# Patient Record
Sex: FEMALE | Race: BLACK OR AFRICAN AMERICAN | Employment: UNEMPLOYED | URBAN - METROPOLITAN AREA
[De-identification: names, ages, dates, MRNs, and addresses within clinical notes are randomized per-mention and may not be internally consistent; named-entity substitution may affect disease eponyms.]

---

## 2017-01-09 ENCOUNTER — HOSPITAL ENCOUNTER (INPATIENT)
Age: 42
LOS: 2 days | Discharge: HOME | DRG: 189 | End: 2017-01-11
Attending: EMERGENCY MEDICINE | Admitting: INTERNAL MEDICINE
Payer: MEDICAID

## 2017-01-09 ENCOUNTER — APPOINTMENT (OUTPATIENT)
Dept: GENERAL RADIOLOGY | Age: 42
DRG: 189 | End: 2017-01-09
Attending: EMERGENCY MEDICINE
Payer: MEDICAID

## 2017-01-09 DIAGNOSIS — J96.00 ACUTE RESPIRATORY FAILURE, UNSPECIFIED WHETHER WITH HYPOXIA OR HYPERCAPNIA (HCC): Primary | ICD-10-CM

## 2017-01-09 PROBLEM — J44.9 COPD (CHRONIC OBSTRUCTIVE PULMONARY DISEASE) (HCC): Status: ACTIVE | Noted: 2017-01-09

## 2017-01-09 LAB
ALBUMIN SERPL BCP-MCNC: 3.9 G/DL (ref 3.5–5)
ALBUMIN/GLOB SERPL: 1 {RATIO} (ref 1.1–2.2)
ALP SERPL-CCNC: 78 U/L (ref 45–117)
ALT SERPL-CCNC: 25 U/L (ref 12–78)
AMMONIA PLAS-SCNC: 22 UMOL/L
ANION GAP BLD CALC-SCNC: 6 MMOL/L (ref 5–15)
ARTERIAL PATENCY WRIST A: YES
AST SERPL W P-5'-P-CCNC: 27 U/L (ref 15–37)
BASE DEFICIT BLDA-SCNC: 2.7 MMOL/L
BASOPHILS # BLD AUTO: 0 K/UL (ref 0–0.1)
BASOPHILS # BLD: 1 % (ref 0–1)
BDY SITE: ABNORMAL
BILIRUB SERPL-MCNC: 0.2 MG/DL (ref 0.2–1)
BUN SERPL-MCNC: 9 MG/DL (ref 6–20)
BUN/CREAT SERPL: 10 (ref 12–20)
CALCIUM SERPL-MCNC: 8.4 MG/DL (ref 8.5–10.1)
CHLORIDE SERPL-SCNC: 106 MMOL/L (ref 97–108)
CO2 SERPL-SCNC: 30 MMOL/L (ref 21–32)
CREAT SERPL-MCNC: 0.92 MG/DL (ref 0.55–1.02)
EOSINOPHIL # BLD: 0.6 K/UL (ref 0–0.4)
EOSINOPHIL NFR BLD: 12 % (ref 0–7)
ERYTHROCYTE [DISTWIDTH] IN BLOOD BY AUTOMATED COUNT: 15.6 % (ref 11.5–14.5)
GLOBULIN SER CALC-MCNC: 3.8 G/DL (ref 2–4)
GLUCOSE SERPL-MCNC: 98 MG/DL (ref 65–100)
HCO3 BLDA-SCNC: 24 MMOL/L (ref 22–26)
HCT VFR BLD AUTO: 38.4 % (ref 35–47)
HGB BLD-MCNC: 12.5 G/DL (ref 11.5–16)
INR PPP: 1.1 (ref 0.9–1.1)
LACTATE SERPL-SCNC: 1.4 MMOL/L (ref 0.4–2)
LYMPHOCYTES # BLD AUTO: 36 % (ref 12–49)
LYMPHOCYTES # BLD: 1.7 K/UL (ref 0.8–3.5)
MCH RBC QN AUTO: 29.3 PG (ref 26–34)
MCHC RBC AUTO-ENTMCNC: 32.6 G/DL (ref 30–36.5)
MCV RBC AUTO: 89.9 FL (ref 80–99)
MONOCYTES # BLD: 0.5 K/UL (ref 0–1)
MONOCYTES NFR BLD AUTO: 10 % (ref 5–13)
NEUTS SEG # BLD: 2 K/UL (ref 1.8–8)
NEUTS SEG NFR BLD AUTO: 41 % (ref 32–75)
PCO2 BLDA: 47 MMHG (ref 35–45)
PH BLDA: 7.32 [PH] (ref 7.35–7.45)
PLATELET # BLD AUTO: 278 K/UL (ref 150–400)
PO2 BLDA: 201 MMHG (ref 80–100)
POTASSIUM SERPL-SCNC: 3.2 MMOL/L (ref 3.5–5.1)
PROT SERPL-MCNC: 7.7 G/DL (ref 6.4–8.2)
PROTHROMBIN TIME: 11.1 SEC (ref 9–11.1)
RBC # BLD AUTO: 4.27 M/UL (ref 3.8–5.2)
SAO2 % BLD: 99 % (ref 92–97)
SAO2% DEVICE SAO2% SENSOR NAME: ABNORMAL
SODIUM SERPL-SCNC: 142 MMOL/L (ref 136–145)
SPECIMEN SITE: ABNORMAL
WBC # BLD AUTO: 4.7 K/UL (ref 3.6–11)

## 2017-01-09 PROCEDURE — 80053 COMPREHEN METABOLIC PANEL: CPT | Performed by: EMERGENCY MEDICINE

## 2017-01-09 PROCEDURE — 83605 ASSAY OF LACTIC ACID: CPT | Performed by: EMERGENCY MEDICINE

## 2017-01-09 PROCEDURE — 65660000000 HC RM CCU STEPDOWN

## 2017-01-09 PROCEDURE — 71010 XR CHEST PORT: CPT

## 2017-01-09 PROCEDURE — 74011000250 HC RX REV CODE- 250: Performed by: EMERGENCY MEDICINE

## 2017-01-09 PROCEDURE — 74011000250 HC RX REV CODE- 250: Performed by: INTERNAL MEDICINE

## 2017-01-09 PROCEDURE — 74011250636 HC RX REV CODE- 250/636: Performed by: INTERNAL MEDICINE

## 2017-01-09 PROCEDURE — 74011250637 HC RX REV CODE- 250/637: Performed by: INTERNAL MEDICINE

## 2017-01-09 PROCEDURE — 82803 BLOOD GASES ANY COMBINATION: CPT | Performed by: EMERGENCY MEDICINE

## 2017-01-09 PROCEDURE — 77030013140 HC MSK NEB VYRM -A

## 2017-01-09 PROCEDURE — 94644 CONT INHLJ TX 1ST HOUR: CPT

## 2017-01-09 PROCEDURE — 36600 WITHDRAWAL OF ARTERIAL BLOOD: CPT | Performed by: EMERGENCY MEDICINE

## 2017-01-09 PROCEDURE — 85025 COMPLETE CBC W/AUTO DIFF WBC: CPT | Performed by: EMERGENCY MEDICINE

## 2017-01-09 PROCEDURE — 96374 THER/PROPH/DIAG INJ IV PUSH: CPT

## 2017-01-09 PROCEDURE — 36415 COLL VENOUS BLD VENIPUNCTURE: CPT | Performed by: EMERGENCY MEDICINE

## 2017-01-09 PROCEDURE — 82140 ASSAY OF AMMONIA: CPT | Performed by: EMERGENCY MEDICINE

## 2017-01-09 PROCEDURE — 74011250636 HC RX REV CODE- 250/636: Performed by: EMERGENCY MEDICINE

## 2017-01-09 PROCEDURE — 85610 PROTHROMBIN TIME: CPT | Performed by: EMERGENCY MEDICINE

## 2017-01-09 PROCEDURE — 80178 ASSAY OF LITHIUM: CPT | Performed by: INTERNAL MEDICINE

## 2017-01-09 PROCEDURE — 96361 HYDRATE IV INFUSION ADD-ON: CPT

## 2017-01-09 PROCEDURE — 99285 EMERGENCY DEPT VISIT HI MDM: CPT

## 2017-01-09 RX ORDER — ALBUTEROL SULFATE 0.83 MG/ML
1.25 SOLUTION RESPIRATORY (INHALATION)
Status: DISCONTINUED | OUTPATIENT
Start: 2017-01-09 | End: 2017-01-10

## 2017-01-09 RX ORDER — QUETIAPINE FUMARATE 25 MG/1
50 TABLET, FILM COATED ORAL 2 TIMES DAILY
Status: DISCONTINUED | OUTPATIENT
Start: 2017-01-10 | End: 2017-01-11 | Stop reason: HOSPADM

## 2017-01-09 RX ORDER — SODIUM CHLORIDE 0.9 % (FLUSH) 0.9 %
5-10 SYRINGE (ML) INJECTION AS NEEDED
Status: DISCONTINUED | OUTPATIENT
Start: 2017-01-09 | End: 2017-01-11 | Stop reason: HOSPADM

## 2017-01-09 RX ORDER — LITHIUM CARBONATE 300 MG/1
300 CAPSULE ORAL 2 TIMES DAILY
Status: DISCONTINUED | OUTPATIENT
Start: 2017-01-09 | End: 2017-01-11 | Stop reason: HOSPADM

## 2017-01-09 RX ORDER — ALBUTEROL SULFATE 2.5 MG/.5ML
5 SOLUTION RESPIRATORY (INHALATION) CONTINUOUS
Status: DISCONTINUED | OUTPATIENT
Start: 2017-01-09 | End: 2017-01-09

## 2017-01-09 RX ORDER — CLONAZEPAM 0.5 MG/1
0.5 TABLET ORAL 2 TIMES DAILY
Status: DISCONTINUED | OUTPATIENT
Start: 2017-01-09 | End: 2017-01-11 | Stop reason: HOSPADM

## 2017-01-09 RX ORDER — ALBUTEROL SULFATE 0.83 MG/ML
10 SOLUTION RESPIRATORY (INHALATION) CONTINUOUS
Status: DISCONTINUED | OUTPATIENT
Start: 2017-01-09 | End: 2017-01-10

## 2017-01-09 RX ORDER — SODIUM CHLORIDE 0.9 % (FLUSH) 0.9 %
5-10 SYRINGE (ML) INJECTION EVERY 8 HOURS
Status: DISCONTINUED | OUTPATIENT
Start: 2017-01-09 | End: 2017-01-11 | Stop reason: HOSPADM

## 2017-01-09 RX ORDER — PROCHLORPERAZINE EDISYLATE 5 MG/ML
5 INJECTION INTRAMUSCULAR; INTRAVENOUS
Status: DISCONTINUED | OUTPATIENT
Start: 2017-01-09 | End: 2017-01-11 | Stop reason: HOSPADM

## 2017-01-09 RX ORDER — SERTRALINE HYDROCHLORIDE 50 MG/1
100 TABLET, FILM COATED ORAL DAILY
Status: DISCONTINUED | OUTPATIENT
Start: 2017-01-10 | End: 2017-01-10 | Stop reason: SDUPTHER

## 2017-01-09 RX ORDER — IPRATROPIUM BROMIDE 0.5 MG/2.5ML
0.5 SOLUTION RESPIRATORY (INHALATION)
Status: DISCONTINUED | OUTPATIENT
Start: 2017-01-09 | End: 2017-01-10

## 2017-01-09 RX ORDER — PRAZOSIN HYDROCHLORIDE 1 MG/1
2 CAPSULE ORAL
Status: DISCONTINUED | OUTPATIENT
Start: 2017-01-09 | End: 2017-01-11 | Stop reason: HOSPADM

## 2017-01-09 RX ORDER — POTASSIUM CHLORIDE 7.45 MG/ML
10 INJECTION INTRAVENOUS ONCE
Status: COMPLETED | OUTPATIENT
Start: 2017-01-10 | End: 2017-01-10

## 2017-01-09 RX ORDER — HEPARIN SODIUM 5000 [USP'U]/ML
5000 INJECTION, SOLUTION INTRAVENOUS; SUBCUTANEOUS EVERY 8 HOURS
Status: DISCONTINUED | OUTPATIENT
Start: 2017-01-10 | End: 2017-01-11 | Stop reason: HOSPADM

## 2017-01-09 RX ADMIN — ALBUTEROL SULFATE 1.25 MG: 2.5 SOLUTION RESPIRATORY (INHALATION) at 21:35

## 2017-01-09 RX ADMIN — Medication 10 ML: at 21:22

## 2017-01-09 RX ADMIN — IPRATROPIUM BROMIDE 0.5 MG: 0.5 SOLUTION RESPIRATORY (INHALATION) at 21:35

## 2017-01-09 RX ADMIN — CLONAZEPAM 0.5 MG: 0.5 TABLET ORAL at 21:35

## 2017-01-09 RX ADMIN — GABAPENTIN 800 MG: 100 CAPSULE ORAL at 21:21

## 2017-01-09 RX ADMIN — METHYLPREDNISOLONE SODIUM SUCCINATE 125 MG: 125 INJECTION, POWDER, FOR SOLUTION INTRAMUSCULAR; INTRAVENOUS at 19:43

## 2017-01-09 RX ADMIN — AZITHROMYCIN MONOHYDRATE 500 MG: 500 INJECTION, POWDER, LYOPHILIZED, FOR SOLUTION INTRAVENOUS at 21:22

## 2017-01-09 RX ADMIN — GUAIFENESIN 600 MG: 600 TABLET, EXTENDED RELEASE ORAL at 21:39

## 2017-01-09 RX ADMIN — LIDOCAINE HYDROCHLORIDE 1 G: 10 INJECTION, SOLUTION EPIDURAL; INFILTRATION; INTRACAUDAL; PERINEURAL at 21:21

## 2017-01-09 RX ADMIN — PROCHLORPERAZINE EDISYLATE 5 MG: 5 INJECTION INTRAMUSCULAR; INTRAVENOUS at 23:17

## 2017-01-09 RX ADMIN — SODIUM CHLORIDE 1000 ML: 900 INJECTION, SOLUTION INTRAVENOUS at 19:44

## 2017-01-09 RX ADMIN — ALBUTEROL SULFATE 10 MG: 2.5 SOLUTION RESPIRATORY (INHALATION) at 18:17

## 2017-01-09 NOTE — IP AVS SNAPSHOT
Summary of Care Report The Summary of Care report has been created to help improve care coordination. Users with access to [employer] or [employer] (Web-based application) may access additional patient information including the Discharge Summary. If you are not currently a [address] user and need more information, please call the number listed below in the [place] [address] section and ask to be connected with Medical Records. Facility Information Name Address Phone [address] [address] [phone] Patient Information Patient Name Sex MALENA Han ([id number]) Female 1975 Discharge Information Admitting Provider Service Area Unit Kayden Beavers MD / [phone] [address] [facility] / [phone] Discharge Provider Discharge Date/Time Discharge Disposition Destination (none) 2017 (Pending) AHR (none) Patient Language Language ENGLISH [13] Problem List as of 2017  Date Reviewed: 2017 Codes Priority Class Noted - Resolved Unspecified asthma, with exacerbation ICD-10-CM: J45.901 ICD-9-CM: 493.92   2014 - Present Heroin abuse ICD-10-CM: F11.10 ICD-9-CM: 305.50   2014 - Present COPD (chronic obstructive pulmonary disease) (HCC) ICD-10-CM: J44.9 ICD-9-CM: 896   2017 - Present Sinus tachycardia ICD-10-CM: R00.0 ICD-9-CM: 427.89   1/10/2017 - Present HTN (hypertension) ICD-10-CM: I10 
ICD-9-CM: 401.9   2017 - Present Drug abuse ICD-10-CM: F19.10 ICD-9-CM: 305.90   2017 - Present Bipolar disorder ([address] ICD-10-CM: F31.9 ICD-9-CM: 296.80   2017 - Present You are allergic to the following No active allergies Current Discharge Medication List  
  
START taking these medications Dose & Instructions Dispensing Information Comments  
 acetaminophen 325 mg tablet Commonly known as:  TYLENOL Dose:  650 mg Take 2 Tabs by mouth every four (4) hours as needed for Fever. Quantity:  40 Tab Refills:  0  
   
 albuterol-ipratropium 2.5 mg-0.5 mg/3 ml Nebu Commonly known as:  Ryan Marques Dose:  3 mL  
3 mL by Nebulization route every six (6) hours as needed. Quantity:  100 Nebule Refills:  1  
   
 dilTIAZem  mg ER capsule Commonly known as:  CARDIZEM CD Dose:  120 mg Take 1 Cap by mouth daily. Quantity:  30 Cap Refills:  1  
   
 fluticasone-vilanterol 200-25 mcg/dose inhaler Commonly known as:  BREO ELLIPTA Dose:  1 Puff Take 1 Puff by inhalation daily. Quantity:  28 Each Refills:  1  
   
 levoFLOXacin 500 mg tablet Commonly known as:  Fairchild Parma Dose:  500 mg Take 1 Tab by mouth daily for 10 days. Quantity:  10 Tab Refills:  0 Nebulizer & Compressor machine Dose:  1 Each  
1 Each by Does Not Apply route daily. Quantity:  1 Each Refills:  0  
   
 nicotine 14 mg/24 hr patch Commonly known as:  Erskin Neither Dose:  1 Patch 1 Patch by TransDERmal route daily for 30 days. Quantity:  30 Patch Refills:  0  
   
 oxyCODONE-acetaminophen 5-325 mg per tablet Commonly known as:  PERCOCET Dose:  1 Tab Take 1 Tab by mouth every six (6) hours as needed. Max Daily Amount: 4 Tabs. Quantity:  15 Tab Refills:  0 CONTINUE these medications which have CHANGED Dose & Instructions Dispensing Information Comments  
 albuterol 90 mcg/actuation inhaler Commonly known as:  PROVENTIL HFA, VENTOLIN HFA, PROAIR HFA What changed:   
- how much to take 
- reasons to take this - Another medication with the same name was removed. Continue taking this medication, and follow the directions you see here. Dose:  2 Puff Take 2 Puffs by inhalation every four (4) hours as needed for Wheezing or Shortness of Breath. Quantity:  1 Inhaler Refills:  0  
   
 prazosin 2 mg capsule Commonly known as:  MINIPRESS What changed:  Another medication with the same name was removed. Continue taking this medication, and follow the directions you see here. Dose:  2 mg Take 1 Cap by mouth nightly. Quantity:  30 Cap Refills:  1  
   
 predniSONE 10 mg tablet Commonly known as:  Radhasy Arnoldll What changed:   
- how much to take 
- how to take this - when to take this 
- reasons to take this 
- additional instructions Dose:  10 mg Take 1 Tab by mouth Multiple. Take 2 tab po daily for 3 days Then take 1 tab po daily for 3 days Then take 1/2 tab po daily for 3 days then stop Quantity:  12 Tab Refills:  0 QUEtiapine 400 mg tablet Commonly known as:  SEROquel What changed:   
- medication strength 
- how much to take - Another medication with the same name was removed. Continue taking this medication, and follow the directions you see here. Dose:  400 mg Take 1 Tab by mouth two (2) times a day. Quantity:  60 Tab Refills:  1 CONTINUE these medications which have NOT CHANGED Dose & Instructions Dispensing Information Comments  
 clonazePAM 0.5 mg tablet Commonly known as:  Dhaval Brocks Dose:  0.5 mg Take 1 Tab by mouth two (2) times a day. Max Daily Amount: 1 mg. Quantity:  60 Tab Refills:  0  
   
 cloNIDine HCl 0.3 mg tablet Commonly known as:  CATAPRES Dose:  0.3 mg Take 1 Tab by mouth three (3) times daily. Quantity:  90 Tab Refills:  1  
   
 gabapentin 800 mg tablet Commonly known as:  NEURONTIN Dose:  800 mg Take 1 Tab by mouth three (3) times daily. Quantity:  90 Tab Refills:  1  
   
 lithium carbonate 300 mg capsule Dose:  300 mg Take 1 Cap by mouth two (2) times daily (with meals). Quantity:  60 Cap Refills:  1  
   
 sertraline 100 mg tablet Commonly known as:  ZOLOFT Dose:  100 mg Take 1 Tab by mouth daily. Quantity:  30 Tab Refills:  1 STOP taking these medications Comments  
 budesonide-formoterol 160-4.5 mcg/actuation HFA inhaler Commonly known as:  SYMBICORT  
   
   
 SYMBICORT IN  
   
   
  
  
  
Current Immunizations Name Date Influenza Vaccine  Deferred (Patient Refused) Pneumococcal Polysaccharide (PPSV-23)  Deferred (Patient Refused) Follow-up Information Follow up With Details Comments Contact Info None   None (395) Patient stated that they have no PCP Discharge Instructions HOSPITALIST DISCHARGE INSTRUCTIONS 
NAME: Milena Dobbs :  1975 MRN:  [id number] Date/Time:  2017 9:59 AM 
 
ADMIT DATE: 2017 DISCHARGE DATE: 2017 ADMITTING DIAGNOSIS:  Respiratory Failure, COPD Exacerbation, HTN, Bipolar Ds, Chronic Pain, Smoker, Drug Abuse Patient Active Problem List  
Diagnosis Code  Unspecified asthma, with exacerbation J45. [address]  Heroin abuse F11.10  
 COPD (chronic obstructive pulmonary disease) ([facility]) J44.9  Sinus tachycardia R00.0  
 HTN (hypertension) I10  
 Drug abuse F19.10  Bipolar disorder ([facility] F31.9 DISCHARGE DIAGNOSIS: 
Patient Active Problem List  
Diagnosis Code  Unspecified asthma, with exacerbation J45. [address]  Heroin abuse F11.10  
 COPD (chronic obstructive pulmonary disease) (HCC) J44.9  Sinus tachycardia R00.0  
 HTN (hypertension) I10  
 Drug abuse F19.10  Bipolar disorder ([facility] F31.9 MEDICATIONS: 
  
 
         As per medication reconciliation · It is important that you take the medication exactly as they are prescribed. · Keep your medication in the bottles provided by the pharmacist and keep a list of the medication names, dosages, and times to be taken in your wallet. · Do not take other medications without consulting your doctor. Pain Management: per above medications What to do at [facility] Recommended diet:  Cardiac Diet Recommended activity: Activity as tolerated and No driving while on analgesics If you experience any of the following symptoms then please call your primary care physician or return to the emergency room if you cannot get hold of your doctor: 
Fever, chills, nausea, vomiting, diarrhea, change in mentation, falling, bleeding, shortness of breath. Follow Up: 
 PCP you are to call and set up an appointment to see them in 2 week. Information obtained by : 
I understand that if any problems occur once I am at home I am to contact my physician. I understand and acknowledge receipt of the instructions indicated above. Physician's or R.N.'s Signature                                                                  Date/Time Patient or Representative Signature                                                          Date/Time Chart Review Routing History Recipient Method Report Sent By Bill Gandara None [address] IP Auto Routed Notes Fabian Hall MD [facility] 1/30/2014  6:20 AM 01/30/2014 None [address] IP Auto Routed Notes Fabian Hall MD [facility] 2/2/2014  2:20 PM 02/02/2014 None [address] IP Auto Routed Notes Glenys Jaeger MD [[id number]] 2/4/2014 11:22 PM 02/04/2014 None [address] IP Auto Routed Notes Amy Kirby MD [[id number]] 2/6/2014  4:20 PM 02/06/2014

## 2017-01-09 NOTE — ED NOTES
Pt reports being difficult IV stick, always requiring it \"in my neck\". 1 attempt at IV insertion by Jamison Kirkland RN, with no success.

## 2017-01-09 NOTE — IP AVS SNAPSHOT
[address] [facility] 
[phone] Patient: Cherry Guido MRN: [id number] :1975 You are allergic to the following No active allergies Recent Documentation Height Weight BMI OB Status Smoking Status 1.626 m 68 kg 25.75 kg/m2 Having regular periods Current Every Day Smoker Unresulted Labs Order Current Status LEGIONELLA PNEUMOPHILA AG, URINE  In process S. PNEUMO AG, URINE/CSF ONLY In process T3 TOTAL In process CULTURE, URINE Preliminary result CULTURE, URINE Preliminary result Emergency Contacts Name Discharge Info Relation Home Work Mobile Amalia Kim  Parent [1] [phone] About your hospitalization You were admitted on:  2017 You last received care in the:  [facility] 2 CARDIOPULMONARY CARE You were discharged on:  2017 Unit phone number:  [phone] Why you were hospitalized Your primary diagnosis was:  Not on File Your diagnoses also included:  Copd (Chronic Obstructive Pulmonary Disease) (Hcc), Sinus Tachycardia, Htn (Hypertension), Drug Abuse, Bipolar Disorder (Hcc) Providers Seen During Your Hospitalizations Provider Role Specialty Primary office phone Clarke Turner MD Attending Provider Emergency Medicine [phone] Jodi Godoy MD Attending Provider Hospitalist [phone] Your Primary Care Physician (PCP) Primary Care Physician Office Phone Office Fax NONE ** None ** ** None ** Follow-up Information Follow up With Details Comments Contact Info None   None (395) Patient stated that they have no PCP Current Discharge Medication List  
  
START taking these medications Dose & Instructions Dispensing Information Comments Morning Noon Evening Bedtime  
 acetaminophen 325 mg tablet Commonly known as:  TYLENOL  
   
 Your next dose is: Today, Tomorrow Other:  _________ Dose:  650 mg Take 2 Tabs by mouth every four (4) hours as needed for Fever. Quantity:  40 Tab Refills:  0  
     
   
   
   
  
 albuterol-ipratropium 2.5 mg-0.5 mg/3 ml Nebu Commonly known as:  Rosario Medicus Your next dose is: Today, Tomorrow Other:  _________ Dose:  3 mL  
3 mL by Nebulization route every six (6) hours as needed. Quantity:  100 Nebule Refills:  1  
     
   
   
   
  
 dilTIAZem  mg ER capsule Commonly known as:  CARDIZEM CD Your next dose is: Today, Tomorrow Other:  _________ Dose:  120 mg Take 1 Cap by mouth daily. Quantity:  30 Cap Refills:  1  
     
   
   
   
  
 fluticasone-vilanterol 200-25 mcg/dose inhaler Commonly known as:  BREO ELLIPTA Your next dose is: Today, Tomorrow Other:  _________ Dose:  1 Puff Take 1 Puff by inhalation daily. Quantity:  28 Each Refills:  1  
     
   
   
   
  
 levoFLOXacin 500 mg tablet Commonly known as:  Duaine Hurter Your next dose is: Today, Tomorrow Other:  _________ Dose:  500 mg Take 1 Tab by mouth daily for 10 days. Quantity:  10 Tab Refills:  0 Nebulizer & Compressor machine Your next dose is: Today, Tomorrow Other:  _________ Dose:  1 Each  
1 Each by Does Not Apply route daily. Quantity:  1 Each Refills:  0  
     
   
   
   
  
 nicotine 14 mg/24 hr patch Commonly known as:  Rola Capri Your next dose is: Today, Tomorrow Other:  _________ Dose:  1 Patch 1 Patch by TransDERmal route daily for 30 days. Quantity:  30 Patch Refills:  0  
     
   
   
   
  
 oxyCODONE-acetaminophen 5-325 mg per tablet Commonly known as:  PERCOCET Your next dose is: Today, Tomorrow Other:  _________ Dose:  1 Tab Take 1 Tab by mouth every six (6) hours as needed. Max Daily Amount: 4 Tabs. Quantity:  15 Tab Refills:  0 CONTINUE these medications which have CHANGED Dose & Instructions Dispensing Information Comments Morning Noon Evening Bedtime  
 albuterol 90 mcg/actuation inhaler Commonly known as:  PROVENTIL HFA, VENTOLIN HFA, PROAIR HFA What changed:   
- how much to take 
- reasons to take this - Another medication with the same name was removed. Continue taking this medication, and follow the directions you see here. Your next dose is: Today, Tomorrow Other:  _________ Dose:  2 Puff Take 2 Puffs by inhalation every four (4) hours as needed for Wheezing or Shortness of Breath. Quantity:  1 Inhaler Refills:  0  
     
   
   
   
  
 prazosin 2 mg capsule Commonly known as:  MINIPRESS What changed:  Another medication with the same name was removed. Continue taking this medication, and follow the directions you see here. Your next dose is: Today, Tomorrow Other:  _________ Dose:  2 mg Take 1 Cap by mouth nightly. Quantity:  30 Cap Refills:  1  
     
   
   
   
  
 predniSONE 10 mg tablet Commonly known as:  Merle Kendell What changed:   
- how much to take 
- how to take this - when to take this 
- reasons to take this 
- additional instructions Your next dose is: Today, Tomorrow Other:  _________ Dose:  10 mg Take 1 Tab by mouth Multiple. Take 2 tab po daily for 3 days Then take 1 tab po daily for 3 days Then take 1/2 tab po daily for 3 days then stop Quantity:  12 Tab Refills:  0 QUEtiapine 400 mg tablet Commonly known as:  SEROquel What changed:   
- medication strength 
- how much to take - Another medication with the same name was removed. Continue taking this medication, and follow the directions you see here. Your next dose is: Today, Tomorrow Other:  _________ Dose:  400 mg Take 1 Tab by mouth two (2) times a day. Quantity:  60 Tab Refills:  1 CONTINUE these medications which have NOT CHANGED Dose & Instructions Dispensing Information Comments Morning Noon Evening Bedtime  
 clonazePAM 0.5 mg tablet Commonly known as:  [place] Hany Your next dose is: Today, Tomorrow Other:  _________ Dose:  0.5 mg Take 1 Tab by mouth two (2) times a day. Max Daily Amount: 1 mg. Quantity:  60 Tab Refills:  0  
     
   
   
   
  
 cloNIDine HCl 0.3 mg tablet Commonly known as:  CATAPRES Your next dose is: Today, Tomorrow Other:  _________ Dose:  0.3 mg Take 1 Tab by mouth three (3) times daily. Quantity:  90 Tab Refills:  1  
     
   
   
   
  
 gabapentin 800 mg tablet Commonly known as:  NEURONTIN Your next dose is: Today, Tomorrow Other:  _________ Dose:  800 mg Take 1 Tab by mouth three (3) times daily. Quantity:  90 Tab Refills:  1  
     
   
   
   
  
 lithium carbonate 300 mg capsule Your next dose is: Today, Tomorrow Other:  _________ Dose:  300 mg Take 1 Cap by mouth two (2) times daily (with meals). Quantity:  60 Cap Refills:  1  
     
   
   
   
  
 sertraline 100 mg tablet Commonly known as:  ZOLOFT Your next dose is: Today, Tomorrow Other:  _________ Dose:  100 mg Take 1 Tab by mouth daily. Quantity:  30 Tab Refills:  1 STOP taking these medications   
 budesonide-formoterol 160-4.5 mcg/actuation HFA inhaler Commonly known as:  SYMBICORT  
   
  
 SYMBICORT IN Where to Get Your Medications Information on where to get these meds will be given to you by the nurse or doctor. ! Ask your nurse or doctor about these medications acetaminophen 325 mg tablet  
 albuterol 90 mcg/actuation inhaler  
 albuterol-ipratropium 2.5 mg-0.5 mg/3 ml Nebu  
 clonazePAM 0.5 mg tablet  
 cloNIDine HCl 0.3 mg tablet  
 dilTIAZem  mg ER capsule  
 fluticasone-vilanterol 200-25 mcg/dose inhaler  
 gabapentin 800 mg tablet  
 levoFLOXacin 500 mg tablet  
 lithium carbonate 300 mg capsule Nebulizer & Compressor machine  
 nicotine 14 mg/24 hr patch  
 oxyCODONE-acetaminophen 5-325 mg per tablet  
 prazosin 2 mg capsule  
 predniSONE 10 mg tablet QUEtiapine 400 mg tablet  
 sertraline 100 mg tablet Discharge Instructions HOSPITALIST DISCHARGE INSTRUCTIONS 
NAME: David Dunn :  1975 MRN:  [id number] Date/Time:  2017 9:59 AM 
 
ADMIT DATE: 2017 DISCHARGE DATE: 2017 ADMITTING DIAGNOSIS:  Respiratory Failure, COPD Exacerbation, HTN, Bipolar Ds, Chronic Pain, Smoker, Drug Abuse Patient Active Problem List  
Diagnosis Code  Unspecified asthma, with exacerbation J45. 0  Heroin abuse F11.10  
 COPD (chronic obstructive pulmonary disease) ([facility]) J44.9  Sinus tachycardia R00.0  
 HTN (hypertension) I10  
 Drug abuse F19.10  Bipolar disorder ([facility] F31.9 DISCHARGE DIAGNOSIS: 
Patient Active Problem List  
Diagnosis Code  Unspecified asthma, with exacerbation J45. 0  Heroin abuse F11.10  
 COPD (chronic obstructive pulmonary disease) (HCC) J44.9  Sinus tachycardia R00.0  
 HTN (hypertension) I10  
 Drug abuse F19.10  Bipolar disorder ([facility] F31.9 MEDICATIONS: 
  
 
         As per medication reconciliation · It is important that you take the medication exactly as they are prescribed. · Keep your medication in the bottles provided by the pharmacist and keep a list of the medication names, dosages, and times to be taken in your wallet. · Do not take other medications without consulting your doctor. Pain Management: per above medications What to do at [facility] Recommended diet:  Cardiac Diet Recommended activity: Activity as tolerated and No driving while on analgesics If you experience any of the following symptoms then please call your primary care physician or return to the emergency room if you cannot get hold of your doctor: 
Fever, chills, nausea, vomiting, diarrhea, change in mentation, falling, bleeding, shortness of breath. Follow Up: 
 PCP you are to call and set up an appointment to see them in 2 week. Information obtained by : 
I understand that if any problems occur once I am at home I am to contact my physician. I understand and acknowledge receipt of the instructions indicated above. Physician's or R.N.'s Signature                                                                  Date/Time Patient or Representative Signature                                                          Date/Time Discharge Orders None [employer] Announcement We are excited to announce that we are making your provider's discharge notes available to you in [employer]. You will see these notes when they are completed and signed by the physician that discharged you from your recent hospital stay. If you have any questions or concerns about any information you see in [employer], please call the Health Information Department where you were seen or reach out to your Primary Care Provider for more information about your plan of care. Introducing [facility]! Dede Sharpe introduces [employer] patient portal. Now you can access parts of your medical record, email your doctor's office, and request medication refills online.    
 
1. In your internet browser, go to [email or web address] 2. Click on the First Time User? Click Here link in the Sign In box. You will see the New Member Sign Up page. 3. Enter your [employer] Access Code exactly as it appears below. You will not need to use this code after youve completed the sign-up process. If you do not sign up before the expiration date, you must request a new code. · [employer] Access Code: [email or web address] Expires: 4/9/2017  6:14 PM 
 
4. Enter the last four digits of your Social Security Number (xxxx) and Date of Birth (mm/dd/yyyy) as indicated and click Submit. You will be taken to the next sign-up page. 5. Create a [employer] ID. This will be your [employer] login ID and cannot be changed, so think of one that is secure and easy to remember. 6. Create a [employer] password. You can change your password at any time. 7. Enter your Password Reset Question and Answer. This can be used at a later time if you forget your password. 8. Enter your e-mail address. You will receive e-mail notification when new information is available in [address] 9. Click Sign Up. You can now view and download portions of your medical record. 10. Click the Download Summary menu link to download a portable copy of your medical information. If you have questions, please visit the Frequently Asked Questions section of the [employer] website. Remember, [employer] is NOT to be used for urgent needs. For medical emergencies, dial 911. Now available from your iPhone and Android! General Information Please provide this summary of care documentation to your next provider. Patient Signature:  ____________________________________________________________ Date:  ____________________________________________________________  
  
Scharlene Alosa Provider Signature:  ____________________________________________________________ Date:  ____________________________________________________________

## 2017-01-09 NOTE — ED PROVIDER NOTES
HPI Comments: Livier Ross is a 35 y.o. Female with PMHx significant for HTN, COPD, and asthma with intubation who presents via EMS to the ED to be evaluated for respiratory distress. Per EMS, pt was initially at 80% SO2 upon their arrival, but improved to 100% SO2 when she was placed on C-PAP at 8L with duo-nebulizer. According to EMS, pt is severely orthopneic. She had a recent drug relapse (heroin) this morning. She called 911 this afternoon but was unable to speak. PCP: None     Social hx: + Smoke( 4 cigs/day), + EtOH (quit), + Illicit Drugs (heroin)    HPI was mainly collected from EMS, and thus HPI is limited in nature. Written by Marquis Ryan ED Scribaleksandra, as dictated by Les Pinto MD.      The history is provided by the patient and the EMS personnel. No  was used. Past Medical History:   Diagnosis Date    Arrhythmia      irregular    Asthma     Chronic pain      back, leg    Depression     Hepatitis B     Hypertension      atrial fibrillation    Other ill-defined conditions      heart murmur    Psychiatric disorder      bipolar    Sarcoidosis ([facility] [address]        No past surgical history on file. Family History:   Problem Relation Age of Onset    Hypertension Father     Hypertension Mother        Social History     Social History    Marital status: SINGLE     Spouse name: N/A    Number of children: N/A    Years of education: N/A     Occupational History    Not on file. Social History Main Topics    Smoking status: Current Every Day Smoker    Smokeless tobacco: Not on file      Comment: 4 cig. day    Alcohol use No      Comment: quit    Drug use: Yes     Special: Heroin    Sexual activity: Not on file     Other Topics Concern    Not on file     Social History Narrative    No narrative on file         ALLERGIES: Review of patient's allergies indicates no known allergies.     Review of Systems   Unable to perform ROS: Acuity of condition     Patient Vitals for the past 12 hrs:   Pulse Resp BP SpO2   01/09/17 2135 (!) 113 - (!) 134/99 -   01/09/17 2000 69 17 147/77 100 %   01/09/17 1830 80 21 135/81 100 %   01/09/17 1805 94 19 122/87 100 %     Physical Exam   Constitutional: She is oriented to person, place, and time. She appears well-developed and well-nourished. She appears distressed. HENT:   Head: Atraumatic. Eyes: EOM are normal.   Cardiovascular: Normal rate, regular rhythm, normal heart sounds and intact distal pulses. Exam reveals no gallop and no friction rub. No murmur heard. Pulmonary/Chest: Accessory muscle usage present. No stridor. She is in respiratory distress. She has wheezes. She has no rhonchi. She has no rales. She exhibits no tenderness. Increased work of breathing, tripoding on non-rebreather 100% O2. Excessive accessory muscle usage. Wheezing diffusely through all lung fields. Prolonged expiratory phase. Abdominal: Soft. Bowel sounds are normal. She exhibits no distension and no mass. There is no tenderness. There is no rebound and no guarding. Musculoskeletal: Normal range of motion. She exhibits no edema or tenderness. Neurological: She is oriented to person, place, and time. Skin: Skin is warm. Psychiatric: She has a normal mood and affect. Nursing note and vitals reviewed.        MDM  Number of Diagnoses or Management Options  Acute respiratory failure, unspecified whether with hypoxia or hypercapnia [facility]):   Diagnosis management comments: Assessment/Plan: acute respiratory failure with hypoxia/hypercapnea, asthma exacerbation, COPD exacerbation, PNA       Amount and/or Complexity of Data Reviewed  Clinical lab tests: ordered and reviewed  Tests in the radiology section of CPT®: ordered and reviewed  Obtain history from someone other than the patient: yes (EMS)  Review and summarize past medical records: yes  Discuss the patient with other providers: yes (Hospitalist)    Critical Care  Total time providing critical care:  minutes    Patient Progress  Patient progress: stable        Procedures    CRITICAL CARE NOTE :    6:09 PM      IMPENDING DETERIORATION -Respiratory    ASSOCIATED RISK FACTORS - Hypoxia    MANAGEMENT- Bedside Assessment and Supervision of Care    INTERPRETATION -  Xrays, Blood Gases, ECG and Blood Pressure    INTERVENTIONS - vent mngmt    CASE REVIEW - Hospitalist, Nursing and Family    TREATMENT RESPONSE -Stable    PERFORMED BY - Self        NOTES   :      I have spent >100 minutes of critical care time involved in lab review, consultations with specialist, family decision- making, bedside attention and documentation. During this entire length of time I was immediately available to the patient . Jamison Wyman MD    Procedure Note - Limited Bedside Ultrasound- Peripheral Line Placement   6:53 PM  Performed by: Dc Garcia MD: Supervising Doctor: Jamison Wyman MD  Indication: need for venous access for blood work or imaging studies. Interpretation:   A peripheral venous line was placed using dynamic US guidance. Compression of vessel was performed to confirm venous placement. Area was prepped with Chlorprep. A 20 gauge catheter was placed in the left brachial antecubital area. 1 number of attempts. The procedure took 1-15 minutes, and pt tolerated well. Written by Sigifredo Camp, ED Scribe, as dictated by cD Garcia MD: Supervising Doctor: Jamison Wyman MD    Progress note:  7:49 PM  Pt has completed her 1 hour nebulizer treatment. She still has expiratory wheezing diffusely through all fields and a prolonged expiratory phase. She is still on non-rebreather 100%. Will attempt to ween pt off non-rebreather as tolerated. Plan admission.    Written by Sigifredo Camp, ED Scribe, as dictated by Jamison Wyman MD.    CONSULT NOTE:   8:12 PM  Jamison Wyman MD spoke with Gloria Hwang MD.   Specialty: Hospitalist  Discussed pt's hx, disposition, and available diagnostic and imaging results. Reviewed care plans. Consultant will evaluate pt for admission. Written by Jada Love ED Scribe, as dictated by Vasile Gonzáles MD.    LABORATORY TESTS:  Recent Results (from the past 12 hour(s))   BLOOD GAS, ARTERIAL    Collection Time: 01/09/17  6:26 PM   Result Value Ref Range    pH 7.32 (L) 7.35 - 7.45      PCO2 47 (H) 35.0 - 45.0 mmHg    PO2 201 (H) 80 - 100 mmHg    O2 SAT 99 (H) 92 - 97 %    BICARBONATE 24 22 - 26 mmol/L    BASE DEFICIT 2.7 mmol/L    O2 METHOD AEROSOL MASK      Sample source ARTERIAL      SITE LEFT RADIAL      ROSHAN'S TEST YES     CBC WITH AUTOMATED DIFF    Collection Time: 01/09/17  6:50 PM   Result Value Ref Range    WBC 4.7 3.6 - 11.0 K/uL    RBC 4.27 3.80 - 5.20 M/uL    HGB 12.5 11.5 - 16.0 g/dL    HCT 38.4 35.0 - 47.0 %    MCV 89.9 80.0 - 99.0 FL    MCH 29.3 26.0 - 34.0 PG    MCHC 32.6 30.0 - 36.5 g/dL    RDW 15.6 (H) 11.5 - 14.5 %    PLATELET 219 659 - 454 K/uL    NEUTROPHILS 41 32 - 75 %    LYMPHOCYTES 36 12 - 49 %    MONOCYTES 10 5 - 13 %    EOSINOPHILS 12 (H) 0 - 7 %    BASOPHILS 1 0 - 1 %    ABS. NEUTROPHILS 2.0 1.8 - 8.0 K/UL    ABS. LYMPHOCYTES 1.7 0.8 - 3.5 K/UL    ABS. MONOCYTES 0.5 0.0 - 1.0 K/UL    ABS. EOSINOPHILS 0.6 (H) 0.0 - 0.4 K/UL    ABS. BASOPHILS 0.0 0.0 - 0.1 K/UL   METABOLIC PANEL, COMPREHENSIVE    Collection Time: 01/09/17  6:50 PM   Result Value Ref Range    Sodium 142 136 - 145 mmol/L    Potassium 3.2 (L) 3.5 - 5.1 mmol/L    Chloride 106 97 - 108 mmol/L    CO2 30 21 - 32 mmol/L    Anion gap 6 5 - 15 mmol/L    Glucose 98 65 - 100 mg/dL    BUN 9 6 - 20 MG/DL    Creatinine 0.92 0.55 - 1.02 MG/DL    BUN/Creatinine ratio 10 (L) 12 - 20      GFR est AA >60 >60 ml/min/1.73m2    GFR est non-AA >60 >60 ml/min/1.73m2    Calcium 8.4 (L) 8.5 - 10.1 MG/DL    Bilirubin, total 0.2 0.2 - 1.0 MG/DL    ALT 25 12 - 78 U/L    AST 27 15 - 37 U/L    Alk.  phosphatase 78 45 - 117 U/L    Protein, total 7.7 6.4 - 8.2 g/dL    Albumin 3.9 3.5 - 5.0 g/dL Globulin 3.8 2.0 - 4.0 g/dL    A-G Ratio 1.0 (L) 1.1 - 2.2     LACTIC ACID, PLASMA    Collection Time: 01/09/17  6:50 PM   Result Value Ref Range    Lactic acid 1.4 0.4 - 2.0 MMOL/L   AMMONIA    Collection Time: 01/09/17  7:42 PM   Result Value Ref Range    Ammonia 22 <32 UMOL/L   PROTHROMBIN TIME + INR    Collection Time: 01/09/17  7:42 PM   Result Value Ref Range    INR 1.1 0.9 - 1.1      Prothrombin time 11.1 9.0 - 11.1 sec       IMAGING RESULTS:  XR CHEST PORT   Final Result   EXAM: XR CHEST PORT     INDICATION: Chest Pain     COMPARISON: 2014     FINDINGS: A portable AP radiograph of the chest was obtained at 1825 hours. The  patient is on a cardiac monitor. The lungs are clear of an acute process. Granulomatous changes are noted. . The cardiac and mediastinal contours and  pulmonary vascularity are normal. Bony structures are unchanged.      IMPRESSION  IMPRESSION: No acute abnormality is identified.        MEDICATIONS GIVEN:  Medications   albuterol (PROVENTIL VENTOLIN) nebulizer solution 10 mg (10 mg Nebulization New Bag 1/9/17 1817)   lithium carbonate capsule 300 mg (not administered)   sertraline (ZOLOFT) tablet 100 mg (not administered)   clonazePAM (KlonoPIN) tablet 0.5 mg (0.5 mg Oral Given 1/9/17 2135)   gabapentin (NEURONTIN) capsule 800 mg (800 mg Oral Given 1/9/17 2121)   sodium chloride (NS) flush 5-10 mL (10 mL IntraVENous Given 1/9/17 2122)   sodium chloride (NS) flush 5-10 mL (not administered)   ipratropium (ATROVENT) 0.02 % nebulizer solution 0.5 mg (0.5 mg Nebulization Given 1/9/17 2135)   albuterol (PROVENTIL VENTOLIN) nebulizer solution 1.25 mg (1.25 mg Nebulization Given 1/9/17 2135)   methylPREDNISolone (PF) (SOLU-MEDROL) injection 60 mg (not administered)   azithromycin (ZITHROMAX) 500 mg in 0.9% sodium chloride (MBP/ADV) 250 mL (500 mg IntraVENous New Bag 1/9/17 2122)   cefTRIAXone (ROCEPHIN) 1 g in 0.9% sodium chloride (MBP/ADV) 50 mL ADV (not administered)   guaiFENesin SR (Gregg & Gregg) tablet 600 mg (600 mg Oral Given 1/9/17 2139)   sodium chloride 0.9 % bolus infusion 1,000 mL (1,000 mL IntraVENous New Bag 1/9/17 1944)   methylPREDNISolone (PF) (SOLU-MEDROL) injection 125 mg (125 mg IntraVENous Given 1/9/17 1943)   cefTRIAXone (ROCEPHIN) 1 g in lidocaine (PF) (XYLOCAINE) 10 mg/mL (1 %) IM injection (1 g IntraMUSCular Given 1/9/17 2121)       IMPRESSION:  1. Acute respiratory failure, unspecified whether with hypoxia or hypercapnia ([facility] [address]        PLAN:  1. Admit to hospitalist.    ADMIT NOTE:  6:05 PM  The patient is being admitted to the hospital by Antonio Juárez MD.  The results of their tests and reasons for their admission have been discussed with the patient and/or available family. They convey agreement and understanding for the need to be admitted and for their admission diagnosis. This note is prepared by Kostas Dunne, acting as Scribe for Shandra Bojorquez MD.    Shandra Bojorquez MD: The scribe's documentation has been prepared under my direction and personally reviewed by me in its entirety. I confirm that the note above accurately reflects all work, treatment, procedures, and medical decision making performed by me.

## 2017-01-10 PROBLEM — R00.0 SINUS TACHYCARDIA: Status: ACTIVE | Noted: 2017-01-10

## 2017-01-10 LAB
ALBUMIN SERPL BCP-MCNC: 3.4 G/DL (ref 3.5–5)
ALBUMIN/GLOB SERPL: 1 {RATIO} (ref 1.1–2.2)
ALP SERPL-CCNC: 70 U/L (ref 45–117)
ALT SERPL-CCNC: 20 U/L (ref 12–78)
AMPHET UR QL SCN: NEGATIVE
ANION GAP BLD CALC-SCNC: 13 MMOL/L (ref 5–15)
APPEARANCE UR: CLEAR
AST SERPL W P-5'-P-CCNC: 27 U/L (ref 15–37)
BACTERIA URNS QL MICRO: ABNORMAL /HPF
BARBITURATES UR QL SCN: NEGATIVE
BASOPHILS # BLD AUTO: 0 K/UL (ref 0–0.1)
BASOPHILS # BLD: 0 % (ref 0–1)
BENZODIAZ UR QL: NEGATIVE
BILIRUB SERPL-MCNC: 0.2 MG/DL (ref 0.2–1)
BILIRUB UR QL: NEGATIVE
BUN SERPL-MCNC: 8 MG/DL (ref 6–20)
BUN/CREAT SERPL: 9 (ref 12–20)
CALCIUM SERPL-MCNC: 8.3 MG/DL (ref 8.5–10.1)
CANNABINOIDS UR QL SCN: POSITIVE
CHLORIDE SERPL-SCNC: 110 MMOL/L (ref 97–108)
CO2 SERPL-SCNC: 18 MMOL/L (ref 21–32)
COCAINE UR QL SCN: POSITIVE
COLOR UR: ABNORMAL
CREAT SERPL-MCNC: 0.85 MG/DL (ref 0.55–1.02)
DATE LAST DOSE: ABNORMAL
DIFFERENTIAL METHOD BLD: ABNORMAL
DRUG SCRN COMMENT,DRGCM: ABNORMAL
EOSINOPHIL # BLD: 0 K/UL (ref 0–0.4)
EOSINOPHIL NFR BLD: 0 % (ref 0–7)
EPITH CASTS URNS QL MICRO: ABNORMAL /LPF
ERYTHROCYTE [DISTWIDTH] IN BLOOD BY AUTOMATED COUNT: 15.6 % (ref 11.5–14.5)
EST. AVERAGE GLUCOSE BLD GHB EST-MCNC: 105 MG/DL
GLOBULIN SER CALC-MCNC: 3.5 G/DL (ref 2–4)
GLUCOSE BLD STRIP.AUTO-MCNC: 131 MG/DL (ref 65–100)
GLUCOSE BLD STRIP.AUTO-MCNC: 140 MG/DL (ref 65–100)
GLUCOSE SERPL-MCNC: 144 MG/DL (ref 65–100)
GLUCOSE UR STRIP.AUTO-MCNC: NEGATIVE MG/DL
HBA1C MFR BLD: 5.3 % (ref 4.2–6.3)
HCG UR QL: NEGATIVE
HCT VFR BLD AUTO: 37.8 % (ref 35–47)
HGB BLD-MCNC: 12.3 G/DL (ref 11.5–16)
HGB UR QL STRIP: ABNORMAL
KETONES UR QL STRIP.AUTO: NEGATIVE MG/DL
LEUKOCYTE ESTERASE UR QL STRIP.AUTO: NEGATIVE
LITHIUM SERPL-SCNC: <0.2 MMOL/L (ref 0.6–1.2)
LYMPHOCYTES # BLD AUTO: 12 % (ref 12–49)
LYMPHOCYTES # BLD: 0.4 K/UL (ref 0.8–3.5)
MCH RBC QN AUTO: 29.6 PG (ref 26–34)
MCHC RBC AUTO-ENTMCNC: 32.5 G/DL (ref 30–36.5)
MCV RBC AUTO: 91.1 FL (ref 80–99)
METHADONE UR QL: NEGATIVE
MONOCYTES # BLD: 0 K/UL (ref 0–1)
MONOCYTES NFR BLD AUTO: 1 % (ref 5–13)
MUCOUS THREADS URNS QL MICRO: ABNORMAL /LPF
NEUTS SEG # BLD: 2.6 K/UL (ref 1.8–8)
NEUTS SEG NFR BLD AUTO: 87 % (ref 32–75)
NITRITE UR QL STRIP.AUTO: NEGATIVE
OPIATES UR QL: POSITIVE
PCP UR QL: NEGATIVE
PH UR STRIP: 6 [PH] (ref 5–8)
PLATELET # BLD AUTO: 248 K/UL (ref 150–400)
POTASSIUM SERPL-SCNC: 4.1 MMOL/L (ref 3.5–5.1)
PROT SERPL-MCNC: 6.9 G/DL (ref 6.4–8.2)
PROT UR STRIP-MCNC: NEGATIVE MG/DL
RBC # BLD AUTO: 4.15 M/UL (ref 3.8–5.2)
RBC #/AREA URNS HPF: ABNORMAL /HPF (ref 0–5)
RBC MORPH BLD: ABNORMAL
REPORTED DOSE,DOSE: ABNORMAL UNITS
REPORTED DOSE/TIME,TMG: ABNORMAL
SERVICE CMNT-IMP: ABNORMAL
SERVICE CMNT-IMP: ABNORMAL
SODIUM SERPL-SCNC: 141 MMOL/L (ref 136–145)
SP GR UR REFRACTOMETRY: 1.02 (ref 1–1.03)
TSH SERPL DL<=0.05 MIU/L-ACNC: 0.25 UIU/ML (ref 0.36–3.74)
UA: UC IF INDICATED,UAUC: ABNORMAL
UROBILINOGEN UR QL STRIP.AUTO: 0.2 EU/DL (ref 0.2–1)
WBC # BLD AUTO: 3 K/UL (ref 3.6–11)
WBC URNS QL MICRO: ABNORMAL /HPF (ref 0–4)

## 2017-01-10 PROCEDURE — 74011250636 HC RX REV CODE- 250/636: Performed by: INTERNAL MEDICINE

## 2017-01-10 PROCEDURE — 36415 COLL VENOUS BLD VENIPUNCTURE: CPT | Performed by: INTERNAL MEDICINE

## 2017-01-10 PROCEDURE — 51798 US URINE CAPACITY MEASURE: CPT

## 2017-01-10 PROCEDURE — 65660000000 HC RM CCU STEPDOWN

## 2017-01-10 PROCEDURE — 80307 DRUG TEST PRSMV CHEM ANLYZR: CPT | Performed by: EMERGENCY MEDICINE

## 2017-01-10 PROCEDURE — 87899 AGENT NOS ASSAY W/OPTIC: CPT | Performed by: INTERNAL MEDICINE

## 2017-01-10 PROCEDURE — 81025 URINE PREGNANCY TEST: CPT | Performed by: INTERNAL MEDICINE

## 2017-01-10 PROCEDURE — 87086 URINE CULTURE/COLONY COUNT: CPT | Performed by: INTERNAL MEDICINE

## 2017-01-10 PROCEDURE — 80053 COMPREHEN METABOLIC PANEL: CPT | Performed by: INTERNAL MEDICINE

## 2017-01-10 PROCEDURE — 82962 GLUCOSE BLOOD TEST: CPT

## 2017-01-10 PROCEDURE — 83036 HEMOGLOBIN GLYCOSYLATED A1C: CPT | Performed by: INTERNAL MEDICINE

## 2017-01-10 PROCEDURE — 77030029684 HC NEB SM VOL KT MONA -A

## 2017-01-10 PROCEDURE — 84443 ASSAY THYROID STIM HORMONE: CPT | Performed by: INTERNAL MEDICINE

## 2017-01-10 PROCEDURE — 97161 PT EVAL LOW COMPLEX 20 MIN: CPT

## 2017-01-10 PROCEDURE — 74011250637 HC RX REV CODE- 250/637: Performed by: INTERNAL MEDICINE

## 2017-01-10 PROCEDURE — 85025 COMPLETE CBC W/AUTO DIFF WBC: CPT | Performed by: INTERNAL MEDICINE

## 2017-01-10 PROCEDURE — 81001 URINALYSIS AUTO W/SCOPE: CPT | Performed by: EMERGENCY MEDICINE

## 2017-01-10 PROCEDURE — 74011000250 HC RX REV CODE- 250: Performed by: INTERNAL MEDICINE

## 2017-01-10 PROCEDURE — 87449 NOS EACH ORGANISM AG IA: CPT | Performed by: INTERNAL MEDICINE

## 2017-01-10 PROCEDURE — 74011000258 HC RX REV CODE- 258: Performed by: INTERNAL MEDICINE

## 2017-01-10 RX ORDER — OXYCODONE AND ACETAMINOPHEN 5; 325 MG/1; MG/1
1 TABLET ORAL
Status: DISCONTINUED | OUTPATIENT
Start: 2017-01-10 | End: 2017-01-11 | Stop reason: HOSPADM

## 2017-01-10 RX ORDER — IBUPROFEN 200 MG
1 TABLET ORAL DAILY
Status: DISCONTINUED | OUTPATIENT
Start: 2017-01-10 | End: 2017-01-11 | Stop reason: HOSPADM

## 2017-01-10 RX ORDER — CLONAZEPAM 0.5 MG/1
0.5 TABLET ORAL 2 TIMES DAILY
Status: ON HOLD | COMMUNITY
End: 2017-01-11

## 2017-01-10 RX ORDER — SERTRALINE HYDROCHLORIDE 50 MG/1
100 TABLET, FILM COATED ORAL EVERY EVENING
Status: DISCONTINUED | OUTPATIENT
Start: 2017-01-10 | End: 2017-01-11 | Stop reason: HOSPADM

## 2017-01-10 RX ORDER — MAGNESIUM SULFATE 100 %
4 CRYSTALS MISCELLANEOUS AS NEEDED
Status: DISCONTINUED | OUTPATIENT
Start: 2017-01-10 | End: 2017-01-11 | Stop reason: HOSPADM

## 2017-01-10 RX ORDER — BUTALBITAL, ACETAMINOPHEN AND CAFFEINE 50; 325; 40 MG/1; MG/1; MG/1
2 TABLET ORAL
Status: DISCONTINUED | OUTPATIENT
Start: 2017-01-10 | End: 2017-01-11 | Stop reason: HOSPADM

## 2017-01-10 RX ORDER — PRAZOSIN HYDROCHLORIDE 2 MG/1
2 CAPSULE ORAL
Status: ON HOLD | COMMUNITY
End: 2017-01-11

## 2017-01-10 RX ORDER — DEXTROSE 50 % IN WATER (D50W) INTRAVENOUS SYRINGE
12.5-25 AS NEEDED
Status: DISCONTINUED | OUTPATIENT
Start: 2017-01-10 | End: 2017-01-11 | Stop reason: HOSPADM

## 2017-01-10 RX ORDER — SERTRALINE HYDROCHLORIDE 100 MG/1
100 TABLET, FILM COATED ORAL DAILY
Status: ON HOLD | COMMUNITY
End: 2017-01-11

## 2017-01-10 RX ORDER — ACETAMINOPHEN 325 MG/1
650 TABLET ORAL
Status: DISCONTINUED | OUTPATIENT
Start: 2017-01-10 | End: 2017-01-11 | Stop reason: HOSPADM

## 2017-01-10 RX ORDER — QUETIAPINE FUMARATE 400 MG/1
800 TABLET, FILM COATED ORAL
COMMUNITY
End: 2017-01-11

## 2017-01-10 RX ORDER — LANOLIN ALCOHOL/MO/W.PET/CERES
3 CREAM (GRAM) TOPICAL
Status: DISCONTINUED | OUTPATIENT
Start: 2017-01-10 | End: 2017-01-11 | Stop reason: HOSPADM

## 2017-01-10 RX ORDER — LITHIUM CARBONATE 300 MG/1
300 CAPSULE ORAL 2 TIMES DAILY WITH MEALS
Status: ON HOLD | COMMUNITY
End: 2017-01-11

## 2017-01-10 RX ORDER — BUDESONIDE AND FORMOTEROL FUMARATE DIHYDRATE 160; 4.5 UG/1; UG/1
2 AEROSOL RESPIRATORY (INHALATION) 2 TIMES DAILY
Status: ON HOLD | COMMUNITY
End: 2017-01-11

## 2017-01-10 RX ORDER — CLONIDINE HYDROCHLORIDE 0.3 MG/1
0.3 TABLET ORAL 3 TIMES DAILY
Status: ON HOLD | COMMUNITY
End: 2017-01-11

## 2017-01-10 RX ORDER — IPRATROPIUM BROMIDE AND ALBUTEROL SULFATE 2.5; .5 MG/3ML; MG/3ML
3 SOLUTION RESPIRATORY (INHALATION)
Status: DISCONTINUED | OUTPATIENT
Start: 2017-01-10 | End: 2017-01-11

## 2017-01-10 RX ORDER — GABAPENTIN 800 MG/1
800 TABLET ORAL 3 TIMES DAILY
Status: ON HOLD | COMMUNITY
End: 2017-01-11

## 2017-01-10 RX ORDER — INSULIN LISPRO 100 [IU]/ML
INJECTION, SOLUTION INTRAVENOUS; SUBCUTANEOUS
Status: DISCONTINUED | OUTPATIENT
Start: 2017-01-10 | End: 2017-01-11 | Stop reason: HOSPADM

## 2017-01-10 RX ORDER — MORPHINE SULFATE 2 MG/ML
2 INJECTION, SOLUTION INTRAMUSCULAR; INTRAVENOUS
Status: DISCONTINUED | OUTPATIENT
Start: 2017-01-10 | End: 2017-01-11 | Stop reason: HOSPADM

## 2017-01-10 RX ORDER — POTASSIUM CHLORIDE 7.45 MG/ML
10 INJECTION INTRAVENOUS ONCE
Status: COMPLETED | OUTPATIENT
Start: 2017-01-10 | End: 2017-01-10

## 2017-01-10 RX ADMIN — QUETIAPINE FUMARATE 50 MG: 25 TABLET, FILM COATED ORAL at 10:11

## 2017-01-10 RX ADMIN — GABAPENTIN 800 MG: 100 CAPSULE ORAL at 21:01

## 2017-01-10 RX ADMIN — SERTRALINE HYDROCHLORIDE 100 MG: 50 TABLET ORAL at 21:01

## 2017-01-10 RX ADMIN — HEPARIN SODIUM 5000 UNITS: 5000 INJECTION, SOLUTION INTRAVENOUS; SUBCUTANEOUS at 07:13

## 2017-01-10 RX ADMIN — QUETIAPINE FUMARATE 50 MG: 25 TABLET, FILM COATED ORAL at 17:09

## 2017-01-10 RX ADMIN — CLONAZEPAM 0.5 MG: 0.5 TABLET ORAL at 10:10

## 2017-01-10 RX ADMIN — POTASSIUM CHLORIDE 10 MEQ: 10 INJECTION, SOLUTION INTRAVENOUS at 01:36

## 2017-01-10 RX ADMIN — POTASSIUM CHLORIDE 10 MEQ: 10 INJECTION, SOLUTION INTRAVENOUS at 03:13

## 2017-01-10 RX ADMIN — GUAIFENESIN 600 MG: 600 TABLET, EXTENDED RELEASE ORAL at 21:01

## 2017-01-10 RX ADMIN — Medication 10 ML: at 05:40

## 2017-01-10 RX ADMIN — Medication 10 ML: at 21:01

## 2017-01-10 RX ADMIN — GUAIFENESIN 600 MG: 600 TABLET, EXTENDED RELEASE ORAL at 10:10

## 2017-01-10 RX ADMIN — PRAZOSIN HYDROCHLORIDE 2 MG: 1 CAPSULE ORAL at 00:02

## 2017-01-10 RX ADMIN — MELATONIN 3 MG ORAL TABLET 3 MG: 3 TABLET ORAL at 21:11

## 2017-01-10 RX ADMIN — GABAPENTIN 800 MG: 100 CAPSULE ORAL at 15:36

## 2017-01-10 RX ADMIN — DEXTROSE MONOHYDRATE 5 MG/HR: 50 INJECTION, SOLUTION INTRAVENOUS at 17:09

## 2017-01-10 RX ADMIN — PRAZOSIN HYDROCHLORIDE 2 MG: 1 CAPSULE ORAL at 21:01

## 2017-01-10 RX ADMIN — OXYCODONE HYDROCHLORIDE AND ACETAMINOPHEN 1 TABLET: 5; 325 TABLET ORAL at 21:01

## 2017-01-10 RX ADMIN — GABAPENTIN 800 MG: 100 CAPSULE ORAL at 10:10

## 2017-01-10 RX ADMIN — UMECLIDINIUM 1 PUFF: 62.5 AEROSOL, POWDER ORAL at 10:14

## 2017-01-10 RX ADMIN — METHYLPREDNISOLONE SODIUM SUCCINATE 60 MG: 125 INJECTION, POWDER, FOR SOLUTION INTRAMUSCULAR; INTRAVENOUS at 05:40

## 2017-01-10 RX ADMIN — Medication 10 ML: at 12:59

## 2017-01-10 RX ADMIN — METHYLPREDNISOLONE SODIUM SUCCINATE 40 MG: 125 INJECTION, POWDER, FOR SOLUTION INTRAMUSCULAR; INTRAVENOUS at 21:02

## 2017-01-10 RX ADMIN — OXYCODONE HYDROCHLORIDE AND ACETAMINOPHEN 1 TABLET: 5; 325 TABLET ORAL at 15:58

## 2017-01-10 RX ADMIN — AZITHROMYCIN MONOHYDRATE 500 MG: 500 INJECTION, POWDER, LYOPHILIZED, FOR SOLUTION INTRAVENOUS at 21:02

## 2017-01-10 RX ADMIN — LITHIUM CARBONATE 300 MG: 300 CAPSULE ORAL at 17:09

## 2017-01-10 RX ADMIN — METHYLPREDNISOLONE SODIUM SUCCINATE 40 MG: 125 INJECTION, POWDER, FOR SOLUTION INTRAMUSCULAR; INTRAVENOUS at 12:58

## 2017-01-10 RX ADMIN — LITHIUM CARBONATE 300 MG: 300 CAPSULE ORAL at 10:11

## 2017-01-10 RX ADMIN — BUTALBITAL, ACETAMINOPHEN, AND CAFFEINE 2 TABLET: 50; 325; 40 TABLET ORAL at 18:44

## 2017-01-10 RX ADMIN — LITHIUM CARBONATE 300 MG: 300 CAPSULE ORAL at 00:03

## 2017-01-10 RX ADMIN — METHYLPREDNISOLONE SODIUM SUCCINATE 60 MG: 125 INJECTION, POWDER, FOR SOLUTION INTRAMUSCULAR; INTRAVENOUS at 00:02

## 2017-01-10 RX ADMIN — CLONAZEPAM 0.5 MG: 0.5 TABLET ORAL at 17:09

## 2017-01-10 RX ADMIN — CEFTRIAXONE 1 G: 1 INJECTION, POWDER, FOR SOLUTION INTRAMUSCULAR; INTRAVENOUS at 22:53

## 2017-01-10 NOTE — PROGRESS NOTES
physical Therapy EVALUATION/DISCHARGE  Patient: Don Lee (64 y.o. female)  Date: 1/10/2017  Primary Diagnosis: COPD (chronic obstructive pulmonary disease) ([facility])        Precautions:      ASSESSMENT :  Based on the objective data described below, the patient demonstrated independence with functional mobility and ambulation. Patient received sleeping in bed and not agreeable at the request to get OOB and ambulate. Patient reluctantly ambulated in the room independently. Patient denied shortness of breath. SpO2: 97% on room air at rest and with activity. Patient returned to bed. Patient does not require any further acute PT needs at this time. Notified RN. Will complete order. Skilled physical therapy is not indicated at this time. PLAN :  Discharge Recommendations: None  Further Equipment Recommendations for Discharge: none     SUBJECTIVE:   Patient stated I hope I get to leave tomorrow.     OBJECTIVE DATA SUMMARY:   HISTORY:    Past Medical History   Diagnosis Date    Arrhythmia      irregular    Asthma     Chronic pain      back, leg    Depression     Hepatitis B     Hypertension      atrial fibrillation    Other ill-defined conditions      heart murmur    Psychiatric disorder      bipolar    Sarcoidosis ([facility] [address]    No past surgical history on file. Prior Level of Function/Home Situation: independent without the use of an assistive device; lives with her mother in a 1 story home  Personal factors and/or comorbidities impacting plan of care:     Home Situation  Home Environment: Private residence  # Steps to Enter: 0  One/Two Story Residence: One story  Living Alone: No  Support Systems: Parent  Patient Expects to be Discharged to[de-identified] Private residence  Current DME Used/Available at Home: None    EXAMINATION/PRESENTATION/DECISION MAKING:     Strength:    Strength:  Within functional limits      Range Of Motion:  AROM: Within functional limits      PROM: Within functional limits     Functional Mobility:  Bed Mobility:     Supine to Sit: Independent  Sit to Supine: Independent     Transfers:  Sit to Stand: Independent  Stand to Sit: Independent        Balance:   Sitting: Intact  Standing: Intact  Ambulation/Gait Training:  Distance (ft): 25 Feet (ft) (pt declined gait outside of room)  Assistive Device:  (none)  Ambulation - Level of Assistance: Independent        Gait Abnormalities: Path deviations        Base of Support: Narrowed    Functional Measure:  Barthel Index:    Bathin  Bladder: 10  Bowels: 10  Groomin  Dressing: 10  Feeding: 10  Mobility: 15  Stairs: 10  Toilet Use: 10  Transfer (Bed to Chair and Back): 15  Total: 100       Barthel and G-code impairment scale:  Percentage of impairment CH  0% CI  1-19% CJ  20-39% CK  40-59% CL  60-79% CM  80-99% CN  100%   Barthel Score 0-100 100 99-80 79-60 59-40 20-39 1-19   0   Barthel Score 0-20 20 17-19 13-16 9-12 5-8 1-4 0      The Barthel ADL Index: Guidelines  1. The index should be used as a record of what a patient does, not as a record of what a patient could do. 2. The main aim is to establish degree of independence from any help, physical or verbal, however minor and for whatever reason. 3. The need for supervision renders the patient not independent. 4. A patient's performance should be established using the best available evidence. Asking the patient, friends/relatives and nurses are the usual sources, but direct observation and common sense are also important. However direct testing is not needed. 5. Usually the patient's performance over the preceding 24-48 hours is important, but occasionally longer periods will be relevant. 6. Middle categories imply that the patient supplies over 50 per cent of the effort. 7. Use of aids to be independent is allowed. Geovanna Jasmine., Barthel, DLeticiaW. (6397). Functional evaluation: the Barthel Index. [facility] (14)2.   [facility] [place] krish KATARINA Todd, Gabriela Guerin., [facility]., Deven Petersen. (1999). Measuring the change indisability after inpatient rehabilitation; comparison of the responsiveness of the Barthel Index and Functional Jenkins Measure. Journal of Neurology, Neurosurgery, and Psychiatry, 66(4), 750-873. STEFANIA Wise, KATIE Prado, & Angélica Agrawal M.A. (2004.) Assessment of post-stroke quality of life in cost-effectiveness studies: The usefulness of the Barthel Index and the EuroQoL-5D. Quality of Life Research, 13, 134-84         G codes: In compliance with CMSs Claims Based Outcome Reporting, the following G-code set was chosen for this patient based on their primary functional limitation being treated: The outcome measure chosen to determine the severity of the functional limitation was the Barthel Index with a score of 100/100 which was correlated with the impairment scale. ? Mobility - Walking and Moving Around:     - CURRENT STATUS: CH - 0% impaired, limited or restricted    - GOAL STATUS: CH - 0% impaired, limited or restricted    - D/C STATUS:  CH - 0% impaired, limited or restricted        Physical Therapy Evaluation Charge Determination   History Examination Presentation Decision-Making   MEDIUM  Complexity : 1-2 comorbidities / personal factors will impact the outcome/ POC  LOW Complexity : 1-2 Standardized tests and measures addressing body structure, function, activity limitation and / or participation in recreation  LOW Complexity : Stable, uncomplicated  Other outcome measures Barthel Index  LOW       Based on the above components, the patient evaluation is determined to be of the following complexity level: LOW     Pain:           Activity Tolerance:   Good. VSS throughout evaluation on room air  Please refer to the flowsheet for vital signs taken during this treatment.   After treatment:   []   Patient left in no apparent distress sitting up in chair  [x]   Patient left in no apparent distress in bed  [x]   Call bell left within reach  [x]   Nursing notified  []   Caregiver present  []   Bed alarm activated    COMMUNICATION/EDUCATION:   Communication/Collaboration:  [x]   Fall prevention education was provided and the patient/caregiver indicated understanding. [x]   Patient/family have participated as able and agree with findings and recommendations. []   Patient is unable to participate in plan of care at this time.   Findings and recommendations were discussed with: Registered Nurse    Thank you for this referral.  Jere Kimball, PT, DPT   Time Calculation: 10 mins

## 2017-01-10 NOTE — PROGRESS NOTES
Hospitalist Progress Note    NAME: Watson Holder   :  1975   MRN:  [id number]       Interim Hospital Summary: 35 y.o. female whom presented on 2017 with  Respiratory FAilure due to COPD Exacerbation     Assessment / Plan:  Acute Hypoxic Respiratory Failure POA: O2 sat 87% on room air, will c/w supplemental O2. Wean down as tolerated  COPD Exacerbation: will start Z-max,/rocephin  bronchodilators, mucolytics, taper down Steroids, check sputum. Hypokalemia: replace prn  HTN: home meds  Bipolar dx/home meds Check lithium level  Chronic pain/neuropathy- cont w Neurontin   Smoker: counseled. Start nicotine patch. Baseline: Fairly independent   Code status: Full  Prophylaxis: Hep SQ  Recommended Disposition: Home w/Family     Subjective:     Chief Complaint / Reason for Physician Visit  \"I feel a little better\". Discussed with RN events overnight. Review of Systems:  Symptom Y/N Comments  Symptom Y/N Comments   Fever/Chills    Chest Pain     Poor Appetite    Edema     Cough y   Abdominal Pain     Sputum y   Joint Pain     SOB/RIVAS y   Pruritis/Rash     Nausea/vomit    Tolerating PT/OT     Diarrhea    Tolerating Diet y    Constipation    Other       Could NOT obtain due to:      Objective:     VITALS:   Last 24hrs VS reviewed since prior progress note. Most recent are:  Patient Vitals for the past 24 hrs:   Temp Pulse Resp BP SpO2   01/10/17 0734 98 °F (36.7 °C) (!) 106 18 129/78 95 %   01/10/17 0347 98.1 °F (36.7 °C) (!) 110 20 126/69 96 %   17 2300 98 °F (36.7 °C) 93 20 155/86 96 %   17 2135 - (!) 113 - (!) 134/99 -   17 -  17 147/77 100 %   17 1830 - 80 21 135/81 100 %   17 1805 - 94 19 122/87 100 %     No intake or output data in the 24 hours ending 01/10/17 1026     PHYSICAL EXAM:  General: WD, WN. Alert, cooperative, no acute distress    EENT:  EOMI. Anicteric sclerae.  MMM  Resp:  Coarse BS, rhonchi, wheezing  CV:  Regular  rhythm,  No edema  GI:  Soft, Non distended, Non tender.  +Bowel sounds  Neurologic:  Alert and oriented X 3, normal speech,   Psych:   Good insight. Not anxious nor agitated  Skin:  No rashes. No jaundice    Reviewed most current lab test results and cultures  YES  Reviewed most current radiology test results   YES  Review and summation of old records today    NO  Reviewed patient's current orders and MAR    YES  PMH/SH reviewed - no change compared to H&P  ________________________________________________________________________  Care Plan discussed with:    Comments   Patient y    Family      RN y    Care Manager     Consultant                        Multidiciplinary team rounds were held today with , nursing, pharmacist and clinical coordinator. Patient's plan of care was discussed; medications were reviewed and discharge planning was addressed. ________________________________________________________________________  Total NON critical care TIME:  35  Minutes    Total CRITICAL CARE TIME Spent:   Minutes non procedure based      Comments   >50% of visit spent in counseling and coordination of care     ________________________________________________________________________  Louie Waite MD     Procedures: see electronic medical records for all procedures/Xrays and details which were not copied into this note but were reviewed prior to creation of Plan. LABS:  I reviewed today's most current labs and imaging studies.   Pertinent labs include:  Recent Labs      01/10/17   0528  01/09/17   1850   WBC  3.0*  4.7   HGB  12.3  12.5   HCT  37.8  38.4   PLT  248  278     Recent Labs      01/10/17   0528  01/09/17   1942 01/09/17   1850   NA  141   --   142   K  4.1   --   3.2*   CL  110*   --   106   CO2  18*   --   30   GLU  144*   --   98   BUN  8   --   9   CREA  0.85   --   0.92   CA  8.3*   --   8.4*   ALB  3.4*   --   3.9   TBILI  0.2   --   0.2   SGOT  27   --   27   ALT  20   --   25   INR   --   1.1   -- Signed: Jodi Godoy MD

## 2017-01-10 NOTE — ROUTINE PROCESS
TRANSFER - OUT REPORT:    Verbal report given to Keara Mckeon RN on Don Lee  being transferred to [address] [place] [address] for routine progression of care       Report consisted of patients Situation, Background, Assessment and   Recommendations(SBAR). Information from the following report(s) SBAR, ED Summary and MAR was reviewed with the receiving nurse. Lines:   Peripheral IV 01/09/17 Left Antecubital (Active)   Site Assessment Clean, dry, & intact 1/9/2017  6:57 PM   Phlebitis Assessment 0 1/9/2017  6:57 PM   Infiltration Assessment 0 1/9/2017  6:57 PM   Dressing Status Clean, dry, & intact 1/9/2017  6:57 PM   Dressing Type Tape;Transparent 1/9/2017  6:57 PM        Opportunity for questions and clarification was provided.       Patient transported with:   O2 @ 10 liters

## 2017-01-10 NOTE — PROGRESS NOTES
TRANSFER - IN REPORT:    Verbal report received from Amy Miranda RN(name) on Rubina Baugh  being received from ED (unit) for routine progression of care      Report consisted of patients Situation, Background, Assessment and   Recommendations(SBAR). Information from the following report(s) SBAR, Kardex and Recent Results was reviewed with the receiving nurse. Opportunity for questions and clarification was provided. Assessment completed upon patients arrival to unit and care assumed.        Primary Nurse Sade Lee RN and Yoni Juarez RN performed a dual skin assessment on this patient No impairment noted  Doc score is 23

## 2017-01-10 NOTE — H&P
Hospitalist Admission Note    NAME: Marcio Ly   :  1975   MRN:  [id number]     Date/Time:  2017 9:20 PM    Patient PCP: None  ________________________________________________________________________    My assessment of this patient's clinical condition and my plan of care is as follows. Assessment / Plan:  Acute Hypoxic Respiratory Failure POA: O2 sat 87% on room air, will c/w supplemental O2. If does not respond will use bipap  COPD Exacerbation: will start Z-max,/rocephin  bronchodilators, mucolytics, Steroids, check sputum. Hypokalemia: replace prn  HTN: home meds  Bipolar dx/home meds  Check lithium level  Chronic pain/neuropathy- cont w Neurontin   Former Smoker: counseled. .   Code Status: Full Code as per patient   DVT Prophylaxis: heparin  GI Prophylaxis: not indicated   Baseline: Fairly independent               Subjective:   CHIEF COMPLAINT: sob/wheezing    HISTORY OF PRESENT ILLNESS:     Shantell Sweeney is a 35 y.o. Female with PMHx significant for HTN, COPD, and Bipolar/Anxiety  With a h/o  Intubation in the past  who presents via EMS to the ED to be evaluated for respiratory distress. Per EMS, pt was initially at 80% SO2 upon their arrival, but improved to 100% SO2 when she was placed on C-PAP at 8L with duo-nebulizer. No fevers or chills  No c/p  no abdominal pain. No n/v. No uti sx. No sick contacts. No travel. no longer smokes. No si/hi   Sx per patient are c/w prior copd flare ups- no calf pain. We were asked to admit for work up and evaluation of the above problems. Past Medical History   Diagnosis Date    Arrhythmia      irregular    Asthma     Chronic pain      back, leg    Depression     Hepatitis B     Hypertension      atrial fibrillation    Other ill-defined conditions      heart murmur    Psychiatric disorder      bipolar    Sarcoidosis ([facility]         No past surgical history on file.     Social History   Substance Use Topics    Smoking status: Current Every Day Smoker    Smokeless tobacco: Not on file      Comment: 4 cig. day    Alcohol use No      Comment: quit        Family History   Problem Relation Age of Onset    Hypertension Father     Hypertension Mother      No Known Allergies     Prior to Admission medications    Medication Sig Start Date End Date Taking? Authorizing Provider   predniSONE (DELTASONE) 10 mg tablet Take 4 Tab tonight x 1 then 3 Tab daily  x 2 days then 2 Tab daily x 3 days then 1 Tab daily x 3 days then 1/2 Tab daily  x 3 days. 2/6/14   Anaya Colby MD   albuterol (PROVENTIL HFA, VENTOLIN HFA) 90 mcg/actuation inhaler Take 1 Puff by inhalation every four (4) hours as needed for Wheezing. 2/6/14   Anaya Colby MD   QUEtiapine (SEROQUEL) 100 mg tablet Take 50 mg by mouth two (2) times a day. Analy Rivera MD   albuterol (PROVENTIL HFA, VENTOLIN HFA) 90 mcg/actuation inhaler Take  by inhalation. Analy Rivera MD   tiotropium (SPIRIVA WITH HANDIHALER) 18 mcg inhalation capsule Take 1 Cap by inhalation daily. Analy Rivera MD   diltiazem (CARDIZEM) 30 mg tablet Take  by mouth Before breakfast, lunch, dinner and at bedtime. Anlay Rivera MD   Paliperidone (INVEGA) 1.5 mg tr24 Take  by mouth. Analy Rivera MD   prazosin (MINIPRESS) 1 mg capsule Take  by mouth nightly. Analy Rivera MD       REVIEW OF SYSTEMS:     I am not able to complete the review of systems because:    The patient is intubated and sedated    The patient has altered mental status due to his acute medical problems    The patient has baseline aphasia from prior stroke(s)    The patient has baseline dementia and is not reliable historian    The patient is in acute medical distress and unable to provide information           Total of 12 systems reviewed as follows:       POSITIVE= underlined text  Negative = text not underlined  General:  fever, chills, sweats, generalized weakness, weight loss/gain,      loss of appetite   Eyes:    blurred vision, eye pain, loss of vision, double vision  ENT:    rhinorrhea, pharyngitis   Respiratory:   cough, sputum production, SOB, RIVAS, wheezing, pleuritic pain   Cardiology:   chest pain, palpitations, orthopnea, PND, edema, syncope   Gastrointestinal:  abdominal pain , N/V, diarrhea, dysphagia, constipation, bleeding   Genitourinary:  frequency, urgency, dysuria, hematuria, incontinence   Muskuloskeletal :  arthralgia, myalgia, chronic pain  Hematology:  easy bruising, nose or gum bleeding, lymphadenopathy   Dermatological: rash, ulceration, pruritis, color change / jaundice  Endocrine:   hot flashes or polydipsia   Neurological:  headache, dizziness, confusion, focal weakness, paresthesia,     Speech difficulties, memory loss, gait difficulty  Psychological: Feelings of anxiety, depression, agitation    Objective:   VITALS:    Visit Vitals    /77    Pulse 69    Resp 17    Ht 5' 4\" (1.626 m)    Wt 68 kg (150 lb)    SpO2 100%    BMI 25.75 kg/m2       PHYSICAL EXAM:    General:    Alert, cooperative, mild respiratory distress, appears stated age. Able to speak in full sentences   HEENT: Atraumatic, anicteric sclerae, pink conjunctivae     No oral ulcers, mucosa moist, throat clear, dentition fair  Neck:  Supple, symmetrical,  thyroid: non tender  Lungs:   Decrease bs jessica   Diffuse  Wheezing thru chest wall  no Rhonchi. No rales. Chest wall:  No tenderness  ++Accessory muscle use. Heart:   Regular  rhythm,  No  murmur   Trace pedal edema jessica, no calf  ttp  Abdomen:   Soft, non-tender. Not distended. Bowel sounds normal  Extremities: No cyanosis. No clubbing      Capillary refill normal,  Radial pulse 2+,  DP   Skin:     Not pale. Not Jaundiced  No rashes   Psych:  Good insight. Not depressed. Not anxious or agitated. Neurologic: EOMs intact. No facial asymmetry. No aphasia or slurred speech. Symmetrical strength, Sensation grossly intact. Alert and oriented X 4. _______________________________________________________________________  Care Plan discussed with:    Comments   Patient x    Family      RN x    Care Manager                    Consultant:      _______________________________________________________________________  Expected  Disposition:   Home with Family x   HH/PT/OT/RN    SNF/LTC    MAEGAN    ________________________________________________________________________  TOTAL TIME:  61 Minutes    Critical Care Provided     Minutes non procedure based      Comments    xx Reviewed previous records   >50% of visit spent in counseling and coordination of care xx Discussion with patient and/or family and questions answered       ________________________________________________________________________  Signed: Silas Rutledge MD    Procedures: see electronic medical records for all procedures/Xrays and details which were not copied into this note but were reviewed prior to creation of Plan. LAB DATA REVIEWED:    Recent Results (from the past 24 hour(s))   BLOOD GAS, ARTERIAL    Collection Time: 01/09/17  6:26 PM   Result Value Ref Range    pH 7.32 (L) 7.35 - 7.45      PCO2 47 (H) 35.0 - 45.0 mmHg    PO2 201 (H) 80 - 100 mmHg    O2 SAT 99 (H) 92 - 97 %    BICARBONATE 24 22 - 26 mmol/L    BASE DEFICIT 2.7 mmol/L    O2 METHOD AEROSOL MASK      Sample source ARTERIAL      SITE LEFT RADIAL      ROSHAN'S TEST YES     CBC WITH AUTOMATED DIFF    Collection Time: 01/09/17  6:50 PM   Result Value Ref Range    WBC 4.7 3.6 - 11.0 K/uL    RBC 4.27 3.80 - 5.20 M/uL    HGB 12.5 11.5 - 16.0 g/dL    HCT 38.4 35.0 - 47.0 %    MCV 89.9 80.0 - 99.0 FL    MCH 29.3 26.0 - 34.0 PG    MCHC 32.6 30.0 - 36.5 g/dL    RDW 15.6 (H) 11.5 - 14.5 %    PLATELET 359 261 - 618 K/uL    NEUTROPHILS 41 32 - 75 %    LYMPHOCYTES 36 12 - 49 %    MONOCYTES 10 5 - 13 %    EOSINOPHILS 12 (H) 0 - 7 %    BASOPHILS 1 0 - 1 %    ABS. NEUTROPHILS 2.0 1.8 - 8.0 K/UL    ABS. LYMPHOCYTES 1.7 0.8 - 3.5 K/UL    ABS. MONOCYTES 0.5 0.0 - 1.0 K/UL    ABS. EOSINOPHILS 0.6 (H) 0.0 - 0.4 K/UL    ABS. BASOPHILS 0.0 0.0 - 0.1 K/UL   METABOLIC PANEL, COMPREHENSIVE    Collection Time: 01/09/17  6:50 PM   Result Value Ref Range    Sodium 142 136 - 145 mmol/L    Potassium 3.2 (L) 3.5 - 5.1 mmol/L    Chloride 106 97 - 108 mmol/L    CO2 30 21 - 32 mmol/L    Anion gap 6 5 - 15 mmol/L    Glucose 98 65 - 100 mg/dL    BUN 9 6 - 20 MG/DL    Creatinine 0.92 0.55 - 1.02 MG/DL    BUN/Creatinine ratio 10 (L) 12 - 20      GFR est AA >60 >60 ml/min/1.73m2    GFR est non-AA >60 >60 ml/min/1.73m2    Calcium 8.4 (L) 8.5 - 10.1 MG/DL    Bilirubin, total 0.2 0.2 - 1.0 MG/DL    ALT 25 12 - 78 U/L    AST 27 15 - 37 U/L    Alk.  phosphatase 78 45 - 117 U/L    Protein, total 7.7 6.4 - 8.2 g/dL    Albumin 3.9 3.5 - 5.0 g/dL    Globulin 3.8 2.0 - 4.0 g/dL    A-G Ratio 1.0 (L) 1.1 - 2.2     LACTIC ACID, PLASMA    Collection Time: 01/09/17  6:50 PM   Result Value Ref Range    Lactic acid 1.4 0.4 - 2.0 MMOL/L   AMMONIA    Collection Time: 01/09/17  7:42 PM   Result Value Ref Range    Ammonia 22 <32 UMOL/L   PROTHROMBIN TIME + INR    Collection Time: 01/09/17  7:42 PM   Result Value Ref Range    INR 1.1 0.9 - 1.1      Prothrombin time 11.1 9.0 - 11.1 sec

## 2017-01-10 NOTE — PROGRESS NOTES
Pharmacy Medication Reconciliation     The patient was interviewed regarding current PTA medication list, use and drug allergies. The patient was questioned regarding use of any other inhalers, topical products, over the counter medications, herbal medications, vitamin products or ophthalmic/nasal/otic medication use. Allergy Update: Confirmed with patient that she does not have any known medication allergies    Recommendations/Findings: The following amendments were made to the patient's active medication list on file at [facility]:   1) Additions:    Budesonide/formoterol inhaler   Clonidine   Clonazepam   Gabapentin   Lithium   Sertraline    2) Deletions:    Duplicate albuterol HFA inhaler   Diltiazem- patient stated that she was taken off this agent and initiated on clonidine   Paliperidone   Prednisone- patient stated that the last dose of her prednisone taper was administered on the day of her admission   Tiotropium     3) Changes:    Albuterol HFA: patient takes 1-2 puffs, not 1 puff   Prazosin: patient is now on 2 mg QHS (increased from 1 mg)   Quetiapine: increased dose to patient-reported 800 mg QHS    4) Pharmacy identified PTA medication concerns:   Quetiapine- when I called to clarify PTA dose, pharmacy communicated that the last time filled at community pharmacy was 12/22 for 400 mg PO QHS x 5 days   Budesonide/Formoterol- patient states that she uses this inhaler 3-4 times daily (using more than prescribed per her NOT provider's discretion), clarified with her that this medication is a maintenance therapy, and if she feels that she needs extra doses, her COPD may not be adequately controlled.    Clonazepam: concerning for patient with + opioid in UDS as concomitant benzodiazepines + opioids can cause significant respiratory distress and risk for overdose      -Clarified PTA med list with patient and [address] (patient last filled PTA medications at one of the [place], [place] stores). PTA medication list was corrected to the following:     Prior to Admission Medications   Prescriptions Last Dose Informant Patient Reported? Taking? QUEtiapine (SEROQUEL) 400 mg tablet   Yes Yes   Sig: Take 800 mg by mouth nightly. albuterol (PROVENTIL HFA, VENTOLIN HFA) 90 mcg/actuation inhaler 1/9/2017 at Unknown time  No Yes   Sig: Take 1 Puff by inhalation every four (4) hours as needed for Wheezing. Patient taking differently: Take 1-2 Puffs by inhalation every four (4) hours as needed for Wheezing. budesonide-formoterol (SYMBICORT) 160-4.5 mcg/actuation HFA inhaler   Yes Yes   Sig: Take 2 Puffs by inhalation two (2) times a day. cloNIDine HCl (CATAPRES) 0.3 mg tablet   Yes Yes   Sig: Take 0.3 mg by mouth three (3) times daily. clonazePAM (KLONOPIN) 0.5 mg tablet   Yes Yes   Sig: Take 0.5 mg by mouth two (2) times a day.   gabapentin (NEURONTIN) 800 mg tablet   Yes Yes   Sig: Take 800 mg by mouth three (3) times daily. lithium carbonate 300 mg capsule   Yes Yes   Sig: Take 300 mg by mouth two (2) times daily (with meals). prazosin (MINIPRESS) 2 mg capsule   Yes Yes   Sig: Take 2 mg by mouth nightly. sertraline (ZOLOFT) 100 mg tablet   Yes Yes   Sig: Take 100 mg by mouth daily.       Facility-Administered Medications: None          Thank you,  Russ Virk, ANGELAD

## 2017-01-10 NOTE — CONSULTS
[address] [facility] [address] [facility]     Date of  Admission: 1/9/2017  6:00 PM     Admission type:Emergency    Consult for:  Consult by: Subjective:     Rubina Baugh is a 35 y.o. female admitted for COPD (chronic obstructive pulmonary disease) ([facility] Patient complains of  Palpitations. No CP. No Hx cardiac problems. Rate OK in er but has been climbing since admission.   Telemetry shows sinus tach at 120-130    Patient Active Problem List    Diagnosis Date Noted    Sinus tachycardia 01/10/2017    COPD (chronic obstructive pulmonary disease) ([facility] 01/09/2017    Unspecified asthma, with exacerbation 01/30/2014    Heroin abuse 01/30/2014      None  Past Medical History   Diagnosis Date    Arrhythmia      irregular    Asthma     Chronic pain      back, leg    Depression     Hepatitis B     Hypertension      atrial fibrillation    Other ill-defined conditions      heart murmur    Psychiatric disorder      bipolar    Sarcoidosis ([facility]       Social History     Social History    Marital status: SINGLE     Spouse name: N/A    Number of children: N/A    Years of education: N/A     Social History Main Topics    Smoking status: Current Every Day Smoker    Smokeless tobacco: Not on file      Comment: 4 cig. day    Alcohol use No      Comment: quit    Drug use: Yes     Special: Heroin    Sexual activity: Not on file     Other Topics Concern    Not on file     Social History Narrative    No narrative on file     No Known Allergies   Family History   Problem Relation Age of Onset    Hypertension Father     Hypertension Mother       Current Facility-Administered Medications   Medication Dose Route Frequency    cefTRIAXone (ROCEPHIN) 1 g in 0.9% sodium chloride (MBP/ADV) 50 mL ADV  1 g IntraVENous Q12H    albuterol-ipratropium (DUO-NEB) 2.5 MG-0.5 MG/3 ML  3 mL Nebulization Q6H RT    acetaminophen (TYLENOL) tablet 650 mg  650 mg Oral Q4H PRN    methylPREDNISolone (PF) (SOLU-MEDROL) injection 40 mg  40 mg IntraVENous Q8H    morphine injection 2 mg  2 mg IntraVENous Q4H PRN    nicotine (NICODERM CQ) 14 mg/24 hr patch 1 Patch  1 Patch TransDERmal DAILY    insulin lispro (HUMALOG) injection   SubCUTAneous AC&HS    glucose chewable tablet 16 g  4 Tab Oral PRN    dextrose (D50W) injection syrg 12.5-25 g  12.5-25 g IntraVENous PRN    glucagon (GLUCAGEN) injection 1 mg  1 mg IntraMUSCular PRN    oxyCODONE-acetaminophen (PERCOCET) 5-325 mg per tablet 1 Tab  1 Tab Oral Q6H PRN    sertraline (ZOLOFT) tablet 100 mg  100 mg Oral QPM    dilTIAZem (CARDIZEM) 100 mg in dextrose 5% (MBP/ADV) 100 mL infusion  5 mg/hr IntraVENous CONTINUOUS    prazosin (MINIPRESS) capsule 2 mg  2 mg Oral QHS    QUEtiapine (SEROquel) tablet 50 mg  50 mg Oral BID    umeclidinium (INCRUSE ELLIPTA) 62.5 mcg/actuation  1 Puff Inhalation DAILY    lithium carbonate capsule 300 mg  300 mg Oral BID    clonazePAM (KlonoPIN) tablet 0.5 mg  0.5 mg Oral BID    gabapentin (NEURONTIN) capsule 800 mg  800 mg Oral TID    sodium chloride (NS) flush 5-10 mL  5-10 mL IntraVENous Q8H    sodium chloride (NS) flush 5-10 mL  5-10 mL IntraVENous PRN    heparin (porcine) injection 5,000 Units  5,000 Units SubCUTAneous Q8H    azithromycin (ZITHROMAX) 500 mg in 0.9% sodium chloride (MBP/ADV) 250 mL  500 mg IntraVENous Q24H    guaiFENesin SR (MUCINEX) tablet 600 mg  600 mg Oral Q12H    prochlorperazine (COMPAZINE) injection 5 mg  5 mg IntraVENous Q6H PRN        Review of Symptoms:   Constitutional: negative  Eyes: negative   Ears, nose, mouth, throat, and face: negative  Respiratory: negative   Cardiovascular: negative   Gastrointestinal: negative  Genitourinary:negative   Musculoskeletal:negative   Neurological: negative   Endocrine: negative          Objective:      Visit Vitals    /77 (BP 1 Location: Right arm, BP Patient Position: At rest)    Pulse (!) 137    Temp 98.6 °F (37 °C)    Resp 16    Ht 5' 4\" (1.626 m)    Wt 150 lb (68 kg)    SpO2 96%    BMI 25.75 kg/m2       Physical:   General:   Heart: RRR, no m/S3/JVD, no carotid bruits   Lungs: clear   Abdomen: Soft, +BS, NTND   Extremities: LE jessica +DP/PT, no edema   Neurologic: Grossly normal    Data Review:   Recent Labs      01/10/17   0528  01/09/17   1850   WBC  3.0*  4.7   HGB  12.3  12.5   HCT  37.8  38.4   PLT  248  278     Recent Labs      01/10/17   0528  01/09/17   1942 01/09/17   1850   NA  141   --   142   K  4.1   --   3.2*   CL  110*   --   106   CO2  18*   --   30   GLU  144*   --   98   BUN  8   --   9   CREA  0.85   --   0.92   CA  8.3*   --   8.4*   ALB  3.4*   --   3.9   TBILI  0.2   --   0.2   SGOT  27   --   27   ALT  20   --   25   INR   --   1.1   --        No results for input(s): TROIQ, CPK, CKMB in the last 72 hours. Intake/Output Summary (Last 24 hours) at 01/10/17 1632  Last data filed at 01/10/17 1017   Gross per 24 hour   Intake                0 ml   Output              700 ml   Net             -700 ml        Cardiographics    Telemetry:   ECG:   Echocardiogram:   CXRAY:       Assessment:       Active Problems:    COPD (chronic obstructive pulmonary disease) ([facility] (1/9/2017)      Sinus tachycardia (1/10/2017)         Plan:     Prob a side effect of the albuterol. Consider changing to xopinex. Will order echo. No objection to IV dilt.                   Keny Ramos MD

## 2017-01-10 NOTE — PROGRESS NOTES
0730  Report received from [place], [facility]. SBAR, Kardex, Procedure Summary, Intake/Output, MAR, Accordion and Recent Results were discussed. 1030  Urine collected and sent to lab  Sputum sample needed. Patient aware. Will call when able to obtain    1050  Patient requesting pain meds for 8/10 pain. Takes percocet at home for chronic back pain. MD Park paged  Percocet 5/325 q6hr ordered    1300  Patient refusing Heparin stating \"I don't want that, I don't like it. \" Provided education on DVT prophylaxis, patient continues to refuse. 1608  Patients HR maintaining 120's-130's. 140's with any movement in bed. MD Celena Menjivar paged  Orders received to start diltiazem gtt and call cardiology consult    4006  Cardiology consult called. 1742  Patient requesting something for sleep. States that she normally takes Seroquel 800 mg QHS. Pharmacy called pharmacy to verify [address]. Script was filled for Seroquel 400 mg QHS on 12/22. Patient also c/o unrelieved HA, \"feels like my BP is narciso-high. \" States that she takes clonidine . 3mg TID. Current /90    1800  MD Park paged. Orders received for melatonin and firocet.  RN to enter orders     Elizabeth Sparks

## 2017-01-11 VITALS
BODY MASS INDEX: 25.61 KG/M2 | HEART RATE: 76 BPM | OXYGEN SATURATION: 97 % | SYSTOLIC BLOOD PRESSURE: 124 MMHG | WEIGHT: 150 LBS | DIASTOLIC BLOOD PRESSURE: 77 MMHG | TEMPERATURE: 98.1 F | RESPIRATION RATE: 18 BRPM | HEIGHT: 64 IN

## 2017-01-11 PROBLEM — F31.9 BIPOLAR DISORDER (HCC): Status: ACTIVE | Noted: 2017-01-11

## 2017-01-11 PROBLEM — I10 HTN (HYPERTENSION): Status: ACTIVE | Noted: 2017-01-11

## 2017-01-11 PROBLEM — F19.10 DRUG ABUSE (HCC): Status: ACTIVE | Noted: 2017-01-11

## 2017-01-11 LAB
ALBUMIN SERPL BCP-MCNC: 3.2 G/DL (ref 3.5–5)
ALBUMIN/GLOB SERPL: 0.9 {RATIO} (ref 1.1–2.2)
ALP SERPL-CCNC: 63 U/L (ref 45–117)
ALT SERPL-CCNC: 20 U/L (ref 12–78)
ANION GAP BLD CALC-SCNC: 11 MMOL/L (ref 5–15)
AST SERPL W P-5'-P-CCNC: 18 U/L (ref 15–37)
BASOPHILS # BLD AUTO: 0 K/UL (ref 0–0.1)
BASOPHILS # BLD: 0 % (ref 0–1)
BILIRUB SERPL-MCNC: 0.2 MG/DL (ref 0.2–1)
BUN SERPL-MCNC: 10 MG/DL (ref 6–20)
BUN/CREAT SERPL: 12 (ref 12–20)
CALCIUM SERPL-MCNC: 8.6 MG/DL (ref 8.5–10.1)
CHLORIDE SERPL-SCNC: 108 MMOL/L (ref 97–108)
CHOLEST SERPL-MCNC: 237 MG/DL
CO2 SERPL-SCNC: 20 MMOL/L (ref 21–32)
CREAT SERPL-MCNC: 0.84 MG/DL (ref 0.55–1.02)
EOSINOPHIL # BLD: 0 K/UL (ref 0–0.4)
EOSINOPHIL NFR BLD: 0 % (ref 0–7)
ERYTHROCYTE [DISTWIDTH] IN BLOOD BY AUTOMATED COUNT: 15.6 % (ref 11.5–14.5)
GLOBULIN SER CALC-MCNC: 3.7 G/DL (ref 2–4)
GLUCOSE SERPL-MCNC: 112 MG/DL (ref 65–100)
HCT VFR BLD AUTO: 36.1 % (ref 35–47)
HDLC SERPL-MCNC: 97 MG/DL
HDLC SERPL: 2.4 {RATIO} (ref 0–5)
HGB BLD-MCNC: 11.8 G/DL (ref 11.5–16)
L PNEUMO1 AG UR QL IA: NEGATIVE
LDLC SERPL CALC-MCNC: 130.6 MG/DL (ref 0–100)
LIPID PROFILE,FLP: ABNORMAL
LYMPHOCYTES # BLD AUTO: 6 % (ref 12–49)
LYMPHOCYTES # BLD: 0.7 K/UL (ref 0.8–3.5)
MAGNESIUM SERPL-MCNC: 2.3 MG/DL (ref 1.6–2.4)
MCH RBC QN AUTO: 29.4 PG (ref 26–34)
MCHC RBC AUTO-ENTMCNC: 32.7 G/DL (ref 30–36.5)
MCV RBC AUTO: 89.8 FL (ref 80–99)
MONOCYTES # BLD: 0.4 K/UL (ref 0–1)
MONOCYTES NFR BLD AUTO: 3 % (ref 5–13)
NEUTS SEG # BLD: 9.6 K/UL (ref 1.8–8)
NEUTS SEG NFR BLD AUTO: 91 % (ref 32–75)
PLATELET # BLD AUTO: 292 K/UL (ref 150–400)
POTASSIUM SERPL-SCNC: 4 MMOL/L (ref 3.5–5.1)
PROT SERPL-MCNC: 6.9 G/DL (ref 6.4–8.2)
RBC # BLD AUTO: 4.02 M/UL (ref 3.8–5.2)
SODIUM SERPL-SCNC: 139 MMOL/L (ref 136–145)
SPECIMEN SOURCE: NORMAL
T4 FREE SERPL-MCNC: 1.1 NG/DL (ref 0.8–1.5)
TRIGL SERPL-MCNC: 47 MG/DL (ref ?–150)
TSH SERPL DL<=0.05 MIU/L-ACNC: 0.13 UIU/ML (ref 0.36–3.74)
VLDLC SERPL CALC-MCNC: 9.4 MG/DL
WBC # BLD AUTO: 10.6 K/UL (ref 3.6–11)

## 2017-01-11 PROCEDURE — 80061 LIPID PANEL: CPT | Performed by: INTERNAL MEDICINE

## 2017-01-11 PROCEDURE — 36415 COLL VENOUS BLD VENIPUNCTURE: CPT | Performed by: INTERNAL MEDICINE

## 2017-01-11 PROCEDURE — 84480 ASSAY TRIIODOTHYRONINE (T3): CPT | Performed by: INTERNAL MEDICINE

## 2017-01-11 PROCEDURE — 94640 AIRWAY INHALATION TREATMENT: CPT

## 2017-01-11 PROCEDURE — 85025 COMPLETE CBC W/AUTO DIFF WBC: CPT | Performed by: INTERNAL MEDICINE

## 2017-01-11 PROCEDURE — 74011250637 HC RX REV CODE- 250/637: Performed by: INTERNAL MEDICINE

## 2017-01-11 PROCEDURE — 80053 COMPREHEN METABOLIC PANEL: CPT | Performed by: INTERNAL MEDICINE

## 2017-01-11 PROCEDURE — 74011000250 HC RX REV CODE- 250: Performed by: INTERNAL MEDICINE

## 2017-01-11 PROCEDURE — 83735 ASSAY OF MAGNESIUM: CPT | Performed by: INTERNAL MEDICINE

## 2017-01-11 PROCEDURE — 36600 WITHDRAWAL OF ARTERIAL BLOOD: CPT

## 2017-01-11 PROCEDURE — 84439 ASSAY OF FREE THYROXINE: CPT | Performed by: INTERNAL MEDICINE

## 2017-01-11 PROCEDURE — 84443 ASSAY THYROID STIM HORMONE: CPT | Performed by: INTERNAL MEDICINE

## 2017-01-11 RX ORDER — CLONIDINE HYDROCHLORIDE 0.3 MG/1
0.3 TABLET ORAL 3 TIMES DAILY
Qty: 90 TAB | Refills: 1 | Status: SHIPPED | OUTPATIENT
Start: 2017-01-11 | End: 2017-01-26

## 2017-01-11 RX ORDER — BUDESONIDE AND FORMOTEROL FUMARATE DIHYDRATE 160; 4.5 UG/1; UG/1
2 AEROSOL RESPIRATORY (INHALATION) 2 TIMES DAILY
Qty: 1 INHALER | Refills: 1 | Status: SHIPPED | OUTPATIENT
Start: 2017-01-11 | End: 2017-01-11

## 2017-01-11 RX ORDER — IPRATROPIUM BROMIDE AND ALBUTEROL SULFATE 2.5; .5 MG/3ML; MG/3ML
3 SOLUTION RESPIRATORY (INHALATION)
Qty: 100 NEBULE | Refills: 1 | Status: ON HOLD | OUTPATIENT
Start: 2017-01-11 | End: 2017-04-19

## 2017-01-11 RX ORDER — LITHIUM CARBONATE 300 MG/1
300 CAPSULE ORAL 2 TIMES DAILY WITH MEALS
Qty: 60 CAP | Refills: 1 | Status: SHIPPED | OUTPATIENT
Start: 2017-01-11 | End: 2017-01-26

## 2017-01-11 RX ORDER — NEBULIZER AND COMPRESSOR
1 EACH MISCELLANEOUS DAILY
Qty: 1 EACH | Refills: 0 | Status: ON HOLD | OUTPATIENT
Start: 2017-01-11 | End: 2017-04-19

## 2017-01-11 RX ORDER — OXYCODONE AND ACETAMINOPHEN 5; 325 MG/1; MG/1
1 TABLET ORAL
Qty: 15 TAB | Refills: 0 | Status: SHIPPED | OUTPATIENT
Start: 2017-01-11 | End: 2017-01-26

## 2017-01-11 RX ORDER — IPRATROPIUM BROMIDE AND ALBUTEROL SULFATE 2.5; .5 MG/3ML; MG/3ML
3 SOLUTION RESPIRATORY (INHALATION)
Status: DISCONTINUED | OUTPATIENT
Start: 2017-01-11 | End: 2017-01-11 | Stop reason: HOSPADM

## 2017-01-11 RX ORDER — CLONAZEPAM 0.5 MG/1
0.5 TABLET ORAL 2 TIMES DAILY
Qty: 60 TAB | Refills: 0 | Status: SHIPPED | OUTPATIENT
Start: 2017-01-11 | End: 2017-01-26

## 2017-01-11 RX ORDER — PRAZOSIN HYDROCHLORIDE 2 MG/1
2 CAPSULE ORAL
Qty: 30 CAP | Refills: 1 | Status: SHIPPED | OUTPATIENT
Start: 2017-01-11 | End: 2017-01-26

## 2017-01-11 RX ORDER — FLUTICASONE FUROATE AND VILANTEROL 200; 25 UG/1; UG/1
1 POWDER RESPIRATORY (INHALATION) DAILY
Qty: 28 EACH | Refills: 1 | Status: SHIPPED | OUTPATIENT
Start: 2017-01-11 | End: 2017-01-11

## 2017-01-11 RX ORDER — FLUTICASONE FUROATE AND VILANTEROL 200; 25 UG/1; UG/1
1 POWDER RESPIRATORY (INHALATION) DAILY
Qty: 28 EACH | Refills: 1 | Status: ON HOLD | OUTPATIENT
Start: 2017-01-11 | End: 2017-04-19

## 2017-01-11 RX ORDER — ALBUTEROL SULFATE 90 UG/1
2 AEROSOL, METERED RESPIRATORY (INHALATION)
Qty: 1 INHALER | Refills: 0 | Status: SHIPPED | OUTPATIENT
Start: 2017-01-11 | End: 2017-04-16

## 2017-01-11 RX ORDER — DILTIAZEM HYDROCHLORIDE 120 MG/1
120 CAPSULE, COATED, EXTENDED RELEASE ORAL DAILY
Qty: 30 CAP | Refills: 1 | Status: SHIPPED | OUTPATIENT
Start: 2017-01-11 | End: 2017-01-26

## 2017-01-11 RX ORDER — PREDNISONE 10 MG/1
10 TABLET ORAL
Qty: 12 TAB | Refills: 0 | Status: ON HOLD | OUTPATIENT
Start: 2017-01-11 | End: 2017-01-25

## 2017-01-11 RX ORDER — IBUPROFEN 200 MG
1 TABLET ORAL DAILY
Qty: 30 PATCH | Refills: 0 | Status: SHIPPED | OUTPATIENT
Start: 2017-01-11 | End: 2017-02-10

## 2017-01-11 RX ORDER — ACETAMINOPHEN 325 MG/1
650 TABLET ORAL
Qty: 40 TAB | Refills: 0 | Status: SHIPPED | OUTPATIENT
Start: 2017-01-11 | End: 2017-01-26

## 2017-01-11 RX ORDER — QUETIAPINE FUMARATE 400 MG/1
400 TABLET, FILM COATED ORAL 2 TIMES DAILY
Qty: 60 TAB | Refills: 1 | Status: SHIPPED | OUTPATIENT
Start: 2017-01-11 | End: 2017-01-26

## 2017-01-11 RX ORDER — LEVOFLOXACIN 500 MG/1
500 TABLET, FILM COATED ORAL DAILY
Qty: 10 TAB | Refills: 0 | Status: SHIPPED | OUTPATIENT
Start: 2017-01-11 | End: 2017-01-21

## 2017-01-11 RX ORDER — DILTIAZEM HYDROCHLORIDE 30 MG/1
30 TABLET, FILM COATED ORAL 3 TIMES DAILY
Status: DISCONTINUED | OUTPATIENT
Start: 2017-01-11 | End: 2017-01-11 | Stop reason: HOSPADM

## 2017-01-11 RX ORDER — SERTRALINE HYDROCHLORIDE 100 MG/1
100 TABLET, FILM COATED ORAL DAILY
Qty: 30 TAB | Refills: 1 | Status: SHIPPED | OUTPATIENT
Start: 2017-01-11 | End: 2017-01-26

## 2017-01-11 RX ORDER — GABAPENTIN 800 MG/1
800 TABLET ORAL 3 TIMES DAILY
Qty: 90 TAB | Refills: 1 | Status: SHIPPED | OUTPATIENT
Start: 2017-01-11 | End: 2017-01-26

## 2017-01-11 RX ADMIN — GUAIFENESIN 600 MG: 600 TABLET, EXTENDED RELEASE ORAL at 10:14

## 2017-01-11 RX ADMIN — OXYCODONE HYDROCHLORIDE AND ACETAMINOPHEN 1 TABLET: 5; 325 TABLET ORAL at 03:47

## 2017-01-11 RX ADMIN — DILTIAZEM HYDROCHLORIDE 30 MG: 30 TABLET, FILM COATED ORAL at 15:50

## 2017-01-11 RX ADMIN — GABAPENTIN 800 MG: 100 CAPSULE ORAL at 10:14

## 2017-01-11 RX ADMIN — Medication 10 ML: at 06:26

## 2017-01-11 RX ADMIN — UMECLIDINIUM 1 PUFF: 62.5 AEROSOL, POWDER ORAL at 10:18

## 2017-01-11 RX ADMIN — DILTIAZEM HYDROCHLORIDE 30 MG: 30 TABLET, FILM COATED ORAL at 10:14

## 2017-01-11 RX ADMIN — CLONAZEPAM 0.5 MG: 0.5 TABLET ORAL at 10:14

## 2017-01-11 RX ADMIN — GABAPENTIN 800 MG: 100 CAPSULE ORAL at 15:50

## 2017-01-11 RX ADMIN — QUETIAPINE FUMARATE 50 MG: 25 TABLET, FILM COATED ORAL at 10:14

## 2017-01-11 RX ADMIN — IPRATROPIUM BROMIDE AND ALBUTEROL SULFATE 3 ML: .5; 3 SOLUTION RESPIRATORY (INHALATION) at 01:12

## 2017-01-11 RX ADMIN — LITHIUM CARBONATE 300 MG: 300 CAPSULE ORAL at 10:14

## 2017-01-11 RX ADMIN — OXYCODONE HYDROCHLORIDE AND ACETAMINOPHEN 1 TABLET: 5; 325 TABLET ORAL at 10:14

## 2017-01-11 NOTE — PROGRESS NOTES
[address] [place] [address]  [phone]      Cardiology Progress Note      1/11/2017 11:10 AM    Admit Date: 1/9/2017    Admit Diagnosis:   COPD (chronic obstructive pulmonary disease) ([address]    Subjective:     Lara Mcgee     Visit Vitals    /76 (BP 1 Location: Left arm, BP Patient Position: At rest)    Pulse 93    Temp 97.9 °F (36.6 °C)    Resp 18    Ht 5' 4\" (1.626 m)    Wt 150 lb (68 kg)    LMP 12/19/2016    SpO2 96%    BMI 25.75 kg/m2       Current Facility-Administered Medications   Medication Dose Route Frequency    dilTIAZem (CARDIZEM) IR tablet 30 mg  30 mg Oral TID    albuterol-ipratropium (DUO-NEB) 2.5 MG-0.5 MG/3 ML  3 mL Nebulization Q6H PRN    cefTRIAXone (ROCEPHIN) 1 g in 0.9% sodium chloride (MBP/ADV) 50 mL ADV  1 g IntraVENous Q12H    acetaminophen (TYLENOL) tablet 650 mg  650 mg Oral Q4H PRN    methylPREDNISolone (PF) (SOLU-MEDROL) injection 40 mg  40 mg IntraVENous Q8H    morphine injection 2 mg  2 mg IntraVENous Q4H PRN    nicotine (NICODERM CQ) 14 mg/24 hr patch 1 Patch  1 Patch TransDERmal DAILY    insulin lispro (HUMALOG) injection   SubCUTAneous AC&HS    glucose chewable tablet 16 g  4 Tab Oral PRN    dextrose (D50W) injection syrg 12.5-25 g  12.5-25 g IntraVENous PRN    glucagon (GLUCAGEN) injection 1 mg  1 mg IntraMUSCular PRN    oxyCODONE-acetaminophen (PERCOCET) 5-325 mg per tablet 1 Tab  1 Tab Oral Q6H PRN    sertraline (ZOLOFT) tablet 100 mg  100 mg Oral QPM    butalbital-acetaminophen-caffeine (FIORICET, ESGIC) -40 mg per tablet 2 Tab  2 Tab Oral Q8H PRN    melatonin tablet 3 mg  3 mg Oral QHS PRN    prazosin (MINIPRESS) capsule 2 mg  2 mg Oral QHS    QUEtiapine (SEROquel) tablet 50 mg  50 mg Oral BID    umeclidinium (INCRUSE ELLIPTA) 62.5 mcg/actuation  1 Puff Inhalation DAILY    lithium carbonate capsule 300 mg  300 mg Oral BID    clonazePAM (KlonoPIN) tablet 0.5 mg  0.5 mg Oral BID    gabapentin (NEURONTIN) capsule 800 mg  800 mg Oral TID    sodium chloride (NS) flush 5-10 mL  5-10 mL IntraVENous Q8H    sodium chloride (NS) flush 5-10 mL  5-10 mL IntraVENous PRN    heparin (porcine) injection 5,000 Units  5,000 Units SubCUTAneous Q8H    azithromycin (ZITHROMAX) 500 mg in 0.9% sodium chloride (MBP/ADV) 250 mL  500 mg IntraVENous Q24H    guaiFENesin SR (MUCINEX) tablet 600 mg  600 mg Oral Q12H    prochlorperazine (COMPAZINE) injection 5 mg  5 mg IntraVENous Q6H PRN       Objective:      Physical Exam:  General Appearance:    Chest:   Clear  Cardiovascular:  Regular rate and rhythm, no murmur.   Abdomen:   Soft, non-tender, bowel sounds are active.   Extremities:   Skin:  Warm and dry.     Data Review:   Recent Labs      01/11/17   0409  01/10/17   0528  01/09/17   1850   WBC  10.6  3.0*  4.7   HGB  11.8  12.3  12.5   HCT  36.1  37.8  38.4   PLT  292  248  278     Recent Labs      01/11/17   0409  01/10/17   0528  01/09/17   1942 01/09/17   1850   NA  139  141   --   142   K  4.0  4.1   --   3.2*   CL  108  110*   --   106   CO2  20*  18*   --   30   GLU  112*  144*   --   98   BUN  10  8   --   9   CREA  0.84  0.85   --   0.92   CA  8.6  8.3*   --   8.4*   MG  2.3   --    --    --    ALB  3.2*  3.4*   --   3.9   TBILI  0.2  0.2   --   0.2   SGOT  18  27   --   27   ALT  20  20   --   25   INR   --    --   1.1   --        No results for input(s): TROIQ, CPK, CKMB in the last 72 hours. Intake/Output Summary (Last 24 hours) at 01/11/17 1110  Last data filed at 01/11/17 0423   Gross per 24 hour   Intake           606.17 ml   Output              400 ml   Net           206.17 ml        Telemetry:   EKG:  Cxray:    Assessment:     Active Problems:    COPD (chronic obstructive pulmonary disease) ([facility] (1/9/2017)      Sinus tachycardia (1/10/2017)      HTN (hypertension) (1/11/2017)      Drug abuse (1/11/2017)      Bipolar disorder ([facility] (1/11/2017)        Plan:       Rate down. Echo pending. Agree with po dilt.

## 2017-01-11 NOTE — PROGRESS NOTES
Bedside and Verbal shift change report given to Sam Vega RN by Richard Pal RN. Report included the following information SBAR, Kardex, Intake/Output, MAR, Recent Results and Cardiac Rhythm NSR/ Sinus Tach. 0815: Patient refused blood glucose check. Patient currently without IV access, as patient pulled IV out last night. Patient has very poor IV access. Left message with PICC team about inserting Midline. IV medications held until IV access established. 1600: Discharge instructions reviewed with patient including follow ups, new medications and side effects. Opportunity for questions provided. Patient verbalized understanding of information. Telemetry removed. Patient leaving by way of cab, stable for discharge at this time.

## 2017-01-11 NOTE — PROGRESS NOTES
Occupational Therapy  Orders received and medical record reviewed. Pt reports that she does not need OT services. Nursing reports that pt is up ad yani in room and pt has recently been ambulating in the narayan. PT discharged pt yesterday. Pt requests, \"Can you tell them that I need a nebulizer? \"   Pt reports that 3 weeks ago she was hospitalized for a week in [place]; she was intubated due to not being able to breathe. Pt requests a list of medical doctors to follow up with and a list of mental health doctors as well. Pt reports that she lives with her mother and step father. Her 8year old son has been living with them while she's been in [place]; pt has recently moved here. Pt may benefit from pulmonary rehab-outpatient services once she is settled and has her medical condition managed. Pt was educated in basic [employer] principles and provided [facility] handouts for her reference. Discussed with nursing re: pt's request for a nebulizer. Will discuss w/ when available. Will discharge pt from OT. Thank you for the consult. 13 minutes.

## 2017-01-11 NOTE — DISCHARGE SUMMARY
Hospitalist Discharge Summary     Patient ID:  Rubina Baugh  [id number]  35 y.o.  1975    PCP on record: None    Admit date: 1/9/2017  Discharge date and time: 1/11/2017      DISCHARGE DIAGNOSIS:    Respiratory Failure, COPD Exacerbation, HTN, Bipolar Ds, Chronic Pain, Smoker, Drug Abuse      CONSULTATIONS:  IP CONSULT TO HOSPITALIST  IP CONSULT TO CARDIOLOGY    Excerpted HPI from H&P of Dominguez Soto MD:  Rhea Nascimento is a 35 y.o. Female with PMHx significant for HTN, COPD, and Bipolar/Anxiety With a h/o  Intubation in the past  who presents via EMS to the ED to be evaluated for respiratory distress. Per EMS, pt was initially at 80% SO2 upon their arrival, but improved to 100% SO2 when she was placed on C-PAP at 8L with duo-nebulizer. No fevers or chills No c/p no abdominal pain. No n/v. No uti sx. No sick contacts. No travel. no longer smokes. No si/hi   Sx per patient are c/w prior copd flare ups- no calf pain. We were asked to admit for work up and evaluation of the above problems. ______________________________________________________________________  DISCHARGE SUMMARY/HOSPITAL COURSE:  for full details see H&P, daily progress notes, labs, consult notes. Acute Hypoxic Respiratory Failure POA: O2 sat 87% on room air, will c/w supplemental O2. Wean down as tolerated, so far tolerating room air. COPD Exacerbation: will start Z-max,/rocephin bronchodilators, mucolytics, taper down Steroids, check sputum. Will D/c on LVQ and prednisone  Hypokalemia: replace prn  HTN: home meds, resume clonidine and prozosin, added CArdizem  Bipolar dx/home meds Check lithium level, c/w Lithium, Seroquel, Zoloft, Klonopin  Chronic pain/neuropathy- cont w Neurontin   Smoker: counseled. Start nicotine patch.    Drug Abuse: UDS positive for Opiates, Marijuana, Cocaine  Baseline: Fairly independent   Code status: Full  Prophylaxis: Hep SQ  Recommended Disposition: Home w/Family    D/c Home today will need to set up relationship with PCP in the area.  _______________________________________________________________________  Patient seen and examined by me on discharge day. Pertinent Findings:  Gen:    Not in distress  Chest: Coarse BS, rhonchi  CVS:   Regular rhythm. No edema  Abd:  Soft, not distended, not tender  Neuro:  Alert, GCS 15  _______________________________________________________________________  DISCHARGE MEDICATIONS:   Current Discharge Medication List      START taking these medications    Details   acetaminophen (TYLENOL) 325 mg tablet Take 2 Tabs by mouth every four (4) hours as needed for Fever. Qty: 40 Tab, Refills: 0      albuterol-ipratropium (DUO-NEB) 2.5 mg-0.5 mg/3 ml nebu 3 mL by Nebulization route every six (6) hours as needed. Qty: 100 Nebule, Refills: 1      nicotine (NICODERM CQ) 14 mg/24 hr patch 1 Patch by TransDERmal route daily for 30 days. Qty: 30 Patch, Refills: 0      oxyCODONE-acetaminophen (PERCOCET) 5-325 mg per tablet Take 1 Tab by mouth every six (6) hours as needed. Max Daily Amount: 4 Tabs. Qty: 15 Tab, Refills: 0      levoFLOXacin (LEVAQUIN) 500 mg tablet Take 1 Tab by mouth daily for 10 days. Qty: 10 Tab, Refills: 0      Nebulizer & Compressor machine 1 Each by Does Not Apply route daily. Qty: 1 Each, Refills: 0      dilTIAZem CD (CARDIZEM CD) 120 mg ER capsule Take 1 Cap by mouth daily. Qty: 30 Cap, Refills: 1      fluticasone-vilanterol (BREO ELLIPTA) 200-25 mcg/dose inhaler Take 1 Puff by inhalation daily. Qty: 28 Each, Refills: 1         CONTINUE these medications which have CHANGED    Details   albuterol (PROVENTIL HFA, VENTOLIN HFA, PROAIR HFA) 90 mcg/actuation inhaler Take 2 Puffs by inhalation every four (4) hours as needed for Wheezing or Shortness of Breath. Qty: 1 Inhaler, Refills: 0      clonazePAM (KLONOPIN) 0.5 mg tablet Take 1 Tab by mouth two (2) times a day. Max Daily Amount: 1 mg.   Qty: 60 Tab, Refills: 0      cloNIDine HCl (CATAPRES) 0.3 mg tablet Take 1 Tab by mouth three (3) times daily. Qty: 90 Tab, Refills: 1      gabapentin (NEURONTIN) 800 mg tablet Take 1 Tab by mouth three (3) times daily. Qty: 90 Tab, Refills: 1      lithium carbonate 300 mg capsule Take 1 Cap by mouth two (2) times daily (with meals). Qty: 60 Cap, Refills: 1      prazosin (MINIPRESS) 2 mg capsule Take 1 Cap by mouth nightly. Qty: 30 Cap, Refills: 1      QUEtiapine (SEROQUEL) 400 mg tablet Take 1 Tab by mouth two (2) times a day. Qty: 60 Tab, Refills: 1      sertraline (ZOLOFT) 100 mg tablet Take 1 Tab by mouth daily. Qty: 30 Tab, Refills: 1      predniSONE (DELTASONE) 10 mg tablet Take 1 Tab by mouth Multiple. Take 2 tab po daily for 3 days  Then take 1 tab po daily for 3 days  Then take 1/2 tab po daily for 3 days then stop  Qty: 12 Tab, Refills: 0         STOP taking these medications       BUDESONIDE/FORMOTEROL FUMARATE (SYMBICORT IN) Comments:   Reason for Stopping:         budesonide-formoterol (SYMBICORT) 160-4.5 mcg/actuation HFA inhaler Comments:   Reason for Stopping:         tiotropium (SPIRIVA WITH HANDIHALER) 18 mcg inhalation capsule Comments:   Reason for Stopping:               My Recommended Diet, Activity, Wound Care, and follow-up labs are listed in the patient's Discharge Insturctions which I have personally completed and reviewed.     _______________________________________________________________________  DISPOSITION:    Home with Family: y   Home with HH/PT/OT/RN:    SNF/LTC:    MAEGAN:    OTHER:        Condition at Discharge:  Stable  _______________________________________________________________________  Follow up with:   PCP : None  Follow-up Information     Follow up With Details Comments Contact Info    None   None ([phone]) Patient stated that they have no PCP        F/U PCP        Total time in minutes spent coordinating this discharge (includes going over instructions, follow-up, prescriptions, and preparing report for sign off to her PCP) : 35 minutes    Signed:  Cayla Abreu MD

## 2017-01-11 NOTE — DISCHARGE INSTRUCTIONS
HOSPITALIST DISCHARGE INSTRUCTIONS  NAME: Elmer Mohamud   :  1975   MRN:  [id number]     Date/Time:  2017 9:59 AM    ADMIT DATE: 2017     DISCHARGE DATE: 2017     ADMITTING DIAGNOSIS:  Respiratory Failure, COPD Exacerbation, HTN, Bipolar Ds, Chronic Pain, Smoker, Drug Abuse    Patient Active Problem List   Diagnosis Code    Unspecified asthma, with exacerbation J45. 0    Heroin abuse F11.10    COPD (chronic obstructive pulmonary disease) (formerly [facility]) J44.9    Sinus tachycardia R00.0    HTN (hypertension) I10    Drug abuse F19.10    [facility] F31.9       DISCHARGE DIAGNOSIS:  Patient Active Problem List   Diagnosis Code    Unspecified asthma, with exacerbation J45. 0    Heroin abuse F11.10    COPD (chronic obstructive pulmonary disease) (formerly [facility]) J44.9    Sinus tachycardia R00.0    HTN (hypertension) I10    Drug abuse F19.10    Bipolar disorder (formerly [facility]) F31.9         MEDICATIONS:                As per medication reconciliation    · It is important that you take the medication exactly as they are prescribed. · Keep your medication in the bottles provided by the pharmacist and keep a list of the medication names, dosages, and times to be taken in your wallet. · Do not take other medications without consulting your doctor. Pain Management: per above medications    What to do at Home    Recommended diet:  Cardiac Diet    Recommended activity: Activity as tolerated and No driving while on analgesics    If you experience any of the following symptoms then please call your primary care physician or return to the emergency room if you cannot get hold of your doctor:  Fever, chills, nausea, vomiting, diarrhea, change in mentation, falling, bleeding, shortness of breath. Follow Up:   PCP you are to call and set up an appointment to see them in 2 week. Information obtained by :  I understand that if any problems occur once I am at home I am to contact my physician.     I understand and acknowledge receipt of the instructions indicated above.                                                                                                                                            Physician's or R.N.'s Signature                                                                  Date/Time                                                                                                                                              Patient or Representative Signature                                                          Date/Time

## 2017-01-11 NOTE — PROGRESS NOTES
Pt is a 35year old female admitted on 1/9/17. Pt reports that she will be discharged today and will require assistance with her medications as she is uninsured. Pt will also require a cab vouchure to her home address located on the demographic sheet. The good help fund has been able to pay for the cost of 7 medications for the pt. A cab voucher has been provided to the pharmacy to  medication and then to her home address. Cyndy Loud PCP list given and community based resources for Free and low case healthcare also provided.     Sami Espitia, DORCAS

## 2017-01-11 NOTE — PROGRESS NOTES
Report received from Cece Millard, [facility]. SBAR, Kardex, Procedure Summary, Intake/Output, MAR and Recent Results were discussed. Win        [facility] SHIFT NURSING NOTE    Bedside shift change report given to Christina Silvestre (oncoming nurse) by Doretha Carbajal (offgoing nurse). Report included the following information SBAR, Kardex, Procedure Summary, Intake/Output, MAR and Recent Results. SHIFT SUMMARY: HR improved. TORB received for arterial stick for morning labs. 6:37 AM  Patient pulled PIV out. Patient does not know how IV was pulled out. Admission Date 1/9/2017   Admission Diagnosis COPD (chronic obstructive pulmonary disease) ([facility]   Consults IP CONSULT TO HOSPITALIST  IP CONSULT TO CARDIOLOGY        Consults   [x] PT   [x] OT   [] Speech   [] Palliative      [] Hospice    [x] Case Management   [] None   Cardiac Monitoring   [] Yes   [] No     Antibiotics   [x] Yes   [] No   GI Prophylaxis  (Ex: Protonix, Pepcid, etc,.)   [x] Yes   [] No          DVT Prophylaxis   SCDs:             Win stockings:         [x] Medication (Ex: Lovenox, Eliquis,  Heparin, etc..)   [] Contraindicated   [] None       Urinary Catheter             LDAs               Peripheral IV 01/09/17 Left Antecubital (Active)   Site Assessment Clean, dry, & intact 1/11/2017  4:23 AM   Phlebitis Assessment 0 1/11/2017  4:23 AM   Infiltration Assessment 0 1/11/2017  4:23 AM   Dressing Status Clean, dry, & intact 1/11/2017  4:23 AM   Dressing Type Tape 1/11/2017  4:23 AM   Hub Color/Line Status Flushed 1/10/2017  8:20 PM                      I/Os   Intake/Output Summary (Last 24 hours) at 01/11/17 0429  Last data filed at 01/11/17 0423   Gross per 24 hour   Intake           606.17 ml   Output             1100 ml   Net          -493.83 ml         Activity Level Activity Level:  Up with Assistance     Activity Assistance: Partial (one person)   Diet Active Orders   Diet    DIET CARDIAC Regular; Consistent Carb 1800kcal      Purposeful Rounding every 1-2 hour?   [x] Yes    Criss Score  Total Score: 1   Bed Alarm (If score 3 or >)   [] Yes    [] Refused (See signed refusal form in chart)   Doc Score  Doc Score: 21       Doc Score (if score 14 or less)   [] PMT consult   [] Nutrition consult   [] Wound Care consult      []  Specialty bed         Influenza Vaccine Received Flu Vaccine for Current Season (usually Sept-March): Yes    Patient/Guardian Refused (Notify MD): Yes          Needs prior to discharge:   Home O2 required:    [] Yes   [x] No     If yes, how much O2 required?     Other:    Last Bowel Movement Date: 01/09/17        POST-OP SURGICAL VATS   [] Yes   [x] No     Incentive Spirometer:   [] Yes   [] Refused   Coughing and deep breathing:     [] Yes   [] Refused   Oral care:     [] Yes   [] Refused   Understanding (patient education):     [] Yes   [] Refused   Getting out of bed Number times ambulated in hallway past shift:    Number of times OOB to chair past shift:     Head of bed elevation:     [] Yes   [] Refused      Readmission Risk Assessment Tool Score Low Risk            12       Total Score        3 Relationship with PCP    4 More than 1 Admission in calendar year    4 Patient Insurance is Medicare, Medicaid or Self Pay    1 Charlson Comorbidity Score        Criteria that do not apply:    Patient Living Status    Patient Length of Stay > 5       Expected Length of Stay 3d 19h   Actual Length of Stay 2

## 2017-01-12 LAB
BACTERIA SPEC CULT: NORMAL
BACTERIA SPEC CULT: NORMAL
CC UR VC: NORMAL
CC UR VC: NORMAL
SERVICE CMNT-IMP: NORMAL
SERVICE CMNT-IMP: NORMAL

## 2017-01-15 ENCOUNTER — HOSPITAL ENCOUNTER (OUTPATIENT)
Age: 42
Setting detail: OBSERVATION
Discharge: LEFT BEFORE DISCHARGE | End: 2017-01-16
Attending: EMERGENCY MEDICINE | Admitting: INTERNAL MEDICINE
Payer: MEDICAID

## 2017-01-15 ENCOUNTER — APPOINTMENT (OUTPATIENT)
Dept: GENERAL RADIOLOGY | Age: 42
End: 2017-01-15
Attending: EMERGENCY MEDICINE
Payer: MEDICAID

## 2017-01-15 DIAGNOSIS — J45.41 MODERATE PERSISTENT ASTHMA WITH ACUTE EXACERBATION: Primary | ICD-10-CM

## 2017-01-15 LAB
ALBUMIN SERPL BCP-MCNC: 3.5 G/DL (ref 3.5–5)
ALBUMIN/GLOB SERPL: 1 {RATIO} (ref 1.1–2.2)
ALP SERPL-CCNC: 87 U/L (ref 45–117)
ALT SERPL-CCNC: 39 U/L (ref 12–78)
AMPHET UR QL SCN: NEGATIVE
ANION GAP BLD CALC-SCNC: 9 MMOL/L (ref 5–15)
APPEARANCE UR: ABNORMAL
ARTERIAL PATENCY WRIST A: YES
AST SERPL W P-5'-P-CCNC: 40 U/L (ref 15–37)
BACTERIA URNS QL MICRO: ABNORMAL /HPF
BARBITURATES UR QL SCN: POSITIVE
BASE DEFICIT BLDA-SCNC: 3.8 MMOL/L
BASOPHILS # BLD AUTO: 0 K/UL (ref 0–0.1)
BASOPHILS # BLD: 0 % (ref 0–1)
BDY SITE: ABNORMAL
BENZODIAZ UR QL: NEGATIVE
BILIRUB SERPL-MCNC: <0.1 MG/DL (ref 0.2–1)
BILIRUB UR QL: NEGATIVE
BUN SERPL-MCNC: 8 MG/DL (ref 6–20)
BUN/CREAT SERPL: 8 (ref 12–20)
CALCIUM SERPL-MCNC: 8.4 MG/DL (ref 8.5–10.1)
CANNABINOIDS UR QL SCN: POSITIVE
CHLORIDE SERPL-SCNC: 105 MMOL/L (ref 97–108)
CO2 SERPL-SCNC: 25 MMOL/L (ref 21–32)
COCAINE UR QL SCN: NEGATIVE
COLOR UR: ABNORMAL
CREAT SERPL-MCNC: 1.03 MG/DL (ref 0.55–1.02)
DATE LAST DOSE: ABNORMAL
DRUG SCRN COMMENT,DRGCM: ABNORMAL
EOSINOPHIL # BLD: 0.2 K/UL (ref 0–0.4)
EOSINOPHIL NFR BLD: 5 % (ref 0–7)
EPITH CASTS URNS QL MICRO: ABNORMAL /LPF
ERYTHROCYTE [DISTWIDTH] IN BLOOD BY AUTOMATED COUNT: 14.9 % (ref 11.5–14.5)
FLUID CULTURE, SPNG2: NORMAL
GLOBULIN SER CALC-MCNC: 3.6 G/DL (ref 2–4)
GLUCOSE SERPL-MCNC: 123 MG/DL (ref 65–100)
GLUCOSE UR STRIP.AUTO-MCNC: NEGATIVE MG/DL
HCO3 BLDA-SCNC: 22 MMOL/L (ref 22–26)
HCT VFR BLD AUTO: 41 % (ref 35–47)
HGB BLD-MCNC: 13.6 G/DL (ref 11.5–16)
HGB UR QL STRIP: ABNORMAL
KETONES UR QL STRIP.AUTO: NEGATIVE MG/DL
LEUKOCYTE ESTERASE UR QL STRIP.AUTO: ABNORMAL
LITHIUM SERPL-SCNC: <0.2 MMOL/L (ref 0.6–1.2)
LYMPHOCYTES # BLD AUTO: 31 % (ref 12–49)
LYMPHOCYTES # BLD: 1.6 K/UL (ref 0.8–3.5)
MCH RBC QN AUTO: 29.2 PG (ref 26–34)
MCHC RBC AUTO-ENTMCNC: 33.2 G/DL (ref 30–36.5)
MCV RBC AUTO: 88.2 FL (ref 80–99)
METHADONE UR QL: NEGATIVE
MONOCYTES # BLD: 0.2 K/UL (ref 0–1)
MONOCYTES NFR BLD AUTO: 4 % (ref 5–13)
NEUTS SEG # BLD: 3.1 K/UL (ref 1.8–8)
NEUTS SEG NFR BLD AUTO: 60 % (ref 32–75)
NITRITE UR QL STRIP.AUTO: NEGATIVE
OPIATES UR QL: POSITIVE
ORGANISM ID, SPNG3: NORMAL
PCO2 BLDA: 42 MMHG (ref 35–45)
PCP UR QL: NEGATIVE
PH BLDA: 7.33 [PH] (ref 7.35–7.45)
PH UR STRIP: 6 [PH] (ref 5–8)
PLATELET # BLD AUTO: 232 K/UL (ref 150–400)
PLEASE NOTE, SPNG4: NORMAL
PO2 BLDA: 55 MMHG (ref 80–100)
POTASSIUM SERPL-SCNC: 3.8 MMOL/L (ref 3.5–5.1)
PROT SERPL-MCNC: 7.1 G/DL (ref 6.4–8.2)
PROT UR STRIP-MCNC: NEGATIVE MG/DL
RBC # BLD AUTO: 4.65 M/UL (ref 3.8–5.2)
RBC #/AREA URNS HPF: ABNORMAL /HPF (ref 0–5)
REPORTED DOSE,DOSE: ABNORMAL UNITS
REPORTED DOSE/TIME,TMG: ABNORMAL
S PNEUM AG SPEC QL LA: NEGATIVE
SAO2 % BLD: 86 % (ref 92–97)
SAO2% DEVICE SAO2% SENSOR NAME: ABNORMAL
SODIUM SERPL-SCNC: 139 MMOL/L (ref 136–145)
SP GR UR REFRACTOMETRY: 1.01 (ref 1–1.03)
SPECIMEN SITE: ABNORMAL
SPECIMEN SOURCE: NORMAL
SPECIMEN, SPNG1: NORMAL
T3 SERPL-MCNC: 74 NG/DL (ref 71–180)
TROPONIN I SERPL-MCNC: <0.04 NG/ML
UROBILINOGEN UR QL STRIP.AUTO: 0.2 EU/DL (ref 0.2–1)
WBC # BLD AUTO: 5.1 K/UL (ref 3.6–11)
WBC URNS QL MICRO: ABNORMAL /HPF (ref 0–4)

## 2017-01-15 PROCEDURE — 84484 ASSAY OF TROPONIN QUANT: CPT | Performed by: EMERGENCY MEDICINE

## 2017-01-15 PROCEDURE — 74011250636 HC RX REV CODE- 250/636: Performed by: INTERNAL MEDICINE

## 2017-01-15 PROCEDURE — 36415 COLL VENOUS BLD VENIPUNCTURE: CPT | Performed by: EMERGENCY MEDICINE

## 2017-01-15 PROCEDURE — 71010 XR CHEST PORT: CPT

## 2017-01-15 PROCEDURE — 74011000250 HC RX REV CODE- 250: Performed by: EMERGENCY MEDICINE

## 2017-01-15 PROCEDURE — 36600 WITHDRAWAL OF ARTERIAL BLOOD: CPT | Performed by: EMERGENCY MEDICINE

## 2017-01-15 PROCEDURE — 80053 COMPREHEN METABOLIC PANEL: CPT | Performed by: EMERGENCY MEDICINE

## 2017-01-15 PROCEDURE — 80307 DRUG TEST PRSMV CHEM ANLYZR: CPT | Performed by: EMERGENCY MEDICINE

## 2017-01-15 PROCEDURE — 85025 COMPLETE CBC W/AUTO DIFF WBC: CPT | Performed by: EMERGENCY MEDICINE

## 2017-01-15 PROCEDURE — 99218 HC RM OBSERVATION: CPT

## 2017-01-15 PROCEDURE — 96360 HYDRATION IV INFUSION INIT: CPT

## 2017-01-15 PROCEDURE — 81001 URINALYSIS AUTO W/SCOPE: CPT | Performed by: EMERGENCY MEDICINE

## 2017-01-15 PROCEDURE — 82803 BLOOD GASES ANY COMBINATION: CPT | Performed by: EMERGENCY MEDICINE

## 2017-01-15 PROCEDURE — 74011250637 HC RX REV CODE- 250/637: Performed by: INTERNAL MEDICINE

## 2017-01-15 PROCEDURE — 74011250636 HC RX REV CODE- 250/636: Performed by: EMERGENCY MEDICINE

## 2017-01-15 PROCEDURE — 99285 EMERGENCY DEPT VISIT HI MDM: CPT

## 2017-01-15 PROCEDURE — 94640 AIRWAY INHALATION TREATMENT: CPT

## 2017-01-15 PROCEDURE — 80178 ASSAY OF LITHIUM: CPT | Performed by: EMERGENCY MEDICINE

## 2017-01-15 PROCEDURE — 93005 ELECTROCARDIOGRAM TRACING: CPT

## 2017-01-15 RX ORDER — SODIUM CHLORIDE 0.9 % (FLUSH) 0.9 %
5-10 SYRINGE (ML) INJECTION AS NEEDED
Status: DISCONTINUED | OUTPATIENT
Start: 2017-01-15 | End: 2017-01-16 | Stop reason: HOSPADM

## 2017-01-15 RX ORDER — DILTIAZEM HYDROCHLORIDE 120 MG/1
120 CAPSULE, COATED, EXTENDED RELEASE ORAL DAILY
Status: DISCONTINUED | OUTPATIENT
Start: 2017-01-16 | End: 2017-01-15

## 2017-01-15 RX ORDER — CODEINE PHOSPHATE AND GUAIFENESIN 10; 100 MG/5ML; MG/5ML
5 SOLUTION ORAL
Status: DISCONTINUED | OUTPATIENT
Start: 2017-01-15 | End: 2017-01-16 | Stop reason: HOSPADM

## 2017-01-15 RX ORDER — ALBUTEROL SULFATE 0.83 MG/ML
2.5 SOLUTION RESPIRATORY (INHALATION)
Status: DISCONTINUED | OUTPATIENT
Start: 2017-01-15 | End: 2017-01-16 | Stop reason: HOSPADM

## 2017-01-15 RX ORDER — ENOXAPARIN SODIUM 100 MG/ML
40 INJECTION SUBCUTANEOUS DAILY
Status: DISCONTINUED | OUTPATIENT
Start: 2017-01-16 | End: 2017-01-16 | Stop reason: HOSPADM

## 2017-01-15 RX ORDER — QUETIAPINE FUMARATE 100 MG/1
400 TABLET, FILM COATED ORAL 2 TIMES DAILY
Status: DISCONTINUED | OUTPATIENT
Start: 2017-01-15 | End: 2017-01-16 | Stop reason: HOSPADM

## 2017-01-15 RX ORDER — CLONIDINE HYDROCHLORIDE 0.1 MG/1
0.1 TABLET ORAL 3 TIMES DAILY
Status: DISCONTINUED | OUTPATIENT
Start: 2017-01-16 | End: 2017-01-15

## 2017-01-15 RX ORDER — PRAZOSIN HYDROCHLORIDE 1 MG/1
2 CAPSULE ORAL
Status: DISCONTINUED | OUTPATIENT
Start: 2017-01-15 | End: 2017-01-16 | Stop reason: HOSPADM

## 2017-01-15 RX ORDER — OXYCODONE AND ACETAMINOPHEN 5; 325 MG/1; MG/1
1 TABLET ORAL
Status: DISCONTINUED | OUTPATIENT
Start: 2017-01-15 | End: 2017-01-16 | Stop reason: HOSPADM

## 2017-01-15 RX ORDER — FLUTICASONE FUROATE AND VILANTEROL 200; 25 UG/1; UG/1
1 POWDER RESPIRATORY (INHALATION) DAILY
Status: DISCONTINUED | OUTPATIENT
Start: 2017-01-16 | End: 2017-01-16 | Stop reason: HOSPADM

## 2017-01-15 RX ORDER — ACETAMINOPHEN 325 MG/1
650 TABLET ORAL
Status: DISCONTINUED | OUTPATIENT
Start: 2017-01-15 | End: 2017-01-16 | Stop reason: HOSPADM

## 2017-01-15 RX ORDER — GABAPENTIN 300 MG/1
900 CAPSULE ORAL 3 TIMES DAILY
Status: DISCONTINUED | OUTPATIENT
Start: 2017-01-15 | End: 2017-01-16 | Stop reason: HOSPADM

## 2017-01-15 RX ORDER — CLONIDINE HYDROCHLORIDE 0.1 MG/1
0.3 TABLET ORAL 3 TIMES DAILY
Status: DISCONTINUED | OUTPATIENT
Start: 2017-01-15 | End: 2017-01-15

## 2017-01-15 RX ORDER — CLONIDINE HYDROCHLORIDE 0.1 MG/1
0.1 TABLET ORAL 2 TIMES DAILY
Status: DISCONTINUED | OUTPATIENT
Start: 2017-01-15 | End: 2017-01-16 | Stop reason: HOSPADM

## 2017-01-15 RX ORDER — SODIUM CHLORIDE 0.9 % (FLUSH) 0.9 %
5-10 SYRINGE (ML) INJECTION EVERY 8 HOURS
Status: DISCONTINUED | OUTPATIENT
Start: 2017-01-15 | End: 2017-01-16 | Stop reason: HOSPADM

## 2017-01-15 RX ORDER — LITHIUM CARBONATE 300 MG/1
300 CAPSULE ORAL 2 TIMES DAILY WITH MEALS
Status: DISCONTINUED | OUTPATIENT
Start: 2017-01-16 | End: 2017-01-16 | Stop reason: HOSPADM

## 2017-01-15 RX ORDER — BENZONATATE 100 MG/1
100 CAPSULE ORAL
Status: DISCONTINUED | OUTPATIENT
Start: 2017-01-15 | End: 2017-01-16 | Stop reason: HOSPADM

## 2017-01-15 RX ORDER — SERTRALINE HYDROCHLORIDE 50 MG/1
100 TABLET, FILM COATED ORAL DAILY
Status: DISCONTINUED | OUTPATIENT
Start: 2017-01-16 | End: 2017-01-16 | Stop reason: HOSPADM

## 2017-01-15 RX ORDER — IBUPROFEN 200 MG
1 TABLET ORAL DAILY
Status: DISCONTINUED | OUTPATIENT
Start: 2017-01-16 | End: 2017-01-16 | Stop reason: HOSPADM

## 2017-01-15 RX ORDER — CLONAZEPAM 0.5 MG/1
0.5 TABLET ORAL 2 TIMES DAILY
Status: DISCONTINUED | OUTPATIENT
Start: 2017-01-15 | End: 2017-01-16 | Stop reason: HOSPADM

## 2017-01-15 RX ORDER — SULFAMETHOXAZOLE AND TRIMETHOPRIM 800; 160 MG/1; MG/1
1 TABLET ORAL EVERY 12 HOURS
Status: DISCONTINUED | OUTPATIENT
Start: 2017-01-15 | End: 2017-01-16 | Stop reason: HOSPADM

## 2017-01-15 RX ORDER — IPRATROPIUM BROMIDE AND ALBUTEROL SULFATE 2.5; .5 MG/3ML; MG/3ML
3 SOLUTION RESPIRATORY (INHALATION)
Status: DISCONTINUED | OUTPATIENT
Start: 2017-01-15 | End: 2017-01-16 | Stop reason: HOSPADM

## 2017-01-15 RX ORDER — IPRATROPIUM BROMIDE AND ALBUTEROL SULFATE 2.5; .5 MG/3ML; MG/3ML
3 SOLUTION RESPIRATORY (INHALATION) ONCE
Status: COMPLETED | OUTPATIENT
Start: 2017-01-15 | End: 2017-01-15

## 2017-01-15 RX ADMIN — CLONAZEPAM 0.5 MG: 0.5 TABLET ORAL at 23:19

## 2017-01-15 RX ADMIN — GUAIFENESIN 600 MG: 600 TABLET, EXTENDED RELEASE ORAL at 19:32

## 2017-01-15 RX ADMIN — IPRATROPIUM BROMIDE AND ALBUTEROL SULFATE 3 ML: .5; 3 SOLUTION RESPIRATORY (INHALATION) at 18:29

## 2017-01-15 RX ADMIN — PRAZOSIN HYDROCHLORIDE 2 MG: 1 CAPSULE ORAL at 23:22

## 2017-01-15 RX ADMIN — Medication 10 ML: at 23:19

## 2017-01-15 RX ADMIN — SULFAMETHOXAZOLE AND TRIMETHOPRIM 1 TABLET: 800; 160 TABLET ORAL at 23:24

## 2017-01-15 RX ADMIN — BENZONATATE 100 MG: 100 CAPSULE ORAL at 23:21

## 2017-01-15 RX ADMIN — METHYLPREDNISOLONE SODIUM SUCCINATE 40 MG: 40 INJECTION, POWDER, FOR SOLUTION INTRAMUSCULAR; INTRAVENOUS at 23:25

## 2017-01-15 RX ADMIN — GABAPENTIN 900 MG: 300 CAPSULE ORAL at 23:28

## 2017-01-15 RX ADMIN — QUETIAPINE FUMARATE 400 MG: 100 TABLET, FILM COATED ORAL at 23:20

## 2017-01-15 RX ADMIN — OXYCODONE HYDROCHLORIDE AND ACETAMINOPHEN 1 TABLET: 5; 325 TABLET ORAL at 23:20

## 2017-01-15 RX ADMIN — SODIUM CHLORIDE 1000 ML: 900 INJECTION, SOLUTION INTRAVENOUS at 18:00

## 2017-01-15 NOTE — ED PROVIDER NOTES
HPI Comments: Milena Dobbs is a 35 y.o. female with hx significant for asthma, a-fib, HTN, hepatitis B, sarcoidosis, and bipolar disorder who presents to ED [facility] ED via EMS with cc of gradually worsening SOB since yesterday. Pt also c/o wheezing, productive cough with \"yellow and green\" sputum, and chest tightness. Pt reports she has been using nebulizer treatments at home, with no relief. Per EMS, pt was given Decadron 10mg IM and Albuterol 10mg en route to ED which pt notes has helped her symptoms. Pt reports h/o admission and intubation for her asthma. Pt reports recent steroid use but is not on any treatment currently. Pt does not see a PCP or pulmonologist due to recently being uninsured. Pt states current symptoms are similar to her prior asthma exacerbations. Pt denies fever, chest pain, calf pain, leg swelling, nausea, or vomiting. Social Hx: +tobacco, +former EtOH, +illicit drug usage (heroin)    There are no other complaints, changes or physical findings at this time. The history is provided by the patient and the EMS personnel. Past Medical History:   Diagnosis Date    Arrhythmia      irregular    Asthma     Chronic pain      back, leg    Depression     Hepatitis B     Hypertension      atrial fibrillation    Other ill-defined conditions      heart murmur    Psychiatric disorder      bipolar    Sarcoidosis ([facility] [address]        No past surgical history on file. Family History:   Problem Relation Age of Onset    Hypertension Father     Hypertension Mother        Social History     Social History    Marital status: SINGLE     Spouse name: N/A    Number of children: N/A    Years of education: N/A     Occupational History    Not on file.      Social History Main Topics    Smoking status: Current Every Day Smoker    Smokeless tobacco: Not on file      Comment: 4 cig. day    Alcohol use No      Comment: quit    Drug use: Yes     Special: Heroin    Sexual activity: Not on file Other Topics Concern    Not on file     Social History Narrative    No narrative on file         ALLERGIES: Review of patient's allergies indicates no known allergies. Review of Systems   Constitutional: Negative for activity change, chills and fever. HENT: Negative for congestion and sore throat. Eyes: Negative for pain and redness. Respiratory: Positive for cough, chest tightness, shortness of breath and wheezing. Cardiovascular: Negative for chest pain, palpitations and leg swelling. Gastrointestinal: Negative for abdominal pain, diarrhea, nausea and vomiting. Genitourinary: Negative for dysuria, frequency and urgency. Musculoskeletal: Negative for back pain, joint swelling, myalgias and neck pain. Denies calf pain   Skin: Negative for rash. Neurological: Negative for syncope, light-headedness and headaches. Psychiatric/Behavioral: Negative for confusion. All other systems reviewed and are negative. Patient Vitals for the past 12 hrs:   Temp Pulse Resp BP SpO2   01/15/17 1657 97.7 °F (36.5 °C) 78 26 92/59 98 %         Physical Exam   Constitutional: She is oriented to person, place, and time. She appears well-developed and well-nourished. No distress. HENT:   Head: Normocephalic. Nose: Nose normal.   Mouth/Throat: Oropharynx is clear and moist. No oropharyngeal exudate. Eyes: Conjunctivae are normal. Pupils are equal, round, and reactive to light. No scleral icterus. Neck: Normal range of motion. Neck supple. No JVD present. No tracheal deviation present. No thyromegaly present. Cardiovascular: Normal rate, regular rhythm and intact distal pulses. Exam reveals no gallop and no friction rub. No murmur heard. Pulmonary/Chest: No stridor. She is in respiratory distress (mild). She has no rales. Moderate increased work of breathing, poor air movement, BL expiratory wheezes   Abdominal: Soft. Bowel sounds are normal. She exhibits no distension.  There is no tenderness. There is no rebound and no guarding. Musculoskeletal: Normal range of motion. She exhibits no edema. Lymphadenopathy:     She has no cervical adenopathy. Neurological: She is alert and oriented to person, place, and time. No cranial nerve deficit. She exhibits normal muscle tone. Coordination normal.   Skin: Skin is warm and dry. No rash noted. She is not diaphoretic. No erythema. Psychiatric: She has a normal mood and affect. Her behavior is normal.   Nursing note and vitals reviewed. MDM  Number of Diagnoses or Management Options  Moderate persistent asthma with acute exacerbation:   Diagnosis management comments: DDx: asthma, pneumonia, ACS       Amount and/or Complexity of Data Reviewed  Clinical lab tests: reviewed and ordered  Tests in the radiology section of CPT®: reviewed and ordered  Tests in the medicine section of CPT®: reviewed and ordered  Obtain history from someone other than the patient: yes (EMS)  Review and summarize past medical records: yes  Discuss the patient with other providers: yes (Hospitalist)  Independent visualization of images, tracings, or specimens: yes    Risk of Complications, Morbidity, and/or Mortality  General comments: Will admit for acute asthma; afebrile; sats improved with nebs but wheezing and increased work of breathing persist; cxr clear; no increased wbc; reportedly on prednisone as outpatient after recent admission for same; negative cardiac enzymes; zeynep admit to hospitalist; Nusrat Lewis MD      Patient Progress  Patient progress: stable    ED Course       Procedures    PROGRESS NOTE:  5:11 PM  I have reviewed medical records in the EHR, pertinent to patients present condition/presenting problems. Pt was admitted from 1/9/17 to 1/11/17 for acute respiratory failure and COPD. Pt was d/c home with Levaquin 500mg once daily x 10 days and Prednisone taper course.   Written by Naz Berg ED Scribe, as dictated by Nusrat Lewis, MD.      ED EKG interpretation: 17:00  Rhythm: normal sinus rhythm; and regular . Rate (approx.): 79; Axis: normal; WA Interval: normal; QRS interval: normal ; ST/T wave: normal; normal ekg; This EKG was interpreted by Marissa Arias MD,ED Provider. PROGRESS NOTE:  6:34 PM  Pt reevaluated. She is still wheezing significantly despite treatment with nebs and steroids. Will plan for hospitalist admission. Written by Judy Hoyos, ED Scribe, as dictated by Marissa Arias MD.    CONSULT NOTE:   6:59 PM  Marissa Arias MD spoke with Umm Pereira MD,   Specialty: Hospitalist  Discussed pt's hx, disposition, and available diagnostic and imaging results. Reviewed care plans. Consultant will evaluate pt for admission.   Written by Blanca Zuniga, ED Scribe, as dictated by Marissa Arias MD.      LABORATORY TESTS:  Recent Results (from the past 12 hour(s))   EKG, 12 LEAD, INITIAL    Collection Time: 01/15/17  5:00 PM   Result Value Ref Range    Ventricular Rate 79 BPM    Atrial Rate 79 BPM    P-R Interval 110 ms    QRS Duration 88 ms    Q-T Interval 390 ms    QTC Calculation (Bezet) 447 ms    Calculated P Axis 44 degrees    Calculated R Axis 54 degrees    Calculated T Axis 59 degrees    Diagnosis       Sinus rhythm with short WA  Otherwise normal ECG  When compared with ECG of 30-JAN-2014 00:16,  No significant change was found     BLOOD GAS, ARTERIAL    Collection Time: 01/15/17  5:35 PM   Result Value Ref Range    pH 7.33 (L) 7.35 - 7.45      PCO2 42 35.0 - 45.0 mmHg    PO2 55 (L) 80 - 100 mmHg    O2 SAT 86 (L) 92 - 97 %    BICARBONATE 22 22 - 26 mmol/L    BASE DEFICIT 3.8 mmol/L    O2 METHOD ROOM AIR      Sample source ARTERIAL      SITE RIGHT RADIAL      ROSHAN'S TEST YES     CBC WITH AUTOMATED DIFF    Collection Time: 01/15/17  6:00 PM   Result Value Ref Range    WBC 5.1 3.6 - 11.0 K/uL    RBC 4.65 3.80 - 5.20 M/uL    HGB 13.6 11.5 - 16.0 g/dL    HCT 41.0 35.0 - 47.0 %    MCV 88.2 80.0 - 99.0 FL    MCH 29.2 26.0 - 34.0 PG    MCHC 33.2 30.0 - 36.5 g/dL    RDW 14.9 (H) 11.5 - 14.5 %    PLATELET 339 014 - 533 K/uL    NEUTROPHILS 60 32 - 75 %    LYMPHOCYTES 31 12 - 49 %    MONOCYTES 4 (L) 5 - 13 %    EOSINOPHILS 5 0 - 7 %    BASOPHILS 0 0 - 1 %    ABS. NEUTROPHILS 3.1 1.8 - 8.0 K/UL    ABS. LYMPHOCYTES 1.6 0.8 - 3.5 K/UL    ABS. MONOCYTES 0.2 0.0 - 1.0 K/UL    ABS. EOSINOPHILS 0.2 0.0 - 0.4 K/UL    ABS. BASOPHILS 0.0 0.0 - 0.1 K/UL   METABOLIC PANEL, COMPREHENSIVE    Collection Time: 01/15/17  6:00 PM   Result Value Ref Range    Sodium 139 136 - 145 mmol/L    Potassium 3.8 3.5 - 5.1 mmol/L    Chloride 105 97 - 108 mmol/L    CO2 25 21 - 32 mmol/L    Anion gap 9 5 - 15 mmol/L    Glucose 123 (H) 65 - 100 mg/dL    BUN 8 6 - 20 MG/DL    Creatinine 1.03 (H) 0.55 - 1.02 MG/DL    BUN/Creatinine ratio 8 (L) 12 - 20      GFR est AA >60 >60 ml/min/1.73m2    GFR est non-AA 58 (L) >60 ml/min/1.73m2    Calcium 8.4 (L) 8.5 - 10.1 MG/DL    Bilirubin, total <0.1 (L) 0.2 - 1.0 MG/DL    ALT 39 12 - 78 U/L    AST 40 (H) 15 - 37 U/L    Alk. phosphatase 87 45 - 117 U/L    Protein, total 7.1 6.4 - 8.2 g/dL    Albumin 3.5 3.5 - 5.0 g/dL    Globulin 3.6 2.0 - 4.0 g/dL    A-G Ratio 1.0 (L) 1.1 - 2.2     TROPONIN I    Collection Time: 01/15/17  6:00 PM   Result Value Ref Range    Troponin-I, Qt. <0.04 <0.05 ng/mL       IMAGING RESULTS:  CXR Results  (Last 48 hours)               01/15/17 4257  XR CHEST PORT Final result    Impression:  IMPRESSION: No acute abnormality identified. Narrative:  EXAM:  XR CHEST PORT       INDICATION:  Chest Pain       COMPARISON:  1/9/2017       FINDINGS: A portable AP radiograph of the chest was obtained at 1728 hours. The   patient is on a cardiac monitor. The lungs are clear. Heart size is normal.   Granulomatous changes are noted. .  The bones and soft tissues are grossly within   normal limits.                   MEDICATIONS GIVEN:  Medications   sodium chloride 0.9 % bolus infusion 1,000 mL (1,000 mL IntraVENous New Bag 1/15/17 1800)   albuterol-ipratropium (DUO-NEB) 2.5 MG-0.5 MG/3 ML (3 mL Nebulization Given 1/15/17 1829)       IMPRESSION:  1. Moderate persistent asthma with acute exacerbation        PLAN:  1. Admit to hospital    ADMISSION NOTE:  6:59 PM  Pt is being admitted to the hospital by Dr. Ramakrishna Eaton. All available results and reasons for admission have been discussed with the pt and/or available family. Pt and/or available family convey agreement and understanding of need for admission and admission diagnosis. ATTESTATION:  This note is prepared by David Huynh, acting as Scribe for Patricia Mccullough MD.    Patricia Mccullough MD: The scribe's documentation has been prepared under my direction and personally reviewed by me in its entirety. I confirm that the note above accurately reflects all work, treatment, procedures, and medical decision making performed by me.

## 2017-01-16 VITALS
BODY MASS INDEX: 27.31 KG/M2 | DIASTOLIC BLOOD PRESSURE: 64 MMHG | OXYGEN SATURATION: 98 % | RESPIRATION RATE: 18 BRPM | HEIGHT: 64 IN | WEIGHT: 160 LBS | TEMPERATURE: 98 F | SYSTOLIC BLOOD PRESSURE: 105 MMHG | HEART RATE: 66 BPM

## 2017-01-16 LAB
ANION GAP BLD CALC-SCNC: 10 MMOL/L (ref 5–15)
ATRIAL RATE: 79 BPM
BUN SERPL-MCNC: 8 MG/DL (ref 6–20)
BUN/CREAT SERPL: 10 (ref 12–20)
CALCIUM SERPL-MCNC: 8.2 MG/DL (ref 8.5–10.1)
CALCULATED P AXIS, ECG09: 44 DEGREES
CALCULATED R AXIS, ECG10: 54 DEGREES
CALCULATED T AXIS, ECG11: 59 DEGREES
CHLORIDE SERPL-SCNC: 106 MMOL/L (ref 97–108)
CO2 SERPL-SCNC: 22 MMOL/L (ref 21–32)
CREAT SERPL-MCNC: 0.83 MG/DL (ref 0.55–1.02)
DIAGNOSIS, 93000: NORMAL
ERYTHROCYTE [DISTWIDTH] IN BLOOD BY AUTOMATED COUNT: 15 % (ref 11.5–14.5)
GLUCOSE SERPL-MCNC: 115 MG/DL (ref 65–100)
HCT VFR BLD AUTO: 37 % (ref 35–47)
HGB BLD-MCNC: 12.1 G/DL (ref 11.5–16)
MCH RBC QN AUTO: 29.8 PG (ref 26–34)
MCHC RBC AUTO-ENTMCNC: 32.7 G/DL (ref 30–36.5)
MCV RBC AUTO: 91.1 FL (ref 80–99)
P-R INTERVAL, ECG05: 110 MS
PLATELET # BLD AUTO: 189 K/UL (ref 150–400)
POTASSIUM SERPL-SCNC: 4.2 MMOL/L (ref 3.5–5.1)
Q-T INTERVAL, ECG07: 390 MS
QRS DURATION, ECG06: 88 MS
QTC CALCULATION (BEZET), ECG08: 447 MS
RBC # BLD AUTO: 4.06 M/UL (ref 3.8–5.2)
SODIUM SERPL-SCNC: 138 MMOL/L (ref 136–145)
VENTRICULAR RATE, ECG03: 79 BPM
WBC # BLD AUTO: 7.2 K/UL (ref 3.6–11)

## 2017-01-16 PROCEDURE — 74011000250 HC RX REV CODE- 250: Performed by: INTERNAL MEDICINE

## 2017-01-16 PROCEDURE — 36415 COLL VENOUS BLD VENIPUNCTURE: CPT | Performed by: INTERNAL MEDICINE

## 2017-01-16 PROCEDURE — 80048 BASIC METABOLIC PNL TOTAL CA: CPT | Performed by: INTERNAL MEDICINE

## 2017-01-16 PROCEDURE — 99218 HC RM OBSERVATION: CPT

## 2017-01-16 PROCEDURE — 94640 AIRWAY INHALATION TREATMENT: CPT

## 2017-01-16 PROCEDURE — 77030029684 HC NEB SM VOL KT MONA -A

## 2017-01-16 PROCEDURE — 85027 COMPLETE CBC AUTOMATED: CPT | Performed by: INTERNAL MEDICINE

## 2017-01-16 RX ADMIN — IPRATROPIUM BROMIDE AND ALBUTEROL SULFATE 3 ML: .5; 3 SOLUTION RESPIRATORY (INHALATION) at 03:11

## 2017-01-16 RX ADMIN — Medication 10 ML: at 06:09

## 2017-01-16 NOTE — H&P
Hospitalist Admission Note    NAME: Watson Holder   :  1975   MRN:  [id number]     Date/Time:  1/15/2017 7:12 PM    Patient PCP: None  ________________________________________________________________________    My assessment of this patient's clinical condition and my plan of care is as follows. Assessment / Plan:  Acute combined COPD and asthma exacerbations with acute bronchitis:  Recent admission for same -  - CXR with no acute abnormality identified  - solumedrol  - mucinex. Change levaquin to bactrim - CXR remains negative. - sputum culture  - duonebs schedule with prn albuterol  - prn tessalon perles and robitussin AC  - will check C Diff with diarrhea since leaving the hospital  Hypertension, benign/essential:  Low blood pressure since presentation  - reduce clonidine and add hold parameters  - hold diltiazem  H/o atrial fibrillation:  - holding diltiazem as above  - add ASA  Bipolar disorder:   - con't home seroquel, zoloft, lithium  - prn klonipin  Tobacco use d/o:  Has not smoked since she left the hospital  Sarcoidosis: no e/o sarcoid change on CXR    Code Status: Full  Surrogate Decision Maker: mom is decision maker if needed  DVT Prophylaxis: lovenox    Baseline: independent, lives with mom        Subjective:   CHIEF COMPLAINT:  Shortness of breath    HISTORY OF PRESENT ILLNESS:     Michel Roblero is a 35 y.o.  female who presents with above. Pt recently admitted for COPD exacerbation and bronchitis, discharged from the hospital 4 days ago. She says that she was doing fairly well until yesterday morning. At that time, she again developed increasing shortness of breath. She used her neb machine ~6-7 times yesterday without improvement. She says that she was taking her prednisone and levaquin as prescribed. She says she had a fever 102 this morning. She has been coughing purulent and blood-tinged sputum. She has chest pain with coughing.   Poor appetite. She has been having diarrhea since leaving the hospital.  No focal weakness. We were asked to admit for work up and evaluation of the above problems. Past Medical History   Diagnosis Date    Arrhythmia      irregular    Asthma     Chronic pain      back, leg    Depression     Hepatitis B     Hypertension      atrial fibrillation    Other ill-defined conditions      heart murmur    Psychiatric disorder      bipolar    Sarcoidosis ([address]         No past surgical history on file. Social History   Substance Use Topics    Smoking status: Current Every Day Smoker    Smokeless tobacco: Not on file      Comment: 4 cig. day    Alcohol use No      Comment: quit        Family History   Problem Relation Age of Onset    Hypertension Father     Hypertension Mother      No Known Allergies     Prior to Admission medications    Medication Sig Start Date End Date Taking? Authorizing Provider   albuterol (PROVENTIL HFA, VENTOLIN HFA, PROAIR HFA) 90 mcg/actuation inhaler Take 2 Puffs by inhalation every four (4) hours as needed for Wheezing or Shortness of Breath. 1/11/17   Cayla Abreu MD   clonazePAM (KLONOPIN) 0.5 mg tablet Take 1 Tab by mouth two (2) times a day. Max Daily Amount: 1 mg. 1/11/17   Cayla Abreu MD   cloNIDine HCl (CATAPRES) 0.3 mg tablet Take 1 Tab by mouth three (3) times daily. 1/11/17   Cayla Abreu MD   gabapentin (NEURONTIN) 800 mg tablet Take 1 Tab by mouth three (3) times daily. 1/11/17   Cayla Abreu MD   lithium carbonate 300 mg capsule Take 1 Cap by mouth two (2) times daily (with meals). 1/11/17   Cayla Abreu MD   prazosin (MINIPRESS) 2 mg capsule Take 1 Cap by mouth nightly. 1/11/17   Cayla Abreu MD   QUEtiapine (SEROQUEL) 400 mg tablet Take 1 Tab by mouth two (2) times a day. 1/11/17   Cayla Abreu MD   sertraline (ZOLOFT) 100 mg tablet Take 1 Tab by mouth daily.  1/11/17   Cayla Abreu MD   acetaminophen (TYLENOL) 325 mg tablet Take 2 Tabs by mouth every four (4) hours as needed for Fever. 1/11/17   Alexia Caraballo MD   albuterol-ipratropium (DUO-NEB) 2.5 mg-0.5 mg/3 ml nebu 3 mL by Nebulization route every six (6) hours as needed. 1/11/17   Alexia Caraballo MD   nicotine (NICODERM CQ) 14 mg/24 hr patch 1 Patch by TransDERmal route daily for 30 days. 1/11/17 2/10/17  Alexia Caraballo MD   oxyCODONE-acetaminophen (PERCOCET) 5-325 mg per tablet Take 1 Tab by mouth every six (6) hours as needed. Max Daily Amount: 4 Tabs. 1/11/17   Alexia Caraballo MD   levoFLOXacin (LEVAQUIN) 500 mg tablet Take 1 Tab by mouth daily for 10 days. 1/11/17 1/21/17  Alexia Caraballo MD   predniSONE (DELTASONE) 10 mg tablet Take 1 Tab by mouth Multiple. Take 2 tab po daily for 3 days  Then take 1 tab po daily for 3 days  Then take 1/2 tab po daily for 3 days then stop 1/11/17   Alexia Caraballo MD   Nebulizer & Compressor machine 1 Each by Does Not Apply route daily. 1/11/17   Alexia Caraballo MD   dilTIAZem CD (CARDIZEM CD) 120 mg ER capsule Take 1 Cap by mouth daily. 1/11/17   Alexia Caraballo MD   fluticasone-vilanterol (BREO ELLIPTA) 972-70 mcg/dose inhaler Take 1 Puff by inhalation daily. 1/11/17   Alexia Caraballo MD       REVIEW OF SYSTEMS:     I am not able to complete the review of systems because:    The patient is intubated and sedated    The patient has altered mental status due to his acute medical problems    The patient has baseline aphasia from prior stroke(s)    The patient has baseline dementia and is not reliable historian    The patient is in acute medical distress and unable to provide information           Total of 12 systems reviewed as follows:       POSITIVE= underlined text  Negative = text not underlined  General:  fever, chills, sweats, generalized weakness, weight loss/gain,      loss of appetite   Eyes:    blurred vision, eye pain, loss of vision, double vision  ENT:    rhinorrhea, pharyngitis   Respiratory:   cough, sputum production, SOB, RIVAS, wheezing, pleuritic pain   Cardiology:   chest pain, palpitations, orthopnea, PND, edema, syncope   Gastrointestinal:  abdominal pain , N/V, diarrhea, dysphagia, constipation, bleeding   Genitourinary:  frequency, urgency, dysuria, hematuria, incontinence   Muskuloskeletal :  arthralgia, myalgia, back pain  Hematology:  easy bruising, nose or gum bleeding, lymphadenopathy   Dermatological: rash, ulceration, pruritis, color change / jaundice  Endocrine:   hot flashes or polydipsia   Neurological:  headache, dizziness, confusion, focal weakness, paresthesia,     Speech difficulties, memory loss, gait difficulty  Psychological: Feelings of anxiety, depression, agitation    Objective:   VITALS:    Visit Vitals    BP 92/59 (BP 1 Location: Right arm)    Pulse 78    Temp 97.7 °F (36.5 °C)    Resp 26    Ht 5' 4\" (1.626 m)    Wt 72.6 kg (160 lb)    SpO2 98%    BMI 27.46 kg/m2       PHYSICAL EXAM:    General:    Alert, cooperative, no distress, appears stated age. HEENT: Atraumatic, anicteric sclerae, pink conjunctivae     No oral ulcers, mucosa moist, throat clear, dentition fair  Neck:  Supple, symmetrical,  thyroid: non tender  Lungs:   Minimal wheezing at very end of expiration. Pt trying to \"force\" wheeze as well. No rales. Good air movement. Chest wall:  No tenderness  No Accessory muscle use. Heart:   Regular  rhythm,  No  murmur   No edema  Abdomen:   Soft, non-tender. Not distended. Bowel sounds normal  Extremities: No cyanosis. No clubbing    Skin:     Not pale. Not Jaundiced  No rashes   Psych:  Some insight. Not depressed. Not anxious or agitated. Neurologic: EOMs intact. No facial asymmetry. No aphasia or slurred speech. Symmetrical strength, Sensation grossly intact.  Alert and oriented X 4.     _______________________________________________________________________  Care Plan discussed with:    Comments   Patient x    Family      RN     Care Manager Consultant:      _______________________________________________________________________  Expected  Disposition:   Home with Family x   HH/PT/OT/RN    SNF/LTC    MAEGAN    ________________________________________________________________________  TOTAL TIME:  48 Minutes    Critical Care Provided     Minutes non procedure based      Comments    x Reviewed previous records   >50% of visit spent in counseling and coordination of care x Discussion with patient and/or family and questions answered       ________________________________________________________________________  Signed: Lorenzo Ceballos MD    Procedures: see electronic medical records for all procedures/Xrays and details which were not copied into this note but were reviewed prior to creation of Plan.     LAB DATA REVIEWED:    Recent Results (from the past 24 hour(s))   EKG, 12 LEAD, INITIAL    Collection Time: 01/15/17  5:00 PM   Result Value Ref Range    Ventricular Rate 79 BPM    Atrial Rate 79 BPM    P-R Interval 110 ms    QRS Duration 88 ms    Q-T Interval 390 ms    QTC Calculation (Bezet) 447 ms    Calculated P Axis 44 degrees    Calculated R Axis 54 degrees    Calculated T Axis 59 degrees    Diagnosis       Sinus rhythm with short OK  Otherwise normal ECG  When compared with ECG of 30-JAN-2014 00:16,  No significant change was found     BLOOD GAS, ARTERIAL    Collection Time: 01/15/17  5:35 PM   Result Value Ref Range    pH 7.33 (L) 7.35 - 7.45      PCO2 42 35.0 - 45.0 mmHg    PO2 55 (L) 80 - 100 mmHg    O2 SAT 86 (L) 92 - 97 %    BICARBONATE 22 22 - 26 mmol/L    BASE DEFICIT 3.8 mmol/L    O2 METHOD ROOM AIR      Sample source ARTERIAL      SITE RIGHT RADIAL      ROSHAN'S TEST YES     CBC WITH AUTOMATED DIFF    Collection Time: 01/15/17  6:00 PM   Result Value Ref Range    WBC 5.1 3.6 - 11.0 K/uL    RBC 4.65 3.80 - 5.20 M/uL    HGB 13.6 11.5 - 16.0 g/dL    HCT 41.0 35.0 - 47.0 %    MCV 88.2 80.0 - 99.0 FL    MCH 29.2 26.0 - 34.0 PG    MCHC 33.2 30.0 - 36.5 g/dL    RDW 14.9 (H) 11.5 - 14.5 %    PLATELET 311 593 - 737 K/uL    NEUTROPHILS 60 32 - 75 %    LYMPHOCYTES 31 12 - 49 %    MONOCYTES 4 (L) 5 - 13 %    EOSINOPHILS 5 0 - 7 %    BASOPHILS 0 0 - 1 %    ABS. NEUTROPHILS 3.1 1.8 - 8.0 K/UL    ABS. LYMPHOCYTES 1.6 0.8 - 3.5 K/UL    ABS. MONOCYTES 0.2 0.0 - 1.0 K/UL    ABS. EOSINOPHILS 0.2 0.0 - 0.4 K/UL    ABS. BASOPHILS 0.0 0.0 - 0.1 K/UL   METABOLIC PANEL, COMPREHENSIVE    Collection Time: 01/15/17  6:00 PM   Result Value Ref Range    Sodium 139 136 - 145 mmol/L    Potassium 3.8 3.5 - 5.1 mmol/L    Chloride 105 97 - 108 mmol/L    CO2 25 21 - 32 mmol/L    Anion gap 9 5 - 15 mmol/L    Glucose 123 (H) 65 - 100 mg/dL    BUN 8 6 - 20 MG/DL    Creatinine 1.03 (H) 0.55 - 1.02 MG/DL    BUN/Creatinine ratio 8 (L) 12 - 20      GFR est AA >60 >60 ml/min/1.73m2    GFR est non-AA 58 (L) >60 ml/min/1.73m2    Calcium 8.4 (L) 8.5 - 10.1 MG/DL    Bilirubin, total <0.1 (L) 0.2 - 1.0 MG/DL    ALT 39 12 - 78 U/L    AST 40 (H) 15 - 37 U/L    Alk.  phosphatase 87 45 - 117 U/L    Protein, total 7.1 6.4 - 8.2 g/dL    Albumin 3.5 3.5 - 5.0 g/dL    Globulin 3.6 2.0 - 4.0 g/dL    A-G Ratio 1.0 (L) 1.1 - 2.2     TROPONIN I    Collection Time: 01/15/17  6:00 PM   Result Value Ref Range    Troponin-I, Qt. <0.04 <0.05 ng/mL

## 2017-01-16 NOTE — PROGRESS NOTES
Order received and acknowledged. Noted that pt left AMA today. Will complete order. Thank you.   Shady Paredes, PT, DPT

## 2017-01-16 NOTE — PROGRESS NOTES
TRANSFER - IN REPORT:    Verbal report received from Arianna(name) on Evgeny Mcgee  being received from ED(unit) for routine progression of care      Report consisted of patients Situation, Background, Assessment and   Recommendations(SBAR). Information from the following report(s) SBAR, Kardex, ED Summary, Intake/Output, MAR, Recent Results and Med Rec Status was reviewed with the receiving nurse. Opportunity for questions and clarification was provided. Assessment completed upon patients arrival to unit and care assumed.      Primary Nurse Magdaleno Mueller RN and Regina Graham RN performed a dual skin assessment on this patient No impairment noted  Doc score is 22

## 2017-01-16 NOTE — ED NOTES
Bedside shift report received from [place]. Patient resting comfortably, no complaints of pain, vital signs stable, states feels better.

## 2017-01-16 NOTE — PROGRESS NOTES
Pt left AMA despite offering medical discharge in some time today after my evaluation was complete. Pt signed the [facility] paperwork. Vitals were stable based on the EMR. Labs were reviewed prior to pt leaving- unremarkable. Pt was medically stable for leaving AMA.

## 2017-01-16 NOTE — PROGRESS NOTES
Bedside and Verbal shift change report given to Eva House (oncoming nurse) by Zion MOTT (offgoing nurse). Report included the following information SBAR, Kardex, MAR and Recent Results.
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
## 2017-01-16 NOTE — DISCHARGE SUMMARY
Pt left AMA before seeing        Acute combined COPD and asthma exacerbations with acute bronchitis: Recent admission for same 1/9-11/17  - CXR with no acute abnormality identified  - solumedrol - Plan to DC on PO prednisone when clinically improved  - mucinex. Change levaquin to bactrim - CXR remains negative.   - sputum culture  - duonebs schedule with prn albuterol  - prn tessalon perles and robitussin AC  - will check C Diff with diarrhea since leaving the hospital  Hypertension, benign/essential:  Low blood pressure since presentation  - reduce clonidine and add hold parameters  - hold diltiazem  H/o atrial fibrillation:  - holding diltiazem as above  - add ASA  Bipolar disorder:   - con't home seroquel, zoloft, lithium  - prn klonipin  Tobacco use d/o: Has not smoked since she left the hospital  Sarcoidosis: no e/o sarcoid change on CXR     Code Status: Full  Surrogate Decision Maker: mom is decision maker if needed

## 2017-01-16 NOTE — PROGRESS NOTES
0745: Patient was walking in hallway. RN directed patient back into room being that patient was on contact to r/o CDiff. Patient became angry. Patient called out. Upon entering room patient asked how long before she was to see Dr. Elmer Becerra. Stated I could page him and would be around sometime this morning. Left room to page MD. Will continue to monitor. 0800: MD paged. Dr. Elmer Becerra returned call. Notified him that patient was wanting to leave AMA or see him promptly. While on the phone patient called out again stating she was going to leave AMA. Dr. Elmer Becerra notified while on phone. Verbal order stating to make sure AMA paperwork filled out. IV discontinued. Will continue monitor. 0809: Patient filled out [place] paperwork and left the floor. Dr. Elmer Becerra to come fill out [place] paperwork. Will continue to monitor.

## 2017-01-25 ENCOUNTER — HOSPITAL ENCOUNTER (INPATIENT)
Age: 42
LOS: 1 days | Discharge: HOME | DRG: 897 | End: 2017-01-26
Attending: EMERGENCY MEDICINE | Admitting: PSYCHIATRY & NEUROLOGY
Payer: MEDICAID

## 2017-01-25 DIAGNOSIS — F33.9 EPISODE OF RECURRENT MAJOR DEPRESSIVE DISORDER, UNSPECIFIED DEPRESSION EPISODE SEVERITY (HCC): ICD-10-CM

## 2017-01-25 DIAGNOSIS — F11.10 HEROIN ABUSE (HCC): Primary | ICD-10-CM

## 2017-01-25 DIAGNOSIS — F19.94 SUBSTANCE INDUCED MOOD DISORDER (HCC): ICD-10-CM

## 2017-01-25 PROBLEM — F32.A DEPRESSION: Status: ACTIVE | Noted: 2017-01-25

## 2017-01-25 PROBLEM — F32.A DEPRESSIVE DISORDER: Status: ACTIVE | Noted: 2017-01-25

## 2017-01-25 PROBLEM — F19.20 POLYSUBSTANCE DEPENDENCE (HCC): Status: ACTIVE | Noted: 2017-01-25

## 2017-01-25 PROBLEM — F19.10 POLYSUBSTANCE ABUSE (HCC): Status: ACTIVE | Noted: 2017-01-25

## 2017-01-25 LAB
AMPHET UR QL SCN: NEGATIVE
ANION GAP BLD CALC-SCNC: 13 MMOL/L (ref 5–15)
APPEARANCE UR: CLEAR
BACTERIA URNS QL MICRO: ABNORMAL /HPF
BARBITURATES UR QL SCN: NEGATIVE
BASOPHILS # BLD AUTO: 0 K/UL (ref 0–0.1)
BASOPHILS # BLD: 1 % (ref 0–1)
BENZODIAZ UR QL: NEGATIVE
BILIRUB UR QL: NEGATIVE
BUN SERPL-MCNC: 8 MG/DL (ref 6–20)
BUN/CREAT SERPL: 9 (ref 12–20)
CALCIUM SERPL-MCNC: 8.9 MG/DL (ref 8.5–10.1)
CANNABINOIDS UR QL SCN: POSITIVE
CHLORIDE SERPL-SCNC: 108 MMOL/L (ref 97–108)
CO2 SERPL-SCNC: 24 MMOL/L (ref 21–32)
COCAINE UR QL SCN: POSITIVE
COLOR UR: ABNORMAL
CREAT SERPL-MCNC: 0.9 MG/DL (ref 0.55–1.02)
DRUG SCRN COMMENT,DRGCM: ABNORMAL
EOSINOPHIL # BLD: 0.5 K/UL (ref 0–0.4)
EOSINOPHIL NFR BLD: 7 % (ref 0–7)
EPITH CASTS URNS QL MICRO: ABNORMAL /LPF
ERYTHROCYTE [DISTWIDTH] IN BLOOD BY AUTOMATED COUNT: 15.9 % (ref 11.5–14.5)
ETHANOL SERPL-MCNC: <10 MG/DL
GLUCOSE BLD STRIP.AUTO-MCNC: 119 MG/DL (ref 65–100)
GLUCOSE SERPL-MCNC: 68 MG/DL (ref 65–100)
GLUCOSE UR STRIP.AUTO-MCNC: NEGATIVE MG/DL
HCG UR QL: NEGATIVE
HCT VFR BLD AUTO: 37.9 % (ref 35–47)
HGB BLD-MCNC: 12.1 G/DL (ref 11.5–16)
HGB UR QL STRIP: NEGATIVE
KETONES UR QL STRIP.AUTO: ABNORMAL MG/DL
LEUKOCYTE ESTERASE UR QL STRIP.AUTO: NEGATIVE
LYMPHOCYTES # BLD AUTO: 16 % (ref 12–49)
LYMPHOCYTES # BLD: 1 K/UL (ref 0.8–3.5)
MCH RBC QN AUTO: 29.2 PG (ref 26–34)
MCHC RBC AUTO-ENTMCNC: 31.9 G/DL (ref 30–36.5)
MCV RBC AUTO: 91.5 FL (ref 80–99)
METHADONE UR QL: NEGATIVE
MONOCYTES # BLD: 0.4 K/UL (ref 0–1)
MONOCYTES NFR BLD AUTO: 6 % (ref 5–13)
NEUTS SEG # BLD: 4.7 K/UL (ref 1.8–8)
NEUTS SEG NFR BLD AUTO: 70 % (ref 32–75)
NITRITE UR QL STRIP.AUTO: NEGATIVE
OPIATES UR QL: POSITIVE
PCP UR QL: NEGATIVE
PH UR STRIP: 6.5 [PH] (ref 5–8)
PLATELET # BLD AUTO: 237 K/UL (ref 150–400)
POTASSIUM SERPL-SCNC: 3.3 MMOL/L (ref 3.5–5.1)
PROT UR STRIP-MCNC: NEGATIVE MG/DL
RBC # BLD AUTO: 4.14 M/UL (ref 3.8–5.2)
RBC #/AREA URNS HPF: ABNORMAL /HPF (ref 0–5)
SERVICE CMNT-IMP: ABNORMAL
SODIUM SERPL-SCNC: 145 MMOL/L (ref 136–145)
SP GR UR REFRACTOMETRY: 1.02 (ref 1–1.03)
UA: UC IF INDICATED,UAUC: ABNORMAL
UROBILINOGEN UR QL STRIP.AUTO: 1 EU/DL (ref 0.2–1)
WBC # BLD AUTO: 6.6 K/UL (ref 3.6–11)
WBC URNS QL MICRO: ABNORMAL /HPF (ref 0–4)

## 2017-01-25 PROCEDURE — 65220000003 HC RM SEMIPRIVATE PSYCH

## 2017-01-25 PROCEDURE — 94640 AIRWAY INHALATION TREATMENT: CPT

## 2017-01-25 PROCEDURE — 82962 GLUCOSE BLOOD TEST: CPT

## 2017-01-25 PROCEDURE — 74011636637 HC RX REV CODE- 636/637: Performed by: EMERGENCY MEDICINE

## 2017-01-25 PROCEDURE — 99285 EMERGENCY DEPT VISIT HI MDM: CPT

## 2017-01-25 PROCEDURE — 90791 PSYCH DIAGNOSTIC EVALUATION: CPT

## 2017-01-25 PROCEDURE — 81025 URINE PREGNANCY TEST: CPT

## 2017-01-25 PROCEDURE — 36415 COLL VENOUS BLD VENIPUNCTURE: CPT | Performed by: EMERGENCY MEDICINE

## 2017-01-25 PROCEDURE — 80307 DRUG TEST PRSMV CHEM ANLYZR: CPT | Performed by: EMERGENCY MEDICINE

## 2017-01-25 PROCEDURE — 85025 COMPLETE CBC W/AUTO DIFF WBC: CPT | Performed by: EMERGENCY MEDICINE

## 2017-01-25 PROCEDURE — 74011250637 HC RX REV CODE- 250/637: Performed by: EMERGENCY MEDICINE

## 2017-01-25 PROCEDURE — 74011250637 HC RX REV CODE- 250/637: Performed by: PSYCHIATRY & NEUROLOGY

## 2017-01-25 PROCEDURE — 74011000250 HC RX REV CODE- 250: Performed by: EMERGENCY MEDICINE

## 2017-01-25 PROCEDURE — HZ2ZZZZ DETOXIFICATION SERVICES FOR SUBSTANCE ABUSE TREATMENT: ICD-10-PCS | Performed by: PSYCHIATRY & NEUROLOGY

## 2017-01-25 PROCEDURE — 81001 URINALYSIS AUTO W/SCOPE: CPT | Performed by: EMERGENCY MEDICINE

## 2017-01-25 PROCEDURE — 80048 BASIC METABOLIC PNL TOTAL CA: CPT | Performed by: EMERGENCY MEDICINE

## 2017-01-25 PROCEDURE — 87086 URINE CULTURE/COLONY COUNT: CPT | Performed by: EMERGENCY MEDICINE

## 2017-01-25 RX ORDER — IPRATROPIUM BROMIDE AND ALBUTEROL SULFATE 2.5; .5 MG/3ML; MG/3ML
3 SOLUTION RESPIRATORY (INHALATION)
Status: DISCONTINUED | OUTPATIENT
Start: 2017-01-26 | End: 2017-01-26 | Stop reason: HOSPADM

## 2017-01-25 RX ORDER — ADHESIVE BANDAGE
30 BANDAGE TOPICAL DAILY PRN
Status: DISCONTINUED | OUTPATIENT
Start: 2017-01-25 | End: 2017-01-26 | Stop reason: HOSPADM

## 2017-01-25 RX ORDER — THERA TABS 400 MCG
1 TAB ORAL DAILY
Status: DISCONTINUED | OUTPATIENT
Start: 2017-01-25 | End: 2017-01-26 | Stop reason: HOSPADM

## 2017-01-25 RX ORDER — IBUPROFEN 200 MG
1 TABLET ORAL
Status: DISCONTINUED | OUTPATIENT
Start: 2017-01-25 | End: 2017-01-26 | Stop reason: HOSPADM

## 2017-01-25 RX ORDER — ACETAMINOPHEN 325 MG/1
650 TABLET ORAL
Status: DISCONTINUED | OUTPATIENT
Start: 2017-01-25 | End: 2017-01-26 | Stop reason: HOSPADM

## 2017-01-25 RX ORDER — METHADONE HYDROCHLORIDE 10 MG/1
30 TABLET ORAL ONCE
Status: COMPLETED | OUTPATIENT
Start: 2017-01-25 | End: 2017-01-25

## 2017-01-25 RX ORDER — DIAZEPAM 5 MG/1
5 TABLET ORAL
Status: COMPLETED | OUTPATIENT
Start: 2017-01-25 | End: 2017-01-25

## 2017-01-25 RX ORDER — LORAZEPAM 2 MG/ML
2 INJECTION INTRAMUSCULAR
Status: DISCONTINUED | OUTPATIENT
Start: 2017-01-25 | End: 2017-01-26 | Stop reason: HOSPADM

## 2017-01-25 RX ORDER — PREDNISONE 20 MG/1
60 TABLET ORAL
Status: COMPLETED | OUTPATIENT
Start: 2017-01-25 | End: 2017-01-25

## 2017-01-25 RX ORDER — LORAZEPAM 1 MG/1
1 TABLET ORAL
Status: DISCONTINUED | OUTPATIENT
Start: 2017-01-25 | End: 2017-01-26 | Stop reason: HOSPADM

## 2017-01-25 RX ORDER — OLANZAPINE 5 MG/1
5 TABLET ORAL
Status: DISCONTINUED | OUTPATIENT
Start: 2017-01-25 | End: 2017-01-26 | Stop reason: HOSPADM

## 2017-01-25 RX ORDER — ADHESIVE BANDAGE
30 BANDAGE TOPICAL DAILY PRN
Status: DISCONTINUED | OUTPATIENT
Start: 2017-01-25 | End: 2017-01-25

## 2017-01-25 RX ORDER — PROMETHAZINE HYDROCHLORIDE 25 MG/1
25 TABLET ORAL
Status: DISCONTINUED | OUTPATIENT
Start: 2017-01-25 | End: 2017-01-25

## 2017-01-25 RX ORDER — LOPERAMIDE HYDROCHLORIDE 2 MG/1
2 CAPSULE ORAL AS NEEDED
Status: DISCONTINUED | OUTPATIENT
Start: 2017-01-25 | End: 2017-01-26 | Stop reason: HOSPADM

## 2017-01-25 RX ORDER — CLONIDINE HYDROCHLORIDE 0.1 MG/1
0.3 TABLET ORAL 3 TIMES DAILY
Status: DISCONTINUED | OUTPATIENT
Start: 2017-01-26 | End: 2017-01-26 | Stop reason: HOSPADM

## 2017-01-25 RX ORDER — BENZTROPINE MESYLATE 2 MG/1
2 TABLET ORAL
Status: DISCONTINUED | OUTPATIENT
Start: 2017-01-25 | End: 2017-01-26 | Stop reason: HOSPADM

## 2017-01-25 RX ORDER — ZOLPIDEM TARTRATE 10 MG/1
10 TABLET ORAL
Status: DISCONTINUED | OUTPATIENT
Start: 2017-01-25 | End: 2017-01-26 | Stop reason: HOSPADM

## 2017-01-25 RX ORDER — BENZONATATE 100 MG/1
100 CAPSULE ORAL
COMMUNITY
End: 2017-01-26

## 2017-01-25 RX ORDER — LANOLIN ALCOHOL/MO/W.PET/CERES
100 CREAM (GRAM) TOPICAL DAILY
Status: DISCONTINUED | OUTPATIENT
Start: 2017-01-25 | End: 2017-01-26 | Stop reason: HOSPADM

## 2017-01-25 RX ORDER — IBUPROFEN 400 MG/1
400 TABLET ORAL
Status: DISCONTINUED | OUTPATIENT
Start: 2017-01-25 | End: 2017-01-25

## 2017-01-25 RX ORDER — BENZTROPINE MESYLATE 1 MG/ML
2 INJECTION INTRAMUSCULAR; INTRAVENOUS
Status: DISCONTINUED | OUTPATIENT
Start: 2017-01-25 | End: 2017-01-26 | Stop reason: HOSPADM

## 2017-01-25 RX ORDER — PROCHLORPERAZINE EDISYLATE 5 MG/ML
10 INJECTION INTRAMUSCULAR; INTRAVENOUS
Status: DISCONTINUED | OUTPATIENT
Start: 2017-01-25 | End: 2017-01-26 | Stop reason: HOSPADM

## 2017-01-25 RX ORDER — FLUTICASONE FUROATE AND VILANTEROL 200; 25 UG/1; UG/1
1 POWDER RESPIRATORY (INHALATION) DAILY
Status: DISCONTINUED | OUTPATIENT
Start: 2017-01-26 | End: 2017-01-26 | Stop reason: CLARIF

## 2017-01-25 RX ORDER — ACETAMINOPHEN 325 MG/1
650 TABLET ORAL
Status: DISCONTINUED | OUTPATIENT
Start: 2017-01-25 | End: 2017-01-25

## 2017-01-25 RX ORDER — PROMETHAZINE HYDROCHLORIDE 25 MG/ML
25 INJECTION, SOLUTION INTRAMUSCULAR; INTRAVENOUS
Status: DISCONTINUED | OUTPATIENT
Start: 2017-01-25 | End: 2017-01-25 | Stop reason: CLARIF

## 2017-01-25 RX ORDER — DILTIAZEM HYDROCHLORIDE 120 MG/1
120 CAPSULE, COATED, EXTENDED RELEASE ORAL DAILY
Status: DISCONTINUED | OUTPATIENT
Start: 2017-01-26 | End: 2017-01-26 | Stop reason: HOSPADM

## 2017-01-25 RX ORDER — DICYCLOMINE HYDROCHLORIDE 10 MG/ML
20 INJECTION INTRAMUSCULAR
Status: DISCONTINUED | OUTPATIENT
Start: 2017-01-25 | End: 2017-01-26 | Stop reason: HOSPADM

## 2017-01-25 RX ORDER — CODEINE PHOSPHATE AND GUAIFENESIN 10; 100 MG/5ML; MG/5ML
5 SOLUTION ORAL
COMMUNITY
End: 2017-04-22

## 2017-01-25 RX ORDER — ONDANSETRON 4 MG/1
8 TABLET, ORALLY DISINTEGRATING ORAL
Status: COMPLETED | OUTPATIENT
Start: 2017-01-25 | End: 2017-01-25

## 2017-01-25 RX ORDER — ONDANSETRON 2 MG/ML
8 INJECTION INTRAMUSCULAR; INTRAVENOUS
Status: DISCONTINUED | OUTPATIENT
Start: 2017-01-25 | End: 2017-01-25

## 2017-01-25 RX ORDER — IPRATROPIUM BROMIDE AND ALBUTEROL SULFATE 2.5; .5 MG/3ML; MG/3ML
3 SOLUTION RESPIRATORY (INHALATION)
Status: COMPLETED | OUTPATIENT
Start: 2017-01-25 | End: 2017-01-25

## 2017-01-25 RX ORDER — FOLIC ACID 1 MG/1
1 TABLET ORAL DAILY
Status: DISCONTINUED | OUTPATIENT
Start: 2017-01-25 | End: 2017-01-26 | Stop reason: HOSPADM

## 2017-01-25 RX ADMIN — ACETAMINOPHEN 650 MG: 325 TABLET ORAL at 09:35

## 2017-01-25 RX ADMIN — METHADONE HYDROCHLORIDE 30 MG: 10 TABLET ORAL at 09:33

## 2017-01-25 RX ADMIN — DIAZEPAM 5 MG: 5 TABLET ORAL at 03:23

## 2017-01-25 RX ADMIN — ZOLPIDEM TARTRATE 10 MG: 10 TABLET, FILM COATED ORAL at 21:21

## 2017-01-25 RX ADMIN — IPRATROPIUM BROMIDE AND ALBUTEROL SULFATE 3 ML: .5; 3 SOLUTION RESPIRATORY (INHALATION) at 03:25

## 2017-01-25 RX ADMIN — ONDANSETRON 8 MG: 4 TABLET, ORALLY DISINTEGRATING ORAL at 03:21

## 2017-01-25 RX ADMIN — PREDNISONE 60 MG: 20 TABLET ORAL at 03:23

## 2017-01-25 NOTE — H&P
INITIAL PSYCHIATRIC EVALUATION         IDENTIFICATION:    Patient Name  Elmer Mohamud   Date of Birth 1975   [facility] [id number]   Medical Record Number  [id number]      Age  35 y.o. PCP None   Admit date:  1/25/2017    Room Number  322/02  @ [facility]   Date of Service  1/25/2017            HISTORY         REASON FOR HOSPITALIZATION:  CC: \" i am so so\". Pt admitted under a voluntary basis for severe depression with suicidal ideations proving to be/posing an imminent danger to self. HISTORY OF PRESENT ILLNESS:    The patient, Elmer Mohamud, is a 35 y.o. BLACK OR  female with a past psychiatric history significant for polysubstance dependence, who presents at this time with complaints of (and/or evidence of) the following emotional symptoms: depression and suicidal thoughts/threats. Additional symptomatology include relapse on drug use, feeling of guilt, helplessness, mild w/d sx, anxiety, chronic pain, difficulty sleeping, poor appetite financial problems, increased irritability, problem with medication and \"I don't want to go on living like this\". The above symptoms have been present for few days. These symptoms are of severe severity. These symptoms are intermittent/ fleeting in nature. The patient's condition has been precipitated by and psychosocial stressors (relapse, financial ). Patient's condition made worse by continued illicit drug use and alcohol use as well as treatment noncompliance. UDS +MJ , cocaine and opiates; BAL=0. Per [employer] report \"Patient is 35year old female reporting to ED with suicidal ideations, increased depression. Patient is currently having symptoms of withdrawal and was given 5mg Valium while in Ed, patient reported cold sweats, vomiting, restless, no appetite, sleepless nights. At bedside, patient reported having suicidal thoughts, denied homicidal thoughts and denied hallucinations. Patient reported Having plan to overdose.  Patient reported that she was previously hospitalized psychiatrically about a month ago in [place]. Patient reported that they gave her resources to follow through with in [facility] but she did not follow through with resources. As reported patient relapsed about a month ago after being clean. Patient reported that she does not feel safe leaving hospital and stated she does not want to live anymore. Patient reported being under psychiatric care about two years ago did not identify who psychiatrist was. Patient was cooperative and observed laying in bed with low mood. Patient cooperated with assessment. \"     ALLERGIES:  Allergies   Allergen Reactions    Tomato Swelling     Tongue      MEDICATIONS PRIOR TO ADMISSION:  Prescriptions Prior to Admission   Medication Sig    albuterol (PROVENTIL HFA, VENTOLIN HFA, PROAIR HFA) 90 mcg/actuation inhaler Take 2 Puffs by inhalation every four (4) hours as needed for Wheezing or Shortness of Breath.  clonazePAM (KLONOPIN) 0.5 mg tablet Take 1 Tab by mouth two (2) times a day. Max Daily Amount: 1 mg.  cloNIDine HCl (CATAPRES) 0.3 mg tablet Take 1 Tab by mouth three (3) times daily.  gabapentin (NEURONTIN) 800 mg tablet Take 1 Tab by mouth three (3) times daily.  lithium carbonate 300 mg capsule Take 1 Cap by mouth two (2) times daily (with meals).  prazosin (MINIPRESS) 2 mg capsule Take 1 Cap by mouth nightly.  QUEtiapine (SEROQUEL) 400 mg tablet Take 1 Tab by mouth two (2) times a day.  sertraline (ZOLOFT) 100 mg tablet Take 1 Tab by mouth daily.  acetaminophen (TYLENOL) 325 mg tablet Take 2 Tabs by mouth every four (4) hours as needed for Fever.  albuterol-ipratropium (DUO-NEB) 2.5 mg-0.5 mg/3 ml nebu 3 mL by Nebulization route every six (6) hours as needed.  nicotine (NICODERM CQ) 14 mg/24 hr patch 1 Patch by TransDERmal route daily for 30 days.  oxyCODONE-acetaminophen (PERCOCET) 5-325 mg per tablet Take 1 Tab by mouth every six (6) hours as needed. Max Daily Amount: 4 Tabs.  predniSONE (DELTASONE) 10 mg tablet Take 1 Tab by mouth Multiple. Take 2 tab po daily for 3 days  Then take 1 tab po daily for 3 days  Then take 1/2 tab po daily for 3 days then stop    Nebulizer & Compressor machine 1 Each by Does Not Apply route daily.  dilTIAZem CD (CARDIZEM CD) 120 mg ER capsule Take 1 Cap by mouth daily.  fluticasone-vilanterol (BREO ELLIPTA) 200-25 mcg/dose inhaler Take 1 Puff by inhalation daily. PAST MEDICAL HISTORY:  Past Medical History   Diagnosis Date    Arrhythmia      irregular    Asthma     Chronic obstructive pulmonary disease (HCC)     Chronic pain      back, leg    Depression     Hepatitis B     Hypertension      atrial fibrillation    Other ill-defined conditions(799.89)      heart murmur    Psychiatric disorder      bipolar    Sarcoidosis ([facility] [address]    History reviewed. No pertinent past surgical history. SOCIAL HISTORY:    Social History     Social History    Marital status: SINGLE     Spouse name: N/A    Number of children: N/A    Years of education: N/A     Occupational History    Not on file. Social History Main Topics    Smoking status: Current Every Day Smoker     Packs/day: 0.50    Smokeless tobacco: Never Used      Comment: 4 cig. day    Alcohol use Yes      Comment: daily, 1/2 pint vodka for 3 year.  Drug use: Yes     Special: Heroin, Marijuana, Cocaine      Comment: .5 gm heroin daily- relapsed 1.5 months, was in rehab in [place], also uses cocaine once a months    Sexual activity: Yes     Partners: Female     Other Topics Concern    Not on file     Social History Narrative    Born in [address], 5 children,    No legal charges. Pt graduated from high school. Pt was employed last month at [employer], currently she is unemployed. Pt lives with her mother and 8year old son. She has 4 adult children. FAMILY HISTORY: History reviewed. No pertinent family history.    Family History Problem Relation Age of Onset    Hypertension Father     Hypertension Mother        REVIEW OF SYSTEMS:   Psychological ROS: positive for - anxiety, depression and suicidal ideation  negative for - disorientation or hallucinations  Pertinent items are noted in the History of Present Illness. All other Systems reviewed and are considered negative. MENTAL STATUS EXAM & VITALS         MENTAL STATUS EXAM (MSE):    MSE FINDINGS ARE WITHIN NORMAL LIMITS (WNL) UNLESS OTHERWISE STATED BELOW. ( ALL OF THE BELOW CATEGORIES OF THE MSE HAVE BEEN REVIEWED (reviewed 1/25/2017) AND UPDATED AS DEEMED APPROPRIATE )  General Presentation age appropriate and disheveled, passive and unreliable   Orientation oriented to time, place and person   Vital Signs  See below (reviewed 1/25/2017); Vital Signs (BP, Pulse, & Temp) are within normal limits if not listed below.    Gait and Station Stable/steady, no ataxia   Musculoskeletal System No extrapyramidal symptoms (EPS); no abnormal muscular movements or Tardive Dyskinesia (TD); muscle strength and tone are within normal limits   Language No aphasia or dysarthria   Speech:  monotone   Thought Processes logical; slow rate of thoughts; fair abstract reasoning/computation   Thought Associations goal directed   Thought Content free of delusions, free of hallucinations and preoccupations   Suicidal Ideations no plan , no intention and contracts for safety   Homicidal Ideations no plan , no intention and contracts for safety   Mood:  stable    Affect:  mood-congruent and sad   Memory recent  intact   Memory remote:  intact   Concentration/Attention:  intact   Fund of Knowledge average   Insight:  fair   Reliability fair   Judgment:  limited            VITALS:     Patient Vitals for the past 24 hrs:   Temp Pulse Resp BP SpO2   01/25/17 0845 98 °F (36.7 °C) 74 16 - -   01/25/17 0634 98.4 °F (36.9 °C) 100 16 (!) 135/91 96 %   01/25/17 0241 98.7 °F (37.1 °C) (!) 116 16 (!) 158/102 97 % Wt Readings from Last 3 Encounters:   01/25/17 68 kg (150 lb)   01/15/17 72.6 kg (160 lb)   01/09/17 68 kg (150 lb)     Temp Readings from Last 3 Encounters:   01/25/17 98 °F (36.7 °C)   01/16/17 98 °F (36.7 °C)   01/11/17 98.1 °F (36.7 °C)     BP Readings from Last 3 Encounters:   01/25/17 (!) 135/91   01/16/17 105/64   01/11/17 124/77     Pulse Readings from Last 3 Encounters:   01/25/17 74   01/16/17 66   01/11/17 76            DATA       LABORATORY DATA:  Labs Reviewed   CBC WITH AUTOMATED DIFF - Abnormal; Notable for the following:        Result Value    RDW 15.9 (*)     ABS.  EOSINOPHILS 0.5 (*)     All other components within normal limits   METABOLIC PANEL, BASIC - Abnormal; Notable for the following:     Potassium 3.3 (*)     BUN/Creatinine ratio 9 (*)     All other components within normal limits   URINALYSIS W/ REFLEX CULTURE - Abnormal; Notable for the following:     Ketone TRACE (*)     Bacteria 1+ (*)     UA:UC IF INDICATED URINE CULTURE ORDERED (*)     All other components within normal limits   DRUG SCREEN, URINE - Abnormal; Notable for the following:     COCAINE POSITIVE (*)     OPIATES POSITIVE (*)     THC (TH-CANNABINOL) POSITIVE (*)     All other components within normal limits   GLUCOSE, POC - Abnormal; Notable for the following:     Glucose (POC) 119 (*)     All other components within normal limits   CULTURE, URINE   ETHYL ALCOHOL   HCG URINE, QL. - POC   POC URINE PREGNANCY TEST     Admission on 01/25/2017   Component Date Value Ref Range Status    WBC 01/25/2017 6.6  3.6 - 11.0 K/uL Final    RBC 01/25/2017 4.14  3.80 - 5.20 M/uL Final    HGB 01/25/2017 12.1  11.5 - 16.0 g/dL Final    HCT 01/25/2017 37.9  35.0 - 47.0 % Final    MCV 01/25/2017 91.5  80.0 - 99.0 FL Final    MCH 01/25/2017 29.2  26.0 - 34.0 PG Final    MCHC 01/25/2017 31.9  30.0 - 36.5 g/dL Final    RDW 01/25/2017 15.9* 11.5 - 14.5 % Final    PLATELET 01/13/2223 322  150 - 400 K/uL Final    NEUTROPHILS 01/25/2017 70 32 - 75 % Final    LYMPHOCYTES 01/25/2017 16  12 - 49 % Final    MONOCYTES 01/25/2017 6  5 - 13 % Final    EOSINOPHILS 01/25/2017 7  0 - 7 % Final    BASOPHILS 01/25/2017 1  0 - 1 % Final    ABS. NEUTROPHILS 01/25/2017 4.7  1.8 - 8.0 K/UL Final    ABS. LYMPHOCYTES 01/25/2017 1.0  0.8 - 3.5 K/UL Final    ABS. MONOCYTES 01/25/2017 0.4  0.0 - 1.0 K/UL Final    ABS. EOSINOPHILS 01/25/2017 0.5* 0.0 - 0.4 K/UL Final    ABS.  BASOPHILS 01/25/2017 0.0  0.0 - 0.1 K/UL Final    Sodium 01/25/2017 145  136 - 145 mmol/L Final    Potassium 01/25/2017 3.3* 3.5 - 5.1 mmol/L Final    Chloride 01/25/2017 108  97 - 108 mmol/L Final    CO2 01/25/2017 24  21 - 32 mmol/L Final    Anion gap 01/25/2017 13  5 - 15 mmol/L Final    Glucose 01/25/2017 68  65 - 100 mg/dL Final    BUN 01/25/2017 8  6 - 20 MG/DL Final    Creatinine 01/25/2017 0.90  0.55 - 1.02 MG/DL Final    BUN/Creatinine ratio 01/25/2017 9* 12 - 20   Final    GFR est AA 01/25/2017 >60  >60 ml/min/1.73m2 Final    GFR est non-AA 01/25/2017 >60  >60 ml/min/1.73m2 Final    Calcium 01/25/2017 8.9  8.5 - 10.1 MG/DL Final    Color 01/25/2017 YELLOW/STRAW    Final    Appearance 01/25/2017 CLEAR  CLEAR   Final    Specific gravity 01/25/2017 1.025  1.003 - 1.030   Final    pH (UA) 01/25/2017 6.5  5.0 - 8.0   Final    Protein 01/25/2017 NEGATIVE   NEG mg/dL Final    Glucose 01/25/2017 NEGATIVE   NEG mg/dL Final    Ketone 01/25/2017 TRACE* NEG mg/dL Final    Bilirubin 01/25/2017 NEGATIVE   NEG   Final    Blood 01/25/2017 NEGATIVE   NEG   Final    Urobilinogen 01/25/2017 1.0  0.2 - 1.0 EU/dL Final    Nitrites 01/25/2017 NEGATIVE   NEG   Final    Leukocyte Esterase 01/25/2017 NEGATIVE   NEG   Final    WBC 01/25/2017 0-4  0 - 4 /hpf Final    RBC 01/25/2017 0-5  0 - 5 /hpf Final    Epithelial cells 01/25/2017 FEW  FEW /lpf Final    Bacteria 01/25/2017 1+* NEG /hpf Final    UA:UC IF INDICATED 01/25/2017 URINE CULTURE ORDERED* CNI   Final    AMPHETAMINE 01/25/2017 NEGATIVE   NEG   Final    BARBITURATES 01/25/2017 NEGATIVE   NEG   Final    BENZODIAZEPINE 01/25/2017 NEGATIVE   NEG   Final    COCAINE 01/25/2017 POSITIVE* NEG   Final    METHADONE 01/25/2017 NEGATIVE   NEG   Final    OPIATES 01/25/2017 POSITIVE* NEG   Final    PCP(PHENCYCLIDINE) 01/25/2017 NEGATIVE   NEG   Final    THC (TH-CANNABINOL) 01/25/2017 POSITIVE* NEG   Final    Drug screen comment 01/25/2017 (NOTE)   Final    ALCOHOL(ETHYL),SERUM 01/25/2017 <10  <10 MG/DL Final    Pregnancy test,urine (POC) 01/25/2017 NEGATIVE   NEG   Final    Glucose (POC) 01/25/2017 119* 65 - 100 mg/dL Final    Performed by 01/25/2017 Usama Jama   Final        RADIOLOGY REPORTS:    Results from Hospital Encounter encounter on 01/15/17   XR CHEST PORT   Narrative EXAM:  XR CHEST PORT    INDICATION:  Chest Pain    COMPARISON:  1/9/2017    FINDINGS: A portable AP radiograph of the chest was obtained at 1728 hours. The  patient is on a cardiac monitor. The lungs are clear. Heart size is normal.  Granulomatous changes are noted. .  The bones and soft tissues are grossly within  normal limits. Impression IMPRESSION: No acute abnormality identified. Xr Chest Port    Result Date: 1/15/2017  EXAM:  XR CHEST PORT INDICATION:  Chest Pain COMPARISON:  1/9/2017 FINDINGS: A portable AP radiograph of the chest was obtained at 1728 hours. The patient is on a cardiac monitor. The lungs are clear. Heart size is normal. Granulomatous changes are noted. .  The bones and soft tissues are grossly within normal limits. IMPRESSION: No acute abnormality identified. Xr Chest Port    Result Date: 1/9/2017  EXAM:  XR CHEST PORT INDICATION:  Chest Pain COMPARISON:  2014 FINDINGS: A portable AP radiograph of the chest was obtained at 1825 hours. The patient is on a cardiac monitor. The lungs are clear of an acute process. Granulomatous changes are noted. .  The cardiac and mediastinal contours and pulmonary vascularity are normal.  Bony structures are unchanged. IMPRESSION: No acute abnormality is identified.               MEDICATIONS       ALL MEDICATIONS  Current Facility-Administered Medications   Medication Dose Route Frequency    ziprasidone (GEODON) 20 mg in sterile water (preservative free) 1 mL injection  20 mg IntraMUSCular BID PRN    OLANZapine (ZyPREXA) tablet 5 mg  5 mg Oral Q6H PRN    benztropine (COGENTIN) tablet 2 mg  2 mg Oral BID PRN    benztropine (COGENTIN) injection 2 mg  2 mg IntraMUSCular BID PRN    LORazepam (ATIVAN) injection 2 mg  2 mg IntraMUSCular Q4H PRN    LORazepam (ATIVAN) tablet 1 mg  1 mg Oral Q4H PRN    zolpidem (AMBIEN) tablet 10 mg  10 mg Oral QHS PRN    acetaminophen (TYLENOL) tablet 650 mg  650 mg Oral Q4H PRN    ibuprofen (MOTRIN) tablet 400 mg  400 mg Oral Q8H PRN    magnesium hydroxide (MILK OF MAGNESIA) 400 mg/5 mL oral suspension 30 mL  30 mL Oral DAILY PRN    nicotine (NICODERM CQ) 21 mg/24 hr patch 1 Patch  1 Patch TransDERmal DAILY PRN    [START ON 1/26/2017] methadone (DOLOPHINE) tablet 15 mg  15 mg Oral DAILY    loperamide (IMODIUM) capsule 2 mg  2 mg Oral PRN    dicyclomine (BENTYL) 10 mg/mL injection 20 mg  20 mg IntraMUSCular I2H PRN    folic acid (FOLVITE) tablet 1 mg  1 mg Oral DAILY    therapeutic multivitamin (THERAGRAN) tablet 1 Tab  1 Tab Oral DAILY    thiamine (B-1) tablet 100 mg  100 mg Oral DAILY    prochlorperazine (COMPAZINE) injection 10 mg  10 mg IntraMUSCular Q6H PRN      SCHEDULED MEDICATIONS  Current Facility-Administered Medications   Medication Dose Route Frequency    [START ON 1/26/2017] methadone (DOLOPHINE) tablet 15 mg  15 mg Oral DAILY    folic acid (FOLVITE) tablet 1 mg  1 mg Oral DAILY    therapeutic multivitamin (THERAGRAN) tablet 1 Tab  1 Tab Oral DAILY    thiamine (B-1) tablet 100 mg  100 mg Oral DAILY                ASSESSMENT & PLAN        The patient Leif Loyd is a 35 y.o.  female who presents at this time for treatment of the following diagnoses:  Patient Active Hospital Problem List:   Substance induced mood disorder ([facility] (1/25/2017)    Assessment: doc- opiates- heroin, recent relapse- feeling of guilt, vague SI without plan, feel safe on the unit, no manai or psychosis. Sad affect. Plan: detox protocol with methadone, need period of sobriety to assess need for med. COPD (chronic obstructive pulmonary disease) ([address] (1/9/2017)    Assessment: chronic    Plan: per IM f/u, received med in the ER     Polysubstance dependence ([address] (1/25/2017)    Assessment: heroin, cocaine, alcohol, and MJ dependence rehab in [place] and sober until 1.5 month ago, last dose yesterday, mild w/d sx- cramps, nx, sweating. Plan: detox, COWS and CIWA, encourage Rehab/ substance abuse program        In summary, Freida Morley presents with a severe exacerbation of the principal diagnosis, Substance induced mood disorder ([facility]    While on the unit Freida Morley will be provided with individual, milieu, occupational, group, and substance abuse therapies to address target symptoms as deemed appropriate for the individual patient. I agree with decision to admit patient. I have spoken to [facility] psychiatric /ED staff regarding the nature of patients's admission at this time. A coordinated, multidisplinary treatment team (includes the nurse, unit pharmcist,  and writer) round was conducted for this initial evaluation with the patient present. The following regarding medications was addressed during rounds with patient:   the risks and benefits of the proposed medication. The patient was given the opportunity to ask questions. Informed consent given to the use of the above medications. I will continue to adjust psychiatric and non-psychiatric medications (see above \"medication\" section and orders section for details) as deemed appropriate & based upon diagnoses and response to treatment.      I have reviewed admission (and previous/old) labs and medical tests in the EHR and or transferring hospital documents. I will continue to order blood tests/labs and diagnostic tests as deemed appropriate and review results as they become available (see orders for details). I have reviewed old psychiatric and medical records available in the EHR. I Will order additional psychiatric records from other institutions to further elucidate the nature of patient's psychopathology and review once available. I will gather additional collateral information from friends, family and o/p treatment team to further elucidate the nature of patient's psychopathology and baselline level of psychiatric functioning.         ESTIMATED LENGTH OF STAY:   3 days       STRENGTHS:  Exercising self-direction/Resourceful and Awareness of Substance abuse issues                      SIGNED:    Erick Loza MD  1/25/2017

## 2017-01-25 NOTE — IP AVS SNAPSHOT
Ching Vasquez 
 
 
 [address] Patient: Antonio Aguilar MRN: [id number] :1975 You are allergic to the following Allergen Reactions Tomato Swelling Tongue Recent Documentation Height Weight Breastfeeding? BMI OB Status Smoking Status 1.626 m 68 kg No 25.75 kg/m2 Having regular periods Current Every Day Smoker Emergency Contacts Name Discharge Info Relation Home Work Mobile Amalia Kim  Parent [1] [phone] About your hospitalization You were admitted on:  2017 You last received care in the:  [facility]. [facility] You were discharged on:  2017 Unit phone number:  [phone] Why you were hospitalized Your primary diagnosis was:  Substance Induced Mood Disorder (Hcc) Your diagnoses also included:  Polysubstance Dependence (Hcc), Copd (Chronic Obstructive Pulmonary Disease) (Hcc), Htn (Hypertension) Providers Seen During Your Hospitalizations Provider Role Specialty Primary office phone Tiffany Betancourt MD Attending Provider Emergency Medicine [phone] Renay Espinoza MD Attending Provider Psychiatry [phone] Your Primary Care Physician (PCP) Primary Care Physician Office Phone Office Fax NONE ** None ** ** None ** Follow-up Information Follow up With Details Comments Contact Info [employer]  Walk-in appointments Monday- @ 8AM for substance abuse treatment. Pt will need photo ID  [id number] [facility] 
[phone] None   None ([phone]) Patient stated that they have no PCP Current Discharge Medication List  
  
START taking these medications Dose & Instructions Dispensing Information Comments Morning Noon Evening Bedtime  
 gabapentin 400 mg capsule Commonly known as:  NEURONTIN  
 Replaces:  gabapentin 800 mg tablet Your next dose is: Today, Tomorrow Other:  _________ Dose:  800 mg Take 2 Caps by mouth three (3) times daily for 7 days. Indications: NEUROPATHIC PAIN Quantity:  42 Cap Refills:  0 CONTINUE these medications which have NOT CHANGED Dose & Instructions Dispensing Information Comments Morning Noon Evening Bedtime  
 albuterol 90 mcg/actuation inhaler Commonly known as:  PROVENTIL HFA, VENTOLIN HFA, PROAIR HFA Your next dose is: Today, Tomorrow Other:  _________ Dose:  2 Puff Take 2 Puffs by inhalation every four (4) hours as needed for Wheezing or Shortness of Breath. Quantity:  1 Inhaler Refills:  0  
     
   
   
   
  
 albuterol-ipratropium 2.5 mg-0.5 mg/3 ml Nebu Commonly known as:  Sun Oscar Your next dose is: Today, Tomorrow Other:  _________ Dose:  3 mL  
3 mL by Nebulization route every six (6) hours as needed. Quantity:  100 Nebule Refills:  1  
     
   
   
   
  
 cloNIDine HCl 0.3 mg tablet Commonly known as:  CATAPRES Your next dose is: Today, Tomorrow Other:  _________ Dose:  0.3 mg Take 1 Tab by mouth three (3) times daily for 7 days. Indications: Hypertension Quantity:  21 Tab Refills:  0  
     
   
   
   
  
 dilTIAZem  mg ER capsule Commonly known as:  CARDIZEM CD Your next dose is: Today, Tomorrow Other:  _________ Dose:  120 mg Take 1 Cap by mouth daily for 7 days. Indications: VENTRICULAR RATE CONTROL IN ATRIAL FIBRILLATION Quantity:  30 Cap Refills:  0  
     
   
   
   
  
 prazosin 2 mg capsule Commonly known as:  MINIPRESS Your next dose is: Today, Tomorrow Other:  _________ Dose:  2 mg Take 1 Cap by mouth nightly for 7 days. Indications: Hypertension Quantity:  7 Cap Refills:  0 STOP taking these medications   
 acetaminophen 325 mg tablet Commonly known as:  TYLENOL  
   
  
 clonazePAM 0.5 mg tablet Commonly known as:  KlonoPIN  
   
  
 gabapentin 800 mg tablet Commonly known as:  NEURONTIN Replaced by:  gabapentin 400 mg capsule  
   
  
 lithium carbonate 300 mg capsule  
   
  
 oxyCODONE-acetaminophen 5-325 mg per tablet Commonly known as:  PERCOCET QUEtiapine 400 mg tablet Commonly known as:  SEROquel  
   
  
 sertraline 100 mg tablet Commonly known as:  ZOLOFT  
   
  
 TESSALON PERLES 100 mg capsule Generic drug:  benzonatate ASK your doctor about these medications Dose & Instructions Dispensing Information Comments Morning Noon Evening Bedtime  
 fluticasone-vilanterol 200-25 mcg/dose inhaler Commonly known as:  BREO ELLIPTA Your next dose is: Today, Tomorrow Other:  _________ Dose:  1 Puff Take 1 Puff by inhalation daily. Quantity:  28 Each Refills:  1  
     
   
   
   
  
 guaiFENesin-codeine 100-10 mg/5 mL solution Commonly known as:  ROBITUSSIN AC Your next dose is: Today, Tomorrow Other:  _________ Dose:  5 mL Take 5 mL by mouth four (4) times daily as needed for Cough. Refills:  0 Nebulizer & Compressor machine Your next dose is: Today, Tomorrow Other:  _________ Dose:  1 Each  
1 Each by Does Not Apply route daily. Quantity:  1 Each Refills:  0  
     
   
   
   
  
 nicotine 14 mg/24 hr patch Commonly known as:  Jeramie Mehta Your next dose is: Today, Tomorrow Other:  _________ Dose:  1 Patch 1 Patch by TransDERmal route daily for 30 days. Quantity:  30 Patch Refills:  0 Where to Get Your Medications Information on where to get these meds will be given to you by the nurse or doctor. ! Ask your nurse or doctor about these medications cloNIDine HCl 0.3 mg tablet  
 dilTIAZem  mg ER capsule  
 gabapentin 400 mg capsule  
 prazosin 2 mg capsule Discharge Instructions DISCHARGE SUMMARY from Nurse The following personal items are in your possession at time of discharge: 
 
  
Visual Aid: None PATIENT INSTRUCTIONS: 
 
 
 
 
What to do at Home: 
Recommended activity: Activity as tolerated, If I feel that I can not keep these promises and I am at risk of hurting myself or others, I will call the crisis office and speak with a crisis worker who will assist me during my crisis. [address] [place] [address]  [phone] [address] [place] [address]   [phone] [id number] [facility]  [phone] [facility]  [phone] *  Please give a list of your current medications to your Primary Care Provider. *  Please update this list whenever your medications are discontinued, doses are 
    changed, or new medications (including over-the-counter products) are added. *  Please carry medication information at all times in case of emergency situations. These are general instructions for a healthy lifestyle: No smoking/ No tobacco products/ Avoid exposure to second hand smoke Surgeon General's Warning:  Quitting smoking now greatly reduces serious risk to your health. Obesity, smoking, and sedentary lifestyle greatly increases your risk for illness A healthy diet, regular physical exercise & weight monitoring are important for maintaining a healthy lifestyle Recognize signs and symptoms of STROKE: 
 
F-face looks uneven A-arms unable to move or move unevenly S-speech slurred or non-existent T-time-call 911 as soon as signs and symptoms begin-DO NOT go Back to bed or wait to see if you get better-TIME IS BRAIN. Warning Signs of HEART ATTACK Call 911 if you have these symptoms: ? Chest discomfort. Most heart attacks involve discomfort in the center of the chest that lasts more than a few minutes, or that goes away and comes back. It can feel like uncomfortable pressure, squeezing, fullness, or pain. ? Discomfort in other areas of the upper body. Symptoms can include pain or discomfort in one or both arms, the back, neck, jaw, or stomach. ? Shortness of breath with or without chest discomfort. ? Other signs may include breaking out in a cold sweat, nausea, or lightheadedness. Don't wait more than five minutes to call [address]! Fast action can save your life. Calling 911 is almost always the fastest way to get lifesaving treatment. Emergency Medical Services staff can begin treatment when they arrive  up to an hour sooner than if someone gets to the hospital by car. The discharge information has been reviewed with the patient. The patient verbalized understanding. Discharge medications reviewed with the patient and appropriate educational materials and side effects teaching were provided. Discharge Orders None [employer] [place] Announcement We are excited to announce that we are making your provider's discharge notes available to you in [employer]. You will see these notes when they are completed and signed by the physician that discharged you from your recent hospital stay. If you have any questions or concerns about any information you see in [employer], please call the Health Information Department where you were seen or reach out to your Primary Care Provider for more information about your plan of care. Introducing [facility]! [employer] introduces [employer] patient portal. Now you can access parts of your medical record, email your doctor's office, and request medication refills online. 1. In your internet browser, go to [email or web address] 2. Click on the First Time User? Click Here link in the Sign In box.  You will see the New Member Sign Up page. 3. Enter your [employer] Access Code exactly as it appears below. You will not need to use this code after youve completed the sign-up process. If you do not sign up before the expiration date, you must request a new code. · [employer] Access Code: [email or web address] Expires: 4/9/2017  6:14 PM 
 
4. Enter the last four digits of your Social Security Number (xxxx) and Date of Birth (mm/dd/yyyy) as indicated and click Submit. You will be taken to the next sign-up page. 5. Create a [employer] ID. This will be your [employer] login ID and cannot be changed, so think of one that is secure and easy to remember. 6. Create a [employer] password. You can change your password at any time. 7. Enter your Password Reset Question and Answer. This can be used at a later time if you forget your password. 8. Enter your e-mail address. You will receive e-mail notification when new information is available in [address] 9. Click Sign Up. You can now view and download portions of your medical record. 10. Click the Download Summary menu link to download a portable copy of your medical information. If you have questions, please visit the Frequently Asked Questions section of the [employer] website. Remember, [employer] is NOT to be used for urgent needs. For medical emergencies, dial 911. Now available from your iPhone and Android! General Information Please provide this summary of care documentation to your next provider. Patient Signature:  ____________________________________________________________ Date:  ____________________________________________________________  
  
[employer] Provider Signature:  ____________________________________________________________ Date:  ____________________________________________________________

## 2017-01-25 NOTE — ED NOTES
TRANSFER - OUT REPORT:    Verbal report given to Beth Gregorio RN(name) on [facility]  being transferred to [facility] for routine progression of care       Report consisted of patients Situation, Background, Assessment and   Recommendations(SBAR). Information from the following report(s) SBAR, ED Summary, Intake/Output, MAR and Recent Results was reviewed with the receiving nurse. Lines:       Opportunity for questions and clarification was provided.

## 2017-01-25 NOTE — IP AVS SNAPSHOT
Current Discharge Medication List  
  
Take these medications at their scheduled times Dose & Instructions Dispensing Information Comments Morning Noon Evening Bedtime  
 cloNIDine HCl 0.3 mg tablet Commonly known as:  CATAPRES Your next dose is: Today, Tomorrow Other:  ____________ Dose:  0.3 mg Take 1 Tab by mouth three (3) times daily for 7 days. Indications: Hypertension Quantity:  21 Tab Refills:  0  
     
   
   
   
  
 dilTIAZem  mg ER capsule Commonly known as:  CARDIZEM CD Your next dose is: Today, Tomorrow Other:  ____________ Dose:  120 mg Take 1 Cap by mouth daily for 7 days. Indications: VENTRICULAR RATE CONTROL IN ATRIAL FIBRILLATION Quantity:  30 Cap Refills:  0  
     
   
   
   
  
 gabapentin 400 mg capsule Commonly known as:  NEURONTIN Your next dose is: Today, Tomorrow Other:  ____________ Dose:  800 mg Take 2 Caps by mouth three (3) times daily for 7 days. Indications: NEUROPATHIC PAIN Quantity:  42 Cap Refills:  0  
     
   
   
   
  
 prazosin 2 mg capsule Commonly known as:  MINIPRESS Your next dose is: Today, Tomorrow Other:  ____________ Dose:  2 mg Take 1 Cap by mouth nightly for 7 days. Indications: Hypertension Quantity:  7 Cap Refills:  0 Take these medications as needed Dose & Instructions Dispensing Information Comments Morning Noon Evening Bedtime  
 albuterol 90 mcg/actuation inhaler Commonly known as:  PROVENTIL HFA, VENTOLIN HFA, PROAIR HFA Your next dose is: Today, Tomorrow Other:  ____________ Dose:  2 Puff Take 2 Puffs by inhalation every four (4) hours as needed for Wheezing or Shortness of Breath. Quantity:  1 Inhaler Refills:  0  
     
   
   
   
  
 albuterol-ipratropium 2.5 mg-0.5 mg/3 ml Nebu Commonly known as:  Alis Cabrera  
   
 Your next dose is: Today, Tomorrow Other:  ____________ Dose:  3 mL  
3 mL by Nebulization route every six (6) hours as needed. Quantity:  100 Nebule Refills:  1 ASK your doctor about these medications Dose & Instructions Dispensing Information Comments Morning Noon Evening Bedtime  
 fluticasone-vilanterol 200-25 mcg/dose inhaler Commonly known as:  BREO ELLIPTA Your next dose is: Today, Tomorrow Other:  ____________ Dose:  1 Puff Take 1 Puff by inhalation daily. Quantity:  28 Each Refills:  1  
     
   
   
   
  
 guaiFENesin-codeine 100-10 mg/5 mL solution Commonly known as:  ROBITUSSIN AC Your next dose is: Today, Tomorrow Other:  ____________ Dose:  5 mL Take 5 mL by mouth four (4) times daily as needed for Cough. Refills:  0 Nebulizer & Compressor machine Your next dose is: Today, Tomorrow Other:  ____________ Dose:  1 Each  
1 Each by Does Not Apply route daily. Quantity:  1 Each Refills:  0  
     
   
   
   
  
 nicotine 14 mg/24 hr patch Commonly known as:  Oletha Lisa Your next dose is: Today, Tomorrow Other:  ____________ Dose:  1 Patch 1 Patch by TransDERmal route daily for 30 days. Quantity:  30 Patch Refills:  0 Where to Get Your Medications Information about where to get these medications is not yet available ! Ask your nurse or doctor about these medications  
  cloNIDine HCl 0.3 mg tablet  
 dilTIAZem  mg ER capsule  
 gabapentin 400 mg capsule  
 prazosin 2 mg capsule

## 2017-01-25 NOTE — BH NOTES
Patient was admitted with complaints of depression,SI, and PSA. Patient is ambulatory. Patient stated tht she still has SI, but she contracted for safety. Patient stated she has been using drugs x 20 yrs on and off. Patient said that she was sober for a year about a year ago. She stated she did participate in a 30 day inpatient program once. Patient stated she is unemployed and stated that people gives her drugs. Patient stated she attempted suicide 5 years ago bu taking medications and cutting wrist.    Affect and mood depressed and irritable. A body search was performed. Old wounds noted on right mid abdomen (abscess drainage). Patient also had old wound from cuts on upper right arm. Healed wounds noted on lower right arm also. Patient went to bed after admission. Doctor is aware of admission. Breakfast has been ordered for patient.

## 2017-01-25 NOTE — PROGRESS NOTES
Pharmacy Medication Reconciliation     Recommendations/Findings:   1) Please interpret medication list with caution, no supplementary notes available in paper chart so medication list is based on patient's admission to [facility] from 1/10-. 2) Of note during that admission, patient's BP medications were decreased to: clonidine 0.1 mg PO BID & diltiazem IR 30 mg PO TID. 3) Added Robitussin AC and benzonatate per [facility] notes. 4) Removed prednisone.      -Clarified PTA med list with previous medical records. PTA medication list was corrected to the following:     Prior to Admission Medications   Prescriptions Last Dose Informant Patient Reported? Taking? Nebulizer & Compressor machine Not Taking at Unknown time Transfer Papers No No   Si Each by Does Not Apply route daily. QUEtiapine (SEROQUEL) 400 mg tablet Not Taking at Unknown time Transfer Papers No No   Sig: Take 1 Tab by mouth two (2) times a day. acetaminophen (TYLENOL) 325 mg tablet Not Taking at Unknown time Transfer Papers No No   Sig: Take 2 Tabs by mouth every four (4) hours as needed for Fever. albuterol (PROVENTIL HFA, VENTOLIN HFA, PROAIR HFA) 90 mcg/actuation inhaler Not Taking at Unknown time Transfer Papers No No   Sig: Take 2 Puffs by inhalation every four (4) hours as needed for Wheezing or Shortness of Breath. albuterol-ipratropium (DUO-NEB) 2.5 mg-0.5 mg/3 ml nebu Not Taking at Unknown time Transfer Papers No No   Sig: 3 mL by Nebulization route every six (6) hours as needed. benzonatate (TESSALON PERLES) 100 mg capsule Unknown at Unknown time Transfer Papers Yes No   Sig: Take 100 mg by mouth three (3) times daily as needed for Cough. cloNIDine HCl (CATAPRES) 0.3 mg tablet Not Taking at Unknown time Transfer Papers No No   Sig: Take 1 Tab by mouth three (3) times daily. clonazePAM (KLONOPIN) 0.5 mg tablet Not Taking at Unknown time Transfer Papers No No   Sig: Take 1 Tab by mouth two (2) times a day. Max Daily Amount: 1 mg. dilTIAZem CD (CARDIZEM CD) 120 mg ER capsule Not Taking at Unknown time Transfer Papers No No   Sig: Take 1 Cap by mouth daily. fluticasone-vilanterol (BREO ELLIPTA) 200-25 mcg/dose inhaler Not Taking at Unknown time Transfer Papers No No   Sig: Take 1 Puff by inhalation daily. gabapentin (NEURONTIN) 800 mg tablet Not Taking at Unknown time Transfer Papers No No   Sig: Take 1 Tab by mouth three (3) times daily. guaiFENesin-codeine (ROBITUSSIN AC) 100-10 mg/5 mL solution Unknown at Unknown time Transfer Papers Yes No   Sig: Take 5 mL by mouth four (4) times daily as needed for Cough. lithium carbonate 300 mg capsule Not Taking at Unknown time Transfer Papers No No   Sig: Take 1 Cap by mouth two (2) times daily (with meals). nicotine (NICODERM CQ) 14 mg/24 hr patch Not Taking at Unknown time Transfer Papers No No   Si Patch by TransDERmal route daily for 30 days. oxyCODONE-acetaminophen (PERCOCET) 5-325 mg per tablet Not Taking at Unknown time Transfer Papers No No   Sig: Take 1 Tab by mouth every six (6) hours as needed. Max Daily Amount: 4 Tabs. prazosin (MINIPRESS) 2 mg capsule Not Taking at Unknown time Transfer Papers No No   Sig: Take 1 Cap by mouth nightly. sertraline (ZOLOFT) 100 mg tablet Not Taking at Unknown time Transfer Papers No No   Sig: Take 1 Tab by mouth daily.       Facility-Administered Medications: None          Thank you,  Faboi Hubbard, PHARMD, BCPS  Contact: [phone]

## 2017-01-25 NOTE — ED NOTES
Pt presents ambulatory to ED complaining of SI, plan to overdose on pills. Pt denies HI, hallucinations, delusions. Pt reports recent heroin, cocaine, and ETOH use. Pt presents clammy, shaky, tachycardic, and hypertensive. Pt reports she is supposed to take \"a lot of medications, but haven't been. \"  Pt reports recent relapse with heroin use 1 month ago. Pt is cooperative and tearful. Pt is alert and oriented x 4. Assesment completed and pt updated on plan of care.

## 2017-01-25 NOTE — IP AVS SNAPSHOT
Summary of Care Report The Summary of Care report has been created to help improve care coordination. Users with access to [employer] or [address] (Web-based application) may access additional patient information including the Discharge Summary. If you are not currently a [employer] user and need more information, please call the number listed below in the [place] [address] section and ask to be connected with Medical Records. Facility Information Name Address Phone [facility] [address] [place] [id number] [phone] Patient Information Patient Name Sex  Mallika Dominguez ([id number]) Female 1975 Discharge Information Admitting Provider Service Area Unit Jessica Fortune MD / Alice 57 / [phone] Discharge Provider Discharge Date/Time Discharge Disposition Destination (none) 2017 Afternoon (Pending) AHR (none) Patient Language Language ENGLISH [13] Problem List as of 2017  Date Reviewed: 2017 Codes Priority Class Noted - Resolved Unspecified asthma, with exacerbation ICD-10-CM: J45.901 ICD-9-CM: 493.92   2014 - Present Heroin abuse ICD-10-CM: F11.10 ICD-9-CM: 305.50   2014 - Present COPD (chronic obstructive pulmonary disease) (HCC) ICD-10-CM: J44.9 ICD-9-CM: 142   2017 - Present Sinus tachycardia ICD-10-CM: R00.0 ICD-9-CM: 427.89   1/10/2017 - Present HTN (hypertension) ICD-10-CM: I10 
ICD-9-CM: 401.9   2017 - Present Drug abuse ICD-10-CM: F19.10 ICD-9-CM: 305.90   2017 - Present Bipolar disorder ([facility] ICD-10-CM: F31.9 ICD-9-CM: 296.80   2017 - Present Depressive disorder ICD-10-CM: F32.9 ICD-9-CM: 331   2017 - Present Polysubstance dependence ([facility] ICD-10-CM: M00.39 ICD-9-CM: 304.80   1/25/2017 - Present * (Principal)Substance induced mood disorder (HCC) ICD-10-CM: N47.25 ICD-9-CM: 292.84   1/25/2017 - Present Depression ICD-10-CM: F32.9 ICD-9-CM: 103   1/25/2017 - Present You are allergic to the following Allergen Reactions Tomato Swelling Tongue Current Discharge Medication List  
  
START taking these medications Dose & Instructions Dispensing Information Comments  
 gabapentin 400 mg capsule Commonly known as:  NEURONTIN Replaces:  gabapentin 800 mg tablet Dose:  800 mg Take 2 Caps by mouth three (3) times daily for 7 days. Indications: NEUROPATHIC PAIN Quantity:  42 Cap Refills:  0 CONTINUE these medications which have NOT CHANGED Dose & Instructions Dispensing Information Comments  
 albuterol 90 mcg/actuation inhaler Commonly known as:  PROVENTIL HFA, VENTOLIN HFA, PROAIR HFA Dose:  2 Puff Take 2 Puffs by inhalation every four (4) hours as needed for Wheezing or Shortness of Breath. Quantity:  1 Inhaler Refills:  0  
   
 albuterol-ipratropium 2.5 mg-0.5 mg/3 ml Nebu Commonly known as:  Janeen Flatten Dose:  3 mL  
3 mL by Nebulization route every six (6) hours as needed. Quantity:  100 Nebule Refills:  1  
   
 cloNIDine HCl 0.3 mg tablet Commonly known as:  CATAPRES Dose:  0.3 mg Take 1 Tab by mouth three (3) times daily for 7 days. Indications: Hypertension Quantity:  21 Tab Refills:  0  
   
 dilTIAZem  mg ER capsule Commonly known as:  CARDIZEM CD Dose:  120 mg Take 1 Cap by mouth daily for 7 days. Indications: VENTRICULAR RATE CONTROL IN ATRIAL FIBRILLATION Quantity:  30 Cap Refills:  0  
   
 prazosin 2 mg capsule Commonly known as:  MINIPRESS Dose:  2 mg Take 1 Cap by mouth nightly for 7 days. Indications: Hypertension Quantity:  7 Cap Refills:  0 STOP taking these medications Comments  
 acetaminophen 325 mg tablet Commonly known as:  TYLENOL  
   
   
 clonazePAM 0.5 mg tablet Commonly known as:  KlonoPIN  
   
   
 gabapentin 800 mg tablet Commonly known as:  NEURONTIN Replaced by:  gabapentin 400 mg capsule  
   
   
 lithium carbonate 300 mg capsule  
   
   
 oxyCODONE-acetaminophen 5-325 mg per tablet Commonly known as:  PERCOCET QUEtiapine 400 mg tablet Commonly known as:  SEROquel  
   
   
 sertraline 100 mg tablet Commonly known as:  ZOLOFT  
   
   
 TESSALON PERLES 100 mg capsule Generic drug:  benzonatate ASK your doctor about these medications Dose & Instructions Dispensing Information Comments  
 fluticasone-vilanterol 200-25 mcg/dose inhaler Commonly known as:  BREO ELLIPTA Dose:  1 Puff Take 1 Puff by inhalation daily. Quantity:  28 Each Refills:  1  
   
 guaiFENesin-codeine 100-10 mg/5 mL solution Commonly known as:  ROBITUSSIN AC Dose:  5 mL Take 5 mL by mouth four (4) times daily as needed for Cough. Refills:  0 Nebulizer & Compressor machine Dose:  1 Each  
1 Each by Does Not Apply route daily. Quantity:  1 Each Refills:  0  
   
 nicotine 14 mg/24 hr patch Commonly known as:  Azell Sabins Dose:  1 Patch 1 Patch by TransDERmal route daily for 30 days. Quantity:  30 Patch Refills:  0 Current Immunizations Name Date Influenza Vaccine 10/1/2016,  Deferred (Patient Refused) Pneumococcal Polysaccharide (PPSV-23)  Deferred (Patient Refused) Follow-up Information Follow up With Details Comments Contact [facility]  Walk-in appointments Monday-Thursdays @ 8AM for substance abuse treatment. Pt will need photo ID  [facility] 
[phone] None   None (395) Patient stated that they have no PCP Discharge Instructions DISCHARGE SUMMARY from Nurse The following personal items are in your possession at time of discharge: 
 
  
Visual Aid: None PATIENT INSTRUCTIONS: 
 
 
 
 
What to do at Home: 
Recommended activity: Activity as tolerated, If I feel that I can not keep these promises and I am at risk of hurting myself or others, I will call the crisis office and speak with a crisis worker who will assist me during my crisis. [facility]  [phone] [place] [address]   [phone] [address] [facility]  [phone] [facility]  [phone] *  Please give a list of your current medications to your Primary Care Provider. *  Please update this list whenever your medications are discontinued, doses are 
    changed, or new medications (including over-the-counter products) are added. *  Please carry medication information at all times in case of emergency situations. These are general instructions for a healthy lifestyle: No smoking/ No tobacco products/ Avoid exposure to second hand smoke Surgeon General's Warning:  Quitting smoking now greatly reduces serious risk to your health. Obesity, smoking, and sedentary lifestyle greatly increases your risk for illness A healthy diet, regular physical exercise & weight monitoring are important for maintaining a healthy lifestyle Recognize signs and symptoms of STROKE: 
 
F-face looks uneven A-arms unable to move or move unevenly S-speech slurred or non-existent T-time-call 911 as soon as signs and symptoms begin-DO NOT go Back to bed or wait to see if you get better-TIME IS BRAIN. Warning Signs of HEART ATTACK Call 911 if you have these symptoms: 
? Chest discomfort.  Most heart attacks involve discomfort in the center of the chest that lasts more than a few minutes, or that goes away and comes back. It can feel like uncomfortable pressure, squeezing, fullness, or pain. ? Discomfort in other areas of the upper body. Symptoms can include pain or discomfort in one or both arms, the back, neck, jaw, or stomach. ? Shortness of breath with or without chest discomfort. ? Other signs may include breaking out in a cold sweat, nausea, or lightheadedness. Don't wait more than five minutes to call [address]! Fast action can save your life. Calling 911 is almost always the fastest way to get lifesaving treatment. Emergency Medical Services staff can begin treatment when they arrive  up to an hour sooner than if someone gets to the hospital by car. The discharge information has been reviewed with the patient. The patient verbalized understanding. Discharge medications reviewed with the patient and appropriate educational materials and side effects teaching were provided. Chart Review Routing History Recipient Method Report Sent By Erica Patricio [address] IP Auto Routed Notes Laura Obregon MD [facility] 1/30/2014  6:20 AM 01/30/2014 None [address] [employer] IP Auto Routed Notes Laura Obregon MD [facility] 2/2/2014  2:20 PM 02/02/2014 None [address] [employer] IP Auto Routed Notes Misbah Nava MD [[id number]] 2/4/2014 11:22 PM 02/04/2014 None [address] [employer] [facility] IP Auto Routed Notes Chino Hunter MD [[id number]] 2/6/2014  4:20 PM 02/06/2014

## 2017-01-25 NOTE — ED PROVIDER NOTES
HPI Comments: Ozzie Campuzano is a 35 y.o. female with hx of bipolar disorder and heroin abuse who presents ambulatory to the ED c/o SI and feeling depressed since yesterday. Pt states she began to have SI with plan to \"take pills\" after she began to relapse from heroin yesterday, noting last use was early yesterday. She notes hx of SI \"a long time ago\", and admits to recently snorting cocaine. She also c/o nausea and vomiting since yesterday, constant R lower dental pain since yesterday, and recent SOB. Pt notes hx of asthma and COPD which has been treated with prednisone in the past (last used steroids ~1 month ago). Pt specifically denies chance of pregnancy, HI, CP, fevers, chills, diarrhea, or abdominal pain. Social hx: - Tobacco, - EtOH, + Heroin/Cocaine, - Marijuana    PCP: None    There are no other complaints, changes or physical findings at this time. The history is provided by the patient. Past Medical History:   Diagnosis Date    Arrhythmia      irregular    Asthma     Chronic pain      back, leg    Depression     Hepatitis B     Hypertension      atrial fibrillation    Other ill-defined conditions(799.89)      heart murmur    Psychiatric disorder      bipolar    Sarcoidosis ([facility]        No past surgical history on file. Family History:   Problem Relation Age of Onset    Hypertension Father     Hypertension Mother        Social History     Social History    Marital status: SINGLE     Spouse name: N/A    Number of children: N/A    Years of education: N/A     Occupational History    Not on file.      Social History Main Topics    Smoking status: Current Every Day Smoker    Smokeless tobacco: Not on file      Comment: 4 cig. day    Alcohol use No      Comment: quit    Drug use: Yes     Special: Heroin    Sexual activity: Not on file     Other Topics Concern    Not on file     Social History Narrative         ALLERGIES: Tomato    Review of Systems   Constitutional: Negative. Negative for chills and fever. HENT: Positive for dental problem (right lower dental pain). Eyes: Negative. Respiratory: Positive for shortness of breath. Negative for cough. Cardiovascular: Negative. Negative for chest pain. Gastrointestinal: Positive for nausea and vomiting. Negative for abdominal pain, constipation and diarrhea. Genitourinary: Negative. Musculoskeletal: Negative. Skin: Negative. Neurological: Negative. Psychiatric/Behavioral: Positive for dysphoric mood and suicidal ideas. Denies HI   All other systems reviewed and are negative. Vitals:    01/25/17 0241   BP: (!) 158/102   Pulse: (!) 116   Resp: 16   Temp: 98.7 °F (37.1 °C)   SpO2: 97%   Weight: 68 kg (150 lb)   Height: 5' 4\" (1.626 m)            Physical Exam   Constitutional: She is oriented to person, place, and time. She appears well-developed and well-nourished. No distress. HENT:   Head: Normocephalic and atraumatic. Mouth/Throat: Oropharynx is clear and moist. No oropharyngeal exudate. Poor dentition   Eyes: Conjunctivae and EOM are normal. Pupils are equal, round, and reactive to light. Neck: Normal range of motion. Neck supple. Cardiovascular: Regular rhythm and normal heart sounds. Tachycardia present. Exam reveals no gallop and no friction rub. No murmur heard. Pulmonary/Chest: Effort normal. No respiratory distress. She has decreased breath sounds (BL). She has wheezes. She has no rales. Abdominal: Soft. Bowel sounds are normal. She exhibits no distension. There is no tenderness. There is no rebound and no guarding. Musculoskeletal: Normal range of motion. She exhibits no edema or tenderness. Neurological: She is alert and oriented to person, place, and time. Skin: Skin is warm and dry. No rash noted. No erythema. Psychiatric: She expresses suicidal ideation. She expresses no homicidal ideation. She expresses suicidal plans.    Nursing note and vitals reviewed. MDM  Number of Diagnoses or Management Options  Diagnosis management comments: DDx: SI, depression, bipolar disorder, substance abuse, asthma       Amount and/or Complexity of Data Reviewed  Clinical lab tests: ordered and reviewed  Review and summarize past medical records: yes  Discuss the patient with other providers: yes [facility])    Patient Progress  Patient progress: stable    ED Course       Procedures    PROGRESS NOTE:  5:57 AM  Nav Aparicio, [facility] counselor, has evaluated the patient and spoken with psychiatry. Dr Sina Severin has agreed to admit the patient to [address] [place] [address] Unit  Written by Alexis Mariscal ED Scribaleksandra, as dictated by Jahaira Allan MD    LABORATORY TESTS:  Recent Results (from the past 12 hour(s))   HCG URINE, QL. - POC    Collection Time: 01/25/17  3:01 AM   Result Value Ref Range    Pregnancy test,urine (POC) NEGATIVE  NEG     CBC WITH AUTOMATED DIFF    Collection Time: 01/25/17  3:14 AM   Result Value Ref Range    WBC 6.6 3.6 - 11.0 K/uL    RBC 4.14 3.80 - 5.20 M/uL    HGB 12.1 11.5 - 16.0 g/dL    HCT 37.9 35.0 - 47.0 %    MCV 91.5 80.0 - 99.0 FL    MCH 29.2 26.0 - 34.0 PG    MCHC 31.9 30.0 - 36.5 g/dL    RDW 15.9 (H) 11.5 - 14.5 %    PLATELET 690 153 - 311 K/uL    NEUTROPHILS 70 32 - 75 %    LYMPHOCYTES 16 12 - 49 %    MONOCYTES 6 5 - 13 %    EOSINOPHILS 7 0 - 7 %    BASOPHILS 1 0 - 1 %    ABS. NEUTROPHILS 4.7 1.8 - 8.0 K/UL    ABS. LYMPHOCYTES 1.0 0.8 - 3.5 K/UL    ABS. MONOCYTES 0.4 0.0 - 1.0 K/UL    ABS. EOSINOPHILS 0.5 (H) 0.0 - 0.4 K/UL    ABS.  BASOPHILS 0.0 0.0 - 0.1 K/UL   METABOLIC PANEL, BASIC    Collection Time: 01/25/17  3:14 AM   Result Value Ref Range    Sodium 145 136 - 145 mmol/L    Potassium 3.3 (L) 3.5 - 5.1 mmol/L    Chloride 108 97 - 108 mmol/L    CO2 24 21 - 32 mmol/L    Anion gap 13 5 - 15 mmol/L    Glucose 68 65 - 100 mg/dL    BUN 8 6 - 20 MG/DL    Creatinine 0.90 0.55 - 1.02 MG/DL    BUN/Creatinine ratio 9 (L) 12 - 20      GFR est AA >60 >60 ml/min/1.73m2    GFR est non-AA >60 >60 ml/min/1.73m2    Calcium 8.9 8.5 - 10.1 MG/DL   URINALYSIS W/ REFLEX CULTURE    Collection Time: 01/25/17  3:14 AM   Result Value Ref Range    Color YELLOW/STRAW      Appearance CLEAR CLEAR      Specific gravity 1.025 1.003 - 1.030      pH (UA) 6.5 5.0 - 8.0      Protein NEGATIVE  NEG mg/dL    Glucose NEGATIVE  NEG mg/dL    Ketone TRACE (A) NEG mg/dL    Bilirubin NEGATIVE  NEG      Blood NEGATIVE  NEG      Urobilinogen 1.0 0.2 - 1.0 EU/dL    Nitrites NEGATIVE  NEG      Leukocyte Esterase NEGATIVE  NEG      WBC 0-4 0 - 4 /hpf    RBC 0-5 0 - 5 /hpf    Epithelial cells FEW FEW /lpf    Bacteria 1+ (A) NEG /hpf    UA:UC IF INDICATED URINE CULTURE ORDERED (A) CNI     DRUG SCREEN, URINE    Collection Time: 01/25/17  3:14 AM   Result Value Ref Range    AMPHETAMINE NEGATIVE  NEG      BARBITURATES NEGATIVE  NEG      BENZODIAZEPINE NEGATIVE  NEG      COCAINE POSITIVE (A) NEG      METHADONE NEGATIVE  NEG      OPIATES POSITIVE (A) NEG      PCP(PHENCYCLIDINE) NEGATIVE  NEG      THC (TH-CANNABINOL) POSITIVE (A) NEG      Drug screen comment (NOTE)    ETHYL ALCOHOL    Collection Time: 01/25/17  3:14 AM   Result Value Ref Range    ALCOHOL(ETHYL),SERUM <10 <10 MG/DL       MEDICATIONS GIVEN:  Medications   predniSONE (DELTASONE) tablet 60 mg (60 mg Oral Given 1/25/17 0323)   albuterol-ipratropium (DUO-NEB) 2.5 MG-0.5 MG/3 ML (3 mL Nebulization Given 1/25/17 0325)   diazePAM (VALIUM) tablet 5 mg (5 mg Oral Given 1/25/17 0323)   ondansetron (ZOFRAN ODT) tablet 8 mg (8 mg Oral Given 1/25/17 0321)       IMPRESSION:  1. Heroin abuse    2. Episode of recurrent major depressive disorder, unspecified depression episode severity ([facility]    3. Substance induced mood disorder ([facility]        PLAN:  1. Admit    5:58 AM  Patient is being admitted to the hospital by Dr. Mathew Connell. The results of their tests and reasons for their admission have been discussed with them and/or available family.   They convey agreement and understanding for the need to be admitted and for their admission diagnosis. Consultation has been made with the inpatient physician specialist for hospitalization. This note is prepared by Kimberly Seals, acting as Scribe for May Raymond MD.    May Raymond MD: The scribe's documentation has been prepared under my direction and personally reviewed by me in its entirety. I confirm that the note above accurately reflects all work, treatment, procedures, and medical decision making performed by me. Milli Krishnamurthy
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
## 2017-01-25 NOTE — BH NOTES
Social Work     Pt is a single female here on a voluntary status and suicidal ideations due to substance use. Pt reported that she relapsed on heroin a month ago after being clean for one year. She is using 1/2 a gram a day. Pt is also using alcohol 1/2 pint of voldka , cocaine and marijuana. Pt has participated in a 30 day inpatient program in [place]. Pt reported she has attempted suicide in the past. Pt reported withdrawal symptoms to include cold sweats, cramping and body aches. Pt's medical issues include a heart murmur. Pt graduated from high school. Pt was employed last month at [employer], currently she is unemployed. Pt lives with her mother and 8year old son. She has 4 adult children. Pt is alert and oriented. Pt denies SI/HI. Pt is free of delusions. Pt's mood was depressed, affect was congruent with mood. Pt's thought process was logical. Pt's insight and judgment was fair, reliability was good. Pt is interested in starting outpatient treatment. Social work department will coordinate discharge plans.

## 2017-01-25 NOTE — ED NOTES
Pt denies feeling improvement in symptoms. Pt resting with blanket and lights dimmed for comfort.  Pt noted to not be shaky anymore

## 2017-01-25 NOTE — PROGRESS NOTES
Problem: Depressed Mood (Adult/Pediatric)  Goal: *STG: Participates in treatment plan  Outcome: Not Progressing Towards Goal  Patient came in and participated in the admission process and retired to her room. She discussed her use of heroin and how she has abscesses on her arms from picking her skin when she is high. She says that she wants to be clean because she is tired of getting high. She received medications and went back to bed. Patient has not eaten any food today or drank any liquids. She says that she is not able to eat anything at this time. She denies that she is suicidal or homicidal at this time. She was given Tylenol for complaints of tooth pain. Will continue to monitor and assist as needed.

## 2017-01-26 VITALS
BODY MASS INDEX: 25.61 KG/M2 | RESPIRATION RATE: 16 BRPM | HEIGHT: 64 IN | WEIGHT: 150 LBS | DIASTOLIC BLOOD PRESSURE: 94 MMHG | SYSTOLIC BLOOD PRESSURE: 142 MMHG | TEMPERATURE: 97.7 F | OXYGEN SATURATION: 100 % | HEART RATE: 66 BPM

## 2017-01-26 LAB
BACTERIA SPEC CULT: NORMAL
CC UR VC: NORMAL
SERVICE CMNT-IMP: NORMAL

## 2017-01-26 PROCEDURE — 94640 AIRWAY INHALATION TREATMENT: CPT

## 2017-01-26 PROCEDURE — 74011250637 HC RX REV CODE- 250/637: Performed by: INTERNAL MEDICINE

## 2017-01-26 PROCEDURE — 74011250637 HC RX REV CODE- 250/637: Performed by: PSYCHIATRY & NEUROLOGY

## 2017-01-26 PROCEDURE — 74011000250 HC RX REV CODE- 250: Performed by: INTERNAL MEDICINE

## 2017-01-26 RX ORDER — FLUTICASONE PROPIONATE AND SALMETEROL 500; 50 UG/1; UG/1
1 POWDER RESPIRATORY (INHALATION)
Status: DISCONTINUED | OUTPATIENT
Start: 2017-01-26 | End: 2017-01-26 | Stop reason: HOSPADM

## 2017-01-26 RX ORDER — GABAPENTIN 100 MG/1
800 CAPSULE ORAL 3 TIMES DAILY
Status: DISCONTINUED | OUTPATIENT
Start: 2017-01-26 | End: 2017-01-26 | Stop reason: HOSPADM

## 2017-01-26 RX ORDER — CLONIDINE HYDROCHLORIDE 0.3 MG/1
0.3 TABLET ORAL 3 TIMES DAILY
Qty: 21 TAB | Refills: 0 | Status: SHIPPED | OUTPATIENT
Start: 2017-01-26 | End: 2017-02-02

## 2017-01-26 RX ORDER — PRAZOSIN HYDROCHLORIDE 1 MG/1
2 CAPSULE ORAL
Status: DISCONTINUED | OUTPATIENT
Start: 2017-01-26 | End: 2017-01-26 | Stop reason: HOSPADM

## 2017-01-26 RX ORDER — PRAZOSIN HYDROCHLORIDE 2 MG/1
2 CAPSULE ORAL
Qty: 7 CAP | Refills: 0 | Status: SHIPPED | OUTPATIENT
Start: 2017-01-26 | End: 2017-02-02

## 2017-01-26 RX ORDER — DILTIAZEM HYDROCHLORIDE 120 MG/1
120 CAPSULE, COATED, EXTENDED RELEASE ORAL DAILY
Qty: 30 CAP | Refills: 0 | Status: SHIPPED | OUTPATIENT
Start: 2017-01-26 | End: 2017-02-02

## 2017-01-26 RX ORDER — GABAPENTIN 400 MG/1
800 CAPSULE ORAL 3 TIMES DAILY
Qty: 42 CAP | Refills: 0 | Status: SHIPPED | OUTPATIENT
Start: 2017-01-26 | End: 2017-02-02

## 2017-01-26 RX ADMIN — IPRATROPIUM BROMIDE AND ALBUTEROL SULFATE 3 ML: .5; 3 SOLUTION RESPIRATORY (INHALATION) at 07:52

## 2017-01-26 RX ADMIN — THERA TABS 1 TABLET: TAB at 08:57

## 2017-01-26 RX ADMIN — METHADONE HYDROCHLORIDE 15 MG: 10 TABLET ORAL at 08:57

## 2017-01-26 RX ADMIN — GABAPENTIN 800 MG: 100 CAPSULE ORAL at 08:56

## 2017-01-26 RX ADMIN — CLONIDINE HYDROCHLORIDE 0.3 MG: 0.1 TABLET ORAL at 08:57

## 2017-01-26 RX ADMIN — NIFEDIPINE 120 MG: 10 CAPSULE ORAL at 08:58

## 2017-01-26 RX ADMIN — FLUTICASONE PROPIONATE AND SALMETEROL 1 PUFF: 50; 500 POWDER RESPIRATORY (INHALATION) at 08:56

## 2017-01-26 RX ADMIN — Medication 100 MG: at 08:58

## 2017-01-26 RX ADMIN — FOLIC ACID 1 MG: 1 TABLET ORAL at 08:57

## 2017-01-26 NOTE — PROGRESS NOTES
Problem: Suicide/Homicide (Adult/Pediatric)  Goal: *STG: Remains safe in hospital  Outcome: Progressing Towards Goal  The pt remained asleep approximately 7-8 hours. The pt remains safe on the unit. The pt did not communicate any complaint or concern nor displayed any signs/symptoms of distress. Will continue to monitor and support.

## 2017-01-26 NOTE — BH NOTES
Social Work     Pt will be discharged today. She will return home with her mother. Pt is alert and oriented. Pt denies SI/HI. Pt is free of delusions. Pt's mood is euthymic. Pt's thought process is logical. Pt expressed that she is ready to make a change and get clean. Pt is in agreement with the plan. Follow-up  [address] [place] [address]  [id number] [place] [address]  [phone]  Walk-in appointments Monday-Thursdays @ 8AM for substance abuse treatment.  Pt will need photo ID

## 2017-01-26 NOTE — DISCHARGE INSTRUCTIONS
DISCHARGE SUMMARY from Nurse    The following personal items are in your possession at time of discharge:       Visual Aid: None                            PATIENT INSTRUCTIONS:          What to do at Home:  Recommended activity: Activity as tolerated,     If I feel that I can not keep these promises and I am at risk of hurting myself or others, I will call the crisis office and speak with a crisis worker who will assist me during my crisis. [facility]  [address] [facility]  [phone]         *  Please give a list of your current medications to your Primary Care Provider. *  Please update this list whenever your medications are discontinued, doses are      changed, or new medications (including over-the-counter products) are added. *  Please carry medication information at all times in case of emergency situations. These are general instructions for a healthy lifestyle:    No smoking/ No tobacco products/ Avoid exposure to second hand smoke    Surgeon General's Warning:  Quitting smoking now greatly reduces serious risk to your health. Obesity, smoking, and sedentary lifestyle greatly increases your risk for illness    A healthy diet, regular physical exercise & weight monitoring are important for maintaining a healthy lifestyle        Recognize signs and symptoms of STROKE:    F-face looks uneven    A-arms unable to move or move unevenly    S-speech slurred or non-existent    T-time-call 911 as soon as signs and symptoms begin-DO NOT go       Back to bed or wait to see if you get better-TIME IS BRAIN. Warning Signs of HEART ATTACK     Call 911 if you have these symptoms:   Chest discomfort. Most heart attacks involve discomfort in the center of the chest that lasts more than a few minutes, or that goes away and comes back.  It can feel like uncomfortable pressure, squeezing, fullness, or pain.  Discomfort in other areas of the upper body. Symptoms can include pain or discomfort in one or both arms, the back, neck, jaw, or stomach.  Shortness of breath with or without chest discomfort.  Other signs may include breaking out in a cold sweat, nausea, or lightheadedness. Don't wait more than five minutes to call 911 - MINUTES MATTER! Fast action can save your life. Calling 911 is almost always the fastest way to get lifesaving treatment. Emergency Medical Services staff can begin treatment when they arrive -- up to an hour sooner than if someone gets to the hospital by car. The discharge information has been reviewed with the patient. The patient verbalized understanding. Discharge medications reviewed with the patient and appropriate educational materials and side effects teaching were provided.

## 2017-01-26 NOTE — PROGRESS NOTES
Problem: Suicide/Homicide (Adult/Pediatric)  Goal: *STG: Seeks staff when feelings of self harm or harm towards others arise  Outcome: Progressing Towards Goal  Patient is calm and cooperative. Visible on the unit, interacting with staff and peers appropriately. Compliant with meals. Denies any pain or discomfort. Continue to monitor the patient and assess needs.

## 2017-01-26 NOTE — H&P
History and Physical    Subjective:     Segundo Morales is a 35 y.o. female with past medical hx significant for Arrythmia, Asthma, COPD, chronic pain, Hep B, HTN, Atrial fib, and sarcoidosis. Pt is hospitalized with a diagnosis of substance induced mood disorder. Pt denies any acute issues. She reports eye, skin and lung involvement related to sarcoidosis. Past Medical History   Diagnosis Date    Arrhythmia      irregular    Asthma     Chronic obstructive pulmonary disease (HCC)     Chronic pain      back, leg    Depression     Hepatitis B     Hypertension      atrial fibrillation    Other ill-defined conditions(799.89)      heart murmur    Psychiatric disorder      bipolar    Sarcoidosis ([facility] [address] [address]       PSHx:none declared by patient. Family History   Problem Relation Age of Onset    Hypertension Father     Hypertension Mother       Social History   Substance Use Topics    Smoking status: Current Every Day Smoker     Packs/day: 0.50    Smokeless tobacco: Never Used      Comment: 4 cig. day    Alcohol use Yes      Comment: daily, 1/2 pint vodka for 3 year. Prior to Admission medications    Medication Sig Start Date End Date Taking? Authorizing Provider   benzonatate (TESSALON PERLES) 100 mg capsule Take 100 mg by mouth three (3) times daily as needed for Cough. Historical Provider   guaiFENesin-codeine (ROBITUSSIN AC) 100-10 mg/5 mL solution Take 5 mL by mouth four (4) times daily as needed for Cough. Historical Provider   albuterol (PROVENTIL HFA, VENTOLIN HFA, PROAIR HFA) 90 mcg/actuation inhaler Take 2 Puffs by inhalation every four (4) hours as needed for Wheezing or Shortness of Breath. 1/11/17   Cayla Abreu MD   clonazePAM (KLONOPIN) 0.5 mg tablet Take 1 Tab by mouth two (2) times a day. Max Daily Amount: 1 mg. 1/11/17   Cayla Abreu MD   cloNIDine HCl (CATAPRES) 0.3 mg tablet Take 1 Tab by mouth three (3) times daily.  1/11/17   Cayla Abreu MD   gabapentin (NEURONTIN) 800 mg tablet Take 1 Tab by mouth three (3) times daily. 1/11/17   Tacho Pearson MD   lithium carbonate 300 mg capsule Take 1 Cap by mouth two (2) times daily (with meals). 1/11/17   Tacho Pearson MD   prazosin (MINIPRESS) 2 mg capsule Take 1 Cap by mouth nightly. 1/11/17   Tacho Pearson MD   QUEtiapine (SEROQUEL) 400 mg tablet Take 1 Tab by mouth two (2) times a day. 1/11/17   Tacho Pearson MD   sertraline (ZOLOFT) 100 mg tablet Take 1 Tab by mouth daily. 1/11/17   Tacho Pearson MD   acetaminophen (TYLENOL) 325 mg tablet Take 2 Tabs by mouth every four (4) hours as needed for Fever. 1/11/17   Tacho Pearson MD   albuterol-ipratropium (DUO-NEB) 2.5 mg-0.5 mg/3 ml nebu 3 mL by Nebulization route every six (6) hours as needed. 1/11/17   Tacho Pearson MD   nicotine (NICODERM CQ) 14 mg/24 hr patch 1 Patch by TransDERmal route daily for 30 days. 1/11/17 2/10/17  Tacho Pearson MD   oxyCODONE-acetaminophen (PERCOCET) 5-325 mg per tablet Take 1 Tab by mouth every six (6) hours as needed. Max Daily Amount: 4 Tabs. 1/11/17   Tacho Pearson MD   Nebulizer & Compressor machine 1 Each by Does Not Apply route daily. 1/11/17   Tacho Pearson MD   dilTIAZem CD (CARDIZEM CD) 120 mg ER capsule Take 1 Cap by mouth daily. 1/11/17   Tacho Pearson MD   fluticasone-vilanterol (BREO ELLIPTA) 941-64 mcg/dose inhaler Take 1 Puff by inhalation daily. 1/11/17   Tacho Pearson MD     Allergies   Allergen Reactions    Tomato Swelling     Tongue        Review of Systems:  Constitutional: negative  Eyes: negative  Ears, Nose, Mouth, Throat, and Face: negative  Respiratory: negative  Cardiovascular: negative  Gastrointestinal: negative  Genitourinary:negative  Integument/Breast: negative  Hematologic/Lymphatic: negative  Musculoskeletal:negative  Neurological: negative  Behavioral/Psychiatric: substance induced mood disorder. Endocrine: negative  Allergic/Immunologic: negative     Objective:      Intake and Output:            Physical Exam:   Visit Vitals    BP (!) 156/99 (BP 1 Location: Left arm, BP Patient Position: Prone)    Pulse 63    Temp 97.6 °F (36.4 °C)    Resp 20    Ht 5' 4\" (1.626 m)    Wt 68 kg (150 lb)    LMP 12/19/2016    SpO2 96%    Breastfeeding No    BMI 25.75 kg/m2     General:  Alert, cooperative, no distress, appears stated age. Head:  Normocephalic, without obvious abnormality, atraumatic. Eyes:  Conjunctivae/corneas clear. PERRL, EOMs intact. Ears:  Normal external ear canals both ears. Nose: Nares normal. Septum midline. Mucosa normal. No drainage or sinus tenderness. Throat: Lips, mucosa, and tongue normal. Teeth and gums normal.   Neck: Supple, symmetrical, trachea midline, no adenopathy, thyroid: no enlargement/tenderness/nodules, no carotid bruit and no JVD. Back:   Symmetric, no curvature. ROM normal. No CVA tenderness. Lungs:   Clear to auscultation bilaterally. Chest wall:  No tenderness or deformity. Heart:  Regular rate and rhythm, S1, S2 normal, no murmur, click, rub or gallop. Abdomen:   Soft, non-tender. Bowel sounds normal. No masses,  No organomegaly. Extremities: Extremities normal, atraumatic, no cyanosis or edema. Pulses: 2+ and symmetric all extremities. Skin: Skin color, texture, turgor normal. No rashes or lesions   Lymph nodes: Cervical, supraclavicular, and axillary nodes normal.   Neurologic: CNII-XII intact. Normal strength, sensation and reflexes throughout.        Data Review:   Recent Results (from the past 24 hour(s))   HCG URINE, QL. - POC    Collection Time: 01/25/17  3:01 AM   Result Value Ref Range    Pregnancy test,urine (POC) NEGATIVE  NEG     CBC WITH AUTOMATED DIFF    Collection Time: 01/25/17  3:14 AM   Result Value Ref Range    WBC 6.6 3.6 - 11.0 K/uL    RBC 4.14 3.80 - 5.20 M/uL    HGB 12.1 11.5 - 16.0 g/dL    HCT 37.9 35.0 - 47.0 %    MCV 91.5 80.0 - 99.0 FL    MCH 29.2 26.0 - 34.0 PG    MCHC 31.9 30.0 - 36.5 g/dL RDW 15.9 (H) 11.5 - 14.5 %    PLATELET 492 401 - 672 K/uL    NEUTROPHILS 70 32 - 75 %    LYMPHOCYTES 16 12 - 49 %    MONOCYTES 6 5 - 13 %    EOSINOPHILS 7 0 - 7 %    BASOPHILS 1 0 - 1 %    ABS. NEUTROPHILS 4.7 1.8 - 8.0 K/UL    ABS. LYMPHOCYTES 1.0 0.8 - 3.5 K/UL    ABS. MONOCYTES 0.4 0.0 - 1.0 K/UL    ABS. EOSINOPHILS 0.5 (H) 0.0 - 0.4 K/UL    ABS.  BASOPHILS 0.0 0.0 - 0.1 K/UL   METABOLIC PANEL, BASIC    Collection Time: 01/25/17  3:14 AM   Result Value Ref Range    Sodium 145 136 - 145 mmol/L    Potassium 3.3 (L) 3.5 - 5.1 mmol/L    Chloride 108 97 - 108 mmol/L    CO2 24 21 - 32 mmol/L    Anion gap 13 5 - 15 mmol/L    Glucose 68 65 - 100 mg/dL    BUN 8 6 - 20 MG/DL    Creatinine 0.90 0.55 - 1.02 MG/DL    BUN/Creatinine ratio 9 (L) 12 - 20      GFR est AA >60 >60 ml/min/1.73m2    GFR est non-AA >60 >60 ml/min/1.73m2    Calcium 8.9 8.5 - 10.1 MG/DL   URINALYSIS W/ REFLEX CULTURE    Collection Time: 01/25/17  3:14 AM   Result Value Ref Range    Color YELLOW/STRAW      Appearance CLEAR CLEAR      Specific gravity 1.025 1.003 - 1.030      pH (UA) 6.5 5.0 - 8.0      Protein NEGATIVE  NEG mg/dL    Glucose NEGATIVE  NEG mg/dL    Ketone TRACE (A) NEG mg/dL    Bilirubin NEGATIVE  NEG      Blood NEGATIVE  NEG      Urobilinogen 1.0 0.2 - 1.0 EU/dL    Nitrites NEGATIVE  NEG      Leukocyte Esterase NEGATIVE  NEG      WBC 0-4 0 - 4 /hpf    RBC 0-5 0 - 5 /hpf    Epithelial cells FEW FEW /lpf    Bacteria 1+ (A) NEG /hpf    UA:UC IF INDICATED URINE CULTURE ORDERED (A) CNI     DRUG SCREEN, URINE    Collection Time: 01/25/17  3:14 AM   Result Value Ref Range    AMPHETAMINE NEGATIVE  NEG      BARBITURATES NEGATIVE  NEG      BENZODIAZEPINE NEGATIVE  NEG      COCAINE POSITIVE (A) NEG      METHADONE NEGATIVE  NEG      OPIATES POSITIVE (A) NEG      PCP(PHENCYCLIDINE) NEGATIVE  NEG      THC (TH-CANNABINOL) POSITIVE (A) NEG      Drug screen comment (NOTE)    ETHYL ALCOHOL    Collection Time: 01/25/17  3:14 AM   Result Value Ref Range ALCOHOL(ETHYL),SERUM <10 <10 MG/DL   GLUCOSE, POC    Collection Time: 01/25/17  6:01 AM   Result Value Ref Range    Glucose (POC) 119 (H) 65 - 100 mg/dL    Performed by Valarie Veloz        Assessment:     Principal Problem:    Substance induced mood disorder ([facility] (1/25/2017)    Active Problems:    COPD (chronic obstructive pulmonary disease) ([facility] (1/9/2017)      HTN (hypertension) (1/11/2017)      Polysubstance dependence ([facility] (1/25/2017)    Atrial fibrillation    Sarcoidosis    Plan:     Restart Diltiazem    Add Aspirin    Restart inahlers    Restart Catapress    No VTE prophylaxis indicated or necessary at this time.    Signed By: Renee Vides MD     January 25, 2017

## 2017-01-26 NOTE — BH NOTES
SOCIAL WORK NOTE:  Pt did not attend processing group.      DORCAS Chaparro, Supervisee in Social Work

## 2017-01-26 NOTE — DISCHARGE SUMMARY
PSYCHIATRIC DISCHARGE SUMMARY         IDENTIFICATION:    Patient Name  Leif Loyd   Date of Birth 1975   [facility] [id number]   Medical Record Number  [id number]      Age  35 y.o. PCP None   Admit date:  1/25/2017    Discharge date: 1/26/2017   Room Number  322/02  @ [address]   Date of Service  1/26/2017            TYPE OF DISCHARGE: REGULAR               CONDITION AT DISCHARGE: stable       PROVISIONAL & DISCHARGE DIAGNOSES:    Problem List  Date Reviewed: 1/16/2017          Codes Class    Depressive disorder ICD-10-CM: F32.9  ICD-9-CM: 36         Polysubstance dependence ([facility] ICD-10-CM: F19.20  ICD-9-CM: 304.80         * (Principal)Substance induced mood disorder ([facility] ICD-10-CM: F19.94  ICD-9-CM: 292.84         Depression ICD-10-CM: F32.9  ICD-9-CM: 478         HTN (hypertension) ICD-10-CM: I10  ICD-9-CM: 401.9         Drug abuse ICD-10-CM: F19.10  ICD-9-CM: 305.90         Bipolar disorder ([facility] ICD-10-CM: F31.9  ICD-9-CM: 296.80         Sinus tachycardia ICD-10-CM: R00.0  ICD-9-CM: 427.89         COPD (chronic obstructive pulmonary disease) (HCC) ICD-10-CM: J44.9  ICD-9-CM: 496         Unspecified asthma, with exacerbation ICD-10-CM: J45.901  ICD-9-CM: 493.92         Heroin abuse ICD-10-CM: F11.10  ICD-9-CM: 305.50               Active Hospital Problems    Polysubstance dependence ([facility]      *Substance induced mood disorder ([facility]      HTN (hypertension)      COPD (chronic obstructive pulmonary disease) (HCC)        DISCHARGE DIAGNOSIS:   Axis I:  SEE ABOVE  Axis II: SEE ABOVE  Axis III: SEE ABOVE  Axis IV:  Substance abuse, financial,lack of structure  Axis V:  35 on admission, 60 on discharge (baseline)       CC & HISTORY OF PRESENT ILLNESS:  The patient, Leif Loyd, is a 35 y.o.  BLACK OR  female with a past psychiatric history significant for polysubstance dependence, who presents at this time with complaints of (and/or evidence of) the following emotional symptoms: depression and suicidal thoughts/threats. Additional symptomatology include relapse on drug use, feeling of guilt, helplessness, mild w/d sx, anxiety, chronic pain, difficulty sleeping, poor appetite financial problems, increased irritability, problem with medication and \"I don't want to go on living like this\". The above symptoms have been present for few days. These symptoms are of severe severity. These symptoms are intermittent/ fleeting in nature. The patient's condition has been precipitated by and psychosocial stressors (relapse, financial ). Patient's condition made worse by continued illicit drug use and alcohol use as well as treatment noncompliance. UDS +MJ , cocaine and opiates; BAL=0. Per [employer] report \"Patient is 35year old female reporting to ED with suicidal ideations, increased depression. Patient is currently having symptoms of withdrawal and was given 5mg Valium while in Ed, patient reported cold sweats, vomiting, restless, no appetite, sleepless nights. At bedside, patient reported having suicidal thoughts, denied homicidal thoughts and denied hallucinations. Patient reported Having plan to overdose. Patient reported that she was previously hospitalized psychiatrically about a month ago in [place]. Patient reported that they gave her resources to follow through with in [place] but she did not follow through with resources. As reported patient relapsed about a month ago after being clean. Patient reported that she does not feel safe leaving hospital and stated she does not want to live anymore. Patient reported being under psychiatric care about two years ago did not identify who psychiatrist was. Patient was cooperative and observed laying in bed with low mood. Patient cooperated with assessment. \"       SOCIAL HISTORY:    Social History     Social History    Marital status: SINGLE     Spouse name: N/A    Number of children: N/A    Years of education: N/A     Occupational History    Not on file. Social History Main Topics    Smoking status: Current Every Day Smoker     Packs/day: 0.50    Smokeless tobacco: Never Used      Comment: 4 cig. day    Alcohol use Yes      Comment: daily, 1/2 pint vodka for 3 year.  Drug use: Yes     Special: Heroin, Marijuana, Cocaine      Comment: .5 gm heroin daily- relapsed 1.5 months, was in rehab in [place], also uses cocaine once a months    Sexual activity: Yes     Partners: Female     Other Topics Concern    Not on file     Social History Narrative    Born in [facility],     [facility], 5 children,    No legal charges. Pt graduated from high school. Pt was employed last month at [employer], currently she is unemployed. Pt lives with her mother and 8year old son. She has 4 adult children. FAMILY HISTORY:   Family History   Problem Relation Age of Onset    Hypertension Father     Hypertension Mother              HOSPITALIZATION COURSE:    Catracho Mclean was admitted to the inpatient psychiatric unit [facility] for acute psychiatric stabilization in regards to symptomatology as described in the HPI above. .  While on the unit Catracho Mclean was involved in individual, group, occupational and milieu therapy. Psychiatric medications were adjusted during this hospitalization including prn med, detox. Evgeny Mcgee demonstrated a progressive improvement in overall condition. Much of patient's depression appeared to be related to situational stressors, effects of drugs of abuse, and psychological factors. Please see individual progress notes for more specific details regarding patient's hospitalization course. At time of discharge, Catracho Mclean is without significant problems of depression psychosis  zane. Patient free of suicidal and homicidal ideations (appears to be at very low risk of suicide or homicide) and reports many positive predictive factors in terms of not attempting suicide or homicide.  Overall presentation at time of discharge is most consistent with the diagnosis of substance induced mood disorder, polysubstance dependence. Patient with request for discharge today. There are no grounds to seek a TDO. Patient has maximized benefit to be derived from acute inpatient psychiatric treatment. All members of the treatment team concur with each other in regards to plans for discharge today per patient's request.  Patient and family are aware and in agreement with discharge and discharge plan. Per my last note:     Substance induced mood disorder ([facility] (1/25/2017)  Assessment: doc- opiates- heroin, recent relapse- feeling of guilt, vague SI without plan, feel safe on the unit, no zane or psychosis. Sad affect. Plan: detox protocol with methadone, need period of sobriety to assess need for med. Polysubstance dependence ([facility] (1/25/2017)  Assessment: heroin, cocaine, alcohol, and MJ dependence rehab in [place] and sober until 1.5 month ago, last dose yesterday, mild w/d sx- cramps, nx, sweating. Plan: detox, COWS and CIWA, encourage Rehab/ substance abuse program           LABS AND IMAGAING:    Labs Reviewed   CBC WITH AUTOMATED DIFF - Abnormal; Notable for the following:        Result Value    RDW 15.9 (*)     ABS.  EOSINOPHILS 0.5 (*)     All other components within normal limits   METABOLIC PANEL, BASIC - Abnormal; Notable for the following:     Potassium 3.3 (*)     BUN/Creatinine ratio 9 (*)     All other components within normal limits   URINALYSIS W/ REFLEX CULTURE - Abnormal; Notable for the following:     Ketone TRACE (*)     Bacteria 1+ (*)     UA:UC IF INDICATED URINE CULTURE ORDERED (*)     All other components within normal limits   DRUG SCREEN, URINE - Abnormal; Notable for the following:     COCAINE POSITIVE (*)     OPIATES POSITIVE (*)     THC (TH-CANNABINOL) POSITIVE (*)     All other components within normal limits   GLUCOSE, POC - Abnormal; Notable for the following:     Glucose (POC) 119 (*)     All other components within normal limits   CULTURE, URINE   ETHYL ALCOHOL   GLUCOSE, FASTING   TSH 3RD GENERATION   LIPID PANEL   HCG URINE, QL. - POC   POC URINE PREGNANCY TEST     No results found for: DS35, PHEN, PHENO, PHENT, DILF, DS39, PHENY, PTN, VALF2, VALAC, VALP, VALPR, DS6, CRBAM, CRBAMP, CARB2, XCRBAM  Admission on 01/25/2017   Component Date Value Ref Range Status    WBC 01/25/2017 6.6  3.6 - 11.0 K/uL Final    RBC 01/25/2017 4.14  3.80 - 5.20 M/uL Final    HGB 01/25/2017 12.1  11.5 - 16.0 g/dL Final    HCT 01/25/2017 37.9  35.0 - 47.0 % Final    MCV 01/25/2017 91.5  80.0 - 99.0 FL Final    MCH 01/25/2017 29.2  26.0 - 34.0 PG Final    MCHC 01/25/2017 31.9  30.0 - 36.5 g/dL Final    RDW 01/25/2017 15.9* 11.5 - 14.5 % Final    PLATELET 21/06/5208 968  150 - 400 K/uL Final    NEUTROPHILS 01/25/2017 70  32 - 75 % Final    LYMPHOCYTES 01/25/2017 16  12 - 49 % Final    MONOCYTES 01/25/2017 6  5 - 13 % Final    EOSINOPHILS 01/25/2017 7  0 - 7 % Final    BASOPHILS 01/25/2017 1  0 - 1 % Final    ABS. NEUTROPHILS 01/25/2017 4.7  1.8 - 8.0 K/UL Final    ABS. LYMPHOCYTES 01/25/2017 1.0  0.8 - 3.5 K/UL Final    ABS. MONOCYTES 01/25/2017 0.4  0.0 - 1.0 K/UL Final    ABS. EOSINOPHILS 01/25/2017 0.5* 0.0 - 0.4 K/UL Final    ABS.  BASOPHILS 01/25/2017 0.0  0.0 - 0.1 K/UL Final    Sodium 01/25/2017 145  136 - 145 mmol/L Final    Potassium 01/25/2017 3.3* 3.5 - 5.1 mmol/L Final    Chloride 01/25/2017 108  97 - 108 mmol/L Final    CO2 01/25/2017 24  21 - 32 mmol/L Final    Anion gap 01/25/2017 13  5 - 15 mmol/L Final    Glucose 01/25/2017 68  65 - 100 mg/dL Final    BUN 01/25/2017 8  6 - 20 MG/DL Final    Creatinine 01/25/2017 0.90  0.55 - 1.02 MG/DL Final    BUN/Creatinine ratio 01/25/2017 9* 12 - 20   Final    GFR est AA 01/25/2017 >60  >60 ml/min/1.73m2 Final    GFR est non-AA 01/25/2017 >60  >60 ml/min/1.73m2 Final    Calcium 01/25/2017 8.9  8.5 - 10.1 MG/DL Final    Color 01/25/2017 YELLOW/STRAW    Final    Appearance 01/25/2017 CLEAR  CLEAR   Final    Specific gravity 01/25/2017 1.025  1.003 - 1.030   Final    pH (UA) 01/25/2017 6.5  5.0 - 8.0   Final    Protein 01/25/2017 NEGATIVE   NEG mg/dL Final    Glucose 01/25/2017 NEGATIVE   NEG mg/dL Final    Ketone 01/25/2017 TRACE* NEG mg/dL Final    Bilirubin 01/25/2017 NEGATIVE   NEG   Final    Blood 01/25/2017 NEGATIVE   NEG   Final    Urobilinogen 01/25/2017 1.0  0.2 - 1.0 EU/dL Final    Nitrites 01/25/2017 NEGATIVE   NEG   Final    Leukocyte Esterase 01/25/2017 NEGATIVE   NEG   Final    WBC 01/25/2017 0-4  0 - 4 /hpf Final    RBC 01/25/2017 0-5  0 - 5 /hpf Final    Epithelial cells 01/25/2017 FEW  FEW /lpf Final    Bacteria 01/25/2017 1+* NEG /hpf Final    UA:UC IF INDICATED 01/25/2017 URINE CULTURE ORDERED* CNI   Final    AMPHETAMINE 01/25/2017 NEGATIVE   NEG   Final    BARBITURATES 01/25/2017 NEGATIVE   NEG   Final    BENZODIAZEPINE 01/25/2017 NEGATIVE   NEG   Final    COCAINE 01/25/2017 POSITIVE* NEG   Final    METHADONE 01/25/2017 NEGATIVE   NEG   Final    OPIATES 01/25/2017 POSITIVE* NEG   Final    PCP(PHENCYCLIDINE) 01/25/2017 NEGATIVE   NEG   Final    THC (TH-CANNABINOL) 01/25/2017 POSITIVE* NEG   Final    Drug screen comment 01/25/2017 (NOTE)   Final    ALCOHOL(ETHYL),SERUM 01/25/2017 <10  <10 MG/DL Final    Pregnancy test,urine (POC) 01/25/2017 NEGATIVE   NEG   Final    Glucose (POC) 01/25/2017 119* 65 - 100 mg/dL Final    Performed by 01/25/2017 Erna Amador   Final   Admission on 01/15/2017, Discharged on 01/16/2017   Component Date Value Ref Range Status    Ventricular Rate 01/15/2017 79  BPM Final    Atrial Rate 01/15/2017 79  BPM Final    P-R Interval 01/15/2017 110  ms Final    QRS Duration 01/15/2017 88  ms Final    Q-T Interval 01/15/2017 390  ms Final    QTC Calculation (Bezet) 01/15/2017 447  ms Final    Calculated P Axis 01/15/2017 44  degrees Final    Calculated R Axis 01/15/2017 54  degrees Final    Calculated T Axis 01/15/2017 59  degrees Final    Diagnosis 01/15/2017    Final                    Value:Sinus rhythm with short DE  Otherwise normal ECG  When compared with ECG of 30-JAN-2014 00:16,  No significant change was found  Confirmed by Eliazar Chandra ([id number]) on 1/16/2017 12:11:13 PM      WBC 01/15/2017 5.1  3.6 - 11.0 K/uL Final    RBC 01/15/2017 4.65  3.80 - 5.20 M/uL Final    HGB 01/15/2017 13.6  11.5 - 16.0 g/dL Final    HCT 01/15/2017 41.0  35.0 - 47.0 % Final    MCV 01/15/2017 88.2  80.0 - 99.0 FL Final    MCH 01/15/2017 29.2  26.0 - 34.0 PG Final    MCHC 01/15/2017 33.2  30.0 - 36.5 g/dL Final    RDW 01/15/2017 14.9* 11.5 - 14.5 % Final    PLATELET 82/46/0950 388  150 - 400 K/uL Final    NEUTROPHILS 01/15/2017 60  32 - 75 % Final    LYMPHOCYTES 01/15/2017 31  12 - 49 % Final    MONOCYTES 01/15/2017 4* 5 - 13 % Final    EOSINOPHILS 01/15/2017 5  0 - 7 % Final    BASOPHILS 01/15/2017 0  0 - 1 % Final    ABS. NEUTROPHILS 01/15/2017 3.1  1.8 - 8.0 K/UL Final    ABS. LYMPHOCYTES 01/15/2017 1.6  0.8 - 3.5 K/UL Final    ABS. MONOCYTES 01/15/2017 0.2  0.0 - 1.0 K/UL Final    ABS. EOSINOPHILS 01/15/2017 0.2  0.0 - 0.4 K/UL Final    ABS.  BASOPHILS 01/15/2017 0.0  0.0 - 0.1 K/UL Final    Sodium 01/15/2017 139  136 - 145 mmol/L Final    Potassium 01/15/2017 3.8  3.5 - 5.1 mmol/L Final    Chloride 01/15/2017 105  97 - 108 mmol/L Final    CO2 01/15/2017 25  21 - 32 mmol/L Final    Anion gap 01/15/2017 9  5 - 15 mmol/L Final    Glucose 01/15/2017 123* 65 - 100 mg/dL Final    BUN 01/15/2017 8  6 - 20 MG/DL Final    Creatinine 01/15/2017 1.03* 0.55 - 1.02 MG/DL Final    BUN/Creatinine ratio 01/15/2017 8* 12 - 20   Final    GFR est AA 01/15/2017 >60  >60 ml/min/1.73m2 Final    GFR est non-AA 01/15/2017 58* >60 ml/min/1.73m2 Final    Calcium 01/15/2017 8.4* 8.5 - 10.1 MG/DL Final    Bilirubin, total 01/15/2017 <0.1* 0.2 - 1.0 MG/DL Final    ALT 01/15/2017 39  12 - 78 U/L Final    AST 01/15/2017 40* 15 - 37 U/L Final    Alk.  phosphatase 01/15/2017 87  45 - 117 U/L Final    Protein, total 01/15/2017 7.1  6.4 - 8.2 g/dL Final    Albumin 01/15/2017 3.5  3.5 - 5.0 g/dL Final    Globulin 01/15/2017 3.6  2.0 - 4.0 g/dL Final    A-G Ratio 01/15/2017 1.0* 1.1 - 2.2   Final    Troponin-I, Qt. 01/15/2017 <0.04  <0.05 ng/mL Final    Color 01/15/2017 YELLOW/STRAW    Final    Appearance 01/15/2017 TURBID* CLEAR   Final    Specific gravity 01/15/2017 1.014  1.003 - 1.030   Final    pH (UA) 01/15/2017 6.0  5.0 - 8.0   Final    Protein 01/15/2017 NEGATIVE   NEG mg/dL Final    Glucose 01/15/2017 NEGATIVE   NEG mg/dL Final    Ketone 01/15/2017 NEGATIVE   NEG mg/dL Final    Bilirubin 01/15/2017 NEGATIVE   NEG   Final    Blood 01/15/2017 LARGE* NEG   Final    Urobilinogen 01/15/2017 0.2  0.2 - 1.0 EU/dL Final    Nitrites 01/15/2017 NEGATIVE   NEG   Final    Leukocyte Esterase 01/15/2017 TRACE* NEG   Final    WBC 01/15/2017 0-4  0 - 4 /hpf Final    RBC 01/15/2017 0-5  0 - 5 /hpf Final    Epithelial cells 01/15/2017 MANY* FEW /lpf Final    Bacteria 01/15/2017 4+* NEG /hpf Final    AMPHETAMINE 01/15/2017 NEGATIVE   NEG   Final    BARBITURATES 01/15/2017 POSITIVE* NEG   Final    BENZODIAZEPINE 01/15/2017 NEGATIVE   NEG   Final    COCAINE 01/15/2017 NEGATIVE   NEG   Final    METHADONE 01/15/2017 NEGATIVE   NEG   Final    OPIATES 01/15/2017 POSITIVE* NEG   Final    PCP(PHENCYCLIDINE) 01/15/2017 NEGATIVE   NEG   Final    THC (TH-CANNABINOL) 01/15/2017 POSITIVE* NEG   Final    Drug screen comment 01/15/2017 (NOTE)   Final    Lithium 01/15/2017 <0.20* 0.60 - 1.20 MMOL/L Final    Reported dose date: 01/15/2017 NOT PERFORMED    Final    Reported dose time: 01/15/2017 NOT PERFORMED    Final    Reported dose: 01/15/2017 NOT PERFORMED  UNITS Final    pH 01/15/2017 7.33* 7.35 - 7.45   Final    PCO2 01/15/2017 42  35.0 - 45.0 mmHg Final    PO2 01/15/2017 55* 80 - 100 mmHg Final    O2 SAT 01/15/2017 86* 92 - 97 % Final    BICARBONATE 01/15/2017 22  22 - 26 mmol/L Final    BASE DEFICIT 01/15/2017 3.8  mmol/L Final    O2 METHOD 01/15/2017 ROOM AIR    Final    Sample source 01/15/2017 ARTERIAL    Final    SITE 01/15/2017 RIGHT RADIAL    Final    ROSHAN'S TEST 01/15/2017 YES    Final    Sodium 01/16/2017 138  136 - 145 mmol/L Final    Potassium 01/16/2017 4.2  3.5 - 5.1 mmol/L Final    Chloride 01/16/2017 106  97 - 108 mmol/L Final    CO2 01/16/2017 22  21 - 32 mmol/L Final    Anion gap 01/16/2017 10  5 - 15 mmol/L Final    Glucose 01/16/2017 115* 65 - 100 mg/dL Final    BUN 01/16/2017 8  6 - 20 MG/DL Final    Creatinine 01/16/2017 0.83  0.55 - 1.02 MG/DL Final    BUN/Creatinine ratio 01/16/2017 10* 12 - 20   Final    GFR est AA 01/16/2017 >60  >60 ml/min/1.73m2 Final    GFR est non-AA 01/16/2017 >60  >60 ml/min/1.73m2 Final    Calcium 01/16/2017 8.2* 8.5 - 10.1 MG/DL Final    WBC 01/16/2017 7.2  3.6 - 11.0 K/uL Final    RBC 01/16/2017 4.06  3.80 - 5.20 M/uL Final    HGB 01/16/2017 12.1  11.5 - 16.0 g/dL Final    HCT 01/16/2017 37.0  35.0 - 47.0 % Final    MCV 01/16/2017 91.1  80.0 - 99.0 FL Final    MCH 01/16/2017 29.8  26.0 - 34.0 PG Final    MCHC 01/16/2017 32.7  30.0 - 36.5 g/dL Final    RDW 01/16/2017 15.0* 11.5 - 14.5 % Final    PLATELET 39/93/6129 448  150 - 400 K/uL Final     Xr Chest Port    Result Date: 1/15/2017  EXAM:  XR CHEST PORT INDICATION:  Chest Pain COMPARISON:  1/9/2017 FINDINGS: A portable AP radiograph of the chest was obtained at 1728 hours. The patient is on a cardiac monitor. The lungs are clear. Heart size is normal. Granulomatous changes are noted. .  The bones and soft tissues are grossly within normal limits. IMPRESSION: No acute abnormality identified.      Xr Chest Port    Result Date: 1/9/2017  EXAM:  XR CHEST PORT INDICATION:  Chest Pain COMPARISON:  2014 FINDINGS: A portable AP radiograph of the chest was obtained at 1825 hours. The patient is on a cardiac monitor. The lungs are clear of an acute process. Granulomatous changes are noted. .  The cardiac and mediastinal contours and pulmonary vascularity are normal.  Bony structures are unchanged. IMPRESSION: No acute abnormality is identified. DISPOSITION:    Home. Patient to f/u with drug/etoh rehabilitation, psychiatric, and psychotherapy appointments. Patient is to f/u with internist as directed. FOLLOW-UP CARE:    Activity as tolerated  Regular Diet  Wound Care: none needed. Follow-up Information     Follow up With Details Comments 2005 [facility]  Walk-in appointments Mobday-Thursdays @ 8AM for substance abuse treatment. Pt will need photo ID  [id number] [facility]  [phone]    None   None ([phone]) Patient stated that they have no PCP                   PROGNOSIS:   guarded / Poor---- based on nature of patient's pathology/ies and treatment compliance issues. Prognosis is greatly dependent upon patient's ability to remain sober and to follow up with drug/etoh rehabilitation and psychiatric/psychotherapy appointments as well as to comply with psychiatric medications as prescribed. DISCHARGE MEDICATIONS:     Informed consent given for the use of following psychotropic medications:  Current Discharge Medication List      START taking these medications    Details   gabapentin (NEURONTIN) 400 mg capsule Take 2 Caps by mouth three (3) times daily for 7 days. Indications: NEUROPATHIC PAIN  Qty: 42 Cap, Refills: 0         CONTINUE these medications which have CHANGED    Details   cloNIDine HCl (CATAPRES) 0.3 mg tablet Take 1 Tab by mouth three (3) times daily for 7 days. Indications: Hypertension  Qty: 21 Tab, Refills: 0      dilTIAZem CD (CARDIZEM CD) 120 mg ER capsule Take 1 Cap by mouth daily for 7 days.  Indications: VENTRICULAR RATE CONTROL IN ATRIAL FIBRILLATION  Qty: 30 Cap, Refills: 0      prazosin (MINIPRESS) 2 mg capsule Take 1 Cap by mouth nightly for 7 days. Indications: Hypertension  Qty: 7 Cap, Refills: 0         CONTINUE these medications which have NOT CHANGED    Details   guaiFENesin-codeine (ROBITUSSIN AC) 100-10 mg/5 mL solution Take 5 mL by mouth four (4) times daily as needed for Cough. albuterol (PROVENTIL HFA, VENTOLIN HFA, PROAIR HFA) 90 mcg/actuation inhaler Take 2 Puffs by inhalation every four (4) hours as needed for Wheezing or Shortness of Breath. Qty: 1 Inhaler, Refills: 0      albuterol-ipratropium (DUO-NEB) 2.5 mg-0.5 mg/3 ml nebu 3 mL by Nebulization route every six (6) hours as needed. Qty: 100 Nebule, Refills: 1      nicotine (NICODERM CQ) 14 mg/24 hr patch 1 Patch by TransDERmal route daily for 30 days. Qty: 30 Patch, Refills: 0      Nebulizer & Compressor machine 1 Each by Does Not Apply route daily. Qty: 1 Each, Refills: 0      fluticasone-vilanterol (BREO ELLIPTA) 200-25 mcg/dose inhaler Take 1 Puff by inhalation daily.   Qty: 28 Each, Refills: 1         STOP taking these medications       benzonatate (TESSALON PERLES) 100 mg capsule Comments:   Reason for Stopping:         clonazePAM (KLONOPIN) 0.5 mg tablet Comments:   Reason for Stopping:         gabapentin (NEURONTIN) 800 mg tablet Comments:   Reason for Stopping:         lithium carbonate 300 mg capsule Comments:   Reason for Stopping:         QUEtiapine (SEROQUEL) 400 mg tablet Comments:   Reason for Stopping:         sertraline (ZOLOFT) 100 mg tablet Comments:   Reason for Stopping:         acetaminophen (TYLENOL) 325 mg tablet Comments:   Reason for Stopping:         oxyCODONE-acetaminophen (PERCOCET) 5-325 mg per tablet Comments:   Reason for Stopping:                      A coordinated, multidisplinary treatment team round was conducted with Domenica Mcgee---this is done daily here at [facility]. This team consists of the nurse, psychiatric unit pharmcist,  and writer. I have spent greater than 35 minutes on discharge work.     Signed:  Michael Diallo MD  1/26/2017

## 2017-01-27 ENCOUNTER — HOSPITAL ENCOUNTER (EMERGENCY)
Age: 42
Discharge: HOME | End: 2017-01-27
Attending: EMERGENCY MEDICINE
Payer: MEDICAID

## 2017-01-27 VITALS
TEMPERATURE: 97.9 F | BODY MASS INDEX: 24.75 KG/M2 | DIASTOLIC BLOOD PRESSURE: 78 MMHG | OXYGEN SATURATION: 95 % | SYSTOLIC BLOOD PRESSURE: 141 MMHG | WEIGHT: 145 LBS | HEIGHT: 64 IN | RESPIRATION RATE: 16 BRPM | HEART RATE: 61 BPM

## 2017-01-27 DIAGNOSIS — J45.41 MODERATE PERSISTENT ASTHMA WITH ACUTE EXACERBATION: Primary | ICD-10-CM

## 2017-01-27 DIAGNOSIS — F19.94 SUBSTANCE INDUCED MOOD DISORDER (HCC): ICD-10-CM

## 2017-01-27 LAB
AMPHET UR QL SCN: NEGATIVE
APPEARANCE UR: ABNORMAL
BACTERIA URNS QL MICRO: ABNORMAL /HPF
BARBITURATES UR QL SCN: NEGATIVE
BENZODIAZ UR QL: POSITIVE
BILIRUB UR QL CFM: NEGATIVE
CANNABINOIDS UR QL SCN: POSITIVE
COCAINE UR QL SCN: POSITIVE
COLOR UR: ABNORMAL
DRUG SCRN COMMENT,DRGCM: ABNORMAL
EPITH CASTS URNS QL MICRO: ABNORMAL /LPF
GLUCOSE UR STRIP.AUTO-MCNC: NEGATIVE MG/DL
HCG UR QL: NEGATIVE
HGB UR QL STRIP: NEGATIVE
KETONES UR QL STRIP.AUTO: 15 MG/DL
LEUKOCYTE ESTERASE UR QL STRIP.AUTO: NEGATIVE
METHADONE UR QL: POSITIVE
MUCOUS THREADS URNS QL MICRO: ABNORMAL /LPF
NITRITE UR QL STRIP.AUTO: NEGATIVE
OPIATES UR QL: POSITIVE
PCP UR QL: NEGATIVE
PH UR STRIP: 5.5 [PH] (ref 5–8)
PROT UR STRIP-MCNC: ABNORMAL MG/DL
RBC #/AREA URNS HPF: ABNORMAL /HPF (ref 0–5)
SP GR UR REFRACTOMETRY: 1.02 (ref 1–1.03)
UA: UC IF INDICATED,UAUC: ABNORMAL
UROBILINOGEN UR QL STRIP.AUTO: 1 EU/DL (ref 0.2–1)
WBC URNS QL MICRO: ABNORMAL /HPF (ref 0–4)

## 2017-01-27 PROCEDURE — 94640 AIRWAY INHALATION TREATMENT: CPT

## 2017-01-27 PROCEDURE — 90791 PSYCH DIAGNOSTIC EVALUATION: CPT

## 2017-01-27 PROCEDURE — 81001 URINALYSIS AUTO W/SCOPE: CPT | Performed by: EMERGENCY MEDICINE

## 2017-01-27 PROCEDURE — 81025 URINE PREGNANCY TEST: CPT

## 2017-01-27 PROCEDURE — 77030013140 HC MSK NEB VYRM -A

## 2017-01-27 PROCEDURE — 87086 URINE CULTURE/COLONY COUNT: CPT | Performed by: EMERGENCY MEDICINE

## 2017-01-27 PROCEDURE — 99284 EMERGENCY DEPT VISIT MOD MDM: CPT

## 2017-01-27 PROCEDURE — 74011000250 HC RX REV CODE- 250: Performed by: EMERGENCY MEDICINE

## 2017-01-27 PROCEDURE — 99283 EMERGENCY DEPT VISIT LOW MDM: CPT

## 2017-01-27 PROCEDURE — 80307 DRUG TEST PRSMV CHEM ANLYZR: CPT | Performed by: EMERGENCY MEDICINE

## 2017-01-27 PROCEDURE — 74011636637 HC RX REV CODE- 636/637: Performed by: EMERGENCY MEDICINE

## 2017-01-27 RX ORDER — PREDNISONE 20 MG/1
60 TABLET ORAL
Status: COMPLETED | OUTPATIENT
Start: 2017-01-27 | End: 2017-01-27

## 2017-01-27 RX ORDER — PREDNISONE 20 MG/1
60 TABLET ORAL DAILY
Qty: 15 TAB | Refills: 0 | Status: SHIPPED | OUTPATIENT
Start: 2017-01-27 | End: 2017-02-01

## 2017-01-27 RX ORDER — IPRATROPIUM BROMIDE AND ALBUTEROL SULFATE 2.5; .5 MG/3ML; MG/3ML
3 SOLUTION RESPIRATORY (INHALATION)
Status: COMPLETED | OUTPATIENT
Start: 2017-01-27 | End: 2017-01-27

## 2017-01-27 RX ADMIN — PREDNISONE 60 MG: 20 TABLET ORAL at 21:33

## 2017-01-27 RX ADMIN — IPRATROPIUM BROMIDE AND ALBUTEROL SULFATE 3 ML: .5; 3 SOLUTION RESPIRATORY (INHALATION) at 21:43

## 2017-01-28 NOTE — ED TRIAGE NOTES
Pt states that her plan was to take pills. States that she has different medications at home. Denies having any medications here with her.

## 2017-01-28 NOTE — ED PROVIDER NOTES
HPI Comments: Milena Dobbs is a 35 y.o. female with PMHx significant for asthma, HTN, depression, arrhythmia, and COPD who presents ambulatory to the ED c/o suicidal ideation and SOB. Pt states that she was discharged two days ago after presenting to the ED for suicidal ideation, but notes that the ideation has been getting progressively worse since then. Pt states that she is compliant with her prescription medication. Pt denies any hx of DM. Pt denies any chances of pregnancy due to a tubal ligation. Pt denies any recent marijuana usage. Pt specifically denies fever, nausea, vomiting, and diarrhea. PCP: None  Social hx: + tobacco usage, - EtOH    There are no other complaints, changes or physical findings at this time. The history is provided by the patient. Past Medical History:   Diagnosis Date    Arrhythmia      irregular    Asthma     Chronic obstructive pulmonary disease (HCC)     Chronic pain      back, leg    Depression     Hepatitis B     Hypertension      atrial fibrillation    Other ill-defined conditions(799.89)      heart murmur    Psychiatric disorder      bipolar    Sarcoidosis ([facility] [address]        History reviewed. No pertinent past surgical history. Family History:   Problem Relation Age of Onset    Hypertension Father     Hypertension Mother        Social History     Social History    Marital status: SINGLE     Spouse name: N/A    Number of children: N/A    Years of education: N/A     Occupational History    Not on file. Social History Main Topics    Smoking status: Current Every Day Smoker     Packs/day: 0.50    Smokeless tobacco: Never Used      Comment: 4 cig. day    Alcohol use Yes      Comment: daily, 1/2 pint vodka for 3 year.     Drug use: Yes     Special: Heroin, Marijuana, Cocaine      Comment: last used 1/24/2017    Sexual activity: Yes     Partners: Female     Other Topics Concern    Not on file     Social History Narrative    Born in [facility], Single, 5 children,    No legal charges. Pt graduated from high school. Pt was employed last month at [employer], currently she is unemployed. Pt lives with her mother and 8year old son. She has 4 adult children. ALLERGIES: Tomato    Review of Systems   Constitutional: Negative for activity change, chills, diaphoresis and fever. HENT: Negative for sore throat and trouble swallowing. Respiratory: Positive for shortness of breath. Negative for cough. (-) RIVAS   Cardiovascular: Negative for chest pain. Gastrointestinal: Negative for abdominal pain, diarrhea, nausea and vomiting. Genitourinary: Negative for difficulty urinating, dysuria and frequency. Musculoskeletal: Negative for arthralgias, back pain and myalgias. Neurological: Negative for weakness (generalized or focal). Hematological: Does not bruise/bleed easily. Psychiatric/Behavioral: Positive for suicidal ideas. All other systems reviewed and are negative. Vitals:    01/27/17 2110   BP: 141/78   Pulse: 61   Resp: 16   Temp: 97.9 °F (36.6 °C)   SpO2: 95%   Weight: 65.8 kg (145 lb)   Height: 5' 4\" (1.626 m)            Physical Exam   Constitutional: She is oriented to person, place, and time. She appears well-developed. No distress. HENT:   Head: Normocephalic and atraumatic. Mouth/Throat: Oropharynx is clear and moist. No oropharyngeal exudate or posterior oropharyngeal erythema. Neck: Normal range of motion and full passive range of motion without pain. Neck supple. (-) adenopathy   Cardiovascular: Normal rate, regular rhythm, normal heart sounds, intact distal pulses and normal pulses. Exam reveals no gallop and no friction rub. No murmur heard. Pulmonary/Chest: No accessory muscle usage. She is in respiratory distress (moderate). She has no decreased breath sounds. She has wheezes. She has no rhonchi. She has no rales. Diminished breath sounds BL. Abdominal: Soft.  Bowel sounds are normal. She exhibits no distension. There is no tenderness. There is no rebound, no guarding and no CVA tenderness. Musculoskeletal:        Thoracic back: She exhibits no tenderness and no bony tenderness. Lumbar back: She exhibits no tenderness and no bony tenderness. Neurological: She is alert and oriented to person, place, and time. She has normal strength. She is not disoriented. No cranial nerve deficit or sensory deficit. No focal deficits ; 5/5 muscle strength in all extremities   Skin: Skin is warm. No lesion and no rash noted. Rash is not nodular. She is not diaphoretic. MDM  Number of Diagnoses or Management Options  Diagnosis management comments:     DDx: viral illness, unlikely pneumonia, SI, substance abuse, mood disorder       Amount and/or Complexity of Data Reviewed  Clinical lab tests: ordered and reviewed  Review and summarize past medical records: yes  Discuss the patient with other providers: yes [facility]    Patient Progress  Patient progress: stable    ED Course       Procedures      CONSULT NOTE:   10:15 PM  Tiffany Betancourt MD spoke with Bobby Green,  Specialty: Gino Knox  Discussed pt's hx, disposition, and available diagnostic and imaging results. Reviewed care plans. Consultant agrees with plans as outlined. Bobby Green states that she talked with  who denies pt admission to hospital. Bobby Green states that pt needs to follow discharge instructions.   Written by Misti Hightower ED Scribe, as dictated by Tiffany Betancourt MD.      LABORATORY TESTS:  Recent Results (from the past 12 hour(s))   HCG URINE, QL. - POC    Collection Time: 01/27/17  9:29 PM   Result Value Ref Range    Pregnancy test,urine (POC) NEGATIVE  NEG     URINALYSIS W/ REFLEX CULTURE    Collection Time: 01/27/17  9:30 PM   Result Value Ref Range    Color DARK YELLOW      Appearance CLOUDY (A) CLEAR      Specific gravity 1.025 1.003 - 1.030      pH (UA) 5.5 5.0 - 8.0      Protein TRACE (A) NEG mg/dL    Glucose NEGATIVE  NEG mg/dL    Ketone 15 (A) NEG mg/dL    Blood NEGATIVE  NEG      Urobilinogen 1.0 0.2 - 1.0 EU/dL    Nitrites NEGATIVE  NEG      Leukocyte Esterase NEGATIVE  NEG      WBC 0-4 0 - 4 /hpf    RBC 0-5 0 - 5 /hpf    Epithelial cells MANY (A) FEW /lpf    Bacteria 4+ (A) NEG /hpf    UA:UC IF INDICATED URINE CULTURE ORDERED (A) CNI      Mucus 2+ (A) NEG /lpf   DRUG SCREEN, URINE    Collection Time: 01/27/17  9:30 PM   Result Value Ref Range    AMPHETAMINE NEGATIVE  NEG      BARBITURATES NEGATIVE  NEG      BENZODIAZEPINE POSITIVE (A) NEG      COCAINE POSITIVE (A) NEG      METHADONE POSITIVE (A) NEG      OPIATES POSITIVE (A) NEG      PCP(PHENCYCLIDINE) NEGATIVE  NEG      THC (TH-CANNABINOL) POSITIVE (A) NEG      Drug screen comment (NOTE)    BILIRUBIN, CONFIRM    Collection Time: 01/27/17  9:30 PM   Result Value Ref Range    Bilirubin UA, confirm NEGATIVE  NEG         IMAGING RESULTS:  No orders to display       MEDICATIONS GIVEN:  Medications   predniSONE (DELTASONE) tablet 60 mg (60 mg Oral Given 1/27/17 2133)   albuterol-ipratropium (DUO-NEB) 2.5 MG-0.5 MG/3 ML (3 mL Nebulization Given 1/27/17 2143)       IMPRESSION:  1. Moderate persistent asthma with acute exacerbation    2. [facility] ([facility]        PLAN:  1. Discharge Medication List as of 1/27/2017 10:26 PM      START taking these medications    Details   predniSONE (DELTASONE) 20 mg tablet Take 3 Tabs by mouth daily for 5 days. , Print, Disp-15 Tab, R-0         CONTINUE these medications which have NOT CHANGED    Details   cloNIDine HCl (CATAPRES) 0.3 mg tablet Take 1 Tab by mouth three (3) times daily for 7 days. Indications: Hypertension, Print, Disp-21 Tab, R-0      dilTIAZem CD (CARDIZEM CD) 120 mg ER capsule Take 1 Cap by mouth daily for 7 days. Indications: VENTRICULAR RATE CONTROL IN ATRIAL FIBRILLATION, Print, Disp-30 Cap, R-0      gabapentin (NEURONTIN) 400 mg capsule Take 2 Caps by mouth three (3) times daily for 7 days. Indications: NEUROPATHIC PAIN, Print, Disp-42 Cap, R-0      prazosin (MINIPRESS) 2 mg capsule Take 1 Cap by mouth nightly for 7 days. Indications: Hypertension, Print, Disp-7 Cap, R-0      guaiFENesin-codeine (ROBITUSSIN AC) 100-10 mg/5 mL solution Take 5 mL by mouth four (4) times daily as needed for Cough., Historical Med      albuterol (PROVENTIL HFA, VENTOLIN HFA, PROAIR HFA) 90 mcg/actuation inhaler Take 2 Puffs by inhalation every four (4) hours as needed for Wheezing or Shortness of Breath., Print, Disp-1 Inhaler, R-0      albuterol-ipratropium (DUO-NEB) 2.5 mg-0.5 mg/3 ml nebu 3 mL by Nebulization route every six (6) hours as needed. , Print, Disp-100 Nebule, R-1      nicotine (NICODERM CQ) 14 mg/24 hr patch 1 Patch by TransDERmal route daily for 30 days. , Print, Disp-30 Patch, R-0      Nebulizer & Compressor machine 1 Each by Does Not Apply route daily. , Print, Disp-1 Each, R-0      fluticasone-vilanterol (BREO ELLIPTA) 200-25 mcg/dose inhaler Take 1 Puff by inhalation daily. , Print, Disp-28 Each, R-1           2. Follow-up Information     Follow up With Details Comments [address], [place] [address] [phone]          Return to ED if worse     DISCHARGE NOTE  10:27 PM  The patient has been re-evaluated and is ready for discharge. Reviewed available results with patient. Counseled pt on diagnosis and care plan. Pt has expressed understanding, and all questions have been answered. Pt agrees with plan and agrees to F/U as recommended, or return to the ED if their sxs worsen. Discharge instructions have been provided and explained to the pt, along with reasons to return to the ED.   Written by Maia Thompson ED Scribe, as dictated by Natanael Putnam MD.    This note is prepared by Maia Thompson, acting as Scribe for Natanael Putnam MD.    Natanael Putnam MD: The scribe's documentation has been prepared under my direction and personally reviewed by me in its entirety. I confirm that the note above accurately reflects all work, treatment, procedures, and medical decision making performed by me.

## 2017-01-28 NOTE — ED NOTES
Patient (s) was given copy of dc instructions and 1 paper script(s) and  electronic scripts. Patient (s)  verbalized understanding of instructions and script (s). Patient given a current medication reconciliation form and verbalized understanding of their medications. Patient (s) verbalized understanding of the importance of discussing medications with  his or her physician or clinic they will be following up with. Patient alert and oriented and in no acute distress. Patient discharged home ambulatory with self.

## 2017-01-28 NOTE — ED NOTES
Patient reports being suicidal today. She reports a plan to overdose on her pills, she reports taking a lot of home medications. She reports living with her mother at this time. The patient denies being homicidal or having any hallucinations. She is alert & oriented x 4. She reports that she using drugs (heroin & cocaine 3 days ago). .See Nursing Assessment      Emergency Department Nursing Plan of Care       The Nursing Plan of Care is developed from the Nursing assessment and Emergency Department Attending provider initial evaluation. The plan of care may be reviewed in the ED Provider note.     The Plan of Care was developed with the following considerations:   Patient / Family readiness to learn indicated by:verbalized understanding  Persons(s) to be included in education: patient  Barriers to Learning/Limitations:No    Signed     Sofia Oliveira RN    1/27/2017   9:55 PM

## 2017-01-28 NOTE — DISCHARGE INSTRUCTIONS
Asthma Attack: Care Instructions  Your Care Instructions    During an asthma attack, the airways swell and narrow. This makes it hard to breathe. Severe asthma attacks can be life-threatening, but you can help prevent them by keeping your asthma under control and treating symptoms before they get bad. Symptoms include being short of breath, having chest tightness, coughing, and wheezing. Noting and treating these symptoms can also help you avoid future trips to the emergency room. The doctor has checked you carefully, but problems can develop later. If you notice any problems or new symptoms, get medical treatment right away. Follow-up care is a key part of your treatment and safety. Be sure to make and go to all appointments, and call your doctor if you are having problems. It's also a good idea to know your test results and keep a list of the medicines you take. How can you care for yourself at home? · Follow your asthma action plan to prevent and treat attacks. If you don't have an asthma action plan, work with your doctor to create one. · Take your asthma medicines exactly as prescribed. Talk to your doctor right away if you have any questions about how to take them. ¨ Use your quick-relief medicine when you have symptoms of an attack. Quick-relief medicine is usually an albuterol inhaler. Some people need to use quick-relief medicine before they exercise. ¨ Take your controller medicine every day, not just when you have symptoms. Controller medicine is usually an inhaled corticosteroid. The goal is to prevent problems before they occur. Don't use your controller medicine to treat an attack that has already started. It doesn't work fast enough to help. ¨ If your doctor prescribed corticosteroid pills to use during an attack, take them exactly as prescribed. It may take hours for the pills to work, but they may make the episode shorter and help you breathe better.   ¨ Keep your quick-relief medicine with you at all times. · Talk to your doctor before using other medicines. Some medicines, such as aspirin, can cause asthma attacks in some people. · If you have a peak flow meter, use it to check how well you are breathing. This can help you predict when an asthma attack is going to occur. Then you can take medicine to prevent the asthma attack or make it less severe. · Do not smoke or allow others to smoke around you. Avoid smoky places. Smoking makes asthma worse. If you need help quitting, talk to your doctor about stop-smoking programs and medicines. These can increase your chances of quitting for good. · Learn what triggers an asthma attack for you, and avoid the triggers when you can. Common triggers include colds, smoke, air pollution, dust, pollen, mold, pets, cockroaches, stress, and cold air. · Avoid colds and the flu. Get a pneumococcal vaccine shot. If you have had one before, ask your doctor if you need a second dose. Get a flu vaccine every fall. If you must be around people with colds or the flu, wash your hands often. When should you call for help? Call 911 anytime you think you may need emergency care. For example, call if:  · You have severe trouble breathing. Call your doctor now or seek immediate medical care if:  · Your symptoms do not get better after you have followed your asthma action plan. · You have new or worse trouble breathing. · Your coughing and wheezing get worse. · You cough up dark brown or bloody mucus (sputum). · You have a new or higher fever. Watch closely for changes in your health, and be sure to contact your doctor if:  · You need to use quick-relief medicine on more than 2 days a week (unless it is just for exercise). · You cough more deeply or more often, especially if you notice more mucus or a change in the color of your mucus. · You are not getting better as expected. Where can you learn more?   Go to http://jazlyn-eladio.info/. Enter J557 in the search box to learn more about \"Asthma Attack: Care Instructions. \"  Current as of: May 23, 2016  Content Version: 11.1  © 5011-0784 [employer]. Care instructions adapted under license by [employer] (which disclaims liability or warranty for this information). If you have questions about a medical condition or this instruction, always ask your healthcare professional. [address] any warranty or liability for your use of this information. Learning About Drug Abuse  What is drug abuse? Drug abuse means using drugs in a way that harms you or causes you to harm others. Your doctor may call it substance use disorder or drug misuse. It's possible to misuse almost any type of drug, including illegal drugs, prescription drugs, and over-the-counter drugs. Could you have a problem? If there's a chance you may be misusing drugs, it's important to find out. So ask yourself a few questions. · Do you spend a lot of time thinking about your medicine or other drug--getting it and using it? Has that taken the place of other things you used to enjoy? · Have you had problems with your family or friends, or at work, because of your use? · Do you use drugs even when you've told yourself you won't? Do you think you might have a problem? If you do, then you've just taken an important first step. Many people have overcome this problem. And most of them started by reaching out to others, like caring friends or family, their doctor, or a support group. How is drug abuse treated? If you think you may have a problem with drugs, talk to your doctor. You and your doctor can decide whether you have a problem and what type of treatment might help you. You may need to stay in a hospital at first, so that you can be treated for withdrawal symptoms.   One of the goals of treatment is helping you get used to life without the drug. Counseling can help you prepare for people or situations that might tempt you to start using again. You can practice these skills through one-on-one counseling, family therapy, or group therapy. Therapy may be part of inpatient treatment, where you stay in a treatment center. Or it may be part of outpatient treatment, where you can fit your therapy around your job or other responsibilities. Another goal of treatment is getting ongoing support for your drug-free life. Many people find support by going to meetings like Narcotics Anonymous or SMART Recovery. This type of support can help you feel less alone and more motivated to stay drug-free. You might talk to your doctor or do an online search for local treatment programs. Or you might tell a friend or loved one that you need help. Follow-up care is a key part of your treatment and safety. Be sure to make and go to all appointments, and call your doctor if you are having problems. It's also a good idea to know your test results and keep a list of the medicines you take. Where can you learn more? Go to http://jazlyn-eladio.info/. Enter 468-161-2883 in the search box to learn more about \"Learning About Drug Abuse. \"  Current as of: May 13, 2016  Content Version: 11.1  © 7289-9335 [employer]. Care instructions adapted under license by [employer] (which disclaims liability or warranty for this information). If you have questions about a medical condition or this instruction, always ask your healthcare professional. [place] [address] any warranty or liability for your use of this information.

## 2017-01-29 LAB
BACTERIA SPEC CULT: NORMAL
CC UR VC: NORMAL
SERVICE CMNT-IMP: NORMAL

## 2017-02-06 ENCOUNTER — HOSPITAL ENCOUNTER (EMERGENCY)
Age: 42
Discharge: HOME | End: 2017-02-06
Attending: EMERGENCY MEDICINE
Payer: MEDICAID

## 2017-02-06 VITALS
OXYGEN SATURATION: 100 % | TEMPERATURE: 98 F | HEIGHT: 64 IN | SYSTOLIC BLOOD PRESSURE: 142 MMHG | BODY MASS INDEX: 26.57 KG/M2 | RESPIRATION RATE: 20 BRPM | HEART RATE: 121 BPM | WEIGHT: 155.65 LBS | DIASTOLIC BLOOD PRESSURE: 102 MMHG

## 2017-02-06 DIAGNOSIS — Z76.0 MEDICATION REFILL: ICD-10-CM

## 2017-02-06 DIAGNOSIS — Z91.89 POOR ORAL HYGIENE: ICD-10-CM

## 2017-02-06 DIAGNOSIS — Z72.0 TOBACCO ABUSE: ICD-10-CM

## 2017-02-06 DIAGNOSIS — I10 ESSENTIAL HYPERTENSION: ICD-10-CM

## 2017-02-06 DIAGNOSIS — F19.20 POLYSUBSTANCE DEPENDENCE (HCC): Primary | ICD-10-CM

## 2017-02-06 PROCEDURE — 99282 EMERGENCY DEPT VISIT SF MDM: CPT

## 2017-02-06 RX ORDER — CLONIDINE HYDROCHLORIDE 0.2 MG/1
0.2 TABLET ORAL 2 TIMES DAILY
Qty: 20 TAB | Refills: 0 | Status: SHIPPED | OUTPATIENT
Start: 2017-02-06 | End: 2017-03-19

## 2017-02-06 RX ORDER — PENICILLIN V POTASSIUM 500 MG/1
500 TABLET, FILM COATED ORAL 4 TIMES DAILY
Qty: 28 TAB | Refills: 0 | Status: SHIPPED | OUTPATIENT
Start: 2017-02-06 | End: 2017-02-13

## 2017-02-06 RX ORDER — CLONIDINE HYDROCHLORIDE 0.2 MG/1
0.2 TABLET ORAL 3 TIMES DAILY
COMMUNITY
End: 2017-02-26 | Stop reason: SDUPTHER

## 2017-02-06 RX ORDER — QUETIAPINE FUMARATE 400 MG/1
800 TABLET, FILM COATED ORAL
Qty: 20 TAB | Refills: 0 | Status: SHIPPED | OUTPATIENT
Start: 2017-02-06 | End: 2017-02-26 | Stop reason: SDUPTHER

## 2017-02-06 RX ORDER — NAPROXEN 500 MG/1
500 TABLET ORAL 2 TIMES DAILY WITH MEALS
Qty: 20 TAB | Refills: 0 | Status: SHIPPED | OUTPATIENT
Start: 2017-02-06 | End: 2017-02-16

## 2017-02-06 RX ORDER — QUETIAPINE FUMARATE 400 MG/1
800 TABLET, FILM COATED ORAL
Status: ON HOLD | COMMUNITY
End: 2017-04-19

## 2017-02-06 RX ORDER — SERTRALINE HYDROCHLORIDE 100 MG/1
100 TABLET, FILM COATED ORAL DAILY
Status: ON HOLD | COMMUNITY
End: 2017-04-19

## 2017-02-06 RX ORDER — PRAZOSIN HYDROCHLORIDE 2 MG/1
2 CAPSULE ORAL
Status: ON HOLD | COMMUNITY
End: 2017-04-19

## 2017-02-06 RX ORDER — LITHIUM CARBONATE 300 MG
300 TABLET ORAL 2 TIMES DAILY
Status: ON HOLD | COMMUNITY
End: 2017-04-19

## 2017-02-06 RX ORDER — HYDROCODONE BITARTRATE AND ACETAMINOPHEN 5; 325 MG/1; MG/1
1 TABLET ORAL
Qty: 10 TAB | Refills: 0 | Status: SHIPPED | OUTPATIENT
Start: 2017-02-06 | End: 2017-04-22

## 2017-02-06 RX ORDER — CLONAZEPAM 2 MG/1
2 TABLET ORAL
Qty: 20 TAB | Refills: 0 | Status: SHIPPED | OUTPATIENT
Start: 2017-02-06 | End: 2017-02-27 | Stop reason: ALTCHOICE

## 2017-02-06 RX ORDER — CLONAZEPAM 2 MG/1
2 TABLET ORAL 2 TIMES DAILY
COMMUNITY
End: 2017-02-27 | Stop reason: ALTCHOICE

## 2017-02-06 NOTE — ED PROVIDER NOTES
HPI Comments: Don Lee, 35 y.o. Female with PMHx of asthma, HTN, Hep B, chronic pain, and COPD presents ambulatory to [facility] ED with cc of worsening lower bilateral teeth pain for a few days. Her teeth have been hurting since her lower front teeth were removed. Pt reports that she has been taking Motrin without relief. Pt also notes that she is currently out of her prescriptions for Clonidine, Seroquel,  and Klonopin and does not have a PCP in the area. She specifically denies any fevers, chills, nausea, vomiting, chest pain, shortness of breath, headache, rash, diarrhea, sweating or weight loss. Social history significant for: + Tobacco (0.5 PPD), + EtOH, + Illicit Drug Use (cocaine, heroin, marijuana)  PSHx: none    There are no other complaints, changes, or physical findings at this time. Written by Criss Castillo ED Scribaleksandra, as dictated by Kathy Bennett. The history is provided by the patient. No  was used. Past Medical History:   Diagnosis Date    Arrhythmia      irregular    Asthma     Chronic obstructive pulmonary disease (HCC)     Chronic pain      back, leg    Depression     Hepatitis B     Hypertension      atrial fibrillation    Other ill-defined conditions(799.89)      heart murmur    Psychiatric disorder      bipolar    Sarcoidosis ([facility] [address]        No past surgical history on file. Family History:   Problem Relation Age of Onset    Hypertension Father     Hypertension Mother        Social History     Social History    Marital status: SINGLE     Spouse name: N/A    Number of children: N/A    Years of education: N/A     Occupational History    Not on file. Social History Main Topics    Smoking status: Current Every Day Smoker     Packs/day: 0.50    Smokeless tobacco: Never Used      Comment: 4 cig. day    Alcohol use Yes      Comment: daily, 1/2 pint vodka for 3 year.     Drug use: Yes     Special: Heroin, Marijuana, Cocaine      Comment: last used 1/24/2017    Sexual activity: Yes     Partners: Female     Other Topics Concern    Not on file     Social History Narrative    Born in [address], 5 children,    No legal charges. Pt graduated from high school. Pt was employed last month at [employer], currently she is unemployed. Pt lives with her mother and 8year old son. She has 4 adult children. ALLERGIES: Tomato    Review of Systems   Constitutional: Negative for fatigue and fever.        (+) medications refill    HENT: Positive for dental problem. Negative for ear pain and sore throat. Eyes: Negative for pain, redness and visual disturbance. Respiratory: Negative for cough and shortness of breath. Cardiovascular: Negative for chest pain and palpitations. Gastrointestinal: Negative for abdominal pain, nausea and vomiting. Genitourinary: Negative for dysuria, frequency and urgency. Musculoskeletal: Negative for back pain, gait problem, neck pain and neck stiffness. Skin: Negative for rash and wound. Neurological: Negative for dizziness, weakness, light-headedness, numbness and headaches. Patient Vitals for the past 12 hrs:   Temp Pulse Resp BP SpO2   02/06/17 0832 - - - (!) 142/102 -   02/06/17 0829 98 °F (36.7 °C) (!) 121 20 - 100 %         Physical Exam   Constitutional: She is oriented to person, place, and time. She appears well-developed and well-nourished. Non-toxic appearance. No distress. HENT:   Head: Normocephalic and atraumatic. Right Ear: Tympanic membrane, external ear and ear canal normal. Tympanic membrane is not erythematous. Left Ear: Tympanic membrane, external ear and ear canal normal. Tympanic membrane is not erythematous.    Nose: Nose normal.   Mouth/Throat: Uvula is midline and oropharynx is clear and moist.   No facial swelling  No trismus  Essentially edentulous, poor oral hygeine  No abscess     Eyes: Conjunctivae and EOM are normal. Pupils are equal, round, and reactive to light. No scleral icterus. Neck: Normal range of motion and full passive range of motion without pain. Cardiovascular: Normal rate and regular rhythm. Pulses:       Radial pulses are 2+ on the right side, and 2+ on the left side. Pulmonary/Chest: Effort normal. No accessory muscle usage. No tachypnea. No respiratory distress. She has no decreased breath sounds. She has no wheezes. Abdominal: Soft. There is no tenderness. Musculoskeletal: Normal range of motion. Neurological: She is alert and oriented to person, place, and time. She is not disoriented. No cranial nerve deficit. GCS eye subscore is 4. GCS verbal subscore is 5. GCS motor subscore is 6. Skin: Skin is intact. No rash noted. Psychiatric: She has a normal mood and affect. Her speech is normal.   Nursing note and vitals reviewed. MDM  Number of Diagnoses or Management Options  Polysubstance dependence [facility]):   Diagnosis management comments:   DDx: Polysubstance abuse, tobacco abuse, medication refill, poor oral hygeine       Amount and/or Complexity of Data Reviewed  Review and summarize past medical records: yes (EMR )    Patient Progress  Patient progress: stable    ED Course       Procedures    8:40 AM  Per EMR review, pt has recently been treated for moderate asthma exacerbation and heroin abuse. Reviewed . TOBACCO COUNSELING:  Upon evaluation, pt expressed that they are a current tobacco user. Pt has been counseled on the dangers of smoking and was encouraged to quit as soon as possible in order to decrease further risks to their health. Pt has conveyed their understanding of the risks involved should they continue to use tobacco products. IMPRESSION:  1. Polysubstance dependence ([address]    2. Tobacco abuse    3. Medication refill    4. Poor oral hygiene    5. Essential hypertension        PLAN:  1.    Current Discharge Medication List      START taking these medications    Details   !! cloNIDine HCl (CATAPRES) 0.2 mg tablet Take 1 Tab by mouth two (2) times a day. Qty: 20 Tab, Refills: 0      !! QUEtiapine (SEROQUEL) 400 mg tablet Take 2 Tabs by mouth nightly. Qty: 20 Tab, Refills: 0      !! clonazePAM (KLONOPIN) 2 mg tablet Take 1 Tab by mouth every eight (8) hours as needed. Max Daily Amount: 6 mg. Qty: 20 Tab, Refills: 0      penicillin v potassium (VEETID) 500 mg tablet Take 1 Tab by mouth four (4) times daily for 7 days. Qty: 28 Tab, Refills: 0      HYDROcodone-acetaminophen (NORCO) 5-325 mg per tablet Take 1 Tab by mouth every four (4) hours as needed for Pain. Max Daily Amount: 6 Tabs. Qty: 10 Tab, Refills: 0      naproxen (NAPROSYN) 500 mg tablet Take 1 Tab by mouth two (2) times daily (with meals) for 10 days. Qty: 20 Tab, Refills: 0       !! - Potential duplicate medications found. Please discuss with provider. CONTINUE these medications which have NOT CHANGED    Details   !! cloNIDine HCl (CATAPRES) 0.2 mg tablet Take 0.2 mg by mouth three (3) times daily. !! QUEtiapine (SEROQUEL) 400 mg tablet Take 800 mg by mouth nightly. lithium carbonate 300 mg tablet Take 300 mg by mouth two (2) times a day. sertraline (ZOLOFT) 100 mg tablet Take 100 mg by mouth daily. prazosin (MINIPRESS) 2 mg capsule Take 2 mg by mouth nightly. !! clonazePAM (KLONOPIN) 2 mg tablet Take 2 mg by mouth two (2) times a day. guaiFENesin-codeine (ROBITUSSIN AC) 100-10 mg/5 mL solution Take 5 mL by mouth four (4) times daily as needed for Cough. albuterol (PROVENTIL HFA, VENTOLIN HFA, PROAIR HFA) 90 mcg/actuation inhaler Take 2 Puffs by inhalation every four (4) hours as needed for Wheezing or Shortness of Breath. Qty: 1 Inhaler, Refills: 0      albuterol-ipratropium (DUO-NEB) 2.5 mg-0.5 mg/3 ml nebu 3 mL by Nebulization route every six (6) hours as needed. Qty: 100 Nebule, Refills: 1      nicotine (NICODERM CQ) 14 mg/24 hr patch 1 Patch by TransDERmal route daily for 30 days.   Qty: 30 Patch, Refills: 0      Nebulizer & Compressor machine 1 Each by Does Not Apply route daily. Qty: 1 Each, Refills: 0      fluticasone-vilanterol (BREO ELLIPTA) 200-25 mcg/dose inhaler Take 1 Puff by inhalation daily. Qty: 28 Each, Refills: 1       !! - Potential duplicate medications found. Please discuss with provider. 2.   Follow-up Information     Follow up With Details Comments [address] Schedule an appointment as soon as possible for a visit PRIMARY CARE: call to schedule follow up [address] [facility] [address] [phone]        Return to ED if worse     Discharge Note:  9:19 AM  The pt is ready for discharge. The pt's signs, symptoms, diagnosis, and discharge instructions have been discussed and pt has conveyed their understanding. The pt is to follow up as recommended or return to ER should their symptoms worsen. Plan has been discussed and pt is in agreement. This note is prepared by Katrin Hough, acting as a Scribe for Jose A Tang: The scribe's documentation has been prepared under my direction and personally reviewed by me in its entirety. I confirm that the notes above accurately reflects all work, treatment, procedures, and medical decision making performed by me.

## 2017-02-06 NOTE — ED NOTES
Patient presents ambulatory to the ED with complaint of lower dental pain for several days as well as being out of her HTN medication for about three days.

## 2017-02-06 NOTE — DISCHARGE INSTRUCTIONS
[facility] Departments     For adult and child immunizations, family planning, TB screening, STD testing and women's health services. [facility]: [place] [phone]      [facility] 25   [address]   [address]   170 [facility]: [place] [address] [facility] [address] [phone] [address]          [address] [place] [address]     For primary care services, woman and child wellness, and some clinics providing specialty care. [facility] -- [address] [facility]5 [facility] [phone]   411 [facility] 200 [facility] 3614 [facility] [phone]   339 [facility][note 3] [address] [facility] [address] [phone]   [address]  [facility] [address] [place] [address] 5850  Community  [phone]   [address] [phone]   [facility] 81 [facility] [phone]   [facility] [address] [place] [address] [phone]   [facility]: [facility] [address] [facility] [address] [phone]     Ravi [address] [phone]   [facility] Outreach 5850  Community  [phone]   [employer]  [address], [address] ([email or web address]) [phone]         Sexual Health/Woman Wellness Clinics    For STD/HIV testing and treatment, pregnancy testing and services, men's health, birth control services, LGBT services, and hepatitis/HPV vaccine services. [facility] for [place] [employer] 201 N. [facility] 75 [facility] 1579 [address] [phone]   [facility] [address], 5th floor [phone]   Pregnancy [address] for Women [address]  [address] [phone]         Specialty Service 170 [facility]   [phone]   [place]   [phone]   Women, Infant and Children's Services: [address] [phone] 600 [facility]   [phone]   [address] 4938 [facility] Psychiatry     [phone]   [address] Crisis   [address] [facility] [address] [phone]     Local Primary Care Physicians  [facility] [phone]  MD Lillian Bello MD Delta Boys, MD [facility] [phone]  Jennifer Gonzalez, [facility]  MD Felicia Murray MD Carlette Lien, MD [address] [address] [phone]  Eloy Punch[place] [address] [facility] [phone]  MD Ewa Padron MD Thedford Fetters, MD Estill Marks, MD   [facility] [phone]  MD Connor PEREZ MD [facility], NP [address] [phone]  MD Carley Huffman MD Cara Meissner, MD Darlene Snowman, MD Alejandra Levering, MD Myles Salaam, MD Berkley Hippo, MD   [address] [facility]  Shandra Peck MD [facility] [phone]  MD Roseanne Yu, NP  MD Jinny Knight MD Arneta Jacob, MD Graydon Living, MD Melvenia Shears, MD   2123 [facility] [phone]  MD Naye Simmons, P  Yarely Reyes, NP  MD Antonio Castelan MD Florencia Rafter, MD Joesph Brunswick, MD [facility] [phone]  MD Audrey Stephens MD Leva Rand, MD Rodgers Medley, MD Piedad Dauer, MD   [address] [phone]  MD Cherelle Stack MD [facility] [phone]  MD Kristen Lorenz MD Sharalyn Coast Jere Strong, [address] [facility] [phone]  Jason Lin, MD Vance Leonard, MD Alisson Clayton, MD Christiano Obando, MD Chas Leach, MD Fabrice Chandler, NP  Patricia Gates MD 1619 [facility]   [phone]  Estela Umaña, MD Augustine Maria, MD Chepe Ying, MD   2104 [facility] [phone]  [facility], MD Yudith Godwin, FNP  Belen Bleachecarlos, PA-C  Belen Bleacher, FNP  Julia Lockhart, PA-C  Aurora Cano, MD Jai Phan, NP   Hortencia Layton, DO Miscellaneous:  Sudha Velasco -[phone]

## 2017-02-06 NOTE — PROGRESS NOTES
Care Management ED Assessment    **CM Consult for transportation and assistance with information about Medicaid, she had Medicaid product in [place], she thought it would be transferred here. Discussed with her that Medicaid is state supported and doesn't transfer from state to state**    Quynh Sorto, 35 y.o. Female  presented ambulatory to ED [facility] ED with cc of worsening lower bilateral teeth pain for a few days. Her teeth have been hurting since her lower front teeth were removed. ED workup completed, verified face sheet and demographics. ,         Past Medical History          Past Medical History:   Diagnosis Date    Arrhythmia         irregular    Asthma      Chronic obstructive pulmonary disease (HCC)      Chronic pain         back, leg    Depression      Hepatitis B      Hypertension         atrial fibrillation    Other ill-defined conditions(799.89)         heart murmur    Psychiatric disorder         bipolar    Sarcoidosis ([facility] [address]         Care Management Interventions  PCP Verified by CM: Yes (No PCP, list given to patient)  Mode of Transport at Discharge: Other (see comment) (Yellow tax cab voucher given to patient - she has no transportation home or resources)  Transition of Care Consult (CM Consult): Discharge Planning (Discharge planned to home, lives with mother.)  MyChart Signup: No  Discharge Durable Medical Equipment: No  Health Maintenance Reviewed: Yes  Physical Therapy Consult: No  Occupational Therapy Consult: No  Speech Therapy Consult: No  Current Support Network: Relative's Home (Lives with mother, she is independent prior to admission. )  Plan discussed with Pt/Family/Caregiver: Yes (Met with patient in ED, discussed following up with [facility] [place].   She had Medicaid in [place])  Discharge Location  Discharge Placement: Home    Sari Ramirez RN, BSN, [place]  ED Care Management  [phone]

## 2017-02-09 ENCOUNTER — APPOINTMENT (OUTPATIENT)
Dept: GENERAL RADIOLOGY | Age: 42
End: 2017-02-09
Attending: EMERGENCY MEDICINE
Payer: MEDICAID

## 2017-02-09 ENCOUNTER — HOSPITAL ENCOUNTER (EMERGENCY)
Age: 42
Discharge: HOME | End: 2017-02-09
Attending: EMERGENCY MEDICINE
Payer: MEDICAID

## 2017-02-09 VITALS
WEIGHT: 147 LBS | OXYGEN SATURATION: 100 % | RESPIRATION RATE: 14 BRPM | TEMPERATURE: 98.2 F | SYSTOLIC BLOOD PRESSURE: 125 MMHG | BODY MASS INDEX: 25.1 KG/M2 | HEIGHT: 64 IN | HEART RATE: 78 BPM | DIASTOLIC BLOOD PRESSURE: 85 MMHG

## 2017-02-09 DIAGNOSIS — R51.9 FACE PAIN: ICD-10-CM
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
## 2017-02-09 DIAGNOSIS — R10.9 RIGHT SIDED ABDOMINAL PAIN: ICD-10-CM

## 2017-02-09 PROCEDURE — 75810000275 HC EMERGENCY DEPT VISIT NO LEVEL OF CARE

## 2017-02-09 PROCEDURE — 99284 EMERGENCY DEPT VISIT MOD MDM: CPT | Performed by: EMERGENCY MEDICINE

## 2017-02-09 PROCEDURE — 70360 X-RAY EXAM OF NECK: CPT

## 2017-02-09 PROCEDURE — 74011250637 HC RX REV CODE- 250/637: Performed by: EMERGENCY MEDICINE

## 2017-02-09 RX ORDER — ACETAMINOPHEN 325 MG/1
975 TABLET ORAL
Status: COMPLETED | OUTPATIENT
Start: 2017-02-09 | End: 2017-02-09

## 2017-02-09 RX ADMIN — ACETAMINOPHEN 975 MG: 325 TABLET, FILM COATED ORAL at 17:49

## 2017-02-09 NOTE — ED NOTES
Pt given discharge instructions, patient education, and follow up information. Pt states understanding. All questions answered. Pt discharged to home in private vehicle, ambulatory. Pt A/Ox4, RA, pain controlled.
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
## 2017-02-09 NOTE — DISCHARGE INSTRUCTIONS
Head or Face Pain: Care Instructions  Your Care Instructions  Common causes of head or face pain are allergies, stress, and injuries. Other causes include tooth problems and sinus infections. Eating certain foods, such as chocolate or cheese, or drinking certain liquids, such as coffee or cola, can cause head pain for some people. If you have mild head pain, you may not need treatment. It is important to watch your symptoms and talk to your doctor if your pain continues or gets worse. Follow-up care is a key part of your treatment and safety. Be sure to make and go to all appointments, and call your doctor if you are having problems. It's also a good idea to know your test results and keep a list of the medicines you take. How can you care for yourself at home? · Take pain medicines exactly as directed. ¨ If the doctor gave you a prescription medicine for pain, take it as prescribed. ¨ If you are not taking a prescription pain medicine, ask your doctor if you can take an over-the-counter pain medicine. · Take it easy for the next few days or longer if you are not feeling well. · Use a warm, moist towel or heating pad set on low to relax tight muscles in your shoulder and neck. Have someone gently massage your neck and shoulders. · Put ice or a cold pack on the area for 10 to 20 minutes at a time. Put a thin cloth between the ice and your skin. When should you call for help? Call 911 anytime you think you may need emergency care. For example, call if:  · You have twitching, jerking, or a seizure. · You passed out (lost consciousness). · You have symptoms of a stroke. These may include:  ¨ Sudden numbness, tingling, weakness, or loss of movement in your face, arm, or leg, especially on only one side of your body. ¨ Sudden vision changes. ¨ Sudden trouble speaking. ¨ Sudden confusion or trouble understanding simple statements. ¨ Sudden problems with walking or balance.   ¨ A sudden, severe headache that is different from past headaches. · You have jaw pain and pain in your chest, shoulder, neck, or arm. Call your doctor now or seek immediate medical care if:  · You have a fever with a stiff neck or a severe headache. · You have nausea and vomiting, or you cannot keep food or liquids down. Watch closely for changes in your health, and be sure to contact your doctor if:  · Your head or face pain does not get better as expected. Where can you learn more? Go to http://jazlyn-eladio.info/. Enter P568 in the search box to learn more about \"Head or Face Pain: Care Instructions. \"  Current as of: May 27, 2016  Content Version: 11.1  © 3472-7991 [employer], [employer]. Care instructions adapted under license by [employer] (which disclaims liability or warranty for this information). If you have questions about a medical condition or this instruction, always ask your healthcare professional. [address] any warranty or liability for your use of this information.

## 2017-02-10 ENCOUNTER — HOSPITAL ENCOUNTER (EMERGENCY)
Age: 42
Discharge: HOME | End: 2017-02-10
Attending: STUDENT IN AN ORGANIZED HEALTH CARE EDUCATION/TRAINING PROGRAM | Admitting: STUDENT IN AN ORGANIZED HEALTH CARE EDUCATION/TRAINING PROGRAM
Payer: MEDICAID

## 2017-02-10 VITALS
WEIGHT: 147.06 LBS | DIASTOLIC BLOOD PRESSURE: 77 MMHG | TEMPERATURE: 98.6 F | HEIGHT: 64 IN | SYSTOLIC BLOOD PRESSURE: 134 MMHG | HEART RATE: 120 BPM | RESPIRATION RATE: 16 BRPM | OXYGEN SATURATION: 98 % | BODY MASS INDEX: 25.11 KG/M2

## 2017-02-10 DIAGNOSIS — K02.9 DENTAL CARIES: Primary | ICD-10-CM

## 2017-02-10 PROCEDURE — 74011250637 HC RX REV CODE- 250/637: Performed by: STUDENT IN AN ORGANIZED HEALTH CARE EDUCATION/TRAINING PROGRAM

## 2017-02-10 PROCEDURE — 74011000250 HC RX REV CODE- 250: Performed by: STUDENT IN AN ORGANIZED HEALTH CARE EDUCATION/TRAINING PROGRAM

## 2017-02-10 PROCEDURE — 99283 EMERGENCY DEPT VISIT LOW MDM: CPT

## 2017-02-10 RX ADMIN — BENZOCAINE: 200 SPRAY DENTAL; ORAL; PERIODONTAL at 09:57

## 2017-02-10 NOTE — ED NOTES
Pt has no HD access. Pt states \"they do it here\" & pointed to her [facility]. Reviewed chart history, has never been seen here by nephrology or for kidney problems. All blood work in past related to kidney function has been normal. MD in to speak with pt regarding findings.

## 2017-02-10 NOTE — ED PROVIDER NOTES
HPI Comments: 35 y.o. female with past medical history significant for asthma, HTN, sarcoidosis, depression, hepatitis B, heart murmur, arrhythmia, bipolar disorder, chronic pain, COPD who presents with chief complaint of abdominal pain. Pt complains of RUQ abd pain and associated chills that began yesterday. Pt reports that she is a dialysis patient, but was not dialyzed yesterday. Pt states that she has been on dialysis for 2 years. Pt also complains of diffuse dental pain that has persisted for the past several days. Pt denies fever. There are no other acute medical concerns at this time. Old Chart Review:  Pt's labs show normal renal function; there is no h/o dialysis treatment. PCP: None    Note written by Alene Reap. Evern Schlatter, as dictated by Rafy Eastman MD 10:08 AM      The history is provided by the patient. No  was used. Past Medical History:   Diagnosis Date    Arrhythmia      irregular    Asthma     Chronic obstructive pulmonary disease (HCC)     Chronic pain      back, leg    Depression     Hepatitis B     Hypertension      atrial fibrillation    Other ill-defined conditions(799.89)      heart murmur    Psychiatric disorder      bipolar    Sarcoidosis ([facility] [address]        Past Surgical History:   Procedure Laterality Date    Hx gyn      Hx wisdom teeth extraction           Family History:   Problem Relation Age of Onset    Hypertension Father     Hypertension Mother        Social History     Social History    Marital status: SINGLE     Spouse name: N/A    Number of children: N/A    Years of education: N/A     Occupational History    Not on file.      Social History Main Topics    Smoking status: Current Every Day Smoker     Packs/day: 1.50    Smokeless tobacco: Never Used      Comment: 4 cig. day    Alcohol use Yes    Drug use: Yes     Special: Heroin, Marijuana, Cocaine      Comment: last used 1/24/2017    Sexual activity: Yes     Partners: Female Other Topics Concern    Not on file     Social History Narrative    Born in [address], 5 children,    No legal charges. Pt graduated from high school. Pt was employed last month at [employer], currently she is unemployed. Pt lives with her mother and 8year old son. She has 4 adult children. ALLERGIES: Tomato    Review of Systems   Constitutional: Positive for chills. Negative for activity change, diaphoresis, fatigue and fever. HENT: Positive for dental problem. Negative for congestion and sore throat. Eyes: Negative for photophobia and visual disturbance. Respiratory: Negative for chest tightness and shortness of breath. Cardiovascular: Negative for chest pain, palpitations and leg swelling. Gastrointestinal: Positive for abdominal pain. Negative for blood in stool, constipation, diarrhea, nausea and vomiting. Genitourinary: Negative for difficulty urinating, dysuria, flank pain, frequency and hematuria. Musculoskeletal: Negative for back pain. Neurological: Negative for dizziness, syncope, numbness and headaches. All other systems reviewed and are negative. Vitals:    02/10/17 0854   BP: 134/77   Pulse: (!) 120   Resp: 16   Temp: 98.6 °F (37 °C)   SpO2: 98%   Weight: 66.7 kg (147 lb 1 oz)   Height: 5' 4\" (1.626 m)            Physical Exam   Constitutional: She is oriented to person, place, and time. She appears well-developed and well-nourished. No distress. HENT:   Head: Normocephalic and atraumatic. Nose: Nose normal.   Mouth/Throat: Oropharynx is clear and moist. Dental caries (throughout mouth) present. No oropharyngeal exudate. Poor dentition   Eyes: Conjunctivae and EOM are normal. Right eye exhibits no discharge. Left eye exhibits no discharge. No scleral icterus. Neck: Normal range of motion. Neck supple. No JVD present. No tracheal deviation present. No thyromegaly present.    Cardiovascular: Normal rate, regular rhythm, normal heart sounds and intact distal pulses. Exam reveals no gallop and no friction rub. No murmur heard. Pulmonary/Chest: Effort normal and breath sounds normal. No stridor. No respiratory distress. She has no wheezes. She has no rales. She exhibits no tenderness. Abdominal: Bowel sounds are normal. She exhibits no distension and no mass. There is no tenderness. There is no rebound. Musculoskeletal: Normal range of motion. She exhibits no edema or tenderness. Lymphadenopathy:     She has no cervical adenopathy. Neurological: She is alert and oriented to person, place, and time. No cranial nerve deficit. Coordination normal.   Skin: Skin is warm and dry. No rash noted. She is not diaphoretic. No erythema. No pallor. Psychiatric: She has a normal mood and affect. Her behavior is normal. Judgment and thought content normal.   Nursing note and vitals reviewed. Note written by Erika Boucher. Sonia Slaughter, as dictated by Shadi Page MD 10:14 AM         MDM  Number of Diagnoses or Management Options  Dental caries:   Diagnosis management comments: Malingering, narcotic seeking, dental caries. 34 y/o female stating she is in pain and missed dialysis. On PE pt. Has no access for hemo or peritoneal dialysis. Pt. Originally stated she started dialysis 2 years ago. When questioned pt. About likelihood of not being on dialysis she said she started 2 days ago. Pt. Is clearly not on dialysis nor has been on dialysis. Likely malingering vs. factious as pt. Is seeking pain meds. Amount and/or Complexity of Data Reviewed  Review and summarize past medical records: yes    Risk of Complications, Morbidity, and/or Mortality  Presenting problems: moderate  Diagnostic procedures: moderate  Management options: moderate    Patient Progress  Patient progress: stable    ED Course       Procedures    PROGRESS NOTE:  10:14 AM  Pt reports that she has been on dialysis for 2 years.  Upon chart review, pt has normal renal function and no evidence of dialysis treatment. Discussed this with the patient and pointed out that she has no tunnel cath or AV fistula; pt states that she started dialysis 2 days ago (contrary to previous statement that she has been on dialysis for 2 years). Explained to the pt that she would have access if truly on dialysis. Concern for malingering and narcotic seeking.

## 2017-02-10 NOTE — ED NOTES
Rhart at bedside arranging a shelter for patient. Awaiting alfred back from staff. Patient ambulatory to restroom. Steady gait noted.

## 2017-02-10 NOTE — ED NOTES
Shelter arranged by Kathie Champion. Patient going to the [address] [place] [address] , [address] [facility] to [facility]. ETA 25 minutes. Patient verbalizes understanding of discharge instructions. Ambulatory and in no acute distress at discharge.
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
## 2017-02-11 ENCOUNTER — HOSPITAL ENCOUNTER (EMERGENCY)
Age: 42
Discharge: HOME | End: 2017-02-11
Attending: EMERGENCY MEDICINE
Payer: MEDICAID

## 2017-02-11 VITALS
OXYGEN SATURATION: 98 % | TEMPERATURE: 97.3 F | WEIGHT: 145 LBS | RESPIRATION RATE: 16 BRPM | HEART RATE: 105 BPM | HEIGHT: 64 IN | BODY MASS INDEX: 24.75 KG/M2 | SYSTOLIC BLOOD PRESSURE: 124 MMHG | DIASTOLIC BLOOD PRESSURE: 69 MMHG

## 2017-02-11 DIAGNOSIS — J45.31 MILD PERSISTENT ASTHMA WITH ACUTE EXACERBATION: Primary | ICD-10-CM

## 2017-02-11 DIAGNOSIS — Z72.0 TOBACCO ABUSE: ICD-10-CM

## 2017-02-11 PROCEDURE — 74011250637 HC RX REV CODE- 250/637: Performed by: EMERGENCY MEDICINE

## 2017-02-11 PROCEDURE — 94640 AIRWAY INHALATION TREATMENT: CPT

## 2017-02-11 PROCEDURE — 74011636637 HC RX REV CODE- 636/637: Performed by: EMERGENCY MEDICINE

## 2017-02-11 PROCEDURE — 77030013140 HC MSK NEB VYRM -A

## 2017-02-11 PROCEDURE — 99283 EMERGENCY DEPT VISIT LOW MDM: CPT

## 2017-02-11 RX ORDER — ALBUTEROL SULFATE 90 UG/1
1 AEROSOL, METERED RESPIRATORY (INHALATION)
Status: COMPLETED | OUTPATIENT
Start: 2017-02-11 | End: 2017-02-11

## 2017-02-11 RX ORDER — PREDNISONE 20 MG/1
60 TABLET ORAL
Status: COMPLETED | OUTPATIENT
Start: 2017-02-11 | End: 2017-02-11

## 2017-02-11 RX ORDER — PREDNISONE 20 MG/1
60 TABLET ORAL DAILY
Qty: 15 TAB | Refills: 0 | Status: SHIPPED | OUTPATIENT
Start: 2017-02-11 | End: 2017-02-16

## 2017-02-11 RX ADMIN — PREDNISONE 60 MG: 20 TABLET ORAL at 06:16

## 2017-02-11 RX ADMIN — ALBUTEROL SULFATE 1 PUFF: 90 AEROSOL, METERED RESPIRATORY (INHALATION) at 06:16

## 2017-02-11 NOTE — ED NOTES
Patient given copy of dc instructions and one script(s). Patient verbalized understanding of instructions and script (s). Patient given a current medication reconciliation form and verbalized understanding of their medications. Patient verbalized understanding of the importance of discussing medications with  his or her physician or clinic when they follow up. Patient alert and oriented and in no acute distress. Pt verbalizes pain scale of 8 out of 10. Pt declined wheelchair discharge. Patient discharged home ambulatory without assiatnce.

## 2017-02-11 NOTE — ED PROVIDER NOTES
HPI Comments: Joy Castaneda is a 35 y.o. female with PMHx significant for hepatitis B, HTN, and asthma who presents ambulatory to the ED with c/o shortness of breath. Pt reports her symptoms are consistent with h/o asthma exacerbation. Furthermore, she is also has a cough productive of yellow sputum. Pt also reports dental pain. Upon chart review, these dental problems have been an ongoing chronic issue. Of note, pt is no longer menstruating. She specifically denies any fevers, chills, nausea, vomiting, chest pain, headache, rash, or diarrhea. PCP: None     Social hx: + Smoker, + EtOH, + Illicit Drugs    There are no other complaints, changes, or physical findings at this time. Written by CALI Magana, as dictated by Natanael Putnam MD.      The history is provided by the patient. No  was used. Past Medical History:   Diagnosis Date    Arrhythmia      irregular    Asthma     Chronic obstructive pulmonary disease (HCC)     Chronic pain      back, leg    Depression     Hepatitis B     Hypertension      atrial fibrillation    Other ill-defined conditions(799.89)      heart murmur    Psychiatric disorder      bipolar    Sarcoidosis ([facility] [address]        Past Surgical History:   Procedure Laterality Date    Hx gyn      Hx wisdom teeth extraction           Family History:   Problem Relation Age of Onset    Hypertension Father     Hypertension Mother        Social History     Social History    Marital status: SINGLE     Spouse name: N/A    Number of children: N/A    Years of education: N/A     Occupational History    Not on file.      Social History Main Topics    Smoking status: Current Every Day Smoker     Packs/day: 1.50    Smokeless tobacco: Never Used      Comment: 4 cig. day    Alcohol use Yes    Drug use: Yes     Special: Heroin, Marijuana, Cocaine      Comment: last used 1/24/2017    Sexual activity: Yes     Partners: Female     Other Topics Concern    Not on file     Social History Narrative    Born in [address], 5 children,    No legal charges. Pt graduated from high school. Pt was employed last month at [employer], currently she is unemployed. Pt lives with her mother and 8year old son. She has 4 adult children. ALLERGIES: Tomato    Review of Systems   Constitutional: Negative for appetite change, chills, diaphoresis, fatigue and fever. HENT: Positive for dental problem (pain). Negative for sore throat and trouble swallowing. Respiratory: Positive for cough (productive) and shortness of breath. Cardiovascular: Negative for chest pain. Gastrointestinal: Negative for abdominal pain, diarrhea, nausea and vomiting. Genitourinary: Negative for dysuria and frequency. Musculoskeletal: Negative for arthralgias, back pain and myalgias. Skin: Negative for color change and rash. Neurological: Negative for weakness and numbness. Hematological: Does not bruise/bleed easily. All other systems reviewed and are negative. Patient Vitals for the past 12 hrs:   Temp Pulse Resp BP SpO2   02/11/17 0601 97.3 °F (36.3 °C) (!) 105 16 124/69 98 %              Physical Exam   Constitutional: She is oriented to person, place, and time. She appears well-developed and well-nourished. No distress. HENT:   Head: Normocephalic and atraumatic. Mouth/Throat: Oropharynx is clear and moist. No oropharyngeal exudate or posterior oropharyngeal erythema. Neck: Normal range of motion and full passive range of motion without pain. Neck supple. Cardiovascular: Normal rate, regular rhythm, normal heart sounds, intact distal pulses and normal pulses. Exam reveals no gallop and no friction rub. No murmur heard. Pulmonary/Chest: Effort normal. No accessory muscle usage. No respiratory distress. She has no decreased breath sounds. She has wheezes. She has no rhonchi. She has no rales. Wheezes bilaterally. Abdominal: Soft. Bowel sounds are normal. She exhibits no distension. There is no tenderness. There is no rebound, no guarding and no CVA tenderness. Musculoskeletal: Normal range of motion. She exhibits no edema or tenderness. Thoracic back: She exhibits no tenderness and no bony tenderness. Lumbar back: She exhibits no tenderness and no bony tenderness. Lymphadenopathy:     She has no cervical adenopathy. Neurological: She is alert and oriented to person, place, and time. She has normal strength. She is not disoriented. No cranial nerve deficit or sensory deficit. No focal deficits; 5/5 muscle strength in all extremities   Skin: Skin is warm. No lesion and no rash noted. Rash is not nodular. She is not diaphoretic. Nursing note and vitals reviewed. MDM  Number of Diagnoses or Management Options  Diagnosis management comments: DDx: Asthma exacerbation, tobacco abuse, viral illness       Amount and/or Complexity of Data Reviewed  Review and summarize past medical records: yes    Patient Progress  Patient progress: stable        Procedures    MEDICATIONS GIVEN:  Medications   predniSONE (DELTASONE) tablet 60 mg (60 mg Oral Given 2/11/17 0616)   albuterol (PROVENTIL HFA, VENTOLIN HFA, PROAIR HFA) inhaler 1 Puff (1 Puff Inhalation Given 2/11/17 0616)       IMPRESSION:  1. Mild persistent asthma with acute exacerbation    2. Tobacco abuse        PLAN:  1. Current Discharge Medication List      START taking these medications    Details   predniSONE (DELTASONE) 20 mg tablet Take 3 Tabs by mouth daily for 5 days. Qty: 15 Tab, Refills: 0         CONTINUE these medications which have NOT CHANGED    Details   !! cloNIDine HCl (CATAPRES) 0.2 mg tablet Take 0.2 mg by mouth three (3) times daily. !! QUEtiapine (SEROQUEL) 400 mg tablet Take 800 mg by mouth nightly. lithium carbonate 300 mg tablet Take 300 mg by mouth two (2) times a day. sertraline (ZOLOFT) 100 mg tablet Take 100 mg by mouth daily. prazosin (MINIPRESS) 2 mg capsule Take 2 mg by mouth nightly. !! clonazePAM (KLONOPIN) 2 mg tablet Take 2 mg by mouth two (2) times a day. !! cloNIDine HCl (CATAPRES) 0.2 mg tablet Take 1 Tab by mouth two (2) times a day. Qty: 20 Tab, Refills: 0      !! QUEtiapine (SEROQUEL) 400 mg tablet Take 2 Tabs by mouth nightly. Qty: 20 Tab, Refills: 0      !! clonazePAM (KLONOPIN) 2 mg tablet Take 1 Tab by mouth every eight (8) hours as needed. Max Daily Amount: 6 mg. Qty: 20 Tab, Refills: 0      guaiFENesin-codeine (ROBITUSSIN AC) 100-10 mg/5 mL solution Take 5 mL by mouth four (4) times daily as needed for Cough. albuterol (PROVENTIL HFA, VENTOLIN HFA, PROAIR HFA) 90 mcg/actuation inhaler Take 2 Puffs by inhalation every four (4) hours as needed for Wheezing or Shortness of Breath. Qty: 1 Inhaler, Refills: 0      albuterol-ipratropium (DUO-NEB) 2.5 mg-0.5 mg/3 ml nebu 3 mL by Nebulization route every six (6) hours as needed. Qty: 100 Nebule, Refills: 1      Nebulizer & Compressor machine 1 Each by Does Not Apply route daily. Qty: 1 Each, Refills: 0      fluticasone-vilanterol (BREO ELLIPTA) 200-25 mcg/dose inhaler Take 1 Puff by inhalation daily. Qty: 28 Each, Refills: 1      penicillin v potassium (VEETID) 500 mg tablet Take 1 Tab by mouth four (4) times daily for 7 days. Qty: 28 Tab, Refills: 0      HYDROcodone-acetaminophen (NORCO) 5-325 mg per tablet Take 1 Tab by mouth every four (4) hours as needed for Pain. Max Daily Amount: 6 Tabs. Qty: 10 Tab, Refills: 0      naproxen (NAPROSYN) 500 mg tablet Take 1 Tab by mouth two (2) times daily (with meals) for 10 days. Qty: 20 Tab, Refills: 0       !! - Potential duplicate medications found. Please discuss with provider.       STOP taking these medications       nicotine (NICODERM CQ) 14 mg/24 hr patch Comments:   Reason for Stoppin.   Follow-up Information     Follow up With Details Comments Contact Info    Dayanara Dick MD [address]  [phone]      Matias Robles, [address]  [phone]          Return to ED if worse     DISCHARGE NOTE:  6:26 AM  The patient is ready for discharge. The patients signs, symptoms, diagnosis, and instructions for discharge have been discussed and the pt has conveyed their understanding. The patient is to follow up as recommended with Forrest Myles MD, Matias Robles MD, or return to the ER should their symptoms worsen. Plan has been discussed and patient has conveyed their agreement. This note is prepared by Jada Love, acting as Scribe for Dayton Hastings MD.    Dayton Hastings MD: The scribe's documentation has been prepared under my direction and personally reviewed by me in its entirety. I confirm that the note above accurately reflects all work, treatment, procedures, and medical decision making performed by me.

## 2017-02-11 NOTE — DISCHARGE INSTRUCTIONS

## 2017-02-22 ENCOUNTER — APPOINTMENT (OUTPATIENT)
Dept: GENERAL RADIOLOGY | Age: 42
DRG: 191 | End: 2017-02-22
Attending: EMERGENCY MEDICINE
Payer: MEDICAID

## 2017-02-22 ENCOUNTER — HOSPITAL ENCOUNTER (INPATIENT)
Age: 42
LOS: 1 days | Discharge: LEFT BEFORE DISCHARGE | DRG: 191 | End: 2017-02-23
Attending: EMERGENCY MEDICINE | Admitting: INTERNAL MEDICINE
Payer: MEDICAID

## 2017-02-22 DIAGNOSIS — J45.901 ASTHMA WITH ACUTE EXACERBATION, UNSPECIFIED ASTHMA SEVERITY: Primary | ICD-10-CM

## 2017-02-22 PROBLEM — J44.9 COPD (CHRONIC OBSTRUCTIVE PULMONARY DISEASE) (HCC): Status: RESOLVED | Noted: 2017-01-09 | Resolved: 2017-02-22

## 2017-02-22 PROBLEM — F32.A DEPRESSIVE DISORDER: Status: RESOLVED | Noted: 2017-01-25 | Resolved: 2017-02-22

## 2017-02-22 PROBLEM — F19.10 DRUG ABUSE (HCC): Status: RESOLVED | Noted: 2017-01-11 | Resolved: 2017-02-22

## 2017-02-22 PROBLEM — D64.9 ANEMIA: Status: ACTIVE | Noted: 2017-02-22

## 2017-02-22 PROBLEM — F31.9 BIPOLAR DISORDER (HCC): Status: RESOLVED | Noted: 2017-01-11 | Resolved: 2017-02-22

## 2017-02-22 PROBLEM — F19.20 POLYSUBSTANCE DEPENDENCE (HCC): Status: RESOLVED | Noted: 2017-01-25 | Resolved: 2017-02-22

## 2017-02-22 PROBLEM — F19.94 SUBSTANCE INDUCED MOOD DISORDER (HCC): Status: RESOLVED | Noted: 2017-01-25 | Resolved: 2017-02-22

## 2017-02-22 PROBLEM — I10 HTN (HYPERTENSION): Status: RESOLVED | Noted: 2017-01-11 | Resolved: 2017-02-22

## 2017-02-22 LAB
ALBUMIN SERPL BCP-MCNC: 3 G/DL (ref 3.5–5)
ALBUMIN/GLOB SERPL: 0.9 {RATIO} (ref 1.1–2.2)
ALP SERPL-CCNC: 89 U/L (ref 45–117)
ALT SERPL-CCNC: 17 U/L (ref 12–78)
ANION GAP BLD CALC-SCNC: 9 MMOL/L (ref 5–15)
AST SERPL W P-5'-P-CCNC: 18 U/L (ref 15–37)
BASOPHILS # BLD AUTO: 0 K/UL (ref 0–0.1)
BASOPHILS # BLD: 0 % (ref 0–1)
BILIRUB SERPL-MCNC: 0.3 MG/DL (ref 0.2–1)
BUN SERPL-MCNC: 10 MG/DL (ref 6–20)
BUN/CREAT SERPL: 13 (ref 12–20)
CALCIUM SERPL-MCNC: 8.5 MG/DL (ref 8.5–10.1)
CHLORIDE SERPL-SCNC: 105 MMOL/L (ref 97–108)
CO2 SERPL-SCNC: 25 MMOL/L (ref 21–32)
CREAT SERPL-MCNC: 0.79 MG/DL (ref 0.55–1.02)
DATE LAST DOSE: ABNORMAL
DIFFERENTIAL METHOD BLD: ABNORMAL
EOSINOPHIL # BLD: 0 K/UL (ref 0–0.4)
EOSINOPHIL NFR BLD: 0 % (ref 0–7)
ERYTHROCYTE [DISTWIDTH] IN BLOOD BY AUTOMATED COUNT: 15.8 % (ref 11.5–14.5)
ETHANOL SERPL-MCNC: <10 MG/DL
FLUAV AG NPH QL IA: NEGATIVE
FLUBV AG NOSE QL IA: NEGATIVE
GLOBULIN SER CALC-MCNC: 3.3 G/DL (ref 2–4)
GLUCOSE SERPL-MCNC: 122 MG/DL (ref 65–100)
HCT VFR BLD AUTO: 33.5 % (ref 35–47)
HGB BLD-MCNC: 11.1 G/DL (ref 11.5–16)
LITHIUM SERPL-SCNC: <0.2 MMOL/L (ref 0.6–1.2)
LYMPHOCYTES # BLD AUTO: 15 % (ref 12–49)
LYMPHOCYTES # BLD: 0.8 K/UL (ref 0.8–3.5)
MCH RBC QN AUTO: 28.7 PG (ref 26–34)
MCHC RBC AUTO-ENTMCNC: 33.1 G/DL (ref 30–36.5)
MCV RBC AUTO: 86.6 FL (ref 80–99)
MONOCYTES # BLD: 0.5 K/UL (ref 0–1)
MONOCYTES NFR BLD AUTO: 10 % (ref 5–13)
NEUTS SEG # BLD: 3.7 K/UL (ref 1.8–8)
NEUTS SEG NFR BLD AUTO: 75 % (ref 32–75)
PLATELET # BLD AUTO: 273 K/UL (ref 150–400)
POTASSIUM SERPL-SCNC: 3.7 MMOL/L (ref 3.5–5.1)
PROT SERPL-MCNC: 6.3 G/DL (ref 6.4–8.2)
RBC # BLD AUTO: 3.87 M/UL (ref 3.8–5.2)
RBC MORPH BLD: ABNORMAL
REPORTED DOSE,DOSE: ABNORMAL UNITS
REPORTED DOSE/TIME,TMG: ABNORMAL
SODIUM SERPL-SCNC: 139 MMOL/L (ref 136–145)
WBC # BLD AUTO: 5 K/UL (ref 3.6–11)

## 2017-02-22 PROCEDURE — 71010 XR CHEST PORT: CPT

## 2017-02-22 PROCEDURE — 65660000000 HC RM CCU STEPDOWN

## 2017-02-22 PROCEDURE — 36600 WITHDRAWAL OF ARTERIAL BLOOD: CPT

## 2017-02-22 PROCEDURE — 80307 DRUG TEST PRSMV CHEM ANLYZR: CPT | Performed by: INTERNAL MEDICINE

## 2017-02-22 PROCEDURE — 74011000250 HC RX REV CODE- 250: Performed by: EMERGENCY MEDICINE

## 2017-02-22 PROCEDURE — 80053 COMPREHEN METABOLIC PANEL: CPT | Performed by: EMERGENCY MEDICINE

## 2017-02-22 PROCEDURE — 80074 ACUTE HEPATITIS PANEL: CPT | Performed by: INTERNAL MEDICINE

## 2017-02-22 PROCEDURE — 80178 ASSAY OF LITHIUM: CPT | Performed by: INTERNAL MEDICINE

## 2017-02-22 PROCEDURE — 96375 TX/PRO/DX INJ NEW DRUG ADDON: CPT

## 2017-02-22 PROCEDURE — 74011250636 HC RX REV CODE- 250/636: Performed by: INTERNAL MEDICINE

## 2017-02-22 PROCEDURE — 94640 AIRWAY INHALATION TREATMENT: CPT

## 2017-02-22 PROCEDURE — 94644 CONT INHLJ TX 1ST HOUR: CPT

## 2017-02-22 PROCEDURE — 36415 COLL VENOUS BLD VENIPUNCTURE: CPT | Performed by: EMERGENCY MEDICINE

## 2017-02-22 PROCEDURE — 85025 COMPLETE CBC W/AUTO DIFF WBC: CPT | Performed by: EMERGENCY MEDICINE

## 2017-02-22 PROCEDURE — 87804 INFLUENZA ASSAY W/OPTIC: CPT | Performed by: EMERGENCY MEDICINE

## 2017-02-22 PROCEDURE — 96365 THER/PROPH/DIAG IV INF INIT: CPT

## 2017-02-22 PROCEDURE — 99285 EMERGENCY DEPT VISIT HI MDM: CPT

## 2017-02-22 PROCEDURE — 87389 HIV-1 AG W/HIV-1&-2 AB AG IA: CPT | Performed by: INTERNAL MEDICINE

## 2017-02-22 PROCEDURE — 74011250637 HC RX REV CODE- 250/637: Performed by: INTERNAL MEDICINE

## 2017-02-22 PROCEDURE — 74011000250 HC RX REV CODE- 250: Performed by: INTERNAL MEDICINE

## 2017-02-22 PROCEDURE — 74011250636 HC RX REV CODE- 250/636: Performed by: EMERGENCY MEDICINE

## 2017-02-22 RX ORDER — LITHIUM CARBONATE 300 MG
300 TABLET ORAL 2 TIMES DAILY
Status: DISCONTINUED | OUTPATIENT
Start: 2017-02-22 | End: 2017-02-23 | Stop reason: HOSPADM

## 2017-02-22 RX ORDER — DEXTROSE, SODIUM CHLORIDE, AND POTASSIUM CHLORIDE 5; .45; .15 G/100ML; G/100ML; G/100ML
75 INJECTION INTRAVENOUS CONTINUOUS
Status: DISCONTINUED | OUTPATIENT
Start: 2017-02-22 | End: 2017-02-23 | Stop reason: HOSPADM

## 2017-02-22 RX ORDER — NALOXONE HYDROCHLORIDE 0.4 MG/ML
0.4 INJECTION, SOLUTION INTRAMUSCULAR; INTRAVENOUS; SUBCUTANEOUS AS NEEDED
Status: DISCONTINUED | OUTPATIENT
Start: 2017-02-22 | End: 2017-02-23 | Stop reason: HOSPADM

## 2017-02-22 RX ORDER — ONDANSETRON 2 MG/ML
4 INJECTION INTRAMUSCULAR; INTRAVENOUS
Status: DISCONTINUED | OUTPATIENT
Start: 2017-02-22 | End: 2017-02-23 | Stop reason: HOSPADM

## 2017-02-22 RX ORDER — MAGNESIUM SULFATE HEPTAHYDRATE 40 MG/ML
2 INJECTION, SOLUTION INTRAVENOUS
Status: COMPLETED | OUTPATIENT
Start: 2017-02-22 | End: 2017-02-22

## 2017-02-22 RX ORDER — FLUTICASONE FUROATE AND VILANTEROL 200; 25 UG/1; UG/1
1 POWDER RESPIRATORY (INHALATION) DAILY
Status: DISCONTINUED | OUTPATIENT
Start: 2017-02-23 | End: 2017-02-23 | Stop reason: HOSPADM

## 2017-02-22 RX ORDER — IPRATROPIUM BROMIDE AND ALBUTEROL SULFATE 2.5; .5 MG/3ML; MG/3ML
3 SOLUTION RESPIRATORY (INHALATION)
Status: DISCONTINUED | OUTPATIENT
Start: 2017-02-22 | End: 2017-02-23 | Stop reason: HOSPADM

## 2017-02-22 RX ORDER — SODIUM CHLORIDE 9 MG/ML
125 INJECTION, SOLUTION INTRAVENOUS CONTINUOUS
Status: DISCONTINUED | OUTPATIENT
Start: 2017-02-22 | End: 2017-02-23

## 2017-02-22 RX ORDER — QUETIAPINE FUMARATE 100 MG/1
800 TABLET, FILM COATED ORAL
Status: DISCONTINUED | OUTPATIENT
Start: 2017-02-22 | End: 2017-02-23 | Stop reason: HOSPADM

## 2017-02-22 RX ORDER — PRAZOSIN HYDROCHLORIDE 1 MG/1
2 CAPSULE ORAL
Status: DISCONTINUED | OUTPATIENT
Start: 2017-02-22 | End: 2017-02-23 | Stop reason: HOSPADM

## 2017-02-22 RX ORDER — DIPHENHYDRAMINE HCL 25 MG
25 CAPSULE ORAL
Status: DISCONTINUED | OUTPATIENT
Start: 2017-02-22 | End: 2017-02-23 | Stop reason: HOSPADM

## 2017-02-22 RX ORDER — IPRATROPIUM BROMIDE 0.5 MG/2.5ML
0.5 SOLUTION RESPIRATORY (INHALATION)
Status: COMPLETED | OUTPATIENT
Start: 2017-02-22 | End: 2017-02-22

## 2017-02-22 RX ORDER — FAMOTIDINE 20 MG/1
20 TABLET, FILM COATED ORAL 2 TIMES DAILY
Status: DISCONTINUED | OUTPATIENT
Start: 2017-02-22 | End: 2017-02-23 | Stop reason: HOSPADM

## 2017-02-22 RX ORDER — SERTRALINE HYDROCHLORIDE 50 MG/1
100 TABLET, FILM COATED ORAL DAILY
Status: DISCONTINUED | OUTPATIENT
Start: 2017-02-23 | End: 2017-02-23 | Stop reason: HOSPADM

## 2017-02-22 RX ORDER — ALBUTEROL SULFATE 0.83 MG/ML
5 SOLUTION RESPIRATORY (INHALATION)
Status: COMPLETED | OUTPATIENT
Start: 2017-02-22 | End: 2017-02-22

## 2017-02-22 RX ORDER — CLONAZEPAM 1 MG/1
2 TABLET ORAL
Status: DISCONTINUED | OUTPATIENT
Start: 2017-02-22 | End: 2017-02-23 | Stop reason: HOSPADM

## 2017-02-22 RX ORDER — CLONIDINE HYDROCHLORIDE 0.1 MG/1
0.2 TABLET ORAL 3 TIMES DAILY
Status: DISCONTINUED | OUTPATIENT
Start: 2017-02-22 | End: 2017-02-23 | Stop reason: HOSPADM

## 2017-02-22 RX ORDER — ACETAMINOPHEN 325 MG/1
650 TABLET ORAL
Status: DISCONTINUED | OUTPATIENT
Start: 2017-02-22 | End: 2017-02-23 | Stop reason: HOSPADM

## 2017-02-22 RX ORDER — ENOXAPARIN SODIUM 100 MG/ML
40 INJECTION SUBCUTANEOUS EVERY 24 HOURS
Status: DISCONTINUED | OUTPATIENT
Start: 2017-02-22 | End: 2017-02-23 | Stop reason: HOSPADM

## 2017-02-22 RX ORDER — ALBUTEROL SULFATE 2.5 MG/.5ML
15 SOLUTION RESPIRATORY (INHALATION) CONTINUOUS
Status: DISCONTINUED | OUTPATIENT
Start: 2017-02-22 | End: 2017-02-23

## 2017-02-22 RX ADMIN — IPRATROPIUM BROMIDE AND ALBUTEROL SULFATE 3 ML: .5; 3 SOLUTION RESPIRATORY (INHALATION) at 20:28

## 2017-02-22 RX ADMIN — QUETIAPINE FUMARATE 800 MG: 100 TABLET, FILM COATED ORAL at 22:31

## 2017-02-22 RX ADMIN — ENOXAPARIN SODIUM 40 MG: 40 INJECTION SUBCUTANEOUS at 21:51

## 2017-02-22 RX ADMIN — ALBUTEROL SULFATE 5 MG: 2.5 SOLUTION RESPIRATORY (INHALATION) at 19:00

## 2017-02-22 RX ADMIN — METHYLPREDNISOLONE SODIUM SUCCINATE 125 MG: 125 INJECTION, POWDER, FOR SOLUTION INTRAMUSCULAR; INTRAVENOUS at 23:53

## 2017-02-22 RX ADMIN — METHYLPREDNISOLONE SODIUM SUCCINATE 125 MG: 125 INJECTION, POWDER, FOR SOLUTION INTRAMUSCULAR; INTRAVENOUS at 17:57

## 2017-02-22 RX ADMIN — FOLIC ACID: 5 INJECTION, SOLUTION INTRAMUSCULAR; INTRAVENOUS; SUBCUTANEOUS at 21:25

## 2017-02-22 RX ADMIN — IPRATROPIUM BROMIDE AND ALBUTEROL SULFATE 3 ML: .5; 3 SOLUTION RESPIRATORY (INHALATION) at 23:24

## 2017-02-22 RX ADMIN — ALBUTEROL SULFATE 5 MG: 2.5 SOLUTION RESPIRATORY (INHALATION) at 16:36

## 2017-02-22 RX ADMIN — IPRATROPIUM BROMIDE 0.5 MG: 0.5 SOLUTION RESPIRATORY (INHALATION) at 16:50

## 2017-02-22 RX ADMIN — ALBUTEROL SULFATE 15 MG/HR: 2.5 SOLUTION RESPIRATORY (INHALATION) at 17:08

## 2017-02-22 RX ADMIN — FAMOTIDINE 20 MG: 20 TABLET ORAL at 20:28

## 2017-02-22 RX ADMIN — IPRATROPIUM BROMIDE 0.5 MG: 0.5 SOLUTION RESPIRATORY (INHALATION) at 19:00

## 2017-02-22 RX ADMIN — SODIUM CHLORIDE 125 ML/HR: 900 INJECTION, SOLUTION INTRAVENOUS at 18:01

## 2017-02-22 RX ADMIN — MAGNESIUM SULFATE HEPTAHYDRATE 2 G: 40 INJECTION, SOLUTION INTRAVENOUS at 17:57

## 2017-02-22 RX ADMIN — CLONIDINE HYDROCHLORIDE 0.2 MG: 0.1 TABLET ORAL at 21:30

## 2017-02-22 RX ADMIN — PRAZOSIN HYDROCHLORIDE 2 MG: 1 CAPSULE ORAL at 21:30

## 2017-02-22 NOTE — ED NOTES
Unable to pull lab specimen from [facility]. Fluids and mag infusing. Per Dr. Afua Ramos gave verbal orders for art stick by respiratory for labs.

## 2017-02-22 NOTE — ED PROVIDER NOTES
HPI Comments: Junior Ward is a 35 y.o. Female tobacco and heroine user who has a h/o asthma, copd, hypertension, hepatitis B and A-fib, presents to ED [facility] ED ambulatory with cc worsening shortness of breath and associated symptoms of wheezing x yesterday. The patient reports using her Nebulizer 4 times since 0900 today, however it did not provide her with any symptom relief. While she denies any recent illnesses, she does report sick contacts for which she states that her son has an upper respiratory infection. She is not complaining of any productive cough, however. While the patient still smokes half a pack of cigarettes on a regular basis, she states that she has not used tobacco or heroine within the last 3 days. Of note; the patient does endorse a h/o hospitalizations and intubations related to asthma and copd exacerbations. She denies all other symptoms at this time, including any fevers, chills, coughing, chest pain or nausea. PCP: None    There are no other complaints changes or physical findings at this time  Written by Casey Spicer ED Scribe as dictated by Luis A Mcmahon MD.    The history is provided by the patient. Past Medical History:   Diagnosis Date    Asthma     Bipolar 1 disorder (HCC)     Chronic obstructive pulmonary disease (HCC)     Chronic pain     back, leg    Cocaine abuse     Depression     Hepatitis B     Heroin abuse     HTN (hypertension)     Narcotic abuse     Polysubstance abuse     Sarcoidosis ([address]     Tobacco abuse        Past Surgical History:   Procedure Laterality Date    HX GYN      HX WISDOM TEETH EXTRACTION           Family History:   Problem Relation Age of Onset    Hypertension Father     Hypertension Mother        Social History     Social History    Marital status: SINGLE     Spouse name: N/A    Number of children: N/A    Years of education: N/A     Occupational History    Not on file.      Social History Main Topics    Smoking status: Current Every Day Smoker     Packs/day: 0.50    Smokeless tobacco: Never Used      Comment: 4 cig. day    Alcohol use Yes    Drug use: Yes     Special: Heroin, Marijuana, Cocaine      Comment: last used 1/24/2017    Sexual activity: Yes     Partners: Female     Other Topics Concern    Not on file     Social History Narrative    Born in [address], 5 children,    No legal charges. Pt graduated from high school. Pt was employed last month at [employer], currently she is unemployed. Pt lives with her mother and 8year old son. She has 4 adult children. ALLERGIES: Tomato    Review of Systems   Constitutional: Negative for chills, diaphoresis, fever and unexpected weight change. HENT: Negative for rhinorrhea and sore throat. Eyes: Negative for pain. Respiratory: Positive for shortness of breath and wheezing. Negative for cough and stridor. Cardiovascular: Negative for chest pain and leg swelling. Gastrointestinal: Negative for abdominal distention, abdominal pain, blood in stool, diarrhea, nausea and vomiting. Genitourinary: Negative for difficulty urinating, dysuria and flank pain. Musculoskeletal: Negative for back pain and neck stiffness. Skin: Negative for rash. Neurological: Negative for seizures, syncope, weakness, light-headedness and headaches. Psychiatric/Behavioral: Negative for confusion. All other systems reviewed and are negative. Patient Vitals for the past 12 hrs:   Temp Pulse Resp BP SpO2   02/22/17 1915 - (!) 123 20 118/64 95 %   02/22/17 1800 - (!) 122 19 111/71 98 %   02/22/17 1757 - (!) 121 - 125/75 -   02/22/17 1709 - - - - 97 %   02/22/17 1636 - 97 - 109/67 -   02/22/17 1625 98.3 °F (36.8 °C) (!) 108 28 118/73 100 %     Physical Exam   Constitutional: She is oriented to person, place, and time. She appears well-developed and well-nourished. No distress.    HENT:   Nose: Nose normal.   Mouth/Throat: Oropharynx is clear and moist. No oropharyngeal exudate. Eyes: Conjunctivae and EOM are normal. Pupils are equal, round, and reactive to light. Right eye exhibits no discharge. Left eye exhibits no discharge. No scleral icterus. Neck: Normal range of motion. Neck supple. No JVD present. Cardiovascular: Regular rhythm, normal heart sounds and intact distal pulses. Tachycardia present. No murmur heard. Tachypnea   Pulmonary/Chest: Effort normal. No stridor. No respiratory distress. She has wheezes. She has no rales. Sating at 100% on room air  Tachypneic  Diminished air movement  Diffuse expiratory wheezing throughout all lung fields  + accessory muscle use: Supraclavicular retractions   Abdominal: Soft. Bowel sounds are normal. She exhibits no distension. There is no tenderness. There is no rebound and no guarding. Musculoskeletal: She exhibits no edema or tenderness. Neurological: She is alert and oriented to person, place, and time. Skin: Skin is warm and dry. No rash noted. She is not diaphoretic. Psychiatric: She has a normal mood and affect. Nursing note and vitals reviewed. MDM  Number of Diagnoses or Management Options  Asthma with acute exacerbation, unspecified asthma severity:      Amount and/or Complexity of Data Reviewed  Clinical lab tests: ordered and reviewed  Tests in the radiology section of CPT®: ordered and reviewed  Review and summarize past medical records: yes  Discuss the patient with other providers: yes (Hospitalist)  Independent visualization of images, tracings, or specimens: yes    Critical Care  Total time providing critical care: 30-74 minutes    Patient Progress  Patient progress: stable    ED Course       Procedures    5:58 PM  I re-examined the patient and evaluated the effectiveness of breathing treatment they received. I feel that there has been minimal improvement, and feel that the patient would benefit clinically from further breathing treatments.   I will evaluate the patient again after the next round of treatments. 7:00 PM  I re-examined the patient and evaluated the effectiveness of breathing treatment they received. I feel that there has been minimal improvement, and feel that the patient would benefit clinically from further breathing treatments. I will evaluate the patient again after the next round of treatments. Will have the Hospitalist evaluate for admission. CONSULT NOTE:   7:13 PM  Saturnino Encarnacion MD spoke with Dr. Tacho Jason,   Specialty: Hospitalist  Discussed pt's hx, disposition, and available diagnostic and imaging results. Reviewed care plans. Consultant will evaluate pt for admission. Written by Lizandro Landry II, ED Scribe, as dictated by Saturnino Encarnacion MD.    Critical Care: The reason for providing this level of medical care for this critically ill patient was due to a critical illness that impaired one or more vital organ systems such that there was a high probability of imminent or life threatening deterioration in the patients condition. This care involved high complexity decision making to assess, manipulate, and support vital system functions. LABORATORY TESTS:  Recent Results (from the past 12 hour(s))   INFLUENZA A & B AG (RAPID TEST)    Collection Time: 02/22/17  6:02 PM   Result Value Ref Range    Influenza A Antigen NEGATIVE  NEG      Influenza B Antigen NEGATIVE  NEG     CBC WITH AUTOMATED DIFF    Collection Time: 02/22/17  6:25 PM   Result Value Ref Range    WBC 5.0 3.6 - 11.0 K/uL    RBC 3.87 3.80 - 5.20 M/uL    HGB 11.1 (L) 11.5 - 16.0 g/dL    HCT 33.5 (L) 35.0 - 47.0 %    MCV 86.6 80.0 - 99.0 FL    MCH 28.7 26.0 - 34.0 PG    MCHC 33.1 30.0 - 36.5 g/dL    RDW 15.8 (H) 11.5 - 14.5 %    PLATELET 714 774 - 486 K/uL    NEUTROPHILS PENDING %    LYMPHOCYTES PENDING %    MONOCYTES PENDING %    EOSINOPHILS PENDING %    BASOPHILS PENDING %    ABS. NEUTROPHILS PENDING K/UL    ABS. LYMPHOCYTES PENDING K/UL    ABS. MONOCYTES PENDING K/UL    ABS.  EOSINOPHILS PENDING K/UL    ABS. BASOPHILS PENDING K/UL    DF PENDING    METABOLIC PANEL, COMPREHENSIVE    Collection Time: 02/22/17  6:25 PM   Result Value Ref Range    Sodium 139 136 - 145 mmol/L    Potassium 3.7 3.5 - 5.1 mmol/L    Chloride 105 97 - 108 mmol/L    CO2 25 21 - 32 mmol/L    Anion gap 9 5 - 15 mmol/L    Glucose 122 (H) 65 - 100 mg/dL    BUN 10 6 - 20 MG/DL    Creatinine 0.79 0.55 - 1.02 MG/DL    BUN/Creatinine ratio 13 12 - 20      GFR est AA >60 >60 ml/min/1.73m2    GFR est non-AA >60 >60 ml/min/1.73m2    Calcium 8.5 8.5 - 10.1 MG/DL    Bilirubin, total 0.3 0.2 - 1.0 MG/DL    ALT (SGPT) 17 12 - 78 U/L    AST (SGOT) 18 15 - 37 U/L    Alk. phosphatase 89 45 - 117 U/L    Protein, total 6.3 (L) 6.4 - 8.2 g/dL    Albumin 3.0 (L) 3.5 - 5.0 g/dL    Globulin 3.3 2.0 - 4.0 g/dL    A-G Ratio 0.9 (L) 1.1 - 2.2         IMAGING RESULTS:  CXR Results  (Last 48 hours)               02/22/17 1703  XR CHEST PORT Final result    Impression:  Impression:   1. No acute disease           Narrative:  INDICATION:  Asthma and COPD with shortness of breath and wheezing since   yesterday        Exam: Portable chest 1650. Comparison January 15, 2017. Findings: Cardiomediastinal silhouette is within normal limits. Pulmonary   vasculature is not engorged. There are no focal parenchymal opacities,   effusions, or pneumothorax.                  MEDICATIONS GIVEN:  Medications   albuterol CONCENTRATE 2.5mg/0.5 mL neb soln (15 mg/hr Nebulization New Bag 2/22/17 1708)   0.9% sodium chloride infusion (125 mL/hr IntraVENous New Bag 2/22/17 7631)   albuterol-ipratropium (DUO-NEB) 2.5 MG-0.5 MG/3 ML (not administered)   methylPREDNISolone (PF) (SOLU-MEDROL) injection 125 mg (not administered)   clonazePAM (KlonoPIN) tablet 2 mg (not administered)   cloNIDine HCl (CATAPRES) tablet 0.2 mg (not administered)   lithium carbonate tablet 300 mg (not administered)   prazosin (MINIPRESS) capsule 2 mg (not administered)   QUEtiapine (SEROquel) tablet 800 mg (not administered)   sertraline (ZOLOFT) tablet 100 mg (not administered)   fluticasone-vilanterol (BREO ELLIPTA) 200mcg-25mcg/puff (not administered)   famotidine (PEPCID) tablet 20 mg (not administered)   0.9% sodium chloride 1,000 mL with mvi, adult no. 4 with vit K 10 mL, thiamine 961 mg, folic acid 1 mg infusion (not administered)   albuterol (PROVENTIL VENTOLIN) nebulizer solution 5 mg (5 mg Nebulization Given 2/22/17 1636)   ipratropium (ATROVENT) 0.02 % nebulizer solution 0.5 mg (0.5 mg Nebulization Given 2/22/17 1650)   methylPREDNISolone (PF) (SOLU-MEDROL) injection 125 mg (125 mg IntraVENous Given 2/22/17 1757)   magnesium sulfate 2 g/50 ml IVPB (premix or compounded) (2 g IntraVENous New Bag 2/22/17 1757)   albuterol (PROVENTIL VENTOLIN) nebulizer solution 5 mg (5 mg Nebulization Given 2/22/17 1900)   ipratropium (ATROVENT) 0.02 % nebulizer solution 0.5 mg (0.5 mg Nebulization Given 2/22/17 1900)       IMPRESSION:  1. Asthma with acute exacerbation, unspecified asthma severity        PLAN:  1. Admit    7:15 PM  Patient is being admitted to the hospital by Dr. Alessandra Watt. The results of their tests and reasons for their admission have been discussed with the patient and/or available family. They convey agreement and understanding for the need to be admitted and for their admission diagnosis. This note is prepared by Tanja Moseley II acting as Scribe for Gregg Currie MD.    Gregg Currie MD: The Scribe's documentation has been prepared under my direction and personally reviewed by me in its entirety. I confirm that the note above accurately reflects all work, treatment, procedures, and medical decision making performed by me.

## 2017-02-22 NOTE — ED NOTES
Pt has track marks to bilateral arms with scar tissue at most veins. Attempted IV twice, patient reported most recent use was this week in right [facility]. Stuck patient twice, unable to obtain labs or get IV access. Dr. Asim Meyer aware and will try EJ or US guided IV.

## 2017-02-23 VITALS
BODY MASS INDEX: 25.56 KG/M2 | HEIGHT: 64 IN | WEIGHT: 149.69 LBS | SYSTOLIC BLOOD PRESSURE: 108 MMHG | TEMPERATURE: 97.8 F | OXYGEN SATURATION: 93 % | HEART RATE: 114 BPM | DIASTOLIC BLOOD PRESSURE: 65 MMHG | RESPIRATION RATE: 18 BRPM

## 2017-02-23 LAB
AMPHET UR QL SCN: NEGATIVE
ANION GAP BLD CALC-SCNC: 9 MMOL/L (ref 5–15)
APPEARANCE UR: CLEAR
BACTERIA URNS QL MICRO: NEGATIVE /HPF
BARBITURATES UR QL SCN: NEGATIVE
BENZODIAZ UR QL: NEGATIVE
BILIRUB UR QL: NEGATIVE
BUN SERPL-MCNC: 9 MG/DL (ref 6–20)
BUN/CREAT SERPL: 12 (ref 12–20)
CALCIUM SERPL-MCNC: 8.3 MG/DL (ref 8.5–10.1)
CANNABINOIDS UR QL SCN: POSITIVE
CHLORIDE SERPL-SCNC: 109 MMOL/L (ref 97–108)
CO2 SERPL-SCNC: 21 MMOL/L (ref 21–32)
COCAINE UR QL SCN: NEGATIVE
COLOR UR: NORMAL
CREAT SERPL-MCNC: 0.75 MG/DL (ref 0.55–1.02)
DRUG SCRN COMMENT,DRGCM: ABNORMAL
EPITH CASTS URNS QL MICRO: NORMAL /LPF
ERYTHROCYTE [DISTWIDTH] IN BLOOD BY AUTOMATED COUNT: 16 % (ref 11.5–14.5)
GLUCOSE SERPL-MCNC: 141 MG/DL (ref 65–100)
GLUCOSE UR STRIP.AUTO-MCNC: NEGATIVE MG/DL
HCT VFR BLD AUTO: 32 % (ref 35–47)
HGB BLD-MCNC: 11 G/DL (ref 11.5–16)
HGB UR QL STRIP: NEGATIVE
HYALINE CASTS URNS QL MICRO: NORMAL /LPF (ref 0–5)
KETONES UR QL STRIP.AUTO: NEGATIVE MG/DL
LEUKOCYTE ESTERASE UR QL STRIP.AUTO: NEGATIVE
MAGNESIUM SERPL-MCNC: 2.4 MG/DL (ref 1.6–2.4)
MCH RBC QN AUTO: 30 PG (ref 26–34)
MCHC RBC AUTO-ENTMCNC: 34.4 G/DL (ref 30–36.5)
MCV RBC AUTO: 87.2 FL (ref 80–99)
METHADONE UR QL: NEGATIVE
NITRITE UR QL STRIP.AUTO: NEGATIVE
OPIATES UR QL: POSITIVE
PCP UR QL: NEGATIVE
PH UR STRIP: 6.5 [PH] (ref 5–8)
PHOSPHATE SERPL-MCNC: 3 MG/DL (ref 2.6–4.7)
PLATELET # BLD AUTO: 281 K/UL (ref 150–400)
POTASSIUM SERPL-SCNC: 4.1 MMOL/L (ref 3.5–5.1)
PROT UR STRIP-MCNC: NEGATIVE MG/DL
RBC # BLD AUTO: 3.67 M/UL (ref 3.8–5.2)
RBC #/AREA URNS HPF: NORMAL /HPF (ref 0–5)
SODIUM SERPL-SCNC: 139 MMOL/L (ref 136–145)
SP GR UR REFRACTOMETRY: 1.01 (ref 1–1.03)
UROBILINOGEN UR QL STRIP.AUTO: 0.2 EU/DL (ref 0.2–1)
WBC # BLD AUTO: 3.4 K/UL (ref 3.6–11)
WBC URNS QL MICRO: NORMAL /HPF (ref 0–4)

## 2017-02-23 PROCEDURE — 94640 AIRWAY INHALATION TREATMENT: CPT

## 2017-02-23 PROCEDURE — 74011000250 HC RX REV CODE- 250: Performed by: INTERNAL MEDICINE

## 2017-02-23 PROCEDURE — 36600 WITHDRAWAL OF ARTERIAL BLOOD: CPT

## 2017-02-23 PROCEDURE — 74011250636 HC RX REV CODE- 250/636: Performed by: INTERNAL MEDICINE

## 2017-02-23 PROCEDURE — 84100 ASSAY OF PHOSPHORUS: CPT | Performed by: INTERNAL MEDICINE

## 2017-02-23 PROCEDURE — 83735 ASSAY OF MAGNESIUM: CPT | Performed by: INTERNAL MEDICINE

## 2017-02-23 PROCEDURE — 74011250637 HC RX REV CODE- 250/637: Performed by: INTERNAL MEDICINE

## 2017-02-23 PROCEDURE — 81001 URINALYSIS AUTO W/SCOPE: CPT | Performed by: INTERNAL MEDICINE

## 2017-02-23 PROCEDURE — 87086 URINE CULTURE/COLONY COUNT: CPT | Performed by: INTERNAL MEDICINE

## 2017-02-23 PROCEDURE — 80048 BASIC METABOLIC PNL TOTAL CA: CPT | Performed by: INTERNAL MEDICINE

## 2017-02-23 PROCEDURE — 80307 DRUG TEST PRSMV CHEM ANLYZR: CPT | Performed by: INTERNAL MEDICINE

## 2017-02-23 PROCEDURE — 36415 COLL VENOUS BLD VENIPUNCTURE: CPT | Performed by: INTERNAL MEDICINE

## 2017-02-23 PROCEDURE — 85027 COMPLETE CBC AUTOMATED: CPT | Performed by: INTERNAL MEDICINE

## 2017-02-23 RX ADMIN — CLONIDINE HYDROCHLORIDE 0.2 MG: 0.1 TABLET ORAL at 08:43

## 2017-02-23 RX ADMIN — METHYLPREDNISOLONE SODIUM SUCCINATE 125 MG: 125 INJECTION, POWDER, FOR SOLUTION INTRAMUSCULAR; INTRAVENOUS at 06:18

## 2017-02-23 RX ADMIN — IPRATROPIUM BROMIDE AND ALBUTEROL SULFATE 3 ML: .5; 3 SOLUTION RESPIRATORY (INHALATION) at 03:39

## 2017-02-23 RX ADMIN — FAMOTIDINE 20 MG: 20 TABLET ORAL at 08:43

## 2017-02-23 RX ADMIN — IPRATROPIUM BROMIDE AND ALBUTEROL SULFATE 3 ML: .5; 3 SOLUTION RESPIRATORY (INHALATION) at 07:36

## 2017-02-23 RX ADMIN — LITHIUM CARBONATE 300 MG: 300 TABLET ORAL at 08:43

## 2017-02-23 RX ADMIN — SERTRALINE HYDROCHLORIDE 100 MG: 50 TABLET ORAL at 08:43

## 2017-02-23 NOTE — PROGRESS NOTES
8:13 PM  Called ED to receive report. 8:28 PM  Called ED to receive report. TRANSFER - IN REPORT:    Verbal report received from Alize(name) on Evgeny Mcgee  being received from ED(unit) for routine progression of care      Report consisted of patients Situation, Background, Assessment and   Recommendations(SBAR). Information from the following report(s) SBAR, Kardex, ED Summary, Procedure Summary, Intake/Output, MAR and Recent Results was reviewed with the receiving nurse. Opportunity for questions and clarification was provided. Assessment completed upon patients arrival to unit and care assumed. Primary Nurse Katrin Brown and Sumit Jimenez RN performed a dual skin assessment on this patient No impairment noted  Doc score is 22    3:29 AM  Shay OROZCO for art stick for morning. Labs  TORB received from Dr. Marva Ying for art stick for morning labs. [address] SHIFT NURSING NOTE    Bedside shift change report given to Shannon (oncoming nurse) by Gerri Hollis (offgoing nurse). Report included the following information SBAR, Kardex, Procedure Summary, Intake/Output, MAR and Recent Results.     SHIFT SUMMARY: See above        Admission Date 2/22/2017   Admission Diagnosis Asthma exacerbation   Consults IP CONSULT TO PULMONOLOGY  IP CONSULT TO PSYCHIATRY        Consults   [] PT   [] OT   [] Speech   [] Palliative      [] Hospice    [] Case Management   [x] None   Cardiac Monitoring   [x] Yes   [] No     Antibiotics   [x] Yes   [] No   GI Prophylaxis  (Ex: Protonix, Pepcid, etc,.)   [x] Yes   [] No          DVT Prophylaxis   SCDs:             Win stockings:         [x] Medication (Ex: Lovenox, Eliquis, Brilinta, Coumadin,  Heparin, etc..)   [] Contraindicated   [] No VTE needed       Urinary Catheter             LDAs               Peripheral IV 02/22/17 Right External jugular (Active)   Site Assessment Clean, dry, & intact 2/23/2017  3:50 AM   Phlebitis Assessment 0 2/23/2017  3:50 AM   Infiltration Assessment 0 2/23/2017  3:50 AM   Dressing Status Clean, dry, & intact 2/23/2017  3:50 AM   Dressing Type Tape;Transparent 2/23/2017  3:50 AM   Hub Color/Line Status Flushed 2/23/2017  3:50 AM                      I/Os   Intake/Output Summary (Last 24 hours) at 02/23/17 0611  Last data filed at 02/23/17 0447   Gross per 24 hour   Intake           481.25 ml   Output              950 ml   Net          -468.75 ml         Activity Level Activity Level: Up with Assistance     Activity Assistance: Partial (one person)   Diet Active Orders   Diet    DIET CARDIAC Regular      Purposeful Rounding every 1-2 hour? [x] Yes    Criss Score  Total Score: 1   Bed Alarm (If score 3 or >)   [] Yes    [] Refused (See signed refusal form in chart)   Doc Score  Doc Score: 21       Doc Score (if score 14 or less)   [] PMT consult   [] Nutrition consult   [] Wound Care consult      []  Specialty bed         Influenza Vaccine Received Flu Vaccine for Current Season (usually Sept-March): Yes               Needs prior to discharge:   Home O2 required:    [] Yes   [x] No     If yes, how much O2 required?     Other:    Last Bowel Movement Date: 02/22/17   Readmission Risk Assessment Tool Score Medium Risk            18       Total Score        3 Relationship with PCP    9 More than 1 Admission in calendar year    4 Patient Insurance is Medicare, Medicaid or Self Pay    2 Charlson Comorbidity Score        Criteria that do not apply:    Patient Living Status    Patient Length of Stay > 5       Expected Length of Stay - - -   Actual Length of Stay 1

## 2017-02-23 NOTE — PROGRESS NOTES
Bedside and Verbal shift change report received from Duc Fletcher RN (offgoing nurse). Report included the following information SBAR, Kardex, ED Summary, Intake/Output, MAR, Recent Results and Cardiac Rhythm NSR.   0830: Paged Dr. Hubert Coreas per patient want to leave the hospital -- awaiting call back from MD  8832: Per MD, can discharge patient today. MD will see patient to discharge   8953: Per patient wants to sign AMA, cannot wait for MD - paged Dr. Carmen Calvillo: Per MD will see patient now  6069: Patient left AMA without seeing patient. Removed telemetry and PIV.  Escorted to lobby by nurse manager

## 2017-02-23 NOTE — ED NOTES
TRANSFER - OUT REPORT:    Verbal report given to [facility] RN(name) on [place] [employer]  being transferred to [facility](unit) for routine progression of care       Report consisted of patients Situation, Background, Assessment and   Recommendations(SBAR). Information from the following report(s) SBAR, Kardex, ED Summary and MAR was reviewed with the receiving nurse. Lines:   Peripheral IV 02/22/17 Right External jugular (Active)   Site Assessment Clean, dry, & intact 2/22/2017  5:57 PM   Phlebitis Assessment 0 2/22/2017  5:57 PM   Infiltration Assessment 0 2/22/2017  5:57 PM        Opportunity for questions and clarification was provided.       Patient transported with:   [employer]

## 2017-02-23 NOTE — DISCHARGE SUMMARY
[facility] Discharge- Patient has left against medical advice prior to me getting a chance to evaluate her. Admit: 2/22/17  AMA DC: 2/13/17    Paged by nursing patient would like to leave AMA. Informed I would come evaluate the patient and discharge if medically appropriate. Paged again patient leaving without evaluation. Informed I would see patient now to complete AMA paperwork. Patient left prior to this. See nursing progress note for further documentation.     Signed By: Sarita Bledsoe MD     February 23, 2017

## 2017-02-23 NOTE — PROGRESS NOTES
02/23/17 0754   Vital Signs   Temp 97.8 °F (36.6 °C)   Temp Source Oral   Pulse (Heart Rate) (!) 114   Heart Rate Source Monitor   Resp Rate 18   O2 Sat (%) 93 %   Level of Consciousness Alert   /65   MAP (Calculated) 79   BP 1 Method Automatic   BP 1 Location Right arm   BP Patient Position At rest   MEWS Score 3   Oxygen Therapy   O2 Device Room air   Patient Observation   Hourly Rounds Yes   Activity In bed;Eating; Watching t.v.     Patient resting quietly in bed with no complaint voiced. Will continue to monitor.

## 2017-02-23 NOTE — H&P
[address] [place] [address] [address] [facility]  [phone]    Hospitalist Admission Note      NAME:  Annita King   :   1975   MRN:  [id number]     PCP:  None     Date/Time:  2017 7:31 PM          Subjective:     CHIEF COMPLAINT: dyspnea     HISTORY OF PRESENT ILLNESS:     Ms. Theo Ariza is a 35 y.o.  female who presented to the Emergency Department complaining of dyspnea. She is a vague historian. I have reviewed chart and discussed with ER staff. Patient with hx of recurrent asthma, including admissions and intubation. She has 1 to 3 days of worsening dyspnea with cough. She has a sick contact at home. She continues to smoke, and use heroin. Er workup shows RIVAS and increased WOB that was not relieved with steroids, nebs, and Mg. We will admit her for management. Past Medical History:   Diagnosis Date    Asthma     Bipolar 1 disorder (HCC)     Chronic obstructive pulmonary disease (HCC)     Chronic pain     back, leg    Cocaine abuse     Depression     Hepatitis B     Heroin abuse     HTN (hypertension)     Narcotic abuse     Polysubstance abuse     Sarcoidosis ([facility] [address]     Tobacco abuse         Past Surgical History:   Procedure Laterality Date    HX GYN      HX WISDOM TEETH EXTRACTION         Social History   Substance Use Topics    Smoking status: Current Every Day Smoker     Packs/day: 0.50    Smokeless tobacco: Never Used      Comment: 4 cig. day    Alcohol use Yes        Family History   Problem Relation Age of Onset    Hypertension Father     Hypertension Mother         Allergies   Allergen Reactions    Tomato Swelling     Tongue        Prior to Admission medications    Medication Sig Start Date End Date Taking? Authorizing Provider   cloNIDine HCl (CATAPRES) 0.2 mg tablet Take 1 Tab by mouth two (2) times a day.  17  Yes ANDRZEJ Tolliver   clonazePAM (KLONOPIN) 2 mg tablet Take 1 Tab by mouth every eight (8) hours as needed. Max Daily Amount: 6 mg. 2/6/17  Yes ANDRZEJ Aguilar   albuterol (PROVENTIL HFA, VENTOLIN HFA, PROAIR HFA) 90 mcg/actuation inhaler Take 2 Puffs by inhalation every four (4) hours as needed for Wheezing or Shortness of Breath. 1/11/17  Yes Taylor Mauricio MD   albuterol-ipratropium (DUO-NEB) 2.5 mg-0.5 mg/3 ml nebu 3 mL by Nebulization route every six (6) hours as needed. 1/11/17  Yes Taylor Mauricio MD   fluticasone-vilanterol (BREO ELLIPTA) 200-25 mcg/dose inhaler Take 1 Puff by inhalation daily. 1/11/17  Yes Taylor Mauricio MD   cloNIDine HCl (CATAPRES) 0.2 mg tablet Take 0.2 mg by mouth three (3) times daily. Analy Rivera MD   QUEtiapine (SEROQUEL) 400 mg tablet Take 800 mg by mouth nightly. Analy Rivera MD   lithium carbonate 300 mg tablet Take 300 mg by mouth two (2) times a day. Analy Rivera MD   sertraline (ZOLOFT) 100 mg tablet Take 100 mg by mouth daily. Analy Rivera MD   prazosin (MINIPRESS) 2 mg capsule Take 2 mg by mouth nightly. Analy Rivera MD   clonazePAM (KLONOPIN) 2 mg tablet Take 2 mg by mouth two (2) times a day. Analy Rivera MD   QUEtiapine (SEROQUEL) 400 mg tablet Take 2 Tabs by mouth nightly. 2/6/17   ANDRZEJ Aguilar   HYDROcodone-acetaminophen (NORCO) 5-325 mg per tablet Take 1 Tab by mouth every four (4) hours as needed for Pain. Max Daily Amount: 6 Tabs. 2/6/17   ANDRZEJ Aguilar   guaiFENesin-codeine (ROBITUSSIN AC) 100-10 mg/5 mL solution Take 5 mL by mouth four (4) times daily as needed for Cough. Historical Provider   Nebulizer & Compressor machine 1 Each by Does Not Apply route daily.  1/11/17   Taylor Mauricio MD       Review of Systems: Vague  (bold if positive, if negative)    Gen:  Eyes:  ENT:  CVS:  Pulm: wheezingGI:    :    MS:  Skin:  Psych:  Endo:    Hem:  Renal:    Neuro:        Objective:      VITALS:    Vital signs reviewed; most recent are:    Visit Vitals    /64    Pulse (!) 123    Temp 98.3 °F (36.8 °C)    Resp 20    Ht 5' 4\" (1.626 m)    Wt 67.9 kg (149 lb 11.1 oz)    SpO2 95%    BMI 25.69 kg/m2     SpO2 Readings from Last 6 Encounters:   02/22/17 95%   02/11/17 98%   02/10/17 98%   02/09/17 100%   02/06/17 100%   01/27/17 95%        No intake or output data in the 24 hours ending 02/22/17 1931     Exam:     Physical Exam:    Gen:  Frail disheveled, in mild acute distress  HEENT:  Pink conjunctivae, PERRL, hearing intact to voice, moist mucous membranes  Neck:  Supple, without masses, thyroid non-tender  Resp:  Mild accessory muscle use, bilateral breath sounds with wheezes   Card:  No murmurs, tachycardic S1, S2 without thrills, bruits or peripheral edema  Abd:  Soft, non-tender, non-distended, reduced bowel sounds are present, no mass  Lymph:  No cervical or inguinal adenopathy  Musc:  No cyanosis or clubbing  Skin:  No rashes or ulcers, skin turgor is good  Neuro:  Cranial nerves are grossly intact, mild motor weakness, follows commands vaguely  Psych:  Poor insight, oriented to person, place     Labs:    Recent Labs      02/22/17   1825   WBC  5.0   HGB  11.1*   HCT  33.5*   PLT  273     Recent Labs      02/22/17   1825   NA  139   K  3.7   CL  105   CO2  25   GLU  122*   BUN  10   CREA  0.79   CA  8.5   ALB  3.0*   TBILI  0.3   SGOT  18   ALT  17     Lab Results   Component Value Date/Time    Glucose (POC) 119 01/25/2017 06:01 AM    Glucose (POC) 131 01/10/2017 04:48 PM     No results for input(s): PH, PCO2, PO2, HCO3, FIO2 in the last 72 hours. No results for input(s): INR in the last 72 hours. No lab exists for component: INREXT       Assessment and Plan:      Asthma exacerbation / Chronic obstructive pulmonary disease with exacerbation / Sarcoidosis - POA, unclear trigger, but likely viral URI based on Hx of sick contact. IV steroids. Scheduled duonebs. Continue Breo. Pulm consult. Oxygen if needed. Polysubstance abuse / Heroin abuse / Cocaine abuse / Narcotic abuse - Check drug screen.   Check hepatitis and HIV screen. Advise cessation. Goody Bag. HTN (hypertension) / Sinus tachycardia - POA, pulse driven by albuterol for now. Continue clonidine and prozosin    Bipolar 1 disorder / Depression - This leads to abuse and non-compliance, which are likely to lead to her death soon. Needs close outpatient follow up with psych. Consult psych. Continue lonazepam, clonidine, quetiapine, lithium, sertraline, prazosin, and check Li level. Anemia - Monitor with hydration    Chronic pain - Tylenol prn. No narcotics    Hepatitis B - Per chart, but no serology testing in chart. Tobacco abuse - Advised cessation. Spent 5 minutes in addition to all other time spent on admission, noted below.       Telemetry reviewed:   normal sinus rhythm    Risk of deterioration: high      Total time spent with patient: [address] [facility] discussed with: Patient, Nursing Staff and >50% of time spent in counseling and coordination of care    Discussed:  Care Plan       ___________________________________________________    Attending Physician: Pam Ocasio MD

## 2017-02-23 NOTE — ED NOTES
Bedside shift change report given to Martin Bhatia (oncoming nurse) by Mel Barcenas (offgoing nurse). Report included the following information SBAR, Kardex, ED Summary, [facility] and Recent Results.

## 2017-02-24 LAB
BACTERIA SPEC CULT: NORMAL
CC UR VC: NORMAL
HAV IGM SERPL QL IA: NONREACTIVE
HBV CORE IGM SER QL: ABNORMAL
HBV SURFACE AG SER QL: 0.12 INDEX
HBV SURFACE AG SER QL: NEGATIVE
HCV AB SER IA-ACNC: >11.9 INDEX
HCV AB SERPL QL IA: REACTIVE
HCV COMMENT,HCGAC: ABNORMAL
HIV 1+2 AB+HIV1 P24 AG SERPL QL IA: NONREACTIVE
HIV12 RESULT COMMENT, HHIVC: NORMAL
SERVICE CMNT-IMP: NORMAL
SP1: ABNORMAL
SP2: ABNORMAL
SP3: ABNORMAL

## 2017-02-26 ENCOUNTER — HOSPITAL ENCOUNTER (INPATIENT)
Age: 42
LOS: 1 days | Discharge: LEFT BEFORE DISCHARGE | DRG: 203 | End: 2017-02-27
Attending: EMERGENCY MEDICINE | Admitting: INTERNAL MEDICINE
Payer: MEDICAID

## 2017-02-26 ENCOUNTER — APPOINTMENT (OUTPATIENT)
Dept: GENERAL RADIOLOGY | Age: 42
DRG: 203 | End: 2017-02-26
Attending: EMERGENCY MEDICINE
Payer: MEDICAID

## 2017-02-26 DIAGNOSIS — E87.6 HYPOKALEMIA: ICD-10-CM

## 2017-02-26 DIAGNOSIS — J45.51 SEVERE PERSISTENT ASTHMA WITH ACUTE EXACERBATION: Primary | ICD-10-CM

## 2017-02-26 DIAGNOSIS — D64.9 CHRONIC ANEMIA: ICD-10-CM

## 2017-02-26 LAB
ALBUMIN SERPL BCP-MCNC: 3.3 G/DL (ref 3.5–5)
ALBUMIN/GLOB SERPL: 0.9 {RATIO} (ref 1.1–2.2)
ALP SERPL-CCNC: 101 U/L (ref 45–117)
ALT SERPL-CCNC: 31 U/L (ref 12–78)
ANION GAP BLD CALC-SCNC: 12 MMOL/L (ref 5–15)
ARTERIAL PATENCY WRIST A: YES
AST SERPL W P-5'-P-CCNC: 40 U/L (ref 15–37)
BASE EXCESS BLDA CALC-SCNC: 0 MMOL/L
BASOPHILS # BLD AUTO: 0 K/UL (ref 0–0.1)
BASOPHILS # BLD: 0 % (ref 0–1)
BDY SITE: ABNORMAL
BILIRUB SERPL-MCNC: 0.4 MG/DL (ref 0.2–1)
BUN SERPL-MCNC: 9 MG/DL (ref 6–20)
BUN/CREAT SERPL: 12 (ref 12–20)
CALCIUM SERPL-MCNC: 8 MG/DL (ref 8.5–10.1)
CHLORIDE SERPL-SCNC: 108 MMOL/L (ref 97–108)
CO2 SERPL-SCNC: 23 MMOL/L (ref 21–32)
CREAT SERPL-MCNC: 0.76 MG/DL (ref 0.55–1.02)
EOSINOPHIL # BLD: 0.1 K/UL (ref 0–0.4)
EOSINOPHIL NFR BLD: 1 % (ref 0–7)
ERYTHROCYTE [DISTWIDTH] IN BLOOD BY AUTOMATED COUNT: 16.1 % (ref 11.5–14.5)
GLOBULIN SER CALC-MCNC: 3.6 G/DL (ref 2–4)
GLUCOSE SERPL-MCNC: 81 MG/DL (ref 65–100)
HCG SERPL QL: NEGATIVE
HCO3 BLDA-SCNC: 23 MMOL/L (ref 22–26)
HCT VFR BLD AUTO: 32 % (ref 35–47)
HGB BLD-MCNC: 10.6 G/DL (ref 11.5–16)
LYMPHOCYTES # BLD AUTO: 27 % (ref 12–49)
LYMPHOCYTES # BLD: 1.7 K/UL (ref 0.8–3.5)
MAGNESIUM SERPL-MCNC: 2.2 MG/DL (ref 1.6–2.4)
MCH RBC QN AUTO: 28.8 PG (ref 26–34)
MCHC RBC AUTO-ENTMCNC: 33.1 G/DL (ref 30–36.5)
MCV RBC AUTO: 87 FL (ref 80–99)
MONOCYTES # BLD: 0.8 K/UL (ref 0–1)
MONOCYTES NFR BLD AUTO: 12 % (ref 5–13)
NEUTS SEG # BLD: 3.7 K/UL (ref 1.8–8)
NEUTS SEG NFR BLD AUTO: 60 % (ref 32–75)
PCO2 BLDA: 34 MMHG (ref 35–45)
PH BLDA: 7.45 [PH] (ref 7.35–7.45)
PLATELET # BLD AUTO: 331 K/UL (ref 150–400)
PO2 BLDA: 79 MMHG (ref 80–100)
POTASSIUM SERPL-SCNC: 3.1 MMOL/L (ref 3.5–5.1)
PROT SERPL-MCNC: 6.9 G/DL (ref 6.4–8.2)
RBC # BLD AUTO: 3.68 M/UL (ref 3.8–5.2)
SAO2 % BLD: 96 % (ref 92–97)
SAO2% DEVICE SAO2% SENSOR NAME: ABNORMAL
SODIUM SERPL-SCNC: 143 MMOL/L (ref 136–145)
SPECIMEN SITE: ABNORMAL
WBC # BLD AUTO: 6.2 K/UL (ref 3.6–11)

## 2017-02-26 PROCEDURE — 77030029684 HC NEB SM VOL KT MONA -A

## 2017-02-26 PROCEDURE — 74011250636 HC RX REV CODE- 250/636: Performed by: INTERNAL MEDICINE

## 2017-02-26 PROCEDURE — 74011000250 HC RX REV CODE- 250: Performed by: INTERNAL MEDICINE

## 2017-02-26 PROCEDURE — 65270000029 HC RM PRIVATE

## 2017-02-26 PROCEDURE — 74011250637 HC RX REV CODE- 250/637: Performed by: INTERNAL MEDICINE

## 2017-02-26 PROCEDURE — 36415 COLL VENOUS BLD VENIPUNCTURE: CPT | Performed by: EMERGENCY MEDICINE

## 2017-02-26 PROCEDURE — 99285 EMERGENCY DEPT VISIT HI MDM: CPT

## 2017-02-26 PROCEDURE — 82803 BLOOD GASES ANY COMBINATION: CPT | Performed by: EMERGENCY MEDICINE

## 2017-02-26 PROCEDURE — 80053 COMPREHEN METABOLIC PANEL: CPT | Performed by: EMERGENCY MEDICINE

## 2017-02-26 PROCEDURE — 94640 AIRWAY INHALATION TREATMENT: CPT

## 2017-02-26 PROCEDURE — 96372 THER/PROPH/DIAG INJ SC/IM: CPT

## 2017-02-26 PROCEDURE — 94644 CONT INHLJ TX 1ST HOUR: CPT

## 2017-02-26 PROCEDURE — 74011250636 HC RX REV CODE- 250/636: Performed by: EMERGENCY MEDICINE

## 2017-02-26 PROCEDURE — 71010 XR CHEST PORT: CPT

## 2017-02-26 PROCEDURE — 36600 WITHDRAWAL OF ARTERIAL BLOOD: CPT | Performed by: EMERGENCY MEDICINE

## 2017-02-26 PROCEDURE — 84703 CHORIONIC GONADOTROPIN ASSAY: CPT | Performed by: EMERGENCY MEDICINE

## 2017-02-26 PROCEDURE — 74011000250 HC RX REV CODE- 250

## 2017-02-26 PROCEDURE — 83735 ASSAY OF MAGNESIUM: CPT | Performed by: EMERGENCY MEDICINE

## 2017-02-26 PROCEDURE — 85025 COMPLETE CBC W/AUTO DIFF WBC: CPT | Performed by: EMERGENCY MEDICINE

## 2017-02-26 PROCEDURE — 74011000250 HC RX REV CODE- 250: Performed by: EMERGENCY MEDICINE

## 2017-02-26 RX ORDER — IPRATROPIUM BROMIDE AND ALBUTEROL SULFATE 2.5; .5 MG/3ML; MG/3ML
SOLUTION RESPIRATORY (INHALATION)
Status: COMPLETED
Start: 2017-02-26 | End: 2017-02-26

## 2017-02-26 RX ORDER — SERTRALINE HYDROCHLORIDE 50 MG/1
100 TABLET, FILM COATED ORAL DAILY
Status: DISCONTINUED | OUTPATIENT
Start: 2017-02-27 | End: 2017-02-27 | Stop reason: HOSPADM

## 2017-02-26 RX ORDER — ACETAMINOPHEN 325 MG/1
650 TABLET ORAL
Status: DISCONTINUED | OUTPATIENT
Start: 2017-02-26 | End: 2017-02-27 | Stop reason: HOSPADM

## 2017-02-26 RX ORDER — IPRATROPIUM BROMIDE AND ALBUTEROL SULFATE 2.5; .5 MG/3ML; MG/3ML
3 SOLUTION RESPIRATORY (INHALATION)
Status: DISCONTINUED | OUTPATIENT
Start: 2017-02-26 | End: 2017-02-27 | Stop reason: HOSPADM

## 2017-02-26 RX ORDER — AZITHROMYCIN 250 MG/1
500 TABLET, FILM COATED ORAL DAILY
Status: DISCONTINUED | OUTPATIENT
Start: 2017-02-27 | End: 2017-02-27 | Stop reason: HOSPADM

## 2017-02-26 RX ORDER — CLONIDINE HYDROCHLORIDE 0.1 MG/1
0.2 TABLET ORAL 2 TIMES DAILY
Status: DISCONTINUED | OUTPATIENT
Start: 2017-02-26 | End: 2017-02-27 | Stop reason: HOSPADM

## 2017-02-26 RX ORDER — POTASSIUM CHLORIDE 750 MG/1
20 TABLET, FILM COATED, EXTENDED RELEASE ORAL
Status: COMPLETED | OUTPATIENT
Start: 2017-02-26 | End: 2017-02-26

## 2017-02-26 RX ORDER — IPRATROPIUM BROMIDE AND ALBUTEROL SULFATE 2.5; .5 MG/3ML; MG/3ML
3 SOLUTION RESPIRATORY (INHALATION)
Status: COMPLETED | OUTPATIENT
Start: 2017-02-26 | End: 2017-02-26

## 2017-02-26 RX ORDER — GABAPENTIN 800 MG/1
800 TABLET ORAL 3 TIMES DAILY
Status: ON HOLD | COMMUNITY
End: 2017-04-19

## 2017-02-26 RX ORDER — DOCUSATE SODIUM 100 MG/1
100 CAPSULE, LIQUID FILLED ORAL
Status: DISCONTINUED | OUTPATIENT
Start: 2017-02-26 | End: 2017-02-27 | Stop reason: HOSPADM

## 2017-02-26 RX ORDER — LITHIUM CARBONATE 300 MG
300 TABLET ORAL 2 TIMES DAILY
Status: DISCONTINUED | OUTPATIENT
Start: 2017-02-26 | End: 2017-02-26

## 2017-02-26 RX ORDER — CLONAZEPAM 2 MG/1
5 TABLET ORAL 2 TIMES DAILY
COMMUNITY
End: 2017-02-27 | Stop reason: ALTCHOICE

## 2017-02-26 RX ORDER — QUETIAPINE FUMARATE 200 MG/1
800 TABLET, FILM COATED ORAL
Status: DISCONTINUED | OUTPATIENT
Start: 2017-02-26 | End: 2017-02-26 | Stop reason: SDUPTHER

## 2017-02-26 RX ORDER — ALBUTEROL SULFATE 2.5 MG/.5ML
10 SOLUTION RESPIRATORY (INHALATION) CONTINUOUS
Status: DISCONTINUED | OUTPATIENT
Start: 2017-02-26 | End: 2017-02-26

## 2017-02-26 RX ORDER — CLONIDINE HYDROCHLORIDE 0.3 MG/1
0.3 TABLET ORAL 3 TIMES DAILY
Status: ON HOLD | COMMUNITY
End: 2017-04-19

## 2017-02-26 RX ORDER — QUETIAPINE FUMARATE 100 MG/1
800 TABLET, FILM COATED ORAL
Status: DISCONTINUED | OUTPATIENT
Start: 2017-02-26 | End: 2017-02-27 | Stop reason: HOSPADM

## 2017-02-26 RX ORDER — SODIUM CHLORIDE 0.9 % (FLUSH) 0.9 %
5-10 SYRINGE (ML) INJECTION AS NEEDED
Status: DISCONTINUED | OUTPATIENT
Start: 2017-02-26 | End: 2017-02-27 | Stop reason: HOSPADM

## 2017-02-26 RX ORDER — SODIUM CHLORIDE 0.9 % (FLUSH) 0.9 %
5-10 SYRINGE (ML) INJECTION EVERY 8 HOURS
Status: DISCONTINUED | OUTPATIENT
Start: 2017-02-26 | End: 2017-02-27 | Stop reason: HOSPADM

## 2017-02-26 RX ORDER — MAGNESIUM SULFATE 1 G/100ML
1 INJECTION INTRAVENOUS ONCE
Status: COMPLETED | OUTPATIENT
Start: 2017-02-26 | End: 2017-02-26

## 2017-02-26 RX ORDER — IPRATROPIUM BROMIDE AND ALBUTEROL SULFATE 2.5; .5 MG/3ML; MG/3ML
3 SOLUTION RESPIRATORY (INHALATION)
Status: DISCONTINUED | OUTPATIENT
Start: 2017-02-26 | End: 2017-02-26

## 2017-02-26 RX ORDER — ONDANSETRON 2 MG/ML
4 INJECTION INTRAMUSCULAR; INTRAVENOUS
Status: DISCONTINUED | OUTPATIENT
Start: 2017-02-26 | End: 2017-02-27 | Stop reason: HOSPADM

## 2017-02-26 RX ORDER — CLONAZEPAM 1 MG/1
2 TABLET ORAL 2 TIMES DAILY
Status: DISCONTINUED | OUTPATIENT
Start: 2017-02-26 | End: 2017-02-27 | Stop reason: HOSPADM

## 2017-02-26 RX ORDER — HYDROCODONE BITARTRATE AND ACETAMINOPHEN 5; 325 MG/1; MG/1
1 TABLET ORAL
Status: DISCONTINUED | OUTPATIENT
Start: 2017-02-26 | End: 2017-02-27 | Stop reason: HOSPADM

## 2017-02-26 RX ORDER — LITHIUM CARBONATE 300 MG/1
300 CAPSULE ORAL 2 TIMES DAILY
Status: DISCONTINUED | OUTPATIENT
Start: 2017-02-26 | End: 2017-02-27 | Stop reason: HOSPADM

## 2017-02-26 RX ORDER — FLUTICASONE FUROATE AND VILANTEROL 200; 25 UG/1; UG/1
1 POWDER RESPIRATORY (INHALATION) DAILY
Status: DISCONTINUED | OUTPATIENT
Start: 2017-02-27 | End: 2017-02-27 | Stop reason: HOSPADM

## 2017-02-26 RX ORDER — AMLODIPINE BESYLATE 5 MG/1
5 TABLET ORAL DAILY
Status: DISCONTINUED | OUTPATIENT
Start: 2017-02-26 | End: 2017-02-27 | Stop reason: HOSPADM

## 2017-02-26 RX ORDER — ENOXAPARIN SODIUM 100 MG/ML
40 INJECTION SUBCUTANEOUS EVERY 24 HOURS
Status: DISCONTINUED | OUTPATIENT
Start: 2017-02-26 | End: 2017-02-27 | Stop reason: HOSPADM

## 2017-02-26 RX ORDER — HYDRALAZINE HYDROCHLORIDE 20 MG/ML
20 INJECTION INTRAMUSCULAR; INTRAVENOUS
Status: DISCONTINUED | OUTPATIENT
Start: 2017-02-26 | End: 2017-02-27 | Stop reason: HOSPADM

## 2017-02-26 RX ADMIN — IPRATROPIUM BROMIDE AND ALBUTEROL SULFATE 3 ML: 2.5; .5 SOLUTION RESPIRATORY (INHALATION) at 10:06

## 2017-02-26 RX ADMIN — LITHIUM CARBONATE 300 MG: 300 CAPSULE, GELATIN COATED ORAL at 21:03

## 2017-02-26 RX ADMIN — MAGNESIUM SULFATE IN DEXTROSE 1 G: 10 INJECTION, SOLUTION INTRAVENOUS at 21:04

## 2017-02-26 RX ADMIN — METHYLPREDNISOLONE SODIUM SUCCINATE 125 MG: 125 INJECTION, POWDER, FOR SOLUTION INTRAMUSCULAR; INTRAVENOUS at 10:40

## 2017-02-26 RX ADMIN — ALBUTEROL SULFATE 10 MG/HR: 2.5 SOLUTION RESPIRATORY (INHALATION) at 10:30

## 2017-02-26 RX ADMIN — AMLODIPINE BESYLATE 5 MG: 5 TABLET ORAL at 16:32

## 2017-02-26 RX ADMIN — CLONAZEPAM 2 MG: 1 TABLET ORAL at 17:57

## 2017-02-26 RX ADMIN — CLONIDINE HYDROCHLORIDE 0.2 MG: 0.1 TABLET ORAL at 17:57

## 2017-02-26 RX ADMIN — ONDANSETRON HYDROCHLORIDE 4 MG: 2 INJECTION, SOLUTION INTRAMUSCULAR; INTRAVENOUS at 17:57

## 2017-02-26 RX ADMIN — IPRATROPIUM BROMIDE AND ALBUTEROL SULFATE 3 ML: .5; 3 SOLUTION RESPIRATORY (INHALATION) at 19:35

## 2017-02-26 RX ADMIN — METHYLPREDNISOLONE SODIUM SUCCINATE 40 MG: 40 INJECTION, POWDER, FOR SOLUTION INTRAMUSCULAR; INTRAVENOUS at 17:57

## 2017-02-26 RX ADMIN — QUETIAPINE FUMARATE 800 MG: 100 TABLET, FILM COATED ORAL at 21:03

## 2017-02-26 RX ADMIN — Medication 10 ML: at 21:05

## 2017-02-26 RX ADMIN — Medication 10 ML: at 17:58

## 2017-02-26 RX ADMIN — POTASSIUM CHLORIDE 20 MEQ: 750 TABLET, FILM COATED, EXTENDED RELEASE ORAL at 17:57

## 2017-02-26 RX ADMIN — HYDROCODONE BITARTRATE AND ACETAMINOPHEN 1 TABLET: 5; 325 TABLET ORAL at 17:57

## 2017-02-26 RX ADMIN — IPRATROPIUM BROMIDE AND ALBUTEROL SULFATE 3 ML: .5; 3 SOLUTION RESPIRATORY (INHALATION) at 10:06

## 2017-02-26 RX ADMIN — IPRATROPIUM BROMIDE AND ALBUTEROL SULFATE 3 ML: .5; 3 SOLUTION RESPIRATORY (INHALATION) at 23:39

## 2017-02-26 NOTE — ED NOTES
TRANSFER - OUT REPORT:    Verbal report given to Russ Feliciano RN on [address] [facility]  being transferred to  21  (Renal) for routine progression of care       Report consisted of patients Situation, Background, Assessment and   Recommendations(SBAR). Information from the following report(s) SBAR, ED Summary, Intake/Output, MAR and Recent Results was reviewed with the receiving nurse. Lines:       Opportunity for questions and clarification was provided.       Patient transported with:   [employer]

## 2017-02-26 NOTE — PROGRESS NOTES
TRANSFER - IN REPORT:    Verbal report received from [address] [place], [place] [address]dior Arredondo RN(name) on [address] [place] [address]  being received from ER(unit) for routine progression of care      Report consisted of patients Situation, Background, Assessment and   Recommendations(SBAR). Information from the following report(s) SBAR, Kardex, ED Summary, Intake/Output, MAR, Recent Results and Cardiac Rhythm NSR was reviewed with the receiving nurse. Opportunity for questions and clarification was provided. Assessment completed upon patients arrival to unit and care assumed.

## 2017-02-26 NOTE — ED NOTES
Pt reports not much improvement after hour long neb. Pt continues to have audible wheezing at this time. Pt states she was admitted for three days last week for the same thing. Pt has no requests at this time.

## 2017-02-26 NOTE — H&P
Hospitalist Admission Note    NAME: Ozzie Campuzano   :  1975   MRN:  [id number]     Date/Time:  2017 3:33 PM    Patient PCP: None  ________________________________________________________________________    My assessment of this patient's clinical condition and my plan of care is as follows. Assessment / Plan:  Asthma exacerbation, COPD, sarcoidosis  -significant symptoms and very poor airflow on exam, not improving despite nebulizer treatments and prednisone at home, will likely need few days of inpatient treatment  -recently admitted for exacerbation and left AMA  -start higher dose IV steroids  -nebs every 4 hours  -azithromycin for possible infection  -will order magnesium  -continue home inhalers  -check daily peak flow    Hypertension  -will reorder home medications  -add amlodipine    Bipolar disorder  -lithium  -seroquel    Polysubstance abuse  -check drug screen    History of hep B, positive hep C ab  -hep B ab equivocal on last lab work  -hep C ab positive on last admission, will check HCV RNA to eval for active infection      Code Status: full  Surrogate Decision Maker: mother    DVT Prophylaxis: lovenox  GI Prophylaxis: not indicated    Baseline: independent        Subjective:   CHIEF COMPLAINT: sob    HISTORY OF PRESENT ILLNESS:     Emiliana Ngo is a 35 y.o.  female with history of asthma, bipolar d/o, chronic pain, depression, polysubstance abuse who presents with worsening wheezing. Patient was admitted here this past week for same symptoms however left AMA the next day. She initially felt better after discharge then began feeling worse today. Has been having chills, no cough. Significant shortness of breath worse with exertion. She tried nebulizer rx at home and home inhalers without improvement, had been taking prednisone 20 mg for the last 2 days. We were asked to admit for work up and evaluation of the above problems.      Past Medical History: Diagnosis Date    Asthma     Bipolar 1 disorder (HCC)     Chronic obstructive pulmonary disease (HCC)     Chronic pain     back, leg    Cocaine abuse     Depression     Hepatitis B     Heroin abuse     HTN (hypertension)     Narcotic abuse     Polysubstance abuse     Sarcoidosis ([address]     Tobacco abuse         Past Surgical History:   Procedure Laterality Date    HX GYN      HX WISDOM TEETH EXTRACTION         Social History   Substance Use Topics    Smoking status: Current Every Day Smoker     Packs/day: 0.50    Smokeless tobacco: Never Used      Comment: 4 cig. day    Alcohol use Yes        Family History   Problem Relation Age of Onset    Hypertension Father     Hypertension Mother      Allergies   Allergen Reactions    Tomato Swelling     Tongue        Prior to Admission medications    Medication Sig Start Date End Date Taking? Authorizing Provider   cloNIDine HCl (CATAPRES) 0.2 mg tablet Take 0.2 mg by mouth three (3) times daily. Analy Rivera MD   QUEtiapine (SEROQUEL) 400 mg tablet Take 800 mg by mouth nightly. Analy Rivera MD   lithium carbonate 300 mg tablet Take 300 mg by mouth two (2) times a day. Analy Rivera MD   sertraline (ZOLOFT) 100 mg tablet Take 100 mg by mouth daily. Analy Rviera MD   prazosin (MINIPRESS) 2 mg capsule Take 2 mg by mouth nightly. Analy Rivera MD   clonazePAM (KLONOPIN) 2 mg tablet Take 2 mg by mouth two (2) times a day. Analy Rivera MD   cloNIDine HCl (CATAPRES) 0.2 mg tablet Take 1 Tab by mouth two (2) times a day. 2/6/17   ANDRZEJ Rivera   QUEtiapine (SEROQUEL) 400 mg tablet Take 2 Tabs by mouth nightly. 2/6/17   ANDRZEJ Rivera   clonazePAM (KLONOPIN) 2 mg tablet Take 1 Tab by mouth every eight (8) hours as needed. Max Daily Amount: 6 mg. 2/6/17   ANDRZEJ Rivera   HYDROcodone-acetaminophen [facility]) 5-325 mg per tablet Take 1 Tab by mouth every four (4) hours as needed for Pain. Max Daily Amount: 6 Tabs.  2/6/17   ANDRZEJ Rivera guaiFENesin-codeine (ROBITUSSIN AC) 100-10 mg/5 mL solution Take 5 mL by mouth four (4) times daily as needed for Cough. Historical Provider   albuterol (PROVENTIL HFA, VENTOLIN HFA, PROAIR HFA) 90 mcg/actuation inhaler Take 2 Puffs by inhalation every four (4) hours as needed for Wheezing or Shortness of Breath. 1/11/17   Warden Aristides MD   albuterol-ipratropium (DUO-NEB) 2.5 mg-0.5 mg/3 ml nebu 3 mL by Nebulization route every six (6) hours as needed. 1/11/17   Warden Aristides MD   Nebulizer & Compressor machine 1 Each by Does Not Apply route daily. 1/11/17   Warden Aristides MD   fluticasone-vilanterol (BREO ELLIPTA) 545-85 mcg/dose inhaler Take 1 Puff by inhalation daily.  1/11/17   Warden Aristides MD       REVIEW OF SYSTEMS:       Total of 12 systems reviewed as follows:         General: no fever, no chills, no sweats, no generalized weakness, no weight loss, no weight gain, no loss of appetite   Eyes: no blurred vision, no eye pain, no loss of vision, no double vision  ENT: no rhinorrhea, no pharyngitis   Respiratory: + cough, no sputum production, + SOB, + RIVAS, + wheezing, no pleuritic pain   Cardiology: no chest pain, no palpitations, no orthopnea, no PND, no edema, no syncope   Gastrointestinal: no abdominal pain, no N/V, no diarrhea, no dysphagia, no constipation, no bleeding   Genitourinary: no frequency, no urgency, no dysuria, no hematuria, no incontinence   Muskuloskeletal : no arthralgia, no myalgia, no back pain  Hematology: no easy bruising, no nose or gum bleeding, no lymphadenopathy   Dermatological: no rash, no ulceration, no pruritis, no color change / jaundice  Endocrine: no hot flashes, no polydipsia   Neurological: no headache, no dizziness, no confusion, no focal weakness, no paresthesia, no speech difficulties, no memory loss, no gait difficulty  Psychological: no anxiety, no depression, no agitation      Objective:   VITALS:    Patient Vitals for the past 12 hrs:   Temp Pulse Resp BP SpO2   02/26/17 1030 - 81 15 (!) 147/95 98 %   02/26/17 1015 - 81 15 139/89 98 %   02/26/17 1006 - 75 - (!) 146/91 99 %   02/26/17 0958 98.2 °F (36.8 °C) 98 (!) 41 (!) 159/102 99 %         PHYSICAL EXAM:    General:    Alert, cooperative, some distress, appears stated age. HEENT: Atraumatic, anicteric sclerae, pink conjunctivae     No oral ulcers, oral mucosa moist, throat clear, dentition poor  Neck:  Supple, symmetrical  Lungs:   Very poor airflow, wheezing and rhonchi in all lung fields  Chest wall:  No tenderness, no accessory muscle use. Heart:   Regular rhythm, no murmur, no edema  Abdomen:   Soft, non-tender, not distended, bowel sounds normal  Extremities: No cyanosis, no clubbing, warm, well perfused, radial pulse 2+  Skin:     Not pale, not jaundiced, no rashes   Psych:  Good insight, not depressed, not anxious or agitated.   Neurologic: EOMs intact, face symmetric, no aphasia or slurred speech, strength 5/5 in BUE, 5/5 in BLE, sensation grossly intact, alert and oriented x 4.     _______________________________________________________________________  Care Plan discussed with:    Comments   Patient x    Family      RN     Care Manager                    Consultant:      _______________________________________________________________________  Expected  Disposition:   Home with Family x   HH/PT/OT/RN    SNF/LTC    MAEGAN    ________________________________________________________________________  TOTAL TIME:  61 Minutes    Critical Care Provided     Minutes non procedure based      Comments     Reviewed previous records   >50% of visit spent in counseling and coordination of care  Discussion with patient and/or family and questions answered       ________________________________________________________________________  Amanda Brown MD    Procedures: see electronic medical records for all procedures/Xrays and details which were not copied into this note but were reviewed prior to creation of Plan.    LAB DATA REVIEWED:    Recent Results (from the past 24 hour(s))   CBC WITH AUTOMATED DIFF    Collection Time: 02/26/17 10:46 AM   Result Value Ref Range    WBC 6.2 3.6 - 11.0 K/uL    RBC 3.68 (L) 3.80 - 5.20 M/uL    HGB 10.6 (L) 11.5 - 16.0 g/dL    HCT 32.0 (L) 35.0 - 47.0 %    MCV 87.0 80.0 - 99.0 FL    MCH 28.8 26.0 - 34.0 PG    MCHC 33.1 30.0 - 36.5 g/dL    RDW 16.1 (H) 11.5 - 14.5 %    PLATELET 518 108 - 142 K/uL    NEUTROPHILS 60 32 - 75 %    LYMPHOCYTES 27 12 - 49 %    MONOCYTES 12 5 - 13 %    EOSINOPHILS 1 0 - 7 %    BASOPHILS 0 0 - 1 %    ABS. NEUTROPHILS 3.7 1.8 - 8.0 K/UL    ABS. LYMPHOCYTES 1.7 0.8 - 3.5 K/UL    ABS. MONOCYTES 0.8 0.0 - 1.0 K/UL    ABS. EOSINOPHILS 0.1 0.0 - 0.4 K/UL    ABS. BASOPHILS 0.0 0.0 - 0.1 K/UL   METABOLIC PANEL, COMPREHENSIVE    Collection Time: 02/26/17 10:46 AM   Result Value Ref Range    Sodium 143 136 - 145 mmol/L    Potassium 3.1 (L) 3.5 - 5.1 mmol/L    Chloride 108 97 - 108 mmol/L    CO2 23 21 - 32 mmol/L    Anion gap 12 5 - 15 mmol/L    Glucose 81 65 - 100 mg/dL    BUN 9 6 - 20 MG/DL    Creatinine 0.76 0.55 - 1.02 MG/DL    BUN/Creatinine ratio 12 12 - 20      GFR est AA >60 >60 ml/min/1.73m2    GFR est non-AA >60 >60 ml/min/1.73m2    Calcium 8.0 (L) 8.5 - 10.1 MG/DL    Bilirubin, total 0.4 0.2 - 1.0 MG/DL    ALT (SGPT) 31 12 - 78 U/L    AST (SGOT) 40 (H) 15 - 37 U/L    Alk.  phosphatase 101 45 - 117 U/L    Protein, total 6.9 6.4 - 8.2 g/dL    Albumin 3.3 (L) 3.5 - 5.0 g/dL    Globulin 3.6 2.0 - 4.0 g/dL    A-G Ratio 0.9 (L) 1.1 - 2.2     MAGNESIUM    Collection Time: 02/26/17 10:46 AM   Result Value Ref Range    Magnesium 2.2 1.6 - 2.4 mg/dL

## 2017-02-26 NOTE — ED NOTES
Spoke to Dr. Gabriel Ghotra. Ok for vitals per unit protocol, not q2, and orders will be placed for low potassium.

## 2017-02-26 NOTE — ED PROVIDER NOTES
HPI Comments: David Dunn is a 35 y.o. female with PMHx significant for Sarcoidosis, Asthma, COPD, and illicit drug abuse who presents ambulatory to ED [facility] ED with difficulty breathing. Pt was recently admitted for acute exacerbation of her asthma on 2/22/17 but left AMA 2/23/17. Pt is back into the ED with severe difficulty breathing. Pt notes her last intubation was about a year ago. Pt does have a nebulizer at home and notes no relief after using it. She also notes a dry cough. Pt is on 20 mg of steroids every day and has been taking them as prescribed. Pt is a known user of cocaine, heroin, and other narcotics but she says she has not used any recently. Pt specifically denies any fevers, leg swelling, or any other acute symptoms. PCP: None    Social Hx: + tobacco (0.5 ppd), +EtOH, + illicit drugs (cocaine, heroin)    There are no other complaints, changes or physical findings at this time. The history is provided by the patient. No  was used. Past Medical History:   Diagnosis Date    Asthma     Bipolar 1 disorder (HCC)     Chronic obstructive pulmonary disease (HCC)     Chronic pain     back, leg    Cocaine abuse     Depression     Hepatitis B     Heroin abuse     HTN (hypertension)     Narcotic abuse     Polysubstance abuse     Sarcoidosis ([facility] [address]     Tobacco abuse        Past Surgical History:   Procedure Laterality Date    HX GYN      HX WISDOM TEETH EXTRACTION           Family History:   Problem Relation Age of Onset    Hypertension Father     Hypertension Mother        Social History     Social History    Marital status: SINGLE     Spouse name: N/A    Number of children: N/A    Years of education: N/A     Occupational History    Not on file.      Social History Main Topics    Smoking status: Current Every Day Smoker     Packs/day: 0.50    Smokeless tobacco: Never Used      Comment: 4 cig. day    Alcohol use Yes    Drug use: Yes     Special: Heroin, Marijuana, Cocaine      Comment: last used 1/24/2017    Sexual activity: Yes     Partners: Female     Other Topics Concern    Not on file     Social History Narrative    Born in [facility],     [facility],    No legal charges. Pt graduated from high school. Pt was employed last month at [employer], currently she is unemployed. Pt lives with her mother and 8year old son. She has 4 adult children. ALLERGIES: Tomato    Review of Systems   Constitutional: Negative for fever. Respiratory: Positive for cough (dry) and shortness of breath. (+) dyspnea    Cardiovascular: Negative for leg swelling. Patient Vitals for the past 12 hrs:   Temp Pulse Resp BP SpO2   02/26/17 1600 - 72 15 (!) 159/102 97 %   02/26/17 1500 - 74 16 149/89 98 %   02/26/17 1400 - 78 15 (!) 148/101 97 %   02/26/17 1300 - 85 20 154/82 96 %   02/26/17 1030 - 81 15 (!) 147/95 98 %   02/26/17 1015 - 81 15 139/89 98 %   02/26/17 1006 - 75 - (!) 146/91 99 %   02/26/17 0958 98.2 °F (36.8 °C) 98 (!) 41 (!) 159/102 99 %           Physical Exam     Vital signs and nursing notes reviewed    CONSTITUTIONAL: Alert, in moderate distress; well-developed; well-nourished, sitting upright. HEAD:  Normocephalic, atraumatic  EYES: PERRL; EOM's intact. ENTM: Nose: no rhinorrhea; Throat: no erythema or exudate, mucous membranes moist  Neck:  Supple. trachea is midline. RESP: Chest clear, equal breath sounds. - R/R, respiratory rate of 30, inspiratory and expiratory wheezes with poor air movement. CV: S1 and S2 WNL; No murmurs, gallops or rubs. 2+ radial and DP pulses bilaterally. GI: non-distended, normal bowel sounds, abdomen soft and non-tender. No masses or organomegaly. : No costo-vertebral angle tenderness. BACK:  Non-tender, normal appearance  UPPER EXT:  Normal inspection. no joint or soft tissue swelling  LOWER EXT: No edema, no calf tenderness.   NEURO: Alert and oriented x3, 5/5 strength and light touch sensation intact in bilateral upper and lower extremities. SKIN: No rashes; Warm and dry  PSYCH: Normal mood, normal affect    MDM  Number of Diagnoses or Management Options  Chronic anemia:   Hypokalemia:   Severe persistent asthma with acute exacerbation:   Diagnosis management comments: 34 y/o female with moderate persistent asthma here with acute exacerbation with poor air movement, wheezing and poor peak flow pt with some improvement but remains tachypnic and would benefit from in patient admission for regular pulmonary care not consistent with Pneumonia, pneumothorax, PE, CHF. Amount and/or Complexity of Data Reviewed  Clinical lab tests: ordered and reviewed  Tests in the radiology section of CPT®: ordered and reviewed  Review and summarize past medical records: yes  Discuss the patient with other providers: yes (Hospitalist)    Patient Progress  Patient progress: stable    ED Course       Procedures    CRITICAL CARE NOTE :    10:01 AM      IMPENDING DETERIORATION -Respiratory    ASSOCIATED RISK FACTORS - Hypoxia and hypercarbia    MANAGEMENT- Bedside Assessment and Supervision of Care    INTERPRETATION -  Xrays, Blood Gases and Cardiac Output Measures     INTERVENTIONS - oxygen management and continuous nebulizer treatment    CASE REVIEW - Hospitalist and Nursing    TREATMENT RESPONSE -Stable    PERFORMED BY - Self        NOTES   :      I have spent 40 minutes of critical care time involved in lab review, consultations with specialist, family decision- making, bedside attention and documentation. During this entire length of time I was immediately available to the patient . Phillip Moctezuma MD    PROGRESS NOTE:  2:14 PM  Pt with ongoing tachypnea and poor air movement despite continuous nebs. Will document AVG and peak flow. Plan for admission.   Written by Leeroy Noel, ED Scribe, as dictated by Phillip Moctezuma MD.      CONSULT NOTE:   3:23 PM  Phillip Moctezuma MD spoke with Dr. Georgi Harper,   Specialty: Hospitalist  Discussed pt's hx, disposition, and available diagnostic and imaging results. Reviewed care plans. Consultant will evaluate pt for admission. Written by CALI Villagran, as dictated by Kyle Pereira MD.    LABORATORY TESTS:  Recent Results (from the past 12 hour(s))   CBC WITH AUTOMATED DIFF    Collection Time: 02/26/17 10:46 AM   Result Value Ref Range    WBC 6.2 3.6 - 11.0 K/uL    RBC 3.68 (L) 3.80 - 5.20 M/uL    HGB 10.6 (L) 11.5 - 16.0 g/dL    HCT 32.0 (L) 35.0 - 47.0 %    MCV 87.0 80.0 - 99.0 FL    MCH 28.8 26.0 - 34.0 PG    MCHC 33.1 30.0 - 36.5 g/dL    RDW 16.1 (H) 11.5 - 14.5 %    PLATELET 409 725 - 010 K/uL    NEUTROPHILS 60 32 - 75 %    LYMPHOCYTES 27 12 - 49 %    MONOCYTES 12 5 - 13 %    EOSINOPHILS 1 0 - 7 %    BASOPHILS 0 0 - 1 %    ABS. NEUTROPHILS 3.7 1.8 - 8.0 K/UL    ABS. LYMPHOCYTES 1.7 0.8 - 3.5 K/UL    ABS. MONOCYTES 0.8 0.0 - 1.0 K/UL    ABS. EOSINOPHILS 0.1 0.0 - 0.4 K/UL    ABS. BASOPHILS 0.0 0.0 - 0.1 K/UL   METABOLIC PANEL, COMPREHENSIVE    Collection Time: 02/26/17 10:46 AM   Result Value Ref Range    Sodium 143 136 - 145 mmol/L    Potassium 3.1 (L) 3.5 - 5.1 mmol/L    Chloride 108 97 - 108 mmol/L    CO2 23 21 - 32 mmol/L    Anion gap 12 5 - 15 mmol/L    Glucose 81 65 - 100 mg/dL    BUN 9 6 - 20 MG/DL    Creatinine 0.76 0.55 - 1.02 MG/DL    BUN/Creatinine ratio 12 12 - 20      GFR est AA >60 >60 ml/min/1.73m2    GFR est non-AA >60 >60 ml/min/1.73m2    Calcium 8.0 (L) 8.5 - 10.1 MG/DL    Bilirubin, total 0.4 0.2 - 1.0 MG/DL    ALT (SGPT) 31 12 - 78 U/L    AST (SGOT) 40 (H) 15 - 37 U/L    Alk.  phosphatase 101 45 - 117 U/L    Protein, total 6.9 6.4 - 8.2 g/dL    Albumin 3.3 (L) 3.5 - 5.0 g/dL    Globulin 3.6 2.0 - 4.0 g/dL    A-G Ratio 0.9 (L) 1.1 - 2.2     MAGNESIUM    Collection Time: 02/26/17 10:46 AM   Result Value Ref Range    Magnesium 2.2 1.6 - 2.4 mg/dL   BLOOD GAS, ARTERIAL    Collection Time: 02/26/17  3:48 PM   Result Value Ref Range    pH 7.45 7.35 - 7.45      PCO2 34 (L) 35.0 - 45.0 mmHg    PO2 79 (L) 80 - 100 mmHg    O2 SAT 96 92 - 97 %    BICARBONATE 23 22 - 26 mmol/L    BASE EXCESS 0.0 mmol/L    O2 METHOD ROOM AIR      Sample source ARTERIAL      SITE RIGHT RADIAL      ROSHAN'S TEST YES         IMAGING RESULTS:  XR CHEST PORT   Final Result   Study Result      Chest portable AP     History: Acute wheezing     Comparison: 2/22/2017     Findings: The lungs are well expanded. No focal consolidation, pleural  effusion, or pneumothorax. The cardiomediastinal silhouette is unremarkable. The visualized osseous structures are unremarkable.     IMPRESSION  Impression:  No acute cardiopulmonary process. MEDICATIONS GIVEN:  Medications   albuterol CONCENTRATE 2.5mg/0.5 mL neb soln (10 mg/hr Nebulization New Bag 2/26/17 1030)   methylPREDNISolone (PF) (SOLU-MEDROL) injection 40 mg (not administered)   albuterol-ipratropium (DUO-NEB) 2.5 MG-0.5 MG/3 ML (3 mL Nebulization Given 2/26/17 1006)   methylPREDNISolone (PF) (SOLU-MEDROL) injection 125 mg (125 mg IntraMUSCular Given 2/26/17 1040)       IMPRESSION:  1. Severe persistent asthma with acute exacerbation    2. Hypokalemia    3. Chronic anemia        PLAN:  1. admit    ADMIT NOTE:  3:55 PM  Patient is being admitted to the hospital by Dr. Andrés Kiser. The results of their tests and reasons for their admission have been discussed with them and/or available family. They convey agreement and understanding for the need to be admitted and for their admission diagnosis. Consultation has been made with the inpatient physician specialist for hospitalization.

## 2017-02-26 NOTE — ED NOTES
Assumed care of pt from [facility]. Pt placed in position of comfort. Call bell within reach.

## 2017-02-26 NOTE — PROGRESS NOTES
Primary Nurse Shayan Jeffers RN and Ann Carlin RN performed a dual skin assessment on this patient No impairment noted  Doc score is 23

## 2017-02-27 VITALS
DIASTOLIC BLOOD PRESSURE: 63 MMHG | RESPIRATION RATE: 18 BRPM | TEMPERATURE: 97.8 F | HEART RATE: 120 BPM | OXYGEN SATURATION: 94 % | BODY MASS INDEX: 24.79 KG/M2 | WEIGHT: 144.4 LBS | SYSTOLIC BLOOD PRESSURE: 111 MMHG

## 2017-02-27 LAB
ANION GAP BLD CALC-SCNC: 9 MMOL/L (ref 5–15)
BUN SERPL-MCNC: 8 MG/DL (ref 6–20)
BUN/CREAT SERPL: 10 (ref 12–20)
CALCIUM SERPL-MCNC: 8.5 MG/DL (ref 8.5–10.1)
CHLORIDE SERPL-SCNC: 108 MMOL/L (ref 97–108)
CO2 SERPL-SCNC: 23 MMOL/L (ref 21–32)
CREAT SERPL-MCNC: 0.79 MG/DL (ref 0.55–1.02)
ERYTHROCYTE [DISTWIDTH] IN BLOOD BY AUTOMATED COUNT: 16 % (ref 11.5–14.5)
GLUCOSE SERPL-MCNC: 123 MG/DL (ref 65–100)
HCT VFR BLD AUTO: 35.9 % (ref 35–47)
HGB BLD-MCNC: 12.1 G/DL (ref 11.5–16)
MCH RBC QN AUTO: 29.4 PG (ref 26–34)
MCHC RBC AUTO-ENTMCNC: 33.7 G/DL (ref 30–36.5)
MCV RBC AUTO: 87.3 FL (ref 80–99)
PLATELET # BLD AUTO: 337 K/UL (ref 150–400)
POTASSIUM SERPL-SCNC: 4.3 MMOL/L (ref 3.5–5.1)
RBC # BLD AUTO: 4.11 M/UL (ref 3.8–5.2)
SODIUM SERPL-SCNC: 140 MMOL/L (ref 136–145)
WBC # BLD AUTO: 4.7 K/UL (ref 3.6–11)

## 2017-02-27 PROCEDURE — 85027 COMPLETE CBC AUTOMATED: CPT | Performed by: INTERNAL MEDICINE

## 2017-02-27 PROCEDURE — 74011250637 HC RX REV CODE- 250/637: Performed by: INTERNAL MEDICINE

## 2017-02-27 PROCEDURE — 74011000250 HC RX REV CODE- 250: Performed by: INTERNAL MEDICINE

## 2017-02-27 PROCEDURE — 74011250636 HC RX REV CODE- 250/636: Performed by: INTERNAL MEDICINE

## 2017-02-27 PROCEDURE — 80048 BASIC METABOLIC PNL TOTAL CA: CPT | Performed by: INTERNAL MEDICINE

## 2017-02-27 PROCEDURE — 87521 HEPATITIS C PROBE&RVRS TRNSC: CPT | Performed by: INTERNAL MEDICINE

## 2017-02-27 PROCEDURE — 94640 AIRWAY INHALATION TREATMENT: CPT

## 2017-02-27 PROCEDURE — 36415 COLL VENOUS BLD VENIPUNCTURE: CPT | Performed by: INTERNAL MEDICINE

## 2017-02-27 RX ADMIN — METHYLPREDNISOLONE SODIUM SUCCINATE 40 MG: 40 INJECTION, POWDER, FOR SOLUTION INTRAMUSCULAR; INTRAVENOUS at 02:03

## 2017-02-27 RX ADMIN — SERTRALINE HYDROCHLORIDE 100 MG: 50 TABLET ORAL at 08:48

## 2017-02-27 RX ADMIN — CLONIDINE HYDROCHLORIDE 0.2 MG: 0.1 TABLET ORAL at 08:48

## 2017-02-27 RX ADMIN — LITHIUM CARBONATE 300 MG: 300 CAPSULE, GELATIN COATED ORAL at 08:48

## 2017-02-27 RX ADMIN — IPRATROPIUM BROMIDE AND ALBUTEROL SULFATE 3 ML: .5; 3 SOLUTION RESPIRATORY (INHALATION) at 04:25

## 2017-02-27 RX ADMIN — AMLODIPINE BESYLATE 5 MG: 5 TABLET ORAL at 08:47

## 2017-02-27 RX ADMIN — Medication 10 ML: at 05:11

## 2017-02-27 RX ADMIN — HYDROCODONE BITARTRATE AND ACETAMINOPHEN 1 TABLET: 5; 325 TABLET ORAL at 03:31

## 2017-02-27 RX ADMIN — METHYLPREDNISOLONE SODIUM SUCCINATE 40 MG: 40 INJECTION, POWDER, FOR SOLUTION INTRAMUSCULAR; INTRAVENOUS at 11:12

## 2017-02-27 RX ADMIN — CLONAZEPAM 2 MG: 1 TABLET ORAL at 08:48

## 2017-02-27 RX ADMIN — FLUTICASONE FUROATE AND VILANTEROL TRIFENATATE 1 PUFF: 200; 25 POWDER RESPIRATORY (INHALATION) at 09:30

## 2017-02-27 RX ADMIN — AZITHROMYCIN 500 MG: 250 TABLET, FILM COATED ORAL at 08:47

## 2017-02-27 RX ADMIN — IPRATROPIUM BROMIDE AND ALBUTEROL SULFATE 3 ML: .5; 3 SOLUTION RESPIRATORY (INHALATION) at 07:37

## 2017-02-27 NOTE — PROGRESS NOTES
Bedside and Verbal shift change report given to Charo (oncoming nurse) by Valorie Mac RN (offgoing nurse). Report included the following information SBAR, Kardex, Intake/Output, MAR and Recent Results. Zone Phone for oncoming shift:   [phone]    Shift Summary: VS stable. Norco given x 1 for lower back pain.     LDAs               Peripheral IV 02/26/17 Right Hand (Active)   Site Assessment Clean, dry, & intact 2/26/2017  5:51 PM   Phlebitis Assessment 0 2/26/2017  5:51 PM   Infiltration Assessment 0 2/26/2017  5:51 PM   Dressing Status Clean, dry, & intact 2/26/2017  5:51 PM   Dressing Type Transparent 2/26/2017  5:51 PM   Hub Color/Line Status Blue;Capped 2/26/2017  5:51 PM                        Intake & Output     Last Bowel Movement Last Bowel Movement Date: 02/25/17   Glucose Checks [x] N/A  [] AC/HS  [] Q6  Concerns:   Nutrition Active Orders   Diet    DIET REGULAR       Consults []PT  []OT  []Speech  []Case Management   Cardiac Monitoring []N/A [x]Yes Expires:

## 2017-02-27 NOTE — PROGRESS NOTES
Pt asked to sign her self out AMA, I asked if she could wait for the MD and she asked how long, so I paged Dr Vicente Saenz and he stated could see pt in about an hour. I advised pt of this and she agreed to wait and after about 15 min she decided she couldn't wait and signer herself out AMA. Dr. Vicente Saenz notified. Pt's IV removed.

## 2017-02-28 LAB
BACTERIA SPEC CULT: NORMAL
BACTERIA SPEC CULT: NORMAL
SERVICE CMNT-IMP: NORMAL

## 2017-02-28 NOTE — DISCHARGE SUMMARY
[facility]     Patient ID:  Milena Dobbs  [id number]  35 y.o.  1975    PCP on record: None    Admit date: 2/26/2017  Discharge date and time: 2/27/2017    PATIENT LEFT AMA PRIOR TO MY EVALUATION OF HER DURING THIS HOSPITALIZATION. - Paged by nursing that patient is going to leave AMA. On review of her chart and labs I offered to come do an official discharge with proper follow up and medications within the next hour. Patient decided she could not wait for an official discharge and left prior to me seeing her.     DISCHARGE DIAGNOSIS:    (per h&p)  Asthma exacerbation, COPD, sarcoidosis  Hypertension  Bipolar disorder  Polysubstance abuse  History of hep B, positive hep C ab        Signed:  Shaquille Manzanares MD

## 2017-03-01 LAB — HCV RNA SERPL QL NAA+PROBE: POSITIVE

## 2017-03-19 ENCOUNTER — APPOINTMENT (OUTPATIENT)
Dept: GENERAL RADIOLOGY | Age: 42
End: 2017-03-19
Attending: PHYSICIAN ASSISTANT
Payer: MEDICAID

## 2017-03-19 ENCOUNTER — HOSPITAL ENCOUNTER (OUTPATIENT)
Age: 42
Setting detail: OBSERVATION
Discharge: LEFT BEFORE DISCHARGE | End: 2017-03-20
Attending: EMERGENCY MEDICINE | Admitting: INTERNAL MEDICINE
Payer: MEDICAID

## 2017-03-19 DIAGNOSIS — J45.902 STATUS ASTHMATICUS WITH COPD (CHRONIC OBSTRUCTIVE PULMONARY DISEASE) (HCC): Primary | ICD-10-CM

## 2017-03-19 DIAGNOSIS — J44.9 STATUS ASTHMATICUS WITH COPD (CHRONIC OBSTRUCTIVE PULMONARY DISEASE) (HCC): Primary | ICD-10-CM

## 2017-03-19 LAB
ALBUMIN SERPL BCP-MCNC: 3.7 G/DL (ref 3.5–5)
ALBUMIN/GLOB SERPL: 1.1 {RATIO} (ref 1.1–2.2)
ALP SERPL-CCNC: 107 U/L (ref 45–117)
ALT SERPL-CCNC: 26 U/L (ref 12–78)
ANION GAP BLD CALC-SCNC: 12 MMOL/L (ref 5–15)
AST SERPL W P-5'-P-CCNC: 44 U/L (ref 15–37)
BASOPHILS # BLD AUTO: 0 K/UL (ref 0–0.1)
BASOPHILS # BLD: 1 % (ref 0–1)
BILIRUB SERPL-MCNC: 0.5 MG/DL (ref 0.2–1)
BUN SERPL-MCNC: 6 MG/DL (ref 6–20)
BUN/CREAT SERPL: 8 (ref 12–20)
CALCIUM SERPL-MCNC: 8.8 MG/DL (ref 8.5–10.1)
CHLORIDE SERPL-SCNC: 104 MMOL/L (ref 97–108)
CO2 SERPL-SCNC: 25 MMOL/L (ref 21–32)
CREAT SERPL-MCNC: 0.77 MG/DL (ref 0.55–1.02)
EOSINOPHIL # BLD: 0.4 K/UL (ref 0–0.4)
EOSINOPHIL NFR BLD: 8 % (ref 0–7)
ERYTHROCYTE [DISTWIDTH] IN BLOOD BY AUTOMATED COUNT: 15.9 % (ref 11.5–14.5)
FLUAV AG NPH QL IA: NEGATIVE
FLUBV AG NOSE QL IA: NEGATIVE
GLOBULIN SER CALC-MCNC: 3.5 G/DL (ref 2–4)
GLUCOSE SERPL-MCNC: 86 MG/DL (ref 65–100)
HCT VFR BLD AUTO: 37 % (ref 35–47)
HGB BLD-MCNC: 12 G/DL (ref 11.5–16)
LYMPHOCYTES # BLD AUTO: 30 % (ref 12–49)
LYMPHOCYTES # BLD: 1.4 K/UL (ref 0.8–3.5)
MCH RBC QN AUTO: 28.7 PG (ref 26–34)
MCHC RBC AUTO-ENTMCNC: 32.4 G/DL (ref 30–36.5)
MCV RBC AUTO: 88.5 FL (ref 80–99)
MONOCYTES # BLD: 0.3 K/UL (ref 0–1)
MONOCYTES NFR BLD AUTO: 6 % (ref 5–13)
NEUTS SEG # BLD: 2.6 K/UL (ref 1.8–8)
NEUTS SEG NFR BLD AUTO: 55 % (ref 32–75)
PLATELET # BLD AUTO: 268 K/UL (ref 150–400)
POTASSIUM SERPL-SCNC: 3.3 MMOL/L (ref 3.5–5.1)
PROT SERPL-MCNC: 7.2 G/DL (ref 6.4–8.2)
RBC # BLD AUTO: 4.18 M/UL (ref 3.8–5.2)
SODIUM SERPL-SCNC: 141 MMOL/L (ref 136–145)
WBC # BLD AUTO: 4.7 K/UL (ref 3.6–11)

## 2017-03-19 PROCEDURE — 77030013140 HC MSK NEB VYRM -A

## 2017-03-19 PROCEDURE — 85025 COMPLETE CBC W/AUTO DIFF WBC: CPT | Performed by: EMERGENCY MEDICINE

## 2017-03-19 PROCEDURE — 94640 AIRWAY INHALATION TREATMENT: CPT

## 2017-03-19 PROCEDURE — 74011636637 HC RX REV CODE- 636/637: Performed by: EMERGENCY MEDICINE

## 2017-03-19 PROCEDURE — 71020 XR CHEST PA LAT: CPT

## 2017-03-19 PROCEDURE — 80053 COMPREHEN METABOLIC PANEL: CPT | Performed by: EMERGENCY MEDICINE

## 2017-03-19 PROCEDURE — 74011250637 HC RX REV CODE- 250/637: Performed by: EMERGENCY MEDICINE

## 2017-03-19 PROCEDURE — 74011000250 HC RX REV CODE- 250: Performed by: EMERGENCY MEDICINE

## 2017-03-19 PROCEDURE — 87804 INFLUENZA ASSAY W/OPTIC: CPT | Performed by: EMERGENCY MEDICINE

## 2017-03-19 PROCEDURE — 74011000250 HC RX REV CODE- 250

## 2017-03-19 PROCEDURE — 74011000250 HC RX REV CODE- 250: Performed by: PHYSICIAN ASSISTANT

## 2017-03-19 PROCEDURE — 36415 COLL VENOUS BLD VENIPUNCTURE: CPT | Performed by: EMERGENCY MEDICINE

## 2017-03-19 PROCEDURE — 99285 EMERGENCY DEPT VISIT HI MDM: CPT

## 2017-03-19 RX ORDER — IPRATROPIUM BROMIDE AND ALBUTEROL SULFATE 2.5; .5 MG/3ML; MG/3ML
SOLUTION RESPIRATORY (INHALATION)
Status: COMPLETED
Start: 2017-03-19 | End: 2017-03-19

## 2017-03-19 RX ORDER — POTASSIUM CHLORIDE 1.5 G/1.77G
40 POWDER, FOR SOLUTION ORAL
Status: COMPLETED | OUTPATIENT
Start: 2017-03-19 | End: 2017-03-19

## 2017-03-19 RX ORDER — IPRATROPIUM BROMIDE AND ALBUTEROL SULFATE 2.5; .5 MG/3ML; MG/3ML
3 SOLUTION RESPIRATORY (INHALATION)
Status: COMPLETED | OUTPATIENT
Start: 2017-03-19 | End: 2017-03-19

## 2017-03-19 RX ORDER — PREDNISONE 20 MG/1
60 TABLET ORAL
Status: COMPLETED | OUTPATIENT
Start: 2017-03-19 | End: 2017-03-19

## 2017-03-19 RX ADMIN — POTASSIUM CHLORIDE 40 MEQ: 1.5 POWDER, FOR SOLUTION ORAL at 23:23

## 2017-03-19 RX ADMIN — PREDNISONE 60 MG: 20 TABLET ORAL at 21:30

## 2017-03-19 RX ADMIN — IPRATROPIUM BROMIDE AND ALBUTEROL SULFATE 3 ML: .5; 3 SOLUTION RESPIRATORY (INHALATION) at 20:53

## 2017-03-19 RX ADMIN — IPRATROPIUM BROMIDE AND ALBUTEROL SULFATE 3 ML: .5; 3 SOLUTION RESPIRATORY (INHALATION) at 21:30

## 2017-03-19 RX ADMIN — IPRATROPIUM BROMIDE AND ALBUTEROL SULFATE 3 ML: .5; 3 SOLUTION RESPIRATORY (INHALATION) at 20:30

## 2017-03-20 VITALS
HEIGHT: 64 IN | TEMPERATURE: 98 F | HEART RATE: 116 BPM | SYSTOLIC BLOOD PRESSURE: 151 MMHG | RESPIRATION RATE: 20 BRPM | OXYGEN SATURATION: 98 % | DIASTOLIC BLOOD PRESSURE: 83 MMHG | WEIGHT: 144.84 LBS | BODY MASS INDEX: 24.73 KG/M2

## 2017-03-20 LAB
ALBUMIN SERPL BCP-MCNC: 3.2 G/DL (ref 3.5–5)
ALBUMIN/GLOB SERPL: 0.9 {RATIO} (ref 1.1–2.2)
ALP SERPL-CCNC: 97 U/L (ref 45–117)
ALT SERPL-CCNC: 24 U/L (ref 12–78)
ANION GAP BLD CALC-SCNC: 14 MMOL/L (ref 5–15)
AST SERPL W P-5'-P-CCNC: 40 U/L (ref 15–37)
BASOPHILS # BLD AUTO: 0 K/UL (ref 0–0.1)
BASOPHILS # BLD: 0 % (ref 0–1)
BILIRUB SERPL-MCNC: 0.3 MG/DL (ref 0.2–1)
BUN SERPL-MCNC: 6 MG/DL (ref 6–20)
BUN/CREAT SERPL: 8 (ref 12–20)
CALCIUM SERPL-MCNC: 8.4 MG/DL (ref 8.5–10.1)
CHLORIDE SERPL-SCNC: 108 MMOL/L (ref 97–108)
CO2 SERPL-SCNC: 18 MMOL/L (ref 21–32)
CREAT SERPL-MCNC: 0.77 MG/DL (ref 0.55–1.02)
DATE LAST DOSE: ABNORMAL
EOSINOPHIL # BLD: 0 K/UL (ref 0–0.4)
EOSINOPHIL NFR BLD: 0 % (ref 0–7)
ERYTHROCYTE [DISTWIDTH] IN BLOOD BY AUTOMATED COUNT: 15.7 % (ref 11.5–14.5)
GLOBULIN SER CALC-MCNC: 3.7 G/DL (ref 2–4)
GLUCOSE SERPL-MCNC: 111 MG/DL (ref 65–100)
HCT VFR BLD AUTO: 35.4 % (ref 35–47)
HGB BLD-MCNC: 11.7 G/DL (ref 11.5–16)
LITHIUM SERPL-SCNC: <0.2 MMOL/L (ref 0.6–1.2)
LYMPHOCYTES # BLD AUTO: 7 % (ref 12–49)
LYMPHOCYTES # BLD: 0.3 K/UL (ref 0.8–3.5)
MAGNESIUM SERPL-MCNC: 1.6 MG/DL (ref 1.6–2.4)
MCH RBC QN AUTO: 29 PG (ref 26–34)
MCHC RBC AUTO-ENTMCNC: 33.1 G/DL (ref 30–36.5)
MCV RBC AUTO: 87.8 FL (ref 80–99)
MONOCYTES # BLD: 0 K/UL (ref 0–1)
MONOCYTES NFR BLD AUTO: 1 % (ref 5–13)
NEUTS SEG # BLD: 4 K/UL (ref 1.8–8)
NEUTS SEG NFR BLD AUTO: 92 % (ref 32–75)
PLATELET # BLD AUTO: 251 K/UL (ref 150–400)
POTASSIUM SERPL-SCNC: 3.6 MMOL/L (ref 3.5–5.1)
PROT SERPL-MCNC: 6.9 G/DL (ref 6.4–8.2)
RBC # BLD AUTO: 4.03 M/UL (ref 3.8–5.2)
RBC MORPH BLD: ABNORMAL
REPORTED DOSE,DOSE: ABNORMAL UNITS
REPORTED DOSE/TIME,TMG: ABNORMAL
SODIUM SERPL-SCNC: 140 MMOL/L (ref 136–145)
WBC # BLD AUTO: 4.3 K/UL (ref 3.6–11)

## 2017-03-20 PROCEDURE — 74011000250 HC RX REV CODE- 250: Performed by: INTERNAL MEDICINE

## 2017-03-20 PROCEDURE — 80178 ASSAY OF LITHIUM: CPT | Performed by: INTERNAL MEDICINE

## 2017-03-20 PROCEDURE — 85025 COMPLETE CBC W/AUTO DIFF WBC: CPT | Performed by: INTERNAL MEDICINE

## 2017-03-20 PROCEDURE — 83735 ASSAY OF MAGNESIUM: CPT | Performed by: INTERNAL MEDICINE

## 2017-03-20 PROCEDURE — 80053 COMPREHEN METABOLIC PANEL: CPT | Performed by: INTERNAL MEDICINE

## 2017-03-20 PROCEDURE — 74011250636 HC RX REV CODE- 250/636: Performed by: INTERNAL MEDICINE

## 2017-03-20 PROCEDURE — 74011250637 HC RX REV CODE- 250/637: Performed by: INTERNAL MEDICINE

## 2017-03-20 PROCEDURE — 74011250636 HC RX REV CODE- 250/636: Performed by: EMERGENCY MEDICINE

## 2017-03-20 PROCEDURE — 36415 COLL VENOUS BLD VENIPUNCTURE: CPT | Performed by: INTERNAL MEDICINE

## 2017-03-20 PROCEDURE — 74011000250 HC RX REV CODE- 250: Performed by: EMERGENCY MEDICINE

## 2017-03-20 PROCEDURE — 74011636637 HC RX REV CODE- 636/637: Performed by: INTERNAL MEDICINE

## 2017-03-20 PROCEDURE — 99218 HC RM OBSERVATION: CPT

## 2017-03-20 PROCEDURE — 96360 HYDRATION IV INFUSION INIT: CPT

## 2017-03-20 RX ORDER — FLUTICASONE FUROATE AND VILANTEROL 200; 25 UG/1; UG/1
1 POWDER RESPIRATORY (INHALATION) DAILY
Status: DISCONTINUED | OUTPATIENT
Start: 2017-03-20 | End: 2017-03-20 | Stop reason: HOSPADM

## 2017-03-20 RX ORDER — SODIUM CHLORIDE 0.9 % (FLUSH) 0.9 %
5-10 SYRINGE (ML) INJECTION EVERY 8 HOURS
Status: DISCONTINUED | OUTPATIENT
Start: 2017-03-20 | End: 2017-03-20 | Stop reason: HOSPADM

## 2017-03-20 RX ORDER — SODIUM CHLORIDE 9 MG/ML
75 INJECTION, SOLUTION INTRAVENOUS CONTINUOUS
Status: DISCONTINUED | OUTPATIENT
Start: 2017-03-20 | End: 2017-03-20 | Stop reason: HOSPADM

## 2017-03-20 RX ORDER — POLYETHYLENE GLYCOL 3350 17 G/17G
17 POWDER, FOR SOLUTION ORAL DAILY
Status: DISCONTINUED | OUTPATIENT
Start: 2017-03-20 | End: 2017-03-20 | Stop reason: HOSPADM

## 2017-03-20 RX ORDER — IPRATROPIUM BROMIDE AND ALBUTEROL SULFATE 2.5; .5 MG/3ML; MG/3ML
3 SOLUTION RESPIRATORY (INHALATION)
Status: COMPLETED | OUTPATIENT
Start: 2017-03-20 | End: 2017-03-20

## 2017-03-20 RX ORDER — AZITHROMYCIN 250 MG/1
500 TABLET, FILM COATED ORAL DAILY
Status: DISCONTINUED | OUTPATIENT
Start: 2017-03-20 | End: 2017-03-20 | Stop reason: HOSPADM

## 2017-03-20 RX ORDER — QUETIAPINE FUMARATE 200 MG/1
800 TABLET, FILM COATED ORAL
Status: DISCONTINUED | OUTPATIENT
Start: 2017-03-20 | End: 2017-03-20 | Stop reason: HOSPADM

## 2017-03-20 RX ORDER — IPRATROPIUM BROMIDE AND ALBUTEROL SULFATE 2.5; .5 MG/3ML; MG/3ML
3 SOLUTION RESPIRATORY (INHALATION)
Status: DISCONTINUED | OUTPATIENT
Start: 2017-03-20 | End: 2017-03-20 | Stop reason: HOSPADM

## 2017-03-20 RX ORDER — PRAZOSIN HYDROCHLORIDE 1 MG/1
2 CAPSULE ORAL
Status: DISCONTINUED | OUTPATIENT
Start: 2017-03-20 | End: 2017-03-20 | Stop reason: HOSPADM

## 2017-03-20 RX ORDER — LITHIUM CARBONATE 300 MG
300 TABLET ORAL 2 TIMES DAILY
Status: DISCONTINUED | OUTPATIENT
Start: 2017-03-20 | End: 2017-03-20 | Stop reason: HOSPADM

## 2017-03-20 RX ORDER — PREDNISONE 20 MG/1
20 TABLET ORAL
Status: DISCONTINUED | OUTPATIENT
Start: 2017-03-20 | End: 2017-03-20 | Stop reason: HOSPADM

## 2017-03-20 RX ORDER — ALBUTEROL SULFATE 0.83 MG/ML
10 SOLUTION RESPIRATORY (INHALATION)
Status: COMPLETED | OUTPATIENT
Start: 2017-03-20 | End: 2017-03-20

## 2017-03-20 RX ORDER — IPRATROPIUM BROMIDE AND ALBUTEROL SULFATE 2.5; .5 MG/3ML; MG/3ML
3 SOLUTION RESPIRATORY (INHALATION)
Status: DISCONTINUED | OUTPATIENT
Start: 2017-03-20 | End: 2017-03-20

## 2017-03-20 RX ORDER — CLONIDINE HYDROCHLORIDE 0.1 MG/1
0.3 TABLET ORAL 3 TIMES DAILY
Status: DISCONTINUED | OUTPATIENT
Start: 2017-03-20 | End: 2017-03-20 | Stop reason: HOSPADM

## 2017-03-20 RX ORDER — SODIUM CHLORIDE 0.9 % (FLUSH) 0.9 %
5-10 SYRINGE (ML) INJECTION AS NEEDED
Status: DISCONTINUED | OUTPATIENT
Start: 2017-03-20 | End: 2017-03-20 | Stop reason: HOSPADM

## 2017-03-20 RX ORDER — ACETAMINOPHEN 325 MG/1
650 TABLET ORAL
Status: DISCONTINUED | OUTPATIENT
Start: 2017-03-20 | End: 2017-03-20 | Stop reason: HOSPADM

## 2017-03-20 RX ORDER — SERTRALINE HYDROCHLORIDE 50 MG/1
100 TABLET, FILM COATED ORAL DAILY
Status: DISCONTINUED | OUTPATIENT
Start: 2017-03-20 | End: 2017-03-20 | Stop reason: HOSPADM

## 2017-03-20 RX ORDER — HEPARIN SODIUM 5000 [USP'U]/ML
5000 INJECTION, SOLUTION INTRAVENOUS; SUBCUTANEOUS EVERY 12 HOURS
Status: DISCONTINUED | OUTPATIENT
Start: 2017-03-20 | End: 2017-03-20 | Stop reason: HOSPADM

## 2017-03-20 RX ORDER — LABETALOL HYDROCHLORIDE 5 MG/ML
10 INJECTION, SOLUTION INTRAVENOUS
Status: DISCONTINUED | OUTPATIENT
Start: 2017-03-20 | End: 2017-03-20 | Stop reason: HOSPADM

## 2017-03-20 RX ADMIN — SODIUM CHLORIDE 75 ML/HR: 900 INJECTION, SOLUTION INTRAVENOUS at 08:02

## 2017-03-20 RX ADMIN — GABAPENTIN 800 MG: 100 CAPSULE ORAL at 08:34

## 2017-03-20 RX ADMIN — SERTRALINE HYDROCHLORIDE 100 MG: 50 TABLET ORAL at 08:34

## 2017-03-20 RX ADMIN — LITHIUM CARBONATE 300 MG: 300 TABLET ORAL at 08:34

## 2017-03-20 RX ADMIN — CLONIDINE HYDROCHLORIDE 0.3 MG: 0.1 TABLET ORAL at 08:34

## 2017-03-20 RX ADMIN — SODIUM CHLORIDE 1000 ML: 900 INJECTION, SOLUTION INTRAVENOUS at 04:54

## 2017-03-20 RX ADMIN — PREDNISONE 20 MG: 20 TABLET ORAL at 08:34

## 2017-03-20 RX ADMIN — ALBUTEROL SULFATE 10 MG: 2.5 SOLUTION RESPIRATORY (INHALATION) at 01:29

## 2017-03-20 RX ADMIN — AZITHROMYCIN 500 MG: 250 TABLET, FILM COATED ORAL at 08:34

## 2017-03-20 RX ADMIN — IPRATROPIUM BROMIDE AND ALBUTEROL SULFATE 3 ML: .5; 3 SOLUTION RESPIRATORY (INHALATION) at 04:53

## 2017-03-20 RX ADMIN — ACETAMINOPHEN 650 MG: 325 TABLET, FILM COATED ORAL at 08:33

## 2017-03-20 NOTE — ED NOTES
Bedside and Verbal shift change report given to Ashley Gamez RN (oncoming nurse) by Win Singh RN (offgoing nurse). Report included the following information SBAR, ED Summary, MAR and Recent Results.

## 2017-03-20 NOTE — ED NOTES
Charge RN at bedside to attempt IV access. Unsuccessful. Pt remains awake and stable with breaths even an unlabored. Dr. Sergio Harrison updated.

## 2017-03-20 NOTE — ED PROVIDER NOTES
HPI Comments: Livier Ross is a 35 y.o. female, pmhx polysubstance abuse / HTN / asthma / COPD, who presents ambulatory to the ED c/o progressively worsening SOB with associated wheezing x 0000 this morning. Pt reports a history of similar sxs with her previous asthma exacerbations and notes she had to be intubated last year. She states her last course of steroids was ~1 month ago. Pt notes taking her nebulized Albuterol throughout the day today with no relief of sxs. She denies having a PCP to follow up with. Pt states she quit smoking cigarettes 2 weeks ago, but continues to smoke marijuana. Pt denies using home O2 at baseline. She specifically denies any recent CP, nausea, vomiting, diarrhea, abd pain, or urinary sxs. PCP: None    PMHx: Significant for asthma, HTN, sarcoidosis, depression, hepatitis B, chronic pain, COPD, bipolar, tobacco abuse, cocaine abuse, heroin abuse, narcotic abuse  PSHx: Significant for BL tubal ligation  Social Hx: -tobacco (quit 2 weeks ago), -EtOH, +Illicit Drugs (poly substance abuse / marijuana regularly)    There are no other complaints, changes, or physical findings at this time. The history is provided by the patient. Past Medical History:   Diagnosis Date    Asthma     Bipolar 1 disorder (HCC)     Chronic obstructive pulmonary disease (HCC)     Chronic pain     back, leg    Cocaine abuse     Depression     Hepatitis B     Heroin abuse     HTN (hypertension)     Narcotic abuse     Polysubstance abuse     Sarcoidosis ([facility] [address]     Tobacco abuse        Past Surgical History:   Procedure Laterality Date    HX GYN      HX WISDOM TEETH EXTRACTION           Family History:   Problem Relation Age of Onset    Hypertension Father     Hypertension Mother        Social History     Social History    Marital status: SINGLE     Spouse name: N/A    Number of children: N/A    Years of education: N/A     Occupational History    Not on file.      Social History Main Topics    Smoking status: Former Smoker     Packs/day: 0.50     Quit date: 3/5/2017    Smokeless tobacco: Never Used    Alcohol use No    Drug use: Yes     Special: Marijuana      Comment: last used 1/24/2017    Sexual activity: Yes     Partners: Female     Other Topics Concern    Not on file     Social History Narrative    Born in [address], 5 children,    No legal charges. Pt graduated from high school. Pt was employed last month at [employer], currently she is unemployed. Pt lives with her mother and 8year old son. She has 4 adult children. ALLERGIES: Tomato    Review of Systems   Constitutional: Negative. Negative for fever. Eyes: Negative. Respiratory: Positive for shortness of breath and wheezing. Cardiovascular: Negative for chest pain. Gastrointestinal: Negative for abdominal pain, nausea and vomiting. Endocrine: Negative. Genitourinary: Negative. Negative for difficulty urinating, dysuria and hematuria. Musculoskeletal: Negative. Skin: Negative. Allergic/Immunologic: Negative. Neurological: Negative. Psychiatric/Behavioral: Negative for suicidal ideas. All other systems reviewed and are negative. Vitals:    03/19/17 2030 03/19/17 2132 03/19/17 2323 03/20/17 0130   BP: 144/74  133/70 127/78   Pulse: 62 61 67 (!) 107   Resp: (!) 32 25 18 20   Temp: 97.9 °F (36.6 °C)      SpO2: 94% 99% 97% 95%   Weight: 65.7 kg (144 lb 13.5 oz)      Height: 5' 4\" (1.626 m)               Physical Exam   Constitutional: She is oriented to person, place, and time. She appears well-developed and well-nourished. She appears distressed. HENT:   Head: Normocephalic and atraumatic. Nose: Nose normal.   Eyes: Conjunctivae and EOM are normal. No scleral icterus. Neck: Normal range of motion. No tracheal deviation present. Cardiovascular: Normal rate, regular rhythm, normal heart sounds and intact distal pulses. Exam reveals no friction rub.     No murmur heard.  Pulmonary/Chest: Accessory muscle usage present. No stridor. Tachypnea noted. She is in respiratory distress. She has decreased breath sounds. She has wheezes in the right upper field, the right middle field, the left upper field and the left middle field. She has no rales. Abdominal: Soft. Bowel sounds are normal. She exhibits no distension. There is no tenderness. There is no rebound. Musculoskeletal: Normal range of motion. She exhibits no tenderness. Neurological: She is alert and oriented to person, place, and time. No cranial nerve deficit. Skin: Skin is warm and dry. No rash noted. She is not diaphoretic. Psychiatric: She has a normal mood and affect. Her behavior is normal. Judgment and thought content normal.   Nursing note and vitals reviewed. MDM  Number of Diagnoses or Management Options  Status asthmaticus with COPD (chronic obstructive pulmonary disease) ([facility] [address]):   Diagnosis management comments: DDX:  COPD flare, asthma exacerbation, pna, bronchitis    Plan:  Duo nebs, steroids, cxr    Impression:  Status asthmaticus       Amount and/or Complexity of Data Reviewed  Clinical lab tests: reviewed and ordered  Tests in the radiology section of CPT®: reviewed and ordered  Review and summarize past medical records: yes  Discuss the patient with other providers: yes (Hospitalist)  Independent visualization of images, tracings, or specimens: yes    Patient Progress  Patient progress: stable      Procedures    I reviewed our electronic medical record system for any past medical records that were available that may contribute to the patients current condition, the nursing notes and and vital signs from today's visit    Nursing notes will be reviewed as they become available in realtime while the pt has been in the ED.   Ernesto Zaldivar MD    Discussed the risks of smoking and the benefits of smoking cessation as well as the long term sequelae of smoking with the pt who verbalized her understanding. Reviewed strategies for success, including gradually decreasing the number of cigarettes smoked a day. Written by Ravi Carranza ED Scribe, as dictated by Antonio Andujar MD      Pt's rate is 61bpm with a NSR rhythm as noted on Cardiac monitor. SpO2 is 99%, on (2L)      I personally reviewed pt's imaging. Official read by radiology listed below. Antonio Andujar MD    PROGRESS NOTE:  10:25 PM  Pt reevaluated. Appears better. Pt with decreased WOB after her 3rd Duo-Neb and 60mg. Awaiting cxr. Will continue to monitor. Written by Ravi Carranza ED Scribe, as dictated by Antonio Andujar MD    PROGRESS NOTE:  12:19 AM  Pt reevaluated. Pt appears better at this time, but states she still feels bad and continues to wheeze. Will order an hour long nebulizer and reassess. Written by Ravi Carranza ED Scribe, as dictated by Antonio Andujar MD    PROGRESS NOTE:  3:41 AM  Pt reevaluated after her hour long nebulizer treatment. Pt continues to wheeze. She denies any changes to her sxs. Will consult with hospitalist for admission. Written by Ravi Carranza ED Scribe, as dictated by Antonio Andujar MD    CONSULT NOTE:   3:42 AM  Antonio Andujar MD spoke with Dr. Fox Harris,   Specialty: Hospitalist  Discussed pt's HPI and available diagnostic results thus far. Expressed concerns for needed admission. Consultant will evaluate pt for admission.    Antonio Andujar MD        LABORATORY TESTS:  Recent Results (from the past 12 hour(s))   INFLUENZA A & B AG (RAPID TEST)    Collection Time: 03/19/17  9:43 PM   Result Value Ref Range    Influenza A Antigen NEGATIVE  NEG      Influenza B Antigen NEGATIVE  NEG     CBC WITH AUTOMATED DIFF    Collection Time: 03/19/17  9:43 PM   Result Value Ref Range    WBC 4.7 3.6 - 11.0 K/uL    RBC 4.18 3.80 - 5.20 M/uL    HGB 12.0 11.5 - 16.0 g/dL    HCT 37.0 35.0 - 47.0 %    MCV 88.5 80.0 - 99.0 FL    MCH 28.7 26.0 - 34.0 PG    MCHC 32.4 30.0 - 36.5 g/dL RDW 15.9 (H) 11.5 - 14.5 %    PLATELET 402 870 - 124 K/uL    NEUTROPHILS 55 32 - 75 %    LYMPHOCYTES 30 12 - 49 %    MONOCYTES 6 5 - 13 %    EOSINOPHILS 8 (H) 0 - 7 %    BASOPHILS 1 0 - 1 %    ABS. NEUTROPHILS 2.6 1.8 - 8.0 K/UL    ABS. LYMPHOCYTES 1.4 0.8 - 3.5 K/UL    ABS. MONOCYTES 0.3 0.0 - 1.0 K/UL    ABS. EOSINOPHILS 0.4 0.0 - 0.4 K/UL    ABS. BASOPHILS 0.0 0.0 - 0.1 K/UL   METABOLIC PANEL, COMPREHENSIVE    Collection Time: 03/19/17  9:43 PM   Result Value Ref Range    Sodium 141 136 - 145 mmol/L    Potassium 3.3 (L) 3.5 - 5.1 mmol/L    Chloride 104 97 - 108 mmol/L    CO2 25 21 - 32 mmol/L    Anion gap 12 5 - 15 mmol/L    Glucose 86 65 - 100 mg/dL    BUN 6 6 - 20 MG/DL    Creatinine 0.77 0.55 - 1.02 MG/DL    BUN/Creatinine ratio 8 (L) 12 - 20      GFR est AA >60 >60 ml/min/1.73m2    GFR est non-AA >60 >60 ml/min/1.73m2    Calcium 8.8 8.5 - 10.1 MG/DL    Bilirubin, total 0.5 0.2 - 1.0 MG/DL    ALT (SGPT) 26 12 - 78 U/L    AST (SGOT) 44 (H) 15 - 37 U/L    Alk. phosphatase 107 45 - 117 U/L    Protein, total 7.2 6.4 - 8.2 g/dL    Albumin 3.7 3.5 - 5.0 g/dL    Globulin 3.5 2.0 - 4.0 g/dL    A-G Ratio 1.1 1.1 - 2.2         IMAGING RESULTS:     CXR Results  (Last 48 hours)               03/19/17 2314  XR CHEST PA LAT Final result    Impression:  IMPRESSION: No acute abnormality identified. Narrative:  EXAM:  XR CHEST PA LAT       INDICATION:   wheezing       COMPARISON: 2017. FINDINGS: AP and lateral radiographs of the chest demonstrate granulomatous   changes. The lungs are clear of an acute process. . The cardiac and mediastinal   contours and pulmonary vascularity are normal.  The bones and soft tissues are   within normal limits.                     MEDICATIONS GIVEN:  Medications   sodium chloride 0.9 % bolus infusion 1,000 mL (not administered)   albuterol-ipratropium (DUO-NEB) 2.5 MG-0.5 MG/3 ML (not administered)   albuterol-ipratropium (DUO-NEB) 2.5 mg-0.5 mg/3 ml nebulizer solution (3 mL  Given 3/19/17 2030)   albuterol-ipratropium (DUO-NEB) 2.5 MG-0.5 MG/3 ML (3 mL Nebulization Given 3/19/17 2053)   albuterol-ipratropium (DUO-NEB) 2.5 MG-0.5 MG/3 ML (3 mL Nebulization Given 3/19/17 2130)   predniSONE (DELTASONE) tablet 60 mg (60 mg Oral Given 3/19/17 2130)   potassium chloride (KLOR-CON) packet 40 mEq (40 mEq Oral Given 3/19/17 2323)   albuterol (PROVENTIL VENTOLIN) nebulizer solution 10 mg (10 mg Nebulization Given 3/20/17 0129)       IMPRESSION:  1. Status asthmaticus with COPD (chronic obstructive pulmonary disease) ([facility] [address]        PLAN:  1. Admit to hospitalist    3:43 AM  Patient is being admitted to the hospital by Dr. Jose Valente. The results of their tests and reasons for their admission have been discussed with them and/or available family. They convey agreement and understanding for the need to be admitted and for their admission diagnosis. Written by Sergio Hernandez ED Scribe, as dictated by Boni Zelaya MD.        This note is prepared by Stephanie Werner, acting as Scribe for MD Boni Pyle MD : The scribe's documentation has been prepared under my direction and personally reviewed by me in its entirety. I confirm that the note above accurately reflects all work, treatment, procedures, and medical decision making performed by me. This note will not be viewable in [address]

## 2017-03-20 NOTE — ED NOTES
Patient with decreased work of breathing and decreased accessory muscle use. Pt reports she feels a little better. Pt remains awake and stable.

## 2017-03-20 NOTE — ED NOTES
Assumed care of pt at this time, received bedside report from [address] with pt included in care. Pt is resting in room, with call bell in reach. All questions answered.

## 2017-03-20 NOTE — PROGRESS NOTES
TRANSFER - IN REPORT:    Verbal report received from [facility] (name) on [place] [facility]  being received from ED (unit) for routine progression of care      Report consisted of patients Situation, Background, Assessment and   Recommendations(SBAR). Information from the following report(s) SBAR, Kardex and Recent Results was reviewed with the receiving nurse. Opportunity for questions and clarification was provided. Assessment completed upon patients arrival to unit and care assumed.

## 2017-03-20 NOTE — PROGRESS NOTES
[address] SHIFT NURSING NOTE    SHIFT SUMMARY: 08:50 - Patient c/o back pain and chills. Last reported heroin use was 3/19/17. Med rec completed with patient. MD aware and will round on patient shortly. VSS. Offered Tylenol per prn orders, but would rather wait to have the [place] if the MD will prescribe. 09:30 - HR up per telemetry. Checked on patient to find she had taken her PIV out and was in the process of removing her tele. States she is leaving because she doesn't \"feel good and don't want to be here anymore. \"  Advised patient that MD was seeing her next and was just outside her room. She stated she did not want to see the doctor anymore. Patient signed AMA form, MD is aware. Walked patient to ER waiting room where she stated her ride would be picking her up. Advised patient that should she get worse, she could return to the ER. She verbalized understanding. 09:34 - MD signed AMA form and placed on chart. Admission Date 3/19/2017   Admission Diagnosis asthma exacerbation   Consults None        Consults   [] PT   [] OT   [] Speech   [] Palliative      [] Hospice    [] Case Management   [x] None   Cardiac Monitoring   [x] Yes   [] No     Antibiotics   [x] Yes   [] No   GI Prophylaxis  (Ex: Protonix, Pepcid, etc,.)   [] Yes   [x] No          DVT Prophylaxis   SCDs:             Win stockings:         [x] Medication, refused (Ex: Lovenox, Eliquis, Brilinta, Coumadin,  Heparin, etc..)   [] Contraindicated   [] No VTE needed       Urinary Catheter             LDAs               Peripheral IV 03/19/17 Left Antecubital (Active)   Site Assessment Clean, dry, & intact 3/20/2017  8:50 AM   Phlebitis Assessment 0 3/20/2017  8:50 AM   Infiltration Assessment 0 3/20/2017  8:50 AM   Dressing Status Clean, dry, & intact 3/20/2017  8:50 AM   Dressing Type Tape;Transparent 3/20/2017  8:50 AM   Hub Color/Line Status Green; Infusing 3/20/2017  8:50 AM                      I/Os   Intake/Output Summary (Last 24 hours) at 03/20/17 0851  Last data filed at 03/20/17 0850   Gross per 24 hour   Intake               60 ml   Output                0 ml   Net               60 ml         Activity Level Activity Level: Up ad yani     Activity Assistance: No assistance needed   Diet Active Orders   Diet    DIET CARDIAC Regular      Purposeful Rounding every 1-2 hour? [x] Yes    Criss Score  Total Score: 1   Bed Alarm (If score 3 or >)   [] Yes    [] Refused (See signed refusal form in chart)   Doc Score  Doc Score: 22       Doc Score (if score 14 or less)   [] PMT consult   [] Nutrition consult   [] Wound Care consult      []  Specialty bed         Influenza Vaccine                 Needs prior to discharge:   Home O2 required:    [] Yes   [x] No     If yes, how much O2 required?     Other:    Last Bowel Movement Date: 03/19/17   Readmission Risk Assessment Tool Score Medium Risk            17       Total Score        11 More than 1 Admission in calendar year    4 Patient Insurance is Medicare, Medicaid or Self Pay    2 Charlson Comorbidity Score        Criteria that do not apply:    Relationship with PCP    Patient Living Status    Patient Length of Stay > 5       Expected Length of Stay - - -   Actual Length of Stay 1

## 2017-03-20 NOTE — ED NOTES
TRANSFER - OUT REPORT:    Verbal report given to Art Graham RN (name) on Alon Martinezr  being transferred to [facility](unit) for routine progression of care       Report consisted of patients Situation, Background, Assessment and   Recommendations(SBAR). Information from the following report(s) SBAR, Kardex, ED Summary, Procedure Summary, MAR and Recent Results was reviewed with the receiving nurse. Lines:       Opportunity for questions and clarification was provided.       Patient transported with:   [employer]

## 2017-03-20 NOTE — ED NOTES
Assumed care of patient at this time. Patient immediately roomed from triage and duo neb Initiated. Patient with increased work of breathing and accessory muscle use noted. VSS. Pt reports wheezing starting last night and states little to no relief with at home neb treatments. Pt reports PMH of asthma and COPD. RN at bedside.

## 2017-03-20 NOTE — H&P
Hospitalist Admission Note    NAME: Alon Baez   :  1975   MRN:  [id number]     Date/Time:  3/20/2017 4:14 AM    Patient PCP: None  ________________________________________________________________________    Given the patient's current clinical presentation, I have a high level of concern for decompensation if discharged from the ED. Complex decision making was performed which includes reviewing the patient's available past medical records, laboratory results, and Xray films. I have also directly communicated my plan and discussed this case with the involved ED physician. My assessment of this patient's clinical condition and my plan of care is as follows:    Assessment / Plan:  Acute on chronic asthma flare  Suspect viral bronchitis  -IVF NS  -steroids PO  -nebs scheduled and prn  -wean O2 to off  -azithromycin to continue  -patient has required intubation in the past - watch carefully to ensure not fatiguing  -mild peripheral eosinophilia - could consider further work up OP with Pulmonary for Eosinophilic asthma should she become relatively steroid insensitive  -continue on home Breo  -consider addition of allergy medicine to help given high pollen at this time. Will start zyrtec - watch interactions    Polysubstance abuse  -quit Tobacco 2wks prior but continues to abuse marijuana. Educated that the contents of marijuana, like tobacco, will continue to irritate the lung triggering asthma.  patietn not interested at this time  -encouraged cessation of illicit drugs as well - has started back insufflating heroin - watch for withdrawal  -gave information re use of narcotics anonymous    Hypertension  -continue on home OP PO medicine, no changes    Hypokalemia  -replace PO  -empirically replace mag    Chronic lithium use  -check levels      Code Status: full  DVT Prophylaxis: hep sq  GI Prophylaxis: not indicated    Baseline: independent        Subjective:   CHIEF COMPLAINT: \"i could not breathe right\"    HISTORY OF PRESENT ILLNESS:     [address] [facility] [place] is a 35 y.o.  female with known history as listed below presents to ED with complaint noted above. Available records were reviewed at the time of H&P. Patient with known asthma recently seen and evaluated 1month prior for an asthmatic flare given steroids PO with some improvement now returns with continued worsening SOB, cough, wheezing not improved with home medicine. She notes attempting increased use of her home nebulized Albuterol throughout the day today with no relief of sxs. She denies having a PCP to follow up with. Pt states she quit smoking cigarettes 2 weeks ago but continues to use marijuana of at least 1joint daily. Not currently on O2, +hx of intubation for asthma. NO CP, nausea, vomiting, diarrhea, abd pain, or urinary sxs. No Fevers. No recent travel. She uses her medications as directed. No recent changes in medicine. to note patient has started using heroin by insufflation method again over last 2mos. In ED CXR neg. No wbc. No fever. tachypnic but not hypoxic. She was given nebs, steroids, but did not fully relieve her wheezing and thachypnea. Flu checked and was neg. We were asked to observe.      Past Medical History:   Diagnosis Date    Asthma     Bipolar 1 disorder (HCC)     Chronic obstructive pulmonary disease (HCC)     Chronic pain     back, leg    Cocaine abuse     Depression     Hepatitis B     Heroin abuse     HTN (hypertension)     Narcotic abuse     Polysubstance abuse     Sarcoidosis ([facility] [address]     Tobacco abuse       Past Surgical History:   Procedure Laterality Date    HX GYN      HX WISDOM TEETH EXTRACTION       Social History   Substance Use Topics    Smoking status: Former Smoker     Packs/day: 0.50     Quit date: 3/5/2017    Smokeless tobacco: Never Used    Alcohol use No      Family History   Problem Relation Age of Onset    Hypertension Father     Hypertension Mother         Allergies   Allergen Reactions    Tomato Swelling     Tongue        Prior to Admission medications    Medication Sig Start Date End Date Taking? Authorizing Provider   cloNIDine HCl (CATAPRES) 0.3 mg tablet Take 0.3 mg by mouth three (3) times daily. Yes Historical Provider   gabapentin (NEURONTIN) 800 mg tablet Take 800 mg by mouth three (3) times daily. Yes Historical Provider   QUEtiapine (SEROQUEL) 400 mg tablet Take 800 mg by mouth nightly. Yes Analy Rivera MD   lithium carbonate 300 mg tablet Take 300 mg by mouth two (2) times a day. Yes Analy Rivera MD   sertraline (ZOLOFT) 100 mg tablet Take 100 mg by mouth daily. Yes Analy Rivera MD   prazosin (MINIPRESS) 2 mg capsule Take 2 mg by mouth nightly. Yes Analy Rivera MD   HYDROcodone-acetaminophen (NORCO) 5-325 mg per tablet Take 1 Tab by mouth every four (4) hours as needed for Pain. Max Daily Amount: 6 Tabs. 2/6/17  Yes ANDRZEJ Peralta   guaiFENesin-codeine (ROBITUSSIN AC) 100-10 mg/5 mL solution Take 5 mL by mouth four (4) times daily as needed for Cough. Yes Historical Provider   albuterol (PROVENTIL HFA, VENTOLIN HFA, PROAIR HFA) 90 mcg/actuation inhaler Take 2 Puffs by inhalation every four (4) hours as needed for Wheezing or Shortness of Breath. 1/11/17  Yes Larry Chopra MD   albuterol-ipratropium (DUO-NEB) 2.5 mg-0.5 mg/3 ml nebu 3 mL by Nebulization route every six (6) hours as needed. 1/11/17  Yes Larry Chopra MD   Nebulizer & Compressor machine 1 Each by Does Not Apply route daily. 1/11/17  Yes Larry Chopra MD   fluticasone-vilanterol (BREO ELLIPTA) 200-25 mcg/dose inhaler Take 1 Puff by inhalation daily.  1/11/17  Yes Larry Chopra MD     REVIEW OF SYSTEMS:  See HPI for details  General: negative for fever, chills, sweats, +weakness, no weight loss  Eyes: negative for blurred vision, eye pain, loss of vision, diplopia  Ear Nose and Throat: negative for rhinorrhea, pharyngitis, otalgia, tinnitus, +hoarse due to cough, no swallowing difficulties  Respiratory: negative for pleuritic pain, +cough, no sputum production, + wheezing, SOB, RIVAS  Cardiology:  +chest pain with coughing only.  No palpitations, orthopnea, PND, edema, syncope   Gastrointestinal: negative for abdominal pain, N/V, dysphagia, change in bowel habits, bleeding  Genitourinary: negative for frequency, urgency, dysuria, hematuria, incontinence  Muskuloskeletal : negative for arthralgia, myalgia  Hematology: negative for easy bruising, bleeding, lymphadenopathy  Dermatological: negative for rash, ulceration, mole change, new lesion  Endocrine: negative for hot flashes or polydipsia  Neurological: negative for headache, dizziness, confusion, focal weakness, paresthesia, memory loss, gait disturbance  Psychological: negative for anxiety, depression, agitation    Objective:   VITALS:    Visit Vitals    /78 (BP 1 Location: Left arm)    Pulse (!) 107    Temp 97.9 °F (36.6 °C)    Resp 20    Ht 5' 4\" (1.626 m)    Wt 65.7 kg (144 lb 13.5 oz)    SpO2 95%    BMI 24.86 kg/m2     PHYSICAL EXAM:     GENERAL:    WD y   WN y   Cachectic    Thin    Obese    Disheveled y   Ill Appearing Critically n   Ill Appearing Chronically y   Acute Distress y   Other      HEENT:    NC/AT/EOMI y   PERRLA y   Conjunctivae Pink    Conjunctivae Pale y   Moist Mucosa    Dry Mucosa y   Hearing intact to voice y   Other      NECK:    Supple y   Masses n   Thyroid Tender n   Other                   RESPIRATORY:    CTA bilaterally WITHOUT wheezing/rhonchi/rales or crackles    Wheezing y   Rhonchi n   Crackles y   Use of accessory muscles y mild   Other      CARDIAC:    regular rate and rhythm No murmurs/rubs/gallops y   Murmur    Rubs    Gallops    Rate Regular/Irregular reg   Carotid Bruit Left/Right n   Lower Extremity Edema n   JVP  n   Other Normal capillary refill     ABDOMEN:    Soft non distended non tender +bowel sounds no HSM y   Rigid    Tenderness    Hepatomegaly    Splenomegaly    Distended n   Increased girth due to habitus    Normal/Hyper/Hypo Active Bowel Sounds hyper   Other      SKIN / MUSCULOSKELETAL:    Rashes n   Ecchymosis n   Ulcers    Tight to palpitation n   Turgor Good/Poor poor   Cyanosis/Clubbing    Amputation(s)    Other y excoriation marks throughout     NEUROLOGY:    cranial nerves II-XII grossly intact y   Cranial Nerve Deficit    Facial Droop    Slurred Speech    Aphasia n   Strength Normal y   Weakness n   Meningismus/Kernig's Sign/ Brudzinsky n   Follows Commands y   Other      PSYCHIATRIC:    AAOx3 in no acute distress y   Insight Poor    Insight Good y   Alert and Oriented to Person     Alert and Oriented to Place    Alert and Oriented toTime    Depressed    Anxious n   Agitated n   Lethargic n   Stuporous n   Sedated    Other    _______________________________________________________________________  Care Plan discussed with:    Comments   Patient x Discussed with patient in room. POC outlined and Questions answered (22   Family      RN x    Care Manager                    Consultant:  javan LEIVA MD 5   _______________________________________________________________________  Recommended Disposition:   Home with Family y   HH/PT/OT/RN    SNF/LTC    MAEGAN    ________________________________________________________________________  TOTAL TIME:  39 Minutes    Critical Care Provided     Minutes non procedure based      Comments   >50% of visit spent in counseling and coordination of care x Chart review  Discussion with patient and/or family and questions answered     ________________________________________________________________________  Signed: Darcie Thao MD      Procedures: see electronic medical records for all procedures/Xrays and details which were not copied into this note but were reviewed prior to creation of Plan.     LAB DATA REVIEWED:    Recent Results (from the past 24 hour(s))   INFLUENZA A & B AG (RAPID TEST)    Collection Time: 03/19/17  9:43 PM   Result Value Ref Range    Influenza A Antigen NEGATIVE  NEG      Influenza B Antigen NEGATIVE  NEG     CBC WITH AUTOMATED DIFF    Collection Time: 03/19/17  9:43 PM   Result Value Ref Range    WBC 4.7 3.6 - 11.0 K/uL    RBC 4.18 3.80 - 5.20 M/uL    HGB 12.0 11.5 - 16.0 g/dL    HCT 37.0 35.0 - 47.0 %    MCV 88.5 80.0 - 99.0 FL    MCH 28.7 26.0 - 34.0 PG    MCHC 32.4 30.0 - 36.5 g/dL    RDW 15.9 (H) 11.5 - 14.5 %    PLATELET 678 769 - 274 K/uL    NEUTROPHILS 55 32 - 75 %    LYMPHOCYTES 30 12 - 49 %    MONOCYTES 6 5 - 13 %    EOSINOPHILS 8 (H) 0 - 7 %    BASOPHILS 1 0 - 1 %    ABS. NEUTROPHILS 2.6 1.8 - 8.0 K/UL    ABS. LYMPHOCYTES 1.4 0.8 - 3.5 K/UL    ABS. MONOCYTES 0.3 0.0 - 1.0 K/UL    ABS. EOSINOPHILS 0.4 0.0 - 0.4 K/UL    ABS. BASOPHILS 0.0 0.0 - 0.1 K/UL   METABOLIC PANEL, COMPREHENSIVE    Collection Time: 03/19/17  9:43 PM   Result Value Ref Range    Sodium 141 136 - 145 mmol/L    Potassium 3.3 (L) 3.5 - 5.1 mmol/L    Chloride 104 97 - 108 mmol/L    CO2 25 21 - 32 mmol/L    Anion gap 12 5 - 15 mmol/L    Glucose 86 65 - 100 mg/dL    BUN 6 6 - 20 MG/DL    Creatinine 0.77 0.55 - 1.02 MG/DL    BUN/Creatinine ratio 8 (L) 12 - 20      GFR est AA >60 >60 ml/min/1.73m2    GFR est non-AA >60 >60 ml/min/1.73m2    Calcium 8.8 8.5 - 10.1 MG/DL    Bilirubin, total 0.5 0.2 - 1.0 MG/DL    ALT (SGPT) 26 12 - 78 U/L    AST (SGOT) 44 (H) 15 - 37 U/L    Alk.  phosphatase 107 45 - 117 U/L    Protein, total 7.2 6.4 - 8.2 g/dL    Albumin 3.7 3.5 - 5.0 g/dL    Globulin 3.5 2.0 - 4.0 g/dL    A-G Ratio 1.1 1.1 - 2.2

## 2017-04-16 ENCOUNTER — HOSPITAL ENCOUNTER (INPATIENT)
Age: 42
LOS: 2 days | Discharge: HOME | DRG: 191 | End: 2017-04-22
Attending: EMERGENCY MEDICINE | Admitting: STUDENT IN AN ORGANIZED HEALTH CARE EDUCATION/TRAINING PROGRAM
Payer: MEDICAID

## 2017-04-16 ENCOUNTER — APPOINTMENT (OUTPATIENT)
Dept: GENERAL RADIOLOGY | Age: 42
DRG: 191 | End: 2017-04-16
Attending: EMERGENCY MEDICINE
Payer: MEDICAID

## 2017-04-16 DIAGNOSIS — R55 SPELL OF LOSS OF CONSCIOUSNESS: ICD-10-CM

## 2017-04-16 DIAGNOSIS — J45.51 SEVERE PERSISTENT ASTHMA WITH ACUTE EXACERBATION: Primary | ICD-10-CM

## 2017-04-16 PROCEDURE — 77030013140 HC MSK NEB VYRM -A

## 2017-04-16 PROCEDURE — 71020 XR CHEST PA LAT: CPT

## 2017-04-16 PROCEDURE — 99285 EMERGENCY DEPT VISIT HI MDM: CPT

## 2017-04-16 PROCEDURE — 94640 AIRWAY INHALATION TREATMENT: CPT

## 2017-04-16 PROCEDURE — 74011000250 HC RX REV CODE- 250: Performed by: PHYSICIAN ASSISTANT

## 2017-04-16 PROCEDURE — 74011000250 HC RX REV CODE- 250: Performed by: EMERGENCY MEDICINE

## 2017-04-16 PROCEDURE — 74011636637 HC RX REV CODE- 636/637: Performed by: EMERGENCY MEDICINE

## 2017-04-16 RX ORDER — PREDNISONE 50 MG/1
50 TABLET ORAL DAILY
Qty: 3 TAB | Refills: 0 | Status: SHIPPED | OUTPATIENT
Start: 2017-04-16 | End: 2017-04-19

## 2017-04-16 RX ORDER — ALBUTEROL SULFATE 0.83 MG/ML
2.5 SOLUTION RESPIRATORY (INHALATION)
Qty: 24 EACH | Refills: 0 | Status: ON HOLD | OUTPATIENT
Start: 2017-04-16 | End: 2018-08-07

## 2017-04-16 RX ORDER — PREDNISONE 20 MG/1
60 TABLET ORAL
Status: COMPLETED | OUTPATIENT
Start: 2017-04-16 | End: 2017-04-16

## 2017-04-16 RX ADMIN — ALBUTEROL SULFATE 1 DOSE: 2.5 SOLUTION RESPIRATORY (INHALATION) at 23:49

## 2017-04-16 RX ADMIN — PREDNISONE 60 MG: 20 TABLET ORAL at 21:28

## 2017-04-16 RX ADMIN — ALBUTEROL SULFATE 1 DOSE: 2.5 SOLUTION RESPIRATORY (INHALATION) at 21:53

## 2017-04-16 RX ADMIN — ALBUTEROL SULFATE 1 DOSE: 2.5 SOLUTION RESPIRATORY (INHALATION) at 19:43

## 2017-04-16 RX ADMIN — ALBUTEROL SULFATE 1 DOSE: 2.5 SOLUTION RESPIRATORY (INHALATION) at 23:28

## 2017-04-16 NOTE — IP AVS SNAPSHOT
[address] 
[phone] Patient: Catalina Ratliff MRN: [id number] :1975 You are allergic to the following Allergen Reactions Tomato Swelling Tongue Recent Documentation Height Weight BMI OB Status Smoking Status 1.626 m 56.7 kg 21.46 kg/m2 Having regular periods Current Every Day Smoker Emergency Contacts Name Discharge Info Relation Home Work Mobile Amalia Kim  Parent [1] [phone] About your hospitalization You were admitted on:  2017 You last received care in the:  [facility] 073 [id number] You were discharged on:  2017 Unit phone number:  [phone] Why you were hospitalized Your primary diagnosis was:  Asthma Exacerbation Your diagnoses also included:  Seizure (Hcc) Providers Seen During Your Hospitalizations Provider Role Specialty Primary office phone Adele Fisher MD Attending Provider Emergency Medicine [phone] Girma Brice MD Attending Provider [facility] [phone] Kimberly Mane MD Attending Provider Internal Medicine [phone] Your Primary Care Physician (PCP) Primary Care Physician Office Phone Office Fax NONE ** None ** ** None ** Follow-up Information Follow up With Details Comments Contact Info [address]  In 2 days  [address] [facility] [address] 
[phone] None   None (395) Patient stated that they have no PCP [employer] [facility] In 1 day Please ask for an assessment for services, specifically the [facility] program.   [address] [facility] 
[phone] Current Discharge Medication List  
  
START taking these medications Dose & Instructions Dispensing Information Comments Morning Noon Evening Bedtime * albuterol 2.5 mg /3 mL (0.083 %) nebulizer solution Commonly known as:  PROVENTIL VENTOLIN Replaces:  albuterol 90 mcg/actuation inhaler Your last dose was: Your next dose is:    
   
   
 Dose:  2.5 mg  
3 mL by Nebulization route every four (4) hours as needed for Wheezing. Quantity:  24 Each Refills:  0  
     
   
   
   
  
 * albuterol sulfate 90 mcg/actuation Aepb Your last dose was: Your next dose is:    
   
   
 Dose:  2 Puff Take 2 Puffs by inhalation every four (4) hours as needed. Quantity:  1 Inhaler Refills:  0  
     
   
   
   
  
 nicotine 21 mg/24 hr  
Commonly known as:  Fritz Barajas Your last dose was: Your next dose is:    
   
   
 Dose:  1 Patch 1 Patch by TransDERmal route daily for 30 days. Quantity:  30 Patch Refills:  0  
     
   
   
   
  
 predniSONE 10 mg tablet Commonly known as:  Jyoti Warren Your last dose was: Your next dose is:    
   
   
 40 mg daily for 2 days, then 30 mg daily for 2 days, then 20 mg daily for 2 days, then 10 mg daily for 2 days, then stop Quantity:  20 Tab Refills:  0  
     
   
   
   
  
 * Notice: This list has 2 medication(s) that are the same as other medications prescribed for you. Read the directions carefully, and ask your doctor or other care provider to review them with you. CONTINUE these medications which have NOT CHANGED Dose & Instructions Dispensing Information Comments Morning Noon Evening Bedtime  
 albuterol-ipratropium 2.5 mg-0.5 mg/3 ml Nebu Commonly known as:  Rosey Carvajal Your last dose was: Your next dose is:    
   
   
 Dose:  3 mL  
3 mL by Nebulization route every six (6) hours as needed. Quantity:  100 Nebule Refills:  1  
     
   
   
   
  
 fluticasone-vilanterol 200-25 mcg/dose inhaler Commonly known as:  BREO ELLIPTA Your last dose was: Your next dose is:    
   
   
 Dose:  1 Puff Take 1 Puff by inhalation daily. Quantity:  28 Each Refills:  1  
     
   
   
   
  
 gabapentin 800 mg tablet Commonly known as:  NEURONTIN Your last dose was: Your next dose is:    
   
   
 Dose:  800 mg Take 1 Tab by mouth three (3) times daily. Quantity:  90 Tab Refills:  0 KLONOPIN PO Your last dose was: Your next dose is:    
   
   
 Dose:  5 mg Take 5 mg by mouth two (2) times a day. Refills:  0  
     
   
   
   
  
 lithium carbonate 300 mg tablet Your last dose was: Your next dose is:    
   
   
 Dose:  300 mg Take 1 Tab by mouth two (2) times a day. Quantity:  60 Tab Refills:  0 Nebulizer & Compressor machine Your last dose was: Your next dose is:    
   
   
 Dose:  1 Each  
1 Each by Does Not Apply route daily. Quantity:  1 Each Refills:  0  
     
   
   
   
  
 prazosin 2 mg capsule Commonly known as:  MINIPRESS Your last dose was: Your next dose is:    
   
   
 Dose:  2 mg Take 1 Cap by mouth nightly. Quantity:  30 Cap Refills:  0 QUEtiapine 400 mg tablet Commonly known as:  SEROquel Your last dose was: Your next dose is:    
   
   
 Dose:  800 mg Take 2 Tabs by mouth nightly. Quantity:  30 Tab Refills:  0  
     
   
   
   
  
 sertraline 100 mg tablet Commonly known as:  ZOLOFT Your last dose was: Your next dose is:    
   
   
 Dose:  100 mg Take 1 Tab by mouth daily. Quantity:  30 Tab Refills:  0 STOP taking these medications   
 albuterol 90 mcg/actuation inhaler Commonly known as:  PROVENTIL HFA, VENTOLIN HFA, PROAIR HFA Replaced by:  albuterol 2.5 mg /3 mL (0.083 %) nebulizer solution  
   
  
 cloNIDine HCl 0.3 mg tablet Commonly known as:  CATAPRES  
   
  
 guaiFENesin-codeine 100-10 mg/5 mL solution Commonly known as:  ROBITUSSIN AC  
   
  
 HYDROcodone-acetaminophen 5-325 mg per tablet Commonly known as:  Tila Higuera Where to Get Your Medications Information on where to get these meds will be given to you by the nurse or doctor. ! Ask your nurse or doctor about these medications  
  albuterol 2.5 mg /3 mL (0.083 %) nebulizer solution  
 albuterol sulfate 90 mcg/actuation Aepb  
 albuterol-ipratropium 2.5 mg-0.5 mg/3 ml Nebu  
 fluticasone-vilanterol 200-25 mcg/dose inhaler  
 gabapentin 800 mg tablet  
 lithium carbonate 300 mg tablet Nebulizer & Compressor machine  
 nicotine 21 mg/24 hr  
 prazosin 2 mg capsule  
 predniSONE 10 mg tablet QUEtiapine 400 mg tablet  
 sertraline 100 mg tablet Discharge Instructions Discharge Instructions PATIENT ID: Margaret Saenz MRN: [id number] YOB: 1975 DATE OF ADMISSION: 4/16/2017  8:35 PM   
DATE OF DISCHARGE: 4/19/2017 PRIMARY CARE PROVIDER: None ATTENDING PHYSICIAN: Sruthi Phoenix MD 
DISCHARGING PROVIDER: Sruthi Phoenix MD   
To contact this individual call [phone] and ask the  to page. If unavailable ask to be transferred the Adult Hospitalist Department. DISCHARGE DIAGNOSES Acute asthma exacerbation vs COPD exacerbation Hypertension Opiate dependence with early withdrawal 
  Tobacco dependence CONSULTATIONS: IP CONSULT TO HOSPITALIST 
IP CONSULT TO PSYCHIATRY PROCEDURES/SURGERIES: * No surgery found * PENDING TEST RESULTS:  
At the time of discharge the following test results are still pending: None. FOLLOW UP APPOINTMENTS:  
Follow-up Information Follow up With Details Comments Contact Info [address] [place] [address] In 2 days  [address] [place] [address] 
[phone]  None   None (395) Patient stated that they have no PCP 
  
  
  
 
ADDITIONAL CARE RECOMMENDATIONS:  
 You were started on the following new medications: 
 (1) Prednisone - which is a steroid used to treat your asthma / COPD exacerbation (2) Albuterol - which is a rescue inhaler that you may use up to 4 times a day if you are experiencing shortness of breath 
 (3) Nicotine patch - for tobacco use; you should strongly consider quitting smoking for good You may resume your other home medications. DIET: Regular Diet ACTIVITY: Activity as tolerated WOUND CARE: N/A 
 
EQUIPMENT needed: N/A 
 
 
DISCHARGE MEDICATIONS: 
 See Medication Reconciliation Form · It is important that you take the medication exactly as they are prescribed. · Keep your medication in the bottles provided by the pharmacist and keep a list of the medication names, dosages, and times to be taken in your wallet. · Do not take other medications without consulting your doctor. NOTIFY YOUR PHYSICIAN FOR ANY OF THE FOLLOWING:  
Fever over 101 degrees for 24 hours. Chest pain, shortness of breath, fever, chills, nausea, vomiting, diarrhea, change in mentation, falling, weakness, bleeding. Severe pain or pain not relieved by medications. Or, any other signs or symptoms that you may have questions about. DISPOSITION: 
x  Home With: 
 OT  PT  [facility]  RN  
  
 SNF/Inpatient Rehab/LTAC Independent/assisted living Hospice Other: CDMP Checked:  
Yes x PROBLEM LIST Updated: 
Yes x Signed:  
Nyla Jones MD 
4/19/2017 
12:36 PM 
  
Asthma Attack: Care Instructions Your Care Instructions During an asthma attack, the airways swell and narrow. This makes it hard to breathe. Severe asthma attacks can be life-threatening, but you can help prevent them by keeping your asthma under control and treating symptoms before they get bad. Symptoms include being short of breath, having chest tightness, coughing, and wheezing. Noting and treating these symptoms can also help you avoid future trips to the emergency room. The doctor has checked you carefully, but problems can develop later. If you notice any problems or new symptoms, get medical treatment right away. Follow-up care is a key part of your treatment and safety. Be sure to make and go to all appointments, and call your doctor if you are having problems. It's also a good idea to know your test results and keep a list of the medicines you take. How can you care for yourself at home? · Follow your asthma action plan to prevent and treat attacks. If you don't have an asthma action plan, work with your doctor to create one. · Take your asthma medicines exactly as prescribed. Talk to your doctor right away if you have any questions about how to take them. ¨ Use your quick-relief medicine when you have symptoms of an attack. Quick-relief medicine is usually an albuterol inhaler. Some people need to use quick-relief medicine before they exercise. ¨ Take your controller medicine every day, not just when you have symptoms. Controller medicine is usually an inhaled corticosteroid. The goal is to prevent problems before they occur. Don't use your controller medicine to treat an attack that has already started. It doesn't work fast enough to help. ¨ If your doctor prescribed corticosteroid pills to use during an attack, take them exactly as prescribed. It may take hours for the pills to work, but they may make the episode shorter and help you breathe better. ¨ Keep your quick-relief medicine with you at all times. · Talk to your doctor before using other medicines. Some medicines, such as aspirin, can cause asthma attacks in some people. · If you have a peak flow meter, use it to check how well you are breathing. This can help you predict when an asthma attack is going to occur. Then you can take medicine to prevent the asthma attack or make it less severe. · Do not smoke or allow others to smoke around you. Avoid smoky places. Smoking makes asthma worse. If you need help quitting, talk to your doctor about stop-smoking programs and medicines. These can increase your chances of quitting for good. · Learn what triggers an asthma attack for you, and avoid the triggers when you can. Common triggers include colds, smoke, air pollution, dust, pollen, mold, pets, cockroaches, stress, and cold air. · Avoid colds and the flu. Get a pneumococcal vaccine shot. If you have had one before, ask your doctor if you need a second dose. Get a flu vaccine every fall. If you must be around people with colds or the flu, wash your hands often. When should you call for help? Call 911 anytime you think you may need emergency care. For example, call if: 
· You have severe trouble breathing. Call your doctor now or seek immediate medical care if: 
· Your symptoms do not get better after you have followed your asthma action plan. · You have new or worse trouble breathing. · Your coughing and wheezing get worse. · You cough up dark brown or bloody mucus (sputum). · You have a new or higher fever. Watch closely for changes in your health, and be sure to contact your doctor if: 
· You need to use quick-relief medicine on more than 2 days a week (unless it is just for exercise). · You cough more deeply or more often, especially if you notice more mucus or a change in the color of your mucus. · You are not getting better as expected. Where can you learn more? Go to http://jazlyn-eladio.info/. Enter N407 in the search box to learn more about \"Asthma Attack: Care Instructions. \" Current as of: May 23, 2016 Content Version: 11.2 © 5583-9525 [employer]. Care instructions adapted under license by [employer] (which disclaims liability or warranty for this information).  If you have questions about a medical condition or this instruction, always ask your healthcare professional. [employer] disclaims any warranty or liability for your use of this information. We hope that we have addressed all of your medical concerns. The examination and treatment you received in the Emergency Department were for an emergent problem and were not intended as complete care. It is important that you follow up with your healthcare provider(s) for ongoing care. If your symptoms worsen or do not improve as expected, and you are unable to reach your usual health care provider(s), you should return to the Emergency Department. Today's healthcare is undergoing tremendous change, and patient satisfaction surveys are one of the many tools to assess the quality of medical care. You may receive a survey from the [employer] regarding your experience in the Emergency Department. I hope that your experience has been completely positive, particularly the medical care that I provided. As such, please participate in the survey; anything less than excellent does not meet my expectations or intentions. [facility] and [address] [place] [address] participate in nationally recognized quality of care measures. If your blood pressure is greater than 120/80, as reported below, we urge that you seek medical care to address the potential of high blood pressure, commonly known as hypertension. Hypertension can be hereditary or can be caused by certain medical conditions, pain, stress, or \"white coat syndrome. \" Please make an appointment with your health care provider(s) for follow up of your Emergency Department visit. VITALS:  
Patient Vitals for the past 8 hrs: 
 Temp Pulse Resp BP SpO2  
04/16/17 2153 - - - - 100 % 04/16/17 1932 98.4 °F (36.9 °C) 93 16 120/77 96 % Thank you for allowing us to provide you with medical care today. We realize that you have many choices for your emergency care needs.   Please choose us in the future for any continued health care needs. Regards, Makenna Romero, [facility].  
Office: [phone] No results found for this or any previous visit (from the past 24 hour(s)). Xr Chest Pa Lat Result Date: 4/16/2017 INDICATION:  Wheezing and shortness of breath for one day. History of asthma Exam: Chest 2 views. Comparison March 19, 2017. Findings: Cardiac silhouette is not enlarged. There are calcified mediastinal and hilar nodes. Pulmonary vasculature is not engorged. No focal parenchymal opacities, effusions, or pneumothorax. Granuloma right lung base unchanged. Bony thorax is intact. Impression: 1. No acute cardiopulmonary disease DISCHARGE SUMMARY from Nurse The following personal items are in your possession at time of discharge: 
 
Dental Appliances: None Visual Aid: None Home Medications: None Jewelry: Federica Muscat Clothing: At bedside Other Valuables: None PATIENT INSTRUCTIONS: 
 
After general anesthesia or intravenous sedation, for 24 hours or while taking prescription Narcotics: · Limit your activities · Do not drive and operate hazardous machinery · Do not make important personal or business decisions · Do  not drink alcoholic beverages · If you have not urinated within 8 hours after discharge, please contact your surgeon on call. Report the following to your surgeon: 
· Excessive pain, swelling, redness or odor of or around the surgical area · Temperature over 100.5 · Nausea and vomiting lasting longer than 4 hours or if unable to take medications · Any signs of decreased circulation or nerve impairment to extremity: change in color, persistent  numbness, tingling, coldness or increase pain · Any questions These are general instructions for a healthy lifestyle: No smoking/ No tobacco products/ Avoid exposure to second hand smoke Surgeon General's Warning:  Quitting smoking now greatly reduces serious risk to your health. Obesity, smoking, and sedentary lifestyle greatly increases your risk for illness A healthy diet, regular physical exercise & weight monitoring are important for maintaining a healthy lifestyle You may be retaining fluid if you have a history of heart failure or if you experience any of the following symptoms:  Weight gain of 3 pounds or more overnight or 5 pounds in a week, increased swelling in our hands or feet or shortness of breath while lying flat in bed. Please call your doctor as soon as you notice any of these symptoms; do not wait until your next office visit. Recognize signs and symptoms of STROKE: 
 
F-face looks uneven A-arms unable to move or move unevenly S-speech slurred or non-existent T-time-call 911 as soon as signs and symptoms begin-DO NOT go Back to bed or wait to see if you get better-TIME IS BRAIN. Warning Signs of HEART ATTACK Call 911 if you have these symptoms: 
? Chest discomfort. Most heart attacks involve discomfort in the center of the chest that lasts more than a few minutes, or that goes away and comes back. It can feel like uncomfortable pressure, squeezing, fullness, or pain. ? Discomfort in other areas of the upper body. Symptoms can include pain or discomfort in one or both arms, the back, neck, jaw, or stomach. ? Shortness of breath with or without chest discomfort. ? Other signs may include breaking out in a cold sweat, nausea, or lightheadedness. Don't wait more than five minutes to call [address]! Fast action can save your life. Calling 911 is almost always the fastest way to get lifesaving treatment. Emergency Medical Services staff can begin treatment when they arrive  up to an hour sooner than if someone gets to the hospital by car. The discharge information has been reviewed with the patient.   The patient verbalized understanding. Discharge medications reviewed with the patient and appropriate educational materials and side effects teaching were provided. MyChart Activation Thank you for requesting access to [facility]. Please follow the instructions below to securely access and download your online medical record. [facility] allows you to send messages to your doctor, view your test results, renew your prescriptions, schedule appointments, and more. How Do I Sign Up? 1. In your internet browser, go to [email or web address] 
2. Click on the First Time User? Click Here link in the Sign In box. You will be redirect to the New Member Sign Up page. 3. Enter your [facility] Access Code exactly as it appears below. You will not need to use this code after youve completed the sign-up process. If you do not sign up before the expiration date, you must request a new code. [facility] Access Code: [email or web address] Expires: 7/15/2017  5:21 PM (This is the date your [facility] access code will ) 4. Enter the last four digits of your Social Security Number (xxxx) and Date of Birth (mm/dd/yyyy) as indicated and click Submit. You will be taken to the next sign-up page. 5. Create a [facility] ID. This will be your [facility] login ID and cannot be changed, so think of one that is secure and easy to remember. 6. Create a [facility] password. You can change your password at any time. 7. Enter your Password Reset Question and Answer. This can be used at a later time if you forget your password. 8. Enter your e-mail address. You will receive e-mail notification when new information is available in [address] 9. Click Sign Up. You can now view and download portions of your medical record. 10. Click the Download Summary menu link to download a portable copy of your medical information. Additional Information If you have questions, please visit the Frequently Asked Questions section of the [employer] website at [email or web address] Remember, [facility] is NOT to be used for urgent needs. For medical emergencies, dial 911. Discharge Orders None [facility] Announcement We are excited to announce that we are making your provider's discharge notes available to you in [facility]. You will see these notes when they are completed and signed by the physician that discharged you from your recent hospital stay. If you have any questions or concerns about any information you see in [facility], please call the Health Information Department where you were seen or reach out to your Primary Care Provider for more information about your plan of care. Introducing [facility] [place] [facility]! Renuka Jimenez introduces [facility] patient portal. Now you can access parts of your medical record, email your doctor's office, and request medication refills online. 1. In your internet browser, go to [email or web address] 2. Click on the First Time User? Click Here link in the Sign In box. You will see the New Member Sign Up page. 3. Enter your [facility] Access Code exactly as it appears below. You will not need to use this code after youve completed the sign-up process. If you do not sign up before the expiration date, you must request a new code. · [facility] Access Code: [email or web address] Expires: 7/15/2017  5:21 PM 
 
4. Enter the last four digits of your Social Security Number (xxxx) and Date of Birth (mm/dd/yyyy) as indicated and click Submit. You will be taken to the next sign-up page. 5. Create a [facility] ID. This will be your [facility] login ID and cannot be changed, so think of one that is secure and easy to remember. 6. Create a [facility] password. You can change your password at any time. 7. Enter your Password Reset Question and Answer. This can be used at a later time if you forget your password. 8. Enter your e-mail address. You will receive e-mail notification when new information is available in [address] 9. Click Sign Up. You can now view and download portions of your medical record. 10. Click the Download Summary menu link to download a portable copy of your medical information. If you have questions, please visit the Frequently Asked Questions section of the [employer] website. Remember, [employer] is NOT to be used for urgent needs. For medical emergencies, dial 911. Now available from your iPhone and Android! General Information Please provide this summary of care documentation to your next provider. Patient Signature:  ____________________________________________________________ Date:  ____________________________________________________________  
  
Etrrence Alden Provider Signature:  ____________________________________________________________ Date:  ____________________________________________________________

## 2017-04-16 NOTE — ED TRIAGE NOTES
Pt presents with wheezing and asthma attack. Pt states her symptoms started yesterday and have progressively gotten worse. Pt denies chest pain.

## 2017-04-16 NOTE — IP AVS SNAPSHOT
Current Discharge Medication List  
  
START taking these medications Dose & Instructions Dispensing Information Comments Morning Noon Evening Bedtime * albuterol 2.5 mg /3 mL (0.083 %) nebulizer solution Commonly known as:  PROVENTIL VENTOLIN Replaces:  albuterol 90 mcg/actuation inhaler Your last dose was: Your next dose is:    
   
   
 Dose:  2.5 mg  
3 mL by Nebulization route every four (4) hours as needed for Wheezing. Quantity:  24 Each Refills:  0  
     
   
   
   
  
 * albuterol sulfate 90 mcg/actuation Aepb Your last dose was: Your next dose is:    
   
   
 Dose:  2 Puff Take 2 Puffs by inhalation every four (4) hours as needed. Quantity:  1 Inhaler Refills:  0  
     
   
   
   
  
 nicotine 21 mg/24 hr  
Commonly known as:  Christopher Harvey Your last dose was: Your next dose is:    
   
   
 Dose:  1 Patch 1 Patch by TransDERmal route daily for 30 days. Quantity:  30 Patch Refills:  0  
     
   
   
   
  
 predniSONE 10 mg tablet Commonly known as:  Rande Frost Your last dose was: Your next dose is:    
   
   
 40 mg daily for 2 days, then 30 mg daily for 2 days, then 20 mg daily for 2 days, then 10 mg daily for 2 days, then stop Quantity:  20 Tab Refills:  0  
     
   
   
   
  
 * Notice: This list has 2 medication(s) that are the same as other medications prescribed for you. Read the directions carefully, and ask your doctor or other care provider to review them with you. CONTINUE these medications which have NOT CHANGED Dose & Instructions Dispensing Information Comments Morning Noon Evening Bedtime  
 albuterol-ipratropium 2.5 mg-0.5 mg/3 ml Nebu Commonly known as:  Natasha Holguin Your last dose was: Your next dose is:    
   
   
 Dose:  3 mL  
3 mL by Nebulization route every six (6) hours as needed. Quantity:  100 Nebule Refills:  1 fluticasone-vilanterol 200-25 mcg/dose inhaler Commonly known as:  BREO ELLIPTA Your last dose was: Your next dose is:    
   
   
 Dose:  1 Puff Take 1 Puff by inhalation daily. Quantity:  28 Each Refills:  1  
     
   
   
   
  
 gabapentin 800 mg tablet Commonly known as:  NEURONTIN Your last dose was: Your next dose is:    
   
   
 Dose:  800 mg Take 1 Tab by mouth three (3) times daily. Quantity:  90 Tab Refills:  0 KLONOPIN PO Your last dose was: Your next dose is:    
   
   
 Dose:  5 mg Take 5 mg by mouth two (2) times a day. Refills:  0  
     
   
   
   
  
 lithium carbonate 300 mg tablet Your last dose was: Your next dose is:    
   
   
 Dose:  300 mg Take 1 Tab by mouth two (2) times a day. Quantity:  60 Tab Refills:  0 Nebulizer & Compressor machine Your last dose was: Your next dose is:    
   
   
 Dose:  1 Each  
1 Each by Does Not Apply route daily. Quantity:  1 Each Refills:  0  
     
   
   
   
  
 prazosin 2 mg capsule Commonly known as:  MINIPRESS Your last dose was: Your next dose is:    
   
   
 Dose:  2 mg Take 1 Cap by mouth nightly. Quantity:  30 Cap Refills:  0 QUEtiapine 400 mg tablet Commonly known as:  SEROquel Your last dose was: Your next dose is:    
   
   
 Dose:  800 mg Take 2 Tabs by mouth nightly. Quantity:  30 Tab Refills:  0  
     
   
   
   
  
 sertraline 100 mg tablet Commonly known as:  ZOLOFT Your last dose was: Your next dose is:    
   
   
 Dose:  100 mg Take 1 Tab by mouth daily. Quantity:  30 Tab Refills:  0 STOP taking these medications   
 albuterol 90 mcg/actuation inhaler Commonly known as:  PROVENTIL HFA, VENTOLIN HFA, PROAIR HFA  
 Replaced by:  albuterol 2.5 mg /3 mL (0.083 %) nebulizer solution  
   
  
 cloNIDine HCl 0.3 mg tablet Commonly known as:  CATAPRES  
   
  
 guaiFENesin-codeine 100-10 mg/5 mL solution Commonly known as:  ROBITUSSIN AC  
   
  
 HYDROcodone-acetaminophen 5-325 mg per tablet Commonly known as:  Cris Safe Where to Get Your Medications Information on where to get these meds will be given to you by the nurse or doctor. ! Ask your nurse or doctor about these medications  
  albuterol 2.5 mg /3 mL (0.083 %) nebulizer solution  
 albuterol sulfate 90 mcg/actuation Aepb  
 albuterol-ipratropium 2.5 mg-0.5 mg/3 ml Nebu  
 fluticasone-vilanterol 200-25 mcg/dose inhaler  
 gabapentin 800 mg tablet  
 lithium carbonate 300 mg tablet Nebulizer & Compressor machine  
 nicotine 21 mg/24 hr  
 prazosin 2 mg capsule  
 predniSONE 10 mg tablet QUEtiapine 400 mg tablet  
 sertraline 100 mg tablet

## 2017-04-16 NOTE — IP AVS SNAPSHOT
Summary of Care Report The Summary of Care report has been created to help improve care coordination. Users with access to [facility] or [address] (Web-based application) may access additional patient information including the Discharge Summary. If you are not currently a [address] user and need more information, please call the number listed below in the [place] [address] section and ask to be connected with Medical Records. Facility Information Name Address Phone [phone] [phone] Patient Information Patient Name Sex  Madi Grace ([id number]) Female 1975 Discharge Information Admitting Provider Service Area Unit Radha Phelps MD / [facility] / [phone] Discharge Provider Discharge Date/Time Discharge Disposition Destination (none) 2017 (Pending) AHR (none) Patient Language Language ENGLISH [13] Hospital Problems as of 2017  Reviewed: 2017 12:03 PM by Radha Phelps MD  
  
  
  
 Class Noted - Resolved Last Modified POA Active Problems Seizure ([address]  2017 - Present 2017 by Radha Phelps MD Unknown Entered by Radha Phelps MD  
  
Non-Hospital Problems as of 2017  Reviewed: 2017 12:03 PM by Radha Phelps MD  
  
  
  
 Class Noted - Resolved Last Modified Active Problems   Heroin abuse  2014 - Present 2017 by Yamini Jain MD  
  Entered by María Hernandez MD  
  Sinus tachycardia  1/10/2017 - Present 2017 by Yamini Jain MD  
  Entered by Nely Dove MD  
  Depression  2017 - Present 2017 by Yamini Jain MD  
  Entered by Yanet Rome MD  
  Bipolar 1 disorder [facility])  Unknown - Present 2017 by Yamini Jain MD  
 Entered by Mariposa Ortez MD  
  Chronic obstructive pulmonary disease [facility])  Unknown - Present 2/22/2017 by Mariposa Ortez MD  
  Entered by Mariposa Ortez MD  
  Chronic pain  Unknown - Present 2/22/2017 by Mariposa Ortez MD  
  Entered by Mariposa Ortez MD  
  Overview Signed 2/22/2017  7:15 PM by Mariposa Ortez MD  
   back, leg Hepatitis B  Unknown - Present 2/22/2017 by Mariposa Ortez MD  
  Entered by Mariposa Ortez MD  
  Sarcoidosis [facility])  Unknown - Present 2/22/2017 by Mariposa Ortez MD  
  Entered by Mariposa Ortez MD  
  Tobacco abuse  Unknown - Present 2/22/2017 by Mariposa Ortez MD  
  Entered by Mariposa Ortez MD  
  Anemia  2/22/2017 - Present 2/22/2017 by Mariposa Ortez MD  
  Entered by Mariposa Ortez MD  
  Cocaine abuse  Unknown - Present 2/22/2017 by Mariposa Ortez MD  
  Entered by Mariposa Ortez MD  
  Polysubstance abuse  Unknown - Present 2/22/2017 by Mariposa Ortez MD  
  Entered by Mariposa Ortez MD  
  Narcotic abuse  Unknown - Present 2/22/2017 by Mariposa Ortez MD  
  Entered by Mariposa Ortez MD  
  HTN (hypertension)  Unknown - Present 2/22/2017 by Mariposa Ortez MD  
  Entered by Mariposa Ortez MD  
  
You are allergic to the following Allergen Reactions Tomato Swelling Tongue Current Discharge Medication List  
  
START taking these medications Dose & Instructions Dispensing Information Comments * albuterol 2.5 mg /3 mL (0.083 %) nebulizer solution Commonly known as:  PROVENTIL VENTOLIN Replaces:  albuterol 90 mcg/actuation inhaler Dose:  2.5 mg  
3 mL by Nebulization route every four (4) hours as needed for Wheezing. Quantity:  24 Each Refills:  0  
   
 * albuterol sulfate 90 mcg/actuation Aepb Dose:  2 Puff Take 2 Puffs by inhalation every four (4) hours as needed. Quantity:  1 Inhaler Refills:  0  
   
 nicotine 21 mg/24 hr  
 Commonly known as:  Stephenie Yoo Dose:  1 Patch 1 Patch by TransDERmal route daily for 30 days. Quantity:  30 Patch Refills:  0  
   
 predniSONE 10 mg tablet Commonly known as:  DELTASONE  
 40 mg daily for 2 days, then 30 mg daily for 2 days, then 20 mg daily for 2 days, then 10 mg daily for 2 days, then stop Quantity:  20 Tab Refills:  0  
   
 * Notice: This list has 2 medication(s) that are the same as other medications prescribed for you. Read the directions carefully, and ask your doctor or other care provider to review them with you. CONTINUE these medications which have NOT CHANGED Dose & Instructions Dispensing Information Comments  
 albuterol-ipratropium 2.5 mg-0.5 mg/3 ml Nebu Commonly known as:  Gema Barrera Dose:  3 mL  
3 mL by Nebulization route every six (6) hours as needed. Quantity:  100 Nebule Refills:  1  
   
 fluticasone-vilanterol 200-25 mcg/dose inhaler Commonly known as:  BREO ELLIPTA Dose:  1 Puff Take 1 Puff by inhalation daily. Quantity:  28 Each Refills:  1  
   
 gabapentin 800 mg tablet Commonly known as:  NEURONTIN Dose:  800 mg Take 1 Tab by mouth three (3) times daily. Quantity:  90 Tab Refills:  0 KLONOPIN PO Dose:  5 mg Take 5 mg by mouth two (2) times a day. Refills:  0  
   
 lithium carbonate 300 mg tablet Dose:  300 mg Take 1 Tab by mouth two (2) times a day. Quantity:  60 Tab Refills:  0 Nebulizer & Compressor machine Dose:  1 Each  
1 Each by Does Not Apply route daily. Quantity:  1 Each Refills:  0  
   
 prazosin 2 mg capsule Commonly known as:  MINIPRESS Dose:  2 mg Take 1 Cap by mouth nightly. Quantity:  30 Cap Refills:  0 QUEtiapine 400 mg tablet Commonly known as:  SEROquel Dose:  800 mg Take 2 Tabs by mouth nightly. Quantity:  30 Tab Refills:  0  
   
 sertraline 100 mg tablet Commonly known as:  ZOLOFT  Dose:  100 mg  
 Take 1 Tab by mouth daily. Quantity:  30 Tab Refills:  0 STOP taking these medications Comments  
 albuterol 90 mcg/actuation inhaler Commonly known as:  PROVENTIL HFA, VENTOLIN HFA, PROAIR HFA Replaced by:  albuterol 2.5 mg /3 mL (0.083 %) nebulizer solution  
   
   
 cloNIDine HCl 0.3 mg tablet Commonly known as:  CATAPRES  
   
   
 guaiFENesin-codeine 100-10 mg/5 mL solution Commonly known as:  ROBITUSSIN AC  
   
   
 HYDROcodone-acetaminophen 5-325 mg per tablet Commonly known as:  Deisy Carson Current Immunizations Name Date Influenza Vaccine 10/1/2016,  Deferred (Patient Refused) Pneumococcal Polysaccharide (PPSV-23)  Deferred (Patient Refused) Follow-up Information Follow up With Details Comments Contact Info [address] [place] [address] In 2 days  [address] [place] [id number] 
[phone] None   None (395) Patient stated that they have no PCP [facility] In 1 day Please ask for an assessment for services, specifically the [facility] program.   [address] [place] [address] 
[phone] Discharge Instructions Discharge Instructions PATIENT ID: Win Carolina MRN: [id number] YOB: 1975 DATE OF ADMISSION: 4/16/2017  8:35 PM   
DATE OF DISCHARGE: 4/19/2017 PRIMARY CARE PROVIDER: None ATTENDING PHYSICIAN: Hawk Jj MD 
DISCHARGING PROVIDER: Hawk Jj MD   
To contact this individual call [phone] and ask the  to page. If unavailable ask to be transferred the Adult Hospitalist Department. DISCHARGE DIAGNOSES Acute asthma exacerbation vs COPD exacerbation Hypertension Opiate dependence with early withdrawal 
  Tobacco dependence CONSULTATIONS: IP CONSULT TO HOSPITALIST 
IP CONSULT TO PSYCHIATRY PROCEDURES/SURGERIES: * No surgery found * PENDING TEST RESULTS:  
 At the time of discharge the following test results are still pending: None. FOLLOW UP APPOINTMENTS:  
Follow-up Information Follow up With Details Comments Contact Info [address] [place] [address] In 2 days  Lisa Mendoza 
[phone] None   None (395) Patient stated that they have no PCP 
  
  
  
 
ADDITIONAL CARE RECOMMENDATIONS:  
  You were started on the following new medications: 
 (1) Prednisone - which is a steroid used to treat your asthma / COPD exacerbation (2) Albuterol - which is a rescue inhaler that you may use up to 4 times a day if you are experiencing shortness of breath 
 (3) Nicotine patch - for tobacco use; you should strongly consider quitting smoking for good You may resume your other home medications. DIET: Regular Diet ACTIVITY: Activity as tolerated WOUND CARE: N/A 
 
EQUIPMENT needed: N/A 
 
 
  
 SNF/Inpatient Rehab/LTAC Independent/assisted living Hospice Other: CDMP Checked:  
Yes x PROBLEM LIST Updated: 
Yes x Signed:  
Kimberly Mane MD 
4/19/2017 
12:36 PM 
  
Asthma Attack: Care Instructions Your Care Instructions During an asthma attack, the airways swell and narrow. This makes it hard to breathe. Severe asthma attacks can be life-threatening, but you can help prevent them by keeping your asthma under control and treating symptoms before they get bad. Symptoms include being short of breath, having chest tightness, coughing, and wheezing. Noting and treating these symptoms can also help you avoid future trips to the emergency room. The doctor has checked you carefully, but problems can develop later. If you notice any problems or new symptoms, get medical treatment right away. Follow-up care is a key part of your treatment and safety. Be sure to make and go to all appointments, and call your doctor if you are having problems. It's also a good idea to know your test results and keep a list of the medicines you take. How can you care for yourself at home? · Follow your asthma action plan to prevent and treat attacks. If you don't have an asthma action plan, work with your doctor to create one. · Take your asthma medicines exactly as prescribed. Talk to your doctor right away if you have any questions about how to take them. ¨ Use your quick-relief medicine when you have symptoms of an attack. Quick-relief medicine is usually an albuterol inhaler. Some people need to use quick-relief medicine before they exercise. ¨ Take your controller medicine every day, not just when you have symptoms. Controller medicine is usually an inhaled corticosteroid. The goal is to prevent problems before they occur. Don't use your controller medicine to treat an attack that has already started. It doesn't work fast enough to help. ¨ If your doctor prescribed corticosteroid pills to use during an attack, take them exactly as prescribed. It may take hours for the pills to work, but they may make the episode shorter and help you breathe better. ¨ Keep your quick-relief medicine with you at all times. · Talk to your doctor before using other medicines. Some medicines, such as aspirin, can cause asthma attacks in some people. · If you have a peak flow meter, use it to check how well you are breathing. This can help you predict when an asthma attack is going to occur. Then you can take medicine to prevent the asthma attack or make it less severe. · Do not smoke or allow others to smoke around you. Avoid smoky places. Smoking makes asthma worse. If you need help quitting, talk to your doctor about stop-smoking programs and medicines. These can increase your chances of quitting for good. · Learn what triggers an asthma attack for you, and avoid the triggers when you can. Common triggers include colds, smoke, air pollution, dust, pollen, mold, pets, cockroaches, stress, and cold air. · Avoid colds and the flu. Get a pneumococcal vaccine shot. If you have had one before, ask your doctor if you need a second dose. Get a flu vaccine every fall. If you must be around people with colds or the flu, wash your hands often. When should you call for help? Call 911 anytime you think you may need emergency care. For example, call if: 
· You have severe trouble breathing. Call your doctor now or seek immediate medical care if: 
· Your symptoms do not get better after you have followed your asthma action plan. · You have new or worse trouble breathing. · Your coughing and wheezing get worse. · You cough up dark brown or bloody mucus (sputum). · You have a new or higher fever. Watch closely for changes in your health, and be sure to contact your doctor if: 
· You need to use quick-relief medicine on more than 2 days a week (unless it is just for exercise). · You cough more deeply or more often, especially if you notice more mucus or a change in the color of your mucus. · You are not getting better as expected. Where can you learn more? Go to http://jazlyn-eladio.info/. Enter H684 in the search box to learn more about \"Asthma Attack: Care Instructions. \" Current as of: May 23, 2016 Content Version: 11.2 © 3456-4740 [employer]. Care instructions adapted under license by [employer] (which disclaims liability or warranty for this information). If you have questions about a medical condition or this instruction, always ask your healthcare professional. [address] any warranty or liability for your use of this information. We hope that we have addressed all of your medical concerns. The examination and treatment you received in the Emergency Department were for an emergent problem and were not intended as complete care. It is important that you follow up with your healthcare provider(s) for ongoing care. If your symptoms worsen or do not improve as expected, and you are unable to reach your usual health care provider(s), you should return to the Emergency Department. Today's healthcare is undergoing tremendous change, and patient satisfaction surveys are one of the many tools to assess the quality of medical care. You may receive a survey from the [employer] regarding your experience in the Emergency Department. I hope that your experience has been completely positive, particularly the medical care that I provided. As such, please participate in the survey; anything less than excellent does not meet my expectations or intentions. [facility] and [facility] participate in nationally recognized quality of care measures. If your blood pressure is greater than 120/80, as reported below, we urge that you seek medical care to address the potential of high blood pressure, commonly known as hypertension. Hypertension can be hereditary or can be caused by certain medical conditions, pain, stress, or \"white coat syndrome. \"   
  
 Please make an appointment with your health care provider(s) for follow up of your Emergency Department visit. VITALS:  
Patient Vitals for the past 8 hrs: 
 Temp Pulse Resp BP SpO2  
04/16/17 2153 - - - - 100 % 04/16/17 1932 98.4 °F (36.9 °C) 93 16 120/77 96 % Thank you for allowing us to provide you with medical care today. We realize that you have many choices for your emergency care needs. Please choose us in the future for any continued health care needs. Regards, Makenna Cherry [facility], [address].  
Office: [phone] No results found for this or any previous visit (from the past 24 hour(s)). Xr Chest Pa Lat Result Date: 4/16/2017 INDICATION:  Wheezing and shortness of breath for one day. History of asthma Exam: Chest 2 views. Comparison March 19, 2017. Findings: Cardiac silhouette is not enlarged. There are calcified mediastinal and hilar nodes. Pulmonary vasculature is not engorged. No focal parenchymal opacities, effusions, or pneumothorax. Granuloma right lung base unchanged. Bony thorax is intact. Impression: 1. No acute cardiopulmonary disease DISCHARGE SUMMARY from Nurse The following personal items are in your possession at time of discharge: 
 
Dental Appliances: None Visual Aid: None Home Medications: None Jewelry: Greek Chain Clothing: At bedside Other Valuables: None PATIENT INSTRUCTIONS: 
 
After general anesthesia or intravenous sedation, for 24 hours or while taking prescription Narcotics: · Limit your activities · Do not drive and operate hazardous machinery · Do not make important personal or business decisions · Do  not drink alcoholic beverages · If you have not urinated within 8 hours after discharge, please contact your surgeon on call. Report the following to your surgeon: 
· Excessive pain, swelling, redness or odor of or around the surgical area · Temperature over 100.5 · Nausea and vomiting lasting longer than 4 hours or if unable to take medications · Any signs of decreased circulation or nerve impairment to extremity: change in color, persistent  numbness, tingling, coldness or increase pain · Any questions These are general instructions for a healthy lifestyle: No smoking/ No tobacco products/ Avoid exposure to second hand smoke Surgeon General's Warning:  Quitting smoking now greatly reduces serious risk to your health. Obesity, smoking, and sedentary lifestyle greatly increases your risk for illness A healthy diet, regular physical exercise & weight monitoring are important for maintaining a healthy lifestyle You may be retaining fluid if you have a history of heart failure or if you experience any of the following symptoms:  Weight gain of 3 pounds or more overnight or 5 pounds in a week, increased swelling in our hands or feet or shortness of breath while lying flat in bed. Please call your doctor as soon as you notice any of these symptoms; do not wait until your next office visit. Recognize signs and symptoms of STROKE: 
 
F-face looks uneven A-arms unable to move or move unevenly S-speech slurred or non-existent T-time-call 911 as soon as signs and symptoms begin-DO NOT go Back to bed or wait to see if you get better-TIME IS BRAIN. Warning Signs of HEART ATTACK Call 911 if you have these symptoms: 
? Chest discomfort. Most heart attacks involve discomfort in the center of the chest that lasts more than a few minutes, or that goes away and comes back. It can feel like uncomfortable pressure, squeezing, fullness, or pain. ? Discomfort in other areas of the upper body. Symptoms can include pain or discomfort in one or both arms, the back, neck, jaw, or stomach. ? Shortness of breath with or without chest discomfort. ? Other signs may include breaking out in a cold sweat, nausea, or lightheadedness. Don't wait more than five minutes to call [address]! Fast action can save your life. Calling 911 is almost always the fastest way to get lifesaving treatment. Emergency Medical Services staff can begin treatment when they arrive  up to an hour sooner than if someone gets to the hospital by car. The discharge information has been reviewed with the patient. The patient verbalized understanding. Discharge medications reviewed with the patient and appropriate educational materials and side effects teaching were provided. [employer] Thank you for requesting access to [employer]. Please follow the instructions below to securely access and download your online medical record. [employer] allows you to send messages to your doctor, view your test results, renew your prescriptions, schedule appointments, and more. How Do I Sign Up? 1. In your internet browser, go to [email or web address] 
2. Click on the First Time User? Click Here link in the Sign In box. You will be redirect to the New Member Sign Up page. 3. Enter your [employer] Access Code exactly as it appears below. You will not need to use this code after youve completed the sign-up process. If you do not sign up before the expiration date, you must request a new code. [employer] Access Code: [email or web address] Expires: 7/15/2017  5:21 PM (This is the date your [employer] access code will ) 4. Enter the last four digits of your Social Security Number (xxxx) and Date of Birth (mm/dd/yyyy) as indicated and click Submit. You will be taken to the next sign-up page. 5. Create a [employer] ID. This will be your [employer] login ID and cannot be changed, so think of one that is secure and easy to remember. 6. Create a [employer] password. You can change your password at any time. 7. Enter your Password Reset Question and Answer. This can be used at a later time if you forget your password. 8. Enter your e-mail address. You will receive e-mail notification when new information is available in [address] 9. Click Sign Up. You can now view and download portions of your medical record. 10. Click the Download Summary menu link to download a portable copy of your medical information. Additional Information If you have questions, please visit the Frequently Asked Questions section of the [employer] website at [email or web address] Remember, [employer] is NOT to be used for urgent needs. For medical emergencies, dial 911. Chart Review Routing History Recipient Method Report Sent By Marylou Samuel None [address] IP Auto Routed Notes MD Erin Richey 1/30/2014  6:20 AM 01/30/2014 None [address] IP Auto Routed Notes MD Erin Richey 2/2/2014  2:20 PM 02/02/2014 None [address] IP Auto Routed Notes Veronica Corley MD [[id number]] 2/4/2014 11:22 PM 02/04/2014 None [address] IP Auto Routed Notes Sabi Pan MD [[id number]] 2/6/2014  4:20 PM 02/06/2014

## 2017-04-17 LAB
ALBUMIN SERPL BCP-MCNC: 3.3 G/DL (ref 3.5–5)
ALBUMIN/GLOB SERPL: 1 {RATIO} (ref 1.1–2.2)
ALP SERPL-CCNC: 88 U/L (ref 45–117)
ALT SERPL-CCNC: 29 U/L (ref 12–78)
AMPHET UR QL SCN: NEGATIVE
ANION GAP BLD CALC-SCNC: 8 MMOL/L (ref 5–15)
AST SERPL W P-5'-P-CCNC: 32 U/L (ref 15–37)
ATRIAL RATE: 75 BPM
BARBITURATES UR QL SCN: NEGATIVE
BASOPHILS # BLD AUTO: 0 K/UL (ref 0–0.1)
BASOPHILS # BLD: 0 % (ref 0–1)
BENZODIAZ UR QL: NEGATIVE
BILIRUB DIRECT SERPL-MCNC: 0.1 MG/DL (ref 0–0.2)
BILIRUB SERPL-MCNC: 0.3 MG/DL (ref 0.2–1)
BUN SERPL-MCNC: 8 MG/DL (ref 6–20)
BUN/CREAT SERPL: 11 (ref 12–20)
CALCIUM SERPL-MCNC: 8.6 MG/DL (ref 8.5–10.1)
CALCULATED P AXIS, ECG09: 40 DEGREES
CALCULATED R AXIS, ECG10: 68 DEGREES
CALCULATED T AXIS, ECG11: 52 DEGREES
CANNABINOIDS UR QL SCN: POSITIVE
CHLORIDE SERPL-SCNC: 107 MMOL/L (ref 97–108)
CO2 SERPL-SCNC: 26 MMOL/L (ref 21–32)
COCAINE UR QL SCN: POSITIVE
CREAT SERPL-MCNC: 0.72 MG/DL (ref 0.55–1.02)
DIAGNOSIS, 93000: NORMAL
DIFFERENTIAL METHOD BLD: ABNORMAL
DRUG SCRN COMMENT,DRGCM: ABNORMAL
EOSINOPHIL # BLD: 0 K/UL (ref 0–0.4)
EOSINOPHIL NFR BLD: 1 % (ref 0–7)
ERYTHROCYTE [DISTWIDTH] IN BLOOD BY AUTOMATED COUNT: 16.4 % (ref 11.5–14.5)
GLOBULIN SER CALC-MCNC: 3.3 G/DL (ref 2–4)
GLUCOSE SERPL-MCNC: 107 MG/DL (ref 65–100)
HCT VFR BLD AUTO: 33.3 % (ref 35–47)
HGB BLD-MCNC: 10.6 G/DL (ref 11.5–16)
LACTATE SERPL-SCNC: 2.8 MMOL/L (ref 0.4–2)
LACTATE SERPL-SCNC: 2.9 MMOL/L (ref 0.4–2)
LACTATE SERPL-SCNC: 3.3 MMOL/L (ref 0.4–2)
LACTATE SERPL-SCNC: 4.3 MMOL/L (ref 0.4–2)
LYMPHOCYTES # BLD AUTO: 17 % (ref 12–49)
LYMPHOCYTES # BLD: 0.8 K/UL (ref 0.8–3.5)
MCH RBC QN AUTO: 27.8 PG (ref 26–34)
MCHC RBC AUTO-ENTMCNC: 31.8 G/DL (ref 30–36.5)
MCV RBC AUTO: 87.4 FL (ref 80–99)
METHADONE UR QL: NEGATIVE
MONOCYTES # BLD: 0.1 K/UL (ref 0–1)
MONOCYTES NFR BLD AUTO: 3 % (ref 5–13)
NEUTS SEG # BLD: 3.7 K/UL (ref 1.8–8)
NEUTS SEG NFR BLD AUTO: 79 % (ref 32–75)
OPIATES UR QL: POSITIVE
P-R INTERVAL, ECG05: 118 MS
PCP UR QL: NEGATIVE
PLATELET # BLD AUTO: 281 K/UL (ref 150–400)
PLATELET COMMENTS,PCOM: ABNORMAL
POTASSIUM SERPL-SCNC: 3.4 MMOL/L (ref 3.5–5.1)
PROT SERPL-MCNC: 6.6 G/DL (ref 6.4–8.2)
Q-T INTERVAL, ECG07: 404 MS
QRS DURATION, ECG06: 84 MS
QTC CALCULATION (BEZET), ECG08: 451 MS
RBC # BLD AUTO: 3.81 M/UL (ref 3.8–5.2)
RBC MORPH BLD: ABNORMAL
SODIUM SERPL-SCNC: 141 MMOL/L (ref 136–145)
VENTRICULAR RATE, ECG03: 75 BPM
WBC # BLD AUTO: 4.6 K/UL (ref 3.6–11)

## 2017-04-17 PROCEDURE — 93041 RHYTHM ECG TRACING: CPT

## 2017-04-17 PROCEDURE — 36415 COLL VENOUS BLD VENIPUNCTURE: CPT | Performed by: FAMILY MEDICINE

## 2017-04-17 PROCEDURE — 74011250637 HC RX REV CODE- 250/637: Performed by: STUDENT IN AN ORGANIZED HEALTH CARE EDUCATION/TRAINING PROGRAM

## 2017-04-17 PROCEDURE — 94640 AIRWAY INHALATION TREATMENT: CPT

## 2017-04-17 PROCEDURE — 74011250637 HC RX REV CODE- 250/637: Performed by: INTERNAL MEDICINE

## 2017-04-17 PROCEDURE — 99218 HC RM OBSERVATION: CPT

## 2017-04-17 PROCEDURE — 74011250636 HC RX REV CODE- 250/636: Performed by: FAMILY MEDICINE

## 2017-04-17 PROCEDURE — 80307 DRUG TEST PRSMV CHEM ANLYZR: CPT | Performed by: FAMILY MEDICINE

## 2017-04-17 PROCEDURE — 80076 HEPATIC FUNCTION PANEL: CPT | Performed by: FAMILY MEDICINE

## 2017-04-17 PROCEDURE — 74011000250 HC RX REV CODE- 250: Performed by: FAMILY MEDICINE

## 2017-04-17 PROCEDURE — 85025 COMPLETE CBC W/AUTO DIFF WBC: CPT | Performed by: FAMILY MEDICINE

## 2017-04-17 PROCEDURE — 74011250637 HC RX REV CODE- 250/637: Performed by: FAMILY MEDICINE

## 2017-04-17 PROCEDURE — 80048 BASIC METABOLIC PNL TOTAL CA: CPT | Performed by: FAMILY MEDICINE

## 2017-04-17 PROCEDURE — 83605 ASSAY OF LACTIC ACID: CPT | Performed by: FAMILY MEDICINE

## 2017-04-17 RX ORDER — DICYCLOMINE HYDROCHLORIDE 10 MG/ML
20 INJECTION INTRAMUSCULAR
Status: DISCONTINUED | OUTPATIENT
Start: 2017-04-17 | End: 2017-04-22 | Stop reason: HOSPADM

## 2017-04-17 RX ORDER — METHADONE HYDROCHLORIDE 10 MG/1
15 TABLET ORAL DAILY
Status: COMPLETED | OUTPATIENT
Start: 2017-04-18 | End: 2017-04-19

## 2017-04-17 RX ORDER — METHADONE HYDROCHLORIDE 10 MG/1
30 TABLET ORAL ONCE
Status: COMPLETED | OUTPATIENT
Start: 2017-04-17 | End: 2017-04-17

## 2017-04-17 RX ORDER — POTASSIUM CHLORIDE 750 MG/1
40 TABLET, FILM COATED, EXTENDED RELEASE ORAL
Status: COMPLETED | OUTPATIENT
Start: 2017-04-17 | End: 2017-04-17

## 2017-04-17 RX ORDER — LITHIUM CARBONATE 300 MG
300 TABLET ORAL 2 TIMES DAILY
Status: DISCONTINUED | OUTPATIENT
Start: 2017-04-17 | End: 2017-04-22 | Stop reason: HOSPADM

## 2017-04-17 RX ORDER — IPRATROPIUM BROMIDE AND ALBUTEROL SULFATE 2.5; .5 MG/3ML; MG/3ML
3 SOLUTION RESPIRATORY (INHALATION)
Status: DISCONTINUED | OUTPATIENT
Start: 2017-04-17 | End: 2017-04-18

## 2017-04-17 RX ORDER — SERTRALINE HYDROCHLORIDE 50 MG/1
100 TABLET, FILM COATED ORAL DAILY
Status: DISCONTINUED | OUTPATIENT
Start: 2017-04-17 | End: 2017-04-22 | Stop reason: HOSPADM

## 2017-04-17 RX ORDER — LOPERAMIDE HYDROCHLORIDE 2 MG/1
2 CAPSULE ORAL AS NEEDED
Status: DISCONTINUED | OUTPATIENT
Start: 2017-04-17 | End: 2017-04-22 | Stop reason: HOSPADM

## 2017-04-17 RX ORDER — PROMETHAZINE HYDROCHLORIDE 25 MG/1
25 TABLET ORAL
Status: DISCONTINUED | OUTPATIENT
Start: 2017-04-17 | End: 2017-04-22 | Stop reason: HOSPADM

## 2017-04-17 RX ORDER — PRAZOSIN HYDROCHLORIDE 1 MG/1
2 CAPSULE ORAL
Status: DISCONTINUED | OUTPATIENT
Start: 2017-04-17 | End: 2017-04-22 | Stop reason: HOSPADM

## 2017-04-17 RX ORDER — TEMAZEPAM 15 MG/1
15 CAPSULE ORAL
Status: DISCONTINUED | OUTPATIENT
Start: 2017-04-17 | End: 2017-04-22 | Stop reason: HOSPADM

## 2017-04-17 RX ORDER — ACETAMINOPHEN 325 MG/1
650 TABLET ORAL
Status: DISCONTINUED | OUTPATIENT
Start: 2017-04-17 | End: 2017-04-22 | Stop reason: HOSPADM

## 2017-04-17 RX ORDER — IBUPROFEN 200 MG
TABLET ORAL
Status: DISPENSED
Start: 2017-04-17 | End: 2017-04-17

## 2017-04-17 RX ORDER — SODIUM CHLORIDE 0.9 % (FLUSH) 0.9 %
5-10 SYRINGE (ML) INJECTION EVERY 8 HOURS
Status: DISCONTINUED | OUTPATIENT
Start: 2017-04-17 | End: 2017-04-22 | Stop reason: HOSPADM

## 2017-04-17 RX ORDER — SODIUM CHLORIDE 0.9 % (FLUSH) 0.9 %
5-10 SYRINGE (ML) INJECTION AS NEEDED
Status: DISCONTINUED | OUTPATIENT
Start: 2017-04-17 | End: 2017-04-22 | Stop reason: HOSPADM

## 2017-04-17 RX ORDER — GABAPENTIN 400 MG/1
800 CAPSULE ORAL 3 TIMES DAILY
Status: DISCONTINUED | OUTPATIENT
Start: 2017-04-17 | End: 2017-04-22 | Stop reason: HOSPADM

## 2017-04-17 RX ORDER — SODIUM CHLORIDE 9 MG/ML
50 INJECTION, SOLUTION INTRAVENOUS CONTINUOUS
Status: DISCONTINUED | OUTPATIENT
Start: 2017-04-17 | End: 2017-04-22 | Stop reason: HOSPADM

## 2017-04-17 RX ORDER — IBUPROFEN 200 MG
1 TABLET ORAL DAILY
Status: DISCONTINUED | OUTPATIENT
Start: 2017-04-17 | End: 2017-04-22 | Stop reason: HOSPADM

## 2017-04-17 RX ADMIN — IPRATROPIUM BROMIDE AND ALBUTEROL SULFATE 3 ML: .5; 3 SOLUTION RESPIRATORY (INHALATION) at 21:15

## 2017-04-17 RX ADMIN — CLONIDINE HYDROCHLORIDE 0.3 MG: 0.2 TABLET ORAL at 09:06

## 2017-04-17 RX ADMIN — METHADONE HYDROCHLORIDE 30 MG: 10 TABLET ORAL at 09:06

## 2017-04-17 RX ADMIN — SODIUM CHLORIDE 125 ML/HR: 900 INJECTION, SOLUTION INTRAVENOUS at 12:22

## 2017-04-17 RX ADMIN — LITHIUM CARBONATE 300 MG: 300 TABLET ORAL at 17:09

## 2017-04-17 RX ADMIN — TEMAZEPAM 15 MG: 15 CAPSULE ORAL at 23:07

## 2017-04-17 RX ADMIN — SODIUM CHLORIDE 100 ML/HR: 900 INJECTION, SOLUTION INTRAVENOUS at 02:04

## 2017-04-17 RX ADMIN — METHYLPREDNISOLONE SODIUM SUCCINATE 40 MG: 40 INJECTION, POWDER, FOR SOLUTION INTRAMUSCULAR; INTRAVENOUS at 18:00

## 2017-04-17 RX ADMIN — CLONIDINE HYDROCHLORIDE 0.3 MG: 0.2 TABLET ORAL at 21:46

## 2017-04-17 RX ADMIN — GABAPENTIN 800 MG: 400 CAPSULE ORAL at 17:09

## 2017-04-17 RX ADMIN — PRAZOSIN HYDROCHLORIDE 2 MG: 1 CAPSULE ORAL at 21:47

## 2017-04-17 RX ADMIN — SERTRALINE HYDROCHLORIDE 100 MG: 50 TABLET ORAL at 09:07

## 2017-04-17 RX ADMIN — SODIUM CHLORIDE 125 ML/HR: 900 INJECTION, SOLUTION INTRAVENOUS at 21:46

## 2017-04-17 RX ADMIN — IPRATROPIUM BROMIDE AND ALBUTEROL SULFATE 3 ML: .5; 3 SOLUTION RESPIRATORY (INHALATION) at 09:27

## 2017-04-17 RX ADMIN — PROMETHAZINE HYDROCHLORIDE 25 MG: 25 TABLET ORAL at 21:47

## 2017-04-17 RX ADMIN — LITHIUM CARBONATE 300 MG: 300 TABLET ORAL at 09:07

## 2017-04-17 RX ADMIN — METHYLPREDNISOLONE SODIUM SUCCINATE 40 MG: 40 INJECTION, POWDER, FOR SOLUTION INTRAMUSCULAR; INTRAVENOUS at 03:18

## 2017-04-17 RX ADMIN — METHYLPREDNISOLONE SODIUM SUCCINATE 40 MG: 40 INJECTION, POWDER, FOR SOLUTION INTRAMUSCULAR; INTRAVENOUS at 23:07

## 2017-04-17 RX ADMIN — PRAZOSIN HYDROCHLORIDE 2 MG: 1 CAPSULE ORAL at 02:41

## 2017-04-17 RX ADMIN — IPRATROPIUM BROMIDE AND ALBUTEROL SULFATE 3 ML: .5; 3 SOLUTION RESPIRATORY (INHALATION) at 12:30

## 2017-04-17 RX ADMIN — METHYLPREDNISOLONE SODIUM SUCCINATE 40 MG: 40 INJECTION, POWDER, FOR SOLUTION INTRAMUSCULAR; INTRAVENOUS at 12:22

## 2017-04-17 RX ADMIN — PROMETHAZINE HYDROCHLORIDE 25 MG: 25 TABLET ORAL at 09:58

## 2017-04-17 RX ADMIN — GABAPENTIN 800 MG: 400 CAPSULE ORAL at 21:46

## 2017-04-17 RX ADMIN — GABAPENTIN 800 MG: 400 CAPSULE ORAL at 09:06

## 2017-04-17 RX ADMIN — POTASSIUM CHLORIDE 40 MEQ: 750 TABLET, FILM COATED, EXTENDED RELEASE ORAL at 03:19

## 2017-04-17 NOTE — PROGRESS NOTES
Bedside shift change report given to Fabio Rolle (oncoming nurse) by Magaly Cai (offgoing nurse). Report included the following information SBAR.

## 2017-04-17 NOTE — ROUTINE PROCESS
Report called to TIERA Kemp. Floor is ready to receive pt. Pt remains awake and alert, skin warm and dry. Respirations even and unlabored. Pt to floor via wheelchair with this RN.

## 2017-04-17 NOTE — PROGRESS NOTES
Reviewed medical chart; met with the patient at the bedside. Patient asthma, COPD, sarcoidosis, bipolar disorder, chronic pain, polysubstance abuse, cocaine abuse, heroin abuse, tobacco abuse, depression, hepatitis B, hypertension, and narcotic abuse. Patient lives with her mother; her mother pays all of the bills. She does not use any DME to ambulate. Patient uses three different inhalers that the hospital paid for previously. She also has a nebulizer. Patient is unemployed and does not receive disability income. She is uninsured. Provided patient with list of free clinics in the area, a copy of the Care Card application, and contact information for Phyllis Habermann, financial counselor for the hospital.      Patient reported to the psychiatrist that she spent 3 years in a rehabilitation facility in [place], [place] called the [address] [place] [address]. She left in December 2016. Current Discharge Plan:  Spoke with the patient regarding [facility] (Formerly [facility] [place] a women's shelter for those who suffer from addiction. She is interested and would like to go there from the hospital.  Meme Webster with [facility]), O#[phone] to start this process; left a message and then learned that she is on vacation this week. Next, called her coworker CARMELO Howard#[phone] and left a message - awaiting a response. Care Management will continue to follow her disposition. DORCAS Mcfarland     Care Management Interventions  PCP Verified by CM:  Yes  Palliative Care Consult (Criteria: CHF and RRAT>21): No  Mode of Transport at Discharge:  (Patient will travel by car at discharge.  )  Transition of Care Consult (CM Consult): Discharge Planning  MyChart Signup: No  Discharge Durable Medical Equipment: No  Physical Therapy Consult: No  Occupational Therapy Consult: No  Speech Therapy Consult: No  Current Support Network: [facility] Follow Up Transport:  (Patient will travel by car at discharge.  )  Plan discussed with Pt/Family/Caregiver: Yes  Freedom of Choice Offered: Yes  Discharge Location  Discharge Placement: Outpatient substance abuse program (Outpatient Substance Abuse Program vs. Home.  )

## 2017-04-17 NOTE — PROGRESS NOTES
Patient seen and examined at bedside. Admitted under observation status earlier this morning by Dr. Rachid Gallegos for acute asthma vs COPD exacerbation. She reports continued central chest tightness and dyspnea that has not significantly changed. She had recently moved from [place] and has not established with any care providers in [place] yet. She reports going through early heroin withdrawal, last use apparently yesterday. Visit Vitals    /69 (BP 1 Location: Left arm, BP Patient Position: At rest)    Pulse 64    Temp 98 °F (36.7 °C)    Resp 16    Ht 5' 4\" (1.626 m)    Wt 56.7 kg (125 lb)    SpO2 100%    BMI 21.46 kg/m2     General: slight diaphoresis, speaking in 3-4 word sentences with mild respiratory distress  Chest/lungs: prolonged expiratory phase with diffuse end-expiratory wheeze throughout all lung fields  Heart: S1S2 physiologic, regular rhythm, no appreciable murmurs  Abd: soft, nontender, nondistended, +BS  Ext: no peripheral edema    Plan as outlined in Dr. Lombardi [place] [facility]. Continue bronchodilator therapy, IV steroids. Added opiate withdrawal protocol, monitor clinical opiate withdrawal scale.     Lucy Schulz MD  4/17/2017   17:06
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
## 2017-04-17 NOTE — PROGRESS NOTES
Primary Nurse Leena Granado RN and Tiffany Patel RN performed a dual skin assessment on this patient No impairment noted  Doc score is 23

## 2017-04-17 NOTE — H&P
[address] [facility] [address]   HISTORY AND PHYSICAL       Name:  Beverly Abdul   MR#:  [id number]   :  1975   Account #:  [de-identified]        Date of Adm:  2017       CHIEF COMPLAINT: Shortness of breath. HISTORY OF PRESENT ILLNESS: A 43-year-old    female with past medical history of asthma, COPD, sarcoidosis, bipolar   disorder, chronic pain, polysubstance abuse, cocaine abuse, heroin   abuse, tobacco abuse, depression, hepatitis B, hypertension, narcotic   abuse, presented to the emergency department from home with chief   complaint of shortness of breath and wheezing. The patient has a   longstanding history of asthma, COPD. She has been hospitalized   from 2017 to 2017 secondary to same. She notes   worsening symptoms starting approximately 2 days ago. Shortness of   breath, notably remaining constant with dyspnea on exertion, present   at rest with associated wheezing, moderate which is now severe. She notably has been using albuterol at home without relief of   symptoms. Per the ER notes, the patient has been intubated for   asthma approximately 2 years ago. Most recently she completed a   course of treatment with steroids. She reportedly tends to have some   seasonal exacerbations of asthma. She is not reporting dizziness,   lightheadedness, focal weakness, numbness, paresthesias, slurred   speech, facial droop, focal weakness, headache, neck pain, back pain,   abdominal pain, nausea, vomiting, diarrhea, melena, dysuria,   hematuria, calf pain, swelling, or edema. She admits to chest pain,   points to the center of her chest, rated 07/10, moderate to severe,   aggravated with coughing and deep breathing without specific   alleviating factors. On arrival in the emergency department, initial   recorded vital signs were blood pressure 120/77, heart rate of 93,   respiratory rate 16, O2 saturation 96% on room air.  Workup including   chest x-ray, PA and lateral, showed no acute process. Per the ED, the   patient was initially given prednisone 50 mg p.o. and DuoNeb nebulizer   treatments. The patient reportedly remained refractory to treatments,   continued wheezing, shortness of breath and now chest pain   symptoms. The patient is now seen for admission to our hospitalist   service for continued evaluation and treatment. PAST MEDICAL HISTORY:   1. Asthma. 2. COPD. 3. Bipolar disorder. 4. Chronic pain. 5. Cocaine abuse, heroin abuse, narcotic abuse, polysubstance abuse,   tobacco abuse, depression, hepatitis C, hypertension. PAST SURGICAL HISTORY: Hessel tooth extraction. MEDICATIONS:   1. DuoNeb 2.5 mg/0.5 mg, 3 mL nebulized q.6h. p.r.n.   2. Temazepam 5 mg p.o. b.i.d.   3. Clonidine 0.2 mg p.o. t.i.d.   4. Breo Ellipta 200/25 mcg per dose, 1 puff inhaled daily. 5. Gabapentin 800 mg p.o. t.i.d.   6. Robitussin-/10 mg/5 mL solution p.o. q.i.d. p.r.n.   7. Norco 5/325 mg 1 tablet p.o. q.4h. p.r.n.   8. Lithium carbonate 300 mg p.o. b.i.d.    9. Prazosin 2 mg 1 capsule p.o. at bedtime. 10. Seroquel 800 mg p.o. at bedtime. 11. Zoloft 100 mg p.o. daily. ALLERGIES: NO KNOWN DRUG ALLERGIES; TOMATO. SOCIAL HISTORY: Current smoker, approximately half pack per day. Admits to marijuana, admits to heroin last use 2 days ago. No reports   of alcohol. FAMILY HISTORY: Hypertension - mother, father. REVIEW OF SYSTEMS   Eleven systems reviewed, pertinent positives as HPI, otherwise   negative. PHYSICAL EXAMINATION   VITAL SIGNS: Temperature 98.4 degrees Fahrenheit, blood pressure   122/62, heart rate 77, respiratory rate of 20, O2 saturation 97% on   room air. Recorded weight of 125 pounds (56.7 kg), recorded height of   5 feet 4 inches tall. GENERAL: The patient in no acute respiratory distress while at rest,   worsened with activity and movement.    PSYCHIATRIC: The patient is awake, alert, oriented x3.   NEUROLOGIC: GCS of 15. Moves extremities x4. Sensation is grossly   intact without slurred speech, facial droop. HEENT: Normocephalic, atraumatic. PERRLA is intact. Sclerae are   anicteric. Conjunctivae are clear. Nares are patent. Oropharynx is   clear. Tongue is midline, nonedematous. NECK: Supple, without lymphadenopathy, JVD, carotid bruits,   thyromegaly. LYMPH: Negative for cervical, supraclavicular adenopathy. RESPIRATORY/LUNGS: Bilateral wheezes. CARDIOVASCULAR: Heart regular rate and rhythm, normal S1, S2,   without murmurs, rubs or gallops. GI: Abdomen soft, nontender, nondistended. Normoactive bowel   sounds. No rebound, guarding or rigidity. No auscultated abdominal   bruits. No pulsatile mass. BACK: No CVS tenderness. No step-off deformity. MUSCULOSKELETAL: No acute palpable deformity. Negative for calf   tenderness. VASCULAR: 2+ radial, 1+ dorsalis pedis pulses, without cyanosis,   clubbing, edema. SKIN: Warm and dry. LABORATORY DATA: Sodium 141, potassium 3.4, chloride 107, CO2   of 26, BUN 8, creatinine 0.72, glucose 107, anion gap of 8, calcium is   8.6, GFR greater than 60. Chest x-ray, PA and lateral, results reviewed, showing no acute   cardiopulmonary process. IMPRESSION AND PLAN: A 75-year-old female with noted past   medical history, now admitted with acute asthma exacerbation   with chronic obstructive pulmonary disease exacerbation. 1. Asthma with chronic obstructive pulmonary disease exacerbation. Admit the patient to observation. Place on DuoNeb nebulizer   treatment. Order Solu-Medrol 40 mg IV q. 6h. May provide   supplemental oxygen if needed but currently, the patient is   oxygenating well. 2. Acute chest pain. Continue plan of care as noted above. Order 12-  lead EKG, stat troponin level. Pain suspect secondary to noted   coughing, shortness of breath. 3.  Heroin abuse.  Monitor for signs of withdrawal. Provide has had   adjunct medications if any signs of withdrawal should present. Consult   with case management for drug treatment program.   4. Polysubstance abuse with addiction. Continue with plan of care as   noted. 5. Elevated LFTs (AST). Repeat comprehensive metabolic panel. 6. Chronic pain syndrome. Continue to monitor repeat VTE   prophylaxis, bilateral lower extremities. KARINE Mora MD      MP / HC   D:  04/17/2017   03:02   T:  04/17/2017   05:45   Job #:  [id number]

## 2017-04-17 NOTE — ED PROVIDER NOTES
HPI Comments: 35 y.o. female with past medical history significant for asthma, sarcoidosis, COPD, HTN, polysubstance abuse who presents with chief complaint of shortness of breath. Patient complains of worsening since yesterday, stating that she has been using albuterol at home without relief. Patient states she had been intubated for asthma 2 years ago. Patient states she finished a steroid pack last month for her asthma. Patient states this is the typical season for her asthmatic exacerbation. Patient denies fever, chills, abdominal pain, nausea, or vomiting. There are no other acute medical concerns at this time. Social hx: current smoker, no alcohol  PCP: None    Note written by Jair Orona, as dictated by Florence Sharma MD 10:54 PM     The history is provided by the patient. No  was used. Past Medical History:   Diagnosis Date    Asthma     Bipolar 1 disorder (HCC)     Chronic obstructive pulmonary disease (HCC)     Chronic pain     back, leg    Cocaine abuse     Depression     Hepatitis B     Heroin abuse     HTN (hypertension)     Narcotic abuse     Polysubstance abuse     Sarcoidosis ([facility] [address]     Tobacco abuse        Past Surgical History:   Procedure Laterality Date    HX GYN      HX WISDOM TEETH EXTRACTION           Family History:   Problem Relation Age of Onset    Hypertension Father     Hypertension Mother        Social History     Social History    Marital status: SINGLE     Spouse name: N/A    Number of children: N/A    Years of education: N/A     Occupational History    Not on file.      Social History Main Topics    Smoking status: Current Every Day Smoker     Packs/day: 0.25    Smokeless tobacco: Never Used    Alcohol use No    Drug use: Yes     Special: Marijuana      Comment: last used 1/24/2017    Sexual activity: Yes     Partners: Female     Other Topics Concern    Not on file     Social History Narrative    Born in [facility], Single, 5 children,    No legal charges. Pt graduated from high school. Pt was employed last month at [employer], currently she is unemployed. Pt lives with her mother and 8year old son. She has 4 adult children. ALLERGIES: Tomato    Review of Systems   Constitutional: Negative for chills and fever. HENT: Negative for congestion, nosebleeds and rhinorrhea. Eyes: Negative for pain and redness. Respiratory: Positive for shortness of breath. Negative for cough. Cardiovascular: Negative for chest pain and palpitations. Gastrointestinal: Negative for abdominal pain, nausea and vomiting. Genitourinary: Negative for dysuria, frequency, vaginal bleeding and vaginal pain. Musculoskeletal: Negative for myalgias. Skin: Negative for rash and wound. Neurological: Negative for seizures, syncope and weakness. Hematological: Does not bruise/bleed easily. Psychiatric/Behavioral: Negative for agitation, confusion, dysphoric mood and suicidal ideas. The patient is not nervous/anxious. Vitals:    04/16/17 1932 04/16/17 2153   BP: 120/77    Pulse: 93    Resp: 16    Temp: 98.4 °F (36.9 °C)    SpO2: 96% 100%   Weight: 56.7 kg (125 lb)    Height: 5' 4\" (1.626 m)             Physical Exam   Constitutional: She is oriented to person, place, and time. She appears well-developed and well-nourished. HENT:   Head: Normocephalic and atraumatic. Eyes: EOM are normal. Pupils are equal, round, and reactive to light. Neck: Normal range of motion. Neck supple. No tracheal deviation present. Cardiovascular: Normal rate, regular rhythm, normal heart sounds and intact distal pulses. Pulmonary/Chest: No stridor. No respiratory distress. She has wheezes. She has no rales. She exhibits no tenderness. Increased work to breathe  B/l expiratory wheezes with prolonged exhalation   Abdominal: Soft. Bowel sounds are normal. She exhibits no distension. There is no tenderness. There is no rebound. Musculoskeletal: Normal range of motion. She exhibits no edema or tenderness. Neurological: She is alert and oriented to person, place, and time. No cranial nerve deficit. Skin: Skin is warm and dry. No rash noted. No pallor. Psychiatric: She has a normal mood and affect. Nursing note and vitals reviewed. Note written by Jair Mann, as dictated by Vic Bruno MD 10:56 PM      MDM  Number of Diagnoses or Management Options  Diagnosis management comments: 35yF with asthma p/w c/o 2 days SOB with asthma exacerbation. Well appearing, increased WOB, exp wheezing. Plan-nebs, pred. Amount and/or Complexity of Data Reviewed  Tests in the radiology section of CPT®: ordered and reviewed  Independent visualization of images, tracings, or specimens: yes    Risk of Complications, Morbidity, and/or Mortality  Presenting problems: moderate  Diagnostic procedures: moderate  Management options: moderate    Patient Progress  Patient progress: improved    ED Course       Procedures      11:06 PM  Continued wheezing. 11:51 PM  Continued wheezing. Pt reports she does not feel any improvement. 12:06 AM  Vic Bruno MD spoke with Dr. Mika Feliciano, Consult for Hospitalist. Discussed available diagnostic tests and clinical findings. He/She is in agreement with care plans as outlined.  He/she will admit patient

## 2017-04-18 LAB
ANION GAP BLD CALC-SCNC: 6 MMOL/L (ref 5–15)
BACTERIA SPEC CULT: ABNORMAL
BACTERIA SPEC CULT: ABNORMAL
BASOPHILS # BLD AUTO: 0 K/UL (ref 0–0.1)
BASOPHILS # BLD: 0 % (ref 0–1)
BUN SERPL-MCNC: 7 MG/DL (ref 6–20)
BUN/CREAT SERPL: 10 (ref 12–20)
CALCIUM SERPL-MCNC: 8.6 MG/DL (ref 8.5–10.1)
CHLORIDE SERPL-SCNC: 110 MMOL/L (ref 97–108)
CO2 SERPL-SCNC: 24 MMOL/L (ref 21–32)
CREAT SERPL-MCNC: 0.68 MG/DL (ref 0.55–1.02)
EOSINOPHIL # BLD: 0 K/UL (ref 0–0.4)
EOSINOPHIL NFR BLD: 0 % (ref 0–7)
ERYTHROCYTE [DISTWIDTH] IN BLOOD BY AUTOMATED COUNT: 16.1 % (ref 11.5–14.5)
GLUCOSE SERPL-MCNC: 132 MG/DL (ref 65–100)
HCT VFR BLD AUTO: 32.6 % (ref 35–47)
HGB BLD-MCNC: 10.6 G/DL (ref 11.5–16)
LACTATE SERPL-SCNC: 1.9 MMOL/L (ref 0.4–2)
LACTATE SERPL-SCNC: 3 MMOL/L (ref 0.4–2)
LYMPHOCYTES # BLD AUTO: 6 % (ref 12–49)
LYMPHOCYTES # BLD: 0.5 K/UL (ref 0.8–3.5)
MCH RBC QN AUTO: 28.3 PG (ref 26–34)
MCHC RBC AUTO-ENTMCNC: 32.5 G/DL (ref 30–36.5)
MCV RBC AUTO: 87.2 FL (ref 80–99)
MONOCYTES # BLD: 0.2 K/UL (ref 0–1)
MONOCYTES NFR BLD AUTO: 3 % (ref 5–13)
NEUTS SEG # BLD: 7.3 K/UL (ref 1.8–8)
NEUTS SEG NFR BLD AUTO: 91 % (ref 32–75)
PLATELET # BLD AUTO: 274 K/UL (ref 150–400)
POTASSIUM SERPL-SCNC: 4.7 MMOL/L (ref 3.5–5.1)
RBC # BLD AUTO: 3.74 M/UL (ref 3.8–5.2)
SERVICE CMNT-IMP: ABNORMAL
SODIUM SERPL-SCNC: 140 MMOL/L (ref 136–145)
WBC # BLD AUTO: 7.9 K/UL (ref 3.6–11)

## 2017-04-18 PROCEDURE — 74011250637 HC RX REV CODE- 250/637: Performed by: FAMILY MEDICINE

## 2017-04-18 PROCEDURE — 74011000250 HC RX REV CODE- 250: Performed by: FAMILY MEDICINE

## 2017-04-18 PROCEDURE — 83605 ASSAY OF LACTIC ACID: CPT | Performed by: FAMILY MEDICINE

## 2017-04-18 PROCEDURE — 77030027138 HC INCENT SPIROMETER -A

## 2017-04-18 PROCEDURE — 36415 COLL VENOUS BLD VENIPUNCTURE: CPT | Performed by: FAMILY MEDICINE

## 2017-04-18 PROCEDURE — 74011250636 HC RX REV CODE- 250/636: Performed by: FAMILY MEDICINE

## 2017-04-18 PROCEDURE — 99218 HC RM OBSERVATION: CPT

## 2017-04-18 PROCEDURE — 74011250637 HC RX REV CODE- 250/637: Performed by: INTERNAL MEDICINE

## 2017-04-18 PROCEDURE — 80048 BASIC METABOLIC PNL TOTAL CA: CPT | Performed by: STUDENT IN AN ORGANIZED HEALTH CARE EDUCATION/TRAINING PROGRAM

## 2017-04-18 PROCEDURE — 94640 AIRWAY INHALATION TREATMENT: CPT

## 2017-04-18 PROCEDURE — 85025 COMPLETE CBC W/AUTO DIFF WBC: CPT | Performed by: STUDENT IN AN ORGANIZED HEALTH CARE EDUCATION/TRAINING PROGRAM

## 2017-04-18 PROCEDURE — 74011250637 HC RX REV CODE- 250/637: Performed by: STUDENT IN AN ORGANIZED HEALTH CARE EDUCATION/TRAINING PROGRAM

## 2017-04-18 RX ORDER — IPRATROPIUM BROMIDE AND ALBUTEROL SULFATE 2.5; .5 MG/3ML; MG/3ML
3 SOLUTION RESPIRATORY (INHALATION)
Status: DISCONTINUED | OUTPATIENT
Start: 2017-04-18 | End: 2017-04-22 | Stop reason: HOSPADM

## 2017-04-18 RX ORDER — PREDNISONE 20 MG/1
40 TABLET ORAL
Status: DISCONTINUED | OUTPATIENT
Start: 2017-04-19 | End: 2017-04-22 | Stop reason: HOSPADM

## 2017-04-18 RX ADMIN — GABAPENTIN 800 MG: 400 CAPSULE ORAL at 21:32

## 2017-04-18 RX ADMIN — IPRATROPIUM BROMIDE AND ALBUTEROL SULFATE 3 ML: .5; 3 SOLUTION RESPIRATORY (INHALATION) at 00:49

## 2017-04-18 RX ADMIN — IPRATROPIUM BROMIDE AND ALBUTEROL SULFATE 3 ML: .5; 3 SOLUTION RESPIRATORY (INHALATION) at 08:43

## 2017-04-18 RX ADMIN — METHYLPREDNISOLONE SODIUM SUCCINATE 40 MG: 40 INJECTION, POWDER, FOR SOLUTION INTRAMUSCULAR; INTRAVENOUS at 05:09

## 2017-04-18 RX ADMIN — IPRATROPIUM BROMIDE AND ALBUTEROL SULFATE 3 ML: .5; 3 SOLUTION RESPIRATORY (INHALATION) at 13:53

## 2017-04-18 RX ADMIN — METHYLPREDNISOLONE SODIUM SUCCINATE 40 MG: 40 INJECTION, POWDER, FOR SOLUTION INTRAMUSCULAR; INTRAVENOUS at 11:17

## 2017-04-18 RX ADMIN — PROMETHAZINE HYDROCHLORIDE 25 MG: 25 TABLET ORAL at 15:43

## 2017-04-18 RX ADMIN — PRAZOSIN HYDROCHLORIDE 2 MG: 1 CAPSULE ORAL at 21:33

## 2017-04-18 RX ADMIN — METHADONE HYDROCHLORIDE 15 MG: 10 TABLET ORAL at 08:28

## 2017-04-18 RX ADMIN — SERTRALINE HYDROCHLORIDE 100 MG: 50 TABLET ORAL at 08:28

## 2017-04-18 RX ADMIN — GABAPENTIN 800 MG: 400 CAPSULE ORAL at 15:27

## 2017-04-18 RX ADMIN — Medication 8 ML: at 14:00

## 2017-04-18 RX ADMIN — CLONIDINE HYDROCHLORIDE 0.3 MG: 0.2 TABLET ORAL at 08:30

## 2017-04-18 RX ADMIN — TEMAZEPAM 15 MG: 15 CAPSULE ORAL at 21:32

## 2017-04-18 RX ADMIN — LITHIUM CARBONATE 300 MG: 300 TABLET ORAL at 08:31

## 2017-04-18 RX ADMIN — LITHIUM CARBONATE 300 MG: 300 TABLET ORAL at 17:09

## 2017-04-18 RX ADMIN — GABAPENTIN 800 MG: 400 CAPSULE ORAL at 08:30

## 2017-04-18 NOTE — PROGRESS NOTES
Bedside shift change report given to TIERA Owusu (oncoming nurse) by Regi Tello RN(offgoing nurse). Report given with SBAR.

## 2017-04-18 NOTE — PROGRESS NOTES
Hospitalist Progress Note  Justin Hamlin MD  Office: [phone]  Cell: [phone]      Date of Service:  2017  NAME:  Loreta Leong  :  1975  MRN:  [id number]      Admission Summary:   35year old female with history of asthma / COPD, sarcoidosis, bipolar disorder, chronic pain, polysubstance abuse with active heroin abuse, HTN presented on 17 with shortness of breath. She was diagnosed with acute asthma exacerbation vs COPD exacerbation. Interval history / Subjective:     She reports feeling tired, but a little better than yesterday. Breathing is a little better this morning compared to yesterday, not quite back to baseline. She is hopeful she'll be able to get out of the hospital tomorrow. Assessment & Plan:     1. Acute asthma vs COPD exacerbation   - improving, unclear trigger, possibly viral illness vs continued tobacco use   - CXR  - no acute cardiopulmonary disease   - continue standing DuoNeb therapy, likely resume Breo Ellipta on discharge   - change from IV steroids to PO tomorrow    2. Heroin dependence with opiate withdrawal / polysubstance abuse   - started on opiate withdrawal protocol (on methadone)   - continue lithium, Zoloft   - behavioral health consulted - plan for follow-up with [facility]    3. Lactic acidosis   - suspect this is secondary to bronchodilator therapy, she does not exhibit any other signs or symptoms suggestive of underlying sepsis   - will stop lactic acid checks    4. HTN   - BP controlled on clonidine, prazosin    5.   Tobacco dependence   - counseled on tobacco cessation   - nicotine patch    Code status: FULL  DVT prophylaxis: SCDs    Care Plan discussed with: Patient/Family and Nurse  Disposition: Home w/Family     Hospital Problems  Date Reviewed: 2017          Codes Class Noted POA    * (Principal)Asthma exacerbation ICD-10-CM: J45.901  ICD-9-CM: 493.92  2017 Yes Review of Systems:   A comprehensive review of systems was negative except for that written in the HPI. Vital Signs:    Last 24hrs VS reviewed since prior progress note. Most recent are:  Visit Vitals    /68    Pulse (!) 58    Temp 98 °F (36.7 °C)    Resp 15    Ht 5' 4\" (1.626 m)    Wt 56.7 kg (125 lb)    SpO2 96%    BMI 21.46 kg/m2         Intake/Output Summary (Last 24 hours) at 04/18/17 1508  Last data filed at 04/18/17 0400   Gross per 24 hour   Intake                0 ml   Output              450 ml   Net             -450 ml        Physical Examination:             Constitutional:  No acute distress, cooperative, pleasant, speaking in full sentences   ENT:  Oral mucous moist, oropharynx benign. Neck supple,    Resp:  Slight end expiratory wheeze throughout all lung fields   CV:  Regular rhythm, normal rate, no murmurs, gallops, rubs    GI:  Soft, non distended, non tender. normoactive bowel sounds, no hepatosplenomegaly     Musculoskeletal:  No edema, warm, 2+ pulses throughout    Neurologic:  Moves all extremities. AAOx3, CN II-XII reviewed            Data Review:    Review and/or order of clinical lab test      Labs:     Recent Labs      04/18/17 0258  04/17/17   0044   WBC  7.9  4.6   HGB  10.6*  10.6*   HCT  32.6*  33.3*   PLT  274  281     Recent Labs      04/18/17 0258  04/17/17 0044   NA  140  141   K  4.7  3.4*   CL  110*  107   CO2  24  26   BUN  7  8   CREA  0.68  0.72   GLU  132*  107*   CA  8.6  8.6     Recent Labs      04/17/17   0044   SGOT  32   ALT  29   AP  88   TBILI  0.3   TP  6.6   ALB  3.3*   GLOB  3.3     No results for input(s): INR, PTP, APTT in the last 72 hours. No lab exists for component: INREXT   No results for input(s): FE, TIBC, PSAT, FERR in the last 72 hours. No results found for: FOL, RBCF   No results for input(s): PH, PCO2, PO2 in the last 72 hours. No results for input(s): CPK, CKNDX, TROIQ in the last 72 hours.     No lab exists for component: CPKMB  Lab Results   Component Value Date/Time    Cholesterol, total 237 01/11/2017 04:09 AM    HDL Cholesterol 97 01/11/2017 04:09 AM    LDL, calculated 130.6 01/11/2017 04:09 AM    Triglyceride 47 01/11/2017 04:09 AM    CHOL/HDL Ratio 2.4 01/11/2017 04:09 AM     Lab Results   Component Value Date/Time    Glucose (POC) 119 01/25/2017 06:01 AM    Glucose (POC) 131 01/10/2017 04:48 PM    Glucose (POC) 140 01/10/2017 11:32 AM    Glucose (POC) 101 06/15/2009 05:10 PM    Glucose (POC) 135 02/11/2009 04:07 PM    Glucose (POC) 69 02/10/2009 07:06 PM    Glucose (POC) 93 01/18/2009 07:57 PM     Lab Results   Component Value Date/Time    Color YELLOW/STRAW 02/23/2017 04:42 AM    Appearance CLEAR 02/23/2017 04:42 AM    Specific gravity 1.014 02/23/2017 04:42 AM    Specific gravity >1.030 05/09/2009 09:27 PM    pH (UA) 6.5 02/23/2017 04:42 AM    Protein NEGATIVE  02/23/2017 04:42 AM    Glucose NEGATIVE  02/23/2017 04:42 AM    Ketone NEGATIVE  02/23/2017 04:42 AM    Bilirubin NEGATIVE  02/23/2017 04:42 AM    Urobilinogen 0.2 02/23/2017 04:42 AM    Nitrites NEGATIVE  02/23/2017 04:42 AM    Leukocyte Esterase NEGATIVE  02/23/2017 04:42 AM    Epithelial cells FEW 02/23/2017 04:42 AM    Bacteria NEGATIVE  02/23/2017 04:42 AM    WBC 0-4 02/23/2017 04:42 AM    RBC 0-5 02/23/2017 04:42 AM         Medications Reviewed:     Current Facility-Administered Medications   Medication Dose Route Frequency    albuterol-ipratropium (DUO-NEB) 2.5 MG-0.5 MG/3 ML  3 mL Nebulization TID RT    albuterol-ipratropium (DUO-NEB) 2.5 MG-0.5 MG/3 ML  3 mL Nebulization Q6H PRN    nicotine (NICODERM CQ) 21 mg/24 hr patch 1 Patch  1 Patch TransDERmal DAILY    cloNIDine HCl (CATAPRES) tablet 0.3 mg  0.3 mg Oral TID    gabapentin (NEURONTIN) capsule 800 mg  800 mg Oral TID    lithium carbonate tablet 300 mg  300 mg Oral BID    prazosin (MINIPRESS) capsule 2 mg  2 mg Oral QHS    sertraline (ZOLOFT) tablet 100 mg  100 mg Oral DAILY    sodium chloride (NS) flush 5-10 mL  5-10 mL IntraVENous Q8H    sodium chloride (NS) flush 5-10 mL  5-10 mL IntraVENous PRN    0.9% sodium chloride infusion  125 mL/hr IntraVENous CONTINUOUS    methylPREDNISolone (PF) (SOLU-MEDROL) injection 40 mg  40 mg IntraVENous Q6H    methadone (DOLOPHINE) tablet 15 mg  15 mg Oral DAILY    loperamide (IMODIUM) capsule 2 mg  2 mg Oral PRN    promethazine (PHENERGAN) tablet 25 mg  25 mg Oral Q6H PRN    acetaminophen (TYLENOL) tablet 650 mg  650 mg Oral Q4H PRN    dicyclomine (BENTYL) 10 mg/mL injection 20 mg  20 mg IntraMUSCular Q6H PRN    temazepam (RESTORIL) capsule 15 mg  15 mg Oral QHS     ______________________________________________________________________  EXPECTED LENGTH OF STAY: - - -  ACTUAL LENGTH OF STAY:          0                 Eden Noguera MD

## 2017-04-18 NOTE — PROGRESS NOTES
Critical Lactic of 4.3 reported.  On called paged. Dr. Car Randall ordered a repeat in four hours since no SS of sepsis.

## 2017-04-18 NOTE — PROGRESS NOTES
Patient too tired to finish 0000 treatment. Patient falls asleep with device in her hand. I asked her if she would rather wear the mask so she would not have to hold it but patient refuses mask and says that she would rather skip this treatment. Treatment ended early.

## 2017-04-18 NOTE — PROGRESS NOTES
Spoke with Frederick Taylor at [facility]). She explained that whenever the patient is stable for discharge, she can go to the [facility] location at [address] ([address]), [place], [address] [place] [address] and ask for an assessment for services and could qualify for the rapid access program.  She will need to present a photo ID and insurance card. Explained to Cici Woods that she has [facility]; she responded that the patient will likely be screened for gap insurance due to her diagnosis of Bipolar disorder. Care Management will continue to follow her disposition.    DORCAS Estrada

## 2017-04-18 NOTE — PROGRESS NOTES
Bedside shift change report given to [facility]  (oncoming nurse) by Dalton Lozano (offgoing nurse). Report included the following information SBAR, Kardex, Intake/Output, MAR and Recent Results.

## 2017-04-18 NOTE — PROGRESS NOTES
Bedside shift change report given to [facility] (oncoming nurse) by Jaime Gamez (offgoing nurse). Report included the following information SBAR, ED Summary, Procedure Summary, Intake/Output and Recent Results.

## 2017-04-18 NOTE — PROGRESS NOTES
Bedside shift change report given to Ilia Mackay (oncoming nurse) by Alma Estrella (offgoing nurse). Report included the following information SBAR.

## 2017-04-18 NOTE — PROGRESS NOTES
1020: Page out to Dr. Micki Funez. Lactic acid 3.0. No orders given back to RN. 1100: Dr. Micki Funez order to stop q6 latic acid labs     1530: Informed Dr. Micki Funez of pt feeling the effects the withdraw.  No orders given

## 2017-04-19 PROBLEM — J45.901 ASTHMA EXACERBATION: Status: RESOLVED | Noted: 2017-02-22 | Resolved: 2017-04-19

## 2017-04-19 LAB
ANION GAP BLD CALC-SCNC: 6 MMOL/L (ref 5–15)
BUN SERPL-MCNC: 13 MG/DL (ref 6–20)
BUN/CREAT SERPL: 17 (ref 12–20)
CALCIUM SERPL-MCNC: 8.5 MG/DL (ref 8.5–10.1)
CHLORIDE SERPL-SCNC: 107 MMOL/L (ref 97–108)
CO2 SERPL-SCNC: 24 MMOL/L (ref 21–32)
CREAT SERPL-MCNC: 0.76 MG/DL (ref 0.55–1.02)
GLUCOSE SERPL-MCNC: 105 MG/DL (ref 65–100)
POTASSIUM SERPL-SCNC: 4.5 MMOL/L (ref 3.5–5.1)
SODIUM SERPL-SCNC: 137 MMOL/L (ref 136–145)

## 2017-04-19 PROCEDURE — 74011250637 HC RX REV CODE- 250/637: Performed by: INTERNAL MEDICINE

## 2017-04-19 PROCEDURE — 80048 BASIC METABOLIC PNL TOTAL CA: CPT | Performed by: STUDENT IN AN ORGANIZED HEALTH CARE EDUCATION/TRAINING PROGRAM

## 2017-04-19 PROCEDURE — 74011250637 HC RX REV CODE- 250/637: Performed by: FAMILY MEDICINE

## 2017-04-19 PROCEDURE — 74011250637 HC RX REV CODE- 250/637: Performed by: STUDENT IN AN ORGANIZED HEALTH CARE EDUCATION/TRAINING PROGRAM

## 2017-04-19 PROCEDURE — 94640 AIRWAY INHALATION TREATMENT: CPT

## 2017-04-19 PROCEDURE — 74011000250 HC RX REV CODE- 250: Performed by: FAMILY MEDICINE

## 2017-04-19 PROCEDURE — 99218 HC RM OBSERVATION: CPT

## 2017-04-19 PROCEDURE — 36415 COLL VENOUS BLD VENIPUNCTURE: CPT | Performed by: STUDENT IN AN ORGANIZED HEALTH CARE EDUCATION/TRAINING PROGRAM

## 2017-04-19 PROCEDURE — 74011636637 HC RX REV CODE- 636/637: Performed by: STUDENT IN AN ORGANIZED HEALTH CARE EDUCATION/TRAINING PROGRAM

## 2017-04-19 RX ORDER — SERTRALINE HYDROCHLORIDE 100 MG/1
100 TABLET, FILM COATED ORAL DAILY
Qty: 30 TAB | Refills: 0 | Status: SHIPPED | OUTPATIENT
Start: 2017-04-19 | End: 2017-07-28

## 2017-04-19 RX ORDER — IBUPROFEN 200 MG
1 TABLET ORAL DAILY
Qty: 30 PATCH | Refills: 0 | Status: SHIPPED | OUTPATIENT
Start: 2017-04-19 | End: 2017-05-19

## 2017-04-19 RX ORDER — PREDNISONE 10 MG/1
TABLET ORAL
Qty: 20 TAB | Refills: 0 | Status: SHIPPED | OUTPATIENT
Start: 2017-04-19 | End: 2017-07-28

## 2017-04-19 RX ORDER — CLONIDINE HYDROCHLORIDE 0.3 MG/1
0.3 TABLET ORAL 3 TIMES DAILY
Qty: 90 TAB | Refills: 0 | Status: SHIPPED | OUTPATIENT
Start: 2017-04-19 | End: 2017-04-20

## 2017-04-19 RX ORDER — LITHIUM CARBONATE 300 MG
300 TABLET ORAL 2 TIMES DAILY
Qty: 60 TAB | Refills: 0 | Status: SHIPPED | OUTPATIENT
Start: 2017-04-19 | End: 2017-07-28

## 2017-04-19 RX ORDER — GABAPENTIN 800 MG/1
800 TABLET ORAL 3 TIMES DAILY
Qty: 90 TAB | Refills: 0 | Status: SHIPPED | OUTPATIENT
Start: 2017-04-19 | End: 2017-07-28

## 2017-04-19 RX ORDER — PRAZOSIN HYDROCHLORIDE 2 MG/1
2 CAPSULE ORAL
Qty: 30 CAP | Refills: 0 | Status: SHIPPED | OUTPATIENT
Start: 2017-04-19 | End: 2017-07-28

## 2017-04-19 RX ORDER — FLUTICASONE FUROATE AND VILANTEROL 200; 25 UG/1; UG/1
1 POWDER RESPIRATORY (INHALATION) DAILY
Qty: 28 EACH | Refills: 1 | Status: SHIPPED | OUTPATIENT
Start: 2017-04-19 | End: 2018-08-07

## 2017-04-19 RX ORDER — IPRATROPIUM BROMIDE AND ALBUTEROL SULFATE 2.5; .5 MG/3ML; MG/3ML
3 SOLUTION RESPIRATORY (INHALATION)
Qty: 100 NEBULE | Refills: 1 | Status: SHIPPED | OUTPATIENT
Start: 2017-04-19 | End: 2017-08-03

## 2017-04-19 RX ORDER — QUETIAPINE FUMARATE 400 MG/1
800 TABLET, FILM COATED ORAL
Qty: 30 TAB | Refills: 0 | Status: SHIPPED | OUTPATIENT
Start: 2017-04-19 | End: 2017-07-28

## 2017-04-19 RX ORDER — NEBULIZER AND COMPRESSOR
1 EACH MISCELLANEOUS DAILY
Qty: 1 EACH | Refills: 0 | Status: SHIPPED | OUTPATIENT
Start: 2017-04-19 | End: 2017-08-03

## 2017-04-19 RX ADMIN — PRAZOSIN HYDROCHLORIDE 2 MG: 1 CAPSULE ORAL at 22:54

## 2017-04-19 RX ADMIN — GABAPENTIN 800 MG: 400 CAPSULE ORAL at 17:34

## 2017-04-19 RX ADMIN — PREDNISONE 40 MG: 20 TABLET ORAL at 07:13

## 2017-04-19 RX ADMIN — GABAPENTIN 800 MG: 400 CAPSULE ORAL at 09:01

## 2017-04-19 RX ADMIN — LITHIUM CARBONATE 300 MG: 300 TABLET ORAL at 17:34

## 2017-04-19 RX ADMIN — GABAPENTIN 800 MG: 400 CAPSULE ORAL at 22:43

## 2017-04-19 RX ADMIN — CLONIDINE HYDROCHLORIDE 0.3 MG: 0.2 TABLET ORAL at 17:34

## 2017-04-19 RX ADMIN — CLONIDINE HYDROCHLORIDE 0.3 MG: 0.2 TABLET ORAL at 22:43

## 2017-04-19 RX ADMIN — LITHIUM CARBONATE 300 MG: 300 TABLET ORAL at 09:00

## 2017-04-19 RX ADMIN — PROMETHAZINE HYDROCHLORIDE 25 MG: 25 TABLET ORAL at 09:07

## 2017-04-19 RX ADMIN — PROMETHAZINE HYDROCHLORIDE 25 MG: 25 TABLET ORAL at 17:37

## 2017-04-19 RX ADMIN — IPRATROPIUM BROMIDE AND ALBUTEROL SULFATE 3 ML: .5; 3 SOLUTION RESPIRATORY (INHALATION) at 20:55

## 2017-04-19 RX ADMIN — TEMAZEPAM 15 MG: 15 CAPSULE ORAL at 22:43

## 2017-04-19 RX ADMIN — IPRATROPIUM BROMIDE AND ALBUTEROL SULFATE 3 ML: .5; 3 SOLUTION RESPIRATORY (INHALATION) at 15:07

## 2017-04-19 RX ADMIN — CLONIDINE HYDROCHLORIDE 0.3 MG: 0.2 TABLET ORAL at 09:00

## 2017-04-19 RX ADMIN — SERTRALINE HYDROCHLORIDE 100 MG: 50 TABLET ORAL at 09:00

## 2017-04-19 RX ADMIN — METHADONE HYDROCHLORIDE 15 MG: 10 TABLET ORAL at 09:01

## 2017-04-19 NOTE — PROGRESS NOTES
Bedside shift change report given to Humberto Lemus RN (oncoming nurse) by Curtis Santiago RN (offgoing nurse). Report included the following information SBAR, Intake/Output and MAR.

## 2017-04-19 NOTE — PROGRESS NOTES
Care Management-Received call from patient's nurse. Patient finally has all of her prescriptions and is ready to go. Her nurse called to see if she needs to call report and to find out if she can still go. This CM called [facility] ([phone]) and spoke with Sandie Singer in Crisis ([facility]). He said that the only times for rapid access are 8:00 AM and 1:00 PM. At the [facility] location. They do not do that after hours in Crisis. Patient will need to be there tomorrow at one of those times. She will need proof of address and picture ID. Updated patient's nurse. Nurse will call MD to update him on the above. He will make call as to whether she will be discharged to home with follow up at [facility] in AM or stay and be discharged at 7:00 AM so she can got to [facility] at 8:00 AM. Nurse will call physician now.      DORCAS Hoang

## 2017-04-19 NOTE — PROGRESS NOTES
Bedside shift change report given to [facility] SONIA (oncoming nurse) by Tuan Muhammad   (offgoing nurse). Report included the following information SBAR.     5097- Discharge patient by 7am to allow patient time to get to [address] by 8am per Dr. Yoan Sterling, please see CM note     [phone] AVS reviewed and signed with patient. Discussed meds, f/u appt, reportable s&s, teaching about asthma with readback and diet/activity reviewed. Patient understands to leave by 7am to allow for time to get to [facility] for admission at 8am. Patient aware to bring photo ID and insurance card to  Clonidine 15 tabs.

## 2017-04-19 NOTE — PROGRESS NOTES
Spoke with the patient at the bedside to explain that she can go to the [facility] location at [address] ([address]), [place], [facility] today and ask for an assessment for services and could qualify for the rapid access program. She will need to present a photo ID and insurance card. Further explained that she will likely be screened for gap insurance due to her diagnosis of Bipolar disorder. Patient is still agreeable to go to the [facility] if accepted and understands that she would spend the night there. Patient's prescriptions were sent to the Bedside Rx program (total $183.14). She has already received assistance with medications once already; this  spoke with her supervisors to receive special approval for assistance at this visit as well. Patient already has a nebulizer and the nicotine patch is over the counter; these prescriptions were therefore not filled, but sent with the patient. Additionally, they do not have one of the inhalers available. This prescription will also be sent with her as well; patient states that she still has the inhalers from her hospitalization in January. Cab ticket was obtained as the patient has no transportation with family and no money to pay for a cab. Once the nurse is ready to discharge the patient, please call [employer] at S#[phone]. Care Management will continue to follow her disposition.    DORCAS Hart

## 2017-04-19 NOTE — DISCHARGE SUMMARY
Discharge Summary       PATIENT ID: Margaret Saenz  MRN: [id number]   YOB: 1975    DATE OF ADMISSION: 4/16/2017  8:35 PM    DATE OF DISCHARGE: 4/19/17   PRIMARY CARE PROVIDER: None     ATTENDING PHYSICIAN: Hosea Raymond MD  DISCHARGING PROVIDER: Sruthi Phoenix MD    To contact this individual call [phone] and ask the  to page. If unavailable ask to be transferred the Adult Hospitalist Department. CONSULTATIONS: IP CONSULT TO HOSPITALIST  IP CONSULT TO PSYCHIATRY    PROCEDURES/SURGERIES: * No surgery found *    ADMITTING DIAGNOSES & HOSPITAL COURSE:   35year old female with history of asthma / COPD, sarcoidosis, bipolar disorder, chronic pain, polysubstance abuse with active heroin abuse, HTN presented on 4/16/17 with shortness of breath. She was diagnosed with acute asthma exacerbation vs COPD exacerbation.     1. Acute asthma vs COPD exacerbation  - improving, unclear trigger, possibly viral illness vs continued tobacco use  - CXR 4/16 - no acute cardiopulmonary disease  - continue standing DuoNeb therapy, likely resume Breo Ellipta on discharge with prn albuterol inhaler  - she received IV Solu-Medrol, which was converted to prednisone for taper     2. Heroin dependence with opiate withdrawal / polysubstance abuse  - started on opiate withdrawal protocol (on methadone), which was completed in hospital  - continued lithium, Zoloft  - behavioral health consulted - plan for follow-up with [facility]     3. Lactic acidosis  - suspect this is secondary to bronchodilator therapy, she does not exhibit any other signs or symptoms suggestive of underlying sepsis     4. HTN  - BP controlled on clonidine, prazosin     5. Tobacco dependence  - counseled on tobacco cessation  - nicotine patch        DISCHARGE DIAGNOSES / PLAN:      1. See above. PENDING TEST RESULTS:   At the time of discharge the following test results are still pending: None.     FOLLOW UP APPOINTMENTS:    Follow-up Information     Follow up With Details Comments [address] In 2 days  [phone]    None   None (968) Patient stated that they have no PCP             ADDITIONAL CARE RECOMMENDATIONS:     You were started on the following new medications:   (1) Prednisone - which is a steroid used to treat your asthma / COPD exacerbation   (2) Albuterol - which is a rescue inhaler that you may use up to 4 times a day if you are experiencing shortness of breath   (3) Nicotine patch - for tobacco use; you should strongly consider quitting smoking for good     You may resume your other home medications. DIET: Regular Diet    ACTIVITY: Activity as tolerated    WOUND CARE: N/A    EQUIPMENT needed: N/A    DISCHARGE MEDICATIONS:  Current Discharge Medication List      START taking these medications    Details   albuterol sulfate 90 mcg/actuation aepb Take 2 Puffs by inhalation every four (4) hours as needed. Qty: 1 Inhaler, Refills: 0      predniSONE (DELTASONE) 10 mg tablet 40 mg daily for 2 days, then 30 mg daily for 2 days, then 20 mg daily for 2 days, then 10 mg daily for 2 days, then stop  Qty: 20 Tab, Refills: 0      nicotine (NICODERM CQ) 21 mg/24 hr 1 Patch by TransDERmal route daily for 30 days. Qty: 30 Patch, Refills: 0      albuterol (PROVENTIL VENTOLIN) 2.5 mg /3 mL (0.083 %) nebulizer solution 3 mL by Nebulization route every four (4) hours as needed for Wheezing. Qty: 24 Each, Refills: 0         CONTINUE these medications which have CHANGED    Details   albuterol-ipratropium (DUO-NEB) 2.5 mg-0.5 mg/3 ml nebu 3 mL by Nebulization route every six (6) hours as needed. Qty: 100 Nebule, Refills: 1      cloNIDine HCl (CATAPRES) 0.3 mg tablet Take 1 Tab by mouth three (3) times daily. Qty: 90 Tab, Refills: 0      fluticasone-vilanterol (BREO ELLIPTA) 200-25 mcg/dose inhaler Take 1 Puff by inhalation daily.   Qty: 28 Each, Refills: 1 gabapentin (NEURONTIN) 800 mg tablet Take 1 Tab by mouth three (3) times daily. Qty: 90 Tab, Refills: 0      lithium carbonate 300 mg tablet Take 1 Tab by mouth two (2) times a day. Qty: 60 Tab, Refills: 0      Nebulizer & Compressor machine 1 Each by Does Not Apply route daily. Qty: 1 Each, Refills: 0      prazosin (MINIPRESS) 2 mg capsule Take 1 Cap by mouth nightly. Qty: 30 Cap, Refills: 0      QUEtiapine (SEROQUEL) 400 mg tablet Take 2 Tabs by mouth nightly. Qty: 30 Tab, Refills: 0      sertraline (ZOLOFT) 100 mg tablet Take 1 Tab by mouth daily. Qty: 30 Tab, Refills: 0         CONTINUE these medications which have NOT CHANGED    Details   CLONAZEPAM (KLONOPIN PO) Take 5 mg by mouth two (2) times a day. STOP taking these medications       HYDROcodone-acetaminophen (NORCO) 5-325 mg per tablet Comments:   Reason for Stopping:         guaiFENesin-codeine (ROBITUSSIN AC) 100-10 mg/5 mL solution Comments:   Reason for Stopping:         albuterol (PROVENTIL HFA, VENTOLIN HFA, PROAIR HFA) 90 mcg/actuation inhaler Comments:   Reason for Stopping:                 NOTIFY YOUR PHYSICIAN FOR ANY OF THE FOLLOWING:   Fever over 101 degrees for 24 hours. Chest pain, shortness of breath, fever, chills, nausea, vomiting, diarrhea, change in mentation, falling, weakness, bleeding. Severe pain or pain not relieved by medications. Or, any other signs or symptoms that you may have questions about.     DISPOSITION:    Home With:   OT  PT  HH  RN       Long term SNF/Inpatient Rehab    Independent/assisted living    Hospice   x Other:  [facility]       PATIENT CONDITION AT DISCHARGE:     Functional status    Poor     Deconditioned    x Independent      Cognition    x Lucid     Forgetful     Dementia      Catheters/lines (plus indication)    Calvin     PICC     PEG    x None      Code status    x Full code     DNR      PHYSICAL EXAMINATION AT DISCHARGE:  Visit Vitals    /81 (BP 1 Location: Right arm, BP Patient Position: At rest)    Pulse (!) 49    Temp 97.8 °F (36.6 °C)    Resp 16    Ht 5' 4\" (1.626 m)    Wt 56.7 kg (125 lb)    SpO2 99%    BMI 21.46 kg/m2     Constitutional: No acute distress, cooperative, pleasant, speaking in full sentences   ENT: Oral mucous moist, oropharynx benign. Neck supple,    Resp: Minimal end-expiratory wheeze in bases, otherwise clear to auscultation. CV: Regular rhythm, normal rate, no murmurs, gallops, rubs   GI: Soft, non distended, non tender. normoactive bowel sounds, no hepatosplenomegaly    Musculoskeletal: No edema, warm, 2+ pulses throughout   Neurologic: Moves all extremities.  AAOx3, CN II-XII reviewed         CHRONIC MEDICAL DIAGNOSES:  Problem List as of 4/19/2017  Date Reviewed: 4/19/2017          Codes Class Noted - Resolved    Bipolar 1 disorder (HCC) ICD-10-CM: F31.9  ICD-9-CM: 296.7  Unknown - Present        Chronic obstructive pulmonary disease ([facility] ICD-10-CM: J44.9  ICD-9-CM: 298  Unknown - Present        Chronic pain ICD-10-CM: G89.29  ICD-9-CM: 338.29  Unknown - Present    Overview Signed 2/22/2017  7:15 PM by Erendira Farias MD     back, leg             Hepatitis B ICD-10-CM: B19.10  ICD-9-CM: 070.30  Unknown - Present        Sarcoidosis ([facility].) ICD-10-CM: D86.9  ICD-9-CM: 135  Unknown - Present        Tobacco abuse ICD-10-CM: Z72.0  ICD-9-CM: 305.1  Unknown - Present        Anemia ICD-10-CM: D64.9  ICD-9-CM: 285.9  2/22/2017 - Present        Cocaine abuse ICD-10-CM: F14.10  ICD-9-CM: 305.60  Unknown - Present        Polysubstance abuse ICD-10-CM: F19.10  ICD-9-CM: 305.90  Unknown - Present        Narcotic abuse ICD-10-CM: F11.10  ICD-9-CM: 305.40  Unknown - Present        HTN (hypertension) ICD-10-CM: I10  ICD-9-CM: 401.9  Unknown - Present        Depression ICD-10-CM: F32.9  ICD-9-CM: 404  1/25/2017 - Present        Sinus tachycardia ICD-10-CM: R00.0  ICD-9-CM: 427.89  1/10/2017 - Present        Heroin abuse ICD-10-CM: F11.10  ICD-9-CM: 305.50  1/30/2014 - Present        * (Principal)RESOLVED: Asthma exacerbation ICD-10-CM: J45.901  ICD-9-CM: 493.92  2/22/2017 - 4/19/2017        RESOLVED: Asthma ICD-10-CM: J45.909  ICD-9-CM: 493.90  Unknown - 2/22/2017        RESOLVED: Depressive disorder ICD-10-CM: F32.9  ICD-9-CM: 826  1/25/2017 - 2/22/2017        RESOLVED: Polysubstance dependence ([facility] ICD-10-CM: F19.20  ICD-9-CM: 304.80  1/25/2017 - 2/22/2017        RESOLVED: Substance induced mood disorder ([facility] ICD-10-CM: F19.94  ICD-9-CM: 292.84  1/25/2017 - 2/22/2017        RESOLVED: HTN (hypertension) ICD-10-CM: I10  ICD-9-CM: 401.9  1/11/2017 - 2/22/2017        RESOLVED: Drug abuse ICD-10-CM: F19.10  ICD-9-CM: 305.90  1/11/2017 - 2/22/2017        RESOLVED: Bipolar disorder ([facility] ICD-10-CM: F31.9  ICD-9-CM: 296.80  1/11/2017 - 2/22/2017        RESOLVED: COPD (chronic obstructive pulmonary disease) ([facility] ICD-10-CM: J44.9  ICD-9-CM: 496  1/9/2017 - 2/22/2017        RESOLVED: Unspecified asthma, with exacerbation ICD-10-CM: J45.901  ICD-9-CM: 493.92  1/30/2014 - 2/22/2017              Greater than 30 minutes were spent with the patient on counseling and coordination of care    Signed:   Tiana Brown MD  4/19/2017  12:39 PM

## 2017-04-19 NOTE — PROGRESS NOTES
2030:  Patient pulled out her IV.  3 attempts made to restart by 3 different RNs. Hospitalist paged. Dr. العلي Arabic aware and gave permission to leave IV out due to possible DC tomorrow.

## 2017-04-19 NOTE — DISCHARGE INSTRUCTIONS
Discharge Instructions       PATIENT ID: Leatha Fraga  MRN: [id number]   YOB: 1975    DATE OF ADMISSION: 4/16/2017  8:35 PM    DATE OF DISCHARGE: 4/22/2017    PRIMARY CARE PROVIDER: None     ATTENDING PHYSICIAN: Mónica Rao MD  DISCHARGING PROVIDER: Mónica Rao MD    To contact this individual call [phone] and ask the  to page. If unavailable ask to be transferred the Adult Hospitalist Department. DISCHARGE DIAGNOSES     Acute asthma exacerbation vs COPD exacerbation    Hypertension    Opiate dependence with early withdrawal    Tobacco dependence    CONSULTATIONS: IP CONSULT TO HOSPITALIST  IP CONSULT TO PSYCHIATRY  IP CONSULT TO NEUROLOGY    PROCEDURES/SURGERIES: * No surgery found *    PENDING TEST RESULTS:   At the time of discharge the following test results are still pending: None. FOLLOW UP APPOINTMENTS:   Follow-up Information     Follow up With Details Comments [address] In 2 days  [phone]  [phone]    None   None (777) Patient stated that they have no PCP      [address] [place] [address] On 4/24/2017 Please ask for an assessment for services, specifically the [employer] program.   [address] [facility]  [phone]           ADDITIONAL CARE RECOMMENDATIONS:     You were started on the following new medications:   (1) Prednisone - which is a steroid used to treat your asthma / COPD exacerbation   (2) Albuterol - which is a rescue inhaler that you may use up to 4 times a day if you are experiencing shortness of breath   (3) Nicotine patch - for tobacco use; you should strongly consider quitting smoking for good     You may resume your other home medications.     DIET: Regular Diet    ACTIVITY: Activity as tolerated    WOUND CARE: N/A    EQUIPMENT needed: N/A      DISCHARGE MEDICATIONS:   See Medication Reconciliation Form    · It is important that you take the medication exactly as they are prescribed. · Keep your medication in the bottles provided by the pharmacist and keep a list of the medication names, dosages, and times to be taken in your wallet. · Do not take other medications without consulting your doctor. NOTIFY YOUR PHYSICIAN FOR ANY OF THE FOLLOWING:   Fever over 101 degrees for 24 hours. Chest pain, shortness of breath, fever, chills, nausea, vomiting, diarrhea, change in mentation, falling, weakness, bleeding. Severe pain or pain not relieved by medications. Or, any other signs or symptoms that you may have questions about. DISPOSITION:  x  Home With:   OT  PT  HH  RN       SNF/Inpatient Rehab/LTAC    Independent/assisted living    Hospice    Other:     CDMP Checked:   Yes x     PROBLEM LIST Updated:  Yes x       Signed:   Dave James MD  4/22/2017  12:36 PM     Asthma Attack: Care Instructions  Your Care Instructions    During an asthma attack, the airways swell and narrow. This makes it hard to breathe. Severe asthma attacks can be life-threatening, but you can help prevent them by keeping your asthma under control and treating symptoms before they get bad. Symptoms include being short of breath, having chest tightness, coughing, and wheezing. Noting and treating these symptoms can also help you avoid future trips to the emergency room. The doctor has checked you carefully, but problems can develop later. If you notice any problems or new symptoms, get medical treatment right away. Follow-up care is a key part of your treatment and safety. Be sure to make and go to all appointments, and call your doctor if you are having problems. It's also a good idea to know your test results and keep a list of the medicines you take. How can you care for yourself at home? · Follow your asthma action plan to prevent and treat attacks. If you don't have an asthma action plan, work with your doctor to create one.   · Take your asthma medicines exactly as prescribed. Talk to your doctor right away if you have any questions about how to take them. ¨ Use your quick-relief medicine when you have symptoms of an attack. Quick-relief medicine is usually an albuterol inhaler. Some people need to use quick-relief medicine before they exercise. ¨ Take your controller medicine every day, not just when you have symptoms. Controller medicine is usually an inhaled corticosteroid. The goal is to prevent problems before they occur. Don't use your controller medicine to treat an attack that has already started. It doesn't work fast enough to help. ¨ If your doctor prescribed corticosteroid pills to use during an attack, take them exactly as prescribed. It may take hours for the pills to work, but they may make the episode shorter and help you breathe better. ¨ Keep your quick-relief medicine with you at all times. · Talk to your doctor before using other medicines. Some medicines, such as aspirin, can cause asthma attacks in some people. · If you have a peak flow meter, use it to check how well you are breathing. This can help you predict when an asthma attack is going to occur. Then you can take medicine to prevent the asthma attack or make it less severe. · Do not smoke or allow others to smoke around you. Avoid smoky places. Smoking makes asthma worse. If you need help quitting, talk to your doctor about stop-smoking programs and medicines. These can increase your chances of quitting for good. · Learn what triggers an asthma attack for you, and avoid the triggers when you can. Common triggers include colds, smoke, air pollution, dust, pollen, mold, pets, cockroaches, stress, and cold air. · Avoid colds and the flu. Get a pneumococcal vaccine shot. If you have had one before, ask your doctor if you need a second dose. Get a flu vaccine every fall. If you must be around people with colds or the flu, wash your hands often. When should you call for help?   Call 911 anytime you think you may need emergency care. For example, call if:  · You have severe trouble breathing. Call your doctor now or seek immediate medical care if:  · Your symptoms do not get better after you have followed your asthma action plan. · You have new or worse trouble breathing. · Your coughing and wheezing get worse. · You cough up dark brown or bloody mucus (sputum). · You have a new or higher fever. Watch closely for changes in your health, and be sure to contact your doctor if:  · You need to use quick-relief medicine on more than 2 days a week (unless it is just for exercise). · You cough more deeply or more often, especially if you notice more mucus or a change in the color of your mucus. · You are not getting better as expected. Where can you learn more? Go to http://jazlyn-eladio.info/. Enter Z490 in the search box to learn more about \"Asthma Attack: Care Instructions. \"  Current as of: May 23, 2016  Content Version: 11.2  © 9822-4563 [employer]. Care instructions adapted under license by [employer] (which disclaims liability or warranty for this information). If you have questions about a medical condition or this instruction, always ask your healthcare professional. [address] any warranty or liability for your use of this information. We hope that we have addressed all of your medical concerns. The examination and treatment you received in the Emergency Department were for an emergent problem and were not intended as complete care. It is important that you follow up with your healthcare provider(s) for ongoing care. If your symptoms worsen or do not improve as expected, and you are unable to reach your usual health care provider(s), you should return to the Emergency Department.       Today's healthcare is undergoing tremendous change, and patient satisfaction surveys are one of the many tools to assess the quality of medical care. You may receive a survey from the [employer] regarding your experience in the Emergency Department. I hope that your experience has been completely positive, particularly the medical care that I provided. As such, please participate in the survey; anything less than excellent does not meet my expectations or intentions. [address] [facility] and [address] [place] [address] participate in nationally recognized quality of care measures. If your blood pressure is greater than 120/80, as reported below, we urge that you seek medical care to address the potential of high blood pressure, commonly known as hypertension. Hypertension can be hereditary or can be caused by certain medical conditions, pain, stress, or \"white coat syndrome. \"       Please make an appointment with your health care provider(s) for follow up of your Emergency Department visit. VITALS:   Patient Vitals for the past 8 hrs:   Temp Pulse Resp BP SpO2   04/22/17 0904 98.5 °F (36.9 °C) (!) 55 16 103/68 99 %   04/22/17 0553 - 60 - - -   04/22/17 0453 97.4 °F (36.3 °C) (!) 57 18 102/64 98 %          Thank you for allowing us to provide you with medical care today. We realize that you have many choices for your emergency care needs. Please choose us in the future for any continued health care needs. Lissa Pedro, [address]: [phone]            No results found for this or any previous visit (from the past 24 hour(s)). No results found. DISCHARGE SUMMARY from Nurse    The following personal items are in your possession at time of discharge:    Dental Appliances: None  Visual Aid: None     Home Medications: None  Jewelry: Earrings  Clothing:  At bedside  Other Valuables: None             PATIENT INSTRUCTIONS:    After general anesthesia or intravenous sedation, for 24 hours or while taking prescription Narcotics:  · Limit your activities  · Do not drive and operate hazardous machinery  · Do not make important personal or business decisions  · Do  not drink alcoholic beverages  · If you have not urinated within 8 hours after discharge, please contact your surgeon on call. Report the following to your surgeon:  · Excessive pain, swelling, redness or odor of or around the surgical area  · Temperature over 100.5  · Nausea and vomiting lasting longer than 4 hours or if unable to take medications  · Any signs of decreased circulation or nerve impairment to extremity: change in color, persistent  numbness, tingling, coldness or increase pain  · Any questions        These are general instructions for a healthy lifestyle:    No smoking/ No tobacco products/ Avoid exposure to second hand smoke    Surgeon General's Warning:  Quitting smoking now greatly reduces serious risk to your health. Obesity, smoking, and sedentary lifestyle greatly increases your risk for illness    A healthy diet, regular physical exercise & weight monitoring are important for maintaining a healthy lifestyle    You may be retaining fluid if you have a history of heart failure or if you experience any of the following symptoms:  Weight gain of 3 pounds or more overnight or 5 pounds in a week, increased swelling in our hands or feet or shortness of breath while lying flat in bed. Please call your doctor as soon as you notice any of these symptoms; do not wait until your next office visit. Recognize signs and symptoms of STROKE:    F-face looks uneven    A-arms unable to move or move unevenly    S-speech slurred or non-existent    T-time-call 911 as soon as signs and symptoms begin-DO NOT go       Back to bed or wait to see if you get better-TIME IS BRAIN. Warning Signs of HEART ATTACK     Call 911 if you have these symptoms:   Chest discomfort.  Most heart attacks involve discomfort in the center of the chest that lasts more than a few minutes, or that goes away and comes back. It can feel like uncomfortable pressure, squeezing, fullness, or pain.  Discomfort in other areas of the upper body. Symptoms can include pain or discomfort in one or both arms, the back, neck, jaw, or stomach.  Shortness of breath with or without chest discomfort.  Other signs may include breaking out in a cold sweat, nausea, or lightheadedness. Don't wait more than five minutes to call 911 - MINUTES MATTER! Fast action can save your life. Calling 911 is almost always the fastest way to get lifesaving treatment. Emergency Medical Services staff can begin treatment when they arrive -- up to an hour sooner than if someone gets to the hospital by car. The discharge information has been reviewed with the patient. The patient verbalized understanding. Discharge medications reviewed with the patient and appropriate educational materials and side effects teaching were provided. [employer] Activation    Thank you for requesting access to [employer]. Please follow the instructions below to securely access and download your online medical record. [employer] allows you to send messages to your doctor, view your test results, renew your prescriptions, schedule appointments, and more. How Do I Sign Up? 1. In your internet browser, go to [email or web address]  2. Click on the First Time User? Click Here link in the Sign In box. You will be redirect to the New Member Sign Up page. 3. Enter your [employer] Access Code exactly as it appears below. You will not need to use this code after youve completed the sign-up process. If you do not sign up before the expiration date, you must request a new code. [employer] Access Code: [email or web address]  Expires: 7/15/2017  5:21 PM (This is the date your [employer] access code will )    4. Enter the last four digits of your Social Security Number (xxxx) and Date of Birth (mm/dd/yyyy) as indicated and click Submit.  You will be taken to the next sign-up page. 5. Create a [employer] ID. This will be your [employer] login ID and cannot be changed, so think of one that is secure and easy to remember. 6. Create a [employer] password. You can change your password at any time. 7. Enter your Password Reset Question and Answer. This can be used at a later time if you forget your password. 8. Enter your e-mail address. You will receive e-mail notification when new information is available in [address] 9. Click Sign Up. You can now view and download portions of your medical record. 10. Click the Download Summary menu link to download a portable copy of your medical information. Additional Information    If you have questions, please visit the Frequently Asked Questions section of the [employer] website at [email or web address] Remember, [employer] is NOT to be used for urgent needs. For medical emergencies, dial 911.

## 2017-04-20 ENCOUNTER — APPOINTMENT (OUTPATIENT)
Dept: CT IMAGING | Age: 42
DRG: 191 | End: 2017-04-20
Attending: STUDENT IN AN ORGANIZED HEALTH CARE EDUCATION/TRAINING PROGRAM
Payer: MEDICAID

## 2017-04-20 PROBLEM — R56.9 SEIZURE (HCC): Status: ACTIVE | Noted: 2017-04-20

## 2017-04-20 LAB
ANION GAP BLD CALC-SCNC: 7 MMOL/L (ref 5–15)
ATRIAL RATE: 46 BPM
ATRIAL RATE: 54 BPM
BUN SERPL-MCNC: 13 MG/DL (ref 6–20)
BUN/CREAT SERPL: 14 (ref 12–20)
CALCIUM SERPL-MCNC: 8.5 MG/DL (ref 8.5–10.1)
CALCULATED P AXIS, ECG09: 48 DEGREES
CALCULATED P AXIS, ECG09: 56 DEGREES
CALCULATED R AXIS, ECG10: 70 DEGREES
CALCULATED R AXIS, ECG10: 74 DEGREES
CALCULATED T AXIS, ECG11: 67 DEGREES
CALCULATED T AXIS, ECG11: 70 DEGREES
CHLORIDE SERPL-SCNC: 101 MMOL/L (ref 97–108)
CO2 SERPL-SCNC: 26 MMOL/L (ref 21–32)
CREAT SERPL-MCNC: 0.95 MG/DL (ref 0.55–1.02)
DIAGNOSIS, 93000: NORMAL
DIAGNOSIS, 93000: NORMAL
GLUCOSE SERPL-MCNC: 96 MG/DL (ref 65–100)
MAGNESIUM SERPL-MCNC: 2 MG/DL (ref 1.6–2.4)
P-R INTERVAL, ECG05: 112 MS
P-R INTERVAL, ECG05: 118 MS
POTASSIUM SERPL-SCNC: 4.2 MMOL/L (ref 3.5–5.1)
Q-T INTERVAL, ECG07: 478 MS
Q-T INTERVAL, ECG07: 498 MS
QRS DURATION, ECG06: 86 MS
QRS DURATION, ECG06: 90 MS
QTC CALCULATION (BEZET), ECG08: 418 MS
QTC CALCULATION (BEZET), ECG08: 472 MS
SODIUM SERPL-SCNC: 134 MMOL/L (ref 136–145)
TROPONIN I SERPL-MCNC: <0.04 NG/ML
VENTRICULAR RATE, ECG03: 46 BPM
VENTRICULAR RATE, ECG03: 54 BPM

## 2017-04-20 PROCEDURE — 99218 HC RM OBSERVATION: CPT

## 2017-04-20 PROCEDURE — 74011250637 HC RX REV CODE- 250/637: Performed by: INTERNAL MEDICINE

## 2017-04-20 PROCEDURE — 94640 AIRWAY INHALATION TREATMENT: CPT

## 2017-04-20 PROCEDURE — 70450 CT HEAD/BRAIN W/O DYE: CPT

## 2017-04-20 PROCEDURE — 74011250636 HC RX REV CODE- 250/636: Performed by: STUDENT IN AN ORGANIZED HEALTH CARE EDUCATION/TRAINING PROGRAM

## 2017-04-20 PROCEDURE — 77010033678 HC OXYGEN DAILY

## 2017-04-20 PROCEDURE — 93005 ELECTROCARDIOGRAM TRACING: CPT

## 2017-04-20 PROCEDURE — 74011250637 HC RX REV CODE- 250/637: Performed by: FAMILY MEDICINE

## 2017-04-20 PROCEDURE — 74011000250 HC RX REV CODE- 250: Performed by: FAMILY MEDICINE

## 2017-04-20 PROCEDURE — 84484 ASSAY OF TROPONIN QUANT: CPT | Performed by: STUDENT IN AN ORGANIZED HEALTH CARE EDUCATION/TRAINING PROGRAM

## 2017-04-20 PROCEDURE — 74011250637 HC RX REV CODE- 250/637: Performed by: STUDENT IN AN ORGANIZED HEALTH CARE EDUCATION/TRAINING PROGRAM

## 2017-04-20 PROCEDURE — 36415 COLL VENOUS BLD VENIPUNCTURE: CPT | Performed by: STUDENT IN AN ORGANIZED HEALTH CARE EDUCATION/TRAINING PROGRAM

## 2017-04-20 PROCEDURE — 65660000000 HC RM CCU STEPDOWN

## 2017-04-20 PROCEDURE — 80048 BASIC METABOLIC PNL TOTAL CA: CPT | Performed by: STUDENT IN AN ORGANIZED HEALTH CARE EDUCATION/TRAINING PROGRAM

## 2017-04-20 PROCEDURE — 93041 RHYTHM ECG TRACING: CPT

## 2017-04-20 PROCEDURE — 83735 ASSAY OF MAGNESIUM: CPT | Performed by: STUDENT IN AN ORGANIZED HEALTH CARE EDUCATION/TRAINING PROGRAM

## 2017-04-20 RX ADMIN — IPRATROPIUM BROMIDE AND ALBUTEROL SULFATE 3 ML: .5; 3 SOLUTION RESPIRATORY (INHALATION) at 21:06

## 2017-04-20 RX ADMIN — SERTRALINE HYDROCHLORIDE 100 MG: 50 TABLET ORAL at 14:48

## 2017-04-20 RX ADMIN — GABAPENTIN 800 MG: 400 CAPSULE ORAL at 14:50

## 2017-04-20 RX ADMIN — SODIUM CHLORIDE 50 ML/HR: 900 INJECTION, SOLUTION INTRAVENOUS at 21:32

## 2017-04-20 RX ADMIN — Medication 10 ML: at 14:00

## 2017-04-20 RX ADMIN — GABAPENTIN 800 MG: 400 CAPSULE ORAL at 22:14

## 2017-04-20 RX ADMIN — LITHIUM CARBONATE 300 MG: 300 TABLET ORAL at 18:26

## 2017-04-20 RX ADMIN — ACETAMINOPHEN 650 MG: 325 TABLET, FILM COATED ORAL at 06:48

## 2017-04-20 RX ADMIN — Medication 10 ML: at 21:33

## 2017-04-20 RX ADMIN — TEMAZEPAM 15 MG: 15 CAPSULE ORAL at 22:14

## 2017-04-20 NOTE — PROGRESS NOTES
Spiritual Care Assessment/Progress Notes    Isael Anna [id number]  [id number]    1975  35 y.o.  female    Patient Telephone Number: [phone] (home)   Christianity Affiliation: Pantera   Language: English   Extended Emergency Contact Information  Primary Emergency Contact: 2021 Zander Nix Phone: [phone]  Relation: Parent   Patient Active Problem List    Diagnosis Date Noted    Anemia 02/22/2017    Bipolar 1 disorder (HCC)     Chronic obstructive pulmonary disease (HCC)     Chronic pain     Hepatitis B     [facility] [address]     Tobacco abuse     Cocaine abuse     Polysubstance abuse     Narcotic abuse     HTN (hypertension)     Depression 01/25/2017    Sinus tachycardia 01/10/2017    Heroin abuse 01/30/2014        Date: 4/20/2017       Level of Christianity/Spiritual Activity:  []         Involved in vinay tradition/spiritual practice    []         Not involved in vinay tradition/spiritual practice  []         Spiritually oriented    []         Claims no spiritual orientation    []         seeking spiritual identity  []         Feels alienated from Religion practice/tradition  []         Feels angry about Religion practice/tradition  []         Spirituality/Religion tradition a resource for coping at this time.   [x]         Not able to assess due to medical condition    Services Provided Today:  []         crisis intervention    []         reading Scriptures  []         spiritual assessment    []         prayer  []         empathic listening/emotional support  []         rites and rituals (cite in comments)  []         life review     []         Religion support  []         theological development   []         advocacy  []         ethical dialog     []         blessing  []         bereavement support    []         support to family  []         anticipatory grief support   []         help with AMD  []         spiritual guidance    [] meditation      Spiritual Care Needs  []         Emotional Support  []         Spiritual/Mu-ism Care  []         Loss/Adjustment  []         Advocacy/Referral                /Ethics  []         No needs expressed at               this time  []         Other: (note in               comments)  [address]  []         Follow up visits with               pt/family  []         Provide materials  []         Schedule sacraments  []         Contact Community               Clergy  [x]         Follow up as needed  []         Other: (note in               comments)     Comments: I responded to the rapid response but was unable to assess the patient at the time because the patient was on the floor and being attended to by the medical team. No visitors or family were present. Spiritual Care Services remains available as needed. Rev. Rip Hutchinson M.Div, [facility]

## 2017-04-20 NOTE — PROGRESS NOTES
Hospitalist Progress Note  Zia Martínez MD  Office: [phone]  Cell: [phone]      Date of Service:  2017  NAME:  Paulette Oneal  :  1975  MRN:  [id number]      Admission Summary:   35year old female with history of asthma / COPD, sarcoidosis, bipolar disorder, chronic pain, polysubstance abuse with active heroin abuse, HTN presented on 17 with shortness of breath. She was diagnosed with acute asthma exacerbation vs COPD exacerbation. Interval history / Subjective:    Originally planned for discharge earlier this morning, but then had developed seizure-like activity (see my progress note from earlier). She had additional rapid response called for chest pain later in the afternoon, during which she reported sharp, reproducible chest pain in his mid-sternum, similar to previous days, but not quite the same. Assessment & Plan:     1. Acute asthma vs COPD exacerbation   - improving, unclear trigger, possibly viral illness vs continued tobacco use   - CXR  - no acute cardiopulmonary disease   - continue standing DuoNeb therapy, likely resume Breo Ellipta on discharge   - change from IV steroids to PO tomorrow    2. Heroin dependence with opiate withdrawal / polysubstance abuse   - started on opiate withdrawal protocol (on methadone)   - continue lithium, Zoloft   - behavioral health consulted - plan for follow-up with [facility]    3. Lactic acidosis   - suspect this is secondary to bronchodilator therapy, she does not exhibit any other signs or symptoms suggestive of underlying sepsis   - will stop lactic acid checks    4. HTN   - BP controlled on clonidine, prazosin    5. Tobacco dependence   - counseled on tobacco cessation   - nicotine patch    6.   Seizure-like activity / clonidine overdose   - occurring  AM, suspect this is from clonidine overdose (missing 7 pills from her pill bottle), supported by sinus bradycardia   - CT head 4/20 - no acute intracranial process   - supportive care and telemetry monitoring    7. Chest pain   - slightly atypical, EKG without acute ST-T changes   - check troponin, continue telemetry monitoring; if troponin elevated, trend and cardiology evaluation    Code status: FULL  DVT prophylaxis: SCDs    Care Plan discussed with: Patient/Family and Nurse  Disposition: Home w/Family     Hospital Problems  Date Reviewed: 4/19/2017          Codes Class Noted POA    Seizure ([address] ICD-10-CM: R56.9  ICD-9-CM: 780.39  4/20/2017 Unknown                Review of Systems:   A comprehensive review of systems was negative except for that written in the HPI. Vital Signs:    Last 24hrs VS reviewed since prior progress note. Most recent are:  Visit Vitals    /70 (BP 1 Location: Right arm, BP Patient Position: At rest)    Pulse (!) 55    Temp 97.8 °F (36.6 °C)    Resp 18    Ht 5' 4\" (1.626 m)    Wt 56.7 kg (125 lb)    SpO2 100%    BMI 21.46 kg/m2         Intake/Output Summary (Last 24 hours) at 04/20/17 1727  Last data filed at 04/20/17 1400   Gross per 24 hour   Intake              200 ml   Output                0 ml   Net              200 ml        Physical Examination:             Constitutional:  No acute distress, cooperative, pleasant, speaking in full sentences   ENT:  Oral mucous moist, oropharynx benign. Neck supple,    Resp:  Slight end expiratory wheeze throughout all lung fields   CV:  Regular rhythm, normal rate, no murmurs, gallops, rubs    GI:  Soft, non distended, non tender. normoactive bowel sounds, no hepatosplenomegaly     Musculoskeletal:  No edema, warm, 2+ pulses throughout    Neurologic:  Moves all extremities.   AAOx3, CN II-XII reviewed            Data Review:    Review and/or order of clinical lab test      Labs:     Recent Labs      04/18/17   0258   WBC  7.9   HGB  10.6*   HCT  32.6*   PLT  274     Recent Labs      04/20/17   0738  04/19/17   0415  04/18/17 0258   NA  134*  137  140   K  4.2  4.5  4.7   CL  101  107  110*   CO2  26  24  24   BUN  13  13  7   CREA  0.95  0.76  0.68   GLU  96  105*  132*   CA  8.5  8.5  8.6   MG  2.0   --    --      No results for input(s): SGOT, GPT, ALT, AP, TBIL, TBILI, TP, ALB, GLOB, GGT, AML, LPSE in the last 72 hours. No lab exists for component: AMYP, HLPSE  No results for input(s): INR, PTP, APTT in the last 72 hours. No lab exists for component: INREXT, INREXT   No results for input(s): FE, TIBC, PSAT, FERR in the last 72 hours. No results found for: FOL, RBCF   No results for input(s): PH, PCO2, PO2 in the last 72 hours. No results for input(s): CPK, CKNDX, TROIQ in the last 72 hours.     No lab exists for component: CPKMB  Lab Results   Component Value Date/Time    Cholesterol, total 237 01/11/2017 04:09 AM    HDL Cholesterol 97 01/11/2017 04:09 AM    LDL, calculated 130.6 01/11/2017 04:09 AM    Triglyceride 47 01/11/2017 04:09 AM    CHOL/HDL Ratio 2.4 01/11/2017 04:09 AM     Lab Results   Component Value Date/Time    Glucose (POC) 119 01/25/2017 06:01 AM    Glucose (POC) 131 01/10/2017 04:48 PM    Glucose (POC) 140 01/10/2017 11:32 AM    Glucose (POC) 101 06/15/2009 05:10 PM    Glucose (POC) 135 02/11/2009 04:07 PM    Glucose (POC) 69 02/10/2009 07:06 PM    Glucose (POC) 93 01/18/2009 07:57 PM     Lab Results   Component Value Date/Time    Color YELLOW/STRAW 02/23/2017 04:42 AM    Appearance CLEAR 02/23/2017 04:42 AM    Specific gravity 1.014 02/23/2017 04:42 AM    Specific gravity >1.030 05/09/2009 09:27 PM    pH (UA) 6.5 02/23/2017 04:42 AM    Protein NEGATIVE  02/23/2017 04:42 AM    Glucose NEGATIVE  02/23/2017 04:42 AM    Ketone NEGATIVE  02/23/2017 04:42 AM    Bilirubin NEGATIVE  02/23/2017 04:42 AM    Urobilinogen 0.2 02/23/2017 04:42 AM    Nitrites NEGATIVE  02/23/2017 04:42 AM    Leukocyte Esterase NEGATIVE  02/23/2017 04:42 AM    Epithelial cells FEW 02/23/2017 04:42 AM    Bacteria NEGATIVE  02/23/2017 04:42 AM    WBC 0-4 02/23/2017 04:42 AM    RBC 0-5 02/23/2017 04:42 AM         Medications Reviewed:     Current Facility-Administered Medications   Medication Dose Route Frequency    albuterol-ipratropium (DUO-NEB) 2.5 MG-0.5 MG/3 ML  3 mL Nebulization TID RT    albuterol-ipratropium (DUO-NEB) 2.5 MG-0.5 MG/3 ML  3 mL Nebulization Q6H PRN    predniSONE (DELTASONE) tablet 40 mg  40 mg Oral DAILY WITH BREAKFAST    nicotine (NICODERM CQ) 21 mg/24 hr patch 1 Patch  1 Patch TransDERmal DAILY    gabapentin (NEURONTIN) capsule 800 mg  800 mg Oral TID    lithium carbonate tablet 300 mg  300 mg Oral BID    prazosin (MINIPRESS) capsule 2 mg  2 mg Oral QHS    sertraline (ZOLOFT) tablet 100 mg  100 mg Oral DAILY    sodium chloride (NS) flush 5-10 mL  5-10 mL IntraVENous Q8H    sodium chloride (NS) flush 5-10 mL  5-10 mL IntraVENous PRN    0.9% sodium chloride infusion  50 mL/hr IntraVENous CONTINUOUS    loperamide (IMODIUM) capsule 2 mg  2 mg Oral PRN    promethazine (PHENERGAN) tablet 25 mg  25 mg Oral Q6H PRN    acetaminophen (TYLENOL) tablet 650 mg  650 mg Oral Q4H PRN    dicyclomine (BENTYL) 10 mg/mL injection 20 mg  20 mg IntraMUSCular Q6H PRN    temazepam (RESTORIL) capsule 15 mg  15 mg Oral QHS     ______________________________________________________________________  EXPECTED LENGTH OF STAY: - - -  ACTUAL LENGTH OF STAY:          0                 Inés Andrews MD

## 2017-04-20 NOTE — PROGRESS NOTES
[facility] tele monitor called and said patient had elevated ST. I paged Dr. Cici Jones. Before I could go look at patient he called back. Dr. Cici Jones said to order stat EKG and troponin. Ordered stat ekg and troponin and went in patient room. Patient nurse David Settler was in room. I told David Hankinsr about elevated ST and about Dr. Aiyana Alejandre orders. We asked patient if she was having chest pain and she said yes. Called Rapid Response. EKG done. Working on Troponin. Dr. Cici Jones awaiting results of troponin to decide next action. Patient hard stick and nurses weren't able to get troponin. Respiratory called to do an arterial stick. Awaiting respiratory therapy.

## 2017-04-20 NOTE — CONSULTS
Discussed with Attending Physician and upon my review, agree with Inpatient admission at this time. Admitted on 4/16 with asthma/COPD exacerbation. Also noted to have issues with polysubstance abuse. Plan was to discharge but this morning had possible seizure. Rapid Response called and additional hospital stay required. Silvio Hassan, [address] [place] [address].  Care Management

## 2017-04-20 NOTE — PROGRESS NOTES
Recieved after return from C.T drowsy open eyes to verbal and touch. Alert and orient to name and place. Reoriented to year and situation. Able to follow verbal command but goes back to sleep. MEWS score #3 d/t hr 43 and bp 166/100. Will notify M.d.

## 2017-04-20 NOTE — PROGRESS NOTES
Spiritual Care Assessment/Progress Notes    Kelly Nunez [id number]  [id number]    1975  35 y.o.  female    Patient Telephone Number: [phone] (home)   Synagogue Affiliation: Pantera   Language: English   Extended Emergency Contact Information  Primary Emergency Contact: 2021 Zander Nix Phone: [phone]  Relation: Parent   Patient Active Problem List    Diagnosis Date Noted    Seizure ([facility] 04/20/2017    Anemia 02/22/2017    Bipolar 1 disorder (HCC)     Chronic obstructive pulmonary disease (HCC)     Chronic pain     Hepatitis B     Sarcoidosis ([facility]     Tobacco abuse     Cocaine abuse     Polysubstance abuse     Narcotic abuse     HTN (hypertension)     Depression 01/25/2017    Sinus tachycardia 01/10/2017    Heroin abuse 01/30/2014        Date: 4/20/2017       Level of Synagogue/Spiritual Activity:  []         Involved in vinay tradition/spiritual practice    []         Not involved in vinay tradition/spiritual practice  []         Spiritually oriented    []         Claims no spiritual orientation    []         seeking spiritual identity  []         Feels alienated from Gnosticist practice/tradition  []         Feels angry about Gnosticist practice/tradition  []         Spirituality/Gnosticist tradition  a resource for coping at this time.   [x]         Not able to assess due to medical condition    Services Provided Today:  []         crisis intervention    []         reading Scriptures  []         spiritual assessment    []         prayer  []         empathic listening/emotional support  []         rites and rituals (cite in comments)  []         life review     []         Gnosticist support  []         theological development   []         advocacy  []         ethical dialog     []         blessing  []         bereavement support    []         support to family  []         anticipatory grief support   []         help with AMD  []         spiritual guidance    []         meditation      Spiritual Care Needs  []         Emotional Support  []         Spiritual/Confucianist Care  []         Loss/Adjustment  []         Advocacy/Referral                /Ethics  []         No needs expressed at               this time  []         Other: (note in               comments)  [address]  [x]         Follow up visits with  Pt/family as able and/or needed  []         Provide materials  []         Schedule sacraments  []         Contact Community               Clergy  []         Follow up as needed  []         Other: (note in               comments)     Comments: Responded to RRT on Surg unit. Staff was working with Miss Reynoso Burn, no family present, chaplains will continue to follow as able and/or needed.   Visited by: Marnie Paul [facility] [facility] ([id number])

## 2017-04-20 NOTE — PROGRESS NOTES
0730 Pt called out, went in room pt was kneeling by the bed, when asked what happened and did she fall pt stated yes I fell, at the same moment pt had a seizure, rapid response called, pt came to unsure of where she was, had another seizure. MD present, nurses, code team, respiratory and nursing supervisors present during rapid response. Orders received for CT of head (pt c/o headache as well), labs, remote telemetry and peripheral iv placed. Pt is lethargic/drowsy and unaware of what happened.

## 2017-04-20 NOTE — PROGRESS NOTES
Resting quietly in bed at present. Easy to arouse by calling name and touch, goes back to sleep. Call zuleta in reach. Malu Siddiqui

## 2017-04-20 NOTE — PROGRESS NOTES
Rapid response note:    Arrived at rapid response called on patient for seizure-like activity. She had been planned for discharge today for possible rapid access rate to [place]. She had apparently called out to nurse that she was going to fall and then had witnessed upper extremity decorticate-like positioning, rolling her arms back followed by tonic-clonic type movements. She had fallen off her bed at this point, but did not have any tonic-clonic movements on my arrival.    HEENT: Normocephalic, no evident trauma, no crocker sign or raccoon eyes, oropharynx appears clear  Chest/lungs: diminished in posterior bases, otherwise clear to auscultation  Heart: S1S2 physiologic, slightly bradycardic, no appreciable murmurs  Abd: soft, non-distended, normoactive, bowel sounds  Ext: no edema  Neuro: woke up shortly after my arrival, oriented to person, initially disoriented to place and time, but rapidly corrected herself, non-focal otherwise    Assess: 35year old female with possible seizure, unclear etiology, metabolic vs medication-induced vs pseudoseizure    Plan:   - check EKG   - check electrolytes   - check CT head   - pill count    Sruthi Phoenix MD  4/20/2017        Addendum:     She is missing 7 clonidine from her prescription bottle. Will discontinue. Continue plan as above.

## 2017-04-20 NOTE — PROGRESS NOTES
Spiritual Care Assessment/Progress Notes    Kelly Nunez [id number]  [id number]    1975  35 y.o.  female    Patient Telephone Number: [phone] (home)   Moravian Affiliation: Pantera   Language: English   Extended Emergency Contact Information  Primary Emergency Contact: 2021 Zander Nix Phone: [phone]  Relation: Parent   Patient Active Problem List    Diagnosis Date Noted    Seizure ([facility] 04/20/2017    Anemia 02/22/2017    Bipolar 1 disorder (HCC)     Chronic obstructive pulmonary disease (HCC)     Chronic pain     Hepatitis B     Sarcoidosis ([facility]     Tobacco abuse     Cocaine abuse     Polysubstance abuse     Narcotic abuse     HTN (hypertension)     Depression 01/25/2017    Sinus tachycardia 01/10/2017    Heroin abuse 01/30/2014        Date: 4/20/2017       Level of Moravian/Spiritual Activity:  []         Involved in vinay tradition/spiritual practice    []         Not involved in vinay tradition/spiritual practice  [x]         Spiritually oriented    []         Claims no spiritual orientation    []         seeking spiritual identity  []         Feels alienated from Rastafarian practice/tradition  []         Feels angry about Rastafarian practice/tradition  []         Spirituality/Rastafarian tradition  a resource for coping at this time.   []         Not able to assess due to medical condition    Services Provided Today:  []         crisis intervention    []         reading Scriptures  [x]         spiritual assessment    []         prayer  [x]         empathic listening/emotional support  []         rites and rituals (cite in comments)  []         life review     []         Rastafarian support  []         theological development   []         advocacy  []         ethical dialog     []         blessing  []         bereavement support    []         support to family  []         anticipatory grief support   []         help with AMD  []         spiritual guidance    []         meditation      Spiritual Care Needs  []         Emotional Support  []         Spiritual/Judaism Care  []         Loss/Adjustment  []         Advocacy/Referral                /Ethics  []         No needs expressed at               this time  [x]         Other: (note in               comments)  [address]  []         Follow up visits with               pt/family  []         Provide materials  []         Schedule sacraments  []         Contact Community               Clergy  [x]         Follow up as needed  []         Other: (note in               comments)     Comments: Miss Kailyn Ames had two RRT today. When I stopped in for a visit on Surgery Unit she appeared to be very sleepyhe indicated she has good faimily support and belief in God. She indicated it would be ok to be kept in prayer.   Visited by: Analia Osman [address] [facility] [address] ([id number])

## 2017-04-20 NOTE — PROGRESS NOTES
(C/o neck for a few days) pt is asleep in room, when awaken pt drifts back to sleep during conversation, pt bp is elevated gave pt her bp meds at 2250 asked if in pain pt states the back of neck is hurting, the position pt is in asleep looks uncomfortable, gave an extra pillow to reposition pt neck.

## 2017-04-20 NOTE — PROGRESS NOTES
Post Fall Documentation      Matthew Mcgee witnessed/unwitnessed fall occurred on 4/20/2017 (Date) at 56 (Time). The answers to the following questions summarize the fall:     · In the patient's own words,:  · What was he/she doing when he/she fell? Pt does not remember    · What are his/her complaints? headache    · Nurse:  · Document observation, treatment, conversation, follow-up, and patient response. Pt had seizure in front of nurses    · What was the patient's condition when found (i.e., pain, symptoms, cuts, bruises)? headache    · What specific complaints did the patient have? Headache/ what happened, where am I?     · What did the staff do when patient was found (i.e., vital signs, returned to bed with fall alarm, side rails up)? Called rapid response, vitals, orders received, ct of head ordered    · Which physician was notified?  MD Dr. David Jerome was present with nurse and RRT staff    Marva Dietrich, RN

## 2017-04-21 ENCOUNTER — APPOINTMENT (OUTPATIENT)
Dept: CT IMAGING | Age: 42
DRG: 191 | End: 2017-04-21
Attending: FAMILY MEDICINE
Payer: MEDICAID

## 2017-04-21 LAB
ANION GAP BLD CALC-SCNC: 7 MMOL/L (ref 5–15)
BASOPHILS # BLD AUTO: 0 K/UL (ref 0–0.1)
BASOPHILS # BLD: 0 % (ref 0–1)
BUN SERPL-MCNC: 7 MG/DL (ref 6–20)
BUN/CREAT SERPL: 10 (ref 12–20)
CALCIUM SERPL-MCNC: 8.2 MG/DL (ref 8.5–10.1)
CHLORIDE SERPL-SCNC: 101 MMOL/L (ref 97–108)
CO2 SERPL-SCNC: 28 MMOL/L (ref 21–32)
CREAT SERPL-MCNC: 0.73 MG/DL (ref 0.55–1.02)
EOSINOPHIL # BLD: 0.2 K/UL (ref 0–0.4)
EOSINOPHIL NFR BLD: 3 % (ref 0–7)
ERYTHROCYTE [DISTWIDTH] IN BLOOD BY AUTOMATED COUNT: 15.5 % (ref 11.5–14.5)
GLUCOSE SERPL-MCNC: 101 MG/DL (ref 65–100)
HCG SERPL QL: NEGATIVE
HCT VFR BLD AUTO: 35.7 % (ref 35–47)
HGB BLD-MCNC: 11.6 G/DL (ref 11.5–16)
LYMPHOCYTES # BLD AUTO: 29 % (ref 12–49)
LYMPHOCYTES # BLD: 1.7 K/UL (ref 0.8–3.5)
MAGNESIUM SERPL-MCNC: 1.8 MG/DL (ref 1.6–2.4)
MCH RBC QN AUTO: 28.1 PG (ref 26–34)
MCHC RBC AUTO-ENTMCNC: 32.5 G/DL (ref 30–36.5)
MCV RBC AUTO: 86.4 FL (ref 80–99)
MONOCYTES # BLD: 0.7 K/UL (ref 0–1)
MONOCYTES NFR BLD AUTO: 11 % (ref 5–13)
NEUTS SEG # BLD: 3.3 K/UL (ref 1.8–8)
NEUTS SEG NFR BLD AUTO: 57 % (ref 32–75)
PLATELET # BLD AUTO: 264 K/UL (ref 150–400)
POTASSIUM SERPL-SCNC: 3.6 MMOL/L (ref 3.5–5.1)
RBC # BLD AUTO: 4.13 M/UL (ref 3.8–5.2)
SODIUM SERPL-SCNC: 136 MMOL/L (ref 136–145)
WBC # BLD AUTO: 5.8 K/UL (ref 3.6–11)

## 2017-04-21 PROCEDURE — 74011250637 HC RX REV CODE- 250/637: Performed by: STUDENT IN AN ORGANIZED HEALTH CARE EDUCATION/TRAINING PROGRAM

## 2017-04-21 PROCEDURE — 74011636637 HC RX REV CODE- 636/637: Performed by: STUDENT IN AN ORGANIZED HEALTH CARE EDUCATION/TRAINING PROGRAM

## 2017-04-21 PROCEDURE — 74011250637 HC RX REV CODE- 250/637: Performed by: FAMILY MEDICINE

## 2017-04-21 PROCEDURE — 70450 CT HEAD/BRAIN W/O DYE: CPT

## 2017-04-21 PROCEDURE — 76937 US GUIDE VASCULAR ACCESS: CPT

## 2017-04-21 PROCEDURE — 74011250636 HC RX REV CODE- 250/636: Performed by: STUDENT IN AN ORGANIZED HEALTH CARE EDUCATION/TRAINING PROGRAM

## 2017-04-21 PROCEDURE — 80048 BASIC METABOLIC PNL TOTAL CA: CPT | Performed by: FAMILY MEDICINE

## 2017-04-21 PROCEDURE — 95816 EEG AWAKE AND DROWSY: CPT | Performed by: FAMILY MEDICINE

## 2017-04-21 PROCEDURE — 74011250637 HC RX REV CODE- 250/637: Performed by: INTERNAL MEDICINE

## 2017-04-21 PROCEDURE — 85025 COMPLETE CBC W/AUTO DIFF WBC: CPT | Performed by: FAMILY MEDICINE

## 2017-04-21 PROCEDURE — 83735 ASSAY OF MAGNESIUM: CPT | Performed by: FAMILY MEDICINE

## 2017-04-21 PROCEDURE — 65660000000 HC RM CCU STEPDOWN

## 2017-04-21 PROCEDURE — 84703 CHORIONIC GONADOTROPIN ASSAY: CPT | Performed by: FAMILY MEDICINE

## 2017-04-21 PROCEDURE — 77010033678 HC OXYGEN DAILY

## 2017-04-21 PROCEDURE — 36600 WITHDRAWAL OF ARTERIAL BLOOD: CPT

## 2017-04-21 PROCEDURE — 51798 US URINE CAPACITY MEASURE: CPT

## 2017-04-21 RX ADMIN — Medication 10 ML: at 09:40

## 2017-04-21 RX ADMIN — GABAPENTIN 800 MG: 400 CAPSULE ORAL at 21:29

## 2017-04-21 RX ADMIN — Medication 10 ML: at 21:32

## 2017-04-21 RX ADMIN — Medication 10 ML: at 15:16

## 2017-04-21 RX ADMIN — LITHIUM CARBONATE 300 MG: 300 TABLET ORAL at 09:14

## 2017-04-21 RX ADMIN — PREDNISONE 40 MG: 20 TABLET ORAL at 07:35

## 2017-04-21 RX ADMIN — Medication 10 ML: at 07:35

## 2017-04-21 RX ADMIN — PROMETHAZINE HYDROCHLORIDE 25 MG: 25 TABLET ORAL at 15:15

## 2017-04-21 RX ADMIN — GABAPENTIN 800 MG: 400 CAPSULE ORAL at 09:14

## 2017-04-21 RX ADMIN — TEMAZEPAM 15 MG: 15 CAPSULE ORAL at 21:30

## 2017-04-21 RX ADMIN — GABAPENTIN 800 MG: 400 CAPSULE ORAL at 15:15

## 2017-04-21 RX ADMIN — PROMETHAZINE HYDROCHLORIDE 25 MG: 25 TABLET ORAL at 07:42

## 2017-04-21 RX ADMIN — SODIUM CHLORIDE 50 ML/HR: 900 INJECTION, SOLUTION INTRAVENOUS at 21:39

## 2017-04-21 RX ADMIN — ACETAMINOPHEN 650 MG: 325 TABLET, FILM COATED ORAL at 07:42

## 2017-04-21 RX ADMIN — LITHIUM CARBONATE 300 MG: 300 TABLET ORAL at 18:48

## 2017-04-21 RX ADMIN — SERTRALINE HYDROCHLORIDE 100 MG: 50 TABLET ORAL at 09:00

## 2017-04-21 NOTE — CONSULTS
Neuro consult completed, dictated note to follow. No clear description of events provided. Exam is non-focal. Possible seizure vs syncope vs ADRIANA of psychogenic origin. EEG is normal. CT head x 2 -normal.  Has risk factor of family h/o seizure in her father. Would not start AED at this time.

## 2017-04-21 NOTE — CONSULTS
[address]       Name:  Stephany Gomez   MR#:  [id number]   :  1975   Account #:  [de-identified]    Date of Consultation:  2017   Date of Adm:  2017       HISTORY OF PRESENT ILLNESS: This is a 53-year-old left-handed   female who was admitted on the  with what was felt to be a   COPD/asthma exacerbation, subsequently found to have a urine drug   screen positive for opiates, cocaine and cannabis, with the patient and   her mother reporting a heroin addiction. She was set for discharge   yesterday when she had a possible seizure and then she had another   one this morning. There is very limited information regarding these   events. Unfortunately, staff members did not detail what they witnessed   in the chart. The nurse present today states the patient was standing   up, leaning on the side of the bed and had \"seizures,\" and then this   morning, the nurse today did witness part of this. She said she went in   when a rapid response was called and saw the patient was on the floor   and stiff. She does not know if her eyes were open or closed, does not   know how long it lasted, does not know if she was confused after. According to chart notes, the hospitalist reports the patient likely took 7   tabs of clonidine. These are missing from her home pill bottle and this   is further supported by the presence of bradycardia. The patient denies   prior history of seizures, denies head injury, denies history of febrile   seizures, no history of CNS infection. Believes her mom had a normal   pregnancy and delivery for her. Tells me that her father had a history of   seizures,  in a car accident while seizing in his 25s. When asked   what she recalls about the events that occurred yesterday and today,   she says today she was in bed, felt dizzy, had a funny taste in her   mouth and then she does not remember what happened.  Next thing   she knew were the nurses at the bedside. States she does not   remember anything about yesterday. She had a head CTs yesterday   and today which were unremarkable. PAST MEDICAL HISTORY: COPD, sarcoidosis, bipolar disorder,   polysubstance abuse. REVIEW OF SYSTEMS: As per past medical history, HPI, otherwise   negative, though reliability of the historian has been questioned in the   chart. MEDICATIONS   1. Clonazepam 5 mg b.i.d.   2. Catapres. 3. Neurontin 800 mg t.i.d.   4. Seroquel 400 mg, she takes 2 tablets at bedtime. 5. Lithium 300 mg b.i.d.   6. Zoloft 100 mg a day. 7. Prednisone 40 mg a day. ALLERGIES: NONE. SOCIAL HISTORY: She lives in [place] with her mother currently. She previously was in [facility] working at [employer]. She smokes half a   pack of cigarettes a day, no alcohol and has known polysubstance   abuse. FAMILY HISTORY: Again, she tells me her father had \"grandmother   seizures,\" I assume she means grand mal seizures, and  in a car   accident in his 25s. Mom with hypertension, brother with HIV, a   daughter and a son who are healthy. PHYSICAL EXAMINATION   GENERAL: This is a thin, drowsy-appearing female lying in bed in no   distress. HEART: Has a regular rate and rhythm without murmurs. Her carotids   are 2+, no bruits. EXTREMITIES: Warm without edema. She has 2+ radial pulses. NEUROLOGIC: Mental status: She appears drowsy, but is able to   participate in the exam. Her speech is not dysarthric. Her language is   intact. Cranial nerves: She has no facial asymmetry, no ptosis. Extraocular eye movements intact without diplopia or nystagmus. Visual fields full. Pupils equally round and reactive. Her tongue is   midline. Her palate elevated symmetrically. Trapezius and   sternocleidomastoid are 5/5. Her motor exam is 5/5 throughout. No   pronator drift. No tremors. Sensory exam is intact to light touch   throughout. Reflexes are 2+ and symmetric.  Toes downgoing. Coordination is intact finger to nose, heel to shin and rapid alternating   movements. Gait is not assessed at this time. STUDIES AND REPORTS: Other than that mentioned above,   metabolic panel with sodium of 134. Yesterday, this was 137. Magnesium is normal. Pregnancy test is negative. CBC is   unremarkable. ASSESSMENT AND PLAN: This is a 57-year-old left-handed female   with two reported spells. Full details of these spells are unknown, but   the descriptions given are very different from one another. They do not   sound like they are stereotype spells. Her exam is nonfocal. Differential   for these spells is diverse including true epileptic seizure with a family   history of seizure in her father, syncope with possible post-syncopal   seizure-like activity or a nonepileptic psychogenic spell. CT is   unremarkable and I failed to mention above, I reviewed her EEG and it   is normal without evidence of epileptiform discharges or electrographic   seizures. At this time, I would not start her on an antiepileptic drug,   continue to monitor her. If she were to continue to have these events in   the hospital delaying discharge, could consider putting her on a 24-  hour EEG.         MD Gianni Horner / [facility]   D:  04/21/2017   17:27   T:  04/21/2017   18:09   Job #:  [id number]

## 2017-04-21 NOTE — PROGRESS NOTES
Pt adamantly denies taking any pills this AM and does not remember events this AM.  She states she is ready to go to [place] though episode undiagnosed this AM and rapid response team this afternoon indicates emotional instability and/or feeling most comfortable in hospital.  Mental status shows her to be alert and oriented. Interact and irritated by conjecture about her taking clonidine. Overall mood/affect ok, speech linear, clear and coherent. No psychotic sx, no suicidal thinking. Cognitive function intact. Grooming and hygiene adequate. Dx: Polysubstance abuse. Pt seems to have resolved any withdrawal sx. Would d/c when medically cleared with plan to transfer to [facility].

## 2017-04-21 NOTE — PROGRESS NOTES
Rapid response was called by nurse who witnessed patient having generalized tonic- clonic seizure, lasting ~ 1 minute, onset ~ 04:55 hours, spontaneously resolving. Pt is now awake, responsive, with no reported focal neurological deficits. Nurse reports that she witnessed patient having same similar seizure activity yesterday. At that time, patient reportedly was close to discharge when she took Clonidine tablets from her home supply without telling our medical staff. Patient was later seen by psychiatrist in consultation. Pt seen and evaluated. PE:  GEN- patient in no acute respiratory distress  PSYCH- intermittently somnolent but easily arouses to loud verbal stimuli; oriented x 3. NEURO-GCS 14 (E3 V5  M6). MAEX 4. No slurred speech/ no facial droop. Sensation grossly intact. No pronator drift. HEENT-NCAT/pupils 2 mm reactive bilateral. Oropharynx clear. NECK-supple, trachea midline  LUNGS-CTA B  HEART-RRR  ABD-soft, NT,ND, + BS. No R/G/R  VASCULAR-2+ radial/1+DP pulses bilateral.  Trace edema BLE  SKIN-warm/dry. Multiple skin lesions. MS-no calf tenderness    A/P:  Seizure- recurrent/ new onset vs pseudoseizures.     -  Send for stat CT head  -  Ordered repeat labs  -  EEG today  -  Consult neurologist  -  Seizure precautions  -  Neurovascular checks'  -  Fall precautions  -

## 2017-04-21 NOTE — PROGRESS NOTES
Reviewed medical chart; met with the patient at the bedside. Called and spoke with NITO Gibson#[phone] at the [facility]. Confirmed that [facility] only completes intake assessments Monday-Friday from 8AM-3PM, but prefer that patients arrive as early as possible. Patient is still agreeable to being assessed for the [facility] program and prefers this over returning home at discharge. Explained that she may have to discharge home over the weekend and report to [facility] as of Monday morning. She is agreeable to this plan. If the patient discharges over the weekend, she can report to [facility] as of Monday morning - [facility] location at [address] [place] [address] ([address]. Once at [facility], she will need to ask for an assessment for services and could qualify for the rapid access program.  [facility] asks that she provide photo ID and insurance card. Nurse will need to fax the discharge instructions to [facility] coordinator, Clarence Borrego, ERIBERTO#[phone] on the day of discharge. Discharge medications were already paid for and delivered to the patient's room earlier in this stay; these will need to be sent with the patient at discharge. She will likely need another cab ticket at discharge. Care Management will continue to follow her disposition.    DORCAS Wynn

## 2017-04-21 NOTE — PROGRESS NOTES
0054 pt called out and stated bed linen was wet and she need them changed. When attempted to get patient up to chair she blank starred at me  my left hand and started seizure, called for help and another nurse and I got pt in bed without her hitting head or floor. Rapid response was called. Dr. Mauricio Franz came and saw pt, ordered labs, ct of head. New iv placed.

## 2017-04-21 NOTE — PROGRESS NOTES
Got pt up to void at bedside commode, pt stated she could not \"pee\" so I bladder scanned her for greater than 742, told pt that I had to call the DrLeticia To get a straight cath order since she can not go. Pt wanted to try again and then preceded to void 600 cc, will continue to monitor. Pt drank 2 oj.

## 2017-04-21 NOTE — PROGRESS NOTES
Pt states waiting on resolution of seizures as to dx/tx. She states she is doing ok emotionally. No bipolar sx, not depressed. Waiting on transfer to [place] when medically stable. Pt comfortable in hospital without good options. She is not optimistic about [facility] but feels no other choice. Reports in chart about motor activity witnessed point to neurologic finding but pt capable of activity to simulate seizures. When issue resolved transfer in order if she'll allow.

## 2017-04-21 NOTE — PROGRESS NOTES
Hospitalist Progress Note  Sabi Granger MD  Office: [phone]  Cell: [phone]      Date of Service:  2017  NAME:  Сергей Montejo  :  1975  MRN:  [id number]      Admission Summary:   35year old female with history of asthma / COPD, sarcoidosis, bipolar disorder, chronic pain, polysubstance abuse with active heroin abuse, HTN presented on 17 with shortness of breath. She was diagnosed with acute asthma exacerbation vs COPD exacerbation. Interval history / Subjective:    Reports of another witnessed seizure this morning, tonic-clonic by description. She denies any substance ingestion, states vague family history of seizures and stated to me that she had been on anti-epileptics prior to this, but could not name specifically what medication it was, who prescribed it or how long she had taken it for. Assessment & Plan:     1. Acute asthma vs COPD exacerbation   - improving, unclear trigger, possibly viral illness vs continued tobacco use   - CXR  - no acute cardiopulmonary disease   - continue standing DuoNeb therapy, likely resume Breo Ellipta on discharge   - change from IV steroids to PO tomorrow    2. Heroin dependence with opiate withdrawal / polysubstance abuse   - started on opiate withdrawal protocol (on methadone)   - continue lithium, Zoloft   - behavioral health consulted - plan for follow-up with [facility]    3. Lactic acidosis   - suspect this is secondary to bronchodilator therapy, she does not exhibit any other signs or symptoms suggestive of underlying sepsis   - will stop lactic acid checks    4. HTN   - BP controlled on clonidine, prazosin    5. Tobacco dependence   - counseled on tobacco cessation   - nicotine patch    6.   Seizure-like activity / clonidine overdose   - occurring 20 AM, suspect this is from clonidine overdose (missing 7 pills from her pill bottle), supported by sinus bradycardia; recurrent event on 4/21 AM   - CT head 4/20 - no acute intracranial process   - EEG 4/21 - without any evidence of epileptiform activity   - seen by neurology, would not start AED at this juncture   - supportive care and telemetry monitoring    7. Chest pain   - slightly atypical, EKG without acute ST-T changes, troponin non-elevated    Code status: FULL  DVT prophylaxis: SCDs    Care Plan discussed with: Patient/Family and Nurse  Disposition: Home w/Family, likely tomorrow, she will need to go to [facility] on Monday     Hospital Problems  Date Reviewed: 4/19/2017          Codes Class Noted POA    Seizure ([facility] [address] ICD-10-CM: R56.9  ICD-9-CM: 780.39  4/20/2017 Unknown                Review of Systems:   A comprehensive review of systems was negative except for that written in the HPI. Vital Signs:    Last 24hrs VS reviewed since prior progress note. Most recent are:  Visit Vitals    BP 99/54    Pulse 66    Temp 98.1 °F (36.7 °C)    Resp 18    Ht 5' 4\" (1.626 m)    Wt 56.7 kg (125 lb)    SpO2 96%    BMI 21.46 kg/m2         Intake/Output Summary (Last 24 hours) at 04/21/17 1617  Last data filed at 04/21/17 1400   Gross per 24 hour   Intake             1140 ml   Output             2400 ml   Net            -1260 ml        Physical Examination:             Constitutional:  No acute distress, cooperative, pleasant, speaking in full sentences   ENT:  Oral mucous moist, oropharynx benign. Neck supple,    Resp:  Slight end expiratory wheeze throughout all lung fields   CV:  Regular rhythm, normal rate, no murmurs, gallops, rubs    GI:  Soft, non distended, non tender. normoactive bowel sounds, no hepatosplenomegaly     Musculoskeletal:  No edema, warm, 2+ pulses throughout    Neurologic:  Moves all extremities.   AAOx3, CN II-XII reviewed            Data Review:    Review and/or order of clinical lab test      Labs:     Recent Labs      04/21/17   0533   WBC  5.8   HGB  11.6   HCT  35.7   PLT  264     Recent Labs 04/21/17   0533  04/20/17   0738  04/19/17   0415   NA  136  134*  137   K  3.6  4.2  4.5   CL  101  101  107   CO2  28  26  24   BUN  7  13  13   CREA  0.73  0.95  0.76   GLU  101*  96  105*   CA  8.2*  8.5  8.5   MG  1.8  2.0   --      No results for input(s): SGOT, GPT, ALT, AP, TBIL, TBILI, TP, ALB, GLOB, GGT, AML, LPSE in the last 72 hours. No lab exists for component: AMYP, HLPSE  No results for input(s): INR, PTP, APTT in the last 72 hours. No lab exists for component: INREXT, INREXT   No results for input(s): FE, TIBC, PSAT, FERR in the last 72 hours. No results found for: FOL, RBCF   No results for input(s): PH, PCO2, PO2 in the last 72 hours.   Recent Labs      04/20/17   1631   TROIQ  <0.04     Lab Results   Component Value Date/Time    Cholesterol, total 237 01/11/2017 04:09 AM    HDL Cholesterol 97 01/11/2017 04:09 AM    LDL, calculated 130.6 01/11/2017 04:09 AM    Triglyceride 47 01/11/2017 04:09 AM    CHOL/HDL Ratio 2.4 01/11/2017 04:09 AM     Lab Results   Component Value Date/Time    Glucose (POC) 119 01/25/2017 06:01 AM    Glucose (POC) 131 01/10/2017 04:48 PM    Glucose (POC) 140 01/10/2017 11:32 AM    Glucose (POC) 101 06/15/2009 05:10 PM    Glucose (POC) 135 02/11/2009 04:07 PM    Glucose (POC) 69 02/10/2009 07:06 PM    Glucose (POC) 93 01/18/2009 07:57 PM     Lab Results   Component Value Date/Time    Color YELLOW/STRAW 02/23/2017 04:42 AM    Appearance CLEAR 02/23/2017 04:42 AM    Specific gravity 1.014 02/23/2017 04:42 AM    Specific gravity >1.030 05/09/2009 09:27 PM    pH (UA) 6.5 02/23/2017 04:42 AM    Protein NEGATIVE  02/23/2017 04:42 AM    Glucose NEGATIVE  02/23/2017 04:42 AM    Ketone NEGATIVE  02/23/2017 04:42 AM    Bilirubin NEGATIVE  02/23/2017 04:42 AM    Urobilinogen 0.2 02/23/2017 04:42 AM    Nitrites NEGATIVE  02/23/2017 04:42 AM    Leukocyte Esterase NEGATIVE  02/23/2017 04:42 AM    Epithelial cells FEW 02/23/2017 04:42 AM    Bacteria NEGATIVE  02/23/2017 04:42 AM WBC 0-4 02/23/2017 04:42 AM    RBC 0-5 02/23/2017 04:42 AM         Medications Reviewed:     Current Facility-Administered Medications   Medication Dose Route Frequency    albuterol-ipratropium (DUO-NEB) 2.5 MG-0.5 MG/3 ML  3 mL Nebulization TID RT    albuterol-ipratropium (DUO-NEB) 2.5 MG-0.5 MG/3 ML  3 mL Nebulization Q6H PRN    predniSONE (DELTASONE) tablet 40 mg  40 mg Oral DAILY WITH BREAKFAST    nicotine (NICODERM CQ) 21 mg/24 hr patch 1 Patch  1 Patch TransDERmal DAILY    gabapentin (NEURONTIN) capsule 800 mg  800 mg Oral TID    lithium carbonate tablet 300 mg  300 mg Oral BID    prazosin (MINIPRESS) capsule 2 mg  2 mg Oral QHS    sertraline (ZOLOFT) tablet 100 mg  100 mg Oral DAILY    sodium chloride (NS) flush 5-10 mL  5-10 mL IntraVENous Q8H    sodium chloride (NS) flush 5-10 mL  5-10 mL IntraVENous PRN    0.9% sodium chloride infusion  50 mL/hr IntraVENous CONTINUOUS    loperamide (IMODIUM) capsule 2 mg  2 mg Oral PRN    promethazine (PHENERGAN) tablet 25 mg  25 mg Oral Q6H PRN    acetaminophen (TYLENOL) tablet 650 mg  650 mg Oral Q4H PRN    dicyclomine (BENTYL) 10 mg/mL injection 20 mg  20 mg IntraMUSCular Q6H PRN    temazepam (RESTORIL) capsule 15 mg  15 mg Oral QHS     ______________________________________________________________________  EXPECTED LENGTH OF STAY: 3d 7h  ACTUAL LENGTH OF STAY:          1                 Denny Treadwell MD

## 2017-04-22 VITALS
WEIGHT: 125 LBS | SYSTOLIC BLOOD PRESSURE: 103 MMHG | RESPIRATION RATE: 16 BRPM | TEMPERATURE: 98.5 F | HEIGHT: 64 IN | OXYGEN SATURATION: 99 % | HEART RATE: 55 BPM | DIASTOLIC BLOOD PRESSURE: 68 MMHG | BODY MASS INDEX: 21.34 KG/M2

## 2017-04-22 PROCEDURE — 74011636637 HC RX REV CODE- 636/637: Performed by: STUDENT IN AN ORGANIZED HEALTH CARE EDUCATION/TRAINING PROGRAM

## 2017-04-22 PROCEDURE — 74011250637 HC RX REV CODE- 250/637: Performed by: FAMILY MEDICINE

## 2017-04-22 RX ADMIN — SERTRALINE HYDROCHLORIDE 100 MG: 50 TABLET ORAL at 09:00

## 2017-04-22 RX ADMIN — LITHIUM CARBONATE 300 MG: 300 TABLET ORAL at 09:06

## 2017-04-22 RX ADMIN — PREDNISONE 40 MG: 20 TABLET ORAL at 09:06

## 2017-04-22 RX ADMIN — GABAPENTIN 800 MG: 400 CAPSULE ORAL at 09:06

## 2017-04-22 NOTE — PROGRESS NOTES
Bedside shift change report given to Diamond Calvo (oncoming nurse) by Marybel Cobian (offgoing nurse). Report included the following information SBAR, Kardex, ED Summary, Intake/Output, MAR and Accordion.

## 2017-04-22 NOTE — DISCHARGE SUMMARY
Discharge Summary       PATIENT ID: Janice Sanchez  MRN: [id number]   YOB: 1975    DATE OF ADMISSION: 4/16/2017  8:35 PM    DATE OF DISCHARGE: 4/22/17   PRIMARY CARE PROVIDER: None     ATTENDING PHYSICIAN: Pastor Paul MD  DISCHARGING PROVIDER: Anastasia Zafar MD    To contact this individual call [phone] and ask the  to page. If unavailable ask to be transferred the Adult Hospitalist Department. CONSULTATIONS: IP CONSULT TO HOSPITALIST  IP CONSULT TO PSYCHIATRY  IP CONSULT TO NEUROLOGY    PROCEDURES/SURGERIES: * No surgery found *    ADMITTING DIAGNOSES & HOSPITAL COURSE:   35year old female with history of asthma / COPD, sarcoidosis, bipolar disorder, chronic pain, polysubstance abuse with active heroin abuse, HTN presented on 4/16/17 with shortness of breath. She was diagnosed with acute asthma exacerbation vs COPD exacerbation. She was originally planned for discharge on 4/20, but then had developed convulsions, suspected secondary to ingestion/abuse of outpatient clonidine prescription in anticipation for discharge. 1. Acute asthma vs COPD exacerbation  - improving, unclear trigger, possibly viral illness vs continued tobacco use  - CXR 4/16 - no acute cardiopulmonary disease  - continue standing DuoNeb therapy, likely resume Breo Ellipta on discharge  - changed to PO steroids, will complete taper on discharge     2. Heroin dependence with opiate withdrawal / polysubstance abuse  - completed opiate withdrawal protocol (on methadone)  - continued lithium, Zoloft  - behavioral health consulted - plan for follow-up with [facility]     3. Lactic acidosis  - suspect this is secondary to bronchodilator therapy, she does not exhibit any other signs or symptoms suggestive of underlying sepsis  - stopped lactic acid checks     4. HTN  - BP controlled on prazosin alone; she had taken 7 clonidine on 4/20 AM - she was monitored off of this and BPs were within normotensive range     5. Tobacco dependence  - counseled on tobacco cessation  - nicotine patch     6. Seizure-like activity / clonidine overdose  - occurring 4/20 AM, suspect this is from clonidine overdose (missing 7 pills from her pill bottle), supported by sinus bradycardia; recurrent event on 4/21 AM  - CT head 4/20 - no acute intracranial process  - EEG 4/21 - without any evidence of epileptiform activity  - seen by neurology, would not start AED at this juncture  - monitored on telemetry, noted bradycardia, which had resolved at time of her discharge  - note: even on day of discharge, patient demanded clonidine prescription, I explained that given her previous situation, I did not feel comfortable with prescribing this medication given her tendency for drug abuse; she had additionally requested Klonopin, which I did not prescribe for similar reasons     7. Chest pain  - slightly atypical on 4/20, EKG without acute ST-T changes, troponin non-elevated    DISCHARGE DIAGNOSES / PLAN:      1. See above. PENDING TEST RESULTS:   At the time of discharge the following test results are still pending: None.     FOLLOW UP APPOINTMENTS:    Follow-up Information     Follow up With Details Comments [address] In 2 days  [phone]  [phone]    None   None (967) Patient stated that they have no PCP      [address] [place] [address] On 4/24/2017 Please ask for an assessment for services, specifically the [facility] program.   [id number] [facility]  [phone]           ADDITIONAL CARE RECOMMENDATIONS:       You were started on the following new medications:   (1) Prednisone - which is a steroid used to treat your asthma / COPD exacerbation   (2) Albuterol - which is a rescue inhaler that you may use up to 4 times a day if you are experiencing shortness of breath   (3) Nicotine patch - for tobacco use; you should strongly consider quitting smoking for good     You may resume your other home medications. DIET: Regular Diet    ACTIVITY: Activity as tolerated    WOUND CARE: N/A    EQUIPMENT needed: N/A    DISCHARGE MEDICATIONS:  Current Discharge Medication List      START taking these medications    Details   albuterol sulfate 90 mcg/actuation aepb Take 2 Puffs by inhalation every four (4) hours as needed. Qty: 1 Inhaler, Refills: 0      predniSONE (DELTASONE) 10 mg tablet 40 mg daily for 2 days, then 30 mg daily for 2 days, then 20 mg daily for 2 days, then 10 mg daily for 2 days, then stop  Qty: 20 Tab, Refills: 0      nicotine (NICODERM CQ) 21 mg/24 hr 1 Patch by TransDERmal route daily for 30 days. Qty: 30 Patch, Refills: 0      albuterol (PROVENTIL VENTOLIN) 2.5 mg /3 mL (0.083 %) nebulizer solution 3 mL by Nebulization route every four (4) hours as needed for Wheezing. Qty: 24 Each, Refills: 0         CONTINUE these medications which have CHANGED    Details   albuterol-ipratropium (DUO-NEB) 2.5 mg-0.5 mg/3 ml nebu 3 mL by Nebulization route every six (6) hours as needed. Qty: 100 Nebule, Refills: 1      fluticasone-vilanterol (BREO ELLIPTA) 200-25 mcg/dose inhaler Take 1 Puff by inhalation daily. Qty: 28 Each, Refills: 1      gabapentin (NEURONTIN) 800 mg tablet Take 1 Tab by mouth three (3) times daily. Qty: 90 Tab, Refills: 0      lithium carbonate 300 mg tablet Take 1 Tab by mouth two (2) times a day. Qty: 60 Tab, Refills: 0      Nebulizer & Compressor machine 1 Each by Does Not Apply route daily. Qty: 1 Each, Refills: 0      prazosin (MINIPRESS) 2 mg capsule Take 1 Cap by mouth nightly. Qty: 30 Cap, Refills: 0      QUEtiapine (SEROQUEL) 400 mg tablet Take 2 Tabs by mouth nightly. Qty: 30 Tab, Refills: 0      sertraline (ZOLOFT) 100 mg tablet Take 1 Tab by mouth daily. Qty: 30 Tab, Refills: 0         CONTINUE these medications which have NOT CHANGED    Details   CLONAZEPAM (KLONOPIN PO) Take 5 mg by mouth two (2) times a day. STOP taking these medications       cloNIDine HCl (CATAPRES) 0.3 mg tablet Comments:   Reason for Stopping:         HYDROcodone-acetaminophen (NORCO) 5-325 mg per tablet Comments:   Reason for Stopping:         guaiFENesin-codeine (ROBITUSSIN AC) 100-10 mg/5 mL solution Comments:   Reason for Stopping:         albuterol (PROVENTIL HFA, VENTOLIN HFA, PROAIR HFA) 90 mcg/actuation inhaler Comments:   Reason for Stopping:                 NOTIFY YOUR PHYSICIAN FOR ANY OF THE FOLLOWING:   Fever over 101 degrees for 24 hours. Chest pain, shortness of breath, fever, chills, nausea, vomiting, diarrhea, change in mentation, falling, weakness, bleeding. Severe pain or pain not relieved by medications. Or, any other signs or symptoms that you may have questions about. DISPOSITION:  x  Home With:   OT  PT  HH  RN       Long term SNF/Inpatient Rehab    Independent/assisted living    Hospice    Other:       PATIENT CONDITION AT DISCHARGE:     Functional status    Poor     Deconditioned    x Independent      Cognition   x  Lucid     Forgetful     Dementia      Catheters/lines (plus indication)    Calvin     PICC     PEG    x None      Code status   x  Full code     DNR      PHYSICAL EXAMINATION AT DISCHARGE:    Visit Vitals    /68    Pulse (!) 55    Temp 98.5 °F (36.9 °C)    Resp 16    Ht 5' 4\" (1.626 m)    Wt 56.7 kg (125 lb)    SpO2 99%    BMI 21.46 kg/m2     Constitutional: No acute distress, cooperative, pleasant, speaking in full sentences   ENT: Oral mucous moist, oropharynx benign. Neck supple,    Resp: Slight end expiratory wheeze throughout all lung fields   CV: Regular rhythm, normal rate, no murmurs, gallops, rubs   GI: Soft, non distended, non tender. normoactive bowel sounds, no hepatosplenomegaly    Musculoskeletal: No edema, warm, 2+ pulses throughout   Neurologic: Moves all extremities.  AAOx3, CN II-XII reviewed         CHRONIC MEDICAL DIAGNOSES:  Problem List as of 4/22/2017  Date Reviewed: 4/19/2017          Codes Class Noted - Resolved    Seizure ([facility] ICD-10-CM: R56.9  ICD-9-CM: 780.39  4/20/2017 - Present        Bipolar 1 disorder ([facility] ICD-10-CM: F31.9  ICD-9-CM: 296.7  Unknown - Present        Chronic obstructive pulmonary disease ([facility] ICD-10-CM: J44.9  ICD-9-CM: 941  Unknown - Present        Chronic pain ICD-10-CM: G89.29  ICD-9-CM: 338.29  Unknown - Present    Overview Signed 2/22/2017  7:15 PM by Clara Otero MD     back, leg             Hepatitis B ICD-10-CM: B19.10  ICD-9-CM: 070.30  Unknown - Present        Sarcoidosis ([facility] ICD-10-CM: D86.9  ICD-9-CM: 135  Unknown - Present        Tobacco abuse ICD-10-CM: Z72.0  ICD-9-CM: 305.1  Unknown - Present        Anemia ICD-10-CM: D64.9  ICD-9-CM: 285.9  2/22/2017 - Present        Cocaine abuse ICD-10-CM: F14.10  ICD-9-CM: 305.60  Unknown - Present        Polysubstance abuse ICD-10-CM: F19.10  ICD-9-CM: 305.90  Unknown - Present        Narcotic abuse ICD-10-CM: F11.10  ICD-9-CM: 305.40  Unknown - Present        HTN (hypertension) ICD-10-CM: I10  ICD-9-CM: 401.9  Unknown - Present        Depression ICD-10-CM: F32.9  ICD-9-CM: 534  1/25/2017 - Present        Sinus tachycardia ICD-10-CM: R00.0  ICD-9-CM: 427.89  1/10/2017 - Present        Heroin abuse ICD-10-CM: F11.10  ICD-9-CM: 305.50  1/30/2014 - Present        * (Principal)RESOLVED: Asthma exacerbation ICD-10-CM: J45.901  ICD-9-CM: 493.92  2/22/2017 - 4/19/2017        RESOLVED: Asthma ICD-10-CM: J45.909  ICD-9-CM: 493.90  Unknown - 2/22/2017        RESOLVED: Depressive disorder ICD-10-CM: F32.9  ICD-9-CM: 613  1/25/2017 - 2/22/2017        RESOLVED: Polysubstance dependence ([facility] ICD-10-CM: K16.14  ICD-9-CM: 304.80  1/25/2017 - 2/22/2017        RESOLVED: Substance induced mood disorder ([facility] ICD-10-CM: L87.11  ICD-9-CM: 292.84  1/25/2017 - 2/22/2017        RESOLVED: HTN (hypertension) ICD-10-CM: I10  ICD-9-CM: 401.9  1/11/2017 - 2/22/2017        RESOLVED: Drug abuse ICD-10-CM: F19.10  ICD-9-CM: 305.90  1/11/2017 - 2/22/2017        RESOLVED: Bipolar disorder ([facility] ICD-10-CM: F31.9  ICD-9-CM: 296.80  1/11/2017 - 2/22/2017        RESOLVED: COPD (chronic obstructive pulmonary disease) ([facility] ICD-10-CM: J44.9  ICD-9-CM: 496  1/9/2017 - 2/22/2017        RESOLVED: Unspecified asthma, with exacerbation ICD-10-CM: J45.901  ICD-9-CM: 493.92  1/30/2014 - 2/22/2017              Greater than 30 minutes were spent with the patient on counseling and coordination of care    She is at risk for re-admission given her drug-seeking behavior and tendency for overuse. Seen by behavioral health, she did not require inpatient psychiatric admission.     Signed:   Inés Andrews MD  4/22/2017  11:34 AM

## 2017-04-22 NOTE — PROGRESS NOTES
Attempted to review discharge instructions with patient. She is asking about why she cannot continue with her Clonidine \"for her blood pressure\". After discussing with MD, explained to patient that her BP is on the low side and so this medication is not being recommended for her. Then pt stated \"but it is also to go along with my psych meds\". MD came to patient's bedside to discuss that he is not writing a clonidine prescription and that she can discuss it with Guzman and her primary care physician. After given this information, nurse attempted to read over discharge instructions, pt refused to hear discharge instructions and stated \"I am ready to go\". Then patient stated \"he said to continue the Klonopin but he did not write me a prescription for it\" discussed with MD, he stated if she needs a new prescription for the Klonopin she will have to see Guzman/her primary MD for it. Explained this to patient.

## 2017-04-22 NOTE — PROCEDURES
[address], [address] [place] [address] Av   EEG       Name:  Leoncio Gomez   MR#:  [id number]   :  1975   Account #:  [de-identified]    Date of Procedure:  2017   Date of Adm:  2017       HISTORY: This is a 75-year-old female with spells concerning for   possible seizure versus nonepileptic spells. EEG is performed to   evaluate for evidence of underlying epilepsy. MEDICATIONS    1. Albuterol. 2. Prednisone. 3. Nicotine. 4. Gabapentin. 5. Lithium. 6. Prazosin. 7. Zoloft. CONDITIONS: This is a routine 21-channel digital EEG recording with   one channel devoted to limited EKG performed in accordance with the   International 10-20 system. The study was done during states of   wakefulness and drowsiness. Photic stimulation and hyperventilation   was performed as an activating procedure. There is extensive bead   artifact in the bilateral frontal leads, as well as in T4 and T3, throughout   the duration of the recording. DESCRIPTION: Upon maximal arousal, the posterior dominant rhythm   has a frequency of 9 Hz and amplitude of 20 microvolts. This activity is   symmetric in the bilateral posterior derivations and attenuated with eye   opening. Photic stimulation did not significantly alter the tracing. The   patient becomes drowsy, but deeper stages of sleep are not attained. There were no focal abnormalities, epileptiform discharges, or   electrographic seizures seen. INTERPRETATION: This is a normal awake and drowsy EEG. CLINICAL CORRELATION: A normal EEG does not definitively   exclude a diagnosis of epilepsy. If clinical suspicion is high, consider   sleep-deprived EEG or admission to the epilepsy monitoring unit.         MD NAKUL Quintana / [facility]   D:  2017   22:20   T:  2017   06:16   Job #:  [id number]

## 2017-04-24 ENCOUNTER — APPOINTMENT (OUTPATIENT)
Dept: CT IMAGING | Age: 42
DRG: 917 | End: 2017-04-24
Attending: EMERGENCY MEDICINE
Payer: MEDICAID

## 2017-04-24 ENCOUNTER — HOSPITAL ENCOUNTER (INPATIENT)
Age: 42
LOS: 2 days | Discharge: LEFT BEFORE DISCHARGE | DRG: 917 | End: 2017-04-27
Attending: EMERGENCY MEDICINE | Admitting: INTERNAL MEDICINE
Payer: MEDICAID

## 2017-04-24 DIAGNOSIS — I65.23 STENOSIS OF BOTH INTERNAL CAROTID ARTERIES: ICD-10-CM

## 2017-04-24 DIAGNOSIS — R55 CONVULSIVE SYNCOPE: ICD-10-CM

## 2017-04-24 DIAGNOSIS — G93.40 ACUTE ENCEPHALOPATHY: ICD-10-CM

## 2017-04-24 DIAGNOSIS — R41.82 ALTERED MENTAL STATUS, UNSPECIFIED: ICD-10-CM

## 2017-04-24 DIAGNOSIS — R41.0 DELIRIUM: Primary | ICD-10-CM

## 2017-04-24 DIAGNOSIS — F19.10 POLYSUBSTANCE ABUSE (HCC): ICD-10-CM

## 2017-04-24 DIAGNOSIS — R56.9 CONVULSIONS, UNSPECIFIED CONVULSION TYPE (HCC): ICD-10-CM

## 2017-04-24 LAB
ALBUMIN SERPL BCP-MCNC: 3.1 G/DL (ref 3.5–5)
ALBUMIN/GLOB SERPL: 0.9 {RATIO} (ref 1.1–2.2)
ALP SERPL-CCNC: 85 U/L (ref 45–117)
ALT SERPL-CCNC: 54 U/L (ref 12–78)
AMMONIA PLAS-SCNC: 15 UMOL/L
AMORPH CRY URNS QL MICRO: ABNORMAL
AMPHET UR QL SCN: NEGATIVE
ANION GAP BLD CALC-SCNC: 11 MMOL/L (ref 5–15)
APAP SERPL-MCNC: 3 UG/ML (ref 10–30)
APPEARANCE UR: ABNORMAL
AST SERPL W P-5'-P-CCNC: 87 U/L (ref 15–37)
BACTERIA URNS QL MICRO: ABNORMAL /HPF
BARBITURATES UR QL SCN: NEGATIVE
BASOPHILS # BLD AUTO: 0 K/UL
BASOPHILS # BLD: 0 %
BENZODIAZ UR QL: POSITIVE
BILIRUB SERPL-MCNC: 0.2 MG/DL (ref 0.2–1)
BILIRUB UR QL CFM: NEGATIVE
BUN SERPL-MCNC: 14 MG/DL (ref 6–20)
BUN/CREAT SERPL: 11 (ref 12–20)
CALCIUM SERPL-MCNC: 8.7 MG/DL (ref 8.5–10.1)
CANNABINOIDS UR QL SCN: POSITIVE
CHLORIDE SERPL-SCNC: 103 MMOL/L (ref 97–108)
CK SERPL-CCNC: 54 U/L (ref 26–192)
CO2 SERPL-SCNC: 26 MMOL/L (ref 21–32)
COCAINE UR QL SCN: NEGATIVE
COLOR UR: ABNORMAL
CREAT SERPL-MCNC: 1.27 MG/DL (ref 0.55–1.02)
DRUG SCRN COMMENT,DRGCM: ABNORMAL
EOSINOPHIL # BLD: 0.1 K/UL
EOSINOPHIL NFR BLD: 1 %
EPITH CASTS URNS QL MICRO: ABNORMAL /LPF
ERYTHROCYTE [DISTWIDTH] IN BLOOD BY AUTOMATED COUNT: 16.2 % (ref 11.5–14.5)
ETHANOL SERPL-MCNC: <10 MG/DL
GLOBULIN SER CALC-MCNC: 3.4 G/DL (ref 2–4)
GLUCOSE BLD STRIP.AUTO-MCNC: 116 MG/DL (ref 65–100)
GLUCOSE SERPL-MCNC: 87 MG/DL (ref 65–100)
GLUCOSE UR STRIP.AUTO-MCNC: NEGATIVE MG/DL
HCG SERPL-ACNC: <1 MIU/ML (ref 0–6)
HCT VFR BLD AUTO: 33.8 % (ref 35–47)
HGB BLD-MCNC: 11.4 G/DL (ref 11.5–16)
HGB UR QL STRIP: NEGATIVE
INR PPP: 1.2 (ref 0.9–1.1)
KETONES UR QL STRIP.AUTO: ABNORMAL MG/DL
LACTATE SERPL-SCNC: 3.5 MMOL/L (ref 0.4–2)
LEUKOCYTE ESTERASE UR QL STRIP.AUTO: ABNORMAL
LYMPHOCYTES # BLD AUTO: 33 %
LYMPHOCYTES # BLD: 3 K/UL
MCH RBC QN AUTO: 28.8 PG (ref 26–34)
MCHC RBC AUTO-ENTMCNC: 33.7 G/DL (ref 30–36.5)
MCV RBC AUTO: 85.4 FL (ref 80–99)
METAMYELOCYTES NFR BLD MANUAL: 1 %
METHADONE UR QL: POSITIVE
MONOCYTES # BLD: 0.3 K/UL
MONOCYTES NFR BLD AUTO: 3 %
NEUTS BAND NFR BLD MANUAL: 1 %
NEUTS SEG # BLD: 5.7 K/UL
NEUTS SEG NFR BLD AUTO: 61 %
NITRITE UR QL STRIP.AUTO: NEGATIVE
OPIATES UR QL: NEGATIVE
PCP UR QL: NEGATIVE
PH UR STRIP: 7 [PH] (ref 5–8)
PLATELET # BLD AUTO: 182 K/UL (ref 150–400)
POTASSIUM SERPL-SCNC: 3.6 MMOL/L (ref 3.5–5.1)
PROT SERPL-MCNC: 6.5 G/DL (ref 6.4–8.2)
PROT UR STRIP-MCNC: 100 MG/DL
PROTHROMBIN TIME: 12 SEC (ref 9–11.1)
RBC # BLD AUTO: 3.96 M/UL (ref 3.8–5.2)
RBC #/AREA URNS HPF: ABNORMAL /HPF (ref 0–5)
RBC MORPH BLD: ABNORMAL
SALICYLATES SERPL-MCNC: <1.7 MG/DL (ref 2.8–20)
SERVICE CMNT-IMP: ABNORMAL
SODIUM SERPL-SCNC: 140 MMOL/L (ref 136–145)
SP GR UR REFRACTOMETRY: 1.03 (ref 1–1.03)
TROPONIN I SERPL-MCNC: <0.04 NG/ML
TSH SERPL DL<=0.05 MIU/L-ACNC: 6.6 UIU/ML (ref 0.36–3.74)
UROBILINOGEN UR QL STRIP.AUTO: 1 EU/DL (ref 0.2–1)
WBC # BLD AUTO: 9.2 K/UL (ref 3.6–11)
WBC URNS QL MICRO: ABNORMAL /HPF (ref 0–4)

## 2017-04-24 PROCEDURE — 80307 DRUG TEST PRSMV CHEM ANLYZR: CPT | Performed by: EMERGENCY MEDICINE

## 2017-04-24 PROCEDURE — 82962 GLUCOSE BLOOD TEST: CPT

## 2017-04-24 PROCEDURE — 85025 COMPLETE CBC W/AUTO DIFF WBC: CPT | Performed by: EMERGENCY MEDICINE

## 2017-04-24 PROCEDURE — 83605 ASSAY OF LACTIC ACID: CPT | Performed by: EMERGENCY MEDICINE

## 2017-04-24 PROCEDURE — 80178 ASSAY OF LITHIUM: CPT | Performed by: EMERGENCY MEDICINE

## 2017-04-24 PROCEDURE — 82550 ASSAY OF CK (CPK): CPT | Performed by: EMERGENCY MEDICINE

## 2017-04-24 PROCEDURE — 84702 CHORIONIC GONADOTROPIN TEST: CPT | Performed by: EMERGENCY MEDICINE

## 2017-04-24 PROCEDURE — 93005 ELECTROCARDIOGRAM TRACING: CPT

## 2017-04-24 PROCEDURE — 99285 EMERGENCY DEPT VISIT HI MDM: CPT

## 2017-04-24 PROCEDURE — 81001 URINALYSIS AUTO W/SCOPE: CPT | Performed by: EMERGENCY MEDICINE

## 2017-04-24 PROCEDURE — 84439 ASSAY OF FREE THYROXINE: CPT | Performed by: EMERGENCY MEDICINE

## 2017-04-24 PROCEDURE — 74011250636 HC RX REV CODE- 250/636: Performed by: EMERGENCY MEDICINE

## 2017-04-24 PROCEDURE — 36415 COLL VENOUS BLD VENIPUNCTURE: CPT | Performed by: EMERGENCY MEDICINE

## 2017-04-24 PROCEDURE — 82140 ASSAY OF AMMONIA: CPT | Performed by: EMERGENCY MEDICINE

## 2017-04-24 PROCEDURE — 96361 HYDRATE IV INFUSION ADD-ON: CPT

## 2017-04-24 PROCEDURE — 85610 PROTHROMBIN TIME: CPT | Performed by: EMERGENCY MEDICINE

## 2017-04-24 PROCEDURE — 96374 THER/PROPH/DIAG INJ IV PUSH: CPT

## 2017-04-24 PROCEDURE — 96376 TX/PRO/DX INJ SAME DRUG ADON: CPT

## 2017-04-24 PROCEDURE — 84484 ASSAY OF TROPONIN QUANT: CPT | Performed by: EMERGENCY MEDICINE

## 2017-04-24 PROCEDURE — 84443 ASSAY THYROID STIM HORMONE: CPT | Performed by: EMERGENCY MEDICINE

## 2017-04-24 PROCEDURE — 80053 COMPREHEN METABOLIC PANEL: CPT | Performed by: EMERGENCY MEDICINE

## 2017-04-24 PROCEDURE — 70450 CT HEAD/BRAIN W/O DYE: CPT

## 2017-04-24 PROCEDURE — 77030005563 HC CATH URETH INT MMGH -A

## 2017-04-24 PROCEDURE — 51701 INSERT BLADDER CATHETER: CPT

## 2017-04-24 RX ORDER — SODIUM CHLORIDE 9 MG/ML
2000 INJECTION, SOLUTION INTRAVENOUS ONCE
Status: COMPLETED | OUTPATIENT
Start: 2017-04-24 | End: 2017-04-25

## 2017-04-24 RX ORDER — MIDAZOLAM HYDROCHLORIDE 1 MG/ML
2 INJECTION, SOLUTION INTRAMUSCULAR; INTRAVENOUS
Status: COMPLETED | OUTPATIENT
Start: 2017-04-24 | End: 2017-04-24

## 2017-04-24 RX ADMIN — MIDAZOLAM HYDROCHLORIDE 2 MG: 1 INJECTION, SOLUTION INTRAMUSCULAR; INTRAVENOUS at 21:31

## 2017-04-24 RX ADMIN — SODIUM CHLORIDE 2000 ML: 900 INJECTION, SOLUTION INTRAVENOUS at 22:15

## 2017-04-24 RX ADMIN — MIDAZOLAM HYDROCHLORIDE 2 MG: 1 INJECTION, SOLUTION INTRAMUSCULAR; INTRAVENOUS at 22:13

## 2017-04-25 PROBLEM — R56.9 CONVULSION DISORDER (HCC): Status: ACTIVE | Noted: 2017-04-25

## 2017-04-25 PROBLEM — R55 CONVULSIVE SYNCOPE: Status: ACTIVE | Noted: 2017-04-25

## 2017-04-25 PROBLEM — I65.23 STENOSIS OF BOTH INTERNAL CAROTID ARTERIES: Status: ACTIVE | Noted: 2017-04-25

## 2017-04-25 PROBLEM — E87.20 LACTIC ACIDOSIS: Status: ACTIVE | Noted: 2017-04-25

## 2017-04-25 PROBLEM — G93.40 ACUTE ENCEPHALOPATHY: Status: ACTIVE | Noted: 2017-04-25

## 2017-04-25 PROBLEM — Z91.89 POTENTIAL FOR ALTERED MENTAL STATUS: Status: ACTIVE | Noted: 2017-04-25

## 2017-04-25 LAB
ANION GAP BLD CALC-SCNC: 11 MMOL/L (ref 5–15)
ATRIAL RATE: 113 BPM
BUN SERPL-MCNC: 11 MG/DL (ref 6–20)
BUN/CREAT SERPL: 9 (ref 12–20)
CALCIUM SERPL-MCNC: 7.9 MG/DL (ref 8.5–10.1)
CALCULATED P AXIS, ECG09: 63 DEGREES
CALCULATED R AXIS, ECG10: 70 DEGREES
CALCULATED T AXIS, ECG11: 64 DEGREES
CHLORIDE SERPL-SCNC: 108 MMOL/L (ref 97–108)
CO2 SERPL-SCNC: 23 MMOL/L (ref 21–32)
CREAT SERPL-MCNC: 1.18 MG/DL (ref 0.55–1.02)
DATE LAST DOSE: ABNORMAL
DIAGNOSIS, 93000: NORMAL
GLUCOSE SERPL-MCNC: 99 MG/DL (ref 65–100)
LACTATE SERPL-SCNC: 0.8 MMOL/L (ref 0.4–2)
LITHIUM SERPL-SCNC: 0.46 MMOL/L (ref 0.6–1.2)
P-R INTERVAL, ECG05: 118 MS
POTASSIUM SERPL-SCNC: 3.3 MMOL/L (ref 3.5–5.1)
Q-T INTERVAL, ECG07: 366 MS
QRS DURATION, ECG06: 82 MS
QTC CALCULATION (BEZET), ECG08: 502 MS
REPORTED DOSE,DOSE: ABNORMAL UNITS
REPORTED DOSE/TIME,TMG: ABNORMAL
SODIUM SERPL-SCNC: 142 MMOL/L (ref 136–145)
T4 FREE SERPL-MCNC: 0.9 NG/DL (ref 0.8–1.5)
VENTRICULAR RATE, ECG03: 113 BPM

## 2017-04-25 PROCEDURE — 74011250637 HC RX REV CODE- 250/637: Performed by: INTERNAL MEDICINE

## 2017-04-25 PROCEDURE — 36415 COLL VENOUS BLD VENIPUNCTURE: CPT | Performed by: FAMILY MEDICINE

## 2017-04-25 PROCEDURE — 95816 EEG AWAKE AND DROWSY: CPT | Performed by: PSYCHIATRY & NEUROLOGY

## 2017-04-25 PROCEDURE — 74011250637 HC RX REV CODE- 250/637: Performed by: FAMILY MEDICINE

## 2017-04-25 PROCEDURE — 65270000029 HC RM PRIVATE

## 2017-04-25 PROCEDURE — 93880 EXTRACRANIAL BILAT STUDY: CPT

## 2017-04-25 PROCEDURE — 74011250636 HC RX REV CODE- 250/636: Performed by: FAMILY MEDICINE

## 2017-04-25 PROCEDURE — 83605 ASSAY OF LACTIC ACID: CPT | Performed by: EMERGENCY MEDICINE

## 2017-04-25 PROCEDURE — 80048 BASIC METABOLIC PNL TOTAL CA: CPT | Performed by: FAMILY MEDICINE

## 2017-04-25 PROCEDURE — 74011250637 HC RX REV CODE- 250/637: Performed by: PSYCHIATRY & NEUROLOGY

## 2017-04-25 PROCEDURE — 74011250636 HC RX REV CODE- 250/636: Performed by: INTERNAL MEDICINE

## 2017-04-25 RX ORDER — SODIUM CHLORIDE 0.9 % (FLUSH) 0.9 %
5-10 SYRINGE (ML) INJECTION EVERY 8 HOURS
Status: DISCONTINUED | OUTPATIENT
Start: 2017-04-25 | End: 2017-04-27 | Stop reason: HOSPADM

## 2017-04-25 RX ORDER — LEVOTHYROXINE SODIUM 50 UG/1
25 TABLET ORAL
Status: DISCONTINUED | OUTPATIENT
Start: 2017-04-25 | End: 2017-04-27 | Stop reason: HOSPADM

## 2017-04-25 RX ORDER — ONDANSETRON 2 MG/ML
4 INJECTION INTRAMUSCULAR; INTRAVENOUS
Status: DISCONTINUED | OUTPATIENT
Start: 2017-04-25 | End: 2017-04-27 | Stop reason: HOSPADM

## 2017-04-25 RX ORDER — SERTRALINE HYDROCHLORIDE 50 MG/1
100 TABLET, FILM COATED ORAL DAILY
Status: DISCONTINUED | OUTPATIENT
Start: 2017-04-25 | End: 2017-04-27 | Stop reason: HOSPADM

## 2017-04-25 RX ORDER — SODIUM CHLORIDE 0.9 % (FLUSH) 0.9 %
5-10 SYRINGE (ML) INJECTION AS NEEDED
Status: DISCONTINUED | OUTPATIENT
Start: 2017-04-25 | End: 2017-04-27 | Stop reason: HOSPADM

## 2017-04-25 RX ORDER — FLUTICASONE FUROATE AND VILANTEROL 200; 25 UG/1; UG/1
1 POWDER RESPIRATORY (INHALATION) DAILY
Status: DISCONTINUED | OUTPATIENT
Start: 2017-04-25 | End: 2017-04-27 | Stop reason: HOSPADM

## 2017-04-25 RX ORDER — POTASSIUM CHLORIDE 1.5 G/1.77G
40 POWDER, FOR SOLUTION ORAL
Status: COMPLETED | OUTPATIENT
Start: 2017-04-25 | End: 2017-04-25

## 2017-04-25 RX ORDER — CLONIDINE HYDROCHLORIDE 0.1 MG/1
0.1 TABLET ORAL 3 TIMES DAILY
Status: DISCONTINUED | OUTPATIENT
Start: 2017-04-25 | End: 2017-04-27 | Stop reason: HOSPADM

## 2017-04-25 RX ORDER — SODIUM CHLORIDE 9 MG/ML
75 INJECTION, SOLUTION INTRAVENOUS CONTINUOUS
Status: DISCONTINUED | OUTPATIENT
Start: 2017-04-25 | End: 2017-04-26

## 2017-04-25 RX ORDER — LITHIUM CARBONATE 300 MG
300 TABLET ORAL 2 TIMES DAILY
Status: DISCONTINUED | OUTPATIENT
Start: 2017-04-25 | End: 2017-04-27

## 2017-04-25 RX ORDER — CLONAZEPAM 0.5 MG/1
0.5 TABLET ORAL 2 TIMES DAILY
Status: DISCONTINUED | OUTPATIENT
Start: 2017-04-25 | End: 2017-04-27 | Stop reason: HOSPADM

## 2017-04-25 RX ORDER — ENOXAPARIN SODIUM 100 MG/ML
40 INJECTION SUBCUTANEOUS EVERY 24 HOURS
Status: DISCONTINUED | OUTPATIENT
Start: 2017-04-25 | End: 2017-04-27 | Stop reason: HOSPADM

## 2017-04-25 RX ORDER — LORAZEPAM 2 MG/ML
1 INJECTION INTRAMUSCULAR
Status: DISCONTINUED | OUTPATIENT
Start: 2017-04-25 | End: 2017-04-27

## 2017-04-25 RX ADMIN — CLONAZEPAM 0.5 MG: 0.5 TABLET ORAL at 17:22

## 2017-04-25 RX ADMIN — POTASSIUM CHLORIDE 40 MEQ: 1.5 POWDER, FOR SOLUTION ORAL at 12:37

## 2017-04-25 RX ADMIN — LEVOTHYROXINE SODIUM 25 MCG: 50 TABLET ORAL at 08:36

## 2017-04-25 RX ADMIN — CLONAZEPAM 0.5 MG: 0.5 TABLET ORAL at 08:35

## 2017-04-25 RX ADMIN — CLONIDINE HYDROCHLORIDE 0.1 MG: 0.1 TABLET ORAL at 21:02

## 2017-04-25 RX ADMIN — SERTRALINE HYDROCHLORIDE 100 MG: 50 TABLET ORAL at 08:35

## 2017-04-25 RX ADMIN — FLUTICASONE FUROATE AND VILANTEROL TRIFENATATE 1 PUFF: 200; 25 POWDER RESPIRATORY (INHALATION) at 08:36

## 2017-04-25 RX ADMIN — ONDANSETRON HYDROCHLORIDE 4 MG: 2 INJECTION, SOLUTION INTRAMUSCULAR; INTRAVENOUS at 19:49

## 2017-04-25 RX ADMIN — Medication 10 ML: at 08:39

## 2017-04-25 RX ADMIN — LORAZEPAM 1 MG: 2 INJECTION INTRAMUSCULAR; INTRAVENOUS at 17:40

## 2017-04-25 RX ADMIN — LITHIUM CARBONATE 300 MG: 300 TABLET ORAL at 17:22

## 2017-04-25 RX ADMIN — Medication 10 ML: at 21:02

## 2017-04-25 RX ADMIN — LORAZEPAM 1 MG: 2 INJECTION INTRAMUSCULAR; INTRAVENOUS at 13:30

## 2017-04-25 RX ADMIN — CLONIDINE HYDROCHLORIDE 0.1 MG: 0.1 TABLET ORAL at 17:22

## 2017-04-25 RX ADMIN — Medication 10 ML: at 12:37

## 2017-04-25 RX ADMIN — LORAZEPAM 1 MG: 2 INJECTION INTRAMUSCULAR; INTRAVENOUS at 21:30

## 2017-04-25 RX ADMIN — LITHIUM CARBONATE 300 MG: 300 TABLET ORAL at 08:35

## 2017-04-25 RX ADMIN — SODIUM CHLORIDE 75 ML/HR: 900 INJECTION, SOLUTION INTRAVENOUS at 09:31

## 2017-04-25 RX ADMIN — SODIUM CHLORIDE 75 ML/HR: 900 INJECTION, SOLUTION INTRAVENOUS at 21:03

## 2017-04-25 NOTE — ED NOTES
Multiple attempts for additional IV access/blood draw. Pt uncooperative and swinging at staff with arms and feet.

## 2017-04-25 NOTE — CONSULTS
Dictated: Pt wit 3 normal head CT's and normal EEG in last 5 days with normal neuro exam except for withdrawal symptoms  Will check EEG again but needs Psych

## 2017-04-25 NOTE — PROGRESS NOTES
End of Shift Nursing Note    Bedside shift change report given to Bret Cordova (oncoming nurse) by Hector Garcia RN (offgoing nurse). Report included the following information SBAR, Intake/Output and MAR. Zone Phone:   [phone]    Significant changes during shift:  Patient received two doses of PRN Ativan; No s/s of seizure activity on shift; Seizure precautions on shift; Contact precautions (MRSA); Called EEG department x 3; No callback from department   Non-emergent issues for physician to address:       Number times ambulated in hallway past shift: 0      Number of times OOB to chair past shift: 0    POD #: admitted 4-25-17     Vital Signs:    Temp: 98.3 °F (36.8 °C)     Pulse (Heart Rate): 91     BP: 111/71     Resp Rate: 16     O2 Sat (%): 99 %    Skin Integrity:      Wounds: No; old scabs/scarrs   Dressings Present: no    Wound Concerns: no      GI:  Current diet:  DIET REGULAR    Nausea: YES  Vomiting: NO  Bowel Sounds: YES  Flatus: YES      Patient Safety:    Falls Score: 3  Mobility Score: 1  Bed Alarm On? Yes  Sitter? No      Opportunity for questions and clarification was given to oncoming nurse. Patient bed is in low position, side rails are up x 2, call bell within reach and patient not in distress.     Kimberly Bose

## 2017-04-25 NOTE — ED NOTES
Urine specimen collected from patient at this time via straight catheter. Pt cooperative with procedure after administration of versed.

## 2017-04-25 NOTE — PROGRESS NOTES
CM spoke with the patient who stated that she had gone to [facility] 4/18/2017 after her discharge from [facility] thinking she would be going into a inpatient detox program immediately. She was told that they would not have an opening until next week. She stated that the last dose of methadone she had was at [facility]. She states she used marijuana to try to help with detox symptoms as she did not have a prescription for methadone. ROSE called and left messages with Rakesh Frances and Douglas Morris who were listed in the notes of ROSE at [address] also contacted Crisis at [facility] and was informed that she had come to [facility] on 4/18/2017 but does not know the outcome. They are open from 8am-10am. Per the patient she was told she would have to wait a week and she has yet to detox. Cm will follow the patient for discharge planning.  Ainsley Wills RN #[id number]

## 2017-04-25 NOTE — ED NOTES
TRANSFER - OUT REPORT:    Verbal report given to TIERA Still (name) on Kingsley Cortez  being transferred to gen surg (unit) for routine progression of care       Report consisted of patients Situation, Background, Assessment and   Recommendations(SBAR). Information from the following report(s) ED Summary was reviewed with the receiving nurse. Lines:   Peripheral IV 03/19/17 Left Antecubital (Active)        Opportunity for questions and clarification was provided.       Patient transported with:   [employer]

## 2017-04-25 NOTE — ED NOTES
Pt presents to ED via EMS after family found her unresponsive. On arrival, pt is responsive to painful stimuli. BG per EMS was 107. IV access obtained by EMS. No medications given in route. Pt is known opioid abuser and was recently treated at [address] [place] [address],[address]. Family at bedside states they have been with patient and have not seen her use since getting out of the hospital and do not believe that is the cause of tonight's problem.

## 2017-04-25 NOTE — ED PROVIDER NOTES
HPI Comments: Aniya Morales is a 35 y.o. female, pmhx significant for asthma, COPD, sarcoidosis, depression, bipolar 1 d/o, narcotic abuse, heroin abuse, cocaine abuse, tobacco abuse, polysubstance abuse, HTN, chronic pain, who presents via EMS with family to the ED for evaluation of unusual behavior after being found tremulous by family this AM. Per family, pt is \"constantly in and out of the hospital\" for her medical issues. Pt has a known substance abuse hx, and family endorses suspicion that she ingested some unknown substances today. Per EMS, pt keep squeezing her eyes shut whenever they tried to evaluate her eyes. Pt specifically states that she did take something, but would not say what. Per family, pt has 5 children. PCP: None    Social Hx: + tobacco (0.25 ppd), - EtOH, + Illicit Drugs (cocaine, marijuana, heroin, narcotics)    History is otherwise limited secondary to pt being uncooperative, and aggressive. The history is provided by the patient, a relative and the EMS personnel. The history is limited by the condition of the patient. No  was used. Past Medical History:   Diagnosis Date    Asthma     Bipolar 1 disorder (HCC)     Chronic obstructive pulmonary disease (HCC)     Chronic pain     back, leg    Cocaine abuse     Depression     Hepatitis B     Heroin abuse     HTN (hypertension)     Narcotic abuse     Polysubstance abuse     Sarcoidosis ([facility] [address]     Tobacco abuse        Past Surgical History:   Procedure Laterality Date    HX GYN      HX WISDOM TEETH EXTRACTION           Family History:   Problem Relation Age of Onset    Hypertension Father     Hypertension Mother        Social History     Social History    Marital status: SINGLE     Spouse name: N/A    Number of children: N/A    Years of education: N/A     Occupational History    Not on file.      Social History Main Topics    Smoking status: Current Every Day Smoker     Packs/day: 0.25    Smokeless tobacco: Never Used    Alcohol use No    Drug use: Yes     Special: Marijuana      Comment: last used 1/24/2017    Sexual activity: Yes     Partners: Female     Other Topics Concern    Not on file     Social History Narrative    Born in [facility],     [facility], 5 children,    No legal charges. Pt graduated from high school. Pt was employed last month at [employer], currently she is unemployed. Pt lives with her mother and 8year old son. She has 4 adult children. ALLERGIES: Tomato    Review of Systems   Unable to perform ROS: Other (aggression and uncooperative)       Patient Vitals for the past 12 hrs:   Temp Pulse Resp BP SpO2   04/25/17 0030 - 80 15 117/86 100 %   04/24/17 2345 - 88 16 120/80 100 %   04/24/17 2330 - 95 17 128/85 100 %   04/24/17 2315 - 100 19 (!) 142/91 100 %   04/24/17 2300 - (!) 102 19 131/80 100 %   04/24/17 2245 - (!) 103 15 (!) 135/95 100 %   04/24/17 2230 - (!) 104 18 125/82 100 %   04/24/17 2215 - (!) 110 20 126/75 100 %   04/24/17 2201 - (!) 116 29 129/82 100 %   04/24/17 2152 - (!) 107 16 - 100 %   04/24/17 2151 - - - 118/83 -   04/24/17 2130 - (!) 106 22 110/73 99 %   04/24/17 2041 97.6 °F (36.4 °C) (!) 107 22 111/60 95 %       Physical Exam     Nursing note and vitals reviewed.   General appearance: non-toxic, NAD, aggressive  Eyes: PERRL, EOMI, conjunctiva normal, anicteric sclera  HEENT: mucous membranes moist, oropharynx is clear, poor dentition, neck supple  Pulmonary: clear to auscultation bilaterally, protecting her own airway  Cardiac: tachycardic and regular rhythm, no murmurs, gallops, or rubs, 2+ peripheral pulses  Abdomen: soft, nontender, nondistended, bowel sounds present  MSK: no lower extremity edema, extremities are strong,   Neuro: Awake, not answering questions, minimally follows directions, no clonus, occasional twitching, combative with attempts of phlebotomy; strength 5/5 all 4 extremities requiring restraint for safety and lab draws MDM  Number of Diagnoses or Management Options  Delirium:   Polysubstance abuse:   Diagnosis management comments:   DDx: substance abuse,  drug ingestion, metabolic encephalopathy, low suspicion for CNS infection    Remains altered; suspicious for illicit ingestion in setting of underlying psychiatric illness. No signs to suggest infection. Will observe to hospitalist.        Amount and/or Complexity of Data Reviewed  Clinical lab tests: reviewed and ordered  Tests in the radiology section of CPT®: ordered and reviewed  Tests in the medicine section of CPT®: ordered and reviewed  Obtain history from someone other than the patient: yes (Family, EMS)  Review and summarize past medical records: yes  Discuss the patient with other providers: yes (hospitalist )  Independent visualization of images, tracings, or specimens: yes    Critical Care  Total time providing critical care: 30-74 minutes (35 minutes)    Patient Progress  Patient progress: improved    ED Course       Procedures    EKG interpretation: (Preliminary) 8:47 PM  Rhythm: sinus tachycardia; and regular . Rate (approx.): 113; Axis: normal; VT interval: normal; QRS interval: normal ; ST/T wave: No ST elevation or ST depression  Written by Preston Hernandez ED Scribe, as dictated by Nash Mcnair MD.     PROGRESS NOTE   12:06 AM  Pt resting comfortably, currently sleeping. Her vitals have improved. Pt is arousable to voice and appears startled. Pt remains altered from baseline. Written by CALI Ayalaibe, as dictated by Nash Mcnair MD.     CRITICAL CARE NOTE:    12:52 AM    IMPENDING DETERIORATION -Cardiovascular, CNS and Metabolic    ASSOCIATED RISK FACTORS - altered mental status, suspected substance abuse, possible coingestion     MANAGEMENT- Bedside Assessment and Supervision of Care    INTERPRETATION -  CT Scan, ECG, Blood Pressure and Heart Rate    INTERVENTIONS - hemodynamic mngmt and Neurologic interventions CASE REVIEW - Hospitalist, Nursing and Family    TREATMENT RESPONSE -Improved    PERFORMED BY - Self      NOTES   :    I have spent 35 minutes of critical care time involved in lab review, consultations with specialist, family decision- making, bedside attention and documentation. During this entire length of time I was immediately available to the patient . Alphonso Melendrez. Linwood Rviera MD    LABORATORY TESTS:  Recent Results (from the past 12 hour(s))   GLUCOSE, POC    Collection Time: 04/24/17  8:45 PM   Result Value Ref Range    Glucose (POC) 116 (H) 65 - 100 mg/dL    Performed by Carli Camarena (PCT)    EKG, 12 LEAD, INITIAL    Collection Time: 04/24/17  8:47 PM   Result Value Ref Range    Ventricular Rate 113 BPM    Atrial Rate 113 BPM    P-R Interval 118 ms    QRS Duration 82 ms    Q-T Interval 366 ms    QTC Calculation (Bezet) 502 ms    Calculated P Axis 63 degrees    Calculated R Axis 70 degrees    Calculated T Axis 64 degrees    Diagnosis       Sinus tachycardia  Possible Left atrial enlargement  Borderline ECG  When compared with ECG of 20-APR-2017 14:57,  Vent. rate has increased BY  59 BPM  T wave amplitude has decreased in Inferior leads  T wave amplitude has decreased in Anterolateral leads     CBC WITH AUTOMATED DIFF    Collection Time: 04/24/17  9:23 PM   Result Value Ref Range    WBC 9.2 3.6 - 11.0 K/uL    RBC 3.96 3.80 - 5.20 M/uL    HGB 11.4 (L) 11.5 - 16.0 g/dL    HCT 33.8 (L) 35.0 - 47.0 %    MCV 85.4 80.0 - 99.0 FL    MCH 28.8 26.0 - 34.0 PG    MCHC 33.7 30.0 - 36.5 g/dL    RDW 16.2 (H) 11.5 - 14.5 %    PLATELET 625 491 - 333 K/uL    NEUTROPHILS 61 %    BAND NEUTROPHILS 1 %    LYMPHOCYTES 33 %    MONOCYTES 3 %    EOSINOPHILS 1 %    BASOPHILS 0 %    METAMYELOCYTES 1 %    ABS. NEUTROPHILS 5.7 K/UL    ABS. LYMPHOCYTES 3.0 K/UL    ABS. MONOCYTES 0.3 K/UL    ABS. EOSINOPHILS 0.1 K/UL    ABS.  BASOPHILS 0.0 K/UL    RBC COMMENTS ANISOCYTOSIS  PRESENT       PROTHROMBIN TIME + INR    Collection Time: 04/24/17 9:23 PM   Result Value Ref Range    INR 1.2 (H) 0.9 - 1.1      Prothrombin time 12.0 (H) 9.0 - 73.2 sec   METABOLIC PANEL, COMPREHENSIVE    Collection Time: 04/24/17  9:23 PM   Result Value Ref Range    Sodium 140 136 - 145 mmol/L    Potassium 3.6 3.5 - 5.1 mmol/L    Chloride 103 97 - 108 mmol/L    CO2 26 21 - 32 mmol/L    Anion gap 11 5 - 15 mmol/L    Glucose 87 65 - 100 mg/dL    BUN 14 6 - 20 MG/DL    Creatinine 1.27 (H) 0.55 - 1.02 MG/DL    BUN/Creatinine ratio 11 (L) 12 - 20      GFR est AA 56 (L) >60 ml/min/1.73m2    GFR est non-AA 46 (L) >60 ml/min/1.73m2    Calcium 8.7 8.5 - 10.1 MG/DL    Bilirubin, total 0.2 0.2 - 1.0 MG/DL    ALT (SGPT) 54 12 - 78 U/L    AST (SGOT) 87 (H) 15 - 37 U/L    Alk. phosphatase 85 45 - 117 U/L    Protein, total 6.5 6.4 - 8.2 g/dL    Albumin 3.1 (L) 3.5 - 5.0 g/dL    Globulin 3.4 2.0 - 4.0 g/dL    A-G Ratio 0.9 (L) 1.1 - 2.2     LACTIC ACID, PLASMA    Collection Time: 04/24/17  9:23 PM   Result Value Ref Range    Lactic acid 3.5 (HH) 0.4 - 2.0 MMOL/L   CK W/ REFLX CKMB    Collection Time: 04/24/17  9:23 PM   Result Value Ref Range    CK 54 26 - 192 U/L   TROPONIN I    Collection Time: 04/24/17  9:23 PM   Result Value Ref Range    Troponin-I, Qt. <0.04 <0.05 ng/mL   ETHYL ALCOHOL    Collection Time: 04/24/17  9:23 PM   Result Value Ref Range    ALCOHOL(ETHYL),SERUM <10 <10 MG/DL   AMMONIA    Collection Time: 04/24/17  9:23 PM   Result Value Ref Range    Ammonia 15 <35 UMOL/L   SALICYLATE    Collection Time: 04/24/17  9:23 PM   Result Value Ref Range    SALICYLATE <3.6 (L) 2.8 - 20.0 MG/DL   ACETAMINOPHEN    Collection Time: 04/24/17  9:23 PM   Result Value Ref Range    ACETAMINOPHEN 3 (L) 10 - 30 ug/mL   TSH 3RD GENERATION    Collection Time: 04/24/17  9:23 PM   Result Value Ref Range    TSH 6.60 (H) 0.36 - 3.74 uIU/mL   TOTAL HCG, QT.     Collection Time: 04/24/17  9:23 PM   Result Value Ref Range    Beta HCG, QT <1 0 - 6 MIU/ML   LITHIUM    Collection Time: 04/24/17  9:23 PM Result Value Ref Range    Lithium 0.46 (L) 0.60 - 1.20 MMOL/L    Reported dose date: NOT PROVIDED      Reported dose time: NOT PROVIDED      Reported dose: NOT PROVIDED UNITS   URINALYSIS W/MICROSCOPIC    Collection Time: 04/24/17 10:23 PM   Result Value Ref Range    Color DARK YELLOW      Appearance CLOUDY (A) CLEAR      Specific gravity 1.029 1.003 - 1.030      pH (UA) 7.0 5.0 - 8.0      Protein 100 (A) NEG mg/dL    Glucose NEGATIVE  NEG mg/dL    Ketone TRACE (A) NEG mg/dL    Blood NEGATIVE  NEG      Urobilinogen 1.0 0.2 - 1.0 EU/dL    Nitrites NEGATIVE  NEG      Leukocyte Esterase SMALL (A) NEG      WBC 5-10 0 - 4 /hpf    RBC 0-5 0 - 5 /hpf    Epithelial cells MODERATE (A) FEW /lpf    Bacteria 1+ (A) NEG /hpf    Amorphous Crystals 1+ (A) NEG   BILIRUBIN, CONFIRM    Collection Time: 04/24/17 10:23 PM   Result Value Ref Range    Bilirubin UA, confirm NEGATIVE  NEG     DRUG SCREEN, URINE    Collection Time: 04/24/17 10:23 PM   Result Value Ref Range    AMPHETAMINE NEGATIVE  NEG      BARBITURATES NEGATIVE  NEG      BENZODIAZEPINE POSITIVE (A) NEG      COCAINE NEGATIVE  NEG      METHADONE POSITIVE (A) NEG      OPIATES NEGATIVE  NEG      PCP(PHENCYCLIDINE) NEGATIVE  NEG      THC (TH-CANNABINOL) POSITIVE (A) NEG      Drug screen comment (NOTE)        IMAGING RESULTS:   CT HEAD WO CONT (Final result)      EXAM: CT HEAD WO CONT     INDICATION: altered mental status     COMPARISON: CT brain 4/21/2017. .     TECHNIQUE: Unenhanced CT of the head was performed using 5 mm images. Brain and  bone windows were generated. CT dose reduction was achieved through use of a  standardized protocol tailored for this examination and automatic exposure  control for dose modulation.      FINDINGS:  The ventricles and sulci are normal in size, shape and configuration and  midline. There is no significant white matter disease. There is no intracranial  hemorrhage, extra-axial collection, mass, mass effect or midline shift.  The  basilar cisterns are open. No acute infarct is identified. The bone windows  demonstrate no abnormalities. The visualized portions of the paranasal sinuses  and mastoid air cells are clear.     IMPRESSION  IMPRESSION: No acute findings. Result time: 04/24/17 22:01:29       MEDICATIONS GIVEN:  Medications   0.9% sodium chloride infusion 2,000 mL (2,000 mL IntraVENous New Bag 4/24/17 2215)   midazolam (VERSED) injection 2 mg (2 mg IntraVENous Given 4/24/17 2131)   midazolam (VERSED) injection 2 mg (2 mg IntraVENous Given 4/24/17 2213)       IMPRESSION:  1. Delirium    2. Polysubstance abuse        PLAN: Admit to hospitalist  Admission Note:  12:52 AM  Patient is being admitted to the hospital by Dr. Rosie Singleton. The results of their tests and reasons for their admission have been discussed with them and/or available family. They convey agreement and understanding for the need to be admitted and for their admission diagnosis. Written by Preston Hernandez, ED Scribe, as dictated by Nash Mcnair MD.    Helyn Samples: This note is prepared by Preston Hernandez, acting as Scribe for Nash Mcnair MD.    Nash Mcnair MD: The scribe's documentation has been prepared under my direction and personally reviewed by me in its entirety. I confirm that the note above accurately reflects all work, treatment, procedures, and medical decision making performed by me.

## 2017-04-25 NOTE — CONSULTS
[id number]   [address] [facility]       Name:  Vazquez Wright   MR#:  [id number]   :  1975   Account #:  [de-identified]    Date of Consultation:  2017   Date of Adm:  2017       CHIEF COMPLAINT: Altered mental status. HISTORY OF PRESENT ILLNESS: This patient is a 42-year-old, right-  handed [facility] American female we were asked to evaluate as a new   patient for sudden altered mental status by Dr. Fabiola Hoffman. The patient had unusual   behavior, agitation and tremulousness 1 day prior to admission, when   she was admitted yesterday. She has a history of polysubstance   abuse, heroin, cocaine and marijuana. She was just discharged from   [facility]. There, she had 2 normal CT scans of the head and   a normal EEG. She was seen by Psychiatry and given methadone for   withdrawal. She may have taken some unknown substance like bath   salts or synthetic marijuana. She was found to be in lactic acidosis and   had sinus tachycardia in the ER, and a normal CT of the head. She   was admitted to the hospital for the agitation. We are asked to evaluate   for possible seizure, possible altered mental status. PAST MEDICAL HISTORY: Pertinent for asthma, bipolar disorder,   COPD, cocaine abuse, depression, hepatitis B, heroin use,   hypertension, narcotic usage, polysubstance abuse, marijuana use,   sarcoidosis, tobacco abuse. She quit smoking 4 days ago. She has   also had wisdom teeth extracted in the past and some gynecological   procedures. ALLERGIES: PERTINENT IN THAT SHE HAS ALLERGIES TO   TOMATOES, BUT NO OTHER ALLERGIES NOTED. SOCIAL HISTORY: Pertinent that she was smoking up to 4 days ago,   smoking 1/4 pack a day. Apparently, does not use alcohol, she says. She is single and lives with her mother. She has 5 children. MEDICATIONS PRIOR TO ADMISSION WERE SUPPOSEDLY:   1. Neurontin 800 mg 1 tablet 3 times a day.    2. Lithium carbonate 300 mg twice a day. 3. Minipress 2 mg once a day. 4. Seroquel 400 mg taking 2 tablets at night as needed. 5. Zoloft 100 mg once a day. 6. Albuterol inhalers p.r.n.   7. DuoNeb inhalers p.r.n.   8. BREO Ellipta inhalers once a day. 9. albuterol nebulizer at home. 10. Steroids 40 mg for 2 days, and then tapering dose downward. 11. Nicoderm patches. 12. Proventil inhalers. 13. Clonazepam 5 mg by mouth twice a day. FAMILY HISTORY: Pertinent in that she says she has 5 children, they   are all in good health, she says. One brother is in good health. Her   mother has high blood pressure. Her father has high blood pressure. REVIEW OF SYSTEMS   NEUROLOGIC: She just is tremulous. She thinks she is at [facility]. She is a little confused. No encephalopathy. No focal weakness or   sensory loss. HEENT: She has no headache, no vision problems. NECK: No neck pain. No meningismus. CHEST: She has some chronic shortness of breath, but no cough right   now. CARDIAC: She has no chest pain or palpitations. ABDOMEN: No nausea, no vomiting. BOWEL AND BLADDER: She has normal bowel and bladder control. MUSCULOSKELETAL: She denies any new swollen or tender joints. SKIN: She has no recent rashes or neurocutaneous lesions seen. LYMPHATIC: She has no lymphadenopathy or tender nodes. VASCULAR: She have no significant claudication or edema. SYSTEMIC: No fever, no meningismus. NEUROPSYCHIATRIC: She seemed anxious and tremulous. A little encephalopathic, and   appears to be depressed. PHYSICAL EXAMINATION   VITAL SIGNS: Now shows that she has a temperature of 98.1   degrees, pulse 99, blood pressure 117/78 and respirations 16. NEUROLOGIC: The patient is awake. She is oriented to 04/25/2017   and the president is [email or web address], but she has to struggle a little bit with the   date.  She seems to give a fair history, but does not have good memory   about what they told her about going to mental health unit. She is fluent,   even though she says she has difficulty getting some of her words out,   but it seems to be more of a stuttering type of speech. She has no   clear focal weakness. Rapid alternating movement appears to be   symmetric. She has moderate bilateral intention type tremor and static   tremor, probably from medications. She walks with a normal gait. Negative Romberg on tandem. Her pupils are equal and reactive to   light. Her extraocular motility is intact. Her visual fields are full to   confrontation. Fundi poorly seen. Sensory examination to pin and   double simultaneous stimulation is intact. Finger-nose-finger   examination shows no major dysmetria. Basically, she has an   unremarkable neurologic examination. Cranial nerves 2 through 12   otherwise intact also. NECK: Supple, without bruits or meningismus. HEENT: Shows no significant lesions noted. NECK: No neck pain. No meningismus. CHEST: She has clear lung fields. No rales or rhonchi. CARDIAC: She has regular rhythm, with a soft systolic ejection   murmur. ABDOMEN: She has no tenderness or masses. SKIN: She has no rashes or neurocutaneous lesions seen. MUSCULOSKELETAL: She has no unusual muscle pain, joint pain or   swelling. VASCULAR: She had slight decreased pulses in the feet, but no   significant edema. LYMPHATIC: No lymphadenopathy or tender nodes. SYSTEMIC: She has been afebrile. Supple neck, no meningismus. NEUROPSYCHIATRIC: She is depressed, somewhat anxious. IMPRESSION: The patient most likely with a metabolic   encephalopathy due her drugs. I do not think she has anything   structural of the brain. She has had 3 normal CT scans in the last 5   days. She had one normal EEG, will check another EEG. I do not think   she is having seizures, except some withdrawal seizures. She will see   Psychiatry to help her with her withdrawal syndrome.  We will get a   carotid Doppler due to her passing out spells. Doubt we will find much   Neurologically wrong. Continue excellent medical care. Await Psychiatry. She needs therapy for her withdrawal state. She is a complicated case. She has already seen another neurologist, Dr. Josef Gomez, at [facility], [place] 3 days ago.         MD OSIEL Armstrong / Chris Bliss   D:  04/25/2017   10:31   T:  04/25/2017   12:26   Job #:  [id number]

## 2017-04-25 NOTE — CONSULTS
Psychiatry  Consult    Subjective:     Date of Evaluation:  4/25/2017    Reason for Referral:  Marilynn Lombardi was referred to the examiners from Medical floor  for Bipolar  Disorder .     History of Presenting Problem: Patient stated she has long history of bipolar  Disorder  And  Polysubstance  Abuse  ,  She  First  Denied  Using  Anything but Heroin  But later  She stated  She is  Using  Xanax  And  Klonopin  And  She is  Also   Smoking  Marijuana  ,   She  Stated  She used  Heroin yesterday  ,  However with  The history   There  Was  AMS  And   Memory  Issues  That  Could not  Explained  Just by  Heroin   Use   And most probably  There  Was  Benzo  On top of that  ,  Patient  Later  stated  That  She  Used  Xanax also  Yesterday  ,   She  Wants  To  Receive   Medication   For  Withdrawal   And  Requested  Either  Methadone  Or  Suboxon , She  Stated  She has  History   Of  Depression  And  Gets manic  Sometimes  And  Sh si  On lithium    For years  ,  She  denied  feeling   Suicidal  And   Denied  Having thoughts about hurting  Herself     Patient Active Problem List    Diagnosis Date Noted    Lactic acidosis 04/25/2017    Acute encephalopathy 04/25/2017    Seizure ([address] 04/20/2017    Anemia 02/22/2017    Bipolar 1 disorder (HCC)     Chronic obstructive pulmonary disease ([address]     Chronic pain     Hepatitis B     Sarcoidosis ([address]     Tobacco abuse     Cocaine abuse     Polysubstance abuse     Narcotic abuse     HTN (hypertension)     Depression 01/25/2017    Sinus tachycardia 01/10/2017    Heroin abuse 01/30/2014     Past Medical History:   Diagnosis Date    Asthma     Bipolar 1 disorder ([address]     Chronic obstructive pulmonary disease (HCC)     Chronic pain     back, leg    Cocaine abuse     Depression     Hepatitis B     Heroin abuse     HTN (hypertension)     Narcotic abuse     Polysubstance abuse     Sarcoidosis ([address]     Tobacco abuse       Family History   Problem Relation Age of Onset    Hypertension Father     Hypertension Mother       Social History   Substance Use Topics    Smoking status: Current Every Day Smoker     Packs/day: 0.25    Smokeless tobacco: Never Used    Alcohol use No     Past Surgical History:   Procedure Laterality Date    HX GYN      HX WISDOM TEETH EXTRACTION        Prior to Admission medications    Medication Sig Start Date End Date Taking? Authorizing Provider   gabapentin (NEURONTIN) 800 mg tablet Take 1 Tab by mouth three (3) times daily. 4/19/17  Yes Inés Andrews MD   lithium carbonate 300 mg tablet Take 1 Tab by mouth two (2) times a day. 4/19/17  Yes Inés Andrews MD   prazosin (MINIPRESS) 2 mg capsule Take 1 Cap by mouth nightly. 4/19/17  Yes Inés Andrews MD   QUEtiapine (SEROQUEL) 400 mg tablet Take 2 Tabs by mouth nightly. Patient taking differently: Take 200 mg by mouth nightly. 4/19/17  Yes Inés Andrews MD   sertraline (ZOLOFT) 100 mg tablet Take 1 Tab by mouth daily. 4/19/17  Yes Inés Andrews MD   albuterol sulfate 90 mcg/actuation aepb Take 2 Puffs by inhalation every four (4) hours as needed. 4/19/17   Inés Andrews MD   albuterol-ipratropium (DUO-NEB) 2.5 mg-0.5 mg/3 ml nebu 3 mL by Nebulization route every six (6) hours as needed. 4/19/17   Inés Andrews MD   fluticasone-vilanterol (BREO ELLIPTA) 978-57 mcg/dose inhaler Take 1 Puff by inhalation daily. 4/19/17   Inés Andrews MD   Nebulizer & Compressor machine 1 Each by Does Not Apply route daily. 4/19/17   Inés Andrews MD   predniSONE (DELTASONE) 10 mg tablet 40 mg daily for 2 days, then 30 mg daily for 2 days, then 20 mg daily for 2 days, then 10 mg daily for 2 days, then stop 4/19/17   Inés Andrews MD   nicotine (NICODERM CQ) 21 mg/24 hr 1 Patch by TransDERmal route daily for 30 days. 4/19/17 5/19/17  Inés Andrews MD   albuterol (PROVENTIL VENTOLIN) 2.5 mg /3 mL (0.083 %) nebulizer solution 3 mL by Nebulization route every four (4) hours as needed for Wheezing.  4/16/17 Aliyah Larry MD   CLONAZEPAM (KLONOPIN PO) Take 5 mg by mouth two (2) times a day. Historical Provider     Allergies   Allergen Reactions    Tomato Swelling     Tongue          Objective:     Patient Vitals for the past 8 hrs:   BP Temp Pulse Resp SpO2   04/25/17 1116 111/71 98.3 °F (36.8 °C) 91 16 99 %       Mental Status exam: she  Stated  She is  Anxious  And  Withdrawal   And  She has no SI or HI and no psychotic features and she has  Fair  Cognitive  Function and has   Fair insight             Impression: Bipolar  Disorder , Poly susbtance  Abuse      Active Problems:    Polysubstance abuse ()      Lactic acidosis (4/25/2017)      Acute encephalopathy (4/25/2017)          Plan: She  Needs  t o stay  On Klonopin  And  Ativan  For  Two  Days  To  Address  Withdrawal from Benzo   And  t o avoid  Any seizure  ,  She is a candidate for Methadone not  subsoxon  As  She is  Using  One gram Heroin  Daily    She needs  Inpatient  Rehab , will use  Clonidine  To  Address Heroin  Withdrawal  Now      Recommendations for Treatment/Conditions:  Outpatient follow up recommended after release    Referral To:    she  Needs inpatient rehab for substance  Abuse      Competency Statement: At the current time, the patient is competent to make informed consent regarding their current medical care and discharge planning and/or financial decisions.

## 2017-04-25 NOTE — PROGRESS NOTES
[facility]  Preliminary Report:  Carotid Duplex Scan    Right:  No plaque noted in the right carotid system. Right ICA velocities suggest 0% diameter reduction. Right vertebral artery flow is antegrade. Left:  No plaque noted in the left carotid system. Left ICA velocities suggest 0% diameter reduction. Left vertebral artery flow is antegrade. Final report to follow.

## 2017-04-25 NOTE — PROGRESS NOTES
Seen in am. More awake now. Still with some tremors. rechk BMP> Lithium levels low. Psychiatry has been consulted. Also consult with neurology? Seizures.

## 2017-04-25 NOTE — PROGRESS NOTES
34 y/o female admitted via ED for altered mental status and behaving differently from her norm. As noted from ED notes, has PMH of polysubstance abuse. Declines assessment, refuses to speak to nurse and covers her head when approached. . Have attempted lab draw without success. Second attempt was met with resistance and uncooperativeness. Stated she wanted to order her breakfast and she was hungry.

## 2017-04-25 NOTE — H&P
Hospitalist Admission Note    NAME: Сергей Montejo   :  1975   MRN:  [id number]     Date/Time:  2017 4:12 AM    Patient PCP: None  ________________________________________________________________________    My assessment of this patient's clinical condition and my plan of care is as follows. Assessment / Plan:  Acute Encephalopathy POA- likely due to unknown recreational substance ingestion  Lactic Acidosis POA  H/o Polysubstance abuse  ?h/o Bipolar disorder  Lactate= 3.5  CT head neg  UA neg  Lithium level = 0.46  Tylenol level 3.0  salicylate level <1.9  Trop neg  EKG= sinus tachycardia with no acute T or ST changes  Urine tox screen  +ve for benzos, methadone & THC  TSH= 6.6    Medical bed admission  S/p IV bolus in ER- lactate has normalized now in ER  Start low dose synthroid for now  Await pt to be more alert & awake - call psych eval at that time- ?IP psych  Cont Zoloft, holding off Seroquel for now  Cont Lithium  Cont Clonazepam as at home to prevent withdrawal seizures    HTN  Asthma    Holding prazosin for now  Cont Breo      Code Status: Full  Surrogate Decision Maker:mother    DVT Prophylaxis: Lovenox  GI Prophylaxis: not indicated    Baseline: Pt is in & out of multiple hospitals due to her polysubstance abuse related events- has had left AMA in past from [facility] (last admission for asthma)        Subjective:   CHIEF COMPLAINT: AMS with agitation x 1 day    HISTORY OF PRESENT ILLNESS:     Judit Norris is a 35 y.o.  female who presents with above complains from home via EMS. Pt was brought in with CC of unusual behavior with agitation & tremulousness x 1 day (since AM)  H/o severe polysubstance abuse - heroin, cocaine, & marijuana in past- just DC from [facility] after Heroin withdrawal admission- was managed with Methadone protocol as per psych there. Pt is thought have taken some unknown substance as per the family/mother - ?  Bath salt versus synthetic marijuana? Pt has not yet cleared up in ER to be evaluated by Psych or [facility] for disposition  H/o Bipolar disorder with zane as per family. Pt was found to be in lactic acidosis with sinus tachycardia in ER with unremarkable workup including CT head. We were asked to admit for work up and evaluation of the above problems. Past Medical History:   Diagnosis Date    Asthma     Bipolar 1 disorder (HCC)     Chronic obstructive pulmonary disease (HCC)     Chronic pain     back, leg    Cocaine abuse     Depression     Hepatitis B     Heroin abuse     HTN (hypertension)     Narcotic abuse     Polysubstance abuse     Sarcoidosis ([address]     Tobacco abuse         Past Surgical History:   Procedure Laterality Date    HX GYN      HX WISDOM TEETH EXTRACTION         Social History   Substance Use Topics    Smoking status: Current Every Day Smoker     Packs/day: 0.25    Smokeless tobacco: Never Used    Alcohol use No        Family History   Problem Relation Age of Onset    Hypertension Father     Hypertension Mother      Allergies   Allergen Reactions    Tomato Swelling     Tongue        Prior to Admission medications    Medication Sig Start Date End Date Taking? Authorizing Provider   gabapentin (NEURONTIN) 800 mg tablet Take 1 Tab by mouth three (3) times daily. 4/19/17  Yes Sorin Huerta MD   lithium carbonate 300 mg tablet Take 1 Tab by mouth two (2) times a day. 4/19/17  Yes Sorin Huerta MD   prazosin (MINIPRESS) 2 mg capsule Take 1 Cap by mouth nightly. 4/19/17  Yes Sorin Huerta MD   QUEtiapine (SEROQUEL) 400 mg tablet Take 2 Tabs by mouth nightly. Patient taking differently: Take 200 mg by mouth nightly. 4/19/17  Yes Sorin Huerta MD   sertraline (ZOLOFT) 100 mg tablet Take 1 Tab by mouth daily. 4/19/17  Yes Sorin Huerta MD   albuterol sulfate 90 mcg/actuation aepb Take 2 Puffs by inhalation every four (4) hours as needed.  4/19/17   Sorin Huerta MD   albuterol-ipratropium ([place]) 2.5 mg-0.5 mg/3 ml nebu 3 mL by Nebulization route every six (6) hours as needed. 4/19/17   Annita Swenson MD   fluticasone-vilanterol (BREO ELLIPTA) 625-68 mcg/dose inhaler Take 1 Puff by inhalation daily. 4/19/17   Annita Swenson MD   Nebulizer & Compressor machine 1 Each by Does Not Apply route daily. 4/19/17   Annita Swenson MD   predniSONE (DELTASONE) 10 mg tablet 40 mg daily for 2 days, then 30 mg daily for 2 days, then 20 mg daily for 2 days, then 10 mg daily for 2 days, then stop 4/19/17   Annita Swenson MD   nicotine (NICODERM CQ) 21 mg/24 hr 1 Patch by TransDERmal route daily for 30 days. 4/19/17 5/19/17  Annita Swenson MD   albuterol (PROVENTIL VENTOLIN) 2.5 mg /3 mL (0.083 %) nebulizer solution 3 mL by Nebulization route every four (4) hours as needed for Wheezing. 4/16/17   Mireya Venegas MD   CLONAZEPAM (KLONOPIN PO) Take 5 mg by mouth two (2) times a day. Historical Provider       REVIEW OF SYSTEMS:     I am not able to complete the review of systems because:    The patient is intubated and sedated   x The patient has altered mental status due to his acute medical problems    The patient has baseline aphasia from prior stroke(s)    The patient has baseline dementia and is not reliable historian    The patient is in acute medical distress and unable to provide information           Total of 12 systems reviewed as follows:       POSITIVE= underlined text  Negative = text not underlined  General:  fever, chills, sweats, generalized weakness, weight loss/gain,      loss of appetite   Eyes:    blurred vision, eye pain, loss of vision, double vision  ENT:    rhinorrhea, pharyngitis   Respiratory:   cough, sputum production, SOB, RIVAS, wheezing, pleuritic pain   Cardiology:   chest pain, palpitations, orthopnea, PND, edema, syncope   Gastrointestinal:  abdominal pain , N/V, diarrhea, dysphagia, constipation, bleeding   Genitourinary:  frequency, urgency, dysuria, hematuria, incontinence   Muskuloskeletal : arthralgia, myalgia, back pain  Hematology:  easy bruising, nose or gum bleeding, lymphadenopathy   Dermatological: rash, ulceration, pruritis, color change / jaundice  Endocrine:   hot flashes or polydipsia   Neurological:  headache, dizziness, confusion, focal weakness, paresthesia,     Speech difficulties, memory loss, gait difficulty  Psychological: Feelings of anxiety, depression, agitation    Objective:   VITALS:    Visit Vitals    /79    Pulse 90    Temp 97.6 °F (36.4 °C)    Resp 17    Ht 5' 4\" (1.626 m)    SpO2 100%       PHYSICAL EXAM:    General:    Alert & arousable but not cooperative, no apparent distress, appears stated age. HEENT: Atraumatic, anicteric sclerae, pink conjunctivae     No oral ulcers, mucosa moist, throat clear, dentition fair  Neck:  Supple, symmetrical,  thyroid: non tender  Lungs:   Clear to auscultation bilaterally. No Wheezing or Rhonchi. No rales. Chest wall:  No tenderness  No Accessory muscle use. Heart:   Regular  rhythm,  No  murmur   No edema  Abdomen:   Soft, non-tender. Not distended. Bowel sounds normal  Extremities: No cyanosis. No clubbing,      Skin turgor normal, Capillary refill normal, Radial dial pulse 2+  Skin:     Not pale. Not Jaundiced  No rashes   Psych:  Poor insight. Not depressed. Not agitated now, sleeping in bed comfortably  Neurologic: EOMs intact. No facial asymmetry. No aphasia or slurred speech. Symmetrical strength, Sensation grossly intact. Alert and oriented X 1.     _______________________________________________________________________  Care Plan discussed with:    Comments   Patient x    Family      RN x    Care Manager                    Consultant:  x CALI Law   _______________________________________________________________________  Expected  Disposition:   Home with Family ? HH/PT/OT/RN    IP psych ?    MAEGAN    ________________________________________________________________________  TOTAL TIME:  45 Minutes    Critical Care Provided     Minutes non procedure based      Comments    x Reviewed previous records   >50% of visit spent in counseling and coordination of care  Discussion with patient and/or family and questions answered       ________________________________________________________________________  Signed: Tyrell Reynolds MD    Procedures: see electronic medical records for all procedures/Xrays and details which were not copied into this note but were reviewed prior to creation of Plan. LAB DATA REVIEWED:    Recent Results (from the past 24 hour(s))   GLUCOSE, POC    Collection Time: 04/24/17  8:45 PM   Result Value Ref Range    Glucose (POC) 116 (H) 65 - 100 mg/dL    Performed by Shasha Lopez (PCT)    EKG, 12 LEAD, INITIAL    Collection Time: 04/24/17  8:47 PM   Result Value Ref Range    Ventricular Rate 113 BPM    Atrial Rate 113 BPM    P-R Interval 118 ms    QRS Duration 82 ms    Q-T Interval 366 ms    QTC Calculation (Bezet) 502 ms    Calculated P Axis 63 degrees    Calculated R Axis 70 degrees    Calculated T Axis 64 degrees    Diagnosis       Sinus tachycardia  Possible Left atrial enlargement  Borderline ECG  When compared with ECG of 20-APR-2017 14:57,  Vent. rate has increased BY  59 BPM  T wave amplitude has decreased in Inferior leads  T wave amplitude has decreased in Anterolateral leads     CBC WITH AUTOMATED DIFF    Collection Time: 04/24/17  9:23 PM   Result Value Ref Range    WBC 9.2 3.6 - 11.0 K/uL    RBC 3.96 3.80 - 5.20 M/uL    HGB 11.4 (L) 11.5 - 16.0 g/dL    HCT 33.8 (L) 35.0 - 47.0 %    MCV 85.4 80.0 - 99.0 FL    MCH 28.8 26.0 - 34.0 PG    MCHC 33.7 30.0 - 36.5 g/dL    RDW 16.2 (H) 11.5 - 14.5 %    PLATELET 229 787 - 691 K/uL    NEUTROPHILS 61 %    BAND NEUTROPHILS 1 %    LYMPHOCYTES 33 %    MONOCYTES 3 %    EOSINOPHILS 1 %    BASOPHILS 0 %    METAMYELOCYTES 1 %    ABS. NEUTROPHILS 5.7 K/UL    ABS. LYMPHOCYTES 3.0 K/UL    ABS. MONOCYTES 0.3 K/UL    ABS.  EOSINOPHILS 0.1 K/UL ABS. BASOPHILS 0.0 K/UL    RBC COMMENTS ANISOCYTOSIS  PRESENT       PROTHROMBIN TIME + INR    Collection Time: 04/24/17  9:23 PM   Result Value Ref Range    INR 1.2 (H) 0.9 - 1.1      Prothrombin time 12.0 (H) 9.0 - 60.1 sec   METABOLIC PANEL, COMPREHENSIVE    Collection Time: 04/24/17  9:23 PM   Result Value Ref Range    Sodium 140 136 - 145 mmol/L    Potassium 3.6 3.5 - 5.1 mmol/L    Chloride 103 97 - 108 mmol/L    CO2 26 21 - 32 mmol/L    Anion gap 11 5 - 15 mmol/L    Glucose 87 65 - 100 mg/dL    BUN 14 6 - 20 MG/DL    Creatinine 1.27 (H) 0.55 - 1.02 MG/DL    BUN/Creatinine ratio 11 (L) 12 - 20      GFR est AA 56 (L) >60 ml/min/1.73m2    GFR est non-AA 46 (L) >60 ml/min/1.73m2    Calcium 8.7 8.5 - 10.1 MG/DL    Bilirubin, total 0.2 0.2 - 1.0 MG/DL    ALT (SGPT) 54 12 - 78 U/L    AST (SGOT) 87 (H) 15 - 37 U/L    Alk. phosphatase 85 45 - 117 U/L    Protein, total 6.5 6.4 - 8.2 g/dL    Albumin 3.1 (L) 3.5 - 5.0 g/dL    Globulin 3.4 2.0 - 4.0 g/dL    A-G Ratio 0.9 (L) 1.1 - 2.2     LACTIC ACID, PLASMA    Collection Time: 04/24/17  9:23 PM   Result Value Ref Range    Lactic acid 3.5 (HH) 0.4 - 2.0 MMOL/L   CK W/ REFLX CKMB    Collection Time: 04/24/17  9:23 PM   Result Value Ref Range    CK 54 26 - 192 U/L   TROPONIN I    Collection Time: 04/24/17  9:23 PM   Result Value Ref Range    Troponin-I, Qt. <0.04 <0.05 ng/mL   ETHYL ALCOHOL    Collection Time: 04/24/17  9:23 PM   Result Value Ref Range    ALCOHOL(ETHYL),SERUM <10 <10 MG/DL   AMMONIA    Collection Time: 04/24/17  9:23 PM   Result Value Ref Range    Ammonia 15 <01 UMOL/L   SALICYLATE    Collection Time: 04/24/17  9:23 PM   Result Value Ref Range    SALICYLATE <7.0 (L) 2.8 - 20.0 MG/DL   ACETAMINOPHEN    Collection Time: 04/24/17  9:23 PM   Result Value Ref Range    ACETAMINOPHEN 3 (L) 10 - 30 ug/mL   TSH 3RD GENERATION    Collection Time: 04/24/17  9:23 PM   Result Value Ref Range    TSH 6.60 (H) 0.36 - 3.74 uIU/mL   TOTAL HCG, QT.     Collection Time: 04/24/17  9:23 PM   Result Value Ref Range    Beta HCG, QT <1 0 - 6 MIU/ML   LITHIUM    Collection Time: 04/24/17  9:23 PM   Result Value Ref Range    Lithium 0.46 (L) 0.60 - 1.20 MMOL/L    Reported dose date: NOT PROVIDED      Reported dose time: NOT PROVIDED      Reported dose: NOT PROVIDED UNITS   URINALYSIS W/MICROSCOPIC    Collection Time: 04/24/17 10:23 PM   Result Value Ref Range    Color DARK YELLOW      Appearance CLOUDY (A) CLEAR      Specific gravity 1.029 1.003 - 1.030      pH (UA) 7.0 5.0 - 8.0      Protein 100 (A) NEG mg/dL    Glucose NEGATIVE  NEG mg/dL    Ketone TRACE (A) NEG mg/dL    Blood NEGATIVE  NEG      Urobilinogen 1.0 0.2 - 1.0 EU/dL    Nitrites NEGATIVE  NEG      Leukocyte Esterase SMALL (A) NEG      WBC 5-10 0 - 4 /hpf    RBC 0-5 0 - 5 /hpf    Epithelial cells MODERATE (A) FEW /lpf    Bacteria 1+ (A) NEG /hpf    Amorphous Crystals 1+ (A) NEG   BILIRUBIN, CONFIRM    Collection Time: 04/24/17 10:23 PM   Result Value Ref Range    Bilirubin UA, confirm NEGATIVE  NEG     DRUG SCREEN, URINE    Collection Time: 04/24/17 10:23 PM   Result Value Ref Range    AMPHETAMINE NEGATIVE  NEG      BARBITURATES NEGATIVE  NEG      BENZODIAZEPINE POSITIVE (A) NEG      COCAINE NEGATIVE  NEG      METHADONE POSITIVE (A) NEG      OPIATES NEGATIVE  NEG      PCP(PHENCYCLIDINE) NEGATIVE  NEG      THC (TH-CANNABINOL) POSITIVE (A) NEG      Drug screen comment (NOTE)    LACTIC ACID, PLASMA    Collection Time: 04/25/17  2:00 AM   Result Value Ref Range    Lactic acid 0.8 0.4 - 2.0 MMOL/L

## 2017-04-25 NOTE — PROCEDURES
[facility]  *** FINAL REPORT ***    Name: Jose Echeverria  MRN: [id number]    Inpatient  : 14 Dec 1975  HIS Order #: [id number]  [id number] [facility] Visit #: [id number]  Date: 2017    TYPE OF TEST: Cerebrovascular Duplex    REASON FOR TEST  AMS    Right Carotid:-             Proximal               Mid                 Distal  cm/s  Systolic  Diastolic  Systolic  Diastolic  Systolic  Diastolic  CCA:     78.2                                      81.0  Bulb:  ECA:    105.0  ICA:     94.0                 69.0                 83.0  ICA/CCA:  1.2    ICA Stenosis: Normal    Right Vertebral:-  Finding: Antegrade  Sys:       59.0  Jaci:    Right Subclavian: Normal    Left Carotid:-            Proximal                Mid                 Distal  cm/s  Systolic  Diastolic  Systolic  Diastolic  Systolic  Diastolic  CCA:    298.8      18.0                            87.0  Bulb:  ECA:    117.0  ICA:     54.0                 59.0                 69.0  ICA/CCA:  0.6    ICA Stenosis: Normal    Left Vertebral:-  Finding: Antegrade  Sys:       46.0  Jaci:    Left Subclavian: Normal    INTERPRETATION/FINDINGS  PROCEDURE:  Carotid Duplex Examination using B-mode, color and  spectral Doppler of the extracranial cerebrovascular arteries. 1. No evidence of significant arterial occlusive disease in the  internal carotid arteries. 2. No significant stenosis in the external carotid arteries  bilaterally. 3. Antegrade flow in both vertebral arteries. 4. Normal flow in both subclavian arteries. ADDITIONAL COMMENTS    I have personally reviewed the data relevant to the interpretation of  this  study. TECHNOLOGIST: Aminata Allen RVT  Signed: 2017 02:53 PM    PHYSICIAN: Maxim Miranda MD  Signed: 2017 03:00 PM

## 2017-04-26 PROBLEM — R41.82 ALTERED MENTAL STATUS, UNSPECIFIED: Status: ACTIVE | Noted: 2017-04-26

## 2017-04-26 LAB
ANION GAP BLD CALC-SCNC: 9 MMOL/L (ref 5–15)
BUN SERPL-MCNC: 8 MG/DL (ref 6–20)
BUN/CREAT SERPL: 9 (ref 12–20)
CALCIUM SERPL-MCNC: 8 MG/DL (ref 8.5–10.1)
CHLORIDE SERPL-SCNC: 106 MMOL/L (ref 97–108)
CO2 SERPL-SCNC: 24 MMOL/L (ref 21–32)
CREAT SERPL-MCNC: 0.92 MG/DL (ref 0.55–1.02)
GLUCOSE SERPL-MCNC: 97 MG/DL (ref 65–100)
POTASSIUM SERPL-SCNC: 4.1 MMOL/L (ref 3.5–5.1)
SODIUM SERPL-SCNC: 139 MMOL/L (ref 136–145)

## 2017-04-26 PROCEDURE — 36415 COLL VENOUS BLD VENIPUNCTURE: CPT | Performed by: INTERNAL MEDICINE

## 2017-04-26 PROCEDURE — 74011250637 HC RX REV CODE- 250/637: Performed by: INTERNAL MEDICINE

## 2017-04-26 PROCEDURE — 74011250637 HC RX REV CODE- 250/637: Performed by: PSYCHIATRY & NEUROLOGY

## 2017-04-26 PROCEDURE — 65270000029 HC RM PRIVATE

## 2017-04-26 PROCEDURE — 74011250636 HC RX REV CODE- 250/636: Performed by: EMERGENCY MEDICINE

## 2017-04-26 PROCEDURE — 80048 BASIC METABOLIC PNL TOTAL CA: CPT | Performed by: INTERNAL MEDICINE

## 2017-04-26 PROCEDURE — 74011250636 HC RX REV CODE- 250/636: Performed by: INTERNAL MEDICINE

## 2017-04-26 RX ORDER — CLONAZEPAM 0.5 MG/1
0.5 TABLET ORAL 2 TIMES DAILY
COMMUNITY
End: 2017-07-28

## 2017-04-26 RX ORDER — HALOPERIDOL 5 MG/ML
2 INJECTION INTRAMUSCULAR ONCE
Status: COMPLETED | OUTPATIENT
Start: 2017-04-26 | End: 2017-04-26

## 2017-04-26 RX ADMIN — CLONAZEPAM 0.5 MG: 0.5 TABLET ORAL at 09:50

## 2017-04-26 RX ADMIN — CLONIDINE HYDROCHLORIDE 0.1 MG: 0.1 TABLET ORAL at 21:10

## 2017-04-26 RX ADMIN — LITHIUM CARBONATE 300 MG: 300 TABLET ORAL at 09:50

## 2017-04-26 RX ADMIN — CLONAZEPAM 0.5 MG: 0.5 TABLET ORAL at 18:09

## 2017-04-26 RX ADMIN — ONDANSETRON HYDROCHLORIDE 4 MG: 2 INJECTION, SOLUTION INTRAMUSCULAR; INTRAVENOUS at 01:48

## 2017-04-26 RX ADMIN — Medication 10 ML: at 03:33

## 2017-04-26 RX ADMIN — LORAZEPAM 1 MG: 2 INJECTION INTRAMUSCULAR; INTRAVENOUS at 10:37

## 2017-04-26 RX ADMIN — CLONIDINE HYDROCHLORIDE 0.1 MG: 0.1 TABLET ORAL at 09:50

## 2017-04-26 RX ADMIN — CLONIDINE HYDROCHLORIDE 0.1 MG: 0.1 TABLET ORAL at 15:37

## 2017-04-26 RX ADMIN — FLUTICASONE FUROATE AND VILANTEROL TRIFENATATE 1 PUFF: 200; 25 POWDER RESPIRATORY (INHALATION) at 09:50

## 2017-04-26 RX ADMIN — Medication 10 ML: at 19:43

## 2017-04-26 RX ADMIN — LORAZEPAM 1 MG: 2 INJECTION INTRAMUSCULAR; INTRAVENOUS at 19:43

## 2017-04-26 RX ADMIN — SERTRALINE HYDROCHLORIDE 100 MG: 50 TABLET ORAL at 09:50

## 2017-04-26 RX ADMIN — ONDANSETRON HYDROCHLORIDE 4 MG: 2 INJECTION, SOLUTION INTRAMUSCULAR; INTRAVENOUS at 10:37

## 2017-04-26 RX ADMIN — LEVOTHYROXINE SODIUM 25 MCG: 50 TABLET ORAL at 07:03

## 2017-04-26 RX ADMIN — LORAZEPAM 1 MG: 2 INJECTION INTRAMUSCULAR; INTRAVENOUS at 15:52

## 2017-04-26 RX ADMIN — HALOPERIDOL LACTATE 2 MG: 5 INJECTION, SOLUTION INTRAMUSCULAR at 03:33

## 2017-04-26 RX ADMIN — LORAZEPAM 1 MG: 2 INJECTION INTRAMUSCULAR; INTRAVENOUS at 01:10

## 2017-04-26 RX ADMIN — LITHIUM CARBONATE 300 MG: 300 TABLET ORAL at 18:09

## 2017-04-26 NOTE — PROGRESS NOTES
End of Shift Nursing Note    Bedside shift change report given to Dayton Valverde (oncoming nurse) by Priti Newman (offgoing nurse). Report included the following information SBAR, Intake/Output, MAR and Recent Results. Zone Phone:   [phone]    Significant changes during shift:    Auditory and visual hallucinations   Non-emergent issues for physician to address:   Change in med? Methadone v. Suboxone per pt. Number times ambulated in hallway past shift: 0      Number of times OOB to chair past shift: 0    POD #: na     Vital Signs:    Temp: 98.6 °F (37 °C)     Pulse (Heart Rate): 98     BP: 107/72     Resp Rate: 16     O2 Sat (%): 99 %    Lines & Drains:     Urinary Catheter? No   Placement Date:      Medical Necessity:   Central Line? No   Placement Date:    Medical Necessity:   PICC Line? No   Placement Date:    Medical Necessity:     NG tube [] in [] removed [x] not applicable   Drains [] in [] removed [x] not applicable     Skin Integrity:      Wounds: no   Dressings Present: no    Wound Concerns: no      GI:    Current diet:  DIET REGULAR    Nausea: NO  Vomiting: NO  Bowel Sounds: YES  Flatus: YES  Last Bowel Movement: yesterday   Appearance:     Respiratory:  Supplemental O2: No      Device:    via  Liters/min     Incentive Spirometer: NO  Volume:   Coughing and Deep Breathing: YES  Oral Care: YES  Understanding (patient/family education): NO   Getting out of bed: YES  Head of bed elevation: YES    Patient Safety:    Falls Score: 5  Mobility Score: 1  Bed Alarm On? Yes  Sitter? No      Opportunity for questions and clarification was given to oncoming nurse. Patient bed is in low position, side rails are up x 2, door & observation blinds open as needed, call bell within reach and patient not in distress.     Aarti Orozco RN

## 2017-04-26 NOTE — PROGRESS NOTES
[id number]    0730  Report completed, SITTER IN ROOM PER PRN ORDER    1037  Patient requested ativan and nausea medicine, stated she was shaking and then held her hand up to demonstrate the shakiness. 1050  Patient asked if nurse could inquire on what medications she is going to get when she is discharged she stated \" the sindhu that did that test on my head said I was going home today\"     1330  Patient has 2 adult female visitors with an infant, the visitors refuse to follow the contact precautions and were asked by multiple staff members to comply with a polite explaination    1500-SHIFT CHANGE  Bedside and Verbal shift change report to oncoming nurse. Report included the following information SBAR, Kardex, Intake/Output and Recent Results.

## 2017-04-26 NOTE — PROGRESS NOTES
The patient is a 35year old who is a readmit due to substance use, metabolic encephalopathy, lactic acidosis. She states that she went to [facility] as the staff at [facility] instructed to go when discharged and was told that they could not see her for a week. She states that she wants to go to rehab for substance use disorder for heroin. At admission she was urine drug positive for methadone which she received at [facility], [facility] and [facility]. CM has contacted [facility] and is waiting for call back for any assistance they can provide for her. CM gave the patient information and contact number for [facility] and Substance Abuse  for rehab assistance. Care Management Interventions  PCP Verified by CM: No (has not seen a doctor in a while)  Transition of Care Consult (CM Consult): Discharge Planning  Discharge Durable Medical Equipment: No (no DME)  Physical Therapy Consult: No  Occupational Therapy Consult: No  Speech Therapy Consult: No  Current Support Network: Relative's Home (states she lives with her mother, was discharged from [facility] on the 18th)   CM will follow the patient for discharge planning.   Marshall Kayser RN #[id number]

## 2017-04-26 NOTE — PROGRESS NOTES
Patient was becoming increasingly confused with impulsive behavior and auditory\visual hallucinations. She was getting up with out calling and was moving quickly almost falling. 0110 ativan was given per order with minimal to no improvement  0120 request for sitter for patient safety and to prevent falling patient has prn sitter order. Patient at 0320 was increasingly irritable with no marked improvement in confused state or with hallucinations. Paged on call hospitalist Dr. Laurie King received order for Haldol 2mg IV. Was given at 0333 IV per order will continue to monitor.  0430 patient has finally calmed down and is resting in bed with eyes closed will continue to monitor and leave sitter

## 2017-04-26 NOTE — PROGRESS NOTES
Hospitalist Progress Note    NAME: Ed Delgado   :  1975   MRN:  [id number]     Interim Hospital Summary: 35 y.o. female whom presented on 2017 with      Assessment / Plan:  1. Acute Encephalopathy(POA); likely due to unknown recreational substance ingestion. CT head neg. UA neg. UDS + for benzos, methadone & THC. Now on PRn ativan and klonopin. Seen by Neurology and Psychiatry and appreciate help. Has been started back on clonidine by psych. 2. H/o bipolar disorder: on lithium. 3. AMBREEN: improved with hydration. dc IVF  4. Hypothyroidism: TSH 6.6 and started on low dose synthroid. Will need repeat levels in 4-6 weeks  5. HTN: resume home meds  6. Asthma; Cont Breo  7. D/w CM about disposition. Noted recs by psychiatry about inpatient rehab.          Code Status: Full  Surrogate Decision Maker:mother     DVT Prophylaxis: Lovenox  GI Prophylaxis: not indicated     Baseline: Pt is in & out of multiple hospitals due to her polysubstance abuse related events- has had left AMA in the past    Subjective:     Chief Complaint / Reason for Physician Visit  No c/o CP/SOB/N/V. Discussed with RN events overnight. Review of Systems:  Symptom Y/N Comments  Symptom Y/N Comments   Fever/Chills    Chest Pain     Poor Appetite    Edema     Cough    Abdominal Pain     Sputum    Joint Pain     SOB/RIVAS    Pruritis/Rash     Nausea/vomit    Tolerating PT/OT     Diarrhea    Tolerating Diet     Constipation    Other         Objective:     VITALS:   Last 24hrs VS reviewed since prior progress note.  Most recent are:  Patient Vitals for the past 24 hrs:   Temp Pulse Resp BP SpO2   17 1110 98.4 °F (36.9 °C) 78 18 130/89 100 %   17 1019 98.4 °F (36.9 °C) 80 18 108/67 100 %   17 0805 98.2 °F (36.8 °C) 85 20 112/76 100 %   17 0150 98 °F (36.7 °C) 89 20 108/83 100 %   17 1943 98.6 °F (37 °C) 98 16 107/72 99 %       Intake/Output Summary (Last 24 hours) at 04/26/17 1506  Last data filed at 04/26/17 1448   Gross per 24 hour   Intake          2263.75 ml   Output                2 ml   Net          2261.75 ml        PHYSICAL EXAM:  General: WD, WN. Alert, cooperative, no acute distress    EENT:  EOMI. Anicteric sclerae. MMM  Resp:  CTA bilaterally, no wheezing or rales. No accessory muscle use  CV:  Regular  rhythm,  No edema  GI:  Soft, Non distended, Non tender.  +Bowel sounds  Neurologic:  Alert and oriented X 3, normal speech,   Psych:    Not anxious nor agitated  Skin:  no rashes or ulcers. No jaundice    Reviewed most current lab test results and cultures  YES  Reviewed most current radiology test results   YES  Review and summation of old records today    NO  Reviewed patient's current orders and MAR    YES  PMH/ reviewed - no change compared to H&P  ________________________________________________________________________  Care Plan discussed with:    Comments   Patient x    Family      RN x    Care Manager x                   Consultant:      ________________________________________________________________________  Total NON critical care TIME: 25  Minutes    Total CRITICAL CARE TIME Spent:   Minutes non procedure based      Comments   >50% of visit spent in counseling and coordination of care     ________________________________________________________________________  Nic Juarez MD     Procedures: see electronic medical records for all procedures/Xrays and details which were not copied into this note but were reviewed prior to creation of Plan. LABS:  I reviewed today's most current labs and imaging studies.   Pertinent labs include:  Recent Labs      04/24/17 2123   WBC  9.2   HGB  11.4*   HCT  33.8*   PLT  182     Recent Labs      04/26/17   0115  04/25/17   0930  04/24/17 2123   NA  139  142  140   K  4.1  3.3*  3.6   CL  106  108  103   CO2  24  23  26   GLU  97  99  87   BUN  8  11  14   CREA  0.92  1.18*  1.27*   CA  8.0* 7. 9*  8.7   ALB   --    --   3.1*   TBILI   --    --   0.2   SGOT   --    --   87*   ALT   --    --   54   INR   --    --   1.2*

## 2017-04-26 NOTE — PROGRESS NOTES
1930: received report from [facility]. SBAR, medication, orders, assessment, and plan of care discussed. 2015: helped patient to bathroom where she gave herself of a bath. Patient able to ambulate just has tremors bilateral upper extremity. She is complaining of bad withdrawal symptoms. 2125: paged hospitalist about patient wanting more medication for withdrawal symptoms. She ordered a one time dose now of 1mg, her PRN ativan is due at 2139, prn given. 2250: patient still hallucinating and seeing people in her room who she keeps talking to. She asks questions that make no sense and talks about things that make no sense.

## 2017-04-26 NOTE — PROGRESS NOTES
End of Shift Nursing Note    Bedside shift change report given to Jairo Rodriguez (oncoming nurse) by Amber Lamb (offgoing nurse). Report included the following information SBAR, Kardex, ED Summary, Procedure Summary, Intake/Output, MAR, Accordion and Recent Results. Significant changes during shift:    Pt with some anxiety, but calmed after ativan. 1:1 sitter. Two episodes of pt walking in the narayan stating she wanted to leave, but RN able to redirect back into her room. Non-emergent issues for physician to address:   none     Number times ambulated in hallway past shift: 2    Number of times OOB to chair past shift: 2         Vital Signs:    Temp: 98.4 °F (36.9 °C)     Pulse (Heart Rate): 78     BP: 119/85     Resp Rate: 18     O2 Sat (%): 100 %    Lines & Drains:     Urinary Catheter? No       NG tube [] in [] removed [x] not applicable   Drains [] in [] removed [x] not applicable     Skin Integrity:      Wounds: no   Dressings Present: no    Wound Concerns: no      GI:    Current diet:  DIET REGULAR    Nausea: NO  Vomiting: NO  Bowel Sounds: YES  Flatus: YES  Last Bowel Movement: today       Respiratory:  Supplemental O2: No        Incentive Spirometer: NO    Coughing and Deep Breathing: YES  Oral Care: YES  Understanding (patient/family education): YES   Getting out of bed: YES  Head of bed elevation: YES    Patient Safety:    Falls Score: 3  Mobility Score: 1  Bed Alarm On? No  Sitter? Yes      Opportunity for questions and clarification was given to oncoming nurse. Patient bed is in low position, side rails are up x 2, door & observation blinds open as needed, call bell within reach and patient not in distress.     Ayleen Figueroa, RN

## 2017-04-27 VITALS
BODY MASS INDEX: 22.62 KG/M2 | DIASTOLIC BLOOD PRESSURE: 88 MMHG | SYSTOLIC BLOOD PRESSURE: 134 MMHG | HEART RATE: 73 BPM | HEIGHT: 64 IN | RESPIRATION RATE: 18 BRPM | OXYGEN SATURATION: 99 % | WEIGHT: 132.5 LBS | TEMPERATURE: 97.8 F

## 2017-04-27 PROCEDURE — 74011250637 HC RX REV CODE- 250/637: Performed by: FAMILY MEDICINE

## 2017-04-27 PROCEDURE — 74011250637 HC RX REV CODE- 250/637: Performed by: INTERNAL MEDICINE

## 2017-04-27 PROCEDURE — 74011250636 HC RX REV CODE- 250/636: Performed by: INTERNAL MEDICINE

## 2017-04-27 PROCEDURE — 74011250637 HC RX REV CODE- 250/637: Performed by: PSYCHIATRY & NEUROLOGY

## 2017-04-27 RX ORDER — GABAPENTIN 100 MG/1
200 CAPSULE ORAL 3 TIMES DAILY
Status: DISCONTINUED | OUTPATIENT
Start: 2017-04-27 | End: 2017-04-27 | Stop reason: HOSPADM

## 2017-04-27 RX ORDER — LORAZEPAM 1 MG/1
1 TABLET ORAL
Status: DISCONTINUED | OUTPATIENT
Start: 2017-04-27 | End: 2017-04-27 | Stop reason: HOSPADM

## 2017-04-27 RX ORDER — GABAPENTIN 300 MG/1
300 CAPSULE ORAL 3 TIMES DAILY
Status: DISCONTINUED | OUTPATIENT
Start: 2017-04-27 | End: 2017-04-27

## 2017-04-27 RX ADMIN — CLONIDINE HYDROCHLORIDE 0.1 MG: 0.1 TABLET ORAL at 16:03

## 2017-04-27 RX ADMIN — LORAZEPAM 1 MG: 2 INJECTION INTRAMUSCULAR; INTRAVENOUS at 11:25

## 2017-04-27 RX ADMIN — LORAZEPAM 1 MG: 2 INJECTION INTRAMUSCULAR; INTRAVENOUS at 01:51

## 2017-04-27 RX ADMIN — FLUTICASONE FUROATE AND VILANTEROL TRIFENATATE 1 PUFF: 200; 25 POWDER RESPIRATORY (INHALATION) at 08:29

## 2017-04-27 RX ADMIN — LITHIUM CARBONATE 300 MG: 300 TABLET ORAL at 08:29

## 2017-04-27 RX ADMIN — Medication 10 ML: at 13:15

## 2017-04-27 RX ADMIN — CLONAZEPAM 0.5 MG: 0.5 TABLET ORAL at 08:28

## 2017-04-27 RX ADMIN — SERTRALINE HYDROCHLORIDE 100 MG: 50 TABLET ORAL at 08:28

## 2017-04-27 RX ADMIN — CLONIDINE HYDROCHLORIDE 0.1 MG: 0.1 TABLET ORAL at 08:28

## 2017-04-27 RX ADMIN — LEVOTHYROXINE SODIUM 25 MCG: 50 TABLET ORAL at 08:28

## 2017-04-27 RX ADMIN — LORAZEPAM 1 MG: 1 TABLET ORAL at 16:03

## 2017-04-27 RX ADMIN — ONDANSETRON HYDROCHLORIDE 4 MG: 2 INJECTION, SOLUTION INTRAMUSCULAR; INTRAVENOUS at 01:51

## 2017-04-27 NOTE — PROGRESS NOTES
ROSE received a call back from Ms. Mendoza at [facility]. ROSE explained the patient's situation and that she wants rehab for substance use disorder. Ms Kelle Bhatia will call ROSE back after reviewing what happened on the 18th and will check to see if there are any beds in inpatient rehab. She stated that she would have to detox prior to admission.  Jaron Costello RN #[id number]

## 2017-04-27 NOTE — PROGRESS NOTES
End of Shift Nursing Note    Bedside shift change report given to [address] [place] [address] [address] (oncoming nurse) by Yariel Roach (offgoing nurse). Report included the following information SBAR, Intake/Output, MAR and Recent Results. Zone Phone:   [phone]    Significant changes during shift:    Auditory and visual hallucinations increasingly disruptive to patient. Non-emergent issues for physician to address:   Change in med   Pt. Wants help to get inpt. rehab     Number times ambulated in hallway past shift: 0      Number of times OOB to chair past shift: 0    POD #: na     Vital Signs:    Temp: 98.2 °F (36.8 °C)     Pulse (Heart Rate): 88     BP: 112/79     Resp Rate: 18     O2 Sat (%): 99 %    Lines & Drains:     Urinary Catheter? No   Placement Date:      Medical Necessity:   Central Line? No   Placement Date:    Medical Necessity:   PICC Line? No   Placement Date:    Medical Necessity:     NG tube [] in [] removed [x] not applicable   Drains [] in [] removed [x] not applicable     Skin Integrity:      Wounds: no   Dressings Present: no    Wound Concerns: no      GI:    Current diet:  DIET REGULAR    Nausea: NO  Vomiting: NO  Bowel Sounds: YES  Flatus: YES  Last Bowel Movement: yesterday   Appearance:     Respiratory:  Supplemental O2: No      Device:    via  Liters/min     Incentive Spirometer: NO  Volume:   Coughing and Deep Breathing: YES  Oral Care: YES  Understanding (patient/family education): NO   Getting out of bed: YES  Head of bed elevation: YES    Patient Safety:    Falls Score: 5  Mobility Score: 1  Bed Alarm On? Yes  Sitter? No      Opportunity for questions and clarification was given to oncoming nurse. Patient bed is in low position, side rails are up x 2, door & observation blinds open as needed, call bell within reach and patient not in distress.     Js Johnson, RN

## 2017-04-27 NOTE — PROGRESS NOTES
Patient is very anxious and unhappy about not getting desired pain medicine, she is ready to go home. I talked to Dr Michael Ballesteros on phone and, the patient is going home against medical advice. Dr Michael Ballesteros and I talked to her about the risks of going home too soon, patient refuses to stay and  firm about signing the [place]. Dr Michael Ballesteros gave me order on phone to discharge her, as the patient refused to stay. Patient signed the [place] papers but dr Michael Ballesteros could not be on the floor for signing the papaers. She got the patient consent on phone.

## 2017-04-27 NOTE — PROGRESS NOTES
Hospitalist Progress Note    NAME: Joanie Medeiros   :  1975   MRN:  [id number]     Interim Hospital Summary: 35 y.o. female whom presented on 2017 with      Assessment / Plan:  1. Acute Encephalopathy(POA); appears improved. likely due to unknown recreational substance ingestion. CT head neg. UA neg. UDS + for benzos, methadone & THC. Now on PRn ativan and klonopin. Seen by Neurology and Psychiatry and appreciate help. Has been started back on clonidine by psych. 2. H/o bipolar disorder: on lithium. 3. AMBREEN: improved with hydration. dc IVF  4. Hypothyroidism: TSH 6.6 and started on low dose synthroid. Will need repeat levels in 4-6 weeks  5. HTN: resume home meds  6. Asthma; Cont Breo  7. D/w CM about disposition. Appears medically stable. Noted recs by psychiatry about inpatient rehab.? Inpatient psych            Code Status: Full  Surrogate Decision Maker:mother     DVT Prophylaxis: Lovenox  GI Prophylaxis: not indicated     Baseline: Pt is in & out of multiple hospitals due to her polysubstance abuse related events- has had left AMA in the past    Subjective:     Chief Complaint / Reason for Physician Visit  No c/o CP/SOB/N/V. Discussed with RN events overnight. Review of Systems:  Symptom Y/N Comments  Symptom Y/N Comments   Fever/Chills n   Chest Pain n    Poor Appetite n   Edema n    Cough n   Abdominal Pain n    Sputum n   Joint Pain     SOB/RIVAS n   Pruritis/Rash     Nausea/vomit n   Tolerating PT/OT     Diarrhea    Tolerating Diet     Constipation    Other         Objective:     VITALS:   Last 24hrs VS reviewed since prior progress note.  Most recent are:  Patient Vitals for the past 24 hrs:   Temp Pulse Resp BP SpO2   17 0746 98.8 °F (37.1 °C) 77 16 135/86 100 %   17 0411 98.9 °F (37.2 °C) 86 16 128/74 -   17 1950 98.2 °F (36.8 °C) 88 18 112/79 99 %   17 1521 98.4 °F (36.9 °C) 78 18 119/85 100 %   17 1110 98.4 °F (36.9 °C) 78 18 130/89 100 %   04/26/17 1019 98.4 °F (36.9 °C) 80 18 108/67 100 %       Intake/Output Summary (Last 24 hours) at 04/27/17 0906  Last data filed at 04/26/17 1845   Gross per 24 hour   Intake            507.5 ml   Output                2 ml   Net            505.5 ml        PHYSICAL EXAM:  General: WD, WN. Alert, cooperative, no acute distress    EENT:  EOMI. Anicteric sclerae. MMM  Resp:  CTA bilaterally, no wheezing or rales. No accessory muscle use  CV:  Regular  rhythm,  No edema  GI:  Soft, Non distended, Non tender.  +Bowel sounds  Neurologic:  Alert and oriented X 3, normal speech,   Psych:    Not anxious nor agitated  Skin:  no rashes or ulcers. No jaundice    Reviewed most current lab test results and cultures  YES  Reviewed most current radiology test results   YES  Review and summation of old records today    NO  Reviewed patient's current orders and MAR    YES  PMH/ reviewed - no change compared to H&P  ________________________________________________________________________  Care Plan discussed with:    Comments   Patient x    Family      RN x    Care Manager x                   Consultant:      ________________________________________________________________________  Total NON critical care TIME: 25  Minutes    Total CRITICAL CARE TIME Spent:   Minutes non procedure based      Comments   >50% of visit spent in counseling and coordination of care     ________________________________________________________________________  Tanna Payan MD     Procedures: see electronic medical records for all procedures/Xrays and details which were not copied into this note but were reviewed prior to creation of Plan. LABS:  I reviewed today's most current labs and imaging studies.   Pertinent labs include:  Recent Labs      04/24/17 2123   WBC  9.2   HGB  11.4*   HCT  33.8*   PLT  182     Recent Labs      04/26/17   0115  04/25/17   0930  04/24/17 2123   NA  139  142  140   K  4.1 3. 3*  3.6   CL  106  108  103   CO2  24  23  26   GLU  97  99  87   BUN  8  11  14   CREA  0.92  1.18*  1.27*   CA  8.0*  7.9*  8.7   ALB   --    --   3.1*   TBILI   --    --   0.2   SGOT   --    --   87*   ALT   --    --   54   INR   --    --   1.2*

## 2017-04-27 NOTE — PROGRESS NOTES
CM left a message with Ms. Carl Burkett at [facility] to call CM regarding patient's pending discharge and what options may be available to her there. The patient will be discharged in the next day or two and has gone through detox.  Kaci Neely RN #[id number]

## 2017-04-27 NOTE — PROGRESS NOTES
Neurology Progress Note    Patient ID:  Gwynda Lot  [id number]  35 y.o.  1975      CHIEF COMPLAINT: Tremors and altered mental status    Subjective:      Patient has complaints altered mental status and tremors, but looking much better today and her EEG just shows mild generalized slowing and her CT was negative. Psychiatry has seen her and is working with her on her detox.   Patient has no new neurological problems    Current Facility-Administered Medications   Medication Dose Route Frequency    clonazePAM (KlonoPIN) tablet 0.5 mg  0.5 mg Oral BID    fluticasone-vilanterol (BREO ELLIPTA) 200mcg-25mcg/puff  1 Puff Inhalation DAILY    lithium carbonate tablet 300 mg  300 mg Oral BID    sertraline (ZOLOFT) tablet 100 mg  100 mg Oral DAILY    sodium chloride (NS) flush 5-10 mL  5-10 mL IntraVENous Q8H    sodium chloride (NS) flush 5-10 mL  5-10 mL IntraVENous PRN    ondansetron (ZOFRAN) injection 4 mg  4 mg IntraVENous Q4H PRN    enoxaparin (LOVENOX) injection 40 mg  40 mg SubCUTAneous Q24H    LORazepam (ATIVAN) injection 1 mg  1 mg IntraVENous Q4H PRN    levothyroxine (SYNTHROID) tablet 25 mcg  25 mcg Oral ACB    cloNIDine HCl (CATAPRES) tablet 0.1 mg  0.1 mg Oral TID        Past Medical History:   Diagnosis Date    Asthma     Bipolar 1 disorder (HCC)     Chronic obstructive pulmonary disease (HCC)     Chronic pain     back, leg    Cocaine abuse     Depression     Hepatitis B     Heroin abuse     HTN (hypertension)     Narcotic abuse     Polysubstance abuse     Sarcoidosis ([facility]     Tobacco abuse        Past Surgical History:   Procedure Laterality Date    HX GYN      HX WISDOM TEETH EXTRACTION         [unfilled]    Social History   Substance Use Topics    Smoking status: Current Every Day Smoker     Packs/day: 0.25    Smokeless tobacco: Never Used    Alcohol use No       Current Facility-Administered Medications   Medication Dose Route Frequency Provider Last Rate Last Dose    clonazePAM (KlonoPIN) tablet 0.5 mg  0.5 mg Oral BID Benito Zacarias MD   0.5 mg at 04/26/17 1809    fluticasone-vilanterol (BREO ELLIPTA) 200mcg-25mcg/puff  1 Puff Inhalation DAILY Benito Zacarias MD   1 Puff at 04/26/17 0950    lithium carbonate tablet 300 mg  300 mg Oral BID Benito Zacarias MD   300 mg at 04/26/17 1809    sertraline (ZOLOFT) tablet 100 mg  100 mg Oral DAILY Benito Zacarias MD   100 mg at 04/26/17 0950    sodium chloride (NS) flush 5-10 mL  5-10 mL IntraVENous Demarcus Mason MD   10 mL at 04/26/17 1943    sodium chloride (NS) flush 5-10 mL  5-10 mL IntraVENous PRN Benito Zacarias MD        ondansetron [facility]) injection 4 mg  4 mg IntraVENous Q4H PRN Benito Zacarias MD   4 mg at 04/26/17 1037    enoxaparin (LOVENOX) injection 40 mg  40 mg SubCUTAneous Q24H Benito Zacarias MD        LORazepam (ATIVAN) injection 1 mg  1 mg IntraVENous Q4H PRN Benito Zacarias MD   1 mg at 04/26/17 1943    levothyroxine (SYNTHROID) tablet 25 mcg  25 mcg Oral ACB Benito Zacarias MD   25 mcg at 04/26/17 0703    cloNIDine HCl (CATAPRES) tablet 0.1 mg  0.1 mg Oral TID Janett Maria MD   0.1 mg at 04/26/17 2110       Allergies   Allergen Reactions    Tomato Swelling     Tongue       Review of Systems:    A comprehensive review of systems was negative except for: Constitutional: positive for fatigue and malaise  Musculoskeletal: positive for myalgias, arthralgias, stiff joints and muscle weakness  Neurological: positive for memory problems, paresthesia, tremor and weakness  Behvioral/Psych: positive for anxiety, depression and illegal drug usage    Objective:      Objective:     Patient Vitals for the past 24 hrs:   BP Temp Pulse Resp SpO2 Weight   04/26/17 1950 112/79 98.2 °F (36.8 °C) 88 18 99 % -   04/26/17 1656 - - - - - 132 lb 8 oz (60.1 kg)   04/26/17 1521 119/85 98.4 °F (36.9 °C) 78 18 100 % -   04/26/17 1110 130/89 98.4 °F (36.9 °C) 78 18 100 % -   04/26/17 1019 108/67 98.4 °F (36.9 °C) 80 18 100 % -   04/26/17 0805 112/76 98.2 °F (36.8 °C) 85 20 100 % -   04/26/17 0150 108/83 98 °F (36.7 °C) 89 20 100 % -         Lab Review   Recent Results (from the past 24 hour(s))   METABOLIC PANEL, BASIC    Collection Time: 04/26/17  1:15 AM   Result Value Ref Range    Sodium 139 136 - 145 mmol/L    Potassium 4.1 3.5 - 5.1 mmol/L    Chloride 106 97 - 108 mmol/L    CO2 24 21 - 32 mmol/L    Anion gap 9 5 - 15 mmol/L    Glucose 97 65 - 100 mg/dL    BUN 8 6 - 20 MG/DL    Creatinine 0.92 0.55 - 1.02 MG/DL    BUN/Creatinine ratio 9 (L) 12 - 20      GFR est AA >60 >60 ml/min/1.73m2    GFR est non-AA >60 >60 ml/min/1.73m2    Calcium 8.0 (L) 8.5 - 10.1 MG/DL           Additional comments:I personally viewed and interpreted the patient's EEG and labs        NEUROLOGICAL EXAM:    Appearance: The patient is poorly developed and nourished, provides a fair history and is in no acute distress. Mental Status: Oriented to time, place and person and the president, cognitive function and fund of knowledge is slow but more normal today. Speech is fluent without aphasia or dysarthria. Mood and affect appropriate but anxious and depressed . Cranial Nerves:   Intact visual fields. Fundi are benign. OSBALDO, EOM's full, no nystagmus, no ptosis. Facial sensation is normal. Corneal reflexes are not tested. Facial movement is symmetric. Hearing is normal bilaterally. Palate is midline with normal sternocleidomastoid and trapezius muscles are normal. Tongue is midline. Neck without meningismus or bruits   Motor:  4/5 strength in upper and lower proximal and distal muscles. Normal bulk and tone. No fasciculations. Reflexes:   Deep tendon reflexes 1+/4 and symmetrical.  No babinski or clonus present   Sensory:   Normal to touch, pinprick and temperature and vibration. DSS is intact   Gait:  Normal gait. Tremor:   No tremor noted. Cerebellar:  No cerebellar signs present.    Neurovascular:  Normal heart sounds and regular rhythm, peripheral pulses intact, and no carotid bruits. Assessment:        Assessment:       ICD-10-CM ICD-9-CM    1. Delirium R41.0 780.09    2. Polysubstance abuse F19.10 305.90    3. Acute encephalopathy G93.40 348.30    4. Convulsions, unspecified convulsion type ([facility] [address] R56.9 780.39    5. Convulsive syncope (HCC) R55 780.2      780.39    6.  Stenosis of both internal carotid arteries I65.23 433.10      433.30      Active Problems:    Polysubstance abuse ()      Lactic acidosis (4/25/2017)      Acute encephalopathy (4/25/2017)      Potential for altered mental status (4/25/2017)      Convulsion disorder ([facility] [address] (4/25/2017)      Stenosis of both internal carotid arteries (4/25/2017)      [facility] (4/25/2017)      Altered mental status, unspecified (4/26/2017)        Plan:     EEG was normal today, showing no seizures, no slowing or abnormalities, and her CT on admission was normal  Patient much better after being treated by psychiatry  She has no seizures, or altered mental status is clearly related to her drug history, she is much better on current therapy now  We will see again as needed, call if we can help      Signed:  Fran Henderson MD  4/26/2017  10:25 PM    None  None

## 2017-04-27 NOTE — PROGRESS NOTES
Called by nursing that patient asking for pain medications. Ordered tylenol. Then called back by nursing again that pt did not want tylenol for pain and asking for something stronger and if not given would leave AMA. Pt admitted for drug abuse-heroin and withdrawal and I am not comfortable prescribing IV pain medication for her right now. Spoke with patient over the phone. She is oriented, alert x3, feels she has pain all over her body wants something stronger and if I do not prescribe will leave AMA. Discussed with her about the risks of going home and increased risk of worsening symptoms,including death, she understands but still wants to leave AMA.

## 2017-04-27 NOTE — PROGRESS NOTES
End of Shift Nursing Note    Bedside shift change report given to Linda Serrano RN (oncoming nurse) by Kailee Almeida. Blu Chris RN (offgoing nurse). Report included the following information SBAR. Zone Phone:   [phone]    Significant changes during shift:    none   Non-emergent issues for physician to address:   none     Number times ambulated in hallway past shift: in room      Number of times OOB to chair past shift: 0    POD #: none     Vital Signs:    Temp: 97.8 °F (36.6 °C)     Pulse (Heart Rate): 73     BP: 134/88     Resp Rate: 18     O2 Sat (%): 99 %    Lines & Drains:     Urinary Catheter? No    Central Line? No     PICC Line? No       NG tube [] in [] removed [x] not applicable   Drains [] in [] removed [x] not applicable     Skin Integrity:      Wounds: no   Dressings Present: no    Wound Concerns: no      GI:    Current diet:  DIET REGULAR    Nausea: NO  Vomiting: NO  Bowel Sounds: YES  Flatus: YES  Last Bowel Movement: 4/25/17   Appearance: soft    Respiratory:  Supplemental O2: No      Device: RA      Incentive Spirometer: NO  Volume: n/a  Coughing and Deep Breathing: YES  Oral Care: YES  Understanding (patient/family education): YES   Getting out of bed: YES  Head of bed elevation: YES    Patient Safety:    Falls Score: 3  Mobility Score: 1  Bed Alarm On? Yes  Sitter? Yes      Opportunity for questions and clarification was given to oncoming nurse. Patient bed is in supine position, side rails are up x 2, door & observation blinds open as needed, call bell within reach and patient not in distress.     Kirsty Pierson RN

## 2017-04-27 NOTE — PROCEDURES
[address]   EEG       Name:  Lana Felty   MR#:  [id number]   :  1975   Account #:  [de-identified]    Date of Procedure:  2017   Date of Adm:  2017       DATE OF SERVICE: 2017      CLINICAL INDICATION: The patient is a 22-year-old female with a   history of altered mental status, confusion, possible lethargy. The   patient with some history of drug abuse problems. The patient with   passing out spells, possible seizures, possible convulsive syncope. EEG to rule out seizures, rule out cortical abnormality, rule out   encephalopathy. EEG CLASSIFICATION: Normal awake and asleep. DESCRIPTION OF THE RECORD: The background of this recording   contains a posteriorly located occipital alpha rhythm of 9 to 10 Hz that   attenuates with eye opening. There were no clear areas of focal   slowing or spike or spike-and-wave discharges seen in the recording. Hyperventilation was not performed. Photic stimulation produced a   mild driving response in the posterior head regions. The patient did   enter brief states of sleep with K-complexes and sleep spindles seen in   the central head regions. There also was a slight increase in some low   amplitude 15-25 Hz beta activity most likely representing medication   affect. INTERPRETATION: This is an essentially normal   electroencephalogram with the patient awake and asleep, showing no   focal abnormalities or spike discharges. The excess beta activity most   likely represents medication artifact. Clinical correlation recommended.         MD OSIEL Aguirre / OZ   D:  2017   22:47   T:  2017   13:12   Job #:  [id number]

## 2017-04-27 NOTE — CONSULTS
PSYCHIATRIC PROGRESS NOTE         Patient Name  Magy Ureña   Date of Birth 1975   [facility] [id number]   Medical Record Number  [id number]      Age  35 y.o. PCP None   Admit date:  4/24/2017    Room Number  2138/01  @ [facility]   Date of Service  4/27/2017             E & M PROGRESS NOTE:         HISTORY       CC/HISTORY OF PRESENT ILLNESS/INTERVAL HISTORY:  (reviewed/updated 4/27/2017). per initial evaluation: Patient stated she has long history of bipolar Disorder And Polysubstance Abuse , She First Denied Using Anything but Heroin But later She stated She is Using Xanax And Klonopin And She is Also Smoking Marijuana , She Stated She used Heroin yesterday , However with The history There Was AMS And Memory Issues That Could not Explained Just by Heroin Use And most probably There Was Benzo On top of that , Patient Later stated That She Used Xanax also Yesterday , She Wants To Receive Medication For Withdrawal And Requested Either Methadone Or Suboxon , She Stated She has History Of Depression And Gets manic Sometimes And Sh si On lithium For years , She denied feeling Suicidal And Denied Having thoughts about hurting Herself       Yehuda Mcgee presents/reports/evidences the following emotional symptoms today, 4/27/2017:  Pt reports w/d sx- abdominal pain, chills, sweating, cramps. Pt has been using heroin and xanax off the street. She is motivated to get enrolled in substance abuse program. She denies active SI/HI/AVH. She is med seeking and requesting if she can be put back on lithium and other psychotropics. She is already started on Zoloft and Lithium. No manic sx or psychosis reported. No overt depressive sx. She has been also asking to start Neurontin which she has been on in the past.      SIDE EFFECTS: (reviewed/updated 4/27/2017)  None reported or admitted to.      ALLERGIES:(reviewed/updated 4/27/2017)  Allergies   Allergen Reactions    Tomato Swelling     Tongue MEDICATIONS PRIOR TO ADMISSION:(reviewed/updated 4/27/2017)  Prescriptions Prior to Admission   Medication Sig    clonazePAM (KLONOPIN) 0.5 mg tablet Take 0.5 mg by mouth two (2) times a day.  gabapentin (NEURONTIN) 800 mg tablet Take 1 Tab by mouth three (3) times daily.  lithium carbonate 300 mg tablet Take 1 Tab by mouth two (2) times a day.  prazosin (MINIPRESS) 2 mg capsule Take 1 Cap by mouth nightly.  QUEtiapine (SEROQUEL) 400 mg tablet Take 2 Tabs by mouth nightly. (Patient taking differently: Take 200 mg by mouth nightly.)    sertraline (ZOLOFT) 100 mg tablet Take 1 Tab by mouth daily.  albuterol sulfate 90 mcg/actuation aepb Take 2 Puffs by inhalation every four (4) hours as needed.  albuterol-ipratropium (DUO-NEB) 2.5 mg-0.5 mg/3 ml nebu 3 mL by Nebulization route every six (6) hours as needed.  fluticasone-vilanterol (BREO ELLIPTA) 200-25 mcg/dose inhaler Take 1 Puff by inhalation daily.  Nebulizer & Compressor machine 1 Each by Does Not Apply route daily.  predniSONE (DELTASONE) 10 mg tablet 40 mg daily for 2 days, then 30 mg daily for 2 days, then 20 mg daily for 2 days, then 10 mg daily for 2 days, then stop    nicotine (NICODERM CQ) 21 mg/24 hr 1 Patch by TransDERmal route daily for 30 days.  albuterol (PROVENTIL VENTOLIN) 2.5 mg /3 mL (0.083 %) nebulizer solution 3 mL by Nebulization route every four (4) hours as needed for Wheezing. PAST MEDICAL HISTORY: Past medical history from the initial psychiatric evaluation has been reviewed (reviewed/updated 4/27/2017) with no additional updates (I asked patient and no additional past medical history provided).    Past Medical History:   Diagnosis Date    Asthma     Bipolar 1 disorder (HCC)     Chronic obstructive pulmonary disease (HCC)     Chronic pain     back, leg    Cocaine abuse     Depression     Hepatitis B     Heroin abuse     HTN (hypertension)     Narcotic abuse     Polysubstance abuse     Sarcoidosis ([facility] [address]     Tobacco abuse      Past Surgical History:   Procedure Laterality Date    HX GYN      HX WISDOM TEETH EXTRACTION        SOCIAL HISTORY: Social history from the initial psychiatric evaluation has been reviewed (reviewed/updated 4/27/2017) with no additional updates (I asked patient and no additional social history provided). Social History     Social History    Marital status: SINGLE     Spouse name: N/A    Number of children: N/A    Years of education: N/A     Occupational History    Not on file. Social History Main Topics    Smoking status: Current Every Day Smoker     Packs/day: 0.25    Smokeless tobacco: Never Used    Alcohol use No    Drug use: Yes     Special: Marijuana      Comment: last used 1/24/2017    Sexual activity: Yes     Partners: Female     Other Topics Concern    Not on file     Social History Narrative    Born in [facility],     [facility], 5 children,    No legal charges. Pt graduated from high school. Pt was employed last month at [employer], currently she is unemployed. Pt lives with her mother and 8year old son. She has 4 adult children. FAMILY HISTORY: Family history from the initial psychiatric evaluation has been reviewed (reviewed/updated 4/27/2017) with no additional updates (I asked patient and no additional family history provided).    Family History   Problem Relation Age of Onset    Hypertension Father     Hypertension Mother        REVIEW OF SYSTEMS: (reviewed/updated 4/27/2017)  Appetite:good   Sleep: fitful   All other Review of Systems:      Psychological ROS: positive for - anxiety  Respiratory ROS: no cough, shortness of breath, or wheezing  Gastrointestinal ROS: no abdominal pain, change in bowel habits, or black or bloody stools  positive for - abdominal pain and diarrhea  Musculoskeletal ROS: negative  positive for - joint pain and muscle pain         MENTAL STATUS EXAM & VITALS     MENTAL STATUS EXAM (MSE):    MSE FINDINGS ARE WITHIN NORMAL LIMITS (WNL) UNLESS OTHERWISE STATED BELOW. Orientation oriented to time, place and person   Vital Signs (BP,Pulse, Temp) See below (reviewed 4/27/2017); vital signs are WNL if not listed below. Gait and Station Stable/steady, no ataxia   Abnormal Muscular Movements, Tone, and Behavior No EPS, no Tardive Dyskinesia, no abnormal muscular movements; wnl tone   Relations cooperative and unreliable   General Appearance:  age appropriate and poor hygiene   Language No aphasia or dysarthria   Speech:  monotone   Thought Processes logical, wnl rate of thoughts, good abstract reasoning and computation   Thought Associations goal directed   Thought Content free of delusions, free of hallucinations and preoccupations   Suicidal Ideations none   Homicidal Ideations none   Mood:  stable   Affect:  anxious, increased in intensity and mood-congruent   Memory recent  adequate   Memory remote:  adequate   Concentration/Attention:  adequate   Fund of Knowledge average   Insight:  fair   Reliability poor   Judgment:  limited        VITALS:     Patient Vitals for the past 24 hrs:   Temp Pulse Resp BP SpO2   04/27/17 1119 97.8 °F (36.6 °C) 73 18 134/88 99 %   04/27/17 0746 98.8 °F (37.1 °C) 77 16 135/86 100 %   04/27/17 0411 98.9 °F (37.2 °C) 86 16 128/74 -   04/26/17 1950 98.2 °F (36.8 °C) 88 18 112/79 99 %            DATA     LABORATORY DATA:(reviewed/updated 4/27/2017)  No results found for this or any previous visit (from the past 24 hour(s)).   No results found for: VALF2, VALAC, VALP, VALPR, DS6, CRBAM, CRBAMP, CARB2, XCRBAM  Lab Results   Component Value Date/Time    Lithium 0.46 04/24/2017 09:23 PM          MEDICATIONS     ALL MEDICATIONS:   Current Facility-Administered Medications   Medication Dose Route Frequency    LORazepam (ATIVAN) tablet 1 mg  1 mg Oral Q4H PRN    gabapentin (NEURONTIN) capsule 300 mg  300 mg Oral TID    clonazePAM (KlonoPIN) tablet 0.5 mg  0.5 mg Oral BID    fluticasone-vilanterol (BREO ELLIPTA) 200mcg-25mcg/puff  1 Puff Inhalation DAILY    sertraline (ZOLOFT) tablet 100 mg  100 mg Oral DAILY    sodium chloride (NS) flush 5-10 mL  5-10 mL IntraVENous Q8H    sodium chloride (NS) flush 5-10 mL  5-10 mL IntraVENous PRN    ondansetron (ZOFRAN) injection 4 mg  4 mg IntraVENous Q4H PRN    enoxaparin (LOVENOX) injection 40 mg  40 mg SubCUTAneous Q24H    levothyroxine (SYNTHROID) tablet 25 mcg  25 mcg Oral ACB    cloNIDine HCl (CATAPRES) tablet 0.1 mg  0.1 mg Oral TID      SCHEDULED MEDICATIONS:   Current Facility-Administered Medications   Medication Dose Route Frequency    gabapentin (NEURONTIN) capsule 300 mg  300 mg Oral TID    clonazePAM (KlonoPIN) tablet 0.5 mg  0.5 mg Oral BID    fluticasone-vilanterol (BREO ELLIPTA) 200mcg-25mcg/puff  1 Puff Inhalation DAILY    sertraline (ZOLOFT) tablet 100 mg  100 mg Oral DAILY    sodium chloride (NS) flush 5-10 mL  5-10 mL IntraVENous Q8H    enoxaparin (LOVENOX) injection 40 mg  40 mg SubCUTAneous Q24H    levothyroxine (SYNTHROID) tablet 25 mcg  25 mcg Oral ACB    cloNIDine HCl (CATAPRES) tablet 0.1 mg  0.1 mg Oral TID            ASSESSMENT & PLAN     The patient, Catalina Ratliff, is a 35 y.o.  female who presents at this time for treatment of the following diagnoses: (reviewed/updated 4/27/2017)    Polysubstance Dependence- Heroin, Benzo and THC  Substance induced mood disorder- r/o Bipolar type. Plan: will DC Lithium, ct with Zoloft, Start Neurontin 300 mg TID, CT with Klonopin taper    Ct with opioid and Benzo detox, with COWS and CIWA with prn ativan, seizure precaution    Recommend Inpatient Rehab for Substance Abuse on dc- case management to follow up with  [facility]      I will continue to follow up with patient as deemed appropriate. I will continue to order blood tests/labs and diagnostic tests as deemed appropriate and review results as they become available (see orders for details and above listed lab/test results).     I will order psychiatric records from previous [facility] hospitals to further elucidate the nature of patient's psychopathology and review once available. I will gather additional collateral information from friends, family and o/p treatment team to further elucidate the nature of patient's psychopathology and baselline level of psychiatric functioning.     Complete current electronic health record for patient has been reviewed today including consultant notes, ancillary staff notes, nurses and psychiatric tech notes        Signed By:   Nichole Earl MD  4/27/2017

## 2017-05-01 NOTE — DISCHARGE SUMMARY
Hospitalist Discharge Summary     Patient ID:  Leatha Fraga  [id number]  35 y.o.  1975    PCP on record: None    Admit date: 4/24/2017  Discharge date and time: 5/1/2017      DISCHARGE DIAGNOSIS:     1. Acute Encephalopathy(POA); appears improved. 2. H/o bipolar disorder  3. AMBREEN: improved with hydration    4. Hypothyroidism  5. HTN    6. Asthma    Left AMA      CONSULTATIONS:  IP CONSULT TO PSYCHIATRY  IP CONSULT TO NEUROLOGY    Excerpted HPI from H&P of Dr Chica Robledo:   Maria Elena Ferguson is a 35 y.o.  female who presents with above complains from home via EMS. Pt was brought in with CC of unusual behavior with agitation & tremulousness x 1 day (since AM)  H/o severe polysubstance abuse - heroin, cocaine, & marijuana in past- just DC from [facility] after Heroin withdrawal admission- was managed with Methadone protocol as per psych there. Pt is thought have taken some unknown substance as per the family/mother - ? Bath salt versus synthetic marijuana? Pt has not yet cleared up in ER to be evaluated by Psych or [facility] for disposition  H/o Bipolar disorder with zane as per family.     Pt was found to be in lactic acidosis with sinus tachycardia in ER with unremarkable workup including CT head.       ______________________________________________________________________  DISCHARGE SUMMARY/HOSPITAL COURSE:  for full details see H&P, daily progress notes, labs, consult notes. 1. Acute Encephalopathy(POA); appears improved. likely due to unknown recreational substance ingestion. CT head neg. UA neg. UDS + for benzos, methadone & THC. Now on PRn ativan and klonopin. Seen by Neurology and Psychiatry and appreciate help. Has been started back on clonidine by psych. 2. H/o bipolar disorder: on lithium. dced by psych  3. AMBREEN: improved with hydration. dc IVF  4. Hypothyroidism: TSH 6.6 and started on low dose synthroid. Will need repeat levels in 4-6 weeks  5. HTN: resume home meds  6. Asthma; Cont Breo  7. D/w CM about disposition. Noted recs by psychiatry about inpatient rehab.? Inpatient psych      Called by nursing that patient asking for pain medications. Ordered tylenol. Then called back by nursing again that pt did not want tylenol for pain and asking for something stronger and if not given would leave AMA. Pt admitted for drug abuse-heroin and withdrawal and I am not comfortable prescribing IV pain medication for her right now. Spoke with patient over the phone. She is oriented, alert x3, feels she has pain all over her body wants something stronger and if I do not prescribe will leave AMA.     Discussed with her about the risks of going home and increased risk of worsening symptoms,including death, she understands but still wants to leave AMA.         Code Status: Full  Surrogate Decision Maker:mother      DVT Prophylaxis: Lovenox  GI Prophylaxis: not indicated      Baseline: Pt is in & out of multiple hospitals due to her polysubstance abuse related events- has had left AMA in the past        _______________________________________________________________________  Pt left AMA  _______________________________________________________________________  DISCHARGE MEDICATIONS:   Cannot display discharge medications since this patient is not currently admitted. My Recommended Diet, Activity, Wound Care, and follow-up labs are listed in the patient's Discharge Insturctions which I have personally completed and reviewed.     _______________________________________________________________________  DISPOSITION:    Home with Family:    Home with HH/PT/OT/RN:    SNF/LTC:    MAEGAN:    OTHER:    Pt Left AMA  _______________________________________________________________________  Follow up with:   PCP : None  Follow-up Information     Follow up With Details Comments [address]  you could qualify for Rapid Access program. You will need to be there between 8am to 10am Monday through Friday to apply for the program. [facility] [id number] [place] can call for assistance for inpatient substance disorder programs [phone]              Total time in minutes spent coordinating this discharge (includes going over instructions, follow-up, prescriptions, and preparing report for sign off to her PCP) :  30 minutes    Signed:  Trent Jarquin MD

## 2017-05-01 NOTE — ADT AUTH CERT NOTES
Berl Loss: This is a second admission beginning on  with a discharge of       Facility Name: [address]                      Patient Demographics      Patient Name Sex  Address Phone     Sujatha Enamorado Female 1975 [address]  [address] [facility] [phone] [facility])   [facility] Account      Name Acct ID Class Status Primary Coverage     Sujatha Enamorado [id number] INPATIENT Discharged/Not Billed MEDICAID OUT OF STATE - [place] [id number]              Guarantor Account (for Hospital Account [de-identified])      Name Relation to Pt Service Area Active? Acct Type     Sujatha Enamorado Self [facility] Yes Personal/Family     Address Phone           [address], [place] [address] [phone](C)                Coverage Information (for Hospital Account [de-identified])      F/O Payor/Plan Subscriber  Subscriber Sex Precert #     MEDICAID OUT OF STATE/BSHSI MEDICAID OUT OF STATE 71 [id number]      Subscriber Subscriber #     Sujatha Enamorado [id number]     [facility] # Group Name     [id number] medicaid expansion adult      Address Phone     [address] [place] [facility] [address]      Policy Number Status Effective Date Benefits Phone     [id number] -  -     Auth/Cert       [id number]              Diagnosis         Codes Comments     Delirium  ICD-10-CM: R41.0  ICD-9-CM: 780.09        Admission Information - Patient Record Only      Arrival Date/Time: 2017 Admit Date/Time: 2017 IP Adm. Date/Time: 2017 0246     Admission Type: Emergency Point of Origin: Non-health Care Facility/self Admit Category:      Means of Arrival: Ambulance Primary Service: Medicine Secondary Service: N/A     Transfer Source:  Service Area: [place] Unit: [facility] 2 General Surgery     Admit Provider: Rema Gardiner MD Attending Provider: Loan Ward.  Polo Brown MD Referring Provider:        Admission Information      Attending Provider Admission Dx Admitted On       17     Service Isolation Code Status     MEDICINE  Prior     Allergies           Tomato         Admission Information      [facility] [place] [facility] GENERAL SURGERY/ Service: MEDICINE     Admitting provider: Mela Davis MD Phone: [phone]     Attending provider:  Phone:      PCP: None Phone:      Admission dx: Acute encephalopathy Patient class: I     Admission type: ER         Patient Demographics      Patient Name [address] Sex  Address Phone   Melissa Chaudhari [id number] Female 1975 [address] [place] [address] [phone] (Home)         CSN:     [id number]         Admit Date: Admit Time Room Bed     2017  8:36 PM 2138 [29914] 01 [76087]       Attending Providers      Provider Pager From To     Dillon Smith. Shaneka Kelley MD  17     Trevor Greene MD  17       Emergency Contact(s)      Name Relation Home Work Mobile     Amalia Kim [phone]         Utilization Review         [id number] Adult-General Medical by Leticia Reilly RN      Review Entered Review Status     2017 In Primary     Details       REVIEW SUMMARY     Patient: KOBY JOSEPH  Review Number: [id number]  Review Status: In Primary     Condition Specific: Yes        OUTCOMES  Outcome Type: Primary              REVIEW DETAILS     Admit Date: 2017  Product: ReCellular Adult  Subset: General Medical  (Symptom or finding within 24h)     (Excludes PO medications unless noted)  [X] Select Day, One:  [X] Episode Day 1, One:  [X] ACUTE, >= One:  [X] Withdrawal syndrome, Both:  [X] Alcohol, anxiolytic, hypnotic, or sedative withdrawal syndrome, >= One:  [X] Heart rate > 100/min, sustained  [X] Intervention, >= One:  [X] Medication assisted withdrawal management (includes PO) <= 3d     Version: CloudAmboÂ® 2016.1  CloudAmboÂ® and StatusPage  © The [employer] and/or one of its [place]. All Rights Reserved. CPT only © 2015 American Medical Association.  All Rights Reserved.            Additional Notes     97.6, 111/60, , RR 22, o2 sat 95, RA          Meds: Ns 75 ml.hr, klonopin po, breo, synthroid po, lithium po, Zoloft po, Ns 2000 ml bolus, versed iv          Labs: HGb 11.4, HCT 33.8, RDw 16.2, UA protein 100, ketone trace, leuko manny small, epith cells moderate, bacteria 1+, amorph cyrstals 1+, INR 1.2, Creat 1.27, albumin 3.1, AST 87, lactic acid 3.5, acetaminophen 3, lithium 0.46, benzo and methadone positive, TSH 6.60,           EKg:     Sinus tachycardia      Possible Left atrial enlargement      Borderline ECG                CT HEAD:     No acute findings.                CHIEF COMPLAINT: AMS with agitation x 1 day           HISTORY OF PRESENT ILLNESS:      Nila Jasso is a 35 y.o.  female who presents with above complains from home via EMS.   Pt was brought in with CC of unusual behavior with agitation & tremulousness x 1 day (since AM)     H/o severe polysubstance abuse - heroin, cocaine, & marijuana in past- just DC from [facility] after Heroin withdrawal admission- was managed with Methadone protocol as per psych there.     Pt is thought have taken some unknown substance as per the family/mother - ?  Bath salt versus synthetic marijuana?     Pt has not yet cleared up in ER to be evaluated by Psych or [facility]SMART for disposition     H/o Bipolar disorder with zane as per family.           Pt was found to be in lactic acidosis with sinus tachycardia in ER with unremarkable workup including CT head.           Acute Encephalopathy POA- likely due to unknown recreational substance ingestion     Lactic Acidosis POA     H/o Polysubstance abuse     ?h/o Bipolar disorder     Lactate= 3.5     CT head neg     UA neg     Lithium level = 0.46     Tylenol level 3.0     salicylate level <9.9     Trop neg     EKG= sinus tachycardia with no acute T or ST changes     Urine tox screen +ve for benzos, methadone & THC     TSH= 6.6           Medical bed admission     S/p IV bolus in ER- lactate has normalized now in ER     Start low dose synthroid for now     Await pt to be more alert & awake - call psych eval at that time- ?IP psych     Cont Zoloft, holding off Seroquel for now     Cont Lithium     Cont Clonazepam as at home to prevent withdrawal seizures           HTN     Asthma           Holding prazosin for now     Cont Breo                 Code Status: Full     Surrogate Decision Maker:mother           DVT Prophylaxis: Lovenox     GI Prophylaxis: not indicated           Baseline: Pt is in & out of multiple hospitals due to her polysubstance abuse related events- has had left AMA in past from [facility] (last admission for asthma)

## 2017-07-28 ENCOUNTER — HOSPITAL ENCOUNTER (EMERGENCY)
Age: 42
Discharge: HOME | End: 2017-07-28
Attending: EMERGENCY MEDICINE | Admitting: EMERGENCY MEDICINE
Payer: MEDICAID

## 2017-07-28 VITALS
WEIGHT: 168.87 LBS | OXYGEN SATURATION: 100 % | HEIGHT: 64 IN | BODY MASS INDEX: 28.83 KG/M2 | TEMPERATURE: 98 F | SYSTOLIC BLOOD PRESSURE: 133 MMHG | HEART RATE: 117 BPM | DIASTOLIC BLOOD PRESSURE: 91 MMHG | RESPIRATION RATE: 18 BRPM

## 2017-07-28 DIAGNOSIS — B02.9 HERPES ZOSTER WITHOUT COMPLICATION: Primary | ICD-10-CM

## 2017-07-28 PROCEDURE — 74011636637 HC RX REV CODE- 636/637: Performed by: PHYSICIAN ASSISTANT

## 2017-07-28 PROCEDURE — 99283 EMERGENCY DEPT VISIT LOW MDM: CPT

## 2017-07-28 PROCEDURE — 74011250637 HC RX REV CODE- 250/637: Performed by: PHYSICIAN ASSISTANT

## 2017-07-28 RX ORDER — PREDNISONE 20 MG/1
60 TABLET ORAL
Status: COMPLETED | OUTPATIENT
Start: 2017-07-28 | End: 2017-07-28

## 2017-07-28 RX ORDER — ACYCLOVIR 800 MG/1
800 TABLET ORAL
Qty: 35 TAB | Refills: 0 | Status: SHIPPED | OUTPATIENT
Start: 2017-07-28 | End: 2017-08-04

## 2017-07-28 RX ORDER — OXYCODONE AND ACETAMINOPHEN 5; 325 MG/1; MG/1
2 TABLET ORAL
Status: COMPLETED | OUTPATIENT
Start: 2017-07-28 | End: 2017-07-28

## 2017-07-28 RX ORDER — PREDNISONE 10 MG/1
TABLET ORAL
Qty: 21 TAB | Refills: 0 | Status: SHIPPED | OUTPATIENT
Start: 2017-07-28 | End: 2017-08-03

## 2017-07-28 RX ORDER — ACYCLOVIR 800 MG/1
800 TABLET ORAL
Status: COMPLETED | OUTPATIENT
Start: 2017-07-28 | End: 2017-07-28

## 2017-07-28 RX ORDER — OXYCODONE AND ACETAMINOPHEN 5; 325 MG/1; MG/1
1-2 TABLET ORAL
Qty: 20 TAB | Refills: 0 | Status: SHIPPED | OUTPATIENT
Start: 2017-07-28 | End: 2017-08-03

## 2017-07-28 RX ADMIN — OXYCODONE HYDROCHLORIDE AND ACETAMINOPHEN 2 TABLET: 5; 325 TABLET ORAL at 20:30

## 2017-07-28 RX ADMIN — PREDNISONE 60 MG: 20 TABLET ORAL at 20:30

## 2017-07-28 RX ADMIN — ACYCLOVIR 800 MG: 800 TABLET ORAL at 20:30

## 2017-07-29 NOTE — ED PROVIDER NOTES
HPI Comments: Camryn De Leon, 35 y.o. female with significant PMHx for asthma, COPD, Hepatitis B, substance abuse, HTN, presents ambulatory to [facility] ED with cc of shingles to the left flank x 2 days. Pt states she is in severe pain and the rash burns. Pt notes she never had chicken pox before. Patient specifically denies fever, chills, nausea, vomiting, diarrhea, or headache. PCP: None    Social history significant for: + (.25 ppd) Tobacco, - EtOH, + (marijuana) Illicit Drug Use    There are no other complaints, changes, or physical findings at this time. The history is provided by the patient. No  was used. Past Medical History:   Diagnosis Date    Asthma     Bipolar 1 disorder (HCC)     Chronic obstructive pulmonary disease (HCC)     Chronic pain     back, leg    Cocaine abuse     Depression     Hepatitis B     Heroin abuse     HTN (hypertension)     Narcotic abuse     Polysubstance abuse     Sarcoidosis ([address]     Tobacco abuse        Past Surgical History:   Procedure Laterality Date    HX GYN      HX WISDOM TEETH EXTRACTION           Family History:   Problem Relation Age of Onset    Hypertension Father     Hypertension Mother        Social History     Social History    Marital status: SINGLE     Spouse name: N/A    Number of children: N/A    Years of education: N/A     Occupational History    Not on file. Social History Main Topics    Smoking status: Former Smoker     Packs/day: 0.25    Smokeless tobacco: Never Used    Alcohol use No    Drug use: No      Comment: last used 1/24/2017    Sexual activity: Yes     Partners: Female     Other Topics Concern    Not on file     Social History Narrative    Born in [address], 5 children,    No legal charges. Pt graduated from high school. Pt was employed last month at [employer], currently she is unemployed. Pt lives with her mother and 8year old son. She has 4 adult children. ALLERGIES: Tomato    Review of Systems   Constitutional: Negative for chills and fever. HENT: Negative for rhinorrhea and sore throat. Respiratory: Negative for cough and shortness of breath. Cardiovascular: Negative for chest pain. Gastrointestinal: Negative for abdominal pain, diarrhea, nausea and vomiting. Genitourinary: Positive for flank pain. Negative for dysuria, hematuria, vaginal bleeding and vaginal discharge. Musculoskeletal: Negative for back pain and neck pain. Skin: Positive for rash. Neurological: Negative for light-headedness and headaches. All other systems reviewed and are negative. Vitals:    07/28/17 1917   BP: (!) 133/91   Pulse: (!) 117   Resp: 18   Temp: 98 °F (36.7 °C)   SpO2: 100%   Weight: 76.6 kg (168 lb 14 oz)   Height: 5' 4\" (1.626 m)            Physical Exam   Constitutional: She is oriented to person, place, and time. She appears well-developed and well-nourished. No distress. 34 yo -American female   HENT:   Head: Normocephalic and atraumatic. Eyes: Conjunctivae are normal. Right eye exhibits no discharge. Left eye exhibits no discharge. Neck: Normal range of motion. Neck supple. Cardiovascular: Normal rate, regular rhythm and normal heart sounds. No murmur heard. Pulmonary/Chest: Effort normal and breath sounds normal. No respiratory distress. She has no wheezes. Neurological: She is alert and oriented to person, place, and time. Skin: Skin is warm and dry. Rash noted. She is not diaphoretic. Papular, vesicular rash in a dermatomal distribution to the L side of upper abdomen. Psychiatric: She has a normal mood and affect. Her behavior is normal.   Nursing note and vitals reviewed.        MDM  Number of Diagnoses or Management Options  Diagnosis management comments:     DDx: shingles, contact dermatitis        Amount and/or Complexity of Data Reviewed  Review and summarize past medical records: yes    Patient Progress  Patient progress: stable    ED Course       Procedures    MEDICATIONS GIVEN:  Medications   oxyCODONE-acetaminophen (PERCOCET) 5-325 mg per tablet 2 Tab (2 Tabs Oral Given 7/28/17 2030)   predniSONE (DELTASONE) tablet 60 mg (60 mg Oral Given 7/28/17 2030)   acyclovir (ZOVIRAX) tablet 800 mg (800 mg Oral Given 7/28/17 2030)       IMPRESSION:  1. Herpes zoster without complication        PLAN:  1. Discharge Medication List as of 7/28/2017  8:33 PM      START taking these medications    Details   oxyCODONE-acetaminophen (PERCOCET) 5-325 mg per tablet Take 1-2 Tabs by mouth every six (6) hours as needed for Pain for up to 5 days. Max Daily Amount: 8 Tabs., Print, Disp-20 Tab, R-0      predniSONE (STERAPRED DS) 10 mg dose pack Standard 6 day taper, Print, Disp-21 Tab, R-0      acyclovir (ZOVIRAX) 800 mg tablet Take 1 Tab by mouth five (5) times daily for 7 days. , Print, Disp-35 Tab, R-0         CONTINUE these medications which have NOT CHANGED    Details   albuterol sulfate 90 mcg/actuation aepb Take 2 Puffs by inhalation every four (4) hours as needed. , Print, Disp-1 Inhaler, R-0      albuterol-ipratropium (DUO-NEB) 2.5 mg-0.5 mg/3 ml nebu 3 mL by Nebulization route every six (6) hours as needed. , Print, Disp-100 Nebule, R-1      fluticasone-vilanterol (BREO ELLIPTA) 200-25 mcg/dose inhaler Take 1 Puff by inhalation daily. , Print, Disp-28 Each, R-1      Nebulizer & Compressor machine 1 Each by Does Not Apply route daily. , Print, Disp-1 Each, R-0      albuterol (PROVENTIL VENTOLIN) 2.5 mg /3 mL (0.083 %) nebulizer solution 3 mL by Nebulization route every four (4) hours as needed for Wheezing., Print, Disp-24 Each, R-0           2. Follow-up Information     Follow up With Details Comments Contact Info    Your PCP In 1 week        3. Narcotic precautions as discussed. Return to ED if worse     Discharge Note:  8:27 PM   The pt is ready for discharge.  The pt's signs, symptoms, diagnosis, and discharge instructions have been discussed and pt has conveyed their understanding. The pt is to follow up as recommended or return to ER should their symptoms worsen. Plan has been discussed and pt is in agreement. This note is prepared by Bob Mehta, acting as a Scribe for JUJU Romero: The scribe's documentation has been prepared under my direction and personally reviewed by me in its entirety. I confirm that the notes above accurately reflects all work, treatment, procedures, and medical decision making performed by me.

## 2017-07-29 NOTE — ED NOTES
Pt reports has had a rash for the past day. Rash wraps around from medial back which wraps around to anterior abdomen. Currently a constant burning 10/10 pain.

## 2017-07-29 NOTE — DISCHARGE INSTRUCTIONS
[facility] SYSTEMS Departments     For adult and child immunizations, family planning, TB screening, STD testing and women's health services. [facility]: [place] [phone]      [facility] 25   [address]   [address]   170 [facility]: [place] [address] [facility] [address]  [phone]      [address]          [address] [place] [address]     For primary care services, woman and child wellness, and some clinics providing specialty care. [facility] -- [address] 2525 [facility] [phone]   411 [facility] 200 [facility] 3614 [facility] [phone]   339 [facility] [address] [phone]   [address] [facility] [employer] [address] [place] [address] 5850  Community  [phone]   [address] [place] [address] [address] [place] [phone]   [facility] 81 [facility] [phone]   [facility] [address] [place] [address] [phone]   [facility]: [facility] 700 [address] [facility] [address] [phone]     [place] [address] [facility] [address] [phone]   [facility] 5850 [facility]  [phone]   [employer]  [address], [place] [address] ([email or web address]) [phone]         Sexual Health/Woman Wellness Clinics    For STD/HIV testing and treatment, pregnancy testing and services, men's health, birth control services, LGBT services, and hepatitis/HPV vaccine services. [facility] [employer] [facility]ika for [place] [employer] 201 [address] [facility] [address] [facility] 1579 [address] [phone]   [facility] [address], 5th floor [phone]   Pregnancy [address] for Women [address]  Evelene Guthrie [phone]         Specialty Service 1701 [facility]   [phone]   [place]   [phone]   Women, Infant and Children's Services: [address] [phone]       600 [facility]   [phone]   [address]   7946 [facility]     [phone]   [address] Crisis   [address] [facility] [address] [phone]     Local Primary Care Physicians  [facility] [phone]  MD Helder Mccormick MD Ninfa Dirk, MD [facility] [phone]  Darren Chacon, [facility]  MD Sagrario Aguila MD Devora Kaufman, MD [address] [place] [address] [address] [phone]  Sonia Gene[place] [address] [facility] [phone]  MD Lobo Guzman, MD Sujata Baker MD   [facility] [phone]  [facility] REFTHB FV, MD Brayan Johnson, NP [address] [phone]  Faheem Medina, MD Jayesh Small MD Katheleen Kempf, MD Marianne Sport, MD Mena Baptise, MD Peyton Bing, MD   33 57 [facility]  Alberto Nagel MD [address] [facility] [address] [phone]  MD Niki Cagle, NP  MD Smitha Chacon MD Franklyn Columbia, MD Maurie Crass, MD Bonney Coss, MD   1113 [facility] [phone]  MD Felix Amaro, P  Marylene Medici, NP  Willena Peach, MD Seth Callow, MD Claude Klein, MD Bob Glee, MD [facility] [phone]  Blanch Shone, MD Atha Forster, MD Harvest Haggis, MD Blanch Oka, MD Lenis Nissen, MD   [address] [phone]  MD Susanne Reed MD [facility] [phone]  MD Carmela Ho MD Addison Dade [place], [address] [facility] [phone]  MD Marylou Rowe MD Lear Hutching, MD Justin Glazier, MD Elberta Champ, MD Megan Nickel, NP  Skinny Mohamud MD [address]  66   [phone]  MD Tien Gamboa MD Holly Ego, MD [facility] [place] [facility] [phone]  [address] [place] [address] MD Erin Carter, [facility]  Lyndsay Mt, PA-C  Lyndsay Mt, [facility]  Odalis Arroyo, PA-C  MD Vanesa Martinez, FRANKIE Escamilla, DO Miscellaneous:  Kaye Bennett [phone]           Shingles: Care Instructions  Your Care Instructions    Shingles (herpes zoster) causes pain and a blistered rash. The rash can appear anywhere on the body but will be on only one side of the body, the left or right. It will be in a band, a strip, or a small area. The pain can be very severe. Shingles can also cause tingling or itching in the area of the rash. The blisters scab over after a few days and heal in 2 to 4 weeks. Medicines can help you feel better and may help prevent more serious problems caused by shingles. Shingles is caused by the same virus that causes chickenpox. When you have chickenpox, the virus gets into your nerve roots and stays there (becomes dormant) long after you get over the chickenpox. If the virus becomes active again, it can cause shingles. Follow-up care is a key part of your treatment and safety. Be sure to make and go to all appointments, and call your doctor if you are having problems. It's also a good idea to know your test results and keep a list of the medicines you take. How can you care for yourself at home? · Be safe with medicines. Take your medicines exactly as prescribed. Call your doctor if you think you are having a problem with your medicine. Antiviral medicine helps you get better faster. · Try not to scratch or pick at the blisters.  They will crust over and fall off on their own if you leave them alone. · Put cool, wet cloths on the area to relieve pain and itching. You can also use calamine lotion. Try not to use so much lotion that it cakes and is hard to get off. · Put cornstarch or baking soda on the sores to help dry them out so they heal faster. · Do not use thick ointment, such as petroleum jelly, on the sores. This will keep them from drying and healing. · To help remove loose crusts, soak them in tap water. This can help decrease oozing, and dry and soothe the skin. · Take an over-the-counter pain medicine, such as acetaminophen (Tylenol), ibuprofen (Advil, Motrin), or naproxen (Aleve). Read and follow all instructions on the label. · Avoid close contact with people until the blisters have healed. It is very important for you to avoid contact with anyone who has never had chickenpox or the chickenpox vaccine. Pregnant women, young babies, and anyone else who has a hard time fighting infection (such as someone with HIV, diabetes, or cancer) is especially at risk. When should you call for help? Call your doctor now or seek immediate medical care if:  · You have a new or higher fever. · You have a severe headache and a stiff neck. · You lose the ability to think clearly. · The rash spreads to your forehead, nose, eyes, or eyelids. · You have eye pain, or your vision gets worse. · You have new pain in your face, or you cannot move the muscles in your face. · Blisters spread to new parts of your body. Watch closely for changes in your health, and be sure to contact your doctor if:  · The rash has not healed after 2 to 4 weeks. · You still have pain after the rash has healed. Where can you learn more? Go to http://jazlyn-eladio.info/. Cristopher Montenegro in the search box to learn more about \"Shingles: Care Instructions. \"  Current as of: March 3, 2017  Content Version: 11.3  © 9222-6733 [employer], [facility].  Care instructions adapted under license by [employer] (which disclaims liability or warranty for this information). If you have questions about a medical condition or this instruction, always ask your healthcare professional. [address] any warranty or liability for your use of this information.

## 2017-07-29 NOTE — ED NOTES
Discharge instructions reviewed with pt and given by LOGAN Yeboah. Ambulated out of ED with steady gait after declining wheelchair ride out. Adult male visitor to assist pt in getting home.

## 2017-07-31 ENCOUNTER — HOSPITAL ENCOUNTER (INPATIENT)
Age: 42
LOS: 3 days | Discharge: PSYCHIATRIC OR SUBSTANCE USE FACILITY | DRG: 917 | End: 2017-08-03
Attending: EMERGENCY MEDICINE | Admitting: HOSPITALIST
Payer: MEDICAID

## 2017-07-31 DIAGNOSIS — T50.901A DRUG OVERDOSE, ACCIDENTAL OR UNINTENTIONAL, INITIAL ENCOUNTER: Primary | ICD-10-CM

## 2017-07-31 DIAGNOSIS — R00.0 SINUS TACHYCARDIA: ICD-10-CM

## 2017-07-31 DIAGNOSIS — G93.40 ACUTE ENCEPHALOPATHY: ICD-10-CM

## 2017-07-31 LAB
ALBUMIN SERPL BCP-MCNC: 3 G/DL (ref 3.5–5)
ALBUMIN/GLOB SERPL: 0.9 {RATIO} (ref 1.1–2.2)
ALP SERPL-CCNC: 62 U/L (ref 45–117)
ALT SERPL-CCNC: 56 U/L (ref 12–78)
AMPHET UR QL SCN: NEGATIVE
ANION GAP BLD CALC-SCNC: 8 MMOL/L (ref 5–15)
APAP SERPL-MCNC: <2 UG/ML (ref 10–30)
APPEARANCE UR: CLEAR
AST SERPL W P-5'-P-CCNC: 76 U/L (ref 15–37)
BARBITURATES UR QL SCN: NEGATIVE
BASOPHILS # BLD AUTO: 0 K/UL (ref 0–0.1)
BASOPHILS # BLD: 0 % (ref 0–1)
BENZODIAZ UR QL: POSITIVE
BILIRUB SERPL-MCNC: 0.2 MG/DL (ref 0.2–1)
BILIRUB UR QL: NEGATIVE
BUN SERPL-MCNC: 10 MG/DL (ref 6–20)
BUN/CREAT SERPL: 9 (ref 12–20)
CALCIUM SERPL-MCNC: 7.9 MG/DL (ref 8.5–10.1)
CANNABINOIDS UR QL SCN: POSITIVE
CHLORIDE SERPL-SCNC: 105 MMOL/L (ref 97–108)
CK SERPL-CCNC: 84 U/L (ref 26–192)
CO2 SERPL-SCNC: 26 MMOL/L (ref 21–32)
COCAINE UR QL SCN: NEGATIVE
COLOR UR: ABNORMAL
CREAT SERPL-MCNC: 1.16 MG/DL (ref 0.55–1.02)
DIFFERENTIAL METHOD BLD: ABNORMAL
DRUG SCRN COMMENT,DRGCM: ABNORMAL
EOSINOPHIL # BLD: 0.1 K/UL (ref 0–0.4)
EOSINOPHIL NFR BLD: 1 % (ref 0–7)
ERYTHROCYTE [DISTWIDTH] IN BLOOD BY AUTOMATED COUNT: 20.6 % (ref 11.5–14.5)
ETHANOL SERPL-MCNC: <10 MG/DL
GLOBULIN SER CALC-MCNC: 3.5 G/DL (ref 2–4)
GLUCOSE BLD STRIP.AUTO-MCNC: 127 MG/DL (ref 65–100)
GLUCOSE SERPL-MCNC: 119 MG/DL (ref 65–100)
GLUCOSE UR STRIP.AUTO-MCNC: NEGATIVE MG/DL
HCG UR QL: NEGATIVE
HCT VFR BLD AUTO: 26.2 % (ref 35–47)
HGB BLD-MCNC: 8.6 G/DL (ref 11.5–16)
HGB UR QL STRIP: NEGATIVE
KETONES UR QL STRIP.AUTO: NEGATIVE MG/DL
LEUKOCYTE ESTERASE UR QL STRIP.AUTO: NEGATIVE
LYMPHOCYTES # BLD AUTO: 53 % (ref 12–49)
LYMPHOCYTES # BLD: 2.9 K/UL (ref 0.8–3.5)
MCH RBC QN AUTO: 28.2 PG (ref 26–34)
MCHC RBC AUTO-ENTMCNC: 32.8 G/DL (ref 30–36.5)
MCV RBC AUTO: 85.9 FL (ref 80–99)
METHADONE UR QL: NEGATIVE
MONOCYTES # BLD: 0.2 K/UL (ref 0–1)
MONOCYTES NFR BLD AUTO: 4 % (ref 5–13)
NEUTS SEG # BLD: 2.4 K/UL (ref 1.8–8)
NEUTS SEG NFR BLD AUTO: 42 % (ref 32–75)
NITRITE UR QL STRIP.AUTO: NEGATIVE
OPIATES UR QL: NEGATIVE
PCP UR QL: NEGATIVE
PH UR STRIP: 6.5 [PH] (ref 5–8)
PLATELET # BLD AUTO: 234 K/UL (ref 150–400)
POTASSIUM SERPL-SCNC: 3.8 MMOL/L (ref 3.5–5.1)
PROT SERPL-MCNC: 6.5 G/DL (ref 6.4–8.2)
PROT UR STRIP-MCNC: 30 MG/DL
RBC # BLD AUTO: 3.05 M/UL (ref 3.8–5.2)
RBC MORPH BLD: ABNORMAL
SALICYLATES SERPL-MCNC: <1.7 MG/DL (ref 2.8–20)
SERVICE CMNT-IMP: ABNORMAL
SODIUM SERPL-SCNC: 139 MMOL/L (ref 136–145)
SP GR UR REFRACTOMETRY: 1.02 (ref 1–1.03)
UROBILINOGEN UR QL STRIP.AUTO: 1 EU/DL (ref 0.2–1)
WBC # BLD AUTO: 5.6 K/UL (ref 3.6–11)

## 2017-07-31 PROCEDURE — 82550 ASSAY OF CK (CPK): CPT | Performed by: EMERGENCY MEDICINE

## 2017-07-31 PROCEDURE — 96361 HYDRATE IV INFUSION ADD-ON: CPT

## 2017-07-31 PROCEDURE — 80053 COMPREHEN METABOLIC PANEL: CPT | Performed by: EMERGENCY MEDICINE

## 2017-07-31 PROCEDURE — 81001 URINALYSIS AUTO W/SCOPE: CPT | Performed by: EMERGENCY MEDICINE

## 2017-07-31 PROCEDURE — 85025 COMPLETE CBC W/AUTO DIFF WBC: CPT | Performed by: EMERGENCY MEDICINE

## 2017-07-31 PROCEDURE — 36415 COLL VENOUS BLD VENIPUNCTURE: CPT | Performed by: EMERGENCY MEDICINE

## 2017-07-31 PROCEDURE — 93005 ELECTROCARDIOGRAM TRACING: CPT

## 2017-07-31 PROCEDURE — 99285 EMERGENCY DEPT VISIT HI MDM: CPT

## 2017-07-31 PROCEDURE — 82962 GLUCOSE BLOOD TEST: CPT

## 2017-07-31 PROCEDURE — 96376 TX/PRO/DX INJ SAME DRUG ADON: CPT

## 2017-07-31 PROCEDURE — 81025 URINE PREGNANCY TEST: CPT | Performed by: EMERGENCY MEDICINE

## 2017-07-31 PROCEDURE — 96374 THER/PROPH/DIAG INJ IV PUSH: CPT

## 2017-07-31 PROCEDURE — 74011250636 HC RX REV CODE- 250/636: Performed by: EMERGENCY MEDICINE

## 2017-07-31 PROCEDURE — 80307 DRUG TEST PRSMV CHEM ANLYZR: CPT | Performed by: EMERGENCY MEDICINE

## 2017-07-31 PROCEDURE — 65610000006 HC RM INTENSIVE CARE

## 2017-07-31 RX ORDER — LORAZEPAM 2 MG/ML
1 INJECTION INTRAMUSCULAR
Status: COMPLETED | OUTPATIENT
Start: 2017-07-31 | End: 2017-07-31

## 2017-07-31 RX ORDER — LORAZEPAM 2 MG/ML
2 INJECTION INTRAMUSCULAR ONCE
Status: COMPLETED | OUTPATIENT
Start: 2017-07-31 | End: 2017-07-31

## 2017-07-31 RX ORDER — LORAZEPAM 2 MG/ML
2 INJECTION INTRAMUSCULAR
Status: DISCONTINUED | OUTPATIENT
Start: 2017-07-31 | End: 2017-07-31

## 2017-07-31 RX ORDER — LORAZEPAM 2 MG/ML
1 INJECTION INTRAMUSCULAR
Status: DISCONTINUED | OUTPATIENT
Start: 2017-07-31 | End: 2017-08-01

## 2017-07-31 RX ORDER — LORAZEPAM 2 MG/ML
1 INJECTION INTRAMUSCULAR
Status: DISCONTINUED | OUTPATIENT
Start: 2017-07-31 | End: 2017-07-31

## 2017-07-31 RX ADMIN — LORAZEPAM 2 MG: 2 INJECTION INTRAMUSCULAR; INTRAVENOUS at 19:47

## 2017-07-31 RX ADMIN — LORAZEPAM 1 MG: 2 INJECTION INTRAMUSCULAR; INTRAVENOUS at 22:51

## 2017-07-31 RX ADMIN — SODIUM CHLORIDE 1000 ML: 900 INJECTION, SOLUTION INTRAVENOUS at 22:51

## 2017-07-31 RX ADMIN — SODIUM CHLORIDE 1000 ML: 900 INJECTION, SOLUTION INTRAVENOUS at 19:47

## 2017-07-31 RX ADMIN — LORAZEPAM 1 MG: 2 INJECTION INTRAMUSCULAR; INTRAVENOUS at 22:17

## 2017-07-31 NOTE — IP AVS SNAPSHOT
[address] [facility] 
[phone] Patient: Ambika Kent MRN: [id number] :1975 Current Discharge Medication List  
  
START taking these medications Dose & Instructions Dispensing Information Comments Morning Noon Evening Bedtime  
 amLODIPine 5 mg tablet Commonly known as:  Priscilla Clark Your last dose was: Your next dose is:    
   
   
 Dose:  5 mg Take 1 Tab by mouth daily. Quantity:  30 Tab Refills:  0  
     
   
   
   
  
 clonazePAM 2 mg tablet Commonly known as:  Ava Heredia Your last dose was: Your next dose is:    
   
   
 Dose:  4 mg Take 2 Tabs by mouth two (2) times a day. Max Daily Amount: 8 mg. Quantity:  30 Tab Refills:  0  
     
   
   
   
  
 lidocaine 5 % Commonly known as:  Cherry Sahu Your last dose was: Your next dose is:    
   
   
 Apply patch to the affected area for 12 hours a day and remove for 12 hours a day. Quantity:  1 Each Refills:  0  
     
   
   
   
  
 lithium carbonate 300 mg capsule Your last dose was: Your next dose is:    
   
   
 Dose:  300 mg Take 1 Cap by mouth two (2) times a day. Quantity:  30 Cap Refills:  0 LORazepam 0.5 mg tablet Commonly known as:  ATIVAN Your last dose was: Your next dose is:    
   
   
 Dose:  0.5 mg Take 1 Tab by mouth every four (4) hours as needed. Max Daily Amount: 3 mg. Quantity:  30 Tab Refills:  0  
     
   
   
   
  
 mupirocin 2 % ointment Commonly known as:  [facility] Your last dose was: Your next dose is:    
   
   
 Dose:  1 g Apply 1 g to affected area every twelve (12) hours. Quantity:  22 g Refills:  0  
     
   
   
   
  
 nicotine 21 mg/24 hr  
Commonly known as:  Marjorie Ripgio Your last dose was: Your next dose is:    
   
   
 Dose:  1 Patch 1 Patch by TransDERmal route daily for 30 days. Quantity:  1 Patch Refills:  0 QUEtiapine 400 mg tablet Commonly known as:  SEROquel Your last dose was: Your next dose is:    
   
   
 Dose:  400 mg Take 1 Tab by mouth two (2) times a day. Quantity:  12 Tab Refills:  0  
     
   
   
   
  
 trimethoprim-sulfamethoxazole 160-800 mg per tablet Commonly known as:  BACTRIM DS, SEPTRA DS Your last dose was: Your next dose is:    
   
   
 Dose:  1 Tab Take 1 Tab by mouth every twelve (12) hours. Quantity:  10 Tab Refills:  0 CONTINUE these medications which have CHANGED Dose & Instructions Dispensing Information Comments Morning Noon Evening Bedtime  
 oxyCODONE-acetaminophen 5-325 mg per tablet Commonly known as:  PERCOCET What changed:   
- how much to take 
- reasons to take this Your last dose was: Your next dose is:    
   
   
 Dose:  1 Tab Take 1 Tab by mouth every six (6) hours as needed. Max Daily Amount: 4 Tabs. Quantity:  12 Tab Refills:  0 CONTINUE these medications which have NOT CHANGED Dose & Instructions Dispensing Information Comments Morning Noon Evening Bedtime  
 acyclovir 800 mg tablet Commonly known as:  ZOVIRAX Your last dose was: Your next dose is:    
   
   
 Dose:  800 mg Take 1 Tab by mouth five (5) times daily for 7 days. Quantity:  35 Tab Refills:  0  
     
   
   
   
  
 * albuterol 2.5 mg /3 mL (0.083 %) nebulizer solution Commonly known as:  PROVENTIL VENTOLIN Your last dose was: Your next dose is:    
   
   
 Dose:  2.5 mg  
3 mL by Nebulization route every four (4) hours as needed for Wheezing. Quantity:  24 Each Refills:  0  
     
   
   
   
  
 * albuterol sulfate 90 mcg/actuation Aepb Your last dose was: Your next dose is:    
   
   
 Dose:  2 Puff Take 2 Puffs by inhalation every four (4) hours as needed. Quantity:  1 Inhaler Refills:  0  
     
   
   
   
  
 fluticasone-vilanterol 200-25 mcg/dose inhaler Commonly known as:  BREO ELLIPTA Your last dose was: Your next dose is:    
   
   
 Dose:  1 Puff Take 1 Puff by inhalation daily. Quantity:  28 Each Refills:  1  
     
   
   
   
  
 gabapentin 300 mg capsule Commonly known as:  NEURONTIN Your last dose was: Your next dose is:    
   
   
 Dose:  300 mg Take 300 mg by mouth. Refills:  0  
     
   
   
   
  
 * Notice: This list has 2 medication(s) that are the same as other medications prescribed for you. Read the directions carefully, and ask your doctor or other care provider to review them with you. STOP taking these medications   
 albuterol-ipratropium 2.5 mg-0.5 mg/3 ml Nebu Commonly known as:  DUO-NEB  
   
  
 cloNIDine HCl 0.1 mg tablet Commonly known as:  CATAPRES Nebulizer & Compressor machine  
   
  
 predniSONE 10 mg dose pack Commonly known as:  STERAPRED DS  
   
  
 traMADol 50 mg tablet Commonly known as:  ULTRAM  
   
  
  
  
Where to Get Your Medications Information on where to get these meds will be given to you by the nurse or doctor. ! Ask your nurse or doctor about these medications  
  amLODIPine 5 mg tablet  
 clonazePAM 2 mg tablet  
 lidocaine 5 %  
 lithium carbonate 300 mg capsule LORazepam 0.5 mg tablet  
 mupirocin 2 % ointment  
 nicotine 21 mg/24 hr  
 oxyCODONE-acetaminophen 5-325 mg per tablet QUEtiapine 400 mg tablet  
 trimethoprim-sulfamethoxazole 160-800 mg per tablet

## 2017-07-31 NOTE — IP AVS SNAPSHOT
[address] [facility] 
[phone] Patient: Sandy Milton MRN: [id number] :1975 You are allergic to the following Allergen Reactions Tomato Swelling Tongue Recent Documentation Height Weight BMI OB Status Smoking Status 1.626 m 76.7 kg 29.02 kg/m2 Having regular periods Former Smoker Unresulted Labs Order Current Status TSH 3RD GENERATION Collected (17 0400) Emergency Contacts Name Discharge Info Relation Home Work Mobile Amalia Kim  Parent [1] [phone] About your hospitalization You were admitted on:  2017 You last received care in the:  [facility] 2 CRITICAL CARE 1 You were discharged on:  August 3, 2017 Unit phone number:  [phone] Why you were hospitalized Your primary diagnosis was:  Drug Overdose Providers Seen During Your Hospitalizations Provider Role Specialty Primary office phone Jacky Judd MD Attending Provider Emergency Medicine [phone] Darin Leija MD Attending Provider Internal Medicine [phone] Your Primary Care Physician (PCP) Primary Care Physician Office Phone Office Fax NONE ** None ** ** None ** Follow-up Information Follow up With Details Comments Contact Info [address] [place] [address],[facility] [address]   Call Report  [phone] Room 721O None   None (395) Patient stated that they have no PCP Current Discharge Medication List  
  
START taking these medications Dose & Instructions Dispensing Information Comments Morning Noon Evening Bedtime  
 amLODIPine 5 mg tablet Commonly known as:  Unknown [place] Your last dose was: Your next dose is:    
   
   
 Dose:  5 mg Take 1 Tab by mouth daily. Quantity:  30 Tab Refills:  0  
     
   
   
   
  
 clonazePAM 2 mg tablet Commonly known as:  Alex Cedillo  
 Your last dose was: Your next dose is:    
   
   
 Dose:  4 mg Take 2 Tabs by mouth two (2) times a day. Max Daily Amount: 8 mg. Quantity:  30 Tab Refills:  0  
     
   
   
   
  
 lidocaine 5 % Commonly known as:  Cj Serve Your last dose was: Your next dose is:    
   
   
 Apply patch to the affected area for 12 hours a day and remove for 12 hours a day. Quantity:  1 Each Refills:  0  
     
   
   
   
  
 lithium carbonate 300 mg capsule Your last dose was: Your next dose is:    
   
   
 Dose:  300 mg Take 1 Cap by mouth two (2) times a day. Quantity:  30 Cap Refills:  0 LORazepam 0.5 mg tablet Commonly known as:  ATIVAN Your last dose was: Your next dose is:    
   
   
 Dose:  0.5 mg Take 1 Tab by mouth every four (4) hours as needed. Max Daily Amount: 3 mg. Quantity:  30 Tab Refills:  0  
     
   
   
   
  
 mupirocin 2 % ointment Commonly known as:  [facility] Your last dose was: Your next dose is:    
   
   
 Dose:  1 g Apply 1 g to affected area every twelve (12) hours. Quantity:  22 g Refills:  0  
     
   
   
   
  
 nicotine 21 mg/24 hr  
Commonly known as:  Sun [place] Your last dose was: Your next dose is:    
   
   
 Dose:  1 Patch 1 Patch by TransDERmal route daily for 30 days. Quantity:  1 Patch Refills:  0 QUEtiapine 400 mg tablet Commonly known as:  SEROquel Your last dose was: Your next dose is:    
   
   
 Dose:  400 mg Take 1 Tab by mouth two (2) times a day. Quantity:  12 Tab Refills:  0  
     
   
   
   
  
 trimethoprim-sulfamethoxazole 160-800 mg per tablet Commonly known as:  BACTRIM DS, SEPTRA DS Your last dose was: Your next dose is:    
   
   
 Dose:  1 Tab Take 1 Tab by mouth every twelve (12) hours. Quantity:  10 Tab Refills:  0 CONTINUE these medications which have CHANGED Dose & Instructions Dispensing Information Comments Morning Noon Evening Bedtime  
 oxyCODONE-acetaminophen 5-325 mg per tablet Commonly known as:  PERCOCET What changed:   
- how much to take 
- reasons to take this Your last dose was: Your next dose is:    
   
   
 Dose:  1 Tab Take 1 Tab by mouth every six (6) hours as needed. Max Daily Amount: 4 Tabs. Quantity:  12 Tab Refills:  0 CONTINUE these medications which have NOT CHANGED Dose & Instructions Dispensing Information Comments Morning Noon Evening Bedtime  
 acyclovir 800 mg tablet Commonly known as:  ZOVIRAX Your last dose was: Your next dose is:    
   
   
 Dose:  800 mg Take 1 Tab by mouth five (5) times daily for 7 days. Quantity:  35 Tab Refills:  0  
     
   
   
   
  
 * albuterol 2.5 mg /3 mL (0.083 %) nebulizer solution Commonly known as:  PROVENTIL VENTOLIN Your last dose was: Your next dose is:    
   
   
 Dose:  2.5 mg  
3 mL by Nebulization route every four (4) hours as needed for Wheezing. Quantity:  24 Each Refills:  0  
     
   
   
   
  
 * albuterol sulfate 90 mcg/actuation Aepb Your last dose was: Your next dose is:    
   
   
 Dose:  2 Puff Take 2 Puffs by inhalation every four (4) hours as needed. Quantity:  1 Inhaler Refills:  0  
     
   
   
   
  
 fluticasone-vilanterol 200-25 mcg/dose inhaler Commonly known as:  BREO ELLIPTA Your last dose was: Your next dose is:    
   
   
 Dose:  1 Puff Take 1 Puff by inhalation daily. Quantity:  28 Each Refills:  1  
     
   
   
   
  
 gabapentin 300 mg capsule Commonly known as:  NEURONTIN Your last dose was: Your next dose is:    
   
   
 Dose:  300 mg Take 300 mg by mouth. Refills:  0 * Notice: This list has 2 medication(s) that are the same as other medications prescribed for you. Read the directions carefully, and ask your doctor or other care provider to review them with you. STOP taking these medications   
 albuterol-ipratropium 2.5 mg-0.5 mg/3 ml Nebu Commonly known as:  DUO-NEB  
   
  
 cloNIDine HCl 0.1 mg tablet Commonly known as:  CATAPRES Nebulizer & Compressor machine  
   
  
 predniSONE 10 mg dose pack Commonly known as:  STERAPRED DS  
   
  
 traMADol 50 mg tablet Commonly known as:  ULTRAM  
   
  
  
  
Where to Get Your Medications Information on where to get these meds will be given to you by the nurse or doctor. ! Ask your nurse or doctor about these medications  
  amLODIPine 5 mg tablet  
 clonazePAM 2 mg tablet  
 lidocaine 5 %  
 lithium carbonate 300 mg capsule LORazepam 0.5 mg tablet  
 mupirocin 2 % ointment  
 nicotine 21 mg/24 hr  
 oxyCODONE-acetaminophen 5-325 mg per tablet QUEtiapine 400 mg tablet  
 trimethoprim-sulfamethoxazole 160-800 mg per tablet Discharge Instructions None Discharge Orders None Introducing [facility]! [facility] introduces V[employer] patient portal. Now you can access parts of your medical record, email your doctor's office, and request medication refills online. 1. In your internet browser, go to https://[employer]. [place]/[employer] 2. Click on the First Time User? Click Here link in the Sign In box. You will see the New Member Sign Up page. 3. Enter your Volve Access Code exactly as it appears below. You will not need to use this code after youve completed the sign-up process. If you do not sign up before the expiration date, you must request a new code. · Volve Access Code: [email or web address] Expires: 10/26/2017  8:33 PM 
 
4.  Enter the last four digits of your Social Security Number (xxxx) and Date of Birth (mm/dd/yyyy) as indicated and click Submit. You will be taken to the next sign-up page. 5. Create a [employer] ID. This will be your [employer] login ID and cannot be changed, so think of one that is secure and easy to remember. 6. Create a [employer] password. You can change your password at any time. 7. Enter your Password Reset Question and Answer. This can be used at a later time if you forget your password. 8. Enter your e-mail address. You will receive e-mail notification when new information is available in [address] 9. Click Sign Up. You can now view and download portions of your medical record. 10. Click the Download Summary menu link to download a portable copy of your medical information. If you have questions, please visit the Frequently Asked Questions section of the [employer] website. Remember, [employer] is NOT to be used for urgent needs. For medical emergencies, dial 911. Now available from your iPhone and Android! General Information Please provide this summary of care documentation to your next provider. Patient Signature:  ____________________________________________________________ Date:  ____________________________________________________________  
  
Rosa Spare Provider Signature:  ____________________________________________________________ Date:  ____________________________________________________________

## 2017-07-31 NOTE — ED NOTES
Assumed care of patient during bedside report. The patient is currently alert to painful stimuli, sternal rub arouses the patient. Patient able to report name and that is it. Speech is slightly incompressible. The patient is sinus tach on the monitor, not hypoxic. The patient is twitching, all over. Pupils are dilated and slow to react. IV attempt twice, patient combative and kicking. Officer at the bedside. The patient is connected to the monitor, blood pressure and continuous pulse oximetry and the cardiac monitor. MD Neville at bedside, order for Ativan and fluids. Patient is resting comfortably, bed in the lowest position, side rails raised, call bell in hand, lights dim. Instructed patient to not get up without assistance, and to ring the call bell for any questions or concerns. Updated patient on the plan of care.       Seizure precautions initiated

## 2017-07-31 NOTE — IP AVS SNAPSHOT
Summary of Care Report The Summary of Care report has been created to help improve care coordination. Users with access to [employer] or [address] (Web-based application) may access additional patient information including the Discharge Summary. If you are not currently a [employer] user and need more information, please call the number listed below in the [address] section and ask to be connected with Medical Records. Facility Information Name Address Phone [address] [address] [phone] Patient Information Patient Name Sex MALENA Angeles ([id number]) Female 1975 Discharge Information Admitting Provider Service Area Unit Willian Correia MD / [phone] [facility] [place] 2 Critical Care  / [phone] Discharge Provider Discharge Date/Time Discharge Disposition Destination (none) 8/3/2017 (Pending) PSY (none) Patient Language Language ENGLISH [13] Hospital Problems as of 8/3/2017  Reviewed: 2017 12:05 AM by Willian Correia MD  
  
  
  
 Class Noted - Resolved Last Modified POA Active Problems * (Principal)Drug overdose  2017 - Present 2017 by Willian Correia MD Unknown Entered by Willian Correia MD  
  
Non-Hospital Problems as of 8/3/2017  Reviewed: 2017 12:05 AM by Willian Correia MD  
  
  
  
 Class Noted - Resolved Last Modified Active Problems   Heroin abuse  2014 - Present 2017 by Shelbie Schaumann, MD  
  Entered by Jt Coleman MD  
  Sinus tachycardia  1/10/2017 - Present 2017 by Shelbie Schaumann, MD  
  Entered by Raya Delaney MD  
  Depression  2017 - Present 2017 by Shelbie Schaumann, MD  
  Entered by Nancy Schulz MD  
  Bipolar 1 disorder [facility])  Unknown - Present 2017 by Roni Toribio Kristopher Lemus MD  
  Entered by Umang Lopez MD  
  Chronic obstructive pulmonary disease [facility])  Unknown - Present 2/22/2017 by Umang Lopez MD  
  Entered by Umang Lopez MD  
  Chronic pain  Unknown - Present 2/22/2017 by Umang Lopez MD  
  Entered by Umang Lopez MD  
  Overview Signed 2/22/2017  7:15 PM by Umang Lopez MD  
   back, leg   Hepatitis B  Unknown - Present 2/22/2017 by Umang Lopez MD  
  Entered by Umang Lopez MD  
  Sarcoidosis [facility])  Unknown - Present 2/22/2017 by Umang Lopez MD  
  Entered by Umang Lopez MD  
  Tobacco abuse  Unknown - Present 2/22/2017 by Umang Lopez MD  
  Entered by Umang Lopez MD  
  Anemia  2/22/2017 - Present 2/22/2017 by Umang Lopez MD  
  Entered by Umang Lopez MD  
  Cocaine abuse  Unknown - Present 2/22/2017 by Umang Lopez MD  
  Entered by Umang Lopez MD  
  Polysubstance abuse  Unknown - Present 4/25/2017 by Kimberly Pearl MD  
  Entered by Umang Lopez MD  
  Narcotic abuse  Unknown - Present 2/22/2017 by Umang Lopez MD  
  Entered by Umang Lopez MD  
  HTN (hypertension)  Unknown - Present 2/22/2017 by Umang Lopez MD  
  Entered by Umang Lopez MD  
  Seizure [facility])  4/20/2017 - Present 4/20/2017 by James Hector MD  
  Entered by James Hector MD  
  Lactic acidosis  4/25/2017 - Present 4/25/2017 by Kimberly Pearl MD  
  Entered by Kimberly Pearl MD  
  Acute encephalopathy  4/25/2017 - Present 4/25/2017 by Kimberly Pearl MD  
  Entered by Kimberly Pearl MD  
  Potential for altered mental status  4/25/2017 - Present 4/25/2017 by Mica Honeycutt MD  
  Entered by Mica Honeycutt MD  
  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  
  Entered by Mica Honeycutt MD  
  Stenosis of both internal carotid arteries  4/25/2017 - Present 4/25/2017 by Mica Honeycutt MD  
 Entered by Kimberly Bowen MD  
  Convulsive syncope  4/25/2017 - Present 4/25/2017 by Kimberly Bowen MD  
  Entered by Kimberly Bowen MD  
  Altered mental status, unspecified  4/26/2017 - Present 4/26/2017 by Kimberly Bowen MD  
  Entered by Kimberly Bowen MD  
  
You are allergic to the following Allergen Reactions Tomato Swelling Tongue Current Discharge Medication List  
  
START taking these medications Dose & Instructions Dispensing Information Comments  
 amLODIPine 5 mg tablet Commonly known as:  Louisa Zambrano Dose:  5 mg Take 1 Tab by mouth daily. Quantity:  30 Tab Refills:  0  
   
 clonazePAM 2 mg tablet Commonly known as:  Karene John Dose:  4 mg Take 2 Tabs by mouth two (2) times a day. Max Daily Amount: 8 mg. Quantity:  30 Tab Refills:  0  
   
 lidocaine 5 % Commonly known as:  Kristie Dust Apply patch to the affected area for 12 hours a day and remove for 12 hours a day. Quantity:  1 Each Refills:  0  
   
 lithium carbonate 300 mg capsule Dose:  300 mg Take 1 Cap by mouth two (2) times a day. Quantity:  30 Cap Refills:  0 LORazepam 0.5 mg tablet Commonly known as:  ATIVAN Dose:  0.5 mg Take 1 Tab by mouth every four (4) hours as needed. Max Daily Amount: 3 mg. Quantity:  30 Tab Refills:  0  
   
 mupirocin 2 % ointment Commonly known as:  [facility] Dose:  1 g Apply 1 g to affected area every twelve (12) hours. Quantity:  22 g Refills:  0  
   
 nicotine 21 mg/24 hr  
Commonly known as:  Kati Sprinkles Dose:  1 Patch 1 Patch by TransDERmal route daily for 30 days. Quantity:  1 Patch Refills:  0 QUEtiapine 400 mg tablet Commonly known as:  SEROquel Dose:  400 mg Take 1 Tab by mouth two (2) times a day. Quantity:  12 Tab Refills:  0  
   
 trimethoprim-sulfamethoxazole 160-800 mg per tablet Commonly known as:  BACTRIM DS, SEPTRA DS Dose:  1 Tab Take 1 Tab by mouth every twelve (12) hours. Quantity:  10 Tab Refills:  0 CONTINUE these medications which have CHANGED Dose & Instructions Dispensing Information Comments  
 oxyCODONE-acetaminophen 5-325 mg per tablet Commonly known as:  PERCOCET What changed:   
- how much to take 
- reasons to take this Dose:  1 Tab Take 1 Tab by mouth every six (6) hours as needed. Max Daily Amount: 4 Tabs. Quantity:  12 Tab Refills:  0 CONTINUE these medications which have NOT CHANGED Dose & Instructions Dispensing Information Comments  
 acyclovir 800 mg tablet Commonly known as:  ZOVIRAX Dose:  800 mg Take 1 Tab by mouth five (5) times daily for 7 days. Quantity:  35 Tab Refills:  0  
   
 * albuterol 2.5 mg /3 mL (0.083 %) nebulizer solution Commonly known as:  PROVENTIL VENTOLIN Dose:  2.5 mg  
3 mL by Nebulization route every four (4) hours as needed for Wheezing. Quantity:  24 Each Refills:  0  
   
 * albuterol sulfate 90 mcg/actuation Aepb Dose:  2 Puff Take 2 Puffs by inhalation every four (4) hours as needed. Quantity:  1 Inhaler Refills:  0  
   
 fluticasone-vilanterol 200-25 mcg/dose inhaler Commonly known as:  BREO ELLIPTA Dose:  1 Puff Take 1 Puff by inhalation daily. Quantity:  28 Each Refills:  1  
   
 gabapentin 300 mg capsule Commonly known as:  NEURONTIN Dose:  300 mg Take 300 mg by mouth. Refills:  0  
   
 * Notice: This list has 2 medication(s) that are the same as other medications prescribed for you. Read the directions carefully, and ask your doctor or other care provider to review them with you. STOP taking these medications Comments  
 albuterol-ipratropium 2.5 mg-0.5 mg/3 ml Nebu Commonly known as:  DUO-NEB  
   
   
 cloNIDine HCl 0.1 mg tablet Commonly known as:  CATAPRES Nebulizer & Compressor machine  
   
   
 predniSONE 10 mg dose pack Commonly known as:  STERAPRED DS  
   
   
 traMADol 50 mg tablet Commonly known as:  ULTRAM  
   
   
  
  
  
Current Immunizations Name Date Influenza Vaccine 10/1/2016,  Deferred (Patient Refused) Pneumococcal Polysaccharide (PPSV-23)  Deferred (Patient Refused) Follow-up Information Follow up With Details Comments Contact [address]   Call Report  [phone] Room 72 None   None (395) Patient stated that they have no PCP Discharge Instructions None Chart Review Routing History Recipient Method Report Sent By Hang Zheng None [address] IP Auto Routed Notes MD Norma Chavez 1/30/2014  6:20 AM 01/30/2014 None [address] IP Auto Routed Notes MD Norma Chavez 2/2/2014  2:20 PM 02/02/2014 None [address] IP Auto Routed Notes Jamia Guillen MD [[id number]] 2/4/2014 11:22 PM 02/04/2014 None [address] IP Auto Routed Notes Radha Singer MD [[id number]] 2/6/2014  4:20 PM 02/06/2014

## 2017-07-31 NOTE — ED NOTES
Assumed care of patient from EMS. Patient is  Responsive to painful stimuli and withdraws from pain, respirations even and unlabored. Pt. Protecting her own airway. EMS states the patient was picked up at her parents' house. Has a history of substance abuse and parents believe patient may have overdosed on seroquel and risperdal.  No evidence to support at this time. Patient has reactive pupils, 3-4mm bilaterally. Pt. Vinod Neighbours in low bed in POC, side rail x2, monitor x3, call light within reach.

## 2017-08-01 ENCOUNTER — APPOINTMENT (OUTPATIENT)
Dept: CT IMAGING | Age: 42
DRG: 917 | End: 2017-08-01
Attending: HOSPITALIST
Payer: MEDICAID

## 2017-08-01 LAB
ALBUMIN SERPL BCP-MCNC: 2.9 G/DL (ref 3.5–5)
ALBUMIN/GLOB SERPL: 0.9 {RATIO} (ref 1.1–2.2)
ALP SERPL-CCNC: 54 U/L (ref 45–117)
ALT SERPL-CCNC: 49 U/L (ref 12–78)
ANION GAP BLD CALC-SCNC: 7 MMOL/L (ref 5–15)
AST SERPL W P-5'-P-CCNC: 57 U/L (ref 15–37)
ATRIAL RATE: 127 BPM
BILIRUB SERPL-MCNC: 0.3 MG/DL (ref 0.2–1)
BUN SERPL-MCNC: 8 MG/DL (ref 6–20)
BUN/CREAT SERPL: 9 (ref 12–20)
CALCIUM SERPL-MCNC: 7.5 MG/DL (ref 8.5–10.1)
CALCULATED P AXIS, ECG09: 57 DEGREES
CALCULATED R AXIS, ECG10: 56 DEGREES
CALCULATED T AXIS, ECG11: 32 DEGREES
CHLORIDE SERPL-SCNC: 108 MMOL/L (ref 97–108)
CO2 SERPL-SCNC: 24 MMOL/L (ref 21–32)
CREAT SERPL-MCNC: 0.93 MG/DL (ref 0.55–1.02)
DIAGNOSIS, 93000: NORMAL
ERYTHROCYTE [DISTWIDTH] IN BLOOD BY AUTOMATED COUNT: 20.3 % (ref 11.5–14.5)
FERRITIN SERPL-MCNC: 32 NG/ML (ref 8–252)
FOLATE SERPL-MCNC: 21.5 NG/ML (ref 5–21)
GLOBULIN SER CALC-MCNC: 3.4 G/DL (ref 2–4)
GLUCOSE BLD STRIP.AUTO-MCNC: 102 MG/DL (ref 65–100)
GLUCOSE SERPL-MCNC: 86 MG/DL (ref 65–100)
HCT VFR BLD AUTO: 26.9 % (ref 35–47)
HGB BLD-MCNC: 8.8 G/DL (ref 11.5–16)
IRON SATN MFR SERPL: 9 % (ref 20–50)
IRON SERPL-MCNC: 36 UG/DL (ref 35–150)
MAGNESIUM SERPL-MCNC: 1.7 MG/DL (ref 1.6–2.4)
MCH RBC QN AUTO: 28 PG (ref 26–34)
MCHC RBC AUTO-ENTMCNC: 32.7 G/DL (ref 30–36.5)
MCV RBC AUTO: 85.7 FL (ref 80–99)
P-R INTERVAL, ECG05: 126 MS
PHOSPHATE SERPL-MCNC: 3 MG/DL (ref 2.6–4.7)
PLATELET # BLD AUTO: 211 K/UL (ref 150–400)
POTASSIUM SERPL-SCNC: 3.7 MMOL/L (ref 3.5–5.1)
PROT SERPL-MCNC: 6.3 G/DL (ref 6.4–8.2)
Q-T INTERVAL, ECG07: 326 MS
QRS DURATION, ECG06: 78 MS
QTC CALCULATION (BEZET), ECG08: 473 MS
RBC # BLD AUTO: 3.14 M/UL (ref 3.8–5.2)
SERVICE CMNT-IMP: ABNORMAL
SODIUM SERPL-SCNC: 139 MMOL/L (ref 136–145)
TIBC SERPL-MCNC: 412 UG/DL (ref 250–450)
VENTRICULAR RATE, ECG03: 127 BPM
VIT B12 SERPL-MCNC: 1002 PG/ML (ref 211–911)
WBC # BLD AUTO: 6.1 K/UL (ref 3.6–11)

## 2017-08-01 PROCEDURE — 74011250636 HC RX REV CODE- 250/636: Performed by: HOSPITALIST

## 2017-08-01 PROCEDURE — 36415 COLL VENOUS BLD VENIPUNCTURE: CPT | Performed by: INTERNAL MEDICINE

## 2017-08-01 PROCEDURE — 85027 COMPLETE CBC AUTOMATED: CPT | Performed by: INTERNAL MEDICINE

## 2017-08-01 PROCEDURE — 82962 GLUCOSE BLOOD TEST: CPT

## 2017-08-01 PROCEDURE — 74011250637 HC RX REV CODE- 250/637: Performed by: HOSPITALIST

## 2017-08-01 PROCEDURE — 74011250637 HC RX REV CODE- 250/637: Performed by: INTERNAL MEDICINE

## 2017-08-01 PROCEDURE — 82746 ASSAY OF FOLIC ACID SERUM: CPT | Performed by: HOSPITALIST

## 2017-08-01 PROCEDURE — 65270000029 HC RM PRIVATE

## 2017-08-01 PROCEDURE — 80053 COMPREHEN METABOLIC PANEL: CPT | Performed by: INTERNAL MEDICINE

## 2017-08-01 PROCEDURE — 84100 ASSAY OF PHOSPHORUS: CPT | Performed by: INTERNAL MEDICINE

## 2017-08-01 PROCEDURE — 74011000258 HC RX REV CODE- 258: Performed by: INTERNAL MEDICINE

## 2017-08-01 PROCEDURE — 82607 VITAMIN B-12: CPT | Performed by: HOSPITALIST

## 2017-08-01 PROCEDURE — 83540 ASSAY OF IRON: CPT | Performed by: HOSPITALIST

## 2017-08-01 PROCEDURE — 65610000006 HC RM INTENSIVE CARE

## 2017-08-01 PROCEDURE — 70450 CT HEAD/BRAIN W/O DYE: CPT

## 2017-08-01 PROCEDURE — 74011000250 HC RX REV CODE- 250: Performed by: INTERNAL MEDICINE

## 2017-08-01 PROCEDURE — 83735 ASSAY OF MAGNESIUM: CPT | Performed by: INTERNAL MEDICINE

## 2017-08-01 PROCEDURE — 82728 ASSAY OF FERRITIN: CPT | Performed by: HOSPITALIST

## 2017-08-01 RX ORDER — CLONAZEPAM 1 MG/1
5 TABLET ORAL 2 TIMES DAILY
Status: DISCONTINUED | OUTPATIENT
Start: 2017-08-01 | End: 2017-08-03

## 2017-08-01 RX ORDER — QUETIAPINE FUMARATE 100 MG/1
400 TABLET, FILM COATED ORAL 2 TIMES DAILY
Status: DISCONTINUED | OUTPATIENT
Start: 2017-08-01 | End: 2017-08-03 | Stop reason: HOSPADM

## 2017-08-01 RX ORDER — CLONIDINE HYDROCHLORIDE 0.1 MG/1
0.1 TABLET ORAL DAILY
COMMUNITY
End: 2017-08-03

## 2017-08-01 RX ORDER — AMLODIPINE BESYLATE 5 MG/1
5 TABLET ORAL DAILY
Status: DISCONTINUED | OUTPATIENT
Start: 2017-08-01 | End: 2017-08-03 | Stop reason: HOSPADM

## 2017-08-01 RX ORDER — ONDANSETRON 2 MG/ML
4 INJECTION INTRAMUSCULAR; INTRAVENOUS
Status: DISCONTINUED | OUTPATIENT
Start: 2017-08-01 | End: 2017-08-03 | Stop reason: HOSPADM

## 2017-08-01 RX ORDER — LIDOCAINE 50 MG/G
1 PATCH TOPICAL EVERY 24 HOURS
Status: DISCONTINUED | OUTPATIENT
Start: 2017-08-01 | End: 2017-08-03 | Stop reason: HOSPADM

## 2017-08-01 RX ORDER — ACETAMINOPHEN 325 MG/1
650 TABLET ORAL
Status: DISCONTINUED | OUTPATIENT
Start: 2017-08-01 | End: 2017-08-01

## 2017-08-01 RX ORDER — LITHIUM CARBONATE 300 MG/1
300 CAPSULE ORAL 2 TIMES DAILY
Status: DISCONTINUED | OUTPATIENT
Start: 2017-08-01 | End: 2017-08-03 | Stop reason: HOSPADM

## 2017-08-01 RX ORDER — LORAZEPAM 2 MG/ML
4 INJECTION INTRAMUSCULAR ONCE
Status: COMPLETED | OUTPATIENT
Start: 2017-08-01 | End: 2017-08-01

## 2017-08-01 RX ORDER — LORAZEPAM 0.5 MG/1
0.5 TABLET ORAL
Status: DISCONTINUED | OUTPATIENT
Start: 2017-08-01 | End: 2017-08-03 | Stop reason: HOSPADM

## 2017-08-01 RX ORDER — MUPIROCIN 20 MG/G
OINTMENT TOPICAL EVERY 12 HOURS
Status: DISCONTINUED | OUTPATIENT
Start: 2017-08-01 | End: 2017-08-03 | Stop reason: HOSPADM

## 2017-08-01 RX ORDER — FLUTICASONE FUROATE AND VILANTEROL 200; 25 UG/1; UG/1
1 POWDER RESPIRATORY (INHALATION) DAILY
Status: DISCONTINUED | OUTPATIENT
Start: 2017-08-01 | End: 2017-08-03 | Stop reason: HOSPADM

## 2017-08-01 RX ORDER — GABAPENTIN 300 MG/1
300 CAPSULE ORAL 3 TIMES DAILY
Status: ON HOLD | COMMUNITY
End: 2018-08-02

## 2017-08-01 RX ORDER — TRAMADOL HYDROCHLORIDE 50 MG/1
50 TABLET ORAL
COMMUNITY
End: 2017-08-03

## 2017-08-01 RX ORDER — LORAZEPAM 2 MG/ML
1 INJECTION INTRAMUSCULAR
Status: DISCONTINUED | OUTPATIENT
Start: 2017-08-01 | End: 2017-08-01

## 2017-08-01 RX ORDER — OXYCODONE AND ACETAMINOPHEN 5; 325 MG/1; MG/1
1 TABLET ORAL
Status: DISCONTINUED | OUTPATIENT
Start: 2017-08-01 | End: 2017-08-03 | Stop reason: HOSPADM

## 2017-08-01 RX ORDER — ALBUTEROL SULFATE 0.83 MG/ML
2.5 SOLUTION RESPIRATORY (INHALATION)
Status: DISCONTINUED | OUTPATIENT
Start: 2017-08-01 | End: 2017-08-03 | Stop reason: HOSPADM

## 2017-08-01 RX ORDER — SODIUM CHLORIDE 0.9 % (FLUSH) 0.9 %
5-10 SYRINGE (ML) INJECTION AS NEEDED
Status: DISCONTINUED | OUTPATIENT
Start: 2017-08-01 | End: 2017-08-03 | Stop reason: HOSPADM

## 2017-08-01 RX ORDER — LORAZEPAM 1 MG/1
4 TABLET ORAL
Status: COMPLETED | OUTPATIENT
Start: 2017-08-01 | End: 2017-08-01

## 2017-08-01 RX ORDER — SODIUM CHLORIDE 0.9 % (FLUSH) 0.9 %
5-10 SYRINGE (ML) INJECTION EVERY 8 HOURS
Status: DISCONTINUED | OUTPATIENT
Start: 2017-08-01 | End: 2017-08-03 | Stop reason: HOSPADM

## 2017-08-01 RX ORDER — DEXTROSE 50 % IN WATER (D50W) INTRAVENOUS SYRINGE
12.5-25 AS NEEDED
Status: DISCONTINUED | OUTPATIENT
Start: 2017-08-01 | End: 2017-08-02

## 2017-08-01 RX ORDER — SODIUM CHLORIDE 9 MG/ML
25 INJECTION, SOLUTION INTRAVENOUS CONTINUOUS
Status: DISCONTINUED | OUTPATIENT
Start: 2017-08-01 | End: 2017-08-01

## 2017-08-01 RX ORDER — IBUPROFEN 200 MG
1 TABLET ORAL DAILY
Status: DISCONTINUED | OUTPATIENT
Start: 2017-08-02 | End: 2017-08-03 | Stop reason: HOSPADM

## 2017-08-01 RX ORDER — MAGNESIUM SULFATE 100 %
4 CRYSTALS MISCELLANEOUS AS NEEDED
Status: DISCONTINUED | OUTPATIENT
Start: 2017-08-01 | End: 2017-08-02

## 2017-08-01 RX ADMIN — Medication 10 ML: at 01:10

## 2017-08-01 RX ADMIN — SODIUM CHLORIDE 100 ML/HR: 900 INJECTION, SOLUTION INTRAVENOUS at 01:03

## 2017-08-01 RX ADMIN — LORAZEPAM 4 MG: 2 INJECTION INTRAMUSCULAR; INTRAVENOUS at 02:38

## 2017-08-01 RX ADMIN — LORAZEPAM 1 MG: 2 INJECTION INTRAMUSCULAR; INTRAVENOUS at 01:11

## 2017-08-01 RX ADMIN — LORAZEPAM 0.5 MG: 0.5 TABLET ORAL at 11:19

## 2017-08-01 RX ADMIN — CLONAZEPAM 5 MG: 1 TABLET ORAL at 12:52

## 2017-08-01 RX ADMIN — LORAZEPAM 1 MG: 2 INJECTION INTRAMUSCULAR; INTRAVENOUS at 04:53

## 2017-08-01 RX ADMIN — LORAZEPAM 1 MG: 2 INJECTION INTRAMUSCULAR; INTRAVENOUS at 09:17

## 2017-08-01 RX ADMIN — MUPIROCIN: 20 OINTMENT TOPICAL at 20:13

## 2017-08-01 RX ADMIN — AMLODIPINE BESYLATE 5 MG: 5 TABLET ORAL at 09:18

## 2017-08-01 RX ADMIN — Medication 10 ML: at 05:01

## 2017-08-01 RX ADMIN — QUETIAPINE FUMARATE 400 MG: 100 TABLET, FILM COATED ORAL at 12:52

## 2017-08-01 RX ADMIN — Medication 10 ML: at 21:33

## 2017-08-01 RX ADMIN — MUPIROCIN: 20 OINTMENT TOPICAL at 07:58

## 2017-08-01 RX ADMIN — LORAZEPAM 4 MG: 1 TABLET ORAL at 11:23

## 2017-08-01 RX ADMIN — SODIUM CHLORIDE 0.2 MCG/KG/HR: 900 INJECTION, SOLUTION INTRAVENOUS at 15:25

## 2017-08-01 RX ADMIN — OXYCODONE HYDROCHLORIDE AND ACETAMINOPHEN 1 TABLET: 5; 325 TABLET ORAL at 09:17

## 2017-08-01 NOTE — CONSULTS
Psychiatry  Consult    Subjective:     Date of Evaluation:  8/1/2017    Reason for Referral:  Lindsay Mendoza was referred to the examiners from ICUfor Bipolar disorder . History of Presenting Problem: 35years old lady with history of bipolar disorder and she was brought by EMS as there was an allegation that she overdosed on Seroquel and Risperidone . Patient was not engaging in talking and she was sedated , she was agitated early and she has been medicated . She was restless and tried to pull her IV line and she is not not able to maintain her attention and not able to get history from here .      Patient Active Problem List    Diagnosis Date Noted    Drug overdose 07/31/2017    Altered mental status, unspecified 04/26/2017    Lactic acidosis 04/25/2017    Acute encephalopathy 04/25/2017    Potential for altered mental status 04/25/2017    Convulsion disorder ([address] 04/25/2017    Stenosis of both internal carotid arteries 04/25/2017    Convulsive syncope 04/25/2017    Seizure ([address] 04/20/2017    Anemia 02/22/2017    Bipolar 1 disorder (HCC)     Chronic obstructive pulmonary disease (HCC)     Chronic pain     Hepatitis B     Sarcoidosis ([address]     Tobacco abuse     Cocaine abuse     Polysubstance abuse     Narcotic abuse     HTN (hypertension)     Depression 01/25/2017    Sinus tachycardia 01/10/2017    Heroin abuse 01/30/2014     Past Medical History:   Diagnosis Date    Asthma     Bipolar 1 disorder (HCC)     Chronic obstructive pulmonary disease (HCC)     Chronic pain     back, leg    Cocaine abuse     Depression     Hepatitis B     Heroin abuse     HTN (hypertension)     Narcotic abuse     Polysubstance abuse     Sarcoidosis ([address]     Tobacco abuse       Family History   Problem Relation Age of Onset    Hypertension Father     Hypertension Mother       Social History   Substance Use Topics    Smoking status: Former Smoker     Packs/day: 0.25    Smokeless tobacco: Never Used  Alcohol use No     Past Surgical History:   Procedure Laterality Date    HX GYN      HX WISDOM TEETH EXTRACTION        Prior to Admission medications    Medication Sig Start Date End Date Taking? Authorizing Provider   oxyCODONE-acetaminophen (PERCOCET) 5-325 mg per tablet Take 1-2 Tabs by mouth every six (6) hours as needed for Pain for up to 5 days. Max Daily Amount: 8 Tabs. 7/28/17 8/2/17  ANDRZEJ Bryant   predniSONE (STERAPRED DS) 10 mg dose pack Standard 6 day taper 7/28/17 8/4/17  ANDRZEJ Bryant   acyclovir (ZOVIRAX) 800 mg tablet Take 1 Tab by mouth five (5) times daily for 7 days. 7/28/17 8/4/17  ANDRZEJ Bryant   albuterol sulfate 90 mcg/actuation aepb Take 2 Puffs by inhalation every four (4) hours as needed. 4/19/17   Gamaliel Hernandez MD   albuterol-ipratropium (DUO-NEB) 2.5 mg-0.5 mg/3 ml nebu 3 mL by Nebulization route every six (6) hours as needed. 4/19/17   Gamaliel Hernandez MD   fluticasone-vilanterol (BREO ELLIPTA) 761-34 mcg/dose inhaler Take 1 Puff by inhalation daily. 4/19/17   Gamaliel Hernandez MD   Nebulizer & Compressor machine 1 Each by Does Not Apply route daily. 4/19/17   Gamaliel Hernandez MD   albuterol (PROVENTIL VENTOLIN) 2.5 mg /3 mL (0.083 %) nebulizer solution 3 mL by Nebulization route every four (4) hours as needed for Wheezing.  4/16/17   Caitlyn Nuñez MD     Allergies   Allergen Reactions    Tomato Swelling     Tongue          Objective:     Patient Vitals for the past 8 hrs:   BP Temp Pulse Resp SpO2   08/01/17 1430 - - (!) 103 21 98 %   08/01/17 1400 - - (!) 109 20 -   08/01/17 1126 (!) 157/100 97.7 °F (36.5 °C) 99 20 99 %   08/01/17 0950 (!) 151/98 - - - -       Mental Status exam: she is sedated and not able to engage in talking and not able to do assess for psychosis or suicidal ideation            Impression: Bipolar disorder by history      Principal Problem:    Drug overdose (7/31/2017)          Plan: Please decrease Klonopin dosage slowly and check lithium level , Consider decreasing Seroquel  Slowly  And use Haldol Prn for agitation      Recommendations for Treatment/Conditions:  Psychiatric re-consultation in  2 days    Referral To:    will be determine later     Competency Statement: It is recommended that the family or other concerned individuals be consulted for substituted judgement if deemed incompetent.  [email or web address]

## 2017-08-01 NOTE — PROGRESS NOTES
0730-Bedside shift change report given to Carola Martinez (oncoming nurse) by Jad Franklin  (offgoing nurse). Report included the following information SBAR, Kardex and MAR. 0800- Patient assessed. See flowsheet. Patient  is alert to herself only, and makes inappropriate comments to the situation. 0930- Patient taken off floor to CT with nurse and monitor. 1000- Returned back to room. 1030- IV infiltrated. Unable to get new IV in.  MD aware. Will order PO medications and it's ok to leave IV out. 1130- Reassessed. Patient increasingly agitated PRN 0.5 mg of ativan given. Patient very unruly,agitated, trying to get out of bed. Discussed with Dr. Lara Escobar. Patient will receive 4 mg of ativan PO.  1230-  Reassessed. One time dose of ativan has not relieved agitation. Patient continues to be very unruly, disoriented, pulling at equipment. Spoke with Dr. Lara Escobar suggests we continue to reorient her and provide distractive activities. 1250- D. Edu Iglesias states he will restart her home meds which included doses of Seroquel, lithium and clonopin. 1350- Patient continues to try and get out of bed. She is currently looking for her baby and purse. She is very agitated when staff try to disorient her. 1300- Her lunch has arrived and she is less agitated eating. 1430- Posey vest restraint placed on patient for safety. Patient continues to be agitated and combative. She is not responding to distractive activities. Dr. Edu Iglesias will order a precedex gtt. PICC team to start an IV. 1520- IV in after 7 attempts. Precedex started. Patient continues to be very agitated. Trying to bite staff. [address] Patient is very agitated. Three staff members are in the room preventing the patient from pulling at tubes lines, and to calm the patient down. Dr. Edu Iglesias at bedside. Verbal orders given for nursing to be able to titrate precedex to 1.4.  1610- Patient resting comfortably. 1620- Pysch at bedside.  1730-  Patient is resting and responds to pain. Will titrate precedex down  to keep Malone -1. [address] with the patient's daughter, [place], who is calling to give us the patient's medication list.  [place] states that her mother was on tramadol 50 mg q 6 hours for pain, but \"took way more than she was supposed to\". She also states she was on Neurontin and clonidine 0.1 mg daily. [place] states she is unaware of the Neurontin dose and is unsure of any other medications because, \"EMS took some medications with them when she went to the hospital.\"  [place] states her 0.1 mg of clonidine was filled on the 24th and is now empty. Nursing verified the spelling of clonidine again to ensure it was not clonazepam. [facility] verified it was clonidine and the prescription was filled on the 24th and it is now empty. [place] also states that her mother was in [place] at a rehab facility and came home on Thursday because the rehab facility stated, Jay Jay Aponte had two many medical problems. \"  She was in her usual state of health until Sunday when she kept repeating herself and repeating her actions. Oneida Cardenas said she \"would wash her hands five times\" then \"put on lotion six times\". She thought then that her mother had taken too many medications. She states if her mother doesn't have her drugs she is very smart and will \"go to the ER and put on a show\" to get pain medications. She explained that they have tried to dispense her medications, hide her medications, and even prevented her from filling a percocet script, but nothing helps. 1930- Report given to TIERA Moser.

## 2017-08-01 NOTE — PROGRESS NOTES
0045- Bedside and verbal report received from Neptali 1540- Pt very restless, attempting new IV stick. Ativan 1mg per orders given. 0145- Pt having visual and auditory hallucinations, yelling, cursing and laughing at nurses. Combative at times. Unable to obtain BP or O2 sats. Pt in incontinent of urine. Left PIV in [facility] continually occluded by pt movement. Unable to obtain new IV or ordered lab sticks. Pt has some bruising to forehead and nose, forehead appears to have a bump under bruise. Head CT ordered, unable to take pt at this time d/t combativeness. 3100 Juan Figueroa paged. 0- Dr Estevan Olson updated on pt situation. Orders received. [address] from [email or web address] called for update. 0400- Reassess complete as documented. Pt answers all orientation questions correctly then drifts back to sleep, waking up again confused. Pt assisted to bedpan several times with RN and sitter. 0700- Bedside and verbal report given tot Germaine MOTT. Sitter remains at bedside for pt safety.

## 2017-08-01 NOTE — PROGRESS NOTES
Hospitalist Progress Note  Rosalee Bowling MD  Internal medicine/ Hospitalist    Daily Progress Note: 8/1/2017 3:57 PM      Interval history / Subjective:   Patient very agitated at the moment. She was admitted for suspected drug overdose of Seroquel and Risperdal.Has h/o polysubstance abuse on top of bipolar do/depression. She was sleepy early today due to sedation but now she is awake and very agitated.     Current Facility-Administered Medications   Medication Dose Route Frequency    albuterol (PROVENTIL VENTOLIN) nebulizer solution 2.5 mg  2.5 mg Nebulization Q4H PRN    fluticasone-vilanterol (BREO ELLIPTA) 200mcg-25mcg/puff  1 Puff Inhalation DAILY    sodium chloride (NS) flush 5-10 mL  5-10 mL IntraVENous Q8H    sodium chloride (NS) flush 5-10 mL  5-10 mL IntraVENous PRN    ondansetron (ZOFRAN) injection 4 mg  4 mg IntraVENous Q4H PRN    glucose chewable tablet 16 g  4 Tab Oral PRN    dextrose (D50W) injection syrg 12.5-25 g  12.5-25 g IntraVENous PRN    glucagon (GLUCAGEN) injection 1 mg  1 mg IntraMUSCular PRN    mupirocin (BACTROBAN) 2 % ointment   Topical Q12H    amLODIPine (NORVASC) tablet 5 mg  5 mg Oral DAILY    oxyCODONE-acetaminophen (PERCOCET) 5-325 mg per tablet 1 Tab  1 Tab Oral Q6H PRN    lidocaine (LIDODERM) 5 % patch 1 Patch  1 Patch TransDERmal Q24H    LORazepam (ATIVAN) tablet 0.5 mg  0.5 mg Oral Q4H PRN    clonazePAM (KlonoPIN) tablet 5 mg  5 mg Oral BID    lithium carbonate capsule 300 mg  300 mg Oral BID    QUEtiapine (SEROquel) tablet 400 mg  400 mg Oral BID    [START ON 8/2/2017] nicotine (NICODERM CQ) 21 mg/24 hr patch 1 Patch  1 Patch TransDERmal DAILY    dexmedetomidine (PRECEDEX) 400 mcg in 0.9% sodium chloride 100 mL infusion  0.2-1.4 mcg/kg/hr IntraVENous TITRATE        Objective:     Visit Vitals    BP (!) 157/100 (BP 1 Location: Left arm, BP Patient Position: At rest)    Pulse (!) 103    Temp 97.7 °F (36.5 °C)    Resp 21    Ht 5' 4\" (1.626 m)    Wt 76.7 kg (169 lb 1.5 oz)    SpO2 98%    BMI 29.02 kg/m2      O2 Device: Room air    Temp (24hrs), Av.6 °F (36.4 °C), Min:97.4 °F (36.3 °C), Max:98.1 °F (36.7 °C)      701 -  190  In: 920 [P.O.:920]  Out: 75 [Urine:75]  1901 -  0700  In: 2499 [I.V.:1045]  Out: 250 [Urine:250]  P/E  Agitated,hardly follows instructions. Heent:perrla,at/nc,mouth moist.  Lungs ctab  Heart s1s2 nl,no m/g  Abdm:soft,not tender,bs present. Extr:no edema,good pulses. Neuro:aaox3,agitated,no facial droop,moving all extremities. Data Review    No results found for this or any previous visit (from the past 12 hour(s)). Assessment/Plan:     Principal Problem:    Drug overdose (2017)    Polysubstance abuse    Bipolar disorder    Anemia    Asthma    Hep B        Care Plan   Suspected drug overdose of Seroquel and Risperdal  Acute encephalopathy   H/o polysubstance abuse   In settings of bipolar DO/ depression   Remain extremely agitated despite use of ativan. On precedex  No signs of underlying infection: no leukocytosis, ua is negative   UDS +: benzo, cannabinol   Keep NPO to decrease risk of aspiration   Aggressive IVF   Monitor closely for QTc prolongation and extrapyramidal symptoms/ myoclonic jerking with risperdal  Head CT w/o acute process     Anemia   Unclear etiology at present.  No signs of active bleeding  Baseline Hb 11.4, admission 8.6  No sign of bleed     Asthma, wo exacerbation, jet nebs prn   Hepatitis B     DVT prophylaxis as per protocol    Case d/w nurse and intensivist

## 2017-08-01 NOTE — PROGRESS NOTES
Attended Interdisciplinary rounds in Critical Care Unit, where patient care was discussed. Visit by: Abdoulaye Kaye. Leti Malik.  Donald Miranda MA, [address]

## 2017-08-01 NOTE — PROGRESS NOTES
IDR - Psych consult. Possible transfer off unit with sitter to medical bed.     [address] [facility] [address]  Ext [phone]

## 2017-08-01 NOTE — PROGRESS NOTES
1900: Bedside and verbal report received from Gavin Loera, [address] [place] [address] Drive: Assessment completed. Patient drowsy. Responds to voice. Alert to self and year. Assisted patient up to bedside commode with assist of two nurses. Patient very impulsive. 2013: Precedex decreased to 0.5 mcg/kg/hr. 2332: Precedex decreased to 0.4 mcg/kg/hr. 0000: Reassessment completed. 0400: Reassessment completed. Unable to draw labs. Multiple attempts made. 0403: Precedex decreased to 0.3 mcg/kg/hr. 3018: Precedex decreased to 0.2 mcg/kg/hr. 0700: Bedside and verbal report given to Cherlynn Closs, RN.
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
## 2017-08-01 NOTE — PROGRESS NOTES
PULMON[facility]OC[facility] INTENSIVIST Consult Service Note  Pulmonary, Critical Care, and Sleep Medicine    Name: Issac Ding MRN: [id number]   : 1975 Hospital: [facility]   Date: 2017   Hospital Day: 2       Subjective/Interval History:   I have reviewed the flowsheet and previous days notes. Seen earlier today on rounds. Reviewed the evaluation and will assist in implementing plan as outlined by Dr. Liane Dumont and team    IMPRESSION:   1. Suspected drug overdose of Seroquel and Risperdal and possibly more:UDS +: benzo, cannabin  2. Acute encephalopathy   3. H/o polysubstance abuse Per record: Pt is in & out of multiple hospitals due to her polysubstance abuse related events. 4. Left chest Zoster rash- still painful  5. In settings of bipolar DO/ depression   6. Asthma, wo exacerbation, jet nebs prn   7. Hepatitis B   8. Anemia Unclear etiology at present. No signs of active bleeding Baseline Hb 11.4, admission 8.6      RECOMMENDATIONS/PLAN:   1. When awake more, will resume some of her home meds such as seroquel, lithium, klonopin. Hold zoloft and gabapentin for now  2. Psychiatry consult  3. Sitter  4. May move to medical floor when more oriented  5. Pain med for post herpetic neuralgia but antiviral med not indicated. Hold prednisone due to agitation- pt claims some meds prescribed at [facility] but no details  6. Advance diet as tolerated  7. Patient requiring sitter due to threat of injury to self, interference with medical devices. Very impulsive  8. Patient requiring restraints due to threat of injury to self, interference with medical devices. 9. Will be available to assist in medical management while in the CCU pending disposition     [x] High complexity decision making was performed  [x] See my orders for details  Tubes:     ICU disposition :Stable to transfer to floor.     Code: Full Code        Subjective/Initial History:   I have reviewed the flowsheet and previous days notes. The patient is unable to give any meaningful history or review of systems because the patient is:  lethargic     Abida Elmore is a 35 y.o. female with PMhx significant for asthma, depression, hepatitis B, HTN, and polysubstance abuse who presented lastnight via EMS to the ED for evaluation of altered mental status after a suspected drug overdose. Pt was found by her parents and EMS was called as the pt was unresponsive. Per EMS, it is possible that she overdosed on Seroquel and Risperdal because two empty bottles were found in her room. Her BG en route was 90. Pt does not know what she took when asked. Pt has a history of substance abuse. While in ED pt become more awake. She became agitated. She was treated in ED with Ativan 1 mg x 3. ED provider discussed pt case with poison control center. Pt remain very conbative in ED despite ativan. She was biting and kicking. She was incontinent of urine. Patient was having visual and auditory hallucinations. She was grabbing into the air and putting \"food\" in her mouth. Patient was talking to invisible people. She was saying \"don't tell me to go to the store\" and \"tell my boy to come back\". Pt also had prescribed percocet. Bottle cannot be find at home. Pt admitted to CCU. Found to have recent but partially healed Zoster rash along left chest. Groggy but could converse at times. Lives with both parents and couple of siblings. Treated for rash at [facility]. On room air. No SOB. No nausea. No fever. QT normal.     April 2017 pt admitted to [facility] with possible clonidine overdose. I reviewed her medications. PMH:  has a past medical history of Asthma; Bipolar 1 disorder ([address] Chronic obstructive pulmonary disease ([address] Chronic pain; Cocaine abuse; Depression; Hepatitis B; Heroin abuse; HTN (hypertension); Narcotic abuse; Polysubstance abuse; Sarcoidosis ([address] and Tobacco abuse.   PSH:   has a past surgical history that includes gyn and wisdom teeth extraction. FHX: family history includes Hypertension in her father and mother. SHX:  reports that she has quit smoking. She smoked 0.25 packs per day. She has never used smokeless tobacco. She reports that she does not drink alcohol or use illicit drugs. ROS:Review of systems not obtained due to patient factors.     MAR reviewed and pertinent medications noted or modified as needed    Allergies   Allergen Reactions    Tomato Swelling     Tongue        MEDS:   Current Facility-Administered Medications   Medication    albuterol (PROVENTIL VENTOLIN) nebulizer solution 2.5 mg    fluticasone-vilanterol (BREO ELLIPTA) 200mcg-25mcg/puff    sodium chloride (NS) flush 5-10 mL    sodium chloride (NS) flush 5-10 mL    ondansetron (ZOFRAN) injection 4 mg    glucose chewable tablet 16 g    dextrose (D50W) injection syrg 12.5-25 g    glucagon (GLUCAGEN) injection 1 mg    mupirocin (BACTROBAN) 2 % ointment    amLODIPine (NORVASC) tablet 5 mg    oxyCODONE-acetaminophen (PERCOCET) 5-325 mg per tablet 1 Tab    lidocaine (LIDODERM) 5 % patch 1 Patch    LORazepam (ATIVAN) tablet 0.5 mg    clonazePAM (KlonoPIN) tablet 5 mg    lithium carbonate capsule 300 mg    QUEtiapine (SEROquel) tablet 400 mg    [START ON 2017] nicotine (NICODERM CQ) 21 mg/24 hr patch 1 Patch        Telemetry:    normal sinus rhythm    Objective:     Vital Signs: Intake/Output: Intake/Output:   Visit Vitals    BP (!) 157/100 (BP 1 Location: Left arm, BP Patient Position: At rest)    Pulse 99    Temp 97.7 °F (36.5 °C)    Resp 20    Ht 5' 4\" (1.626 m)    Wt 76.7 kg (169 lb 1.5 oz)    SpO2 99%    BMI 29.02 kg/m2         O2 Device: Room air       Wt Readings from Last 4 Encounters:   17 76.7 kg (169 lb 1.5 oz)   17 76.6 kg (168 lb 14 oz)   17 60.1 kg (132 lb 8 oz)   17 56.7 kg (125 lb)       Temp (24hrs), Av.6 °F (36.4 °C), Min:97.4 °F (36.3 °C), Max:98.1 °F (36.7 °C)     Intake/Output Summary (Last 24 hours) at 08/01/17 1228  Last data filed at 08/01/17 0950   Gross per 24 hour   Intake             1845 ml   Output              250 ml   Net             1595 ml     Last shift:      08/01 0701 - 08/01 1900  In: 800 [P.O.:800]  Out: -   Last 3 shifts: 07/30 1901 - 08/01 0700  In: 1045 [I.V.:1045]  Out: 250 [Urine:250]     Hemodynamics:    CO:    CI:    CVP:    SVR:   PAP Systolic:    PAP Diastolic:    PVR:    CJ31:        Ventilator Settings:      Mode Rate TV Press PEEP FiO2 PIP Min. Vent                              Physical Exam:    General: non-toxic, in no respiratory distress and acyanotic, uncooperative and disoriented   HEENT: NCAT   Neck, Lymph: No abnormally enlarged lymph nodes. Chest: left dermatomal rash with scabs, no vesicles   Lungs: normal air entry   Heart: Regular rate and rhythm   Abdomen: soft, non-tender, without masses or organomegaly   :    Extremity: negative, clubbing;     Neuro: lethargic   Psych: lethargic   Skin: WarmSkin color, texture, turgor normal. No rashes or lesions;   Pulses: Bilateral, Radial, 2+ Normal upper and lower extremity pulses   Capillary refill: normal well perfused;    Data:   Interval lab and diagnostic data was reviewed. Interval radiology images were independently viewed and available reports were reviewed. All lab results for the last 24 hours reviewed. Labs:    Recent Labs      08/01/17 0253 07/31/17 1934   WBC  6.1  5.6   HGB  8.8*  8.6*   PLT  211  234     Recent Labs      08/01/17 0253 07/31/17 1934   NA  139  139   K  3.7  3.8   CL  108  105   CO2  24  26   GLU  86  119*   BUN  8  10   CREA  0.93  1.16*   CA  7.5*  7.9*   MG  1.7   --    PHOS  3.0   --    ALB  2.9*  3.0*   SGOT  57*  76*   ALT  49  56     No results for input(s): PH, PCO2, PO2, HCO3, FIO2 in the last 72 hours.   Recent Labs      07/31/17 1934   CPK  84     No results found for: BNPP, BNP   Lab Results   Component Value Date/Time    Culture result: MRSA PRESENT 04/17/2017 02:45 AM    Culture result:  04/17/2017 02:45 AM         Screening of patient nares for MRSA is for surveillance purposes and, if positive, to facilitate isolation considerations in high risk settings. It is not intended for automatic decolonization interventions per se as regimens are not sufficiently effective to warrant routine use. Culture result: MRSA NOT PRESENT 02/27/2017 12:00 AM    Culture result:  02/27/2017 12:00 AM         Screening of patient nares for MRSA is for surveillance purposes and, if positive, to facilitate isolation considerations in high risk settings. It is not intended for automatic decolonization interventions per se as regimens are not sufficiently effective to warrant routine use.      Lab Results   Component Value Date/Time    CK 84 07/31/2017 07:34 PM     01/30/2014 01:00 AM     Lab Results   Component Value Date/Time    Color YELLOW/STRAW 07/31/2017 08:52 PM    Appearance CLEAR 07/31/2017 08:52 PM    Specific gravity >1.030 05/09/2009 09:27 PM    pH (UA) 6.5 07/31/2017 08:52 PM    Protein 30 07/31/2017 08:52 PM    Glucose NEGATIVE  07/31/2017 08:52 PM    Ketone NEGATIVE  07/31/2017 08:52 PM    Bilirubin NEGATIVE  07/31/2017 08:52 PM    Blood NEGATIVE  07/31/2017 08:52 PM    Urobilinogen 1.0 07/31/2017 08:52 PM    Nitrites NEGATIVE  07/31/2017 08:52 PM    Leukocyte Esterase NEGATIVE  07/31/2017 08:52 PM    WBC 5-10 04/24/2017 10:23 PM    RBC 0-5 04/24/2017 10:23 PM    Bacteria 1+ 04/24/2017 10:23 PM       Lab Results   Component Value Date/Time    Iron 36 08/01/2017 02:53 AM    TIBC 412 08/01/2017 02:53 AM    Iron % saturation 9 08/01/2017 02:53 AM    Ferritin 32 08/01/2017 02:53 AM     Lab Results   Component Value Date/Time    TSH 6.60 04/24/2017 09:23 PM      Lab Results   Component Value Date/Time    Hepatitis B surface Ag 0.12 02/22/2017 07:45 PM         Imaging:  I have personally reviewed the patients radiographs and have reviewed the reports: Results from [facility] encounter on 04/16/17   XR CHEST PA LAT   Narrative INDICATION:  Wheezing and shortness of breath for one day. History of asthma     Exam: Chest 2 views. Comparison March 19, 2017. Findings: Cardiac silhouette is not enlarged. There are calcified mediastinal  and hilar nodes. Pulmonary vasculature is not engorged. No focal parenchymal  opacities, effusions, or pneumothorax. Granuloma right lung base unchanged. Bony  thorax is intact. Impression Impression:  1. No acute cardiopulmonary disease            Results from Hospital Encounter encounter on 07/31/17   CT HEAD WO CONT   Narrative EXAM:  CT HEAD WO CONT    INDICATION:   AMS. Possible drug overdose. COMPARISON: 4/24/2017. CONTRAST:  None. TECHNIQUE: Unenhanced CT of the head was performed using 5 mm images. Brain and  bone windows were generated. CT dose reduction was achieved through use of a  standardized protocol tailored for this examination and automatic exposure  control for dose modulation. FINDINGS:  The ventricles and sulci are normal in size, shape and configuration and  midline. There is no significant white matter disease. There is no intracranial  hemorrhage, extra-axial collection, mass, mass effect or midline shift. The  basilar cisterns are open. No acute infarct is identified. The bone windows  demonstrate no abnormalities. The visualized portions of the paranasal sinuses  and mastoid air cells are clear. IMPRESSION: No acute finding or significant change. My assessment/plan was discussed with:  nursing    respiratory therapy Dr. whittaker          Thank you for allowing us to participate in the care of this patient. We will be happy to follow along with you.     Christine Dunn MD

## 2017-08-01 NOTE — ED NOTES
TRANSFER - OUT REPORT:    Verbal report given to Adina MOTT(name) on Dat Murray  being transferred to CCU(unit) for routine progression of care       Report consisted of patients Situation, Background, Assessment and   Recommendations(SBAR). Information from the following report(s) SBAR, Kardex, ED Summary, Procedure Summary, Intake/Output, MAR, Recent Results, Med Rec Status and Cardiac Rhythm Sinus Tach was reviewed with the receiving nurse. Lines:   Peripheral IV 07/31/17 Left Antecubital (Active)   Site Assessment Clean, dry, & intact 7/31/2017 10:53 PM   Phlebitis Assessment 0 7/31/2017 10:53 PM   Infiltration Assessment 0 7/31/2017 10:53 PM   Dressing Status Clean, dry, & intact 7/31/2017 10:53 PM   Hub Color/Line Status Pink 7/31/2017 10:53 PM        Opportunity for questions and clarification was provided.       Patient transported with:   Monitor  Registered Nurse

## 2017-08-01 NOTE — ED NOTES
Attempted to awake patient, asked patient about possible drug use - patient reports \"I did not do no da** drugs\". Patient asked if she is in pain, patient states \"My back hurts\", asked if she fell and reports \"No\" and rolls her eyes. Patient fell back asleep. Continues to twitch, significantly better after the Ativan. Seizure precautions remain. Patient sinus tach on the monitor, fluids infusing without issue. The patient is connected to the monitor, blood pressure and continuous pulse oximetry and the cardiac monitor.

## 2017-08-01 NOTE — ED NOTES
Second bolus complete. Patient questioned about drug use and bath salts, when asked about bath salts patient states \"yep\". Continues to be tachycardic on the monitor.

## 2017-08-01 NOTE — PROGRESS NOTES
Nutrition Assessment:    RECOMMENDATIONS:   Continue Regular diet    DIETITIANS INTERVENTIONS/PLAN:   Continue diet as tolerated  Monitor appetite/PO intake    ASSESSMENT:   Pt admitted with drug overdose. PMH: polysubstance abuse, hepatitis B. Chart reviewed, case discussed during CCU rounds. Pt off the floor at time of visit. MST triggered for unsure of wt loss or amount. Per EMR pt's wt is actually up the highest it has ever been. Diet advanced and pt consumed 100% of her lunch tray. Labs reviewed, WNL. No skin breakdown noted. Intake will likely be adequate to meet est needs. SUBJECTIVE/OBJECTIVE:   Pt off the floor   Diet Order: Regular  % Eaten:  Patient Vitals for the past 72 hrs:   % Diet Eaten   08/01/17 0950 100 %     Pertinent Medications: none pertinent. Chemistries:  Lab Results   Component Value Date/Time    Sodium 139 08/01/2017 02:53 AM    Potassium 3.7 08/01/2017 02:53 AM    Chloride 108 08/01/2017 02:53 AM    CO2 24 08/01/2017 02:53 AM    Anion gap 7 08/01/2017 02:53 AM    Glucose 86 08/01/2017 02:53 AM    BUN 8 08/01/2017 02:53 AM    Creatinine 0.93 08/01/2017 02:53 AM    BUN/Creatinine ratio 9 08/01/2017 02:53 AM    GFR est AA >60 08/01/2017 02:53 AM    GFR est non-AA >60 08/01/2017 02:53 AM    Calcium 7.5 08/01/2017 02:53 AM    AST (SGOT) 57 08/01/2017 02:53 AM    Alk. phosphatase 54 08/01/2017 02:53 AM    Protein, total 6.3 08/01/2017 02:53 AM    Albumin 2.9 08/01/2017 02:53 AM    Globulin 3.4 08/01/2017 02:53 AM    A-G Ratio 0.9 08/01/2017 02:53 AM    ALT (SGPT) 49 08/01/2017 02:53 AM      Anthropometrics: Height: 5' 4\" (162.6 cm) Weight: 76.7 kg (169 lb 1.5 oz)    IBW (%IBW):   ( ) UBW (%UBW):   (  %)    BMI: Body mass index is 29.02 kg/(m^2). This BMI is indicative of:  []Underweight   []Normal   [x]Overweight   [] Obesity   [] Extreme Obesity (BMI>40)  Estimated Nutrition Needs (Based on): 1829 Kcals/day (MSJ 1407 x 1.3) , 61 g (0.8gPro/kg) Protein  Carbohydrate:  At Least 130 g/day  Fluids: 1800 mL/day    Last BM: PTA   []Active     []Hyperactive  []Hypoactive       [] Absent   BS  Skin:    [x] Intact   [] Incision  [] Breakdown   [] DTI   [] Tears/Excoriation/Abrasion  []Edema [] Other: Wt Readings from Last 30 Encounters:   08/01/17 76.7 kg (169 lb 1.5 oz)   07/28/17 76.6 kg (168 lb 14 oz)   04/26/17 60.1 kg (132 lb 8 oz)   04/16/17 56.7 kg (125 lb)   03/19/17 65.7 kg (144 lb 13.5 oz)   02/26/17 65.5 kg (144 lb 6.4 oz)   02/22/17 67.9 kg (149 lb 11.1 oz)   02/11/17 65.8 kg (145 lb)   02/10/17 66.7 kg (147 lb 1 oz)   02/09/17 66.7 kg (147 lb)   02/06/17 70.6 kg (155 lb 10.3 oz)   01/27/17 65.8 kg (145 lb)   01/15/17 72.6 kg (160 lb)   01/09/17 68 kg (150 lb)   02/04/14 54.4 kg (120 lb)   01/30/14 56.7 kg (125 lb)      NUTRITION DIAGNOSES:   Problem:  No nutritional diagnosis at this time        NUTRITION INTERVENTIONS:  Meals/Snacks: General/healthful diet                  GOAL:   Pt will consume >70% of meals in 5-7 days.     Cultural, Church, or Ethnic Dietary Needs: None     LEARNING NEEDS (Diet, Food/Nutrient-Drug Interaction):    [x] None Identified   [] Identified and Education Provided/Documented   [] Identified and Pt declined/was not appropriate      [x] Interdisciplinary Care Plan Reviewed/Documented    [x] Participated in Discharge Planning: Regular diet    [x] Interdisciplinary Rounds     NUTRITION RISK:    [] High              [] Moderate           []  Low  [x]  Minimal/Uncompromised    PT SEEN FOR:    []  MD Consult: []Calorie Count      []Diabetic Diet Education        []Diet Education     []Electrolyte Management     []General Nutrition Management and Supplements     []Management of Tube Feeding     []TPN Recommendations    [x]  RN Referral:  [x]MST score >=2     []Enteral/Parenteral Nutrition PTA     []Pregnant: Gestational DM or Multigestation   []  Low BMI  []  DTR Referral       Mary Rm, [address] [place] [address], [address] [place] [address] Dr   Pager [phone]  Weekend Pager [phone]

## 2017-08-01 NOTE — ED NOTES
Patient agitated, attempting to get out of the bed. Patient redirected into the bed.  Asked if she needs to use the restroom, patient denies

## 2017-08-01 NOTE — ED PROVIDER NOTES
HPI Comments: Lindsay Mendoza is a 35 y.o. female with PMhx significant for asthma, depression, hepatitis B, HTN, and polysubstance abuse who presents via EMS to the ED for evaluation of altered mental status after a suspected drug overdose. Pt was found by her parents and EMS was called as the pt was unresponsive. Per EMS, it is possible that she overdosed on Seroquel and Risperdal because two empty bottles were found in her room. Her BG en route was 90. Pt does not know what she took when asked. Pt has a history of substance abuse. Social Hx: - Tobacco, - EtOH, - Illicit Drugs    PCP: None    HPI is limited due to the mental status of the patient. The history is provided by the EMS personnel. The history is limited by the condition of the patient. No  was used. Past Medical History:   Diagnosis Date    Asthma     Bipolar 1 disorder (HCC)     Chronic obstructive pulmonary disease (HCC)     Chronic pain     back, leg    Cocaine abuse     Depression     Hepatitis B     Heroin abuse     HTN (hypertension)     Narcotic abuse     Polysubstance abuse     Sarcoidosis ([address]     Tobacco abuse        Past Surgical History:   Procedure Laterality Date    HX GYN      HX WISDOM TEETH EXTRACTION           Family History:   Problem Relation Age of Onset    Hypertension Father     Hypertension Mother        Social History     Social History    Marital status: SINGLE     Spouse name: N/A    Number of children: N/A    Years of education: N/A     Occupational History    Not on file. Social History Main Topics    Smoking status: Former Smoker     Packs/day: 0.25    Smokeless tobacco: Never Used    Alcohol use No    Drug use: No      Comment: last used 1/24/2017    Sexual activity: Yes     Partners: Female     Other Topics Concern    Not on file     Social History Narrative    Born in [facility],     [facility], 5 children,    No legal charges.     Pt graduated from high school. Pt was employed last month at [employer], currently she is unemployed. Pt lives with her mother and 8year old son. She has 4 adult children. ALLERGIES: Tomato    Review of Systems   Unable to perform ROS: Patient unresponsive       Vitals:    07/31/17 1906 07/31/17 2234 07/31/17 2330   BP: 110/62 (!) 141/96 112/90   Pulse: (!) 120  (!) 102   Resp: 22 23 17   Temp: 97.7 °F (36.5 °C)     SpO2: 98% 99% 100%   Weight: 76.6 kg (168 lb 14 oz)     Height: 5' 4\" (1.626 m)              Physical Exam   Constitutional: She is oriented to person, place, and time. She appears well-developed and well-nourished. Pt responds to voice. She is seizing with intermittent twitching of lower extremities. Pt is unable to hold a conversation and does not answer questions. HENT:   Head: Normocephalic and atraumatic. Eyes: Conjunctivae and EOM are normal.   Neck: Normal range of motion. Neck supple. Cardiovascular: Regular rhythm. Tachycardia present. Pulmonary/Chest: Effort normal and breath sounds normal. No respiratory distress. Abdominal: Soft. She exhibits no distension. There is no tenderness. Musculoskeletal: Normal range of motion. Neurological: She is oriented to person, place, and time. Skin: Skin is warm and dry. Psychiatric: She has a normal mood and affect. Nursing note and vitals reviewed. MDM  Number of Diagnoses or Management Options  Acute encephalopathy:   Drug overdose, accidental or unintentional, initial encounter:   Sinus tachycardia:   Diagnosis management comments: DDx: Patient presenting with altered mental status. DDx: medication toxicity, infection, anemia, electrolyte/metabolic anomoly, hypercapnea, stroke/bleed/mass, dehydration, illicit drug intoxication. Will obtain labwork, UA, EKG and imaging. Likely toxidrome - sympathomimetic vs anticholinergic. Will call poison control.          Amount and/or Complexity of Data Reviewed  Clinical lab tests: ordered and reviewed  Tests in the medicine section of CPT®: ordered and reviewed  Obtain history from someone other than the patient: yes (EMS)  Review and summarize past medical records: yes  Discuss the patient with other providers: yes (Poison Control, hospitalist  )  Independent visualization of images, tracings, or specimens: yes      ED Course       Procedures  EKG interpretation: (Preliminary) 19:12  Rhythm: sinus tachycardia; and regular . Rate (approx.): 127; Axis: normal; VA interval: normal; QRS interval: normal ; ST/T wave: normal; Other findings: Possible left atrial enlargement, normal intervals. Written by Torsten Gibbons ED Scribe as dictated by Jacky Judd MD    PROGRESS NOTE:  7:25 PM  Pt has given Ativan due to seizing. CONSULT NOTE:  7:37 PM  Jacky Judd MD spoke with Poison Control,  Specialty: Poison Control  Discussed patient's hx, disposition, and available diagnostic and imaging results. Reviewed care plans. Consultant agrees with plans as outlined. Poison control states it is possible to see somnolence and QTC prolongation. With risperdal extrapyramidal symptoms and myoclonic jerking. Poison control agrees with fluids and Ativan. Monitor temperatures to ensure not going to NMS. PROGRESS NOTE:  8:04 PM  Pt is still drowsy but is able to answer yes/no questions appropriately. Pt is twitching less but is still slightly twitching. She is uncooperative. 8:36 PM  Pt has been re-evaluated. Patient reports \"I did not do no drugs\" and when asked about Seroquel she rolled her eyes. CONSULT NOTE:  9:45 PM  Jacky Judd MD spoke with poison control,  Specialty: poison control  Discussed patient's hx, disposition, and available diagnostic and imaging results. Reviewed care plans. Consultant agrees with plans as outlined. Reviewed labs with poison control. They suggest that she doesn't need Ativan unless her blood pressure increases.     CONSULT NOTE:  9:45 PM  Jacky Judd MD spoke with Dr. Phillip Shepard,  Specialty: Hospitalist  Discussed patient's hx, disposition, and available diagnostic and imaging results. Reviewed care plans. Consultant agrees with plans as outlined. Dr. Wojciech Schneider will admit. CRITICAL CARE NOTE :    11:47 PM    IMPENDING DETERIORATION -Cardiovascular, CNS and Metabolic  ASSOCIATED RISK FACTORS - Hypotension, Shock and Metabolic changes  MANAGEMENT- Bedside Assessment and Supervision of Care  INTERPRETATION -  CT Scan, ECG, Blood Pressure and Cardiac Output Measures   INTERVENTIONS - hemodynamic mngmt and Metobolic interventions  CASE REVIEW - Hospitalist, medical subspecialist  TREATMENT RESPONSE -Stable  PERFORMED BY - Self    NOTES   :    I have spent 60 minutes of critical care time involved in lab review, consultations with specialist, family decision- making, bedside attention and documentation. During this entire length of time I was immediately available to the patient . Jairo Payne MD      LABORATORY TESTS:  Recent Results (from the past 12 hour(s))   CBC WITH AUTOMATED DIFF    Collection Time: 07/31/17  7:34 PM   Result Value Ref Range    WBC 5.6 3.6 - 11.0 K/uL    RBC 3.05 (L) 3.80 - 5.20 M/uL    HGB 8.6 (L) 11.5 - 16.0 g/dL    HCT 26.2 (L) 35.0 - 47.0 %    MCV 85.9 80.0 - 99.0 FL    MCH 28.2 26.0 - 34.0 PG    MCHC 32.8 30.0 - 36.5 g/dL    RDW 20.6 (H) 11.5 - 14.5 %    PLATELET 036 056 - 299 K/uL    NEUTROPHILS 42 32 - 75 %    LYMPHOCYTES 53 (H) 12 - 49 %    MONOCYTES 4 (L) 5 - 13 %    EOSINOPHILS 1 0 - 7 %    BASOPHILS 0 0 - 1 %    ABS. NEUTROPHILS 2.4 1.8 - 8.0 K/UL    ABS. LYMPHOCYTES 2.9 0.8 - 3.5 K/UL    ABS. MONOCYTES 0.2 0.0 - 1.0 K/UL    ABS. EOSINOPHILS 0.1 0.0 - 0.4 K/UL    ABS.  BASOPHILS 0.0 0.0 - 0.1 K/UL    RBC COMMENTS ANISOCYTOSIS  2+        DF SMEAR SCANNED     METABOLIC PANEL, COMPREHENSIVE    Collection Time: 07/31/17  7:34 PM   Result Value Ref Range    Sodium 139 136 - 145 mmol/L    Potassium 3.8 3.5 - 5.1 mmol/L    Chloride 105 97 - 108 mmol/L    CO2 26 21 - 32 mmol/L    Anion gap 8 5 - 15 mmol/L    Glucose 119 (H) 65 - 100 mg/dL    BUN 10 6 - 20 MG/DL    Creatinine 1.16 (H) 0.55 - 1.02 MG/DL    BUN/Creatinine ratio 9 (L) 12 - 20      GFR est AA >60 >60 ml/min/1.73m2    GFR est non-AA 51 (L) >60 ml/min/1.73m2    Calcium 7.9 (L) 8.5 - 10.1 MG/DL    Bilirubin, total 0.2 0.2 - 1.0 MG/DL    ALT (SGPT) 56 12 - 78 U/L    AST (SGOT) 76 (H) 15 - 37 U/L    Alk.  phosphatase 62 45 - 117 U/L    Protein, total 6.5 6.4 - 8.2 g/dL    Albumin 3.0 (L) 3.5 - 5.0 g/dL    Globulin 3.5 2.0 - 4.0 g/dL    A-G Ratio 0.9 (L) 1.1 - 2.2     ACETAMINOPHEN    Collection Time: 07/31/17  7:34 PM   Result Value Ref Range    Acetaminophen level <2 (L) 10 - 30 ug/mL   SALICYLATE    Collection Time: 07/31/17  7:34 PM   Result Value Ref Range    SALICYLATE <3.2 (L) 2.8 - 20.0 MG/DL   ETHYL ALCOHOL    Collection Time: 07/31/17  7:34 PM   Result Value Ref Range    ALCOHOL(ETHYL),SERUM <10 <10 MG/DL   CK    Collection Time: 07/31/17  7:34 PM   Result Value Ref Range    CK 84 26 - 192 U/L   DRUG SCREEN, URINE    Collection Time: 07/31/17  8:52 PM   Result Value Ref Range    AMPHETAMINES NEGATIVE  NEG      BARBITURATES NEGATIVE  NEG      BENZODIAZEPINE POSITIVE (A) NEG      COCAINE NEGATIVE  NEG      METHADONE NEGATIVE  NEG      OPIATES NEGATIVE  NEG      PCP(PHENCYCLIDINE) NEGATIVE  NEG      THC (TH-CANNABINOL) POSITIVE (A) NEG      Drug screen comment (NOTE)    HCG URINE, QL    Collection Time: 07/31/17  8:52 PM   Result Value Ref Range    HCG urine, Ql. NEGATIVE  NEG     URINALYSIS W/ RFLX MICROSCOPIC    Collection Time: 07/31/17  8:52 PM   Result Value Ref Range    Color YELLOW/STRAW      Appearance CLEAR CLEAR      Specific gravity 1.023 1.003 - 1.030      pH (UA) 6.5 5.0 - 8.0      Protein 30 (A) NEG mg/dL    Glucose NEGATIVE  NEG mg/dL    Ketone NEGATIVE  NEG mg/dL    Bilirubin NEGATIVE  NEG      Blood NEGATIVE  NEG      Urobilinogen 1.0 0.2 - 1.0 EU/dL    Nitrites NEGATIVE NEG      Leukocyte Esterase NEGATIVE  NEG     GLUCOSE, POC    Collection Time: 07/31/17 10:09 PM   Result Value Ref Range    Glucose (POC) 127 (H) 65 - 100 mg/dL    Performed by [facility]      MEDICATIONS GIVEN:  Medications   sodium chloride 0.9 % bolus infusion 1,000 mL (0 mL IntraVENous IV Completed 7/31/17 2047)   sodium chloride 0.9 % bolus infusion 1,000 mL (0 mL IntraVENous IV Completed 7/31/17 2334)   LORazepam (ATIVAN) injection 2 mg (2 mg IntraVENous Given 7/31/17 1947)   LORazepam (ATIVAN) injection 1 mg (1 mg IntraVENous Given 7/31/17 2217)   LORazepam (ATIVAN) injection 1 mg (1 mg IntraVENous Given 7/31/17 2251)       IMPRESSION:  1. Drug overdose, accidental or unintentional, initial encounter    2. Acute encephalopathy    3. Sinus tachycardia        PLAN:  1. Admit to Dr. Oscar Bar    ADMIT NOTE:  10:08 PM  Patient is being admitted to the hospital by Dr. Oscar Bar. The results of their tests and reasons for their admission have been discussed with them and/or available family. They convey agreement and understanding for the need to be admitted and for their admission diagnosis. Consultation has been made with the inpatient physician specialist for hospitalization. Attestation Note:  This note is prepared by [facility], acting as Scribe for MD Kacey Cuevas MD: The scribe's documentation has been prepared under my direction and personally reviewed by me in its entirety. I confirm that the note above accurately reflects all work, treatment, procedures, and medical decision making performed by me.

## 2017-08-01 NOTE — H&P
Hospitalist Admission Note    NAME: Kathy Clark   :  1975   MRN:  [id number]     Date/Time:  2017 10:27 PM    Patient PCP: None  ________________________________________________________________________    My assessment of this patient's clinical condition and my plan of care is as follows. Assessment / Plan:  Suspected drug overdose of Seroquel and Risperdal  Acute encephalopathy   H/o polysubstance abuse   In settings of bipolar DO/ depression   Remain extremely agitated despite use of ativan. Admit to ICU  May need propofol gtt   No signs of underlying infection: no leukocytosis, ua is negative   UDS +: benzo, cannabinol   Keep NPO to decrease risk of aspiration   Aggressive IVF   Monitor closely for QTc prolongation and extrapyramidal symptoms/ myoclonic jerking with risperdal  Will get head CT     Anemia   Unclear etiology at present. No signs of active bleeding  Baseline Hb 11.4, admission 8.6  Follow anemia w/u  Check stool for hemoccult     Asthma, wo exacerbation, jet nebs prn   Hepatitis B           Code Status: Full code   Surrogate Decision Maker: Parents    DVT Prophylaxis: holding lovenox with new onset anemia, re assess in 24 - 48 hr   GI Prophylaxis: not indicated    Baseline: independent   Per record: Pt is in & out of multiple hospitals due to her polysubstance abuse related events. Subjective:   CHIEF COMPLAINT: AMS     HISTORY OF PRESENT ILLNESS:     Delphine Leyva is a 35 y.o.  female who presents with above complaint. Pt has AMS at this time and cannot contribute to the history. No family available for interview. History was obtained from [facility] notes. Pt was found by her parents unresponsive. Parents were suspecting that pt overdosed on Seroquel and Risperdal because two empty bottles were found in her room. Pt was twitching and drooling. On arrival to ED pt was responsive to painful stimuli and withdraws from pain.  While in ED pt become more awake. She was agitated. She was treated in ED with Ativan 1 mg x 3. ED provider discussed pt case with poison control center. Pt remain very conbutive in ED despite ativan. She was biting and kicking. She was incontinent of urine. Patient was having visual and auditory hallucinations. She was grabbing into the air and putting \"food\" in her mouth. Patient was talking to invisible people. She was saying \"don't tell me to go to the store\" and \"tell my boy to come back\". Pt also had prescribed percocet. Bottle cannot be find at home. Vs: 97.7 °F (36.5 °C) - 120 - 110/62 - 22 - 98% RA    We were asked to admit for work up and evaluation of the above problems. Past Medical History:   Diagnosis Date    Asthma     Bipolar 1 disorder (HCC)     Chronic obstructive pulmonary disease (HCC)     Chronic pain     back, leg    Cocaine abuse     Depression     Hepatitis B     Heroin abuse     HTN (hypertension)     Narcotic abuse     Polysubstance abuse     Sarcoidosis ([address]     Tobacco abuse         Past Surgical History:   Procedure Laterality Date    HX GYN      HX WISDOM TEETH EXTRACTION         Social History   Substance Use Topics    Smoking status: Former Smoker     Packs/day: 0.25    Smokeless tobacco: Never Used    Alcohol use No        Family History   Problem Relation Age of Onset    Hypertension Father     Hypertension Mother      Allergies   Allergen Reactions    Tomato Swelling     Tongue        Prior to Admission medications    Medication Sig Start Date End Date Taking? Authorizing Provider   oxyCODONE-acetaminophen (PERCOCET) 5-325 mg per tablet Take 1-2 Tabs by mouth every six (6) hours as needed for Pain for up to 5 days. Max Daily Amount: 8 Tabs. 7/28/17 8/2/17  [employer], [place]   predniSONE (STERAPRED DS) 10 mg dose pack Standard 6 day taper 7/28/17 8/4/17  [employer], [place]   acyclovir (ZOVIRAX) 800 mg tablet Take 1 Tab by mouth five (5) times daily for 7 days. 7/28/17 8/4/17  [place], [place]   albuterol sulfate 90 mcg/actuation aepb Take 2 Puffs by inhalation every four (4) hours as needed. 4/19/17   Elise Santos MD   albuterol-ipratropium (DUO-NEB) 2.5 mg-0.5 mg/3 ml nebu 3 mL by Nebulization route every six (6) hours as needed. 4/19/17   Elise Santos MD   fluticasone-vilanterol (BREO ELLIPTA) 015-08 mcg/dose inhaler Take 1 Puff by inhalation daily. 4/19/17   Elise Santos MD   Nebulizer & Compressor machine 1 Each by Does Not Apply route daily. 4/19/17   Elise Santos MD   albuterol (PROVENTIL VENTOLIN) 2.5 mg /3 mL (0.083 %) nebulizer solution 3 mL by Nebulization route every four (4) hours as needed for Wheezing. 4/16/17   Babita Yeh MD       REVIEW OF SYSTEMS:     I am not able to complete the review of systems because: The patient is intubated and sedated   y The patient has altered mental status due to his acute medical problems    The patient has baseline aphasia from prior stroke(s)    The patient has baseline dementia and is not reliable historian    The patient is in acute medical distress and unable to provide information           Objective:   VITALS:    Visit Vitals    /62 (BP 1 Location: Left arm, BP Patient Position: At rest)    Pulse (!) 120    Temp 97.7 °F (36.5 °C)    Resp 22    Ht 5' 4\" (1.626 m)    Wt 76.6 kg (168 lb 14 oz)    SpO2 98%    BMI 28.99 kg/m2       PHYSICAL EXAM:    General:    Drowsy, uncooperative, acute distress due to agitation, appears stated age. HEENT: Atraumatic, anicteric sclerae, pink conjunctivae     No oral ulcers, mucosa moist, throat clear, dentition fair  Neck:  Supple, symmetrical,  thyroid: non tender  Lungs:   Clear to auscultation bilaterally. No Wheezing or Rhonchi. No rales. Chest wall:  No tenderness  No Accessory muscle use. Heart:   Regular  Rhythm, tachycardic. No  murmur   No edema  Abdomen:   Soft, non-tender. Not distended. Bowel sounds normal  Extremities: No cyanosis. No clubbing,      Skin turgor normal, Capillary refill normal, Radial dial pulse 2+  Skin:     Not pale. Not Jaundiced  No rashes   Psych:  Poor insight. Not depressed. Not anxious or agitated. Neurologic: EOMs intact. No facial asymmetry. No aphasia or slurred speech. Symmetrical strength, Sensation grossly intact. Alert and oriented X 0     _______________________________________________________________________  Care Plan discussed with:    Comments   Patient y    Family      RN y    Care Manager                    Consultant:  [facility] ED provider    _______________________________________________________________________  Expected  Disposition:   Home with Family y   HH/PT/OT/RN    SNF/LTC    MAEGAN    ________________________________________________________________________  TOTAL TIME:  Minutes    Critical Care Provided  48   Minutes non procedure based  Patient is critically ill and at high risk for further deterioration. Complex decision making was performed which includes reviewing the patient's past medical records, current laboratory results, and actual Xray films. I have also discussed this case with the involved ED physician and with Pulmonary Critical care. Critical Care:  The reason for providing this level of medical care for this critically ill patient was due a critical illness that impaired one or more vital organ systems such that there was a high probability of imminent or life threatening deterioration in the patients condition. This care involved high complexity decision making to assess, manipulate, and support vital system functions, to treat this degreee vital organ system failure and to prevent further life threatening deterioration of the patients condition.    I was immediately available to the patient.             Comments    Y Reviewed previous records    Y Discussion with patient and/or family and questions answered ________________________________________________________________________  Signed: Cyrus Hackett MD     Procedures: see electronic medical records for all procedures/Xrays and details which were not copied into this note but were reviewed prior to creation of Plan. LAB DATA REVIEWED:    Recent Results (from the past 24 hour(s))   CBC WITH AUTOMATED DIFF    Collection Time: 07/31/17  7:34 PM   Result Value Ref Range    WBC 5.6 3.6 - 11.0 K/uL    RBC 3.05 (L) 3.80 - 5.20 M/uL    HGB 8.6 (L) 11.5 - 16.0 g/dL    HCT 26.2 (L) 35.0 - 47.0 %    MCV 85.9 80.0 - 99.0 FL    MCH 28.2 26.0 - 34.0 PG    MCHC 32.8 30.0 - 36.5 g/dL    RDW 20.6 (H) 11.5 - 14.5 %    PLATELET 180 760 - 034 K/uL    NEUTROPHILS 42 32 - 75 %    LYMPHOCYTES 53 (H) 12 - 49 %    MONOCYTES 4 (L) 5 - 13 %    EOSINOPHILS 1 0 - 7 %    BASOPHILS 0 0 - 1 %    ABS. NEUTROPHILS 2.4 1.8 - 8.0 K/UL    ABS. LYMPHOCYTES 2.9 0.8 - 3.5 K/UL    ABS. MONOCYTES 0.2 0.0 - 1.0 K/UL    ABS. EOSINOPHILS 0.1 0.0 - 0.4 K/UL    ABS. BASOPHILS 0.0 0.0 - 0.1 K/UL    RBC COMMENTS ANISOCYTOSIS  2+        DF SMEAR SCANNED     METABOLIC PANEL, COMPREHENSIVE    Collection Time: 07/31/17  7:34 PM   Result Value Ref Range    Sodium 139 136 - 145 mmol/L    Potassium 3.8 3.5 - 5.1 mmol/L    Chloride 105 97 - 108 mmol/L    CO2 26 21 - 32 mmol/L    Anion gap 8 5 - 15 mmol/L    Glucose 119 (H) 65 - 100 mg/dL    BUN 10 6 - 20 MG/DL    Creatinine 1.16 (H) 0.55 - 1.02 MG/DL    BUN/Creatinine ratio 9 (L) 12 - 20      GFR est AA >60 >60 ml/min/1.73m2    GFR est non-AA 51 (L) >60 ml/min/1.73m2    Calcium 7.9 (L) 8.5 - 10.1 MG/DL    Bilirubin, total 0.2 0.2 - 1.0 MG/DL    ALT (SGPT) 56 12 - 78 U/L    AST (SGOT) 76 (H) 15 - 37 U/L    Alk.  phosphatase 62 45 - 117 U/L    Protein, total 6.5 6.4 - 8.2 g/dL    Albumin 3.0 (L) 3.5 - 5.0 g/dL    Globulin 3.5 2.0 - 4.0 g/dL    A-G Ratio 0.9 (L) 1.1 - 2.2     ACETAMINOPHEN    Collection Time: 07/31/17  7:34 PM   Result Value Ref Range Acetaminophen level <2 (L) 10 - 30 ug/mL   SALICYLATE    Collection Time: 07/31/17  7:34 PM   Result Value Ref Range    SALICYLATE <0.9 (L) 2.8 - 20.0 MG/DL   ETHYL ALCOHOL    Collection Time: 07/31/17  7:34 PM   Result Value Ref Range    ALCOHOL(ETHYL),SERUM <10 <10 MG/DL   CK    Collection Time: 07/31/17  7:34 PM   Result Value Ref Range    CK 84 26 - 192 U/L   DRUG SCREEN, URINE    Collection Time: 07/31/17  8:52 PM   Result Value Ref Range    AMPHETAMINES NEGATIVE  NEG      BARBITURATES NEGATIVE  NEG      BENZODIAZEPINE POSITIVE (A) NEG      COCAINE NEGATIVE  NEG      METHADONE NEGATIVE  NEG      OPIATES NEGATIVE  NEG      PCP(PHENCYCLIDINE) NEGATIVE  NEG      THC (TH-CANNABINOL) POSITIVE (A) NEG      Drug screen comment (NOTE)    HCG URINE, QL    Collection Time: 07/31/17  8:52 PM   Result Value Ref Range    HCG urine, Ql. NEGATIVE  NEG     URINALYSIS W/ RFLX MICROSCOPIC    Collection Time: 07/31/17  8:52 PM   Result Value Ref Range    Color YELLOW/STRAW      Appearance CLEAR CLEAR      Specific gravity 1.023 1.003 - 1.030      pH (UA) 6.5 5.0 - 8.0      Protein 30 (A) NEG mg/dL    Glucose NEGATIVE  NEG mg/dL    Ketone NEGATIVE  NEG mg/dL    Bilirubin NEGATIVE  NEG      Blood NEGATIVE  NEG      Urobilinogen 1.0 0.2 - 1.0 EU/dL    Nitrites NEGATIVE  NEG      Leukocyte Esterase NEGATIVE  NEG

## 2017-08-02 ENCOUNTER — APPOINTMENT (OUTPATIENT)
Dept: GENERAL RADIOLOGY | Age: 42
DRG: 917 | End: 2017-08-02
Attending: HOSPITALIST
Payer: MEDICAID

## 2017-08-02 LAB
GLUCOSE BLD STRIP.AUTO-MCNC: 118 MG/DL (ref 65–100)
SERVICE CMNT-IMP: ABNORMAL

## 2017-08-02 PROCEDURE — 71010 XR CHEST PORT: CPT

## 2017-08-02 PROCEDURE — 74011250637 HC RX REV CODE- 250/637: Performed by: HOSPITALIST

## 2017-08-02 PROCEDURE — 65270000029 HC RM PRIVATE

## 2017-08-02 PROCEDURE — 74011250636 HC RX REV CODE- 250/636: Performed by: HOSPITALIST

## 2017-08-02 PROCEDURE — 65610000006 HC RM INTENSIVE CARE

## 2017-08-02 PROCEDURE — 74011250637 HC RX REV CODE- 250/637: Performed by: INTERNAL MEDICINE

## 2017-08-02 PROCEDURE — 82962 GLUCOSE BLOOD TEST: CPT

## 2017-08-02 RX ORDER — HYDROMORPHONE HYDROCHLORIDE 2 MG/1
1 TABLET ORAL
Status: COMPLETED | OUTPATIENT
Start: 2017-08-02 | End: 2017-08-02

## 2017-08-02 RX ORDER — HYDROMORPHONE HYDROCHLORIDE 2 MG/1
2 TABLET ORAL
Status: ACTIVE | OUTPATIENT
Start: 2017-08-02 | End: 2017-08-03

## 2017-08-02 RX ADMIN — LORAZEPAM 0.5 MG: 0.5 TABLET ORAL at 22:48

## 2017-08-02 RX ADMIN — HYDROMORPHONE HYDROCHLORIDE 2 MG: 2 TABLET ORAL at 13:00

## 2017-08-02 RX ADMIN — FLUTICASONE FUROATE AND VILANTEROL TRIFENATATE 1 PUFF: 200; 25 POWDER RESPIRATORY (INHALATION) at 08:52

## 2017-08-02 RX ADMIN — CLONAZEPAM 5 MG: 1 TABLET ORAL at 08:50

## 2017-08-02 RX ADMIN — OXYCODONE HYDROCHLORIDE AND ACETAMINOPHEN 1 TABLET: 5; 325 TABLET ORAL at 15:23

## 2017-08-02 RX ADMIN — ONDANSETRON 4 MG: 2 INJECTION INTRAMUSCULAR; INTRAVENOUS at 15:58

## 2017-08-02 RX ADMIN — LITHIUM CARBONATE 300 MG: 300 CAPSULE ORAL at 19:20

## 2017-08-02 RX ADMIN — LORAZEPAM 0.5 MG: 0.5 TABLET ORAL at 08:50

## 2017-08-02 RX ADMIN — QUETIAPINE FUMARATE 400 MG: 100 TABLET, FILM COATED ORAL at 08:50

## 2017-08-02 RX ADMIN — Medication 10 ML: at 15:23

## 2017-08-02 RX ADMIN — QUETIAPINE FUMARATE 400 MG: 100 TABLET, FILM COATED ORAL at 18:56

## 2017-08-02 RX ADMIN — AMLODIPINE BESYLATE 5 MG: 5 TABLET ORAL at 08:50

## 2017-08-02 RX ADMIN — Medication 10 ML: at 21:07

## 2017-08-02 RX ADMIN — Medication 10 ML: at 04:04

## 2017-08-02 RX ADMIN — LITHIUM CARBONATE 300 MG: 300 CAPSULE ORAL at 08:52

## 2017-08-02 RX ADMIN — OXYCODONE HYDROCHLORIDE AND ACETAMINOPHEN 1 TABLET: 5; 325 TABLET ORAL at 21:07

## 2017-08-02 RX ADMIN — MUPIROCIN: 20 OINTMENT TOPICAL at 21:10

## 2017-08-02 RX ADMIN — OXYCODONE HYDROCHLORIDE AND ACETAMINOPHEN 1 TABLET: 5; 325 TABLET ORAL at 06:15

## 2017-08-02 RX ADMIN — CLONAZEPAM 5 MG: 1 TABLET ORAL at 18:56

## 2017-08-02 RX ADMIN — MUPIROCIN: 20 OINTMENT TOPICAL at 08:53

## 2017-08-02 RX ADMIN — LORAZEPAM 0.5 MG: 0.5 TABLET ORAL at 15:23

## 2017-08-02 NOTE — DIABETES MGMT
DTC Progress Note    Recommendations/ Comments: Pt discussed with rounding team and Dr. Renny Hall. Plan to discontinue POC glucose checks, pt BG stable and no pmhx of DM. [facility]. A1c:   Lab Results   Component Value Date/Time    Hemoglobin A1c 5.3 01/10/2017 05:28 AM           Recent Glucose Results: Lab Results   Component Value Date/Time    GLUCPOC 118 (H) 08/02/2017 05:06 AM    GLUCPOC 102 (H) 08/01/2017 11:19 PM        Lab Results   Component Value Date/Time    Creatinine 0.93 08/01/2017 02:53 AM     Estimated Creatinine Clearance: 78.2 mL/min (based on Cr of 0.93). Active Orders   Diet    DIET REGULAR        PO intake: Patient Vitals for the past 72 hrs:   % Diet Eaten   08/01/17 1230 100 %   08/01/17 0950 100 %       Will continue to follow as needed. Thank you.     Antonina Capps, [address]  Office: [phone]

## 2017-08-02 NOTE — CONSULTS
Psychiatry  Consult    Subjective:     Date of Evaluation:  8/2/2017    Reason for Referral:  Lindsay Mendoza was referred to the examiners from ICUfor Bipolar disorder . History of Presenting Problem: 35years old lady with history of bipolar disorder and she was brought by EMS as there was an allegation that she overdosed on Seroquel and Risperidone . She stated she overdosed accidentally and she is in severe pain now and she is throwing up and can not keep things in and she keep vomiting , she has shingles and she is in pain and she stated percocet is not working , her pain is not controlled and her vomiting and nausea are not controlled yet , she has depression and has been hospitalized to [facility] and stayed twelve days four months ago. She stated she came from [place] a month ago and she lives with her mother . She denied using any drugs recently and she stated she is not withdrawing from any drugs and she only takes her Seroquel 800 g po bed time in addition to Risperidone. She agreed to be admitted voluntary as she feels sad and depressed frustrated and overdosed on her medications  .      Patient Active Problem List    Diagnosis Date Noted    Drug overdose 07/31/2017    Altered mental status, unspecified 04/26/2017    Lactic acidosis 04/25/2017    Acute encephalopathy 04/25/2017    Potential for altered mental status 04/25/2017    Convulsion disorder ([facility] [address] 04/25/2017    Stenosis of both internal carotid arteries 04/25/2017    Convulsive syncope 04/25/2017    Seizure ([address] 04/20/2017    Anemia 02/22/2017    Bipolar 1 disorder (HCC)     Chronic obstructive pulmonary disease (HCC)     Chronic pain     Hepatitis B     Sarcoidosis ([address]     Tobacco abuse     Cocaine abuse     Polysubstance abuse     Narcotic abuse     HTN (hypertension)     Depression 01/25/2017    Sinus tachycardia 01/10/2017    Heroin abuse 01/30/2014     Past Medical History:   Diagnosis Date    Asthma     [facility]     Chronic obstructive pulmonary disease (HCC)     Chronic pain     back, leg    Cocaine abuse     Depression     Hepatitis B     Heroin abuse     HTN (hypertension)     Narcotic abuse     Polysubstance abuse     [facility]arcoidosis ([facility]     Tobacco abuse       Family History   Problem Relation Age of Onset    Hypertension Father     Hypertension Mother       Social History   Substance Use Topics    Smoking status: Former Smoker     Packs/day: 0.25    Smokeless tobacco: Never Used    Alcohol use No     Past Surgical History:   Procedure Laterality Date    HX GYN      HX WISDOM TEETH EXTRACTION        Prior to Admission medications    Medication Sig Start Date End Date Taking? Authorizing Provider   traMADol (ULTRAM) 50 mg tablet Take 50 mg by mouth every six (6) hours as needed for Pain. Indications: Pain    Historical Provider   gabapentin (NEURONTIN) 300 mg capsule Take 300 mg by mouth. Historical Provider   cloNIDine HCl (CATAPRES) 0.1 mg tablet Take 0.1 mg by mouth daily. Indications: hypertension    Historical Provider   oxyCODONE-acetaminophen (PERCOCET) 5-325 mg per tablet Take 1-2 Tabs by mouth every six (6) hours as needed for Pain for up to 5 days. Max Daily Amount: 8 Tabs. 7/28/17 8/2/17  ANDRZEJ Paul   predniSONE (STERAPRED DS) 10 mg dose pack Standard 6 day taper 7/28/17 8/4/17  ANDRZEJ Paul   acyclovir (ZOVIRAX) 800 mg tablet Take 1 Tab by mouth five (5) times daily for 7 days. 7/28/17 8/4/17  ANDRZEJ Paul   albuterol sulfate 90 mcg/actuation aepb Take 2 Puffs by inhalation every four (4) hours as needed. 4/19/17   Dayton Jimenez MD   albuterol-ipratropium (DUO-NEB) 2.5 mg-0.5 mg/3 ml nebu 3 mL by Nebulization route every six (6) hours as needed. 4/19/17   Dayton Jimenez MD   fluticasone-vilanterol (BREO ELLIPTA) 151-06 mcg/dose inhaler Take 1 Puff by inhalation daily.  4/19/17   Dayton Jimenez MD   Nebulizer & Compressor machine 1 Each by Does Not Apply route daily. 4/19/17   Khoa Rahman MD   albuterol (PROVENTIL VENTOLIN) 2.5 mg /3 mL (0.083 %) nebulizer solution 3 mL by Nebulization route every four (4) hours as needed for Wheezing. 4/16/17   Jerri Knowles MD     Allergies   Allergen Reactions    Tomato Swelling     Tongue          Objective:     Patient Vitals for the past 8 hrs:   BP Temp Pulse Resp   08/02/17 1210 132/81 98.5 °F (36.9 °C) 98 17   08/02/17 1200 - 98.6 °F (37 °C) - -   08/02/17 1100 129/84 - (!) 105 24       Mental Status exam: She is feeling in pain,depressed and has been in tears and requesting pain medications  And she has inactive suicidal thoughts and has no  paranoid feeling and has limited insight         Impression: Bipolar disorder most recent episode depression       Principal Problem:    Drug overdose (7/31/2017)          Plan:  Will continue Seroquel 400 mg po bed , Decrease Klonopin to 4 mg po bid , Check lithium level , will need inpatient psychiatry service , she accepted to be admitted voluntary for now , TDO may need to be issued if she change her mind , patient currently still in pain and still vomiting and will postpone psychiatric admission until she is stable medically      Recommendations for Treatment/Conditions:  Inpatient psychiatry service   Referral To:    inpatient after she is medically stable      Competency Statement:   She is sounds more competent today comparing to yesterday

## 2017-08-02 NOTE — PROGRESS NOTES
[facility] INTENSIVIST Consult Service Note  Pulmonary, Critical Care, and Sleep Medicine    Name: Saintclair Sero MRN: [id number]   : 1975 Hospital: [address] [facility]   Date: 2017   Hospital Day: 3       Subjective/Interval History:   I have reviewed the flowsheet and previous days notes. Seen earlier today on rounds. Reviewed the evaluation and will assist in implementing plan as outlined by Dr. Demarcus Olivera and team     All meds had been held due to possible overdose of meds and bath salts? ?very agitated and restless, IV precedex started as well as some of her home meds. Posey vest. Psychiatry consult appreciated but pt not fully engaged due to need for heavy sedation     less restless off IV precedex. Still on some of her scheduled home meds. Lithium level ordered. Pt trying to leave before seen by Psychiatry. Verbally Abusive. Resisitant. TDO process intitiated. Code atlas called. Pt agrees to stay but then threatens again after asking for pain meds. IMPRESSION:   1. Suspected drug overdose of Seroquel and Risperdal and possibly bath salts and more:UDS +: benzo, cannabin  2. Acute encephalopathy   3. H/o polysubstance abuse Per record: Pt is in & out of multiple hospitals due to her polysubstance abuse related events. Last discharge summary from 2017 [facility] reviewed  4. Left chest Zoster rash- still painful  5. In settings of bipolar DO/ depression   6. Asthma, wo exacerbation, jet nebs prn   7. Hepatitis B   8. Anemia Unclear etiology at present. No signs of active bleeding Baseline Hb 11.4, admission 8.6      RECOMMENDATIONS/PLAN:   1. home meds such as seroquel, lithium, klonopin. Hold zoloft and gabapentin for now, lithium level level requested by Psychiatry  2. Psychiatry consult  3. TDO  4. Sitter  5. May move to medical floor or inpatient Psychiatry  6. Pain med for post herpetic neuralgia but antiviral med not indicated.  Hold prednisone due to agitation- pt claims some meds prescribed at [facility] but no details  7. Advance diet as tolerated  8. Patient may require sitter due to threat of injury to self, interference with medical devices. Very impulsive. 9. Will be available to assist in medical management while in the CCU pending disposition     [x] High complexity decision making was performed  [x] See my orders for details  Tubes:     ICU disposition :Stable to transfer to floor. Code: Full Code        Subjective/Initial History:   I have reviewed the flowsheet and previous days notes. The patient is unable to give any meaningful history or review of systems because the patient is:  lethargic     Branden Galloway is a 35 y.o. female with PMhx significant for asthma, depression, hepatitis B, HTN, and polysubstance abuse who presented lastnight via EMS to the ED for evaluation of altered mental status after a suspected drug overdose. Pt was found by her parents and EMS was called as the pt was unresponsive. Per EMS, it is possible that she overdosed on Seroquel and Risperdal because two empty bottles were found in her room. Her BG en route was 90. Pt does not know what she took when asked. Pt has a history of substance abuse. While in ED pt become more awake. She became agitated. She was treated in ED with Ativan 1 mg x 3. ED provider discussed pt case with poison control center. Pt remain very conbative in ED despite ativan. She was biting and kicking. She was incontinent of urine. Patient was having visual and auditory hallucinations. She was grabbing into the air and putting \"food\" in her mouth. Patient was talking to invisible people. She was saying \"don't tell me to go to the store\" and \"tell my boy to come back\". Pt also had prescribed percocet. Bottle cannot be find at home. Pt admitted to CCU. Found to have recent but partially healed Zoster rash along left chest. Groggy but could converse at times.  Lives with both parents and couple of siblings. [place] [facility] On room air. No SOB. No nausea. No fever. QT normal.     April 2017 pt admitted to [facility] with possible clonidine overdose. I reviewed her medications. PMH:  has a past medical history of Asthma; Bipolar 1 disorder ([facility] Chronic obstructive pulmonary disease ([facility] Chronic pain; Cocaine abuse; Depression; Hepatitis B; Heroin abuse; HTN (hypertension); Narcotic abuse; Polysubstance abuse; Sarcoidosis ([facility] and Tobacco abuse. PSH:   has a past surgical history that includes gyn and wisdom teeth extraction. FHX: family history includes Hypertension in her father and mother. SHX:  reports that she has quit smoking. She smoked 0.25 packs per day. She has never used smokeless tobacco. She reports that she does not drink alcohol or use illicit drugs. ROS:Review of systems not obtained due to patient factors.     MAR reviewed and pertinent medications noted or modified as needed    Allergies   Allergen Reactions    Tomato Swelling     Tongue        MEDS:   Current Facility-Administered Medications   Medication    albuterol (PROVENTIL VENTOLIN) nebulizer solution 2.5 mg    fluticasone-vilanterol (BREO ELLIPTA) 200mcg-25mcg/puff    sodium chloride (NS) flush 5-10 mL    sodium chloride (NS) flush 5-10 mL    ondansetron (ZOFRAN) injection 4 mg    glucose chewable tablet 16 g    dextrose (D50W) injection syrg 12.5-25 g    glucagon (GLUCAGEN) injection 1 mg    mupirocin (BACTROBAN) 2 % ointment    amLODIPine (NORVASC) tablet 5 mg    oxyCODONE-acetaminophen (PERCOCET) 5-325 mg per tablet 1 Tab    lidocaine (LIDODERM) 5 % patch 1 Patch    LORazepam (ATIVAN) tablet 0.5 mg    clonazePAM (KlonoPIN) tablet 5 mg    lithium carbonate capsule 300 mg    QUEtiapine (SEROquel) tablet 400 mg    nicotine (NICODERM CQ) 21 mg/24 hr patch 1 Patch    dexmedetomidine (PRECEDEX) 400 mcg in 0.9% sodium chloride 100 mL infusion        Telemetry:    normal sinus rhythm    Objective:     Vital Signs: Intake/Output: Intake/Output:   Visit Vitals    /77    Pulse 87    Temp 97.8 °F (36.6 °C)    Resp 24    Ht 5' 4\" (1.626 m)    Wt 76.7 kg (169 lb 1.5 oz)    SpO2 100%    BMI 29.02 kg/m2         O2 Device: Room air       Wt Readings from Last 4 Encounters:   17 76.7 kg (169 lb 1.5 oz)   17 76.6 kg (168 lb 14 oz)   17 60.1 kg (132 lb 8 oz)   17 56.7 kg (125 lb)       Temp (24hrs), Av.7 °F (36.5 °C), Min:97.5 °F (36.4 °C), Max:97.8 °F (36.6 °C)     Intake/Output Summary (Last 24 hours) at 17 0810  Last data filed at 17 0700   Gross per 24 hour   Intake          1933.58 ml   Output             1350 ml   Net           583.58 ml     Last shift:         Last 3 shifts: 1901 -  0700  In: 2978.6 [P.O.:1760; I.V.:1218.6]  Out: 1600 [Urine:1600]       Physical Exam:    General: AAF non-toxic, in no respiratory distress and acyanotic, uncooperative    HEENT: NCAT   Neck, Lymph: No abnormally enlarged lymph nodes. Chest: left dermatomal rash with scabs, no vesicles   Lungs: normal air entry   Heart: Regular rate and rhythm   Abdomen: soft, non-tender, without masses or organomegaly   :    Extremity: negative, clubbing;     Neuro: alert   Psych: poor insight, agitated, argumentative   Skin: WarmSkin color, texture, turgor normal. No rashes or lesions;   Pulses: Bilateral, Radial, 2+ Normal upper and lower extremity pulses   Capillary refill: normal well perfused;    Data:   Interval lab and diagnostic data was reviewed. Interval radiology images were independently viewed and available reports were reviewed. All lab results for the last 24 hours reviewed.           Labs:    Recent Labs      17   0253  17   1934   WBC  6.1  5.6   HGB  8.8*  8.6*   PLT  211  234     Recent Labs      17   0253  17   193   NA  139  139   K  3.7  3.8   CL  108  105   CO2  24  26   GLU  86  119*   BUN  8  10   CREA 0. 93  1.16*   CA  7.5*  7.9*   MG  1.7   --    PHOS  3.0   --    ALB  2.9*  3.0*   SGOT  57*  76*   ALT  49  56     No results for input(s): PH, PCO2, PO2, HCO3, FIO2 in the last 72 hours. Recent Labs      07/31/17 1934   CPK  84     No results found for: BNPP, BNP   Lab Results   Component Value Date/Time    Culture result: MRSA PRESENT 04/17/2017 02:45 AM    Culture result:  04/17/2017 02:45 AM         Screening of patient nares for MRSA is for surveillance purposes and, if positive, to facilitate isolation considerations in high risk settings. It is not intended for automatic decolonization interventions per se as regimens are not sufficiently effective to warrant routine use. Culture result: MRSA NOT PRESENT 02/27/2017 12:00 AM    Culture result:  02/27/2017 12:00 AM         Screening of patient nares for MRSA is for surveillance purposes and, if positive, to facilitate isolation considerations in high risk settings. It is not intended for automatic decolonization interventions per se as regimens are not sufficiently effective to warrant routine use.      Lab Results   Component Value Date/Time    CK 84 07/31/2017 07:34 PM     01/30/2014 01:00 AM     Lab Results   Component Value Date/Time    Color YELLOW/STRAW 07/31/2017 08:52 PM    Appearance CLEAR 07/31/2017 08:52 PM    Specific gravity >1.030 05/09/2009 09:27 PM    pH (UA) 6.5 07/31/2017 08:52 PM    Protein 30 07/31/2017 08:52 PM    Glucose NEGATIVE  07/31/2017 08:52 PM    Ketone NEGATIVE  07/31/2017 08:52 PM    Bilirubin NEGATIVE  07/31/2017 08:52 PM    Blood NEGATIVE  07/31/2017 08:52 PM    Urobilinogen 1.0 07/31/2017 08:52 PM    Nitrites NEGATIVE  07/31/2017 08:52 PM    Leukocyte Esterase NEGATIVE  07/31/2017 08:52 PM    WBC 5-10 04/24/2017 10:23 PM    RBC 0-5 04/24/2017 10:23 PM    Bacteria 1+ 04/24/2017 10:23 PM       Lab Results   Component Value Date/Time    Iron 36 08/01/2017 02:53 AM    TIBC 412 08/01/2017 02:53 AM    Iron % saturation 9 08/01/2017 02:53 AM    Ferritin 32 08/01/2017 02:53 AM     Lab Results   Component Value Date/Time    TSH 6.60 04/24/2017 09:23 PM      Lab Results   Component Value Date/Time    Hepatitis B surface Ag 0.12 02/22/2017 07:45 PM         Imaging:  I have personally reviewed the patients radiographs and have reviewed the reports:          Results from [place] encounter on 07/31/17   XR CHEST PORT   Narrative INDICATION:    EXAM:    A single frontal portable image was obtained. FINDINGS:    Comparison exam: April 16, 2017. The heart is normal in size. The lungs are well expanded bilaterally without  infiltrate or pleural effusion or evidence of overt cardiac decompensation. The  visualized bony thorax is within normal limits. IMPRESSION:    No acute infiltrate or overt cardiac decompensation. Results from [place] encounter on 07/31/17   CT HEAD WO CONT   Narrative EXAM:  CT HEAD WO CONT    INDICATION:   AMS. Possible drug overdose. COMPARISON: 4/24/2017. CONTRAST:  None. TECHNIQUE: Unenhanced CT of the head was performed using 5 mm images. Brain and  bone windows were generated. CT dose reduction was achieved through use of a  standardized protocol tailored for this examination and automatic exposure  control for dose modulation. FINDINGS:  The ventricles and sulci are normal in size, shape and configuration and  midline. There is no significant white matter disease. There is no intracranial  hemorrhage, extra-axial collection, mass, mass effect or midline shift. The  basilar cisterns are open. No acute infarct is identified. The bone windows  demonstrate no abnormalities. The visualized portions of the paranasal sinuses  and mastoid air cells are clear. IMPRESSION: No acute finding or significant change.                My assessment/plan was discussed with:  nursing    respiratory therapy Dr. whittaker          Thank you for allowing us to participate in the care of this patient. We will be happy to follow along with you.     Myrtle Gomez MD

## 2017-08-02 NOTE — PROGRESS NOTES
Pt is a 36 yo female admitted from home after being found unresponsive at home. Parents called EMS - overdose suspected because family found empty bottles of Seroquel and Risperdal in pt's room. Pt has hx which includes bipolar disorder, COPD, polysubstance abuse with multiple ED visits. Pt had altered mental status for first 24 hours so Psych was unable to assess. Mid-morning today, pt began to get restless, agitated and trying to leave unit to go home. Staff tried to calm pt repeatedly and eventually Code Rogers had to be called. Repeated calls to Psychiatry - MD cannot come to assess until 6 PM. 83Gloria Nix came to CCU around 4 PM to explain that he cannot do anything with pt until Psychiatry evaluates. Unit will call Crisis repr in ED later tonight if Psych recommendation is for TDO to Inpt Psych. Care Management Interventions  PCP Verified by CM: No  Mode of Transport at Discharge: Other (see comment) (TBD)  Transition of Care Consult (CM Consult):  Other (Possible Inpt Psychiatriac Transfer)  Physical Therapy Consult: No  Speech Therapy Consult: No  Current Support Network: Relative's Home (parents)     Cristóbal Mcnair, MSW

## 2017-08-02 NOTE — PROGRESS NOTES
1900: Bedside and verbal report received from Michael Chow, [address] [place] [address]. Sitter at bedside. 2000: Assessment completed. Patient alert, oriented and verbally aggressive. Sitter at bedside. 0015: Reassessment completed. No changes noted. 0400: Reassessment completed. Patient very agitated and verbally aggressive. Multiple attempts made to make patient oriented to situation. Patient demanding to see a physician. Informed her that a physician will be by in the morning. Patient demanding me to call for more pain medication. 0407: Paged Dr. Javon Richardson. Informed her of current status. New order for tylenol 650 mg Q6.   0430: Patient complaining of throbbing pain. Warm compress applied to right arm.   0443: Took Tylenol in for patient. Patient stated Vercarmella Roy did not want this fucking shit\" Patient threw pills back. 0700: Bedside and verbal report given to Michael Chow RN.

## 2017-08-02 NOTE — PROGRESS NOTES
0700 report received 0800 assessed 1000 pt upset pacing threatening elopement redirected to room 1200 assessed 1300 pt with sitter  1400 no changes 1600 continue to monitor 1800 psych in to evaluate satetd that if pt becomes involuntary she is to be TDO  1930 report to nurse

## 2017-08-02 NOTE — PROGRESS NOTES
Hospitalist Progress Note  Aniyah Fried MD  Internal medicine/ Hospitalist    Daily Progress Note: 2017 3:57 PM      Interval history / Subjective:   Patient very agitated at the moment. She was admitted for suspected drug overdose of Seroquel and Risperdal.Has h/o polysubstance abuse on top of bipolar do/depression. Patient has been very agitated,restless today in most of the am time,trying to leave the hospital.Code Sher[place] had to be called to prevent pt from leaving as she was out of control.     Current Facility-Administered Medications   Medication Dose Route Frequency    HYDROmorphone (DILAUDID) tablet 2 mg  2 mg Oral NOW    albuterol (PROVENTIL VENTOLIN) nebulizer solution 2.5 mg  2.5 mg Nebulization Q4H PRN    fluticasone-vilanterol (BREO ELLIPTA) 200mcg-25mcg/puff  1 Puff Inhalation DAILY    sodium chloride (NS) flush 5-10 mL  5-10 mL IntraVENous Q8H    sodium chloride (NS) flush 5-10 mL  5-10 mL IntraVENous PRN    ondansetron (ZOFRAN) injection 4 mg  4 mg IntraVENous Q4H PRN    mupirocin (BACTROBAN) 2 % ointment   Topical Q12H    amLODIPine (NORVASC) tablet 5 mg  5 mg Oral DAILY    oxyCODONE-acetaminophen (PERCOCET) 5-325 mg per tablet 1 Tab  1 Tab Oral Q6H PRN    lidocaine (LIDODERM) 5 % patch 1 Patch  1 Patch TransDERmal Q24H    LORazepam (ATIVAN) tablet 0.5 mg  0.5 mg Oral Q4H PRN    clonazePAM (KlonoPIN) tablet 5 mg  5 mg Oral BID    lithium carbonate capsule 300 mg  300 mg Oral BID    QUEtiapine (SEROquel) tablet 400 mg  400 mg Oral BID    nicotine (NICODERM CQ) 21 mg/24 hr patch 1 Patch  1 Patch TransDERmal DAILY        Objective:     Visit Vitals    /81    Pulse 98    Temp 98.5 °F (36.9 °C)    Resp 17    Ht 5' 4\" (1.626 m)    Wt 76.7 kg (169 lb 1.5 oz)    SpO2 (!) 53%    BMI 29.02 kg/m2      O2 Device: Room air    Temp (24hrs), Av.1 °F (36.7 °C), Min:97.5 °F (36.4 °C), Max:98.6 °F (37 °C)         1901 -  0700  In: 2978.6 [P.O.:1760; I.V.:1218.6]  Out: 1600 [Urine:1600]  P/E  Agitated,hardly follows instructions. Heent:perrla,at/nc,mouth moist.  Lungs ctab  Heart s1s2 nl,no m/g  Abdm:soft,not tender,bs present. Extr:no edema,good pulses. Neuro:aaox3,agitated,no facial droop,moving all extremities. Data Review    No results found for this or any previous visit (from the past 12 hour(s)). Assessment/Plan:     Principal Problem:    Drug overdose (7/31/2017)    Polysubstance abuse    Bipolar disorder    Anemia    Asthma    Hep B        Care Plan   Suspected drug overdose of Seroquel and Risperdal  Acute encephalopathy   H/o polysubstance abuse   In settings of bipolar DO/ depression   Remain extremely agitated despite use of ativan. Also on seroquel,lithium  No signs of underlying infection: no leukocytosis, ua is negative   UDS +: benzo, cannabinol IVF   Monitor closely for QTc prolongation and extrapyramidal symptoms/ myoclonic jerking with risperdal  Head CT w/o acute process  Pt was trying to leave the hospital as she became very agitated. Papers have been completed to possible tdo to a psych institution. Psychiatry to evaluate patient this evening. Pt has a sitter now. Left chest Zoster  On pain meds     Anemia   Unclear etiology at present.  No signs of active bleeding  Baseline Hb 11.4, admission 8.6  No sign of bleed     Asthma, wo exacerbation, jet nebs prn   Hepatitis B     DVT prophylaxis as per protocol    Case d/w nurse and intensivist

## 2017-08-03 ENCOUNTER — APPOINTMENT (OUTPATIENT)
Dept: CT IMAGING | Age: 42
DRG: 885 | End: 2017-08-03
Attending: FAMILY MEDICINE
Payer: MEDICAID

## 2017-08-03 ENCOUNTER — HOSPITAL ENCOUNTER (INPATIENT)
Age: 42
LOS: 1 days | Discharge: HOME | DRG: 885 | End: 2017-08-04
Attending: PSYCHIATRY & NEUROLOGY | Admitting: PSYCHIATRY & NEUROLOGY
Payer: MEDICAID

## 2017-08-03 VITALS
WEIGHT: 169.09 LBS | RESPIRATION RATE: 20 BRPM | OXYGEN SATURATION: 100 % | TEMPERATURE: 97.5 F | BODY MASS INDEX: 28.87 KG/M2 | SYSTOLIC BLOOD PRESSURE: 132 MMHG | HEIGHT: 64 IN | DIASTOLIC BLOOD PRESSURE: 87 MMHG | HEART RATE: 100 BPM

## 2017-08-03 PROCEDURE — 74011250636 HC RX REV CODE- 250/636: Performed by: HOSPITALIST

## 2017-08-03 PROCEDURE — 36415 COLL VENOUS BLD VENIPUNCTURE: CPT | Performed by: FAMILY MEDICINE

## 2017-08-03 PROCEDURE — 80053 COMPREHEN METABOLIC PANEL: CPT | Performed by: FAMILY MEDICINE

## 2017-08-03 PROCEDURE — 74011250637 HC RX REV CODE- 250/637: Performed by: HOSPITALIST

## 2017-08-03 PROCEDURE — 74011250637 HC RX REV CODE- 250/637: Performed by: INTERNAL MEDICINE

## 2017-08-03 PROCEDURE — 74011000250 HC RX REV CODE- 250: Performed by: PSYCHIATRY & NEUROLOGY

## 2017-08-03 PROCEDURE — 65220000003 HC RM SEMIPRIVATE PSYCH

## 2017-08-03 PROCEDURE — 74011250636 HC RX REV CODE- 250/636: Performed by: PSYCHIATRY & NEUROLOGY

## 2017-08-03 PROCEDURE — 74011250636 HC RX REV CODE- 250/636: Performed by: INTERNAL MEDICINE

## 2017-08-03 PROCEDURE — 74011250637 HC RX REV CODE- 250/637: Performed by: FAMILY MEDICINE

## 2017-08-03 PROCEDURE — 74011250637 HC RX REV CODE- 250/637: Performed by: PSYCHIATRY & NEUROLOGY

## 2017-08-03 RX ORDER — QUETIAPINE FUMARATE 400 MG/1
400 TABLET, FILM COATED ORAL 2 TIMES DAILY
Qty: 12 TAB | Refills: 0 | Status: SHIPPED
Start: 2017-08-03 | End: 2018-07-26 | Stop reason: DRUGHIGH

## 2017-08-03 RX ORDER — KETOROLAC TROMETHAMINE 30 MG/ML
30 INJECTION, SOLUTION INTRAMUSCULAR; INTRAVENOUS
Status: DISCONTINUED | OUTPATIENT
Start: 2017-08-03 | End: 2017-08-03 | Stop reason: HOSPADM

## 2017-08-03 RX ORDER — BUDESONIDE 0.5 MG/2ML
500 INHALANT ORAL
Status: DISCONTINUED | OUTPATIENT
Start: 2017-08-03 | End: 2017-08-04 | Stop reason: HOSPADM

## 2017-08-03 RX ORDER — IBUPROFEN 200 MG
1 TABLET ORAL DAILY
Qty: 1 PATCH | Refills: 0 | Status: SHIPPED
Start: 2017-08-03 | End: 2017-09-02

## 2017-08-03 RX ORDER — SULFAMETHOXAZOLE AND TRIMETHOPRIM 800; 160 MG/1; MG/1
1 TABLET ORAL EVERY 12 HOURS
Status: DISCONTINUED | OUTPATIENT
Start: 2017-08-03 | End: 2017-08-04 | Stop reason: HOSPADM

## 2017-08-03 RX ORDER — LITHIUM CARBONATE 300 MG/1
300 CAPSULE ORAL 2 TIMES DAILY
Qty: 30 CAP | Refills: 0 | Status: ON HOLD | OUTPATIENT
Start: 2017-08-03 | End: 2018-08-02

## 2017-08-03 RX ORDER — ADHESIVE BANDAGE
30 BANDAGE TOPICAL DAILY PRN
Status: DISCONTINUED | OUTPATIENT
Start: 2017-08-03 | End: 2017-08-04 | Stop reason: HOSPADM

## 2017-08-03 RX ORDER — IBUPROFEN 200 MG
1 TABLET ORAL
Status: DISCONTINUED | OUTPATIENT
Start: 2017-08-03 | End: 2017-08-04 | Stop reason: HOSPADM

## 2017-08-03 RX ORDER — SULFAMETHOXAZOLE AND TRIMETHOPRIM 800; 160 MG/1; MG/1
1 TABLET ORAL EVERY 12 HOURS
Qty: 10 TAB | Refills: 0 | Status: SHIPPED
Start: 2017-08-03 | End: 2018-07-26

## 2017-08-03 RX ORDER — LORAZEPAM 2 MG/ML
2 INJECTION INTRAMUSCULAR
Status: DISCONTINUED | OUTPATIENT
Start: 2017-08-03 | End: 2017-08-04 | Stop reason: HOSPADM

## 2017-08-03 RX ORDER — ACETAMINOPHEN 325 MG/1
650 TABLET ORAL
Status: DISCONTINUED | OUTPATIENT
Start: 2017-08-03 | End: 2017-08-03 | Stop reason: HOSPADM

## 2017-08-03 RX ORDER — CLONAZEPAM 2 MG/1
4 TABLET ORAL 2 TIMES DAILY
Qty: 30 TAB | Refills: 0 | Status: SHIPPED
Start: 2017-08-03 | End: 2018-08-07

## 2017-08-03 RX ORDER — LORAZEPAM 1 MG/1
1 TABLET ORAL
Status: DISCONTINUED | OUTPATIENT
Start: 2017-08-03 | End: 2017-08-04 | Stop reason: HOSPADM

## 2017-08-03 RX ORDER — ALBUTEROL SULFATE 0.83 MG/ML
2.5 SOLUTION RESPIRATORY (INHALATION)
Status: DISCONTINUED | OUTPATIENT
Start: 2017-08-03 | End: 2017-08-04 | Stop reason: HOSPADM

## 2017-08-03 RX ORDER — LANOLIN ALCOHOL/MO/W.PET/CERES
1 CREAM (GRAM) TOPICAL
Status: DISCONTINUED | OUTPATIENT
Start: 2017-08-04 | End: 2017-08-04 | Stop reason: HOSPADM

## 2017-08-03 RX ORDER — ZOLPIDEM TARTRATE 10 MG/1
10 TABLET ORAL
Status: DISCONTINUED | OUTPATIENT
Start: 2017-08-03 | End: 2017-08-03 | Stop reason: DRUGHIGH

## 2017-08-03 RX ORDER — ARFORMOTEROL TARTRATE 15 UG/2ML
15 SOLUTION RESPIRATORY (INHALATION)
Status: DISCONTINUED | OUTPATIENT
Start: 2017-08-03 | End: 2017-08-04 | Stop reason: HOSPADM

## 2017-08-03 RX ORDER — IBUPROFEN 200 MG
1 TABLET ORAL DAILY
Status: DISCONTINUED | OUTPATIENT
Start: 2017-08-04 | End: 2017-08-04 | Stop reason: HOSPADM

## 2017-08-03 RX ORDER — LIDOCAINE 50 MG/G
PATCH TOPICAL
Qty: 1 EACH | Refills: 0 | Status: SHIPPED
Start: 2017-08-03 | End: 2018-08-04

## 2017-08-03 RX ORDER — LORAZEPAM 0.5 MG/1
0.5 TABLET ORAL
Qty: 30 TAB | Refills: 0 | Status: ON HOLD
Start: 2017-08-03 | End: 2018-08-02

## 2017-08-03 RX ORDER — SULFAMETHOXAZOLE AND TRIMETHOPRIM 800; 160 MG/1; MG/1
1 TABLET ORAL EVERY 12 HOURS
Status: DISCONTINUED | OUTPATIENT
Start: 2017-08-03 | End: 2017-08-03 | Stop reason: HOSPADM

## 2017-08-03 RX ORDER — OLANZAPINE 5 MG/1
5 TABLET ORAL
Status: DISCONTINUED | OUTPATIENT
Start: 2017-08-03 | End: 2017-08-04 | Stop reason: HOSPADM

## 2017-08-03 RX ORDER — OXYCODONE AND ACETAMINOPHEN 5; 325 MG/1; MG/1
1 TABLET ORAL
Qty: 12 TAB | Refills: 0 | Status: ON HOLD | OUTPATIENT
Start: 2017-08-03 | End: 2018-08-02

## 2017-08-03 RX ORDER — AMLODIPINE BESYLATE 5 MG/1
5 TABLET ORAL DAILY
Status: DISCONTINUED | OUTPATIENT
Start: 2017-08-04 | End: 2017-08-04 | Stop reason: HOSPADM

## 2017-08-03 RX ORDER — ACETAMINOPHEN 10 MG/ML
1000 INJECTION, SOLUTION INTRAVENOUS ONCE
Status: COMPLETED | OUTPATIENT
Start: 2017-08-03 | End: 2017-08-03

## 2017-08-03 RX ORDER — IBUPROFEN 400 MG/1
400 TABLET ORAL
Status: DISCONTINUED | OUTPATIENT
Start: 2017-08-03 | End: 2017-08-04 | Stop reason: HOSPADM

## 2017-08-03 RX ORDER — AMLODIPINE BESYLATE 5 MG/1
5 TABLET ORAL DAILY
Qty: 30 TAB | Refills: 0 | Status: ON HOLD
Start: 2017-08-03 | End: 2018-08-02

## 2017-08-03 RX ORDER — CLONAZEPAM 1 MG/1
4 TABLET ORAL 2 TIMES DAILY
Status: DISCONTINUED | OUTPATIENT
Start: 2017-08-03 | End: 2017-08-03 | Stop reason: HOSPADM

## 2017-08-03 RX ORDER — BENZTROPINE MESYLATE 1 MG/ML
2 INJECTION INTRAMUSCULAR; INTRAVENOUS
Status: DISCONTINUED | OUTPATIENT
Start: 2017-08-03 | End: 2017-08-04 | Stop reason: HOSPADM

## 2017-08-03 RX ORDER — MUPIROCIN 20 MG/G
1 OINTMENT TOPICAL EVERY 12 HOURS
Status: DISCONTINUED | OUTPATIENT
Start: 2017-08-03 | End: 2017-08-04 | Stop reason: HOSPADM

## 2017-08-03 RX ORDER — BENZTROPINE MESYLATE 2 MG/1
2 TABLET ORAL
Status: DISCONTINUED | OUTPATIENT
Start: 2017-08-03 | End: 2017-08-04 | Stop reason: HOSPADM

## 2017-08-03 RX ORDER — ACETAMINOPHEN 325 MG/1
650 TABLET ORAL
Status: DISCONTINUED | OUTPATIENT
Start: 2017-08-03 | End: 2017-08-04 | Stop reason: HOSPADM

## 2017-08-03 RX ORDER — ACYCLOVIR 800 MG/1
800 TABLET ORAL
Status: DISCONTINUED | OUTPATIENT
Start: 2017-08-03 | End: 2017-08-04 | Stop reason: HOSPADM

## 2017-08-03 RX ORDER — ZOLPIDEM TARTRATE 5 MG/1
5 TABLET ORAL
Status: DISCONTINUED | OUTPATIENT
Start: 2017-08-03 | End: 2017-08-04 | Stop reason: HOSPADM

## 2017-08-03 RX ORDER — OXYCODONE AND ACETAMINOPHEN 5; 325 MG/1; MG/1
1 TABLET ORAL
Status: DISCONTINUED | OUTPATIENT
Start: 2017-08-03 | End: 2017-08-04 | Stop reason: HOSPADM

## 2017-08-03 RX ORDER — MUPIROCIN 20 MG/G
1 OINTMENT TOPICAL EVERY 12 HOURS
Qty: 22 G | Refills: 0 | Status: ON HOLD
Start: 2017-08-03 | End: 2018-08-04

## 2017-08-03 RX ADMIN — IBUPROFEN 400 MG: 400 TABLET, FILM COATED ORAL at 20:24

## 2017-08-03 RX ADMIN — MUPIROCIN 1 G: 20 OINTMENT TOPICAL at 23:28

## 2017-08-03 RX ADMIN — OXYCODONE HYDROCHLORIDE AND ACETAMINOPHEN 1 TABLET: 5; 325 TABLET ORAL at 14:30

## 2017-08-03 RX ADMIN — SULFAMETHOXAZOLE AND TRIMETHOPRIM 1 TABLET: 800; 160 TABLET ORAL at 23:09

## 2017-08-03 RX ADMIN — OXYCODONE HYDROCHLORIDE AND ACETAMINOPHEN 1 TABLET: 5; 325 TABLET ORAL at 23:12

## 2017-08-03 RX ADMIN — LITHIUM CARBONATE 300 MG: 300 CAPSULE ORAL at 08:33

## 2017-08-03 RX ADMIN — ACETAMINOPHEN 1000 MG: 10 INJECTION, SOLUTION INTRAVENOUS at 12:25

## 2017-08-03 RX ADMIN — QUETIAPINE FUMARATE 400 MG: 100 TABLET, FILM COATED ORAL at 08:30

## 2017-08-03 RX ADMIN — KETOROLAC TROMETHAMINE 30 MG: 30 INJECTION, SOLUTION INTRAMUSCULAR at 12:24

## 2017-08-03 RX ADMIN — ACYCLOVIR 800 MG: 800 TABLET ORAL at 23:09

## 2017-08-03 RX ADMIN — LORAZEPAM 0.5 MG: 0.5 TABLET ORAL at 14:30

## 2017-08-03 RX ADMIN — LORAZEPAM 0.5 MG: 0.5 TABLET ORAL at 05:58

## 2017-08-03 RX ADMIN — MUPIROCIN: 20 OINTMENT TOPICAL at 08:31

## 2017-08-03 RX ADMIN — OXYCODONE HYDROCHLORIDE AND ACETAMINOPHEN 1 TABLET: 5; 325 TABLET ORAL at 03:11

## 2017-08-03 RX ADMIN — LORAZEPAM 2 MG: 2 INJECTION INTRAMUSCULAR; INTRAVENOUS at 17:02

## 2017-08-03 RX ADMIN — LORAZEPAM 0.5 MG: 0.5 TABLET ORAL at 02:31

## 2017-08-03 RX ADMIN — OXYCODONE HYDROCHLORIDE AND ACETAMINOPHEN 1 TABLET: 5; 325 TABLET ORAL at 08:31

## 2017-08-03 RX ADMIN — AMLODIPINE BESYLATE 5 MG: 5 TABLET ORAL at 08:31

## 2017-08-03 RX ADMIN — WATER 20 MG: 1 INJECTION INTRAMUSCULAR; INTRAVENOUS; SUBCUTANEOUS at 17:02

## 2017-08-03 RX ADMIN — ONDANSETRON 4 MG: 2 INJECTION INTRAMUSCULAR; INTRAVENOUS at 00:13

## 2017-08-03 RX ADMIN — CLONAZEPAM 5 MG: 1 TABLET ORAL at 08:31

## 2017-08-03 RX ADMIN — SULFAMETHOXAZOLE AND TRIMETHOPRIM 1 TABLET: 800; 160 TABLET ORAL at 12:32

## 2017-08-03 RX ADMIN — Medication 10 ML: at 06:00

## 2017-08-03 NOTE — PROGRESS NOTES
Skin Assessment performed by: MARVIN, RN and V.M., RN    Skin is intact. Pt has a bruise on her left inner forearm. Pressure Ulcer Documentation  (COMPLETE ONE LABEL PER PRESSURE ULCER)  For further information, please review corresponding Wound Care flowsheet. Kathy Eduardo has:    No pressure injury noted and pressure ulcer prevention initiated.

## 2017-08-03 NOTE — PROGRESS NOTES
0700 report received 0730 assessed the pt states that she wanted to stay voluntary in the hospital 0800 assessed the patient  1130 call placed to [facility] (Amparo Angelo) who stated that due to the  not medically clear from psychiatry that she will   [facility] in to D/C pt has no valuables with security

## 2017-08-03 NOTE — PROGRESS NOTES
Hospitalist Consult called on:    Date: 8/3/2017  Time: 5376  Reason for consult: Pt arrived from outside facility. Awaiting return call from MD, to notify of consult.

## 2017-08-03 NOTE — PROGRESS NOTES
SPEECH THERAPY SCREENING:  SERVICES ARE NOT INDICATED AT THIS TIME    [facility] triggered for speech therapy based on results obtained during the nursing admission assessment. The patients chart was reviewed and the patient is not appropriate for a skilled therapy evaluation at this time. Please consult speech therapy if any therapy needs arise. Thank you.     Anita Calvillo, SLP

## 2017-08-03 NOTE — IP AVS SNAPSHOT
[address] 
[phone] Patient: Ghada Shelton MRN: [id number] :1975 Current Discharge Medication List  
  
ASK your doctor about these medications Dose & Instructions Dispensing Information Comments Morning Noon Evening Bedtime  
 acyclovir 800 mg tablet Commonly known as:  ZOVIRAX Your next dose is: Today 2017 Dose:  800 mg Take 1 Tab by mouth five (5) times daily for 7 days. Quantity:  35 Tab Refills:  0  
     
  
   
   
   
  
 * albuterol 2.5 mg /3 mL (0.083 %) nebulizer solution Commonly known as:  PROVENTIL VENTOLIN Dose:  2.5 mg  
3 mL by Nebulization route every four (4) hours as needed for Wheezing. Quantity:  24 Each Refills:  0  
     
   
   
   
  
 * albuterol sulfate 90 mcg/actuation Aepb Dose:  2 Puff Take 2 Puffs by inhalation every four (4) hours as needed. Quantity:  1 Inhaler Refills:  0  
     
   
   
   
  
 amLODIPine 5 mg tablet Commonly known as:  Meeta Easton Your next dose is:  Tomorrow 2017 Dose:  5 mg Take 1 Tab by mouth daily. Quantity:  30 Tab Refills:  0  
     
  
   
   
   
  
 clonazePAM 2 mg tablet Commonly known as:  Meena Cord Notes to Patient:  Not given in the hospital  
   
 Dose:  4 mg Take 2 Tabs by mouth two (2) times a day. Max Daily Amount: 8 mg. Quantity:  30 Tab Refills:  0  
     
   
   
   
  
 fluticasone-vilanterol 200-25 mcg/dose inhaler Commonly known as:  BREO ELLIPTA Notes to Patient:  None given Dose:  1 Puff Take 1 Puff by inhalation daily. Quantity:  28 Each Refills:  1  
     
   
   
   
  
 gabapentin 300 mg capsule Commonly known as:  NEURONTIN Notes to Patient:  None given in the hospital  
   
 Dose:  300 mg Take 300 mg by mouth. Refills:  0  
     
   
   
   
  
 lidocaine 5 % Commonly known as:  Zia Rios  
   
 Apply patch to the affected area for 12 hours a day and remove for 12 hours a day. Quantity:  1 Each Refills:  0  
     
   
   
   
  
 lithium carbonate 300 mg capsule Notes to Patient:  None given Dose:  300 mg Take 1 Cap by mouth two (2) times a day. Quantity:  30 Cap Refills:  0 LORazepam 0.5 mg tablet Commonly known as:  ATIVAN Notes to Patient:  As needed Dose:  0.5 mg Take 1 Tab by mouth every four (4) hours as needed. Max Daily Amount: 3 mg. Quantity:  30 Tab Refills:  0  
     
   
   
   
  
 mupirocin 2 % ointment Commonly known as:  [facility] Dose:  1 g Apply 1 g to affected area every twelve (12) hours. Quantity:  22 g Refills:  0  
     
   
   
   
  
 nicotine 21 mg/24 hr  
Commonly known as:  Marciana Sharp Dose:  1 Patch 1 Patch by TransDERmal route daily for 30 days. Quantity:  1 Patch Refills:  0  
     
   
   
   
  
 oxyCODONE-acetaminophen 5-325 mg per tablet Commonly known as:  PERCOCET Notes to Patient:  None given Dose:  1 Tab Take 1 Tab by mouth every six (6) hours as needed. Max Daily Amount: 4 Tabs. Quantity:  12 Tab Refills:  0 QUEtiapine 400 mg tablet Commonly known as:  SEROquel Notes to Patient:  None given Dose:  400 mg Take 1 Tab by mouth two (2) times a day. Quantity:  12 Tab Refills:  0  
     
   
   
   
  
 trimethoprim-sulfamethoxazole 160-800 mg per tablet Commonly known as:  BACTRIM DS, SEPTRA DS Notes to Patient:  None given Dose:  1 Tab Take 1 Tab by mouth every twelve (12) hours. Quantity:  10 Tab Refills:  0  
     
   
   
   
  
 * Notice: This list has 2 medication(s) that are the same as other medications prescribed for you. Read the directions carefully, and ask your doctor or other care provider to review them with you.

## 2017-08-03 NOTE — IP AVS SNAPSHOT
Summary of Care Report The Summary of Care report has been created to help improve care coordination. Users with access to Room 77 or [facility] (Web-based application) may access additional patient information including the Discharge Summary. If you are not currently a [address] user and need more information, please call the number listed below in the [place] [address] section and ask to be connected with Medical Records. Facility Information Name Address Phone [address] [id number] [phone] Patient Information Patient Name Sex  Sam Cartagena ([id number]) Female 1975 Discharge Information Admitting Provider Service Area Unit Artemio Morin MD / [id number] [facility] / [phone] Discharge Provider Discharge Date/Time Discharge Disposition Destination (none) 2017 (Pending) AHR (none) Patient Language Language ENGLISH [13] Hospital Problems as of 2017  Reviewed: 2017 12:05 AM by Imtiaz Laws MD  
  
  
  
 Class Noted - Resolved Last Modified POA Active Problems Bipolar 1 disorder ([facility] [address]  Unknown - Present 8/3/2017 by Artemio Morin MD Unknown Entered by Jaspreet Leonard MD  
  
Non-Hospital Problems as of 2017  Reviewed: 2017 12:05 AM by Imtiaz Laws MD  
  
  
  
 Class Noted - Resolved Last Modified Active Problems   Heroin abuse  2014 - Present 2017 by Jaspreet Leonard MD  
  Entered by Shaka Headley MD  
  Sinus tachycardia  1/10/2017 - Present 2017 by Jaspreet Leonard MD  
  Entered by Nuria Avitia MD  
  Depression  2017 - Present 2017 by Jaspreet Leonard MD  
  Entered by Melly Ngo MD  
  Chronic obstructive pulmonary disease [facility])  Unknown - Present 2017 by Jaspreet Leonard MD  
 Entered by Mike Levine MD  
  Chronic pain  Unknown - Present 2/22/2017 by Mike Levine MD  
  Entered by Mike Levine MD  
  Overview Signed 2/22/2017  7:15 PM by Mike Levine MD  
   back, leg   Hepatitis B  Unknown - Present 2/22/2017 by Mike Levine MD  
  Entered by Mike Levine MD  
  Sarcoidosis [facility])  Unknown - Present 2/22/2017 by Mike Levine MD  
  Entered by Mike Levine MD  
  Tobacco abuse  Unknown - Present 2/22/2017 by Mike Levine MD  
  Entered by Mike Levine MD  
  Anemia  2/22/2017 - Present 2/22/2017 by Mike Levine MD  
  Entered by Mike Levine MD  
  Cocaine abuse  Unknown - Present 2/22/2017 by Mike Levine MD  
  Entered by Mike Levine MD  
  Polysubstance abuse  Unknown - Present 4/25/2017 by Harjeet Sellers MD  
  Entered by Mike Levine MD  
  Narcotic abuse  Unknown - Present 2/22/2017 by Mike Levine MD  
  Entered by Mike Levine MD  
  HTN (hypertension)  Unknown - Present 2/22/2017 by Mike Levine MD  
  Entered by Mike Levine MD  
  Seizure [facility])  4/20/2017 - Present 4/20/2017 by Aditya Horn MD  
  Entered by Aditya Horn MD  
  Lactic acidosis  4/25/2017 - Present 4/25/2017 by Harjeet Sellers MD  
  Entered by Harjeet Sellers MD  
  Acute encephalopathy  4/25/2017 - Present 4/25/2017 by Harjeet Sellers MD  
  Entered by Harjeet Sellers MD  
  Potential for altered mental status  4/25/2017 - Present 4/25/2017 by Brittany Johnson MD  
  Entered by Brittany Johnson MD  
  Convulsion disorder [facility])  4/25/2017 - Present 4/25/2017 by Brittany Johnson MD  
  Entered by Brittany Johnson MD  
  Stenosis of both internal carotid arteries  4/25/2017 - Present 4/25/2017 by Brittany Johnson MD  
  Entered by Brittany Johnson MD  
  Convulsive syncope  4/25/2017 - Present 4/25/2017 by Brittany Johnson MD  
  Entered by Brittany Johnson MD  
 Altered mental status, unspecified  4/26/2017 - Present 4/26/2017 by Luis A Solano MD  
  Entered by Luis A Solano MD  
  Drug overdose  7/31/2017 - Present 8/1/2017 by Willian West MD  
  Entered by Willian West MD  
  
You are allergic to the following Allergen Reactions Tomato Swelling Tongue Current Discharge Medication List  
  
ASK your doctor about these medications Dose & Instructions Dispensing Information Comments  
 acyclovir 800 mg tablet Commonly known as:  ZOVIRAX Dose:  800 mg Take 1 Tab by mouth five (5) times daily for 7 days. Quantity:  35 Tab Refills:  0  
   
 * albuterol 2.5 mg /3 mL (0.083 %) nebulizer solution Commonly known as:  PROVENTIL VENTOLIN Dose:  2.5 mg  
3 mL by Nebulization route every four (4) hours as needed for Wheezing. Quantity:  24 Each Refills:  0  
   
 * albuterol sulfate 90 mcg/actuation Aepb Dose:  2 Puff Take 2 Puffs by inhalation every four (4) hours as needed. Quantity:  1 Inhaler Refills:  0  
   
 amLODIPine 5 mg tablet Commonly known as:  Patrick Irene Dose:  5 mg Take 1 Tab by mouth daily. Quantity:  30 Tab Refills:  0  
   
 clonazePAM 2 mg tablet Commonly known as:  Dala Pleasure Notes to Patient:  Not given in the hospital  
 Dose:  4 mg Take 2 Tabs by mouth two (2) times a day. Max Daily Amount: 8 mg. Quantity:  30 Tab Refills:  0  
   
 fluticasone-vilanterol 200-25 mcg/dose inhaler Commonly known as:  BREO ELLIPTA Notes to Patient:  None given Dose:  1 Puff Take 1 Puff by inhalation daily. Quantity:  28 Each Refills:  1  
   
 gabapentin 300 mg capsule Commonly known as:  NEURONTIN Notes to Patient:  None given in the hospital  
 Dose:  300 mg Take 300 mg by mouth. Refills:  0  
   
 lidocaine 5 % Commonly known as:  Gloria Proud Apply patch to the affected area for 12 hours a day and remove for 12 hours a day. Quantity:  1 Each Refills:  0 lithium carbonate 300 mg capsule Notes to Patient:  None given Dose:  300 mg Take 1 Cap by mouth two (2) times a day. Quantity:  30 Cap Refills:  0 LORazepam 0.5 mg tablet Commonly known as:  ATIVAN Notes to Patient:  As needed Dose:  0.5 mg Take 1 Tab by mouth every four (4) hours as needed. Max Daily Amount: 3 mg. Quantity:  30 Tab Refills:  0  
   
 mupirocin 2 % ointment Commonly known as:  [facility] Dose:  1 g Apply 1 g to affected area every twelve (12) hours. Quantity:  22 g Refills:  0  
   
 nicotine 21 mg/24 hr  
Commonly known as:  Lady Boyers Dose:  1 Patch 1 Patch by TransDERmal route daily for 30 days. Quantity:  1 Patch Refills:  0  
   
 oxyCODONE-acetaminophen 5-325 mg per tablet Commonly known as:  PERCOCET Notes to Patient:  None given Dose:  1 Tab Take 1 Tab by mouth every six (6) hours as needed. Max Daily Amount: 4 Tabs. Quantity:  12 Tab Refills:  0 QUEtiapine 400 mg tablet Commonly known as:  SEROquel Notes to Patient:  None given Dose:  400 mg Take 1 Tab by mouth two (2) times a day. Quantity:  12 Tab Refills:  0  
   
 trimethoprim-sulfamethoxazole 160-800 mg per tablet Commonly known as:  BACTRIM DS, SEPTRA DS Notes to Patient:  None given Dose:  1 Tab Take 1 Tab by mouth every twelve (12) hours. Quantity:  10 Tab Refills:  0  
   
 * Notice: This list has 2 medication(s) that are the same as other medications prescribed for you. Read the directions carefully, and ask your doctor or other care provider to review them with you. Current Immunizations Name Date Influenza Vaccine 10/1/2016,  Deferred (Patient Refused) Pneumococcal Polysaccharide (PPSV-23)  Deferred (Patient Refused) Follow-up Information Follow up With Details Comments Contact Info  [address] On 8/7/2017 walk in for mental health treatment at 8:00 am  [address] [facility] 
[phone] None   None (395) Patient stated that they have no PCP Discharge Instructions DISCHARGE SUMMARY from Nurse The following personal items are in your possession at time of discharge: 
 
Dental Appliances: None Visual Aid: None Home Medications: Locked (inhaler) Jewelry: Bracelet, Earrings (plastic beaded) Clothing: Shirt, Shorts, Undergarments Other Valuables: Money (comment) ($5.00, locked med cabinet) Personal items $ 5.00 returned to patient Pt. Released from the hospital at her  TDO hearing this morning   No prescriptions given to patient What to do at Home: 
Recommended activity: Activity as tolerated,  
 
. *  Please give a list of your current medications to your Primary Care Provider. *  Please update this list whenever your medications are discontinued, doses are 
    changed, or new medications (including over-the-counter products) are added. *  Please carry medication information at all times in case of emergency situations. These are general instructions for a healthy lifestyle: No smoking/ No tobacco products/ Avoid exposure to second hand smoke Surgeon General's Warning:  Quitting smoking now greatly reduces serious risk to your health. Obesity, smoking, and sedentary lifestyle greatly increases your risk for illness A healthy diet, regular physical exercise & weight monitoring are important for maintaining a healthy lifestyle You may be retaining fluid if you have a history of heart failure or if you experience any of the following symptoms:  Weight gain of 3 pounds or more overnight or 5 pounds in a week, increased swelling in our hands or feet or shortness of breath while lying flat in bed. Please call your doctor as soon as you notice any of these symptoms; do not wait until your next office visit.  
 
Recognize signs and symptoms of STROKE: 
 
 F-face looks uneven A-arms unable to move or move unevenly S-speech slurred or non-existent T-time-call 911 as soon as signs and symptoms begin-DO NOT go Back to bed or wait to see if you get better-TIME IS BRAIN. Warning Signs of HEART ATTACK Call 911 if you have these symptoms: 
? Chest discomfort. Most heart attacks involve discomfort in the center of the chest that lasts more than a few minutes, or that goes away and comes back. It can feel like uncomfortable pressure, squeezing, fullness, or pain. ? Discomfort in other areas of the upper body. Symptoms can include pain or discomfort in one or both arms, the back, neck, jaw, or stomach. ? Shortness of breath with or without chest discomfort. ? Other signs may include breaking out in a cold sweat, nausea, or lightheadedness. Don't wait more than five minutes to call [address]! Fast action can save your life. Calling 911 is almost always the fastest way to get lifesaving treatment. Emergency Medical Services staff can begin treatment when they arrive  up to an hour sooner than if someone gets to the hospital by car. The discharge information has been reviewed with the patient. The patient verbalized understanding. Discharge medications reviewed with the patient and appropriate educational materials and side effects teaching were provided. [email or web address] Activation Thank you for requesting access to [email or web address]. Please follow the instructions below to securely access and download your online medical record. [email or web address] allows you to send messages to your doctor, view your test results, renew your prescriptions, schedule appointments, and more. How Do I Sign Up? 1. In your internet browser, go to [email or web address] 
2. Click on the First Time User? Click Here link in the Sign In box. You will be redirect to the New Member Sign Up page. 3. Enter your [email or web address] Access Code exactly as it appears below.  You will not need to use this code after youve completed the sign-up process. If you do not sign up before the expiration date, you must request a new code. Slide Access Code: [email or web address] Expires: 10/26/2017  8:33 PM (This is the date your Slide access code will ) 4. Enter the last four digits of your Social Security Number (xxxx) and Date of Birth (mm/dd/yyyy) as indicated and click Submit. You will be taken to the next sign-up page. 5. Create a Slide ID. This will be your Slide login ID and cannot be changed, so think of one that is secure and easy to remember. 6. Create a Slide password. You can change your password at any time. 7. Enter your Password Reset Question and Answer. This can be used at a later time if you forget your password. 8. Enter your e-mail address. You will receive e-mail notification when new information is available in [address] 9. Click Sign Up. You can now view and download portions of your medical record. 10. Click the Download Summary menu link to download a portable copy of your medical information. Additional Information If you have questions, please visit the Frequently Asked Questions section of the Slide website at [email or web address] Remember, Slide is NOT to be used for urgent needs. For medical emergencies, dial 911. DISCHARGE SUMMARY 
 
[address] [place] [address] : 1975 MRN: [id number] The patient Ambika Kent exhibits the ability to control behavior in a less restrictive environment. Patient's level of functioning is improving. No assaultive/destructive behavior has been observed for the past 24 hours. No suicidal/homicidal threat or behavior has been observed for the past 24 hours. There is no evidence of serious medication side effects. Patient has not been in physical or protective restraints for at least the past 24 hours. If weapons involved, how are they secured? No weapons involved Is patient aware of and in agreement with discharge plan? Patient was released for her hearing Arrangements for medication: no prescriptions given to patient. Copy of discharge instructions to  provider?:  Yes, faxed to [facility] Arrangements for transportation home:  Taxi home Keep all follow up appointments as scheduled, continue to take prescribed medications per physician instructions. Mental health crisis number:  287 or your local mental health crisis line number at [phone] Chart Review Routing History Recipient Method Report Sent By Leonardo Campo None [address] IP Auto Routed Notes Fozia Tran MD Misbah Pay 1/30/2014  6:20 AM 01/30/2014 None [address] IP Auto Routed Notes Fozia Tran MD Misbah Pay 2/2/2014  2:20 PM 02/02/2014 None [address] IP Auto Routed Notes Nadeem Xiao MD [[id number]] 2/4/2014 11:22 PM 02/04/2014 None [address] IP Auto Routed Notes Cristiana Sage MD [[id number]] 2/6/2014  4:20 PM 02/06/2014

## 2017-08-03 NOTE — DISCHARGE SUMMARY
Discharge Summary    Patient: Abida Elmore               Sex: female          DOA: 7/31/2017         YOB: 1975      Age:  35 y.o.        LOS:  LOS: 3 days                Admit Date: 7/31/2017    Discharge Date: 8/3/2017    Admission Diagnoses: Drug overdose    Discharge Diagnoses:      Drug overdose (7/31/2017)    Polysubstance abuse    Bipolar disorder with agitation    Left chest Zoster rash    Anemia    Asthma    Hep B    Problem List as of 8/3/2017  Date Reviewed: 8/1/2017          Codes Class Noted - Resolved    * (Principal)Drug overdose ICD-10-CM: T50.901A  ICD-9-CM: 977.9, E980.5  7/31/2017 - Present        Altered mental status, unspecified ICD-10-CM: R41.82  ICD-9-CM: 780.97  4/26/2017 - Present        Lactic acidosis ICD-10-CM: E87.2  ICD-9-CM: 276.2  4/25/2017 - Present        Acute encephalopathy ICD-10-CM: G93.40  ICD-9-CM: 348.30  4/25/2017 - Present        Potential for altered mental status ICD-10-CM: Z91.89  ICD-9-CM: V49.89  4/25/2017 - Present        Convulsion disorder ([facility] ICD-10-CM: R56.9  ICD-9-CM: 780.39  4/25/2017 - Present        Stenosis of both internal carotid arteries ICD-10-CM: I65.23  ICD-9-CM: 433.10, 433.30  4/25/2017 - Present        Convulsive syncope ICD-10-CM: R55  ICD-9-CM: 780.2, 780.39  4/25/2017 - Present        Seizure ([facility] ICD-10-CM: R56.9  ICD-9-CM: 780.39  4/20/2017 - Present        Bipolar 1 disorder ([facility] ICD-10-CM: F31.9  ICD-9-CM: 296.7  Unknown - Present        Chronic obstructive pulmonary disease ([facility] ICD-10-CM: J44.9  ICD-9-CM: 171  Unknown - Present        Chronic pain ICD-10-CM: G89.29  ICD-9-CM: 338.29  Unknown - Present    Overview Signed 2/22/2017  7:15 PM by Bozena Carmona MD     back, leg             Hepatitis B ICD-10-CM: B19.10  ICD-9-CM: 070.30  Unknown - Present        Sarcoidosis ([facility] ICD-10-CM: D86.9  ICD-9-CM: 135  Unknown - Present        Tobacco abuse ICD-10-CM: Z72.0  ICD-9-CM: 305.1  Unknown - Present        Anemia ICD-10-CM: D64.9  ICD-9-CM: 285.9  2/22/2017 - Present        Cocaine abuse ICD-10-CM: F14.10  ICD-9-CM: 305.60  Unknown - Present        Polysubstance abuse ICD-10-CM: F19.10  ICD-9-CM: 305.90  Unknown - Present        Narcotic abuse ICD-10-CM: F11.10  ICD-9-CM: 305.40  Unknown - Present        HTN (hypertension) ICD-10-CM: I10  ICD-9-CM: 401.9  Unknown - Present        Depression ICD-10-CM: F32.9  ICD-9-CM: 436  1/25/2017 - Present        Sinus tachycardia ICD-10-CM: R00.0  ICD-9-CM: 427.89  1/10/2017 - Present        Heroin abuse ICD-10-CM: F11.10  ICD-9-CM: 305.50  1/30/2014 - Present        RESOLVED: Asthma exacerbation ICD-10-CM: J45.901  ICD-9-CM: 493.92  2/22/2017 - 4/19/2017        RESOLVED: Asthma ICD-10-CM: J45.909  ICD-9-CM: 493.90  Unknown - 2/22/2017        RESOLVED: Depressive disorder ICD-10-CM: F32.9  ICD-9-CM: 770  1/25/2017 - 2/22/2017        RESOLVED: Polysubstance dependence ([facility] ICD-10-CM: F19.20  ICD-9-CM: 304.80  1/25/2017 - 2/22/2017        RESOLVED: Substance induced mood disorder ([facility] ICD-10-CM: F19.94  ICD-9-CM: 292.84  1/25/2017 - 2/22/2017        RESOLVED: HTN (hypertension) ICD-10-CM: I10  ICD-9-CM: 401.9  1/11/2017 - 2/22/2017        RESOLVED: Drug abuse ICD-10-CM: F19.10  ICD-9-CM: 305.90  1/11/2017 - 2/22/2017        RESOLVED: Bipolar disorder ([facility] ICD-10-CM: F31.9  ICD-9-CM: 296.80  1/11/2017 - 2/22/2017        RESOLVED: COPD (chronic obstructive pulmonary disease) ([facility] ICD-10-CM: J44.9  ICD-9-CM: 496  1/9/2017 - 2/22/2017        RESOLVED: Unspecified asthma, with exacerbation ICD-10-CM: J45.901  ICD-9-CM: 493.92  1/30/2014 - 2/22/2017              Discharge Medications:     Current Discharge Medication List      START taking these medications    Details   clonazePAM (KLONOPIN) 2 mg tablet Take 2 Tabs by mouth two (2) times a day. Max Daily Amount: 8 mg. Qty: 30 Tab, Refills: 0      lithium carbonate 300 mg capsule Take 1 Cap by mouth two (2) times a day.   Qty: 30 Cap, Refills: 0      LORazepam (ATIVAN) 0.5 mg tablet Take 1 Tab by mouth every four (4) hours as needed. Max Daily Amount: 3 mg. Qty: 30 Tab, Refills: 0      mupirocin (BACTROBAN) 2 % ointment Apply 1 g to affected area every twelve (12) hours. Qty: 22 g, Refills: 0      nicotine (NICODERM CQ) 21 mg/24 hr 1 Patch by TransDERmal route daily for 30 days. Qty: 1 Patch, Refills: 0      QUEtiapine (SEROQUEL) 400 mg tablet Take 1 Tab by mouth two (2) times a day. Qty: 12 Tab, Refills: 0      trimethoprim-sulfamethoxazole (BACTRIM DS, SEPTRA DS) 160-800 mg per tablet Take 1 Tab by mouth every twelve (12) hours. Qty: 10 Tab, Refills: 0      lidocaine (LIDODERM) 5 % Apply patch to the affected area for 12 hours a day and remove for 12 hours a day. Qty: 1 Each, Refills: 0      amLODIPine (NORVASC) 5 mg tablet Take 1 Tab by mouth daily. Qty: 30 Tab, Refills: 0         CONTINUE these medications which have CHANGED    Details   oxyCODONE-acetaminophen (PERCOCET) 5-325 mg per tablet Take 1 Tab by mouth every six (6) hours as needed. Max Daily Amount: 4 Tabs. Qty: 12 Tab, Refills: 0         CONTINUE these medications which have NOT CHANGED    Details   gabapentin (NEURONTIN) 300 mg capsule Take 300 mg by mouth. acyclovir (ZOVIRAX) 800 mg tablet Take 1 Tab by mouth five (5) times daily for 7 days. Qty: 35 Tab, Refills: 0      albuterol sulfate 90 mcg/actuation aepb Take 2 Puffs by inhalation every four (4) hours as needed. Qty: 1 Inhaler, Refills: 0      fluticasone-vilanterol (BREO ELLIPTA) 200-25 mcg/dose inhaler Take 1 Puff by inhalation daily. Qty: 28 Each, Refills: 1      albuterol (PROVENTIL VENTOLIN) 2.5 mg /3 mL (0.083 %) nebulizer solution 3 mL by Nebulization route every four (4) hours as needed for Wheezing.   Qty: 24 Each, Refills: 0         STOP taking these medications       traMADol (ULTRAM) 50 mg tablet Comments:   Reason for Stopping:         cloNIDine HCl (CATAPRES) 0.1 mg tablet Comments:   Reason for Stopping:         predniSONE (STERAPRED DS) 10 mg dose pack Comments:   Reason for Stopping:         albuterol-ipratropium (DUO-NEB) 2.5 mg-0.5 mg/3 ml nebu Comments:   Reason for Stopping:         Nebulizer & Compressor machine Comments:   Reason for Stopping: Follow-up:transfer to a [facility] institution    Discharge Condition:medically stable    Activity:as tolerated    Diet:heart healthy diet      Labs:  Labs: Results:       Chemistry Recent Labs      08/01/17 0253 07/31/17 1934   GLU  86  119*   NA  139  139   K  3.7  3.8   CL  108  105   CO2  24  26   BUN  8  10   CREA  0.93  1.16*   CA  7.5*  7.9*   AGAP  7  8   BUCR  9*  9*   AP  54  62   TP  6.3*  6.5   ALB  2.9*  3.0*   GLOB  3.4  3.5   AGRAT  0.9*  0.9*      CBC w/Diff Recent Labs      08/01/17 0253 07/31/17 1934   WBC  6.1  5.6   RBC  3.14*  3.05*   HGB  8.8*  8.6*   HCT  26.9*  26.2*   PLT  211  234   GRANS   --   42   LYMPH   --   53*   EOS   --   1      Cardiac Enzymes Recent Labs      07/31/17 1934   CPK  84      Coagulation No results for input(s): PTP, INR, APTT in the last 72 hours. No lab exists for component: INREXT    Lipid Panel Lab Results   Component Value Date/Time    Cholesterol, total 237 01/11/2017 04:09 AM    HDL Cholesterol 97 01/11/2017 04:09 AM    LDL, calculated 130.6 01/11/2017 04:09 AM    VLDL, calculated 9.4 01/11/2017 04:09 AM    Triglyceride 47 01/11/2017 04:09 AM    CHOL/HDL Ratio 2.4 01/11/2017 04:09 AM      BNP No results for input(s): BNPP in the last 72 hours.    Liver Enzymes Recent Labs      08/01/17 0253   TP  6.3*   ALB  2.9*   AP  54   SGOT  57*      Thyroid Studies Lab Results   Component Value Date/Time    T4, Total 4.9 03/19/2009 06:05 AM    T3 Uptake 30 03/19/2009 06:05 AM    TSH 6.60 04/24/2017 09:23 PM          Imaging:  CT head on 8/1/2017:  IMPRESSION: No acute finding or significant change    Consults:   Psychiatry  Critical care    Treatment Team: Treatment Team: Attending Provider: Olvin Beck MD; Primary Nurse: Petty Mott; Consulting Provider: Neda Villarreal MD; Consulting Provider: Kat Carson MD    Significant Diagnostic Studies:see recent lab results    HISTORY OF PRESENT ILLNESS:     Sung Stephens is a 35 y.o.  female who presents with above complaint. Pt has AMS at this time and cannot contribute to the history. No family available for interview. History was obtained from [facility] notes. Pt was found by her parents unresponsive. Parents were suspecting that pt overdosed on Seroquel and Risperdal because two empty bottles were found in her room. Pt was twitching and drooling. On arrival to ED pt was responsive to painful stimuli and withdraws from pain. While in ED pt become more awake. She was agitated. She was treated in ED with Ativan 1 mg x 3. ED provider discussed pt case with poison control center. Pt remain very conbutive in ED despite ativan. She was biting and kicking. She was incontinent of urine. Patient was having visual and auditory hallucinations. She was grabbing into the air and putting \"food\" in her mouth. Patient was talking to invisible people. She was saying \"don't tell me to go to the store\" and \"tell my boy to come back\". Pt also had prescribed percocet. Bottle cannot be find at home. Hospital Course:  Patient very agitated at the moment. She was admitted for suspected drug overdose of Seroquel and Risperdal.Has h/o polysubstance abuse on top of bipolar do/depression. Patient has been very agitated,restless in most of the time,trying to leave the hospital.Blaine Cain had to be called on 8/2 to prevent pt from leaving as she was out of control. Patient is medically stable and has agreed to be transferred to a psychiatric institution,[facility] details for hospital course:     Suspected drug overdose of Seroquel and Risperdal  Acute encephalopathy   H/o polysubstance abuse   In settings of bipolar DO/ depression   Patient has been extremely agitated despite use of ativan. Also on seroquel,lithium  No signs of underlying infection: no leukocytosis, ua is negative   UDS +: benzo, cannabinol IVF   Head CT w/o acute process  Pt was trying to leave the hospital as she became very agitated. She has a sitter now  She is medically stable and will be transferred to a psych institution     Left chest Zoster  On pain meds      Anemia   Unclear etiology at present. No signs of active bleeding  Baseline Hb 11.4, admission 8.6  No sign of bleed      Asthma, wo exacerbation, continue inhalers. Hepatitis B      Patient is medically stable for transfer. Encephalopathy resolved as patient is well awake,alert and oriented.     Time for discharge:35 minutes    Aniyah Fried MD  August 3, 2017

## 2017-08-03 NOTE — PROGRESS NOTES
Interdisciplinary team rounds were held  8/3/2017 tamiko the following team members:Care Management, Diabetes Treatment Specialist, Nursing, Nutrition, Pharmacy, Physical Therapy, Physician and Respiratory Therapy. Plan of care discussed. Goal: See MD orders and progress notes for further  interventions and desired outcomes.

## 2017-08-03 NOTE — BH NOTES
PRN Medication Documentation    Specific patient behavior that led to need for PRN medication: Agitation, aggression, possible seizure activity, requested medication  Staff interventions attempted prior to PRN being given: verbal redirection, education, emotional support  PRN medication given: 2 mg Ativan IM and 20 mg Geodon IM  Patient response/effectiveness of PRN medication: Pt asleep    2024:  PRN Medication Documentation    Specific patient behavior that led to need for PRN medication: c/o generalized muscle ache 8/10  Staff interventions attempted prior to PRN being given: none  PRN medication given: 400 mg Motrin PO  Patient response/effectiveness of PRN medication: Pt states, \"Better\", reports 7/10, declines further intervention    2315:  PRN Medication Documentation    Specific patient behavior that led to need for PRN medication: c/o pain, \"my shingles\"  Staff interventions attempted prior to PRN being given: none  PRN medication given: 1 tab percocet PO  Patient response/effectiveness of PRN medication:

## 2017-08-03 NOTE — PROGRESS NOTES
[phone] pt called mom to inform that she would go to [facility] per pt mom hung up and would not talk

## 2017-08-03 NOTE — PROGRESS NOTES
[facility] Consult Service Note  Pulmonary, Critical Care, and Sleep Medicine    Name: Deneen Bethea MRN: [id number]   : 1975 Hospital: [facility]   Date: 8/3/2017   Hospital Day: 4       Subjective/Interval History:   I have reviewed the flowsheet and previous days notes. Seen earlier today on rounds. Reviewed the evaluation and will assist in implementing plan as outlined by Dr. London Pickett and team     All meds had been held due to possible overdose of meds and bath salts? ?very agitated and restless, IV precedex started as well as some of her home meds. Posey vest. Psychiatry consult appreciated but pt not fully engaged due to need for heavy sedation     less restless off IV precedex. Still on some of her scheduled home meds. Lithium level ordered. Pt trying to leave before seen by Psychiatry. Verbally Abusive. Resisitant. TDO process intitiated. Code atlas called. Pt agrees to stay but then threatens again after asking for pain meds. 8-3-17: Pt seems to be fine this am. She has some pain due to her zoster. Tolerating po well. IMPRESSION:   1. She is Medically Stable! 2. Suspected drug overdose of Seroquel and Risperdal and possibly bath salts and more:UDS +: benzo, cannabis  3. Acute encephalopathy -resolved. 4. H/o polysubstance abuse Per record: Pt is in & out of multiple hospitals due to her polysubstance abuse related events. Last discharge summary from 2017 [facility] [place] reviewed  5. Left chest Zoster rash- still painful  6. In settings of bipolar DO/ depression   7. Asthma, wo exacerbation, jet nebs prn   8. Hepatitis B   9. Anemia Unclear etiology at present. No signs of active bleeding Baseline Hb 11.4, admission 8.6      RECOMMENDATIONS/PLAN:   1. home meds such as seroquel, lithium, klonopin. Hold zoloft and gabapentin for now, lithium level level requested by Psychiatry  2.  Psychiatry consult suggest inpt eval. 3. TDO if  Needed. 4. Sitter-day 2 waiting for any bed  5. May move to medical floor or inpatient Psychiatry  6. Pain med for post herpetic neuralgia but antiviral med not indicated. Hold prednisone due to agitation- pt claims some meds prescribed at [facility] but no details  7. Advance diet as tolerated  8. Patient may require sitter due to threat of injury to self, interference with medical devices. Very impulsive. 9. Will be available to assist in medical management while in the CCU pending disposition     [x] High complexity decision making was performed  [x] See my orders for details  Tubes:     ICU disposition :Stable to transfer to floor. Code: Full Code        Subjective/Initial History:   I have reviewed the flowsheet and previous days notes. The patient is unable to give any meaningful history or review of systems because the patient is:  lethargic     Kaushik Johnston is a 35 y.o. female with PMhx significant for asthma, depression, hepatitis B, HTN, and polysubstance abuse who presented lastnight via EMS to the ED for evaluation of altered mental status after a suspected drug overdose. Pt was found by her parents and EMS was called as the pt was unresponsive. Per EMS, it is possible that she overdosed on Seroquel and Risperdal because two empty bottles were found in her room. Her BG en route was 90. Pt does not know what she took when asked. Pt has a history of substance abuse. While in ED pt become more awake. She became agitated. She was treated in ED with Ativan 1 mg x 3. ED provider discussed pt case with poison control center. Pt remain very conbative in ED despite ativan. She was biting and kicking. She was incontinent of urine. Patient was having visual and auditory hallucinations. She was grabbing into the air and putting \"food\" in her mouth. Patient was talking to invisible people. She was saying \"don't tell me to go to the store\" and \"tell my boy to come back\".  Pt also had prescribed percocet. Bottle cannot be find at home. Pt admitted to CCU. Found to have recent but partially healed Zoster rash along left chest. Groggy but could converse at times. Lives with both parents and couple of siblings. Treated for rash at [facility]. On room air. No SOB. No nausea. No fever. QT normal.     April 2017 pt admitted to [facility] with possible clonidine overdose. I reviewed her medications. PMH:  has a past medical history of Asthma; Bipolar 1 disorder ([address] Chronic obstructive pulmonary disease ([facility] [address] Chronic pain; Cocaine abuse; Depression; Hepatitis B; Heroin abuse; HTN (hypertension); Narcotic abuse; Polysubstance abuse; Sarcoidosis ([address] and Tobacco abuse. PSH:   has a past surgical history that includes gyn and wisdom teeth extraction. FHX: family history includes Hypertension in her father and mother. SHX:  reports that she has quit smoking. She smoked 0.25 packs per day. She has never used smokeless tobacco. She reports that she does not drink alcohol or use illicit drugs. ROS:Review of systems not obtained due to patient factors.     MAR reviewed and pertinent medications noted or modified as needed    Allergies   Allergen Reactions    Tomato Swelling     Tongue        MEDS:   Current Facility-Administered Medications   Medication    acetaminophen (TYLENOL) tablet 650 mg    albuterol (PROVENTIL VENTOLIN) nebulizer solution 2.5 mg    fluticasone-vilanterol (BREO ELLIPTA) 200mcg-25mcg/puff    sodium chloride (NS) flush 5-10 mL    sodium chloride (NS) flush 5-10 mL    ondansetron (ZOFRAN) injection 4 mg    mupirocin (BACTROBAN) 2 % ointment    amLODIPine (NORVASC) tablet 5 mg    oxyCODONE-acetaminophen (PERCOCET) 5-325 mg per tablet 1 Tab    lidocaine (LIDODERM) 5 % patch 1 Patch    LORazepam (ATIVAN) tablet 0.5 mg    clonazePAM (KlonoPIN) tablet 5 mg    lithium carbonate capsule 300 mg    QUEtiapine (SEROquel) tablet 400 mg    nicotine (NICODERM CQ) 21 mg/24 hr patch 1 Patch        Telemetry:    normal sinus rhythm    Objective:     Vital Signs: Intake/Output: Intake/Output:   Visit Vitals    /85    Pulse 100    Temp 97.8 °F (36.6 °C)    Resp 20    Ht 5' 4\" (1.626 m)    Wt 76.7 kg (169 lb 1.5 oz)    SpO2 99%    BMI 29.02 kg/m2         O2 Device: Room air       Wt Readings from Last 4 Encounters:   17 76.7 kg (169 lb 1.5 oz)   17 76.6 kg (168 lb 14 oz)   17 60.1 kg (132 lb 8 oz)   17 56.7 kg (125 lb)       Temp (24hrs), Av.1 °F (36.7 °C), Min:97.7 °F (36.5 °C), Max:98.6 °F (37 °C)     Intake/Output Summary (Last 24 hours) at 17 0831  Last data filed at 17 1600   Gross per 24 hour   Intake             1100 ml   Output              650 ml   Net              450 ml     Last shift:         Last 3 shifts:  190 -  0700  In: 1196.3 [P.O.:1100; I.V.:96.3]  Out: 1300 [Urine:1300]       Physical Exam:    General: AAF non-toxic, in no respiratory distress and acyanotic, cooperative    HEENT: NCAT   Neck, Lymph: No abnormally enlarged lymph nodes. Chest: left dermatomal rash with scabs, no vesicles   Lungs: normal air entry   Heart: Regular rate and rhythm   Abdomen: soft, non-tender, without masses or organomegaly   :    Extremity: negative, clubbing;     Neuro: alert   Psych: poor insight, agitated, argumentative   Skin: WarmSkin color, texture, turgor normal. No rashes or lesions;   Pulses: Bilateral, Radial, 2+ Normal upper and lower extremity pulses   Capillary refill: normal well perfused;    Data:   Interval lab and diagnostic data was reviewed. Interval radiology images were independently viewed and available reports were reviewed. All lab results for the last 24 hours reviewed.           Labs:    Recent Labs      17   1934   WBC  6.1  5.6   HGB  8.8*  8.6*   PLT  211  234     Recent Labs      17   1934   NA  139  139   K  3.7  3.8   CL  108  105   CO2  24  26   GLU 86  119*   BUN  8  10   CREA  0.93  1.16*   CA  7.5*  7.9*   MG  1.7   --    PHOS  3.0   --    ALB  2.9*  3.0*   SGOT  57*  76*   ALT  49  56     No results for input(s): PH, PCO2, PO2, HCO3, FIO2 in the last 72 hours. Recent Labs      07/31/17   1934   CPK  84     No results found for: BNPP, BNP   Lab Results   Component Value Date/Time    Culture result: MRSA PRESENT 04/17/2017 02:45 AM    Culture result:  04/17/2017 02:45 AM         Screening of patient nares for MRSA is for surveillance purposes and, if positive, to facilitate isolation considerations in high risk settings. It is not intended for automatic decolonization interventions per se as regimens are not sufficiently effective to warrant routine use. Culture result: MRSA NOT PRESENT 02/27/2017 12:00 AM    Culture result:  02/27/2017 12:00 AM         Screening of patient nares for MRSA is for surveillance purposes and, if positive, to facilitate isolation considerations in high risk settings. It is not intended for automatic decolonization interventions per se as regimens are not sufficiently effective to warrant routine use.      Lab Results   Component Value Date/Time    CK 84 07/31/2017 07:34 PM     01/30/2014 01:00 AM     Lab Results   Component Value Date/Time    Color YELLOW/STRAW 07/31/2017 08:52 PM    Appearance CLEAR 07/31/2017 08:52 PM    Specific gravity >1.030 05/09/2009 09:27 PM    pH (UA) 6.5 07/31/2017 08:52 PM    Protein 30 07/31/2017 08:52 PM    Glucose NEGATIVE  07/31/2017 08:52 PM    Ketone NEGATIVE  07/31/2017 08:52 PM    Bilirubin NEGATIVE  07/31/2017 08:52 PM    Blood NEGATIVE  07/31/2017 08:52 PM    Urobilinogen 1.0 07/31/2017 08:52 PM    Nitrites NEGATIVE  07/31/2017 08:52 PM    Leukocyte Esterase NEGATIVE  07/31/2017 08:52 PM    WBC 5-10 04/24/2017 10:23 PM    RBC 0-5 04/24/2017 10:23 PM    Bacteria 1+ 04/24/2017 10:23 PM       Lab Results   Component Value Date/Time    Iron 36 08/01/2017 02:53 AM    TIBC 412 08/01/2017 02:53 AM    Iron % saturation 9 08/01/2017 02:53 AM    Ferritin 32 08/01/2017 02:53 AM     Lab Results   Component Value Date/Time    TSH 6.60 04/24/2017 09:23 PM      Lab Results   Component Value Date/Time    Hepatitis B surface Ag 0.12 02/22/2017 07:45 PM         Imaging:  I have personally reviewed the patients radiographs and have reviewed the reports:          Results from [place] encounter on 07/31/17   XR CHEST PORT   Narrative INDICATION:    EXAM:    A single frontal portable image was obtained. FINDINGS:    Comparison exam: April 16, 2017. The heart is normal in size. The lungs are well expanded bilaterally without  infiltrate or pleural effusion or evidence of overt cardiac decompensation. The  visualized bony thorax is within normal limits. IMPRESSION:    No acute infiltrate or overt cardiac decompensation. Results from [place] encounter on 07/31/17   CT HEAD WO CONT   Narrative EXAM:  CT HEAD WO CONT    INDICATION:   AMS. Possible drug overdose. COMPARISON: 4/24/2017. CONTRAST:  None. TECHNIQUE: Unenhanced CT of the head was performed using 5 mm images. Brain and  bone windows were generated. CT dose reduction was achieved through use of a  standardized protocol tailored for this examination and automatic exposure  control for dose modulation. FINDINGS:  The ventricles and sulci are normal in size, shape and configuration and  midline. There is no significant white matter disease. There is no intracranial  hemorrhage, extra-axial collection, mass, mass effect or midline shift. The  basilar cisterns are open. No acute infarct is identified. The bone windows  demonstrate no abnormalities. The visualized portions of the paranasal sinuses  and mastoid air cells are clear. IMPRESSION: No acute finding or significant change.                My assessment/plan was discussed with:  Nursing xx    respiratory therapy Dr. family Taylor Munoz, MD

## 2017-08-03 NOTE — BH NOTES
Pt is uncooperative with skin assessment. Pt refused MRSA swab. Pt is requesting to leave. TDO process thoroughly explained to pt and pt demands to leave. Called Dr Ron Alexis. Pending call back. BHT with pt in the dayroom. Order obtained from Dr Ron Alexis for a TDO assessment. 1604: Pt is currently talking to Dougie from [facility].     1331: Pt laying on the floor jerking and rolling around the floor. Pt's eyes were open and repeated ,\"Where am I? \"  This behavior lasted approximately one minute. Pt states she had a seizure. Pt refused IM ativan stating, \"I don't want to be doped up for when that man comes. \" Pt refused vital signs. Pt's mood is labile. Pt cursing and yelling at staff. Pt is demanding to leave the unit. Continue to monitor. 1646Ronistacy Menendez from [facility] is talking to pt in the dayroom. Medication nurse present. 1650: Hospitalist Consult:   Reason for consult: H&P  MD who received call: Dr Kenyon Reyez  Response:  has been notified. 1655: Pt will be a TDO waiting on pt to get served. 1700: Pt requesting IM medications. 1702: IM meds given without difficulty. 1708: Pt is currently eating dinner. 1906: Pt is currently resting. 2015: Pt has been served her TDO paperwork. Called and left a message for [employer]. 2035: Left message for [employer] regarding TDO set up. 2047: Hearing is set up for Friday 8/4/17 at 0730. All parties have been notified. 2130: Dr Kenyon Reyez is here seeing pt.        Primary Nurse Michael Lazo RN and Nick Farooq RN performed a dual skin assessment on this patient No impairment noted  Doc score is 23    Abdomen: rash

## 2017-08-03 NOTE — PROGRESS NOTES
6989:  Received call from Mily Da Silva at [facility]. [facility] - bed will be 726A and will be ready by 3:00 PM. Ambo arranged through [facility] for 2:45 PM. Dr. Yeimy Genao and unit aware - EMTALA to be completed. CM spoke w/ Mily Da Silva at [facility] bed control ([phone]) to advise of need for voluntary Inpt Psychiatry bed. She left message later that pt will need a private room due to infection controls for hx of MRSA and that [facility] is expected to have a bed later this afternoon. CM will arrange S ambo when time is known. Transfer will require EMTALA documentation.   DORCAS Celis

## 2017-08-03 NOTE — BH NOTES
Patient admitted voluntarily to Acute Inpatient Psychiatry, under the services of . Upon arrival, pt states her desire to leave call placed to [facility] for TDO assessment.

## 2017-08-03 NOTE — PROGRESS NOTES
[facility] Consult Service Progress NOTE  Pulmonary, Critical Care, and Sleep Medicine    Name: Lindsay Mendoza MRN: [id number]   : 1975 Hospital: [facility]   Date: 8/3/2017  Admission Date: 2017     Chart and notes reviewed. Data reviewed. Pt seen on rounds earlier today. I have evaluated and examined the patient. Reviewed the evaluation. Pt is medically cleared for inpatient Psychiatry. Spoke with Dr. Vianney Domingo by phone who gave us advice on how to proceed. Pt accepted for voluntary treatment. Pt agreed to go for inpatient psychiatry this morning. She was getting impatient and code atlas called. She has tenderness at IV site left antecubital. Warm compresses applied overnight. PO and IV pain meds ordered. Will also order oral abx for possible early phlebitis. Zoster rash crusted over and healing nicely. No need for contact isolation for shingles. Appreciate everyone's help with pt.      Hospital Day: Adama Sanabria MD

## 2017-08-03 NOTE — IP AVS SNAPSHOT
[address] 
[phone] Patient: Belen Perez MRN: [id number] :1975 You are allergic to the following Allergen Reactions Tomato Swelling Tongue Recent Documentation Height Weight Breastfeeding? BMI OB Status Smoking Status 1.626 m 76.7 kg No 29.01 kg/m2 Having regular periods Former Smoker Unresulted Labs Order Current Status CULTURE, MRSA In process Emergency Contacts Name Discharge Info Relation Home Work Mobile Amalia Kim  Parent [1] [phone] About your hospitalization You were admitted on:  August 3, 2017 You last received care in the:  [facility] You were discharged on:  2017 Unit phone number:  [phone] Why you were hospitalized Your primary diagnosis was:  Not on File Your diagnoses also included:  Bipolar 1 Disorder (Hcc) Providers Seen During Your Hospitalizations Provider Role Specialty Primary office phone Samantha Starr MD Attending Provider Psychiatry [phone] Your Primary Care Physician (PCP) Primary Care Physician Office Phone Office Fax NONE ** None ** ** None ** Follow-up Information Follow up With Details Comments Contact Info [address] [place] [address] On 2017 walk in for mental health treatment at 8:00 am  [facility] [address] 
[phone] None   None (395) Patient stated that they have no PCP Current Discharge Medication List  
  
ASK your doctor about these medications Dose & Instructions Dispensing Information Comments Morning Noon Evening Bedtime  
 acyclovir 800 mg tablet Commonly known as:  ZOVIRAX Your next dose is: Today 2017 Dose:  800 mg Take 1 Tab by mouth five (5) times daily for 7 days. Quantity:  35 Tab Refills:  0 * albuterol 2.5 mg /3 mL (0.083 %) nebulizer solution Commonly known as:  PROVENTIL VENTOLIN Dose:  2.5 mg  
3 mL by Nebulization route every four (4) hours as needed for Wheezing. Quantity:  24 Each Refills:  0  
     
   
   
   
  
 * albuterol sulfate 90 mcg/actuation Aepb Dose:  2 Puff Take 2 Puffs by inhalation every four (4) hours as needed. Quantity:  1 Inhaler Refills:  0  
     
   
   
   
  
 amLODIPine 5 mg tablet Commonly known as:  Jessica Barrett Your next dose is:  Tomorrow 8/4/2017 Dose:  5 mg Take 1 Tab by mouth daily. Quantity:  30 Tab Refills:  0  
     
  
   
   
   
  
 clonazePAM 2 mg tablet Commonly known as:  Nixon Barraza Notes to Patient:  Not given in the hospital  
   
 Dose:  4 mg Take 2 Tabs by mouth two (2) times a day. Max Daily Amount: 8 mg. Quantity:  30 Tab Refills:  0  
     
   
   
   
  
 fluticasone-vilanterol 200-25 mcg/dose inhaler Commonly known as:  SONUO ELLIPTA Notes to Patient:  None given Dose:  1 Puff Take 1 Puff by inhalation daily. Quantity:  28 Each Refills:  1  
     
   
   
   
  
 gabapentin 300 mg capsule Commonly known as:  NEURONTIN Notes to Patient:  None given in the hospital  
   
 Dose:  300 mg Take 300 mg by mouth. Refills:  0  
     
   
   
   
  
 lidocaine 5 % Commonly known as:  Marlyn Kaufman Apply patch to the affected area for 12 hours a day and remove for 12 hours a day. Quantity:  1 Each Refills:  0  
     
   
   
   
  
 lithium carbonate 300 mg capsule Notes to Patient:  None given Dose:  300 mg Take 1 Cap by mouth two (2) times a day. Quantity:  30 Cap Refills:  0 LORazepam 0.5 mg tablet Commonly known as:  ATIVAN Notes to Patient:  As needed Dose:  0.5 mg Take 1 Tab by mouth every four (4) hours as needed. Max Daily Amount: 3 mg. Quantity:  30 Tab Refills:  0 mupirocin 2 % ointment Commonly known as:  [facility] Dose:  1 g Apply 1 g to affected area every twelve (12) hours. Quantity:  22 g Refills:  0  
     
   
   
   
  
 nicotine 21 mg/24 hr  
Commonly known as:  Alexa Chase Dose:  1 Patch 1 Patch by TransDERmal route daily for 30 days. Quantity:  1 Patch Refills:  0  
     
   
   
   
  
 oxyCODONE-acetaminophen 5-325 mg per tablet Commonly known as:  PERCOCET Notes to Patient:  None given Dose:  1 Tab Take 1 Tab by mouth every six (6) hours as needed. Max Daily Amount: 4 Tabs. Quantity:  12 Tab Refills:  0 QUEtiapine 400 mg tablet Commonly known as:  SEROquel Notes to Patient:  None given Dose:  400 mg Take 1 Tab by mouth two (2) times a day. Quantity:  12 Tab Refills:  0  
     
   
   
   
  
 trimethoprim-sulfamethoxazole 160-800 mg per tablet Commonly known as:  BACTRIM DS, SEPTRA DS Notes to Patient:  None given Dose:  1 Tab Take 1 Tab by mouth every twelve (12) hours. Quantity:  10 Tab Refills:  0  
     
   
   
   
  
 * Notice: This list has 2 medication(s) that are the same as other medications prescribed for you. Read the directions carefully, and ask your doctor or other care provider to review them with you. Discharge Instructions DISCHARGE SUMMARY from Nurse The following personal items are in your possession at time of discharge: 
 
Dental Appliances: None Visual Aid: None Home Medications: Locked (inhaler) Jewelry: Bracelet, Earrings (plastic beaded) Clothing: Shirt, Shorts, Undergarments Other Valuables: Money (comment) ($5.00, locked med cabinet) Personal items $ 5.00 returned to patient Pt. Released from the hospital at her  TDO hearing this morning   No prescriptions given to patient What to do at Home: 
Recommended activity: Activity as toleratedMirian *  Please give a list of your current medications to your Primary Care Provider. *  Please update this list whenever your medications are discontinued, doses are 
    changed, or new medications (including over-the-counter products) are added. *  Please carry medication information at all times in case of emergency situations. These are general instructions for a healthy lifestyle: No smoking/ No tobacco products/ Avoid exposure to second hand smoke Surgeon General's Warning:  Quitting smoking now greatly reduces serious risk to your health. Obesity, smoking, and sedentary lifestyle greatly increases your risk for illness A healthy diet, regular physical exercise & weight monitoring are important for maintaining a healthy lifestyle You may be retaining fluid if you have a history of heart failure or if you experience any of the following symptoms:  Weight gain of 3 pounds or more overnight or 5 pounds in a week, increased swelling in our hands or feet or shortness of breath while lying flat in bed. Please call your doctor as soon as you notice any of these symptoms; do not wait until your next office visit. Recognize signs and symptoms of STROKE: 
 
F-face looks uneven A-arms unable to move or move unevenly S-speech slurred or non-existent T-time-call 911 as soon as signs and symptoms begin-DO NOT go Back to bed or wait to see if you get better-TIME IS BRAIN. Warning Signs of HEART ATTACK Call 911 if you have these symptoms: 
? Chest discomfort. Most heart attacks involve discomfort in the center of the chest that lasts more than a few minutes, or that goes away and comes back. It can feel like uncomfortable pressure, squeezing, fullness, or pain. ? Discomfort in other areas of the upper body. Symptoms can include pain or discomfort in one or both arms, the back, neck, jaw, or stomach. ? Shortness of breath with or without chest discomfort. ? Other signs may include breaking out in a cold sweat, nausea, or lightheadedness. Don't wait more than five minutes to call [address]! Fast action can save your life. Calling 911 is almost always the fastest way to get lifesaving treatment. Emergency Medical Services staff can begin treatment when they arrive  up to an hour sooner than if someone gets to the hospital by car. The discharge information has been reviewed with the patient. The patient verbalized understanding. Discharge medications reviewed with the patient and appropriate educational materials and side effects teaching were provided. [employer] Activation Thank you for requesting access to [employer] Please follow the instructions below to securely access and download your online medical record. [employer] allows you to send messages to your doctor, view your test results, renew your prescriptions, schedule appointments, and more. How Do I Sign Up? 1. In your internet browser, go to [email or web address] 
2. Click on the First Time User? Click Here link in the Sign In box. You will be redirect to the New Member Sign Up page. 3. Enter your [employer] Access Code exactly as it appears below. You will not need to use this code after youve completed the sign-up process. If you do not sign up before the expiration date, you must request a new code. [employer] Access Code: [email or web address] Expires: 10/26/2017  8:33 PM (This is the date your [employer] access code will ) 4. Enter the last four digits of your Social Security Number (xxxx) and Date of Birth (mm/dd/yyyy) as indicated and click Submit. You will be taken to the next sign-up page. 5. Create a [employer] ID. This will be your [employer] login ID and cannot be changed, so think of one that is secure and easy to remember. 6. Create a [employer] password. You can change your password at any time. 7. Enter your Password Reset Question and Answer.  This can be used at a later time if you forget your password. 8. Enter your e-mail address. You will receive e-mail notification when new information is available in [address] 9. Click Sign Up. You can now view and download portions of your medical record. 10. Click the Download Summary menu link to download a portable copy of your medical information. Additional Information If you have questions, please visit the Frequently Asked Questions section of the [employer] website at [email or web address] Remember, [employer] is NOT to be used for urgent needs. For medical emergencies, dial 911. DISCHARGE SUMMARY 
 
[address] [facility] : 1975 MRN: [id number] The patient Romina Beach exhibits the ability to control behavior in a less restrictive environment. Patient's level of functioning is improving. No assaultive/destructive behavior has been observed for the past 24 hours. No suicidal/homicidal threat or behavior has been observed for the past 24 hours. There is no evidence of serious medication side effects. Patient has not been in physical or protective restraints for at least the past 24 hours. If weapons involved, how are they secured? No weapons involved Is patient aware of and in agreement with discharge plan? Patient was released for her hearing Arrangements for medication: no prescriptions given to patient. Copy of discharge instructions to  provider?:  [facility] Arrangements for transportation home:  Taxi home Keep all follow up appointments as scheduled, continue to take prescribed medications per physician instructions. Mental health crisis number:  920 or your local mental health crisis line number at [phone] Discharge Orders None Introducing [facility]!    
 [facility] introduces [employer] patient portal. Now you can access parts of your medical record, email your doctor's office, and request medication refills online. 1. In your internet browser, go to [email or web address] 2. Click on the First Time User? Click Here link in the Sign In box. You will see the New Member Sign Up page. 3. Enter your [employer] Access Code exactly as it appears below. You will not need to use this code after youve completed the sign-up process. If you do not sign up before the expiration date, you must request a new code. · [employer] Access Code: [email or web address] Expires: 10/26/2017  8:33 PM 
 
4. Enter the last four digits of your Social Security Number (xxxx) and Date of Birth (mm/dd/yyyy) as indicated and click Submit. You will be taken to the next sign-up page. 5. Create a [employer] ID. This will be your [employer] login ID and cannot be changed, so think of one that is secure and easy to remember. 6. Create a [employer] password. You can change your password at any time. 7. Enter your Password Reset Question and Answer. This can be used at a later time if you forget your password. 8. Enter your e-mail address. You will receive e-mail notification when new information is available in [address] 9. Click Sign Up. You can now view and download portions of your medical record. 10. Click the Download Summary menu link to download a portable copy of your medical information. If you have questions, please visit the Frequently Asked Questions section of the [employer] website. Remember, [employer] is NOT to be used for urgent needs. For medical emergencies, dial 911. Now available from your iPhone and Android! General Information Please provide this summary of care documentation to your next provider. Patient Signature:  ____________________________________________________________ Date:  ____________________________________________________________  
  
Gearldine Moulds Provider Signature:  ____________________________________________________________ Date:  ____________________________________________________________

## 2017-08-03 NOTE — PROGRESS NOTES
1407- pt room not ready. Pt scheduled to arrive 1530. Denny Garcia RN will call [facility] at [phone] when pt is leaving [facility]. [address] from [facility] called: pt leaving [facility]. PRN 0.5 mg po ativan and Percocet 5-325 mg po given at 1430 per report. Discuss with [facility] nurse. TRANSFER - IN REPORT:    Verbal report received from [facility]) on Ambika Kent  being received from [facility] (unit) for routine progression of care      Report consisted of patients Situation, Background, Assessment and   Recommendations(SBAR). Information from the following report(s) SBAR was reviewed with the receiving nurse. Opportunity for questions and clarification was provided. Assessment completed upon patients arrival to unit and care assumed.

## 2017-08-04 VITALS
DIASTOLIC BLOOD PRESSURE: 85 MMHG | RESPIRATION RATE: 17 BRPM | WEIGHT: 169 LBS | OXYGEN SATURATION: 99 % | HEIGHT: 64 IN | SYSTOLIC BLOOD PRESSURE: 129 MMHG | TEMPERATURE: 97.8 F | BODY MASS INDEX: 28.85 KG/M2 | HEART RATE: 109 BPM

## 2017-08-04 PROCEDURE — 74011250637 HC RX REV CODE- 250/637: Performed by: PSYCHIATRY & NEUROLOGY

## 2017-08-04 PROCEDURE — 74011250637 HC RX REV CODE- 250/637: Performed by: FAMILY MEDICINE

## 2017-08-04 PROCEDURE — 36415 COLL VENOUS BLD VENIPUNCTURE: CPT | Performed by: PSYCHIATRY & NEUROLOGY

## 2017-08-04 PROCEDURE — 94640 AIRWAY INHALATION TREATMENT: CPT

## 2017-08-04 RX ADMIN — AMLODIPINE BESYLATE 5 MG: 5 TABLET ORAL at 08:09

## 2017-08-04 RX ADMIN — ACYCLOVIR 800 MG: 800 TABLET ORAL at 06:44

## 2017-08-04 RX ADMIN — ZOLPIDEM TARTRATE 5 MG: 5 TABLET ORAL at 00:00

## 2017-08-04 RX ADMIN — ACETAMINOPHEN 650 MG: 325 TABLET, FILM COATED ORAL at 00:58

## 2017-08-04 RX ADMIN — OXYCODONE HYDROCHLORIDE AND ACETAMINOPHEN 1 TABLET: 5; 325 TABLET ORAL at 06:44

## 2017-08-04 RX ADMIN — SULFAMETHOXAZOLE AND TRIMETHOPRIM 1 TABLET: 800; 160 TABLET ORAL at 08:09

## 2017-08-04 RX ADMIN — LORAZEPAM 1 MG: 1 TABLET ORAL at 00:58

## 2017-08-04 RX ADMIN — FERROUS SULFATE TAB 325 MG (65 MG ELEMENTAL FE) 325 MG: 325 (65 FE) TAB at 08:10

## 2017-08-04 RX ADMIN — MUPIROCIN 1 G: 20 OINTMENT TOPICAL at 08:11

## 2017-08-04 NOTE — H&P
[address] [facility] [address]   HISTORY AND PHYSICAL       Name:  Raffi Feng   MR#:  [id number]   :  1975   Account #:  [de-identified]        Date of Adm:  2017           CHIEF COMPLAINT: Psychiatric H and P.    HISTORY OF PRESENT ILLNESS: The patient is a 53-year-old   female with past medical history of polysubstance abuse, seizures   stenosis of both internal carotid arteries, bipolar disorder, COPD,   chronic pain, hepatitis B, sarcoidosis, tobacco abuse, anemia, cocaine   abuse, narcotic abuse, hypertension, depression, sinus tachycardia,   heroin abuse who presents to the psychiatric carrillo today after being   discharged from [facility]. The patient was admitted at [facility]   secondary to what appeared to be drug overdose. The patient   presented to the ER with altered mental status and did not provide   much history. No family was available to provide history. The patient   was found by her parents unresponsive and bottles of Seroquel and   Risperdal were found in her room. The patient was twitching and   drooling. On arriving to the ED, the patient was responsive to painful   stimuli and withdrew to pain, became more awake and indeed became   agitated. Was given Ativan and continued to be combative, was biting   and kicking, incontinent of urine and having visual and auditory   hallucinations. She was talking to invisible people. The patient was   admitted to the hospital. The patient's symptoms resolved, was more   alert and awake today and was discharged to the psychiatric carrillo at   [facility] for further management and evaluation. The patient   was also found to have a left chest zoster and was put on pain   medication for that. The patient was also found to have anemia. Etiology was unclear in the past. Hemoglobin was stable and was   transferred to the psychiatric facility.  Today, per nurses, the patient is   somewhat combative earlier, threw herself on the ground, claimed that   she had a seizure but was talking through the seizure and was having   very asynchronous movement of her body. Per nursing staff, the   patient told them that she was going \"smear feces to their faces if they   did not oblige with her requests. \" The patient currently is sitting in bed,   reports that she has significant pain associated with zoster around her   left abdominal area. The patient reports that she also has history of   COPD, CHF, and sarcoidosis. The patient reports that her cardiologist   is in [place] and she has not seen the cardiologist for a while now. The patient reports that she is not aware if she was ever on diuretics. The patient denies any other complaints or problems. Denies any   headache, blurry vision, sore throat, trouble swallowing, trouble with   speech, any nausea, vomiting, fever, chills, abdominal pain,   constipation, diarrhea, urinary symptoms, focal or generalized   neurological weakness, recent travels or sick contacts. PAST MEDICAL HISTORY: See above. HOME MEDICATIONS: Currently the patient is on:    1. Clonazepam 2 mg by mouth b.i.d.   2. Lithium 300 mg by mouth b.i.d.   3. Lorazepam 0.5 mg every 4 hours as needed. 4. Seroquel 400 mg b.i.d.   5. Bactrim 1 tablet b.i.d.   6. Lidocaine patch to the affected area. 7. Neurontin. 8. Bactrim ointment. 9. Amlodipine 5 mg daily. 10. Acyclovir 800 mg 5 times daily. The patient was started on   acyclovir at [facility]. 11. Breo Ellipta. 12. Albuterol p.r.n.. SOCIAL HISTORY: The patient has a history of polysubstance abuse,   has history of tobacco abuse. Denies any drug abuse. Lives at home. REVIEW OF SYSTEMS:  All systems were reviewed and were found   to be essentially negative except for the symptoms mentioned above. ALLERGIES: TO TOMATO.     PHYSICAL EXAMINATION   VITAL SIGNS: Temperature is 97.8, pulse 109, respiratory rate 18,   blood pressure 122/87, pulse oximetry 98%.   GENERAL: Alert and oriented x3, awake, moderately distressed,   agitated female, appears to be her stated age. HEENT: Pupils equal, reactive to light. Dry mucous membranes. Tympanic membranes clear. NECK: Supple. CHEST: Clear to auscultation bilaterally. CORONARY: S1, S2 were heard. ABDOMEN: Soft. There is a maculopapular rash in dermatome fashion   on the left abdomen extending up to the left flank and to the back. Otherwise soft, nontender, nondistended. Bowel sounds are   physiological.   EXTREMITIES: No clubbing, no cyanosis, no edema. NEUROPSYCHIATRIC: Flat affect. Cranial nerves II through XII   grossly intact. Sensory grossly within normal limits. DTR 2+. Strength   5/5, moves all 4. SKIN: Warm. LABORATORY: Not done today, but were reviewed from the past.    ASSESSMENT AND PLAN:   1. History of altered mental status secondary to drug overdose. Admitted to the psychiatric facility. Plan per psychiatry. Continue to   monitor. 2. Anemia, unclear etiology. I will get a repeat CBC as it has not been   done since 08/01/2017. Will monitor hemoglobin and hematocrit. Will   test stool for occult blood and further intervention per hospital course. Will continue to monitor and reassess as needed. The patient is   hemodynamically stable. Iron panel shows microcytic anemia. Will start   the patient on iron sulfate and again will continue to closely monitor. 3. History of chronic obstructive pulmonary disease. Start the patient   on DuoNeb as needed. 4. Zoster. Will provide pain medication and continue acyclovir. 5. History of congestive heart failure, appears to be stable. Continue to   monitor. 6. Gastrointestinal, deep venous thrombosis prophylaxis per unit   protocol.         Noah Manuel MD MM / Sae Doe   D:  08/03/2017   22:01   T:  08/03/2017   23:10   Job #:  [id number]

## 2017-08-04 NOTE — BH NOTES
Patient refused head CT stated \"nothing is wrong with me I think that is insurance fraud. I just want to get out of here. I was released from my TDO Hearing. \"

## 2017-08-04 NOTE — BH NOTES
PRN Medication Documentation    Specific patient behavior that led to need for PRN medication: Patient is angry, agitated. Pt requesting for more pain medication and also would like to go home. Staff interventions attempted prior to PRN being given: verbal redirection.  Suggesting deep breathing and rest.  PRN medication given: Ativan 1 mg and Tylenol 650 mg  Patient response/effectiveness of PRN medication:

## 2017-08-04 NOTE — INTERDISCIPLINARY ROUNDS
Behavioral Health Interdisciplinary Rounds     Patient Name: Christi Grant  Age: 35 y.o. Room/Bed:  726/  Primary Diagnosis: <principal problem not specified>   Admission Status: TDO     Readmission within 30 days: no  Power of  in place: no  Patient requires a blocked bed: yes          Reason for blocked bed: history of MRSA , Zoster     VTE Prophylaxis: Not indicated  Mobility needs/Fall risk: no    Nutritional Plan: no  Consults: Hospitalist  H&P completed        Labs/Testing due today?: yes , multiple attempts , art stick not successful , pt refusing further sticks at this time . Sleep hours:  2+ hours      Participation in Care/Groups:  yes  Medication Compliant?: Yes  PRNS (last 24 hours):  Antipsychotic (IM), Antianxiety, Sleep Aid and Pain    Restraints (last 24 hours):  no  Substance Abuse:  History of polysubstance abuse  CIWA (range last 24 hours): COWS (range last 24 hours):   Alcohol screening (AUDIT) completed -  AUDIT Score: 0  If applicable, date SBIRT discussed in treatment team AND documented:   Tobacco - patient is a smoker:    Date tobacco education completed by RN:   24 hour chart check complete yes     Patient goal(s) for today:   Treatment team focus/goals: she was released from her hearing   LOS:  1  Expected LOS: released from her hearing   Psychiatric [address] -  no  Name of Decision maker if patient has Psychiatric Care Directive   Patient was offered information   Financial concerns/prescription coverage:  Date of last family contact:      Family requesting physician contact today:    Discharge plan: she was released for her hearing       Outpatient provider(s): refer to [address] [place] [address]     Participating treatment team members: Colt Vega -- Dr. Gricel Freeman RN

## 2017-08-04 NOTE — BH NOTES
GROUP THERAPY PROGRESS NOTE    Sandy Milton is participating in Reflections. Group time: 20 minutes    Personal goal for participation: unit orientation and daily progress    Goal orientation: personal    Group therapy participation: passive    Therapeutic interventions reviewed and discussed: \"Are you stressed? \" and \"Nutrition\" word search handouts. Impression of participation: Seems to be adjusting to unit okay. Had some somatic complaints but in good control and receptive to staff supports.

## 2017-08-04 NOTE — BH NOTES
Attempts made by 3 nurses and multiple sticks to obtain  Stat labs , unsuccessful . Pt states in the past it has required a jugular stick . 80 - Dr Gregory Adams paged   6621- New order for Arterial stick obtained , Resp called   0010- obtained MRSA  Nares culture and sent to lab   0015- Resp. attempted x2 for arterial stick with no success , Pt anxious , tense and will not cooperate with another stick at this time .    Small  Blood sample sent but was not sufficient , lab auto d/jamie order and was reordered

## 2017-08-04 NOTE — BH NOTES
GROUP THERAPY PROGRESS NOTE    Shyann Canseco is participating in Discharge Planning Group: Tools for Recovery    Group time: 30 minutes    Personal goal for participation: Readiness for discharge    Goal orientation: personal    Group therapy participation: active    Therapeutic interventions reviewed and discussed: Yes    Impression of participation: Pt stated she has adequate support which includes family, friends, positive attitude and new mental health services. Pt jessy this hospitalization has helped her to accept that she needs help. Pt is returning to live with her mother and stepfather whom she feels is supportive and willing to help her. Tania Lan

## 2017-08-04 NOTE — DISCHARGE INSTRUCTIONS
DISCHARGE SUMMARY from Nurse    The following personal items are in your possession at time of discharge:    Dental Appliances: None  Visual Aid: None     Home Medications: Locked (inhaler)  Jewelry: Bracelet, Earrings (plastic beaded)  Clothing: Shirt, Shorts, Undergarments  Other Valuables: Money (comment) ($5.00, locked med cabinet)     Personal items $ 5.00 returned to patient  Pt. Released from the hospital at her  TDO hearing this morning   No prescriptions given to patient                   What to do at Home:  Recommended activity: Activity as tolerated,     . *  Please give a list of your current medications to your Primary Care Provider. *  Please update this list whenever your medications are discontinued, doses are      changed, or new medications (including over-the-counter products) are added. *  Please carry medication information at all times in case of emergency situations. These are general instructions for a healthy lifestyle:    No smoking/ No tobacco products/ Avoid exposure to second hand smoke    Surgeon General's Warning:  Quitting smoking now greatly reduces serious risk to your health. Obesity, smoking, and sedentary lifestyle greatly increases your risk for illness    A healthy diet, regular physical exercise & weight monitoring are important for maintaining a healthy lifestyle    You may be retaining fluid if you have a history of heart failure or if you experience any of the following symptoms:  Weight gain of 3 pounds or more overnight or 5 pounds in a week, increased swelling in our hands or feet or shortness of breath while lying flat in bed. Please call your doctor as soon as you notice any of these symptoms; do not wait until your next office visit.     Recognize signs and symptoms of STROKE:    F-face looks uneven    A-arms unable to move or move unevenly    S-speech slurred or non-existent    T-time-call 911 as soon as signs and symptoms begin-DO NOT go Back to bed or wait to see if you get better-TIME IS BRAIN. Warning Signs of HEART ATTACK     Call 911 if you have these symptoms:   Chest discomfort. Most heart attacks involve discomfort in the center of the chest that lasts more than a few minutes, or that goes away and comes back. It can feel like uncomfortable pressure, squeezing, fullness, or pain.  Discomfort in other areas of the upper body. Symptoms can include pain or discomfort in one or both arms, the back, neck, jaw, or stomach.  Shortness of breath with or without chest discomfort.  Other signs may include breaking out in a cold sweat, nausea, or lightheadedness. Don't wait more than five minutes to call 911 - MINUTES MATTER! Fast action can save your life. Calling 911 is almost always the fastest way to get lifesaving treatment. Emergency Medical Services staff can begin treatment when they arrive -- up to an hour sooner than if someone gets to the hospital by car. The discharge information has been reviewed with the patient. The patient verbalized understanding. Discharge medications reviewed with the patient and appropriate educational materials and side effects teaching were provided. [employer] Activation    Thank you for requesting access to [employer]. Please follow the instructions below to securely access and download your online medical record. [employer] allows you to send messages to your doctor, view your test results, renew your prescriptions, schedule appointments, and more. How Do I Sign Up? 1. In your internet browser, go to [email or web address]  2. Click on the First Time User? Click Here link in the Sign In box. You will be redirect to the New Member Sign Up page. 3. Enter your [employer] Access Code exactly as it appears below. You will not need to use this code after youve completed the sign-up process. If you do not sign up before the expiration date, you must request a new code.     [employer] Access Code: [email or web address]  Expires: 10/26/2017  8:33 PM (This is the date your [employer] access code will )    4. Enter the last four digits of your Social Security Number (xxxx) and Date of Birth (mm/dd/yyyy) as indicated and click Submit. You will be taken to the next sign-up page. 5. Create a [employer] ID. This will be your [employer] login ID and cannot be changed, so think of one that is secure and easy to remember. 6. Create a [employer] password. You can change your password at any time. 7. Enter your Password Reset Question and Answer. This can be used at a later time if you forget your password. 8. Enter your e-mail address. You will receive e-mail notification when new information is available in [address] 9. Click Sign Up. You can now view and download portions of your medical record. 10. Click the Download Summary menu link to download a portable copy of your medical information. Additional Information    If you have questions, please visit the Frequently Asked Questions section of the [employer] website at [email or web address] Remember, [employer] is NOT to be used for urgent needs. For medical emergencies, dial 911. DISCHARGE SUMMARY    NAME:Evgeny Mcgee  : 1975  MRN: [id number]    The patient Jada Calvillo exhibits the ability to control behavior in a less restrictive environment. Patient's level of functioning is improving. No assaultive/destructive behavior has been observed for the past 24 hours. No suicidal/homicidal threat or behavior has been observed for the past 24 hours. There is no evidence of serious medication side effects. Patient has not been in physical or protective restraints for at least the past 24 hours. If weapons involved, how are they secured? No weapons involved     Is patient aware of and in agreement with discharge plan? Patient was released for her hearing     Arrangements for medication: no prescriptions given to patient.      Copy of discharge instructions to  provider?:  Yes, faxed to [address] [place] [address] for transportation home:  Taxi home     Keep all follow up appointments as scheduled, continue to take prescribed medications per physician instructions.   Mental health crisis number:  [phone] or your local mental health crisis line number at [phone]

## 2017-08-04 NOTE — BH NOTES
Pt. Up on unit  For breakfast  Pt. Released from TDO hearing this morning  Pt. Denies suicidal ideation    Discharge order obtained  No prescriptions given to pt. On 15 min. Checks for safety.

## 2017-08-04 NOTE — PROGRESS NOTES
Problem: Falls - Risk of  Goal: *Absence of falls  Outcome: Progressing Towards Goal  Pt has been restless and c/o of discomfort from Zoster at lt side. Asking for more medication for pain within a 1/2 hour of receiving Percocet . C/o of pain related to all the attempts to get lab work at this point also . Received PRN medications for anxiety and sleep as well . Pt is has steady gait, night light on in room, and Q15 min safety checks continue     0400-called  [facility]eb[facility]  and spoke w/ Dorita Fleischer regarding prescreening . Stating the [place] representative at the hearing would be able to obtain this     0620- has appeared to sleep or lay resting quietly in bed .  For a TDO hearing this am

## 2017-08-04 NOTE — PROGRESS NOTES
Problem: Substance Use - Stabilization Plan goal met by 8/8/2017  Goal: *STG: Develop a written crisis/recovery plan to maintain sobriety  Develop a written crisis/recovery plan to define strategies maintain sobriety while developing an alcohol/drug-free lifestyle. Outcome: Progressing Towards Goal  Pt. Released from TDO [place] today    States will follow up if necessary with her Dr. Yana Monreal patient    Problem: Discharge Planning  Goal met by 8/8/2017  Goal: *Discharge to safe environment  Outcome: Progressing Towards Goal  [facility]. Released at [facility]     today  Goal: *Knowledge of discharge instructions  Outcome: Progressing Towards Goal  Pt.  Released from TDO [place]      Problem: Falls - Risk of goal met by 8/8/2017  Goal: *Absence of falls  Outcome: Progressing Towards Goal  Absence of falls  Non slip socks  Bed in low position     [facility]  Master Treatment Plan for Dieudonne Woodruff    Date Treatment Plan Initiated:   8/4/2017    Treatment Plan Modalities:  Type of Modality Amount  (x minutes) Frequency (x/week) Duration (x days) Name of Responsible Staff   Community & wrap-up meetings to encourage peer interactions   15   7   1     Jayme Thornton LCSW   Group psychotherapy to assist in building coping skills and internal controls   60   7   1   Jayme Thornton LCSW   Therapeutic activity groups to build coping skills   60   7   1   Jayme Thornton LCSW   Psychoeducation in group setting to address:   Medication education     15   7   [address]     20   7   1   [address]    Relaxation techniques         Symptom management         Discharge planning     [address] work   Spirituality      60   [place]     60   2   1   Volunteer from [facility]   [facility]/AA/NA     61   1   7   Volunteer from [address] [place] [address] medication management     13   7   1   Dr. Nichol Floyd meeting/discharge planning

## 2017-08-04 NOTE — BH NOTES
Behavioral Health Transition Record to Provider    Patient Name: Kevin Ramos  YOB: 1975  Medical Record Number: [id number]  Date of Admission: 8/3/2017  Date of Discharge: 2017    Attending Provider: Shahnaz Heredia MD  Discharging Provider: Dr. Aston Washington   To contact this individual call [phone]  and ask the  to page. If unavailable, ask to be transferred to [facility] Provider on call. [facility] Provider will be available on call  and during holidays. Primary Care Provider: None    Allergies   Allergen Reactions    Tomato Swelling     Tongue          H&P Summary Notes      H&P filed by Yuni Lamb MD at 17 / Draft: Not Electronically Signed     Author:  Yuni Lamb MD Service:  Internal Medicine Author Type:  Physician    Filed:  17 Date of Service:  17 Status:  Unsigned Transcription    :  Yuni Lamb MD (Physician)           [address] [place] [address]   HISTORY AND PHYSICAL       Name:  Frannie Correia   MR#:  [id number]   :  1975   Account #:  [de-identified]        Date of Adm:  2017     PATIENT WAS RELEASED FROM HER HEARING BEFORE BEING SEEN BY DR. FERNÁNDEZ       CHIEF COMPLAINT: Psychiatric H and P.    HISTORY OF PRESENT ILLNESS: The patient is a 40-year-old   female with past medical history of polysubstance abuse, seizures   stenosis of both internal carotid arteries, bipolar disorder, COPD,   chronic pain, hepatitis B, sarcoidosis, tobacco abuse, anemia, cocaine   abuse, narcotic abuse, hypertension, depression, sinus tachycardia,   heroin abuse who presents to the psychiatric carrillo today after being   discharged from [facility]. The patient was admitted at [facility]   secondary to what appeared to be drug overdose. The patient   presented to the ER with altered mental status and did not provide   much history. No family was available to provide history.  The patient was found by her parents unresponsive and bottles of Seroquel and   Risperdal were found in her room. The patient was twitching and   drooling. On arriving to the ED, the patient was responsive to painful   stimuli and withdrew to pain, became more awake and indeed became   agitated. Was given Ativan and continued to be combative, was biting   and kicking, incontinent of urine and having visual and auditory   hallucinations. She was talking to invisible people. The patient was   admitted to the hospital. The patient's symptoms resolved, was more   alert and awake today and was discharged to the psychiatric carrillo at   [facility] for further management and evaluation. The patient   was also found to have a left chest zoster and was put on pain   medication for that. The patient was also found to have anemia. Etiology was unclear in the past. Hemoglobin was stable and was   transferred to the psychiatric facility. Today, per nurses, the patient is   somewhat combative earlier, threw herself on the ground, claimed that   she had a seizure but was talking through the seizure and was having   very asynchronous movement of her body. Per nursing staff, the   patient told them that she was going \"smear feces to their faces if they   did not oblige with her requests. \" The patient currently is sitting in bed,   reports that she has significant pain associated with zoster around her   left abdominal area. The patient reports that she also has history of   COPD, CHF, and sarcoidosis. The patient reports that her cardiologist   is in [place] and she has not seen the cardiologist for a while now. The patient reports that she is not aware if she was ever on diuretics. The patient denies any other complaints or problems.  Denies any   headache, blurry vision, sore throat, trouble swallowing, trouble with   speech, any nausea, vomiting, fever, chills, abdominal pain,   constipation, diarrhea, urinary symptoms, focal or generalized   neurological weakness, recent travels or sick contacts. PAST MEDICAL HISTORY: See above. HOME MEDICATIONS: Currently the patient is on:    1. Clonazepam 2 mg by mouth b.i.d.   2. Lithium 300 mg by mouth b.i.d.   3. Lorazepam 0.5 mg every 4 hours as needed. 4. Seroquel 400 mg b.i.d.   5. Bactrim 1 tablet b.i.d.   6. Lidocaine patch to the affected area. 7. Neurontin. 8. Bactrim ointment. 9. Amlodipine 5 mg daily. 10. Acyclovir 800 mg 5 times daily. The patient was started on   acyclovir at [facility]. 11. Breo Ellipta. 12. Albuterol p.r.n.. SOCIAL HISTORY: The patient has a history of polysubstance abuse,   has history of tobacco abuse. Denies any drug abuse. Lives at home. REVIEW OF SYSTEMS:  All systems were reviewed and were found   to be essentially negative except for the symptoms mentioned above. ALLERGIES: TO TOMATO. PHYSICAL EXAMINATION   VITAL SIGNS: Temperature is 97.8, pulse 109, respiratory rate 18,   blood pressure 122/87, pulse oximetry 98%. GENERAL: Alert and oriented x3, awake, moderately distressed,   agitated female, appears to be her stated age. HEENT: Pupils equal, reactive to light. Dry mucous membranes. Tympanic membranes clear. NECK: Supple. CHEST: Clear to auscultation bilaterally. CORONARY: S1, S2 were heard. ABDOMEN: Soft. There is a maculopapular rash in dermatome fashion   on the left abdomen extending up to the left flank and to the back. Otherwise soft, nontender, nondistended. Bowel sounds are   physiological.   EXTREMITIES: No clubbing, no cyanosis, no edema. NEUROPSYCHIATRIC: Flat affect. Cranial nerves II through XII   grossly intact. Sensory grossly within normal limits. DTR 2+. Strength   5/5, moves all 4. SKIN: Warm. LABORATORY: Not done today, but were reviewed from the past.    ASSESSMENT AND PLAN:   1. History of altered mental status secondary to drug overdose. Admitted to the psychiatric facility.   Plan per psychiatry. Continue to   monitor. 2. Anemia, unclear etiology. I will get a repeat CBC as it has not been   done since 08/01/2017. Will monitor hemoglobin and hematocrit. Will   test stool for occult blood and further intervention per hospital course. Will continue to monitor and reassess as needed. The patient is   hemodynamically stable. Iron panel shows microcytic anemia. Will start   the patient on iron sulfate and again will continue to closely monitor. 3. History of chronic obstructive pulmonary disease. Start the patient   on DuoNeb as needed. 4. Zoster. Will provide pain medication and continue acyclovir. 5. History of congestive heart failure, appears to be stable. Continue to   monitor. 6. Gastrointestinal, deep venous thrombosis prophylaxis per unit   protocol. Jax Prater MD      MM / TW   D:  08/03/2017   22:01   T:  08/03/2017   23:10   Job #:  [id number]  [MM1.1]   Revision History       User Key Date/Time User Provider Type Action    > MM1.1 08/03/17 2312 Judge Kathleen MD Physician Edit              Admission Diagnosis: bipolar  Bipolar 1 disorder ([facility].)    * No surgery found *    Results for orders placed or performed during the hospital encounter of 07/31/17   CBC WITH AUTOMATED DIFF   Result Value Ref Range    WBC 5.6 3.6 - 11.0 K/uL    RBC 3.05 (L) 3.80 - 5.20 M/uL    HGB 8.6 (L) 11.5 - 16.0 g/dL    HCT 26.2 (L) 35.0 - 47.0 %    MCV 85.9 80.0 - 99.0 FL    MCH 28.2 26.0 - 34.0 PG    MCHC 32.8 30.0 - 36.5 g/dL    RDW 20.6 (H) 11.5 - 14.5 %    PLATELET 961 173 - 573 K/uL    NEUTROPHILS 42 32 - 75 %    LYMPHOCYTES 53 (H) 12 - 49 %    MONOCYTES 4 (L) 5 - 13 %    EOSINOPHILS 1 0 - 7 %    BASOPHILS 0 0 - 1 %    ABS. NEUTROPHILS 2.4 1.8 - 8.0 K/UL    ABS. LYMPHOCYTES 2.9 0.8 - 3.5 K/UL    ABS. MONOCYTES 0.2 0.0 - 1.0 K/UL    ABS. EOSINOPHILS 0.1 0.0 - 0.4 K/UL    ABS.  BASOPHILS 0.0 0.0 - 0.1 K/UL    RBC COMMENTS ANISOCYTOSIS  2+        DF SMEAR SCANNED     METABOLIC PANEL, COMPREHENSIVE   Result Value Ref Range    Sodium 139 136 - 145 mmol/L    Potassium 3.8 3.5 - 5.1 mmol/L    Chloride 105 97 - 108 mmol/L    CO2 26 21 - 32 mmol/L    Anion gap 8 5 - 15 mmol/L    Glucose 119 (H) 65 - 100 mg/dL    BUN 10 6 - 20 MG/DL    Creatinine 1.16 (H) 0.55 - 1.02 MG/DL    BUN/Creatinine ratio 9 (L) 12 - 20      GFR est AA >60 >60 ml/min/1.73m2    GFR est non-AA 51 (L) >60 ml/min/1.73m2    Calcium 7.9 (L) 8.5 - 10.1 MG/DL    Bilirubin, total 0.2 0.2 - 1.0 MG/DL    ALT (SGPT) 56 12 - 78 U/L    AST (SGOT) 76 (H) 15 - 37 U/L    Alk.  phosphatase 62 45 - 117 U/L    Protein, total 6.5 6.4 - 8.2 g/dL    Albumin 3.0 (L) 3.5 - 5.0 g/dL    Globulin 3.5 2.0 - 4.0 g/dL    A-G Ratio 0.9 (L) 1.1 - 2.2     ACETAMINOPHEN   Result Value Ref Range    Acetaminophen level <2 (L) 10 - 30 ug/mL   SALICYLATE   Result Value Ref Range    SALICYLATE <0.9 (L) 2.8 - 20.0 MG/DL   DRUG SCREEN, URINE   Result Value Ref Range    AMPHETAMINES NEGATIVE  NEG      BARBITURATES NEGATIVE  NEG      BENZODIAZEPINE POSITIVE (A) NEG      COCAINE NEGATIVE  NEG      METHADONE NEGATIVE  NEG      OPIATES NEGATIVE  NEG      PCP(PHENCYCLIDINE) NEGATIVE  NEG      THC (TH-CANNABINOL) POSITIVE (A) NEG      Drug screen comment (NOTE)    HCG URINE, QL   Result Value Ref Range    HCG urine, Ql. NEGATIVE  NEG     URINALYSIS W/ RFLX MICROSCOPIC   Result Value Ref Range    Color YELLOW/STRAW      Appearance CLEAR CLEAR      Specific gravity 1.023 1.003 - 1.030      pH (UA) 6.5 5.0 - 8.0      Protein 30 (A) NEG mg/dL    Glucose NEGATIVE  NEG mg/dL    Ketone NEGATIVE  NEG mg/dL    Bilirubin NEGATIVE  NEG      Blood NEGATIVE  NEG      Urobilinogen 1.0 0.2 - 1.0 EU/dL    Nitrites NEGATIVE  NEG      Leukocyte Esterase NEGATIVE  NEG     ETHYL ALCOHOL   Result Value Ref Range    ALCOHOL(ETHYL),SERUM <10 <10 MG/DL   CK   Result Value Ref Range    CK 84 26 - 192 U/L   IRON PROFILE   Result Value Ref Range    Iron 36 35 - 150 ug/dL    TIBC 412 250 - 450 ug/dL Iron % saturation 9 (L) 20 - 50 %   METABOLIC PANEL, COMPREHENSIVE   Result Value Ref Range    Sodium 139 136 - 145 mmol/L    Potassium 3.7 3.5 - 5.1 mmol/L    Chloride 108 97 - 108 mmol/L    CO2 24 21 - 32 mmol/L    Anion gap 7 5 - 15 mmol/L    Glucose 86 65 - 100 mg/dL    BUN 8 6 - 20 MG/DL    Creatinine 0.93 0.55 - 1.02 MG/DL    BUN/Creatinine ratio 9 (L) 12 - 20      GFR est AA >60 >60 ml/min/1.73m2    GFR est non-AA >60 >60 ml/min/1.73m2    Calcium 7.5 (L) 8.5 - 10.1 MG/DL    Bilirubin, total 0.3 0.2 - 1.0 MG/DL    ALT (SGPT) 49 12 - 78 U/L    AST (SGOT) 57 (H) 15 - 37 U/L    Alk.  phosphatase 54 45 - 117 U/L    Protein, total 6.3 (L) 6.4 - 8.2 g/dL    Albumin 2.9 (L) 3.5 - 5.0 g/dL    Globulin 3.4 2.0 - 4.0 g/dL    A-G Ratio 0.9 (L) 1.1 - 2.2     MAGNESIUM   Result Value Ref Range    Magnesium 1.7 1.6 - 2.4 mg/dL   CBC W/O DIFF   Result Value Ref Range    WBC 6.1 3.6 - 11.0 K/uL    RBC 3.14 (L) 3.80 - 5.20 M/uL    HGB 8.8 (L) 11.5 - 16.0 g/dL    HCT 26.9 (L) 35.0 - 47.0 %    MCV 85.7 80.0 - 99.0 FL    MCH 28.0 26.0 - 34.0 PG    MCHC 32.7 30.0 - 36.5 g/dL    RDW 20.3 (H) 11.5 - 14.5 %    PLATELET 010 536 - 907 K/uL   PHOSPHORUS   Result Value Ref Range    Phosphorus 3.0 2.6 - 4.7 MG/DL   VITAMIN B12   Result Value Ref Range    Vitamin B12 1002 (H) 211 - 911 pg/mL   FERRITIN   Result Value Ref Range    Ferritin 32 8 - 252 NG/ML   FOLATE   Result Value Ref Range    Folate 21.5 (H) 5.0 - 21.0 ng/mL   GLUCOSE, POC   Result Value Ref Range    Glucose (POC) 127 (H) 65 - 100 mg/dL    Performed by [facility]    GLUCOSE, POC   Result Value Ref Range    Glucose (POC) 102 (H) 65 - 100 mg/dL    Performed by Gloria Titus    GLUCOSE, POC   Result Value Ref Range    Glucose (POC) 118 (H) 65 - 100 mg/dL    Performed by Gloria Titus    EKG, 12 LEAD, INITIAL   Result Value Ref Range    Ventricular Rate 127 BPM    Atrial Rate 127 BPM    P-R Interval 126 ms    QRS Duration 78 ms    Q-T Interval 326 ms    QTC Calculation (Bezet) 473 ms    Calculated P Axis 57 degrees    Calculated R Axis 56 degrees    Calculated T Axis 32 degrees    Diagnosis         Sinus tachcardia Left atrial enlargement  When compared with ECG of 2017 20:47,  No significant change was found  Confirmed by Stephenie Howell ([id number]) on 2017 9:14:30 AM         Immunizations administered during this encounter:   Immunization History   Administered Date(s) Administered    Influenza Vaccine 10/01/2016       Screening for Metabolic Disorders for Patients on Antipsychotic Medications    Estimated Body Mass Index  Estimated body mass index is 29.01 kg/(m^2) as calculated from the following:    Height as of this encounter: 5' 4\" (1.626 m). Weight as of this encounter: 76.7 kg (169 lb). Vital Signs/Blood Pressure  Visit Vitals    /85    Pulse (!) 109    Temp 97.8 °F (36.6 °C)    Resp 17    Ht 5' 4\" (1.626 m)    Wt 76.7 kg (169 lb)    LMP 2017 (Approximate)    SpO2 99%    Breastfeeding No    BMI 29.01 kg/m2       Blood Glucose/Hemoglobin A1c  Lab Results   Component Value Date/Time    Glucose 86 2017 02:53 AM    Glucose (POC) 118 2017 05:06 AM        Lab Results   Component Value Date/Time    Hemoglobin A1c 5.3 01/10/2017 05:28 AM        Lipid Panel  Lab Results   Component Value Date/Time    Cholesterol, total 237 2017 04:09 AM    HDL Cholesterol 97 2017 04:09 AM    LDL, calculated 130.6 2017 04:09 AM    Triglyceride 47 2017 04:09 AM    CHOL/HDL Ratio 2.4 2017 04:09 AM            Discharge Diagnosis: Bipolar Disorder     Discharge Plan: [facility]illo    NAME:Evgeny Mcgee  : 1975  MRN: [id number]    The patient hGada Shelton exhibits the ability to control behavior in a less restrictive environment. Patient's level of functioning is improving. No assaultive/destructive behavior has been observed for the past 24 hours.   No suicidal/homicidal threat or behavior has been observed for the past 24 hours. There is no evidence of serious medication side effects. Patient has not been in physical or protective restraints for at least the past 24 hours. If weapons involved, how are they secured? No weapons involved     Is patient aware of and in agreement with discharge plan? Patient was released for her hearing     Arrangements for medication: no prescriptions given to patient. Copy of discharge instructions to  provider?:  Yes, faxed to [address] [place] [address] for transportation home:  Taxi home     Keep all follow up appointments as scheduled, continue to take prescribed medications per physician instructions. Mental health crisis number:  [phone] or your local mental health crisis line number at [phone]           Discharge Medication List and Instructions:   Current Discharge Medication List          Unresulted Labs (24h ago through future)    Start     Ordered    08/04/17 0400  CBC WITH MANUAL DIFF  ONE TIME,   R      08/04/17 0347    15/19/48 2426  METABOLIC PANEL, COMPREHENSIVE  ONE TIME,   R      08/04/17 0347 08/04/17 0000  TSH, 3RD GENERATION - DO NOT ORDER IF PATIENT HAS RECEIVED THIS LAB WITHIN THE LAST 8 WEEKS  ONE TIME,   R     Comments:  Do not repeat lab if already done in the ED prior to arrival on unit. 08/04/17 0002    08/04/17 0000  LIPID PANEL - DO NOT ORDER IF PATIENT HAS RECEIVED THIS LAB WITHIN THE LAST 8 WEEKS  ONE TIME,   R     Comments:  Patient should be fasting prior to sample being obtained. 08/04/17 0002    08/04/17 0000  GLUCOSE, FASTING  ONE TIME,   R     Comments:  Patient should be fasting prior to sample being obtained.     08/04/17 0002    08/03/17 2145  BNP  ONE TIME,   S      08/03/17 2141    08/03/17 2145  URINALYSIS W/MICROSCOPIC  ONE TIME,   S      08/03/17 2142    08/03/17 2145  OCCULT BLOOD, STOOL  ONE TIME,   S      08/03/17 2142        To obtain results of studies pending at discharge, please contact [phone]    Follow-up Information     Follow up With Details Comments Salvatore Rodriguez On 8/7/2017 walk in for mental health treatment at 8:00 am  Radha Thomas   [phone]    None   None (705) Patient stated that they have no PCP            Advanced Directive:   Does the patient have an appointed surrogate decision maker? No  Does the patient have a Medical Advance Directive? No  Does the patient have a Psychiatric Advance Directive? No  If the patient does not have a surrogate or Medical Advance Directive AND Psychiatric Advance Directive, the patient was offered information on these advance directives Patient declined to complete    Patient Instructions: Please continue all medications until otherwise directed by physician. Tobacco Cessation Discharge Plan:   Is the patient a smoker and needs referral for smoking cessation? Yes  Patient referred to the following for smoking cessation with an appointment? Refused     Patient was offered medication to assist with smoking cessation at discharge? No/ released from her hearing   Was education for smoking cessation added to the discharge instructions? yes    Alcohol/Substance Abuse Discharge Plan:   Does the patient have a history of substance/alcohol abuse and requires a referral for treatment? Yes  Patient referred to the following for substance/alcohol abuse treatment with an appointment? She agreed to go to [address] [place] [address] - 8/7/2017 at 8:00 am   Patient was offered medication to assist with alcohol cessation at discharge?no  Was education for substance/alcohol abuse added to discharge instructions? no    Patient discharged to Home; discussed with patient/caregiver and provided to the patient/caregiver either in hard copy or electronically.

## 2017-08-05 LAB
BACTERIA SPEC CULT: NORMAL
BACTERIA SPEC CULT: NORMAL
SERVICE CMNT-IMP: NORMAL

## 2018-07-26 ENCOUNTER — APPOINTMENT (OUTPATIENT)
Dept: GENERAL RADIOLOGY | Age: 43
DRG: 951 | End: 2018-07-26
Attending: EMERGENCY MEDICINE
Payer: MEDICAID

## 2018-07-26 ENCOUNTER — HOSPITAL ENCOUNTER (INPATIENT)
Age: 43
LOS: 9 days | Discharge: PSYCHIATRIC OR SUBSTANCE USE FACILITY | DRG: 951 | End: 2018-08-04
Attending: EMERGENCY MEDICINE | Admitting: HOSPITALIST
Payer: MEDICAID

## 2018-07-26 DIAGNOSIS — J96.21 ACUTE ON CHRONIC RESPIRATORY FAILURE WITH HYPOXIA (HCC): ICD-10-CM

## 2018-07-26 DIAGNOSIS — R56.9 CONVULSIONS, UNSPECIFIED CONVULSION TYPE (HCC): ICD-10-CM

## 2018-07-26 DIAGNOSIS — J45.901 SEVERE ASTHMA WITH EXACERBATION, UNSPECIFIED WHETHER PERSISTENT: Primary | ICD-10-CM

## 2018-07-26 DIAGNOSIS — F19.10 POLYSUBSTANCE ABUSE (HCC): ICD-10-CM

## 2018-07-26 LAB
ALBUMIN SERPL-MCNC: 3.7 G/DL (ref 3.5–5)
ALBUMIN/GLOB SERPL: 0.9 {RATIO} (ref 1.1–2.2)
ALP SERPL-CCNC: 73 U/L (ref 45–117)
ALT SERPL-CCNC: 23 U/L (ref 12–78)
ANION GAP SERPL CALC-SCNC: 9 MMOL/L (ref 5–15)
ARTERIAL PATENCY WRIST A: YES
ARTERIAL PATENCY WRIST A: YES
AST SERPL-CCNC: 36 U/L (ref 15–37)
BASE DEFICIT BLDA-SCNC: 5.8 MMOL/L
BASE DEFICIT BLDA-SCNC: 9.9 MMOL/L
BASOPHILS # BLD: 0.1 K/UL (ref 0–0.1)
BASOPHILS NFR BLD: 1 % (ref 0–1)
BDY SITE: ABNORMAL
BDY SITE: ABNORMAL
BILIRUB SERPL-MCNC: 0.5 MG/DL (ref 0.2–1)
BNP SERPL-MCNC: 74 PG/ML (ref 0–125)
BREATHS.SPONTANEOUS ON VENT: 30
BUN SERPL-MCNC: 8 MG/DL (ref 6–20)
BUN/CREAT SERPL: 9 (ref 12–20)
CALCIUM SERPL-MCNC: 9.4 MG/DL (ref 8.5–10.1)
CHLORIDE SERPL-SCNC: 106 MMOL/L (ref 97–108)
CO2 SERPL-SCNC: 20 MMOL/L (ref 21–32)
CREAT SERPL-MCNC: 0.85 MG/DL (ref 0.55–1.02)
DIFFERENTIAL METHOD BLD: ABNORMAL
EOSINOPHIL # BLD: 1.3 K/UL (ref 0–0.4)
EOSINOPHIL NFR BLD: 22 % (ref 0–7)
EPAP/CPAP/PEEP, PAPEEP: 6
EPAP/CPAP/PEEP, PAPEEP: 8
ERYTHROCYTE [DISTWIDTH] IN BLOOD BY AUTOMATED COUNT: 14.9 % (ref 11.5–14.5)
FIO2 ON VENT: 50 %
FIO2 ON VENT: 50 %
GAS FLOW.O2 SETTING OXYMISER: 12 L/MIN
GLOBULIN SER CALC-MCNC: 4.1 G/DL (ref 2–4)
GLUCOSE BLD STRIP.AUTO-MCNC: 111 MG/DL (ref 65–100)
GLUCOSE BLD STRIP.AUTO-MCNC: 121 MG/DL (ref 65–100)
GLUCOSE BLD STRIP.AUTO-MCNC: 92 MG/DL (ref 65–100)
GLUCOSE SERPL-MCNC: 100 MG/DL (ref 65–100)
HCO3 BLDA-SCNC: 18 MMOL/L (ref 22–26)
HCO3 BLDA-SCNC: 20 MMOL/L (ref 22–26)
HCT VFR BLD AUTO: 39 % (ref 35–47)
HGB BLD-MCNC: 12.1 G/DL (ref 11.5–16)
IMM GRANULOCYTES # BLD: 0 K/UL (ref 0–0.04)
IMM GRANULOCYTES NFR BLD AUTO: 0 % (ref 0–0.5)
INR PPP: 1.1 (ref 0.9–1.1)
IPAP/PIP, IPAPIP: 12
LYMPHOCYTES # BLD: 1.6 K/UL (ref 0.8–3.5)
LYMPHOCYTES NFR BLD: 27 % (ref 12–49)
MCH RBC QN AUTO: 27.3 PG (ref 26–34)
MCHC RBC AUTO-ENTMCNC: 31 G/DL (ref 30–36.5)
MCV RBC AUTO: 87.8 FL (ref 80–99)
MONOCYTES # BLD: 0.1 K/UL (ref 0–1)
MONOCYTES NFR BLD: 1 % (ref 5–13)
MYELOCYTES NFR BLD MANUAL: 2 %
NEUTS SEG # BLD: 2.8 K/UL (ref 1.8–8)
NEUTS SEG NFR BLD: 47 % (ref 32–75)
NRBC # BLD: 0 K/UL (ref 0–0.01)
NRBC BLD-RTO: 0 PER 100 WBC
PCO2 BLDA: 40 MMHG (ref 35–45)
PCO2 BLDA: 48 MMHG (ref 35–45)
PH BLDA: 7.2 [PH] (ref 7.35–7.45)
PH BLDA: 7.31 [PH] (ref 7.35–7.45)
PLATELET # BLD AUTO: 262 K/UL (ref 150–400)
PMV BLD AUTO: 10.3 FL (ref 8.9–12.9)
PO2 BLDA: 158 MMHG (ref 80–100)
PO2 BLDA: 199 MMHG (ref 80–100)
POTASSIUM SERPL-SCNC: 4.1 MMOL/L (ref 3.5–5.1)
PROT SERPL-MCNC: 7.8 G/DL (ref 6.4–8.2)
PROTHROMBIN TIME: 11.3 SEC (ref 9–11.1)
RBC # BLD AUTO: 4.44 M/UL (ref 3.8–5.2)
RBC MORPH BLD: ABNORMAL
SAO2 % BLD: 99 % (ref 92–97)
SAO2 % BLD: 99 % (ref 92–97)
SAO2% DEVICE SAO2% SENSOR NAME: ABNORMAL
SAO2% DEVICE SAO2% SENSOR NAME: ABNORMAL
SERVICE CMNT-IMP: ABNORMAL
SERVICE CMNT-IMP: NORMAL
SODIUM SERPL-SCNC: 135 MMOL/L (ref 136–145)
SPECIMEN SITE: ABNORMAL
SPECIMEN SITE: ABNORMAL
VENTILATION MODE VENT: ABNORMAL
VT SETTING VENT: 400 ML
WBC # BLD AUTO: 6 K/UL (ref 3.6–11)

## 2018-07-26 PROCEDURE — 31500 INSERT EMERGENCY AIRWAY: CPT

## 2018-07-26 PROCEDURE — 74011000250 HC RX REV CODE- 250: Performed by: EMERGENCY MEDICINE

## 2018-07-26 PROCEDURE — 77010033678 HC OXYGEN DAILY

## 2018-07-26 PROCEDURE — 5A1945Z RESPIRATORY VENTILATION, 24-96 CONSECUTIVE HOURS: ICD-10-PCS | Performed by: EMERGENCY MEDICINE

## 2018-07-26 PROCEDURE — 94003 VENT MGMT INPAT SUBQ DAY: CPT

## 2018-07-26 PROCEDURE — 77030008683 HC TU ET CUF COVD -A

## 2018-07-26 PROCEDURE — 85610 PROTHROMBIN TIME: CPT | Performed by: EMERGENCY MEDICINE

## 2018-07-26 PROCEDURE — 77030012879 HC MSK CPAP FLL FAC PHIL -B

## 2018-07-26 PROCEDURE — 82803 BLOOD GASES ANY COMBINATION: CPT | Performed by: EMERGENCY MEDICINE

## 2018-07-26 PROCEDURE — 85025 COMPLETE CBC W/AUTO DIFF WBC: CPT | Performed by: EMERGENCY MEDICINE

## 2018-07-26 PROCEDURE — 74011000250 HC RX REV CODE- 250: Performed by: INTERNAL MEDICINE

## 2018-07-26 PROCEDURE — 94002 VENT MGMT INPAT INIT DAY: CPT

## 2018-07-26 PROCEDURE — 5A09357 ASSISTANCE WITH RESPIRATORY VENTILATION, LESS THAN 24 CONSECUTIVE HOURS, CONTINUOUS POSITIVE AIRWAY PRESSURE: ICD-10-PCS | Performed by: EMERGENCY MEDICINE

## 2018-07-26 PROCEDURE — 96365 THER/PROPH/DIAG IV INF INIT: CPT

## 2018-07-26 PROCEDURE — 74011250636 HC RX REV CODE- 250/636: Performed by: HOSPITALIST

## 2018-07-26 PROCEDURE — 74011250636 HC RX REV CODE- 250/636

## 2018-07-26 PROCEDURE — 74011000250 HC RX REV CODE- 250

## 2018-07-26 PROCEDURE — 83880 ASSAY OF NATRIURETIC PEPTIDE: CPT | Performed by: EMERGENCY MEDICINE

## 2018-07-26 PROCEDURE — 36415 COLL VENOUS BLD VENIPUNCTURE: CPT | Performed by: EMERGENCY MEDICINE

## 2018-07-26 PROCEDURE — 96375 TX/PRO/DX INJ NEW DRUG ADDON: CPT

## 2018-07-26 PROCEDURE — C1751 CATH, INF, PER/CENT/MIDLINE: HCPCS

## 2018-07-26 PROCEDURE — 71045 X-RAY EXAM CHEST 1 VIEW: CPT

## 2018-07-26 PROCEDURE — 96366 THER/PROPH/DIAG IV INF ADDON: CPT

## 2018-07-26 PROCEDURE — 74011250636 HC RX REV CODE- 250/636: Performed by: EMERGENCY MEDICINE

## 2018-07-26 PROCEDURE — 94640 AIRWAY INHALATION TREATMENT: CPT

## 2018-07-26 PROCEDURE — 82962 GLUCOSE BLOOD TEST: CPT

## 2018-07-26 PROCEDURE — 77030013140 HC MSK NEB VYRM -A

## 2018-07-26 PROCEDURE — 80053 COMPREHEN METABOLIC PANEL: CPT | Performed by: EMERGENCY MEDICINE

## 2018-07-26 PROCEDURE — 0BH17EZ INSERTION OF ENDOTRACHEAL AIRWAY INTO TRACHEA, VIA NATURAL OR ARTIFICIAL OPENING: ICD-10-PCS | Performed by: EMERGENCY MEDICINE

## 2018-07-26 PROCEDURE — 36600 WITHDRAWAL OF ARTERIAL BLOOD: CPT | Performed by: EMERGENCY MEDICINE

## 2018-07-26 PROCEDURE — 77030038269 HC DRN EXT URIN PURWCK BARD -A

## 2018-07-26 PROCEDURE — 65610000006 HC RM INTENSIVE CARE

## 2018-07-26 PROCEDURE — 99285 EMERGENCY DEPT VISIT HI MDM: CPT

## 2018-07-26 PROCEDURE — 96372 THER/PROPH/DIAG INJ SC/IM: CPT

## 2018-07-26 PROCEDURE — 74011000250 HC RX REV CODE- 250: Performed by: HOSPITALIST

## 2018-07-26 PROCEDURE — 75810000137 HC PLCMT CENT VENOUS CATH

## 2018-07-26 PROCEDURE — 94660 CPAP INITIATION&MGMT: CPT

## 2018-07-26 PROCEDURE — 36600 WITHDRAWAL OF ARTERIAL BLOOD: CPT | Performed by: HOSPITALIST

## 2018-07-26 PROCEDURE — 74011250636 HC RX REV CODE- 250/636: Performed by: INTERNAL MEDICINE

## 2018-07-26 RX ORDER — MIDAZOLAM HYDROCHLORIDE 5 MG/ML
5 INJECTION INTRAMUSCULAR; INTRAVENOUS ONCE
Status: ACTIVE | OUTPATIENT
Start: 2018-07-26 | End: 2018-07-27

## 2018-07-26 RX ORDER — MIDAZOLAM HYDROCHLORIDE 1 MG/ML
INJECTION, SOLUTION INTRAMUSCULAR; INTRAVENOUS
Status: DISPENSED
Start: 2018-07-26 | End: 2018-07-27

## 2018-07-26 RX ORDER — QUETIAPINE FUMARATE 300 MG/1
300 TABLET, FILM COATED ORAL DAILY
Status: ON HOLD | COMMUNITY
End: 2018-08-02

## 2018-07-26 RX ORDER — IPRATROPIUM BROMIDE AND ALBUTEROL SULFATE 2.5; .5 MG/3ML; MG/3ML
3 SOLUTION RESPIRATORY (INHALATION)
Status: COMPLETED | OUTPATIENT
Start: 2018-07-26 | End: 2018-07-26

## 2018-07-26 RX ORDER — FENTANYL CITRATE 50 UG/ML
50 INJECTION, SOLUTION INTRAMUSCULAR; INTRAVENOUS
Status: COMPLETED | OUTPATIENT
Start: 2018-07-26 | End: 2018-07-26

## 2018-07-26 RX ORDER — SUCCINYLCHOLINE CHLORIDE 20 MG/ML
100 INJECTION INTRAMUSCULAR; INTRAVENOUS
Status: COMPLETED | OUTPATIENT
Start: 2018-07-26 | End: 2018-07-26

## 2018-07-26 RX ORDER — SODIUM CHLORIDE 0.9 % (FLUSH) 0.9 %
5-10 SYRINGE (ML) INJECTION EVERY 8 HOURS
Status: DISCONTINUED | OUTPATIENT
Start: 2018-07-26 | End: 2018-08-04 | Stop reason: HOSPADM

## 2018-07-26 RX ORDER — CLONIDINE HYDROCHLORIDE 0.3 MG/1
0.3 TABLET ORAL 3 TIMES DAILY
COMMUNITY
End: 2018-08-04

## 2018-07-26 RX ORDER — MIDAZOLAM HYDROCHLORIDE 1 MG/ML
2 INJECTION, SOLUTION INTRAMUSCULAR; INTRAVENOUS
Status: COMPLETED | OUTPATIENT
Start: 2018-07-26 | End: 2018-07-27

## 2018-07-26 RX ORDER — CHLORHEXIDINE GLUCONATE 1.2 MG/ML
15 RINSE ORAL EVERY 12 HOURS
Status: DISCONTINUED | OUTPATIENT
Start: 2018-07-27 | End: 2018-07-30

## 2018-07-26 RX ORDER — LEVOFLOXACIN 5 MG/ML
750 INJECTION, SOLUTION INTRAVENOUS EVERY 24 HOURS
Status: COMPLETED | OUTPATIENT
Start: 2018-07-26 | End: 2018-07-30

## 2018-07-26 RX ORDER — LORAZEPAM 2 MG/ML
1 INJECTION INTRAMUSCULAR ONCE
Status: COMPLETED | OUTPATIENT
Start: 2018-07-26 | End: 2018-07-26

## 2018-07-26 RX ORDER — PROPOFOL 10 MG/ML
0-50 VIAL (ML) INTRAVENOUS
Status: DISCONTINUED | OUTPATIENT
Start: 2018-07-26 | End: 2018-07-30

## 2018-07-26 RX ORDER — SODIUM CHLORIDE 0.9 % (FLUSH) 0.9 %
5-10 SYRINGE (ML) INJECTION AS NEEDED
Status: DISCONTINUED | OUTPATIENT
Start: 2018-07-26 | End: 2018-08-04 | Stop reason: HOSPADM

## 2018-07-26 RX ORDER — ENOXAPARIN SODIUM 100 MG/ML
40 INJECTION SUBCUTANEOUS EVERY 24 HOURS
Status: DISCONTINUED | OUTPATIENT
Start: 2018-07-26 | End: 2018-08-04 | Stop reason: HOSPADM

## 2018-07-26 RX ORDER — ONDANSETRON 2 MG/ML
4 INJECTION INTRAMUSCULAR; INTRAVENOUS
Status: DISCONTINUED | OUTPATIENT
Start: 2018-07-26 | End: 2018-08-04 | Stop reason: HOSPADM

## 2018-07-26 RX ORDER — EPINEPHRINE 1 MG/ML
0.3 INJECTION, SOLUTION, CONCENTRATE INTRAVENOUS
Status: COMPLETED | OUTPATIENT
Start: 2018-07-26 | End: 2018-07-26

## 2018-07-26 RX ORDER — MAGNESIUM SULFATE HEPTAHYDRATE 40 MG/ML
2 INJECTION, SOLUTION INTRAVENOUS ONCE
Status: COMPLETED | OUTPATIENT
Start: 2018-07-26 | End: 2018-07-26

## 2018-07-26 RX ORDER — IPRATROPIUM BROMIDE AND ALBUTEROL SULFATE 2.5; .5 MG/3ML; MG/3ML
SOLUTION RESPIRATORY (INHALATION)
Status: COMPLETED
Start: 2018-07-26 | End: 2018-07-26

## 2018-07-26 RX ORDER — QUETIAPINE FUMARATE 400 MG/1
800 TABLET, FILM COATED ORAL EVERY EVENING
COMMUNITY
End: 2018-08-11

## 2018-07-26 RX ORDER — ACETAMINOPHEN 650 MG/1
650 SUPPOSITORY RECTAL
Status: DISCONTINUED | OUTPATIENT
Start: 2018-07-26 | End: 2018-08-01

## 2018-07-26 RX ORDER — IPRATROPIUM BROMIDE AND ALBUTEROL SULFATE 2.5; .5 MG/3ML; MG/3ML
6 SOLUTION RESPIRATORY (INHALATION)
Status: COMPLETED | OUTPATIENT
Start: 2018-07-26 | End: 2018-07-26

## 2018-07-26 RX ORDER — KETAMINE HYDROCHLORIDE 50 MG/ML
150 INJECTION, SOLUTION INTRAMUSCULAR; INTRAVENOUS
Status: COMPLETED | OUTPATIENT
Start: 2018-07-26 | End: 2018-07-26

## 2018-07-26 RX ORDER — FACIAL-BODY WIPES
10 EACH TOPICAL DAILY PRN
Status: DISCONTINUED | OUTPATIENT
Start: 2018-07-26 | End: 2018-08-04 | Stop reason: HOSPADM

## 2018-07-26 RX ORDER — IPRATROPIUM BROMIDE AND ALBUTEROL SULFATE 2.5; .5 MG/3ML; MG/3ML
3 SOLUTION RESPIRATORY (INHALATION)
Status: DISCONTINUED | OUTPATIENT
Start: 2018-07-26 | End: 2018-07-30

## 2018-07-26 RX ORDER — DEXTROSE 50 % IN WATER (D50W) INTRAVENOUS SYRINGE
12.5-25 AS NEEDED
Status: DISCONTINUED | OUTPATIENT
Start: 2018-07-26 | End: 2018-08-03

## 2018-07-26 RX ORDER — MAGNESIUM SULFATE 100 %
4 CRYSTALS MISCELLANEOUS AS NEEDED
Status: DISCONTINUED | OUTPATIENT
Start: 2018-07-26 | End: 2018-08-03

## 2018-07-26 RX ORDER — IPRATROPIUM BROMIDE AND ALBUTEROL SULFATE 2.5; .5 MG/3ML; MG/3ML
3 SOLUTION RESPIRATORY (INHALATION)
Status: DISCONTINUED | OUTPATIENT
Start: 2018-07-26 | End: 2018-07-26

## 2018-07-26 RX ORDER — SODIUM CHLORIDE 9 MG/ML
100 INJECTION, SOLUTION INTRAVENOUS CONTINUOUS
Status: DISCONTINUED | OUTPATIENT
Start: 2018-07-26 | End: 2018-07-30

## 2018-07-26 RX ORDER — INSULIN LISPRO 100 [IU]/ML
INJECTION, SOLUTION INTRAVENOUS; SUBCUTANEOUS EVERY 6 HOURS
Status: DISCONTINUED | OUTPATIENT
Start: 2018-07-26 | End: 2018-07-30

## 2018-07-26 RX ORDER — MUPIROCIN 20 MG/G
OINTMENT TOPICAL 2 TIMES DAILY
Status: COMPLETED | OUTPATIENT
Start: 2018-07-27 | End: 2018-07-31

## 2018-07-26 RX ORDER — SERTRALINE HYDROCHLORIDE 100 MG/1
100 TABLET, FILM COATED ORAL DAILY
Status: ON HOLD | COMMUNITY
End: 2020-09-24

## 2018-07-26 RX ORDER — MIDAZOLAM HYDROCHLORIDE 1 MG/ML
INJECTION, SOLUTION INTRAMUSCULAR; INTRAVENOUS
Status: COMPLETED
Start: 2018-07-26 | End: 2018-07-26

## 2018-07-26 RX ADMIN — LORAZEPAM 1 MG: 2 INJECTION INTRAMUSCULAR; INTRAVENOUS at 13:16

## 2018-07-26 RX ADMIN — MIDAZOLAM HYDROCHLORIDE 5 MG: 1 INJECTION, SOLUTION INTRAMUSCULAR; INTRAVENOUS at 15:43

## 2018-07-26 RX ADMIN — SODIUM CHLORIDE 100 ML/HR: 900 INJECTION, SOLUTION INTRAVENOUS at 19:04

## 2018-07-26 RX ADMIN — IPRATROPIUM BROMIDE AND ALBUTEROL SULFATE 3 ML: .5; 3 SOLUTION RESPIRATORY (INHALATION) at 20:05

## 2018-07-26 RX ADMIN — MIDAZOLAM HYDROCHLORIDE 4 MG: 1 INJECTION, SOLUTION INTRAMUSCULAR; INTRAVENOUS at 16:55

## 2018-07-26 RX ADMIN — SUCCINYLCHOLINE CHLORIDE 100 MG: 20 INJECTION, SOLUTION INTRAMUSCULAR; INTRAVENOUS at 15:30

## 2018-07-26 RX ADMIN — FAMOTIDINE 20 MG: 10 INJECTION, SOLUTION INTRAVENOUS at 20:45

## 2018-07-26 RX ADMIN — IPRATROPIUM BROMIDE AND ALBUTEROL SULFATE 6 ML: .5; 3 SOLUTION RESPIRATORY (INHALATION) at 10:38

## 2018-07-26 RX ADMIN — IPRATROPIUM BROMIDE AND ALBUTEROL SULFATE 6 ML: 2.5; .5 SOLUTION RESPIRATORY (INHALATION) at 10:38

## 2018-07-26 RX ADMIN — METHYLPREDNISOLONE SODIUM SUCCINATE 40 MG: 40 INJECTION, POWDER, FOR SOLUTION INTRAMUSCULAR; INTRAVENOUS at 18:23

## 2018-07-26 RX ADMIN — METHYLPREDNISOLONE SODIUM SUCCINATE 125 MG: 125 INJECTION, POWDER, FOR SOLUTION INTRAMUSCULAR; INTRAVENOUS at 11:16

## 2018-07-26 RX ADMIN — PROPOFOL 50 MCG/KG/MIN: 10 INJECTION, EMULSION INTRAVENOUS at 19:20

## 2018-07-26 RX ADMIN — SODIUM CHLORIDE 1000 ML: 900 INJECTION, SOLUTION INTRAVENOUS at 13:02

## 2018-07-26 RX ADMIN — Medication 50 MCG/HR: at 18:53

## 2018-07-26 RX ADMIN — ENOXAPARIN SODIUM 40 MG: 100 INJECTION SUBCUTANEOUS at 18:22

## 2018-07-26 RX ADMIN — MAGNESIUM SULFATE HEPTAHYDRATE 2 G: 40 INJECTION, SOLUTION INTRAVENOUS at 11:19

## 2018-07-26 RX ADMIN — IPRATROPIUM BROMIDE AND ALBUTEROL SULFATE 3 ML: .5; 3 SOLUTION RESPIRATORY (INHALATION) at 16:16

## 2018-07-26 RX ADMIN — EPINEPHRINE 0.3 MG: 1 INJECTION, SOLUTION, CONCENTRATE INTRAVENOUS at 10:43

## 2018-07-26 RX ADMIN — PROPOFOL 5 MCG/KG/MIN: 10 INJECTION, EMULSION INTRAVENOUS at 15:16

## 2018-07-26 RX ADMIN — FENTANYL CITRATE 50 MCG: 50 INJECTION, SOLUTION INTRAMUSCULAR; INTRAVENOUS at 15:32

## 2018-07-26 RX ADMIN — Medication 10 ML: at 22:35

## 2018-07-26 RX ADMIN — KETAMINE HYDROCHLORIDE 150 MG: 50 INJECTION, SOLUTION INTRAMUSCULAR; INTRAVENOUS at 15:30

## 2018-07-26 RX ADMIN — IPRATROPIUM BROMIDE AND ALBUTEROL SULFATE 3 ML: .5; 3 SOLUTION RESPIRATORY (INHALATION) at 10:44

## 2018-07-26 RX ADMIN — IPRATROPIUM BROMIDE AND ALBUTEROL SULFATE 3 ML: .5; 3 SOLUTION RESPIRATORY (INHALATION) at 13:02

## 2018-07-26 RX ADMIN — LEVOFLOXACIN 750 MG: 5 INJECTION, SOLUTION INTRAVENOUS at 18:37

## 2018-07-26 NOTE — ED NOTES
Delay in IV access due to poor venous access. 18g to LAC per Dr. Agustin Story. Immediate administration of ordered meds. Pt currently on bipap. States feeling a little better. Remains tachypneic, but does not appear to be as tired as when arrived to ER. No visitors at bedside. Frequent bedside checks per staff/md for pt reassurance. Will continue to monitor.

## 2018-07-26 NOTE — IP AVS SNAPSHOT
[address] [facility] 
[phone] Patient: Cherry Guido MRN: [id number] :1975 About your hospitalization You were admitted on:  2018 You last received care in the:  [facility] NEUROSCIENCE TELEMETRY You were discharged on:  2018 Why you were hospitalized Your primary diagnosis was:  Not on File Your diagnoses also included:  Asthma Exacerbation Follow-up Information Follow up With Details Comments Contact Info [address] [place] [address] Substance Abuse [facility]   Intake [phone] 24-hour Crisis  [phone] [facility] Substance Abuse [facility]   Info/Intake [phone] 24-hour Crisis  [phone] None   None (395) Patient stated that they have no PCP None   None (395) Patient stated that they have no PCP Discharge Orders None A check sammie indicates which time of day the medication should be taken. My Medications START taking these medications Instructions Each Dose to Equal  
 Morning Noon Evening Bedtime  
 amLODIPine 5 mg tablet Commonly known as:  Sandra Yamilex Start taking on:  2018 Your last dose was: Your next dose is: Take 1 Tab by mouth daily for 30 days. 5 mg  
    
   
   
   
  
 gabapentin 300 mg capsule Commonly known as:  NEURONTIN Replaces:  gabapentin 800 mg tablet Your last dose was: Your next dose is: Take 1 Cap by mouth three (3) times daily for 30 days. 300 mg CHANGE how you take these medications Instructions Each Dose to Equal  
 Morning Noon Evening Bedtime  
 lithium carbonate 300 mg capsule What changed:  when to take this Your last dose was: Your next dose is: Take 1 Cap by mouth two (2) times a day for 30 days. 300 mg SEROquel 400 mg tablet Generic drug:  QUEtiapine What changed:  Another medication with the same name was removed. Continue taking this medication, and follow the directions you see here. Your last dose was: Your next dose is: Take 800 mg by mouth every evening. Patient takes 300 mg daily in the morning and 800 mg (2- 400 mg tablets) every evening 800 mg CONTINUE taking these medications Instructions Each Dose to Equal  
 Morning Noon Evening Bedtime * albuterol 2.5 mg /3 mL (0.083 %) nebulizer solution Commonly known as:  PROVENTIL VENTOLIN Your last dose was: Your next dose is:    
   
   
 3 mL by Nebulization route every four (4) hours as needed for Wheezing. 2.5 mg  
    
   
   
   
  
 * albuterol sulfate 90 mcg/actuation Aepb Your last dose was: Your next dose is: Take 2 Puffs by inhalation every four (4) hours as needed. 2 Puff  
    
   
   
   
  
 clonazePAM 2 mg tablet Commonly known as:  Juan A Aver Your last dose was: Your next dose is: Take 2 Tabs by mouth two (2) times a day. Max Daily Amount: 8 mg.  
 4 mg DEPAKOTE 500 mg tablet Generic drug:  divalproex DR Your last dose was: Your next dose is: Take 500 mg by mouth two (2) times a day. 500 mg  
    
   
   
   
  
 fluticasone-vilanterol 200-25 mcg/dose inhaler Commonly known as:  BREO ELLIPTA Your last dose was: Your next dose is: Take 1 Puff by inhalation daily. 1 Puff  
    
   
   
   
  
 mupirocin 2 % ointment Commonly known as:  [facility] Your last dose was: Your next dose is:    
   
   
 Apply 1 g to affected area every twelve (12) hours. 1 g  
    
   
   
   
  
 ZOLOFT 100 mg tablet Generic drug:  sertraline Your last dose was: Your next dose is: Take 100 mg by mouth daily.   
 100 mg  
    
   
   
   
  
 * Notice: This list has 2 medication(s) that are the same as other medications prescribed for you. Read the directions carefully, and ask your doctor or other care provider to review them with you. STOP taking these medications   
 cloNIDine HCl 0.3 mg tablet Commonly known as:  CATAPRES  
   
  
 gabapentin 800 mg tablet Commonly known as:  NEURONTIN Replaced by:  gabapentin 300 mg capsule  
   
  
 lidocaine 5 % Commonly known as:  LIDODERM  
   
  
 LORazepam 0.5 mg tablet Commonly known as:  ATIVAN  
   
  
 oxyCODONE-acetaminophen 5-325 mg per tablet Commonly known as:  PERCOCET Where to Get Your Medications Information on where to get these meds will be given to you by the nurse or doctor. ! Ask your nurse or doctor about these medications  
  amLODIPine 5 mg tablet  
 gabapentin 300 mg capsule  
 lithium carbonate 300 mg capsule Discharge Instructions HOSPITALIST DISCHARGE INSTRUCTIONS 
 
NAME: Phil Chou :  1975 MRN:  [id number] Date/Time:  2018 4:23 PM 
 
ADMIT DATE: 2018 DISCHARGE DATE: 2018 Attending Physician: Yana Cobian MD 
 
DISCHARGE DIAGNOSIS: 
 
Acute hypoxemic respiratory failure secondary to status asthmaticus Required mechanical ventilation, now on room air Polysubstance abuse History of seizure disorder Pseudoseizure activity during this hospital stay Hypertension History of sarcoidosis History of bipolar disorder Tobacco use Deconditioning Overweight status with a BMI of 27 Severe depression Medications: Per above medication reconciliation. Pain Management: per above medications Recommended diet: Regular Diet Recommended activity: Activity as tolerated and PT/OT Eval and Treat Wound care: per protocol Indwelling devices:  None Supplemental Oxygen: None Required Lab work: as needed Glucose management:  None Code status: Full Outside physician follow up: 
 
F/u with pcp after dc from psych, will need one set up. Consider medical consult while in the psych unit if needed Follow-up Information Follow up With Details Comments Contact Info [address] [facility] Substance Abuse [facility]   Intake [phone] 24-hour Crisis  [phone] [facility] Substance Abuse [facility]   Info/Intake [phone] 24-hour Crisis  [phone] None   None (395) Patient stated that they have no PCP None   None (395) Patient stated that they have no PCP Skilled nursing facility/ SNF MD responsible for above on discharge. Information obtained by : 
I understand that if any problems occur once I am at home I am to contact my physician. I understand and acknowledge receipt of the instructions indicated above. Physician's or R.N.'s Signature                                                                  Date/Time Patient or Repres Seratishart Announcement We are excited to announce that we are making your provider's discharge notes available to you in [employer]. You will see these notes when they are completed and signed by the physician that discharged you from your recent hospital stay. If you have any questions or concerns about any information you see in [employer], please call the Health Information Department where you were seen or reach out to your Primary Care Provider for more information about your plan of care. Introducing [facility] & HEALTH SERVICES! [facility] introduces [employer] patient portal. Now you can access parts of your medical record, email your doctor's office, and request medication refills online. 1. In your internet browser, go to [email or web address] 2. Click on the First Time User? Click Here link in the Sign In box. You will see the New Member Sign Up page. 3. Enter your [employer] Access Code exactly as it appears below. You will not need to use this code after youve completed the sign-up process. If you do not sign up before the expiration date, you must request a new code. · [employer] Secure Access Code: [email or web address] Expires: 10/24/2018  2:48 PM 
 
4. Enter the last four digits of your Social Security Number (xxxx) and Date of Birth (mm/dd/yyyy) as indicated and click Submit. You will be taken to the next sign-up page. 5. Create a BarkBoxt ID. This will be your Haute Secure login ID and cannot be changed, so think of one that is secure and easy to remember. 6. Create a [employer] password. You can change your password at any time. 7. Enter your Password Reset Question and Answer. This can be used at a later time if you forget your password. 8. Enter your e-mail address. You will receive e-mail notification when new information is available in [address] 9. Click Sign Up. You can now view and download portions of your medical record. 10. Click the Download Summary menu link to download a portable copy of your medical information. If you have questions, please visit the Frequently Asked Questions section of the [employer] website. Remember, [employer] is NOT to be used for urgent needs. For medical emergencies, dial 911. Now available from your iPhone and Android! Introducing Kennedy Hood As a [facility] patient, I wanted to make you aware of our electronic visit tool called Kennedy [facility]. [facility] 24/7 allows you to connect within minutes with a medical provider 24 hours a day, seven days a week via a mobile device or tablet or logging into a secure website from your computer. You can access Kennedy Hood from anywhere in the [place]. A virtual visit might be right for you when you have a simple condition and feel like you just dont want to get out of bed, or cant get away from work for an appointment, when your regular [facility] provider is not available (evenings, weekends or holidays), or when youre out of town and need minor care. Electronic visits cost only $49 and if the [facility] 24/7 provider determines a prescription is needed to treat your condition, one can be electronically transmitted to a nearby pharmacy*. Please take a moment to enroll today if you have not already done so. The enrollment process is free and takes just a few minutes. To enroll, please download the [facility] 24/7 mayi to your tablet or phone, or visit [email or web address] to enroll on your computer. And, as an [address] [place] [address] patient with a [employer] account, the results of your visits will be scanned into your electronic medical record and your primary care provider will be able to view the scanned results. We urge you to continue to see your regular [facility] provider for your ongoing medical care. And while your primary care provider may not be the one available when you seek a Kennedy Hood virtual visit, the peace of mind you get from getting a real diagnosis real time can be priceless. For more information on [facility] The Whistledarrelfin, view our Frequently Asked Questions (FAQs) at [email or web address]. Sincerely, 
 
Ayanna Hdz MD 
Chief Medical Officer Nabila Susu Hearn *:  certain medications cannot be prescribed via [facility] The WhistledarrelInterValve Unresulted Labs-Please follow up with your PCP about these lab tests Order Current Status CULTURE, MRSA In process Providers Seen During Your Hospitalization Provider Specialty Primary office phone Payal Mckinnon DO Emergency Medicine [phone] Rufus Camilo MD Internal Medicine [phone] Vanessa King MD Internal Medicine [phone] Chica Lawson MD Hospitalist [phone] Antonia Malik MD Hospitalist [phone] Your Primary Care Physician (PCP) Primary Care Physician Office Phone Office Fax NONE ** None ** ** None ** You are allergic to the following Allergen Reactions Tomato Swelling Tongue Recent Documentation Height Weight Breastfeeding? BMI OB Status Smoking Status 1.651 m 75 kg No 27.51 kg/m2 Having regular periods Former Smoker Emergency Contacts Name Discharge Info Relation Home Work Mobile Amalia Kim DISCHARGE CAREGIVER [3] Parent [1] [phone] Patient Belongings The following personal items are in your possession at time of discharge: 
  Dental Appliances: None  Visual Aid: None      Home Medications: None   Jewelry: Other (comment)  Clothing: At bedside    Other Valuables: Other (comment) Please provide this summary of care documentation to your next provider. Signatures-by signing, you are acknowledging that this After Visit Summary has been reviewed with you and you have received a copy. Patient Signature:  ____________________________________________________________ Date:  ____________________________________________________________  
  
Vinod Police Provider Signature:  ____________________________________________________________ Date:  ____________________________________________________________

## 2018-07-26 NOTE — IP AVS SNAPSHOT
Summary of Care Report The Summary of Care report has been created to help improve care coordination. Users with access to 48domain or [address] (Web-based application) may access additional patient information including the Discharge Summary. If you are not currently a [employer] user and need more information, please call the number listed below in the [place] [address] section and ask to be connected with Medical Records. Facility Information Name Address Phone [address] [address] [phone] Patient Information Patient Name Sex MALENA Hutson ([id number]) Female 1975 Discharge Information Admitting Provider Service Area Unit  
 Deena Baez MD / [facility] [place]  [phone] Discharge Provider Discharge Date/Time Discharge Disposition Destination (none) 2018 Evening (Pending) PSY (none) Patient Language Language ENGLISH [13] Hospital Problems as of 2018  Reviewed: 2017 12:05 AM by Marbella Buck MD  
  
  
  
 Class Noted - Resolved Last Modified POA Active Problems Asthma exacerbation  2018 - Present 2018 by Deena Baez MD Unknown Entered by Deena Baez MD  
  
Non-Hospital Problems as of 2018  Reviewed: 2017 12:05 AM by Marbella Buck MD  
  
  
  
 Class Noted - Resolved Last Modified Active Problems   Heroin abuse  2014 - Present 2017 by Twila Vargas MD  
  Entered by Ledy Gill MD  
  Sinus tachycardia  1/10/2017 - Present 2017 by Twila Vargas MD  
  Entered by Jamison Frazier MD  
  Depression  2017 - Present 2017 by Twila Vargas MD  
  Entered by Asim Ortiz MD  
  Bipolar 1 disorder [facility])  Unknown - Present 8/3/2017 by Julisa Egan MD  
 Entered by Twila Vargas MD  
  Chronic obstructive pulmonary disease [facility])  Unknown - Present 2/22/2017 by Twila Vargas MD  
  Entered by Twila Vargas MD  
  Chronic pain  Unknown - Present 2/22/2017 by Twila Vargas MD  
  Entered by Twila Vargas MD  
  Overview Signed 2/22/2017  7:15 PM by Twila Vargas MD  
   back, leg   Hepatitis B  Unknown - Present 2/22/2017 by Twila Vargas MD  
  Entered by Twila Vargas MD  
  Sarcoidosis  Unknown - Present 2/22/2017 by Twila Vargas MD  
  Entered by Twila Vargas MD  
  Tobacco abuse  Unknown - Present 2/22/2017 by Twila Vargas MD  
  Entered by Twila Vargas MD  
  Anemia  2/22/2017 - Present 2/22/2017 by Twila Vargas MD  
  Entered by Twila Vargas MD  
  Cocaine abuse  Unknown - Present 2/22/2017 by Twila Vargas MD  
  Entered by Twila Vargas MD  
  Polysubstance abuse  Unknown - Present 4/25/2017 by Sharla Vazquez MD  
  Entered by Twila Vargas MD  
  Narcotic abuse  Unknown - Present 2/22/2017 by Twila Vargas MD  
  Entered by Twila Vargas MD  
  HTN (hypertension)  Unknown - Present 2/22/2017 by Twila Vargas MD  
  Entered by Twila Vargas MD  
  Seizure [facility])  4/20/2017 - Present 4/20/2017 by Daysi Paul MD  
  Entered by Daysi Paul MD  
  Lactic acidosis  4/25/2017 - Present 4/25/2017 by Sharla Vazquez MD  
  Entered by Sharla Vazquez MD  
  Acute encephalopathy  4/25/2017 - Present 4/25/2017 by Sharla Vazquez MD  
  Entered by Sharla Vazquez MD  
  Potential for altered mental status  4/25/2017 - Present 4/25/2017 by Jude Foreman MD  
  Entered by Jude Foreman MD  
  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  
  Entered by Jude Foreman MD  
  Stenosis of both internal carotid arteries  4/25/2017 - Present 4/25/2017 by Jude Foreman MD  
  Entered by Jude Foreman MD  
 Convulsive syncope  4/25/2017 - Present 4/25/2017 by Boby Bennett MD  
  Entered by Boby Bennett MD  
  Altered mental status, unspecified  4/26/2017 - Present 4/26/2017 by Boby Bennett MD  
  Entered by Boby Bennett MD  
  Drug overdose  7/31/2017 - Present 8/1/2017 by Ilsa Dumas MD  
  Entered by Ilsa Dumas MD  
  
You are allergic to the following Allergen Reactions Tomato Swelling Tongue Current Discharge Medication List  
  
START taking these medications Dose & Instructions Dispensing Information Comments  
 amLODIPine 5 mg tablet Commonly known as:  Jae Zimmer Start taking on:  8/5/2018 Dose:  5 mg Take 1 Tab by mouth daily for 30 days. Quantity:  30 Tab Refills:  0  
   
 gabapentin 300 mg capsule Commonly known as:  NEURONTIN Replaces:  gabapentin 800 mg tablet Dose:  300 mg Take 1 Cap by mouth three (3) times daily for 30 days. Quantity:  90 Cap Refills:  0 CONTINUE these medications which have CHANGED Dose & Instructions Dispensing Information Comments  
 lithium carbonate 300 mg capsule What changed:  when to take this Dose:  300 mg Take 1 Cap by mouth two (2) times a day for 30 days. Quantity:  60 Cap Refills:  0 SEROquel 400 mg tablet Generic drug:  QUEtiapine What changed:  Another medication with the same name was removed. Continue taking this medication, and follow the directions you see here. Dose:  800 mg Take 800 mg by mouth every evening. Patient takes 300 mg daily in the morning and 800 mg (2- 400 mg tablets) every evening Refills:  0 CONTINUE these medications which have NOT CHANGED Dose & Instructions Dispensing Information Comments * albuterol 2.5 mg /3 mL (0.083 %) nebulizer solution Commonly known as:  PROVENTIL VENTOLIN Dose:  2.5 mg  
3 mL by Nebulization route every four (4) hours as needed for Wheezing. Quantity:  24 Each Refills:  0  
   
 * albuterol sulfate 90 mcg/actuation Aepb Dose:  2 Puff Take 2 Puffs by inhalation every four (4) hours as needed. Quantity:  1 Inhaler Refills:  0  
   
 clonazePAM 2 mg tablet Commonly known as:  Elta Halsted Dose:  4 mg Take 2 Tabs by mouth two (2) times a day. Max Daily Amount: 8 mg. Quantity:  30 Tab Refills:  0  
   
 DEPAKOTE 500 mg tablet Generic drug:  divalproex DR Dose:  500 mg Take 500 mg by mouth two (2) times a day. Refills:  0  
   
 fluticasone-vilanterol 200-25 mcg/dose inhaler Commonly known as:  BREO ELLIPTA Dose:  1 Puff Take 1 Puff by inhalation daily. Quantity:  28 Each Refills:  1  
   
 mupirocin 2 % ointment Commonly known as:  [facility] Dose:  1 g Apply 1 g to affected area every twelve (12) hours. Quantity:  22 g Refills:  0  
   
 ZOLOFT 100 mg tablet Generic drug:  sertraline Dose:  100 mg Take 100 mg by mouth daily. Refills:  0  
   
 * Notice: This list has 2 medication(s) that are the same as other medications prescribed for you. Read the directions carefully, and ask your doctor or other care provider to review them with you. STOP taking these medications Comments  
 cloNIDine HCl 0.3 mg tablet Commonly known as:  CATAPRES  
   
   
 gabapentin 800 mg tablet Commonly known as:  NEURONTIN Replaced by:  gabapentin 300 mg capsule  
   
   
 lidocaine 5 % Commonly known as:  LIDODERM  
   
   
 LORazepam 0.5 mg tablet Commonly known as:  ATIVAN  
   
   
 oxyCODONE-acetaminophen 5-325 mg per tablet Commonly known as:  PERCOCET Current Immunizations Name Date Influenza Vaccine 10/1/2016,  Deferred (Patient Refused) Pneumococcal Polysaccharide (PPSV-23)  Deferred (Patient Refused) Follow-up Information Follow up With Details Comments Contact Info [address] [place] [address] Board Substance Abuse [facility]   Intake [phone] 24-hour Crisis  [phone] [facility] Substance Abuse cs   Info/Intake [phone] 24-hour Crisis  [phone] None   None (395) Patient stated that they have no PCP None   None (395) Patient stated that they have no PCP Discharge Instructions HOSPITALIST DISCHARGE INSTRUCTIONS 
 
NAME: John Perrin :  1975 MRN:  [id number] Date/Time:  2018 4:23 PM 
 
ADMIT DATE: 2018 DISCHARGE DATE: 2018 Attending Physician: Josette Doss MD 
 
DISCHARGE DIAGNOSIS: 
 
Acute hypoxemic respiratory failure secondary to status asthmaticus Required mechanical ventilation, now on room air Polysubstance abuse History of seizure disorder Pseudoseizure activity during this hospital stay Hypertension History of sarcoidosis History of bipolar disorder Tobacco use Deconditioning Overweight status with a BMI of 27 Severe depression Medications: Per above medication reconciliation. Pain Management: per above medications Recommended diet: Regular Diet Recommended activity: Activity as tolerated and PT/OT Eval and Treat Wound care: per protocol Indwelling devices:  None Supplemental Oxygen: None Required Lab work: as needed Glucose management:  None Code status: Full Outside physician follow up: 
 
F/u with pcp after dc from psych, will need one set up. Consider medical consult while in the psych unit if needed Follow-up Information Follow up With Details Comments Contact Info [address] [place] [address] [facility]stance Abuse [facility]   Intake [phone] 24-hour Crisis  [phone] [facility]   Info/Intake [phone] 24-hour Crisis  [phone] None   None (395) Patient stated that they have no PCP None   None (395) Patient stated that they have no PCP Skilled nursing facility/ SNF MD responsible for above on discharge.   
 
 
 
Information obtained by : 
 I understand that if any problems occur once I am at home I am to contact my physician. I understand and acknowledge receipt of the instructions indicated above. Physician's or R.N.'s Signature                                                                  Date/Time Patient or Repres Chart Review Routing History Recipient Method Report Sent By Claudia Peterson None [address] [employer] [place] Mail IP Auto Routed Notes MD Brant Newberry 1/30/2014  6:20 AM 01/30/2014 None [address] [facility] [address] Mail IP Auto Routed Notes MD Brant Newberry 2/2/2014  2:20 PM 02/02/2014 None [address] Mail IP Auto Routed Notes Perez Momin MD [[id number]] 2/4/2014 11:22 PM 02/04/2014 None [address] Mail IP Auto Routed Notes Mir Weston MD [[id number]] 2/6/2014  4:20 PM 02/06/2014

## 2018-07-26 NOTE — ED PROVIDER NOTES
EMERGENCY DEPARTMENT HISTORY AND PHYSICAL EXAM      Date: 7/26/2018  Patient Name: Jack Mattson    History of Presenting Illness     Chief Complaint   Patient presents with    Shortness of Breath     Patient complain of shortness of breath for two days and used inhaler and neb tx with out relief. Auditory wheezing heard        History Provided By: Patient    HPI: Jack Mattson, 38 y.o. female with PMHx significant for HTN, COPD, and sarcoidosis, presents ambulatory to the ED with cc of progressively worsening SOB and wheezing x ~2 days. Pt reports her nebulizer at home is broken, and states she has not had a treatment since onset of her symptoms. Pt denies currently taking steroids. She denies using O2 at home. Pt denies currently taking anticoagulants. She denies hx of DM. Pt denies PO food of fluid intake today. She specifically denies fever, chills, nausea, or vomiting. There are no other complaints, changes, or physical findings at this time.     PCP: None    Current Facility-Administered Medications   Medication Dose Route Frequency Provider Last Rate Last Dose    sodium chloride (NS) flush 5-10 mL  5-10 mL IntraVENous Q8H Kulwant Mera MD        sodium chloride (NS) flush 5-10 mL  5-10 mL IntraVENous PRN Kulwant Mera MD        0.9% sodium chloride infusion  100 mL/hr IntraVENous CONTINUOUS Kulwant Mera MD        acetaminophen (TYLENOL) suppository 650 mg  650 mg Rectal Q6H PRN Kulwant Mera MD        ondansetron [facility]) injection 4 mg  4 mg IntraVENous Q6H PRN Kulwant Mera MD        bisacodyl (DULCOLAX) suppository 10 mg  10 mg Rectal DAILY PRN Kulwant Mera MD        enoxaparin (LOVENOX) injection 40 mg  40 mg SubCUTAneous Q24H Kulwant Mera MD        methylPREDNISolone (PF) (SOLU-MEDROL) injection 40 mg  40 mg IntraVENous Q6H Kulwant Mera MD        levoFLOXacin (LEVAQUIN) 750 mg in D5W IVPB  750 mg IntraVENous Q24H Kulwant Mera MD        insulin lispro (HUMALOG) injection SubCUTAneous Q6H Vicente Chapman MD        glucose chewable tablet 16 g  4 Tab Oral PRN Vicente Chapman MD        dextrose (D50W) injection syrg 12.5-25 g  12.5-25 g IntraVENous PRN Vicente Chapman MD        glucagon [facility]) injection 1 mg  1 mg IntraMUSCular PRN Vicente Chapman MD        Patient HEIGHT/WEIGHT needed in chart-please enter  1 Each Other NOW Lata Olivavers, DO        albuterol-ipratropium (DUO-NEB) 2.5 MG-0.5 MG/3 ML  3 mL Nebulization Q4H RT Vicente Chapman MD        propofol (DIPRIVAN) infusion  0-50 mcg/kg/min IntraVENous TITRATE Ltaa Shivers, DO 18.2 mL/hr at 07/26/18 1600 40 mcg/kg/min at 07/26/18 1600    midazolam (VERSED) 1 mg/mL injection             midazolam (VERSED) injection 5 mg  5 mg IntraVENous ONCE Lata Shivers, DO        midazolam (VERSED) injection 2 mg  2 mg IntraVENous Q5MIN PRN Lata Baptistevers, DO        famotidine (PF) (PEPCID) 20 mg in sodium chloride 0.9% 10 mL injection  20 mg IntraVENous Q12H Nimco King MD         Current Outpatient Prescriptions   Medication Sig Dispense Refill    albuterol sulfate 90 mcg/actuation aepb Take 2 Puffs by inhalation every four (4) hours as needed. 1 Inhaler 0    QUEtiapine (SEROQUEL) 300 mg tablet Take 300 mg by mouth daily. Patient takes 300 mg daily in the morning and 800 mg (2- 400 mg tablets) every evening      QUEtiapine (SEROQUEL) 400 mg tablet Take 800 mg by mouth every evening. Patient takes 300 mg daily in the morning and 800 mg (2- 400 mg tablets) every evening      sertraline (ZOLOFT) 100 mg tablet Take 100 mg by mouth daily.  cloNIDine HCl (CATAPRES) 0.3 mg tablet Take 0.3 mg by mouth three (3) times daily.  clonazePAM (KLONOPIN) 2 mg tablet Take 2 Tabs by mouth two (2) times a day. Max Daily Amount: 8 mg. 30 Tab 0    lithium carbonate 300 mg capsule Take 1 Cap by mouth two (2) times a day. 30 Cap 0    LORazepam (ATIVAN) 0.5 mg tablet Take 1 Tab by mouth every four (4) hours as needed. Max Daily Amount: 3 mg.  27 Tab 0    mupirocin (BACTROBAN) 2 % ointment Apply 1 g to affected area every twelve (12) hours. 22 g 0    oxyCODONE-acetaminophen (PERCOCET) 5-325 mg per tablet Take 1 Tab by mouth every six (6) hours as needed. Max Daily Amount: 4 Tabs. 12 Tab 0    lidocaine (LIDODERM) 5 % Apply patch to the affected area for 12 hours a day and remove for 12 hours a day. 1 Each 0    amLODIPine (NORVASC) 5 mg tablet Take 1 Tab by mouth daily. 30 Tab 0    gabapentin (NEURONTIN) 300 mg capsule Take 300 mg by mouth three (3) times daily.  fluticasone-vilanterol (BREO ELLIPTA) 200-25 mcg/dose inhaler Take 1 Puff by inhalation daily. 28 Each 1    albuterol (PROVENTIL VENTOLIN) 2.5 mg /3 mL (0.083 %) nebulizer solution 3 mL by Nebulization route every four (4) hours as needed for Wheezing. 25 Each 0       Past History     Past Medical History:  Past Medical History:   Diagnosis Date    Asthma     Bipolar 1 disorder (HCC)     Chronic obstructive pulmonary disease (HCC)     Chronic pain     back, leg    Cocaine abuse     Depression     Hepatitis B     Heroin abuse     HTN (hypertension)     Narcotic abuse     Polysubstance abuse     Sarcoidosis     Tobacco abuse        Past Surgical History:  Past Surgical History:   Procedure Laterality Date    HX GYN      HX WISDOM TEETH EXTRACTION         Family History:  Family History   Problem Relation Age of Onset    Hypertension Father     Hypertension Mother        Social History:  Social History   Substance Use Topics    Smoking status: Former Smoker     Packs/day: 0.25     Years: 15.00     Quit date: 8/3/2017    Smokeless tobacco: Never Used      Comment: \"I already quit\"    Alcohol use No       Allergies: Allergies   Allergen Reactions    Tomato Swelling     Tongue         Review of Systems   Review of Systems   Constitutional: Negative for chills and fever. HENT: Negative for congestion and sore throat. Eyes: Negative for visual disturbance.    Respiratory: Positive for shortness of breath and wheezing. Negative for cough. Cardiovascular: Negative for chest pain and leg swelling. Gastrointestinal: Negative for abdominal pain, blood in stool, diarrhea, nausea and vomiting. Endocrine: Negative for polyuria. Genitourinary: Negative for dysuria, flank pain, vaginal bleeding and vaginal discharge. Musculoskeletal: Negative for myalgias. Skin: Negative for rash. Allergic/Immunologic: Negative for immunocompromised state. Neurological: Negative for weakness and headaches. Psychiatric/Behavioral: Negative for confusion. Physical Exam   Physical Exam   Constitutional: She is oriented to person, place, and time. She appears well-developed and well-nourished. HENT:   Head: Normocephalic and atraumatic. Moist mucous membranes   Eyes: Conjunctivae are normal. Pupils are equal, round, and reactive to light. Right eye exhibits no discharge. Left eye exhibits no discharge. Neck: Normal range of motion. Neck supple. No tracheal deviation present. Cardiovascular: Normal rate, regular rhythm and normal heart sounds. No murmur heard. Pulmonary/Chest: Breath sounds normal. Accessory muscle usage present. She is in respiratory distress (severe). She has no wheezes. She has no rales. Increased WOB; prolonged expiratory phase   Abdominal: Soft. Bowel sounds are normal. There is no tenderness. There is no rebound and no guarding. Musculoskeletal: Normal range of motion. She exhibits no edema, tenderness or deformity. Neurological: She is alert and oriented to person, place, and time. Skin: Skin is warm and dry. No rash noted. No erythema. Psychiatric: Her behavior is normal.   Nursing note and vitals reviewed.       Diagnostic Study Results     Labs -     Recent Results (from the past 12 hour(s))   GLUCOSE, POC    Collection Time: 07/26/18 10:29 AM   Result Value Ref Range    Glucose (POC) 92 65 - 100 mg/dL    Performed by Nohemy Marrow    CBC WITH AUTOMATED DIFF    Collection Time: 07/26/18 11:09 AM   Result Value Ref Range    WBC 6.0 3.6 - 11.0 K/uL    RBC 4.44 3.80 - 5.20 M/uL    HGB 12.1 11.5 - 16.0 g/dL    HCT 39.0 35.0 - 47.0 %    MCV 87.8 80.0 - 99.0 FL    MCH 27.3 26.0 - 34.0 PG    MCHC 31.0 30.0 - 36.5 g/dL    RDW 14.9 (H) 11.5 - 14.5 %    PLATELET 827 884 - 607 K/uL    MPV 10.3 8.9 - 12.9 FL    NRBC 0.0 0  WBC    ABSOLUTE NRBC 0.00 0.00 - 0.01 K/uL    NEUTROPHILS 47 32 - 75 %    LYMPHOCYTES 27 12 - 49 %    MONOCYTES 1 (L) 5 - 13 %    EOSINOPHILS 22 (H) 0 - 7 %    BASOPHILS 1 0 - 1 %    MYELOCYTES 2 %    IMMATURE GRANULOCYTES 0 0.0 - 0.5 %    ABS. NEUTROPHILS 2.8 1.8 - 8.0 K/UL    ABS. LYMPHOCYTES 1.6 0.8 - 3.5 K/UL    ABS. MONOCYTES 0.1 0.0 - 1.0 K/UL    ABS. EOSINOPHILS 1.3 (H) 0.0 - 0.4 K/UL    ABS. BASOPHILS 0.1 0.0 - 0.1 K/UL    ABS. IMM. GRANS. 0.0 0.00 - 0.04 K/UL    DF MANUAL      RBC COMMENTS HYPOCHROMIA  PRESENT       PROTHROMBIN TIME + INR    Collection Time: 07/26/18 11:09 AM   Result Value Ref Range    INR 1.1 0.9 - 1.1      Prothrombin time 11.3 (H) 9.0 - 09.3 sec   METABOLIC PANEL, COMPREHENSIVE    Collection Time: 07/26/18 11:09 AM   Result Value Ref Range    Sodium 135 (L) 136 - 145 mmol/L    Potassium 4.1 3.5 - 5.1 mmol/L    Chloride 106 97 - 108 mmol/L    CO2 20 (L) 21 - 32 mmol/L    Anion gap 9 5 - 15 mmol/L    Glucose 100 65 - 100 mg/dL    BUN 8 6 - 20 MG/DL    Creatinine 0.85 0.55 - 1.02 MG/DL    BUN/Creatinine ratio 9 (L) 12 - 20      GFR est AA >60 >60 ml/min/1.73m2    GFR est non-AA >60 >60 ml/min/1.73m2    Calcium 9.4 8.5 - 10.1 MG/DL    Bilirubin, total 0.5 0.2 - 1.0 MG/DL    ALT (SGPT) 23 12 - 78 U/L    AST (SGOT) 36 15 - 37 U/L    Alk.  phosphatase 73 45 - 117 U/L    Protein, total 7.8 6.4 - 8.2 g/dL    Albumin 3.7 3.5 - 5.0 g/dL    Globulin 4.1 (H) 2.0 - 4.0 g/dL    A-G Ratio 0.9 (L) 1.1 - 2.2     NT-PRO BNP    Collection Time: 07/26/18 11:09 AM   Result Value Ref Range    NT pro-BNP 74 0 - 125 PG/ML   BLOOD GAS, ARTERIAL    Collection Time: 07/26/18 12:11 PM   Result Value Ref Range    pH 7.31 (L) 7.35 - 7.45      PCO2 40 35.0 - 45.0 mmHg    PO2 199 (H) 80 - 100 mmHg    O2 SAT 99 (H) 92 - 97 %    BICARBONATE 20 (L) 22 - 26 mmol/L    BASE DEFICIT 5.8 mmol/L    O2 METHOD BIPAP      FIO2 50 %    SPONTANEOUS RATE 30.0      IPAP/PIP 12.0      EPAP/CPAP/PEEP 8.0      Sample source ARTERIAL      SITE RIGHT RADIAL      ROSHAN'S TEST YES         Radiologic Studies -   XR CHEST PORT   Final Result      XR CHEST PORT    (Results Pending)     CXR Results  (Last 48 hours)               07/26/18 1125  XR CHEST PORT Final result    Impression:  Impression: No acute process. Narrative: Indication: Dyspnea       Comparison: 8/2/2017       Portable exam of the chest obtained at 1122 demonstrates normal heart size. There is no acute process in the lung fields. The osseous structures are   unremarkable. Medical Decision Making   I am the first provider for this patient. I reviewed the vital signs, available nursing notes, past medical history, past surgical history, family history and social history. Vital Signs-Reviewed the patient's vital signs.   Patient Vitals for the past 12 hrs:   Pulse Resp BP SpO2   07/26/18 1530 (!) 157 12 - 100 %   07/26/18 1345 (!) 108 (!) 37 (!) 138/95 100 %   07/26/18 1330 84 17 133/82 100 %   07/26/18 1315 (!) 121 (!) 43 132/84 100 %   07/26/18 1300 62 25 126/80 100 %   07/26/18 1245 (!) 58 12 123/88 -   07/26/18 1230 60 18 138/77 100 %   07/26/18 1215 73 26 127/68 100 %   07/26/18 1200 92 25 119/82 100 %   07/26/18 1145 70 16 133/83 100 %   07/26/18 1130 (!) 111 (!) 32 130/81 100 %   07/26/18 1115 78 27 (!) 152/123 98 %   07/26/18 1100 92 23 (!) 147/121 99 %   07/26/18 1045 91 (!) 31 (!) 112/93 100 %   07/26/18 1044 - - - 100 %   07/26/18 1030 - - (!) 141/100 99 %   07/26/18 1028 (!) 108 (!) 36 127/79 98 %       Pulse Oximetry Analysis - 100% on RA    Records Reviewed: Nursing Notes, Old Medical Records, Previous Radiology Studies and Previous Laboratory Studies    Provider Notes (Medical Decision Making):     Consistent with asthma exacerbation; differential also includes PNA, spontaneous PTX, doubt PE, does not appear to be consistent with heart failure. ED Course:   Initial assessment performed. The patients presenting problems have been discussed, and they are in agreement with the care plan formulated and outlined with them. I have encouraged them to ask questions as they arise throughout their visit. 10:41 AM  RT at beside, pt placed on BiPAP. Procedure Note- Peripheral IV Access  12:03 PM  Performed by: DO Siria Fernandez DO gained IV access using 18 gauge needle because the patient had no vascular access. After cleaning the site with alcohol prep, the right AC vein was localized with ultrasound guidance in an anterior approach. Line confirmation was obtained by direct visualization and good blood return. No anaesthetic was used. The line was successfully flushed with normal saline and was secured with transparent tape. Estimated blood loss: none  The procedure took 1-15 minutes, and pt tolerated well. CONSULT NOTE:   1:05 PM  Siria Olivera DO spoke with Kelsie Marie MD,   Specialty: Hospitalist  Discussed pt's hx, disposition, and available diagnostic and imaging results. Reviewed care plans. Consultant will evaluate pt for admission. 2:58 PM  Spoke with Dr. Conor Teixeira, requesting intubation. Procedure Note - Orotracheal Intubation:   3:35 PM  Performed by: Siria Olivera DO; Kamille Ross MD   Indication for procedure: impending respiratory failure  RSI performed. The patient was sedated with 100 mg of succinylcholine and 150 mg ketamine and orotracheally intubated with a 7.5 cuffed [place] endotracheal tube using a glidescope. Tube was advanced to 23 cm at the lip.  ETT location confirmed by bilateral, symmetric breath sounds, good end-tidal CO2 detector color change  and chest x-ray visualization. Number of attempts: 1  Complications: none  RSI was used. The procedure took 1-15 minutes, and pt tolerated well. Procedure Note - Central Line Placement:   Performed by: Jaiden Del Cid MD    Immediately prior to the procedure, the patient was reevaluated and found suitable for the planned procedure and any planned medications. Immediately prior to the procedure a time out was called to verify the correct patient, procedure, equipment, staff, and marking as appropriate. Area was cleansed with Chlorprep and anesthetized with 3mLs of 1% lidocaine. Prepped and draped in sterile fashion. Landmarks identified. 20 gauge needle with quad lumen catheter was inserted into pt's Left, Femoral Vein with ultrasound guidance. Line sutured in place; sterile dressing applied. Position: Trendelenburg  Number of attempts: 1  Estimated blood loss: 5cc  The procedure took 1-15 minutes, and pt tolerated well. I have reviewed and agree with the residents findings, including all diagnostic interpretations, and plans as written. I was present during the key portions of separately billed procedures. Philly Sellers DO        Critical Care Time:     CRITICAL CARE NOTE :    3:35 PM    IMPENDING DETERIORATION -Airway, Respiratory and Cardiovascular  ASSOCIATED RISK FACTORS - Hypoxia, Metabolic changes and CNS Decompensation  MANAGEMENT- Bedside Assessment and Transfer  INTERPRETATION -  Xrays, Blood Gases and ECG  INTERVENTIONS - vent mngmt and Metobolic interventions  CASE REVIEW - Hospitalist, Medical Sub-Specialist, Nursing and Family  TREATMENT RESPONSE -Improved  PERFORMED BY - Self    I have spent 70 minutes of critical care time involved in lab review, consultations with specialist, family decision- making, bedside attention and documentation. During this entire length of time I was immediately available to the patient .     Philly Sellers DO    Disposition:  ADMIT NOTE:  1:05 PM  The patient is being admitted to the hospital.  The results of their tests and reasons for their admission have been discussed with the patient and/or available family. They convey agreement and understanding for the need to be admitted and for their admission diagnosis. PLAN:  1. Admit to Hospitalist  Diagnosis     Clinical Impression:   1. Severe asthma with exacerbation, unspecified whether persistent    2. Acute on chronic respiratory failure with hypoxia (HCC)    3. Polysubstance abuse        Attestations: This note is prepared by Yulia Bolivar, acting as Scribe for Siria Olivera DO. Siria Olivera DO: The scribe's documentation has been prepared under my direction and personally reviewed by me in its entirety. I confirm that the note above accurately reflects all work, treatment, procedures, and medical decision making performed by me.

## 2018-07-26 NOTE — PROGRESS NOTES
Pharmacy Clarification of Prior to Admission Medication Regimen     The patient was interviewed regarding clarification of the prior to admission medication regimen and was questioned regarding use of any other inhalers, topical products, over the counter medications, herbal medications, vitamin products or ophthalmic/nasal/otic medication use. Information Obtained From: Rx Query and patient    Pertinent Pharmacy Findings:   Updated patients preferred outpatient pharmacy to: Patient stated \"she cannot afford any medications so she does not want to list a pharmacy. \"   LORazepam (ATIVAN) 0.5 mg tablet, QUEtiapine (SEROQUEL) 300 mg tablet, QUEtiapine (SEROQUEL) 400 mg tablet, albuterol (PROVENTIL VENTOLIN) 2.5 mg /3 mL (0.083 %) nebulizer solution, amLODIPine (NORVASC) 5 mg tablet, cloNIDine HCl (CATAPRES) 0.3 mg tablet, clonazePAM (KLONOPIN) 2 mg tablet, fluticasone-vilanterol (BREO ELLIPTA) 200-25 mcg/dose inhaler, gabapentin (NEURONTIN) 300 mg capsule, lidocaine (LIDODERM) 5 %, lithium carbonate 300 mg capsule, mupirocin (BACTROBAN) 2 % ointment, sertraline (ZOLOFT) 100 mg tablet, oxyCODONE-acetaminophen (PERCOCET) 5-325 mg per tablet: Patient stated she ran out of these agents \"about a week ago. \"    PTA medication list was corrected to the following:     Prior to Admission Medications   Prescriptions Last Dose Informant Patient Reported? Taking? LORazepam (ATIVAN) 0.5 mg tablet 7/19/2018 at Unknown time Self No No   Sig: Take 1 Tab by mouth every four (4) hours as needed. Max Daily Amount: 3 mg. QUEtiapine (SEROQUEL) 300 mg tablet 7/19/2018 at Unknown time Self Yes No   Sig: Take 300 mg by mouth daily. Patient takes 300 mg daily in the morning and 800 mg (2- 400 mg tablets) every evening   QUEtiapine (SEROQUEL) 400 mg tablet 7/19/2018 at Unknown time Self Yes No   Sig: Take 800 mg by mouth every evening.  Patient takes 300 mg daily in the morning and 800 mg (2- 400 mg tablets) every evening   albuterol (PROVENTIL VENTOLIN) 2.5 mg /3 mL (0.083 %) nebulizer solution 7/24/2018 at Unknown time Self No No   Sig: 3 mL by Nebulization route every four (4) hours as needed for Wheezing. albuterol sulfate 90 mcg/actuation aepb 7/26/2018 at Unknown time Self No Yes   Sig: Take 2 Puffs by inhalation every four (4) hours as needed. amLODIPine (NORVASC) 5 mg tablet 7/19/2018 at Unknown time Self No No   Sig: Take 1 Tab by mouth daily. cloNIDine HCl (CATAPRES) 0.3 mg tablet 7/19/2018 at Unknown time Self Yes No   Sig: Take 0.3 mg by mouth three (3) times daily. clonazePAM (KLONOPIN) 2 mg tablet 7/19/2018 at Unknown time Self No No   Sig: Take 2 Tabs by mouth two (2) times a day. Max Daily Amount: 8 mg. fluticasone-vilanterol (BREO ELLIPTA) 200-25 mcg/dose inhaler 7/19/2018 at Unknown time Self No No   Sig: Take 1 Puff by inhalation daily. gabapentin (NEURONTIN) 300 mg capsule 7/19/2018 at Unknown time Self Yes No   Sig: Take 300 mg by mouth three (3) times daily. lidocaine (LIDODERM) 5 % 7/19/2018 at Unknown time Self No No   Sig: Apply patch to the affected area for 12 hours a day and remove for 12 hours a day. lithium carbonate 300 mg capsule 7/19/2018 at Unknown time Self No No   Sig: Take 1 Cap by mouth two (2) times a day. mupirocin (BACTROBAN) 2 % ointment 7/19/2018 at Unknown time Self No No   Sig: Apply 1 g to affected area every twelve (12) hours. oxyCODONE-acetaminophen (PERCOCET) 5-325 mg per tablet 7/19/2018 at Unknown time Self No No   Sig: Take 1 Tab by mouth every six (6) hours as needed. Max Daily Amount: 4 Tabs. sertraline (ZOLOFT) 100 mg tablet 7/19/2018 at Unknown time Self Yes No   Sig: Take 100 mg by mouth daily.       Facility-Administered Medications: None          Thank you,  Ean Mckeon, LETICIA  Medication History Pharmacy Technician

## 2018-07-26 NOTE — H&P
Hospitalist Admission Note    NAME: Zayra Guillen   :  1975   MRN:  [id number]     Date/Time:  2018 2:25 PM    Patient PCP: None  ______________________________________________________________________   Assessment & Plan:  Status asthmaticus with acute respiratory distress, POA  --diffuse wheezing, tachypnea RR 30s still despite on bipap  --AB.31/40/199/20 on bipap. --recommend intubation to patient. She refuses. Has been intubated in past.  I asked ER physician Dr. Oleg Patterson to speak to patient about this as well. If she continues like this, she will tire out and code and die as pCO2 still normal but should be low given tachypnea. --given SQ epi in ER, mag.  --continue with steroid 60mg q6h, empiric abx even though CXR is clear, duoneb, bipap. Heroin abuse -- last used yesterday  Hx Polysubstance abuse:  Prior UDS +THC, methadone, benzo, cocaine, opiate    Sarcoidosis  Bipolar  Hx tobacco abuse -- denies current use    There is no height or weight on file to calculate BMI. Code: full  DVT prophylaxis: lovenox  Surrogate decision maker:  mother        Subjective:   CHIEF COMPLAINT:  SOB, cough, wheeze    HISTORY OF PRESENT ILLNESS:     Zayra Guillen is a 38 y.o.  female with PMH asthma, sarcoid, tobacco abuse, polysubstance abuse (heroin, cocaine, THC, opiate, methadone) who was brought in by EMS with respiratory distress. History limited due to respiratory distress and bipap. Report not eating for 4-5 days since feeling lousy, SOB. Subjective fever and chills. Cough with yellow sputum. Denies sick contact, travel. Last used heroin yesterday. Complains of dull anterior chest pressure. We were asked to admit for work up and evaluation of the above problems.      Past Medical History:   Diagnosis Date    Asthma     Bipolar 1 disorder ([facility] [address]     Chronic obstructive pulmonary disease (HCC)     Chronic pain     back, leg    Cocaine abuse     Depression     Hepatitis B     Heroin abuse     HTN (hypertension)     Narcotic abuse     Polysubstance abuse     Sarcoidosis     Tobacco abuse       Past Surgical History:   Procedure Laterality Date    HX GYN      HX WISDOM TEETH EXTRACTION       Social History   Substance Use Topics    Smoking status: Former Smoker     Packs/day: 0.25     Years: 15.00     Quit date: 8/3/2017    Smokeless tobacco: Never Used      Comment: \"I already quit\"    Alcohol use No   Lives with mother. Heroin abuse    Family History   Problem Relation Age of Onset    Hypertension Father     Hypertension Mother      Allergies   Allergen Reactions    Tomato Swelling     Tongue        Prior to Admission medications    Medication Sig Start Date End Date Taking? Authorizing Provider   albuterol sulfate 90 mcg/actuation aepb Take 2 Puffs by inhalation every four (4) hours as needed. 4/19/17  Yes Wilson Bradley MD   QUEtiapine (SEROQUEL) 300 mg tablet Take 300 mg by mouth daily. Patient takes 300 mg daily in the morning and 800 mg (2- 400 mg tablets) every evening    Historical Provider   QUEtiapine (SEROQUEL) 400 mg tablet Take 800 mg by mouth every evening. Patient takes 300 mg daily in the morning and 800 mg (2- 400 mg tablets) every evening    Historical Provider   sertraline (ZOLOFT) 100 mg tablet Take 100 mg by mouth daily. Historical Provider   cloNIDine HCl (CATAPRES) 0.3 mg tablet Take 0.3 mg by mouth three (3) times daily. Historical Provider   clonazePAM (KLONOPIN) 2 mg tablet Take 2 Tabs by mouth two (2) times a day. Max Daily Amount: 8 mg. 8/3/17   Henry Bravo MD   lithium carbonate 300 mg capsule Take 1 Cap by mouth two (2) times a day. 8/3/17   Henry Bravo MD   LORazepam (ATIVAN) 0.5 mg tablet Take 1 Tab by mouth every four (4) hours as needed. Max Daily Amount: 3 mg. 8/3/17   Henry Bravo MD   mupirocin (BACTROBAN) 2 % ointment Apply 1 g to affected area every twelve (12) hours.  8/3/17 Erik Arenas MD   oxyCODONE-acetaminophen (PERCOCET) 5-325 mg per tablet Take 1 Tab by mouth every six (6) hours as needed. Max Daily Amount: 4 Tabs. 8/3/17   Erik Arenas MD   lidocaine (LIDODERM) 5 % Apply patch to the affected area for 12 hours a day and remove for 12 hours a day. 8/3/17   Erik Arenas MD   amLODIPine (NORVASC) 5 mg tablet Take 1 Tab by mouth daily. 8/3/17   Erik Arenas MD   gabapentin (NEURONTIN) 300 mg capsule Take 300 mg by mouth three (3) times daily. Historical Provider   fluticasone-vilanterol (BREO ELLIPTA) 200-25 mcg/dose inhaler Take 1 Puff by inhalation daily. 4/19/17   Keli Sun MD   albuterol (PROVENTIL VENTOLIN) 2.5 mg /3 mL (0.083 %) nebulizer solution 3 mL by Nebulization route every four (4) hours as needed for Wheezing. 4/16/17   Lawence Boeck, MD     REVIEW OF SYSTEMS:  POSITIVE= Bold. Negative = normal text  Limited due to respiratory distress. See HPI. Objective:   VITALS:    Visit Vitals    BP (!) 138/95    Pulse (!) 108    Resp (!) 37    SpO2 100%     No data recorded. There is no height or weight on file to calculate BMI. PHYSICAL EXAM:    General:    Ill appearing female, drowsy, respiratory distress RR 30s on bipap. HEENT: Atraumatic, anicteric sclerae, pink conjunctivae     No oral ulcers, mucosa dry, Hearing intact. Neck:  Supple, symmetrical,  thyroid: non tender  Lungs:   Diffuse Wheezing b/l. No rales. Chest wall:  No tenderness  No Accessory muscle use. Heart:   Regular  rhythm,  Tachycardic No  murmur   No gallop. No edema. Abdomen:   Soft, non-tender. Not distended. Bowel sounds normal. No masses  Extremities: No cyanosis. No clubbing. Needle scars on left arm  Skin:     Not pale Not Jaundiced  No rashes   Psych:  Poor insight. Not depressed. Not anxious or agitated. Neurologic: EOMs intact. No facial asymmetry. No aphasia or slurred speech. oriented X 3.    Peripheral pulse: Right, Radial, 2+  Capillary refill:  normal    IMAGING RESULTS:   []       I have personally reviewed the actual   []     CXR  []     CT scan  CXR:  CT :  EKG:   ________________________________________________________________________  Care Plan discussed with:    Comments   Patient y    Family      RN     Care Manager                    Consultant:  y Dr. Abigail Dewitt Dr. Dewight Gurney   ________________________________________________________________________  Prophylaxis:  GI pepcid   DVT lovenox   ________________________________________________________________________  Recommended Disposition:   Home with Family y   HH/PT/OT/RN    SNF/LTC    MAEGAN    ________________________________________________________________________  Code Status:  Full Code y   DNR/DNI    ________________________________________________________________________  TOTAL TIME:  35 minutes critical care  ______________________________________________________________________  Yusef Patel MD      Procedures: see electronic medical records for all procedures/Xrays and details which were not copied into this note but were reviewed prior to creation of Plan.     LAB DATA REVIEWED:    Recent Results (from the past 24 hour(s))   GLUCOSE, POC    Collection Time: 07/26/18 10:29 AM   Result Value Ref Range    Glucose (POC) 92 65 - 100 mg/dL    Performed by Donovan Taylor    CBC WITH AUTOMATED DIFF    Collection Time: 07/26/18 11:09 AM   Result Value Ref Range    WBC 6.0 3.6 - 11.0 K/uL    RBC 4.44 3.80 - 5.20 M/uL    HGB 12.1 11.5 - 16.0 g/dL    HCT 39.0 35.0 - 47.0 %    MCV 87.8 80.0 - 99.0 FL    MCH 27.3 26.0 - 34.0 PG    MCHC 31.0 30.0 - 36.5 g/dL    RDW 14.9 (H) 11.5 - 14.5 %    PLATELET 621 121 - 618 K/uL    MPV 10.3 8.9 - 12.9 FL    NRBC 0.0 0  WBC    ABSOLUTE NRBC 0.00 0.00 - 0.01 K/uL    NEUTROPHILS 47 32 - 75 %    LYMPHOCYTES 27 12 - 49 %    MONOCYTES 1 (L) 5 - 13 %    EOSINOPHILS 22 (H) 0 - 7 %    BASOPHILS 1 0 - 1 %    MYELOCYTES 2 %    IMMATURE GRANULOCYTES 0 0.0 - 0.5 %    ABS. NEUTROPHILS 2.8 1.8 - 8.0 K/UL    ABS. LYMPHOCYTES 1.6 0.8 - 3.5 K/UL    ABS. MONOCYTES 0.1 0.0 - 1.0 K/UL    ABS. EOSINOPHILS 1.3 (H) 0.0 - 0.4 K/UL    ABS. BASOPHILS 0.1 0.0 - 0.1 K/UL    ABS. IMM. GRANS. 0.0 0.00 - 0.04 K/UL    DF MANUAL      RBC COMMENTS HYPOCHROMIA  PRESENT       PROTHROMBIN TIME + INR    Collection Time: 07/26/18 11:09 AM   Result Value Ref Range    INR 1.1 0.9 - 1.1      Prothrombin time 11.3 (H) 9.0 - 40.6 sec   METABOLIC PANEL, COMPREHENSIVE    Collection Time: 07/26/18 11:09 AM   Result Value Ref Range    Sodium 135 (L) 136 - 145 mmol/L    Potassium 4.1 3.5 - 5.1 mmol/L    Chloride 106 97 - 108 mmol/L    CO2 20 (L) 21 - 32 mmol/L    Anion gap 9 5 - 15 mmol/L    Glucose 100 65 - 100 mg/dL    BUN 8 6 - 20 MG/DL    Creatinine 0.85 0.55 - 1.02 MG/DL    BUN/Creatinine ratio 9 (L) 12 - 20      GFR est AA >60 >60 ml/min/1.73m2    GFR est non-AA >60 >60 ml/min/1.73m2    Calcium 9.4 8.5 - 10.1 MG/DL    Bilirubin, total 0.5 0.2 - 1.0 MG/DL    ALT (SGPT) 23 12 - 78 U/L    AST (SGOT) 36 15 - 37 U/L    Alk.  phosphatase 73 45 - 117 U/L    Protein, total 7.8 6.4 - 8.2 g/dL    Albumin 3.7 3.5 - 5.0 g/dL    Globulin 4.1 (H) 2.0 - 4.0 g/dL    A-G Ratio 0.9 (L) 1.1 - 2.2     NT-PRO BNP    Collection Time: 07/26/18 11:09 AM   Result Value Ref Range    NT pro-BNP 74 0 - 125 PG/ML   BLOOD GAS, ARTERIAL    Collection Time: 07/26/18 12:11 PM   Result Value Ref Range    pH 7.31 (L) 7.35 - 7.45      PCO2 40 35.0 - 45.0 mmHg    PO2 199 (H) 80 - 100 mmHg    O2 SAT 99 (H) 92 - 97 %    BICARBONATE 20 (L) 22 - 26 mmol/L    BASE DEFICIT 5.8 mmol/L    O2 METHOD BIPAP      FIO2 50 %    SPONTANEOUS RATE 30.0      IPAP/PIP 12.0      EPAP/CPAP/PEEP 8.0      Sample source ARTERIAL      SITE RIGHT RADIAL      ROSHAN'S TEST YES

## 2018-07-26 NOTE — PROGRESS NOTES
7/26/2018  3:55 PM    INTENSIVIST PROGRESS NOTE:     Patient seen and evaluated, chart reviewed   37 yo female presented with sob, wheezing, chest tightness, severe respiratory distress  Placed on bipap  ABG concerning because of normal pco2 on the setting of severe asthma exacerbation, c/w with pt getting tired  Now pt in ER on bipap  In severe distress    ROS: unable to obtain    Visit Vitals    BP (!) 138/95    Pulse (!) 157    Resp 12    Wt 76 kg (167 lb 8.8 oz)    SpO2 100%    BMI 28.76 kg/m2       General: in respiratory distress  Eyes: anicteric  HEENT: BIPAP mask in place  Neck: FROM  CV: RRR  Lungs: very poor air movement, wheezing  Abd: soft  : no flank pain  Ext: no edema  Skin: no rashes  Musculoskeletal: normal inspection  Neuro: non focal    CXR: clear    Labs reviewed    A/P:  - acute respiratory failure: needs intubation  - acute asthma exacerbation: IV steroids, jet nebs  - acute URI: empiric abx  - PUD/DVT prophylaxis  - IVF  - sedation  - Will assist on disposition planning when stable for transfer  Eduard Aquino MD

## 2018-07-26 NOTE — ROUTINE PROCESS
TRANSFER - OUT REPORT:    Verbal AND BEDSIDE report given to Nataliya Schreiber RN(name) on Annita King  being transferred to CCU(unit) for routine progression of care       Report consisted of patients Situation, Background, Assessment and   Recommendations(SBAR). Information from the following report(s) SBAR, Kardex, ED Summary, Intake/Output, MAR, Recent Results and Med Rec Status was reviewed with the receiving nurse. Lines:       Opportunity for questions and clarification was provided.       Patient transported with:   Registered Nurse, RT, and tech

## 2018-07-26 NOTE — ED NOTES
Called to room by pt. Very orthopneic/tachypneic. MD called and v/o rec'd for ativan. Medication administered at ordered. Stayed with pt until MD at bedside. Occasional coughing and unable to catch breath. Began to relax slightly and not as orthopneic. Will continue to monitor.

## 2018-07-26 NOTE — PROGRESS NOTES
TRANSFER - IN REPORT:    Verbal report received from Maren Nobles RN(name) on [address] [facility]  being received from ED(unit) for routine progression of care      Report consisted of patients Situation, Background, Assessment and   Recommendations(SBAR). Information from the following report(s) SBAR, Kardex, ED Summary, Procedure Summary, Intake/Output, MAR, Accordion, Recent Results, Med Rec Status and Cardiac Rhythm Sinus tach was reviewed with the receiving nurse. Opportunity for questions and clarification was provided. Assessment completed upon patients arrival to unit and care assumed. 1830: Pt was found to be pulling on her ETT and sitting up in the bed. Propofol increased (See MAR), Placed pt in soft wrist restraints and Paged Dr. Jean Claude Pressley. 1836: Return call from Dr. Jean Claude Pressley. New orders received for a fentanyl drip. (See MAR). 1900: Bedside shift change report given to Eino Cowden, RN (oncoming nurse) by Fabiana Ayala RN (offgoing nurse). Report included the following information SBAR.

## 2018-07-26 NOTE — PROGRESS NOTES
Spiritual Care Assessment/Progress Note  [facility]      NAME: David Dunn      MRN: [id number]  AGE: 38 y.o.  SEX: female  Shinto Affiliation: Regan Lebron   Language: English     7/26/2018     Total Time (in minutes): 7     Spiritual Assessment begun in [facility] EMERGENCY DEPT through conversation with:         [x]Patient        [] Family    [] Friend(s)        Reason for Consult: Request by patient     Spiritual beliefs: (Please include comment if needed)     [] Identifies with a vinay tradition:         [] Supported by a vinay community:            [] Claims no spiritual orientation:           [] Seeking spiritual identity:                [] Adheres to an individual form of spirituality:           [x] Not able to assess:                           Identified resources for coping:      [] Prayer                               [] Music                  [] Guided Imagery     [x] Family/friends                 [] Pet visits     [] Devotional reading                         [] Unknown     [] Other:                                               Interventions offered during this visit: (See comments for more details)    Patient Interventions: Affirmation of emotions/emotional suffering, Iconic (affirming the presence of God/Higher Power), Initial visit, Normalization of emotional/spiritual concerns           Plan of Care:     [] Support spiritual and/or cultural needs    [] Support AMD and/or advance care planning process      [] Support grieving process   [] Coordinate Rites and/or Rituals    [] Coordination with community clergy   [] No spiritual needs identified at this time   [] Detailed Plan of Care below (See Comments)  [] Make referral to Music Therapy  [] Make referral to Pet Therapy     [] Make referral to Addiction services  [] Make referral to [facility]  [] Make referral to Spiritual Care Partner  [] No future visits requested        [x] Follow up visits as needed     Comments: Responded to overhead page for pastoral care to support patient in ED preparing for procedure. Offered calming presence and support as clinical staff prepped patient. Patient stated her desire for her mother to be called and provided  with contact information.  passed this information on to medical staff and assured patient of ongoing support as needed and desired. luis antonio Devine M.Div.    Paging Service [phone])

## 2018-07-26 NOTE — ED NOTES
Albuterol/ipratrppium neb started per verbal order Dr. Agustin Story and bedside, [facility] April called for Bipap

## 2018-07-26 NOTE — ED NOTES
Pt began to fight the tube when taken off the vent and being bagged from transport. Given 4mg Versed and tolerated transport to CCU without incident.

## 2018-07-26 NOTE — ED NOTES
Pt currently resting quietly after non-traumatic intubation by resident and dr. Kristy Gomes. Awaiting insertion of CVC for transfer to unit.

## 2018-07-26 NOTE — ED NOTES
This RN was stopped by a tech stating the patient was coming from triage and is in respiratory distress. Patient is having audible wheezing and using accessory muscles to breathe. Patient states Adrian Fleischer feels like she is going to collaposed and almost fell to the ground but this RN caught her. Patient called Charge RN for a physician. Patient was placed on 2 liters of oxygen and BS checked.

## 2018-07-26 NOTE — ED NOTES
Pt with worsening resp status. Remains very tachypneic. MD and Pulm to bedside after hospitalist stated pt did not want to be intubated. Explained, by the MDs,  to pt that, although she has the right to make any decision she wants, that with her current condition she will eventually tire out completely and stop breathing and will be intubated emergently. Pt agreed to be intubated.

## 2018-07-27 ENCOUNTER — APPOINTMENT (OUTPATIENT)
Dept: GENERAL RADIOLOGY | Age: 43
DRG: 951 | End: 2018-07-27
Attending: INTERNAL MEDICINE
Payer: MEDICAID

## 2018-07-27 LAB
AMPHET UR QL SCN: NEGATIVE
ANION GAP SERPL CALC-SCNC: 7 MMOL/L (ref 5–15)
ARTERIAL PATENCY WRIST A: YES
BARBITURATES UR QL SCN: NEGATIVE
BASE DEFICIT BLDA-SCNC: 4.4 MMOL/L
BASOPHILS # BLD: 0 K/UL (ref 0–0.1)
BASOPHILS NFR BLD: 0 % (ref 0–1)
BDY SITE: ABNORMAL
BENZODIAZ UR QL: POSITIVE
BUN SERPL-MCNC: 6 MG/DL (ref 6–20)
BUN/CREAT SERPL: 7 (ref 12–20)
CALCIUM SERPL-MCNC: 8.4 MG/DL (ref 8.5–10.1)
CANNABINOIDS UR QL SCN: POSITIVE
CHLORIDE SERPL-SCNC: 110 MMOL/L (ref 97–108)
CO2 SERPL-SCNC: 21 MMOL/L (ref 21–32)
COCAINE UR QL SCN: NEGATIVE
CREAT SERPL-MCNC: 0.81 MG/DL (ref 0.55–1.02)
DIFFERENTIAL METHOD BLD: ABNORMAL
DRUG SCRN COMMENT,DRGCM: ABNORMAL
EOSINOPHIL # BLD: 0 K/UL (ref 0–0.4)
EOSINOPHIL NFR BLD: 0 % (ref 0–7)
EPAP/CPAP/PEEP, PAPEEP: 6
ERYTHROCYTE [DISTWIDTH] IN BLOOD BY AUTOMATED COUNT: 14.9 % (ref 11.5–14.5)
FIO2 ON VENT: 40 %
GAS FLOW.O2 SETTING OXYMISER: 16 L/MIN
GLUCOSE BLD STRIP.AUTO-MCNC: 118 MG/DL (ref 65–100)
GLUCOSE BLD STRIP.AUTO-MCNC: 137 MG/DL (ref 65–100)
GLUCOSE BLD STRIP.AUTO-MCNC: 166 MG/DL (ref 65–100)
GLUCOSE SERPL-MCNC: 123 MG/DL (ref 65–100)
HCO3 BLDA-SCNC: 20 MMOL/L (ref 22–26)
HCT VFR BLD AUTO: 34.5 % (ref 35–47)
HGB BLD-MCNC: 11.2 G/DL (ref 11.5–16)
IMM GRANULOCYTES # BLD: 0 K/UL (ref 0–0.04)
IMM GRANULOCYTES NFR BLD AUTO: 0 % (ref 0–0.5)
LYMPHOCYTES # BLD: 0.5 K/UL (ref 0.8–3.5)
LYMPHOCYTES NFR BLD: 8 % (ref 12–49)
MCH RBC QN AUTO: 27.2 PG (ref 26–34)
MCHC RBC AUTO-ENTMCNC: 32.5 G/DL (ref 30–36.5)
MCV RBC AUTO: 83.7 FL (ref 80–99)
METHADONE UR QL: NEGATIVE
MONOCYTES # BLD: 0.2 K/UL (ref 0–1)
MONOCYTES NFR BLD: 3 % (ref 5–13)
NEUTS SEG # BLD: 6 K/UL (ref 1.8–8)
NEUTS SEG NFR BLD: 89 % (ref 32–75)
NRBC # BLD: 0 K/UL (ref 0–0.01)
NRBC BLD-RTO: 0 PER 100 WBC
OPIATES UR QL: POSITIVE
PCO2 BLDA: 33 MMHG (ref 35–45)
PCP UR QL: NEGATIVE
PH BLDA: 7.39 [PH] (ref 7.35–7.45)
PLATELET # BLD AUTO: 254 K/UL (ref 150–400)
PMV BLD AUTO: 10.5 FL (ref 8.9–12.9)
PO2 BLDA: 120 MMHG (ref 80–100)
POTASSIUM SERPL-SCNC: 4 MMOL/L (ref 3.5–5.1)
RBC # BLD AUTO: 4.12 M/UL (ref 3.8–5.2)
RBC MORPH BLD: ABNORMAL
SAO2 % BLD: 98 % (ref 92–97)
SAO2% DEVICE SAO2% SENSOR NAME: ABNORMAL
SERVICE CMNT-IMP: ABNORMAL
SODIUM SERPL-SCNC: 138 MMOL/L (ref 136–145)
SPECIMEN SITE: ABNORMAL
VENTILATION MODE VENT: ABNORMAL
VT SETTING VENT: 400 ML
WBC # BLD AUTO: 6.7 K/UL (ref 3.6–11)

## 2018-07-27 PROCEDURE — 74011250636 HC RX REV CODE- 250/636: Performed by: HOSPITALIST

## 2018-07-27 PROCEDURE — 85025 COMPLETE CBC W/AUTO DIFF WBC: CPT | Performed by: HOSPITALIST

## 2018-07-27 PROCEDURE — 80307 DRUG TEST PRSMV CHEM ANLYZR: CPT | Performed by: EMERGENCY MEDICINE

## 2018-07-27 PROCEDURE — 94003 VENT MGMT INPAT SUBQ DAY: CPT

## 2018-07-27 PROCEDURE — 36415 COLL VENOUS BLD VENIPUNCTURE: CPT | Performed by: HOSPITALIST

## 2018-07-27 PROCEDURE — 94640 AIRWAY INHALATION TREATMENT: CPT

## 2018-07-27 PROCEDURE — 36600 WITHDRAWAL OF ARTERIAL BLOOD: CPT | Performed by: INTERNAL MEDICINE

## 2018-07-27 PROCEDURE — 74011250637 HC RX REV CODE- 250/637: Performed by: GENERAL ACUTE CARE HOSPITAL

## 2018-07-27 PROCEDURE — 74011000250 HC RX REV CODE- 250: Performed by: HOSPITALIST

## 2018-07-27 PROCEDURE — 74011000258 HC RX REV CODE- 258: Performed by: INTERNAL MEDICINE

## 2018-07-27 PROCEDURE — 74011250636 HC RX REV CODE- 250/636: Performed by: EMERGENCY MEDICINE

## 2018-07-27 PROCEDURE — 80048 BASIC METABOLIC PNL TOTAL CA: CPT | Performed by: EMERGENCY MEDICINE

## 2018-07-27 PROCEDURE — 82962 GLUCOSE BLOOD TEST: CPT

## 2018-07-27 PROCEDURE — 74011000250 HC RX REV CODE- 250: Performed by: INTERNAL MEDICINE

## 2018-07-27 PROCEDURE — 74011250636 HC RX REV CODE- 250/636: Performed by: INTERNAL MEDICINE

## 2018-07-27 PROCEDURE — 82803 BLOOD GASES ANY COMBINATION: CPT | Performed by: INTERNAL MEDICINE

## 2018-07-27 PROCEDURE — 77030038269 HC DRN EXT URIN PURWCK BARD -A

## 2018-07-27 PROCEDURE — 71045 X-RAY EXAM CHEST 1 VIEW: CPT

## 2018-07-27 PROCEDURE — 36600 WITHDRAWAL OF ARTERIAL BLOOD: CPT

## 2018-07-27 PROCEDURE — 65610000006 HC RM INTENSIVE CARE

## 2018-07-27 RX ADMIN — Medication 150 MCG/HR: at 13:42

## 2018-07-27 RX ADMIN — IPRATROPIUM BROMIDE AND ALBUTEROL SULFATE 3 ML: .5; 3 SOLUTION RESPIRATORY (INHALATION) at 03:13

## 2018-07-27 RX ADMIN — SODIUM CHLORIDE 0.6 MCG/KG/HR: 900 INJECTION, SOLUTION INTRAVENOUS at 17:41

## 2018-07-27 RX ADMIN — PROPOFOL 50 MCG/KG/MIN: 10 INJECTION, EMULSION INTRAVENOUS at 07:24

## 2018-07-27 RX ADMIN — IPRATROPIUM BROMIDE AND ALBUTEROL SULFATE 3 ML: .5; 3 SOLUTION RESPIRATORY (INHALATION) at 00:02

## 2018-07-27 RX ADMIN — CHLORHEXIDINE GLUCONATE 15 ML: 1.2 RINSE ORAL at 00:24

## 2018-07-27 RX ADMIN — METHYLPREDNISOLONE SODIUM SUCCINATE 40 MG: 40 INJECTION, POWDER, FOR SOLUTION INTRAMUSCULAR; INTRAVENOUS at 00:15

## 2018-07-27 RX ADMIN — PROPOFOL 50 MCG/KG/MIN: 10 INJECTION, EMULSION INTRAVENOUS at 04:06

## 2018-07-27 RX ADMIN — IPRATROPIUM BROMIDE AND ALBUTEROL SULFATE 3 ML: .5; 3 SOLUTION RESPIRATORY (INHALATION) at 08:04

## 2018-07-27 RX ADMIN — IPRATROPIUM BROMIDE AND ALBUTEROL SULFATE 3 ML: .5; 3 SOLUTION RESPIRATORY (INHALATION) at 20:11

## 2018-07-27 RX ADMIN — FAMOTIDINE 20 MG: 10 INJECTION, SOLUTION INTRAVENOUS at 20:19

## 2018-07-27 RX ADMIN — METHYLPREDNISOLONE SODIUM SUCCINATE 40 MG: 40 INJECTION, POWDER, FOR SOLUTION INTRAMUSCULAR; INTRAVENOUS at 05:12

## 2018-07-27 RX ADMIN — Medication 10 ML: at 13:07

## 2018-07-27 RX ADMIN — PROPOFOL 50 MCG/KG/MIN: 10 INJECTION, EMULSION INTRAVENOUS at 00:14

## 2018-07-27 RX ADMIN — METHYLPREDNISOLONE SODIUM SUCCINATE 40 MG: 40 INJECTION, POWDER, FOR SOLUTION INTRAMUSCULAR; INTRAVENOUS at 12:21

## 2018-07-27 RX ADMIN — METHYLPREDNISOLONE SODIUM SUCCINATE 40 MG: 40 INJECTION, POWDER, FOR SOLUTION INTRAMUSCULAR; INTRAVENOUS at 17:16

## 2018-07-27 RX ADMIN — IPRATROPIUM BROMIDE AND ALBUTEROL SULFATE 3 ML: .5; 3 SOLUTION RESPIRATORY (INHALATION) at 11:07

## 2018-07-27 RX ADMIN — CHLORHEXIDINE GLUCONATE 15 ML: 1.2 RINSE ORAL at 09:12

## 2018-07-27 RX ADMIN — SODIUM CHLORIDE 100 ML/HR: 900 INJECTION, SOLUTION INTRAVENOUS at 21:59

## 2018-07-27 RX ADMIN — PROPOFOL 40 MCG/KG/MIN: 10 INJECTION, EMULSION INTRAVENOUS at 19:05

## 2018-07-27 RX ADMIN — SODIUM CHLORIDE 0.6 MCG/KG/HR: 900 INJECTION, SOLUTION INTRAVENOUS at 09:29

## 2018-07-27 RX ADMIN — PROPOFOL 35 MCG/KG/MIN: 10 INJECTION, EMULSION INTRAVENOUS at 22:03

## 2018-07-27 RX ADMIN — IPRATROPIUM BROMIDE AND ALBUTEROL SULFATE 3 ML: .5; 3 SOLUTION RESPIRATORY (INHALATION) at 23:56

## 2018-07-27 RX ADMIN — MUPIROCIN: 20 OINTMENT TOPICAL at 00:24

## 2018-07-27 RX ADMIN — CHLORHEXIDINE GLUCONATE 15 ML: 1.2 RINSE ORAL at 20:09

## 2018-07-27 RX ADMIN — SODIUM CHLORIDE 100 ML/HR: 900 INJECTION, SOLUTION INTRAVENOUS at 13:06

## 2018-07-27 RX ADMIN — Medication 10 ML: at 22:01

## 2018-07-27 RX ADMIN — FAMOTIDINE 20 MG: 10 INJECTION, SOLUTION INTRAVENOUS at 09:51

## 2018-07-27 RX ADMIN — ENOXAPARIN SODIUM 40 MG: 100 INJECTION SUBCUTANEOUS at 14:59

## 2018-07-27 RX ADMIN — MUPIROCIN: 20 OINTMENT TOPICAL at 17:13

## 2018-07-27 RX ADMIN — MUPIROCIN: 20 OINTMENT TOPICAL at 09:13

## 2018-07-27 RX ADMIN — LEVOFLOXACIN 750 MG: 5 INJECTION, SOLUTION INTRAVENOUS at 15:03

## 2018-07-27 RX ADMIN — PROPOFOL 50 MCG/KG/MIN: 10 INJECTION, EMULSION INTRAVENOUS at 09:54

## 2018-07-27 RX ADMIN — IPRATROPIUM BROMIDE AND ALBUTEROL SULFATE 3 ML: .5; 3 SOLUTION RESPIRATORY (INHALATION) at 15:53

## 2018-07-27 RX ADMIN — Medication 10 ML: at 05:17

## 2018-07-27 RX ADMIN — Medication 200 MCG/HR: at 09:09

## 2018-07-27 RX ADMIN — Medication 150 MCG/HR: at 19:10

## 2018-07-27 RX ADMIN — SODIUM CHLORIDE 100 ML/HR: 900 INJECTION, SOLUTION INTRAVENOUS at 04:59

## 2018-07-27 RX ADMIN — MIDAZOLAM HYDROCHLORIDE 2 MG: 1 INJECTION, SOLUTION INTRAMUSCULAR; INTRAVENOUS at 04:06

## 2018-07-27 RX ADMIN — METHYLPREDNISOLONE SODIUM SUCCINATE 40 MG: 40 INJECTION, POWDER, FOR SOLUTION INTRAMUSCULAR; INTRAVENOUS at 23:40

## 2018-07-27 RX ADMIN — PROPOFOL 45 MCG/KG/MIN: 10 INJECTION, EMULSION INTRAVENOUS at 14:41

## 2018-07-27 NOTE — PROGRESS NOTES
Hospitalist Progress Note    NAME: Ozzie Campuzano   :  1975   MRN:  [id number]       Assessment / Plan:  Status asthmaticus with acute respiratory distress, POA - s/p mechanical ventilation on   --diffuse wheezing, tachypnea RR 30s still despite on bipap  --AB.31/40/199/20 on bipap. --recommend intubation to patient. She refuses. Has been intubated in past.  I asked ER physician Dr. Jennifer Guerra to speak to patient about this as well. If she continues like this, she will tire out and code and die as pCO2 still normal but should be low given tachypnea. --given SQ epi in ER, mag.  --continue with steroid 60mg q6h, empiric abx even though CXR is clear, duoneb, bipap.      :  Pt weaned this morning but failed SBT. Continue nebs, IV steroids, and empiric abx. Heroin abuse -- last used yesterday  Hx Polysubstance abuse:  Prior UDS +THC, methadone, benzo, cocaine, opiate     Sarcoidosis  Bipolar  Hx tobacco abuse -- denies current use      25.0 - 29.9 Overweight / Body mass index is 27.88 kg/(m^2). Code status: Full  Prophylaxis: Lovenox  Recommended Disposition: tbd     Subjective:     Chief Complaint / Reason for Physician Visit  Intubated, sedated Discussed with RN events overnight. Review of Systems:  Symptom Y/N Comments  Symptom Y/N Comments   Fever/Chills    Chest Pain     Poor Appetite    Edema     Cough    Abdominal Pain     Sputum    Joint Pain     SOB/RIVAS    Pruritis/Rash     Nausea/vomit    Tolerating PT/OT     Diarrhea    Tolerating Diet     Constipation    Other       Could NOT obtain due to: sedated     Objective:     VITALS:   Last 24hrs VS reviewed since prior progress note.  Most recent are:  Patient Vitals for the past 24 hrs:   Temp Pulse Resp BP SpO2   18 1200 97.7 °F (36.5 °C) 71 16 116/69 100 %   18 1107 - 72 16 - 100 %   18 1100 - 83 16 119/70 100 %   18 1000 - 74 16 115/67 100 %   18 0900 - 99 20 119/75 96 %   18 0806 - 86 17 - 100 % 07/27/18 0700 97.5 °F (36.4 °C) 72 16 133/87 100 %   07/27/18 0313 - - - - 100 %   07/27/18 0100 - 92 16 128/81 100 %   07/27/18 0002 - - 17 - 100 %   07/26/18 2300 98.4 °F (36.9 °C) 98 16 134/87 100 %   07/26/18 2200 - 94 16 129/81 -   07/26/18 2100 - 100 16 137/88 100 %   07/26/18 2006 - - 16 - 100 %   07/26/18 2000 98 °F (36.7 °C) 96 16 132/84 100 %   07/26/18 1900 - 95 16 (!) 145/97 -   07/26/18 1800 - 94 16 (!) 130/92 -   07/26/18 1705 98 °F (36.7 °C) (!) 101 16 134/82 100 %   07/26/18 1630 - (!) 105 12 122/82 100 %   07/26/18 1616 - - - - 100 %   07/26/18 1615 - (!) 111 12 126/79 100 %   07/26/18 1600 - (!) 116 12 120/74 100 %   07/26/18 1545 - (!) 124 13 118/68 100 %   07/26/18 1530 - (!) 124 26 (!) 185/145 100 %   07/26/18 1515 - 87 19 134/86 -   07/26/18 1445 - 80 17 (!) 126/95 99 %   07/26/18 1430 - 82 (!) 31 (!) 138/108 100 %   07/26/18 1415 - 77 14 116/80 100 %   07/26/18 1400 - 87 12 138/82 100 %   07/26/18 1345 - (!) 108 (!) 37 (!) 138/95 100 %   07/26/18 1330 - 84 17 133/82 100 %   07/26/18 1315 - (!) 121 (!) 43 132/84 100 %   07/26/18 1300 - 62 25 126/80 100 %   07/26/18 1245 - (!) 58 12 123/88 -   07/26/18 1230 - 60 18 138/77 100 %       Intake/Output Summary (Last 24 hours) at 07/27/18 1225  Last data filed at 07/27/18 0700   Gross per 24 hour   Intake          1592.29 ml   Output              150 ml   Net          1442.29 ml        PHYSICAL EXAM:  General: Drowsy, intubated, sedated  EENT:  EOMI. Anicteric sclerae. MMM  Resp:  Wheezing  CV:  Regular  rhythm,  No edema  GI:  Soft, Non distended, Non tender.  +Bowel sounds  Neurologic:  Sedated, attempts to follow simple commands   Psych:   Deferred  Skin:  No rashes.   No jaundice    Reviewed most current lab test results and cultures  YES  Reviewed most current radiology test results   YES  Review and summation of old records today    NO  Reviewed patient's current orders and MAR    YES  PMH/SH reviewed - no change compared to H&P  ________________________________________________________________________  Care Plan discussed with:    Comments   Patient x    Family      RN x    Care Manager     Consultant                        Multidiciplinary team rounds were held today with , nursing, pharmacist and clinical coordinator. Patient's plan of care was discussed; medications were reviewed and discharge planning was addressed. ________________________________________________________________________  Total NON critical care TIME:  25   Minutes    Total CRITICAL CARE TIME Spent:   Minutes non procedure based      Comments   >50% of visit spent in counseling and coordination of care     ________________________________________________________________________  Kristopher Saleem MD     Procedures: see electronic medical records for all procedures/Xrays and details which were not copied into this note but were reviewed prior to creation of Plan. LABS:  I reviewed today's most current labs and imaging studies.   Pertinent labs include:  Recent Labs      07/27/18   0448  07/26/18   1109   WBC  6.7  6.0   HGB  11.2*  12.1   HCT  34.5*  39.0   PLT  254  262     Recent Labs      07/27/18   0448  07/26/18   1109   NA  138  135*   K  4.0  4.1   CL  110*  106   CO2  21  20*   GLU  123*  100   BUN  6  8   CREA  0.81  0.85   CA  8.4*  9.4   ALB   --   3.7   TBILI   --   0.5   SGOT   --   36   ALT   --   23   INR   --   1.1       Signed: Kristopher Saleem MD

## 2018-07-27 NOTE — PROGRESS NOTES
Attended Interdisciplinary rounds in Critical Care Unit, where patient care was discussed. Visit by: Stefanie Browning. Andi Hernandez.  Santy Plunkett MA, [address]

## 2018-07-27 NOTE — CDMP QUERY
There is noted documentation of \"acute respiratory failure\" on this record. Could this be further clarified as 
 
=>Acute Hypoxic Respiratory Failure in the setting of Status Asthmaticus requiring Ventilator support  
 
=>Other Explanation of clinical findings =>Clinically Undetermined (no explanation for clinical findings) The medical record reflects the following clinical findings, treatment, and risk factors: 
 
  
 
Risk Factors: Asthma Clinical Indicators: Noted in the chart:  Patient is having audible wheezing and using accessory muscles to breathe. Patient states Celena Pa feels like she is going to collaposed and almost fell to the ground but this RN caught her. Josefa Magi Josefa Magi Josefa Magi Patient placed on BiPAP. Josefa Magi Josefa Magi ABG was concerning because of normal pco2 on the setting of severe asthma exacerbation, c/w with pt getting tired Intubated in the ER Treatment: Intubated/ Pulmonary Consult/ ABG Please clarify and document your clinical opinion in the progress notes and discharge summary including the definitive and/or presumptive diagnosis, (suspected or probable), related to the above clinical findings. Please include clinical findings supporting your diagnosis Thank Olivia Mares [facility] 
[phone]

## 2018-07-27 NOTE — PROGRESS NOTES
7/27/2018  3:55 PM    INTENSIVIST PROGRESS NOTE:     Patient seen and evaluated, chart reviewed   39 yo female presented with sob, wheezing, chest tightness, severe respiratory distress  Placed on bipap  ABG was concerning because of normal pco2 on the setting of severe asthma exacerbation, c/w with pt getting tired  Intubated in the ER  Now intubated sedated  Failed SBT this am    ROS: unable to obtain    Visit Vitals    /81 (BP 1 Location: Right arm, BP Patient Position: At rest)    Pulse 92    Temp 98.4 °F (36.9 °C)    Resp 16    Ht 5' 5\" (1.651 m)    Wt 76 kg (167 lb 8.8 oz)    SpO2 100%    BMI 27.88 kg/m2       General: intubated  Eyes: anicteric  HEENT: ETT in place  Neck: FROM  CV: RRR  Lungs: wheezing  Abd: soft  : no flank pain  Ext: no edema  Skin: no rashes  Musculoskeletal: normal inspection  Neuro: sedated    CXR: clear    Labs reviewed    A/P:  - acute respiratory failure: vent support  - acute asthma exacerbation: IV steroids, jet nebs  - acute URI: empiric abx  - PUD/DVT prophylaxis  - IVF  - sedation  - Will assist on disposition planning when stable for transfer  Elana Orr MD

## 2018-07-27 NOTE — PROGRESS NOTES
Problem: Falls - Risk of  Goal: *Absence of Falls  Document Criss Fall Risk and appropriate interventions in the flowsheet.    Outcome: Progressing Towards Goal  Fall Risk Interventions:       Mentation Interventions: Bed/chair exit alarm    Medication Interventions: Bed/chair exit alarm    Elimination Interventions: Call light in reach, Bed/chair exit alarm

## 2018-07-27 NOTE — PROGRESS NOTES
Bedside and Verbal shift change report given to Juana Webb Rn (oncoming nurse) by Gwen Rose (offgoing nurse). Report included the following information SBAR, Kardex, Procedure Summary, Intake/Output, MAR, Recent Results, Med Rec Status and Cardiac Rhythm NSR/ST.

## 2018-07-27 NOTE — INTERDISCIPLINARY ROUNDS
Interdisciplinary team rounds were held 7/27/2018 with the following team members:Case Management, Diabetes Treatment Specialist, Nursing, Nutrition, Pastoral Care, Pharmacy and Physician. Plan of care discussed. See clinical pathway and/or care plan for interventions and desired outcomes.

## 2018-07-27 NOTE — ROUTINE PROCESS
Bedside and Verbal shift change report received from TIERA Small(offgoing nurse). Report included the following information SBAR, Kardex, ED Summary, Procedure Summary, Intake/Output, MAR, Accordion, Recent Results, Med Rec Status and Cardiac Rhythm sinus tachycardia. Although she has inspiratory wheezing, she is tolerating the mechanical ventilator well without respiratory distress. Awake mouthing words, and beckoning people to come into the room. The Propofol is maintained at 50 mcq/kg/min  0747:She remains awake, with c/o generalized discomfort, therefore I increased the Fentanyl to 100 mcq/hour. Her mother, Jessica Guzman called to receive updates on her condition. 0812:Status is unchanged, she is sitting up in the bed mouthing words. The Propofol remains at 50 mcq/kg/min, and I increased the FentaNyl to 200 mcq/hour. 0835:She remains awake, uncooperative at this time. Michael Aroramonica is at the bedside; see new orders to Precedex. 0930:Precedex initiated at .6 mcq/kg/hour, and repositioned for comfort. 1000: She is resting without facial grimaces, or respiratory distress. 1200:Skin care completed, suctioned for a small amount of thin white sputum and repositioned for comfort. 1342:She is resting at long intervals with her eyes closed, no facial grimaces, therefore the FentaNyl is decreased to 150 mcq/hour. Updates given to her mother. 1441:Propofol decreased to 45 mcq/kg/min, and repositioned for comfort. 1600:Suctioned for a small amount of thin white sputum and repositioned for comfort. 1800: She is tolerating the current mechanical ventilator settings well without respiratory distress. 1905:Propofol decreased to 40 mcq/kg/min. 1930:Bedside and Verbal shift change report given to NICK Duarte RN (oncoming nurse) by myself (offgoing nurse). Report included the following information SBAR, Kardex, ED Summary, Procedure Summary, Intake/Output, MAR, Accordion, Recent Results, Med Rec Status and Cardiac Rhythm NSR.

## 2018-07-27 NOTE — PROGRESS NOTES
Care Management:    Reason for Admission:  Admitted with Asthma and is currently vented in the ICU. She has a hx of overdose, polysubstance abuse , bipolar. She has been in [facility] psych unit last year for overdose. RRAT Score:    10                 Plan for utilizing home health:      undetermined                    Likelihood of Readmission:  moderate                         Transition of Care Plan:    Undetermined at this time. She has a long hx of bipolar and substance abuse and overdose. I spoke with her mom and she says  Patient refuses everything and will not go to any kind of rehab or substance abuse programs. Mother says they have tried everything and just does not know what else to do for her DTR. Patient has grown children but her mom is her main caregiver. She uses [employer]. She has no insurance and I did send a referral to [facility]. No PCP and mom says she just refuses to go . We can attempt to set patient up with a PCP once she is extubated and able to communicate with us. [facility] was unable to give me any names for any doctor for patient. Care Management Interventions  PCP Verified by CM: No  Mode of Transport at Discharge: Other (see comment) (family)  Current Support Network: Relative's Home (Lives with family and independent with ADL's. She has her drivers license but does not drive and no car.)  Confirm Follow Up Transport: Family (Family takes her where she needs to go. )  Plan discussed with Pt/Family/Caregiver: Yes (I called and spoke with mom Joelm Louis this afternoon. )    Chart reviewed and we will cont to follow for discharge needs.     Giuliana Segundo [facility]m [id number]

## 2018-07-28 ENCOUNTER — APPOINTMENT (OUTPATIENT)
Dept: GENERAL RADIOLOGY | Age: 43
DRG: 951 | End: 2018-07-28
Attending: INTERNAL MEDICINE
Payer: MEDICAID

## 2018-07-28 LAB
ANION GAP SERPL CALC-SCNC: 7 MMOL/L (ref 5–15)
ARTERIAL PATENCY WRIST A: ABNORMAL
BASE DEFICIT BLDA-SCNC: 4.2 MMOL/L
BDY SITE: ABNORMAL
BUN SERPL-MCNC: 10 MG/DL (ref 6–20)
BUN/CREAT SERPL: 12 (ref 12–20)
CALCIUM SERPL-MCNC: 7.8 MG/DL (ref 8.5–10.1)
CHLORIDE SERPL-SCNC: 113 MMOL/L (ref 97–108)
CO2 SERPL-SCNC: 22 MMOL/L (ref 21–32)
CREAT SERPL-MCNC: 0.85 MG/DL (ref 0.55–1.02)
EPAP/CPAP/PEEP, PAPEEP: 6
ERYTHROCYTE [DISTWIDTH] IN BLOOD BY AUTOMATED COUNT: 15.2 % (ref 11.5–14.5)
FIO2 ON VENT: 40 %
GAS FLOW.O2 SETTING OXYMISER: 16 L/MIN
GLUCOSE BLD STRIP.AUTO-MCNC: 116 MG/DL (ref 65–100)
GLUCOSE BLD STRIP.AUTO-MCNC: 126 MG/DL (ref 65–100)
GLUCOSE BLD STRIP.AUTO-MCNC: 141 MG/DL (ref 65–100)
GLUCOSE BLD STRIP.AUTO-MCNC: 95 MG/DL (ref 65–100)
GLUCOSE SERPL-MCNC: 145 MG/DL (ref 65–100)
HCO3 BLDA-SCNC: 21 MMOL/L (ref 22–26)
HCT VFR BLD AUTO: 34.2 % (ref 35–47)
HGB BLD-MCNC: 11 G/DL (ref 11.5–16)
MAGNESIUM SERPL-MCNC: 2.5 MG/DL (ref 1.6–2.4)
MCH RBC QN AUTO: 27.2 PG (ref 26–34)
MCHC RBC AUTO-ENTMCNC: 32.2 G/DL (ref 30–36.5)
MCV RBC AUTO: 84.4 FL (ref 80–99)
NRBC # BLD: 0 K/UL (ref 0–0.01)
NRBC BLD-RTO: 0 PER 100 WBC
PCO2 BLDA: 37 MMHG (ref 35–45)
PH BLDA: 7.37 [PH] (ref 7.35–7.45)
PHOSPHATE SERPL-MCNC: 3.2 MG/DL (ref 2.6–4.7)
PLATELET # BLD AUTO: 310 K/UL (ref 150–400)
PMV BLD AUTO: 10.9 FL (ref 8.9–12.9)
PO2 BLDA: 149 MMHG (ref 80–100)
POTASSIUM SERPL-SCNC: 4.5 MMOL/L (ref 3.5–5.1)
RBC # BLD AUTO: 4.05 M/UL (ref 3.8–5.2)
SAO2 % BLD: 99 % (ref 92–97)
SAO2% DEVICE SAO2% SENSOR NAME: ABNORMAL
SERVICE CMNT-IMP: ABNORMAL
SERVICE CMNT-IMP: NORMAL
SODIUM SERPL-SCNC: 142 MMOL/L (ref 136–145)
SPECIMEN SITE: ABNORMAL
VENTILATION MODE VENT: ABNORMAL
VT SETTING VENT: 400 ML
WBC # BLD AUTO: 9 K/UL (ref 3.6–11)

## 2018-07-28 PROCEDURE — 77030019563 HC DEV ATTCH FEED HOLL -A

## 2018-07-28 PROCEDURE — 36415 COLL VENOUS BLD VENIPUNCTURE: CPT | Performed by: INTERNAL MEDICINE

## 2018-07-28 PROCEDURE — 85027 COMPLETE CBC AUTOMATED: CPT | Performed by: INTERNAL MEDICINE

## 2018-07-28 PROCEDURE — 83735 ASSAY OF MAGNESIUM: CPT | Performed by: INTERNAL MEDICINE

## 2018-07-28 PROCEDURE — 74011250636 HC RX REV CODE- 250/636: Performed by: INTERNAL MEDICINE

## 2018-07-28 PROCEDURE — 74011250636 HC RX REV CODE- 250/636: Performed by: EMERGENCY MEDICINE

## 2018-07-28 PROCEDURE — 36600 WITHDRAWAL OF ARTERIAL BLOOD: CPT | Performed by: INTERNAL MEDICINE

## 2018-07-28 PROCEDURE — 74011250637 HC RX REV CODE- 250/637: Performed by: INTERNAL MEDICINE

## 2018-07-28 PROCEDURE — 84100 ASSAY OF PHOSPHORUS: CPT | Performed by: INTERNAL MEDICINE

## 2018-07-28 PROCEDURE — 74011000250 HC RX REV CODE- 250: Performed by: INTERNAL MEDICINE

## 2018-07-28 PROCEDURE — 74011000250 HC RX REV CODE- 250: Performed by: HOSPITALIST

## 2018-07-28 PROCEDURE — 74018 RADEX ABDOMEN 1 VIEW: CPT

## 2018-07-28 PROCEDURE — 82803 BLOOD GASES ANY COMBINATION: CPT | Performed by: INTERNAL MEDICINE

## 2018-07-28 PROCEDURE — 65610000006 HC RM INTENSIVE CARE

## 2018-07-28 PROCEDURE — 0DH67UZ INSERTION OF FEEDING DEVICE INTO STOMACH, VIA NATURAL OR ARTIFICIAL OPENING: ICD-10-PCS | Performed by: RADIOLOGY

## 2018-07-28 PROCEDURE — 71045 X-RAY EXAM CHEST 1 VIEW: CPT

## 2018-07-28 PROCEDURE — 80048 BASIC METABOLIC PNL TOTAL CA: CPT | Performed by: INTERNAL MEDICINE

## 2018-07-28 PROCEDURE — 74011000258 HC RX REV CODE- 258: Performed by: INTERNAL MEDICINE

## 2018-07-28 PROCEDURE — 94003 VENT MGMT INPAT SUBQ DAY: CPT

## 2018-07-28 PROCEDURE — 77030008771 HC TU NG SALEM SUMP -A

## 2018-07-28 PROCEDURE — 74011250636 HC RX REV CODE- 250/636: Performed by: HOSPITALIST

## 2018-07-28 PROCEDURE — 94640 AIRWAY INHALATION TREATMENT: CPT

## 2018-07-28 PROCEDURE — 82962 GLUCOSE BLOOD TEST: CPT

## 2018-07-28 RX ORDER — LITHIUM CARBONATE 300 MG/1
300 CAPSULE ORAL 2 TIMES DAILY
Status: DISCONTINUED | OUTPATIENT
Start: 2018-07-29 | End: 2018-08-04 | Stop reason: HOSPADM

## 2018-07-28 RX ORDER — GABAPENTIN 300 MG/1
300 CAPSULE ORAL 3 TIMES DAILY
Status: DISCONTINUED | OUTPATIENT
Start: 2018-07-28 | End: 2018-08-04 | Stop reason: HOSPADM

## 2018-07-28 RX ORDER — QUETIAPINE FUMARATE 100 MG/1
300 TABLET, FILM COATED ORAL DAILY
Status: DISCONTINUED | OUTPATIENT
Start: 2018-07-29 | End: 2018-07-29

## 2018-07-28 RX ORDER — CLONAZEPAM 1 MG/1
2 TABLET ORAL 2 TIMES DAILY
Status: DISCONTINUED | OUTPATIENT
Start: 2018-07-28 | End: 2018-08-04 | Stop reason: HOSPADM

## 2018-07-28 RX ORDER — SERTRALINE HYDROCHLORIDE 50 MG/1
100 TABLET, FILM COATED ORAL DAILY
Status: DISCONTINUED | OUTPATIENT
Start: 2018-07-28 | End: 2018-08-04 | Stop reason: HOSPADM

## 2018-07-28 RX ORDER — QUETIAPINE FUMARATE 100 MG/1
800 TABLET, FILM COATED ORAL
Status: DISCONTINUED | OUTPATIENT
Start: 2018-07-28 | End: 2018-07-29

## 2018-07-28 RX ADMIN — QUETIAPINE 800 MG: 100 TABLET, FILM COATED ORAL at 21:04

## 2018-07-28 RX ADMIN — LEVOFLOXACIN 750 MG: 5 INJECTION, SOLUTION INTRAVENOUS at 17:10

## 2018-07-28 RX ADMIN — IPRATROPIUM BROMIDE AND ALBUTEROL SULFATE 3 ML: .5; 3 SOLUTION RESPIRATORY (INHALATION) at 19:27

## 2018-07-28 RX ADMIN — CHLORHEXIDINE GLUCONATE 15 ML: 1.2 RINSE ORAL at 09:05

## 2018-07-28 RX ADMIN — FAMOTIDINE 20 MG: 10 INJECTION, SOLUTION INTRAVENOUS at 21:04

## 2018-07-28 RX ADMIN — Medication 150 MCG/HR: at 19:30

## 2018-07-28 RX ADMIN — METHYLPREDNISOLONE SODIUM SUCCINATE 40 MG: 40 INJECTION, POWDER, FOR SOLUTION INTRAMUSCULAR; INTRAVENOUS at 17:10

## 2018-07-28 RX ADMIN — FAMOTIDINE 20 MG: 10 INJECTION, SOLUTION INTRAVENOUS at 09:05

## 2018-07-28 RX ADMIN — METHYLPREDNISOLONE SODIUM SUCCINATE 40 MG: 40 INJECTION, POWDER, FOR SOLUTION INTRAMUSCULAR; INTRAVENOUS at 23:47

## 2018-07-28 RX ADMIN — Medication 10 ML: at 21:04

## 2018-07-28 RX ADMIN — GABAPENTIN 300 MG: 300 CAPSULE ORAL at 17:19

## 2018-07-28 RX ADMIN — IPRATROPIUM BROMIDE AND ALBUTEROL SULFATE 3 ML: .5; 3 SOLUTION RESPIRATORY (INHALATION) at 11:24

## 2018-07-28 RX ADMIN — PROPOFOL 30 MCG/KG/MIN: 10 INJECTION, EMULSION INTRAVENOUS at 12:07

## 2018-07-28 RX ADMIN — PROPOFOL 40 MCG/KG/MIN: 10 INJECTION, EMULSION INTRAVENOUS at 01:35

## 2018-07-28 RX ADMIN — PROPOFOL 50 MCG/KG/MIN: 10 INJECTION, EMULSION INTRAVENOUS at 17:09

## 2018-07-28 RX ADMIN — IPRATROPIUM BROMIDE AND ALBUTEROL SULFATE 3 ML: .5; 3 SOLUTION RESPIRATORY (INHALATION) at 03:18

## 2018-07-28 RX ADMIN — IPRATROPIUM BROMIDE AND ALBUTEROL SULFATE 3 ML: .5; 3 SOLUTION RESPIRATORY (INHALATION) at 07:26

## 2018-07-28 RX ADMIN — MUPIROCIN: 20 OINTMENT TOPICAL at 09:09

## 2018-07-28 RX ADMIN — MUPIROCIN: 20 OINTMENT TOPICAL at 20:05

## 2018-07-28 RX ADMIN — CHLORHEXIDINE GLUCONATE 15 ML: 1.2 RINSE ORAL at 20:05

## 2018-07-28 RX ADMIN — Medication 10 ML: at 05:08

## 2018-07-28 RX ADMIN — SODIUM CHLORIDE 100 ML/HR: 900 INJECTION, SOLUTION INTRAVENOUS at 09:29

## 2018-07-28 RX ADMIN — SODIUM CHLORIDE 0.6 MCG/KG/HR: 900 INJECTION, SOLUTION INTRAVENOUS at 01:34

## 2018-07-28 RX ADMIN — Medication 150 MCG/HR: at 13:17

## 2018-07-28 RX ADMIN — IPRATROPIUM BROMIDE AND ALBUTEROL SULFATE 3 ML: .5; 3 SOLUTION RESPIRATORY (INHALATION) at 23:42

## 2018-07-28 RX ADMIN — Medication 150 MCG/HR: at 01:10

## 2018-07-28 RX ADMIN — ENOXAPARIN SODIUM 40 MG: 100 INJECTION SUBCUTANEOUS at 17:10

## 2018-07-28 RX ADMIN — PROPOFOL 50 MCG/KG/MIN: 10 INJECTION, EMULSION INTRAVENOUS at 20:54

## 2018-07-28 RX ADMIN — Medication 150 MCG/HR: at 07:21

## 2018-07-28 RX ADMIN — GABAPENTIN 300 MG: 300 CAPSULE ORAL at 21:04

## 2018-07-28 RX ADMIN — CLONAZEPAM 2 MG: 1 TABLET ORAL at 17:18

## 2018-07-28 RX ADMIN — IPRATROPIUM BROMIDE AND ALBUTEROL SULFATE 3 ML: .5; 3 SOLUTION RESPIRATORY (INHALATION) at 16:08

## 2018-07-28 RX ADMIN — METHYLPREDNISOLONE SODIUM SUCCINATE 40 MG: 40 INJECTION, POWDER, FOR SOLUTION INTRAMUSCULAR; INTRAVENOUS at 13:21

## 2018-07-28 RX ADMIN — Medication 10 ML: at 15:47

## 2018-07-28 RX ADMIN — SERTRALINE HYDROCHLORIDE 100 MG: 50 TABLET ORAL at 17:19

## 2018-07-28 RX ADMIN — SODIUM CHLORIDE 100 ML/HR: 900 INJECTION, SOLUTION INTRAVENOUS at 20:04

## 2018-07-28 RX ADMIN — METHYLPREDNISOLONE SODIUM SUCCINATE 40 MG: 40 INJECTION, POWDER, FOR SOLUTION INTRAMUSCULAR; INTRAVENOUS at 05:12

## 2018-07-28 NOTE — PROGRESS NOTES
0700:  Bedside and Verbal shift change report given to Tab Melara (oncoming nurse) by Shelley Severin (offgoing nurse). Report included the following information SBAR, Kardex, Intake/Output, MAR, Recent Results and Cardiac Rhythm SB.   0800:  AM assessment. Grimaces and rises up in the bed during mouth care and ETT suctioning while 0.03 mcg/kg/hr Precedex and 30 mcg Propofol infusing. Updated patient's mother on current status and plan of care. 0930:  Titration of Precedex down due to HR <60.   1030:  HR remains in 50's. Continue to monitor. 1200:  Noon assessment. Precedex off due to lower HR.   1240:  Propofol to standby for sedation vacation (SAT). Within 10 minutes opens eyes, follows commands, nods appropriately, nods yes to being hot. Turned down [employer] to ambient as before. 1330:  Resting comfortably. 1500:  Opens eyes spontaneously. 1545: Sitting up in bed, using tongue to force ETT forward. Increased Propofol infusion from 30 mcg to 50 mcg. Limited urine output with external catheter. Bladder scan resulted >1000 ml urine in bladder. Informed Dr. Mainor Jama of above. Order:  Insert hinojosa catheter and NG tube. 1600: Inserted hinojosa and NG tube to R nare. Order for KUB. 1742:  KUB complete. Administered PO meds via NG tube. 1800:  Resting comfortably with 50 mcg/kg/min Propofol infusing. 1900: Bedside and Verbal shift change report given to David Neves (oncoming nurse) by Tab Melara (offgoing nurse).  Report included the following information SBAR, Kardex, Intake/Output, MAR, Recent Results and Cardiac Rhythm SB.

## 2018-07-28 NOTE — PROGRESS NOTES
Hospitalist Progress Note    NAME: Segundo Morales   :  1975   MRN:  [id number]       Assessment / Plan:  Status asthmaticus with acute respiratory distress, POA - s/p mechanical ventilation on   --diffuse wheezing, tachypnea RR 30s still despite on bipap  --AB.31/40/199/20 on bipap. --recommend intubation to patient.  She refuses. [place] Mink[facility] been intubated in past.  I asked ER physician Dr. Chadwick Marie to speak to patient about this as well.  If she continues like this, she will tire out and code and die as pCO2 still normal but should be low given tachypnea. --given SQ epi in ER, mag.  --continue with steroid 60mg q6h, empiric abx even though CXR is clear, duoneb, bipap.      :  Pt weaned this morning but failed SBT. Continue nebs, IV steroids, and empiric abx. Heroin abuse -- last used yesterday  Hx Polysubstance abuse:  Prior UDS +THC, methadone, benzo, cocaine, opiate    :  Pt not for SBT this morning. Continue nebs, IV steroids, and empiric abx. Continue sedation.      Sarcoidosis  Bipolar  Hx tobacco abuse -- denies current use        25.0 - 29.9 Overweight / Body mass index is 27.88 kg/(m^2).     Code status: Full  Prophylaxis: Lovenox  Recommended Disposition: tbd     Subjective:     Chief Complaint / Reason for Physician Visit  Intubated, sedated  Discussed with RN events overnight. Review of Systems:  Symptom Y/N Comments  Symptom Y/N Comments   Fever/Chills    Chest Pain     Poor Appetite    Edema     Cough    Abdominal Pain     Sputum    Joint Pain     SOB/RIVAS    Pruritis/Rash     Nausea/vomit    Tolerating PT/OT     Diarrhea    Tolerating Diet     Constipation    Other       Could NOT obtain due to: sedated     Objective:     VITALS:   Last 24hrs VS reviewed since prior progress note.  Most recent are:  Patient Vitals for the past 24 hrs:   Temp Pulse Resp BP SpO2   18 0900 - 62 16 121/74 100 %   18 0800 - 61 16 120/82 100 %   18 0730 - (!) 55 16 123/82 100 % 07/28/18 0727 - (!) 56 16 - 100 %   07/28/18 0720 96.7 °F (35.9 °C) - - - -   07/28/18 0700 - (!) 55 16 125/85 100 %   07/28/18 0656 98.4 °F (36.9 °C) - - - -   07/28/18 0630 - (!) 57 16 122/81 100 %   07/28/18 0600 - (!) 56 16 119/81 100 %   07/28/18 0530 - 60 16 128/83 100 %   07/28/18 0500 - 61 16 124/80 100 %   07/28/18 0445 98.8 °F (37.1 °C) - - - -   07/28/18 0430 - 64 16 132/83 100 %   07/28/18 0400 97.8 °F (36.6 °C) 64 16 132/84 100 %   07/28/18 0330 - 66 16 128/81 100 %   07/28/18 0311 - 61 16 - 100 %   07/28/18 0300 - 61 16 132/83 100 %   07/28/18 0230 - 64 16 129/86 100 %   07/28/18 0200 - 64 16 123/81 100 %   07/28/18 0130 - 67 16 125/73 94 %   07/28/18 0100 - 69 16 129/82 99 %   07/28/18 0030 - 74 16 125/82 100 %   07/28/18 0000 - 75 16 127/82 100 %   07/27/18 2351 - 69 16 - 100 %   07/27/18 2335 98.4 °F (36.9 °C) - - - -   07/27/18 2332 98.5 °F (36.9 °C) - - - -   07/27/18 2330 - 70 16 124/84 100 %   07/27/18 2300 - 68 16 129/82 100 %   07/27/18 2230 - 71 16 127/83 100 %   07/27/18 2200 97.9 °F (36.6 °C) 67 16 132/86 100 %   07/27/18 2156 98.2 °F (36.8 °C) - - - -   07/27/18 2130 - 62 16 (!) 136/91 100 %   07/27/18 2100 - 60 16 (!) 136/92 100 %   07/27/18 2030 - 60 16 (!) 134/91 100 %   07/27/18 2005 - (!) 59 16 - 100 %   07/27/18 2000 - 61 16 132/90 100 %   07/27/18 1941 - 60 16 128/87 100 %   07/27/18 1935 (!) 94.5 °F (34.7 °C) - - - -   07/27/18 1931 (!) 94.3 °F (34.6 °C) - - - -   07/27/18 1900 - 68 16 119/83 100 %   07/27/18 1800 - 70 13 102/72 100 %   07/27/18 1700 - 71 16 127/84 100 %   07/27/18 1600 97.6 °F (36.4 °C) 65 16 127/87 100 %   07/27/18 1553 - 64 16 - 100 %   07/27/18 1500 - 63 16 126/85 100 %   07/27/18 1400 - 64 16 122/83 100 %   07/27/18 1300 - 68 16 117/70 100 %   07/27/18 1200 97.7 °F (36.5 °C) 71 16 116/69 100 %   07/27/18 1107 - 72 16 - 100 %   07/27/18 1100 - 83 16 119/70 100 %       Intake/Output Summary (Last 24 hours) at 07/28/18 1019  Last data filed at 07/28/18 0900 Gross per 24 hour   Intake          3616.09 ml   Output             1275 ml   Net          2341.09 ml        PHYSICAL EXAM:  General:                    Drowsy, intubated, sedated  EENT:                       EOMI. Anicteric sclerae. MMM  Resp:                        Wheezing  CV:                            Regular  rhythm,  No edema  GI:                             Soft, Non distended, Non tender.  +Bowel sounds  Neurologic:                Sedated, attempts to follow simple commands   Psych:                       Deferred  Skin:                          No rashes. No jaundice    Reviewed most current lab test results and cultures  YES  Reviewed most current radiology test results   YES  Review and summation of old records today    NO  Reviewed patient's current orders and MAR    YES  PMH/SH reviewed - no change compared to H&P  ________________________________________________________________________  Care Plan discussed with:    Comments   Patient x    Family      RN x    Care Manager     Consultant                        Multidiciplinary team rounds were held today with , nursing, pharmacist and clinical coordinator. Patient's plan of care was discussed; medications were reviewed and discharge planning was addressed. ________________________________________________________________________  Total NON critical care TIME:  25   Minutes    Total CRITICAL CARE TIME Spent:   Minutes non procedure based      Comments   >50% of visit spent in counseling and coordination of care     ________________________________________________________________________  Dann Sandhu MD     Procedures: see electronic medical records for all procedures/Xrays and details which were not copied into this note but were reviewed prior to creation of Plan. LABS:  I reviewed today's most current labs and imaging studies.   Pertinent labs include:  Recent Labs      07/28/18   0455  07/27/18   0448  07/26/18   1109   WBC  9.0 6.7  6.0   HGB  11.0*  11.2*  12.1   HCT  34.2*  34.5*  39.0   PLT  310  254  262     Recent Labs      07/28/18   0455  07/27/18   0448  07/26/18   1109   NA  142  138  135*   K  4.5  4.0  4.1   CL  113*  110*  106   CO2  22  21  20*   GLU  145*  123*  100   BUN  10  6  8   CREA  0.85  0.81  0.85   CA  7.8*  8.4*  9.4   MG  2.5*   --    --    PHOS  3.2   --    --    ALB   --    --   3.7   TBILI   --    --   0.5   SGOT   --    --   36   ALT   --    --   23   INR   --    --   1.1       Signed: Martha Grace MD

## 2018-07-28 NOTE — PROGRESS NOTES
7/28/2018  3:55 PM    INTENSIVIST PROGRESS NOTE:     Patient seen and evaluated, chart reviewed   39 yo female presented with sob, wheezing, chest tightness, severe respiratory distress  Placed on bipap  ABG was concerning because of normal pco2 on the setting of severe asthma exacerbation, c/w with pt getting tired  Intubated in the ER  Now intubated sedated    Failed SBT this am due to agitation    ROS: unable to obtain    Visit Vitals    /78    Pulse (!) 50    Temp 96 °F (35.6 °C)    Resp 16    Ht 5' 5\" (1.651 m)    Wt 72.8 kg (160 lb 7.9 oz)    SpO2 100%    BMI 26.71 kg/m2       General: intubated AAF janene shila  Eyes: anicteric  HEENT: ETT in place  Neck: FROM  CV: RRR  Lungs: wheezing  Abd: soft  : no flank pain  Ext: no edema  Skin: no rashes  Musculoskeletal: normal inspection  Neuro: sedated    CXR: clear; ETT ok    Labs reviewed    A/P:  - acute respiratory failure: vent support  - acute asthma exacerbation: IV steroids, jet nebs  - acute URI: empiric abx  - PUD/DVT prophylaxis  - IVF  - sedation  - Will assist on disposition planning when stable for transfer        Leo Valero MD

## 2018-07-28 NOTE — PROGRESS NOTES
0700   Patient received last evening at 1900 hours from [place]; patient on vent FiO2 40%, peep 6 cm, AC 16, ; bilateral breath sounds audible, equal, coarse; patient suctioned for small amount of thick tan secretions from ETT; portable chest x-ray done this AM; ABG done this AM; no respiratory distress; patient on propofol 30 mics, fentanly PCA 150mics, precedex drip at 0.3 mics; propofol and precedex drip titrated during the night; when propofol paused, patient opens eyes spontaneously, moves all extremities, will not obey; patient restrained right and left wrist; patient will try to talk when propofol paused; in sinus cheryle/sinus rhythm; palpable pulses present; trace edema; left femoral quad lumen catheter infusing the precedex drip, the propofol, the fentanyl PCA and NS at 100 cc/hr; patient is NPO; no emesis; pure-wick in place, drained 250 cc of dark griffin urine this shift; no BM this shift; lab done; patient's daughter, [place], called last evening to check on her mother; report given to [facility].

## 2018-07-29 LAB
DATE LAST DOSE: ABNORMAL
GLUCOSE BLD STRIP.AUTO-MCNC: 100 MG/DL (ref 65–100)
GLUCOSE BLD STRIP.AUTO-MCNC: 110 MG/DL (ref 65–100)
GLUCOSE BLD STRIP.AUTO-MCNC: 120 MG/DL (ref 65–100)
LITHIUM SERPL-SCNC: <0.2 MMOL/L (ref 0.6–1.2)
REPORTED DOSE,DOSE: ABNORMAL UNITS
REPORTED DOSE/TIME,TMG: ABNORMAL
SERVICE CMNT-IMP: ABNORMAL
SERVICE CMNT-IMP: ABNORMAL
SERVICE CMNT-IMP: NORMAL

## 2018-07-29 PROCEDURE — 82962 GLUCOSE BLOOD TEST: CPT

## 2018-07-29 PROCEDURE — 74011000250 HC RX REV CODE- 250: Performed by: HOSPITALIST

## 2018-07-29 PROCEDURE — 80178 ASSAY OF LITHIUM: CPT | Performed by: INTERNAL MEDICINE

## 2018-07-29 PROCEDURE — 74011000250 HC RX REV CODE- 250: Performed by: INTERNAL MEDICINE

## 2018-07-29 PROCEDURE — 74011250636 HC RX REV CODE- 250/636: Performed by: INTERNAL MEDICINE

## 2018-07-29 PROCEDURE — 74011250636 HC RX REV CODE- 250/636: Performed by: EMERGENCY MEDICINE

## 2018-07-29 PROCEDURE — 74011250636 HC RX REV CODE- 250/636: Performed by: HOSPITALIST

## 2018-07-29 PROCEDURE — 74011250637 HC RX REV CODE- 250/637: Performed by: INTERNAL MEDICINE

## 2018-07-29 PROCEDURE — 94640 AIRWAY INHALATION TREATMENT: CPT

## 2018-07-29 PROCEDURE — 65610000006 HC RM INTENSIVE CARE

## 2018-07-29 PROCEDURE — 74011000258 HC RX REV CODE- 258: Performed by: INTERNAL MEDICINE

## 2018-07-29 PROCEDURE — 36415 COLL VENOUS BLD VENIPUNCTURE: CPT | Performed by: INTERNAL MEDICINE

## 2018-07-29 PROCEDURE — 94003 VENT MGMT INPAT SUBQ DAY: CPT

## 2018-07-29 RX ORDER — QUETIAPINE FUMARATE 100 MG/1
400 TABLET, FILM COATED ORAL
Status: DISCONTINUED | OUTPATIENT
Start: 2018-07-29 | End: 2018-08-04 | Stop reason: HOSPADM

## 2018-07-29 RX ADMIN — ENOXAPARIN SODIUM 40 MG: 100 INJECTION SUBCUTANEOUS at 14:31

## 2018-07-29 RX ADMIN — LITHIUM CARBONATE 300 MG: 300 CAPSULE, GELATIN COATED ORAL at 19:24

## 2018-07-29 RX ADMIN — PROPOFOL 50 MCG/KG/MIN: 10 INJECTION, EMULSION INTRAVENOUS at 04:58

## 2018-07-29 RX ADMIN — CHLORHEXIDINE GLUCONATE 15 ML: 1.2 RINSE ORAL at 08:21

## 2018-07-29 RX ADMIN — IPRATROPIUM BROMIDE AND ALBUTEROL SULFATE 3 ML: .5; 3 SOLUTION RESPIRATORY (INHALATION) at 11:09

## 2018-07-29 RX ADMIN — GABAPENTIN 300 MG: 300 CAPSULE ORAL at 21:24

## 2018-07-29 RX ADMIN — CHLORHEXIDINE GLUCONATE 15 ML: 1.2 RINSE ORAL at 21:24

## 2018-07-29 RX ADMIN — Medication 100 MCG/HR: at 08:11

## 2018-07-29 RX ADMIN — QUETIAPINE FUMARATE 400 MG: 100 TABLET ORAL at 21:24

## 2018-07-29 RX ADMIN — METHYLPREDNISOLONE SODIUM SUCCINATE 40 MG: 40 INJECTION, POWDER, FOR SOLUTION INTRAMUSCULAR; INTRAVENOUS at 18:22

## 2018-07-29 RX ADMIN — GABAPENTIN 300 MG: 300 CAPSULE ORAL at 16:17

## 2018-07-29 RX ADMIN — Medication 150 MCG/HR: at 01:33

## 2018-07-29 RX ADMIN — IPRATROPIUM BROMIDE AND ALBUTEROL SULFATE 3 ML: .5; 3 SOLUTION RESPIRATORY (INHALATION) at 15:25

## 2018-07-29 RX ADMIN — MUPIROCIN: 20 OINTMENT TOPICAL at 08:38

## 2018-07-29 RX ADMIN — Medication 75 MCG/HR: at 10:46

## 2018-07-29 RX ADMIN — IPRATROPIUM BROMIDE AND ALBUTEROL SULFATE 3 ML: .5; 3 SOLUTION RESPIRATORY (INHALATION) at 07:16

## 2018-07-29 RX ADMIN — IPRATROPIUM BROMIDE AND ALBUTEROL SULFATE 3 ML: .5; 3 SOLUTION RESPIRATORY (INHALATION) at 23:41

## 2018-07-29 RX ADMIN — METHYLPREDNISOLONE SODIUM SUCCINATE 40 MG: 40 INJECTION, POWDER, FOR SOLUTION INTRAMUSCULAR; INTRAVENOUS at 05:40

## 2018-07-29 RX ADMIN — CLONAZEPAM 2 MG: 1 TABLET ORAL at 08:40

## 2018-07-29 RX ADMIN — Medication 10 ML: at 05:40

## 2018-07-29 RX ADMIN — IPRATROPIUM BROMIDE AND ALBUTEROL SULFATE 3 ML: .5; 3 SOLUTION RESPIRATORY (INHALATION) at 19:22

## 2018-07-29 RX ADMIN — METHYLPREDNISOLONE SODIUM SUCCINATE 40 MG: 40 INJECTION, POWDER, FOR SOLUTION INTRAMUSCULAR; INTRAVENOUS at 12:33

## 2018-07-29 RX ADMIN — GABAPENTIN 300 MG: 300 CAPSULE ORAL at 08:39

## 2018-07-29 RX ADMIN — LITHIUM CARBONATE 300 MG: 300 CAPSULE, GELATIN COATED ORAL at 08:39

## 2018-07-29 RX ADMIN — SODIUM CHLORIDE 100 ML/HR: 900 INJECTION, SOLUTION INTRAVENOUS at 15:17

## 2018-07-29 RX ADMIN — SODIUM CHLORIDE 100 ML/HR: 900 INJECTION, SOLUTION INTRAVENOUS at 05:40

## 2018-07-29 RX ADMIN — FAMOTIDINE 20 MG: 10 INJECTION, SOLUTION INTRAVENOUS at 21:24

## 2018-07-29 RX ADMIN — MUPIROCIN: 20 OINTMENT TOPICAL at 21:24

## 2018-07-29 RX ADMIN — SODIUM CHLORIDE 0.2 MCG/KG/HR: 900 INJECTION, SOLUTION INTRAVENOUS at 16:07

## 2018-07-29 RX ADMIN — Medication 10 ML: at 21:24

## 2018-07-29 RX ADMIN — CLONAZEPAM 2 MG: 1 TABLET ORAL at 18:23

## 2018-07-29 RX ADMIN — PROPOFOL 50 MCG/KG/MIN: 10 INJECTION, EMULSION INTRAVENOUS at 00:53

## 2018-07-29 RX ADMIN — LEVOFLOXACIN 750 MG: 5 INJECTION, SOLUTION INTRAVENOUS at 16:17

## 2018-07-29 RX ADMIN — FAMOTIDINE 20 MG: 10 INJECTION, SOLUTION INTRAVENOUS at 08:39

## 2018-07-29 RX ADMIN — SERTRALINE HYDROCHLORIDE 100 MG: 50 TABLET ORAL at 08:39

## 2018-07-29 RX ADMIN — Medication 10 ML: at 14:32

## 2018-07-29 RX ADMIN — IPRATROPIUM BROMIDE AND ALBUTEROL SULFATE 3 ML: .5; 3 SOLUTION RESPIRATORY (INHALATION) at 03:33

## 2018-07-29 RX ADMIN — Medication 75 MCG/HR: at 19:34

## 2018-07-29 NOTE — PROGRESS NOTES
Bedside and Verbal shift change report given to [facility] [employer] (oncoming nurse) by Imtiaz Mota (offgoing nurse). Report included the following information. 0800- Lithium levels 0.2. Lithium treatment given  Dr Henrique Hewitt; Patient to continue mechanical breathing. Consider SBT tomorrow AM  1630: Patient awake and anxious. Re started on precedex   Bedside and Verbal shift change report given to Alexia Riley (oncoming nurse) by Donna Cota RN (offgoing nurse). Report included the following information SBAR, Kardex, Intake/Output, Recent Results, Med Rec Status and Cardiac Rhythm NSR.

## 2018-07-29 NOTE — PROGRESS NOTES
1900 Report received from Tobias Bobo RN.     0630 Uneventful shift, VSS. Propofol off for SAT, pt slow to awaken, she will open eyes to voice & slight stimulation. 0700 Report given to TIERA Briscoe & Kd Hdez RN.

## 2018-07-29 NOTE — PROGRESS NOTES
7/29/2018  3:55 PM    INTENSIVIST PROGRESS NOTE:     Patient seen and evaluated, chart reviewed   39 yo female presented with sob, wheezing, chest tightness, severe respiratory distress  Placed on bipap  ABG was concerning because of normal pco2 on the setting of severe asthma exacerbation, c/w with pt getting tired  Intubated in the ER  Now intubated sedated    7/28 Failed SBT this am due to agitation    7/29 slept all night. Off propofol but still on IV fentanyl. Failed SAT. Lithium level ok      ROS: unable to obtain    Visit Vitals    /61    Pulse 73    Temp 98 °F (36.7 °C)    Resp 16    Ht 5' 5\" (1.651 m)    Wt 72.8 kg (160 lb 7.9 oz)    SpO2 100%    BMI 26.71 kg/m2       General: intubated AAF   Eyes: anicteric  HEENT: ETT in place  Neck: FROM  CV: RRR  Lungs: no wheezing  Abd: soft  : no flank pain  Ext: no edema  Skin: no rashes  Musculoskeletal: normal inspection  Neuro: sedated    CXR: clear; ETT ok    Labs reviewed    A/P:  - acute respiratory failure: vent support  - acute asthma exacerbation: IV steroids, jet nebs  - acute URI: empiric abx  - PUD/DVT prophylaxis  - IVF  - sedation- restarted a lot of her home meds but will reduce seroquel to allow her to wake up in AM for SBT?  - Will assist on disposition planning when stable for transfer        Jeanine Mccallum MD

## 2018-07-29 NOTE — PROGRESS NOTES
Hospitalist Progress Note    NAME: Rubina Baugh   :  1975   MRN:  [id number]       Assessment / Plan:  Status asthmaticus with acute respiratory distress, POA - s/p mechanical ventilation on   --diffuse wheezing, tachypnea RR 30s still despite on bipap  --AB.31/40/199/20 on bipap. --recommend intubation to patient.  She refuses. Beulah Morel been intubated in past.  I asked ER physician Dr. Lor Ro to speak to patient about this as well.  If she continues like this, she will tire out and code and die as pCO2 still normal but should be low given tachypnea. --given SQ epi in ER, mag.  --continue with steroid 60mg q6h, empiric abx even though CXR is clear, duoneb, bipap.      :  Pt weaned this morning but failed SBT. Continue nebs, IV steroids, and empiric abx.    Heroin abuse -- last used yesterday  Hx Polysubstance abuse:  Prior UDS +THC, methadone, benzo, cocaine, opiate    :  Pt not for SBT this morning. Continue nebs, IV steroids, and empiric abx. Continue sedation. :  Continue mechanical ventilation. Wheezing has improved. Hopeful she will tolerate SBT tmr AM. Continue nebs, steroids and empiric abx.      Sarcoidosis  Bipolar  Hx tobacco abuse -- denies current use          25.0 - 29.9 Overweight / Body mass index is 27.88 kg/(m^2).     Code status: Full  Prophylaxis: Lovenox  Recommended Disposition: tbd     Subjective:     Chief Complaint / Reason for Physician Visit  Sedated. Discussed with RN events overnight. Review of Systems:  Symptom Y/N Comments  Symptom Y/N Comments   Fever/Chills    Chest Pain     Poor Appetite    Edema     Cough    Abdominal Pain     Sputum    Joint Pain     SOB/RIVAS    Pruritis/Rash     Nausea/vomit    Tolerating PT/OT     Diarrhea    Tolerating Diet     Constipation    Other       Could NOT obtain due to: sedated     Objective:     VITALS:   Last 24hrs VS reviewed since prior progress note.  Most recent are:  Patient Vitals for the past 24 hrs:   Temp Pulse Resp BP SpO2   07/29/18 1000 - 72 16 110/57 100 %   07/29/18 0900 - 88 21 122/64 100 %   07/29/18 0800 98 °F (36.7 °C) 70 16 126/73 100 %   07/29/18 0716 - 73 16 - 100 %   07/29/18 0700 - 78 16 120/63 100 %   07/29/18 0600 - 63 16 109/54 100 %   07/29/18 0500 - 60 16 113/59 100 %   07/29/18 0400 98 °F (36.7 °C) 62 16 108/60 100 %   07/29/18 0329 - 64 16 - 100 %   07/29/18 0300 - 70 16 110/57 100 %   07/29/18 0200 - 70 16 106/56 100 %   07/29/18 0100 - 75 16 108/58 100 %   07/29/18 0000 96.7 °F (35.9 °C) 75 16 103/51 100 %   07/28/18 2337 - 61 16 - 100 %   07/28/18 2300 - 64 16 100/48 100 %   07/28/18 2200 - 64 16 99/49 100 %   07/28/18 2100 - 61 16 107/54 100 %   07/28/18 2000 96.3 °F (35.7 °C) 73 16 122/68 100 %   07/28/18 1923 - (!) 55 16 - 100 %   07/28/18 1900 - (!) 59 16 108/64 100 %   07/28/18 1847 - 60 16 - 100 %   07/28/18 1800 - 63 16 114/67 100 %   07/28/18 1743 - (!) 56 16 - -   07/28/18 1700 - (!) 52 16 108/67 -   07/28/18 1608 - - - - 100 %   07/28/18 1600 96 °F (35.6 °C) (!) 51 16 110/69 100 %   07/28/18 1545 - (!) 53 16 - 100 %   07/28/18 1500 - (!) 49 16 117/76 -   07/28/18 1400 - (!) 50 16 115/73 -   07/28/18 1300 - (!) 50 16 119/78 100 %   07/28/18 1200 96 °F (35.6 °C) (!) 51 16 117/83 100 %   07/28/18 1124 - - - - 100 %   07/28/18 1100 - (!) 53 16 117/76 100 %   07/28/18 1023 - (!) 57 16 - 100 %       Intake/Output Summary (Last 24 hours) at 07/29/18 1014  Last data filed at 07/29/18 0600   Gross per 24 hour   Intake           2341.5 ml   Output             2450 ml   Net           -108.5 ml        PHYSICAL EXAM:  General:                    Drowsy, intubated, sedated  EENT:                       EOMI. Anicteric sclerae.  MMM  Resp:                        Wheezing improved  CJ:                            VFMIRYL  rhythm,  No edema  GI:                             Soft, Non distended, Non tender.  +Bowel sounds  Neurologic:                Sedated, attempts to follow simple commands Psych:                       Deferred  Skin:                          No rashes.  No jaundice    Reviewed most current lab test results and cultures  YES  Reviewed most current radiology test results   YES  Review and summation of old records today    NO  Reviewed patient's current orders and MAR    YES  PMH/SH reviewed - no change compared to H&P  ________________________________________________________________________  Care Plan discussed with:    Comments   Patient x    Family      RN x    Care Manager     Consultant                        Multidiciplinary team rounds were held today with , nursing, pharmacist and clinical coordinator. Patient's plan of care was discussed; medications were reviewed and discharge planning was addressed. ________________________________________________________________________  Total NON critical care TIME:  25   Minutes    Total CRITICAL CARE TIME Spent:   Minutes non procedure based      Comments   >50% of visit spent in counseling and coordination of care     ________________________________________________________________________  Jennifer Cedillo MD     Procedures: see electronic medical records for all procedures/Xrays and details which were not copied into this note but were reviewed prior to creation of Plan. LABS:  I reviewed today's most current labs and imaging studies.   Pertinent labs include:  Recent Labs      07/28/18 0455 07/27/18 0448 07/26/18   1109   WBC  9.0  6.7  6.0   HGB  11.0*  11.2*  12.1   HCT  34.2*  34.5*  39.0   PLT  310  254  262     Recent Labs      07/28/18 0455 07/27/18 0448 07/26/18   1109   NA  142  138  135*   K  4.5  4.0  4.1   CL  113*  110*  106   CO2  22  21  20*   GLU  145*  123*  100   BUN  10  6  8   CREA  0.85  0.81  0.85   CA  7.8*  8.4*  9.4   MG  2.5*   --    --    PHOS  3.2   --    --    ALB   --    --   3.7   TBILI   --    --   0.5   SGOT   --    --   36   ALT   --    --   23   INR   --    --   1.1 Signed: Westley Tyler MD

## 2018-07-30 LAB
GLUCOSE BLD STRIP.AUTO-MCNC: 101 MG/DL (ref 65–100)
GLUCOSE BLD STRIP.AUTO-MCNC: 120 MG/DL (ref 65–100)
GLUCOSE BLD STRIP.AUTO-MCNC: 120 MG/DL (ref 65–100)
GLUCOSE BLD STRIP.AUTO-MCNC: 146 MG/DL (ref 65–100)
GLUCOSE BLD STRIP.AUTO-MCNC: 69 MG/DL (ref 65–100)
GLUCOSE BLD STRIP.AUTO-MCNC: 74 MG/DL (ref 65–100)
GLUCOSE BLD STRIP.AUTO-MCNC: 94 MG/DL (ref 65–100)
GLUCOSE BLD STRIP.AUTO-MCNC: 98 MG/DL (ref 65–100)
SERVICE CMNT-IMP: ABNORMAL
SERVICE CMNT-IMP: NORMAL

## 2018-07-30 PROCEDURE — G8978 MOBILITY CURRENT STATUS: HCPCS

## 2018-07-30 PROCEDURE — 94640 AIRWAY INHALATION TREATMENT: CPT

## 2018-07-30 PROCEDURE — 74011000258 HC RX REV CODE- 258: Performed by: PSYCHIATRY & NEUROLOGY

## 2018-07-30 PROCEDURE — 97161 PT EVAL LOW COMPLEX 20 MIN: CPT

## 2018-07-30 PROCEDURE — 94003 VENT MGMT INPAT SUBQ DAY: CPT

## 2018-07-30 PROCEDURE — 65660000000 HC RM CCU STEPDOWN

## 2018-07-30 PROCEDURE — 97530 THERAPEUTIC ACTIVITIES: CPT

## 2018-07-30 PROCEDURE — 97116 GAIT TRAINING THERAPY: CPT

## 2018-07-30 PROCEDURE — 74011250636 HC RX REV CODE- 250/636: Performed by: PSYCHIATRY & NEUROLOGY

## 2018-07-30 PROCEDURE — 77010033678 HC OXYGEN DAILY

## 2018-07-30 PROCEDURE — 82962 GLUCOSE BLOOD TEST: CPT

## 2018-07-30 PROCEDURE — 74011000250 HC RX REV CODE- 250: Performed by: INTERNAL MEDICINE

## 2018-07-30 PROCEDURE — 74011250636 HC RX REV CODE- 250/636

## 2018-07-30 PROCEDURE — G8979 MOBILITY GOAL STATUS: HCPCS

## 2018-07-30 PROCEDURE — 74011000250 HC RX REV CODE- 250: Performed by: HOSPITALIST

## 2018-07-30 PROCEDURE — 74011250636 HC RX REV CODE- 250/636: Performed by: INTERNAL MEDICINE

## 2018-07-30 PROCEDURE — 97165 OT EVAL LOW COMPLEX 30 MIN: CPT

## 2018-07-30 PROCEDURE — 94760 N-INVAS EAR/PLS OXIMETRY 1: CPT

## 2018-07-30 PROCEDURE — 74011250636 HC RX REV CODE- 250/636: Performed by: HOSPITALIST

## 2018-07-30 PROCEDURE — 74011250637 HC RX REV CODE- 250/637: Performed by: INTERNAL MEDICINE

## 2018-07-30 RX ORDER — LEVETIRACETAM 10 MG/ML
1000 INJECTION INTRAVASCULAR ONCE
Status: COMPLETED | OUTPATIENT
Start: 2018-07-30 | End: 2018-07-30

## 2018-07-30 RX ORDER — LEVETIRACETAM 5 MG/ML
500 INJECTION INTRAVASCULAR EVERY 12 HOURS
Status: DISCONTINUED | OUTPATIENT
Start: 2018-07-31 | End: 2018-08-01

## 2018-07-30 RX ORDER — DEXTROSE MONOHYDRATE AND SODIUM CHLORIDE 5; .9 G/100ML; G/100ML
75 INJECTION, SOLUTION INTRAVENOUS CONTINUOUS
Status: DISCONTINUED | OUTPATIENT
Start: 2018-07-30 | End: 2018-08-02

## 2018-07-30 RX ORDER — IPRATROPIUM BROMIDE AND ALBUTEROL SULFATE 2.5; .5 MG/3ML; MG/3ML
3 SOLUTION RESPIRATORY (INHALATION)
Status: DISCONTINUED | OUTPATIENT
Start: 2018-07-30 | End: 2018-08-04 | Stop reason: HOSPADM

## 2018-07-30 RX ORDER — AMLODIPINE BESYLATE 5 MG/1
10 TABLET ORAL DAILY
Status: DISCONTINUED | OUTPATIENT
Start: 2018-07-30 | End: 2018-08-02

## 2018-07-30 RX ORDER — LORAZEPAM 2 MG/ML
INJECTION INTRAMUSCULAR
Status: COMPLETED
Start: 2018-07-30 | End: 2018-07-30

## 2018-07-30 RX ORDER — LEVETIRACETAM 10 MG/ML
1000 INJECTION INTRAVASCULAR EVERY 12 HOURS
Status: COMPLETED | OUTPATIENT
Start: 2018-07-30 | End: 2018-07-30

## 2018-07-30 RX ORDER — SODIUM CHLORIDE 9 MG/ML
75 INJECTION, SOLUTION INTRAVENOUS CONTINUOUS
Status: DISCONTINUED | OUTPATIENT
Start: 2018-07-30 | End: 2018-07-30

## 2018-07-30 RX ORDER — LORAZEPAM 2 MG/ML
1 INJECTION INTRAMUSCULAR AS NEEDED
Status: DISCONTINUED | OUTPATIENT
Start: 2018-07-30 | End: 2018-08-04 | Stop reason: HOSPADM

## 2018-07-30 RX ADMIN — QUETIAPINE FUMARATE 400 MG: 100 TABLET ORAL at 21:02

## 2018-07-30 RX ADMIN — LITHIUM CARBONATE 300 MG: 300 CAPSULE, GELATIN COATED ORAL at 21:00

## 2018-07-30 RX ADMIN — AMLODIPINE BESYLATE 10 MG: 5 TABLET ORAL at 10:39

## 2018-07-30 RX ADMIN — LEVETIRACETAM 1000 MG: 1000 INJECTION, SOLUTION INTRAVENOUS at 21:55

## 2018-07-30 RX ADMIN — SERTRALINE HYDROCHLORIDE 100 MG: 50 TABLET ORAL at 09:11

## 2018-07-30 RX ADMIN — METHYLPREDNISOLONE SODIUM SUCCINATE 40 MG: 40 INJECTION, POWDER, FOR SOLUTION INTRAMUSCULAR; INTRAVENOUS at 05:13

## 2018-07-30 RX ADMIN — IPRATROPIUM BROMIDE AND ALBUTEROL SULFATE 3 ML: .5; 3 SOLUTION RESPIRATORY (INHALATION) at 03:33

## 2018-07-30 RX ADMIN — METHYLPREDNISOLONE SODIUM SUCCINATE 40 MG: 40 INJECTION, POWDER, FOR SOLUTION INTRAMUSCULAR; INTRAVENOUS at 00:16

## 2018-07-30 RX ADMIN — Medication 10 ML: at 19:30

## 2018-07-30 RX ADMIN — SODIUM CHLORIDE 100 ML/HR: 900 INJECTION, SOLUTION INTRAVENOUS at 02:59

## 2018-07-30 RX ADMIN — LEVETIRACETAM 1000 MG: 1000 INJECTION, SOLUTION INTRAVENOUS at 20:32

## 2018-07-30 RX ADMIN — Medication 10 ML: at 05:13

## 2018-07-30 RX ADMIN — LEVOFLOXACIN 750 MG: 5 INJECTION, SOLUTION INTRAVENOUS at 16:09

## 2018-07-30 RX ADMIN — GABAPENTIN 300 MG: 300 CAPSULE ORAL at 16:13

## 2018-07-30 RX ADMIN — CLONAZEPAM 2 MG: 1 TABLET ORAL at 09:11

## 2018-07-30 RX ADMIN — MUPIROCIN: 20 OINTMENT TOPICAL at 09:04

## 2018-07-30 RX ADMIN — GABAPENTIN 300 MG: 300 CAPSULE ORAL at 21:02

## 2018-07-30 RX ADMIN — GABAPENTIN 300 MG: 300 CAPSULE ORAL at 09:11

## 2018-07-30 RX ADMIN — SODIUM CHLORIDE 75 ML/HR: 900 INJECTION, SOLUTION INTRAVENOUS at 20:28

## 2018-07-30 RX ADMIN — DEXTROSE MONOHYDRATE AND SODIUM CHLORIDE 75 ML/HR: 5; .9 INJECTION, SOLUTION INTRAVENOUS at 21:52

## 2018-07-30 RX ADMIN — DEXTROSE MONOHYDRATE 25 G: 500 INJECTION PARENTERAL at 20:41

## 2018-07-30 RX ADMIN — DEXTROSE MONOHYDRATE 12.5 G: 500 INJECTION PARENTERAL at 19:35

## 2018-07-30 RX ADMIN — CLONAZEPAM 2 MG: 1 TABLET ORAL at 18:24

## 2018-07-30 RX ADMIN — ENOXAPARIN SODIUM 40 MG: 100 INJECTION SUBCUTANEOUS at 16:12

## 2018-07-30 RX ADMIN — LITHIUM CARBONATE 300 MG: 300 CAPSULE, GELATIN COATED ORAL at 09:23

## 2018-07-30 RX ADMIN — Medication 10 ML: at 16:12

## 2018-07-30 RX ADMIN — LORAZEPAM 1 MG: 2 INJECTION INTRAMUSCULAR; INTRAVENOUS at 21:11

## 2018-07-30 RX ADMIN — FAMOTIDINE 20 MG: 10 INJECTION, SOLUTION INTRAVENOUS at 09:11

## 2018-07-30 RX ADMIN — LORAZEPAM 1 MG: 2 INJECTION INTRAMUSCULAR; INTRAVENOUS at 19:30

## 2018-07-30 RX ADMIN — IPRATROPIUM BROMIDE AND ALBUTEROL SULFATE 3 ML: .5; 3 SOLUTION RESPIRATORY (INHALATION) at 08:41

## 2018-07-30 RX ADMIN — Medication 10 ML: at 21:02

## 2018-07-30 RX ADMIN — MUPIROCIN: 20 OINTMENT TOPICAL at 21:03

## 2018-07-30 NOTE — PROGRESS NOTES
Hospitalist Progress Note    NAME: Quynh Sorto   :  1975   MRN:  [id number]       Assessment / Plan:  Status asthmaticus with acute respiratory distress, POA - s/p mechanical ventilation on  - extubated   --diffuse wheezing, tachypnea RR 30s still despite on bipap  --AB.31/40/199/20 on bipap. --recommend intubation to patient.  She refuses. Mally Mccormick been intubated in past.  I asked ER physician Dr. Disha Villareal to speak to patient about this as well.  If she continues like this, she will tire out and code and die as pCO2 still normal but should be low given tachypnea. --given SQ epi in ER, mag.  --continue with steroid 60mg q6h, empiric abx even though CXR is clear, duoneb, bipap.      :  Pt weaned this morning but failed SBT. Continue nebs, IV steroids, and empiric abx.    Heroin abuse -- last used yesterday  Hx Polysubstance abuse:  Prior UDS +THC, methadone, benzo, cocaine, opiate    :  Pt not for SBT this morning. Continue nebs, IV steroids, and empiric abx. Continue sedation. :  Continue mechanical ventilation. Wheezing has improved. Hopeful she will tolerate SBT tmr AM. Continue nebs, steroids and empiric abx.      :  Pt extubated to 4L NC - seems to be doing well. Continue nebs, complete abx 7 day course, wean steroids. Sarcoidosis  Bipolar  Hx tobacco abuse -- denies current use          25.0 - 29.9 Overweight / Body mass index is 27.88 kg/(m^2).     Code status: Full  Prophylaxis: Lovenox  Recommended Disposition: tbd     Subjective:     Chief Complaint / Reason for Physician Visit  Pt extubated this morning - feels well, asking to be fed. Discussed with RN events overnight.      Review of Systems:  Symptom Y/N Comments  Symptom Y/N Comments   Fever/Chills n   Chest Pain n    Poor Appetite n   Edema     Cough n   Abdominal Pain n    Sputum    Joint Pain     SOB/RIVAS n   Pruritis/Rash     Nausea/vomit n   Tolerating PT/OT     Diarrhea    Tolerating Diet     Constipation Other       Could NOT obtain due to:      Objective:     VITALS:   Last 24hrs VS reviewed since prior progress note. Most recent are:  Patient Vitals for the past 24 hrs:   Temp Pulse Resp BP SpO2   07/30/18 1100 - 63 13 (!) 156/91 100 %   07/30/18 1000 - 84 15 141/80 100 %   07/30/18 0900 - (!) 57 12 (!) 155/97 100 %   07/30/18 0841 - - - - 100 %   07/30/18 0800 - 64 12 143/86 100 %   07/30/18 0727 98 °F (36.7 °C) (!) 59 8 147/89 100 %   07/30/18 0725 - (!) 58 (!) 7 - 100 %   07/30/18 0708 - 61 10 - 100 %   07/30/18 0700 - 62 8 138/87 100 %   07/30/18 0633 - (!) 57 16 144/87 100 %   07/30/18 0600 - (!) 59 16 (!) 147/96 100 %   07/30/18 0500 - 61 16 155/89 100 %   07/30/18 0400 98.8 °F (37.1 °C) 65 16 150/89 100 %   07/30/18 0329 - 65 17 - 100 %   07/30/18 0300 - 69 16 136/77 100 %   07/30/18 0200 - 70 16 127/77 99 %   07/30/18 0100 - 76 16 128/70 98 %   07/30/18 0000 99.1 °F (37.3 °C) 68 16 132/74 100 %   07/29/18 2337 - 63 16 - 100 %   07/29/18 2300 - 66 16 134/78 100 %   07/29/18 2200 - 64 16 132/83 100 %   07/29/18 2100 - 65 16 132/78 100 %   07/29/18 2000 98.8 °F (37.1 °C) 65 16 130/78 100 %   07/29/18 1917 - (!) 58 16 - 100 %   07/29/18 1900 - (!) 58 16 132/83 100 %   07/29/18 1800 - 65 17 127/75 100 %   07/29/18 1700 - 61 16 130/76 100 %   07/29/18 1600 98.4 °F (36.9 °C) 67 16 126/71 100 %   07/29/18 1526 - - - - 100 %   07/29/18 1500 - 69 16 123/78 100 %   07/29/18 1457 - 65 16 - 100 %   07/29/18 1400 - 81 15 129/76 100 %   07/29/18 1300 - 74 18 119/67 100 %       Intake/Output Summary (Last 24 hours) at 07/30/18 1226  Last data filed at 07/30/18 0700   Gross per 24 hour   Intake          2270.12 ml   Output              800 ml   Net          1470.12 ml        PHYSICAL EXAM:  General: Alert, cooperative, no acute distress    EENT:  EOMI. Anicteric sclerae.  MMM  Resp:  Coarse BS  CV:  Regular  rhythm,  No edema  GI:  Soft, Non distended, Non tender.  +Bowel sounds  Neurologic:  Alert and oriented X 3, hoarse speech  Psych:   Good insight. Not anxious nor agitated  Skin:  No rashes. No jaundice    Reviewed most current lab test results and cultures  YES  Reviewed most current radiology test results   YES  Review and summation of old records today    NO  Reviewed patient's current orders and MAR    YES  PMH/SH reviewed - no change compared to H&P  ________________________________________________________________________  Care Plan discussed with:    Comments   Patient x    Family      RN x    Care Manager     Consultant                        Multidiciplinary team rounds were held today with , nursing, pharmacist and clinical coordinator. Patient's plan of care was discussed; medications were reviewed and discharge planning was addressed. ________________________________________________________________________  Total NON critical care TIME:  25   Minutes    Total CRITICAL CARE TIME Spent:   Minutes non procedure based      Comments   >50% of visit spent in counseling and coordination of care     ________________________________________________________________________  David Norman MD     Procedures: see electronic medical records for all procedures/Xrays and details which were not copied into this note but were reviewed prior to creation of Plan. LABS:  I reviewed today's most current labs and imaging studies.   Pertinent labs include:  Recent Labs      07/28/18   0455   WBC  9.0   HGB  11.0*   HCT  34.2*   PLT  310     Recent Labs      07/28/18   0455   NA  142   K  4.5   CL  113*   CO2  22   GLU  145*   BUN  10   CREA  0.85   CA  7.8*   MG  2.5*   PHOS  3.2       Signed: David Norman MD

## 2018-07-30 NOTE — PROGRESS NOTES
Problem: Mobility Impaired (Adult and Pediatric)  Goal: *Acute Goals and Plan of Care (Insert Text)  Physical Therapy Goals  Initiated 7/30/2018  1. Patient will move from supine to sit and sit to supine , scoot up and down and roll side to side in bed with minimal assistance/contact guard assist within 7 day(s). 2.  Patient will transfer from bed to chair and chair to bed with minimal assistance/contact guard assist using the least restrictive device within 7 day(s). 3.  Patient will perform sit to stand with minimal assistance/contact guard assist within 7 day(s). 4.  Patient will ambulate with minimal assistance/contact guard assist for 100 feet with the least restrictive device within 7 day(s). physical Therapy EVALUATION  Patient: Tara Lincoln (89 y.o. female)  Date: 7/30/2018  Primary Diagnosis: Asthma exacerbation        Precautions:   Bed Alarm, Fall    ASSESSMENT :  Based on the objective data described below, the patient presents with increased drowsiness/lethargy, intermittent confusion, increased weakness, impaired processing/motor planning/sequencing, hypertension, impaired standing balance, and overall impaired functional mobility. Pt extubated this AM to 2 LPM O2, cleared for participation with therapy by RN. Pt received supine in bed on 2 LPM O2. Pt required increased verbal cueing as well as minAx2 in order to assume seated position EOB. Sitting balance intact EOB however close supervision maintained. Pt sit>>stand w/ minAx2, exhibiting poor standing balance secondary to increased trunk sway in all planes in addition to strong posterior lean. Pt sidestepped 4-5 steps along EOB w/ min-modAx2 and HHAx2, again with increased trunk sway and strong posterior lean remaining. BP elevated to 168/108 during therapy session therefore pt returned to supine position in bed with RN notified. O2 sats % throughout on 2 LPM O2.  Pt is functioning significantly below her baseline modified independent level and would benefit from additional skilled therapy to address the above mentioned deficits. Pending further progress with therapy, discharge recommendations TBD, SNF vs inpt. Patient will benefit from skilled intervention to address the above impairments. Patients rehabilitation potential is considered to be Fair  Factors which may influence rehabilitation potential include:   []         None noted  []         Mental ability/status  []         Medical condition  []         Home/family situation and support systems  []         Safety awareness  []         Pain tolerance/management  []         Other:      PLAN :  Recommendations and Planned Interventions:  [x]           Bed Mobility Training             []    Neuromuscular Re-Education  [x]           Transfer Training                   []    Orthotic/Prosthetic Training  [x]           Gait Training                         []    Modalities  [x]           Therapeutic Exercises           []    Edema Management/Control  [x]           Therapeutic Activities            [x]    Patient and Family Training/Education  []           Other (comment):    Frequency/Duration: Patient will be followed by physical therapy  4 times a week to address goals. Discharge Recommendations: Inpatient Rehab   Further Equipment Recommendations for Discharge: TBD     SUBJECTIVE:   Patient stated Okay.     OBJECTIVE DATA SUMMARY:   HISTORY:    Past Medical History:   Diagnosis Date    Asthma     Bipolar 1 disorder ([facility] [address]     Chronic obstructive pulmonary disease (HCC)     Chronic pain     back, leg    Cocaine abuse     Depression     Hepatitis B     Heroin abuse     HTN (hypertension)     Narcotic abuse     Polysubstance abuse     Sarcoidosis     Tobacco abuse      Past Surgical History:   Procedure Laterality Date    HX GYN      HX WISDOM TEETH EXTRACTION       Prior Level of Function/Home Situation: Pt lives at home w/ mother. Reports ambulation with use of SPC.  Reports history of 10 falls. Denies home O2 use. States her mother assists with bathing and dressing. Does not drive. Hx of drug abuse  Personal factors and/or comorbidities impacting plan of care: hx of drug abuse         EXAMINATION/PRESENTATION/DECISION MAKING:   Critical Behavior:  Neurologic State: Alert, Anesthetized, Drowsy  Orientation Level: Unable to verbalize  Cognition: Decreased command following, Decreased attention/concentration  Safety/Judgement: Decreased awareness of need for safety, Decreased awareness of need for assistance, Decreased insight into deficits, Decreased awareness of environment  Hearing: Auditory  Auditory Impairment: None  Skin:  intact  Edema: BUE, BLE edema  Range Of Motion:  AROM: Generally decreased, functional                       Strength:    Strength: Generally decreased, functional (decreased  strength in B hands)                    Tone & Sensation:                                  Coordination:  Coordination: Generally decreased, functional  Vision:   Tracking: Able to track stimulus in all quadrants w/o difficulty  Functional Mobility:  Bed Mobility:     Supine to Sit: Minimum assistance;Assist x2; Additional time (poor processing, A to intitiate, impaired sequencing)        Transfers:  Sit to Stand: Minimum assistance;Assist x2; Additional time (via HHA x 2, posterior lean)  Stand to Sit: Contact guard assistance;Assist x2; Additional time                       Balance:   Sitting: Impaired; Without support  Sitting - Static: Good (unsupported)  Standing: Impaired; With support  Standing - Static: Constant support;Poor  Standing - Dynamic : Poor  Ambulation/Gait Training:                                                          Functional Measure:  Barthel Index:    Bathin  Bladder: 5  Bowels: 5  Groomin  Dressin  Feedin  Mobility: 0  Stairs: 0  Toilet Use: 5  Transfer (Bed to Chair and Back): 5  Total: 30       Barthel and G-code impairment scale:  Percentage of impairment CH  0% CI  1-19% CJ  20-39% CK  40-59% CL  60-79% CM  80-99% CN  100%   Barthel Score 0-100 100 99-80 79-60 59-40 20-39 1-19   0   Barthel Score 0-20 20 17-19 13-16 9-12 5-8 1-4 0      The Barthel ADL Index: Guidelines  1. The index should be used as a record of what a patient does, not as a record of what a patient could do. 2. The main aim is to establish degree of independence from any help, physical or verbal, however minor and for whatever reason. 3. The need for supervision renders the patient not independent. 4. A patient's performance should be established using the best available evidence. Asking the patient, friends/relatives and nurses are the usual sources, but direct observation and common sense are also important. However direct testing is not needed. 5. Usually the patient's performance over the preceding 24-48 hours is important, but occasionally longer periods will be relevant. 6. Middle categories imply that the patient supplies over 50 per cent of the effort. 7. Use of aids to be independent is allowed. Link ., Barthel, D.W. (8826). Functional evaluation: the Barthel Index. [facility] (14)2. Coleen Crews krish KATARINA Todd, Junior Hernández., Sophia Felix., [place], [address] [place] [address] (1999). Measuring the change indisability after inpatient rehabilitation; comparison of the responsiveness of the Barthel Index and Functional [place] Measure. Journal of Neurology, Neurosurgery, and Psychiatry, 66(4), 870-437. Narda Leach, N.J.A, KATIE Prado, & Shila Lewis MLeticiaA. (2004.) Assessment of post-stroke quality of life in cost-effectiveness studies: The usefulness of the Barthel Index and the EuroQoL-5D. Quality of Life Research, 13, 428-86       G codes: In compliance with CMSs Claims Based Outcome Reporting, the following G-code set was chosen for this patient based on their primary functional limitation being treated:     The outcome measure chosen to determine the severity of the functional limitation was the Barthel Index with a score of 30/100 which was correlated with the impairment scale. ? Mobility - Walking and Moving Around:     - CURRENT STATUS: CL - 60%-79% impaired, limited or restricted    - GOAL STATUS: CI - 1%-19% impaired, limited or restricted    - D/C STATUS:  ---------------To be determined---------------      Physical Therapy Evaluation Charge Determination   History Examination Presentation Decision-Making   MEDIUM  Complexity : 1-2 comorbidities / personal factors will impact the outcome/ POC  MEDIUM Complexity : 3 Standardized tests and measures addressing body structure, function, activity limitation and / or participation in recreation  MEDIUM Complexity : Evolving with changing characteristics  MEDIUM Complexity : FOTO score of 26-74      Based on the above components, the patient evaluation is determined to be of the following complexity level: MEDIUM    Pain:  Pain Scale 1: Behavioral Pain Scale (BPS)  Pain Intensity 1: 3              Activity Tolerance:   Hypertensive; O2 sats % on 2 LPM O2 throughout; drowsy  Please refer to the flowsheet for vital signs taken during this treatment. After treatment:   []         Patient left in no apparent distress sitting up in chair  [x]         Patient left in no apparent distress in bed  [x]         Call bell left within reach  [x]         Nursing notified  []         Caregiver present  [x]         Bed alarm activated    COMMUNICATION/EDUCATION:   The patients plan of care was discussed with: Occupational Therapist and Registered Nurse. [x]         Fall prevention education was provided and the patient/caregiver indicated understanding. [x]         Patient/family have participated as able in goal setting and plan of care. [x]         Patient/family agree to work toward stated goals and plan of care.   []         Patient understands intent and goals of therapy, but is neutral about his/her participation. []         Patient is unable to participate in goal setting and plan of care.     Thank you for this referral.  Jilda Gowers, PT, DPT   Time Calculation: 18 mins

## 2018-07-30 NOTE — ROUTINE PROCESS
TRANSFER - OUT REPORT:    Verbal report given to OMER Meade RN(name) on Johanna Cedeño  being transferred to Medical telemetry(unit) for routine progression of care       Report consisted of patients Situation, Background, Assessment and   Recommendations(SBAR). Information from the following report(s) SBAR, Kardex, ED Summary, Procedure Summary, Intake/Output, MAR, Accordion, Recent Results, Med Rec Status and Cardiac Rhythm NSR was reviewed with the receiving nurse. Opportunity for questions and clarification was provided.       Patient transported with:   O2 @ 2 liters  Registered Nurse  [facility]

## 2018-07-30 NOTE — PROGRESS NOTES
7/30/2018    INTENSIVIST PROGRESS NOTE:     Patient seen and evaluated, chart reviewed   39 yo female presented with sob, wheezing, chest tightness, severe respiratory distress  Placed on bipap  ABG was concerning because of normal pco2 on the setting of severe asthma exacerbation, c/w with pt getting tired  Intubated in the ER  Now intubated, awake, tolerating SBT  No acute events overnight    ROS: unable to obtain    Visit Vitals    /87    Pulse 62    Temp 98.8 °F (37.1 °C)    Resp 8    Ht 5' 5\" (1.651 m)    Wt 72.8 kg (160 lb 7.9 oz)    SpO2 100%    BMI 26.71 kg/m2       General: intubated   Eyes: anicteric  HEENT: ETT in place  Neck: FROM  CV: RRR  Lungs: clear, not wheezing  Abd: soft  : no flank pain  Ext: no edema  Skin: no rashes  Musculoskeletal: normal inspection  Neuro: non focal      Labs reviewed    A/P:  - acute respiratory failure: wean and extubate  - acute asthma exacerbation: IV steroids, jet nebs  - acute URI: empiric abx  - PUD/DVT prophylaxis  - IVF  - DC sedation  - Will assist on disposition planning when stable for transfer      Lani Alas MD

## 2018-07-30 NOTE — INTERDISCIPLINARY ROUNDS
Interdisciplinary team rounds were held 7/30/2018 with the following team members:Diabetes Treatment Specialist, Nursing, Nutrition, Pharmacy, Physician and Respiratory Therapy. Plan of care discussed. See clinical pathway and/or care plan for interventions and desired outcomes.

## 2018-07-30 NOTE — PROGRESS NOTES
1900 Report received from TIERA Briscoe & Andreea Morales RN.    8327 Uneventful shift, VSS. Pt tolerating SAT, quicker to wake up this morning, tolerating SBT as well.    0700 Report given to TIERA Briscoe.

## 2018-07-30 NOTE — PROGRESS NOTES
07/30/18 0708   ABCDEF Bundle   SBT Trial Passed Yes  (Ready for extubation per Dr. Nupur Chen)   Weaning Parameters   Resp Rate Observed 10   Ve 6.6      RSBI 10     Passing SBT trial. Ready for extubation per Dr. Nupur Chen.

## 2018-07-30 NOTE — ROUTINE PROCESS
Bedside and Verbal shift change report received from CAMILLA Dumont (offgoing nurse). Report included the following information SBAR, Kardex, ED Summary, Procedure Summary, Intake/Output, MAR, Accordion, Recent Results, Med Rec Status and Cardiac Rhythm sinus bradycardia. She is arousable to her name, currently with a Spontaneous mode with the Mechanical ventilator. 0740:Extubated, no stridor, or wheezing noted. Continue the current plan of care. 0900:Stand tool passed without any challenges of SOB or desaturation. 1000:He accepted a small amount of her meal without c/o nausea or regurgitation. 1100: She spoke with her mother, noted drowsy but arousable, and cooperative with her plan of care. 1215:Planning to transfer to the medical unit.

## 2018-07-30 NOTE — PROGRESS NOTES
Occupational Therapy Goals  Initiated 7/30/2018  1. Patient will perform self feeding with independence within 7 day(s). 2.  Patient will perform standing grooming task at sink with minimal assistance/contact guard assist within 7 day(s). 3.  Patient will perform upper and lower body dressing with minimal assistance/contact guard assist within 7 day(s). 4.  Patient will perform toilet transfers in bathroom with least restrictive DME with minimal assistance/contact guard assist within 7 day(s). 5.  Patient will perform all aspects of toileting with minimal assistance/contact guard assist within 7 day(s). Occupational Therapy EVALUATION  Patient: David Dunn (20 y.o. female)  Date: 7/30/2018  Primary Diagnosis: Asthma exacerbation        Precautions:   Bed Alarm, Fall    ASSESSMENT :  Based on the objective data described below, the patient presents with delayed processing of one step commands, impaired sequencing for basic mobility, anxiety, risk for and hx of falls and decline in functional status. Pt extubated this AM after intubation for acute asthma exacerbation. Pt with hx of drug abuse. Pt lethargic upon arrival but alert to voice. O2 stable 2 L, but hypertensive. Pt required constant verbal and tactile cueing for moving LEs to EOB in order to achieve sitting position. Noted seated BP with 160s/100s. Completed sit <> stand with MIN A x 2 via HHA. Pt unsteady with occasional postural swaying. Returned supine with CGA-MIN A x 2. Pt is functioning below her baseline, question how far pt is from her cognitive baseline. Pt may require rehab pending her progress. Patient will benefit from skilled intervention to address the above impairments.   Patients rehabilitation potential is considered to be Good  Factors which may influence rehabilitation potential include:   []             None noted  [x]             Mental ability/status  [x]             Medical condition  []             Home/family situation and support systems  []             Safety awareness  []             Pain tolerance/management  []             Other:      PLAN :  Recommendations and Planned Interventions:  [x]               Self Care Training                  [x]        Therapeutic Activities  [x]               Functional Mobility Training    []        Cognitive Retraining  [x]               Therapeutic Exercises           []        Endurance Activities  [x]               Balance Training                   []        Neuromuscular Re-Education  []               Visual/Perceptual Training     [x]   Home Safety Training  [x]               Patient Education                 [x]        Family Training/Education  []               Other (comment):    Frequency/Duration: Patient will be followed by occupational therapy 4 times a week to address goals. Discharge Recommendations: Skilled Nursing Facility  Further Equipment Recommendations for Discharge: TBD     SUBJECTIVE:   Patient stated Can't we walk in the narayan?     OBJECTIVE DATA SUMMARY:   HISTORY:   Past Medical History:   Diagnosis Date    Asthma     Bipolar 1 disorder ([facility] [address]     Chronic obstructive pulmonary disease (HCC)     Chronic pain     back, leg    Cocaine abuse     Depression     Hepatitis B     Heroin abuse     HTN (hypertension)     Narcotic abuse     Polysubstance abuse     Sarcoidosis     Tobacco abuse      Past Surgical History:   Procedure Laterality Date    HX GYN      HX WISDOM TEETH EXTRACTION         Prior Level of Function/Environment/Context: lives with her mother who assists her with dressing and bathing. Pt sits in base of tub to bathe.  Uses [facility] due to R hip pain  Occupations in which the patient is/was successful, what are the barriers preventing that success:   Performance Patterns (routines, roles, habits, and rituals):   Personal Interests and/or values:   Expanded or extensive additional review of patient history: bipolar, drug abuse, sarcoidosis Hand dominance: Right    EXAMINATION OF PERFORMANCE DEFICITS:  Cognitive/Behavioral Status:  Neurologic State: Alert; Anesthetized;Drowsy  Orientation Level: Unable to verbalize  Cognition: Decreased command following;Decreased attention/concentration  Perception: Cues to maintain midline in standing  Perseveration: No perseveration noted  Safety/Judgement: Decreased awareness of need for safety;Decreased awareness of need for assistance;Decreased insight into deficits; Decreased awareness of environment    Skin: skin \"pops\" from IV drug use    Edema: B UE proximal > distal    Hearing: Auditory  Auditory Impairment: None    Vision/Perceptual:    Tracking: Able to track stimulus in all quadrants w/o difficulty                                Range of Motion:    AROM: Generally decreased, functional                         Strength:    Strength: Generally decreased, functional (decreased  strength in B hands)                Coordination:  Coordination: Generally decreased, functional  Fine Motor Skills-Upper: Left Impaired;Right Impaired    Gross Motor Skills-Upper: Left Intact; Right Intact                   Balance:  Sitting: Impaired; Without support  Sitting - Static: Good (unsupported)  Standing: Impaired; With support  Standing - Static: Constant support;Poor  Standing - Dynamic : Poor    Functional Mobility and Transfers for ADLs:  Bed Mobility:  Supine to Sit: Minimum assistance;Assist x2; Additional time (poor processing, A to intitiate, impaired sequencing)    Transfers:  Sit to Stand: Minimum assistance;Assist x2; Additional time (via HHA x 2, posterior lean)  Stand to Sit: Contact guard assistance;Assist x2; Additional time  Toilet Transfer : Minimum assistance;Assist x2; Additional time (inferred, to [facility])    ADL Assessment:  Feeding: Setup    Oral Facial Hygiene/Grooming: Minimum assistance    Bathing: Moderate assistance    Upper Body Dressing:  Moderate assistance (decreased shoulder ROM 2* edema )    Lower Body Dressing: Moderate assistance    Toileting: Moderate assistance                ADL Intervention and task modifications:              Pt educated on role of OT and required verbal cues for safety and proper hand placement during ADLs/functional transfers. Max verbal and tactile cues for sequencing and processing to achieve supine to sit. Cognitive Retraining  Safety/Judgement: Decreased awareness of need for safety;Decreased awareness of need for assistance;Decreased insight into deficits; Decreased awareness of environment      Functional Measure:  Barthel Index:    Bathin  Bladder: 5  Bowels: 5  Groomin  Dressin  Feedin  Mobility: 0  Stairs: 0  Toilet Use: 5  Transfer (Bed to Chair and Back): 5  Total: 30       Barthel and G-code impairment scale:  Percentage of impairment CH  0% CI  1-19% CJ  20-39% CK  40-59% CL  60-79% CM  80-99% CN  100%   Barthel Score 0-100 100 99-80 79-60 59-40 20-39 1-19   0   Barthel Score 0-20 20 17-19 13-16 9-12 5-8 1-4 0      The Barthel ADL Index: Guidelines  1. The index should be used as a record of what a patient does, not as a record of what a patient could do. 2. The main aim is to establish degree of independence from any help, physical or verbal, however minor and for whatever reason. 3. The need for supervision renders the patient not independent. 4. A patient's performance should be established using the best available evidence. Asking the patient, friends/relatives and nurses are the usual sources, but direct observation and common sense are also important. However direct testing is not needed. 5. Usually the patient's performance over the preceding 24-48 hours is important, but occasionally longer periods will be relevant. 6. Middle categories imply that the patient supplies over 50 per cent of the effort. 7. Use of aids to be independent is allowed. Yung Proctor., Barthel, D.W. ([id number]).  Functional evaluation: the Barthel Index. [address] [facility] (14)2. KATARINA Crawley, Pau Carbone., Abimael Chandra., [place], [address] (1999). Measuring the change indisability after inpatient rehabilitation; comparison of the responsiveness of the Barthel Index and Functional [place] Measure. Journal of Neurology, Neurosurgery, and Psychiatry, 66(4), 093-369. GABRIEL Friedman.KRISTA, KATIE Prado, & Kenyon Morelos M.A. (2004.) Assessment of post-stroke quality of life in cost-effectiveness studies: The usefulness of the Barthel Index and the EuroQoL-5D. Quality of Life Research, 13, 160-31         G codes: In compliance with CMSs Claims Based Outcome Reporting, the following G-code set was chosen for this patient based on their primary functional limitation being treated: The outcome measure chosen to determine the severity of the functional limitation was the barthel index with a score of 30/100 which was correlated with the impairment scale. ? Self Care:     - CURRENT STATUS: CL - 60%-79% impaired, limited or restricted    - GOAL STATUS: CK - 40%-59% impaired, limited or restricted    - D/C STATUS:  ---------------To be determined---------------     Occupational Therapy Evaluation Charge Determination   History Examination Decision-Making   LOW Complexity : Brief history review  LOW Complexity : 1-3 performance deficits relating to physical, cognitive , or psychosocial skils that result in activity limitations and / or participation restrictions  MEDIUM Complexity : Patient may present with comorbidities that affect occupational performnce.  Miniml to moderate modification of tasks or assistance (eg, physical or verbal ) with assesment(s) is necessary to enable patient to complete evaluation       Based on the above components, the patient evaluation is determined to be of the following complexity level: LOW   Pain:  Pain Scale 1: Behavioral Pain Scale (BPS)  Pain Intensity 1: 3              Activity Tolerance:   Anxious, tachypnea   Please refer to the flowsheet for vital signs taken during this treatment. After treatment:   [] Patient left in no apparent distress sitting up in chair  [x] Patient left in no apparent distress in bed  [x] Call bell left within reach  [x] Nursing notified  [] Caregiver present  [] Bed alarm activated    COMMUNICATION/EDUCATION:   The patients plan of care was discussed with: Physical Therapist and Registered Nurse.  [] Home safety education was provided and the patient/caregiver indicated understanding. [x] Patient/family have participated as able in goal setting and plan of care. [] Patient/family agree to work toward stated goals and plan of care. [] Patient understands intent and goals of therapy, but is neutral about his/her participation. [] Patient is unable to participate in goal setting and plan of care. This patients plan of care is appropriate for delegation to [facility].     Thank you for this referral.  Job Griffin, OT  Time Calculation: 18 mins

## 2018-07-31 ENCOUNTER — APPOINTMENT (OUTPATIENT)
Dept: GENERAL RADIOLOGY | Age: 43
DRG: 951 | End: 2018-07-31
Attending: INTERNAL MEDICINE
Payer: MEDICAID

## 2018-07-31 LAB
ALBUMIN SERPL-MCNC: 2.6 G/DL (ref 3.5–5)
ALBUMIN/GLOB SERPL: 0.8 {RATIO} (ref 1.1–2.2)
ALP SERPL-CCNC: 47 U/L (ref 45–117)
ALT SERPL-CCNC: 21 U/L (ref 12–78)
ANION GAP SERPL CALC-SCNC: 8 MMOL/L (ref 5–15)
AST SERPL-CCNC: 25 U/L (ref 15–37)
BILIRUB DIRECT SERPL-MCNC: <0.1 MG/DL (ref 0–0.2)
BILIRUB SERPL-MCNC: 0.3 MG/DL (ref 0.2–1)
BUN SERPL-MCNC: 16 MG/DL (ref 6–20)
BUN/CREAT SERPL: 20 (ref 12–20)
CALCIUM SERPL-MCNC: 7.8 MG/DL (ref 8.5–10.1)
CHLORIDE SERPL-SCNC: 109 MMOL/L (ref 97–108)
CK SERPL-CCNC: 165 U/L (ref 26–192)
CO2 SERPL-SCNC: 23 MMOL/L (ref 21–32)
CREAT SERPL-MCNC: 0.8 MG/DL (ref 0.55–1.02)
DATE LAST DOSE: ABNORMAL
DATE LAST DOSE: ABNORMAL
ERYTHROCYTE [DISTWIDTH] IN BLOOD BY AUTOMATED COUNT: 15.5 % (ref 11.5–14.5)
GLOBULIN SER CALC-MCNC: 3.3 G/DL (ref 2–4)
GLUCOSE BLD STRIP.AUTO-MCNC: 111 MG/DL (ref 65–100)
GLUCOSE BLD STRIP.AUTO-MCNC: 114 MG/DL (ref 65–100)
GLUCOSE BLD STRIP.AUTO-MCNC: 71 MG/DL (ref 65–100)
GLUCOSE BLD STRIP.AUTO-MCNC: 72 MG/DL (ref 65–100)
GLUCOSE BLD STRIP.AUTO-MCNC: 75 MG/DL (ref 65–100)
GLUCOSE BLD STRIP.AUTO-MCNC: 83 MG/DL (ref 65–100)
GLUCOSE BLD STRIP.AUTO-MCNC: 87 MG/DL (ref 65–100)
GLUCOSE SERPL-MCNC: 89 MG/DL (ref 65–100)
HCT VFR BLD AUTO: 32.4 % (ref 35–47)
HGB BLD-MCNC: 10.4 G/DL (ref 11.5–16)
LITHIUM SERPL-SCNC: 0.21 MMOL/L (ref 0.6–1.2)
LITHIUM SERPL-SCNC: 0.34 MMOL/L (ref 0.6–1.2)
MAGNESIUM SERPL-MCNC: 2.1 MG/DL (ref 1.6–2.4)
MCH RBC QN AUTO: 27.1 PG (ref 26–34)
MCHC RBC AUTO-ENTMCNC: 32.1 G/DL (ref 30–36.5)
MCV RBC AUTO: 84.4 FL (ref 80–99)
NRBC # BLD: 0 K/UL (ref 0–0.01)
NRBC BLD-RTO: 0 PER 100 WBC
PHOSPHATE SERPL-MCNC: 1.5 MG/DL (ref 2.6–4.7)
PLATELET # BLD AUTO: 298 K/UL (ref 150–400)
PMV BLD AUTO: 9.8 FL (ref 8.9–12.9)
POTASSIUM SERPL-SCNC: 3.2 MMOL/L (ref 3.5–5.1)
PROLACTIN SERPL-MCNC: 10.5 NG/ML
PROLACTIN SERPL-MCNC: 23.1 NG/ML
PROT SERPL-MCNC: 5.9 G/DL (ref 6.4–8.2)
RBC # BLD AUTO: 3.84 M/UL (ref 3.8–5.2)
REPORTED DOSE,DOSE: ABNORMAL UNITS
REPORTED DOSE,DOSE: ABNORMAL UNITS
REPORTED DOSE/TIME,TMG: ABNORMAL
REPORTED DOSE/TIME,TMG: ABNORMAL
SERVICE CMNT-IMP: ABNORMAL
SERVICE CMNT-IMP: ABNORMAL
SERVICE CMNT-IMP: NORMAL
SODIUM SERPL-SCNC: 140 MMOL/L (ref 136–145)
WBC # BLD AUTO: 6 K/UL (ref 3.6–11)

## 2018-07-31 PROCEDURE — 85027 COMPLETE CBC AUTOMATED: CPT | Performed by: INTERNAL MEDICINE

## 2018-07-31 PROCEDURE — 74011250637 HC RX REV CODE- 250/637: Performed by: INTERNAL MEDICINE

## 2018-07-31 PROCEDURE — 74011250636 HC RX REV CODE- 250/636: Performed by: HOSPITALIST

## 2018-07-31 PROCEDURE — 82550 ASSAY OF CK (CPK): CPT | Performed by: INTERNAL MEDICINE

## 2018-07-31 PROCEDURE — 95816 EEG AWAKE AND DROWSY: CPT | Performed by: INTERNAL MEDICINE

## 2018-07-31 PROCEDURE — 80178 ASSAY OF LITHIUM: CPT | Performed by: INTERNAL MEDICINE

## 2018-07-31 PROCEDURE — 80048 BASIC METABOLIC PNL TOTAL CA: CPT | Performed by: PSYCHIATRY & NEUROLOGY

## 2018-07-31 PROCEDURE — 80178 ASSAY OF LITHIUM: CPT | Performed by: GENERAL ACUTE CARE HOSPITAL

## 2018-07-31 PROCEDURE — G8979 MOBILITY GOAL STATUS: HCPCS

## 2018-07-31 PROCEDURE — 74011000250 HC RX REV CODE- 250: Performed by: HOSPITALIST

## 2018-07-31 PROCEDURE — 82962 GLUCOSE BLOOD TEST: CPT

## 2018-07-31 PROCEDURE — 84146 ASSAY OF PROLACTIN: CPT | Performed by: PSYCHIATRY & NEUROLOGY

## 2018-07-31 PROCEDURE — 36600 WITHDRAWAL OF ARTERIAL BLOOD: CPT

## 2018-07-31 PROCEDURE — 84100 ASSAY OF PHOSPHORUS: CPT | Performed by: PSYCHIATRY & NEUROLOGY

## 2018-07-31 PROCEDURE — 80076 HEPATIC FUNCTION PANEL: CPT | Performed by: INTERNAL MEDICINE

## 2018-07-31 PROCEDURE — 83735 ASSAY OF MAGNESIUM: CPT | Performed by: PSYCHIATRY & NEUROLOGY

## 2018-07-31 PROCEDURE — 74011000258 HC RX REV CODE- 258: Performed by: PSYCHIATRY & NEUROLOGY

## 2018-07-31 PROCEDURE — C9113 INJ PANTOPRAZOLE SODIUM, VIA: HCPCS | Performed by: INTERNAL MEDICINE

## 2018-07-31 PROCEDURE — 71045 X-RAY EXAM CHEST 1 VIEW: CPT

## 2018-07-31 PROCEDURE — 74011250636 HC RX REV CODE- 250/636: Performed by: INTERNAL MEDICINE

## 2018-07-31 PROCEDURE — 36415 COLL VENOUS BLD VENIPUNCTURE: CPT | Performed by: PSYCHIATRY & NEUROLOGY

## 2018-07-31 PROCEDURE — G8978 MOBILITY CURRENT STATUS: HCPCS

## 2018-07-31 PROCEDURE — 65620000000 HC RM CCU GENERAL

## 2018-07-31 PROCEDURE — G8980 MOBILITY D/C STATUS: HCPCS

## 2018-07-31 RX ORDER — PANTOPRAZOLE SODIUM 40 MG/10ML
40 INJECTION, POWDER, LYOPHILIZED, FOR SOLUTION INTRAVENOUS DAILY
Status: DISCONTINUED | OUTPATIENT
Start: 2018-07-31 | End: 2018-08-03

## 2018-07-31 RX ORDER — MUPIROCIN 20 MG/G
OINTMENT TOPICAL EVERY 12 HOURS
Status: DISCONTINUED | OUTPATIENT
Start: 2018-07-31 | End: 2018-08-04 | Stop reason: HOSPADM

## 2018-07-31 RX ORDER — TRAMADOL HYDROCHLORIDE 50 MG/1
50 TABLET ORAL
Status: DISCONTINUED | OUTPATIENT
Start: 2018-07-31 | End: 2018-08-01

## 2018-07-31 RX ORDER — POTASSIUM CHLORIDE 7.45 MG/ML
10 INJECTION INTRAVENOUS
Status: COMPLETED | OUTPATIENT
Start: 2018-07-31 | End: 2018-07-31

## 2018-07-31 RX ADMIN — CLONAZEPAM 2 MG: 1 TABLET ORAL at 18:02

## 2018-07-31 RX ADMIN — Medication 10 ML: at 21:13

## 2018-07-31 RX ADMIN — DEXTROSE MONOHYDRATE AND SODIUM CHLORIDE 75 ML/HR: 5; .9 INJECTION, SOLUTION INTRAVENOUS at 16:35

## 2018-07-31 RX ADMIN — LORAZEPAM 1 MG: 2 INJECTION INTRAMUSCULAR; INTRAVENOUS at 16:08

## 2018-07-31 RX ADMIN — MUPIROCIN: 20 OINTMENT TOPICAL at 10:06

## 2018-07-31 RX ADMIN — GABAPENTIN 300 MG: 300 CAPSULE ORAL at 18:04

## 2018-07-31 RX ADMIN — GABAPENTIN 300 MG: 300 CAPSULE ORAL at 21:12

## 2018-07-31 RX ADMIN — LORAZEPAM 1 MG: 2 INJECTION INTRAMUSCULAR; INTRAVENOUS at 09:03

## 2018-07-31 RX ADMIN — ENOXAPARIN SODIUM 40 MG: 100 INJECTION SUBCUTANEOUS at 13:58

## 2018-07-31 RX ADMIN — Medication 10 ML: at 14:24

## 2018-07-31 RX ADMIN — QUETIAPINE FUMARATE 400 MG: 100 TABLET ORAL at 21:12

## 2018-07-31 RX ADMIN — LITHIUM CARBONATE 300 MG: 300 CAPSULE, GELATIN COATED ORAL at 10:00

## 2018-07-31 RX ADMIN — CLONAZEPAM 2 MG: 1 TABLET ORAL at 10:00

## 2018-07-31 RX ADMIN — POTASSIUM CHLORIDE 10 MEQ: 10 INJECTION, SOLUTION INTRAVENOUS at 13:57

## 2018-07-31 RX ADMIN — LEVETIRACETAM 500 MG: 5 INJECTION INTRAVENOUS at 21:56

## 2018-07-31 RX ADMIN — POTASSIUM CHLORIDE 10 MEQ: 10 INJECTION, SOLUTION INTRAVENOUS at 14:25

## 2018-07-31 RX ADMIN — GABAPENTIN 300 MG: 300 CAPSULE ORAL at 18:05

## 2018-07-31 RX ADMIN — PANTOPRAZOLE SODIUM 40 MG: 40 INJECTION, POWDER, FOR SOLUTION INTRAVENOUS at 10:00

## 2018-07-31 RX ADMIN — GABAPENTIN 300 MG: 300 CAPSULE ORAL at 10:00

## 2018-07-31 RX ADMIN — LEVETIRACETAM 500 MG: 5 INJECTION INTRAVENOUS at 11:47

## 2018-07-31 RX ADMIN — DEXTROSE MONOHYDRATE 25 G: 500 INJECTION PARENTERAL at 11:48

## 2018-07-31 RX ADMIN — LITHIUM CARBONATE 300 MG: 300 CAPSULE, GELATIN COATED ORAL at 18:03

## 2018-07-31 NOTE — CONSULTS
SEIZURE CONSULT NOTE    Patient ID:  Marcus Rosenthal  [id number]  38 y.o.  1975    Date of Consultation:  July 31, 2018    Referring Physician: Dr. Monika Crenshaw    Reason for Consultation:  seizure    History of Present Illness:     Patient Active Problem List    Diagnosis Date Noted    Asthma exacerbation 07/26/2018    Drug overdose 07/31/2017    Altered mental status, unspecified 04/26/2017    Lactic acidosis 04/25/2017    Acute encephalopathy 04/25/2017    Potential for altered mental status 04/25/2017    Convulsion disorder ([facility] [address] 04/25/2017    Stenosis of both internal carotid arteries 04/25/2017    Convulsive syncope 04/25/2017    Seizure ([facility] [address] 04/20/2017    Anemia 02/22/2017    Bipolar 1 disorder (HCC)     Chronic obstructive pulmonary disease (HCC)     Chronic pain     Hepatitis B     Sarcoidosis     Tobacco abuse     Cocaine abuse     Polysubstance abuse     Narcotic abuse     HTN (hypertension)     Depression 01/25/2017    Sinus tachycardia 01/10/2017    Heroin abuse 01/30/2014     Past Medical History:   Diagnosis Date    Asthma     Bipolar 1 disorder (HCC)     Chronic obstructive pulmonary disease (HCC)     Chronic pain     back, leg    Cocaine abuse     Depression     Hepatitis B     Heroin abuse     HTN (hypertension)     Narcotic abuse     Polysubstance abuse     Sarcoidosis     Tobacco abuse       Past Surgical History:   Procedure Laterality Date    HX GYN      HX WISDOM TEETH EXTRACTION        Prior to Admission medications    Medication Sig Start Date End Date Taking? Authorizing Provider   albuterol sulfate 90 mcg/actuation aepb Take 2 Puffs by inhalation every four (4) hours as needed. 4/19/17  Yes Clotilde Betancourt MD   QUEtiapine (SEROQUEL) 300 mg tablet Take 300 mg by mouth daily.  Patient takes 300 mg daily in the morning and 800 mg (2- 400 mg tablets) every evening    Historical Provider   QUEtiapine (SEROQUEL) 400 mg tablet Take 800 mg by mouth every evening. Patient takes 300 mg daily in the morning and 800 mg (2- 400 mg tablets) every evening    Historical Provider   sertraline (ZOLOFT) 100 mg tablet Take 100 mg by mouth daily. Historical Provider   cloNIDine HCl (CATAPRES) 0.3 mg tablet Take 0.3 mg by mouth three (3) times daily. Historical Provider   clonazePAM (KLONOPIN) 2 mg tablet Take 2 Tabs by mouth two (2) times a day. Max Daily Amount: 8 mg. 8/3/17   Bean Gabriel MD   lithium carbonate 300 mg capsule Take 1 Cap by mouth two (2) times a day. 8/3/17   Bean Gabriel MD   LORazepam (ATIVAN) 0.5 mg tablet Take 1 Tab by mouth every four (4) hours as needed. Max Daily Amount: 3 mg. 8/3/17   Bean Gabriel MD   mupirocin (BACTROBAN) 2 % ointment Apply 1 g to affected area every twelve (12) hours. 8/3/17   Bean Gabriel MD   oxyCODONE-acetaminophen (PERCOCET) 5-325 mg per tablet Take 1 Tab by mouth every six (6) hours as needed. Max Daily Amount: 4 Tabs. 8/3/17   Bean Gabriel MD   lidocaine (LIDODERM) 5 % Apply patch to the affected area for 12 hours a day and remove for 12 hours a day. 8/3/17   Bean Gabriel MD   amLODIPine (NORVASC) 5 mg tablet Take 1 Tab by mouth daily. 8/3/17   Bean Gabriel MD   gabapentin (NEURONTIN) 300 mg capsule Take 300 mg by mouth three (3) times daily. Historical Provider   fluticasone-vilanterol (BREO ELLIPTA) 200-25 mcg/dose inhaler Take 1 Puff by inhalation daily. 4/19/17   Aminata Sebastian MD   albuterol (PROVENTIL VENTOLIN) 2.5 mg /3 mL (0.083 %) nebulizer solution 3 mL by Nebulization route every four (4) hours as needed for Wheezing.  4/16/17   Tung Arias MD     Allergies   Allergen Reactions    Tomato Swelling     Tongue      Social History   Substance Use Topics    Smoking status: Former Smoker     Packs/day: 0.25     Years: 15.00     Quit date: 8/3/2017    Smokeless tobacco: Never Used      Comment: \"I already quit\"    Alcohol use No      Family History   Problem Relation Age of Onset    Hypertension Father     Hypertension Mother           Subjective:     She is a 38 y.o. AA female with history of polysubstance abuse, bipolar disorder, withdrawal seizures, COPD and asthma who was admitted last 7/26/2018 for asthma exacerbation. Then last night, patient was witnessed by the nurse to have generalized tonic-clonic seizures with eyes rolling and diaphoretic with stable VS. Given 1 mg of Ativan and loaded with 1000 mg of Keppra. BS was 74. Given D50. Had another brief buckling and twisting in bed with head hyperextended. Given another 1 mg of Ativan. I was called last night and added another 1000 mg of IV Keppra loading. 40 minutes after she ate dinner. This morning reported she had 3 back to back seizures (20 seconds then last 2 was 5 to 10 seconds). Given another dose of Ativan 1 mg with relief. Labs done reveals normal prolactin level, anemia (hgb 10.4), hypokalemia (3.2), low phosphorus (1.5) and low calcium (7.8). Chest x-ray revealed bibasilar pleural effusion vs atelectasis. When I saw the patient she was lethargic but interacted appropriately. Review of Systems    Review of systems not obtained due to patient factors. Objective:     Patient Vitals for the past 8 hrs:   BP Temp Pulse Resp SpO2 Weight   07/31/18 1046 (!) 132/94 98.7 °F (37.1 °C) 80 16 100 % -   07/31/18 0800 (!) 145/97 98.7 °F (37.1 °C) 60 16 100 % -   07/31/18 0535 - - - - - 155 lb 6.8 oz (70.5 kg)        NEUROLOGICAL EXAM:    Appearance: The patient is thin and is in no acute distress. Mental Status: Lethargic but easily arousable to name calling. Oriented to place and person. Not to year. Able to identify number of fingers. Fluent, no aphasia or dysarthria. Cranial Nerves:   Intact visual fields. OSBALDO, EOM's full, no nystagmus, no ptosis. Facial sensation is normal. Corneal reflexes are intact. Facial movement is symmetric. Hearing is normal bilaterally.  Palate is midline with normal sternocleidomastoid and trapezius muscles are normal. Tongue is midline. Motor:  Moves all 4 extremities. No tone changes. Reflexes:   Deep tendon reflexes were symmetrical. Toes downgoing. Sensory:   Normal to pinprick. Gait:  Not tested. Tremor:   No tremor noted. Cerebellar:  Not tested.      Data Review:    Recent Results (from the past 24 hour(s))   GLUCOSE, POC    Collection Time: 07/30/18  7:26 PM   Result Value Ref Range    Glucose (POC) 74 65 - 100 mg/dL    Performed by Luci Conway (PCT)    GLUCOSE, POC    Collection Time: 07/30/18  7:35 PM   Result Value Ref Range    Glucose (POC) 146 (H) 65 - 100 mg/dL    Performed by Alli Range, POC    Collection Time: 07/30/18  8:39 PM   Result Value Ref Range    Glucose (POC) 69 65 - 100 mg/dL    Performed by Shelly ION [facility]    GLUCOSE, POC    Collection Time: 07/30/18  9:12 PM   Result Value Ref Range    Glucose (POC) 101 (H) 65 - 100 mg/dL    Performed by [address] [place] [address], POC    Collection Time: 07/30/18  9:28 PM   Result Value Ref Range    Glucose (POC) 98 65 - 100 mg/dL    Performed by [facility]    GLUCOSE, POC    Collection Time: 07/30/18 11:48 PM   Result Value Ref Range    Glucose (POC) 94 65 - 100 mg/dL    Performed by Vamsi Snow    CBC W/O DIFF    Collection Time: 07/31/18  1:47 AM   Result Value Ref Range    WBC 6.0 3.6 - 11.0 K/uL    RBC 3.84 3.80 - 5.20 M/uL    HGB 10.4 (L) 11.5 - 16.0 g/dL    HCT 32.4 (L) 35.0 - 47.0 %    MCV 84.4 80.0 - 99.0 FL    MCH 27.1 26.0 - 34.0 PG    MCHC 32.1 30.0 - 36.5 g/dL    RDW 15.5 (H) 11.5 - 14.5 %    PLATELET 135 835 - 282 K/uL    MPV 9.8 8.9 - 12.9 FL    NRBC 0.0 0  WBC    ABSOLUTE NRBC 0.00 0.00 - 0.18 K/uL   METABOLIC PANEL, BASIC    Collection Time: 07/31/18  4:20 AM   Result Value Ref Range    Sodium 140 136 - 145 mmol/L    Potassium 3.2 (L) 3.5 - 5.1 mmol/L    Chloride 109 (H) 97 - 108 mmol/L    CO2 23 21 - 32 mmol/L    Anion gap 8 5 - 15 mmol/L Glucose 89 65 - 100 mg/dL    BUN 16 6 - 20 MG/DL    Creatinine 0.80 0.55 - 1.02 MG/DL    BUN/Creatinine ratio 20 12 - 20      GFR est AA >60 >60 ml/min/1.73m2    GFR est non-AA >60 >60 ml/min/1.73m2    Calcium 7.8 (L) 8.5 - 10.1 MG/DL   PHOSPHORUS    Collection Time: 07/31/18  4:20 AM   Result Value Ref Range    Phosphorus 1.5 (L) 2.6 - 4.7 MG/DL   PROLACTIN    Collection Time: 07/31/18  4:20 AM   Result Value Ref Range    Prolactin 23.1 ng/mL   LITHIUM    Collection Time: 07/31/18  4:20 AM   Result Value Ref Range    Lithium level 0.21 (L) 0.60 - 1.20 MMOL/L    Reported dose date: NOT PROVIDED      Reported dose time: NOT PROVIDED      Reported dose: NOT PROVIDED UNITS   MAGNESIUM    Collection Time: 07/31/18  4:20 AM   Result Value Ref Range    Magnesium 2.1 1.6 - 2.4 mg/dL   GLUCOSE, POC    Collection Time: 07/31/18  5:18 AM   Result Value Ref Range    Glucose (POC) 87 65 - 100 mg/dL    Performed by Alin Dior    GLUCOSE, POC    Collection Time: 07/31/18 11:15 AM   Result Value Ref Range    Glucose (POC) 72 65 - 100 mg/dL    Performed by [address] [place] [address], POC    Collection Time: 07/31/18 11:16 AM   Result Value Ref Range    Glucose (POC) 83 65 - 100 mg/dL    Performed by [address] [place] [address], POC    Collection Time: 07/31/18 11:16 AM   Result Value Ref Range    Glucose (POC) 75 65 - 100 mg/dL    Performed by Conor Vidal    GLUCOSE, POC    Collection Time: 07/31/18 11:40 AM   Result Value Ref Range    Glucose (POC) 71 65 - 100 mg/dL    Performed by Conor Vidal          Assessment:   Seizures    Plan:   Neurological examination reveals no focal deficits with cognitive impairment secondary to medication. History of previous provoke seizures. Likely dealing again with provoke seizure from electrolytes issues +/- medication or withdrawal. Need to also consider pseudo seizure. No need to do another neuroimaging. Check prolactin levels again. Last was check too far from recent seizure.  Abnormal and due to seizure would be twice normal levels. Continue Keppra 500 mg every 12 hours for now. Continue as needed Ativan for breakthrough seizures. EEG is pending. Seizure precautions. Thank you for the consult.
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
## 2018-07-31 NOTE — PROGRESS NOTES
Hospitalist      Responded to RAT call 2dt  [facility] clinic seizures  On oxygen sating well   . fs 74  Associated with diaphoresis ,trashing in bed  Reviewed chart   Responded well to amp d50 x1, ativan 1mg iv x1  Pt still bit confused  Load keppra 1000mg iv  then 500 mg bid   eeg  neuro consult   stpe down for now   if decomp tx to icu, conatct neurology/pulmonary  Serial labs   fs in 15 minutes  Continue current care

## 2018-07-31 NOTE — PROGRESS NOTES
Bedside and Verbal shift change report given to [address]  (oncoming nurse) by Oswaldo Hinton RN  (offgoing nurse). Report included the following information SBAR, Kardex, ED Summary, OR Summary, Procedure Summary, Intake/Output, MAR, Accordion, Recent Results and Med Rec Status.

## 2018-07-31 NOTE — PROGRESS NOTES
Pt had another seizure lasting only 3 seconds , had an aura of just staring and the started to seize. This time around pt lung slightly coarse attempted to suction her , she had a good cough . Report to Birt < RN prior to transferring pt.

## 2018-07-31 NOTE — PROGRESS NOTES
Rapid called for patient at 0. Tech in room as patient was trying to get out bed then started to sieze. Dr. Yvonne Newman answered rapid. Patient stablized and transferred to PCU.

## 2018-07-31 NOTE — PROGRESS NOTES
RRT F/U. Patient had a second SZ. Neuro consult called instructed to give additional loading dose of Keppra. BS also continued to be <80. MIV changed to D5NS and Q6 accu check added.

## 2018-07-31 NOTE — PROGRESS NOTES
Pt had another seizure at 1607started by trying to place B/P cuff on her. She ahd back to back seizure 15 sec hyper exteded and jerking of extremities , eyse deviated to left the stared straight ahead.  When she seizes has elevated heart rate  Spoke with MD transfer  To ICU

## 2018-07-31 NOTE — PROGRESS NOTES
Hospitalist Progress Note    NAME: Alon Baez   :  1975   MRN:  [id number]       Assessment / Plan:  38 y.o.  female with PMH asthma, sarcoid, tobacco abuse, polysubstance abuse (heroin, cocaine, THC, opiate, methadone) who was brought in by EMS with respiratory distress. Last used of heroin was the day before presentation. Admitted with diagnosis of acute respiratory failure due to status asthmaticus and heroin abuse on . She required mechanical ventilation and was extubated on . Seizure activity  Patient reports diagnosis of seizure about 4 years ago and since then she has taken depakote. Overnight, patient had episode of tonic-clonic movement with associated diaphoresis, stool incontinence and post-ictal confusion. She received IV ativan and loading dose of keppra 1000 mg. This morning, patient had 5 episodes of tonic-clonic activity but remained alert during each episode and there was no post-ictal confusion. EEG ordered stat. Check lithium levels, CPK, LFT. Neurology notified. Continue iv keppra and seizure precautions. Status asthmaticus with acute respiratory distress, POA   - On admission, patient with diffuse wheezing, and tachypnea RR 30s despite BIPAP use. CXR negative. Patient intubated in ED and on mechanical ventilation. Received IV steroids, nebs and empiric antibiotics. She was successfully extubated on .   - Now tolerating well 2L NC. Continue nebs and     Polysubstance abuse  UDS positive for opiates, BZD and THC. HTN  On amlodipine    Sarcoidosis    Bipolar  Continue home dose of lithium, gabapentin, and clonazepam.    Hx tobacco abuse -- denies current use          25.0 - 29.9 Overweight / Body mass index is 27.88 kg/(m^2).     Code status: Full  Prophylaxis: Lovenox  Recommended Disposition: tbd     Subjective:     Chief Complaint / Reason for Physician Visit  Pt is alert, oriented to person and place.  She denies any pain, shortness of breath, nausea or vomiting. Discussed with RN events overnight. Review of Systems:  Symptom Y/N Comments  Symptom Y/N Comments   Fever/Chills n   Chest Pain n    Poor Appetite n   Edema     Cough n   Abdominal Pain n    Sputum    Joint Pain     SOB/RIVAS n   Pruritis/Rash     Nausea/vomit n   Tolerating PT/OT     Diarrhea    Tolerating Diet     Constipation    Other       Could NOT obtain due to:      Objective:     VITALS:   Last 24hrs VS reviewed since prior progress note. Most recent are:  Patient Vitals for the past 24 hrs:   Temp Pulse Resp BP SpO2   07/31/18 0800 98.7 °F (37.1 °C) 60 16 (!) 145/97 100 %   07/31/18 0300 97.4 °F (36.3 °C) 67 18 (!) 138/92 100 %   07/30/18 2300 97.8 °F (36.6 °C) (!) 101 18 113/69 100 %   07/30/18 2018 97.6 °F (36.4 °C) 65 19 150/86 100 %   07/30/18 1920 - 88 - (!) 107/105 -   07/30/18 1915 - 82 - (!) 158/118 100 %   07/30/18 1507 98 °F (36.7 °C) 88 18 (!) 143/96 98 %   07/30/18 1350 - 72 - (!) 168/107 -   07/30/18 1340 - - - (!) 152/96 100 %   07/30/18 1320 97.6 °F (36.4 °C) 91 16 134/90 98 %   07/30/18 1200 - 78 14 142/89 99 %   07/30/18 1100 - 63 13 (!) 156/91 100 %       Intake/Output Summary (Last 24 hours) at 07/31/18 1020  Last data filed at 07/31/18 0531   Gross per 24 hour   Intake           365.57 ml   Output             2325 ml   Net         -1959.43 ml        PHYSICAL EXAM:  General: Alert, not in acute distress or pain. Patient had a short course of tonic-clonic movement for about 15 seconds. She did not lose consciousness and was talking to me right after the episode. EENT:  Anicteric sclerae. Resp:  Coarse BS  CV:  Regular  Rate,  No edema  GI:  Soft, Non distended, Non tender.  +Bowel sounds  Neurologic:  Alert and oriented X 2. Skin:  No rashes.   No jaundice    Reviewed most current lab test results and cultures  YES  Reviewed most current radiology test results   YES  Review and summation of old records today    NO  Reviewed patient's current orders and MAR    YES  PMH/SH reviewed - no change compared to H&P  ________________________________________________________________________  ________________________________________________________________________  Marvin Andraed MD     Procedures: see electronic medical records for all procedures/Xrays and details which were not copied into this note but were reviewed prior to creation of Plan. LABS:  I reviewed today's most current labs and imaging studies.   Pertinent labs include:  Recent Labs      07/31/18   0147   WBC  6.0   HGB  10.4*   HCT  32.4*   PLT  298     Recent Labs      07/31/18   0420   NA  140   K  3.2*   CL  109*   CO2  23   GLU  89   BUN  16   CREA  0.80   CA  7.8*   MG  2.1   PHOS  1.5*       Signed: Marvin Andrade MD

## 2018-07-31 NOTE — PROGRESS NOTES
1153 bolus of  D 50   1/2 amp for sugar 72 not coming up with orange juice. Pt also had 6 seizures today last one at 1035. Both Ativan and Kepra given. With lighter seizures pt talking after few minutes. One seizure in front of MD. Called for EEG which is being done now Called for art stick as pt a difficult stick.

## 2018-07-31 NOTE — PROGRESS NOTES
Chart reviewed and discussed with patients RN. Plan to hold PT today due to multiple seizures. Will continue to follow for therapy.      Left, PT

## 2018-07-31 NOTE — PROGRESS NOTES
PCU SHIFT NURSING NOTE      Bedside and Verbal shift change report given to Ana Strickland RN (oncoming nurse) by Alexa Irizarry from Med/Surg (offgoing nurse). Report included the following information SBAR, Kardex, Intake/Output, MAR, Recent Results and Cardiac Rhythm NSR. Shift Summary: Pt transferred to Med/Surg today s/p extubation. Pt transferred to PCU now from Med/surg s/p seizure with rapid response. No noted acute distress. Aleena Moreno RN CCU states if another seizure occurs then notify her. See flowcharting for full assessment. Bed alarm on and Tele sitter on. Will monitor. 2041 spot check of blood sugar and was 69 with D50 admin. 2111 Pt began bucking and twisting in bed with head hyperextended. Admin Ativan 1mg IV for seizure and notified Aleena Moreno RN CCU on seizure. Solomabrendan Dias reports she called neurology with new orders noted to admin a second dose of Keppra loading dose, change IVF from NS to D5NS and accuchecks Q 6 hours and Kat reports neuro will eval in am with EEG.    2200 Pt fed chicken/spaghetti dinner. Admission Date 7/26/2018   Admission Diagnosis Asthma exacerbation   Consults IP CONSULT TO NEUROLOGY        Consults   []PT   []OT   []Speech   []Case Management      [] Palliative      Cardiac Monitoring Order   []Yes   []No     IV drips   []Yes    Drip:                            Dose:  Drip:                            Dose:  Drip:                            Dose:   []No     GI Prophylaxis   []Yes   []No         DVT Prophylaxis   SCDs:             Win stockings:         [] Medication   []Contraindicated   []None      Activity Level Activity Level: Bed Rest     Activity Assistance: Partial (two people)   Purposeful Rounding every 1-2 hour?    []Yes   Riley Score  Total Score: 3   Bed Alarm (If score 3 or >)   []Yes   [] Refused (See signed refusal form in chart)   Doc Score  Doc Score: 16   Doc Score (if score 14 or less)   []PMT consult   []Wound Care consult      []Specialty bed   [] Nutrition consult          Needs prior to discharge:   Home O2 required:    []Yes   []No    If yes, how much O2 required? Other:    Last Bowel Movement: Last Bowel Movement Date: 07/28/18      Influenza Vaccine Received Flu Vaccine for Current Season (usually Sept-March): Not Flu Season        Pneumonia Vaccine           Diet Active Orders   Diet    DIET CARDIAC Regular; 2 GM NA (House Low NA)      LDAs               Peripheral IV 07/30/18 Right Arm (Active)   Site Assessment Clean, dry, & intact 7/30/2018  8:18 PM   Phlebitis Assessment 0 7/30/2018  8:18 PM   Infiltration Assessment 0 7/30/2018  8:18 PM   Dressing Status Clean, dry, & intact 7/30/2018  8:18 PM   Dressing Type Transparent 7/30/2018  8:18 PM   Hub Color/Line Status Pink; Infusing 7/30/2018  8:18 PM   Action Taken Other (comment) 7/30/2018  3:50 PM   Alcohol Cap Used Yes 7/30/2018  8:18 PM                      Urinary Catheter [REMOVED] Urinary Catheter 07/28/18 Calvin-Indications for Use: Acute urinary retention/bladder outlet obstruction    Intake & Output   Date 07/29/18 1900 - 07/30/18 0659 07/30/18 0700 - 07/31/18 0659   Shift 5110-5263 24 Hour Total 8075-9391 9971-6523 24 Hour Total   I  N  T  A  K  E   I.V.  (mL/kg/hr) 1381.9 2783.7 467.9  (0.5)  467.9      Precedex Volume 49.4 52.8 0.4  0.4      Fentanyl Volume 32.5 62.5 2.6  2.6      Diprivan Volume  68.4         Volume (0.9% sodium chloride infusion) 1300 2300 465  465      Volume (levoFLOXacin (LEVAQUIN) 750 mg in D5W IVPB)  300       NG/GT 60 270         Water Flush Volume (mL) ([REMOVED] Nasogastric Tube 07/28/18) 30 120         Medication Volume ([REMOVED] Nasogastric Tube 07/28/18) 30 150       Shift Total  (mL/kg) 1441.9  (19.8) 3053.7  (41.9) 467.9  (6.4)  467.9  (6.4)   O  U  T  P  U  T   Urine  (mL/kg/hr) 555 1175 375  (0.4) 1200 1575      Urine Voided    1200 1200      Urine Output (mL) ([REMOVED] Urinary Catheter 07/28/18 Calvin) 668 3106 600 499 Emesis/NG output 20 30         Output (ml) ([REMOVED] Nasogastric Tube 07/28/18) 20 30       Shift Total  (mL/kg) 575  (7.9) 1205  (16.6) 375  (5.2) 1200  (16.5) 1575  (21.6)   .9 1848.7 92.9 -1200 -1107.1   Weight (kg) 72.8 72.8 72.8 72.8 72.8         Readmission Risk Assessment Tool Score Low Risk            10       Total Score        4 IP Visits Last 12 Months (1-3=4, 4=9, >4=11)    4 Pt. Coverage (Medicare=5 , Medicaid, or Self-Pay=4)    2 Charlson Comorbidity Score (Age + Comorbid Conditions)        Criteria that do not apply:    Has Seen PCP in Last 6 Months (Yes=3, No=0)    . Living with Significant Other. Assisted Living. LTAC. SNF.  or   Rehab    Patient Length of Stay (>5 days = 3)       Expected Length of Stay 3d 16h   Actual Length of Stay 4

## 2018-08-01 LAB
ANION GAP SERPL CALC-SCNC: 6 MMOL/L (ref 5–15)
BASOPHILS # BLD: 0 K/UL (ref 0–0.1)
BASOPHILS NFR BLD: 0 % (ref 0–1)
BUN SERPL-MCNC: 11 MG/DL (ref 6–20)
BUN/CREAT SERPL: 14 (ref 12–20)
CALCIUM SERPL-MCNC: 7.7 MG/DL (ref 8.5–10.1)
CHLORIDE SERPL-SCNC: 106 MMOL/L (ref 97–108)
CO2 SERPL-SCNC: 23 MMOL/L (ref 21–32)
CREAT SERPL-MCNC: 0.76 MG/DL (ref 0.55–1.02)
CRP SERPL-MCNC: <0.29 MG/DL (ref 0–0.6)
DIFFERENTIAL METHOD BLD: ABNORMAL
EOSINOPHIL # BLD: 0.3 K/UL (ref 0–0.4)
EOSINOPHIL NFR BLD: 6 % (ref 0–7)
ERYTHROCYTE [DISTWIDTH] IN BLOOD BY AUTOMATED COUNT: 15.1 % (ref 11.5–14.5)
ERYTHROCYTE [SEDIMENTATION RATE] IN BLOOD: 17 MM/HR (ref 0–20)
GLUCOSE SERPL-MCNC: 85 MG/DL (ref 65–100)
HCT VFR BLD AUTO: 36.3 % (ref 35–47)
HGB BLD-MCNC: 11.6 G/DL (ref 11.5–16)
IMM GRANULOCYTES # BLD: 0 K/UL (ref 0–0.04)
IMM GRANULOCYTES NFR BLD AUTO: 1 % (ref 0–0.5)
LYMPHOCYTES # BLD: 2 K/UL (ref 0.8–3.5)
LYMPHOCYTES NFR BLD: 45 % (ref 12–49)
MCH RBC QN AUTO: 27 PG (ref 26–34)
MCHC RBC AUTO-ENTMCNC: 32 G/DL (ref 30–36.5)
MCV RBC AUTO: 84.4 FL (ref 80–99)
MONOCYTES # BLD: 0.3 K/UL (ref 0–1)
MONOCYTES NFR BLD: 7 % (ref 5–13)
NEUTS SEG # BLD: 1.8 K/UL (ref 1.8–8)
NEUTS SEG NFR BLD: 41 % (ref 32–75)
NRBC # BLD: 0 K/UL (ref 0–0.01)
NRBC BLD-RTO: 0 PER 100 WBC
PLATELET # BLD AUTO: 279 K/UL (ref 150–400)
PMV BLD AUTO: 9.9 FL (ref 8.9–12.9)
POTASSIUM SERPL-SCNC: 3.8 MMOL/L (ref 3.5–5.1)
RBC # BLD AUTO: 4.3 M/UL (ref 3.8–5.2)
SODIUM SERPL-SCNC: 135 MMOL/L (ref 136–145)
WBC # BLD AUTO: 4.4 K/UL (ref 3.6–11)

## 2018-08-01 PROCEDURE — 74011250637 HC RX REV CODE- 250/637: Performed by: INTERNAL MEDICINE

## 2018-08-01 PROCEDURE — 74011250636 HC RX REV CODE- 250/636: Performed by: HOSPITALIST

## 2018-08-01 PROCEDURE — 85025 COMPLETE CBC W/AUTO DIFF WBC: CPT | Performed by: INTERNAL MEDICINE

## 2018-08-01 PROCEDURE — 65620000000 HC RM CCU GENERAL

## 2018-08-01 PROCEDURE — 74011250636 HC RX REV CODE- 250/636: Performed by: INTERNAL MEDICINE

## 2018-08-01 PROCEDURE — 86140 C-REACTIVE PROTEIN: CPT | Performed by: INTERNAL MEDICINE

## 2018-08-01 PROCEDURE — C9113 INJ PANTOPRAZOLE SODIUM, VIA: HCPCS | Performed by: INTERNAL MEDICINE

## 2018-08-01 PROCEDURE — 74011000258 HC RX REV CODE- 258: Performed by: PSYCHIATRY & NEUROLOGY

## 2018-08-01 PROCEDURE — 80048 BASIC METABOLIC PNL TOTAL CA: CPT | Performed by: INTERNAL MEDICINE

## 2018-08-01 PROCEDURE — 36415 COLL VENOUS BLD VENIPUNCTURE: CPT | Performed by: INTERNAL MEDICINE

## 2018-08-01 PROCEDURE — 95813 EEG EXTND MNTR 61-119 MIN: CPT | Performed by: INTERNAL MEDICINE

## 2018-08-01 PROCEDURE — 77030038269 HC DRN EXT URIN PURWCK BARD -A

## 2018-08-01 PROCEDURE — 85652 RBC SED RATE AUTOMATED: CPT | Performed by: INTERNAL MEDICINE

## 2018-08-01 PROCEDURE — 74011250637 HC RX REV CODE- 250/637: Performed by: PSYCHIATRY & NEUROLOGY

## 2018-08-01 RX ORDER — IBUPROFEN 600 MG/1
600 TABLET ORAL ONCE
Status: COMPLETED | OUTPATIENT
Start: 2018-08-01 | End: 2018-08-01

## 2018-08-01 RX ORDER — LEVETIRACETAM 5 MG/ML
500 INJECTION INTRAVASCULAR EVERY 12 HOURS
Status: DISCONTINUED | OUTPATIENT
Start: 2018-08-01 | End: 2018-08-01

## 2018-08-01 RX ORDER — OXYCODONE HYDROCHLORIDE 5 MG/1
5 TABLET ORAL
Status: DISCONTINUED | OUTPATIENT
Start: 2018-08-01 | End: 2018-08-04 | Stop reason: HOSPADM

## 2018-08-01 RX ORDER — MORPHINE SULFATE 2 MG/ML
2 INJECTION, SOLUTION INTRAMUSCULAR; INTRAVENOUS ONCE
Status: COMPLETED | OUTPATIENT
Start: 2018-08-01 | End: 2018-08-01

## 2018-08-01 RX ORDER — ACETAMINOPHEN 325 MG/1
650 TABLET ORAL
Status: DISCONTINUED | OUTPATIENT
Start: 2018-08-01 | End: 2018-08-04 | Stop reason: HOSPADM

## 2018-08-01 RX ORDER — LEVETIRACETAM 500 MG/1
500 TABLET ORAL 2 TIMES DAILY
Status: DISCONTINUED | OUTPATIENT
Start: 2018-08-01 | End: 2018-08-01

## 2018-08-01 RX ORDER — IBUPROFEN 400 MG/1
400 TABLET ORAL ONCE
Status: DISCONTINUED | OUTPATIENT
Start: 2018-08-01 | End: 2018-08-01

## 2018-08-01 RX ORDER — LEVETIRACETAM 500 MG/1
500 TABLET ORAL 2 TIMES DAILY
Status: DISCONTINUED | OUTPATIENT
Start: 2018-08-01 | End: 2018-08-02

## 2018-08-01 RX ADMIN — LEVETIRACETAM 500 MG: 500 TABLET ORAL at 17:17

## 2018-08-01 RX ADMIN — LITHIUM CARBONATE 300 MG: 300 CAPSULE, GELATIN COATED ORAL at 17:17

## 2018-08-01 RX ADMIN — GABAPENTIN 300 MG: 300 CAPSULE ORAL at 16:24

## 2018-08-01 RX ADMIN — AMLODIPINE BESYLATE 10 MG: 5 TABLET ORAL at 10:17

## 2018-08-01 RX ADMIN — PANTOPRAZOLE SODIUM 40 MG: 40 INJECTION, POWDER, FOR SOLUTION INTRAVENOUS at 10:18

## 2018-08-01 RX ADMIN — DEXTROSE MONOHYDRATE AND SODIUM CHLORIDE 75 ML/HR: 5; .9 INJECTION, SOLUTION INTRAVENOUS at 05:44

## 2018-08-01 RX ADMIN — OXYCODONE HYDROCHLORIDE 5 MG: 5 TABLET ORAL at 22:06

## 2018-08-01 RX ADMIN — LEVETIRACETAM 500 MG: 5 INJECTION INTRAVENOUS at 10:37

## 2018-08-01 RX ADMIN — QUETIAPINE FUMARATE 400 MG: 100 TABLET ORAL at 22:06

## 2018-08-01 RX ADMIN — IBUPROFEN 600 MG: 600 TABLET, FILM COATED ORAL at 19:23

## 2018-08-01 RX ADMIN — Medication 10 ML: at 22:13

## 2018-08-01 RX ADMIN — ACETAMINOPHEN 650 MG: 325 TABLET ORAL at 14:21

## 2018-08-01 RX ADMIN — SERTRALINE HYDROCHLORIDE 100 MG: 50 TABLET ORAL at 21:09

## 2018-08-01 RX ADMIN — OXYCODONE HYDROCHLORIDE 5 MG: 5 TABLET ORAL at 16:24

## 2018-08-01 RX ADMIN — GABAPENTIN 300 MG: 300 CAPSULE ORAL at 10:16

## 2018-08-01 RX ADMIN — LITHIUM CARBONATE 300 MG: 300 CAPSULE, GELATIN COATED ORAL at 10:15

## 2018-08-01 RX ADMIN — MUPIROCIN: 20 OINTMENT TOPICAL at 10:50

## 2018-08-01 RX ADMIN — CLONAZEPAM 2 MG: 1 TABLET ORAL at 10:15

## 2018-08-01 RX ADMIN — GABAPENTIN 300 MG: 300 CAPSULE ORAL at 22:06

## 2018-08-01 RX ADMIN — MORPHINE SULFATE 2 MG: 2 INJECTION, SOLUTION INTRAMUSCULAR; INTRAVENOUS at 10:50

## 2018-08-01 RX ADMIN — Medication 10 ML: at 05:44

## 2018-08-01 RX ADMIN — CLONAZEPAM 2 MG: 1 TABLET ORAL at 17:17

## 2018-08-01 RX ADMIN — DEXTROSE MONOHYDRATE AND SODIUM CHLORIDE 75 ML/HR: 5; .9 INJECTION, SOLUTION INTRAVENOUS at 18:47

## 2018-08-01 RX ADMIN — MUPIROCIN: 20 OINTMENT TOPICAL at 21:14

## 2018-08-01 NOTE — PROGRESS NOTES
1920   [facility]   2000   Patient yelling out, cursing. Patient wants to [address]. Tried to talk to patient about npo status   2020   Patient upset wanting food and drink. Have discussed with her that because of her seizing she cant have anything right now. Patient asked to talk to the charge nurse   2030   Joyce Bragg, charge talked with patient and Ludmila Clarke states that if she doesn't get any food that she wants to leave\". Patient is not alert to place and time. 2200   Patient given po meds with water, tolerated well   0000   Patient resting quietly with eyes closed. No ss of pain or distress observed   0530   Patient bathed, linens changed.  [place] changed   0740   Bedside report given to [facility] discussed

## 2018-08-01 NOTE — PROGRESS NOTES
Hospitalist Progress Note    NAME: Milena Dobbs   :  1975   MRN:  [id number]       Assessment / Plan:  38 y.o.  female with PMH asthma, sarcoid, tobacco abuse, polysubstance abuse (heroin, cocaine, THC, opiate, methadone) who was brought in by EMS with respiratory distress. Last used of heroin was the day before presentation. Admitted with diagnosis of acute respiratory failure due to status asthmaticus and heroin abuse on . She required mechanical ventilation and was extubated on . Seizure activity  Withdrawal syndrome  Patient reports diagnosis of seizure about 4 years ago and since then she has taken depakote. Overnight, patient had episode of tonic-clonic movement with associated diaphoresis, stool incontinence and post-ictal confusion. She received IV ativan and loading dose of keppra 1000 mg. This morning, patient had 5 episodes of tonic-clonic activity but remained alert during each episode and there was no post-ictal confusion. : EEG was wnl. Low lithium levels. Prolactin, CPK, and LFT wnl. Neurology is following. Continue oral keppra and seizure precautions. Plan for 24 hours EEG. ICU care. Status asthmaticus with acute respiratory distress, POA   On admission, patient with diffuse wheezing, and tachypnea RR 30s despite BIPAP use. CXR negative. Patient intubated in ED and on mechanical ventilation. Received IV steroids, nebs and empiric antibiotics. She was successfully extubated on .   - Now tolerating well 2L NC. Continue nebs and levaquin. Polysubstance abuse  UDS positive for opiates, BZD and THC. HTN  On amlodipine    Sarcoidosis    Bipolar  Continue home dose of lithium, gabapentin, and clonazepam.    Hx tobacco abuse -- denies current use          25.0 - 29.9 Overweight / Body mass index is 27.88 kg/(m^2).       Code status: Full  Prophylaxis: Lovenox  Recommended Disposition: tbd     Subjective:     Chief Complaint / Reason for Physician Visit  Persistent episodes of seizure activity this morning and received Ativan. Now, she is alert, oriented to person and place. No shortness of breath, nausea or vomiting. Discussed with RN events overnight. Review of Systems:  Symptom Y/N Comments  Symptom Y/N Comments   Fever/Chills n   Chest Pain n    Poor Appetite n   Edema     Cough n   Abdominal Pain n    Sputum    Joint Pain     SOB/RIVAS n   Pruritis/Rash     Nausea/vomit n   Tolerating PT/OT     Diarrhea    Tolerating Diet     Constipation    Other       Could NOT obtain due to:      Objective:     VITALS:   Last 24hrs VS reviewed since prior progress note. Most recent are:  Patient Vitals for the past 24 hrs:   Temp Pulse Resp BP SpO2   08/01/18 1200 - 88 25 134/51 100 %   08/01/18 1100 99.2 °F (37.3 °C) 90 21 140/90 99 %   08/01/18 1000 - 86 18 135/87 99 %   08/01/18 0900 97.9 °F (36.6 °C) 85 22 (!) 144/100 97 %   08/01/18 0800 98.4 °F (36.9 °C) 93 15 (!) 132/93 99 %   08/01/18 0700 - 72 18 110/63 99 %   08/01/18 0530 - 63 13 - 100 %   08/01/18 0500 - 64 15 99/75 100 %   08/01/18 0400 98.6 °F (37 °C) 66 17 107/78 99 %   08/01/18 0300 - 72 16 117/80 100 %   08/01/18 0200 - 74 15 114/67 99 %   08/01/18 0100 - 74 15 111/76 98 %   08/01/18 0000 - 96 13 106/62 97 %   07/31/18 2300 - (!) 103 16 103/59 98 %   07/31/18 2200 - 84 21 125/75 99 %   07/31/18 2100 - 72 20 127/79 97 %   07/31/18 2000 - 90 21 109/68 100 %   07/31/18 1902 98.6 °F (37 °C) 93 18 130/60 99 %   07/31/18 1833 - (!) 125 18 130/70 100 %   07/31/18 1832 - (!) 144 - - 100 %   07/31/18 1612 - (!) 112 - - 100 %   07/31/18 1608 98 °F (36.7 °C) (!) 101 16 124/82 100 %   07/31/18 1607 - (!) 157 - - 100 %       Intake/Output Summary (Last 24 hours) at 08/01/18 1313  Last data filed at 08/01/18 1101   Gross per 24 hour   Intake             2500 ml   Output             2500 ml   Net                0 ml        PHYSICAL EXAM:  General: Alert, not in acute distress or pain.   EENT:  Anicteric sclerae. Resp:  Coarse BS. No wheezes. No crackles. CV:  Regular Rate,  No edema  GI:  Soft, Non distended, Non tender.  +Bowel sounds  Neurologic:  Alert and oriented X 2. Moving all extremities. Following commands. Skin:  No rashes. No jaundice    Reviewed most current lab test results and cultures  YES  Reviewed most current radiology test results   YES  Review and summation of old records today    NO  Reviewed patient's current orders and MAR    YES  PMH/SH reviewed - no change compared to H&P  ________________________________________________________________________  ________________________________________________________________________  Chica Lawson MD     Procedures: see electronic medical records for all procedures/Xrays and details which were not copied into this note but were reviewed prior to creation of Plan. LABS:  I reviewed today's most current labs and imaging studies.   Pertinent labs include:  Recent Labs      08/01/18   0303  07/31/18   0147   WBC  4.4  6.0   HGB  11.6  10.4*   HCT  36.3  32.4*   PLT  279  298     Recent Labs      08/01/18   0303  07/31/18   1441  07/31/18   0420   NA  135*   --   140   K  3.8   --   3.2*   CL  106   --   109*   CO2  23   --   23   GLU  85   --   89   BUN  11   --   16   CREA  0.76   --   0.80   CA  7.7*   --   7.8*   MG   --    --   2.1   PHOS   --    --   1.5*   ALB   --   2.6*   --    TBILI   --   0.3   --    SGOT   --   25   --    ALT   --   21   --        Signed: Chica Lawson MD

## 2018-08-01 NOTE — PROGRESS NOTES
Occupational Therapy  Pt has transferred to CCU due to multiple seizures. Will defer at this time and continue to follow as appropriate.

## 2018-08-01 NOTE — PROGRESS NOTES
Pt spoke with Nursing - requested substance abuse recovery information. 2 booklets of info given to pt to review tonight. CM will f/u in more depth on .   DORCAS Durand

## 2018-08-01 NOTE — PROGRESS NOTES
0900:  Bedside handoff report received from Radha Quigley RN (offgoing nurse). 1003:  Patient noted to have tonic-clonic seizure lasting approximately 30 seconds without full return of consciousness following epsiode. VSS, no injuries noted. Dr. Agnes Browning paged; no new orders at this time, will order 24 hour EEG when he sees patient. 1010:  Patient now oriented x 4, answers questions appropriately. No focal defecits noted. VS remain stable. 1855:  Patient c/o stomach and bilateral leg pain. Unable to characterize pain, unable to localize more specifically. Spoke with Dr. Jessika Calderón; orders received for one time dose of ibuprofen. 1915:  Bedside handoff report given to Shiv Almonte RN (oncoming nurse).       Ross Wang RN

## 2018-08-01 NOTE — PROGRESS NOTES
Neurology Progress Note    Patient ID:  Livier Ross  [id number]  38 y.o.  1975    Subjective:      Patient continues to have seizure like activity lasting less than 1 minute. Minimal post-ictal state. EEG done yesterday was normal. Labs revealed mild hyponatremia (135) and low calcium. Receiving ativan for the seizures. Patient is on Keppra 500 mg IV every 12 hours, Gabapentin, Clonazepam and Quetiapine.      Current Facility-Administered Medications   Medication Dose Route Frequency    levETIRAcetam (KEPPRA) 500 mg in saline (iso-osm) 100 ml IVPB  500 mg IntraVENous Q12H    pantoprazole (PROTONIX) injection 40 mg  40 mg IntraVENous DAILY    mupirocin (BACTROBAN) 2 % ointment   Both Nostrils Q12H    amLODIPine (NORVASC) tablet 10 mg  10 mg Oral DAILY    albuterol-ipratropium (DUO-NEB) 2.5 MG-0.5 MG/3 ML  3 mL Nebulization Q6H PRN    LORazepam (ATIVAN) injection 1 mg  1 mg IntraVENous PRN    dextrose 5% and 0.9% NaCl infusion  75 mL/hr IntraVENous CONTINUOUS    QUEtiapine (SEROquel) tablet 400 mg  400 mg Oral QHS    clonazePAM (KlonoPIN) tablet 2 mg  2 mg Oral BID    sertraline (ZOLOFT) tablet 100 mg  100 mg Per NG tube DAILY    gabapentin (NEURONTIN) capsule 300 mg  300 mg Oral TID    lithium carbonate capsule 300 mg  300 mg Oral BID    sodium chloride (NS) flush 5-10 mL  5-10 mL IntraVENous Q8H    sodium chloride (NS) flush 5-10 mL  5-10 mL IntraVENous PRN    acetaminophen (TYLENOL) suppository 650 mg  650 mg Rectal Q6H PRN    ondansetron (ZOFRAN) injection 4 mg  4 mg IntraVENous Q6H PRN    bisacodyl (DULCOLAX) suppository 10 mg  10 mg Rectal DAILY PRN    enoxaparin (LOVENOX) injection 40 mg  40 mg SubCUTAneous Q24H    glucose chewable tablet 16 g  4 Tab Oral PRN    dextrose (D50W) injection syrg 12.5-25 g  12.5-25 g IntraVENous PRN    glucagon (GLUCAGEN) injection 1 mg  1 mg IntraMUSCular PRN        Objective:     Patient Vitals for the past 8 hrs:   BP Temp Pulse Resp SpO2 Weight 08/01/18 1200 134/51 - 88 25 100 % -   08/01/18 1100 140/90 99.2 °F (37.3 °C) 90 21 99 % -   08/01/18 1000 135/87 - 86 18 99 % -   08/01/18 0900 (!) 144/100 97.9 °F (36.6 °C) 85 22 97 % -   08/01/18 0800 (!) 132/93 98.4 °F (36.9 °C) 93 15 99 % -   08/01/18 0700 110/63 - 72 18 99 % -   08/01/18 0530 - - 63 13 100 % 165 lb 5.5 oz (75 kg)       08/01 0701 - 08/01 1900  In: 013 [P.O.:300; I.V.:525]  Out: 950 [Urine:950]  07/30 1901 - 08/01 0700  In: 1145 [P.O.:50; I.V.:1775]  Out: 3500 [Urine:3500]    Lab Review   Recent Results (from the past 24 hour(s))   CK    Collection Time: 07/31/18  2:41 PM   Result Value Ref Range     26 - 192 U/L   HEPATIC FUNCTION PANEL    Collection Time: 07/31/18  2:41 PM   Result Value Ref Range    Protein, total 5.9 (L) 6.4 - 8.2 g/dL    Albumin 2.6 (L) 3.5 - 5.0 g/dL    Globulin 3.3 2.0 - 4.0 g/dL    A-G Ratio 0.8 (L) 1.1 - 2.2      Bilirubin, total 0.3 0.2 - 1.0 MG/DL    Bilirubin, direct <0.1 0.0 - 0.2 MG/DL    Alk.  phosphatase 47 45 - 117 U/L    AST (SGOT) 25 15 - 37 U/L    ALT (SGPT) 21 12 - 78 U/L   LITHIUM    Collection Time: 07/31/18  2:41 PM   Result Value Ref Range    Lithium level 0.34 (L) 0.60 - 1.20 MMOL/L    Reported dose date: NOT PROVIDED      Reported dose time: NOT PROVIDED      Reported dose: NOT PROVIDED UNITS   GLUCOSE, POC    Collection Time: 07/31/18  6:21 PM   Result Value Ref Range    Glucose (POC) 111 (H) 65 - 100 mg/dL    Performed by Terressa Boot    METABOLIC PANEL, BASIC    Collection Time: 08/01/18  3:03 AM   Result Value Ref Range    Sodium 135 (L) 136 - 145 mmol/L    Potassium 3.8 3.5 - 5.1 mmol/L    Chloride 106 97 - 108 mmol/L    CO2 23 21 - 32 mmol/L    Anion gap 6 5 - 15 mmol/L    Glucose 85 65 - 100 mg/dL    BUN 11 6 - 20 MG/DL    Creatinine 0.76 0.55 - 1.02 MG/DL    BUN/Creatinine ratio 14 12 - 20      GFR est AA >60 >60 ml/min/1.73m2    GFR est non-AA >60 >60 ml/min/1.73m2    Calcium 7.7 (L) 8.5 - 10.1 MG/DL   CBC WITH AUTOMATED DIFF Collection Time: 08/01/18  3:03 AM   Result Value Ref Range    WBC 4.4 3.6 - 11.0 K/uL    RBC 4.30 3.80 - 5.20 M/uL    HGB 11.6 11.5 - 16.0 g/dL    HCT 36.3 35.0 - 47.0 %    MCV 84.4 80.0 - 99.0 FL    MCH 27.0 26.0 - 34.0 PG    MCHC 32.0 30.0 - 36.5 g/dL    RDW 15.1 (H) 11.5 - 14.5 %    PLATELET 832 352 - 787 K/uL    MPV 9.9 8.9 - 12.9 FL    NRBC 0.0 0  WBC    ABSOLUTE NRBC 0.00 0.00 - 0.01 K/uL    NEUTROPHILS 41 32 - 75 %    LYMPHOCYTES 45 12 - 49 %    MONOCYTES 7 5 - 13 %    EOSINOPHILS 6 0 - 7 %    BASOPHILS 0 0 - 1 %    IMMATURE GRANULOCYTES 1 (H) 0.0 - 0.5 %    ABS. NEUTROPHILS 1.8 1.8 - 8.0 K/UL    ABS. LYMPHOCYTES 2.0 0.8 - 3.5 K/UL    ABS. MONOCYTES 0.3 0.0 - 1.0 K/UL    ABS. EOSINOPHILS 0.3 0.0 - 0.4 K/UL    ABS. BASOPHILS 0.0 0.0 - 0.1 K/UL    ABS. IMM. GRANS. 0.0 0.00 - 0.04 K/UL    DF AUTOMATED     C REACTIVE PROTEIN, QT    Collection Time: 08/01/18  3:03 AM   Result Value Ref Range    C-Reactive protein <0.29 0.00 - 0.60 mg/dL   SED RATE (ESR)    Collection Time: 08/01/18  3:03 AM   Result Value Ref Range    Sed rate, automated 17 0 - 20 mm/hr     NEUROLOGICAL EXAM:     Appearance: The patient is thin and is in no acute distress. Mental Status: Awake and oriented. Fluent, no aphasia or dysarthria. Follows commands. Cranial Nerves:   Intact visual fields. OSBALDO, EOM's full, no nystagmus, no ptosis. Facial sensation is normal. Corneal reflexes are intact. Facial movement is symmetric. Hearing is normal bilaterally. Palate is midline with normal sternocleidomastoid and trapezius muscles are normal. Tongue is midline. Motor:  Moves all 4 extremities. No tone changes. Reflexes:   Deep tendon reflexes were symmetrical. Toes downgoing. Sensory:   Normal to pinprick. Gait:  Not tested. Tremor:   No tremor noted.    Cerebellar:  Intact         Assessment:   Seizure    Plan:   Neurological examination is non-focal. Continue to have seizure like spells that are brief despite technically on multiple medications for seizures (Keppra, Gabapentin and Clonazepam). Need to consider pseudoseizure. EEG yesterday was normal but event was not capture. Plan is to do a prolonged EEG to capture event and then see if truly epileptogenic. Change IV Keppra to oral. Hold off on giving ativan for brief spells unless seizures are prolonged with VS decompensation.       Signed:  Kathryn Hutchins MD  8/1/2018  1:20 PM

## 2018-08-01 NOTE — PROGRESS NOTES
Physical Therapy:    Pt was transferred to CCU for multiple seizures. RN just reported patient with 25 second seizure. Will hold therapy. Will continue to follow once medically appropriate.  Thank you    Yamilet Larkin, PT, DPT

## 2018-08-01 NOTE — PROGRESS NOTES
[facility]  Pulmonary, Critical Care, and Sleep Medicine    Name: John Perrin MRN: [id number]   : 1975 Hospital: [facility]   Date: 2018        Critical Care Patient Consult    NO PCP moved her from [place]. Pt was noted again this am at 10am noted to have recurrent seizure lasting 5-10 seconds. Will keep in ICU for now for closer observation. IMPRESSION:   · Suspected recurrent seizures. Had a normal EEG no evidence of seizures yesterday. · Asthma/COPD with acute exacerbation-presented initially with status asthmaticus. · Heroin Abuse, Hx of polys  · Chest pain  · Ex smoker  · Sarcoid  · Bipolar  · Hypertension  · Full Code      RECOMMENDATIONS:   · Keep in ICU for ongoing seizures. · ON clonazepam  · BP monitoring. · ON Keppra, neuro did an EEG that revealed no seizures. · On empiric levofloxacin. NO Cx.   · On Enoxaparin for prophylaxis. · On lithium. · ON PPI      Subjective/History: This patient has been seen and evaluated at the request of Dr. Arsenio aPrks for above. Patient is a 38 y.o. female who presented with acute respiratory failure. Reports that  This am has been having generalized aches and was noted to have chest pain. She had anterior chest pain, dull sensation. No radiation. NO associated symptoms except some mild  Nausea. ROS of 10 systems is otherwise negative. Past Medical History:   Diagnosis Date    Asthma     Bipolar 1 disorder (HCC)     Chronic obstructive pulmonary disease (HCC)     Chronic pain     back, leg    Cocaine abuse     Depression     Hepatitis B     Heroin abuse     HTN (hypertension)     Narcotic abuse     Polysubstance abuse     Sarcoidosis     Tobacco abuse       Past Surgical History:   Procedure Laterality Date    HX GYN      HX WISDOM TEETH EXTRACTION        Prior to Admission medications    Medication Sig Start Date End Date Taking?  Authorizing Provider   albuterol sulfate 90 mcg/actuation aepb Take 2 Puffs by inhalation every four (4) hours as needed. 4/19/17  Yes Meka Guerra MD   QUEtiapine (SEROQUEL) 300 mg tablet Take 300 mg by mouth daily. Patient takes 300 mg daily in the morning and 800 mg (2- 400 mg tablets) every evening    Historical Provider   QUEtiapine (SEROQUEL) 400 mg tablet Take 800 mg by mouth every evening. Patient takes 300 mg daily in the morning and 800 mg (2- 400 mg tablets) every evening    Historical Provider   sertraline (ZOLOFT) 100 mg tablet Take 100 mg by mouth daily. Historical Provider   cloNIDine HCl (CATAPRES) 0.3 mg tablet Take 0.3 mg by mouth three (3) times daily. Historical Provider   clonazePAM (KLONOPIN) 2 mg tablet Take 2 Tabs by mouth two (2) times a day. Max Daily Amount: 8 mg. 8/3/17   Courtney Burt MD   lithium carbonate 300 mg capsule Take 1 Cap by mouth two (2) times a day. 8/3/17   Courtney Burt MD   LORazepam (ATIVAN) 0.5 mg tablet Take 1 Tab by mouth every four (4) hours as needed. Max Daily Amount: 3 mg. 8/3/17   Courtney Burt MD   mupirocin (BACTROBAN) 2 % ointment Apply 1 g to affected area every twelve (12) hours. 8/3/17   Courtney Burt MD   oxyCODONE-acetaminophen (PERCOCET) 5-325 mg per tablet Take 1 Tab by mouth every six (6) hours as needed. Max Daily Amount: 4 Tabs. 8/3/17   Courtney Burt MD   lidocaine (LIDODERM) 5 % Apply patch to the affected area for 12 hours a day and remove for 12 hours a day. 8/3/17   Courtney Burt MD   amLODIPine (NORVASC) 5 mg tablet Take 1 Tab by mouth daily. 8/3/17   Courtney Burt MD   gabapentin (NEURONTIN) 300 mg capsule Take 300 mg by mouth three (3) times daily. Historical Provider   fluticasone-vilanterol (BREO ELLIPTA) 200-25 mcg/dose inhaler Take 1 Puff by inhalation daily. 4/19/17   Meka Guerra MD   albuterol (PROVENTIL VENTOLIN) 2.5 mg /3 mL (0.083 %) nebulizer solution 3 mL by Nebulization route every four (4) hours as needed for Wheezing. 4/16/17   Callie Ruiz MD     Current Facility-Administered Medications   Medication Dose Route Frequency    pantoprazole (PROTONIX) injection 40 mg  40 mg IntraVENous DAILY    mupirocin (BACTROBAN) 2 % ointment   Both Nostrils Q12H    amLODIPine (NORVASC) tablet 10 mg  10 mg Oral DAILY    levETIRAcetam (KEPPRA) 500 mg in saline (iso-osm) 100 ml IVPB  500 mg IntraVENous Q12H    dextrose 5% and 0.9% NaCl infusion  75 mL/hr IntraVENous CONTINUOUS    QUEtiapine (SEROquel) tablet 400 mg  400 mg Oral QHS    clonazePAM (KlonoPIN) tablet 2 mg  2 mg Oral BID    sertraline (ZOLOFT) tablet 100 mg  100 mg Per NG tube DAILY    gabapentin (NEURONTIN) capsule 300 mg  300 mg Oral TID    lithium carbonate capsule 300 mg  300 mg Oral BID    sodium chloride (NS) flush 5-10 mL  5-10 mL IntraVENous Q8H    enoxaparin (LOVENOX) injection 40 mg  40 mg SubCUTAneous Q24H     Allergies   Allergen Reactions    Tomato Swelling     Tongue      Social History   Substance Use Topics    Smoking status: Former Smoker     Packs/day: 0.25     Years: 15.00     Quit date: 8/3/2017    Smokeless tobacco: Never Used      Comment: \"I already quit\"    Alcohol use No      Family History   Problem Relation Age of Onset    Hypertension Father     Hypertension Mother         Review of Systems:  Constitutional: positive for fatigue and malaise  Eyes: negative  Ears, nose, mouth, throat, and face: negative  Respiratory: positive for cough, pleurisy/chest pain, dyspnea on exertion or chronic bronchitis  Cardiovascular: positive for chest pain, chest pressure/discomfort, dyspnea, fatigue  Gastrointestinal: positive for dyspepsia  Genitourinary:negative  Integument/breast: negative  Hematologic/lymphatic: negative  Musculoskeletal:negative  Neurological: negative  Behavioral/Psych: negative  Endocrine: negative  Allergic/Immunologic: negative    Objective:   Vital Signs:    Visit Vitals    BP (!) 132/93 (BP 1 Location: Left arm, BP Patient Position: At rest)    Pulse 93    Temp 98.4 °F (36.9 °C)    Resp 15    Ht 5' 5\" (1.651 m)    Wt 75 kg (165 lb 5.5 oz)    SpO2 99%    Breastfeeding No    BMI 27.51 kg/m2       O2 Device: Room air   O2 Flow Rate (L/min): 2 l/min   Temp (24hrs), Av.5 °F (36.9 °C), Min:98 °F (36.7 °C), Max:98.7 °F (37.1 °C)       Intake/Output:   Last shift:         Last 3 shifts: 1901 -  0700  In: 1943 [P.O.:50; I.V.:1775]  Out: 3500 [Urine:3500]    Intake/Output Summary (Last 24 hours) at 18 0824  Last data filed at 18 0530   Gross per 24 hour   Intake             1825 ml   Output             1550 ml   Net              275 ml     Hemodynamics:   PAP:   CO:     Wedge:   CI:     CVP:    SVR:       PVR:       Ventilator Settings:  Mode Rate Tidal Volume Pressure FiO2 PEEP   CPAP   400 ml  6 cm H2O 40 % 6 cm H20     Peak airway pressure: 13 cm H2O    Minute ventilation: 5.59 l/min      Physical Exam:    General:  Alert, cooperative, no distress, appears stated age. Appears tired. Head:  Normocephalic, without obvious abnormality, atraumatic. Eyes:  Conjunctivae/corneas clear. PERRL, EOMs intact. Nose: Nares normal. Septum midline. Mucosa normal. No drainage or sinus tenderness. Throat: Lips, mucosa, and tongue normal. Teeth and gums normal.   Neck: Supple, symmetrical, trachea midline, no adenopathy, thyroid: no enlargment/tenderness/nodules, no carotid bruit and no JVD. Back:   Symmetric, no curvature. ROM normal.   Lungs:   Has end expiratory wheezing. Breathing comfortably. Chest wall:  No tenderness or deformity. Heart:  Regular rate and rhythm, S1, S2 normal, no murmur, click, rub or gallop. Abdomen:   Soft, non-tender. Bowel sounds normal. No masses,  No organomegaly. Extremities: Extremities normal, atraumatic, no cyanosis or edema. Pulses: 2+ and symmetric all extremities.    Skin: Skin color, texture, turgor normal. No rashes or lesions   Lymph nodes: Cervical, supraclavicular nodes normal.   Neurologic: Grossly nonfocal, motor intact of BUE and BLE. Data:     Recent Results (from the past 24 hour(s))   PROLACTIN    Collection Time: 07/31/18 11:09 AM   Result Value Ref Range    Prolactin 10.5 ng/mL   GLUCOSE, POC    Collection Time: 07/31/18 11:15 AM   Result Value Ref Range    Glucose (POC) 72 65 - 100 mg/dL    Performed by [address] [place] [address], POC    Collection Time: 07/31/18 11:16 AM   Result Value Ref Range    Glucose (POC) 83 65 - 100 mg/dL    [facility] [place]    GLUCOSE, POC    Collection Time: 07/31/18 11:16 AM   Result Value Ref Range    Glucose (POC) 75 65 - 100 mg/dL    [facility] [place]    GLUCOSE, POC    Collection Time: 07/31/18 11:40 AM   Result Value Ref Range    Glucose (POC) 71 65 - 100 mg/dL    [facility] [place]    GLUCOSE, POC    Collection Time: 07/31/18 12:05 PM   Result Value Ref Range    Glucose (POC) 114 (H) 65 - 100 mg/dL    [facility] by [facility] [place]    CK    Collection Time: 07/31/18  2:41 PM   Result Value Ref Range     26 - 192 U/L   HEPATIC FUNCTION PANEL    Collection Time: 07/31/18  2:41 PM   Result Value Ref Range    Protein, total 5.9 (L) 6.4 - 8.2 g/dL    Albumin 2.6 (L) 3.5 - 5.0 g/dL    Globulin 3.3 2.0 - 4.0 g/dL    A-G Ratio 0.8 (L) 1.1 - 2.2      Bilirubin, total 0.3 0.2 - 1.0 MG/DL    Bilirubin, direct <0.1 0.0 - 0.2 MG/DL    Alk.  phosphatase 47 45 - 117 U/L    AST (SGOT) 25 15 - 37 U/L    ALT (SGPT) 21 12 - 78 U/L   LITHIUM    Collection Time: 07/31/18  2:41 PM   Result Value Ref Range    Lithium level 0.34 (L) 0.60 - 1.20 MMOL/L    Reported dose date: NOT PROVIDED      Reported dose time: NOT PROVIDED      Reported dose: NOT PROVIDED UNITS   GLUCOSE, POC    Collection Time: 07/31/18  6:21 PM   Result Value Ref Range    Glucose (POC) 111 (H) 65 - 100 mg/dL    Performed by [address] [place] [address], BASIC    Collection Time: 08/01/18  3:03 AM   Result Value Ref Range    Sodium 135 (L) 136 - 145 mmol/L    Potassium 3.8 3.5 - 5.1 mmol/L    Chloride 106 97 - 108 mmol/L    CO2 23 21 - 32 mmol/L    Anion gap 6 5 - 15 mmol/L    Glucose 85 65 - 100 mg/dL    BUN 11 6 - 20 MG/DL    Creatinine 0.76 0.55 - 1.02 MG/DL    BUN/Creatinine ratio 14 12 - 20      GFR est AA >60 >60 ml/min/1.73m2    GFR est non-AA >60 >60 ml/min/1.73m2    Calcium 7.7 (L) 8.5 - 10.1 MG/DL   CBC WITH AUTOMATED DIFF    Collection Time: 08/01/18  3:03 AM   Result Value Ref Range    WBC 4.4 3.6 - 11.0 K/uL    RBC 4.30 3.80 - 5.20 M/uL    HGB 11.6 11.5 - 16.0 g/dL    HCT 36.3 35.0 - 47.0 %    MCV 84.4 80.0 - 99.0 FL    MCH 27.0 26.0 - 34.0 PG    MCHC 32.0 30.0 - 36.5 g/dL    RDW 15.1 (H) 11.5 - 14.5 %    PLATELET 161 428 - 088 K/uL    MPV 9.9 8.9 - 12.9 FL    NRBC 0.0 0  WBC    ABSOLUTE NRBC 0.00 0.00 - 0.01 K/uL    NEUTROPHILS 41 32 - 75 %    LYMPHOCYTES 45 12 - 49 %    MONOCYTES 7 5 - 13 %    EOSINOPHILS 6 0 - 7 %    BASOPHILS 0 0 - 1 %    IMMATURE GRANULOCYTES 1 (H) 0.0 - 0.5 %    ABS. NEUTROPHILS 1.8 1.8 - 8.0 K/UL    ABS. LYMPHOCYTES 2.0 0.8 - 3.5 K/UL    ABS. MONOCYTES 0.3 0.0 - 1.0 K/UL    ABS. EOSINOPHILS 0.3 0.0 - 0.4 K/UL    ABS. BASOPHILS 0.0 0.0 - 0.1 K/UL    ABS. IMM. GRANS. 0.0 0.00 - 0.04 K/UL    DF AUTOMATED     C REACTIVE PROTEIN, QT    Collection Time: 08/01/18  3:03 AM   Result Value Ref Range    C-Reactive protein <0.29 0.00 - 0.60 mg/dL   SED RATE (ESR)    Collection Time: 08/01/18  3:03 AM   Result Value Ref Range    Sed rate, automated 17 0 - 20 mm/hr             Telemetry:normal sinus rhythm    Imaging:  I have personally reviewed the patients radiographs and have reviewed the reports:  7-31-18: CXR: 1. Likely small pleural effusions with associated passive atelectasis and/or  consolidation.         EEG from yesterday was normal.       Janet Ding MD

## 2018-08-01 NOTE — PROGRESS NOTES
Nutrition Assessment:    RECOMMENDATIONS:   Continue Cardiac/2gNa diet     DIETITIANS INTERVENTIONS/PLAN:   Continue diet as tolerated  Monitor appetite/PO intake    ASSESSMENT:   Pt admitted with asthma exacerbation. PMH: drug abuse, HTN, psych hx, hepatitis B. Chart reviewed for LOS, case discussed during CCU rounds. MST negative for previous nutritional risk factors. Her appetite is good, consumed 100% of her breakfast tray. Labs reviewed, WNL. No skin breakdown noted. Current intake is likely adequate to meet est needs. SUBJECTIVE/OBJECTIVE:     Diet Order: Cardiac, Other (comment) (2gNa)  % Eaten:  Patient Vitals for the past 72 hrs:   % Diet Eaten   08/01/18 0945 100 %     Pertinent Medications:levaquin, protonix, KCl; [email or web address]). Chemistries:  Lab Results   Component Value Date/Time    Sodium 135 (L) 08/01/2018 03:03 AM    Potassium 3.8 08/01/2018 03:03 AM    Chloride 106 08/01/2018 03:03 AM    CO2 23 08/01/2018 03:03 AM    Anion gap 6 08/01/2018 03:03 AM    Glucose 85 08/01/2018 03:03 AM    BUN 11 08/01/2018 03:03 AM    Creatinine 0.76 08/01/2018 03:03 AM    BUN/Creatinine ratio 14 08/01/2018 03:03 AM    GFR est AA >60 08/01/2018 03:03 AM    GFR est non-AA >60 08/01/2018 03:03 AM    Calcium 7.7 (L) 08/01/2018 03:03 AM    AST (SGOT) 25 07/31/2018 02:41 PM    Alk. phosphatase 47 07/31/2018 02:41 PM    Protein, total 5.9 (L) 07/31/2018 02:41 PM    Albumin 2.6 (L) 07/31/2018 02:41 PM    Globulin 3.3 07/31/2018 02:41 PM    A-G Ratio 0.8 (L) 07/31/2018 02:41 PM    ALT (SGPT) 21 07/31/2018 02:41 PM      Anthropometrics: Height: 5' 5\" (165.1 cm) Weight: 75 kg (165 lb 5.5 oz)  [x]bed scale(8/1)    []stated   []unknown    IBW (%IBW):   ( ) UBW (%UBW):   (  %)    BMI: Body mass index is 27.51 kg/(m^2).     This BMI is indicative of:  []Underweight   []Normal   [x]Overweight   [] Obesity   [] Extreme Obesity (BMI>40)  Estimated Nutrition Needs (Based on): 1821 Kcals/day (MSJ 1401 x 1.3) , 60 g (0.8gPro/kg) Protein  Carbohydrate: At Least 130 g/day  Fluids: 1800 mL/day    Last BM: 7/29   [x]Active     []Hyperactive  []Hypoactive       [] Absent   BS  Skin:    [x] Intact   [] Incision  [] Breakdown   [] DTI   [] Tears/Excoriation/Abrasion  [x]Edema(trace-generalized, all extremities)  [] Other: Wt Readings from Last 30 Encounters:   08/01/18 75 kg (165 lb 5.5 oz)   08/01/17 76.7 kg (169 lb 1.5 oz)   07/28/17 76.6 kg (168 lb 14 oz)   04/26/17 60.1 kg (132 lb 8 oz)   04/16/17 56.7 kg (125 lb)   03/19/17 65.7 kg (144 lb 13.5 oz)   02/26/17 65.5 kg (144 lb 6.4 oz)   02/22/17 67.9 kg (149 lb 11.1 oz)   02/11/17 65.8 kg (145 lb)   02/10/17 66.7 kg (147 lb 1 oz)   02/09/17 66.7 kg (147 lb)   02/06/17 70.6 kg (155 lb 10.3 oz)   01/27/17 65.8 kg (145 lb)   01/15/17 72.6 kg (160 lb)   01/09/17 68 kg (150 lb)   02/04/14 54.4 kg (120 lb)   01/30/14 56.7 kg (125 lb)      NUTRITION DIAGNOSES:   Problem:  No nutritional diagnosis at this time        NUTRITION INTERVENTIONS:  Meals/Snacks: General/healthful diet                  GOAL:   Pt will consume >70% of meals in 5-7 days.      Cultural, Jehovah's witness, or Ethnic Dietary Needs: None     LEARNING NEEDS (Diet, Food/Nutrient-Drug Interaction):    [x] None Identified   [] Identified and Education Provided/Documented   [] Identified and Pt declined/was not appropriate      [x] Interdisciplinary Care Plan Reviewed/Documented    [x] Participated in Discharge Planning: Cardiac diet    [x] Interdisciplinary Rounds     NUTRITION RISK:    [] High              [] Moderate           []  Low  [x]  Minimal/Uncompromised    PT SEEN FOR:    []  MD Consult: []Calorie Count      []Diabetic Diet Education        []Diet Education     []Electrolyte Management     []General Nutrition Management and Supplements     []Management of Tube Feeding     []TPN Recommendations    []  RN Referral:  []MST score >=2     []Enteral/Parenteral Nutrition PTA     []Pregnant: Gestational DM or Multigestation []  Low BMI  []  Re-Screen   [x]  LOS   []  NPO/clears x 5 days   []  New TF/TPN    Geovanna Benjamin, RD, [address]   Pager [phone]  Weekend Pager [phone]

## 2018-08-01 NOTE — PROCEDURES
Yung Case, [address]      Electroencephalogram         Procedure Date: 07/31/2018    Procedure ID: MR     Patient Name: Lara Mcgee     YOB: 1975    Medical Record No: [id number]    INDICATION: Seizure    Medications:    Current Facility-Administered Medications:     pantoprazole (PROTONIX) injection 40 mg, 40 mg, IntraVENous, DAILY, Becky Quintanilla MD, 40 mg at 07/31/18 1000    traMADol (ULTRAM) tablet 50 mg, 50 mg, Oral, Q6H PRN, Becky Quintanilla MD    pirocin [facility]) 2 % ointment, , Both Nostrils, Q12H, Becky Quintanilla MD    amLODIPine (NORVASC) tablet 10 mg, 10 mg, Oral, DAILY, Uma Lema MD, Stopped at 07/31/18 0959    albuterol-ipratropium (DUO-NEB) 2.5 MG-0.5 MG/3 ML, 3 mL, Nebulization, Q6H PRN, Uma Lema MD    levETIRAcetam (KEPPRA) 500 mg in saline (iso-osm) 100 ml IVPB, 500 mg, IntraVENous, Q12H, Jigar Roy MD, Last Rate: 400 mL/hr at 07/31/18 1147, 500 mg at 07/31/18 2156    LORazepam (ATIVAN) injection 1 mg, 1 mg, IntraVENous, PRN, Jigar Roy MD, 1 mg at 07/31/18 1608    dextrose 5% and 0.9% NaCl infusion, 75 mL/hr, IntraVENous, CONTINUOUS, Warren Boast, MD, Last Rate: 75 mL/hr at 07/31/18 1635, 75 mL/hr at 07/31/18 1635    QUEtiapine (SEROquel) tablet 400 mg, 400 mg, Oral, QHS, Uma Lema MD, 400 mg at 07/31/18 2112    clonazePAM (KlonoPIN) tablet 2 mg, 2 mg, Oral, BID, Uma Lema MD, 2 mg at 07/31/18 1802    sertraline (ZOLOFT) tablet 100 mg, 100 mg, Per NG tube, DAILY, Uma Lema MD, Stopped at 07/31/18 0900    gabapentin (NEURONTIN) capsule 300 mg, 300 mg, Oral, TID, Uma Lema MD, 300 mg at 07/31/18 2112    lithium carbonate capsule 300 mg, 300 mg, Oral, BID, Uma Lema MD, 300 mg at 07/31/18 1803    sodium chloride (NS) flush 5-10 mL, 5-10 mL, IntraVENous, Q8H, Bjorn Davis MD, 10 mL at 07/31/18 2113    sodium chloride (NS) flush 5-10 mL, 5-10 mL, IntraVENous, PRN, Awilda Henry MD, 10 mL at 07/30/18 1930    acetaminophen (TYLENOL) suppository 650 mg, 650 mg, Rectal, Q6H PRN, Awilda Henry MD    ondansetron [facility]) injection 4 mg, 4 mg, IntraVENous, Q6H PRN, Awilda Henry MD    bisacodyl (DULCOLAX) suppository 10 mg, 10 mg, Rectal, DAILY PRN, Awilda Henry MD    enoxaparin (LOVENOX) injection 40 mg, 40 mg, SubCUTAneous, Q24H, Awilda Henry MD, 40 mg at 07/31/18 1358    glucose chewable tablet 16 g, 4 Tab, Oral, PRN, Awilda Henry MD    dextrose (D50W) injection syrg 12.5-25 g, 12.5-25 g, IntraVENous, PRN, Awilda Henry MD, 25 g at 07/31/18 1148    glucagon (GLUCAGEN) injection 1 mg, 1 mg, IntraMUSCular, PRN, Awilda Henry MD    DESCRIPTION OF PROCEDURE: Electrodes were applied in accordance with the international 10-20 system of electrode placement. EEG was reviewed in both bipolar and referential montages    Description of Activity:  During wakefulness, there is continuous runs of 9-10 Hz symmetric posterior alpha rhythm, that attenuate symmetrically with eye opening. Low voltage beta activity occurs symmetrically at the anterior head regions bilaterally. During drowsiness, there is attenuation of the alpha rhythm and low voltage theta activity occurs bilaterally. Intermittent photic stimulation was performed and induces some bilaterally symmetric posterior driving responses. No sharp or spike discharges, seizures or epileptiform discharges seen. No focal asymmetry. Head movements were recorded with no electrographic seizure correlate. Clinical Interpretation: This EEG, performed during wakefulness and drowsiness is normal. There is no focal asymmetry, seizures or epileptiform discharges seen.

## 2018-08-01 NOTE — INTERDISCIPLINARY ROUNDS
Interdisciplinary team rounds were held 8/1/18 with the following team members:Care Management, Diabetes Treatment Specialist, Nursing, Nutrition, Pharmacy, Physician, Respiratory Therapy and Clinical Coordinator. Plan of care discussed. Goal: See MD orders and progress notes for further  interventions and desired outcomes.

## 2018-08-02 LAB
ALBUMIN SERPL-MCNC: 2.4 G/DL (ref 3.5–5)
ALBUMIN/GLOB SERPL: 0.8 {RATIO} (ref 1.1–2.2)
ALP SERPL-CCNC: 49 U/L (ref 45–117)
ALT SERPL-CCNC: 18 U/L (ref 12–78)
ANION GAP SERPL CALC-SCNC: 8 MMOL/L (ref 5–15)
AST SERPL-CCNC: 15 U/L (ref 15–37)
BASOPHILS # BLD: 0 K/UL (ref 0–0.1)
BASOPHILS NFR BLD: 0 % (ref 0–1)
BILIRUB SERPL-MCNC: 0.1 MG/DL (ref 0.2–1)
BUN SERPL-MCNC: 10 MG/DL (ref 6–20)
BUN/CREAT SERPL: 11 (ref 12–20)
CALCIUM SERPL-MCNC: 7.8 MG/DL (ref 8.5–10.1)
CHLORIDE SERPL-SCNC: 104 MMOL/L (ref 97–108)
CO2 SERPL-SCNC: 27 MMOL/L (ref 21–32)
CREAT SERPL-MCNC: 0.92 MG/DL (ref 0.55–1.02)
DIFFERENTIAL METHOD BLD: ABNORMAL
EOSINOPHIL # BLD: 0.5 K/UL (ref 0–0.4)
EOSINOPHIL NFR BLD: 11 % (ref 0–7)
ERYTHROCYTE [DISTWIDTH] IN BLOOD BY AUTOMATED COUNT: 14.8 % (ref 11.5–14.5)
GLOBULIN SER CALC-MCNC: 3.1 G/DL (ref 2–4)
GLUCOSE SERPL-MCNC: 102 MG/DL (ref 65–100)
HCT VFR BLD AUTO: 34.4 % (ref 35–47)
HGB BLD-MCNC: 11.5 G/DL (ref 11.5–16)
IMM GRANULOCYTES # BLD: 0 K/UL (ref 0–0.04)
IMM GRANULOCYTES NFR BLD AUTO: 1 % (ref 0–0.5)
LYMPHOCYTES # BLD: 2.1 K/UL (ref 0.8–3.5)
LYMPHOCYTES NFR BLD: 47 % (ref 12–49)
MAGNESIUM SERPL-MCNC: 1.9 MG/DL (ref 1.6–2.4)
MCH RBC QN AUTO: 27.3 PG (ref 26–34)
MCHC RBC AUTO-ENTMCNC: 33.4 G/DL (ref 30–36.5)
MCV RBC AUTO: 81.7 FL (ref 80–99)
MONOCYTES # BLD: 0.4 K/UL (ref 0–1)
MONOCYTES NFR BLD: 10 % (ref 5–13)
NEUTS SEG # BLD: 1.4 K/UL (ref 1.8–8)
NEUTS SEG NFR BLD: 32 % (ref 32–75)
NRBC # BLD: 0 K/UL (ref 0–0.01)
NRBC BLD-RTO: 0 PER 100 WBC
PHOSPHATE SERPL-MCNC: 3.5 MG/DL (ref 2.6–4.7)
PLATELET # BLD AUTO: 253 K/UL (ref 150–400)
PMV BLD AUTO: 9.5 FL (ref 8.9–12.9)
POTASSIUM SERPL-SCNC: 3.3 MMOL/L (ref 3.5–5.1)
PROT SERPL-MCNC: 5.5 G/DL (ref 6.4–8.2)
RBC # BLD AUTO: 4.21 M/UL (ref 3.8–5.2)
SODIUM SERPL-SCNC: 139 MMOL/L (ref 136–145)
WBC # BLD AUTO: 4.4 K/UL (ref 3.6–11)

## 2018-08-02 PROCEDURE — 94640 AIRWAY INHALATION TREATMENT: CPT

## 2018-08-02 PROCEDURE — 85025 COMPLETE CBC W/AUTO DIFF WBC: CPT | Performed by: INTERNAL MEDICINE

## 2018-08-02 PROCEDURE — 74011250637 HC RX REV CODE- 250/637: Performed by: EMERGENCY MEDICINE

## 2018-08-02 PROCEDURE — 74011250636 HC RX REV CODE- 250/636: Performed by: HOSPITALIST

## 2018-08-02 PROCEDURE — 97530 THERAPEUTIC ACTIVITIES: CPT | Performed by: OCCUPATIONAL THERAPIST

## 2018-08-02 PROCEDURE — 36415 COLL VENOUS BLD VENIPUNCTURE: CPT | Performed by: INTERNAL MEDICINE

## 2018-08-02 PROCEDURE — 74011000250 HC RX REV CODE- 250: Performed by: EMERGENCY MEDICINE

## 2018-08-02 PROCEDURE — 74011250637 HC RX REV CODE- 250/637: Performed by: PSYCHIATRY & NEUROLOGY

## 2018-08-02 PROCEDURE — 77030029684 HC NEB SM VOL KT MONA -A

## 2018-08-02 PROCEDURE — 80053 COMPREHEN METABOLIC PANEL: CPT | Performed by: INTERNAL MEDICINE

## 2018-08-02 PROCEDURE — 97530 THERAPEUTIC ACTIVITIES: CPT | Performed by: PHYSICAL THERAPIST

## 2018-08-02 PROCEDURE — 83735 ASSAY OF MAGNESIUM: CPT | Performed by: INTERNAL MEDICINE

## 2018-08-02 PROCEDURE — 65620000000 HC RM CCU GENERAL

## 2018-08-02 PROCEDURE — 97535 SELF CARE MNGMENT TRAINING: CPT | Performed by: OCCUPATIONAL THERAPIST

## 2018-08-02 PROCEDURE — 74011250636 HC RX REV CODE- 250/636: Performed by: INTERNAL MEDICINE

## 2018-08-02 PROCEDURE — 74011250637 HC RX REV CODE- 250/637: Performed by: INTERNAL MEDICINE

## 2018-08-02 PROCEDURE — 97165 OT EVAL LOW COMPLEX 30 MIN: CPT | Performed by: OCCUPATIONAL THERAPIST

## 2018-08-02 PROCEDURE — 76937 US GUIDE VASCULAR ACCESS: CPT

## 2018-08-02 PROCEDURE — 97116 GAIT TRAINING THERAPY: CPT | Performed by: PHYSICAL THERAPIST

## 2018-08-02 PROCEDURE — C9113 INJ PANTOPRAZOLE SODIUM, VIA: HCPCS | Performed by: INTERNAL MEDICINE

## 2018-08-02 PROCEDURE — 84100 ASSAY OF PHOSPHORUS: CPT | Performed by: INTERNAL MEDICINE

## 2018-08-02 RX ORDER — POTASSIUM CHLORIDE 20 MEQ/1
40 TABLET, EXTENDED RELEASE ORAL
Status: COMPLETED | OUTPATIENT
Start: 2018-08-02 | End: 2018-08-02

## 2018-08-02 RX ORDER — BUDESONIDE 0.5 MG/2ML
500 INHALANT ORAL
Status: DISCONTINUED | OUTPATIENT
Start: 2018-08-02 | End: 2018-08-04

## 2018-08-02 RX ORDER — DIVALPROEX SODIUM 500 MG/1
500 TABLET, DELAYED RELEASE ORAL 2 TIMES DAILY
Status: ON HOLD | COMMUNITY
End: 2020-09-25 | Stop reason: SDUPTHER

## 2018-08-02 RX ORDER — GABAPENTIN 800 MG/1
800 TABLET ORAL 3 TIMES DAILY
COMMUNITY
End: 2018-08-04

## 2018-08-02 RX ORDER — DIVALPROEX SODIUM 250 MG/1
500 TABLET, DELAYED RELEASE ORAL 2 TIMES DAILY
Status: DISCONTINUED | OUTPATIENT
Start: 2018-08-03 | End: 2018-08-04 | Stop reason: HOSPADM

## 2018-08-02 RX ORDER — POTASSIUM CHLORIDE 750 MG/1
20 TABLET, FILM COATED, EXTENDED RELEASE ORAL ONCE
Status: COMPLETED | OUTPATIENT
Start: 2018-08-02 | End: 2018-08-02

## 2018-08-02 RX ORDER — AMLODIPINE BESYLATE 5 MG/1
5 TABLET ORAL DAILY
Status: DISCONTINUED | OUTPATIENT
Start: 2018-08-03 | End: 2018-08-04 | Stop reason: HOSPADM

## 2018-08-02 RX ORDER — LORAZEPAM 0.5 MG/1
0.5 TABLET ORAL 2 TIMES DAILY
COMMUNITY
End: 2018-08-04

## 2018-08-02 RX ORDER — LITHIUM CARBONATE 300 MG/1
300 CAPSULE ORAL DAILY
COMMUNITY
End: 2018-08-04

## 2018-08-02 RX ORDER — MAGNESIUM SULFATE HEPTAHYDRATE 40 MG/ML
2 INJECTION, SOLUTION INTRAVENOUS ONCE
Status: COMPLETED | OUTPATIENT
Start: 2018-08-02 | End: 2018-08-02

## 2018-08-02 RX ORDER — OXYCODONE AND ACETAMINOPHEN 5; 325 MG/1; MG/1
1 TABLET ORAL 2 TIMES DAILY
COMMUNITY
End: 2018-08-04

## 2018-08-02 RX ADMIN — Medication 10 ML: at 16:33

## 2018-08-02 RX ADMIN — SERTRALINE HYDROCHLORIDE 100 MG: 50 TABLET ORAL at 20:42

## 2018-08-02 RX ADMIN — OXYCODONE HYDROCHLORIDE 5 MG: 5 TABLET ORAL at 07:12

## 2018-08-02 RX ADMIN — QUETIAPINE FUMARATE 400 MG: 100 TABLET ORAL at 22:13

## 2018-08-02 RX ADMIN — GABAPENTIN 300 MG: 300 CAPSULE ORAL at 16:39

## 2018-08-02 RX ADMIN — LEVETIRACETAM 500 MG: 500 TABLET ORAL at 18:08

## 2018-08-02 RX ADMIN — LITHIUM CARBONATE 300 MG: 300 CAPSULE, GELATIN COATED ORAL at 18:08

## 2018-08-02 RX ADMIN — PANTOPRAZOLE SODIUM 40 MG: 40 INJECTION, POWDER, FOR SOLUTION INTRAVENOUS at 09:37

## 2018-08-02 RX ADMIN — ACETAMINOPHEN 650 MG: 325 TABLET ORAL at 20:42

## 2018-08-02 RX ADMIN — OXYCODONE HYDROCHLORIDE 5 MG: 5 TABLET ORAL at 23:43

## 2018-08-02 RX ADMIN — OXYCODONE HYDROCHLORIDE 5 MG: 5 TABLET ORAL at 12:16

## 2018-08-02 RX ADMIN — Medication 10 ML: at 07:12

## 2018-08-02 RX ADMIN — OXYCODONE HYDROCHLORIDE 5 MG: 5 TABLET ORAL at 18:08

## 2018-08-02 RX ADMIN — CLONAZEPAM 2 MG: 1 TABLET ORAL at 09:37

## 2018-08-02 RX ADMIN — LITHIUM CARBONATE 300 MG: 300 CAPSULE, GELATIN COATED ORAL at 09:37

## 2018-08-02 RX ADMIN — LEVETIRACETAM 500 MG: 500 TABLET ORAL at 09:37

## 2018-08-02 RX ADMIN — POTASSIUM CHLORIDE 20 MEQ: 750 TABLET, EXTENDED RELEASE ORAL at 20:43

## 2018-08-02 RX ADMIN — POTASSIUM CHLORIDE 40 MEQ: 20 TABLET, EXTENDED RELEASE ORAL at 09:37

## 2018-08-02 RX ADMIN — BUDESONIDE 500 MCG: 0.5 SUSPENSION RESPIRATORY (INHALATION) at 23:46

## 2018-08-02 RX ADMIN — ENOXAPARIN SODIUM 40 MG: 100 INJECTION SUBCUTANEOUS at 16:39

## 2018-08-02 RX ADMIN — CLONAZEPAM 2 MG: 1 TABLET ORAL at 18:08

## 2018-08-02 RX ADMIN — MAGNESIUM SULFATE IN WATER 2 G: 40 INJECTION, SOLUTION INTRAVENOUS at 12:17

## 2018-08-02 RX ADMIN — MUPIROCIN: 20 OINTMENT TOPICAL at 10:20

## 2018-08-02 RX ADMIN — MUPIROCIN: 20 OINTMENT TOPICAL at 22:12

## 2018-08-02 RX ADMIN — Medication 10 ML: at 22:14

## 2018-08-02 RX ADMIN — GABAPENTIN 300 MG: 300 CAPSULE ORAL at 22:14

## 2018-08-02 RX ADMIN — GABAPENTIN 300 MG: 300 CAPSULE ORAL at 09:37

## 2018-08-02 NOTE — PROGRESS NOTES
Pharmacy Medication Reconciliation     The patient was interviewed regarding current PTA medication list, use and drug allergies; No one present in room and obtained permission from patient to discuss drug regimen with visitor(s) present. The patient was questioned regarding use of any other inhalers, topical products, over the counter medications, herbal medications, vitamin products or ophthalmic/nasal/otic medication use. Allergy Update: Tomato- swelling of the tongue    Recommendations/Findings: The following amendments were made to the patient's active [facility] [id number] [facility]:     1) Additions: Depakote 500mg bid    2) Deletions: Quetiapine 300mg QAM. Amlodipine 5mg every day. 3) Changes: Gabapentin 300mg TID to gabapentin 800mg TID. Lithium carbonate 300mg bid to Lithium carbonate 300mg every day. Oxycodone/APAP 5/325 1 q6h prn to Oxycodone/APAP 5/325 1 bid. Lorazepam  0.5mg 1 q4h prn to Lorazepam 0.5mg 1 bid     4) Other:   Patient was coherent and aware of her medications, name, strength, and frequency. Changes made to medication list were due to what patient told us this morning.  Patient admits to non-adherence since she does not have prescription insurance in [place]. Says last doses taken were greater than 6 months ago.  Patient lived in [place] do to being in a rehab program for heroin. Patient moved back to [place] to be close with family. She admitted to relapsing after 5 years sober. She said last time she used was over a week ago and is now going through withdrawal. She said she wants to get clean again but is week and is going to talk to case management about going back into a rehab program.          -Clarified PTA med list with Patient. PTA medication list was corrected to the following:     Prior to Admission Medications   Prescriptions Last Dose Informant Patient Reported? Taking?    LORazepam (ATIVAN) 0.5 mg tablet   Yes Yes   Sig: Take 0.5 mg by mouth two (2) times a day.   QUEtiapine (SEROQUEL) 400 mg tablet 7/19/2018 at Unknown time Self Yes No   Sig: Take 800 mg by mouth every evening. Patient takes 300 mg daily in the morning and 800 mg (2- 400 mg tablets) every evening   albuterol (PROVENTIL VENTOLIN) 2.5 mg /3 mL (0.083 %) nebulizer solution 7/24/2018 at Unknown time Self No No   Sig: 3 mL by Nebulization route every four (4) hours as needed for Wheezing. albuterol sulfate 90 mcg/actuation aepb  Self No No   Sig: Take 2 Puffs by inhalation every four (4) hours as needed. cloNIDine HCl (CATAPRES) 0.3 mg tablet 7/19/2018 at Unknown time Self Yes No   Sig: Take 0.3 mg by mouth three (3) times daily. clonazePAM (KLONOPIN) 2 mg tablet 7/19/2018 at Unknown time Self No No   Sig: Take 2 Tabs by mouth two (2) times a day. Max Daily Amount: 8 mg.   divalproex DR (DEPAKOTE) 500 mg tablet   Yes No   Sig: Take 500 mg by mouth two (2) times a day. fluticasone-vilanterol (BREO ELLIPTA) 200-25 mcg/dose inhaler 7/19/2018 at Unknown time Self No No   Sig: Take 1 Puff by inhalation daily. gabapentin (NEURONTIN) 800 mg tablet   Yes No   Sig: Take 800 mg by mouth three (3) times daily. lidocaine (LIDODERM) 5 % 7/19/2018 at Unknown time Self No No   Sig: Apply patch to the affected area for 12 hours a day and remove for 12 hours a day. lithium carbonate 300 mg capsule   Yes Yes   Sig: Take 300 mg by mouth daily. mupirocin (BACTROBAN) 2 % ointment 7/19/2018 at Unknown time Self No No   Sig: Apply 1 g to affected area every twelve (12) hours. oxyCODONE-acetaminophen (PERCOCET) 5-325 mg per tablet   Yes Yes   Sig: Take 1 Tab by mouth two (2) times a day. sertraline (ZOLOFT) 100 mg tablet 7/19/2018 at Unknown time Self Yes No   Sig: Take 100 mg by mouth daily.       Facility-Administered Medications: None          Thank you,  Macie Narayan

## 2018-08-02 NOTE — PROGRESS NOTES
Physical Therapy Goals  Revised 8/2/2018  1. Patient will move from supine to sit and sit to supine , scoot up and down and roll side to side in bed with supervision/set-up within 7 day(s). 2.  Patient will transfer from bed to chair and chair to bed with supervision/set-up using the least restrictive device within 7 day(s). 3.  Patient will perform sit to stand with supervision/set-up within 7 day(s). 4.  Patient will ambulate with supervision/set-up for 200 feet with the least restrictive device within 7 day(s). Physical Therapy Goals  Initiated 7/30/2018  1. Patient will move from supine to sit and sit to supine , scoot up and down and roll side to side in bed with minimal assistance/contact guard assist within 7 day(s). 2.  Patient will transfer from bed to chair and chair to bed with minimal assistance/contact guard assist using the least restrictive device within 7 day(s). 3.  Patient will perform sit to stand with minimal assistance/contact guard assist within 7 day(s). 4.  Patient will ambulate with minimal assistance/contact guard assist for 100 feet with the least restrictive device within 7 day(s). physical Therapy TREATMENT: WEEKLY REASSESSMENT  Patient: Zayra Guillen (89 y.o. female)  Date: 8/2/2018  Diagnosis: Asthma exacerbation <principal problem not specified>       Precautions: Bed Alarm, Fall  Chart, physical therapy assessment, plan of care and goals were reviewed. ASSESSMENT: Patient transferred to ICU following pseudoseizures on 7/31/18. Cleared for PT by nursing. Patient demonstrating good overall progress in therapy. Patient requiring SBA for bed mobility and min A for transfers and ambulation. Gait is unsteady demonstrating very narrowed base of support; path deviations and increased trunk sway. Patient able to ambulate 125 feet using cardiac cart for support. Patient is somewhat impulsive with mobility which increases risk of falls.   Patient reports modified independence at baseline using RW for ambulation. Patient is below her functional baseline and would benefit from inpatient rehab following discharge to regain functional independence prior to returning to home. Patient with h/o polysubstance abuse and would further benefit from [facility] rehab. Patient's progression toward goals since last assessment: good progress with mobility and all goals upgraded     PLAN:  Goals have been updated based on progression since last assessment. Patient continues to benefit from skilled intervention to address the above impairments. Continue to follow the patient 4 times a week to address goals. Planned Interventions:  [x]              Bed Mobility Training             []       Neuromuscular Re-Education  [x]              Transfer Training                   []       Orthotic/Prosthetic Training  [x]              Gait Training                         []       Modalities  []              Therapeutic Exercises           []       Edema Management/Control  [x]              Therapeutic Activities            [x]       Patient and Family Training/Education  []              Other (comment):  Discharge Recommendations: Inpatient Rehab  Further Equipment Recommendations for Discharge: TBD by rehab     SUBJECTIVE:   Patient stated My legs are weak.     OBJECTIVE DATA SUMMARY:   Critical Behavior:  Neurologic State: Alert  Orientation Level: Oriented X4  Cognition: Follows commands  Safety/Judgement: Decreased awareness of need for safety, Decreased awareness of need for assistance, Decreased insight into deficits, Decreased awareness of environment    Strength:       generally decreased but functional                   Functional Mobility Training:  Bed Mobility:     Supine to Sit: Stand-by assistance     Scooting: Stand-by assistance        Transfers:  Sit to Stand: Minimum assistance (poor balance with inital standing)  Stand to Sit: Minimum assistance                             Balance:  Sitting: Impaired  Sitting - Static: Good (unsupported)  Sitting - Dynamic: Good (unsupported)  Standing: Impaired  Standing - Static: Fair;Constant support  Standing - Dynamic : Fair (using cardiac cart for support)  Ambulation/Gait Training:  Distance (ft): 125 Feet (ft)  Assistive Device: Gait belt (cardiac cart)  Ambulation - Level of Assistance: Minimal assistance        Gait Abnormalities: Decreased step clearance; Path deviations;Trunk sway increased        Base of Support: Narrowed     Speed/Denise: Pace decreased (<100 feet/min); Slow  Step Length: Left shortened;Right shortened      Gait is slow and mildly unsteady demonstrating very narrowed base of support with almost scissoring gait; increased trunk sway and path deviations        Pain:  Pain Scale 1: Numeric (0 - 10)  Pain Intensity 1: 8  Pain Location 1: Abdomen;Leg  Pain Orientation 1: Left;Right; Anterior  Pain Description 1: Aching;Cramping  Pain Intervention(s) 1: Medication (see MAR)  Activity Tolerance:   VSS  Please refer to the flowsheet for vital signs taken during this treatment.   After treatment:   [x]  Patient left in no apparent distress sitting up in chair  []  Patient left in no apparent distress in bed  [x]  Call bell left within reach  [x]  Nursing notified  []  Caregiver present  [x]  Bed alarm activated    COMMUNICATION/COLLABORATION:   The patients plan of care was discussed with: Occupational Therapist and Registered Nurse    Stephanie Harper, PT   Time Calculation: 32 mins

## 2018-08-02 NOTE — PROGRESS NOTES
[facility]  Pulmonary, Critical Care, and Sleep Medicine    Name: Freida Morley MRN: [id number]   : 1975 Hospital: [facility]   Date: 2018        Critical Care Patient Consult    NO PCP moved her from [place]. IMPRESSION:   · Suspected recurrent seizures versus pseudoseizures, has normal EEG, no post ictal state. Had a normal EEG no evidence of seizures on . · Asthma/COPD with acute exacerbation-presented initially with status asthmaticus now doing much better. · Hypokalemia  · Heroin Abuse, Hx of polys  · Chest pain  · Ex smoker  · Sarcoid  · Bipolar  · Hypertension  · Full Code      RECOMMENDATIONS:   · Neurology assistance appreciated. · Ready to move out to Neuro/Tele. · Replete K.   · ON clonazepam  · BP monitoring. · ON Keppra, neuro did an EEG that revealed no seizures. · On empiric levofloxacin. NO Cx.   · On Enoxaparin for prophylaxis. · On lithium. · ON PPI      Subjective/History:       Events from yesterday noted. Had a headache, felt poorly. Had fatigue. Tolerating po intake. Had chest pain. Had some brief seizure like activities but appeared to be pseudoseizures. This patient has been seen and evaluated at the request of Dr. Evi Mancia for above. Patient is a 38 y.o. female who presented with acute respiratory failure. Reports that  This am has been having generalized aches and was noted to have chest pain. She had anterior chest pain, dull sensation. No radiation. NO associated symptoms except some mild  Nausea. ROS of 10 systems is otherwise negative.        Past Medical History:   Diagnosis Date    Asthma     Bipolar 1 disorder (HCC)     Chronic obstructive pulmonary disease (HCC)     Chronic pain     back, leg    Cocaine abuse     Depression     Hepatitis B     Heroin abuse     HTN (hypertension)     Narcotic abuse     Polysubstance abuse     Sarcoidosis     Tobacco abuse       Past Surgical History:   Procedure Laterality Date    HX GYN      HX WISDOM TEETH EXTRACTION        Prior to Admission medications    Medication Sig Start Date End Date Taking? Authorizing Provider   albuterol sulfate 90 mcg/actuation aepb Take 2 Puffs by inhalation every four (4) hours as needed. 4/19/17  Yes Rambo Hernandez MD   QUEtiapine (SEROQUEL) 300 mg tablet Take 300 mg by mouth daily. Patient takes 300 mg daily in the morning and 800 mg (2- 400 mg tablets) every evening    Historical Provider   QUEtiapine (SEROQUEL) 400 mg tablet Take 800 mg by mouth every evening. Patient takes 300 mg daily in the morning and 800 mg (2- 400 mg tablets) every evening    Historical Provider   sertraline (ZOLOFT) 100 mg tablet Take 100 mg by mouth daily. Historical Provider   cloNIDine HCl (CATAPRES) 0.3 mg tablet Take 0.3 mg by mouth three (3) times daily. Historical Provider   clonazePAM (KLONOPIN) 2 mg tablet Take 2 Tabs by mouth two (2) times a day. Max Daily Amount: 8 mg. 8/3/17   Milan Santamaria MD   lithium carbonate 300 mg capsule Take 1 Cap by mouth two (2) times a day. 8/3/17   Milan Santamaria MD   LORazepam (ATIVAN) 0.5 mg tablet Take 1 Tab by mouth every four (4) hours as needed. Max Daily Amount: 3 mg. 8/3/17   Milan Santamaria MD   mupirocin (BACTROBAN) 2 % ointment Apply 1 g to affected area every twelve (12) hours. 8/3/17   Milan Santamaria MD   oxyCODONE-acetaminophen (PERCOCET) 5-325 mg per tablet Take 1 Tab by mouth every six (6) hours as needed. Max Daily Amount: 4 Tabs. 8/3/17   Milan Santamaria MD   lidocaine (LIDODERM) 5 % Apply patch to the affected area for 12 hours a day and remove for 12 hours a day. 8/3/17   Milan Santamaria MD   amLODIPine (NORVASC) 5 mg tablet Take 1 Tab by mouth daily. 8/3/17   Milan Santamaria MD   gabapentin (NEURONTIN) 300 mg capsule Take 300 mg by mouth three (3) times daily.     Historical Provider   fluticasone-vilanterol (BREO ELLIPTA) 200-25 mcg/dose inhaler Take 1 Puff by inhalation daily. 4/19/17   Hai Osborne MD   albuterol (PROVENTIL VENTOLIN) 2.5 mg /3 mL (0.083 %) nebulizer solution 3 mL by Nebulization route every four (4) hours as needed for Wheezing.  4/16/17   Mahamed Bartholomew MD     Current Facility-Administered Medications   Medication Dose Route Frequency    levETIRAcetam (KEPPRA) tablet 500 mg  500 mg Oral BID    pantoprazole (PROTONIX) injection 40 mg  40 mg IntraVENous DAILY    mupirocin (BACTROBAN) 2 % ointment   Both Nostrils Q12H    amLODIPine (NORVASC) tablet 10 mg  10 mg Oral DAILY    dextrose 5% and 0.9% NaCl infusion  75 mL/hr IntraVENous CONTINUOUS    QUEtiapine (SEROquel) tablet 400 mg  400 mg Oral QHS    clonazePAM (KlonoPIN) tablet 2 mg  2 mg Oral BID    sertraline (ZOLOFT) tablet 100 mg  100 mg Per NG tube DAILY    gabapentin (NEURONTIN) capsule 300 mg  300 mg Oral TID    lithium carbonate capsule 300 mg  300 mg Oral BID    sodium chloride (NS) flush 5-10 mL  5-10 mL IntraVENous Q8H    enoxaparin (LOVENOX) injection 40 mg  40 mg SubCUTAneous Q24H     Allergies   Allergen Reactions    Tomato Swelling     Tongue      Social History   Substance Use Topics    Smoking status: Former Smoker     Packs/day: 0.25     Years: 15.00     Quit date: 8/3/2017    Smokeless tobacco: Never Used      Comment: \"I already quit\"    Alcohol use No      Family History   Problem Relation Age of Onset    Hypertension Father     Hypertension Mother         Review of Systems:  Constitutional: positive for fatigue and malaise  Eyes: negative  Ears, nose, mouth, throat, and face: negative  Respiratory: positive for cough, pleurisy/chest pain, dyspnea on exertion or chronic bronchitis  Cardiovascular: positive for chest pain, chest pressure/discomfort, dyspnea, fatigue  Gastrointestinal: positive for dyspepsia  Genitourinary:negative  Integument/breast: negative  Hematologic/lymphatic: negative  Musculoskeletal:negative  Neurological: negative  Behavioral/Psych: negative  Endocrine: negative  Allergic/Immunologic: negative    Objective:   Vital Signs:    Visit Vitals    BP (!) 85/56    Pulse 96    Temp 97.7 °F (36.5 °C)    Resp 16    Ht 5' 5\" (1.651 m)    Wt 75 kg (165 lb 5.5 oz)    SpO2 99%    Breastfeeding No    BMI 27.51 kg/m2       O2 Device: Room air   O2 Flow Rate (L/min): 2 l/min   Temp (24hrs), Av.4 °F (36.9 °C), Min:97.7 °F (36.5 °C), Max:99.2 °F (37.3 °C)       Intake/Output:   Last shift:         Last 3 shifts:  1901 -  0700  In: 3418 [P.O.:990; I.V.:1900]  Out: 3650 [Urine:3650]    Intake/Output Summary (Last 24 hours) at 18 0749  Last data filed at 18 2100   Gross per 24 hour   Intake             2115 ml   Output             2700 ml   Net             -585 ml     Hemodynamics:   PAP:   CO:     Wedge:   CI:     CVP:    SVR:       PVR:       Ventilator Settings:  Mode Rate Tidal Volume Pressure FiO2 PEEP   CPAP   400 ml  6 cm H2O 40 % 6 cm H20     Peak airway pressure: 13 cm H2O    Minute ventilation: 5.59 l/min      Physical Exam:    General:  Alert, cooperative, no distress, appears stated age. Normal  Speech. Head:  Normocephalic, without obvious abnormality, atraumatic. Eyes:  Conjunctivae/corneas clear. PERRL, EOMs intact. Nose: Nares normal. Septum midline. Mucosa normal. No drainage or sinus tenderness. Throat: Lips, mucosa, and tongue normal. Teeth and gums normal.   Neck: Supple, symmetrical, trachea midline, no adenopathy, thyroid: no enlargment/tenderness/nodules, no carotid bruit and no JVD. Back:   Symmetric, no curvature. ROM normal.   Lungs:   Has end expiratory wheezing. Breathing comfortably. Normal speech. Chest wall:  No tenderness or deformity. Heart:  Regular rate and rhythm, S1, S2 normal, no murmur, click, rub or gallop. Abdomen:   Soft, non-tender. Bowel sounds normal. No masses,  No organomegaly.    Extremities: Extremities normal, atraumatic, no cyanosis or edema.   Pulses: 2+ and symmetric all extremities. Skin: Skin color, texture, turgor normal. No rashes or lesions   Lymph nodes: Cervical, supraclavicular nodes normal.   Neurologic: Grossly nonfocal, motor intact of BUE and BLE. Psych: no overt anxiety or depression. Data:     Recent Results (from the past 24 hour(s))   METABOLIC PANEL, COMPREHENSIVE    Collection Time: 08/02/18  6:09 AM   Result Value Ref Range    Sodium 139 136 - 145 mmol/L    Potassium 3.3 (L) 3.5 - 5.1 mmol/L    Chloride 104 97 - 108 mmol/L    CO2 27 21 - 32 mmol/L    Anion gap 8 5 - 15 mmol/L    Glucose 102 (H) 65 - 100 mg/dL    BUN 10 6 - 20 MG/DL    Creatinine 0.92 0.55 - 1.02 MG/DL    BUN/Creatinine ratio 11 (L) 12 - 20      GFR est AA >60 >60 ml/min/1.73m2    GFR est non-AA >60 >60 ml/min/1.73m2    Calcium 7.8 (L) 8.5 - 10.1 MG/DL    Bilirubin, total 0.1 (L) 0.2 - 1.0 MG/DL    ALT (SGPT) 18 12 - 78 U/L    AST (SGOT) 15 15 - 37 U/L    Alk. phosphatase 49 45 - 117 U/L    Protein, total 5.5 (L) 6.4 - 8.2 g/dL    Albumin 2.4 (L) 3.5 - 5.0 g/dL    Globulin 3.1 2.0 - 4.0 g/dL    A-G Ratio 0.8 (L) 1.1 - 2.2     MAGNESIUM    Collection Time: 08/02/18  6:09 AM   Result Value Ref Range    Magnesium 1.9 1.6 - 2.4 mg/dL   PHOSPHORUS    Collection Time: 08/02/18  6:09 AM   Result Value Ref Range    Phosphorus 3.5 2.6 - 4.7 MG/DL   CBC WITH AUTOMATED DIFF    Collection Time: 08/02/18  6:09 AM   Result Value Ref Range    WBC 4.4 3.6 - 11.0 K/uL    RBC 4.21 3.80 - 5.20 M/uL    HGB 11.5 11.5 - 16.0 g/dL    HCT 34.4 (L) 35.0 - 47.0 %    MCV 81.7 80.0 - 99.0 FL    MCH 27.3 26.0 - 34.0 PG    MCHC 33.4 30.0 - 36.5 g/dL    RDW 14.8 (H) 11.5 - 14.5 %    PLATELET 699 183 - 261 K/uL    MPV 9.5 8.9 - 12.9 FL    NRBC 0.0 0  WBC    ABSOLUTE NRBC 0.00 0.00 - 0.01 K/uL    NEUTROPHILS 32 32 - 75 %    LYMPHOCYTES 47 12 - 49 %    MONOCYTES 10 5 - 13 %    EOSINOPHILS 11 (H) 0 - 7 %    BASOPHILS 0 0 - 1 %    IMMATURE GRANULOCYTES 1 (H) 0.0 - 0.5 %    ABS. NEUTROPHILS 1.4 (L) 1.8 - 8.0 K/UL    ABS. LYMPHOCYTES 2.1 0.8 - 3.5 K/UL    ABS. MONOCYTES 0.4 0.0 - 1.0 K/UL    ABS. EOSINOPHILS 0.5 (H) 0.0 - 0.4 K/UL    ABS. BASOPHILS 0.0 0.0 - 0.1 K/UL    ABS. IMM. GRANS. 0.0 0.00 - 0.04 K/UL    DF AUTOMATED               Telemetry:normal sinus rhythm    Imaging:  I have personally reviewed the patients radiographs and have reviewed the reports:  7-31-18: CXR: 1. Likely small pleural effusions with associated passive atelectasis and/or  consolidation.         EEG from yesterday was normal.       Demetria Johnson MD

## 2018-08-02 NOTE — PROGRESS NOTES
Neurology Progress Note    Patient ID:  Milena Dobbs  [id number]  38 y.o.  1975    Subjective:      Patient with one episode of seizure last night. Prolonged EEG was done and event did not reveal any electrographic seizure correlate. Labs today revealed hypokalemia (3.3), hypocalcemia (7.8) and low albumin.      Current Facility-Administered Medications   Medication Dose Route Frequency    [START ON 8/3/2018] amLODIPine (NORVASC) tablet 5 mg  5 mg Oral DAILY    levETIRAcetam (KEPPRA) tablet 500 mg  500 mg Oral BID    acetaminophen (TYLENOL) tablet 650 mg  650 mg Oral Q6H PRN    oxyCODONE IR (ROXICODONE) tablet 5 mg  5 mg Oral Q6H PRN    pantoprazole (PROTONIX) injection 40 mg  40 mg IntraVENous DAILY    mupirocin (BACTROBAN) 2 % ointment   Both Nostrils Q12H    albuterol-ipratropium (DUO-NEB) 2.5 MG-0.5 MG/3 ML  3 mL Nebulization Q6H PRN    LORazepam (ATIVAN) injection 1 mg  1 mg IntraVENous PRN    QUEtiapine (SEROquel) tablet 400 mg  400 mg Oral QHS    clonazePAM (KlonoPIN) tablet 2 mg  2 mg Oral BID    sertraline (ZOLOFT) tablet 100 mg  100 mg Per NG tube DAILY    gabapentin (NEURONTIN) capsule 300 mg  300 mg Oral TID    lithium carbonate capsule 300 mg  300 mg Oral BID    sodium chloride (NS) flush 5-10 mL  5-10 mL IntraVENous Q8H    sodium chloride (NS) flush 5-10 mL  5-10 mL IntraVENous PRN    ondansetron (ZOFRAN) injection 4 mg  4 mg IntraVENous Q6H PRN    bisacodyl (DULCOLAX) suppository 10 mg  10 mg Rectal DAILY PRN    enoxaparin (LOVENOX) injection 40 mg  40 mg SubCUTAneous Q24H    glucose chewable tablet 16 g  4 Tab Oral PRN    dextrose (D50W) injection syrg 12.5-25 g  12.5-25 g IntraVENous PRN    glucagon (GLUCAGEN) injection 1 mg  1 mg IntraMUSCular PRN        Objective:     Patient Vitals for the past 8 hrs:   BP Temp Pulse Resp SpO2   08/02/18 1554 (P) 125/86 - (P) 93 - -   08/02/18 1538 (P) 109/86 - (!) (P) 106 - (P) 100 %   08/02/18 1300 111/65 98 °F (36.7 °C) 97 20 100 % 08/02/18 1100 99/55 - 89 18 98 %   08/02/18 1000 106/63 - (!) 102 13 98 %   08/02/18 0900 94/57 - 85 17 98 %       08/02 0701 - 08/02 1900  In: 360 [P.O.:360]  Out: 200 [Urine:200]  07/31 1901 - 08/02 0700  In: 2833 [P.O.:990; I.V.:1900]  Out: 4250 [Urine:4250]    Lab Review   Recent Results (from the past 24 hour(s))   METABOLIC PANEL, COMPREHENSIVE    Collection Time: 08/02/18  6:09 AM   Result Value Ref Range    Sodium 139 136 - 145 mmol/L    Potassium 3.3 (L) 3.5 - 5.1 mmol/L    Chloride 104 97 - 108 mmol/L    CO2 27 21 - 32 mmol/L    Anion gap 8 5 - 15 mmol/L    Glucose 102 (H) 65 - 100 mg/dL    BUN 10 6 - 20 MG/DL    Creatinine 0.92 0.55 - 1.02 MG/DL    BUN/Creatinine ratio 11 (L) 12 - 20      GFR est AA >60 >60 ml/min/1.73m2    GFR est non-AA >60 >60 ml/min/1.73m2    Calcium 7.8 (L) 8.5 - 10.1 MG/DL    Bilirubin, total 0.1 (L) 0.2 - 1.0 MG/DL    ALT (SGPT) 18 12 - 78 U/L    AST (SGOT) 15 15 - 37 U/L    Alk. phosphatase 49 45 - 117 U/L    Protein, total 5.5 (L) 6.4 - 8.2 g/dL    Albumin 2.4 (L) 3.5 - 5.0 g/dL    Globulin 3.1 2.0 - 4.0 g/dL    A-G Ratio 0.8 (L) 1.1 - 2.2     MAGNESIUM    Collection Time: 08/02/18  6:09 AM   Result Value Ref Range    Magnesium 1.9 1.6 - 2.4 mg/dL   PHOSPHORUS    Collection Time: 08/02/18  6:09 AM   Result Value Ref Range    Phosphorus 3.5 2.6 - 4.7 MG/DL   CBC WITH AUTOMATED DIFF    Collection Time: 08/02/18  6:09 AM   Result Value Ref Range    WBC 4.4 3.6 - 11.0 K/uL    RBC 4.21 3.80 - 5.20 M/uL    HGB 11.5 11.5 - 16.0 g/dL    HCT 34.4 (L) 35.0 - 47.0 %    MCV 81.7 80.0 - 99.0 FL    MCH 27.3 26.0 - 34.0 PG    MCHC 33.4 30.0 - 36.5 g/dL    RDW 14.8 (H) 11.5 - 14.5 %    PLATELET 091 528 - 823 K/uL    MPV 9.5 8.9 - 12.9 FL    NRBC 0.0 0  WBC    ABSOLUTE NRBC 0.00 0.00 - 0.01 K/uL    NEUTROPHILS 32 32 - 75 %    LYMPHOCYTES 47 12 - 49 %    MONOCYTES 10 5 - 13 %    EOSINOPHILS 11 (H) 0 - 7 %    BASOPHILS 0 0 - 1 %    IMMATURE GRANULOCYTES 1 (H) 0.0 - 0.5 %    ABS. NEUTROPHILS 1.4 (L) 1.8 - 8.0 K/UL    ABS. LYMPHOCYTES 2.1 0.8 - 3.5 K/UL    ABS. MONOCYTES 0.4 0.0 - 1.0 K/UL    ABS. EOSINOPHILS 0.5 (H) 0.0 - 0.4 K/UL    ABS. BASOPHILS 0.0 0.0 - 0.1 K/UL    ABS. IMM. GRANS. 0.0 0.00 - 0.04 K/UL    DF AUTOMATED       NEUROLOGICAL EXAM:      Appearance: The patient is thin and is in no acute distress. Mental Status: Awake and oriented. Fluent, no aphasia or dysarthria. Follows commands. Cranial Nerves:   II - XII were intact. Motor:  Moves all 4 extremities. No tone changes. Reflexes:   Deep tendon reflexes were symmetrical. Toes downgoing. Sensory:   Normal to pinprick. Gait:  Not tested. Tremor:   No tremor noted. Cerebellar:  Intact         Assessment:   Convulsion    Plan:   Neurological examination is non-focal. Seizure like spell capture during prolonged EEG recording did not reveal any electrographic seizure correlate. Event is consistent with pseudoseizures. Patient is on multiple medications that may prevent seizures (Keppra, Gabapentin and Clonazepam). Okay to discontinue Keppra and restart previous home medication Depakote.      Prolonged EEG was normal. Hold off on giving ativan for brief spells unless seizures are prolonged with VS decompensation. Recommend psychiatry evaluation. No further recommendations from a neurological standpoint.     Signed:  Kameron Kang MD  8/2/2018  4:06 PM

## 2018-08-02 NOTE — PROCEDURES
[place] [facility], [address]      Electroencephalogram         Procedure Date: 08/1/2018    Procedure ID: MR     Patient Name: Alen Mcgee     YOB: 1975    Medical Record No: [id number]    INDICATION: Seizure    Medications:    Current Facility-Administered Medications:     [START ON 8/3/2018] amLODIPine (NORVASC) tablet 5 mg, 5 mg, Oral, DAILY, Kendrick Villatoro MD    levETIRAcetam (KEPPRA) tablet 500 mg, 500 mg, Oral, BID, Saul Valdez MD, 500 mg at 08/02/18 8887    acetaminophen (TYLENOL) tablet 650 mg, 650 mg, Oral, Q6H PRN, Sparkle Michel MD, 650 mg at 08/01/18 1421    oxyCODONE IR (ROXICODONE) tablet 5 mg, 5 mg, Oral, Q6H PRN, Sparkle Michel MD, 5 mg at 08/02/18 1216    pantoprazole (PROTONIX) injection 40 mg, 40 mg, IntraVENous, DAILY, Geoffrey Tse MD, 40 mg at 08/02/18 1421    mupirocin (BACTROBAN) 2 % ointment, , Both Nostrils, Q12H, Geoffrey Tse MD    albuterol-ipratropium (DUO-NEB) 2.5 MG-0.5 MG/3 ML, 3 mL, Nebulization, Q6H PRN, Kendrick Villatoro MD    LORazepam (ATIVAN) injection 1 mg, 1 mg, IntraVENous, PRN, Patrica Diop MD, 1 mg at 07/31/18 1608    QUEtiapine (SEROquel) tablet 400 mg, 400 mg, Oral, QHS, Kendrick Villatoro MD, 400 mg at 08/01/18 2206    clonazePAM (KlonoPIN) tablet 2 mg, 2 mg, Oral, BID, Kendrick Villatoro MD, 2 mg at 08/02/18 0309    sertraline (ZOLOFT) tablet 100 mg, 100 mg, Per NG tube, DAILY, Kendrick Villatoro MD, 100 mg at 08/01/18 2109    gabapentin (NEURONTIN) capsule 300 mg, 300 mg, Oral, TID, Kendrick Villatoro MD, 300 mg at 08/02/18 9831    lithium carbonate capsule 300 mg, 300 mg, Oral, BID, Kendrick Villatoro MD, 300 mg at 08/02/18 3640    sodium chloride (NS) flush 5-10 mL, 5-10 mL, IntraVENous, Q8H, Chandler Charles MD, 10 mL at 08/02/18 2078    sodium chloride (NS) flush 5-10 mL, 5-10 mL, IntraVENous, PRN, Chandler Charles MD, 10 mL at 07/30/18 1930   ondansetron (ZOFRAN) injection 4 mg, 4 mg, IntraVENous, Q6H PRN, Julianna Waller MD    bisacodyl (DULCOLAX) suppository 10 mg, 10 mg, Rectal, DAILY PRN, Julianna Waller MD    enoxaparin (LOVENOX) injection 40 mg, 40 mg, SubCUTAneous, Q24H, Julianna Waller MD, 40 mg at 07/31/18 1358    glucose chewable tablet 16 g, 4 Tab, Oral, PRN, Julianna Waller MD    dextrose (D50W) injection syrg 12.5-25 g, 12.5-25 g, IntraVENous, PRN, Julianna Waller MD, 25 g at 07/31/18 1148    glucagon (GLUCAGEN) injection 1 mg, 1 mg, IntraMUSCular, PRN, Julianna Waller MD    DESCRIPTION OF PROCEDURE: Electrodes were applied in accordance with the international 10-20 system of electrode placement. EEG was reviewed in both bipolar and referential montages    DESCRIPTION OF FINDINGS: EEG was started 2:29 pm 8/1/2018 and ended 7:11 am 8/2/2018 for a total of 11 hours 59 minutes and 53 seconds of recording. Background consists of symmetric  9 hz activity. This activity attenuates with eye opening. During drowsiness, there is attenuation of the underlying rhythm and theta frequency is seen. K complex, vertex sharp waves and sleep spindles were seen and symmetric. Deeper stages of sleep was not seen. Patient on video and recording had episodes of coughing, head movements, whole body movements and talking on the phone with no seizure correlate. Then around 11:07 pm patient was observe on video with a prior awake background to suddenly hyperextend her head, move it side to side with tense of the upper body and flexion/extension movement of her legs with occasional arm movement lasting about 30 seconds. There was no electrographic seizure correlate. After the event, there was no post-ictal slowing but normal alpha frequency awake pattern. IMPRESSION:  This prolonged EEG done in awake, drowsy and sleep states was normal. No epileptiform discharges, seizures or focal asymmetry seen.

## 2018-08-02 NOTE — PROGRESS NOTES
1900- received report from Елена Sullivan [facility], [facility] and assumed care of pt.  2200- pt has been tolerating  ice cream, peanut butter crackers, orange ice , ginger ale. 2312- pt just went through appx 2 minutes of extreme jerky clonic movement that involved entire body, pt responsive during event and held my hand. HR went up to 130's and now back down to about 100. BP and pulse ox stable  Continues to have continuous EEG in room. 0600- am labs drawn and sent. 0710- pt c/o pain and 5mg roxicodone po given  .   0121- bedside report given to Perla Pan

## 2018-08-02 NOTE — PROGRESS NOTES
0830  AM assessment complete. Pt c/o gas pains and cramping in abdomen and pain in legs. Discussed with pt the timing for her pain medication. Discussed replacing potassium and other electrolytes to help with cramping and leg pain until geovany due. Will discuss with MD in rounds. 1015  Pt c/o increasing withdrawal symptoms. Pt spoke with pharmacy prior about meds for withdrawal and updated med rec. Pt states that she had been in a rehab facility in [place] previously and was clean for 5 years. When she moved home with her mother and stepfather, she was around a lot of marijuana smoking and this is a trigger for her. She relapsed to heroin use. She stated, \"I'm so disappointed in myself\". Pt has 5 children. She is requesting a visit from case management to assist with meds, a home nebulizer and finding a new place to live that is more conducive to her recovery. Spoke with  Kamlesh Mojica to update her on pt situation. 1500  Pt up in chair with PT/OT. Pt tolerated for 30 min and wanted to go back to bed. Pt restless in chair and rocking back and forth. 1600  Pt requested ensure with meals because she hasn't been eating much. Ensure ordered.

## 2018-08-02 NOTE — PROGRESS NOTES
Hospitalist Progress Note    NAME: Pati Chand   :  1975   MRN:  [id number]       Assessment / Plan:  38 y. o.   female with PMH asthma, sarcoid, tobacco abuse, polysubstance abuse (heroin, cocaine, THC, opiate, methadone) who was brought in by EMS with respiratory distress. Last used of heroin was the day before presentation. Admitted with diagnosis of acute respiratory failure due to status asthmaticus and heroin abuse on . She required mechanical ventilation and was extubated on .      Seizure activity, suspected psueodosz  Withdrawal syndrome  Patient reports diagnosis of seizure about 4 years ago and since then she has taken depakote. Overnight, patient had episode of tonic-clonic movement with associated diaphoresis, stool incontinence and post-ictal confusion. She received IV ativan and loading dose of keppra 1000 mg. EEG negative, despite \"sz like activity\" c/w pseudosz  Change keppra to home Depakote dose  Neurology following  Will ask psych to see in am    Status asthmaticus with acute respiratory distress, POA   On admission, patient with diffuse wheezing, and tachypnea RR 30s despite BIPAP use. CXR negative. Patient intubated in ED and on mechanical ventilation. Received IV steroids, nebs and empiric antibiotics. She was successfully extubated on .   - Now tolerating well RA. Continue prn nebs, completed levaquin.  -will add pulmicort, off IV steroids  -will need o2 challenge  -transfer to PCU     Polysubstance abuse  UDS positive for opiates, BZD and THC. Psych to assist and CM     HTN  On amlodipine     Sarcoidosis     Bipolar  Continue home dose of lithium, gabapentin, and clonazepam.     Hx tobacco abuse -- denies current use    Deconditioning- cont PT, possible acute rehab          25.0 - 29.9 Overweight / Body mass index is 27.88 kg/(m^2).       Code status: Full  Prophylaxis: Lovenox  Recommended Disposition: acute rehab       Subjective:     Chief Complaint / Reason for Physician Visit  sob      Patient reports her breathing is improving no cough or sputum production. Admits to some pain in her chest she has been having all along but also is improving. She reports a history of seizures in the past and continued problems with drugs and is agreeable to talking to psychiatry. She is tolerating p.o.'s but does not have much of an appetite. Patient was evaluated at 7:15 PM    Discussed with RN events overnight. Review of Systems:  Symptom Y/N Comments  Symptom Y/N Comments   Fever/Chills    Chest Pain y better   Poor Appetite y   Edema     Cough    Abdominal Pain n    Sputum    Joint Pain     SOB/RIVAS y better  Pruritis/Rash     Nausea/vomit    Tolerating PT/OT     Diarrhea    Tolerating Diet     Constipation    Other       Could NOT obtain due to:      Objective:     VITALS:   Last 24hrs VS reviewed since prior progress note.  Most recent are:  Patient Vitals for the past 24 hrs:   Temp Pulse Resp BP SpO2   08/02/18 1930 98.1 °F (36.7 °C) 98 - 102/49 -   08/02/18 1700 - 87 19 100/54 100 %   08/02/18 1600 98 °F (36.7 °C) 91 22 128/84 (!) 37 %   08/02/18 1555 - 98 - 125/86 -   08/02/18 1554 - 93 - 125/86 -   08/02/18 1548 - 92 17 122/83 -   08/02/18 1538 - 90 16 109/86 (!) 78 %   08/02/18 1300 98 °F (36.7 °C) 97 20 111/65 100 %   08/02/18 1100 - 89 18 99/55 98 %   08/02/18 1000 - (!) 102 13 106/63 98 %   08/02/18 0900 - 85 17 94/57 98 %   08/02/18 0800 98.1 °F (36.7 °C) 92 19 100/60 99 %   08/02/18 0700 - 84 17 94/56 99 %   08/02/18 0600 - 90 18 94/61 97 %   08/02/18 0500 - (!) 102 16 (!) 81/47 98 %   08/02/18 0405 - 96 16 (!) 85/56 99 %   08/02/18 0400 97.7 °F (36.5 °C) (!) 103 17 (!) 75/44 97 %   08/02/18 0300 - (!) 109 19 (!) 86/51 96 %   08/02/18 0200 - (!) 119 20 (!) 79/46 94 %   08/02/18 0140 - (!) 115 19 (!) 82/42 96 %   08/02/18 0105 - (!) 118 17 (!) 73/37 94 %   08/02/18 0000 - 99 20 102/60 96 %   08/01/18 2300 - 88 (!) 32 114/68 97 %   08/01/18 2200 - 91 21 112/73 99 %   08/01/18 2100 - (!) 111 18 100/73 99 %       Intake/Output Summary (Last 24 hours) at 08/02/18 2014  Last data filed at 08/02/18 1200   Gross per 24 hour   Intake              600 ml   Output              800 ml   Net             -200 ml        PHYSICAL EXAM:  Patient is awake alert mildly ill-appearing oriented ×3, on RA. mm are tacky cardiovascular regular rate no murmurs rubs or gallops lungs scattered wheezing moderate air no rhonchi or crackles. Abdomen bowel sounds present soft nontender extremities no clubbing cyanosis or edema    Reviewed most current lab test results and cultures  YES  Reviewed most current radiology test results   YES  Review and summation of old records today    NO  Reviewed patient's current orders and MAR    YES  PMH/SH reviewed - no change compared to H&P  ________________________________________________________________________  Care Plan discussed with:    Comments   Patient y    Family      RN y    Care Manager     Consultant                        Multidiciplinary team rounds were held today with , nursing, pharmacist and clinical coordinator. Patient's plan of care was discussed; medications were reviewed and discharge planning was addressed. ________________________________________________________________________  Total NON critical care TIME:   Minutes    Total CRITICAL CARE TIME Spent:   Minutes non procedure based      Comments   >50% of visit spent in counseling and coordination of care     ________________________________________________________________________  Delaney Schneider MD     Procedures: see electronic medical records for all procedures/Xrays and details which were not copied into this note but were reviewed prior to creation of Plan. LABS:  I reviewed today's most current labs and imaging studies.   Pertinent labs include:  Recent Labs      08/02/18   0609  08/01/18   0303  07/31/18   0147   WBC  4.4  4.4  6.0   HGB  11.5 11.6  10.4*   HCT  34.4*  36.3  32.4*   PLT  253  279  298     Recent Labs      08/02/18   0609  08/01/18   0303  07/31/18   1441  07/31/18   0420   NA  139  135*   --   140   K  3.3*  3.8   --   3.2*   CL  104  106   --   109*   CO2  27  23   --   23   GLU  102*  85   --   89   BUN  10  11   --   16   CREA  0.92  0.76   --   0.80   CA  7.8*  7.7*   --   7.8*   MG  1.9   --    --   2.1   PHOS  3.5   --    --   1.5*   ALB  2.4*   --   2.6*   --    TBILI  0.1*   --   0.3   --    SGOT  15   --   25   --    ALT  18   --   21   --        Signed: Lieutenant Cipriano MD

## 2018-08-02 NOTE — PROGRESS NOTES
CM spoke w/ pt again today to f/u on conversation pt had with RN, Sarmad Woods about her desire to stop using heroin. Pt again stated that she had remained clean for 5 years after attending a program in [place]. Pt feels that living with her mother is not conducive to her resisting temptation to use. Pt also stated that she had a nebulizer which is now broken and may need help with dc medications. Pt has spoken with Med [facility] repr who told her that she would likely qualify to have her Medicaid reinstated if she supplied needed verifications. Before speaking w/ pt, CM conducted chart review of pt's previous admissions at [facility] and [facility]. In 2017, pt had 9 Inpt admissions. On at least 2 of these admissions,   funds were used to pay for nebulizer and dc meds. In April 2017, [facility] CM's provided detailed info for pt to have screening for [facility] for substance abuse program.    Pt did remember being provided meds on one occasion and stated that she had gone to [facility] for screening interview, only to be told that she would be put on waiting list for program but then she did not follow up. CM checked on [facility] residential program for those with SA issues and mental health diagnosis, but was told that homelessness was also a requirement (and pt is not homeless). [facility] may again be the best shot that pt has to get into a residential program to help her with addiction and other aspects of her life. Ref info will again be provided at [place]. [facility] can supply a nebulizer from [facility] (ref approved through [facility]) and will bill hospital for $25 if approved by CM Mgr. ROSE is also checking with Mgr.to see if dc meds can be paid for again through [employer].     Doron Peterson, DORCAS

## 2018-08-02 NOTE — PROGRESS NOTES
Interdisciplinary team rounds were held 8/2/2018  with the following team members:Care Management, Diabetes Treatment Specialist, Nursing, Nutrition, Pharmacy, Physical Therapy, Physician and Respiratory therapy. Plan of care discussed. Goal: Adjust medications, continue to monitor and support. See MD orders and progress notes for further  interventions and desired outcomes.

## 2018-08-03 LAB
ANION GAP SERPL CALC-SCNC: 5 MMOL/L (ref 5–15)
BASOPHILS # BLD: 0 K/UL (ref 0–0.1)
BASOPHILS NFR BLD: 0 % (ref 0–1)
BUN SERPL-MCNC: 12 MG/DL (ref 6–20)
BUN/CREAT SERPL: 16 (ref 12–20)
CALCIUM SERPL-MCNC: 8.1 MG/DL (ref 8.5–10.1)
CHLORIDE SERPL-SCNC: 105 MMOL/L (ref 97–108)
CO2 SERPL-SCNC: 27 MMOL/L (ref 21–32)
CREAT SERPL-MCNC: 0.76 MG/DL (ref 0.55–1.02)
DIFFERENTIAL METHOD BLD: ABNORMAL
EOSINOPHIL # BLD: 0.7 K/UL (ref 0–0.4)
EOSINOPHIL NFR BLD: 12 % (ref 0–7)
ERYTHROCYTE [DISTWIDTH] IN BLOOD BY AUTOMATED COUNT: 15 % (ref 11.5–14.5)
GLUCOSE BLD STRIP.AUTO-MCNC: 109 MG/DL (ref 65–100)
GLUCOSE BLD STRIP.AUTO-MCNC: 91 MG/DL (ref 65–100)
GLUCOSE SERPL-MCNC: 87 MG/DL (ref 65–100)
HCT VFR BLD AUTO: 33.8 % (ref 35–47)
HGB BLD-MCNC: 11.1 G/DL (ref 11.5–16)
IMM GRANULOCYTES # BLD: 0 K/UL (ref 0–0.04)
IMM GRANULOCYTES NFR BLD AUTO: 0 % (ref 0–0.5)
LYMPHOCYTES # BLD: 2.3 K/UL (ref 0.8–3.5)
LYMPHOCYTES NFR BLD: 39 % (ref 12–49)
MAGNESIUM SERPL-MCNC: 2 MG/DL (ref 1.6–2.4)
MCH RBC QN AUTO: 27.3 PG (ref 26–34)
MCHC RBC AUTO-ENTMCNC: 32.8 G/DL (ref 30–36.5)
MCV RBC AUTO: 83 FL (ref 80–99)
MONOCYTES # BLD: 0.6 K/UL (ref 0–1)
MONOCYTES NFR BLD: 10 % (ref 5–13)
NEUTS SEG # BLD: 2.3 K/UL (ref 1.8–8)
NEUTS SEG NFR BLD: 39 % (ref 32–75)
NRBC # BLD: 0 K/UL (ref 0–0.01)
NRBC BLD-RTO: 0 PER 100 WBC
PHOSPHATE SERPL-MCNC: 3.3 MG/DL (ref 2.6–4.7)
PLATELET # BLD AUTO: 281 K/UL (ref 150–400)
PMV BLD AUTO: 9.6 FL (ref 8.9–12.9)
POTASSIUM SERPL-SCNC: 4.1 MMOL/L (ref 3.5–5.1)
RBC # BLD AUTO: 4.07 M/UL (ref 3.8–5.2)
SERVICE CMNT-IMP: ABNORMAL
SERVICE CMNT-IMP: NORMAL
SODIUM SERPL-SCNC: 137 MMOL/L (ref 136–145)
WBC # BLD AUTO: 5.9 K/UL (ref 3.6–11)

## 2018-08-03 PROCEDURE — 74011250637 HC RX REV CODE- 250/637: Performed by: PSYCHIATRY & NEUROLOGY

## 2018-08-03 PROCEDURE — 83735 ASSAY OF MAGNESIUM: CPT | Performed by: INTERNAL MEDICINE

## 2018-08-03 PROCEDURE — 94760 N-INVAS EAR/PLS OXIMETRY 1: CPT

## 2018-08-03 PROCEDURE — 65660000000 HC RM CCU STEPDOWN

## 2018-08-03 PROCEDURE — 85025 COMPLETE CBC W/AUTO DIFF WBC: CPT | Performed by: INTERNAL MEDICINE

## 2018-08-03 PROCEDURE — 74011000250 HC RX REV CODE- 250: Performed by: EMERGENCY MEDICINE

## 2018-08-03 PROCEDURE — 80048 BASIC METABOLIC PNL TOTAL CA: CPT | Performed by: INTERNAL MEDICINE

## 2018-08-03 PROCEDURE — 74011250637 HC RX REV CODE- 250/637: Performed by: INTERNAL MEDICINE

## 2018-08-03 PROCEDURE — 94640 AIRWAY INHALATION TREATMENT: CPT

## 2018-08-03 PROCEDURE — 36415 COLL VENOUS BLD VENIPUNCTURE: CPT | Performed by: INTERNAL MEDICINE

## 2018-08-03 PROCEDURE — 84100 ASSAY OF PHOSPHORUS: CPT | Performed by: INTERNAL MEDICINE

## 2018-08-03 PROCEDURE — C9113 INJ PANTOPRAZOLE SODIUM, VIA: HCPCS | Performed by: INTERNAL MEDICINE

## 2018-08-03 PROCEDURE — 82962 GLUCOSE BLOOD TEST: CPT

## 2018-08-03 PROCEDURE — 74011250636 HC RX REV CODE- 250/636: Performed by: INTERNAL MEDICINE

## 2018-08-03 PROCEDURE — 74011250636 HC RX REV CODE- 250/636: Performed by: HOSPITALIST

## 2018-08-03 RX ADMIN — PANTOPRAZOLE SODIUM 40 MG: 40 INJECTION, POWDER, FOR SOLUTION INTRAVENOUS at 08:44

## 2018-08-03 RX ADMIN — AMLODIPINE BESYLATE 5 MG: 5 TABLET ORAL at 08:44

## 2018-08-03 RX ADMIN — Medication 10 ML: at 15:04

## 2018-08-03 RX ADMIN — QUETIAPINE FUMARATE 400 MG: 100 TABLET ORAL at 21:10

## 2018-08-03 RX ADMIN — ACETAMINOPHEN 650 MG: 325 TABLET ORAL at 08:51

## 2018-08-03 RX ADMIN — OXYCODONE HYDROCHLORIDE 5 MG: 5 TABLET ORAL at 15:04

## 2018-08-03 RX ADMIN — LITHIUM CARBONATE 300 MG: 300 CAPSULE, GELATIN COATED ORAL at 17:12

## 2018-08-03 RX ADMIN — DIVALPROEX SODIUM 500 MG: 250 TABLET, DELAYED RELEASE ORAL at 15:03

## 2018-08-03 RX ADMIN — ENOXAPARIN SODIUM 40 MG: 100 INJECTION SUBCUTANEOUS at 15:04

## 2018-08-03 RX ADMIN — GABAPENTIN 300 MG: 300 CAPSULE ORAL at 21:10

## 2018-08-03 RX ADMIN — GABAPENTIN 300 MG: 300 CAPSULE ORAL at 15:52

## 2018-08-03 RX ADMIN — SERTRALINE HYDROCHLORIDE 100 MG: 50 TABLET ORAL at 21:10

## 2018-08-03 RX ADMIN — Medication 10 ML: at 21:10

## 2018-08-03 RX ADMIN — OXYCODONE HYDROCHLORIDE 5 MG: 5 TABLET ORAL at 06:51

## 2018-08-03 RX ADMIN — MUPIROCIN: 20 OINTMENT TOPICAL at 21:12

## 2018-08-03 RX ADMIN — GABAPENTIN 300 MG: 300 CAPSULE ORAL at 08:44

## 2018-08-03 RX ADMIN — Medication 10 ML: at 08:44

## 2018-08-03 RX ADMIN — CLONAZEPAM 2 MG: 1 TABLET ORAL at 08:43

## 2018-08-03 RX ADMIN — DIVALPROEX SODIUM 500 MG: 250 TABLET, DELAYED RELEASE ORAL at 17:12

## 2018-08-03 RX ADMIN — LITHIUM CARBONATE 300 MG: 300 CAPSULE, GELATIN COATED ORAL at 08:44

## 2018-08-03 RX ADMIN — MUPIROCIN: 20 OINTMENT TOPICAL at 08:52

## 2018-08-03 RX ADMIN — BUDESONIDE 500 MCG: 0.5 SUSPENSION RESPIRATORY (INHALATION) at 08:36

## 2018-08-03 RX ADMIN — CLONAZEPAM 2 MG: 1 TABLET ORAL at 17:12

## 2018-08-03 NOTE — PROGRESS NOTES
[facility]  Pulmonary, Critical Care, and Sleep Medicine    Name: David Dunn MRN: [id number]   : 1975 Hospital: [facility]   Date: 8/3/2018        Critical Care Patient Consult    NO PCP moved here from [place]. 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 Had a normal EEG no evidence of seizures on . Had another shaking episode about 3am. No issues was alert during episode. · Asthma/COPD with acute exacerbation-presented initially with status asthmaticus now doing much better. Minimal wheezing. · Hypokalemia  · Heroin Abuse, Hx of polys  · Chest pain  · Ex smoker  · Sarcoid  · Bipolar  · Hypertension  · Full Code  · Day 2 waiting on bed. RECOMMENDATIONS:   · Neurology assistance appreciated. · Ready to move out to Neuro/Tele. · Replete K.   · ON clonazepam  · BP monitoring. · ON Keppra, neuro did an EEG that revealed no seizures. · On empiric levofloxacin. NO Cx.   · On Enoxaparin for prophylaxis. · On lithium. · ON PPI      Subjective/History:     8-3-18: Had another shaking episode this am. Notes that she is going through withdrawals. Notes chest pain. MIld headache. Decreased po intake. Events from yesterday noted. Had a headache, felt poorly. Had fatigue. Tolerating po intake. Had chest pain. Had some brief seizure like activities but appeared to be pseudoseizures. This patient has been seen and evaluated at the request of Dr. Mike Mojica for above. Patient is a 38 y.o. female who presented with acute respiratory failure. Reports that  This am has been having generalized aches and was noted to have chest pain. She had anterior chest pain, dull sensation. No radiation. NO associated symptoms except some mild  Nausea. ROS of 10 systems is otherwise negative.        Past Medical History:   Diagnosis Date    Asthma     Bipolar 1 disorder (HCC)     Chronic obstructive pulmonary disease (HCC)     Chronic pain     back, leg    Cocaine abuse     Depression     Hepatitis B     Heroin abuse     HTN (hypertension)     Narcotic abuse     Polysubstance abuse     Sarcoidosis     Tobacco abuse       Past Surgical History:   Procedure Laterality Date    HX GYN      HX WISDOM TEETH EXTRACTION        Prior to Admission medications    Medication Sig Start Date End Date Taking? Authorizing Provider   lithium carbonate 300 mg capsule Take 300 mg by mouth daily. Yes Historical Provider   LORazepam (ATIVAN) 0.5 mg tablet Take 0.5 mg by mouth two (2) times a day. Yes Historical Provider   oxyCODONE-acetaminophen (PERCOCET) 5-325 mg per tablet Take 1 Tab by mouth two (2) times a day. Yes Historical Provider   gabapentin (NEURONTIN) 800 mg tablet Take 800 mg by mouth three (3) times daily. Historical Provider   divalproex DR (DEPAKOTE) 500 mg tablet Take 500 mg by mouth two (2) times a day. Historical Provider   QUEtiapine (SEROQUEL) 400 mg tablet Take 800 mg by mouth every evening. Patient takes 300 mg daily in the morning and 800 mg (2- 400 mg tablets) every evening    Historical Provider   sertraline (ZOLOFT) 100 mg tablet Take 100 mg by mouth daily. Historical Provider   cloNIDine HCl (CATAPRES) 0.3 mg tablet Take 0.3 mg by mouth three (3) times daily. Historical Provider   clonazePAM (KLONOPIN) 2 mg tablet Take 2 Tabs by mouth two (2) times a day. Max Daily Amount: 8 mg. 8/3/17   David Narayan MD   mupirocin (BACTROBAN) 2 % ointment Apply 1 g to affected area every twelve (12) hours. 8/3/17   David Narayan MD   lidocaine (LIDODERM) 5 % Apply patch to the affected area for 12 hours a day and remove for 12 hours a day. 8/3/17   David Narayan MD   albuterol sulfate 90 mcg/actuation aepb Take 2 Puffs by inhalation every four (4) hours as needed.  4/19/17   Halima Gregory MD   fluticasone-vilanterol (BREO ELLIPTA) 123-58 mcg/dose inhaler Take 1 Puff by inhalation daily. 4/19/17   Minoo Pizano MD   albuterol (PROVENTIL VENTOLIN) 2.5 mg /3 mL (0.083 %) nebulizer solution 3 mL by Nebulization route every four (4) hours as needed for Wheezing.  4/16/17   Brandee Iverson MD     Current Facility-Administered Medications   Medication Dose Route Frequency    amLODIPine (NORVASC) tablet 5 mg  5 mg Oral DAILY    divalproex DR (DEPAKOTE) tablet 500 mg  500 mg Oral BID    budesonide (PULMICORT) 500 mcg/2 ml nebulizer suspension  500 mcg Nebulization BID RT    pantoprazole (PROTONIX) injection 40 mg  40 mg IntraVENous DAILY    mupirocin (BACTROBAN) 2 % ointment   Both Nostrils Q12H    QUEtiapine (SEROquel) tablet 400 mg  400 mg Oral QHS    clonazePAM (KlonoPIN) tablet 2 mg  2 mg Oral BID    sertraline (ZOLOFT) tablet 100 mg  100 mg Per NG tube DAILY    gabapentin (NEURONTIN) capsule 300 mg  300 mg Oral TID    lithium carbonate capsule 300 mg  300 mg Oral BID    sodium chloride (NS) flush 5-10 mL  5-10 mL IntraVENous Q8H    enoxaparin (LOVENOX) injection 40 mg  40 mg SubCUTAneous Q24H     Allergies   Allergen Reactions    Tomato Swelling     Tongue      Social History   Substance Use Topics    Smoking status: Former Smoker     Packs/day: 0.25     Years: 15.00     Quit date: 8/3/2017    Smokeless tobacco: Never Used      Comment: \"I already quit\"    Alcohol use No      Family History   Problem Relation Age of Onset    Hypertension Father     Hypertension Mother         Review of Systems:  Constitutional: positive for fatigue and malaise  Eyes: negative  Ears, nose, mouth, throat, and face: negative  Respiratory: positive for cough, pleurisy/chest pain, dyspnea on exertion or chronic bronchitis  Cardiovascular: positive for chest pain, chest pressure/discomfort, dyspnea, fatigue  Gastrointestinal: positive for dyspepsia  Genitourinary:negative  Integument/breast: negative  Hematologic/lymphatic: negative  Musculoskeletal:negative  Neurological: negative  Behavioral/Psych: negative  Endocrine: negative  Allergic/Immunologic: negative    Objective:   Vital Signs:    Visit Vitals    BP 98/55    Pulse 81    Temp 98.4 °F (36.9 °C)    Resp 17    Ht 5' 5\" (1.651 m)    Wt 75 kg (165 lb 5.5 oz)    SpO2 97%    Breastfeeding No    BMI 27.51 kg/m2       O2 Device: Room air   O2 Flow Rate (L/min): 2 l/min   Temp (24hrs), Av.2 °F (36.8 °C), Min:98 °F (36.7 °C), Max:98.4 °F (36.9 °C)       Intake/Output:   Last shift:         Last 3 shifts:  1901 -  0700  In: 1020 [P.O.:1020]  Out: 800 [Urine:800]    Intake/Output Summary (Last 24 hours) at 18 0805  Last data filed at 18 0400   Gross per 24 hour   Intake              780 ml   Output              200 ml   Net              580 ml     Hemodynamics:   PAP:   CO:     Wedge:   CI:     CVP:    SVR:       PVR:       Ventilator Settings:  Mode Rate Tidal Volume Pressure FiO2 PEEP   CPAP   400 ml  6 cm H2O 40 % 6 cm H20     Peak airway pressure: 13 cm H2O    Minute ventilation: 5.59 l/min      Physical Exam:    General:  Alert, cooperative, no distress, appears stated age. Normal  Speech. Head:  Normocephalic, without obvious abnormality, atraumatic. Eyes:  Conjunctivae/corneas clear. PERRL, EOMs intact. Nose: Nares normal. Septum midline. Mucosa normal. No drainage or sinus tenderness. Throat: Lips, mucosa, and tongue normal. Teeth and gums normal.   Neck: Supple, symmetrical, trachea midline, no adenopathy, thyroid: no enlargment/tenderness/nodules, no carotid bruit and no JVD. Back:   Symmetric, no curvature. ROM normal.   Lungs:   Has end expiratory wheezing. Breathing comfortably. Normal speech. Chest wall:  No tenderness or deformity. Heart:  Regular rate and rhythm, S1, S2 normal, no murmur, click, rub or gallop. Abdomen:   Soft, non-tender. Bowel sounds normal. No masses,  No organomegaly. Extremities: Extremities normal, atraumatic, no cyanosis or edema.    Pulses: 2+ and symmetric all extremities. Skin: Skin color, texture, turgor normal. No rashes or lesions   Lymph nodes: Cervical, supraclavicular nodes normal.   Neurologic: Grossly nonfocal, motor intact of BUE and BLE. Psych: no overt anxiety or depression. Data:     Recent Results (from the past 24 hour(s))   GLUCOSE, POC    Collection Time: 08/03/18 12:37 AM   Result Value Ref Range    Glucose (POC) 109 (H) 65 - 100 mg/dL    Performed by Can Kamara    MAGNESIUM    Collection Time: 08/03/18  5:24 AM   Result Value Ref Range    Magnesium 2.0 1.6 - 2.4 mg/dL   CBC WITH AUTOMATED DIFF    Collection Time: 08/03/18  5:24 AM   Result Value Ref Range    WBC 5.9 3.6 - 11.0 K/uL    RBC 4.07 3.80 - 5.20 M/uL    HGB 11.1 (L) 11.5 - 16.0 g/dL    HCT 33.8 (L) 35.0 - 47.0 %    MCV 83.0 80.0 - 99.0 FL    MCH 27.3 26.0 - 34.0 PG    MCHC 32.8 30.0 - 36.5 g/dL    RDW 15.0 (H) 11.5 - 14.5 %    PLATELET 256 400 - 740 K/uL    MPV 9.6 8.9 - 12.9 FL    NRBC 0.0 0  WBC    ABSOLUTE NRBC 0.00 0.00 - 0.01 K/uL    NEUTROPHILS 39 32 - 75 %    LYMPHOCYTES 39 12 - 49 %    MONOCYTES 10 5 - 13 %    EOSINOPHILS 12 (H) 0 - 7 %    BASOPHILS 0 0 - 1 %    IMMATURE GRANULOCYTES 0 0.0 - 0.5 %    ABS. NEUTROPHILS 2.3 1.8 - 8.0 K/UL    ABS. LYMPHOCYTES 2.3 0.8 - 3.5 K/UL    ABS. MONOCYTES 0.6 0.0 - 1.0 K/UL    ABS. EOSINOPHILS 0.7 (H) 0.0 - 0.4 K/UL    ABS. BASOPHILS 0.0 0.0 - 0.1 K/UL    ABS. IMM.  GRANS. 0.0 0.00 - 0.04 K/UL    DF AUTOMATED     METABOLIC PANEL, BASIC    Collection Time: 08/03/18  5:24 AM   Result Value Ref Range    Sodium 137 136 - 145 mmol/L    Potassium 4.1 3.5 - 5.1 mmol/L    Chloride 105 97 - 108 mmol/L    CO2 27 21 - 32 mmol/L    Anion gap 5 5 - 15 mmol/L    Glucose 87 65 - 100 mg/dL    BUN 12 6 - 20 MG/DL    Creatinine 0.76 0.55 - 1.02 MG/DL    BUN/Creatinine ratio 16 12 - 20      GFR est AA >60 >60 ml/min/1.73m2    GFR est non-AA >60 >60 ml/min/1.73m2    Calcium 8.1 (L) 8.5 - 10.1 MG/DL   PHOSPHORUS Collection Time: 08/03/18  5:24 AM   Result Value Ref Range    Phosphorus 3.3 2.6 - 4.7 MG/DL   GLUCOSE, POC    Collection Time: 08/03/18  5:30 AM   Result Value Ref Range    Glucose (POC) 91 65 - 100 mg/dL    Performed by Alysha Ward              Telemetry:normal sinus rhythm    Imaging:  I have personally reviewed the patients radiographs and have reviewed the reports:  7-31-18: CXR: 1. Likely small pleural effusions with associated passive atelectasis and/or  consolidation.         EEG from yesterday was normal.       Adam Ramirez MD

## 2018-08-03 NOTE — CONSULTS
PSYCHIATRIC CONSULTATION                         IDENTIFICATION:    Patient Name  Jack Mattson   Date of Birth 1975   [facility] [id number]   Medical Record Number  [id number]      Age  38 y.o. PCP None   Admit date:  7/26/2018    Room Number  3101/01  @ [facility]   Date of Service  8/3/2018            HISTORY         REASON FOR HOSPITALIZATION/CONSULTATION:  A psychiatric consultation was requested by Shivam Frank MD to evaluate or provide advice/opinion related to evaluating for decompensating bipolar disorder   CC: feels sad and depressed     HISTORY OF PRESENT ILLNESS:    The patient, Jack Mattson, is a 38 y.o. BLACK OR  female with a past psychiatric history significant for bipolar disorder  who was admitted to the medical floor for the treatment of shortness of breath , she stated she she is feeling depressed and not herself and she is out of her medications and she is been using too much klonopin and she feels unsafe at home without taking medications and not able to handle her thoughts   Pt seen today for evaluation. ALLERGIES:  Allergies   Allergen Reactions    Tomato Swelling     Tongue      MEDICATIONS PRIOR TO ADMISSION:  Prescriptions Prior to Admission   Medication Sig    lithium carbonate 300 mg capsule Take 300 mg by mouth daily.  LORazepam (ATIVAN) 0.5 mg tablet Take 0.5 mg by mouth two (2) times a day.  oxyCODONE-acetaminophen (PERCOCET) 5-325 mg per tablet Take 1 Tab by mouth two (2) times a day.  gabapentin (NEURONTIN) 800 mg tablet Take 800 mg by mouth three (3) times daily.  divalproex DR (DEPAKOTE) 500 mg tablet Take 500 mg by mouth two (2) times a day.  QUEtiapine (SEROQUEL) 400 mg tablet Take 800 mg by mouth every evening. Patient takes 300 mg daily in the morning and 800 mg (2- 400 mg tablets) every evening    sertraline (ZOLOFT) 100 mg tablet Take 100 mg by mouth daily.     cloNIDine HCl (CATAPRES) 0.3 mg tablet Take 0.3 mg by mouth three (3) times daily.  clonazePAM (KLONOPIN) 2 mg tablet Take 2 Tabs by mouth two (2) times a day. Max Daily Amount: 8 mg.  mupirocin (BACTROBAN) 2 % ointment Apply 1 g to affected area every twelve (12) hours.  lidocaine (LIDODERM) 5 % Apply patch to the affected area for 12 hours a day and remove for 12 hours a day.  albuterol sulfate 90 mcg/actuation aepb Take 2 Puffs by inhalation every four (4) hours as needed.  fluticasone-vilanterol (BREO ELLIPTA) 200-25 mcg/dose inhaler Take 1 Puff by inhalation daily.  albuterol (PROVENTIL VENTOLIN) 2.5 mg /3 mL (0.083 %) nebulizer solution 3 mL by Nebulization route every four (4) hours as needed for Wheezing. PAST MEDICAL HISTORY:  Past Medical History:   Diagnosis Date    Asthma     Bipolar 1 disorder (HCC)     Chronic obstructive pulmonary disease (HCC)     Chronic pain     back, leg    Cocaine abuse     Depression     Hepatitis B     Heroin abuse     HTN (hypertension)     Narcotic abuse     Polysubstance abuse     Sarcoidosis     Tobacco abuse      Past Surgical History:   Procedure Laterality Date    HX GYN      HX WISDOM TEETH EXTRACTION        SOCIAL HISTORY: single   Social History     Social History    Marital status: SINGLE     Spouse name: N/A    Number of children: N/A    Years of education: N/A     Occupational History    Not on file. Social History Main Topics    Smoking status: Former Smoker     Packs/day: 0.25     Years: 15.00     Quit date: 8/3/2017    Smokeless tobacco: Never Used      Comment: \"I already quit\"    Alcohol use No    Drug use: No      Comment: last used 1/24/2017    Sexual activity: Yes     Partners: Female     Other Topics Concern    Not on file     Social History Narrative    Born in [place] [address],     [facility], 5 children,    No legal charges. Pt graduated from high school. Pt was employed last month at [employer], currently she is unemployed.  Pt lives with her mother and 8year old son. She has 4 adult children. FAMILY HISTORY: History reviewed. No pertinent family history. Family History   Problem Relation Age of Onset    Hypertension Father     Hypertension Mother        REVIEW of SYSTEMS:   History obtained from the patient  Pertinent items are noted in the History of Present Illness. All other Systems reviewed and are considered negative. MENTAL STATUS EXAM & VITALS       MENTAL STATUS EXAM (MSE):    MSE FINDINGS ARE WITHIN NORMAL LIMITS (WNL) UNLESS OTHERWISE STATED BELOW. Orientation oriented to time, place and person   Vital Signs (BP,Pulse, Temp) See below (reviewed 8/3/2018); vital signs are WNL if not listed below.    Gait and Station Stable, no ataxia   Abnormal Muscular Movements/Tone/Behavior No EPS, no Tardive Dyskinesia, no abnormal muscular movements; wnl tone   Relations cooperative   General Appearance:  age appropriate   Language No aphasia or dysarthria   Speech:  hypoverbal   Thought Processes logical, wnl rate of thoughts, good abstract reasoning and computation   Thought Associations logical   Thought Content free of delusions and free of hallucinations   Suicidal Ideations intention and no plan    Homicidal Ideations no plan  and no intention   Mood:  depressed   Affect:  depressed   Memory recent  adequate   Memory remote:  adequate   Concentration/Attention:  adequate   Fund of Knowledge average   Insight:  limited   Reliability fair   Judgment:  limited            VITALS:     Patient Vitals for the past 24 hrs:   Temp Pulse Resp BP SpO2   08/03/18 1626 98.6 °F (37 °C) (!) 101 20 116/76 94 %   08/03/18 1551 - (!) 101 24 - 99 %   08/03/18 1550 - (!) 101 24 - 99 %   08/03/18 1518 - 99 21 - 99 %   08/03/18 1517 - 97 19 - 98 %   08/03/18 1516 - (!) 101 21 115/67 -   08/03/18 1200 98.1 °F (36.7 °C) - - - -   08/03/18 1000 - (!) 106 18 - 99 %   08/03/18 0900 - (!) 111 24 - 91 %   08/03/18 0837 - - - - 99 %   08/03/18 0800 - - - - 99 %   08/03/18 0759 - - - - 100 %   08/03/18 0700 - 90 21 108/57 97 %   08/03/18 0400 98.4 °F (36.9 °C) 81 17 98/55 97 %   08/03/18 0000 - (!) 108 18 102/54 96 %   08/02/18 2344 98.4 °F (36.9 °C) - - - 97 %   08/02/18 2200 - 93 21 113/61 95 %   08/02/18 2000 - (!) 101 21 100/51 97 %   08/02/18 1930 98.1 °F (36.7 °C) 98 - 102/49 -   08/02/18 1700 - 87 19 100/54 100 %              DATA     LABORATORY DATA:  Labs Reviewed   CBC WITH AUTOMATED DIFF - Abnormal; Notable for the following:        Result Value    RDW 14.9 (*)     MONOCYTES 1 (*)     EOSINOPHILS 22 (*)     ABS. EOSINOPHILS 1.3 (*)     All other components within normal limits   PROTHROMBIN TIME + INR - Abnormal; Notable for the following:     Prothrombin time 11.3 (*)     All other components within normal limits   METABOLIC PANEL, COMPREHENSIVE - Abnormal; Notable for the following:     Sodium 135 (*)     CO2 20 (*)     BUN/Creatinine ratio 9 (*)     Globulin 4.1 (*)     A-G Ratio 0.9 (*)     All other components within normal limits   BLOOD GAS, ARTERIAL - Abnormal; Notable for the following:     pH 7.31 (*)     PO2 199 (*)     O2 SAT 99 (*)     BICARBONATE 20 (*)     All other components within normal limits   DRUG SCREEN, URINE - Abnormal; Notable for the following:     BENZODIAZEPINES POSITIVE (*)     OPIATES POSITIVE (*)     THC (TH-CANNABINOL) POSITIVE (*)     All other components within normal limits   BLOOD GAS, ARTERIAL - Abnormal; Notable for the following:     pH 7.20 (*)     PCO2 48 (*)     PO2 158 (*)     O2 SAT 99 (*)     BICARBONATE 18 (*)     All other components within normal limits   CBC WITH AUTOMATED DIFF - Abnormal; Notable for the following:     HGB 11.2 (*)     HCT 34.5 (*)     RDW 14.9 (*)     NEUTROPHILS 89 (*)     LYMPHOCYTES 8 (*)     MONOCYTES 3 (*)     ABS.  LYMPHOCYTES 0.5 (*)     All other components within normal limits   METABOLIC PANEL, BASIC - Abnormal; Notable for the following:     Chloride 110 (*)     Glucose 123 (*)     BUN/Creatinine ratio 7 (*)     Calcium 8.4 (*)     All other components within normal limits   BLOOD GAS, ARTERIAL - Abnormal; Notable for the following:     PCO2 33 (*)     PO2 120 (*)     O2 SAT 98 (*)     BICARBONATE 20 (*)     All other components within normal limits   METABOLIC PANEL, BASIC - Abnormal; Notable for the following:     Chloride 113 (*)     Glucose 145 (*)     Calcium 7.8 (*)     All other components within normal limits   BLOOD GAS, ARTERIAL - Abnormal; Notable for the following:     PO2 149 (*)     O2 SAT 99 (*)     BICARBONATE 21 (*)     All other components within normal limits   CBC W/O DIFF - Abnormal; Notable for the following:     HGB 11.0 (*)     HCT 34.2 (*)     RDW 15.2 (*)     All other components within normal limits   MAGNESIUM - Abnormal; Notable for the following:     Magnesium 2.5 (*)     All other components within normal limits   LITHIUM - Abnormal; Notable for the following:     Lithium level <0.20 (*)     All other components within normal limits   METABOLIC PANEL, BASIC - Abnormal; Notable for the following:     Potassium 3.2 (*)     Chloride 109 (*)     Calcium 7.8 (*)     All other components within normal limits   PHOSPHORUS - Abnormal; Notable for the following:     Phosphorus 1.5 (*)     All other components within normal limits   CBC W/O DIFF - Abnormal; Notable for the following:     HGB 10.4 (*)     HCT 32.4 (*)     RDW 15.5 (*)     All other components within normal limits   LITHIUM - Abnormal; Notable for the following:     Lithium level 0.21 (*)     All other components within normal limits   HEPATIC FUNCTION PANEL - Abnormal; Notable for the following:     Protein, total 5.9 (*)     Albumin 2.6 (*)     A-G Ratio 0.8 (*)     All other components within normal limits   LITHIUM - Abnormal; Notable for the following:     Lithium level 0.34 (*)     All other components within normal limits   METABOLIC PANEL, BASIC - Abnormal; Notable for the following:     Sodium 135 (*)     Calcium 7.7 (*)     All other components within normal limits   CBC WITH AUTOMATED DIFF - Abnormal; Notable for the following:     RDW 15.1 (*)     IMMATURE GRANULOCYTES 1 (*)     All other components within normal limits   METABOLIC PANEL, COMPREHENSIVE - Abnormal; Notable for the following:     Potassium 3.3 (*)     Glucose 102 (*)     BUN/Creatinine ratio 11 (*)     Calcium 7.8 (*)     Bilirubin, total 0.1 (*)     Protein, total 5.5 (*)     Albumin 2.4 (*)     A-G Ratio 0.8 (*)     All other components within normal limits   CBC WITH AUTOMATED DIFF - Abnormal; Notable for the following:     HCT 34.4 (*)     RDW 14.8 (*)     EOSINOPHILS 11 (*)     IMMATURE GRANULOCYTES 1 (*)     ABS. NEUTROPHILS 1.4 (*)     ABS. EOSINOPHILS 0.5 (*)     All other components within normal limits   CBC WITH AUTOMATED DIFF - Abnormal; Notable for the following:     HGB 11.1 (*)     HCT 33.8 (*)     RDW 15.0 (*)     EOSINOPHILS 12 (*)     ABS.  EOSINOPHILS 0.7 (*)     All other components within normal limits   METABOLIC PANEL, BASIC - Abnormal; Notable for the following:     Calcium 8.1 (*)     All other components within normal limits   GLUCOSE, POC - Abnormal; Notable for the following:     Glucose (POC) 111 (*)     All other components within normal limits   GLUCOSE, POC - Abnormal; Notable for the following:     Glucose (POC) 121 (*)     All other components within normal limits   GLUCOSE, POC - Abnormal; Notable for the following:     Glucose (POC) 118 (*)     All other components within normal limits   GLUCOSE, POC - Abnormal; Notable for the following:     Glucose (POC) 166 (*)     All other components within normal limits   GLUCOSE, POC - Abnormal; Notable for the following:     Glucose (POC) 137 (*)     All other components within normal limits   GLUCOSE, POC - Abnormal; Notable for the following:     Glucose (POC) 141 (*)     All other components within normal limits   GLUCOSE, POC - Abnormal; Notable for the following:     Glucose (POC) 126 (*)     All other components within normal limits   GLUCOSE, POC - Abnormal; Notable for the following:     Glucose (POC) 116 (*)     All other components within normal limits   GLUCOSE, POC - Abnormal; Notable for the following:     Glucose (POC) 110 (*)     All other components within normal limits   GLUCOSE, POC - Abnormal; Notable for the following:     Glucose (POC) 120 (*)     All other components within normal limits   GLUCOSE, POC - Abnormal; Notable for the following:     Glucose (POC) 120 (*)     All other components within normal limits   GLUCOSE, POC - Abnormal; Notable for the following:     Glucose (POC) 120 (*)     All other components within normal limits   GLUCOSE, POC - Abnormal; Notable for the following:     Glucose (POC) 146 (*)     All other components within normal limits   GLUCOSE, POC - Abnormal; Notable for the following:     Glucose (POC) 101 (*)     All other components within normal limits   GLUCOSE, POC - Abnormal; Notable for the following:     Glucose (POC) 114 (*)     All other components within normal limits   GLUCOSE, POC - Abnormal; Notable for the following:     Glucose (POC) 111 (*)     All other components within normal limits   GLUCOSE, POC - Abnormal; Notable for the following:     Glucose (POC) 109 (*)     All other components within normal limits   NT-PRO BNP   PHOSPHORUS   PROLACTIN   MAGNESIUM   PROLACTIN   CK   C REACTIVE PROTEIN, QT   SED RATE (ESR)   MAGNESIUM   PHOSPHORUS   MAGNESIUM   PHOSPHORUS   LEVETIRACETAM (KEPPRA)   GLUCOSE, POC   GLUCOSE, POC   GLUCOSE, POC   GLUCOSE, POC   GLUCOSE, POC   GLUCOSE, POC   GLUCOSE, POC   GLUCOSE, POC   GLUCOSE, POC   GLUCOSE, POC   GLUCOSE, POC   GLUCOSE, POC   GLUCOSE, POC     Admission on 07/26/2018   No results displayed because visit has over 200 results.            RADIOLOGY REPORTS:    Results from Hospital Encounter encounter on 07/26/18   XR CHEST PORT   Narrative INDICATION: . Tube/line placement  COMPARISON: Previous chest xray, 7/28/2018. LIMITATIONS: Portable technique. Josefa Magi FINDINGS: Single frontal view of the chest.   .  Lines/tubes/surgical: Cardiac monitor leads overly the patient. Heart/mediastinum: Calcified mediastinal lymph nodes. Lungs/pleura: Minimal bibasilar opacities with partially obscured  hemidiaphragms. No visible pneumothorax. Additional Comments: None. .       Impression IMPRESSION:  1. Likely small pleural effusions with associated passive atelectasis and/or  consolidation. Xr Abd (kub)    Result Date: 7/28/2018  EXAM:  XR ABD (KUB) INDICATION:  Verify NG tube placement COMPARISON: None. FINDINGS: A supine radiograph of the abdomen shows an enteric tube traversing expected course to below the diaphragm in the left upper quadrant overlying the gastric lumen. There is otherwise a normal bowel gas pattern. No soft tissue masses or pathologic calcifications are identified. The bones and soft tissues are within normal limits. IMPRESSION: Enteric tube in expected position. Xr Chest Port    Result Date: 7/31/2018  INDICATION: . Tube/line placement COMPARISON: Previous chest xray, 7/28/2018. LIMITATIONS: Portable technique. Josefa Magi FINDINGS: Single frontal view of the chest. . Lines/tubes/surgical: Cardiac monitor leads overly the patient. Heart/mediastinum: Calcified mediastinal lymph nodes. Lungs/pleura: Minimal bibasilar opacities with partially obscured hemidiaphragms. No visible pneumothorax. Additional Comments: None. Josefa Sow IMPRESSION: 1. Likely small pleural effusions with associated passive atelectasis and/or consolidation. Xr Chest Port    Result Date: 7/28/2018  EXAM:  XR CHEST PORT INDICATION:  respiratory failure COMPARISON:  7/27/2018 FINDINGS: A portable AP radiograph of the chest was obtained at 441 hours. ET tube is unchanged. .  Lungs remain clear. Josefa Magi Heart size is normal. Calcified mediastinal lymph nodes are noted. .  Bony structures are intact.  There is slight gaseous distention of the stomach. .     IMPRESSION: Lungs remain clear. Xr Chest Port    Result Date: 7/27/2018  EXAM: Portable CXR.  0442 hours. COMPARISON: 7/26/2018. INDICATION: respiratory failure There is satisfactory intubation. The lungs remain clear. Heart is normal in size. There is no overt pulmonary edema. There are granulomatous tez calcifications. There is no evident pneumothorax, adenopathy or pleural effusion. IMPRESSION: Satisfactory intubation. No Acute Disease. Xr Chest Port    Result Date: 7/26/2018  Indication: Dyspnea Comparison: 8/2/2017 Portable exam of the chest obtained at 1122 demonstrates normal heart size. There is no acute process in the lung fields. The osseous structures are unremarkable. Impression: No acute process.               MEDICATIONS       ALL MEDICATIONS  Current Facility-Administered Medications   Medication Dose Route Frequency    amLODIPine (NORVASC) tablet 5 mg  5 mg Oral DAILY    divalproex DR (DEPAKOTE) tablet 500 mg  500 mg Oral BID    budesonide (PULMICORT) 500 mcg/2 ml nebulizer suspension  500 mcg Nebulization BID RT    acetaminophen (TYLENOL) tablet 650 mg  650 mg Oral Q6H PRN    oxyCODONE IR (ROXICODONE) tablet 5 mg  5 mg Oral Q6H PRN    mupirocin (BACTROBAN) 2 % ointment   Both Nostrils Q12H    albuterol-ipratropium (DUO-NEB) 2.5 MG-0.5 MG/3 ML  3 mL Nebulization Q6H PRN    LORazepam (ATIVAN) injection 1 mg  1 mg IntraVENous PRN    QUEtiapine (SEROquel) tablet 400 mg  400 mg Oral QHS    clonazePAM (KlonoPIN) tablet 2 mg  2 mg Oral BID    sertraline (ZOLOFT) tablet 100 mg  100 mg Per NG tube DAILY    gabapentin (NEURONTIN) capsule 300 mg  300 mg Oral TID    lithium carbonate capsule 300 mg  300 mg Oral BID    sodium chloride (NS) flush 5-10 mL  5-10 mL IntraVENous Q8H    sodium chloride (NS) flush 5-10 mL  5-10 mL IntraVENous PRN    ondansetron (ZOFRAN) injection 4 mg  4 mg IntraVENous Q6H PRN    bisacodyl (DULCOLAX) suppository 10 mg  10 mg Rectal DAILY PRN    enoxaparin (LOVENOX) injection 40 mg  40 mg SubCUTAneous Q24H      SCHEDULED MEDICATIONS  Current Facility-Administered Medications   Medication Dose Route Frequency    amLODIPine (NORVASC) tablet 5 mg  5 mg Oral DAILY    divalproex DR (DEPAKOTE) tablet 500 mg  500 mg Oral BID    budesonide (PULMICORT) 500 mcg/2 ml nebulizer suspension  500 mcg Nebulization BID RT    mupirocin (BACTROBAN) 2 % ointment   Both Nostrils Q12H    QUEtiapine (SEROquel) tablet 400 mg  400 mg Oral QHS    clonazePAM (KlonoPIN) tablet 2 mg  2 mg Oral BID    sertraline (ZOLOFT) tablet 100 mg  100 mg Per NG tube DAILY    gabapentin (NEURONTIN) capsule 300 mg  300 mg Oral TID    lithium carbonate capsule 300 mg  300 mg Oral BID    sodium chloride (NS) flush 5-10 mL  5-10 mL IntraVENous Q8H    enoxaparin (LOVENOX) injection 40 mg  40 mg SubCUTAneous Q24H                ASSESSMENT & PLAN        The patient Marcus Rosenthal is a 38 y.o.  female who presents at this time for treatment of the following diagnoses:  Patient Active Hospital Problem List:       Bipolar disorder Depressed type   Polysubstance abuse     Assessment:Depressed and Heroin abuse     Plan:will benefit from inpatient psychiatry if she agreed     Disposition:inpatient psychiatry if she agreed   Thank you very much for the opportunity to participate in the care of your patient. I will continue to follow up with patient as deemed appropriate. I have reviewed admission (and previous/old) labs and medical tests in the EHR and or transferring hospital documents. I will continue to order blood tests/labs and diagnostic tests as deemed appropriate and review results as they become available (see orders for details). I have reviewed old psychiatric and medical records available in the EHR.  I Will order additional psychiatric records from other institutions to further elucidate the nature of patient's psychopathology and review once available. I will gather additional collateral information from friends, family and o/p treatment team to further elucidate the nature of patient's psychopathology and baselline level of psychiatric functioning.                      SIGNED:    Keny Alarcon MD  8/3/2018

## 2018-08-03 NOTE — DIABETES MGMT
DTC Progress Note    Recommendations/ Comments: Pt discussed with rounding team and Dr. Manjula Agrawal. Plan to d/c POC's, pt with no pmhx of DM and BG WNL. Chart reviewed on [facility] during Multidisciplinary Rounds. A1c:   Lab Results   Component Value Date/Time    Hemoglobin A1c 5.3 01/10/2017 05:28 AM           Recent Glucose Results: Lab Results   Component Value Date/Time    GLU 87 08/03/2018 05:24 AM    GLUCPOC 91 08/03/2018 05:30 AM    GLUCPOC 109 (H) 08/03/2018 12:37 AM        Lab Results   Component Value Date/Time    Creatinine 0.76 08/03/2018 05:24 AM     Estimated Creatinine Clearance: 95.7 mL/min (based on Cr of 0.76). Active Orders   Diet    DIET CARDIAC Regular; 2 GM NA (House Low NA)        PO intake: Patient Vitals for the past 72 hrs:   % Diet Eaten   08/02/18 1117 100 %   08/01/18 0945 100 %       Will continue to follow as needed. Thank you.     Nila Ram, [address], [address]  Office: [phone]

## 2018-08-03 NOTE — PROGRESS NOTES
Pt was evaluated by Psychiatry today and recommendation was for IP Psych admission if pt was willing to go voluntarily. Contact info for substance abuse programs placed on pt's AVS. Pt will need to make contact herself per program protocol. If pt does not go to IP Psych, CM has gotten approval to issue nebulizer from [employer]. [employer] will bill [address] if notified through [facility] that nebulizer was given to pt. If pt does not go to IP Psych, CM has also gotten approval from r. to pay for dc meds. Upon dc, pt's priority should be to f/u on verifications required to get her Medicaid reinstated. Med Assist repr has screened and will f/u with pt. Pt was given list of BS PCP to select new PCP when Medicaid reinstated. Also given BS Carolann schedule to access medical care until insurance reinstated.     Cintia Welch, MSW

## 2018-08-03 NOTE — PROGRESS NOTES
Bedside shift change report given to AlysonRN by Beverely Soulier, RN. Report included the following information SBAR, Kardex and Recent Results. Zone Phone:   [phone]      Significant changes during shift:  Transferred         Patient Information    Randi Augustin  38 y.o.  7/26/2018 10:18 AM by Lidia Smith MD. Randi Augustin was admitted from Home    Problem List    Patient Active Problem List    Diagnosis Date Noted    Asthma exacerbation 07/26/2018    Drug overdose 07/31/2017    Altered mental status, unspecified 04/26/2017    Lactic acidosis 04/25/2017    Acute encephalopathy 04/25/2017    Potential for altered mental status 04/25/2017    Convulsion disorder ([facility] [address] 04/25/2017    Stenosis of both internal carotid arteries 04/25/2017    Convulsive syncope 04/25/2017    Seizure ([facility] [address] 04/20/2017    Anemia 02/22/2017    Bipolar 1 disorder (HCC)     Chronic obstructive pulmonary disease (HCC)     Chronic pain     Hepatitis B     Sarcoidosis     Tobacco abuse     Cocaine abuse     Polysubstance abuse     Narcotic abuse     HTN (hypertension)     Depression 01/25/2017    Sinus tachycardia 01/10/2017    Heroin abuse 01/30/2014     Past Medical History:   Diagnosis Date    Asthma     Bipolar 1 disorder (HCC)     Chronic obstructive pulmonary disease (HCC)     Chronic pain     back, leg    Cocaine abuse     Depression     Hepatitis B     Heroin abuse     HTN (hypertension)     Narcotic abuse     Polysubstance abuse     Sarcoidosis     Tobacco abuse        Activity Status:    OOB to Chair No  Ambulated this shift Yes   Bed Rest No    DVT prophylaxis:    DVT prophylaxis Med- No  DVT prophylaxis SCD or TULIO- No       Patient Safety:    Falls Score Total Score: 3  Safety Level_______  Bed Alarm On? No  Sitter?  No    Plan for upcoming shift: Safety         Discharge Plan: Yes IP Psych    Active Consults:  IP CONSULT TO NEUROLOGY  IP CONSULT TO PSYCHIATRY

## 2018-08-03 NOTE — PROGRESS NOTES
Hospitalist Progress Note    NAME: Catracho Mclean   :  1975   MRN:  [id number]       Assessment / Plan:  38 y. o.   female with PMH asthma, sarcoid, tobacco abuse, polysubstance abuse (heroin, cocaine, THC, opiate, methadone) who was brought in by EMS with respiratory distress. Last used of heroin was the day before presentation. Admitted with diagnosis of acute respiratory failure due to status asthmaticus and heroin abuse on . She required mechanical ventilation and was extubated on .      Seizure activity, suspected psueodosz  Withdrawal syndrome  Patient reports diagnosis of seizure about 4 years ago and since then she has taken depakote. Overnight, patient had episode of tonic-clonic movement with associated diaphoresis, stool incontinence and post-ictal confusion. She received IV ativan and loading dose of keppra 1000 mg. EEG negative, despite \"sz like activity\" c/w pseudosz  Change keppra to home Depakote dose  Neurology following  aprreciate psych eval, plans for inpt psych    Status asthmaticus with acute respiratory distress, POA   On admission, patient with diffuse wheezing, and tachypnea RR 30s despite BIPAP use. CXR negative. Patient intubated in ED and on mechanical ventilation. Received IV steroids, nebs and empiric antibiotics. She was successfully extubated on .   - Now tolerating well RA. Continue prn nebs, completed levaquin.  -cont pulmicort, off IV steroids  -will need o2 challenge before dc  -transfer to PCU     Polysubstance abuse  UDS positive for opiates, BZD and THC. Psych to assist and CM     HTN  On amlodipine     Sarcoidosis     Bipolar  Continue home dose of lithium, gabapentin, and clonazepam.     Hx tobacco abuse -- denies current use    Deconditioning- cont PT, possible acute rehab          25.0 - 29.9 Overweight / Body mass index is 27.88 kg/(m^2).       Code status: Full  Prophylaxis: Lovenox  Recommended Disposition: acute rehab/inpt psych Subjective:     Chief Complaint / Reason for Physician Visit  sob      8/2 Patient reports her breathing is improving no cough or sputum production. Admits to some pain in her chest she has been having all along but also is improving. She reports a history of seizures in the past and continued problems with drugs and is agreeable to talking to psychiatry. She is tolerating p.o.'s but does not have much of an appetite. 8/3 pt had a sz like episode this am. She thinks her breathing is starting to act up again. Patient was evaluated at 2 pm    Discussed with RN events overnight. Review of Systems:  Symptom Y/N Comments  Symptom Y/N Comments   Fever/Chills    Chest Pain y better   Poor Appetite y   Edema     Cough    Abdominal Pain n    Sputum    Joint Pain     SOB/RIVAS y better  Pruritis/Rash     Nausea/vomit    Tolerating PT/OT     Diarrhea    Tolerating Diet     Constipation    Other       Could NOT obtain due to:      Objective:     VITALS:   Last 24hrs VS reviewed since prior progress note.  Most recent are:  Patient Vitals for the past 24 hrs:   Temp Pulse Resp BP SpO2   08/03/18 1626 98.6 °F (37 °C) (!) 101 20 116/76 94 %   08/03/18 1551 - (!) 101 24 - 99 %   08/03/18 1550 - (!) 101 24 - 99 %   08/03/18 1518 - 99 21 - 99 %   08/03/18 1517 - 97 19 - 98 %   08/03/18 1516 - (!) 101 21 115/67 -   08/03/18 1200 98.1 °F (36.7 °C) - - - -   08/03/18 1000 - (!) 106 18 - 99 %   08/03/18 0900 - (!) 111 24 - 91 %   08/03/18 0837 - - - - 99 %   08/03/18 0800 - - - - 99 %   08/03/18 0759 - - - - 100 %   08/03/18 0700 - 90 21 108/57 97 %   08/03/18 0400 98.4 °F (36.9 °C) 81 17 98/55 97 %   08/03/18 0000 - (!) 108 18 102/54 96 %   08/02/18 2344 98.4 °F (36.9 °C) - - - 97 %   08/02/18 2200 - 93 21 113/61 95 %   08/02/18 2000 - (!) 101 21 100/51 97 %   08/02/18 1930 98.1 °F (36.7 °C) 98 - 102/49 -       Intake/Output Summary (Last 24 hours) at 08/03/18 1744  Last data filed at 08/03/18 1100   Gross per 24 hour Intake             1060 ml   Output                0 ml   Net             1060 ml        PHYSICAL EXAM:  Patient is awake alert mildly ill-appearing oriented ×3, on RA. mm are moist cardiovascular regular rate no murmurs rubs or gallops lungs clear today, no w, good air no rhonchi or crackles. Abdomen bowel sounds present soft nontender extremities no clubbing cyanosis or edema    Reviewed most current lab test results and cultures  YES  Reviewed most current radiology test results   YES  Review and summation of old records today    NO  Reviewed patient's current orders and MAR    YES  PMH/SH reviewed - no change compared to H&P  ________________________________________________________________________  Care Plan discussed with:    Comments   Patient y    Family      RN y    Care Manager     Consultant                        Multidiciplinary team rounds were held today with , nursing, pharmacist and clinical coordinator. Patient's plan of care was discussed; medications were reviewed and discharge planning was addressed. ________________________________________________________________________  Total NON critical care TIME:   Minutes    Total CRITICAL CARE TIME Spent:   Minutes non procedure based      Comments   >50% of visit spent in counseling and coordination of care     ________________________________________________________________________  Ted Faulkner MD     Procedures: see electronic medical records for all procedures/Xrays and details which were not copied into this note but were reviewed prior to creation of Plan. LABS:  I reviewed today's most current labs and imaging studies.   Pertinent labs include:  Recent Labs      08/03/18   0524  08/02/18   0609  08/01/18   0303   WBC  5.9  4.4  4.4   HGB  11.1*  11.5  11.6   HCT  33.8*  34.4*  36.3   PLT  281  253  279     Recent Labs      08/03/18   0524  08/02/18   0609  08/01/18   0303   NA  137  139  135*   K  4.1  3.3*  3.8   CL  105 104  106   CO2  27  27  23   GLU  87  102*  85   BUN  12  10  11   CREA  0.76  0.92  0.76   CA  8.1*  7.8*  7.7*   MG  2.0  1.9   --    PHOS  3.3  3.5   --    ALB   --   2.4*   --    TBILI   --   0.1*   --    SGOT   --   15   --    ALT   --   18   --        Signed: Carlyn Shaver MD

## 2018-08-03 NOTE — PROGRESS NOTES
TRANSFER - IN REPORT:    Verbal report received from Michael Webb RN on Quynh Sorto  being received from CCU for routine progression of care      Report consisted of patients Situation, Background, Assessment and   Recommendations(SBAR). Information from the following report(s) SBAR, Kardex and Recent Results was reviewed with the receiving nurse. Opportunity for questions and clarification was provided. Assessment completed upon patients arrival to unit and care assumed.

## 2018-08-03 NOTE — PROGRESS NOTES
CM received update from pt's nurse that pt will need inpt psych and pt is willing to go. CM called [facility] bed placement ([phone]) and they cannot hold a bed for a pt. They recommended when pt is getting discharged, to call bed placement and make the referral at that time. CM informed pt's nurse.      Verdell Fothergill, [address]

## 2018-08-03 NOTE — PROGRESS NOTES
0700 Report received   0800 Assessed the patient  0900 No changes patient up OOB to void steady gate. 1200 Assessed the patient    Dr Cook Gamma in to see the patient   [phone] Patient takes to Neuro floor  Bedside shift change report given to Milagro (oncoming nurse) by Thanh Ballard  (offgoing nurse). Report included the following information SBAR, Kardex, Procedure Summary, Intake/Output and MAR.

## 2018-08-03 NOTE — INTERDISCIPLINARY ROUNDS
Interdisciplinary team rounds were held 8/3/2018 with the following team members:Care Management, Diabetes Treatment Specialist, Nursing, Nutrition, Pharmacy and Physician. Plan of care discussed. See clinical pathway and/or care plan for interventions and desired outcomes.

## 2018-08-03 NOTE — PROGRESS NOTES
1900- received bedside report from Disha Bacon, [address]- C/o pain 8 on 1-10 scale . roxicodone po given  0100- pt resting well with eyes closed. 6711- pt rang out and as I entered room she began to hyperextend her head and move from side to side along with tensing up her body and tremor like movements. No large tonic- clonic  shaking movements at present like before. . I spoke to pt and talked to her throughout this appx 1 minute event . Her HR went from 90-up to 118. And she was holding her breath at intervals between moans. No incontinence no diaphoresis seen. Pt back to baseline immediately and answered yes when I asked if she called me because she felt odd.  0500- pt has been resting well no further twitching , jerky movements tonight. Am labs drawn. 6269- Pt c/o pain 7 on 1-10 scale. 5 mg roxicodone . 0404- bedside report given to ISABELA Martinez RN .

## 2018-08-04 ENCOUNTER — HOSPITAL ENCOUNTER (INPATIENT)
Age: 43
LOS: 3 days | Discharge: HOME | DRG: 773 | End: 2018-08-07
Attending: PSYCHIATRY & NEUROLOGY | Admitting: PSYCHIATRY & NEUROLOGY
Payer: MEDICAID

## 2018-08-04 VITALS
DIASTOLIC BLOOD PRESSURE: 61 MMHG | RESPIRATION RATE: 16 BRPM | TEMPERATURE: 98.4 F | BODY MASS INDEX: 27.55 KG/M2 | HEART RATE: 102 BPM | WEIGHT: 165.34 LBS | OXYGEN SATURATION: 96 % | HEIGHT: 65 IN | SYSTOLIC BLOOD PRESSURE: 108 MMHG

## 2018-08-04 PROBLEM — F19.90 POLY-DRUG MISUSER: Status: ACTIVE | Noted: 2018-08-04

## 2018-08-04 LAB — HCG UR QL: NEGATIVE

## 2018-08-04 PROCEDURE — 74011250637 HC RX REV CODE- 250/637: Performed by: INTERNAL MEDICINE

## 2018-08-04 PROCEDURE — 97110 THERAPEUTIC EXERCISES: CPT

## 2018-08-04 PROCEDURE — 81025 URINE PREGNANCY TEST: CPT

## 2018-08-04 PROCEDURE — 74011250637 HC RX REV CODE- 250/637: Performed by: PSYCHIATRY & NEUROLOGY

## 2018-08-04 PROCEDURE — 65220000003 HC RM SEMIPRIVATE PSYCH

## 2018-08-04 PROCEDURE — 97116 GAIT TRAINING THERAPY: CPT

## 2018-08-04 PROCEDURE — 77030029684 HC NEB SM VOL KT MONA -A

## 2018-08-04 RX ORDER — LOPERAMIDE HYDROCHLORIDE 2 MG/1
2 CAPSULE ORAL AS NEEDED
Status: DISCONTINUED | OUTPATIENT
Start: 2018-08-04 | End: 2018-08-07 | Stop reason: HOSPADM

## 2018-08-04 RX ORDER — ZOLPIDEM TARTRATE 5 MG/1
5 TABLET ORAL
Status: DISCONTINUED | OUTPATIENT
Start: 2018-08-04 | End: 2018-08-07 | Stop reason: HOSPADM

## 2018-08-04 RX ORDER — LORAZEPAM 2 MG/ML
2 INJECTION INTRAMUSCULAR
Status: DISCONTINUED | OUTPATIENT
Start: 2018-08-04 | End: 2018-08-07 | Stop reason: HOSPADM

## 2018-08-04 RX ORDER — DIVALPROEX SODIUM 500 MG/1
500 TABLET, DELAYED RELEASE ORAL 2 TIMES DAILY
Status: DISCONTINUED | OUTPATIENT
Start: 2018-08-04 | End: 2018-08-07 | Stop reason: HOSPADM

## 2018-08-04 RX ORDER — IBUPROFEN 400 MG/1
400 TABLET ORAL
Status: DISCONTINUED | OUTPATIENT
Start: 2018-08-04 | End: 2018-08-07 | Stop reason: HOSPADM

## 2018-08-04 RX ORDER — QUETIAPINE FUMARATE 100 MG/1
400 TABLET, FILM COATED ORAL
Status: DISCONTINUED | OUTPATIENT
Start: 2018-08-04 | End: 2018-08-07 | Stop reason: HOSPADM

## 2018-08-04 RX ORDER — GABAPENTIN 300 MG/1
300 CAPSULE ORAL 3 TIMES DAILY
Qty: 90 CAP | Refills: 0 | Status: SHIPPED
Start: 2018-08-04 | End: 2018-09-03

## 2018-08-04 RX ORDER — ACETAMINOPHEN 325 MG/1
650 TABLET ORAL
Status: DISCONTINUED | OUTPATIENT
Start: 2018-08-04 | End: 2018-08-07 | Stop reason: HOSPADM

## 2018-08-04 RX ORDER — BENZTROPINE MESYLATE 1 MG/1
2 TABLET ORAL
Status: DISCONTINUED | OUTPATIENT
Start: 2018-08-04 | End: 2018-08-07 | Stop reason: HOSPADM

## 2018-08-04 RX ORDER — BENZTROPINE MESYLATE 1 MG/ML
2 INJECTION INTRAMUSCULAR; INTRAVENOUS
Status: DISCONTINUED | OUTPATIENT
Start: 2018-08-04 | End: 2018-08-07 | Stop reason: HOSPADM

## 2018-08-04 RX ORDER — PRAZOSIN HYDROCHLORIDE 2 MG/1
2 CAPSULE ORAL
Status: ON HOLD | COMMUNITY
End: 2020-09-25 | Stop reason: SDUPTHER

## 2018-08-04 RX ORDER — FLUTICASONE FUROATE AND VILANTEROL 200; 25 UG/1; UG/1
1 POWDER RESPIRATORY (INHALATION) DAILY
Status: DISCONTINUED | OUTPATIENT
Start: 2018-08-04 | End: 2018-08-04 | Stop reason: HOSPADM

## 2018-08-04 RX ORDER — ADHESIVE BANDAGE
30 BANDAGE TOPICAL DAILY PRN
Status: DISCONTINUED | OUTPATIENT
Start: 2018-08-04 | End: 2018-08-07 | Stop reason: SDUPTHER

## 2018-08-04 RX ORDER — IBUPROFEN 200 MG
1 TABLET ORAL EVERY 24 HOURS
Status: DISCONTINUED | OUTPATIENT
Start: 2018-08-04 | End: 2018-08-07 | Stop reason: HOSPADM

## 2018-08-04 RX ORDER — LITHIUM CARBONATE 300 MG/1
300 CAPSULE ORAL 2 TIMES DAILY
Qty: 60 CAP | Refills: 0 | Status: SHIPPED
Start: 2018-08-04 | End: 2018-09-03

## 2018-08-04 RX ORDER — ADHESIVE BANDAGE
30 BANDAGE TOPICAL DAILY PRN
Status: DISCONTINUED | OUTPATIENT
Start: 2018-08-04 | End: 2018-08-07 | Stop reason: HOSPADM

## 2018-08-04 RX ORDER — DICYCLOMINE HYDROCHLORIDE 10 MG/ML
20 INJECTION INTRAMUSCULAR
Status: DISCONTINUED | OUTPATIENT
Start: 2018-08-04 | End: 2018-08-07 | Stop reason: HOSPADM

## 2018-08-04 RX ORDER — CLONAZEPAM 1 MG/1
2 TABLET ORAL 2 TIMES DAILY
Status: DISCONTINUED | OUTPATIENT
Start: 2018-08-04 | End: 2018-08-05

## 2018-08-04 RX ORDER — CLONIDINE HYDROCHLORIDE 0.1 MG/1
0.1 TABLET ORAL
Status: DISCONTINUED | OUTPATIENT
Start: 2018-08-04 | End: 2018-08-07 | Stop reason: HOSPADM

## 2018-08-04 RX ORDER — CLONIDINE HYDROCHLORIDE 0.3 MG/1
0.3 TABLET ORAL 3 TIMES DAILY
COMMUNITY
End: 2018-08-07

## 2018-08-04 RX ORDER — SERTRALINE HYDROCHLORIDE 50 MG/1
100 TABLET, FILM COATED ORAL DAILY
Status: DISCONTINUED | OUTPATIENT
Start: 2018-08-05 | End: 2018-08-07 | Stop reason: HOSPADM

## 2018-08-04 RX ORDER — IPRATROPIUM BROMIDE AND ALBUTEROL SULFATE 2.5; .5 MG/3ML; MG/3ML
3 SOLUTION RESPIRATORY (INHALATION)
Status: DISCONTINUED | OUTPATIENT
Start: 2018-08-04 | End: 2018-08-07 | Stop reason: HOSPADM

## 2018-08-04 RX ORDER — PROMETHAZINE HYDROCHLORIDE 25 MG/1
25 TABLET ORAL
Status: DISCONTINUED | OUTPATIENT
Start: 2018-08-04 | End: 2018-08-07 | Stop reason: HOSPADM

## 2018-08-04 RX ORDER — OLANZAPINE 5 MG/1
5 TABLET ORAL
Status: DISCONTINUED | OUTPATIENT
Start: 2018-08-04 | End: 2018-08-07 | Stop reason: HOSPADM

## 2018-08-04 RX ORDER — LORAZEPAM 1 MG/1
1 TABLET ORAL
Status: DISCONTINUED | OUTPATIENT
Start: 2018-08-04 | End: 2018-08-06

## 2018-08-04 RX ORDER — AMLODIPINE BESYLATE 5 MG/1
5 TABLET ORAL DAILY
Qty: 30 TAB | Refills: 0 | Status: ON HOLD
Start: 2018-08-05 | End: 2018-08-04

## 2018-08-04 RX ORDER — ZOLPIDEM TARTRATE 10 MG/1
10 TABLET ORAL
Status: DISCONTINUED | OUTPATIENT
Start: 2018-08-04 | End: 2018-08-04 | Stop reason: DRUGHIGH

## 2018-08-04 RX ORDER — LITHIUM CARBONATE 300 MG
300 TABLET ORAL 2 TIMES DAILY
Status: DISCONTINUED | OUTPATIENT
Start: 2018-08-04 | End: 2018-08-07 | Stop reason: HOSPADM

## 2018-08-04 RX ADMIN — LITHIUM CARBONATE 300 MG: 300 TABLET ORAL at 20:09

## 2018-08-04 RX ADMIN — DIVALPROEX SODIUM 500 MG: 250 TABLET, DELAYED RELEASE ORAL at 08:21

## 2018-08-04 RX ADMIN — Medication 10 ML: at 08:21

## 2018-08-04 RX ADMIN — LITHIUM CARBONATE 300 MG: 300 CAPSULE, GELATIN COATED ORAL at 08:21

## 2018-08-04 RX ADMIN — AMLODIPINE BESYLATE 5 MG: 5 TABLET ORAL at 08:21

## 2018-08-04 RX ADMIN — OXYCODONE HYDROCHLORIDE 5 MG: 5 TABLET ORAL at 12:05

## 2018-08-04 RX ADMIN — QUETIAPINE FUMARATE 400 MG: 100 TABLET ORAL at 20:09

## 2018-08-04 RX ADMIN — GABAPENTIN 300 MG: 300 CAPSULE ORAL at 08:21

## 2018-08-04 RX ADMIN — CLONAZEPAM 2 MG: 1 TABLET ORAL at 08:21

## 2018-08-04 RX ADMIN — OXYCODONE HYDROCHLORIDE 5 MG: 5 TABLET ORAL at 04:00

## 2018-08-04 RX ADMIN — CLONAZEPAM 2 MG: 1 TABLET ORAL at 20:09

## 2018-08-04 RX ADMIN — DIVALPROEX SODIUM 500 MG: 500 TABLET, DELAYED RELEASE ORAL at 20:09

## 2018-08-04 RX ADMIN — ZOLPIDEM TARTRATE 5 MG: 5 TABLET ORAL at 21:29

## 2018-08-04 NOTE — PROGRESS NOTES
Bedside and Verbal shift change report given to cynthia cespedes (oncoming nurse) by America Nair (offgoing nurse). Report included the following information SBAR, Kardex, Intake/Output, MAR and Recent Results.

## 2018-08-04 NOTE — IP AVS SNAPSHOT
Summary of Care Report The Summary of Care report has been created to help improve care coordination. Users with access to [place] or [facility] [address] (Web-based application) may access additional patient information including the Discharge Summary. If you are not currently a [address] user and need more information, please call the number listed below in the [place] [address] section and ask to be connected with Medical Records. Facility Information Name Address Phone [address] [facility] [phone] [phone] Patient Information Patient Name Sex  Patti Valerio ([id number]) Female 1975 Discharge Information Admitting Provider Service Area Unit Maylin Grace MD / [id number] / [phone] Discharge Provider Discharge Date/Time Discharge Disposition Destination (none) 2018 Midday (Pending) AHR (none) Patient Language Language ENGLISH [13] Hospital Problems as of 2018  Reviewed: 2017 12:05 AM by Baron Thompson MD  
  
  
  
 Class Noted - Resolved Last Modified POA Active Problems * (Principal)Poly-drug misuser  2018 - Present 2018 by Maylin Grace MD Unknown Entered by Maylin Grace MD  
  
Non-Hospital Problems as of 2018  Reviewed: 2017 12:05 AM by Baron Thompson MD  
  
  
  
 Class Noted - Resolved Last Modified Active Problems   Heroin abuse  2014 - Present 2017 by Guzman Richards MD  
  Entered by Shahid Ryees MD  
  Sinus tachycardia  1/10/2017 - Present 2017 by Guzman Richards MD  
  Entered by Marcello Atkins MD  
  Depression  2017 - Present 2017 by Guzman Richards MD  
  Entered by Perla Ramirez MD  
  Bipolar 1 disorder [facility])  Unknown - Present 8/3/2017 by Maylin Grace MD  
 Entered by Ania Allen MD  
  Chronic obstructive pulmonary disease [facility])  Unknown - Present 2/22/2017 by Ania Allen MD  
  Entered by Ania Allen MD  
  Chronic pain  Unknown - Present 2/22/2017 by Ania Allen MD  
  Entered by Ania Allen MD  
  Overview Signed 2/22/2017  7:15 PM by Ania Allen MD  
   back, leg   Hepatitis B  Unknown - Present 2/22/2017 by Ania Allen MD  
  Entered by Ania Allen MD  
  Sarcoidosis  Unknown - Present 2/22/2017 by Ania Allen MD  
  Entered by Ania Allen MD  
  Tobacco abuse  Unknown - Present 2/22/2017 by Ania Allen MD  
  Entered by Ania Allen MD  
  Anemia  2/22/2017 - Present 2/22/2017 by Ania Allen MD  
  Entered by Ania Allen MD  
  Cocaine abuse  Unknown - Present 2/22/2017 by Ania Allen MD  
  Entered by Ania Allen MD  
  Polysubstance abuse  Unknown - Present 4/25/2017 by Chad Chaidez MD  
  Entered by Ania Allen MD  
  Narcotic abuse  Unknown - Present 2/22/2017 by Ania Allen MD  
  Entered by Ania Allen MD  
  HTN (hypertension)  Unknown - Present 2/22/2017 by Ania Allen MD  
  Entered by Ania Allen MD  
  Seizure [facility])  4/20/2017 - Present 4/20/2017 by Shama Schmitt MD  
  Entered by Shama Schmitt MD  
  Lactic acidosis  4/25/2017 - Present 4/25/2017 by Chad Chaidez MD  
  Entered by Chad Chaidez MD  
  Acute encephalopathy  4/25/2017 - Present 4/25/2017 by Chad Chaidez MD  
  Entered by Chad Chaidez MD  
  Potential for altered mental status  4/25/2017 - Present 4/25/2017 by Laura Ayon MD  
  Entered by Laura Ayon MD  
  Convulsion disorder [facility])  4/25/2017 - Present 4/25/2017 by Laura Aoyn MD  
  Entered by Laura Ayon MD  
  Stenosis of both internal carotid arteries  4/25/2017 - Present 4/25/2017 by Laura Ayon MD  
  Entered by Laura Ayon MD  
 Convulsive syncope  4/25/2017 - Present 4/25/2017 by Jude Foreman MD  
  Entered by Jude Foreman MD  
  Altered mental status, unspecified  4/26/2017 - Present 4/26/2017 by Jude Foreman MD  
  Entered by Jude Foreman MD  
  Drug overdose  7/31/2017 - Present 8/1/2017 by Marbella Buck MD  
  Entered by Marbella Buck MD  
  Asthma exacerbation  7/26/2018 - Present 7/26/2018 by Deena Baez MD  
  Entered by Deena Baez MD  
  
You are allergic to the following Allergen Reactions Tomato Swelling Tongue Current Discharge Medication List  
  
START taking these medications Dose & Instructions Dispensing Information Comments [facility] PHARMACY TO SUBSTITUTE PER PROTOCOL Dose:  2 Puff Take 2 Puffs by inhalation every four (4) hours as needed for Other. Indications: COPD Associated with Chronic Bronchitis Quantity:  1 Inhaler Refills:  0  
   
 . PHARMACY TO SUBSTITUTE PER PROTOCOL Replaces:  fluticasone-vilanterol 200-25 mcg/dose inhaler Notes to Patient:  ADVAIR daily at 9am  
 Dose:  1 Puff Take 1 Puff by inhalation daily. Indications: COPD Associated with Chronic Bronchitis Quantity:  1 Inhaler Refills:  0  
   
 hydrOXYzine HCl 50 mg tablet Commonly known as:  ATARAX Dose:  50 mg Take 1 Tab by mouth every six (6) hours as needed for Itching for up to 10 days. Indications: anxiety Quantity:  15 Tab Refills:  0 CONTINUE these medications which have CHANGED Dose & Instructions Dispensing Information Comments * DEPAKOTE 500 mg tablet Generic drug:  divalproex DR What changed:  Another medication with the same name was added. Make sure you understand how and when to take each. Dose:  500 mg Take 500 mg by mouth two (2) times a day. Refills:  0  
   
 * divalproex  mg tablet Commonly known as:  DEPAKOTE What changed:   You were already taking a medication with the same name, and this prescription was added. Make sure you understand how and when to take each. Dose:  500 mg Take 1 Tab by mouth two (2) times a day. Indications: DEPRESSION ASSOCIATED WITH BIPOLAR DISORDER Quantity:  60 Tab Refills:  0  
   
 * lithium carbonate 300 mg capsule What changed:  Another medication with the same name was added. Make sure you understand how and when to take each. Dose:  300 mg Take 1 Cap by mouth two (2) times a day for 30 days. Quantity:  60 Cap Refills:  0  
   
 * lithium carbonate 300 mg tablet What changed: You were already taking a medication with the same name, and this prescription was added. Make sure you understand how and when to take each. Dose:  300 mg Take 1 Tab by mouth two (2) times a day. Indications: DEPRESSION ASSOCIATED WITH BIPOLAR DISORDER Quantity:  60 Tab Refills:  0  
   
 * SEROquel 400 mg tablet Generic drug:  QUEtiapine What changed:  Another medication with the same name was added. Make sure you understand how and when to take each. Dose:  800 mg Take 800 mg by mouth every evening. Refills:  0  
   
 * QUEtiapine 400 mg tablet Commonly known as:  SEROquel What changed: You were already taking a medication with the same name, and this prescription was added. Make sure you understand how and when to take each. Dose:  400 mg Take 1 Tab by mouth nightly. Indications: DEPRESSION ASSOCIATED WITH BIPOLAR DISORDER Quantity:  30 Tab Refills:  0  
   
 * ZOLOFT 100 mg tablet Generic drug:  sertraline What changed:  Another medication with the same name was added. Make sure you understand how and when to take each. Dose:  100 mg Take 100 mg by mouth daily. Refills:  0  
   
 * sertraline 100 mg tablet Commonly known as:  ZOLOFT What changed: You were already taking a medication with the same name, and this prescription was added. Make sure you understand how and when to take each.   
 Dose:  100 mg  
 Take 1 Tab by mouth daily. Indications: Post Traumatic Stress Disorder Quantity:  30 Tab Refills:  0  
   
 * Notice: This list has 8 medication(s) that are the same as other medications prescribed for you. Read the directions carefully, and ask your doctor or other care provider to review them with you. CONTINUE these medications which have NOT CHANGED Dose & Instructions Dispensing Information Comments  
 albuterol sulfate 90 mcg/actuation Aepb Dose:  2 Puff Take 2 Puffs by inhalation every four (4) hours as needed. Quantity:  1 Inhaler Refills:  0 STOP taking these medications Comments  
 clonazePAM 2 mg tablet Commonly known as:  KlonoPIN  
   
   
 cloNIDine HCl 0.3 mg tablet Commonly known as:  CATAPRES  
   
   
 fluticasone-vilanterol 200-25 mcg/dose inhaler Commonly known as:  BREO ELLIPTA Replaced by:  . PHARMACY TO SUBSTITUTE PER PROTOCOL ASK your doctor about these medications Dose & Instructions Dispensing Information Comments  
 gabapentin 300 mg capsule Commonly known as:  NEURONTIN Dose:  300 mg Take 1 Cap by mouth three (3) times daily for 30 days. Quantity:  90 Cap Refills:  0  
   
 prazosin 2 mg capsule Commonly known as:  MINIPRESS Dose:  2 mg Take 2 mg by mouth nightly. Indications: Post Traumatic Stress Disorder Refills:  0 Current Immunizations Name Date Influenza Vaccine 10/1/2016,  Deferred (Patient Refused) Pneumococcal Polysaccharide (PPSV-23)  Deferred (Patient Refused) Follow-up Information Follow up With Details Comments Contact Info None   None ([phone]) Patient stated that they have no PCP [address] [facility] [address] [place] [address] On 8/8/2018 walk in for  treatment Monday - Wednesday 7:30 to 3:30 Thursday 7:30 to 5:30 and Friday 7:30 to 11:00  [facility] [address] 
[phone] [address] On 2018 walk in for  services Monday - Friday 8:30 to 4:00  [facility] 
[phone] Discharge Instructions DISCHARGE SUMMARY 
 
[facility] : 1975 MRN: [id number] The patient Sahil Vogel exhibits the ability to control behavior in a less restrictive environment. Patient's level of functioning is improving. No assaultive/destructive behavior has been observed for the past 24 hours. No suicidal/homicidal threat or behavior has been observed for the past 24 hours. There is no evidence of serious medication side effects. Patient has not been in physical or protective restraints for at least the past 24 hours. If weapons involved, how are they secured? No weapons involved Is patient aware of and in agreement with discharge plan? Patient is aware of discharge and is in agreement Arrangements for medication:  Prescriptions filled for patient at [facility] . Referral for substance abuse treatment ? Yes, [facility] Referral for smoking cessation needed ? Yes, refused referral  
 
Copy of discharge instructions to  provider?:  Yes - fax to [phone] Arrangements for transportation home:  Taxi Keep all follow up appointments as scheduled, continue to take prescribed medications per physician instructions. Mental health crisis number:  [phone] or your local mental health crisis line number at [phone] DISCHARGE SUMMARY from Nurse PATIENT INSTRUCTIONS: 
· What to do at Home: 
Recommended activity: Activity as tolerated, If you experience any of the following symptoms thoughts of harming self, feeling overwhelmed with anxiety or hopelessness, please follow up with your assigned providers and local crisis number. *  Please give a list of your current medications to your Primary Care Provider. *  Please update this list whenever your medications are discontinued, doses are 
    changed, or new medications (including over-the-counter products) are added. *  Please carry medication information at all times in case of emergency situations. These are general instructions for a healthy lifestyle: No smoking/ No tobacco products/ Avoid exposure to second hand smoke Surgeon General's Warning:  Quitting smoking now greatly reduces serious risk to your health. Obesity, smoking, and sedentary lifestyle greatly increases your risk for illness A healthy diet, regular physical exercise & weight monitoring are important for maintaining a healthy lifestyle You may be retaining fluid if you have a history of heart failure or if you experience any of the following symptoms:  Weight gain of 3 pounds or more overnight or 5 pounds in a week, increased swelling in our hands or feet or shortness of breath while lying flat in bed. Please call your doctor as soon as you notice any of these symptoms; do not wait until your next office visit. Recognize signs and symptoms of STROKE: 
 
F-face looks uneven A-arms unable to move or move unevenly S-speech slurred or non-existent T-time-call 911 as soon as signs and symptoms begin-DO NOT go Back to bed or wait to see if you get better-TIME IS BRAIN. Warning Signs of HEART ATTACK Call 911 if you have these symptoms: 
? Chest discomfort. Most heart attacks involve discomfort in the center of the chest that lasts more than a few minutes, or that goes away and comes back. It can feel like uncomfortable pressure, squeezing, fullness, or pain. ? Discomfort in other areas of the upper body. Symptoms can include pain or discomfort in one or both arms, the back, neck, jaw, or stomach. ? Shortness of breath with or without chest discomfort. ? Other signs may include breaking out in a cold sweat, nausea, or lightheadedness. Don't wait more than five minutes to call [address]! Fast action can save your life. Calling 911 is almost always the fastest way to get lifesaving treatment. Emergency Medical Services staff can begin treatment when they arrive  up to an hour sooner than if someone gets to the hospital by car. The discharge information has been reviewed with the patient. The patient verbalized understanding. Discharge medications reviewed with the patient and appropriate educational materials and side effects teaching were provided. ___________________________________________________________________________________________________________________________________ Chart Review Routing History Recipient Method Report Sent By Bill Gandara None [address] IP Auto Routed Notes Fabian Hall MD [facility] 1/30/2014  6:20 AM 01/30/2014 None [address] IP Auto Routed Notes Fabian Hall MD [facility] 2/2/2014  2:20 PM 02/02/2014 None [address] IP Auto Routed Notes Glenys Jaeger MD [[id number]] 2/4/2014 11:22 PM 02/04/2014 None [address] IP Auto Routed Notes Sharon Motley MD [[id number]] 2/6/2014  4:20 PM 02/06/2014

## 2018-08-04 NOTE — DISCHARGE INSTRUCTIONS
HOSPITALIST DISCHARGE INSTRUCTIONS    NAME: Leif Loyd   :  1975   MRN:  [id number]     Date/Time:  2018 4:23 PM    ADMIT DATE: 2018   DISCHARGE DATE: 2018     Attending Physician: Silvia Sánchez MD    DISCHARGE DIAGNOSIS:    Acute hypoxemic respiratory failure secondary to status asthmaticus  Required mechanical ventilation, now on room air  Polysubstance abuse  History of seizure disorder  Pseudoseizure activity during this hospital stay  Hypertension  History of sarcoidosis  History of bipolar disorder  Tobacco use  Deconditioning  Overweight status with a BMI of 27  Severe depression      Medications: Per above medication reconciliation. Pain Management: per above medications    Recommended diet: Regular Diet    Recommended activity: Activity as tolerated and PT/OT Eval and Treat    Wound care: per protocol    Indwelling devices:  None    Supplemental Oxygen: None    Required Lab work: as needed     Glucose management:  None    Code status: Full        Outside physician follow up:    F/u with pcp after dc from psych, will need one set up. Consider medical consult while in the psych unit if needed    Follow-up Information     Follow up With Details Comments Contact Info    [facility]   Intake [phone]  24-hour Crisis  [phone]    [facility]   Info/Intake [phone]  24-hour Crisis  [phone]    None   None ([phone]) Patient stated that they have no PCP      None   None (395) Patient stated that they have no PCP                 Skilled nursing facility/ SNF MD responsible for above on discharge. Information obtained by :  I understand that if any problems occur once I am at home I am to contact my physician. I understand and acknowledge receipt of the instructions indicated above. Physician's or R.N.'s Signature                                                                  Date/Time                                                                                                                                              Patient or Repres

## 2018-08-04 NOTE — IP AVS SNAPSHOT
[address] [place] [address] 
[phone] Patient: Zayra Guillen MRN: [id number] :1975 A check sammie indicates which time of day the medication should be taken. My Medications START taking these medications Instructions Each Dose to Equal  
 Morning Noon Evening Bedtime [facility] TO SUBSTITUTE PER PROTOCOL Take 2 Puffs by inhalation every four (4) hours as needed for Other. Indications: COPD Associated with Chronic Bronchitis 2 Puff [facility] TO SUBSTITUTE PER PROTOCOL Replaces:  fluticasone-vilanterol 200-25 mcg/dose inhaler Notes to Patient:  ADVAIR daily at 9am  
   
 Take 1 Puff by inhalation daily. Indications: COPD Associated with Chronic Bronchitis 1 Puff  
    
  
   
   
   
  
 hydrOXYzine HCl 50 mg tablet Commonly known as:  ATARAX Take 1 Tab by mouth every six (6) hours as needed for Itching for up to 10 days. Indications: anxiety 50 mg CHANGE how you take these medications Instructions Each Dose to Equal  
 Morning Noon Evening Bedtime * DEPAKOTE 500 mg tablet Generic drug:  divalproex DR What changed:  Another medication with the same name was added. Make sure you understand how and when to take each. Your next dose is:  9am and 9pm  
   
 Take 500 mg by mouth two (2) times a day. 500 mg  
    
  
   
   
  
   
  
 * divalproex  mg tablet Commonly known as:  DEPAKOTE What changed: You were already taking a medication with the same name, and this prescription was added. Make sure you understand how and when to take each. Your next dose is:  9am and 9pm  
   
 Take 1 Tab by mouth two (2) times a day. Indications: DEPRESSION ASSOCIATED WITH BIPOLAR DISORDER  
 500 mg  
    
  
   
   
  
   
  
 * lithium carbonate 300 mg capsule What changed:  Another medication with the same name was added.  Make sure you understand how and when to take each. Your next dose is:  9am and 9pm  
   
 Take 1 Cap by mouth two (2) times a day for 30 days. 300 mg  
    
  
   
   
  
   
  
 * lithium carbonate 300 mg tablet What changed: You were already taking a medication with the same name, and this prescription was added. Make sure you understand how and when to take each. Your next dose is:  9am and 9pm  
   
 Take 1 Tab by mouth two (2) times a day. Indications: DEPRESSION ASSOCIATED WITH BIPOLAR DISORDER  
 300 mg  
    
  
   
   
  
   
  
 * SEROquel 400 mg tablet Generic drug:  QUEtiapine What changed:  Another medication with the same name was added. Make sure you understand how and when to take each. Your next dose is:  9pm  
   
 Take 800 mg by mouth every evening. 800 mg  
    
   
   
  
   
  
 * QUEtiapine 400 mg tablet Commonly known as:  SEROquel What changed: You were already taking a medication with the same name, and this prescription was added. Make sure you understand how and when to take each. Your next dose is:  9pm  
   
 Take 1 Tab by mouth nightly. Indications: DEPRESSION ASSOCIATED WITH BIPOLAR DISORDER  
 400 mg  
    
   
   
  
   
  
 * ZOLOFT 100 mg tablet Generic drug:  sertraline What changed:  Another medication with the same name was added. Make sure you understand how and when to take each. Your next dose is:  9am  
   
 Take 100 mg by mouth daily. 100 mg  
    
  
   
   
   
  
 * sertraline 100 mg tablet Commonly known as:  ZOLOFT What changed: You were already taking a medication with the same name, and this prescription was added. Make sure you understand how and when to take each. Your next dose is:  9am  
   
 Take 1 Tab by mouth daily. Indications: Post Traumatic Stress Disorder 100 mg * Notice:   This list has 8 medication(s) that are the same as other medications prescribed for you. Read the directions carefully, and ask your doctor or other care provider to review them with you. CONTINUE taking these medications Instructions Each Dose to Equal  
 Morning Noon Evening Bedtime  
 albuterol sulfate 90 mcg/actuation Aepb Take 2 Puffs by inhalation every four (4) hours as needed. 2 Puff STOP taking these medications   
 clonazePAM 2 mg tablet Commonly known as:  KlonoPIN  
   
  
 cloNIDine HCl 0.3 mg tablet Commonly known as:  CATAPRES  
   
  
 fluticasone-vilanterol 200-25 mcg/dose inhaler Commonly known as:  BREO ELLIPTA Replaced by:  . PHARMACY TO SUBSTITUTE PER PROTOCOL ASK your doctor about these medications Instructions Each Dose to Equal  
 Morning Noon Evening Bedtime  
 gabapentin 300 mg capsule Commonly known as:  NEURONTIN Take 1 Cap by mouth three (3) times daily for 30 days. 300 mg  
    
   
   
   
  
 prazosin 2 mg capsule Commonly known as:  MINIPRESS Take 2 mg by mouth nightly. Indications: Post Traumatic Stress Disorder 2 mg Where to Get Your Medications Information on where to get these meds will be given to you by the nurse or doctor. ! Ask your nurse or doctor about these medications [facility] TO SUB[facility] PER PROTOCOL  
 divalproex  mg tablet  
 hydrOXYzine HCl 50 mg tablet  
 lithium carbonate 300 mg tablet QUEtiapine 400 mg tablet  
 sertraline 100 mg tablet
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
## 2018-08-04 NOTE — PROGRESS NOTES
Report called to [address] at [address] [place] [address] in SBAR format. All questions answered. AVS and MAR report provided. EMTALA completed and signed by all parties. Pt left via [facility] with all personal belongings.

## 2018-08-04 NOTE — PROGRESS NOTES
Notified by CM that pt needs pregnancy test ordered and drawn before discharge. Notified Dr. Zackery Allan, orders received.

## 2018-08-04 NOTE — H&P
Hospitalist Admission Note    NAME: Jose Jonas   :  1975   MRN:  [id number]     Date/Time:  2018 7:38 PM    Patient PCP: None  ________________________________________________________________________    My assessment of this patient's clinical condition and my plan of care is as follows. Assessment / Plan:    1) Suicidal ideation: To be managed by psych. 2) COPD: Continue home treatment will add PRN NEbs    3) Polysubstance abuse: Heroin 1 gr a day., Smoking and drinking    4) Bipolar; To be managed by psych    5) CHF: Continue home treatment    6) Hx of sarcoidosis     7) Asthma, not an issue at this time,  PRN inhalers, continue home meds      Code Status: full  Surrogate Decision Maker:    DVT Prophylaxis: Walking all over the unit  GI Prophylaxis: not indicated    Baseline: lives at home with her mother        Subjective:   CHIEF COMPLAINT: Suicidal thoughts    HISTORY OF PRESENT ILLNESS:     Shahzad Eaton is a 38 y.o.   female PMH Sarcoidosis, CHF,bipolar, heroin use,Asthma,  respiratory failure who is admitted to the psych unit for Suicidal ideation. Patient was D/C today from [facility] after she was hospitalized from  to  secondary to respiratory failure (she was intubated secondary to asthma exacerbation) but expressed suicidal ideation over there reason why she is transferred here to psych for further management    We were asked to admit for work up and evaluation of the above problems.      Past Medical History:   Diagnosis Date    Asthma     Bipolar 1 disorder (HCC)     Chronic obstructive pulmonary disease (HCC)     Chronic pain     back, leg    Cocaine abuse     Depression     Hepatitis B     Heroin abuse     HTN (hypertension)     Narcotic abuse     Polysubstance abuse     Sarcoidosis     Tobacco abuse         Past Surgical History:   Procedure Laterality Date    HX GYN      HX WISDOM TEETH EXTRACTION         Social History   Substance Use Topics    Smoking status: Former Smoker     Packs/day: 0.25     Years: 15.00     Quit date: 8/3/2017    Smokeless tobacco: Never Used      Comment: \"I already quit\"    Alcohol use No        Family History   Problem Relation Age of Onset    Hypertension Father     Hypertension Mother      Allergies   Allergen Reactions    Tomato Swelling     Tongue        Prior to Admission medications    Medication Sig Start Date End Date Taking? Authorizing Provider   gabapentin (NEURONTIN) 300 mg capsule Take 1 Cap by mouth three (3) times daily for 30 days. 8/4/18 9/3/18 Yes Jessica Mccallum MD   lithium carbonate 300 mg capsule Take 1 Cap by mouth two (2) times a day for 30 days. 8/4/18 9/3/18 Yes Noemi Paullette Goldmann, MD   cloNIDine HCl (CATAPRES) 0.3 mg tablet Take 0.3 mg by mouth three (3) times daily. Yes Historical Provider   prazosin (MINIPRESS) 2 mg capsule Take 2 mg by mouth nightly. Indications: Post Traumatic Stress Disorder   Yes Historical Provider   divalproex DR (DEPAKOTE) 500 mg tablet Take 500 mg by mouth two (2) times a day. Yes Historical Provider   QUEtiapine (SEROQUEL) 400 mg tablet Take 800 mg by mouth every evening. Yes Historical Provider   sertraline (ZOLOFT) 100 mg tablet Take 100 mg by mouth daily. Yes Historical Provider   clonazePAM (KLONOPIN) 2 mg tablet Take 2 Tabs by mouth two (2) times a day. Max Daily Amount: 8 mg. 8/3/17  Yes Shantell Bianchi MD   albuterol sulfate 90 mcg/actuation aepb Take 2 Puffs by inhalation every four (4) hours as needed. 4/19/17  Yes Veronica Nguyen MD   fluticasone-vilanterol (BREO ELLIPTA) 200-25 mcg/dose inhaler Take 1 Puff by inhalation daily. 4/19/17  Yes Veronica Nguyen MD   albuterol (PROVENTIL VENTOLIN) 2.5 mg /3 mL (0.083 %) nebulizer solution 3 mL by Nebulization route every four (4) hours as needed for Wheezing. 4/16/17  Yes Darron Flores MD       REVIEW OF SYSTEMS:     I am not able to complete the review of systems because:    The patient is intubated and sedated    The patient has altered mental status due to his acute medical problems    The patient has baseline aphasia from prior stroke(s)    The patient has baseline dementia and is not reliable historian    The patient is in acute medical distress and unable to provide information           Total of 12 systems reviewed as follows:       POSITIVE= underlined text  Negative = text not underlined  General:  fever, chills, sweats, generalized weakness, weight loss/gain,      loss of appetite   Eyes:    blurred vision, eye pain, loss of vision, double vision  ENT:    rhinorrhea, pharyngitis   Respiratory:   cough, sputum production, SOB, RIVAS, wheezing, pleuritic pain   Cardiology:   chest pain, palpitations, orthopnea, PND, edema, syncope   Gastrointestinal:  abdominal pain , N/V, diarrhea, dysphagia, constipation, bleeding   Genitourinary:  frequency, urgency, dysuria, hematuria, incontinence   Muskuloskeletal :  arthralgia, myalgia, back pain  Hematology:  easy bruising, nose or gum bleeding, lymphadenopathy   Dermatological: rash, ulceration, pruritis, color change / jaundice  Endocrine:   hot flashes or polydipsia   Neurological:  headache, dizziness, confusion, focal weakness, paresthesia,     Speech difficulties, memory loss, gait difficulty  Psychological: Feelings of anxiety, depression, agitation    Objective:   VITALS:    Visit Vitals    /76 (BP Patient Position: Sitting)    Pulse (!) 110    Temp 98.7 °F (37.1 °C)    Resp 20    Ht 5' 4\" (1.626 m)    Wt 72.1 kg (158 lb 14.4 oz)    SpO2 98%    Breastfeeding No    BMI 27.28 kg/m2       PHYSICAL EXAM:    General:    Alert, cooperative, no distress, appears stated age. HEENT: Atraumatic, anicteric sclerae, pink conjunctivae     No oral ulcers, mucosa moist, throat clear, dentition fair  Neck:  Supple, symmetrical,  thyroid: non tender  Lungs:   Clear to auscultation bilaterally. occasional Wheezing or Rhonchi. No rales.   Chest wall:  No tenderness  No Accessory muscle use. Heart:   Regular  rhythm,  No  murmur   No edema  Abdomen:   Soft, non-tender. Not distended. Bowel sounds normal  Extremities: No cyanosis. No clubbing,      Skin turgor normal, Capillary refill normal, Radial dial pulse 2+  Skin:     Not pale. Not Jaundiced  No rashes   Psych:  Good insight. depressed. anxious or agitated. Neurologic: EOMs intact. No facial asymmetry. No aphasia or slurred speech. Symmetrical strength, Sensation grossly intact. Alert and oriented X 4.     _______________________________________________________________________  Care Plan discussed with:    Comments   Patient x    Family      RN x    Care Manager                    Consultant:      _______________________________________________________________________  Expected  Disposition:   Home with Family x   HH/PT/OT/RN    SNF/LTC    MAEGAN    ________________________________________________________________________  TOTAL TIME: 61 Minutes    Critical Care Provided     Minutes non procedure based      Comments     Reviewed previous records   >50% of visit spent in counseling and coordination of care  Discussion with patient and/or family and questions answered       ________________________________________________________________________  Signed: Kerri Davis MD    Procedures: see electronic medical records for all procedures/Xrays and details which were not copied into this note but were reviewed prior to creation of Plan.     LAB DATA REVIEWED:    Recent Results (from the past 24 hour(s))   HCG URINE, QL. - POC    Collection Time: 08/04/18  2:36 PM   Result Value Ref Range    Pregnancy test,urine (POC) NEGATIVE  NEG

## 2018-08-04 NOTE — DISCHARGE SUMMARY
Hospitalist Discharge Summary     Patient ID:  Freida Morley  [id number]  38 y.o.  1975    PCP on record: None    Admit date: 7/26/2018  Discharge date and time: 8/4/2018      DISCHARGE DIAGNOSIS:    Acute hypoxemic respiratory failure secondary to status asthmaticus  Required mechanical ventilation, now on room air  Polysubstance abuse  History of seizure disorder  Pseudoseizure activity during this hospital stay  Hypertension  History of sarcoidosis  History of bipolar disorder  Tobacco use  Deconditioning  Overweight status with a BMI of 27  Severe depression      CONSULTATIONS:  IP CONSULT TO NEUROLOGY  IP CONSULT TO PSYCHIATRY    Excerpted HPI from H&P of Dayo Wyman MD:  CHIEF COMPLAINT:  SOB, cough, wheeze     HISTORY OF PRESENT ILLNESS:     Freida Morley is a 38 y.o.  female with PMH asthma, sarcoid, tobacco abuse, polysubstance abuse (heroin, cocaine, THC, opiate, methadone) who was brought in by EMS with respiratory distress. History limited due to respiratory distress and bipap. Report not eating for 4-5 days since feeling lousy, SOB. Subjective fever and chills. Cough with yellow sputum. Denies sick contact, travel. Last used heroin yesterday. Complains of dull anterior chest pressure. We were asked to admit for work up and evaluation of the above problems.        ______________________________________________________________________  DISCHARGE SUMMARY/HOSPITAL COURSE:  for full details see H&P, daily progress notes, labs, consult notes. 38 y. o.   female with PMH asthma, sarcoid, tobacco abuse, polysubstance abuse (heroin, cocaine, THC, opiate, methadone) who was brought in by EMS with respiratory distress. Last used of heroin was the day before presentation. Admitted with diagnosis of acute respiratory failure due to status asthmaticus and heroin abuse on 7/26. She required mechanical ventilation and was extubated on 7/31.     Seizure activity, suspected psueodosz  Withdrawal syndrome  Patient reports diagnosis of seizure about 4 years ago and since then she has taken depakote. Overnight, patient had episode of tonic-clonic movement with associated diaphoresis, stool incontinence and post-ictal confusion. She received IV ativan and loading dose of keppra 1000 mg. EEG negative, despite \"sz like activity\" c/w pseudosz  Change keppra to home Depakote dose  Neurology following  Psych accepted to inpt psych     Status asthmaticus with acute respiratory distress, POA   On admission, patient with diffuse wheezing, and tachypnea RR 30s despite BIPAP use. CXR negative. Patient intubated in ED and on mechanical ventilation. Received IV steroids, nebs and empiric antibiotics. She was successfully extubated on 7/30.   - Now tolerating well RA. Continue prn nebs, completed levaquin.  -change pulmicort to home inh, off IV steroids  -did not need o2 on [id number]   -transfer [facility] for inpt psych      Polysubstance abuse  UDS positive for opiates, BZD and THC. Psych to assist and CM      HTN  On amlodipine      Sarcoidosis      Bipolar  Continue home dose of lithium, gabapentin, and clonazepam. Adjust meds per psych      Hx tobacco abuse -- denies current use     Deconditioning- cont PT at [facility], is doing much better, does not need inpt rehab now       25.0 - 29.9 Overweight / Body mass index is 27.88 kg/(m^2). Recommended Disposition: inpt psych      Patient was seen by psychiatry yesterday and accepted for inpatient psychiatric evaluation. She has just been accepted to [facility] and will need to continue PT for her deconditioning while in the inpatient psych unit. Patient's respiratory status now is baseline but consider medical consultation while in the psych unit if needed at [facility].     Patient will need arrangement for a primary care physician after discharge from inpatient psych for close follow-up as an outpatient.    _______________________________________________________________________  Patient seen and examined by me on discharge day. Pertinent Findings:    Patient feels breathing is fine today no cough or sputum production. Still reports diffuse pain. Has agreed to inpatient psychiatry evaluation and management. Patient reports she has been able to get up and go to the bathroom on her own but is still weak. Patient is awake and alert oriented ×3 on room air no distress mucous membranes are moist cardiovascular regular rate lungs are currently clear without wheezes rhonchi or crackles good air movement abdomen is benign extremities no clubbing cyanosis or edema  _______________________________________________________________________  DISCHARGE MEDICATIONS:   Current Discharge Medication List      START taking these medications    Details   gabapentin (NEURONTIN) 300 mg capsule Take 1 Cap by mouth three (3) times daily for 30 days. Qty: 90 Cap, Refills: 0      amLODIPine (NORVASC) 5 mg tablet Take 1 Tab by mouth daily for 30 days. Qty: 30 Tab, Refills: 0         CONTINUE these medications which have CHANGED    Details   lithium carbonate 300 mg capsule Take 1 Cap by mouth two (2) times a day for 30 days. Qty: 60 Cap, Refills: 0         CONTINUE these medications which have NOT CHANGED    Details   divalproex DR (DEPAKOTE) 500 mg tablet Take 500 mg by mouth two (2) times a day. QUEtiapine (SEROQUEL) 400 mg tablet Take 800 mg by mouth every evening. Patient takes 300 mg daily in the morning and 800 mg (2- 400 mg tablets) every evening      sertraline (ZOLOFT) 100 mg tablet Take 100 mg by mouth daily. clonazePAM (KLONOPIN) 2 mg tablet Take 2 Tabs by mouth two (2) times a day. Max Daily Amount: 8 mg. Qty: 30 Tab, Refills: 0      mupirocin (BACTROBAN) 2 % ointment Apply 1 g to affected area every twelve (12) hours.   Qty: 22 g, Refills: 0      albuterol sulfate 90 mcg/actuation aepb Take 2 Puffs by inhalation every four (4) hours as needed. Qty: 1 Inhaler, Refills: 0      fluticasone-vilanterol (BREO ELLIPTA) 200-25 mcg/dose inhaler Take 1 Puff by inhalation daily. Qty: 28 Each, Refills: 1      albuterol (PROVENTIL VENTOLIN) 2.5 mg /3 mL (0.083 %) nebulizer solution 3 mL by Nebulization route every four (4) hours as needed for Wheezing. Qty: 24 Each, Refills: 0         STOP taking these medications       LORazepam (ATIVAN) 0.5 mg tablet Comments:   Reason for Stopping:         oxyCODONE-acetaminophen (PERCOCET) 5-325 mg per tablet Comments:   Reason for Stopping:         gabapentin (NEURONTIN) 800 mg tablet Comments:   Reason for Stopping:         cloNIDine HCl (CATAPRES) 0.3 mg tablet Comments:   Reason for Stopping:         lidocaine (LIDODERM) 5 % Comments:   Reason for Stopping:               My Recommended Diet, Activity, Wound Care, and follow-up labs are listed in the patient's Discharge Insturctions which I have personally completed and reviewed.     ______________________________________________________________________    Risk of deterioration: High    Condition at Discharge:  Stable  ______________________________________________________________________    Disposition  inpt psych at [facility]'s  ______________________________________________________________________    Care Plan discussed with:   Patient, RN, Care Manager, Consultant    ______________________________________________________________________    Code Status: Full Code  ______________________________________________________________________      Follow up with:   PCP : None  Follow-up Information     Follow up With Details Comments Contact Info    [facility]   Intake [phone]  24-hour Crisis  [phone]    [facility]stance Abuse [facility]   Info/Intake [phone]  24-hour Crisis  [phone]    None   None (395) Patient stated that they have no PCP      None   None (395) Patient stated that they have no PCP                Total time in minutes spent coordinating this discharge (includes going over instructions, follow-up, prescriptions, and preparing report for sign off to her PCP) :  40 minutes    Signed:  Lieutenant Cipriano MD

## 2018-08-04 NOTE — PROGRESS NOTES
Weekend CM completed chart review. Noted consult from Dr. Elizabet Babcock: Nawaf Cunha to discharge today to either acute rehab, SNF or Inpatient Psych if Pt agreeable to go. Per Verenice's Note from yesterday: Pt was evaluated by Psych and recommendation was for IP Psych, if Pt was agreeable to go. Contact info for substance abuse programs placed on pt's AVS. Pt will need to make contact herself per program protocol. If pt does not go to IP Psych, CM has gotten approval to issue nebulizer from appruptt. [facility] will bill [id number] if notified through [facility] that nebulizer was given to pt. If pt does not go to IP Psych, CM has also gotten approval from Mgr. to pay for dc meds.     Upon dc, pt's priority should be to f/u on verifications required to get her Medicaid reinstated. Med Assist repr has screened and will f/u with pt. Pt was given list of BS PCP to select new PCP when Medicaid reinstated. Also given BS Caravan schedule to access medical care until insurance reinstated. CM called [facility] Inpatient Psych Bed Placement at  [phone] and spoke to Flaco. He is reviewing the chart now and will call us and let us know what his options are for IP Pysch. Pt is Self Pay so Pt would have to go to [facility] to receive care under [address]. Flaco asked if Pt was I W ADLs. RN indicated that Pt has been stand by A for AMB d/t weakness. 11 AM  Mikey from Bed Placement called this morning and wanted to know her Hx of MRSA. Pt indicated that she had it 15 years ago. He also had a question about whether or not she is on oxygen. Pt is on Room Air and RN reported that she was been checking her saturations when walking and she never drops below 93%. Flaco called again and asked if ER did a pregnancy test in ER. CM looked through lab results and did not see HCG lab result. Flaco indicated that it needed to be ordered HCG lab. ROSE let RN know.       Flaco indicated that she has been accepted and now he is looking for bed placement for her in [facility] [id number]. 11:35 AM   CM received another call from [facility]. He asked that we order a MRSA Swab on this Pt today prior to coming so that would be started. He is still working on bed placement. We are thinking Pt will go to [facility] but has to be in private room d/t MRSA history per [facility].    12:32 PM   CM spoke to [facility] at bed placement. They are still waiting on Pregnancy Test Result. Please notifiy CM once results are in. They are requesting that we start the MRSA Swab prior to her coming. They are trying to get her into INPATIENT PSYCH AT [facility].  7th floor. ADMITTING MD IS DR. Kaye Bella. They are moving people around now since they will need a private room until MRSA Swab test result comes back. MD will have to do EMTALA for hospital to hospital transfer. RN will need to call [facility] to set up medical transport: [phone] and complete PCS packet. 1:33 PM   RN reported that Pt has tubes tied. CM called Nolan to let her know. RN was waiting for Pt to void to do Pregnancy Test.  Asked RN again to get MRSA swab.    4:11 PM   CM received a call from [facility] Bed Placement. Pt can come today. Pt will go to Room 729 Bed 1 on the 7th floor. RN will need to call report to [phone]. RN will set up medical transport to [facility] and work on [facility]. CM will let Pt know. Care Management Interventions  PCP Verified by CM: Yes  Palliative Care Criteria Met (RRAT>21 & CHF Dx)?: No  Mode of Transport at Discharge: BLS  Transition of Care Consult (CM Consult):  Other [facility] Inpatient Psych)  MyChart Signup: No  Discharge Durable Medical Equipment: No  Physical Therapy Consult: Yes  Occupational Therapy Consult: No  Speech Therapy Consult: No  Current Support Network: Relative's Home  Confirm Follow Up Transport: Self  Plan discussed with Pt/Family/Caregiver: Yes  Freedom of Choice Offered: Yes  Discharge Location  Discharge Placement: Psychiatric Unit    Jai, Ne CM   Ext [phone]

## 2018-08-04 NOTE — IP AVS SNAPSHOT
1111 [facility] [address] [place] [address] 
[phone] Patient: Sherren Bari MRN: [id number] :1975 About your hospitalization You were admitted on:  2018 You last received care in the:  [address] [place] [address] You were discharged on:  2018 Why you were hospitalized Your primary diagnosis was:  Poly-Drug Misuser Follow-up Information Follow up With Details Comments Contact Info None   None ([phone]) Patient stated that they have no PCP 4343 [facility] 3301 [facility] On 2018 walk in for SA treatment Monday - Wednesday 7:30 to 3:30 Thursday 7:30 to 5:30 and Friday 7:30 to 11:00  [address] 
[phone] [address] [place] [address] On 2018 walk in for SA services Monday - Friday 8:30 to 4:00  851 [facility] 
[phone] Discharge Orders None A check sammie indicates which time of day the medication should be taken. My Medications START taking these medications Instructions Each Dose to Equal  
 Morning Noon Evening Bedtime [facility] TO SUBSTITUTE PER PROTOCOL Take 2 Puffs by inhalation every four (4) hours as needed for Other. Indications: COPD Associated with Chronic Bronchitis 2 Puff [facility]had[facility] PHARMACY TO SUBSTITUTE PER PROTOCOL Replaces:  fluticasone-vilanterol 200-25 mcg/dose inhaler Notes to Patient:  ADVAIR daily at 9am  
   
 Take 1 Puff by inhalation daily. Indications: COPD Associated with Chronic Bronchitis 1 Puff  
    
  
   
   
   
  
 hydrOXYzine HCl 50 mg tablet Commonly known as:  ATARAX Take 1 Tab by mouth every six (6) hours as needed for Itching for up to 10 days. Indications: anxiety 50 mg CHANGE how you take these medications  Instructions Each Dose to Equal  
 Morning Noon Evening Bedtime * DEPAKOTE 500 mg tablet Generic drug:  divalproex DR What changed:  Another medication with the same name was added. Make sure you understand how and when to take each. Your next dose is:  9am and 9pm  
   
 Take 500 mg by mouth two (2) times a day. 500 mg  
    
  
   
   
  
   
  
 * divalproex  mg tablet Commonly known as:  DEPAKOTE What changed: You were already taking a medication with the same name, and this prescription was added. Make sure you understand how and when to take each. Your next dose is:  9am and 9pm  
   
 Take 1 Tab by mouth two (2) times a day. Indications: DEPRESSION ASSOCIATED WITH BIPOLAR DISORDER  
 500 mg  
    
  
   
   
  
   
  
 * lithium carbonate 300 mg capsule What changed:  Another medication with the same name was added. Make sure you understand how and when to take each. Your next dose is:  9am and 9pm  
   
 Take 1 Cap by mouth two (2) times a day for 30 days. 300 mg  
    
  
   
   
  
   
  
 * lithium carbonate 300 mg tablet What changed: You were already taking a medication with the same name, and this prescription was added. Make sure you understand how and when to take each. Your next dose is:  9am and 9pm  
   
 Take 1 Tab by mouth two (2) times a day. Indications: DEPRESSION ASSOCIATED WITH BIPOLAR DISORDER  
 300 mg  
    
  
   
   
  
   
  
 * SEROquel 400 mg tablet Generic drug:  QUEtiapine What changed:  Another medication with the same name was added. Make sure you understand how and when to take each. Your next dose is:  9pm  
   
 Take 800 mg by mouth every evening. 800 mg  
    
   
   
  
   
  
 * QUEtiapine 400 mg tablet Commonly known as:  SEROquel What changed: You were already taking a medication with the same name, and this prescription was added. Make sure you understand how and when to take each.   
Your next dose is:  9pm  
   
 Take 1 Tab by mouth nightly. Indications: DEPRESSION ASSOCIATED WITH BIPOLAR DISORDER  
 400 mg  
    
   
   
  
   
  
 * ZOLOFT 100 mg tablet Generic drug:  sertraline What changed:  Another medication with the same name was added. Make sure you understand how and when to take each. Your next dose is:  9am  
   
 Take 100 mg by mouth daily. 100 mg  
    
  
   
   
   
  
 * sertraline 100 mg tablet Commonly known as:  ZOLOFT What changed: You were already taking a medication with the same name, and this prescription was added. Make sure you understand how and when to take each. Your next dose is:  9am  
   
 Take 1 Tab by mouth daily. Indications: Post Traumatic Stress Disorder 100 mg * Notice: This list has 8 medication(s) that are the same as other medications prescribed for you. Read the directions carefully, and ask your doctor or other care provider to review them with you. CONTINUE taking these medications Instructions Each Dose to Equal  
 Morning Noon Evening Bedtime  
 albuterol sulfate 90 mcg/actuation Aepb Take 2 Puffs by inhalation every four (4) hours as needed. 2 Puff STOP taking these medications   
 clonazePAM 2 mg tablet Commonly known as:  KlonoPIN  
   
  
 cloNIDine HCl 0.3 mg tablet Commonly known as:  CATAPRES  
   
  
 fluticasone-vilanterol 200-25 mcg/dose inhaler Commonly known as:  BREO ELLIPTA Replaced by:  . PHARMACY TO SUBSTITUTE PER PROTOCOL ASK your doctor about these medications Instructions Each Dose to Equal  
 Morning Noon Evening Bedtime  
 gabapentin 300 mg capsule Commonly known as:  NEURONTIN Take 1 Cap by mouth three (3) times daily for 30 days. 300 mg  
    
   
   
   
  
 prazosin 2 mg capsule Commonly known as:  MINIPRESS Take 2 mg by mouth nightly. Indications: Post Traumatic Stress Disorder 2 mg Where to Get Your Medications Information on where to get these meds will be given to you by the nurse or doctor. ! Ask your nurse or doctor about these medications [facility] PHARMACY TO SUBSTITUTE PER PROTOCOL [facility] PHARMACY TO SUBSTITUTE PER PROTOCOL  
 divalproex  mg tablet  
 hydrOXYzine HCl 50 mg tablet  
 lithium carbonate 300 mg tablet QUEtiapine 400 mg tablet  
 sertraline 100 mg tablet Discharge Instructions DISCHARGE SUMMARY 
 
701 [facility] : 1975 MRN: [id number] The patient Junior Ward exhibits the ability to control behavior in a less restrictive environment. Patient's level of functioning is improving. No assaultive/destructive behavior has been observed for the past 24 hours. No suicidal/homicidal threat or behavior has been observed for the past 24 hours. There is no evidence of serious medication side effects. Patient has not been in physical or protective restraints for at least the past 24 hours. If weapons involved, how are they secured? No weapons involved Is patient aware of and in agreement with discharge plan? Patient is aware of discharge and is in agreement Arrangements for medication:  Prescriptions filled for patient at [facility] . Referral for substance abuse treatment ? Yes, [facility] Referral for smoking cessation needed ? Yes, refused referral  
 
Copy of discharge instructions to  provider?:  Yes - fax to [phone] Arrangements for transportation home:  Taxi Keep all follow up appointments as scheduled, continue to take prescribed medications per physician instructions. Mental health crisis number:  825 or your local mental health crisis line number at [phone] DISCHARGE SUMMARY from Nurse PATIENT INSTRUCTIONS: 
· What to do at Home: 
Recommended activity: Activity as tolerated, If you experience any of the following symptoms thoughts of harming self, feeling overwhelmed with anxiety or hopelessness, please follow up with your assigned providers and local crisis number. *  Please give a list of your current medications to your Primary Care Provider. *  Please update this list whenever your medications are discontinued, doses are 
    changed, or new medications (including over-the-counter products) are added. *  Please carry medication information at all times in case of emergency situations. These are general instructions for a healthy lifestyle: No smoking/ No tobacco products/ Avoid exposure to second hand smoke Surgeon General's Warning:  Quitting smoking now greatly reduces serious risk to your health. Obesity, smoking, and sedentary lifestyle greatly increases your risk for illness A healthy diet, regular physical exercise & weight monitoring are important for maintaining a healthy lifestyle You may be retaining fluid if you have a history of heart failure or if you experience any of the following symptoms:  Weight gain of 3 pounds or more overnight or 5 pounds in a week, increased swelling in our hands or feet or shortness of breath while lying flat in bed. Please call your doctor as soon as you notice any of these symptoms; do not wait until your next office visit. Recognize signs and symptoms of STROKE: 
 
F-face looks uneven A-arms unable to move or move unevenly S-speech slurred or non-existent T-time-call 911 as soon as signs and symptoms begin-DO NOT go Back to bed or wait to see if you get better-TIME IS BRAIN. Warning Signs of HEART ATTACK Call 911 if you have these symptoms: 
? Chest discomfort. Most heart attacks involve discomfort in the center of the chest that lasts more than a few minutes, or that goes away and comes back. It can feel like uncomfortable pressure, squeezing, fullness, or pain. ? Discomfort in other areas of the upper body.  Symptoms can include pain or discomfort in one or both arms, the back, neck, jaw, or stomach. ? Shortness of breath with or without chest discomfort. ? Other signs may include breaking out in a cold sweat, nausea, or lightheadedness. Don't wait more than five minutes to call [address]! Fast action can save your life. Calling 911 is almost always the fastest way to get lifesaving treatment. Emergency Medical Services staff can begin treatment when they arrive  up to an hour sooner than if someone gets to the hospital by car. The discharge information has been reviewed with the patient. The patient verbalized understanding. Discharge medications reviewed with the patient and appropriate educational materials and side effects teaching were provided. ___________________________________________________________________________________________________________________________________ Canvahart Announcement We are excited to announce that we are making your provider's discharge notes available to you in ClairMail. You will see these notes when they are completed and signed by the physician that discharged you from your recent hospital stay. If you have any questions or concerns about any information you see in Canvahart, please call the Health Information Department where you were seen or reach out to your Primary Care Provider for more information about your plan of care. Introducing [facility]! [employer] introduces ClairMail patient portal. Now you can access parts of your medical record, email your doctor's office, and request medication refills online. 1. In your internet browser, go to [email or web address] 2. Click on the First Time User? Click Here link in the Sign In box. You will see the New Member Sign Up page. 3. Enter your ClairMail Access Code exactly as it appears below. You will not need to use this code after youve completed the sign-up process.  If you do not sign up before the expiration date, you must request a new code. · "[employer]" Access Code: [email or web address] Expires: 10/24/2018  2:48 PM 
 
4. Enter the last four digits of your Social Security Number (xxxx) and Date of Birth (mm/dd/yyyy) as indicated and click Submit. You will be taken to the next sign-up page. 5. Create a "[employer]" ID. This will be your "[employer]" login ID and cannot be changed, so think of one that is secure and easy to remember. 6. Create a "[employer]" password. You can change your password at any time. 7. Enter your Password Reset Question and Answer. This can be used at a later time if you forget your password. 8. Enter your e-mail address. You will receive e-mail notification when new information is available in [address] 9. Click Sign Up. You can now view and download portions of your medical record. 10. Click the Download Summary menu link to download a portable copy of your medical information. If you have questions, please visit the Frequently Asked Questions section of the "[employer]" website. Remember, "[employer]" is NOT to be used for urgent needs. For medical emergencies, dial 911. Now available from your iPhone and Android! Introducing Kennedy Hood As a [facility] patient, I wanted to make you aware of our electronic visit tool called Kennedy Hood. [facility] 24/7 allows you to connect within minutes with a medical provider 24 hours a day, seven days a week via a mobile device or tablet or logging into a secure website from your computer. You can access Kennedy Hood from anywhere in the [place].  
 
A virtual visit might be right for you when you have a simple condition and feel like you just dont want to get out of bed, or cant get away from work for an appointment, when your regular [facility] provider is not available (evenings, weekends or holidays), or when youre out of town and need minor care. Electronic visits cost only $49 and if the Zuznow 24/Publimind provider determines a prescription is needed to treat your condition, one can be electronically transmitted to a nearby pharmacy*. Please take a moment to enroll today if you have not already done so. The enrollment process is free and takes just a few minutes. To enroll, please download the [employer] mayi to your tablet or phone, or visit [email or web address] to enroll on your computer. And, as an [address] [place] [address] patient with a [email or web address] account, the results of your visits will be scanned into your electronic medical record and your primary care provider will be able to view the scanned results. We urge you to continue to see your regular [facility] provider for your ongoing medical care. And while your primary care provider may not be the one available when you seek a Carrier IQdarrelfin virtual visit, the peace of mind you get from getting a real diagnosis real time can be priceless. For more information on [facility] & Information, view our Frequently Asked Questions (FAQs) at [email or web address]. Sincerely, 
 
Reese Oh MD 
Chief Medical Officer [address] *:  certain medications cannot be prescribed via [facility] Information Providers Seen During Your Hospitalization Provider Specialty Primary office phone Kristy Treadwell MD Psychiatry [phone] Your Primary Care Physician (PCP) Primary Care Physician Office Phone Office Fax NONE ** None ** ** None ** You are allergic to the following Allergen Reactions Tomato Swelling Tongue Recent Documentation Height Weight Breastfeeding? BMI OB Status Smoking Status 1.626 m 72.1 kg No 27.28 kg/m2 Having regular periods Former Smoker Emergency Contacts Name Discharge Info Relation Home Work Mobile Amalia Kim DISCHARGE CAREGIVER [3] Parent [1] [phone] Patient Belongings The following personal items are in your possession at time of discharge: 
  Dental Appliances: None  Visual Aid: None      Home Medications: None   Jewelry: None  Clothing: Shirt, Pants, Socks, Other (comment) (head scarf, socks are yellow fall socks from [facility])    Other Valuables: None  Personal Items Sent to Safe: none Please provide this summary of care documentation to your next provider. Signatures-by signing, you are acknowledging that this After Visit Summary has been reviewed with you and you have received a copy. Patient Signature:  ____________________________________________________________ Date:  ____________________________________________________________  
  
[facility] [place] [facility]\"" Provider Signature:  ____________________________________________________________ Date:  ____________________________________________________________

## 2018-08-04 NOTE — PROGRESS NOTES
Problem: Mobility Impaired (Adult and Pediatric)  Goal: *Acute Goals and Plan of Care (Insert Text)  Physical Therapy Goals  Revised 8/2/2018  1. Patient will move from supine to sit and sit to supine , scoot up and down and roll side to side in bed with supervision/set-up within 7 day(s). 2.  Patient will transfer from bed to chair and chair to bed with supervision/set-up using the least restrictive device within 7 day(s). 3.  Patient will perform sit to stand with supervision/set-up within 7 day(s). 4.  Patient will ambulate with supervision/set-up for 200 feet with the least restrictive device within 7 day(s). Physical Therapy Goals  Initiated 7/30/2018  1. Patient will move from supine to sit and sit to supine , scoot up and down and roll side to side in bed with minimal assistance/contact guard assist within 7 day(s). 2.  Patient will transfer from bed to chair and chair to bed with minimal assistance/contact guard assist using the least restrictive device within 7 day(s). 3.  Patient will perform sit to stand with minimal assistance/contact guard assist within 7 day(s). 4.  Patient will ambulate with minimal assistance/contact guard assist for 100 feet with the least restrictive device within 7 day(s). physical Therapy TREATMENT  Patient: John Perrin (74 y.o. female)  Date: 8/4/2018  Diagnosis: Asthma exacerbation        Precautions: Bed Alarm, Fall  Chart, physical therapy assessment, plan of care and goals were reviewed. ASSESSMENT: pt is up in room add-yani, did well with bed mob and transfers, one episode of dizziness and LOB, no SOB, some unsteadiness with no AD, did well with ther-ex, vc's for safety and proper RW use.     Progression toward goals:  []    Improving appropriately and progressing toward goals  [x]    Improving slowly and progressing toward goals  []    Not making progress toward goals and plan of care will be adjusted     PLAN:  Patient continues to benefit from skilled intervention to address the above impairments. Continue treatment per established plan of care. Discharge Recommendations: short term  Alexander Mahoney  Further Equipment Recommendations for Discharge:  rolling walker     OBJECTIVE DATA SUMMARY:     Critical Behavior:  Neurologic State: Alert  Orientation Level: Oriented X4  Cognition: Follows commands  Safety/Judgement: Home safety     Functional Mobility Training:  Bed Mobility:  Rolling: Independent  Supine to Sit: Independent  Sit to Supine: Independent  Scooting: Independent  Level of Assistance: Independent  Interventions: Verbal cues     Transfers:  Sit to Stand: Stand-by assistance  Stand to Sit: Stand-by assistance  Bed to Chair: Contact guard assistance  Interventions: Tactile cues; Verbal cues  Level of Assistance: Contact guard assistance     Balance:  Sitting: Intact; Without support  Sitting - Static: Good (unsupported)  Sitting - Dynamic: Not tested  Standing: Intact; With support  Standing - Static: Good;Constant support  Standing - Dynamic : Good     Ambulation/Gait Training:  Distance (ft): 180 Feet (ft)  Assistive Device: Walker, rolling;Gait belt  Ambulation - Level of Assistance: Minimal assistance;Assist x1  Gait Abnormalities: Decreased step clearance; Path deviations  Right Side Weight Bearing: Full  Left Side Weight Bearing: Full  Base of Support: Narrowed  Stance:  (equal)  Speed/Denise: Pace decreased (<100 feet/min)  Step Length: Left shortened;Right shortened    Therapeutic Exercises:   sitting  EXERCISE   Sets   Reps   Active Active Assist   Passive   Comments   Ankle pumps 1 10 [x] [] [] bilat   Heel raises 1 10 [x] [] [] \"   Toe tap 1 10 [x] [] [] \"   Knee ext 1 10 [x] [] [] \"   Hip flex 1 10 [x] [] [] \"     Pain:  Pain Scale 1: Numeric (0 - 10)  Pain Intensity 1:  (no c/o pain)  Pain Location 1: Back;Leg  Pain Orientation 1: Anterior  Pain Description 1: Aching  Pain Intervention(s) 1: Medication (see MAR)     Activity Tolerance: fair    After treatment:   [x]    Patient left in no apparent distress sitting up in chair  []    Patient left in no apparent distress in bed  [x]    Call bell left within reach  [x]    Nursing notified  []    Caregiver present  []    Bed alarm activated    COMMUNICATION/COLLABORATION:   The patients plan of care was discussed with: Registered Nurse    Reba Green PTA   Time Calculation: 25 mins

## 2018-08-05 LAB
BACTERIA SPEC CULT: NORMAL
BACTERIA SPEC CULT: NORMAL
CHOLEST SERPL-MCNC: 190 MG/DL
EST. AVERAGE GLUCOSE BLD GHB EST-MCNC: 117 MG/DL
GLUCOSE P FAST SERPL-MCNC: 93 MG/DL (ref 65–100)
HBA1C MFR BLD: 5.7 % (ref 4.2–6.3)
HDLC SERPL-MCNC: 58 MG/DL
HDLC SERPL: 3.3 {RATIO} (ref 0–5)
LDLC SERPL CALC-MCNC: 105.4 MG/DL (ref 0–100)
LIPID PROFILE,FLP: ABNORMAL
SERVICE CMNT-IMP: NORMAL
TRIGL SERPL-MCNC: 133 MG/DL (ref ?–150)
VLDLC SERPL CALC-MCNC: 26.6 MG/DL

## 2018-08-05 PROCEDURE — 74011250637 HC RX REV CODE- 250/637: Performed by: PSYCHIATRY & NEUROLOGY

## 2018-08-05 PROCEDURE — 97161 PT EVAL LOW COMPLEX 20 MIN: CPT

## 2018-08-05 PROCEDURE — 74011000250 HC RX REV CODE- 250: Performed by: INTERNAL MEDICINE

## 2018-08-05 PROCEDURE — 36415 COLL VENOUS BLD VENIPUNCTURE: CPT | Performed by: PSYCHIATRY & NEUROLOGY

## 2018-08-05 PROCEDURE — 94640 AIRWAY INHALATION TREATMENT: CPT

## 2018-08-05 PROCEDURE — 82947 ASSAY GLUCOSE BLOOD QUANT: CPT | Performed by: PSYCHIATRY & NEUROLOGY

## 2018-08-05 PROCEDURE — 74011250636 HC RX REV CODE- 250/636: Performed by: PSYCHIATRY & NEUROLOGY

## 2018-08-05 PROCEDURE — 83036 HEMOGLOBIN GLYCOSYLATED A1C: CPT | Performed by: PSYCHIATRY & NEUROLOGY

## 2018-08-05 PROCEDURE — 80061 LIPID PANEL: CPT | Performed by: PSYCHIATRY & NEUROLOGY

## 2018-08-05 PROCEDURE — 65220000003 HC RM SEMIPRIVATE PSYCH

## 2018-08-05 PROCEDURE — 97116 GAIT TRAINING THERAPY: CPT

## 2018-08-05 RX ORDER — CLONAZEPAM 1 MG/1
1 TABLET ORAL 2 TIMES DAILY
Status: DISCONTINUED | OUTPATIENT
Start: 2018-08-05 | End: 2018-08-07

## 2018-08-05 RX ADMIN — DIVALPROEX SODIUM 500 MG: 500 TABLET, DELAYED RELEASE ORAL at 08:03

## 2018-08-05 RX ADMIN — SERTRALINE HYDROCHLORIDE 100 MG: 50 TABLET ORAL at 08:03

## 2018-08-05 RX ADMIN — DIVALPROEX SODIUM 500 MG: 500 TABLET, DELAYED RELEASE ORAL at 17:03

## 2018-08-05 RX ADMIN — IPRATROPIUM BROMIDE AND ALBUTEROL SULFATE 3 ML: .5; 3 SOLUTION RESPIRATORY (INHALATION) at 11:07

## 2018-08-05 RX ADMIN — QUETIAPINE FUMARATE 400 MG: 100 TABLET ORAL at 21:17

## 2018-08-05 RX ADMIN — LITHIUM CARBONATE 300 MG: 300 TABLET ORAL at 17:03

## 2018-08-05 RX ADMIN — LORAZEPAM 1 MG: 1 TABLET ORAL at 21:52

## 2018-08-05 RX ADMIN — LITHIUM CARBONATE 300 MG: 300 TABLET ORAL at 08:03

## 2018-08-05 RX ADMIN — LORAZEPAM 2 MG: 2 INJECTION INTRAMUSCULAR; INTRAVENOUS at 11:25

## 2018-08-05 RX ADMIN — CLONAZEPAM 2 MG: 1 TABLET ORAL at 08:03

## 2018-08-05 RX ADMIN — CLONAZEPAM 1 MG: 1 TABLET ORAL at 16:48

## 2018-08-05 NOTE — PROGRESS NOTES
physical Therapy EVALUATION/DISCHARGE  Patient: Ozzie Campuzano (76 y.o. female)  Date: 8/5/2018  Primary Diagnosis: Sub. Abuse, Bipolar  Poly-drug misuser        Precautions: Fall     ASSESSMENT :  Based on the objective data described below, the patient presents with independence with mobility consistent with her mobility level when compared to PT note from [address] yesterday. Patient cleared by RN. Patient independent with bed mobility and transfers. She insists she needs a walker. Provided one for use on unit during admission. She was able to ambulate 180 ft with RW with mod I and no difficulty. She is impulsive at times and needs verbal cues for safety. Recommend she continue ambulating on unit with nursing supervision during admission and d/c home with RW when medically stable. Skilled physical therapy is not indicated at this time. PLAN :  Discharge Recommendations: None  Further Equipment Recommendations for Discharge: RW     SUBJECTIVE:   Patient stated I have a walker I use.     OBJECTIVE DATA SUMMARY:   HISTORY:    Past Medical History:   Diagnosis Date    Asthma     Bipolar 1 disorder ([facility] [address]     Chronic obstructive pulmonary disease (HCC)     Chronic pain     back, leg    Cocaine abuse     Depression     Hepatitis B     Heroin abuse     HTN (hypertension)     Narcotic abuse     Polysubstance abuse     Sarcoidosis     Tobacco abuse      Past Surgical History:   Procedure Laterality Date    HX GYN      HX WISDOM TEETH EXTRACTION       Prior Level of Function/Home Situation: mod I  Personal factors and/or comorbidities impacting plan of care:     Home Situation  Home Environment: Private residence  # Steps to Enter: 3  One/Two Story Residence: One story  Living Alone: No  Support Systems: Parent  Patient Expects to be Discharged to[de-identified] Unknown  Current DME Used/Available at Home: None    EXAMINATION/PRESENTATION/DECISION MAKING:   Critical Behavior:  Neurologic State: Alert Hearing: Auditory  Auditory Impairment: None      Range Of Motion:  AROM: Generally decreased, functional           PROM: Within functional limits           Strength:    Strength: Generally decreased, functional                    Tone & Sensation:   Tone: Normal              Sensation: Intact               Coordination:  Coordination: Within functional limits  Vision:      Functional Mobility:  Bed Mobility:  Rolling: Independent  Supine to Sit: Independent  Sit to Supine: Independent  Scooting: Independent  Transfers:  Sit to Stand: Modified independent  Stand to Sit: Modified independent                       Balance:   Sitting: Intact  Standing: Intact  Ambulation/Gait Training:  Distance (ft): 180 Feet (ft)  Assistive Device: Walker, rolling        Gait Description (WDL): Within defined limits (with RW)                                 Functional Measure:  Tinetti test:    Sitting Balance: 1  Arises: 1  Attempts to Rise: 2  Immediate Standing Balance: 2  Standing Balance: 1  Nudged: 2  Eyes Closed: 1  Turn 360 Degrees - Continuous/Discontinuous: 1  Turn 360 Degrees - Steady/Unsteady: 1  Sitting Down: 1  Balance Score: 13  Indication of Gait: 1  R Step Length/Height: 1  L Step Length/Height: 1  R Foot Clearance: 1  L Foot Clearance: 1  Step Symmetry: 1  Step Continuity: 1  Path: 1  Trunk: 0  Walking Time: 1  Gait Score: 9  Total Score: 22       Tinetti Test and G-code impairment scale:  Percentage of Impairment CH    0%   CI    1-19% CJ    20-39% CK    40-59% CL    60-79% CM    80-99% CN     100%   Tinetti  Score 0-28 28 23-27 17-22 12-16 6-11 1-5 0       Tinetti Tool Score Risk of Falls  <19 = High Fall Risk  19-24 = Moderate Fall Risk  25-28 = Low Fall Risk  Tinetti ME. Performance-Oriented Assessment of Mobility Problems in Elderly Patients. [place] [address]; [id number].  (Scoring Description: PT Bulletin Feb. 10, 1993)    Older adults: Kasandra Wilson et al, 2009; n = [address] [facility] elderly evaluated with ABC, SUNIL, ADL, and IADL)  · Mean SUNIL score for males aged 69-68 years = 26.21(3.40)  · Mean SUNIL score for females age 69-68 years = 25.16(4.30)  · Mean SUNIL score for males over 80 years = 23.29(6.02)  · Mean SUNIL score for females over 80 years = 17.20(8.32)         G codes: In compliance with CMSs Claims Based Outcome Reporting, the following G-code set was chosen for this patient based on their primary functional limitation being treated: The outcome measure chosen to determine the severity of the functional limitation was the Tinetti with a score of 22/28 which was correlated with the impairment scale. ? Mobility - Walking and Moving Around:     - CURRENT STATUS: CJ - 20%-39% impaired, limited or restricted    - GOAL STATUS: CJ - 20%-39% impaired, limited or restricted    - D/C STATUS:  CJ - 20%-39% impaired, limited or restricted        Physical Therapy Evaluation Charge Determination   History Examination Presentation Decision-Making   HIGH Complexity :3+ comorbidities / personal factors will impact the outcome/ POC  MEDIUM Complexity : 3 Standardized tests and measures addressing body structure, function, activity limitation and / or participation in recreation  LOW Complexity : Stable, uncomplicated  HIGH Complexity : FOTO score of 1- 25       Based on the above components, the patient evaluation is determined to be of the following complexity level: LOW     Pain:  Pain Scale 1: Numeric (0 - 10)  Pain Intensity 1: 0     Activity Tolerance:   VSS  Please refer to the flowsheet for vital signs taken during this treatment.   After treatment:   []   Patient left in no apparent distress sitting up in chair  [x]   Patient left in no apparent distress in bed  [x]   Call bell left within reach  [x]   Nursing notified  []   Caregiver present  []   Bed alarm activated    COMMUNICATION/EDUCATION:   Communication/Collaboration:  [x]   Fall prevention education was provided and the patient/caregiver indicated understanding. [x]   Patient/family have participated as able and agree with findings and recommendations. []   Patient is unable to participate in plan of care at this time.   Findings and recommendations were discussed with: Registered Nurse    Thank you for this referral.  Genet Craven, PT , DPT   Time Calculation: 25 mins

## 2018-08-05 NOTE — PROGRESS NOTES
[facility] 60  Master Treatment Plan for Johanna Cedeño    Date Treatment Plan Initiated: 8/5/18    Treatment Plan Modalities:  Type of Modality Amount  (x minutes) Frequency (x/week) Duration (x days) Name of Responsible Staff   Community & wrap-up meetings to encourage peer interactions 15 7 1   Mandi Angelaer, [facility] psychotherapy to assist in building coping skills and internal controls 60 7 1 Jayme Pedraza   Therapeutic activity groups to build coping skills 60 7 1 Jayme Pedraza   Psychoeducation in group setting to address:   Medication education   [address], RN   Coping skills         Relaxation techniques         Symptom management         Discharge planning   60 2 [address]   60 1 1 [facility]er of [facility]/AA/NA   61 2 1 [facility]er of [facility] 77   Physician medication management   15 7 1 Dr. Ameya Juárez meeting/discharge planning   15 2 [address]                                        Problem: Falls - Risk of goal will be met by 8/8/18  Goal: *Absence of Falls  Document Criss Fall Risk and appropriate interventions in the flowsheet. Outcome: Progressing Towards Goal  Fall Risk Interventions:  Mobility Interventions: Bed/chair exit alarm         Medication Interventions: Teach patient to arise slowly         History of Falls Interventions: Room close to nurse's station        Problem: Chemical Dependency (Adult/Pediatric) goal will be met by 8/8/18  Goal: *STG: Participates in treatment plan  Outcome: Progressing Towards Goal  Pt participated in treatment team. Visible on the unit for small periods of time. Goal: *STG: Remains safe in hospital  Outcome: Progressing Towards Goal  Remains safe in the hospital. Denies SI/HI at this time. Goal: *STG: Seeks staff when symptoms of withdrawal increase  Outcome: Progressing Towards Goal  COW was a 5.    Goal: *STG: Complies with medication therapy  Outcome: Progressing Towards Goal  Taking medications as scheduled. Goal: *STG: Attends activities and groups  Outcome: Progressing Towards Goal  Encouraged pt to attend groups and express feelings and concerns.

## 2018-08-05 NOTE — H&P
INITIAL PSYCHIATRIC EVALUATION            IDENTIFICATION:    Patient Name  Quynh Sorto   Date of Birth 1975   [facility] [id number]   Medical Record Number  [id number]      Age  38 y.o. PCP None   Admit date:  8/4/2018    Room Number  729/01  @ [facility]   Date of Service  8/5/2018            HISTORY         REASON FOR HOSPITALIZATION:  CC: \" Pt admitted under a voluntary basis for depression with suicidal ideations and substance abuse  proving to be an imminent danger to self and an inability to care for self. HISTORY OF PRESENT ILLNESS:    The patient, Quynh Sorto, is a 38 y.o. BLACK OR  female with a past psychiatric history significant for depression and opiate abuse , who presents at this time with complaints of (and/or evidence of) the following emotional symptoms: depression, suicidal thoughts/threats, anxiety and hypomania. Additional symptomatology include anxiety, depression worse, feeling depressed, feeling suicidal and increased irritability. The above symptoms have been present for years . These symptoms are of moderate  severity. These symptoms are constant in nature. The patient's condition has been precipitated by being moving to the state and psychosocial stressors (unemplyed and run out of medications  ). Patient's condition made worse by continued illicit drug use and alcohol use as well as treatment noncompliance. UDS: positive ; BAL=0. ALLERGIES:   Allergies   Allergen Reactions    Tomato Swelling     Tongue      MEDICATIONS PRIOR TO ADMISSION:   Prescriptions Prior to Admission   Medication Sig    gabapentin (NEURONTIN) 300 mg capsule Take 1 Cap by mouth three (3) times daily for 30 days.  lithium carbonate 300 mg capsule Take 1 Cap by mouth two (2) times a day for 30 days.  cloNIDine HCl (CATAPRES) 0.3 mg tablet Take 0.3 mg by mouth three (3) times daily.  prazosin (MINIPRESS) 2 mg capsule Take 2 mg by mouth nightly.  Indications: Post Traumatic Stress Disorder    divalproex DR (DEPAKOTE) 500 mg tablet Take 500 mg by mouth two (2) times a day.  QUEtiapine (SEROQUEL) 400 mg tablet Take 800 mg by mouth every evening.  sertraline (ZOLOFT) 100 mg tablet Take 100 mg by mouth daily.  clonazePAM (KLONOPIN) 2 mg tablet Take 2 Tabs by mouth two (2) times a day. Max Daily Amount: 8 mg.  albuterol sulfate 90 mcg/actuation aepb Take 2 Puffs by inhalation every four (4) hours as needed.  fluticasone-vilanterol (BREO ELLIPTA) 200-25 mcg/dose inhaler Take 1 Puff by inhalation daily.  albuterol (PROVENTIL VENTOLIN) 2.5 mg /3 mL (0.083 %) nebulizer solution 3 mL by Nebulization route every four (4) hours as needed for Wheezing. PAST MEDICAL HISTORY:   Past Medical History:   Diagnosis Date    Asthma     Bipolar 1 disorder (HCC)     Chronic obstructive pulmonary disease (HCC)     Chronic pain     back, leg    Cocaine abuse     Depression     Hepatitis B     Heroin abuse     HTN (hypertension)     Narcotic abuse     Polysubstance abuse     Sarcoidosis     Tobacco abuse      Past Surgical History:   Procedure Laterality Date    HX GYN      HX WISDOM TEETH EXTRACTION        SOCIAL HISTORY: single    Social History     Social History    Marital status: SINGLE     Spouse name: N/A    Number of children: N/A    Years of education: N/A     Occupational History    Not on file. Social History Main Topics    Smoking status: Former Smoker     Packs/day: 0.25     Years: 15.00     Quit date: 8/3/2017    Smokeless tobacco: Never Used      Comment: \"I already quit\"    Alcohol use No    Drug use: No      Comment: last used 1/24/2017    Sexual activity: Yes     Partners: Female     Other Topics Concern    Not on file     Social History Narrative    Born in [address], 5 children,    No legal charges. Pt graduated from high school. Pt was employed last month at [employer], currently she is unemployed.  Pt lives with her mother and 8year old son. She has 4 adult children. FAMILY HISTORY: History reviewed. No pertinent family history. Family History   Problem Relation Age of Onset    Hypertension Father     Hypertension Mother        REVIEW OF SYSTEMS:   History obtained from the patient  Pertinent items are noted in the History of Present Illness. All other Systems reviewed and are considered negative. MENTAL STATUS EXAM & VITALS     MENTAL STATUS EXAM (MSE):    MSE FINDINGS ARE WITHIN NORMAL LIMITS (WNL) UNLESS OTHERWISE STATED BELOW. ( ALL OF THE BELOW CATEGORIES OF THE MSE HAVE BEEN REVIEWED (reviewed 8/5/2018) AND UPDATED AS DEEMED APPROPRIATE )  General Presentation age appropriate, cooperative   Orientation oriented to time, place and person   Vital Signs  See below (reviewed 8/5/2018); Vital Signs (BP, Pulse, & Temp) are within normal limits if not listed below.    Gait and Station Stable/steady, no ataxia   Musculoskeletal System No extrapyramidal symptoms (EPS); no abnormal muscular movements or Tardive Dyskinesia (TD); muscle strength and tone are within normal limits   Language No aphasia or dysarthria   Speech:  normal pitch and normal volume   Thought Processes logical; slow rate of thoughts; fair abstract reasoning/computation   Thought Associations goal directed   Thought Content free of delusions   Suicidal Ideations no plan    Homicidal Ideations no plan  and no intention   Mood:  depressed   Affect:  depressed   Memory recent  fair   Memory remote:  fair   Concentration/Attention:  fair   Fund of Knowledge average   Insight:  fair   Reliability fair   Judgment:  limited          VITALS:     Patient Vitals for the past 24 hrs:   Temp Pulse Resp BP SpO2   08/05/18 0730 98.1 °F (36.7 °C) (!) 104 16 111/76 98 %   08/04/18 2003 97.5 °F (36.4 °C) 84 16 119/81 -   08/04/18 1828 98.7 °F (37.1 °C) (!) 110 20 115/76 98 %     Wt Readings from Last 3 Encounters:   08/04/18 72.1 kg (158 lb 14.4 oz) 08/01/18 75 kg (165 lb 5.5 oz)   08/04/17 76.7 kg (169 lb)     Temp Readings from Last 3 Encounters:   08/05/18 98.1 °F (36.7 °C)   08/04/18 98.4 °F (36.9 °C)   08/04/17 97.8 °F (36.6 °C)     BP Readings from Last 3 Encounters:   08/05/18 111/76   08/04/18 108/61   08/04/17 129/85     Pulse Readings from Last 3 Encounters:   08/05/18 (!) 104   08/04/18 (!) 102   08/04/17 (!) 109            DATA     LABORATORY DATA:  Labs Reviewed   LIPID PANEL - Abnormal; Notable for the following:        Result Value    LDL, calculated 105.4 (*)     All other components within normal limits   GLUCOSE, FASTING   HEMOGLOBIN A1C WITH EAG     Admission on 08/04/2018   Component Date Value Ref Range Status    Glucose 08/05/2018 93  65 - 100 MG/DL Final    LIPID PROFILE 08/05/2018        Final    Cholesterol, total 08/05/2018 190  <200 MG/DL Final    Triglyceride 08/05/2018 133  <150 MG/DL Final    HDL Cholesterol 08/05/2018 58  MG/DL Final    LDL, calculated 08/05/2018 105.4* 0 - 100 MG/DL Final    VLDL, calculated 08/05/2018 26.6  MG/DL Final    CHOL/HDL Ratio 08/05/2018 3.3  0 - 5.0   Final    Hemoglobin A1c 08/05/2018 5.7  4.2 - 6.3 % Final    Est. average glucose 08/05/2018 117  mg/dL Final   Admission on 07/26/2018, Discharged on 08/04/2018   No results displayed because visit has over 200 results. RADIOLOGY REPORTS:    Results from Hospital Encounter encounter on 07/26/18   XR CHEST PORT   Narrative INDICATION: . Tube/line placement  COMPARISON: Previous chest xray, 7/28/2018. LIMITATIONS: Portable technique. Loletta Nunnery FINDINGS: Single frontal view of the chest.   .  Lines/tubes/surgical: Cardiac monitor leads overly the patient. Heart/mediastinum: Calcified mediastinal lymph nodes. Lungs/pleura: Minimal bibasilar opacities with partially obscured  hemidiaphragms. No visible pneumothorax. Additional Comments: None. .       Impression IMPRESSION:  1.  Likely small pleural effusions with associated passive atelectasis and/or  consolidation. Xr Abd (kub)    Result Date: 7/28/2018  EXAM:  XR ABD (KUB) INDICATION:  Verify NG tube placement COMPARISON: None. FINDINGS: A supine radiograph of the abdomen shows an enteric tube traversing expected course to below the diaphragm in the left upper quadrant overlying the gastric lumen. There is otherwise a normal bowel gas pattern. No soft tissue masses or pathologic calcifications are identified. The bones and soft tissues are within normal limits. IMPRESSION: Enteric tube in expected position. Xr Chest Port    Result Date: 7/31/2018  INDICATION: . Tube/line placement COMPARISON: Previous chest xray, 7/28/2018. LIMITATIONS: Portable technique. Leonardos Jimy FINDINGS: Single frontal view of the chest. . Lines/tubes/surgical: Cardiac monitor leads overly the patient. Heart/mediastinum: Calcified mediastinal lymph nodes. Lungs/pleura: Minimal bibasilar opacities with partially obscured hemidiaphragms. No visible pneumothorax. Additional Comments: None. Brenda Ahn IMPRESSION: 1. Likely small pleural effusions with associated passive atelectasis and/or consolidation. Xr Chest Port    Result Date: 7/28/2018  EXAM:  XR CHEST PORT INDICATION:  respiratory failure COMPARISON:  7/27/2018 FINDINGS: A portable AP radiograph of the chest was obtained at 441 hours. ET tube is unchanged. .  Lungs remain clear. Brenda Ahn Heart size is normal. Calcified mediastinal lymph nodes are noted. .  Bony structures are intact. There is slight gaseous distention of the stomach. .     IMPRESSION: Lungs remain clear. Xr Chest Port    Result Date: 7/27/2018  EXAM: Portable CXR.  0442 hours. COMPARISON: 7/26/2018. INDICATION: respiratory failure There is satisfactory intubation. The lungs remain clear. Heart is normal in size. There is no overt pulmonary edema. There are granulomatous tez calcifications. There is no evident pneumothorax, adenopathy or pleural effusion.      IMPRESSION: Satisfactory intubation. No Acute Disease. Xr Chest Port    Result Date: 7/26/2018  Indication: Dyspnea Comparison: 8/2/2017 Portable exam of the chest obtained at 1122 demonstrates normal heart size. There is no acute process in the lung fields. The osseous structures are unremarkable. Impression: No acute process.               MEDICATIONS       ALL MEDICATIONS  Current Facility-Administered Medications   Medication Dose Route Frequency    clonazePAM (KlonoPIN) tablet 1 mg  1 mg Oral BID    promethazine (PHENERGAN) tablet 25 mg  25 mg Oral Q6H PRN    loperamide (IMODIUM) capsule 2 mg  2 mg Oral PRN    ibuprofen (MOTRIN) tablet 400 mg  400 mg Oral Q8H PRN    acetaminophen (TYLENOL) tablet 650 mg  650 mg Oral Q4H PRN    dicyclomine (BENTYL) 10 mg/mL injection 20 mg  20 mg IntraMUSCular Q6H PRN    magnesium hydroxide (MILK OF MAGNESIA) 400 mg/5 mL oral suspension 30 mL  30 mL Oral DAILY PRN    nicotine (NICODERM CQ) 21 mg/24 hr patch 1 Patch  1 Patch TransDERmal Q24H    cloNIDine HCl (CATAPRES) tablet 0.1 mg  0.1 mg Oral Q4H PRN    LORazepam (ATIVAN) tablet 1 mg  1 mg Oral Q6H PRN    ziprasidone (GEODON) 20 mg in sterile water (preservative free) 1 mL injection  20 mg IntraMUSCular BID PRN    OLANZapine (ZyPREXA) tablet 5 mg  5 mg Oral Q6H PRN    benztropine (COGENTIN) tablet 2 mg  2 mg Oral BID PRN    benztropine (COGENTIN) injection 2 mg  2 mg IntraMUSCular BID PRN    LORazepam (ATIVAN) injection 2 mg  2 mg IntraMUSCular Q4H PRN    magnesium hydroxide (MILK OF MAGNESIA) 400 mg/5 mL oral suspension 30 mL  30 mL Oral DAILY PRN    zolpidem (AMBIEN) tablet 5 mg  5 mg Oral QHS PRN    lithium carbonate tablet 300 mg  300 mg Oral BID    sertraline (ZOLOFT) tablet 100 mg  100 mg Oral DAILY    QUEtiapine (SEROquel) tablet 400 mg  400 mg Oral QHS    divalproex DR (DEPAKOTE) tablet 500 mg  500 mg Oral BID    albuterol-ipratropium (DUO-NEB) 2.5 MG-0.5 MG/3 ML  3 mL Nebulization Q4H PRN      SCHEDULED MEDICATIONS  Current Facility-Administered Medications   Medication Dose Route Frequency    clonazePAM (KlonoPIN) tablet 1 mg  1 mg Oral BID    nicotine (NICODERM CQ) 21 mg/24 hr patch 1 Patch  1 Patch TransDERmal Q24H    lithium carbonate tablet 300 mg  300 mg Oral BID    sertraline (ZOLOFT) tablet 100 mg  100 mg Oral DAILY    QUEtiapine (SEROquel) tablet 400 mg  400 mg Oral QHS    divalproex DR (DEPAKOTE) tablet 500 mg  500 mg Oral BID                ASSESSMENT & PLAN        The patient, Nicole Hutchinson, is a 38 y.o.  female who presents at this time for treatment of the following diagnoses:  Patient Active Hospital Problem List:   Poly-drug misuser (8/4/2018)  Bipolar disorder , depressed type     Assessment:Heroin abuse and depression and suicidal ideation     Plan: restart her medications           I will continue to monitor blood levels (Depakote, Tegretol, lithium, clozapine---a drug with a narrow therapeutic index= NTI) and associated labs for drug therapy implemented that require intense monitoring for toxicity as deemed appropriate based on current medication side effects and pharmacodynamically determined drug 1/2 lives. A coordinated, multidisplinary treatment team (includes the nurse, unit pharmcist,  and writer) round was conducted for this initial evaluation with the patient present. The following regarding medications was addressed during rounds with patient:   the risks and benefits of the proposed medication. The patient was given the opportunity to ask questions. Informed consent given to the use of the above medications. I will continue to adjust psychiatric and non-psychiatric medications (see above \"medication\" section and orders section for details) as deemed appropriate & based upon diagnoses and response to treatment. I have reviewed admission (and previous/old) labs and medical tests in the EHR and or transferring hospital documents.  I will continue to order blood tests/labs and diagnostic tests as deemed appropriate and review results as they become available (see orders for details). I have reviewed old psychiatric and medical records available in the EHR. I Will order additional psychiatric records from other institutions to further elucidate the nature of patient's psychopathology and review once available. I will gather additional collateral information from friends, family and o/p treatment team to further elucidate the nature of patient's psychopathology and baselline level of psychiatric functioning.       ESTIMATED LENGTH OF STAY:    3       STRENGTHS:  Exercising self-direction/Resourceful, Access to housing/residential stability and Interpersonal/supportive relationships (family, friends, peers)                                        SIGNED:    Arsen Valencia MD  8/5/2018

## 2018-08-05 NOTE — PROGRESS NOTES
Problem: Falls - Risk of  Goal: *Absence of Falls  Document Criss Fall Risk and appropriate interventions in the flowsheet. Outcome: Progressing Towards Goal  Fall Risk Interventions:  Mobility Interventions: Patient to call before getting OOB, PT Consult for mobility concerns   In bed asleep with respirations noted as even and unlabored as chest was rising and falling. Will continue to monitor for safety and 15 minute checks throughout shift.                  History of Falls Interventions: Room close to nurse's station, Door open when patient unattended

## 2018-08-05 NOTE — INTERDISCIPLINARY ROUNDS
Behavioral Health Interdisciplinary Rounds     Patient Name: Milena Dobbs  Age: 38 y.o.   Room/Bed:  [facility]  Primary Diagnosis: <principal problem not specified>   Admission Status: Voluntary     Readmission within 30 days: no  Power of  in place: no  Patient requires a blocked bed: yes          Reason for blocked bed:     VTE Prophylaxis: Not indicated    Mobility needs/Fall risk: yes    Nutritional Plan: no  Consults:          Labs/Testing due today?: yes    Sleep hours: 7.25       Participation in Care/Groups:  no  Medication Compliant?: Yes  PRNS (last 24 hours): Sleep Aid    Restraints (last 24 hours):  no     CIWA (range last 24 hours):     COWS (range last 24 hours):      Alcohol screening (AUDIT) completed -   AUDIT Score: 0     If applicable, date SBIRT discussed in treatment team AND documented:     Tobacco - patient is a smoker: Have You Used Tobacco in the Past 30 Days: Yes  Illegal Drugs use: Have You Used Any Illegal Substances Over the Past 12 Months: Yes    24 hour chart check complete: yes     Patient goal(s) for today:   Treatment team focus/goals:  Progress note     LOS:  1  Expected LOS:     Financial concerns/prescription coverage:    Date of last family contact:       Family requesting physician contact today:  no  Discharge plan:   Guns in the home:         Outpatient provider(s):     Participating treatment team members: Milena Dobbs, * (assigned SW),

## 2018-08-05 NOTE — BH NOTES
Pt stated she was feeling \"like I will have a seizure\". Pt started shaking. Talking and able to follow commands. Notified Dr. Abner Barone. Vital signs taken and Ativan 2 mg IM given. No orders received. Pt resting quietly in bed.

## 2018-08-05 NOTE — BH NOTES
Pt incontinent of urine down length of hallway floor to her room stated that she was unable to hold her water. Floor cleaned by staff and pt provided with incontinence briefs.

## 2018-08-06 PROCEDURE — 74011250637 HC RX REV CODE- 250/637: Performed by: PSYCHIATRY & NEUROLOGY

## 2018-08-06 PROCEDURE — 65220000003 HC RM SEMIPRIVATE PSYCH

## 2018-08-06 RX ORDER — HYDROXYZINE 50 MG/1
50 TABLET, FILM COATED ORAL
Status: DISCONTINUED | OUTPATIENT
Start: 2018-08-06 | End: 2018-08-07 | Stop reason: HOSPADM

## 2018-08-06 RX ADMIN — ZOLPIDEM TARTRATE 5 MG: 5 TABLET ORAL at 21:29

## 2018-08-06 RX ADMIN — CLONAZEPAM 1 MG: 1 TABLET ORAL at 17:41

## 2018-08-06 RX ADMIN — DIVALPROEX SODIUM 500 MG: 500 TABLET, DELAYED RELEASE ORAL at 08:04

## 2018-08-06 RX ADMIN — QUETIAPINE FUMARATE 400 MG: 100 TABLET ORAL at 21:27

## 2018-08-06 RX ADMIN — SERTRALINE HYDROCHLORIDE 100 MG: 50 TABLET ORAL at 08:04

## 2018-08-06 RX ADMIN — HYDROXYZINE HYDROCHLORIDE 50 MG: 50 TABLET, FILM COATED ORAL at 22:41

## 2018-08-06 RX ADMIN — LITHIUM CARBONATE 300 MG: 300 TABLET ORAL at 17:41

## 2018-08-06 RX ADMIN — DIVALPROEX SODIUM 500 MG: 500 TABLET, DELAYED RELEASE ORAL at 17:41

## 2018-08-06 RX ADMIN — HYDROXYZINE HYDROCHLORIDE 50 MG: 50 TABLET, FILM COATED ORAL at 10:59

## 2018-08-06 RX ADMIN — LITHIUM CARBONATE 300 MG: 300 TABLET ORAL at 08:04

## 2018-08-06 RX ADMIN — CLONAZEPAM 1 MG: 1 TABLET ORAL at 08:05

## 2018-08-06 NOTE — BH NOTES
2115-pt received scheduled  seroquel 400mg   Pt began arguing that she needed more clonazpeam, explainted to patient MD decreased clonazepam dose, pt demanded ativan, explained to patient that in 30-45 minutes if not able to sleep, vital signs will be rechecked and will confer with Charge Nurse if okay to give ativan  2120-Pt began staff splitting, asking Frankey Coyer to take vital signs telling Lary Carrillo won't give me my medicine\"   2130-pt angrily came to medication window and reported \"you shut door in my face, this writer explained that while verifying medication with another nurse this writer informed patient door would be temporarily shut.    2147-vital signs checks, stable  2151-ativan po prn received for complaints of anxiety  2225-pt requesting Collette Madden, pt is fall risk, advised patient it would be best to try and lay down to allow medication to work

## 2018-08-06 NOTE — PROGRESS NOTES
Problem: Chemical Dependency (Adult/Pediatric)  Goal: *STG: Participates in treatment plan  Outcome: Progressing Towards Goal  Patient participates in treatment plan. Patient has sad affect, calm, cooperative. Discharge and outpatient programs discussed. Patient 's medications discussed. Patient is isolative to bed, med and meal compliant.

## 2018-08-06 NOTE — BH NOTES
PSYCHOSOCIAL ASSESSMENT    Patient identifying info:  Alfredo Townsend is a 38 y.o., female admitted 8/4/2018  6:21 PM     Presenting problem and precipitating factors:  Patient was transferred to [place] [address] from [facility] after being medically stabilized following an admission for respiratory failure secondary to status asthmaticus and seizures. Pt reports a recent relapse on heroin after leaving her inpatient substance abuse treatment program in [place]. Pt reports wanting to go back into substance abuse treatment and wanting to find a new place to live. Pt states she is suicidal due to her addiction. Mental status assessment: Demanding, alert, oriented    Current psychiatric/substance abuse providers and contact info: To be linked    Previous psychiatric or substance abuse services/providers and response to treatment: Multiple previous inpatient psychiatric hospitalizations; multiple substance abuse treatments     Family history of substance abuse or mental illness:  None indicated    Substance abuse history: Heroin, cocaine, marijuana  Social History   Substance Use Topics    Smoking status: Former Smoker     Packs/day: 0.25     Years: 15.00     Quit date: 8/3/2017    Smokeless tobacco: Never Used      Comment: \"I already quit\"    Alcohol use No       History of biomedical complications associated with substance abuse:  Seizures    Patient's current acceptance of treatment or motivation for change: Poor    Family constellation: Mom, 5 children (3adult, 8year old)    Is significant other involved? No    Describe support system: Mom    Describe living arrangements and home environment: Lives with mother and 8year old child.     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

## 2018-08-06 NOTE — BH NOTES
PSYCHIATRIC PROGRESS NOTE             IDENTIFICATION:    Patient Name  Watson Holder   Date of Birth 1975   [facility] [id number]   Medical Record Number  [id number]      Age  38 y.o. PCP None   Admit date:  8/4/2018    Room Number  729/01  @ [facility]   Date of Service  8/6/2018            HISTORY         REASON FOR HOSPITALIZATION:  CC: \" Pt admitted under a voluntary basis for depression with suicidal ideations and substance abuse  proving to be an imminent danger to self and an inability to care for self. HISTORY OF PRESENT ILLNESS:    The patient, Watson Holder, is a 38 y.o. BLACK OR  female with a past psychiatric history significant for depression and opiate abuse , who presents at this time with complaints of (and/or evidence of) the following emotional symptoms: depression, suicidal thoughts/threats, anxiety and hypomania. Additional symptomatology include anxiety, depression worse, feeling depressed, feeling suicidal and increased irritability. The above symptoms have been present for years . These symptoms are of moderate  severity. These symptoms are constant in nature. The patient's condition has been precipitated by being moving to the state and psychosocial stressors (unemplyed and run out of medications  ). Patient's condition made worse by continued illicit drug use and alcohol use as well as treatment noncompliance. UDS: positive ; BAL=0.   8/6/18 she still medication seeking and she stated she feels anxious and she is not feeling suicidal and no anger issues and no aggressive behavior and she is compliant with medications      ALLERGIES:   Allergies   Allergen Reactions    Tomato Swelling     Tongue      MEDICATIONS PRIOR TO ADMISSION:   Prescriptions Prior to Admission   Medication Sig    gabapentin (NEURONTIN) 300 mg capsule Take 1 Cap by mouth three (3) times daily for 30 days.     lithium carbonate 300 mg capsule Take 1 Cap by mouth two (2) times a day for 30 days.  cloNIDine HCl (CATAPRES) 0.3 mg tablet Take 0.3 mg by mouth three (3) times daily.  prazosin (MINIPRESS) 2 mg capsule Take 2 mg by mouth nightly. Indications: Post Traumatic Stress Disorder    divalproex DR (DEPAKOTE) 500 mg tablet Take 500 mg by mouth two (2) times a day.  QUEtiapine (SEROQUEL) 400 mg tablet Take 800 mg by mouth every evening.  sertraline (ZOLOFT) 100 mg tablet Take 100 mg by mouth daily.  clonazePAM (KLONOPIN) 2 mg tablet Take 2 Tabs by mouth two (2) times a day. Max Daily Amount: 8 mg.  albuterol sulfate 90 mcg/actuation aepb Take 2 Puffs by inhalation every four (4) hours as needed.  fluticasone-vilanterol (BREO ELLIPTA) 200-25 mcg/dose inhaler Take 1 Puff by inhalation daily.  albuterol (PROVENTIL VENTOLIN) 2.5 mg /3 mL (0.083 %) nebulizer solution 3 mL by Nebulization route every four (4) hours as needed for Wheezing. PAST MEDICAL HISTORY:   Past Medical History:   Diagnosis Date    Asthma     Bipolar 1 disorder (HCC)     Chronic obstructive pulmonary disease (HCC)     Chronic pain     back, leg    Cocaine abuse     Depression     Hepatitis B     Heroin abuse     HTN (hypertension)     Narcotic abuse     Polysubstance abuse     Sarcoidosis     Tobacco abuse      Past Surgical History:   Procedure Laterality Date    HX GYN      HX WISDOM TEETH EXTRACTION        SOCIAL HISTORY: single    Social History     Social History    Marital status: SINGLE     Spouse name: N/A    Number of children: N/A    Years of education: N/A     Occupational History    Not on file.      Social History Main Topics    Smoking status: Former Smoker     Packs/day: 0.25     Years: 15.00     Quit date: 8/3/2017    Smokeless tobacco: Never Used      Comment: \"I already quit\"    Alcohol use No    Drug use: No      Comment: last used 1/24/2017    Sexual activity: Yes     Partners: Female     Other Topics Concern    Not on file     Social History Narrative Born in [address], 5 children,    No legal charges. Pt graduated from high school. Pt was employed last month at [employer], currently she is unemployed. Pt lives with her mother and 8year old son. She has 4 adult children. FAMILY HISTORY: History reviewed. No pertinent family history. Family History   Problem Relation Age of Onset    Hypertension Father     Hypertension Mother        REVIEW OF SYSTEMS:   History obtained from the patient  Pertinent items are noted in the History of Present Illness. All other Systems reviewed and are considered negative. MENTAL STATUS EXAM & VITALS     MENTAL STATUS EXAM (MSE):    MSE FINDINGS ARE WITHIN NORMAL LIMITS (WNL) UNLESS OTHERWISE STATED BELOW. ( ALL OF THE BELOW CATEGORIES OF THE MSE HAVE BEEN REVIEWED (reviewed 8/6/2018) AND UPDATED AS DEEMED APPROPRIATE )  General Presentation age appropriate, cooperative   Orientation oriented to time, place and person   Vital Signs  See below (reviewed 8/6/2018); Vital Signs (BP, Pulse, & Temp) are within normal limits if not listed below.    Gait and Station Stable/steady, no ataxia   Musculoskeletal System No extrapyramidal symptoms (EPS); no abnormal muscular movements or Tardive Dyskinesia (TD); muscle strength and tone are within normal limits   Language No aphasia or dysarthria   Speech:  normal pitch and normal volume   Thought Processes logical; slow rate of thoughts; fair abstract reasoning/computation   Thought Associations goal directed   Thought Content free of delusions   Suicidal Ideations no plan    Homicidal Ideations no plan  and no intention   Mood:  depressed   Affect:  depressed   Memory recent  fair   Memory remote:  fair   Concentration/Attention:  fair   Fund of Knowledge average   Insight:  fair   Reliability fair   Judgment:  limited          VITALS:     Patient Vitals for the past 24 hrs:   Temp Pulse Resp BP SpO2   08/06/18 0805 98 °F (36.7 °C) (!) 115 18 116/83 98 % 08/05/18 2148 - (!) 108 - 125/84 -   08/05/18 2003 98.3 °F (36.8 °C) (!) 103 16 123/83 -   08/05/18 1533 98.3 °F (36.8 °C) (!) 102 16 121/83 -   08/05/18 1131 97.8 °F (36.6 °C) (!) 110 18 126/83 99 %     Wt Readings from Last 3 Encounters:   08/04/18 72.1 kg (158 lb 14.4 oz)   08/01/18 75 kg (165 lb 5.5 oz)   08/04/17 76.7 kg (169 lb)     Temp Readings from Last 3 Encounters:   08/06/18 98 °F (36.7 °C)   08/04/18 98.4 °F (36.9 °C)   08/04/17 97.8 °F (36.6 °C)     BP Readings from Last 3 Encounters:   08/06/18 116/83   08/04/18 108/61   08/04/17 129/85     Pulse Readings from Last 3 Encounters:   08/06/18 (!) 115   08/04/18 (!) 102   08/04/17 (!) 109            DATA     LABORATORY DATA:  Labs Reviewed   LIPID PANEL - Abnormal; Notable for the following:        Result Value    LDL, calculated 105.4 (*)     All other components within normal limits   GLUCOSE, FASTING   HEMOGLOBIN A1C WITH EAG     Admission on 08/04/2018   Component Date Value Ref Range Status    Glucose 08/05/2018 93  65 - 100 MG/DL Final    LIPID PROFILE 08/05/2018        Final    Cholesterol, total 08/05/2018 190  <200 MG/DL Final    Triglyceride 08/05/2018 133  <150 MG/DL Final    HDL Cholesterol 08/05/2018 58  MG/DL Final    LDL, calculated 08/05/2018 105.4* 0 - 100 MG/DL Final    VLDL, calculated 08/05/2018 26.6  MG/DL Final    CHOL/HDL Ratio 08/05/2018 3.3  0 - 5.0   Final    Hemoglobin A1c 08/05/2018 5.7  4.2 - 6.3 % Final    Est. average glucose 08/05/2018 117  mg/dL Final        RADIOLOGY REPORTS:    Results from Hospital Encounter encounter on 07/26/18   XR CHEST PORT   Narrative INDICATION: . Tube/line placement  COMPARISON: Previous chest xray, 7/28/2018. LIMITATIONS: Portable technique. [facility] FINDINGS: Single frontal view of the chest.   .  Lines/tubes/surgical: Cardiac monitor leads overly the patient. Heart/mediastinum: Calcified mediastinal lymph nodes.    Lungs/pleura: Minimal bibasilar opacities with partially obscured  hemidiaphragms. No visible pneumothorax. Additional Comments: None. .       Impression IMPRESSION:  1. Likely small pleural effusions with associated passive atelectasis and/or  consolidation. Xr Abd (kub)    Result Date: 7/28/2018  EXAM:  XR ABD (KUB) INDICATION:  Verify NG tube placement COMPARISON: None. FINDINGS: A supine radiograph of the abdomen shows an enteric tube traversing expected course to below the diaphragm in the left upper quadrant overlying the gastric lumen. There is otherwise a normal bowel gas pattern. No soft tissue masses or pathologic calcifications are identified. The bones and soft tissues are within normal limits. IMPRESSION: Enteric tube in expected position. Xr Chest Port    Result Date: 7/31/2018  INDICATION: . Tube/line placement COMPARISON: Previous chest xray, 7/28/2018. LIMITATIONS: Portable technique. [facility] FINDINGS: Single frontal view of the chest. . Lines/tubes/surgical: Cardiac monitor leads overly the patient. Heart/mediastinum: Calcified mediastinal lymph nodes. Lungs/pleura: Minimal bibasilar opacities with partially obscured hemidiaphragms. No visible pneumothorax. Additional Comments: None. [facility] IMPRESSION: 1. Likely small pleural effusions with associated passive atelectasis and/or consolidation. Xr Chest Port    Result Date: 7/28/2018  EXAM:  XR CHEST PORT INDICATION:  respiratory failure COMPARISON:  7/27/2018 FINDINGS: A portable AP radiograph of the chest was obtained at 441 hours. ET tube is unchanged. .  Lungs remain clear. [facility] Heart size is normal. Calcified mediastinal lymph nodes are noted. .  Bony structures are intact. There is slight gaseous distention of the stomach. .     IMPRESSION: Lungs remain clear. Xr Chest Port    Result Date: 7/27/2018  EXAM: Portable CXR.  0442 hours. COMPARISON: 7/26/2018. INDICATION: respiratory failure There is satisfactory intubation. The lungs remain clear. Heart is normal in size.  There is no overt pulmonary edema. There are granulomatous tez calcifications. There is no evident pneumothorax, adenopathy or pleural effusion. IMPRESSION: Satisfactory intubation. No Acute Disease. Xr Chest Port    Result Date: 7/26/2018  Indication: Dyspnea Comparison: 8/2/2017 Portable exam of the chest obtained at 1122 demonstrates normal heart size. There is no acute process in the lung fields. The osseous structures are unremarkable. Impression: No acute process.               MEDICATIONS       ALL MEDICATIONS  Current Facility-Administered Medications   Medication Dose Route Frequency    clonazePAM (KlonoPIN) tablet 1 mg  1 mg Oral BID    promethazine (PHENERGAN) tablet 25 mg  25 mg Oral Q6H PRN    loperamide (IMODIUM) capsule 2 mg  2 mg Oral PRN    ibuprofen (MOTRIN) tablet 400 mg  400 mg Oral Q8H PRN    acetaminophen (TYLENOL) tablet 650 mg  650 mg Oral Q4H PRN    dicyclomine (BENTYL) 10 mg/mL injection 20 mg  20 mg IntraMUSCular Q6H PRN    magnesium hydroxide (MILK OF MAGNESIA) 400 mg/5 mL oral suspension 30 mL  30 mL Oral DAILY PRN    nicotine (NICODERM CQ) 21 mg/24 hr patch 1 Patch  1 Patch TransDERmal Q24H    cloNIDine HCl (CATAPRES) tablet 0.1 mg  0.1 mg Oral Q4H PRN    LORazepam (ATIVAN) tablet 1 mg  1 mg Oral Q6H PRN    ziprasidone (GEODON) 20 mg in sterile water (preservative free) 1 mL injection  20 mg IntraMUSCular BID PRN    OLANZapine (ZyPREXA) tablet 5 mg  5 mg Oral Q6H PRN    benztropine (COGENTIN) tablet 2 mg  2 mg Oral BID PRN    benztropine (COGENTIN) injection 2 mg  2 mg IntraMUSCular BID PRN    LORazepam (ATIVAN) injection 2 mg  2 mg IntraMUSCular Q4H PRN    magnesium hydroxide (MILK OF MAGNESIA) 400 mg/5 mL oral suspension 30 mL  30 mL Oral DAILY PRN    zolpidem (AMBIEN) tablet 5 mg  5 mg Oral QHS PRN    lithium carbonate tablet 300 mg  300 mg Oral BID    sertraline (ZOLOFT) tablet 100 mg  100 mg Oral DAILY    QUEtiapine (SEROquel) tablet 400 mg  400 mg Oral QHS    divalproex DR (DEPAKOTE) tablet 500 mg  500 mg Oral BID    albuterol-ipratropium (DUO-NEB) 2.5 MG-0.5 MG/3 ML  3 mL Nebulization Q4H PRN      SCHEDULED MEDICATIONS  Current Facility-Administered Medications   Medication Dose Route Frequency    clonazePAM (KlonoPIN) tablet 1 mg  1 mg Oral BID    nicotine (NICODERM CQ) 21 mg/24 hr patch 1 Patch  1 Patch TransDERmal Q24H    lithium carbonate tablet 300 mg  300 mg Oral BID    sertraline (ZOLOFT) tablet 100 mg  100 mg Oral DAILY    QUEtiapine (SEROquel) tablet 400 mg  400 mg Oral QHS    divalproex DR (DEPAKOTE) tablet 500 mg  500 mg Oral BID                ASSESSMENT & PLAN        The patient, Darren Guerra, is a 38 y.o.  female who presents at this time for treatment of the following diagnoses:  Patient Active Hospital Problem List:   Poly-drug misuser (8/4/2018)  Bipolar disorder , depressed type     Assessment:Heroin abuse and depression and suicidal ideation     Plan: restart her medications   8/6/18 continue same medications           I will continue to monitor blood levels (Depakote, Tegretol, lithium, clozapine---a drug with a narrow therapeutic index= NTI) and associated labs for drug therapy implemented that require intense monitoring for toxicity as deemed appropriate based on current medication side effects and pharmacodynamically determined drug 1/2 lives. A coordinated, multidisplinary treatment team (includes the nurse, unit pharmcist,  and writer) round was conducted for this initial evaluation with the patient present. The following regarding medications was addressed during rounds with patient:   the risks and benefits of the proposed medication. The patient was given the opportunity to ask questions. Informed consent given to the use of the above medications.      I will continue to adjust psychiatric and non-psychiatric medications (see above \"medication\" section and orders section for details) as deemed appropriate & based upon diagnoses and response to treatment. I have reviewed admission (and previous/old) labs and medical tests in the EHR and or transferring hospital documents. I will continue to order blood tests/labs and diagnostic tests as deemed appropriate and review results as they become available (see orders for details). I have reviewed old psychiatric and medical records available in the EHR. I Will order additional psychiatric records from other institutions to further elucidate the nature of patient's psychopathology and review once available. I will gather additional collateral information from friends, family and o/p treatment team to further elucidate the nature of patient's psychopathology and baselline level of psychiatric functioning.       ESTIMATED LENGTH OF STAY:    3       STRENGTHS:  Exercising self-direction/Resourceful, Access to housing/residential stability and Interpersonal/supportive relationships (family, friends, peers)                                        SIGNED:    Aramis Frederick MD  8/6/2018

## 2018-08-06 NOTE — PROGRESS NOTES
Problem: Falls - Risk of  Goal: *Absence of Falls  Document Criss Fall Risk and appropriate interventions in the flowsheet. Outcome: Progressing Towards Goal  Fall Risk Interventions:  Mobility Interventions: Bed/chair exit alarm         Medication Interventions: Teach patient to arise slowly         History of Falls Interventions: Room close to nurse's station        Problem: Discharge Planning  Goal: *Discharge to safe environment  Outcome: Progressing Towards Goal  Pt is future oriented, hopeful, planning, asking pertinent questions related to her upcoming discharge.

## 2018-08-06 NOTE — BH NOTES
PRN Medication Documentation    Specific patient behavior that led to need for PRN medication: Patient presents with moderate anxiety. Rates anxiety 8/10. States she is upset at the present.   Staff interventions attempted prior to PRN being given: 1:1 processing with patient  PRN medication given: Atarax 50mg PO  Patient response/effectiveness of PRN medication:

## 2018-08-06 NOTE — INTERDISCIPLINARY ROUNDS
Behavioral Health Interdisciplinary Rounds     Patient Name: Phil Chou  Age: 38 y.o.   Room/Bed:  729/  Primary Diagnosis: <principal problem not specified>   Admission Status: Voluntary     Readmission within 30 days: no  Power of  in place: no  Patient requires a blocked bed: yes          Reason for blocked bed: MRSA    VTE Prophylaxis: No    Mobility needs/Fall risk: yes    Nutritional Plan: no  Consults:          Labs/Testing due today?: no    Sleep hours:  7.0      Participation in Care/Groups:  no  Medication Compliant?: Yes  PRNS (last 24 hours): None    Restraints (last 24 hours):  no     CIWA (range last 24 hours):     COWS (range last 24 hours): COWS Total: 2    Alcohol screening (AUDIT) completed -   AUDIT Score: 0     If applicable, date SBIRT discussed in treatment team AND documented:     Tobacco - patient is a smoker: Have You Used Tobacco in the Past 30 Days: Yes  Illegal Drugs use: Have You Used Any Illegal Substances Over the Past 12 Months: Yes    24 hour chart check complete: yes     Patient goal(s) for today: Attend groups  Treatment team focus/goals: Call Taiwo Larkin; find out if Pt is eligible for a nebulizer  Progress note: Pt disappointed; stated she thought she would have access to inpatient treatment after discharge    LOS:  2  Expected LOS: ~4    Financial concerns/prescription coverage:  Uninsured  Date of last family contact: None      Family requesting physician contact today:  no  Discharge plan: Pt wants to go to inpatient treatment  Guns in the home:  No       Outpatient provider(s): [facility]    Participating treatment team members: DORCAS Lozano; Dr. Christina Cardona MD; Hima Jin RN

## 2018-08-06 NOTE — BH NOTES
GROUP THERAPY PROGRESS NOTE    Chad Clayton did not participate in a 60 minute morning Process Group on the General Unit with a focus identifying feelings, planning for the day, and learning more about DBT concepts on \"Emotion Regulation. \"

## 2018-08-07 VITALS
TEMPERATURE: 98.2 F | HEIGHT: 64 IN | HEART RATE: 103 BPM | DIASTOLIC BLOOD PRESSURE: 87 MMHG | WEIGHT: 158.9 LBS | BODY MASS INDEX: 27.13 KG/M2 | OXYGEN SATURATION: 100 % | SYSTOLIC BLOOD PRESSURE: 123 MMHG | RESPIRATION RATE: 18 BRPM

## 2018-08-07 PROCEDURE — 74011250637 HC RX REV CODE- 250/637: Performed by: PSYCHIATRY & NEUROLOGY

## 2018-08-07 RX ORDER — ALBUTEROL SULFATE 0.83 MG/ML
2.5 SOLUTION RESPIRATORY (INHALATION)
Status: DISCONTINUED | OUTPATIENT
Start: 2018-08-07 | End: 2018-08-07 | Stop reason: HOSPADM

## 2018-08-07 RX ORDER — LITHIUM CARBONATE 300 MG
300 TABLET ORAL 2 TIMES DAILY
Qty: 60 TAB | Refills: 0 | Status: ON HOLD | OUTPATIENT
Start: 2018-08-07 | End: 2020-09-24

## 2018-08-07 RX ORDER — DIVALPROEX SODIUM 500 MG/1
500 TABLET, DELAYED RELEASE ORAL 2 TIMES DAILY
Qty: 60 TAB | Refills: 0 | Status: ON HOLD | OUTPATIENT
Start: 2018-08-07 | End: 2020-09-24

## 2018-08-07 RX ORDER — SERTRALINE HYDROCHLORIDE 100 MG/1
100 TABLET, FILM COATED ORAL DAILY
Qty: 30 TAB | Refills: 0 | Status: ON HOLD | OUTPATIENT
Start: 2018-08-07 | End: 2020-09-24

## 2018-08-07 RX ORDER — HYDROXYZINE 50 MG/1
50 TABLET, FILM COATED ORAL
Qty: 15 TAB | Refills: 0 | Status: SHIPPED | OUTPATIENT
Start: 2018-08-07 | End: 2018-08-17

## 2018-08-07 RX ORDER — BUDESONIDE 0.5 MG/2ML
500 INHALANT ORAL
Status: DISCONTINUED | OUTPATIENT
Start: 2018-08-07 | End: 2018-08-07 | Stop reason: HOSPADM

## 2018-08-07 RX ORDER — ARFORMOTEROL TARTRATE 15 UG/2ML
15 SOLUTION RESPIRATORY (INHALATION)
Status: DISCONTINUED | OUTPATIENT
Start: 2018-08-07 | End: 2018-08-07 | Stop reason: HOSPADM

## 2018-08-07 RX ORDER — CLONAZEPAM 0.5 MG/1
0.25 TABLET ORAL 2 TIMES DAILY
Status: DISCONTINUED | OUTPATIENT
Start: 2018-08-07 | End: 2018-08-07 | Stop reason: HOSPADM

## 2018-08-07 RX ORDER — QUETIAPINE FUMARATE 400 MG/1
400 TABLET, FILM COATED ORAL
Qty: 30 TAB | Refills: 0 | Status: SHIPPED | OUTPATIENT
Start: 2018-08-07 | End: 2018-08-11

## 2018-08-07 RX ADMIN — DIVALPROEX SODIUM 500 MG: 500 TABLET, DELAYED RELEASE ORAL at 09:58

## 2018-08-07 RX ADMIN — CLONAZEPAM 0.25 MG: 0.5 TABLET ORAL at 09:59

## 2018-08-07 RX ADMIN — LITHIUM CARBONATE 300 MG: 300 TABLET ORAL at 09:58

## 2018-08-07 RX ADMIN — HYDROXYZINE HYDROCHLORIDE 50 MG: 50 TABLET, FILM COATED ORAL at 06:12

## 2018-08-07 RX ADMIN — SERTRALINE HYDROCHLORIDE 100 MG: 50 TABLET ORAL at 09:58

## 2018-08-07 NOTE — DISCHARGE INSTRUCTIONS
DISCHARGE SUMMARY    NAME:Evgeny Mcgee  : 1975  MRN: [id number]    The patient Cindy Aparicio exhibits the ability to control behavior in a less restrictive environment. Patient's level of functioning is improving. No assaultive/destructive behavior has been observed for the past 24 hours. No suicidal/homicidal threat or behavior has been observed for the past 24 hours. There is no evidence of serious medication side effects. Patient has not been in physical or protective restraints for at least the past 24 hours. If weapons involved, how are they secured? No weapons involved     Is patient aware of and in agreement with discharge plan? Patient is aware of discharge and is in agreement     Arrangements for medication:  Prescriptions filled for patient at [facility] . Referral for substance abuse treatment ? Yes, [facility]   Referral for smoking cessation needed ? Yes, refused referral     Copy of discharge instructions to  provider?:  Yes - fax to [phone]    Arrangements for transportation home:  Taxi     Keep all follow up appointments as scheduled, continue to take prescribed medications per physician instructions. Mental health crisis number:  [phone] or your local mental health crisis line number at [address] [facility] from Nurse    PATIENT INSTRUCTIONS:  ·     What to do at Home:  Recommended activity: Activity as tolerated,     If you experience any of the following symptoms thoughts of harming self, feeling overwhelmed with anxiety or hopelessness, please follow up with your assigned providers and local crisis number. *  Please give a list of your current medications to your Primary Care Provider. *  Please update this list whenever your medications are discontinued, doses are      changed, or new medications (including over-the-counter products) are added.     *  Please carry medication information at all times in case of emergency situations. These are general instructions for a healthy lifestyle:    No smoking/ No tobacco products/ Avoid exposure to second hand smoke  Surgeon General's Warning:  Quitting smoking now greatly reduces serious risk to your health. Obesity, smoking, and sedentary lifestyle greatly increases your risk for illness    A healthy diet, regular physical exercise & weight monitoring are important for maintaining a healthy lifestyle    You may be retaining fluid if you have a history of heart failure or if you experience any of the following symptoms:  Weight gain of 3 pounds or more overnight or 5 pounds in a week, increased swelling in our hands or feet or shortness of breath while lying flat in bed. Please call your doctor as soon as you notice any of these symptoms; do not wait until your next office visit. Recognize signs and symptoms of STROKE:    F-face looks uneven    A-arms unable to move or move unevenly    S-speech slurred or non-existent    T-time-call 911 as soon as signs and symptoms begin-DO NOT go       Back to bed or wait to see if you get better-TIME IS BRAIN. Warning Signs of HEART ATTACK     Call 911 if you have these symptoms:   Chest discomfort. Most heart attacks involve discomfort in the center of the chest that lasts more than a few minutes, or that goes away and comes back. It can feel like uncomfortable pressure, squeezing, fullness, or pain.  Discomfort in other areas of the upper body. Symptoms can include pain or discomfort in one or both arms, the back, neck, jaw, or stomach.  Shortness of breath with or without chest discomfort.  Other signs may include breaking out in a cold sweat, nausea, or lightheadedness. Don't wait more than five minutes to call 911 - MINUTES MATTER! Fast action can save your life. Calling 911 is almost always the fastest way to get lifesaving treatment.  Emergency Medical Services staff can begin treatment when they arrive -- up to an hour sooner than if someone gets to the hospital by car. The discharge information has been reviewed with the patient. The patient verbalized understanding. Discharge medications reviewed with the patient and appropriate educational materials and side effects teaching were provided.   ___________________________________________________________________________________________________________________________________

## 2018-08-07 NOTE — PROGRESS NOTES
Problem: Falls - Risk of  Goal: *Absence of Falls  Document Criss Fall Risk and appropriate interventions in the flowsheet. Outcome: Progressing Towards Goal  Fall Risk Interventions:  Mobility Interventions: Utilize walker, cane, or other assistive device  Medication Interventions: Teach patient to arise slowly  History of Falls Interventions: Room close to nurse's station    2330 Pt appears asleep in bed. Respirations even and unlabored. Will monitor with Q 15 safety checks.     0123 PRN Medication Documentation    Specific patient behavior that led to need for PRN medication: anxiety  Staff interventions attempted prior to PRN being given: calm environment  PRN medication given: Atarax 50 mg  Patient response/effectiveness of PRN medication: will monitor

## 2018-08-07 NOTE — DISCHARGE SUMMARY
PSYCHIATRIC DISCHARGE SUMMARY         IDENTIFICATION:    Patient Name  Jean Claude Mendosa   Date of Birth 1975   [facility] [id number]   Medical Record Number  [id number]      Age  38 y.o.    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  ICD-9-CM: 305.90         Asthma exacerbation ICD-10-CM: J45.901  ICD-9-CM: 493.92         Drug overdose ICD-10-CM: T50.901A  ICD-9-CM: 977.9, E980.5         Altered mental status, unspecified ICD-10-CM: R41.82  ICD-9-CM: 780.97         Lactic acidosis ICD-10-CM: E87.2  ICD-9-CM: 276.2         Acute encephalopathy ICD-10-CM: G93.40  ICD-9-CM: 348.30         Potential for altered mental status ICD-10-CM: Z91.89  ICD-9-CM: V49.89         Convulsion disorder (HCC) ICD-10-CM: R56.9  ICD-9-CM: 780.39         Stenosis of both internal carotid arteries ICD-10-CM: I65.23  ICD-9-CM: 433.10, 433.30         Convulsive syncope ICD-10-CM: R55  ICD-9-CM: 780.2, 780.39         Seizure ([facility] [address] ICD-10-CM: R56.9  ICD-9-CM: 780.39         Bipolar 1 disorder (HCC) ICD-10-CM: F31.9  ICD-9-CM: 296.7         Chronic obstructive pulmonary disease (HCC) ICD-10-CM: J44.9  ICD-9-CM: 496         Chronic pain ICD-10-CM: G89.29  ICD-9-CM: 338.29     Overview Signed 2/22/2017  7:15 PM by Tracy Veloz MD     back, leg             Hepatitis B ICD-10-CM: B19.10  ICD-9-CM: 070.30         Sarcoidosis ICD-10-CM: D86.9  ICD-9-CM: 135         Tobacco abuse ICD-10-CM: Z72.0  ICD-9-CM: 305.1         Anemia ICD-10-CM: D64.9  ICD-9-CM: 285.9         Cocaine abuse ICD-10-CM: F14.10  ICD-9-CM: 305.60         Polysubstance abuse ICD-10-CM: F19.10  ICD-9-CM: 305.90         Narcotic abuse ICD-10-CM: F11.10  ICD-9-CM: 305.40         HTN (hypertension) ICD-10-CM: I10  ICD-9-CM: 401.9         Depression ICD-10-CM: F32.9  ICD-9-CM: 311         Sinus tachycardia ICD-10-CM: R00.0  ICD-9-CM: 427.89         Heroin abuse ICD-10-CM: F11.10  ICD-9-CM: 305.50               Active Hospital Problems    *Poly-drug misuser        DISCHARGE DIAGNOSIS:   Axis I:  SEE ABOVE  Axis II: SEE ABOVE  Axis III: SEE ABOVE  Axis IV:  lack of structure  Axis V:  35 on admission, 55  on discharge 55 (baseline)       CC & HISTORY OF PRESENT ILLNESS:  CC: \" Pt admitted under a voluntary basis for depression with suicidal ideations and substance abuse  proving to be an imminent danger to self and an inability to care for self. HISTORY OF PRESENT ILLNESS:    The patient, Johanna Cedeño, is a 38 y.o. BLACK OR  female with a past psychiatric history significant for depression and opiate abuse , who presents at this time with complaints of (and/or evidence of) the following emotional symptoms: depression, suicidal thoughts/threats, anxiety and hypomania. Additional symptomatology include anxiety, depression worse, feeling depressed, feeling suicidal and increased irritability. The above symptoms have been present for years . These symptoms are of moderate  severity. These symptoms are constant in nature. The patient's condition has been precipitated by being moving to the state and psychosocial stressors (unemplyed and run out of medications  ). Patient's condition made worse by continued illicit drug use and alcohol use as well as treatment noncompliance.  UDS: positive ; BAL=0.   8/6/18 she still medication seeking and she stated she feels anxious and she is not feeling suicidal and no anger issues and no aggressive behavior and she is compliant with medications  8/7/18 she is doing better and she stated she is welling to go to Rehab and she has a ticket to fly to [place] and she has no SI or HI and no aggressive behavior and no issues with paranoid feeling and no issues with self harm         SOCIAL HISTORY:    Social History     Social History    Marital status: SINGLE     Spouse name: N/A    Number of children: N/A    Years of education: N/A     Occupational History    Not on file. Social History Main Topics    Smoking status: Former Smoker     Packs/day: 0.25     Years: 15.00     Quit date: 8/3/2017    Smokeless tobacco: Never Used      Comment: \"I already quit\"    Alcohol use No    Drug use: No      Comment: last used 1/24/2017    Sexual activity: Yes     Partners: Female     Other Topics Concern    Not on file     Social History Narrative    Born in [facility],     [facility], 5 children,    No legal charges. Pt graduated from high school. Pt was employed last month at [employer], currently she is unemployed. Pt lives with her mother and 8year old son. She has 4 adult children. FAMILY HISTORY:   Family History   Problem Relation Age of Onset    Hypertension Father     Hypertension Mother              HOSPITALIZATION COURSE:    Livier Ross was admitted to the inpatient psychiatric unit [address] for acute psychiatric stabilization in regards to symptomatology as described in the HPI above. The differential diagnosis at time of admission included: Polysusbtance abuse and PTSD and Depression . While on the unit Livier Ross was involved in individual, group, occupational and milieu therapy. Psychiatric medications were adjusted during this hospitalization including Lithium ,Depakote and Zoloft . Evgeny Mcgee demonstrated a slow, but progressive improvement in overall condition. Much of patient's depression appeared to be related to situational stressors, effects of drugs of abuse, and psychological factors. Please see individual progress notes for more specific details regarding patient's hospitalization course.    At time of discharge, Livier Ross is without significant problems of depression psychosis  zane. Patient free of suicidal and homicidal ideations (appears to be at very low risk of suicide or homicide) and reports many positive predictive factors in terms of not attempting suicide or homicide. Overall presentation at time of discharge is most consistent with the diagnosis of  Opiate dependence and PTSD and depression  Patient with request for discharge today. There are no grounds to seek a TDO. Patient has maximized benefit to be derived from acute inpatient psychiatric treatment. All members of the treatment team concur with each other in regards to plans for discharge today per patient's request.  Patient and family are aware and in agreement with discharge and discharge plan. Per my last note:          LABS AND IMAGAING:    Labs Reviewed   LIPID PANEL - Abnormal; Notable for the following:        Result Value    LDL, calculated 105.4 (*)     All other components within normal limits   GLUCOSE, FASTING   HEMOGLOBIN A1C WITH EAG     No results found for: DS35, PHEN, PHENO, PHENT, DILF, DS39, PHENY, PTN, VALF2, VALAC, VALP, VALPR, DS6, CRBAM, CRBAMP, CARB2, XCRBAM  Admission on 08/04/2018   Component Date Value Ref Range Status    Glucose 08/05/2018 93  65 - 100 MG/DL Final    LIPID PROFILE 08/05/2018        Final    Cholesterol, total 08/05/2018 190  <200 MG/DL Final    Triglyceride 08/05/2018 133  <150 MG/DL Final    HDL Cholesterol 08/05/2018 58  MG/DL Final    LDL, calculated 08/05/2018 105.4* 0 - 100 MG/DL Final    VLDL, calculated 08/05/2018 26.6  MG/DL Final    CHOL/HDL Ratio 08/05/2018 3.3  0 - 5.0   Final    Hemoglobin A1c 08/05/2018 5.7  4.2 - 6.3 % Final    Est. average glucose 08/05/2018 117  mg/dL Final   Admission on 07/26/2018, Discharged on 08/04/2018   No results displayed because visit has over 200 results. Xr Abd (kub)    Result Date: 7/28/2018  EXAM:  XR ABD (KUB) INDICATION:  Verify NG tube placement COMPARISON: None.  FINDINGS: A supine radiograph of the abdomen shows an enteric tube traversing expected course to below the diaphragm in the left upper quadrant overlying the gastric lumen. There is otherwise a normal bowel gas pattern. No soft tissue masses or pathologic calcifications are identified. The bones and soft tissues are within normal limits. IMPRESSION: Enteric tube in expected position. Xr Chest Port    Result Date: 7/31/2018  INDICATION: . Tube/line placement COMPARISON: Previous chest xray, 7/28/2018. LIMITATIONS: Portable technique. Lacey Oconnell FINDINGS: Single frontal view of the chest. . Lines/tubes/surgical: Cardiac monitor leads overly the patient. Heart/mediastinum: Calcified mediastinal lymph nodes. Lungs/pleura: Minimal bibasilar opacities with partially obscured hemidiaphragms. No visible pneumothorax. Additional Comments: None. Lacey Oconnell IMPRESSION: 1. Likely small pleural effusions with associated passive atelectasis and/or consolidation. Xr Chest Port    Result Date: 7/28/2018  EXAM:  XR CHEST PORT INDICATION:  respiratory failure COMPARISON:  7/27/2018 FINDINGS: A portable AP radiograph of the chest was obtained at 441 hours. ET tube is unchanged. .  Lungs remain clear. Lacey Oconnell Heart size is normal. Calcified mediastinal lymph nodes are noted. .  Bony structures are intact. There is slight gaseous distention of the stomach. .     IMPRESSION: Lungs remain clear. Xr Chest Port    Result Date: 7/27/2018  EXAM: Portable CXR.  0442 hours. COMPARISON: 7/26/2018. INDICATION: respiratory failure There is satisfactory intubation. The lungs remain clear. Heart is normal in size. There is no overt pulmonary edema. There are granulomatous tez calcifications. There is no evident pneumothorax, adenopathy or pleural effusion. IMPRESSION: Satisfactory intubation. No Acute Disease.      Xr Chest Port    Result Date: 7/26/2018  Indication: Dyspnea Comparison: 8/2/2017 Portable exam of the chest obtained at 1122 demonstrates normal heart size. There is no acute process in the lung fields. The osseous structures are unremarkable. Impression: No acute process. DISPOSITION:    Home. Patient to f/u with drug/etoh rehabilitation, psychiatric, and psychotherapy appointments. Patient is to f/u with internist as directed. Patient should have a depakote level and associated labs checked within the next 1-2 weeks by patient's o/p psychiatrist/internist.               FOLLOW-UP CARE:    Activity as tolerated  Regular Diet  Wound Care: none needed. Follow-up Information     Follow up With Details Comments Contact Info    None   None ([phone]) Patient stated that they have no PCP                   PROGNOSIS:    Fair ---- based on nature of patient's pathology/ies and treatment compliance issues. Prognosis is greatly dependent upon patient's ability to remain sober and to follow up with drug/etoh rehabilitation and psychiatric/psychotherapy appointments as well as to comply with psychiatric medications as prescribed. DISCHARGE MEDICATIONS: . Informed consent given for the use of following psychotropic medications:  Current Discharge Medication List      START taking these medications    Details   !! . PHARMACY TO SUBSTITUTE PER PROTOCOL Take 2 Puffs by inhalation every four (4) hours as needed for Other. Indications: COPD Associated with Chronic Bronchitis  Qty: 1 Inhaler, Refills: 0      !! divalproex DR (DEPAKOTE) 500 mg tablet Take 1 Tab by mouth two (2) times a day. Indications: DEPRESSION ASSOCIATED WITH BIPOLAR DISORDER  Qty: 60 Tab, Refills: 0      !! . PHARMACY TO SUBSTITUTE PER PROTOCOL Take 1 Puff by inhalation daily. Indications: COPD Associated with Chronic Bronchitis  Qty: 1 Inhaler, Refills: 0      lithium carbonate 300 mg tablet Take 1 Tab by mouth two (2) times a day. Indications: DEPRESSION ASSOCIATED WITH BIPOLAR DISORDER  Qty: 60 Tab, Refills: 0      !!  QUEtiapine (SEROQUEL) 400 mg tablet Take 1 Tab by mouth nightly. Indications: DEPRESSION ASSOCIATED WITH BIPOLAR DISORDER  Qty: 30 Tab, Refills: 0      !! sertraline (ZOLOFT) 100 mg tablet Take 1 Tab by mouth daily. Indications: Post Traumatic Stress Disorder  Qty: 30 Tab, Refills: 0      hydrOXYzine HCl (ATARAX) 50 mg tablet Take 1 Tab by mouth every six (6) hours as needed for Itching for up to 10 days. Indications: anxiety  Qty: 15 Tab, Refills: 0       !! - Potential duplicate medications found. Please discuss with provider. CONTINUE these medications which have NOT CHANGED    Details   lithium carbonate 300 mg capsule Take 1 Cap by mouth two (2) times a day for 30 days. Qty: 60 Cap, Refills: 0      !! divalproex DR (DEPAKOTE) 500 mg tablet Take 500 mg by mouth two (2) times a day. !! QUEtiapine (SEROQUEL) 400 mg tablet Take 800 mg by mouth every evening. !! sertraline (ZOLOFT) 100 mg tablet Take 100 mg by mouth daily. albuterol sulfate 90 mcg/actuation aepb Take 2 Puffs by inhalation every four (4) hours as needed. Qty: 1 Inhaler, Refills: 0       !! - Potential duplicate medications found. Please discuss with provider. STOP taking these medications       clonazePAM (KLONOPIN) 2 mg tablet Comments:   Reason for Stopping:         fluticasone-vilanterol (BREO ELLIPTA) 200-25 mcg/dose inhaler Comments:   Reason for Stopping:         cloNIDine HCl (CATAPRES) 0.3 mg tablet Comments:   Reason for Stopping:                      A coordinated, multidisplinary treatment team round was conducted with Domenica Mcgee---this is done daily here at [facility]. This team consists of the nurse, psychiatric unit pharmcist,  and writer. I have spent greater than 35 minutes on discharge work.     Signed:  Olya Butts MD  8/7/2018

## 2018-08-07 NOTE — INTERDISCIPLINARY ROUNDS
Behavioral Health Interdisciplinary Rounds     Patient Name: Pati Chand  Age: 38 y.o. Room/Bed:  725/  Primary Diagnosis: <principal problem not specified>   Admission Status: Voluntary     Readmission within 30 days: no  Power of  in place: no  Patient requires a blocked bed: no          Reason for blocked bed: n/a    VTE Prophylaxis: No    Mobility needs/Fall risk: yes    Nutritional Plan: yes  Consults: dietary          Labs/Testing due today?: no    Sleep hours: 6.5       Participation in Care/Groups:  no  Medication Compliant?: Yes  PRNS (last 24 hours): Antianxiety and Sleep Aid    Restraints (last 24 hours):  no     CIWA (range last 24 hours): CIWA-Ar Total: 1   COWS (range last 24 hours): COWS Total: 1    Alcohol screening (AUDIT) completed -   AUDIT Score: 0     If applicable, date SBIRT discussed in treatment team AND documented:     Tobacco - patient is a smoker: Have You Used Tobacco in the Past 30 Days: Yes  Illegal Drugs use: Have You Used Any Illegal Substances Over the Past 12 Months: Yes    24 hour chart check complete: yes     Patient goal(s) for today:   Treatment team focus/goals: Plan for discharge today   Progress note : Plan for discharge today.      LOS:  3  Expected LOS: TBD    Financial concerns/prescription coverage: no benefits   Date of last family contact:       Family requesting physician contact today:    Discharge plan: plan for discharge today   Guns in the home:  no    [facility] [place]   Participating treatment team members: Romain Steel Dr., RN

## 2018-08-07 NOTE — BH NOTES
Care Management Note:    Patient is requesting discharge. She reports [employer] is paying for a ticket to [employer] in [place] for their [employer].  She is requesting to have a 30 day supply of medications for the program and a taxi ticket to  the bus ticket. She denies suicidal/ homicidal ideations and is able to care for self on the unit . Plan for discharge today.
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
## 2018-08-07 NOTE — PROGRESS NOTES
Problem: Falls - Risk of  Goal: *Absence of Falls  Document Criss Fall Risk and appropriate interventions in the flowsheet. Outcome: Progressing Towards Goal  Fall Risk Interventions:  Mobility Interventions: Utilize walker, cane, or other assistive device         Medication Interventions: Teach patient to arise slowly         History of Falls Interventions: Room close to nurse's station        Problem: Chemical Dependency (Adult/Pediatric)  Goal: *STG: Participates in treatment plan  Outcome: Progressing Towards Goal  Out on unit social w peers and staff. Mood and affect bright and hopefull. Denies SI, no self harming behaviors thoughts organized, engaged in milieu and tx plan. Daily goal is to d/c home staff focus is on d/c planning    21 : spoke with pharmacy and Dr. Warden Yi about clarifying two prescriptions. Pharmacy and Warden Yi made aware.

## 2018-08-07 NOTE — PROGRESS NOTES
Canelo Rojo [facility]    [address] Yoselin Vasquez M.Div, M.S, Darrell [address] available at [phone]

## 2018-08-07 NOTE — BH NOTES
PSYCHIATRIC PROGRESS NOTE             IDENTIFICATION:    Patient Name  Johanna Cedeño   Date of Birth 1975   [facility] [id number]   Medical Record Number  [id number]      Age  38 y.o. PCP None   Admit date:  8/4/2018    Room Number  725/02  @ [facility]   Date of Service  8/7/2018            HISTORY         REASON FOR HOSPITALIZATION:  CC: \" Pt admitted under a voluntary basis for depression with suicidal ideations and substance abuse  proving to be an imminent danger to self and an inability to care for self. HISTORY OF PRESENT ILLNESS:    The patient, Johanna Cedeño, is a 38 y.o. BLACK OR  female with a past psychiatric history significant for depression and opiate abuse , who presents at this time with complaints of (and/or evidence of) the following emotional symptoms: depression, suicidal thoughts/threats, anxiety and hypomania. Additional symptomatology include anxiety, depression worse, feeling depressed, feeling suicidal and increased irritability. The above symptoms have been present for years . These symptoms are of moderate  severity. These symptoms are constant in nature. The patient's condition has been precipitated by being moving to the state and psychosocial stressors (unemplyed and run out of medications  ). Patient's condition made worse by continued illicit drug use and alcohol use as well as treatment noncompliance.  UDS: positive ; BAL=0.   8/6/18 she still medication seeking and she stated she feels anxious and she is not feeling suicidal and no anger issues and no aggressive behavior and she is compliant with medications  8/7/18 she is doing better and she stated she is welling to go to Rehab and she has a ticket to fly to [place] and she has no SI or HI and no aggressive behavior and no issues with paranoid feeling and no issues with self harm       ALLERGIES:   Allergies   Allergen Reactions    Tomato Swelling     Tongue      MEDICATIONS PRIOR TO ADMISSION:   Prescriptions Prior to Admission   Medication Sig    gabapentin (NEURONTIN) 300 mg capsule Take 1 Cap by mouth three (3) times daily for 30 days.  lithium carbonate 300 mg capsule Take 1 Cap by mouth two (2) times a day for 30 days.  cloNIDine HCl (CATAPRES) 0.3 mg tablet Take 0.3 mg by mouth three (3) times daily.  prazosin (MINIPRESS) 2 mg capsule Take 2 mg by mouth nightly. Indications: Post Traumatic Stress Disorder    divalproex DR (DEPAKOTE) 500 mg tablet Take 500 mg by mouth two (2) times a day.  QUEtiapine (SEROQUEL) 400 mg tablet Take 800 mg by mouth every evening.  sertraline (ZOLOFT) 100 mg tablet Take 100 mg by mouth daily.  clonazePAM (KLONOPIN) 2 mg tablet Take 2 Tabs by mouth two (2) times a day. Max Daily Amount: 8 mg.  albuterol sulfate 90 mcg/actuation aepb Take 2 Puffs by inhalation every four (4) hours as needed.  fluticasone-vilanterol (BREO ELLIPTA) 200-25 mcg/dose inhaler Take 1 Puff by inhalation daily.  albuterol (PROVENTIL VENTOLIN) 2.5 mg /3 mL (0.083 %) nebulizer solution 3 mL by Nebulization route every four (4) hours as needed for Wheezing. PAST MEDICAL HISTORY:   Past Medical History:   Diagnosis Date    Asthma     Bipolar 1 disorder (HCC)     Chronic obstructive pulmonary disease (HCC)     Chronic pain     back, leg    Cocaine abuse     Depression     Hepatitis B     Heroin abuse     HTN (hypertension)     Narcotic abuse     Polysubstance abuse     Sarcoidosis     Tobacco abuse      Past Surgical History:   Procedure Laterality Date    HX GYN      HX WISDOM TEETH EXTRACTION        SOCIAL HISTORY: single    Social History     Social History    Marital status: SINGLE     Spouse name: N/A    Number of children: N/A    Years of education: N/A     Occupational History    Not on file.      Social History Main Topics    Smoking status: Former Smoker     Packs/day: 0.25     Years: 15.00     Quit date: 8/3/2017    Smokeless tobacco: Never Used      Comment: \"I already quit\"    Alcohol use No    Drug use: No      Comment: last used 1/24/2017    Sexual activity: Yes     Partners: Female     Other Topics Concern    Not on file     Social History Narrative    Born in [facility], 5 children,    No legal charges. Pt graduated from high school. Pt was employed last month at [employer], currently she is unemployed. Pt lives with her mother and 8year old son. She has 4 adult children. FAMILY HISTORY: History reviewed. No pertinent family history. Family History   Problem Relation Age of Onset    Hypertension Father     Hypertension Mother        REVIEW OF SYSTEMS:   History obtained from the patient  Pertinent items are noted in the History of Present Illness. All other Systems reviewed and are considered negative. MENTAL STATUS EXAM & VITALS     MENTAL STATUS EXAM (MSE):    MSE FINDINGS ARE WITHIN NORMAL LIMITS (WNL) UNLESS OTHERWISE STATED BELOW. ( ALL OF THE BELOW CATEGORIES OF THE MSE HAVE BEEN REVIEWED (reviewed 8/7/2018) AND UPDATED AS DEEMED APPROPRIATE )  General Presentation age appropriate, cooperative   Orientation oriented to time, place and person   Vital Signs  See below (reviewed 8/7/2018); Vital Signs (BP, Pulse, & Temp) are within normal limits if not listed below.    Gait and Station Stable/steady, no ataxia   Musculoskeletal System No extrapyramidal symptoms (EPS); no abnormal muscular movements or Tardive Dyskinesia (TD); muscle strength and tone are within normal limits   Language No aphasia or dysarthria   Speech:  normal pitch and normal volume   Thought Processes logical; slow rate of thoughts; fair abstract reasoning/computation   Thought Associations goal directed   Thought Content free of delusions   Suicidal Ideations no plan    Homicidal Ideations no plan  and no intention   Mood:  depressed   Affect:  depressed   Memory recent  fair   Memory remote:  fair Concentration/Attention:  fair   Fund of Knowledge average   Insight:  fair   Reliability fair   Judgment:  limited          VITALS:     Patient Vitals for the past 24 hrs:   Temp Pulse Resp BP SpO2   08/07/18 0756 98.2 °F (36.8 °C) (!) 103 - 123/87 100 %   08/06/18 1942 97.6 °F (36.4 °C) 100 18 122/82 99 %   08/06/18 1550 98 °F (36.7 °C) 91 18 125/85 98 %   08/06/18 1145 98.1 °F (36.7 °C) 97 16 123/89 99 %     Wt Readings from Last 3 Encounters:   08/04/18 72.1 kg (158 lb 14.4 oz)   08/01/18 75 kg (165 lb 5.5 oz)   08/04/17 76.7 kg (169 lb)     Temp Readings from Last 3 Encounters:   08/07/18 98.2 °F (36.8 °C)   08/04/18 98.4 °F (36.9 °C)   08/04/17 97.8 °F (36.6 °C)     BP Readings from Last 3 Encounters:   08/07/18 123/87   08/04/18 108/61   08/04/17 129/85     Pulse Readings from Last 3 Encounters:   08/07/18 (!) 103   08/04/18 (!) 102   08/04/17 (!) 109            DATA     LABORATORY DATA:  Labs Reviewed   LIPID PANEL - Abnormal; Notable for the following:        Result Value    LDL, calculated 105.4 (*)     All other components within normal limits   GLUCOSE, FASTING   HEMOGLOBIN A1C WITH EAG     Admission on 08/04/2018   Component Date Value Ref Range Status    Glucose 08/05/2018 93  65 - 100 MG/DL Final    LIPID PROFILE 08/05/2018        Final    Cholesterol, total 08/05/2018 190  <200 MG/DL Final    Triglyceride 08/05/2018 133  <150 MG/DL Final    HDL Cholesterol 08/05/2018 58  MG/DL Final    LDL, calculated 08/05/2018 105.4* 0 - 100 MG/DL Final    VLDL, calculated 08/05/2018 26.6  MG/DL Final    CHOL/HDL Ratio 08/05/2018 3.3  0 - 5.0   Final    Hemoglobin A1c 08/05/2018 5.7  4.2 - 6.3 % Final    Est. average glucose 08/05/2018 117  mg/dL Final        RADIOLOGY REPORTS:    Results from Hospital Encounter encounter on 07/26/18   XR CHEST PORT   Narrative INDICATION: . Tube/line placement  COMPARISON: Previous chest xray, 7/28/2018. LIMITATIONS: Portable technique. Khadra Landry   FINDINGS: Single frontal view of the chest.   .  Lines/tubes/surgical: Cardiac monitor leads overly the patient. Heart/mediastinum: Calcified mediastinal lymph nodes. Lungs/pleura: Minimal bibasilar opacities with partially obscured  hemidiaphragms. No visible pneumothorax. Additional Comments: None. .       Impression IMPRESSION:  1. Likely small pleural effusions with associated passive atelectasis and/or  consolidation. Xr Abd (kub)    Result Date: 7/28/2018  EXAM:  XR ABD (KUB) INDICATION:  Verify NG tube placement COMPARISON: None. FINDINGS: A supine radiograph of the abdomen shows an enteric tube traversing expected course to below the diaphragm in the left upper quadrant overlying the gastric lumen. There is otherwise a normal bowel gas pattern. No soft tissue masses or pathologic calcifications are identified. The bones and soft tissues are within normal limits. IMPRESSION: Enteric tube in expected position. Xr Chest Port    Result Date: 7/31/2018  INDICATION: . Tube/line placement COMPARISON: Previous chest xray, 7/28/2018. LIMITATIONS: Portable technique. Deisy Moreno FINDINGS: Single frontal view of the chest. . Lines/tubes/surgical: Cardiac monitor leads overly the patient. Heart/mediastinum: Calcified mediastinal lymph nodes. Lungs/pleura: Minimal bibasilar opacities with partially obscured hemidiaphragms. No visible pneumothorax. Additional Comments: None. Deisy Moreno IMPRESSION: 1. Likely small pleural effusions with associated passive atelectasis and/or consolidation. Xr Chest Port    Result Date: 7/28/2018  EXAM:  XR CHEST PORT INDICATION:  respiratory failure COMPARISON:  7/27/2018 FINDINGS: A portable AP radiograph of the chest was obtained at 441 hours. ET tube is unchanged. .  Lungs remain clear. Deisy Moreno Heart size is normal. Calcified mediastinal lymph nodes are noted. .  Bony structures are intact. There is slight gaseous distention of the stomach. .     IMPRESSION: Lungs remain clear.      Xr Chest [facility]    Result Date: 7/27/2018  EXAM: Portable CXR.  0442 hours. COMPARISON: 7/26/2018. INDICATION: respiratory failure There is satisfactory intubation. The lungs remain clear. Heart is normal in size. There is no overt pulmonary edema. There are granulomatous tez calcifications. There is no evident pneumothorax, adenopathy or pleural effusion. IMPRESSION: Satisfactory intubation. No Acute Disease. Xr Chest Port    Result Date: 7/26/2018  Indication: Dyspnea Comparison: 8/2/2017 Portable exam of the chest obtained at 1122 demonstrates normal heart size. There is no acute process in the lung fields. The osseous structures are unremarkable. Impression: No acute process.               MEDICATIONS       ALL MEDICATIONS  Current Facility-Administered Medications   Medication Dose Route Frequency    hydrOXYzine HCl (ATARAX) tablet 50 mg  50 mg Oral Q6H PRN    clonazePAM (KlonoPIN) tablet 1 mg  1 mg Oral BID    promethazine (PHENERGAN) tablet 25 mg  25 mg Oral Q6H PRN    loperamide (IMODIUM) capsule 2 mg  2 mg Oral PRN    ibuprofen (MOTRIN) tablet 400 mg  400 mg Oral Q8H PRN    acetaminophen (TYLENOL) tablet 650 mg  650 mg Oral Q4H PRN    dicyclomine (BENTYL) 10 mg/mL injection 20 mg  20 mg IntraMUSCular Q6H PRN    magnesium hydroxide (MILK OF MAGNESIA) 400 mg/5 mL oral suspension 30 mL  30 mL Oral DAILY PRN    nicotine (NICODERM CQ) 21 mg/24 hr patch 1 Patch  1 Patch TransDERmal Q24H    cloNIDine HCl (CATAPRES) tablet 0.1 mg  0.1 mg Oral Q4H PRN    ziprasidone (GEODON) 20 mg in sterile water (preservative free) 1 mL injection  20 mg IntraMUSCular BID PRN    OLANZapine (ZyPREXA) tablet 5 mg  5 mg Oral Q6H PRN    benztropine (COGENTIN) tablet 2 mg  2 mg Oral BID PRN    benztropine (COGENTIN) injection 2 mg  2 mg IntraMUSCular BID PRN    LORazepam (ATIVAN) injection 2 mg  2 mg IntraMUSCular Q4H PRN    magnesium hydroxide (MILK OF MAGNESIA) 400 mg/5 mL oral suspension 30 mL  30 mL Oral DAILY PRN    zolpidem (AMBIEN) tablet 5 mg  5 mg Oral QHS PRN    lithium carbonate tablet 300 mg  300 mg Oral BID    sertraline (ZOLOFT) tablet 100 mg  100 mg Oral DAILY    QUEtiapine (SEROquel) tablet 400 mg  400 mg Oral QHS    divalproex DR (DEPAKOTE) tablet 500 mg  500 mg Oral BID    albuterol-ipratropium (DUO-NEB) 2.5 MG-0.5 MG/3 ML  3 mL Nebulization Q4H PRN      SCHEDULED MEDICATIONS  Current Facility-Administered Medications   Medication Dose Route Frequency    clonazePAM (KlonoPIN) tablet 1 mg  1 mg Oral BID    nicotine (NICODERM CQ) 21 mg/24 hr patch 1 Patch  1 Patch TransDERmal Q24H    lithium carbonate tablet 300 mg  300 mg Oral BID    sertraline (ZOLOFT) tablet 100 mg  100 mg Oral DAILY    QUEtiapine (SEROquel) tablet 400 mg  400 mg Oral QHS    divalproex DR (DEPAKOTE) tablet 500 mg  500 mg Oral BID                ASSESSMENT & PLAN        The patient, Livier Ross, is a 38 y.o.  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 A coordinated, multidisplinary treatment team (includes the nurse, unit pharmcist,  and writer) round was conducted for this initial evaluation with the patient present. The following regarding medications was addressed during rounds with patient:   the risks and benefits of the proposed medication.  The patient was given the opportunity to ask questions. Informed consent given to the use of the above medications. I will continue to adjust psychiatric and non-psychiatric medications (see above \"medication\" section and orders section for details) as deemed appropriate & based upon diagnoses and response to treatment. I have reviewed admission (and previous/old) labs and medical tests in the EHR and or transferring hospital documents. I will continue to order blood tests/labs and diagnostic tests as deemed appropriate and review results as they become available (see orders for details). I have reviewed old psychiatric and medical records available in the EHR. I Will order additional psychiatric records from other institutions to further elucidate the nature of patient's psychopathology and review once available. I will gather additional collateral information from friends, family and o/p treatment team to further elucidate the nature of patient's psychopathology and baselline level of psychiatric functioning.       ESTIMATED LENGTH OF STAY:    3       STRENGTHS:  Exercising self-direction/Resourceful, Access to housing/residential stability and Interpersonal/supportive relationships (family, friends, peers)                                        SIGNED:    Neda Llanes MD  8/7/2018

## 2018-08-08 NOTE — BH NOTES
Behavioral Health Transition Record to Provider    Patient Name: Milena Dobbs  YOB: 1975  Medical Record Number: [id number]  Date of Admission: 8/4/2018  Date of Discharge: 8/8/2018    Attending Provider: No att. providers found  Discharging Provider: Dr. Jefferson Clark  To contact this individual call [phone] and ask the  to page. If unavailable, ask to be transferred to [facility] Provider on call. [facility] Provider will be available on call 24/7 and during holidays. Primary Care Provider: None    Allergies   Allergen Reactions    Tomato Swelling     Tongue       Reason for Admission: : \" Pt admitted under a voluntary basis for depression with suicidal ideations and substance abuse  proving to be an imminent danger to self and an inability to care for self. Admission Diagnosis: Sub. Abuse, Bipolar  Poly-drug misuser    * No surgery found *    Results for orders placed or performed during the hospital encounter of 08/04/18   GLUCOSE, FASTING   Result Value Ref Range    Glucose 93 65 - 100 MG/DL   LIPID PANEL   Result Value Ref Range    LIPID PROFILE          Cholesterol, total 190 <200 MG/DL    Triglyceride 133 <150 MG/DL    HDL Cholesterol 58 MG/DL    LDL, calculated 105.4 (H) 0 - 100 MG/DL    VLDL, calculated 26.6 MG/DL    CHOL/HDL Ratio 3.3 0 - 5.0     HEMOGLOBIN A1C WITH EAG   Result Value Ref Range    Hemoglobin A1c 5.7 4.2 - 6.3 %    Est. average glucose 117 mg/dL       Immunizations administered during this encounter:   Immunization History   Administered Date(s) Administered    Influenza Vaccine 10/01/2016       Screening for Metabolic Disorders for Patients on Antipsychotic Medications  (Data obtained from the EMR)    Estimated Body Mass Index  Estimated body mass index is 27.28 kg/(m^2) as calculated from the following:    Height as of this encounter: 5' 4\" (1.626 m). Weight as of this encounter: 72.1 kg (158 lb 14.4 oz).      Vital Signs/Blood Pressure  Visit Vitals    /87 (BP 1 Location: Right arm)    Pulse (!) 103    Temp 98.2 °F (36.8 °C)    Resp 18    Ht 5' 4\" (1.626 m)    Wt 72.1 kg (158 lb 14.4 oz)    SpO2 100%    Breastfeeding No    BMI 27.28 kg/m2       Blood Glucose/Hemoglobin A1c  Lab Results   Component Value Date/Time    Glucose 93 08/05/2018 06:56 AM    Glucose (POC) 91 08/03/2018 05:30 AM       Lab Results   Component Value Date/Time    Hemoglobin A1c 5.7 08/05/2018 06:56 AM        Lipid Panel  Lab Results   Component Value Date/Time    Cholesterol, total 190 08/05/2018 06:56 AM    HDL Cholesterol 58 08/05/2018 06:56 AM    LDL, calculated 105.4 (H) 08/05/2018 06:56 AM    Triglyceride 133 08/05/2018 06:56 AM    CHOL/HDL Ratio 3.3 08/05/2018 06:56 AM        Discharge Diagnosis: Poly - drug misuser     Discharge Plan: She will be discharge to her family , with the plan to go to [facility] in [place]    The patient Antonio Aguilar exhibits the ability to control behavior in a less restrictive environment. Patient's level of functioning is improving. No assaultive/destructive behavior has been observed for the past 24 hours. No suicidal/homicidal threat or behavior has been observed for the past 24 hours. There is no evidence of serious medication side effects. Patient has not been in physical or protective restraints for at least the past 24 hours. If weapons involved, how are they secured? No weapons involved     Is patient aware of and in agreement with discharge plan? Patient is aware of discharge and is in agreement     Arrangements for medication:  Prescriptions filled for patient at [address] [place] [address] . Referral for substance abuse treatment ? Yes, [facility]   Referral for smoking cessation needed ?  Yes, refused referral     Copy of discharge instructions to  provider?:  Yes - fax to [phone]    Arrangements for transportation home:  Taxi     Keep all follow up appointments as scheduled, continue to take prescribed medications per physician instructions. Mental health crisis number:  661 or your local mental health crisis line number at [phone]             Discharge Medication List and Instructions:   Discharge Medication List as of 8/7/2018  3:43 PM      START taking these medications    Details   !! . PHARMACY TO SUBSTITUTE PER PROTOCOL Take 2 Puffs by inhalation every four (4) hours as needed for Other. Indications: COPD Associated with Chronic Bronchitis, Print, Disp-1 Inhaler, R-0      !! divalproex DR (DEPAKOTE) 500 mg tablet Take 1 Tab by mouth two (2) times a day. Indications: DEPRESSION ASSOCIATED WITH BIPOLAR DISORDER, Print, Disp-60 Tab, R-0      !! . PHARMACY TO SUBSTITUTE PER PROTOCOL Take 1 Puff by inhalation daily. Indications: COPD Associated with Chronic Bronchitis, Print, Disp-1 Inhaler, R-0      lithium carbonate 300 mg tablet Take 1 Tab by mouth two (2) times a day. Indications: DEPRESSION ASSOCIATED WITH BIPOLAR DISORDER, Print, Disp-60 Tab, R-0      !! QUEtiapine (SEROQUEL) 400 mg tablet Take 1 Tab by mouth nightly. Indications: DEPRESSION ASSOCIATED WITH BIPOLAR DISORDER, Print, Disp-30 Tab, R-0      !! sertraline (ZOLOFT) 100 mg tablet Take 1 Tab by mouth daily. Indications: Post Traumatic Stress Disorder, Print, Disp-30 Tab, R-0      hydrOXYzine HCl (ATARAX) 50 mg tablet Take 1 Tab by mouth every six (6) hours as needed for Itching for up to 10 days. Indications: anxiety, Print, Disp-15 Tab, R-0       !! - Potential duplicate medications found. Please discuss with provider. CONTINUE these medications which have NOT CHANGED    Details   gabapentin (NEURONTIN) 300 mg capsule Take 1 Cap by mouth three (3) times daily for 30 days. , No Print, Disp-90 Cap, R-0      lithium carbonate 300 mg capsule Take 1 Cap by mouth two (2) times a day for 30 days. , No Print, Disp-60 Cap, R-0      prazosin (MINIPRESS) 2 mg capsule Take 2 mg by mouth nightly.  Indications: Post Traumatic Stress Disorder, Historical Med      !! divalproex DR (DEPAKOTE) 500 mg tablet Take 500 mg by mouth two (2) times a day., Historical Med      !! QUEtiapine (SEROQUEL) 400 mg tablet Take 800 mg by mouth every evening., Historical Med      !! sertraline (ZOLOFT) 100 mg tablet Take 100 mg by mouth daily. , Historical Med      albuterol sulfate 90 mcg/actuation aepb Take 2 Puffs by inhalation every four (4) hours as needed. , Print, Disp-1 Inhaler, R-0       !! - Potential duplicate medications found. Please discuss with provider. STOP taking these medications       clonazePAM (KLONOPIN) 2 mg tablet Comments:   Reason for Stopping:         fluticasone-vilanterol (BREO ELLIPTA) 200-25 mcg/dose inhaler Comments:   Reason for Stopping:         cloNIDine HCl (CATAPRES) 0.3 mg tablet Comments:   Reason for Stopping:               Unresulted Labs     None        To obtain results of studies pending at discharge, please contact [phone]    Follow-up Information     Follow up With Details Comments Contact Info    None   None ([phone]) Patient stated that they have no PCP      [address] 54 On 8/8/2018 walk in for  treatment Monday - Wednesday 7:30 to 3:30 Thursday 7:30 to 5:30 and Friday 7:30 to 11:00  Radha Thomas 56  [address] On 8/8/2018 walk in for  services Monday - Friday 8:30 to 4:00  [id number] [facility]  [phone]          Advanced Directive:   Does the patient have an appointed surrogate decision maker? No  Does the patient have a Medical Advance Directive? No  Does the patient have a Psychiatric Advance Directive?  No  If the patient does not have a surrogate or Medical Advance Directive AND Psychiatric Advance Directive, the patient was offered information on these advance directives Patient declined to complete    Patient Instructions: Please continue all medications until otherwise directed by physician. Tobacco Cessation Discharge Plan:   Is the patient a smoker and needs referral for smoking cessation? Yes  Patient referred to the following for smoking cessation with an appointment? Refused     Patient was offered medication to assist with smoking cessation at discharge? Refused  Was education for smoking cessation added to the discharge instructions? Yes    Alcohol/Substance Abuse Discharge Plan:   Does the patient have a history of substance/alcohol abuse and requires a referral for treatment? Yes  Patient referred to the following for substance/alcohol abuse treatment with an appointment? Yes  Patient was offered medication to assist with alcohol cessation at discharge? No  Was education for substance/alcohol abuse added to discharge instructions? No    Patient discharged to Home; discussed with patient/caregiver and provided to the patient/caregiver either in hard copy or electronically.

## 2018-08-09 ENCOUNTER — HOSPITAL ENCOUNTER (INPATIENT)
Age: 43
LOS: 2 days | Discharge: HOME | DRG: 812 | End: 2018-08-11
Attending: EMERGENCY MEDICINE | Admitting: FAMILY MEDICINE
Payer: MEDICAID

## 2018-08-09 DIAGNOSIS — T50.904A DRUG OVERDOSE, UNDETERMINED INTENT, INITIAL ENCOUNTER: Primary | ICD-10-CM

## 2018-08-09 PROBLEM — T50.901A OVERDOSE: Status: ACTIVE | Noted: 2018-08-09

## 2018-08-09 LAB
ALBUMIN SERPL-MCNC: 3.3 G/DL (ref 3.5–5)
ALBUMIN/GLOB SERPL: 0.9 {RATIO} (ref 1.1–2.2)
ALP SERPL-CCNC: 64 U/L (ref 45–117)
ALT SERPL-CCNC: 23 U/L (ref 12–78)
ANION GAP SERPL CALC-SCNC: 8 MMOL/L (ref 5–15)
APAP SERPL-MCNC: <2 UG/ML (ref 10–30)
AST SERPL-CCNC: 25 U/L (ref 15–37)
BASOPHILS # BLD: 0 K/UL (ref 0–0.1)
BASOPHILS NFR BLD: 0 % (ref 0–1)
BILIRUB SERPL-MCNC: 0.3 MG/DL (ref 0.2–1)
BUN SERPL-MCNC: 7 MG/DL (ref 6–20)
BUN/CREAT SERPL: 7 (ref 12–20)
CALCIUM SERPL-MCNC: 8.4 MG/DL (ref 8.5–10.1)
CHLORIDE SERPL-SCNC: 106 MMOL/L (ref 97–108)
CO2 SERPL-SCNC: 24 MMOL/L (ref 21–32)
CREAT SERPL-MCNC: 0.94 MG/DL (ref 0.55–1.02)
DATE LAST DOSE: ABNORMAL
DIFFERENTIAL METHOD BLD: ABNORMAL
EOSINOPHIL # BLD: 0.5 K/UL (ref 0–0.4)
EOSINOPHIL NFR BLD: 9 % (ref 0–7)
ERYTHROCYTE [DISTWIDTH] IN BLOOD BY AUTOMATED COUNT: 16 % (ref 11.5–14.5)
ETHANOL SERPL-MCNC: <10 MG/DL
GLOBULIN SER CALC-MCNC: 3.6 G/DL (ref 2–4)
GLUCOSE SERPL-MCNC: 93 MG/DL (ref 65–100)
HCT VFR BLD AUTO: 30 % (ref 35–47)
HGB BLD-MCNC: 9.7 G/DL (ref 11.5–16)
IMM GRANULOCYTES # BLD: 0 K/UL (ref 0–0.04)
IMM GRANULOCYTES NFR BLD AUTO: 0 % (ref 0–0.5)
IRON SATN MFR SERPL: 14 % (ref 20–50)
IRON SERPL-MCNC: 59 UG/DL (ref 35–150)
LITHIUM SERPL-SCNC: 0.49 MMOL/L (ref 0.6–1.2)
LYMPHOCYTES # BLD: 1 K/UL (ref 0.8–3.5)
LYMPHOCYTES NFR BLD: 18 % (ref 12–49)
MCH RBC QN AUTO: 27.6 PG (ref 26–34)
MCHC RBC AUTO-ENTMCNC: 32.3 G/DL (ref 30–36.5)
MCV RBC AUTO: 85.2 FL (ref 80–99)
MONOCYTES # BLD: 0.4 K/UL (ref 0–1)
MONOCYTES NFR BLD: 7 % (ref 5–13)
NEUTS SEG # BLD: 3.5 K/UL (ref 1.8–8)
NEUTS SEG NFR BLD: 65 % (ref 32–75)
NRBC # BLD: 0 K/UL (ref 0–0.01)
NRBC BLD-RTO: 0 PER 100 WBC
PLATELET # BLD AUTO: 224 K/UL (ref 150–400)
PMV BLD AUTO: 9.9 FL (ref 8.9–12.9)
POTASSIUM SERPL-SCNC: 3.9 MMOL/L (ref 3.5–5.1)
PROT SERPL-MCNC: 6.9 G/DL (ref 6.4–8.2)
RBC # BLD AUTO: 3.52 M/UL (ref 3.8–5.2)
REPORTED DOSE,DOSE: ABNORMAL UNITS
REPORTED DOSE/TIME,TMG: ABNORMAL
SALICYLATES SERPL-MCNC: <1.7 MG/DL (ref 2.8–20)
SODIUM SERPL-SCNC: 138 MMOL/L (ref 136–145)
TIBC SERPL-MCNC: 421 UG/DL (ref 250–450)
VALPROATE SERPL-MCNC: 31 UG/ML (ref 50–100)
WBC # BLD AUTO: 5.4 K/UL (ref 3.6–11)

## 2018-08-09 PROCEDURE — 80307 DRUG TEST PRSMV CHEM ANLYZR: CPT | Performed by: EMERGENCY MEDICINE

## 2018-08-09 PROCEDURE — 94760 N-INVAS EAR/PLS OXIMETRY 1: CPT

## 2018-08-09 PROCEDURE — 96376 TX/PRO/DX INJ SAME DRUG ADON: CPT

## 2018-08-09 PROCEDURE — 99285 EMERGENCY DEPT VISIT HI MDM: CPT

## 2018-08-09 PROCEDURE — 65660000001 HC RM ICU INTERMED STEPDOWN

## 2018-08-09 PROCEDURE — 80053 COMPREHEN METABOLIC PANEL: CPT | Performed by: EMERGENCY MEDICINE

## 2018-08-09 PROCEDURE — 36415 COLL VENOUS BLD VENIPUNCTURE: CPT | Performed by: EMERGENCY MEDICINE

## 2018-08-09 PROCEDURE — 83540 ASSAY OF IRON: CPT | Performed by: FAMILY MEDICINE

## 2018-08-09 PROCEDURE — 74011250636 HC RX REV CODE- 250/636

## 2018-08-09 PROCEDURE — 93005 ELECTROCARDIOGRAM TRACING: CPT

## 2018-08-09 PROCEDURE — 74011250636 HC RX REV CODE- 250/636: Performed by: EMERGENCY MEDICINE

## 2018-08-09 PROCEDURE — 85025 COMPLETE CBC W/AUTO DIFF WBC: CPT | Performed by: FAMILY MEDICINE

## 2018-08-09 PROCEDURE — 80178 ASSAY OF LITHIUM: CPT | Performed by: EMERGENCY MEDICINE

## 2018-08-09 PROCEDURE — 80164 ASSAY DIPROPYLACETIC ACD TOT: CPT | Performed by: EMERGENCY MEDICINE

## 2018-08-09 PROCEDURE — 90791 PSYCH DIAGNOSTIC EVALUATION: CPT

## 2018-08-09 PROCEDURE — 96361 HYDRATE IV INFUSION ADD-ON: CPT

## 2018-08-09 PROCEDURE — 96374 THER/PROPH/DIAG INJ IV PUSH: CPT

## 2018-08-09 RX ORDER — IPRATROPIUM BROMIDE AND ALBUTEROL SULFATE 2.5; .5 MG/3ML; MG/3ML
3 SOLUTION RESPIRATORY (INHALATION)
Status: DISCONTINUED | OUTPATIENT
Start: 2018-08-09 | End: 2018-08-11 | Stop reason: HOSPADM

## 2018-08-09 RX ORDER — NALOXONE HYDROCHLORIDE 1 MG/ML
2 INJECTION INTRAMUSCULAR; INTRAVENOUS; SUBCUTANEOUS ONCE
Status: COMPLETED | OUTPATIENT
Start: 2018-08-09 | End: 2018-08-09

## 2018-08-09 RX ORDER — SODIUM CHLORIDE 9 MG/ML
75 INJECTION, SOLUTION INTRAVENOUS CONTINUOUS
Status: DISCONTINUED | OUTPATIENT
Start: 2018-08-09 | End: 2018-08-11 | Stop reason: HOSPADM

## 2018-08-09 RX ORDER — SODIUM CHLORIDE 0.9 % (FLUSH) 0.9 %
5-10 SYRINGE (ML) INJECTION EVERY 8 HOURS
Status: DISCONTINUED | OUTPATIENT
Start: 2018-08-09 | End: 2018-08-11 | Stop reason: HOSPADM

## 2018-08-09 RX ORDER — NALOXONE HYDROCHLORIDE 1 MG/ML
INJECTION INTRAMUSCULAR; INTRAVENOUS; SUBCUTANEOUS
Status: DISCONTINUED
Start: 2018-08-09 | End: 2018-08-09

## 2018-08-09 RX ORDER — SODIUM CHLORIDE 0.9 % (FLUSH) 0.9 %
5-10 SYRINGE (ML) INJECTION AS NEEDED
Status: DISCONTINUED | OUTPATIENT
Start: 2018-08-09 | End: 2018-08-11 | Stop reason: HOSPADM

## 2018-08-09 RX ADMIN — NALOXONE HYDROCHLORIDE 2 MG: 1 INJECTION INTRAMUSCULAR; INTRAVENOUS; SUBCUTANEOUS at 14:09

## 2018-08-09 RX ADMIN — NALOXONE HYDROCHLORIDE 2 MG: 1 INJECTION PARENTERAL at 14:09

## 2018-08-09 RX ADMIN — NALOXONE HYDROCHLORIDE 2 MG: 1 INJECTION PARENTERAL at 15:55

## 2018-08-09 RX ADMIN — SODIUM CHLORIDE 1000 ML: 900 INJECTION, SOLUTION INTRAVENOUS at 15:58

## 2018-08-09 NOTE — IP AVS SNAPSHOT
[address] 
[phone] Patient: Freida Morley MRN: [id number] :1975 You are allergic to the following Allergen Reactions Tomato Swelling Tongue Recent Documentation Weight Breastfeeding? BMI OB Status Smoking Status 69.7 kg No 26.38 kg/m2 Having regular periods Former Smoker Emergency Contacts  (Rel.) Home Phone Work Phone Mobile Phone Amalia Kim (Parent) [phone] -- -- About your hospitalization You were admitted on:  2018 You last received care in the:  [facility] You were discharged on:  2018 Why you were hospitalized Your primary diagnosis was: Overdose Providers Seen During Your Hospitalization Provider Specialty Primary office phone Brady Monzon MD Emergency Medicine [phone] Ana Espinosa MD Hospitalist [phone] Merline Shaver, MD Internal Medicine [phone] Your Primary Care Physician (PCP) Primary Care Physician Office Phone Office Fax NONE ** None ** ** None ** Follow-up Information Follow up With Details Comments Contact Info None   None ([phone]) Patient stated that they have no PCP 
  
 [facility]   None (395) Patient stated that they have no PCP My Medications STOP taking these medications QUEtiapine 400 mg tablet Commonly known as:  SEROquel SEROquel 400 mg tablet Generic drug:  QUEtiapine TAKE these medications as instructed Instructions Each Dose to Equal  
 Morning Noon Evening Bedtime  
 albuterol sulfate 90 mcg/actuation Aepb Your last dose was: Your next dose is: Take 2 Puffs by inhalation every four (4) hours as needed. 2 Puff * DEPAKOTE 500 mg tablet Generic drug:  divalproex   
 Your last dose was: Your next dose is: Take 500 mg by mouth two (2) times a day. 500 mg  
    
   
   
   
  
 * divalproex  mg tablet Commonly known as:  DEPAKOTE Your last dose was: Your next dose is: Take 1 Tab by mouth two (2) times a day. Indications: DEPRESSION ASSOCIATED WITH BIPOLAR DISORDER  
 500 mg  
    
   
   
   
  
 gabapentin 300 mg capsule Commonly known as:  NEURONTIN Your last dose was: Your next dose is: Take 1 Cap by mouth three (3) times daily for 30 days. 300 mg  
    
   
   
   
  
 hydrOXYzine HCl 50 mg tablet Commonly known as:  ATARAX Your last dose was: Your next dose is: Take 1 Tab by mouth every six (6) hours as needed for Itching for up to 10 days. Indications: anxiety 50 mg  
    
   
   
   
  
 * lithium carbonate 300 mg capsule Your last dose was: Your next dose is: Take 1 Cap by mouth two (2) times a day for 30 days. 300 mg  
    
   
   
   
  
 * lithium carbonate 300 mg tablet Your last dose was: Your next dose is: Take 1 Tab by mouth two (2) times a day. Indications: DEPRESSION ASSOCIATED WITH BIPOLAR DISORDER  
 300 mg  
    
   
   
   
  
 prazosin 2 mg capsule Commonly known as:  MINIPRESS Your last dose was: Your next dose is: Take 2 mg by mouth nightly. Indications: Post Traumatic Stress Disorder 2 mg * ZOLOFT 100 mg tablet Generic drug:  sertraline Your last dose was: Your next dose is: Take 100 mg by mouth daily. 100 mg  
    
   
   
   
  
 * sertraline 100 mg tablet Commonly known as:  ZOLOFT Your last dose was: Your next dose is: Take 1 Tab by mouth daily. Indications: Post Traumatic Stress Disorder  100 mg  
    
   
   
   
  
 * Notice: This list has 6 medication(s) that are the same as other medications prescribed for you. Read the directions carefully, and ask your doctor or other care provider to review them with you. ASK your physician about these medications Instructions Each Dose to Equal  
 Morning Noon Evening Bedtime [facility] TO [facility] PER PROTOCOL Your last dose was: Your next dose is: Take 2 Puffs by inhalation every four (4) hours as needed for Other. Indications: COPD Associated with Chronic Bronchitis 2 Puff [facility] PER PROTOCOL Your last dose was: Your next dose is: Take 1 Puff by inhalation daily. Indications: COPD Associated with Chronic Bronchitis 1 Puff Discharge Instructions None Discharge Orders None [employer] Announcement We are excited to announce that we are making your provider's discharge notes available to you in [employer]. You will see these notes when they are completed and signed by the physician that discharged you from your recent hospital stay. If you have any questions or concerns about any information you see in [employer], please call the Health Information Department where you were seen or reach out to your Primary Care Provider for more information about your plan of care. Introducing hospitals & HEALTH SERVICES! [facility] introduces [employer] patient portal. Now you can access parts of your medical record, email your doctor's office, and request medication refills online. 1. In your internet browser, go to [email or web address] 2. Click on the First Time User? Click Here link in the Sign In box. You will see the New Member Sign Up page. 3. Enter your [employer] Access Code exactly as it appears below. You will not need to use this code after youve completed the sign-up process.  If you do not sign up before the expiration date, you must request a new code. · [employer] Access Code: [email or web address] Expires: 10/24/2018  2:48 PM 
 
4. Enter the last four digits of your Social Security Number (xxxx) and Date of Birth (mm/dd/yyyy) as indicated and click Submit. You will be taken to the next sign-up page. 5. Create a [employer]Story ID. This will be your [employer]Story login ID and cannot be changed, so think of one that is secure and easy to remember. 6. Create a [employer] password. You can change your password at any time. 7. Enter your Password Reset Question and Answer. This can be used at a later time if you forget your password. 8. Enter your e-mail address. You will receive e-mail notification when new information is available in [address] 9. Click Sign Up. You can now view and download portions of your medical record. 10. Click the Download Summary menu link to download a portable copy of your medical information. If you have questions, please visit the Frequently Asked Questions section of the [employer] website. Remember, [employer] is NOT to be used for urgent needs. For medical emergencies, dial 911. Now available from your iPhone and Android! General Information Please provide this summary of care documentation to your next provider. Patient Signature:  ____________________________________________________________ Date:  ____________________________________________________________  
  
Dio Currie Provider Signature:  ____________________________________________________________ Date:  ____________________________________________________________

## 2018-08-09 NOTE — ED NOTES
Patient seen at doorway getting dressed, HPD at bedside. Patient anxious to leave and repeatedly states, \"Lock me up then. \" Patient de-escalated by Primary RN and HPD. Patient ambulatory to restroom with RN and HPD, patient unable to provide UA at this time. 5:40 PM  RT at bedside for arterial stick for repeat CBC, per lab re-collect.
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
## 2018-08-09 NOTE — ED NOTES
Patient resting comfortably in bed, bed locked & low, call bell within reach. Patient given warm blanket, lights remain dimmed. Patient denies further needs at this time.

## 2018-08-09 NOTE — ED NOTES
Poison Control contacted. Pills counted. 27 Quetiapine Fumarate (Seroquel) 200mg missing from bottle filled 8/7/18, should contain 60 tabs. Unknown whether or not patient consumed any pills from recent Gabapentin and Atarax prescriptions. Poison Control recommends 8-12 hours of patient observation beginning at admission time.

## 2018-08-09 NOTE — ED NOTES
After 2 ER Tech PIV attempts, Jony Sosa MD at bedside with US to establish PIV. HPD remains at bedside.

## 2018-08-09 NOTE — PROGRESS NOTES
Bedside shift change report given to [address] (oncoming nurse) by Anthony Campuzaon RN (offgoing nurse). Report included the following information SBAR, Kardex, MAR, Accordion, Recent Results, Med Rec Status, Cardiac Rhythm ST and Alarm Parameters .
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
## 2018-08-09 NOTE — ED NOTES
Report attempted, RN to call back. ER Medic at bedside with US to establish new PIV. HPD at doorway.
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
## 2018-08-09 NOTE — ED TRIAGE NOTES
Pt arrives via EMS from [facility] with cc of unresponsive. Per EMS, patient responsive to voice and pain upon arrival to scene, VSS, . Patient arrives to ER A&Ox4, lethargic, 100% RA. Patient able to protect own airway. Patient reluctant to stay in ER for evaluation, HPD at bedside. Patient arrives with 4 full pill bottles and 1 pill lid without bottle. Patient reports taking Zoloft PTA. Per Clayton Martinez MD, patient missing both Atarax and Neurontin bottles from recent discharge from [facility] on 8/7/18.

## 2018-08-09 NOTE — ROUTINE PROCESS
TRANSFER - OUT REPORT:    Verbal report given to Jose Vega RN (name) on Ozzie Campuzano  being transferred to [facility] (unit) for routine progression of care       Report consisted of patients Situation, Background, Assessment and   Recommendations(SBAR). Information from the following report(s) SBAR, ED Summary, Recent Results and Cardiac Rhythm Sinus Tach was reviewed with the receiving nurse. Lines:   Peripheral IV 08/09/18 Left Antecubital (Active)   Site Assessment Clean, dry, & intact 8/9/2018  6:50 PM   Phlebitis Assessment 0 8/9/2018  6:50 PM   Infiltration Assessment 0 8/9/2018  6:50 PM   Dressing Status Clean, dry, & intact 8/9/2018  6:50 PM   Dressing Type Transparent 8/9/2018  6:50 PM   Hub Color/Line Status Green 8/9/2018  6:50 PM        Opportunity for questions and clarification was provided. Patient transported with:  Clearence Peto, HPD, and cardiac monitor. Jose Vega RN informed that patient refused vital signs before leaving unit.

## 2018-08-10 LAB
AMPHET UR QL SCN: NEGATIVE
ANION GAP SERPL CALC-SCNC: 10 MMOL/L (ref 5–15)
APPEARANCE UR: CLEAR
ATRIAL RATE: 108 BPM
ATRIAL RATE: 92 BPM
BACTERIA URNS QL MICRO: NEGATIVE /HPF
BARBITURATES UR QL SCN: NEGATIVE
BASOPHILS # BLD: 0 K/UL (ref 0–0.1)
BASOPHILS NFR BLD: 0 % (ref 0–1)
BENZODIAZ UR QL: POSITIVE
BILIRUB UR QL: NEGATIVE
BUN SERPL-MCNC: 4 MG/DL (ref 6–20)
BUN/CREAT SERPL: 5 (ref 12–20)
CALCIUM SERPL-MCNC: 8.1 MG/DL (ref 8.5–10.1)
CALCULATED P AXIS, ECG09: 74 DEGREES
CALCULATED P AXIS, ECG09: 75 DEGREES
CALCULATED R AXIS, ECG10: 80 DEGREES
CALCULATED R AXIS, ECG10: 80 DEGREES
CALCULATED T AXIS, ECG11: 70 DEGREES
CALCULATED T AXIS, ECG11: 70 DEGREES
CANNABINOIDS UR QL SCN: POSITIVE
CHLORIDE SERPL-SCNC: 109 MMOL/L (ref 97–108)
CHOLEST SERPL-MCNC: 195 MG/DL
CK SERPL-CCNC: 127 U/L (ref 26–192)
CO2 SERPL-SCNC: 22 MMOL/L (ref 21–32)
COCAINE UR QL SCN: NEGATIVE
COLOR UR: NORMAL
CREAT SERPL-MCNC: 0.87 MG/DL (ref 0.55–1.02)
DIAGNOSIS, 93000: NORMAL
DIAGNOSIS, 93000: NORMAL
DIFFERENTIAL METHOD BLD: ABNORMAL
DRUG SCRN COMMENT,DRGCM: ABNORMAL
EOSINOPHIL # BLD: 0.6 K/UL (ref 0–0.4)
EOSINOPHIL NFR BLD: 9 % (ref 0–7)
EPITH CASTS URNS QL MICRO: NORMAL /LPF
ERYTHROCYTE [DISTWIDTH] IN BLOOD BY AUTOMATED COUNT: 16.1 % (ref 11.5–14.5)
GLUCOSE SERPL-MCNC: 78 MG/DL (ref 65–100)
GLUCOSE UR STRIP.AUTO-MCNC: NEGATIVE MG/DL
HCT VFR BLD AUTO: 30.4 % (ref 35–47)
HDLC SERPL-MCNC: 57 MG/DL
HDLC SERPL: 3.4 {RATIO} (ref 0–5)
HGB BLD-MCNC: 9.8 G/DL (ref 11.5–16)
HGB UR QL STRIP: NEGATIVE
HYALINE CASTS URNS QL MICRO: NORMAL /LPF (ref 0–5)
IMM GRANULOCYTES # BLD: 0 K/UL (ref 0–0.04)
IMM GRANULOCYTES NFR BLD AUTO: 0 % (ref 0–0.5)
KETONES UR QL STRIP.AUTO: NEGATIVE MG/DL
LDLC SERPL CALC-MCNC: 127.4 MG/DL (ref 0–100)
LEUKOCYTE ESTERASE UR QL STRIP.AUTO: NEGATIVE
LIPID PROFILE,FLP: ABNORMAL
LYMPHOCYTES # BLD: 1.5 K/UL (ref 0.8–3.5)
LYMPHOCYTES NFR BLD: 23 % (ref 12–49)
MCH RBC QN AUTO: 27.7 PG (ref 26–34)
MCHC RBC AUTO-ENTMCNC: 32.2 G/DL (ref 30–36.5)
MCV RBC AUTO: 85.9 FL (ref 80–99)
METHADONE UR QL: NEGATIVE
MONOCYTES # BLD: 0.6 K/UL (ref 0–1)
MONOCYTES NFR BLD: 9 % (ref 5–13)
NEUTS SEG # BLD: 3.7 K/UL (ref 1.8–8)
NEUTS SEG NFR BLD: 58 % (ref 32–75)
NITRITE UR QL STRIP.AUTO: NEGATIVE
NRBC # BLD: 0 K/UL (ref 0–0.01)
NRBC BLD-RTO: 0 PER 100 WBC
OPIATES UR QL: NEGATIVE
P-R INTERVAL, ECG05: 124 MS
P-R INTERVAL, ECG05: 130 MS
PCP UR QL: NEGATIVE
PH UR STRIP: 8 [PH] (ref 5–8)
PLATELET # BLD AUTO: 254 K/UL (ref 150–400)
PMV BLD AUTO: 9.8 FL (ref 8.9–12.9)
POTASSIUM SERPL-SCNC: 4.9 MMOL/L (ref 3.5–5.1)
PROT UR STRIP-MCNC: NEGATIVE MG/DL
Q-T INTERVAL, ECG07: 368 MS
Q-T INTERVAL, ECG07: 382 MS
QRS DURATION, ECG06: 78 MS
QRS DURATION, ECG06: 80 MS
QTC CALCULATION (BEZET), ECG08: 472 MS
QTC CALCULATION (BEZET), ECG08: 493 MS
RBC # BLD AUTO: 3.54 M/UL (ref 3.8–5.2)
RBC #/AREA URNS HPF: NORMAL /HPF (ref 0–5)
SODIUM SERPL-SCNC: 141 MMOL/L (ref 136–145)
SP GR UR REFRACTOMETRY: 1.01 (ref 1–1.03)
TRIGL SERPL-MCNC: 53 MG/DL (ref ?–150)
TROPONIN I SERPL-MCNC: <0.05 NG/ML
TSH SERPL DL<=0.05 MIU/L-ACNC: 0.61 UIU/ML (ref 0.36–3.74)
UROBILINOGEN UR QL STRIP.AUTO: 0.2 EU/DL (ref 0.2–1)
VENTRICULAR RATE, ECG03: 108 BPM
VENTRICULAR RATE, ECG03: 92 BPM
VIT B12 SERPL-MCNC: 823 PG/ML (ref 193–986)
VLDLC SERPL CALC-MCNC: 10.6 MG/DL
WBC # BLD AUTO: 6.3 K/UL (ref 3.6–11)
WBC URNS QL MICRO: NORMAL /HPF (ref 0–4)

## 2018-08-10 PROCEDURE — 80061 LIPID PANEL: CPT | Performed by: FAMILY MEDICINE

## 2018-08-10 PROCEDURE — 65270000029 HC RM PRIVATE

## 2018-08-10 PROCEDURE — 36415 COLL VENOUS BLD VENIPUNCTURE: CPT | Performed by: FAMILY MEDICINE

## 2018-08-10 PROCEDURE — 74011250636 HC RX REV CODE- 250/636: Performed by: FAMILY MEDICINE

## 2018-08-10 PROCEDURE — 94762 N-INVAS EAR/PLS OXIMTRY CONT: CPT

## 2018-08-10 PROCEDURE — 85025 COMPLETE CBC W/AUTO DIFF WBC: CPT | Performed by: FAMILY MEDICINE

## 2018-08-10 PROCEDURE — 82607 VITAMIN B-12: CPT | Performed by: FAMILY MEDICINE

## 2018-08-10 PROCEDURE — 80307 DRUG TEST PRSMV CHEM ANLYZR: CPT | Performed by: FAMILY MEDICINE

## 2018-08-10 PROCEDURE — 94760 N-INVAS EAR/PLS OXIMETRY 1: CPT

## 2018-08-10 PROCEDURE — 81001 URINALYSIS AUTO W/SCOPE: CPT | Performed by: FAMILY MEDICINE

## 2018-08-10 PROCEDURE — 77010033678 HC OXYGEN DAILY

## 2018-08-10 PROCEDURE — 74011250637 HC RX REV CODE- 250/637: Performed by: PSYCHIATRY & NEUROLOGY

## 2018-08-10 PROCEDURE — 84484 ASSAY OF TROPONIN QUANT: CPT | Performed by: FAMILY MEDICINE

## 2018-08-10 PROCEDURE — 82550 ASSAY OF CK (CPK): CPT | Performed by: FAMILY MEDICINE

## 2018-08-10 PROCEDURE — 93005 ELECTROCARDIOGRAM TRACING: CPT

## 2018-08-10 PROCEDURE — 80048 BASIC METABOLIC PNL TOTAL CA: CPT | Performed by: FAMILY MEDICINE

## 2018-08-10 PROCEDURE — 84443 ASSAY THYROID STIM HORMONE: CPT | Performed by: FAMILY MEDICINE

## 2018-08-10 RX ORDER — CLONAZEPAM 1 MG/1
2 TABLET ORAL
Status: DISCONTINUED | OUTPATIENT
Start: 2018-08-10 | End: 2018-08-11 | Stop reason: HOSPADM

## 2018-08-10 RX ADMIN — Medication 10 ML: at 15:41

## 2018-08-10 RX ADMIN — SODIUM CHLORIDE 75 ML/HR: 900 INJECTION, SOLUTION INTRAVENOUS at 15:41

## 2018-08-10 RX ADMIN — Medication 10 ML: at 22:15

## 2018-08-10 RX ADMIN — Medication 10 ML: at 00:12

## 2018-08-10 RX ADMIN — Medication 5 ML: at 06:42

## 2018-08-10 RX ADMIN — SODIUM CHLORIDE 75 ML/HR: 900 INJECTION, SOLUTION INTRAVENOUS at 00:12

## 2018-08-10 RX ADMIN — CLONAZEPAM 2 MG: 1 TABLET ORAL at 22:19

## 2018-08-10 NOTE — PROGRESS NOTES
Problem: Falls - Risk of  Goal: *Absence of Falls  Document Criss Fall Risk and appropriate interventions in the flowsheet.    Outcome: Progressing Towards Goal  Fall Risk Interventions:  Mobility Interventions: Communicate number of staff needed for ambulation/transfer    Mentation Interventions: Door open when patient unattended, Family/sitter at bedside, Update white board    Medication Interventions: Patient to call before getting OOB    Elimination Interventions: Call light in reach, Patient to call for help with toileting needs, Toileting schedule/hourly rounds

## 2018-08-10 NOTE — PROGRESS NOTES
Hospitalist Progress Note  Merline Shaver, MD  Answering service: [phone] OR [phone] from in house phone        Date of Service:  8/10/2018  NAME:  Freida Morley  :  1975  MRN:  [id number]      Admission Summary:   39-year-old female with history of asthma, acute hypoxic respiratory failure, polysubstance abuse, history of cocaine, heroin and marijuana abuse, history of seizure disorder, pseudoseizures, hypertension, sarcoidosis, bipolar disorder, tobacco abuse,p/w OD , 27 tablets of Nyquil, 4 tablets of Zoloft and unknown amount of gabapentin that was missing. Patient was discharged about 2 days ago with Zoloft, lithium, hydroxyzine, Seroquel, and Depakote. Per EMS was found at [facility] station,unresponsive ,In the ER initially noted somnolent easily arousable. Denies any suicidal ideation currently. Interval history / Subjective:    awake,alert, c/o generalized pain, upper chest back denies SOB, nausea vomiting      Assessment & Plan:     Encephalopathy- due to meds  - in setting of Overdose  -improving mentation    Overdose apparently on Seroquel, Atarax, gabapentin. Poison control was notified by ER physician and per ER physician,    recommended observing the patient for 8-12 hours on a telemetry monitoring for QTc prolongation. The patient has mild QT prolongation on her EKG. With  QTc is 493. Tele monitoring with no significant events  Poison control signed off  supportive care with IV hydration. Holding  Seroquel, Atarax, gabapentin and other medications for now. neurovascular checks. stable  electrolytes every 4 hours,replace if needed  Repeat ekg NSR,no significant new changes  D/c tele downgrade to med surg    Overdose with possible suicidal intention. A  psych consult . patient on suicide precautions and 1:1 monitoring. .  Anemia. chronic anemia. The hemoglobin dropped from 11-8.7.   Patient denies any hematemesis, melena, hemoptysis, hematuria. stool for occult blood. iron profile with iron 59. repeat cbc with Hgb 9.8    History of asthma.nebs prn    Gastrointestinal and deep vein thrombosis prophylaxis. The patient will be on SCDs. Generalized pain, tylenol, avoid narcoticis/sedating meds   Code status: Full code  DVT prophylaxis: SCD  Care Plan discussed with: Patient/Family, Nurse and   Disposition:TBD , pending psych evaluation      Hospital Problems  Date Reviewed: 8/9/2018          Codes Class Noted POA    * (Principal)Overdose ICD-10-CM: T50.901A  ICD-9-CM: 977.9, E980.5  8/9/2018 Unknown                Review of Systems:   A comprehensive review of systems was negative except for that written in the HPI. Vital Signs:    Last 24hrs VS reviewed since prior progress note. Most recent are:  Visit Vitals    /87 (BP 1 Location: Right arm, BP Patient Position: At rest;Sitting)    Pulse 100    Temp 98.2 °F (36.8 °C)    Resp 20    Wt 69.7 kg (153 lb 10.6 oz)    SpO2 97%    BMI 26.38 kg/m2         Intake/Output Summary (Last 24 hours) at 08/10/18 0819  Last data filed at 08/10/18 0414   Gross per 24 hour   Intake            302.5 ml   Output              800 ml   Net           -497.5 ml        Physical Examination:   /87 (BP 1 Location: Right arm, BP Patient Position: At rest;Sitting)  Pulse 100  Temp 98.2 °F (36.8 °C)  Resp 20  Wt 69.7 kg (153 lb 10.6 oz)  SpO2 97%  BMI 26.38 kg/m2     GEN:awake alert,no acute distress,conversing appropriately  HEENT:AT,NC, PERRL,EOMI,orophaynx with no lesions,moist mucus memb  NECK:supple,no masses  LUNGS:CTA b/l  CVS:RRR  ABD;soft,NT,ND,+BS  EXT;full ROM, no edema ,   MSK;non tender,no spine tenderness  NEURO  Cranial also II-XII grossly intact. Sensory grossly within normal limits. DTR+  SKIN:  Warm.   Psych:pleasant            Data Review:    Review and/or order of clinical lab test      Labs:     Recent Labs      08/10/18 0012  08/09/18   1744   WBC  6.3  5.4   HGB  9.8*  9.7*   HCT  30.4*  30.0*   PLT  254  224     Recent Labs      08/10/18   0007  08/09/18   1415   NA  141  138   K  4.9  3.9   CL  109*  106   CO2  22  24   BUN  4*  7   CREA  0.87  0.94   GLU  78  93   CA  8.1*  8.4*     Recent Labs      08/09/18   1415   SGOT  25   ALT  23   AP  64   TBILI  0.3   TP  6.9   ALB  3.3*   GLOB  3.6     No results for input(s): INR, PTP, APTT in the last 72 hours. No lab exists for component: INREXT   Recent Labs      08/09/18   1849   TIBC  421   PSAT  14*      Lab Results   Component Value Date/Time    Folate 21.5 (H) 08/01/2017 02:53 AM      No results for input(s): PH, PCO2, PO2 in the last 72 hours.   Recent Labs      08/10/18   0007   CPK  127   TROIQ  <0.05     Lab Results   Component Value Date/Time    Cholesterol, total 195 08/10/2018 12:07 AM    HDL Cholesterol 57 08/10/2018 12:07 AM    LDL, calculated 127.4 (H) 08/10/2018 12:07 AM    Triglyceride 53 08/10/2018 12:07 AM    CHOL/HDL Ratio 3.4 08/10/2018 12:07 AM     Lab Results   Component Value Date/Time    Glucose (POC) 91 08/03/2018 05:30 AM    Glucose (POC) 109 (H) 08/03/2018 12:37 AM    Glucose (POC) 111 (H) 07/31/2018 06:21 PM    Glucose (POC) 114 (H) 07/31/2018 12:05 PM    Glucose (POC) 71 07/31/2018 11:40 AM     Lab Results   Component Value Date/Time    Color YELLOW/STRAW 08/10/2018 03:23 AM    Appearance CLEAR 08/10/2018 03:23 AM    Specific gravity 1.013 08/10/2018 03:23 AM    Specific gravity >1.030 (H) 05/09/2009 09:27 PM    pH (UA) 8.0 08/10/2018 03:23 AM    Protein NEGATIVE  08/10/2018 03:23 AM    Glucose NEGATIVE  08/10/2018 03:23 AM    Ketone NEGATIVE  08/10/2018 03:23 AM    Bilirubin NEGATIVE  08/10/2018 03:23 AM    Urobilinogen 0.2 08/10/2018 03:23 AM    Nitrites NEGATIVE  08/10/2018 03:23 AM    Leukocyte Esterase NEGATIVE  08/10/2018 03:23 AM    Epithelial cells FEW 08/10/2018 03:23 AM    Bacteria NEGATIVE  08/10/2018 03:23 AM    WBC 0-4 08/10/2018 03:23 AM    RBC 0-5 08/10/2018 03:23 AM         Medications Reviewed:     Current Facility-Administered Medications   Medication Dose Route Frequency    sodium chloride (NS) flush 5-10 mL  5-10 mL IntraVENous PRN    sodium chloride (NS) flush 5-10 mL  5-10 mL IntraVENous Q8H    sodium chloride (NS) flush 5-10 mL  5-10 mL IntraVENous PRN    0.9% sodium chloride infusion  75 mL/hr IntraVENous CONTINUOUS    albuterol-ipratropium (DUO-NEB) 2.5 MG-0.5 MG/3 ML  3 mL Nebulization Q4H PRN     ______________________________________________________________________  EXPECTED LENGTH OF STAY: - - -  ACTUAL LENGTH OF STAY:          1                 Erica Johnson MD

## 2018-08-10 NOTE — PROGRESS NOTES
TRANSFER - IN REPORT:    Verbal report received from [place]) on [facility]  being received from [employer]) for routine progression of care      Report consisted of patients Situation, Background, Assessment and   Recommendations(SBAR). Information from the following report(s) SBAR, Kardex and ED Summary was reviewed with the receiving nurse. Opportunity for questions and clarification was provided. Assessment completed upon patients arrival to unit and care assumed.     Primary Nurse Clint Nogueira and Johanna RN performed a dual skin assessment on this patient No impairment noted  Doc score is 23

## 2018-08-10 NOTE — PROGRESS NOTES
TRANSFER - OUT REPORT:    Verbal report given to TIERA Gamez (name) on Antonio Manifold  being transferred to [facility] (unit) for routine progression of care       Report consisted of patients Situation, Background, Assessment and   Recommendations(SBAR). Information from the following report(s) SBAR, ED Summary, [facility] and Recent Results was reviewed with the receiving nurse. Lines:   Peripheral IV 08/10/18 Left Wrist (Active)   Site Assessment Clean, dry, & intact 8/10/2018  3:40 PM   Phlebitis Assessment 0 8/10/2018  3:40 PM   Infiltration Assessment 0 8/10/2018  3:40 PM   Dressing Status Clean, dry, & intact 8/10/2018  3:40 PM   Dressing Type Tape;Transparent 8/10/2018  3:40 PM   Hub Color/Line Status Blue; Infusing 8/10/2018  3:40 PM   Action Taken Open ports on tubing capped 8/10/2018  3:40 PM   Alcohol Cap Used Yes 8/10/2018  3:40 PM        Opportunity for questions and clarification was provided.       Patient transported with:  IV pole  Belongings  Bed   Sitter   Pt chart

## 2018-08-10 NOTE — PROGRESS NOTES
Reason for Admission:   Overdose                   RRAT Score:          10           Plan for utilizing home health:      TBD, patient will probably not be homebound                    Likelihood of Readmission:  Moderate                         Transition of Care Plan:      TBD. Was just discharged from inpatient psych on 8/7/2018. Psych consult.

## 2018-08-10 NOTE — PROGRESS NOTES
Patient refusing blood draws at this time and overnight per night shift nurse. Patient is hard stick and was stuck numerous times and was unable to draw blood and is now refusing sticks.

## 2018-08-10 NOTE — PROGRESS NOTES
Dr Brandt Burn up to see pt - verbally and in progress note he states sitter can be Dc'd - paged NP V Self to obtain order to DC sitter - order obtained and put in by NP - [employer] notified and sitter released

## 2018-08-10 NOTE — PROGRESS NOTES
TRANSFER - IN REPORT:    Verbal report received from [address] RN(name) on [facility]  being received from ED(unit) for routine progression of care      Report consisted of patients Situation, Background, Assessment and   Recommendations(SBAR). Information from the following report(s) SBAR, Kardex, ED Summary, Intake/Output, Recent Results and Cardiac Rhythm NSR with prolonged QTC was reviewed with the receiving nurse. Opportunity for questions and clarification was provided. Assessment completed upon patients arrival to unit and care assumed. Primary Nurse Junior Keller, RN and , RN performed a dual skin assessment on this patient. Multiple scattered scars on all extremities. Doc score is 20      2100 - attempted to start IV fluid per orders with IV placed in ED. IV was not patent. Made 1 unsuccessful attempt to place new IV, CCU nurse made 1 unsuccessful attempt. 2145 - PICC team RN made 2 attempts, no peripheral IV line placed. Pt becoming more drowsy, responds to voice. Soraida OROZCO.  2200 - Spoke to Dr. Arianna Waters, advised that we had no line for this patient. He gave verbal order for EJ and told to page Anesthesiologist. Ismael Foster about patient's mental status, was told to monitor pt and notify MD if patient status declines from here. 2245 - Anesthesiologist stated he could come place central line or PICC line in the morning, but would not come place an EJ. Explained patient's history of IV drug abuse, limited IV access, and possibility of deterioration. Again, MD said he could come place central line if consent was obtained, but would not come place EJ. Continuing to monitor patient. 0000 - Bedside shift change report given to Cody Majano (oncoming nurse) by Monika Mcnally (offgoing nurse). Report included the following information SBAR, Kardex, ED Summary, Procedure Summary, Intake/Output, Recent Results and Cardiac Rhythm NST/ST.

## 2018-08-10 NOTE — PROGRESS NOTES
Pt discharged from Psychiatry on 8/7 with plans to go to rehabilitation for substance abuse in [place]. Pt went to bus station to wait for bus, per pt, took some extra quetiapine to help get to sleep, was difficult to arouse and came to ED. She asserted then and now that there was no suicidal intent and needed some rest preparing for her journey to [place] and rehab. Pt with diagnosis of bipolar disorder and polysubstance abuse. Long hx of hospitalizations for these diagnoses and known to me from previous times in the hospital.  This evening pt reiterates her desire to go to rehab and family support. She can be unreliable but this time seems genuine and has bus ticket to corroborate her story. She has never been particularly reliable historian and amount of quetiapine or other medications she may have taken is unclear. Her substance abuse can interfere with her judgment. On mental status she is alert and oriented x 4. No unusual mannerisms, motor activity. Cooperative with interview. Looks stated age, hygiene and grooming ok, in no distress. She has normal thought process, speech pattern. No psychotic or grandiose sx. Neither suicidal sx  nor violent sx. Cognitive function including insight and judgment intact. Thinking is logical.  Dx: Bipolar disorder, no acute sx        Polysubstance abuse, positive for benzodiazepines but prescribed clonazepam and positive for THC which is positive for 30 days after use. Not a suicidal risk and does not need 1:1 supervision        Discharge when medically cleared. She would like to be able to get to bus station by 5:00 PM tomorrow and seems like a good plan. I spoke with pt's mother who confirmed story except mother was concerned during the day yesterday if pt had started taking quetiapine which has been a medication that pt has used for sedating and mellowing effects in abuse manner.   Information doesn't change recommendations re 1:1 supervision or discharge. Only can hope she follows through. Pt states did not get prescription on discharge, mother contradicts and will go with mother and not write prescriptions or restart other medications. Advised pt strongly not to take quetiapine.

## 2018-08-10 NOTE — H&P
[address]  HISTORY AND PHYSICAL      Name:KOBY JOSEPH  MR#: [id number]  : 1975  ACCOUNT #: [de-identified]   ADMIT DATE: 2018    HISTORY OF PRESENT ILLNESS:  Patient is a 78-year-old female with history of asthma, acute hypoxic respiratory failure, polysubstance abuse, history of cocaine, heroin and marijuana abuse, history of seizure disorder, pseudoseizures, hypertension, sarcoidosis, bipolar disorder, tobacco abuse, overweight with BMI of 27,   presents to the hospital with . The patient was found unresponsive at the bus station. EMS was called. To EMS the patient was responsive to voice and pain. The patient was brought to the ER. Patient had 1 episode of incontinence and went to ER and evaluated her medications. They found that the patient had 27 tablets of Nyquil, 4 tablets of Zoloft and unknown amount of gabapentin that was missing. Patient was discharged about 2 days ago with Zoloft, lithium, hydroxyzine, Seroquel, and Depakote. The patient currently is resting in bed. She is alert and oriented x3, is mildly  drowsy, but is easily arousable. Denies any suicidal ideation currently. The patient reports that she is not sure what she took and reports that she had no intentions of hurting herself. The patient denies any other complaints or problems. Denies any headache, blurry vision, sore throat, trouble swallowing, trouble with speech, any chest pain, shortness of breath, cough, fever, chills, abdominal pain, constipation, diarrhea, urinary symptoms, generalized neurologic weakness, recent travel, sick contacts, any falls, injuries, hematemesis, melena, hemoptysis. PAST MEDICAL HISTORY:  See above.     HOME MEDICATIONS:  Currently the patient is on lithium carbonate 300 mg b.i.d., Seroquel 400 mg daily, Zoloft 100 mg daily, Atarax 50 mg every 6 hours as needed, gabapentin 300 mg daily, Minipress 2 mg nightly, Depakote 500 mg b.i.d., Seroquel 800 mg every evening and albuterol p.r.n. SOCIAL HISTORY:  Former smoker, used to smoke about a pack per day for 15 years, quit in 2017. Denies alcohol use. Smokes marijuana on a regular basis. Also, history of cocaine abuse. REVIEW OF SYSTEMS:  All systems were reviewed and found to be essentially negative except for the symptoms mentioned above. ALLERGIES:  TOMATO. FAMILY HISTORY:  Father had history of hypertensive. Mother had history of hypertensive. PHYSICAL EXAMINATION:  VITAL SIGNS:  Temperature 97.7, pulse 105, respiratory rate 14, blood pressure 134/75, pulse ox 99% on room air. GENERAL:  Alert x3, drowsy, mildly distressed, pleasant female, appears to be stated age. HEENT:  Pupils equal, reactive to light. Dry mucous membranes. Tympanic membranes clear. NECK:  Supple. LUNGS:  Clear to auscultation bilaterally. CARDIOVASCULAR:  S1, S2 heard. ABDOMEN:  Soft, nontender, nondistended. Bowel sounds are physiologic. EXTREMITIES:  No clubbing, no cyanosis, no edema. NEUROPSYCHIATRIC:  Pleasant mood without effect. NEUROLOGIC:  Cranial also II-XII grossly intact. Sensory grossly within normal limits. DTRs 2+. SKIN:  Warm. LABORATORY DATA:  White count 5.4, hemoglobin 9.7, hematocrit 30.0, platelet 172. Sodium 130, potassium 3.9, chloride 106, bicarbonate 24, anion gap 18, glucose 92, BUN , creatinine , potassium 3.4, bilirubin total 0.8, ALT 33, AST 25, alkaline phosphatase 64. Acetaminophen level is less than 2, salicylate is less than 1.7. Valproic acid is 31 and  0.49. Serum alcohol is less than 10. EKG shows sinus bradycardia. Urine drug screen is positive for opiates, THC and benzos. ASSESSMENT AND PLAN:  1. Overdose apparently on Seroquel, Atarax, gabapentin. Patient was admitted to a telemetry bed. Poison control was notified by ER physician and per ER physician, they recommended observing the patient for 8-12 hours on a telemetry monitoring for QTc prolongation.   The patient has mild QT prolongation on her EKG. Current QTc is 493. The patient will be closely monitored. Provide supportive care with IV hydration. We will hold Seroquel, Atarax, gabapentin and other medications for now. We will provide neurovascular checks. We will get electrolytes every 4 hours and may consider further intervention and diagnostics if symptoms persist.  If any changes in QTc, may consider cardiology consult and can continue to closely monitor. Further intervention will be per hospital course. Reassess as needed. 2.  Overdose with possible suicidal intention. The patient will be evaluated by psych. A  psych consult has been placed. We will put the patient on suicide precautions and 1:1 monitoring. Further intervention will be per hospital course. Reassess as needed. 3.  Anemia. The patient has history of chronic anemia. The hemoglobin dropped from 11-8.7. Patient denies any hematemesis, melena, hemoptysis, hematuria. Will test stool for occult blood. Get an iron profile and continue to closely monitor. repeat CBC in the morning and reassess as needed. 4.  History of asthma.    p.r.n.   5.  Gastrointestinal and deep vein thrombosis prophylaxis. The patient will be on SCDs.       Beola Siemens, MD MM/GIUSEPPE  D: 08/09/2018 18:34     T: 08/09/2018 19:42  JOB #: [id number]

## 2018-08-10 NOTE — CDMP QUERY
Please clarify if this patient is (was) being treated/managed for:     => Encephalopathy due to medications in the setting of Overdose resolved with Narcan  => Other explanation of clinical findings  => Clinically Undetermined (no explanation for clinical findings)    The medical record reflects the following clinical findings, treatment, and risk factors. Risk Factors:  overdose  Clinical Indicators:  ED-Pt arrives via EMS from [facility] station with cc of unresponsive  Pt was responsive to voice and pain. Pt was noted to have had an episode of incontinence. Full history, physical exam, and ROS unable to be obtained due to:  Drug overdose. The history is provided by the patient and the EMS personnel. The history is limited by the condition of the patient. Patient refusing EKG at this time. Patient states, \"get the officer, lock me up, don't touch me. \" [employer] able to de-escalate situation  Treatment: narcan 2 mg, 1000 cc NS bolus, high fall risk precautions    Please clarify and document your clinical opinion in the progress notes and discharge summary including the definitive and/or presumptive diagnosis, (suspected or probable), related to the above clinical findings. Please include clinical findings supporting your diagnosis.       Thank you,         Todd Schaeffer [address] [place] [address]

## 2018-08-10 NOTE — PROGRESS NOTES
Spoke to poison control on the telephone. They said based on the patients vitals and qtc that they will close her case and that pt is clear of ingestion. [id number] Tried calling report for transfer of this patient. They said they would call me back.

## 2018-08-10 NOTE — PROGRESS NOTES
Bedside and Verbal shift change report given to [address] [place] [address] (oncoming nurse) by SANGEETA Bains RN (offgoing nurse). Report given with SBAR, Kardex, Intake/Output, MAR and Recent Results.

## 2018-08-11 VITALS
RESPIRATION RATE: 19 BRPM | BODY MASS INDEX: 26.38 KG/M2 | TEMPERATURE: 98.3 F | HEART RATE: 89 BPM | OXYGEN SATURATION: 97 % | SYSTOLIC BLOOD PRESSURE: 139 MMHG | WEIGHT: 153.66 LBS | DIASTOLIC BLOOD PRESSURE: 96 MMHG

## 2018-08-11 PROCEDURE — 94640 AIRWAY INHALATION TREATMENT: CPT

## 2018-08-11 PROCEDURE — 74011000250 HC RX REV CODE- 250: Performed by: FAMILY MEDICINE

## 2018-08-11 PROCEDURE — 74011250636 HC RX REV CODE- 250/636: Performed by: FAMILY MEDICINE

## 2018-08-11 PROCEDURE — 77030029684 HC NEB SM VOL KT MONA -A

## 2018-08-11 PROCEDURE — 94760 N-INVAS EAR/PLS OXIMETRY 1: CPT

## 2018-08-11 PROCEDURE — 77010033678 HC OXYGEN DAILY

## 2018-08-11 RX ADMIN — IPRATROPIUM BROMIDE AND ALBUTEROL SULFATE 3 ML: .5; 3 SOLUTION RESPIRATORY (INHALATION) at 08:53

## 2018-08-11 RX ADMIN — Medication 10 ML: at 06:55

## 2018-08-11 RX ADMIN — SODIUM CHLORIDE 75 ML/HR: 900 INJECTION, SOLUTION INTRAVENOUS at 06:55

## 2018-08-11 NOTE — DISCHARGE SUMMARY
Discharge Summary       PATIENT ID: Cindy Aparicio  MRN: [id number]   YOB: 1975    DATE OF ADMISSION: 8/9/2018  1:40 PM    DATE OF DISCHARGE: 8/11/18   PRIMARY CARE PROVIDER: None     ATTENDING PHYSICIAN:  DISCHARGING PROVIDER: Heena Bethea MD    To contact this individual call [phone] and ask the  to page. If unavailable ask to be transferred the Adult Hospitalist Department. CONSULTATIONS: None    PROCEDURES/SURGERIES: * No surgery found *    [address] COURSE:     40-year-old female with history of asthma, acute hypoxic respiratory failure, polysubstance abuse, history of cocaine, heroin and marijuana abuse, history of seizure disorder, pseudoseizures, hypertension, sarcoidosis, bipolar disorder, tobacco abuse,p/w OD , 27 tablets of Nyquil, 4 tablets of Zoloft and unknown amount of gabapentin that was missing.  Patient was discharged about 2 days ago with Zoloft, lithium, hydroxyzine, Seroquel, and Depakote. Per EMS was found at [facility] station,unresponsive ,In the ER initially noted somnolent easily arousable.  Denies any suicidal ideation>Poison control was contacted in the ER , recommended observation on telemetry    Course per problem list and management    Encephalopathy- resolved  -due to meds  -in setting of Overdose  -mentation returned to baseline     Overdose apparently on Seroquel, Atarax, gabapentin.    Poison control was notified by ER physician and per ER physician,    recommended observing the patient for 8-12 hours on a telemetry monitoring for QTc prolongation.  The patient has mild QT prolongation on her EKG on arrival  Tele monitoring revealed with no significant events, ekg with QTc 493  Poison control signed off  supportive care provided with IV hydration. Holding  Seroquel, Atarax, gabapentin and other medications   neurovascular checks.  stable  electrolytes remained stable   Repeat ekg NSR,no significant new changes  D/c tele downgrade to med surg     Overdose with possible suicidal intention.    Placed on suicide precautions and 1:1 monitoring. psych consulted appreciated reccomendations. Pt d/jamie from [facility] 8/7-with plan to to rehab for substance abuse in [facility]  Per psych ; She has normal thought process, speech pattern. No psychotic or grandiose sx. Neither suicidal sx  nor violent sx. Cognitive function including insight and judgment intact. Thinking is logical.  has: Bipolar disorder, no acute sx  . Polysubstance abuse, positive for benzodiazepines but prescribed clonazepam and positive for THC which is positive for 30 days after use. Not a suicidal risk and does not need 1:1 supervision        Discharge when medically cleared  Advised pt strongly not to take quetiapine. Seizure disorder, resume home meds     Anemia. chronic anemia.    The hemoglobin dropped from 11-8. 7.  Patient denies any hematemesis, melena, hemoptysis, hematuria. stool for occult blood. iron profile with iron 59. repeat cbc with Hgb 9.8     History of asthma.nebs prn     Gastrointestinal and deep vein thrombosis prophylaxis.  The patient will be on SCDs.     Generalized pain, tylenol, no narcotics             DISCHARGE DIAGNOSES / PLAN:      1. Overdose   2.bipolar disorder- no acute symptoms  3.polysubstance abuse           FOLLOW UP APPOINTMENTS:    Follow-up Information     Follow up With Details Comments Contact Info    None   None (395) Patient stated that they have no PCP      [facility] Go in 2 days      None   None (395) Patient stated that they have no PCP                 DIET: regular    ACTIVITY: Activity as tolerated          DISCHARGE MEDICATIONS:  Discharge Medication List as of 8/11/2018  1:25 PM      CONTINUE these medications which have NOT CHANGED    Details   !! . PHARMACY TO SUBSTITUTE PER PROTOCOL Take 2 Puffs by inhalation every four (4) hours as needed for Other.  Indications: COPD Associated with Chronic Bronchitis, Print, Disp-1 Inhaler, R-0      !! divalproex DR (DEPAKOTE) 500 mg tablet Take 1 Tab by mouth two (2) times a day. Indications: DEPRESSION ASSOCIATED WITH BIPOLAR DISORDER, Print, Disp-60 Tab, R-0      !! . PHARMACY TO SUBSTITUTE PER PROTOCOL Take 1 Puff by inhalation daily. Indications: COPD Associated with Chronic Bronchitis, Print, Disp-1 Inhaler, R-0      lithium carbonate 300 mg tablet Take 1 Tab by mouth two (2) times a day. Indications: DEPRESSION ASSOCIATED WITH BIPOLAR DISORDER, Print, Disp-60 Tab, R-0      !! sertraline (ZOLOFT) 100 mg tablet Take 1 Tab by mouth daily. Indications: Post Traumatic Stress Disorder, Print, Disp-30 Tab, R-0      hydrOXYzine HCl (ATARAX) 50 mg tablet Take 1 Tab by mouth every six (6) hours as needed for Itching for up to 10 days. Indications: anxiety, Print, Disp-15 Tab, R-0      gabapentin (NEURONTIN) 300 mg capsule Take 1 Cap by mouth three (3) times daily for 30 days. , No Print, Disp-90 Cap, R-0      lithium carbonate 300 mg capsule Take 1 Cap by mouth two (2) times a day for 30 days. , No Print, Disp-60 Cap, R-0      prazosin (MINIPRESS) 2 mg capsule Take 2 mg by mouth nightly. Indications: Post Traumatic Stress Disorder, Historical Med      !! divalproex DR (DEPAKOTE) 500 mg tablet Take 500 mg by mouth two (2) times a day., Historical Med      !! sertraline (ZOLOFT) 100 mg tablet Take 100 mg by mouth daily. , Historical Med      albuterol sulfate 90 mcg/actuation aepb Take 2 Puffs by inhalation every four (4) hours as needed. , Print, Disp-1 Inhaler, R-0       !! - Potential duplicate medications found. Please discuss with provider. STOP taking these medications       QUEtiapine (SEROQUEL) 400 mg tablet Comments:   Reason for Stopping:         QUEtiapine (SEROQUEL) 400 mg tablet Comments:   Reason for Stopping:                 NOTIFY YOUR PHYSICIAN FOR ANY OF THE FOLLOWING:   Fever over 101 degrees for 24 hours.    Chest pain, shortness of breath, fever, chills, nausea, vomiting, diarrhea, change in mentation, falling, weakness, bleeding. Severe pain or pain not relieved by medications. Or, any other signs or symptoms that you may have questions about. DISPOSITION:    Home With:family/self care   OT  PT  HH  RN       Long term SNF/Inpatient Rehab    Independent/assisted living    Hospice    Other:       PATIENT CONDITION AT DISCHARGE:     Functional status    Poor     Deconditioned    x Independent      Cognition   x  Lucid     Forgetful     Dementia      Catheters/lines (plus indication)    Calvin     PICC     PEG    x None      Code status   x  Full code     DNR      PHYSICAL EXAMINATION AT DISCHARGE:  BP (!) 139/96  Pulse 89  Temp 98.3 °F (36.8 °C)  Resp 19  Wt 69.7 kg (153 lb 10.6 oz)  SpO2 97%  Breastfeeding? No  BMI 26.38 kg/m2  GEN:awake alert,no acute distress,conversing appropriately  HEENT:AT,NC, PERRL,EOMI,orophaynx with no lesions,moist mucus memb  NECK:supple,no masses  LUNGS:CTA b/l  CVS:RRR  ABD;soft,NT,ND,+BS  EXT;full ROM, no edema ,   MSK;non tender,no spine tenderness  NEURO  Cranial also II-XII grossly intact.  Sensory grossly within normal limits. DTR+  SKIN:  Warm.   Psych:pleasant, co operative         CHRONIC MEDICAL DIAGNOSES:  Problem List as of 8/11/2018  Date Reviewed: 8/9/2018          Codes Class Noted - Resolved    * (Principal)Overdose ICD-10-CM: T50.901A  ICD-9-CM: 977.9, E980.5  8/9/2018 - Present        Poly-drug misuser ICD-10-CM: F19.10  ICD-9-CM: 305.90  8/4/2018 - Present        Asthma exacerbation ICD-10-CM: J45.901  ICD-9-CM: 493.92  7/26/2018 - Present        Drug overdose ICD-10-CM: T50.901A  ICD-9-CM: 977.9, E980.5  7/31/2017 - Present        Altered mental status, unspecified ICD-10-CM: R41.82  ICD-9-CM: 780.97  4/26/2017 - Present        Lactic acidosis ICD-10-CM: E87.2  ICD-9-CM: 276.2  4/25/2017 - Present        Acute encephalopathy ICD-10-CM: G93.40  ICD-9-CM: 348.30  4/25/2017 - Present        Potential for altered mental status ICD-10-CM: Z91.89  ICD-9-CM: V49.89  4/25/2017 - Present        Convulsion disorder ([facility] ICD-10-CM: R56.9  ICD-9-CM: 780.39  4/25/2017 - Present        Stenosis of both internal carotid arteries ICD-10-CM: I65.23  ICD-9-CM: 433.10, 433.30  4/25/2017 - Present        Convulsive syncope ICD-10-CM: R55  ICD-9-CM: 780.2, 780.39  4/25/2017 - Present        Seizure ([facility] ICD-10-CM: R56.9  ICD-9-CM: 780.39  4/20/2017 - Present        Bipolar 1 disorder (HCC) ICD-10-CM: F31.9  ICD-9-CM: 296.7  Unknown - Present        Chronic obstructive pulmonary disease ([facility] ICD-10-CM: J44.9  ICD-9-CM: 896  Unknown - Present        Chronic pain ICD-10-CM: G89.29  ICD-9-CM: 338.29  Unknown - Present    Overview Signed 2/22/2017  7:15 PM by Anayeli Alexis MD     back, leg             Hepatitis B ICD-10-CM: B19.10  ICD-9-CM: 070.30  Unknown - Present        Sarcoidosis ICD-10-CM: D86.9  ICD-9-CM: 135  Unknown - Present        Tobacco abuse ICD-10-CM: Z72.0  ICD-9-CM: 305.1  Unknown - Present        Anemia ICD-10-CM: D64.9  ICD-9-CM: 285.9  2/22/2017 - Present        Cocaine abuse ICD-10-CM: F14.10  ICD-9-CM: 305.60  Unknown - Present        Polysubstance abuse ICD-10-CM: F19.10  ICD-9-CM: 305.90  Unknown - Present        Narcotic abuse ICD-10-CM: F11.10  ICD-9-CM: 305.40  Unknown - Present        HTN (hypertension) ICD-10-CM: I10  ICD-9-CM: 401.9  Unknown - Present        Depression ICD-10-CM: F32.9  ICD-9-CM: 358  1/25/2017 - Present        Sinus tachycardia ICD-10-CM: R00.0  ICD-9-CM: 427.89  1/10/2017 - Present        Heroin abuse ICD-10-CM: F11.10  ICD-9-CM: 305.50  1/30/2014 - Present        RESOLVED: Asthma exacerbation ICD-10-CM: J45.901  ICD-9-CM: 493.92  2/22/2017 - 4/19/2017        RESOLVED: Asthma ICD-10-CM: J45.909  ICD-9-CM: 493.90  Unknown - 2/22/2017        RESOLVED: Depressive disorder ICD-10-CM: F32.9  ICD-9-CM: 251  1/25/2017 - 2/22/2017        RESOLVED: Polysubstance dependence ([facility] ICD-10-CM: F19.20  ICD-9-CM: 304.80  1/25/2017 - 2/22/2017        RESOLVED: Substance induced mood disorder ([facility] ICD-10-CM: F19.94  ICD-9-CM: 292.84  1/25/2017 - 2/22/2017        RESOLVED: HTN (hypertension) ICD-10-CM: I10  ICD-9-CM: 401.9  1/11/2017 - 2/22/2017        RESOLVED: Drug abuse ICD-10-CM: F19.10  ICD-9-CM: 305.90  1/11/2017 - 2/22/2017        RESOLVED: Bipolar disorder ([facility] ICD-10-CM: F31.9  ICD-9-CM: 296.80  1/11/2017 - 2/22/2017        RESOLVED: COPD (chronic obstructive pulmonary disease) ([facility] ICD-10-CM: J44.9  ICD-9-CM: 496  1/9/2017 - 2/22/2017        RESOLVED: Unspecified asthma, with exacerbation ICD-10-CM: J45.901  ICD-9-CM: 493.92  1/30/2014 - 2/22/2017              Greater than 30  minutes were spent with the patient on counseling and coordination of care    Signed:   Heena Ribera MD  8/13/2018  1:02 PM

## 2018-08-11 NOTE — PROGRESS NOTES
Discussed with the patient and all questioned fully answered.  She will call me if any problems arise. - gave her DC instructions along with cab voucher to [facility]

## 2018-11-28 NOTE — ROUTINE PROCESS
TRANSFER - OUT REPORT:    Verbal report given to Zion RN (name) on Alfredo Townsend  being transferred to onc (unit) for routine progression of care       Report consisted of patients Situation, Background, Assessment and   Recommendations(SBAR). Information from the following report(s) SBAR, Kardex, ED Summary, [facility] and Recent Results was reviewed with the receiving nurse. Lines:   Peripheral IV 01/15/17 Left Antecubital (Active)   Site Assessment Clean, dry, & intact 1/15/2017  6:00 PM   Phlebitis Assessment 0 1/15/2017  6:00 PM   Infiltration Assessment 0 1/15/2017  6:00 PM   Dressing Status Clean, dry, & intact 1/15/2017  6:00 PM   Dressing Type Tape;Transparent 1/15/2017  6:00 PM   Hub Color/Line Status Blue;Flushed 1/15/2017  6:00 PM   Action Taken Blood drawn 1/15/2017  6:00 PM        Opportunity for questions and clarification was provided.       Patient transported with:   [email or web address] Detail Level: Zone Pt came in from home with c/o blister to left big toe, pt states it very itching. Detail Level: Detailed

## 2020-09-10 ENCOUNTER — HOSPITAL ENCOUNTER (EMERGENCY)
Age: 45
Discharge: HOME | End: 2020-09-11
Attending: EMERGENCY MEDICINE
Payer: MEDICAID

## 2020-09-10 ENCOUNTER — HOSPITAL ENCOUNTER (EMERGENCY)
Age: 45
Discharge: LEFT BEFORE DISCHARGE | End: 2020-09-10
Attending: EMERGENCY MEDICINE
Payer: MEDICAID

## 2020-09-10 DIAGNOSIS — T40.1X1A ACCIDENTAL OVERDOSE OF HEROIN, INITIAL ENCOUNTER (HCC): Primary | ICD-10-CM

## 2020-09-10 DIAGNOSIS — T50.904A DRUG OVERDOSE, UNDETERMINED INTENT, INITIAL ENCOUNTER: Primary | ICD-10-CM

## 2020-09-10 PROCEDURE — 99282 EMERGENCY DEPT VISIT SF MDM: CPT

## 2020-09-10 PROCEDURE — 75810000275 HC EMERGENCY DEPT VISIT NO LEVEL OF CARE

## 2020-09-10 PROCEDURE — 99284 EMERGENCY DEPT VISIT MOD MDM: CPT

## 2020-09-10 RX ORDER — ONDANSETRON 2 MG/ML
4 INJECTION INTRAMUSCULAR; INTRAVENOUS
Status: DISCONTINUED | OUTPATIENT
Start: 2020-09-10 | End: 2020-09-10

## 2020-09-10 NOTE — ED NOTES
This RN witnessed coversation with ANDRZEJ Robles . Pt refuses to be seen and evaluated despite our multiple attempts.

## 2020-09-10 NOTE — ED TRIAGE NOTES
Pt refusing to answer any of this RN's questions or allow this RN to get vitals until she talks to the doctor. MD Garfield Hernandez made aware. Pt was given 0.5mg in each nostril of Narcan for a total of 1mg by EMS.

## 2020-09-10 NOTE — ED PROVIDER NOTES
EMERGENCY DEPARTMENT HISTORY AND PHYSICAL EXAM    Date: 9/10/2020  Patient Name: Rachana Silva    History of Presenting Illness     Chief Complaint   Patient presents with    Drug Overdose         History Provided By: Patient    HPI: Rachana Silva is a 53 y.o. female with a PMH of asthma, depression, sarcoidosis, hep B, bipolar 1, polysubstance abuse, hypertension who presents with EMS here for drug overdose. Patient refusing to answer any questions and states that she does not want to be seen or evaluated. PCP: None    Current Outpatient Medications   Medication Sig Dispense Refill    . PHARMACY TO SUBSTITUTE PER PROTOCOL Take 2 Puffs by inhalation every four (4) hours as needed for Other. Indications: COPD Associated with Chronic Bronchitis 1 Inhaler 0    divalproex DR (DEPAKOTE) 500 mg tablet Take 1 Tab by mouth two (2) times a day. Indications: DEPRESSION ASSOCIATED WITH BIPOLAR DISORDER 60 Tab 0    . PHARMACY TO SUBSTITUTE PER PROTOCOL Take 1 Puff by inhalation daily. Indications: COPD Associated with Chronic Bronchitis 1 Inhaler 0    lithium carbonate 300 mg tablet Take 1 Tab by mouth two (2) times a day. Indications: DEPRESSION ASSOCIATED WITH BIPOLAR DISORDER 60 Tab 0    sertraline (ZOLOFT) 100 mg tablet Take 1 Tab by mouth daily. Indications: Post Traumatic Stress Disorder 30 Tab 0    prazosin (MINIPRESS) 2 mg capsule Take 2 mg by mouth nightly. Indications: Post Traumatic Stress Disorder      divalproex DR (DEPAKOTE) 500 mg tablet Take 500 mg by mouth two (2) times a day.  sertraline (ZOLOFT) 100 mg tablet Take 100 mg by mouth daily.  albuterol sulfate 90 mcg/actuation aepb Take 2 Puffs by inhalation every four (4) hours as needed.  1 Inhaler 0       Past History     Past Medical History:  Past Medical History:   Diagnosis Date    Asthma     Bipolar 1 disorder ([facility] [address]     Chronic obstructive pulmonary disease (HCC)     Chronic pain     back, leg    Cocaine abuse     Depression     Hepatitis B     Heroin abuse     HTN (hypertension)     Narcotic abuse     Polysubstance abuse     Sarcoidosis     Tobacco abuse        Past Surgical History:  Past Surgical History:   Procedure Laterality Date    HX GYN      HX WISDOM TEETH EXTRACTION         Family History:  Family History   Problem Relation Age of Onset    Hypertension Father     Hypertension Mother        Social History:  Social History     Tobacco Use    Smoking status: Former Smoker     Packs/day: 0.25     Years: 15.00     Pack years: 3.75     Last attempt to quit: 8/3/2017     Years since quitting: 3.1    Smokeless tobacco: Never Used    Tobacco comment: \"I already quit\"   Substance Use Topics    Alcohol use: No    Drug use: Yes     Types: Marijuana       Allergies: Allergies   Allergen Reactions    Tomato Swelling     Tongue         Review of Systems   Review of Systems   Unable to perform ROS: Other (Patient refused)       Physical Exam   There were no vitals filed for this visit. Physical Exam  Constitutional:       Comments: Patient refused exam or initial vital signs           Diagnostic Study Results     Labs -   No results found for this or any previous visit (from the past 12 hour(s)). Radiologic Studies -   No orders to display     CT Results  (Last 48 hours)    None        CXR Results  (Last 48 hours)    None            Medical Decision Making   I am the first provider for this patient. I reviewed the vital signs, available nursing notes, past medical history, past surgical history, family history and social history. Vital Signs-Reviewed the patient's vital signs. Records Reviewed: Old Medical Records    ED Course as of Sep 10 1825   Thu Sep 10, 2020   1821 Went in to evaluate pt and she states she does not want to be seen. Pt initially states she is here for COPD and then states she just needs some ginger ale. Pt asked about overdose and states she has only had her suboxone today.   Pt then stops answering questions, puts her shoes on and walks out of ED.    [AH]      ED Course User Index  [AH] Devante Sun PA-C          Disposition:  Eloped    Procedures:  Procedures    Please note that this dictation was completed with Dragon, computer voice recognition software. Quite often unanticipated grammatical, syntax, homophones, and other interpretive errors are inadvertently transcribed by the computer software. Please disregard these errors. Additionally, please excuse any errors that have escaped final proofreading. Diagnosis     Clinical Impression:   1.  Drug overdose, undetermined intent, initial encounter

## 2020-09-11 VITALS
HEIGHT: 64 IN | RESPIRATION RATE: 14 BRPM | TEMPERATURE: 98.7 F | DIASTOLIC BLOOD PRESSURE: 87 MMHG | WEIGHT: 200 LBS | HEART RATE: 98 BPM | OXYGEN SATURATION: 95 % | SYSTOLIC BLOOD PRESSURE: 136 MMHG | BODY MASS INDEX: 34.15 KG/M2

## 2020-09-11 RX ORDER — NALOXONE HYDROCHLORIDE 4 MG/.1ML
SPRAY NASAL
Qty: 2 EACH | Refills: 2 | Status: ON HOLD | OUTPATIENT
Start: 2020-09-11 | End: 2020-09-24

## 2020-09-11 NOTE — ED NOTES
..Discharge summary and discharge medications reviewed with patient and appropriate educational materials and side effects teaching were provided. patient  Given 1 paper prescriptions and 0 electronic prescriptions sent to pt's listed pharmacy. Patient  verbalized understanding of the importance of discussing medications with his or her physician or clinic they will be following up with. No si/s of acute distress prior to discharge. Patient offered wheelchair from treatment area to hospital entrance, patient declined wheelchair. Pt ambulatory out of ED independently.

## 2020-09-11 NOTE — DISCHARGE INSTRUCTIONS
Patient Education        Opioid Overdose: Care Instructions  Your Care Instructions     You have had treatment to help your body recover from taking too much of an opioid. You are getting better, but you may not feel well for a while. It takes time for the opioids to leave your body. How long it takes to feel better depends on which drug you took and how much you took of it. Opioids include illegal drugs such as heroin, often called smack, junk, H, and ska. Opioids also include medicines that doctors prescribe to treat pain. These are medicines such as oxycodone, methadone, and buprenorphine. They are sometimes sold and used illegally. Taking too much of an opioid can be dangerous. It may cause:  · Trouble breathing. · Low blood pressure. · A low heart rate. · A coma. When the doctor treated you for the overdose, he or she may have:  · Watched your symptoms or done tests to find out what kind of drug you took. · Given you fluids. · Given you oxygen to help you breathe. · Given you a medicine called naloxone to help reverse the effects of the opioid. · Done several tests, including blood tests, to see how you're responding to treatment. The doctor also watched you carefully to make sure you were recovering safely. Follow-up care is a key part of your treatment and safety. Be sure to make and go to all appointments, and call your doctor if you are having problems. It's also a good idea to know your test results and keep a list of the medicines you take. How can you care for yourself at home? · If you take opioids regularly, your body gets used to them. This is called physical dependence. If you are physically dependent on opioids, you may have withdrawal symptoms when you stop using them or use less. These symptoms can include nausea, sweating, chills, diarrhea, stomach cramps, and muscle aches. Withdrawal can last up to several weeks, depending on which opioid you took and how long you took it.  You may feel very ill, but you probably aren't in medical danger. · Your doctor may give you medicine to help you feel better. To help you get through withdrawal, you can also:  ? Get plenty of rest.  ? Drink plenty of fluids. ? Stay active. ? Eat a healthy diet. · If you had a tube in your throat to help you breathe, you may have a sore throat or hoarseness that can last a few days. Sip liquids to help soothe your throat. · Do not drink alcohol or take illegal drugs. · Do not take medicines that make you tired, like sleeping pills or muscle relaxers. · Do not drive if you feel sleepy or groggy while you recover from an overdose. · Get help to stop using opioids. Talk to your doctor about substance use treatment programs. · Talk to your doctor or pharmacist about having a naloxone rescue kit on hand. When should you call for help? Call 911 anytime you think you may need emergency care. For example, call if:    · You feel you cannot stop from hurting yourself or someone else. Call your doctor now or seek immediate medical care if:    · You have new or worse withdrawal symptoms, such as:  ? Stomach cramps. ? Vomiting. ? Diarrhea. ? Muscle aches. ? Sweating. Watch closely for changes in your health, and be sure to contact your doctor if:    · You do not get better as expected. Where can you learn more? Go to http://www.gray.com/  Enter Z171 in the search box to learn more about \"Opioid Overdose: Care Instructions. \"  Current as of: June 29, 2020               Content Version: 12.6  © 0645-5263 [employer]. Care instructions adapted under license by [employer] (which disclaims liability or warranty for this information). If you have questions about a medical condition or this instruction, always ask your healthcare professional. [address] any warranty or liability for your use of this information.

## 2020-09-11 NOTE — ED NOTES
Two RNS unsuccessful in getting an IV. MD stated we could discontinue the fluids and Zofran ordered. Pt resting quietly in room.

## 2020-09-11 NOTE — ED NOTES
Pt presents to ED via EMS complaining of overdose tonight. Pt was given 1mg intranasally in each nostril for a total of 2mg. Pt was seen here earlier for same complaint and left AMA without being seen. Pt cooperative at this time. Pt is alert and oriented x 4, RR even and unlabored, skin is warm and dry. Assessment completed and pt updated on plan of care. Call bell in reach. Emergency Department Nursing Plan of Care       The Nursing Plan of Care is developed from the Nursing assessment and Emergency Department Attending provider initial evaluation. The plan of care may be reviewed in the ED Provider note.     The Plan of Care was developed with the following considerations:   Patient / Family readiness to learn indicated by:verbalized understanding  Persons(s) to be included in education: patient  Barriers to Learning/Limitations:No    Signed     Chandu Mendoza    9/10/2020   10:19 PM

## 2020-09-11 NOTE — ED NOTES
Verbal shift change report given to Elma Gonzales RN (oncoming nurse) by Ethan Otero RN (offgoing nurse). Report included the following information SBAR, Kardex, ED Summary, Procedure Summary, MAR and Recent Results.

## 2020-09-11 NOTE — ED NOTES
Patient brought in by EMS c/o patient being an overdose. Patient found face first down in the ground. Patient reports using heroin. Patient would not give name to EMS provider. Patient demanding a ginger ale and a blanket. Patient in NAD. Provider at bedside.

## 2020-09-24 ENCOUNTER — HOSPITAL ENCOUNTER (INPATIENT)
Age: 45
LOS: 1 days | Discharge: HOME | DRG: 141 | End: 2020-09-25
Attending: EMERGENCY MEDICINE | Admitting: HOSPITALIST
Payer: MEDICAID

## 2020-09-24 ENCOUNTER — APPOINTMENT (OUTPATIENT)
Dept: GENERAL RADIOLOGY | Age: 45
DRG: 141 | End: 2020-09-24
Attending: EMERGENCY MEDICINE
Payer: MEDICAID

## 2020-09-24 DIAGNOSIS — J45.42 MODERATE PERSISTENT ASTHMA WITH STATUS ASTHMATICUS: ICD-10-CM

## 2020-09-24 DIAGNOSIS — E87.6 HYPOKALEMIA: Primary | ICD-10-CM

## 2020-09-24 PROBLEM — F11.93 HEROIN WITHDRAWAL (HCC): Status: ACTIVE | Noted: 2020-09-24

## 2020-09-24 LAB
AMPHET UR QL SCN: NEGATIVE
ANION GAP SERPL CALC-SCNC: 7 MMOL/L (ref 5–15)
ANION GAP SERPL CALC-SCNC: 8 MMOL/L (ref 5–15)
APPEARANCE UR: ABNORMAL
BARBITURATES UR QL SCN: NEGATIVE
BASOPHILS # BLD: 0 K/UL (ref 0–0.1)
BASOPHILS NFR BLD: 0 % (ref 0–1)
BENZODIAZ UR QL: NEGATIVE
BILIRUB UR QL: NEGATIVE
BUN SERPL-MCNC: 6 MG/DL (ref 6–20)
BUN SERPL-MCNC: 8 MG/DL (ref 6–20)
BUN/CREAT SERPL: 7 (ref 12–20)
BUN/CREAT SERPL: 7 (ref 12–20)
CALCIUM SERPL-MCNC: 8.8 MG/DL (ref 8.5–10.1)
CALCIUM SERPL-MCNC: 8.8 MG/DL (ref 8.5–10.1)
CANNABINOIDS UR QL SCN: POSITIVE
CHLORIDE SERPL-SCNC: 95 MMOL/L (ref 97–108)
CHLORIDE SERPL-SCNC: 98 MMOL/L (ref 97–108)
CO2 SERPL-SCNC: 27 MMOL/L (ref 21–32)
CO2 SERPL-SCNC: 33 MMOL/L (ref 21–32)
COCAINE UR QL SCN: NEGATIVE
COLOR UR: ABNORMAL
CREAT SERPL-MCNC: 0.87 MG/DL (ref 0.55–1.02)
CREAT SERPL-MCNC: 1.21 MG/DL (ref 0.55–1.02)
DIFFERENTIAL METHOD BLD: ABNORMAL
DRUG SCRN COMMENT,DRGCM: ABNORMAL
EOSINOPHIL # BLD: 0.1 K/UL (ref 0–0.4)
EOSINOPHIL NFR BLD: 1 % (ref 0–7)
ERYTHROCYTE [DISTWIDTH] IN BLOOD BY AUTOMATED COUNT: 17.2 % (ref 11.5–14.5)
ETHANOL SERPL-MCNC: <10 MG/DL
GLUCOSE SERPL-MCNC: 128 MG/DL (ref 65–100)
GLUCOSE SERPL-MCNC: 142 MG/DL (ref 65–100)
GLUCOSE UR STRIP.AUTO-MCNC: NEGATIVE MG/DL
HCT VFR BLD AUTO: 36.2 % (ref 35–47)
HGB BLD-MCNC: 11.5 G/DL (ref 11.5–16)
HGB UR QL STRIP: NEGATIVE
IMM GRANULOCYTES # BLD AUTO: 0 K/UL (ref 0–0.04)
IMM GRANULOCYTES NFR BLD AUTO: 0 % (ref 0–0.5)
KETONES UR QL STRIP.AUTO: NEGATIVE MG/DL
LEUKOCYTE ESTERASE UR QL STRIP.AUTO: NEGATIVE
LYMPHOCYTES # BLD: 1.7 K/UL (ref 0.8–3.5)
LYMPHOCYTES NFR BLD: 21 % (ref 12–49)
MAGNESIUM SERPL-MCNC: 1.6 MG/DL (ref 1.6–2.4)
MAGNESIUM SERPL-MCNC: 1.8 MG/DL (ref 1.6–2.4)
MCH RBC QN AUTO: 26.3 PG (ref 26–34)
MCHC RBC AUTO-ENTMCNC: 31.8 G/DL (ref 30–36.5)
MCV RBC AUTO: 82.8 FL (ref 80–99)
METHADONE UR QL: NEGATIVE
MONOCYTES # BLD: 0.8 K/UL (ref 0–1)
MONOCYTES NFR BLD: 10 % (ref 5–13)
NEUTS SEG # BLD: 5.4 K/UL (ref 1.8–8)
NEUTS SEG NFR BLD: 68 % (ref 32–75)
NITRITE UR QL STRIP.AUTO: NEGATIVE
NRBC # BLD: 0 K/UL (ref 0–0.01)
NRBC BLD-RTO: 0 PER 100 WBC
OPIATES UR QL: POSITIVE
PCP UR QL: NEGATIVE
PH UR STRIP: 6 [PH] (ref 5–8)
PHOSPHATE SERPL-MCNC: 1.8 MG/DL (ref 2.6–4.7)
PLATELET # BLD AUTO: 373 K/UL (ref 150–400)
PMV BLD AUTO: 9.6 FL (ref 8.9–12.9)
POTASSIUM SERPL-SCNC: 2.2 MMOL/L (ref 3.5–5.1)
POTASSIUM SERPL-SCNC: 3.9 MMOL/L (ref 3.5–5.1)
PROCALCITONIN SERPL-MCNC: <0.05 NG/ML
PROT UR STRIP-MCNC: NEGATIVE MG/DL
RBC # BLD AUTO: 4.37 M/UL (ref 3.8–5.2)
SODIUM SERPL-SCNC: 133 MMOL/L (ref 136–145)
SODIUM SERPL-SCNC: 135 MMOL/L (ref 136–145)
SP GR UR REFRACTOMETRY: 1.02 (ref 1–1.03)
UROBILINOGEN UR QL STRIP.AUTO: 1 EU/DL (ref 0.2–1)
WBC # BLD AUTO: 8.1 K/UL (ref 3.6–11)

## 2020-09-24 PROCEDURE — 74011000250 HC RX REV CODE- 250: Performed by: EMERGENCY MEDICINE

## 2020-09-24 PROCEDURE — 65270000032 HC RM SEMIPRIVATE

## 2020-09-24 PROCEDURE — 94760 N-INVAS EAR/PLS OXIMETRY 1: CPT

## 2020-09-24 PROCEDURE — 99285 EMERGENCY DEPT VISIT HI MDM: CPT

## 2020-09-24 PROCEDURE — 74011250636 HC RX REV CODE- 250/636: Performed by: EMERGENCY MEDICINE

## 2020-09-24 PROCEDURE — 81003 URINALYSIS AUTO W/O SCOPE: CPT

## 2020-09-24 PROCEDURE — 74011000250 HC RX REV CODE- 250: Performed by: INTERNAL MEDICINE

## 2020-09-24 PROCEDURE — 74011250637 HC RX REV CODE- 250/637: Performed by: STUDENT IN AN ORGANIZED HEALTH CARE EDUCATION/TRAINING PROGRAM

## 2020-09-24 PROCEDURE — 84100 ASSAY OF PHOSPHORUS: CPT

## 2020-09-24 PROCEDURE — 74011250636 HC RX REV CODE- 250/636: Performed by: INTERNAL MEDICINE

## 2020-09-24 PROCEDURE — 80307 DRUG TEST PRSMV CHEM ANLYZR: CPT

## 2020-09-24 PROCEDURE — 74011250636 HC RX REV CODE- 250/636: Performed by: STUDENT IN AN ORGANIZED HEALTH CARE EDUCATION/TRAINING PROGRAM

## 2020-09-24 PROCEDURE — 85025 COMPLETE CBC W/AUTO DIFF WBC: CPT

## 2020-09-24 PROCEDURE — 80048 BASIC METABOLIC PNL TOTAL CA: CPT

## 2020-09-24 PROCEDURE — 83735 ASSAY OF MAGNESIUM: CPT

## 2020-09-24 PROCEDURE — 84145 PROCALCITONIN (PCT): CPT

## 2020-09-24 PROCEDURE — 36415 COLL VENOUS BLD VENIPUNCTURE: CPT

## 2020-09-24 PROCEDURE — 94640 AIRWAY INHALATION TREATMENT: CPT

## 2020-09-24 PROCEDURE — 96366 THER/PROPH/DIAG IV INF ADDON: CPT

## 2020-09-24 PROCEDURE — 74011636637 HC RX REV CODE- 636/637: Performed by: EMERGENCY MEDICINE

## 2020-09-24 PROCEDURE — 74011250637 HC RX REV CODE- 250/637: Performed by: INTERNAL MEDICINE

## 2020-09-24 PROCEDURE — 96365 THER/PROPH/DIAG IV INF INIT: CPT

## 2020-09-24 PROCEDURE — 77030029684 HC NEB SM VOL KT MONA -A

## 2020-09-24 PROCEDURE — 93005 ELECTROCARDIOGRAM TRACING: CPT

## 2020-09-24 PROCEDURE — 71045 X-RAY EXAM CHEST 1 VIEW: CPT

## 2020-09-24 PROCEDURE — 74011250637 HC RX REV CODE- 250/637: Performed by: EMERGENCY MEDICINE

## 2020-09-24 RX ORDER — ACETAMINOPHEN 325 MG/1
650 TABLET ORAL
Status: DISCONTINUED | OUTPATIENT
Start: 2020-09-24 | End: 2020-09-24

## 2020-09-24 RX ORDER — LOPERAMIDE HYDROCHLORIDE 2 MG/1
2 CAPSULE ORAL
Status: DISCONTINUED | OUTPATIENT
Start: 2020-09-24 | End: 2020-09-25 | Stop reason: HOSPADM

## 2020-09-24 RX ORDER — MAGNESIUM SULFATE HEPTAHYDRATE 40 MG/ML
2 INJECTION, SOLUTION INTRAVENOUS ONCE
Status: COMPLETED | OUTPATIENT
Start: 2020-09-24 | End: 2020-09-24

## 2020-09-24 RX ORDER — IPRATROPIUM BROMIDE AND ALBUTEROL SULFATE 2.5; .5 MG/3ML; MG/3ML
9 SOLUTION RESPIRATORY (INHALATION)
Status: COMPLETED | OUTPATIENT
Start: 2020-09-24 | End: 2020-09-24

## 2020-09-24 RX ORDER — PROMETHAZINE HYDROCHLORIDE 25 MG/1
12.5 TABLET ORAL
Status: DISCONTINUED | OUTPATIENT
Start: 2020-09-24 | End: 2020-09-25 | Stop reason: HOSPADM

## 2020-09-24 RX ORDER — DIVALPROEX SODIUM 500 MG/1
500 TABLET, DELAYED RELEASE ORAL 2 TIMES DAILY
Status: DISCONTINUED | OUTPATIENT
Start: 2020-09-24 | End: 2020-09-25 | Stop reason: HOSPADM

## 2020-09-24 RX ORDER — MAGNESIUM SULFATE HEPTAHYDRATE 40 MG/ML
2 INJECTION, SOLUTION INTRAVENOUS ONCE
Status: DISCONTINUED | OUTPATIENT
Start: 2020-09-24 | End: 2020-09-24

## 2020-09-24 RX ORDER — MAGNESIUM SULFATE HEPTAHYDRATE 40 MG/ML
2 INJECTION, SOLUTION INTRAVENOUS
Status: COMPLETED | OUTPATIENT
Start: 2020-09-24 | End: 2020-09-24

## 2020-09-24 RX ORDER — CLONIDINE HYDROCHLORIDE 0.3 MG/1
0.3 TABLET ORAL 2 TIMES DAILY
Status: ON HOLD | COMMUNITY
End: 2020-09-25 | Stop reason: SDUPTHER

## 2020-09-24 RX ORDER — PREDNISONE 20 MG/1
60 TABLET ORAL ONCE
Status: DISCONTINUED | OUTPATIENT
Start: 2020-09-24 | End: 2020-09-24

## 2020-09-24 RX ORDER — CLONIDINE HYDROCHLORIDE 0.1 MG/1
0.1 TABLET ORAL
Status: DISCONTINUED | OUTPATIENT
Start: 2020-09-24 | End: 2020-09-24

## 2020-09-24 RX ORDER — POTASSIUM CHLORIDE 750 MG/1
40 TABLET, FILM COATED, EXTENDED RELEASE ORAL
Status: DISCONTINUED | OUTPATIENT
Start: 2020-09-24 | End: 2020-09-24

## 2020-09-24 RX ORDER — PRAZOSIN HYDROCHLORIDE 1 MG/1
2 CAPSULE ORAL
Status: DISCONTINUED | OUTPATIENT
Start: 2020-09-24 | End: 2020-09-25 | Stop reason: HOSPADM

## 2020-09-24 RX ORDER — UMECLIDINIUM 62.5 UG/1
1 AEROSOL, POWDER ORAL DAILY
Status: ON HOLD | COMMUNITY
End: 2020-09-25 | Stop reason: SDUPTHER

## 2020-09-24 RX ORDER — MAGNESIUM SULFATE 1 G/100ML
1 INJECTION INTRAVENOUS ONCE
Status: DISCONTINUED | OUTPATIENT
Start: 2020-09-24 | End: 2020-09-24

## 2020-09-24 RX ORDER — POTASSIUM CHLORIDE 750 MG/1
40 TABLET, FILM COATED, EXTENDED RELEASE ORAL
Status: COMPLETED | OUTPATIENT
Start: 2020-09-24 | End: 2020-09-24

## 2020-09-24 RX ORDER — IPRATROPIUM BROMIDE AND ALBUTEROL SULFATE 2.5; .5 MG/3ML; MG/3ML
3 SOLUTION RESPIRATORY (INHALATION)
Status: DISCONTINUED | OUTPATIENT
Start: 2020-09-24 | End: 2020-09-25 | Stop reason: HOSPADM

## 2020-09-24 RX ORDER — SODIUM CHLORIDE 9 MG/ML
75 INJECTION, SOLUTION INTRAVENOUS CONTINUOUS
Status: DISCONTINUED | OUTPATIENT
Start: 2020-09-24 | End: 2020-09-25 | Stop reason: HOSPADM

## 2020-09-24 RX ORDER — THERA TABS 400 MCG
1 TAB ORAL DAILY
Status: DISCONTINUED | OUTPATIENT
Start: 2020-09-24 | End: 2020-09-25 | Stop reason: HOSPADM

## 2020-09-24 RX ORDER — MONTELUKAST SODIUM 10 MG/1
10 TABLET ORAL
Status: DISCONTINUED | OUTPATIENT
Start: 2020-09-24 | End: 2020-09-25 | Stop reason: HOSPADM

## 2020-09-24 RX ORDER — ACETAMINOPHEN 325 MG/1
650 TABLET ORAL
Status: DISCONTINUED | OUTPATIENT
Start: 2020-09-24 | End: 2020-09-25 | Stop reason: HOSPADM

## 2020-09-24 RX ORDER — POTASSIUM CHLORIDE 7.45 MG/ML
10 INJECTION INTRAVENOUS
Status: DISCONTINUED | OUTPATIENT
Start: 2020-09-24 | End: 2020-09-24

## 2020-09-24 RX ORDER — HYDRALAZINE HYDROCHLORIDE 20 MG/ML
10 INJECTION INTRAMUSCULAR; INTRAVENOUS
Status: DISCONTINUED | OUTPATIENT
Start: 2020-09-24 | End: 2020-09-24

## 2020-09-24 RX ORDER — ONDANSETRON 2 MG/ML
4 INJECTION INTRAMUSCULAR; INTRAVENOUS
Status: DISCONTINUED | OUTPATIENT
Start: 2020-09-24 | End: 2020-09-25 | Stop reason: HOSPADM

## 2020-09-24 RX ORDER — SODIUM CHLORIDE 0.9 % (FLUSH) 0.9 %
5-40 SYRINGE (ML) INJECTION AS NEEDED
Status: DISCONTINUED | OUTPATIENT
Start: 2020-09-24 | End: 2020-09-25 | Stop reason: HOSPADM

## 2020-09-24 RX ORDER — METHADONE HYDROCHLORIDE 10 MG/1
30 TABLET ORAL ONCE
Status: DISCONTINUED | OUTPATIENT
Start: 2020-09-24 | End: 2020-09-24 | Stop reason: SDUPTHER

## 2020-09-24 RX ORDER — MAGNESIUM SULFATE 1 G/100ML
1 INJECTION INTRAVENOUS ONCE
Status: ACTIVE | OUTPATIENT
Start: 2020-09-24 | End: 2020-09-24

## 2020-09-24 RX ORDER — PREDNISONE 20 MG/1
60 TABLET ORAL
Status: COMPLETED | OUTPATIENT
Start: 2020-09-24 | End: 2020-09-24

## 2020-09-24 RX ORDER — SERTRALINE HYDROCHLORIDE 50 MG/1
100 TABLET, FILM COATED ORAL DAILY
Status: DISCONTINUED | OUTPATIENT
Start: 2020-09-24 | End: 2020-09-24

## 2020-09-24 RX ORDER — CLONIDINE HYDROCHLORIDE 0.1 MG/1
0.2 TABLET ORAL 3 TIMES DAILY
Status: DISCONTINUED | OUTPATIENT
Start: 2020-09-24 | End: 2020-09-25 | Stop reason: HOSPADM

## 2020-09-24 RX ORDER — SODIUM CHLORIDE 0.9 % (FLUSH) 0.9 %
5-40 SYRINGE (ML) INJECTION EVERY 8 HOURS
Status: DISCONTINUED | OUTPATIENT
Start: 2020-09-24 | End: 2020-09-25 | Stop reason: HOSPADM

## 2020-09-24 RX ORDER — ENOXAPARIN SODIUM 100 MG/ML
40 INJECTION SUBCUTANEOUS DAILY
Status: DISCONTINUED | OUTPATIENT
Start: 2020-09-24 | End: 2020-09-25 | Stop reason: HOSPADM

## 2020-09-24 RX ORDER — DICYCLOMINE HYDROCHLORIDE 10 MG/ML
20 INJECTION INTRAMUSCULAR
Status: DISCONTINUED | OUTPATIENT
Start: 2020-09-24 | End: 2020-09-25 | Stop reason: HOSPADM

## 2020-09-24 RX ORDER — ACETAMINOPHEN 650 MG/1
650 SUPPOSITORY RECTAL
Status: DISCONTINUED | OUTPATIENT
Start: 2020-09-24 | End: 2020-09-24

## 2020-09-24 RX ORDER — POLYETHYLENE GLYCOL 3350 17 G/17G
17 POWDER, FOR SOLUTION ORAL DAILY PRN
Status: DISCONTINUED | OUTPATIENT
Start: 2020-09-24 | End: 2020-09-25 | Stop reason: HOSPADM

## 2020-09-24 RX ORDER — LITHIUM CARBONATE 300 MG
300 TABLET ORAL 2 TIMES DAILY
Status: DISCONTINUED | OUTPATIENT
Start: 2020-09-24 | End: 2020-09-24

## 2020-09-24 RX ORDER — LANOLIN ALCOHOL/MO/W.PET/CERES
100 CREAM (GRAM) TOPICAL DAILY
Status: DISCONTINUED | OUTPATIENT
Start: 2020-09-24 | End: 2020-09-25 | Stop reason: HOSPADM

## 2020-09-24 RX ORDER — PROMETHAZINE HYDROCHLORIDE 25 MG/1
25 TABLET ORAL
Status: DISCONTINUED | OUTPATIENT
Start: 2020-09-24 | End: 2020-09-24

## 2020-09-24 RX ORDER — FOLIC ACID 1 MG/1
1 TABLET ORAL DAILY
Status: DISCONTINUED | OUTPATIENT
Start: 2020-09-24 | End: 2020-09-25 | Stop reason: HOSPADM

## 2020-09-24 RX ORDER — IPRATROPIUM BROMIDE AND ALBUTEROL SULFATE 2.5; .5 MG/3ML; MG/3ML
3 SOLUTION RESPIRATORY (INHALATION)
Status: COMPLETED | OUTPATIENT
Start: 2020-09-24 | End: 2020-09-24

## 2020-09-24 RX ORDER — METHADONE HYDROCHLORIDE 10 MG/1
30 TABLET ORAL ONCE
Status: COMPLETED | OUTPATIENT
Start: 2020-09-24 | End: 2020-09-24

## 2020-09-24 RX ORDER — ALBUTEROL SULFATE 0.83 MG/ML
5 SOLUTION RESPIRATORY (INHALATION)
Status: COMPLETED | OUTPATIENT
Start: 2020-09-24 | End: 2020-09-24

## 2020-09-24 RX ADMIN — AZITHROMYCIN 500 MG: 500 INJECTION, POWDER, LYOPHILIZED, FOR SOLUTION INTRAVENOUS at 05:49

## 2020-09-24 RX ADMIN — THERA TABS 1 TABLET: TAB at 11:20

## 2020-09-24 RX ADMIN — POTASSIUM CHLORIDE 40 MEQ: 750 TABLET, FILM COATED, EXTENDED RELEASE ORAL at 11:00

## 2020-09-24 RX ADMIN — PROMETHAZINE HYDROCHLORIDE 12.5 MG: 25 TABLET ORAL at 12:05

## 2020-09-24 RX ADMIN — METHADONE HYDROCHLORIDE 30 MG: 10 TABLET ORAL at 11:00

## 2020-09-24 RX ADMIN — POTASSIUM CHLORIDE 10 MEQ: 7.46 INJECTION, SOLUTION INTRAVENOUS at 08:33

## 2020-09-24 RX ADMIN — MONTELUKAST 10 MG: 10 TABLET, FILM COATED ORAL at 23:22

## 2020-09-24 RX ADMIN — Medication 10 ML: at 23:32

## 2020-09-24 RX ADMIN — ALBUTEROL SULFATE 5 MG: 2.5 SOLUTION RESPIRATORY (INHALATION) at 03:48

## 2020-09-24 RX ADMIN — ONDANSETRON 4 MG: 2 SOLUTION INTRAMUSCULAR; INTRAVENOUS at 06:39

## 2020-09-24 RX ADMIN — IPRATROPIUM BROMIDE AND ALBUTEROL SULFATE 3 ML: .5; 3 SOLUTION RESPIRATORY (INHALATION) at 03:48

## 2020-09-24 RX ADMIN — SODIUM CHLORIDE 1000 ML: 900 INJECTION, SOLUTION INTRAVENOUS at 03:11

## 2020-09-24 RX ADMIN — Medication 100 MG: at 11:17

## 2020-09-24 RX ADMIN — MAGNESIUM SULFATE 2 G: 2 INJECTION INTRAVENOUS at 08:33

## 2020-09-24 RX ADMIN — IPRATROPIUM BROMIDE AND ALBUTEROL SULFATE 3 ML: .5; 3 SOLUTION RESPIRATORY (INHALATION) at 19:29

## 2020-09-24 RX ADMIN — DICYCLOMINE HYDROCHLORIDE 20 MG: 20 INJECTION, SOLUTION INTRAMUSCULAR at 23:22

## 2020-09-24 RX ADMIN — PRAZOSIN HYDROCHLORIDE 2 MG: 1 CAPSULE ORAL at 23:22

## 2020-09-24 RX ADMIN — Medication 10 ML: at 06:40

## 2020-09-24 RX ADMIN — POTASSIUM BICARBONATE 10 MEQ: 391 TABLET, EFFERVESCENT ORAL at 18:06

## 2020-09-24 RX ADMIN — METHYLPREDNISOLONE SODIUM SUCCINATE 40 MG: 40 INJECTION, POWDER, FOR SOLUTION INTRAMUSCULAR; INTRAVENOUS at 18:07

## 2020-09-24 RX ADMIN — FOLIC ACID 1 MG: 1 TABLET ORAL at 11:22

## 2020-09-24 RX ADMIN — CLONIDINE HYDROCHLORIDE 0.2 MG: 0.1 TABLET ORAL at 16:06

## 2020-09-24 RX ADMIN — DIVALPROEX SODIUM 500 MG: 500 TABLET, DELAYED RELEASE ORAL at 18:07

## 2020-09-24 RX ADMIN — IPRATROPIUM BROMIDE AND ALBUTEROL SULFATE 9 ML: .5; 3 SOLUTION RESPIRATORY (INHALATION) at 02:13

## 2020-09-24 RX ADMIN — POTASSIUM CHLORIDE 40 MEQ: 750 TABLET, FILM COATED, EXTENDED RELEASE ORAL at 08:32

## 2020-09-24 RX ADMIN — POTASSIUM CHLORIDE 40 MEQ: 750 TABLET, EXTENDED RELEASE ORAL at 03:23

## 2020-09-24 RX ADMIN — PREDNISONE 60 MG: 20 TABLET ORAL at 02:25

## 2020-09-24 RX ADMIN — MAGNESIUM SULFATE 2 G: 2 INJECTION INTRAVENOUS at 03:11

## 2020-09-24 RX ADMIN — METHYLPREDNISOLONE SODIUM SUCCINATE 40 MG: 40 INJECTION, POWDER, FOR SOLUTION INTRAMUSCULAR; INTRAVENOUS at 05:49

## 2020-09-24 RX ADMIN — DICYCLOMINE HYDROCHLORIDE 20 MG: 20 INJECTION, SOLUTION INTRAMUSCULAR at 11:19

## 2020-09-24 RX ADMIN — DIVALPROEX SODIUM 500 MG: 500 TABLET, DELAYED RELEASE ORAL at 08:37

## 2020-09-24 RX ADMIN — POTASSIUM CHLORIDE 40 MEQ: 750 TABLET, EXTENDED RELEASE ORAL at 04:12

## 2020-09-24 RX ADMIN — CLONIDINE HYDROCHLORIDE 0.2 MG: 0.1 TABLET ORAL at 11:19

## 2020-09-24 RX ADMIN — CLONIDINE HYDROCHLORIDE 0.2 MG: 0.1 TABLET ORAL at 23:22

## 2020-09-24 RX ADMIN — ONDANSETRON 4 MG: 2 SOLUTION INTRAMUSCULAR; INTRAVENOUS at 23:31

## 2020-09-24 RX ADMIN — PROMETHAZINE HYDROCHLORIDE 12.5 MG: 25 TABLET ORAL at 18:17

## 2020-09-24 NOTE — PROGRESS NOTES
[facility]: MED RECONCILIATION    Comments/Recommendations:   Unable to complete medication interview at this time due to patient condition (patient declines - \"I can't talk right now\")  Per patient, takes \"Depakote, prazosin, Requip, and clonidine\"  Patient states she gets her medications from \"[facility] [address]. \" [address] has no history for her in their system (corporate search)  Patient states she does not take lithium nor sertraline    Thank you,  Lizzeth Hart, PharmD, BCPS  [phone]

## 2020-09-24 NOTE — H&P
History and Physical    Patient: Forest Conner MRN: [id number]  SSN: [id number]    YOB: 1975  Age: 53 y.o. Sex: female      Subjective:      Telemedicine history and physical  Chief complaint shortness of breath    History of present illness  57-year-old female with history of asthma presented to the hospital with 2-day history of progressive shortness of breath dyspnea on exertion and mild productive cough. She denied fever or chills. Denies chest pain. She did report persistent nausea. Denies sick contacts. She says she was admitted for asthma about 2 months ago for 7 days. Upon presentation to the hospital vital signs afebrile, 100, 130/78, 18, 95% room air. Lab work-up CBC was benign. Urinalysis negative. Chemistry showed potassium of 2.2 magnesium 1.6, creatinine 1.2. Urine drug screen positive for marijuana and opiate. Patient does admit to using heroin yesterday. Chest x-ray negative. Patient had wheezing and dyspneic appearing requiring admission to the hospital for further monitoring and treatment. Patient seen and examined through telemedicine video evaluation with nurse at bedside assisting with evaluation and examination.     Past Medical History:   Diagnosis Date    Asthma     Bipolar 1 disorder (HCC)     Chronic obstructive pulmonary disease (HCC)     Chronic pain     back, leg    Cocaine abuse (HCC)     Depression     Heart failure (HCC)     Hepatitis B     Heroin abuse ([address]     HTN (hypertension)     Narcotic abuse ([address]     [facility]ub[facility] ([facility] [address]     Sarcoidosis     Tobacco abuse      Past Surgical History:   Procedure Laterality Date    HX GYN      HX WISDOM TEETH EXTRACTION        Family History   Problem Relation Age of Onset    Hypertension Father     Hypertension Mother      Social History     Tobacco Use    Smoking status: Current Every Day Smoker     Packs/day: 0.25     Years: 15.00     Pack years: 3.75     Last attempt to quit: 8/3/2017     Years since quitting: 3.1    Smokeless tobacco: Never Used    Tobacco comment: \"I already quit\"   Substance Use Topics    Alcohol use: No      Prior to Admission medications    Medication Sig Start Date End Date Taking? Authorizing Provider   naloxone (Narcan) 4 mg/actuation nasal spray Use 1 spray intranasally, then discard. Repeat with new spray every 2 min as needed for opioid overdose symptoms, alternating nostrils. 9/11/20  Yes Philly Charles MD   .PHARMACY TO SUBSTITUTE PER PROTOCOL Take 2 Puffs by inhalation every four (4) hours as needed for Other. Indications: COPD Associated with Chronic Bronchitis 8/7/18  Yes Stella Padilla MD   divalproex DR (DEPAKOTE) 500 mg tablet Take 1 Tab by mouth two (2) times a day. Indications: DEPRESSION ASSOCIATED WITH BIPOLAR DISORDER 8/7/18  Yes Stella Padilla MD   .PHARMACY TO SUBSTITUTE PER PROTOCOL Take 1 Puff by inhalation daily. Indications: COPD Associated with Chronic Bronchitis 8/7/18  Yes Stella Padilla MD   lithium carbonate 300 mg tablet Take 1 Tab by mouth two (2) times a day. Indications: DEPRESSION ASSOCIATED WITH BIPOLAR DISORDER 8/7/18  Yes Stella Padilla MD   sertraline (ZOLOFT) 100 mg tablet Take 1 Tab by mouth daily. Indications: Post Traumatic Stress Disorder 8/7/18  Yes Stella Padilla MD   prazosin (MINIPRESS) 2 mg capsule Take 2 mg by mouth nightly. Indications: Post Traumatic Stress Disorder   Yes Provider, Historical   divalproex DR (DEPAKOTE) 500 mg tablet Take 500 mg by mouth two (2) times a day. Yes Provider, Historical   sertraline (ZOLOFT) 100 mg tablet Take 100 mg by mouth daily. Yes Provider, Historical   albuterol sulfate 90 mcg/actuation aepb Take 2 Puffs by inhalation every four (4) hours as needed.  4/19/17  Yes Steph Bowman MD        Allergies   Allergen Reactions    Vancomycin Anaphylaxis    Tomato Swelling     Tongue    Trazodone Angioedema       Review of Systems:  A comprehensive review of systems was negative except for that written in the History of Present Illness. Objective:     Vitals:    09/24/20 0213 09/24/20 0435 09/24/20 0500 09/24/20 0547   BP:  (!) 141/75 (!) 140/71 119/70   Pulse:  100  93   Resp:  20  19   Temp:    98 °F (36.7 °C)   SpO2: 95% 99% 98% 94%   Weight:       Height:            Physical Exam:  Telemedicine physical exam  General patient is awake alert no distress at this time  Eyes sclera nonicteric  Cardiovascular regular  Lungs expiratory wheezing throughout  Abdomen soft nontender  Extremities no edema  Skeletal moving all extremities equally  Neuro she is awake alert answering appropriately nonfocal exam  Psychiatry calm not agitated    Labs reviewed    Assessment  Asthma exacerbation  Hypokalemia  Tobacco dependence  Hypertension  History of bipolar  History of sarcoidosis  Polysubstance abuse    Plan  68-year-old female with asthma exacerbation requires admission for further monitoring and antibiotics, bronchodilators. Patient also has significant hypokalemia. Supplement with magnesium as well.   Counseled on smoking and substance use  Resume home medications accordingly  Lovenox for DVT prophylaxis  Further work-up based on her progression    Signed By: Mark Montana MD     September 24, 2020

## 2020-09-24 NOTE — H&P
Hospitalist Admission Note    NAME: George Manzanares   :  1975   MRN:  [id number]   Room Number: 208/  @ [facility]     Date/Time:  2020 10:23 AM    Patient PCP: None  ______________________________________________________________________  Given the patient's current clinical presentation, I have a high level of concern for decompensation if discharged from the emergency department. Complex decision making was performed, which includes reviewing the patient's available past medical records, laboratory results, and x-ray films. My assessment of this patient's clinical condition and my plan of care is as follows. Assessment / Plan:      Asthma exacerbation POA  Likely triggered by seasonal allergy, inhalational heroin use. Chest x-ray negative for infiltrate. WBC count 8.1. No fevers, chills, exposure to COVID-19, cough, sputum production.    - IV steroids, montelukast, DuoNeb scheduled and as needed  - Check procalcitonin, does not need antibiotics for now. -SPO2 goal greater than 92%  -Peak flow twice daily    Moderate Hypokalemia POA  Hypomagnesemia POA  Hypophosphatemia POA  EKG interpreted independently, normal sinus rhythm, narrow QRS, no ST T changes suggestive ischemia,  ms  -Replete IV and orally.  -Continue telemetry    Opioid withdrawal POA  Heroin abuse POA  UDS positive for marijuana and opioid. Actively withdrawing from opiates. QTc 494 ms  -Methadone taper  -Cows monitoring  - Symptomatic management of withdrawal symptoms  -Patient was counseled extensively on the need to abstain from drugs its addictive tendencies, its deleterious effects on the brain, cardiovascular system, lungs as well as its financial & social sequelae  -Thiamine folate    Acute kidney injury, pre-renal POA   Likely prerenal due to hypovolemia. Baseline creatinine 0.7-0.8.   Current creatinine 1.2  -Gentle IV hydration  - Avoid nephrotoxic medications, renally dose medications. Bipolar disorder POA  Continue divalproex, prazosin. Body mass index is 34.33 kg/m². Obesity   Counseled on Lifestyle modifications, AHA Diet ,weight loss strategies. Code Status: DNR  Surrogate Decision Maker:  Does not wish to name anyone    DVT Prophylaxis: Lovenox  GI Prophylaxis: not indicated    Baseline: amb independently        Subjective:   CHIEF COMPLAINT: wheezing    HISTORY OF PRESENT ILLNESS:     Lazara Cee is a 53 y.o.  female with PMH of asthma, depression/bipolar disorder, hypertension, heroin abuse who presents to ED with c/o wheezing, shortness of breath. Symptoms began 2 days ago with acute onset of shortness of breath, dyspnea on exertion, wheezing, mild cough. Reports nausea, abdominal cramping, inability to tolerate significant p.o. and reports decreased p.o. intake over the past few days. Denies sick contacts, exposure COVID-19. Reports one episode of asthma exacerbation in the past year. Uses only albuterol as needed at home. We were asked to admit for work up and evaluation of the above problems.      Past Medical History:   Diagnosis Date    Asthma     Bipolar 1 disorder ([address]     Chronic obstructive pulmonary disease (HCC)     Chronic pain     back, leg    Cocaine abuse (HCC)     Depression     Heart failure (HCC)     Hepatitis B     Heroin abuse (HCC)     HTN (hypertension)     Narcotic abuse ([address]     [address]ub[address] [facility] [address] [address]     Sarcoidosis     Tobacco abuse         Past Surgical History:   Procedure Laterality Date    HX GYN      HX WISDOM TEETH EXTRACTION         Social History     Tobacco Use    Smoking status: Current Every Day Smoker     Packs/day: 0.25     Years: 15.00     Pack years: 3.75     Last attempt to quit: 8/3/2017     Years since quitting: 3.1    Smokeless tobacco: Never Used    Tobacco comment: \"I already quit\"   Substance Use Topics    Alcohol use: No        Family History   Problem Relation Age of Onset    Hypertension Father     Hypertension Mother      Allergies   Allergen Reactions    Vancomycin Anaphylaxis    Tomato Swelling     Tongue    Trazodone Angioedema        Prior to Admission medications    Medication Sig Start Date End Date Taking? Authorizing Provider   naloxone (Narcan) 4 mg/actuation nasal spray Use 1 spray intranasally, then discard. Repeat with new spray every 2 min as needed for opioid overdose symptoms, alternating nostrils. 9/11/20   Kyung Villatoro MD   .PHARMACY TO SUBSTITUTE PER PROTOCOL Take 2 Puffs by inhalation every four (4) hours as needed for Other. Indications: COPD Associated with Chronic Bronchitis 8/7/18   Helena Gutierrez MD   divalproex DR (DEPAKOTE) 500 mg tablet Take 1 Tab by mouth two (2) times a day. Indications: DEPRESSION ASSOCIATED WITH BIPOLAR DISORDER 8/7/18   Helena Gutierrez MD   .PHARMACY TO SUBSTITUTE PER PROTOCOL Take 1 Puff by inhalation daily. Indications: COPD Associated with Chronic Bronchitis 8/7/18   Helena Gutierrez MD   lithium carbonate 300 mg tablet Take 1 Tab by mouth two (2) times a day. Indications: DEPRESSION ASSOCIATED WITH BIPOLAR DISORDER 8/7/18   Helena Gutierrez MD   sertraline (ZOLOFT) 100 mg tablet Take 1 Tab by mouth daily. Indications: Post Traumatic Stress Disorder 8/7/18   Helena Gutierrez MD   prazosin (MINIPRESS) 2 mg capsule Take 2 mg by mouth nightly. Indications: Post Traumatic Stress Disorder    Provider, Historical   divalproex DR (DEPAKOTE) 500 mg tablet Take 500 mg by mouth two (2) times a day. Provider, Historical   sertraline (ZOLOFT) 100 mg tablet Take 100 mg by mouth daily. Provider, Historical   albuterol sulfate 90 mcg/actuation aepb Take 2 Puffs by inhalation every four (4) hours as needed. 4/19/17   Selena Stallings MD       REVIEW OF SYSTEMS:     I am not able to complete the review of systems because:    The patient is intubated and sedated    The patient has altered mental status due to his acute medical problems    The patient has baseline aphasia from prior stroke(s)    The patient has baseline dementia and is not reliable historian    The patient is in acute medical distress and unable to provide information           Total of 12 systems reviewed as follows:       POSITIVE= underlined text  Negative = text not underlined  General:  fever, chills, sweats, generalized weakness, weight loss/gain,      loss of appetite   Eyes:    blurred vision, eye pain, loss of vision, double vision  ENT:    rhinorrhea, pharyngitis   Respiratory:   cough, sputum production, SOB, RIVAS, wheezing, pleuritic pain   Cardiology:   chest pain, palpitations, orthopnea, PND, edema, syncope   Gastrointestinal:  abdominal pain , N/V, diarrhea, dysphagia, constipation, bleeding   Genitourinary:  frequency, urgency, dysuria, hematuria, incontinence   Muskuloskeletal :  arthralgia, myalgia, back pain  Hematology:  easy bruising, nose or gum bleeding, lymphadenopathy   Dermatological: rash, ulceration, pruritis, color change / jaundice  Endocrine:   hot flashes or polydipsia   Neurological:  headache, dizziness, confusion, focal weakness, paresthesia,     Speech difficulties, memory loss, gait difficulty  Psychological: Feelings of anxiety, depression, agitation    Objective:   VITALS:    Visit Vitals  /72 (BP 1 Location: Left arm)   Pulse 93   Temp 97.9 °F (36.6 °C)   Resp 16   Ht 5' 4\" (1.626 m)   Wt 90.7 kg (200 lb)   SpO2 94%   BMI 34.33 kg/m²       PHYSICAL EXAM:    General:    Alert, cooperative, no distress, appears stated age. HEENT: Atraumatic, anicteric sclerae, pink conjunctivae     No oral ulcers, mucosa moist, throat clear, dentition fair  Neck:  Supple, symmetrical,  thyroid: non tender  Lungs:   Bilateral expiratory wheezing. No rales. Chest wall:  No tenderness  No Accessory muscle use. Heart:   Regular  rhythm,  No  murmur   No edema  Abdomen:   Soft, non-tender. Not distended. Bowel sounds normal  Extremities: No cyanosis.   No clubbing,      Skin turgor normal, Capillary refill normal, Radial dial pulse 2+  Skin:     Not pale. Not Jaundiced  No rashes   Psych:  Good insight. Not depressed. Anxious. Not  agitated. Neurologic: EOMs intact. No facial asymmetry. No aphasia or slurred speech. Symmetrical strength, Sensation grossly intact. Alert and oriented X 4.     ______________________________________________________________________    Care Plan discussed with:  Patient/Family, Nurse and     Expected  Disposition:  Home w/Family  ________________________________________________________________________  TOTAL TIME:  40 Minutes    Critical Care Provided     Minutes non procedure based      Comments    x Reviewed previous records   >50% of visit spent in counseling and coordination of care x Discussion with patient and/or family and questions answered       ________________________________________________________________________  Signed: Augusta Gabriel MD    Procedures: see electronic medical records for all procedures/Xrays and details which were not copied into this note but were reviewed prior to creation of Plan. LAB DATA REVIEWED:    Recent Results (from the past 24 hour(s))   CBC WITH AUTOMATED DIFF    Collection Time: 09/24/20  2:40 AM   Result Value Ref Range    WBC 8.1 3.6 - 11.0 K/uL    RBC 4.37 3.80 - 5.20 M/uL    HGB 11.5 11.5 - 16.0 g/dL    HCT 36.2 35.0 - 47.0 %    MCV 82.8 80.0 - 99.0 FL    MCH 26.3 26.0 - 34.0 PG    MCHC 31.8 30.0 - 36.5 g/dL    RDW 17.2 (H) 11.5 - 14.5 %    PLATELET 446 226 - 251 K/uL    MPV 9.6 8.9 - 12.9 FL    NRBC 0.0 0  WBC    ABSOLUTE NRBC 0.00 0.00 - 0.01 K/uL    NEUTROPHILS 68 32 - 75 %    LYMPHOCYTES 21 12 - 49 %    MONOCYTES 10 5 - 13 %    EOSINOPHILS 1 0 - 7 %    BASOPHILS 0 0 - 1 %    IMMATURE GRANULOCYTES 0 0.0 - 0.5 %    ABS. NEUTROPHILS 5.4 1.8 - 8.0 K/UL    ABS. LYMPHOCYTES 1.7 0.8 - 3.5 K/UL    ABS. MONOCYTES 0.8 0.0 - 1.0 K/UL    ABS.  EOSINOPHILS 0.1 0.0 - 0.4 K/UL ABS. BASOPHILS 0.0 0.0 - 0.1 K/UL    ABS. IMM.  GRANS. 0.0 0.00 - 0.04 K/UL    DF AUTOMATED     METABOLIC PANEL, BASIC    Collection Time: 09/24/20  2:40 AM   Result Value Ref Range    Sodium 135 (L) 136 - 145 mmol/L    Potassium 2.2 (LL) 3.5 - 5.1 mmol/L    Chloride 95 (L) 97 - 108 mmol/L    CO2 33 (H) 21 - 32 mmol/L    Anion gap 7 5 - 15 mmol/L    Glucose 128 (H) 65 - 100 mg/dL    BUN 8 6 - 20 MG/DL    Creatinine 1.21 (H) 0.55 - 1.02 MG/DL    BUN/Creatinine ratio 7 (L) 12 - 20      GFR est AA 58 (L) >60 ml/min/1.73m2    GFR est non-AA 48 (L) >60 ml/min/1.73m2    Calcium 8.8 8.5 - 10.1 MG/DL   MAGNESIUM    Collection Time: 09/24/20  2:40 AM   Result Value Ref Range    Magnesium 1.6 1.6 - 2.4 mg/dL   ETHYL ALCOHOL    Collection Time: 09/24/20  2:40 AM   Result Value Ref Range    ALCOHOL(ETHYL),SERUM <10 <10 MG/DL   URINALYSIS W/ RFLX MICROSCOPIC    Collection Time: 09/24/20  2:41 AM   Result Value Ref Range    Color YELLOW/STRAW      Appearance CLOUDY (A) CLEAR      Specific gravity 1.020 1.003 - 1.030      pH (UA) 6.0 5.0 - 8.0      Protein Negative NEG mg/dL    Glucose Negative NEG mg/dL    Ketone Negative NEG mg/dL    Bilirubin Negative NEG      Blood Negative NEG      Urobilinogen 1.0 0.2 - 1.0 EU/dL    Nitrites Negative NEG      Leukocyte Esterase Negative NEG     DRUG SCREEN, URINE    Collection Time: 09/24/20  2:41 AM   Result Value Ref Range    AMPHETAMINES Negative NEG      BARBITURATES Negative NEG      BENZODIAZEPINES Negative NEG      COCAINE Negative NEG      METHADONE Negative NEG      OPIATES Positive (A) NEG      PCP(PHENCYCLIDINE) Negative NEG      THC (TH-CANNABINOL) Positive (A) NEG      Drug screen comment (NOTE)

## 2020-09-24 NOTE — ED PROVIDER NOTES
18-year-old female with a history of asthma, bipolar disorder, COPD, chronic back and leg pain, substance abuse, depression, hep B, hypertension, sarcoidosis presents ambulatory to the ED with chief complaint of dyspnea since this morning. Describes associated wheeze, dry cough, chest tightness. Notes that she last used heroin yesterday and feels symptoms may be related to withdrawal.  Denies fever, sore throat, runny nose, abdominal pain, vomiting, diarrhea. Does report associated nausea. States her last course of steroids was a month ago. Reports previous admission for asthma/COPD in [place]. Patient states she snorts heroin and does not inject iv drugs.            Past Medical History:   Diagnosis Date    Asthma     Bipolar 1 disorder (HCC)     Chronic obstructive pulmonary disease (HCC)     Chronic pain     back, leg    Cocaine abuse (HCC)     Depression     Hepatitis B     Heroin abuse (HCC)     HTN (hypertension)     Narcotic abuse (HCC)     Polysubstance abuse ([facility] [address]     Sarcoidosis     Tobacco abuse        Past Surgical History:   Procedure Laterality Date    HX GYN      HX WISDOM TEETH EXTRACTION           Family History:   Problem Relation Age of Onset    Hypertension Father     Hypertension Mother        Social History     Socioeconomic History    Marital status: SINGLE     Spouse name: Not on file    Number of children: Not on file    Years of education: Not on file    Highest education level: Not on file   Occupational History    Not on file   Social Needs    Financial resource strain: Not on file    Food insecurity     Worry: Not on file     Inability: Not on file    Transportation needs     Medical: Not on file     Non-medical: Not on file   Tobacco Use    Smoking status: Former Smoker     Packs/day: 0.25     Years: 15.00     Pack years: 3.75     Last attempt to quit: 8/3/2017     Years since quitting: 3.1    Smokeless tobacco: Never Used    Tobacco comment: \"I already quit\"   Substance and Sexual Activity    Alcohol use: No    Drug use: Yes     Types: Marijuana, Heroin    Sexual activity: Yes     Partners: Female   Lifestyle    Physical activity     Days per week: Not on file     Minutes per session: Not on file    Stress: Not on file   Relationships    Social connections     Talks on phone: Not on file     Gets together: Not on file     Attends Adventism service: Not on file     Active member of club or organization: Not on file     Attends meetings of clubs or organizations: Not on file     Relationship status: Not on file    Intimate partner violence     Fear of current or ex partner: Not on file     Emotionally abused: Not on file     Physically abused: Not on file     Forced sexual activity: Not on file   Other Topics Concern    Not on file   Social History Narrative    Born in [address], 5 children,    No legal charges. Pt graduated from high school. Pt was employed last month at [employer], currently she is unemployed. Pt lives with her mother and 8year old son. She has 4 adult children. ALLERGIES: Vancomycin; Tomato; and Trazodone    Review of Systems   Constitutional: Negative. Negative for chills, fever and unexpected weight change. HENT: Negative. Negative for congestion and trouble swallowing. Eyes: Negative for discharge. Respiratory: Positive for cough and wheezing. Negative for chest tightness and shortness of breath. Cardiovascular: Negative. Negative for chest pain. Gastrointestinal: Positive for nausea. Negative for abdominal distention, abdominal pain, constipation and diarrhea. Endocrine: Negative. Genitourinary: Negative. Negative for difficulty urinating, dysuria, frequency and urgency. Musculoskeletal: Negative. Negative for arthralgias and myalgias. Skin: Negative. Negative for color change. Allergic/Immunologic: Negative. Neurological: Negative.   Negative for dizziness, speech difficulty and headaches. Hematological: Negative. Psychiatric/Behavioral: Negative. Negative for agitation and confusion. All other systems reviewed and are negative. Vitals:    09/24/20 0137 09/24/20 0145 09/24/20 0203 09/24/20 0213   BP: 130/78 122/78 (!) 138/90    Pulse: 100  95    Resp: 18  14    Temp: 98.3 °F (36.8 °C)      SpO2: 95% 94% 95% 95%   Weight: 90.7 kg (200 lb)      Height: 5' 4\" (1.626 m)               Physical Exam  Vitals signs and nursing note reviewed. Constitutional:       General: She is not in acute distress. Appearance: She is well-developed. She is not toxic-appearing. Comments: Ambulatory. Carmella Shadow HENT:      Head: Normocephalic and atraumatic. Eyes:      Conjunctiva/sclera: Conjunctivae normal.   Neck:      Musculoskeletal: Neck supple. Cardiovascular:      Rate and Rhythm: Normal rate and regular rhythm. Pulmonary:      Effort: Accessory muscle usage present. No respiratory distress. Breath sounds: Examination of the right-upper field reveals wheezing. Examination of the left-upper field reveals wheezing. Examination of the right-middle field reveals wheezing. Examination of the left-middle field reveals wheezing. Examination of the right-lower field reveals wheezing. Examination of the left-lower field reveals wheezing. Wheezing present. Comments: Diffuse expiratory wheeze. Mild accessory muscle use  Abdominal:      Palpations: Abdomen is soft. Tenderness: There is no abdominal tenderness. Musculoskeletal: Normal range of motion. General: No deformity. Skin:     General: Skin is warm and dry. Neurological:      Mental Status: She is alert and oriented to person, place, and time. Psychiatric:         Behavior: Behavior normal.         Thought Content:  Thought content normal.          MDM  Number of Diagnoses or Management Options  Hypokalemia:   Moderate persistent asthma with status asthmaticus:   Diagnosis management comments: Asthma/COPD exacerbation, URI/bronchitis, pneumonia, COVID, withdrawal       Amount and/or Complexity of Data Reviewed  Clinical lab tests: ordered and reviewed  Tests in the radiology section of CPT®: ordered and reviewed  Review and summarize past medical records: yes  Discuss the patient with other providers: yes    Risk of Complications, Morbidity, and/or Mortality  Presenting problems: high  Diagnostic procedures: high  Management options: high    Patient Progress  Patient progress: stable    ED Course as of Sep 24 0620   Thu Sep 24, 2020   5546 Still wheezing. States her breathing has not significantly improved. Will administer more nebs, re-eval, and if still no improvement consider admission for status asthmaticus.    [SS]   0425 Patient has had 3 DuoNebs, steroids, iv fluids, magnesium and an additional 5m nebulized albuterol and feels no better. Minimal change in pulmonary exam (still diffuse exp wheeze). Will admit for status asthmaticus.     [SS]   [id number] Patient accepted for admission by Dr. Fanny Arteaga, hospitalist.     [SS]      ED Course User Index  [SS] Arlene Moncada MD       Procedures      LABORATORY TESTS:  Recent Results (from the past 12 hour(s))   CBC WITH AUTOMATED DIFF    Collection Time: 09/24/20  2:40 AM   Result Value Ref Range    WBC 8.1 3.6 - 11.0 K/uL    RBC 4.37 3.80 - 5.20 M/uL    HGB 11.5 11.5 - 16.0 g/dL    HCT 36.2 35.0 - 47.0 %    MCV 82.8 80.0 - 99.0 FL    MCH 26.3 26.0 - 34.0 PG    MCHC 31.8 30.0 - 36.5 g/dL    RDW 17.2 (H) 11.5 - 14.5 %    PLATELET 120 320 - 474 K/uL    MPV 9.6 8.9 - 12.9 FL    NRBC 0.0 0  WBC    ABSOLUTE NRBC 0.00 0.00 - 0.01 K/uL    NEUTROPHILS 68 32 - 75 %    LYMPHOCYTES 21 12 - 49 %    MONOCYTES 10 5 - 13 %    EOSINOPHILS 1 0 - 7 %    BASOPHILS 0 0 - 1 %    IMMATURE GRANULOCYTES 0 0.0 - 0.5 %    ABS. NEUTROPHILS 5.4 1.8 - 8.0 K/UL    ABS. LYMPHOCYTES 1.7 0.8 - 3.5 K/UL    ABS. MONOCYTES 0.8 0.0 - 1.0 K/UL    ABS. EOSINOPHILS 0.1 0.0 - 0.4 K/UL    ABS. BASOPHILS 0.0 0.0 - 0.1 K/UL    ABS. IMM. GRANS. 0.0 0.00 - 0.04 K/UL    DF AUTOMATED     METABOLIC PANEL, BASIC    Collection Time: 09/24/20  2:40 AM   Result Value Ref Range    Sodium 135 (L) 136 - 145 mmol/L    Potassium 2.2 (LL) 3.5 - 5.1 mmol/L    Chloride 95 (L) 97 - 108 mmol/L    CO2 33 (H) 21 - 32 mmol/L    Anion gap 7 5 - 15 mmol/L    Glucose 128 (H) 65 - 100 mg/dL    BUN 8 6 - 20 MG/DL    Creatinine 1.21 (H) 0.55 - 1.02 MG/DL    BUN/Creatinine ratio 7 (L) 12 - 20      GFR est AA 58 (L) >60 ml/min/1.73m2    GFR est non-AA 48 (L) >60 ml/min/1.73m2    Calcium 8.8 8.5 - 10.1 MG/DL   MAGNESIUM    Collection Time: 09/24/20  2:40 AM   Result Value Ref Range    Magnesium 1.6 1.6 - 2.4 mg/dL   ETHYL ALCOHOL    Collection Time: 09/24/20  2:40 AM   Result Value Ref Range    ALCOHOL(ETHYL),SERUM <10 <10 MG/DL   URINALYSIS W/ RFLX MICROSCOPIC    Collection Time: 09/24/20  2:41 AM   Result Value Ref Range    Color YELLOW/STRAW      Appearance CLOUDY (A) CLEAR      Specific gravity 1.020 1.003 - 1.030      pH (UA) 6.0 5.0 - 8.0      Protein Negative NEG mg/dL    Glucose Negative NEG mg/dL    Ketone Negative NEG mg/dL    Bilirubin Negative NEG      Blood Negative NEG      Urobilinogen 1.0 0.2 - 1.0 EU/dL    Nitrites Negative NEG      Leukocyte Esterase Negative NEG     DRUG SCREEN, URINE    Collection Time: 09/24/20  2:41 AM   Result Value Ref Range    AMPHETAMINES Negative NEG      BARBITURATES Negative NEG      BENZODIAZEPINES Negative NEG      COCAINE Negative NEG      METHADONE Negative NEG      OPIATES Positive (A) NEG      PCP(PHENCYCLIDINE) Negative NEG      THC (TH-CANNABINOL) Positive (A) NEG      Drug screen comment (NOTE)        IMAGING RESULTS:  XR CHEST PORT   Final Result   IMPRESSION:      No acute process.              MEDICATIONS GIVEN:  Medications   sodium chloride 0.9 % bolus infusion 1,000 mL (1,000 mL IntraVENous New Bag 9/24/20 0311)   magnesium sulfate 2 g/50 ml IVPB (premix or compounded) (2 g IntraVENous New Bag 9/24/20 0311)   albuterol-ipratropium (DUO-NEB) 2.5 MG-0.5 MG/3 ML (9 mL Nebulization Given 9/24/20 0213)   predniSONE (DELTASONE) tablet 60 mg (60 mg Oral Given 9/24/20 0225)   potassium chloride SR (KLOR-CON 10) tablet 40 mEq (40 mEq Oral Given 9/24/20 0323)   potassium chloride SR (KLOR-CON 10) tablet 40 mEq (40 mEq Oral Given 9/24/20 0412)   albuterol-ipratropium (DUO-NEB) 2.5 MG-0.5 MG/3 ML (3 mL Nebulization Given 9/24/20 0348)   albuterol (PROVENTIL VENTOLIN) nebulizer solution 5 mg (5 mg Nebulization Given 9/24/20 0348)     CRITICAL CARE NOTE :    4:29 AM      IMPENDING DETERIORATION -Respiratory, CNS and Metabolic    ASSOCIATED RISK FACTORS - Hypoxia and CNS Decompensation    MANAGEMENT- Bedside Assessment and Supervision of Care    INTERPRETATION -  Xrays, Blood Pressure and Cardiac Output Measures     INTERVENTIONS - hemodynamic mngmt, vascular control and Metobolic interventions    CASE REVIEW - Hospitalist and Nursing    TREATMENT RESPONSE -Improved    PERFORMED BY - Self        NOTES   :      I have spent 50 minutes of critical care time involved in lab review, consultations with specialist, family decision- making, bedside attention and documentation. During this entire length of time I was immediately available to the patient .     Demarcus Davenport MD    LABORATORY TESTS:  Recent Results (from the past 12 hour(s))   CBC WITH AUTOMATED DIFF    Collection Time: 09/24/20  2:40 AM   Result Value Ref Range    WBC 8.1 3.6 - 11.0 K/uL    RBC 4.37 3.80 - 5.20 M/uL    HGB 11.5 11.5 - 16.0 g/dL    HCT 36.2 35.0 - 47.0 %    MCV 82.8 80.0 - 99.0 FL    MCH 26.3 26.0 - 34.0 PG    MCHC 31.8 30.0 - 36.5 g/dL    RDW 17.2 (H) 11.5 - 14.5 %    PLATELET 359 515 - 558 K/uL    MPV 9.6 8.9 - 12.9 FL    NRBC 0.0 0  WBC    ABSOLUTE NRBC 0.00 0.00 - 0.01 K/uL    NEUTROPHILS 68 32 - 75 %    LYMPHOCYTES 21 12 - 49 %    MONOCYTES 10 5 - 13 %    EOSINOPHILS 1 0 - 7 %    BASOPHILS 0 0 - 1 %    IMMATURE GRANULOCYTES 0 0.0 - 0.5 %    ABS. NEUTROPHILS 5.4 1.8 - 8.0 K/UL    ABS. LYMPHOCYTES 1.7 0.8 - 3.5 K/UL    ABS. MONOCYTES 0.8 0.0 - 1.0 K/UL    ABS. EOSINOPHILS 0.1 0.0 - 0.4 K/UL    ABS. BASOPHILS 0.0 0.0 - 0.1 K/UL    ABS. IMM. GRANS. 0.0 0.00 - 0.04 K/UL    DF AUTOMATED     METABOLIC PANEL, BASIC    Collection Time: 09/24/20  2:40 AM   Result Value Ref Range    Sodium 135 (L) 136 - 145 mmol/L    Potassium 2.2 (LL) 3.5 - 5.1 mmol/L    Chloride 95 (L) 97 - 108 mmol/L    CO2 33 (H) 21 - 32 mmol/L    Anion gap 7 5 - 15 mmol/L    Glucose 128 (H) 65 - 100 mg/dL    BUN 8 6 - 20 MG/DL    Creatinine 1.21 (H) 0.55 - 1.02 MG/DL    BUN/Creatinine ratio 7 (L) 12 - 20      GFR est AA 58 (L) >60 ml/min/1.73m2    GFR est non-AA 48 (L) >60 ml/min/1.73m2    Calcium 8.8 8.5 - 10.1 MG/DL   MAGNESIUM    Collection Time: 09/24/20  2:40 AM   Result Value Ref Range    Magnesium 1.6 1.6 - 2.4 mg/dL   ETHYL ALCOHOL    Collection Time: 09/24/20  2:40 AM   Result Value Ref Range    ALCOHOL(ETHYL),SERUM <10 <10 MG/DL   URINALYSIS W/ RFLX MICROSCOPIC    Collection Time: 09/24/20  2:41 AM   Result Value Ref Range    Color YELLOW/STRAW      Appearance CLOUDY (A) CLEAR      Specific gravity 1.020 1.003 - 1.030      pH (UA) 6.0 5.0 - 8.0      Protein Negative NEG mg/dL    Glucose Negative NEG mg/dL    Ketone Negative NEG mg/dL    Bilirubin Negative NEG      Blood Negative NEG      Urobilinogen 1.0 0.2 - 1.0 EU/dL    Nitrites Negative NEG      Leukocyte Esterase Negative NEG     DRUG SCREEN, URINE    Collection Time: 09/24/20  2:41 AM   Result Value Ref Range    AMPHETAMINES Negative NEG      BARBITURATES Negative NEG      BENZODIAZEPINES Negative NEG      COCAINE Negative NEG      METHADONE Negative NEG      OPIATES Positive (A) NEG      PCP(PHENCYCLIDINE) Negative NEG      THC (TH-CANNABINOL) Positive (A) NEG      Drug screen comment (NOTE)        IMAGING RESULTS:  XR CHEST PORT   Final Result   IMPRESSION:      No acute process. MEDICATIONS GIVEN:  Medications   sodium chloride (NS) flush 5-40 mL (has no administration in time range)   sodium chloride (NS) flush 5-40 mL (has no administration in time range)   acetaminophen (TYLENOL) tablet 650 mg (has no administration in time range)     Or   acetaminophen (TYLENOL) suppository 650 mg (has no administration in time range)   polyethylene glycol (MIRALAX) packet 17 g (has no administration in time range)   promethazine (PHENERGAN) tablet 12.5 mg (has no administration in time range)     Or   ondansetron (ZOFRAN) injection 4 mg (has no administration in time range)   enoxaparin (LOVENOX) injection 40 mg (has no administration in time range)   albuterol-ipratropium (DUO-NEB) 2.5 MG-0.5 MG/3 ML (has no administration in time range)   hydrALAZINE (APRESOLINE) 20 mg/mL injection 10 mg (has no administration in time range)   methylPREDNISolone (PF) (SOLU-MEDROL) injection 40 mg (40 mg IntraVENous Given 9/24/20 0549)   azithromycin (ZITHROMAX) 500 mg in 0.9% sodium chloride (MBP/ADV) 250 mL (500 mg IntraVENous New Bag 9/24/20 0549)   sodium chloride 0.9 % bolus infusion 1,000 mL (0 mL IntraVENous IV Completed 9/24/20 0435)   magnesium sulfate 2 g/50 ml IVPB (premix or compounded) (0 g IntraVENous IV Completed 9/24/20 0540)   albuterol-ipratropium (DUO-NEB) 2.5 MG-0.5 MG/3 ML (9 mL Nebulization Given 9/24/20 0213)   predniSONE (DELTASONE) tablet 60 mg (60 mg Oral Given 9/24/20 0225)   potassium chloride SR (KLOR-CON 10) tablet 40 mEq (40 mEq Oral Given 9/24/20 0323)   potassium chloride SR (KLOR-CON 10) tablet 40 mEq (40 mEq Oral Given 9/24/20 0412)   albuterol-ipratropium (DUO-NEB) 2.5 MG-0.5 MG/3 ML (3 mL Nebulization Given 9/24/20 0348)   albuterol (PROVENTIL VENTOLIN) nebulizer solution 5 mg (5 mg Nebulization Given 9/24/20 4098)       IMPRESSION:  1. Hypokalemia    2. Moderate persistent asthma with status asthmaticus        PLAN:  1. Current Discharge Medication List        2. Follow-up Information    None       Return to ED if worse

## 2020-09-24 NOTE — PROGRESS NOTES
Spiritual Care Assessment/Progress Note  [facility]      NAME: Bunny Krabbe      MRN: [id number]  AGE: 53 y.o. SEX: female  Adventist Affiliation: Bernice Ceja   Language: English     9/24/2020     Total Time (in minutes): 10     Spiritual Assessment begun in [address] through conversation with:         []Patient        [] Family    [] Friend(s)        Reason for Consult: Initial/Spiritual assessment, patient floor     Spiritual beliefs: (Please include comment if needed)     [] Identifies with a vinay tradition:         [] Supported by a vinay community:            [] Claims no spiritual orientation:           [] Seeking spiritual identity:                [] Adheres to an individual form of spirituality:           [x] Not able to assess:                           Identified resources for coping:      [] Prayer                               [] Music                  [] Guided Imagery     [] Family/friends                 [] Pet visits     [] Devotional reading                         [x] Unknown     [] Other:                                               Interventions offered during this visit: (See comments for more details)                Plan of Care:     [] Support spiritual and/or cultural needs    [] Support AMD and/or advance care planning process      [] Support grieving process   [] Coordinate Rites and/or Rituals    [] Coordination with community clergy   [x] No spiritual needs identified at this time   [] Detailed Plan of Care below (See Comments)  [] Make referral to Music Therapy  [] Make referral to Pet Therapy     [] Make referral to Addiction services  [] Make referral to [facility]  [] Make referral to Spiritual Care Partner  [] No future visits requested        [] Follow up visits as needed     Comments:   Attempted Initial Spiritual Assessment in [facility] Tele unit. Unable to assess patients needs. No family present at this time.   Chaplains will follow as able and/or as needed. Rev.  Jessy Randle MDiv   For  Assistance Page [phone])

## 2020-09-24 NOTE — PROGRESS NOTES
0819-Pt agitated, jittery verbalized that she is withdrawing from Heroin. Lovenox opened pt refused. IV resistant, trouble shoot but unable to flush. EKG completed    0940-New IV attempted pt educated of new IV insertion needed, when nurse attempted to stick pt she stated, \"ouch! You diidn't even say you were going to stick me! \" Nurse apologized, but unable to re-stick at this time d/t patient agitation. Pt refused Neb treatment with Respiratory therapy. Magnesium and potassium unable to be started at this time. 1040-Pt sleeping    1103-Supervisor attempted blood draw unsuccessful    1210-ER attempted blood draw unsuccessful, pt initially refused attempted blood draw    1230-Physician notified of unsuccessful blood draw attempts, Physician considering possible alternative route. 1456-Pt sitting up, watching tv, and talking on the phone while eating ice pops x2    1850-Pt accepted medication, refused dinner, consumed ice pops x4.  Requested phenergan for nausea, accepted tolerated well

## 2020-09-24 NOTE — PROGRESS NOTES
ANGELICA     Patient came in through the ER this am.   CM assessment in progress     [address] MSW RN   [phone]

## 2020-09-24 NOTE — ED NOTES
Verbal shift change report given to Olivia Leroy RN (oncoming nurse) by Morales Mcfarland RN (offgoing nurse). Report included the following information SBAR, ED Summary, MAR and Recent Results.

## 2020-09-24 NOTE — ED NOTES
TRANSFER - OUT REPORT:    Verbal report given to Lisa Schreiber RN (name) on Bunny Krabbe  being transferred to Medical (unit) for routine progression of care       Report consisted of patients Situation, Background, Assessment and   Recommendations(SBAR). Information from the following report(s) SBAR, ED Summary, [facility] and Recent Results was reviewed with the receiving nurse. Lines:       Opportunity for questions and clarification was provided.       Patient transported with:   Monitor  Registered Nurse

## 2020-09-24 NOTE — PROGRESS NOTES
..Bedside and Verbal shift change report given to [facility] (oncoming nurse) by Juanpablo Kinney RN (offgoing nurse). Report included the following information SBAR, Intake/Output, MAR, Recent Results and Med Rec Status.

## 2020-09-24 NOTE — ED NOTES
Pt in ED w/ complaint of SOB that started today. Pt reports a hx of COPD and heroin abuse. Pt denies any fever, cough, cold symptoms, or  contact w/ anyone Coronavirus. Pt is A&O X 4 and appears to be in no distress. Emergency Department Nursing Plan of Care       The Nursing Plan of Care is developed from the Nursing assessment and Emergency Department Attending provider initial evaluation. The plan of care may be reviewed in the ED Provider note.     The Plan of Care was developed with the following considerations:   Patient / Family readiness to learn indicated by:verbalized understanding  Persons(s) to be included in education: patient  Barriers to Learning/Limitations:No    Signed     Gina Guerrero RN    9/24/2020   3:41 AM

## 2020-09-24 NOTE — PROGRESS NOTES
Bedside report provided by Aurelio Verdugo, TIERA. Pt in bed, resting comfortably, watching tv. PO intake limited but pt interested in ginger ale and popsicles. Pt with newly placed 24 gauge in her R wrist. Current potassium level 3.9 (from lab drawn at 1723).  Na level 133

## 2020-09-24 NOTE — ED TRIAGE NOTES
Reports withdrawing from heroin; last used yesterday typically snorts. Patient has hx of COPD. Reports SOB starting today. Patient in NAD.

## 2020-09-25 VITALS
HEART RATE: 93 BPM | RESPIRATION RATE: 20 BRPM | WEIGHT: 200 LBS | HEIGHT: 64 IN | TEMPERATURE: 97.7 F | SYSTOLIC BLOOD PRESSURE: 101 MMHG | BODY MASS INDEX: 34.15 KG/M2 | DIASTOLIC BLOOD PRESSURE: 67 MMHG | OXYGEN SATURATION: 92 %

## 2020-09-25 LAB
ATRIAL RATE: 92 BPM
CALCULATED P AXIS, ECG09: 67 DEGREES
CALCULATED R AXIS, ECG10: 71 DEGREES
CALCULATED T AXIS, ECG11: 66 DEGREES
DIAGNOSIS, 93000: NORMAL
P-R INTERVAL, ECG05: 142 MS
Q-T INTERVAL, ECG07: 400 MS
QRS DURATION, ECG06: 88 MS
QTC CALCULATION (BEZET), ECG08: 494 MS
VENTRICULAR RATE, ECG03: 92 BPM

## 2020-09-25 PROCEDURE — 74011250636 HC RX REV CODE- 250/636: Performed by: STUDENT IN AN ORGANIZED HEALTH CARE EDUCATION/TRAINING PROGRAM

## 2020-09-25 PROCEDURE — 74011250636 HC RX REV CODE- 250/636: Performed by: INTERNAL MEDICINE

## 2020-09-25 PROCEDURE — 94640 AIRWAY INHALATION TREATMENT: CPT

## 2020-09-25 PROCEDURE — 74011000250 HC RX REV CODE- 250: Performed by: INTERNAL MEDICINE

## 2020-09-25 PROCEDURE — 74011250637 HC RX REV CODE- 250/637: Performed by: INTERNAL MEDICINE

## 2020-09-25 PROCEDURE — 74011250637 HC RX REV CODE- 250/637: Performed by: STUDENT IN AN ORGANIZED HEALTH CARE EDUCATION/TRAINING PROGRAM

## 2020-09-25 RX ORDER — THERA TABS 400 MCG
1 TAB ORAL DAILY
Qty: 30 TAB | Refills: 0 | Status: SHIPPED | OUTPATIENT
Start: 2020-09-26 | End: 2020-12-09

## 2020-09-25 RX ORDER — MONTELUKAST SODIUM 10 MG/1
10 TABLET ORAL
Qty: 30 TAB | Refills: 0 | Status: SHIPPED | OUTPATIENT
Start: 2020-09-25 | End: 2020-12-09

## 2020-09-25 RX ORDER — UMECLIDINIUM 62.5 UG/1
1 AEROSOL, POWDER ORAL DAILY
Qty: 1 INHALER | Refills: 0 | Status: SHIPPED | OUTPATIENT
Start: 2020-09-25 | End: 2020-12-09

## 2020-09-25 RX ORDER — FOLIC ACID 1 MG/1
1 TABLET ORAL DAILY
Qty: 30 TAB | Refills: 0 | Status: SHIPPED | OUTPATIENT
Start: 2020-09-26 | End: 2020-12-09

## 2020-09-25 RX ORDER — PREDNISONE 20 MG/1
40 TABLET ORAL
Qty: 10 TAB | Refills: 0 | Status: SHIPPED | OUTPATIENT
Start: 2020-09-25 | End: 2020-12-09

## 2020-09-25 RX ORDER — PRAZOSIN HYDROCHLORIDE 2 MG/1
2 CAPSULE ORAL
Qty: 30 CAP | Refills: 0 | Status: SHIPPED | OUTPATIENT
Start: 2020-09-25 | End: 2020-12-15

## 2020-09-25 RX ORDER — DIVALPROEX SODIUM 500 MG/1
500 TABLET, DELAYED RELEASE ORAL 2 TIMES DAILY
Qty: 60 TAB | Refills: 0 | Status: SHIPPED | OUTPATIENT
Start: 2020-09-25 | End: 2020-12-09

## 2020-09-25 RX ORDER — CLONIDINE HYDROCHLORIDE 0.3 MG/1
0.3 TABLET ORAL 2 TIMES DAILY
Qty: 60 TAB | Refills: 0 | Status: SHIPPED | OUTPATIENT
Start: 2020-09-25 | End: 2020-12-09

## 2020-09-25 RX ORDER — IPRATROPIUM BROMIDE AND ALBUTEROL SULFATE 2.5; .5 MG/3ML; MG/3ML
3 SOLUTION RESPIRATORY (INHALATION)
Qty: 30 NEBULE | Refills: 0 | Status: SHIPPED | OUTPATIENT
Start: 2020-09-25 | End: 2021-02-10

## 2020-09-25 RX ORDER — LANOLIN ALCOHOL/MO/W.PET/CERES
100 CREAM (GRAM) TOPICAL DAILY
Qty: 30 TAB | Refills: 0 | Status: SHIPPED | OUTPATIENT
Start: 2020-09-26 | End: 2020-12-09

## 2020-09-25 RX ADMIN — IPRATROPIUM BROMIDE AND ALBUTEROL SULFATE 3 ML: .5; 3 SOLUTION RESPIRATORY (INHALATION) at 07:56

## 2020-09-25 RX ADMIN — METHYLPREDNISOLONE SODIUM SUCCINATE 40 MG: 40 INJECTION, POWDER, FOR SOLUTION INTRAMUSCULAR; INTRAVENOUS at 00:30

## 2020-09-25 RX ADMIN — PROMETHAZINE HYDROCHLORIDE 12.5 MG: 25 TABLET ORAL at 08:57

## 2020-09-25 RX ADMIN — METHADONE HYDROCHLORIDE 15 MG: 10 TABLET ORAL at 08:57

## 2020-09-25 RX ADMIN — DICYCLOMINE HYDROCHLORIDE 20 MG: 20 INJECTION, SOLUTION INTRAMUSCULAR at 09:39

## 2020-09-25 NOTE — PROGRESS NOTES
Physician Progress Note      Tha Haile  CSN #:                  [id number]  :                       1975  ADMIT DATE:       2020 1:33 AM  DISCH DATE:        2020 11:02 AM  RESPONDING  PROVIDER #:        Ej Duffy MD          QUERY TEXT:    Dr. Concepción Villalobos:    Pt admitted with SOB. Pt noted to have Acute Asthma exacerbation, moderate persistent POA  Likely triggered by seasonal allergy, inhalational heroin use. If possible, please clarify the diagnosis of \"Acute Asthma exacerbation, moderate persistent POA Likely triggered by seasonal allergy, inhalational heroin use in the progress notes and discharge summary if you are evaluating and / or treating any of the following: The medical record reflects the following:  Risk Factors: Hx:  Asthma / COPD / Cocaine abuse / HF / Hep. B / Heroin abuse / HTN  / Sarcoidosis / Smoker /  Clinical Indicators: 98.3 100 18 130/78 95%. ... ED VS: HR: ; RR: 20. .. Geofm Dowse Geofm Dowse Geofm Dowse 94-95% on RA in the ED. ...... UDS: Opiates: POSITIVE; THC: POSITIVE**....... ED noted: Accessory muscle usage present. ... Geofm Dowse Geofm Dowse Diffuse expiratory wheeze. Mild accessory muscle use. .. Geofm Dowse Geofm Dowse Geofm Dowse 0425---> ED noted: Patient has had 3 DuoNebs, steroids, iv fluids, magnesium and an additional 5m nebulized albuterol and feels no better. Minimal change in pulmonary exam (still diffuse exp wheeze). Will admit for status asthmaticus. .. Geofm Dowse Geofm Dowse Noted: Actively withdrawing from opiates. QTc 494 ms. Symptomatic management of withdrawal symptoms, given methadone  Treatment: IVF NS Bolus 1,000 ml in the ED; IV Mag Sulfate 2g in the ED; Prednisone po in the ED; Solu-medrol 40mg IV in the ED; IV Zithromax 500mg in the ED; IP admit. ; Patient was counseled extensively on the need to abstain from drugs its addictive tendencies, its deleterious effects on the brain, cardiovascular system, lungs as well as its financial & social sequelae.; Thiamine;  Folate    Thank you,    Willy [facility]  [phone]  Options provided:  -- Acute Asthma exacerbation, moderate persistent POA due to seasonal allergy  -- Acute Asthma exacerbation, moderate persistent POA due to inhalation heroin use  -- Acute Asthma exacerbation, moderate persistent POA due to seasonal allergy and inhalation heroin use  -- Other - I will add my own diagnosis  -- Disagree - Not applicable / Not valid  -- Disagree - Clinically unable to determine / Unknown  -- Refer to Clinical Documentation Reviewer    PROVIDER RESPONSE TEXT:    This patient has Acute Asthma exacerbation, moderate persistent POA due to inhalation heroin use.     Query created by: Ellie Simon on 9/25/2020 11:31 AM      Electronically signed by:  Heike Silvestre MD [facility] MD 9/25/2020 4:39 PM

## 2020-09-25 NOTE — DISCHARGE SUMMARY
Hospitalist Discharge Summary     Patient ID:  Ralph Valles  [id number]  53 y.o.  1975    PCP on record: None    Admit date: 9/24/2020  Discharge date and time: 9/25/2020      Admission Diagnoses: Heroin withdrawal [facility]) [F11.23]    Discharge Diagnoses: Active Problems:    Heroin withdrawal ([address] (9/24/2020)           Hospital Course:   Acute Asthma exacerbation, moderate persistent POA  Likely triggered by seasonal allergy, inhalational heroin use. Chest x-ray negative for infiltrate. WBC count 8.1. No fevers, chills, exposure to COVID-19, cough, sputum production. Improved with steroids, nebs,           Moderate Hypokalemia POA  Hypomagnesemia POA  Hypophosphatemia POA  EKG interpreted independently, normal sinus rhythm, narrow QRS, no ST T changes suggestive ischemia,  ms. repleted orally.        Opioid withdrawal POA  Heroin abuse POA  UDS positive for marijuana and opioid. Actively withdrawing from opiates. QTc 494 ms. Symptomatic management of withdrawal symptoms, given methadone. Patient was counseled extensively on the need to abstain from drugs its addictive tendencies, its deleterious effects on the brain, cardiovascular system, lungs as well as its financial & social sequelae. Thiamine folate     Acute kidney injury, pre-renal POA   Likely prerenal due to hypovolemia. Baseline creatinine 0.7-0.8. Current creatinine 1.2       Bipolar disorder POA  Continue divalproex, prazosin.     Body mass index is 34.33 kg/m². Obesity   Counseled on Lifestyle modifications, AHA Diet ,weight loss strategies. CONSULTATIONS:  None    Excerpted HPI from H&P of Elise Reeves MD  53 y.o.  female with PMH of asthma, depression/bipolar disorder, hypertension, heroin abuse who presents to ED with c/o wheezing, shortness of breath. Symptoms began 2 days ago with acute onset of shortness of breath, dyspnea on exertion, wheezing, mild cough.   Reports nausea, abdominal cramping, inability to tolerate significant p.o. and reports decreased p.o. intake over the past few days. Denies sick contacts, exposure COVID-19. Reports one episode of asthma exacerbation in the past year. Uses only albuterol as needed at home.          ______________________________________________________________________  DISCHARGE SUMMARY/HOSPITAL COURSE:  for full details see H&P, daily progress notes, labs, consult notes. Visit Vitals  /67   Pulse 93   Temp 97.7 °F (36.5 °C)   Resp 20   Ht 5' 4\" (1.626 m)   Wt 90.7 kg (200 lb)   SpO2 92%   BMI 34.33 kg/m²       Patient seen and examined by me on discharge day. Pertinent Findings:  Gen:    Not in distress  Chest: Bilateral wheezing  CVS:   Regular rhythm. No edema  Abd:  Soft, not distended, not tender  Neuro:  Alert with good insight. Oriented to person, place, and time   _______________________________________________________________________  DISCHARGE MEDICATIONS:   Current Discharge Medication List      START taking these medications    Details   therapeutic multivitamin (THERAGRAN) tablet Take 1 Tab by mouth daily. Qty: 30 Tab, Refills: 0      thiamine HCL (B-1) 100 mg tablet Take 1 Tab by mouth daily. Qty: 30 Tab, Refills: 0      montelukast (SINGULAIR) 10 mg tablet Take 1 Tab by mouth nightly. Qty: 30 Tab, Refills: 0      folic acid (FOLVITE) 1 mg tablet Take 1 Tab by mouth daily. Qty: 30 Tab, Refills: 0      albuterol-ipratropium (DUO-NEB) 2.5 mg-0.5 mg/3 ml nebu 3 mL by Nebulization route every four (4) hours as needed for Wheezing, Shortness of Breath or Respiratory Distress. Qty: 30 Nebule, Refills: 0      predniSONE (DELTASONE) 20 mg tablet Take 40 mg by mouth daily (with breakfast). Qty: 10 Tab, Refills: 0         CONTINUE these medications which have CHANGED    Details   umeclidinium (Incruse Ellipta) 62.5 mcg/actuation inhaler Take 1 Puff by inhalation daily.   Qty: 1 Inhaler, Refills: 0      prazosin (MINIPRESS) 2 mg capsule Take 1 Cap by mouth nightly. Indications: posttraumatic stress syndrome  Qty: 30 Cap, Refills: 0      cloNIDine HCL (CATAPRES) 0.3 mg tablet Take 1 Tab by mouth two (2) times a day. Qty: 60 Tab, Refills: 0      divalproex DR (Depakote) 500 mg tablet Take 1 Tab by mouth two (2) times a day. Qty: 60 Tab, Refills: 0         STOP taking these medications       albuterol sulfate 90 mcg/actuation aepb Comments:   Reason for Stopping:               My Recommended Diet, Activity, Wound Care, and follow-up labs are listed in the patient's Discharge Insturctions which I have personally completed and reviewed.     _______________________________________________________________________  DISPOSITION:     Home with Family: x   Home with HH/PT/OT/RN:    SNF/LTC:    MAEGAN:    OTHER:        Condition at Discharge:  Stable  _______________________________________________________________________  Follow up with:   PCP : None  Follow-up Information     Follow up With Specialties Details Why Contact Info    None    None ([phone]) Patient stated that they have no PCP                Total time in minutes spent coordinating this discharge (includes going over instructions, follow-up, prescriptions, and preparing report for sign off to her PCP) :  35  minutes    Signed:  Capo Cardenas MD

## 2020-09-25 NOTE — DISCHARGE INSTRUCTIONS
Patient Education        Asthma Attack: Care Instructions  Your Care Instructions     During an asthma attack, the airways swell and narrow. This makes it hard to breathe. Severe asthma attacks can be life-threatening, but you can help prevent them by keeping your asthma under control and treating symptoms before they get bad. Symptoms include being short of breath, having chest tightness, coughing, and wheezing. Noting and treating these symptoms can also help you avoid future trips to the emergency room. The doctor has checked you carefully, but problems can develop later. If you notice any problems or new symptoms, get medical treatment right away. Follow-up care is a key part of your treatment and safety. Be sure to make and go to all appointments, and call your doctor if you are having problems. It's also a good idea to know your test results and keep a list of the medicines you take. How can you care for yourself at home? · Follow your asthma action plan to prevent and treat attacks. If you don't have an asthma action plan, work with your doctor to create one. · Take your asthma medicines exactly as prescribed. Talk to your doctor right away if you have any questions about how to take them. ? Use your quick-relief medicine when you have symptoms of an attack. Quick-relief medicine is usually an albuterol inhaler. Some people need to use quick-relief medicine before they exercise. ? Take your controller medicine every day, not just when you have symptoms. Controller medicine is usually an inhaled corticosteroid. The goal is to prevent problems before they occur. Don't use your controller medicine to treat an attack that has already started. It doesn't work fast enough to help. ? If your doctor prescribed corticosteroid pills to use during an attack, take them exactly as prescribed. It may take hours for the pills to work, but they may make the episode shorter and help you breathe better. ?  Keep your quick-relief medicine with you at all times. · Talk to your doctor before using other medicines. Some medicines, such as aspirin, can cause asthma attacks in some people. · If you have a peak flow meter, use it to check how well you are breathing. This can help you predict when an asthma attack is going to occur. Then you can take medicine to prevent the asthma attack or make it less severe. · Do not smoke or allow others to smoke around you. Avoid smoky places. Smoking makes asthma worse. If you need help quitting, talk to your doctor about stop-smoking programs and medicines. These can increase your chances of quitting for good. · Learn what triggers an asthma attack for you, and avoid the triggers when you can. Common triggers include colds, smoke, air pollution, dust, pollen, mold, pets, cockroaches, stress, and cold air. · Avoid colds and the flu. Get a pneumococcal vaccine shot. If you have had one before, ask your doctor if you need a second dose. Get a flu vaccine every fall. If you must be around people with colds or the flu, wash your hands often. When should you call for help? Call 911 anytime you think you may need emergency care. For example, call if:    · You have severe trouble breathing. Call your doctor now or seek immediate medical care if:    · Your symptoms do not get better after you have followed your asthma action plan.     · You have new or worse trouble breathing.     · Your coughing and wheezing get worse.     · You cough up dark brown or bloody mucus (sputum).     · You have a new or higher fever. Watch closely for changes in your health, and be sure to contact your doctor if:    · You need to use quick-relief medicine on more than 2 days a week (unless it is just for exercise).     · You cough more deeply or more often, especially if you notice more mucus or a change in the color of your mucus.     · You are not getting better as expected. Where can you learn more?   Go to http://jazlyn-eladio.info/  Enter [id number] in the search box to learn more about \"Asthma Attack: Care Instructions. \"  Current as of: February 24, 2020               Content Version: 12.6  © 2006-2020 [employer]. Care instructions adapted under license by [employer] (which disclaims liability or warranty for this information). If you have questions about a medical condition or this instruction, always ask your healthcare professional. [place] [address] any warranty or liability for your use of this information. Patient Education        Learning About Asthma Triggers  What are asthma triggers? When you have asthma, certain things can make your symptoms worse. These are called triggers. Learn what triggers an asthma attack for you, and avoid the triggers when you can. Common triggers include colds, smoke, air pollution, dust, pollen, pets, stress, and cold air. How do asthma triggers affect you? Triggers can make it harder for your lungs to work as they should. They can lead to sudden breathing problems and other symptoms. When you are around a trigger, an asthma attack is more likely. If your symptoms are severe, you may need emergency treatment or have to go to the hospital for treatment. What can you do to avoid triggers? The first thing is to know your triggers. When you are having symptoms, note the things around you that might be causing them. Then look for patterns that may be triggering your symptoms. Record your triggers on a piece of paper or in an asthma diary. When you have your list of possible triggers, work with your doctor to find ways to avoid them. Avoid colds and flu. Get a pneumococcal vaccine shot. If you have had one before, ask your doctor whether you need a second dose. Get a flu vaccine every year, as soon as it's available. If you must be around people with colds or the flu, wash your hands often.   Here are some ways to avoid a few common triggers. · Do not smoke or allow others to smoke around you. If you need help quitting, talk to your doctor about stop-smoking programs and medicines. These can increase your chances of quitting for good. · If there is a lot of pollution, pollen, or dust outside, stay at home and keep your windows closed. Use an air conditioner or air filter in your home. Check your local weather report or newspaper for air quality and pollen reports. What else should you know? · Take your controller medicine every day, not just when you have symptoms. It helps prevent problems before they occur. · Your doctor may suggest that you check how well your lungs are working by measuring your peak expiratory flow (PEF) throughout the day. Your PEF may drop when you are near things that trigger symptoms. Where can you learn more? Go to http://www.guerrero.com/  Enter S900 in the search box to learn more about \"Learning About Asthma Triggers. \"  Current as of: February 24, 2020               Content Version: 12.6  © 2006-2020 [employer]. Care instructions adapted under license by [facility] (which disclaims liability or warranty for this information). If you have questions about a medical condition or this instruction, always ask your healthcare professional. [address] any warranty or liability for your use of this information.

## 2020-09-25 NOTE — PROGRESS NOTES
Physician Progress Note      Pilar Lorenzo  CSN #:                  [id number]  :                       1975  ADMIT DATE:       2020 1:33 AM  [address] [place] DATE:  RESPONDING  PROVIDER #:        Howard Loza MD [facility] MD          QUERY TEXT:    Dr. Watson Soriano:    Patient admitted with SOB, noted to have Asthma exacerbation POA. If possible, please document in progress notes and discharge summary further specificity regarding asthma as follows: The medical record reflects the following:  Risk Factors: Hx:  Asthma / COPD / Cocaine abuse /  HF / Hep. B / Heroin abuse / HTN  / Sarcoidosis / Smoker /  Clinical Indicators: 98.3 100 18 130/78 95%. ... ED VS: HR: ; RR: 20. .. Lesle Gulling Lesle Gulling Lesle Gulling 94-95% on RA in the ED. ..... Lesle Gulling CXR: Neg. ...... UDS: Opiates: POSITIVE; THC: POSITIVE. .. Lesle Gulling Lesle Gulling ED noted: Accessory muscle usage present. ... Lesle Gulling Lesle Gulling Diffuse expiratory wheeze. Mild accessory muscle use  0425---> ED noted: Patient has had 3 DuoNebs, steroids, iv fluids, magnesium and an additional 5m nebulized albuterol and feels no better. Minimal change in pulmonary exam (still diffuse exp wheeze). Will admit for status asthmaticus  Treatment: IVF NS Bolus 1,000 ml in the ED; IV Mag Sulfate 2g in the ED; Prednisone po in the ED; Solu-medrol 40mg IV in the ED; IV Zithromax 500mg in the ED; IP admit    *Mild intermittent- symptoms < 2 x/week &/or nocturnal awakenings < 2x/month. Use of inhalers ? 2 x/week. No limits to normal activity. *Mild persistent - symptoms > 2x/week but not daily &/or nocturnal awakenings 3-4 x/month. Use of inhalers > 2x/week but not daily. Minor impact on normal activity. *Moderate persistent - daily symptoms and/or nocturnal awakenings > 1x/week but not nightly. Daily use of inhalers. Some impact on normal activity. *Severe persistent - symptoms throughout the day and/or nocturnal awakenings up to 7x/week. Use of inhalers several times per day. Normal activity is extremely limited.     Thank you,    Aleah Andrade Dayton Lin  [facility]  [phone]  Options provided:  -- Status asthmaticus  -- Asthma severity is mild intermittent  -- Asthma severity is mild persistent  -- Asthma severity is moderate persistent  -- Asthma severity is severe persistent  -- Other - I will add my own diagnosis  -- Disagree - Not applicable / Not valid  -- Disagree - Clinically unable to determine / Unknown  -- Refer to Clinical Documentation Reviewer    PROVIDER RESPONSE TEXT:    This patient has moderate persistent asthma.     Query created by: Samm Cao on 9/25/2020 10:04 AM      Electronically signed by:  Jose Rodriguez MD [facility] MD 9/25/2020 10:07 AM

## 2020-09-25 NOTE — PROGRESS NOTES
Reason for Admission:  Melecio Rehman is a 53 y.o.  female with PMH of asthma, depression/bipolar disorder, hypertension, heroin abuse who presents to ED with c/o wheezing, shortness of breath    Patient does not have insurance at this time. RUR Score: 22% medium                 PCP: First and Last name:  No PCP at this time   Name of Practice:    Are you a current patient: Yes/No:    Approximate date of last visit:    Can you participate in a virtual visit if needed:     Do you (patient/family) have any concerns for transition/discharge? Patient's family to provide transportation home. Plan for utilizing home health:   No need for home health at this time.     Current Advanced Directive/Advance Care Plan:  No advanced            Transition of Care Plan:

## 2020-09-25 NOTE — PROGRESS NOTES
Patient was discharged from the unit before CM could discuss discharge plan with her. A medical voucher was submitted to the pharmacy to purchase Albuterol and Prednisone for the patient. CM attempted to catch her before she left the building and called the telephone number on file but was unsuccessful in contacting her. RNCalderon spoke with her and patient will return to the pharmacy to  her medications. Patient is uninsured and she was seen by patient access to complete an application for Medicaid. CM verified that patient was insured by Medicaid (#[id number]) but it is not active at this time.

## 2020-09-25 NOTE — PROGRESS NOTES
0710) Bedside shift change report given to Lilibeth Vanegas, RN (oncoming nurse) by Jaime Ferreira RN (offgoing nurse). Report included the following information SBAR, Kardex, MAR and Recent Results. 2910) pt request something for nausea prior to all her pills. Pt given phenergan and methadone   7611) Discuss IV lost with Dr. [address], unable to draw blood last night. Discuss PO bentyl for abdomen cramping. Pt possibly interested in methadone clinic. 1030) . Jade Eaton Reviewed discharge instructions with pt including follow-up appointments, new medications and side effects, medications to continue. Pt refused asthma education, signs/symptoms of stroke and heart attack, and MyChart information. Belongings sent home with pt. Discuss voucher for some medications sent to [facility]  1100) pt walked downstairs for discharge.

## 2020-09-29 ENCOUNTER — TELEPHONE (OUTPATIENT)
Dept: CASE MANAGEMENT | Age: 45
End: 2020-09-29

## 2020-12-08 ENCOUNTER — APPOINTMENT (OUTPATIENT)
Dept: GENERAL RADIOLOGY | Age: 45
DRG: 816 | End: 2020-12-08
Attending: EMERGENCY MEDICINE
Payer: COMMERCIAL

## 2020-12-08 ENCOUNTER — APPOINTMENT (OUTPATIENT)
Dept: ULTRASOUND IMAGING | Age: 45
DRG: 816 | End: 2020-12-08
Attending: STUDENT IN AN ORGANIZED HEALTH CARE EDUCATION/TRAINING PROGRAM
Payer: COMMERCIAL

## 2020-12-08 ENCOUNTER — HOSPITAL ENCOUNTER (INPATIENT)
Age: 45
LOS: 3 days | Discharge: PSYCHIATRIC OR SUBSTANCE USE FACILITY | DRG: 816 | End: 2020-12-11
Attending: EMERGENCY MEDICINE | Admitting: STUDENT IN AN ORGANIZED HEALTH CARE EDUCATION/TRAINING PROGRAM
Payer: COMMERCIAL

## 2020-12-08 DIAGNOSIS — T40.1X1A ACCIDENTAL OVERDOSE OF HEROIN, INITIAL ENCOUNTER (HCC): Primary | ICD-10-CM

## 2020-12-08 DIAGNOSIS — J96.01 ACUTE RESPIRATORY FAILURE WITH HYPOXIA (HCC): ICD-10-CM

## 2020-12-08 DIAGNOSIS — J44.1 ACUTE EXACERBATION OF CHRONIC OBSTRUCTIVE PULMONARY DISEASE (COPD) (HCC): ICD-10-CM

## 2020-12-08 LAB
ALBUMIN SERPL-MCNC: 4 G/DL (ref 3.5–5)
ALBUMIN/GLOB SERPL: 1.2 {RATIO} (ref 1.1–2.2)
ALP SERPL-CCNC: 83 U/L (ref 45–117)
ALT SERPL-CCNC: 56 U/L (ref 12–78)
ANION GAP SERPL CALC-SCNC: 6 MMOL/L (ref 5–15)
APAP SERPL-MCNC: <2 UG/ML (ref 10–30)
ARTERIAL PATENCY WRIST A: ABNORMAL
ARTERIAL PATENCY WRIST A: YES
AST SERPL-CCNC: 69 U/L (ref 15–37)
ATRIAL RATE: 103 BPM
BASE EXCESS BLD CALC-SCNC: 5 MMOL/L
BASE EXCESS BLD CALC-SCNC: 7 MMOL/L
BASOPHILS # BLD: 0 K/UL (ref 0–0.1)
BASOPHILS NFR BLD: 1 % (ref 0–1)
BDY SITE: ABNORMAL
BDY SITE: ABNORMAL
BILIRUB SERPL-MCNC: 0.4 MG/DL (ref 0.2–1)
BNP SERPL-MCNC: 130 PG/ML
BUN SERPL-MCNC: 6 MG/DL (ref 6–20)
BUN/CREAT SERPL: 6 (ref 12–20)
CA-I BLD-SCNC: 1.14 MMOL/L (ref 1.12–1.32)
CA-I BLD-SCNC: 1.19 MMOL/L (ref 1.12–1.32)
CALCIUM SERPL-MCNC: 8.7 MG/DL (ref 8.5–10.1)
CALCULATED P AXIS, ECG09: 21 DEGREES
CALCULATED R AXIS, ECG10: 49 DEGREES
CALCULATED T AXIS, ECG11: 49 DEGREES
CHLORIDE SERPL-SCNC: 105 MMOL/L (ref 97–108)
CK MB CFR SERPL CALC: 0.7 % (ref 0–2.5)
CK MB SERPL-MCNC: 6.3 NG/ML (ref 5–25)
CK SERPL-CCNC: 887 U/L (ref 26–192)
CO2 SERPL-SCNC: 29 MMOL/L (ref 21–32)
COMMENT, HOLDF: NORMAL
COVID-19 RAPID TEST, COVR: NOT DETECTED
CREAT SERPL-MCNC: 0.98 MG/DL (ref 0.55–1.02)
DIAGNOSIS, 93000: NORMAL
DIFFERENTIAL METHOD BLD: ABNORMAL
EOSINOPHIL # BLD: 0.1 K/UL (ref 0–0.4)
EOSINOPHIL NFR BLD: 3 % (ref 0–7)
ERYTHROCYTE [DISTWIDTH] IN BLOOD BY AUTOMATED COUNT: 19.3 % (ref 11.5–14.5)
GAS FLOW.O2 O2 DELIVERY SYS: ABNORMAL L/MIN
GAS FLOW.O2 O2 DELIVERY SYS: ABNORMAL L/MIN
GAS FLOW.O2 SETTING OXYMISER: 6 L/M
GLOBULIN SER CALC-MCNC: 3.4 G/DL (ref 2–4)
GLUCOSE SERPL-MCNC: 108 MG/DL (ref 65–100)
HCO3 BLD-SCNC: 31.3 MMOL/L (ref 22–26)
HCO3 BLD-SCNC: 32.5 MMOL/L (ref 22–26)
HCT VFR BLD AUTO: 34.2 % (ref 35–47)
HEALTH STATUS, XMCV2T: NORMAL
HGB BLD-MCNC: 10.7 G/DL (ref 11.5–16)
IMM GRANULOCYTES # BLD AUTO: 0 K/UL (ref 0–0.04)
IMM GRANULOCYTES NFR BLD AUTO: 0 % (ref 0–0.5)
LACTATE BLD-SCNC: 1.78 MMOL/L (ref 0.4–2)
LYMPHOCYTES # BLD: 1.4 K/UL (ref 0.8–3.5)
LYMPHOCYTES NFR BLD: 27 % (ref 12–49)
MCH RBC QN AUTO: 26 PG (ref 26–34)
MCHC RBC AUTO-ENTMCNC: 31.3 G/DL (ref 30–36.5)
MCV RBC AUTO: 83.2 FL (ref 80–99)
MONOCYTES # BLD: 0.6 K/UL (ref 0–1)
MONOCYTES NFR BLD: 13 % (ref 5–13)
NEUTS SEG # BLD: 2.8 K/UL (ref 1.8–8)
NEUTS SEG NFR BLD: 56 % (ref 32–75)
NRBC # BLD: 0 K/UL (ref 0–0.01)
NRBC BLD-RTO: 0 PER 100 WBC
O2/TOTAL GAS SETTING VFR VENT: 50 %
P-R INTERVAL, ECG05: 118 MS
PCO2 BLD: 56 MMHG (ref 35–45)
PCO2 BLD: 64.4 MMHG (ref 35–45)
PEEP RESPIRATORY: 6 CMH2O
PH BLD: 7.3 [PH] (ref 7.35–7.45)
PH BLD: 7.37 [PH] (ref 7.35–7.45)
PIP ISTAT,IPIP: 14
PLATELET # BLD AUTO: 283 K/UL (ref 150–400)
PMV BLD AUTO: 9.9 FL (ref 8.9–12.9)
PO2 BLD: 104 MMHG (ref 80–100)
PO2 BLD: 174 MMHG (ref 80–100)
POTASSIUM SERPL-SCNC: 3.5 MMOL/L (ref 3.5–5.1)
PROT SERPL-MCNC: 7.4 G/DL (ref 6.4–8.2)
Q-T INTERVAL, ECG07: 358 MS
QRS DURATION, ECG06: 78 MS
QTC CALCULATION (BEZET), ECG08: 468 MS
RBC # BLD AUTO: 4.11 M/UL (ref 3.8–5.2)
SALICYLATES SERPL-MCNC: 2.4 MG/DL (ref 2.8–20)
SAMPLES BEING HELD,HOLD: NORMAL
SAO2 % BLD: 97 % (ref 92–97)
SAO2 % BLD: 99 % (ref 92–97)
SODIUM SERPL-SCNC: 140 MMOL/L (ref 136–145)
SOURCE, COVRS: NORMAL
SPECIMEN SOURCE, FCOV2M: NORMAL
SPECIMEN TYPE, XMCV1T: NORMAL
SPECIMEN TYPE: ABNORMAL
SPECIMEN TYPE: ABNORMAL
TOTAL RESP. RATE, ITRR: 16
TOTAL RESP. RATE, ITRR: 16
TROPONIN I BLD-MCNC: <0.04 NG/ML (ref 0–0.08)
TROPONIN I SERPL-MCNC: <0.05 NG/ML
VENTRICULAR RATE, ECG03: 103 BPM
WBC # BLD AUTO: 5 K/UL (ref 3.6–11)

## 2020-12-08 PROCEDURE — 36415 COLL VENOUS BLD VENIPUNCTURE: CPT

## 2020-12-08 PROCEDURE — 93005 ELECTROCARDIOGRAM TRACING: CPT

## 2020-12-08 PROCEDURE — 74011250636 HC RX REV CODE- 250/636: Performed by: STUDENT IN AN ORGANIZED HEALTH CARE EDUCATION/TRAINING PROGRAM

## 2020-12-08 PROCEDURE — 82550 ASSAY OF CK (CPK): CPT

## 2020-12-08 PROCEDURE — 99285 EMERGENCY DEPT VISIT HI MDM: CPT

## 2020-12-08 PROCEDURE — 84484 ASSAY OF TROPONIN QUANT: CPT

## 2020-12-08 PROCEDURE — 5A09357 ASSISTANCE WITH RESPIRATORY VENTILATION, LESS THAN 24 CONSECUTIVE HOURS, CONTINUOUS POSITIVE AIRWAY PRESSURE: ICD-10-PCS | Performed by: EMERGENCY MEDICINE

## 2020-12-08 PROCEDURE — 65270000029 HC RM PRIVATE

## 2020-12-08 PROCEDURE — 85025 COMPLETE CBC W/AUTO DIFF WBC: CPT

## 2020-12-08 PROCEDURE — 74011250637 HC RX REV CODE- 250/637: Performed by: STUDENT IN AN ORGANIZED HEALTH CARE EDUCATION/TRAINING PROGRAM

## 2020-12-08 PROCEDURE — 82553 CREATINE MB FRACTION: CPT

## 2020-12-08 PROCEDURE — 94640 AIRWAY INHALATION TREATMENT: CPT

## 2020-12-08 PROCEDURE — 83605 ASSAY OF LACTIC ACID: CPT

## 2020-12-08 PROCEDURE — 74011000250 HC RX REV CODE- 250: Performed by: STUDENT IN AN ORGANIZED HEALTH CARE EDUCATION/TRAINING PROGRAM

## 2020-12-08 PROCEDURE — 80307 DRUG TEST PRSMV CHEM ANLYZR: CPT

## 2020-12-08 PROCEDURE — 76882 US LMTD JT/FCL EVL NVASC XTR: CPT

## 2020-12-08 PROCEDURE — 74011250636 HC RX REV CODE- 250/636

## 2020-12-08 PROCEDURE — 36600 WITHDRAWAL OF ARTERIAL BLOOD: CPT

## 2020-12-08 PROCEDURE — 71045 X-RAY EXAM CHEST 1 VIEW: CPT

## 2020-12-08 PROCEDURE — 83880 ASSAY OF NATRIURETIC PEPTIDE: CPT

## 2020-12-08 PROCEDURE — 74011250636 HC RX REV CODE- 250/636: Performed by: EMERGENCY MEDICINE

## 2020-12-08 PROCEDURE — 74011000250 HC RX REV CODE- 250: Performed by: EMERGENCY MEDICINE

## 2020-12-08 PROCEDURE — 82803 BLOOD GASES ANY COMBINATION: CPT

## 2020-12-08 PROCEDURE — 65660000000 HC RM CCU STEPDOWN

## 2020-12-08 PROCEDURE — 96375 TX/PRO/DX INJ NEW DRUG ADDON: CPT

## 2020-12-08 PROCEDURE — 96374 THER/PROPH/DIAG INJ IV PUSH: CPT

## 2020-12-08 PROCEDURE — 94660 CPAP INITIATION&MGMT: CPT

## 2020-12-08 PROCEDURE — 87635 SARS-COV-2 COVID-19 AMP PRB: CPT

## 2020-12-08 PROCEDURE — 80053 COMPREHEN METABOLIC PANEL: CPT

## 2020-12-08 RX ORDER — IPRATROPIUM BROMIDE AND ALBUTEROL SULFATE 2.5; .5 MG/3ML; MG/3ML
3 SOLUTION RESPIRATORY (INHALATION) ONCE
Status: COMPLETED | OUTPATIENT
Start: 2020-12-08 | End: 2020-12-08

## 2020-12-08 RX ORDER — IPRATROPIUM BROMIDE AND ALBUTEROL SULFATE 2.5; .5 MG/3ML; MG/3ML
3 SOLUTION RESPIRATORY (INHALATION)
Status: DISCONTINUED | OUTPATIENT
Start: 2020-12-08 | End: 2020-12-11 | Stop reason: HOSPADM

## 2020-12-08 RX ORDER — CLONIDINE HYDROCHLORIDE 0.1 MG/1
0.3 TABLET ORAL 2 TIMES DAILY
Status: DISCONTINUED | OUTPATIENT
Start: 2020-12-09 | End: 2020-12-11 | Stop reason: HOSPADM

## 2020-12-08 RX ORDER — SODIUM CHLORIDE 0.9 % (FLUSH) 0.9 %
5-40 SYRINGE (ML) INJECTION AS NEEDED
Status: DISCONTINUED | OUTPATIENT
Start: 2020-12-08 | End: 2020-12-11 | Stop reason: HOSPADM

## 2020-12-08 RX ORDER — FOLIC ACID 1 MG/1
1 TABLET ORAL DAILY
Status: DISCONTINUED | OUTPATIENT
Start: 2020-12-09 | End: 2020-12-11 | Stop reason: HOSPADM

## 2020-12-08 RX ORDER — PROMETHAZINE HYDROCHLORIDE 25 MG/1
12.5 TABLET ORAL
Status: DISCONTINUED | OUTPATIENT
Start: 2020-12-08 | End: 2020-12-11 | Stop reason: HOSPADM

## 2020-12-08 RX ORDER — ALBUTEROL SULFATE 0.83 MG/ML
5 SOLUTION RESPIRATORY (INHALATION)
Status: DISPENSED | OUTPATIENT
Start: 2020-12-08 | End: 2020-12-08

## 2020-12-08 RX ORDER — LORAZEPAM 2 MG/ML
INJECTION INTRAMUSCULAR
Status: COMPLETED
Start: 2020-12-08 | End: 2020-12-08

## 2020-12-08 RX ORDER — ONDANSETRON 2 MG/ML
4 INJECTION INTRAMUSCULAR; INTRAVENOUS
Status: DISCONTINUED | OUTPATIENT
Start: 2020-12-08 | End: 2020-12-11 | Stop reason: HOSPADM

## 2020-12-08 RX ORDER — PRAZOSIN HYDROCHLORIDE 1 MG/1
2 CAPSULE ORAL
Status: DISCONTINUED | OUTPATIENT
Start: 2020-12-08 | End: 2020-12-11 | Stop reason: HOSPADM

## 2020-12-08 RX ORDER — MONTELUKAST SODIUM 10 MG/1
10 TABLET ORAL
Status: DISCONTINUED | OUTPATIENT
Start: 2020-12-08 | End: 2020-12-11 | Stop reason: HOSPADM

## 2020-12-08 RX ORDER — ACETAMINOPHEN 325 MG/1
650 TABLET ORAL
Status: DISCONTINUED | OUTPATIENT
Start: 2020-12-08 | End: 2020-12-11 | Stop reason: HOSPADM

## 2020-12-08 RX ORDER — DIVALPROEX SODIUM 500 MG/1
500 TABLET, DELAYED RELEASE ORAL 2 TIMES DAILY
Status: DISCONTINUED | OUTPATIENT
Start: 2020-12-08 | End: 2020-12-11 | Stop reason: HOSPADM

## 2020-12-08 RX ORDER — POLYETHYLENE GLYCOL 3350 17 G/17G
17 POWDER, FOR SOLUTION ORAL DAILY PRN
Status: DISCONTINUED | OUTPATIENT
Start: 2020-12-08 | End: 2020-12-11 | Stop reason: HOSPADM

## 2020-12-08 RX ORDER — NALOXONE HYDROCHLORIDE 0.4 MG/ML
0.2 INJECTION, SOLUTION INTRAMUSCULAR; INTRAVENOUS; SUBCUTANEOUS
Status: DISCONTINUED | OUTPATIENT
Start: 2020-12-08 | End: 2020-12-08

## 2020-12-08 RX ORDER — ACETAMINOPHEN 650 MG/1
650 SUPPOSITORY RECTAL
Status: DISCONTINUED | OUTPATIENT
Start: 2020-12-08 | End: 2020-12-11 | Stop reason: HOSPADM

## 2020-12-08 RX ORDER — NALOXONE HYDROCHLORIDE 0.4 MG/ML
0.2 INJECTION, SOLUTION INTRAMUSCULAR; INTRAVENOUS; SUBCUTANEOUS ONCE
Status: COMPLETED | OUTPATIENT
Start: 2020-12-08 | End: 2020-12-08

## 2020-12-08 RX ORDER — ASPIRIN 325 MG/1
100 TABLET, FILM COATED ORAL DAILY
Status: DISCONTINUED | OUTPATIENT
Start: 2020-12-09 | End: 2020-12-11 | Stop reason: HOSPADM

## 2020-12-08 RX ORDER — SODIUM CHLORIDE 0.9 % (FLUSH) 0.9 %
5-40 SYRINGE (ML) INJECTION EVERY 8 HOURS
Status: DISCONTINUED | OUTPATIENT
Start: 2020-12-08 | End: 2020-12-11 | Stop reason: HOSPADM

## 2020-12-08 RX ORDER — ONDANSETRON 2 MG/ML
INJECTION INTRAMUSCULAR; INTRAVENOUS
Status: COMPLETED
Start: 2020-12-08 | End: 2020-12-08

## 2020-12-08 RX ORDER — KETOROLAC TROMETHAMINE 30 MG/ML
30 INJECTION, SOLUTION INTRAMUSCULAR; INTRAVENOUS
Status: DISCONTINUED | OUTPATIENT
Start: 2020-12-08 | End: 2020-12-11 | Stop reason: HOSPADM

## 2020-12-08 RX ORDER — ENOXAPARIN SODIUM 100 MG/ML
40 INJECTION SUBCUTANEOUS DAILY
Status: DISCONTINUED | OUTPATIENT
Start: 2020-12-08 | End: 2020-12-11 | Stop reason: HOSPADM

## 2020-12-08 RX ORDER — ALBUTEROL SULFATE 0.83 MG/ML
5 SOLUTION RESPIRATORY (INHALATION)
Status: COMPLETED | OUTPATIENT
Start: 2020-12-08 | End: 2020-12-08

## 2020-12-08 RX ORDER — NALOXONE HYDROCHLORIDE 0.4 MG/ML
0.2 INJECTION, SOLUTION INTRAMUSCULAR; INTRAVENOUS; SUBCUTANEOUS
Status: DISCONTINUED | OUTPATIENT
Start: 2020-12-08 | End: 2020-12-11 | Stop reason: HOSPADM

## 2020-12-08 RX ADMIN — LORAZEPAM 1 MG: 2 INJECTION INTRAMUSCULAR; INTRAVENOUS at 03:31

## 2020-12-08 RX ADMIN — METHYLPREDNISOLONE SODIUM SUCCINATE 40 MG: 40 INJECTION, POWDER, FOR SOLUTION INTRAMUSCULAR; INTRAVENOUS at 21:40

## 2020-12-08 RX ADMIN — NALOXONE HYDROCHLORIDE 0.2 MG: 0.4 INJECTION, SOLUTION INTRAMUSCULAR; INTRAVENOUS; SUBCUTANEOUS at 04:02

## 2020-12-08 RX ADMIN — ONDANSETRON HYDROCHLORIDE 4 MG: 2 INJECTION, SOLUTION INTRAMUSCULAR; INTRAVENOUS at 04:10

## 2020-12-08 RX ADMIN — IPRATROPIUM BROMIDE AND ALBUTEROL SULFATE 3 ML: .5; 3 SOLUTION RESPIRATORY (INHALATION) at 19:52

## 2020-12-08 RX ADMIN — KETOROLAC TROMETHAMINE 30 MG: 30 INJECTION, SOLUTION INTRAMUSCULAR; INTRAVENOUS at 17:36

## 2020-12-08 RX ADMIN — Medication 10 ML: at 10:48

## 2020-12-08 RX ADMIN — NALOXONE HYDROCHLORIDE 0.2 MG: 0.4 INJECTION, SOLUTION INTRAMUSCULAR; INTRAVENOUS; SUBCUTANEOUS at 11:19

## 2020-12-08 RX ADMIN — ALBUTEROL SULFATE 5 MG: 2.5 SOLUTION RESPIRATORY (INHALATION) at 03:29

## 2020-12-08 RX ADMIN — KETOROLAC TROMETHAMINE 30 MG: 30 INJECTION, SOLUTION INTRAMUSCULAR; INTRAVENOUS at 19:46

## 2020-12-08 RX ADMIN — ENOXAPARIN SODIUM 40 MG: 40 INJECTION SUBCUTANEOUS at 10:47

## 2020-12-08 RX ADMIN — Medication 10 ML: at 17:37

## 2020-12-08 RX ADMIN — ALBUTEROL SULFATE 5 MG: 2.5 SOLUTION RESPIRATORY (INHALATION) at 06:07

## 2020-12-08 RX ADMIN — IPRATROPIUM BROMIDE AND ALBUTEROL SULFATE 3 ML: .5; 3 SOLUTION RESPIRATORY (INHALATION) at 03:29

## 2020-12-08 RX ADMIN — METHYLPREDNISOLONE SODIUM SUCCINATE 125 MG: 125 INJECTION, POWDER, FOR SOLUTION INTRAMUSCULAR; INTRAVENOUS at 03:31

## 2020-12-08 RX ADMIN — METHYLPREDNISOLONE SODIUM SUCCINATE 40 MG: 40 INJECTION, POWDER, FOR SOLUTION INTRAMUSCULAR; INTRAVENOUS at 11:23

## 2020-12-08 RX ADMIN — ACETAMINOPHEN 650 MG: 325 TABLET ORAL at 19:47

## 2020-12-08 RX ADMIN — DIVALPROEX SODIUM 500 MG: 500 TABLET, DELAYED RELEASE ORAL at 17:38

## 2020-12-08 NOTE — ED NOTES
TRANSFER - IN REPORT:    Verbal report received from Radha (name) on [facility]. Report consisted of patients Situation, Background, Assessment and   Recommendations(SBAR). Information from the following report(s) SBAR and ED Summary was reviewed with the receiving nurse. Opportunity for questions and clarification was provided. 0845 Pt resting in stretcher in POC, Pt remains on BIPAP, will continue to monitor     0951 Notify MD on Pt mornings med w/ NPO status. Per MD take pt off BIPAP and place pt on 6L O2 NC and see how pt does w/o BIPAP       1010 Place pt on 6L NC Pt O2 sats remain 98-98% however pt RR decrease to 8 when pt drifts off. Place BIPAP back on PT. Pt then refused to have th BIPAP machine on, Pt agreed to have the NC instead, will continue to monitor Pt. Notified MD Via perfect serve       1032 RT at bedside     1103 Pt refusing to give urine sample and refusing to be place on BIPAP machine as her RR decreases while she drifts off. Notified charge nurse and MD      9850 1142 After 1 dose of Narcan Pt RR has improve to 15% Pt still refusing BIPAP, Notified MD     1230 Pt remains sleepy and drowsy in stretcher, eyes open spontaneously to voice. 1331 Pt remains sleepy and drowsy in stretcher, eyes open spontaneously to voice.  However, pt drifts back to sleep will continue to monitor & notify MD     1420 Pt sleeping in stretcher, pt remains on the monitor x 3, will continue to monitor     1545 Pt sleeping in stretcher, pt remains on the monitor x 3, will continue to monitor     1720 Pt is alert and requesting to have something to eat, this RN explain to the pt her NPO status and would have to contact the MD. Pt was not happy about that and told me to get out her room    1725 MD at bedside evaluating pt     1735 Provided pt w/ ginger ale, crackers and popsicle

## 2020-12-08 NOTE — ED PROVIDER NOTES
EMERGENCY DEPARTMENT HISTORY AND PHYSICAL EXAM      Date: 12/8/2020  Patient Name: Aren Welch    History of Presenting Illness     Chief Complaint   Patient presents with    Abdominal Pain    Shortness of Breath       History Provided By: Patient    HPI: Aren Welch, 53 y.o. female with PMHx significant for asthma/COPD, bipolar, substance abuse (cocaine, heroin), hypertension, sarcoidosis presents to the ED with chief complaint of shortness of breath. History is limited as patient is unable to speak more than a few words at a time. Patient received neb treatment in route and arrived on 4 L nasal cannula. Review of medical records reveals that patient has had multiple heroin overdose events. She has also been intubated in the past for COPD. Patient shakes her head no when asked if she is having nausea, vomiting, or chest pain. States \"I cannot breathe\" and appears very anxious PCP: None    No current facility-administered medications on file prior to encounter. Current Outpatient Medications on File Prior to Encounter   Medication Sig Dispense Refill    umeclidinium (Incruse Ellipta) 62.5 mcg/actuation inhaler Take 1 Puff by inhalation daily. 1 Inhaler 0    prazosin (MINIPRESS) 2 mg capsule Take 1 Cap by mouth nightly. Indications: posttraumatic stress syndrome 30 Cap 0    cloNIDine HCL (CATAPRES) 0.3 mg tablet Take 1 Tab by mouth two (2) times a day. 60 Tab 0    divalproex DR (Depakote) 500 mg tablet Take 1 Tab by mouth two (2) times a day. 60 Tab 0    therapeutic multivitamin (THERAGRAN) tablet Take 1 Tab by mouth daily. 30 Tab 0    thiamine HCL (B-1) 100 mg tablet Take 1 Tab by mouth daily. 30 Tab 0    montelukast (SINGULAIR) 10 mg tablet Take 1 Tab by mouth nightly. 30 Tab 0    folic acid (FOLVITE) 1 mg tablet Take 1 Tab by mouth daily.  30 Tab 0    albuterol-ipratropium (DUO-NEB) 2.5 mg-0.5 mg/3 ml nebu 3 mL by Nebulization route every four (4) hours as needed for Wheezing, Shortness of Breath or Respiratory Distress. 30 Nebule 0    predniSONE (DELTASONE) 20 mg tablet Take 40 mg by mouth daily (with breakfast). 10 Tab 0       Past History     Past Medical History:  Past Medical History:   Diagnosis Date    Asthma     Bipolar 1 disorder ([facility]     Chronic obstructive pulmonary disease (HCC)     Chronic pain     back, leg    Cocaine abuse ([facility]     Depression     Heart failure (HCC)     Hepatitis B     Heroin abuse (HCC)     HTN (hypertension)     Narcotic abuse (HCC)     Polysubstance abuse ([facility]     Sarcoidosis     Tobacco abuse        Past Surgical History:  Past Surgical History:   Procedure Laterality Date    HX GYN      HX WISDOM TEETH EXTRACTION         Family History:  Family History   Problem Relation Age of Onset    Hypertension Father     Hypertension Mother        Social History:  Social History     Tobacco Use    Smoking status: Current Every Day Smoker     Packs/day: 0.25     Years: 15.00     Pack years: 3.75     Last attempt to quit: 8/3/2017     Years since quitting: 3.3    Smokeless tobacco: Never Used    Tobacco comment: \"I already quit\"   Substance Use Topics    Alcohol use: No    Drug use: Yes     Types: Marijuana, Heroin     Comment: Pt reports last used heroin on 9/23/20       Allergies:   Allergies   Allergen Reactions    Vancomycin Anaphylaxis    Tomato Swelling     Tongue    Trazodone Angioedema         Review of Systems   Review of Systems   Unable to perform ROS: Acuity of condition         Physical Exam    General appearance -overweight, severe respiratory distress, anxious  Eyes - pupils equal and reactive, extraocular eye movements intact  ENT - mucous membranes moist, pharynx normal without lesions  Neck - supple, no significant adenopathy; non-tender to palpation  Chest -tachypneic with respiratory rate in the 40s, markedly increased work of breathing, poor air movement throughout, expiratory wheezes throughout   heart -tachycardic, regular rhythm  Abdomen - soft, nontender, nondistended, no masses or organomegaly  Musculoskeletal - no joint tenderness, deformity or swelling; normal ROM  Extremities - peripheral pulses normal, no pedal edema  Skin - normal coloration and turgor, no rashes  Neurological - alert, oriented x3, speaking 1-2 words at a time due to respiratory distress, no slurred speech no focal findings or movement disorder noted    Diagnostic Study Results     Labs -     Recent Results (from the past 12 hour(s))   SAMPLES BEING HELD    Collection Time: 12/08/20  3:29 AM   Result Value Ref Range    SAMPLES BEING HELD RD  SST     COMMENT        Add-on orders for these samples will be processed based on acceptable specimen integrity and analyte stability, which may vary by analyte. CBC WITH AUTOMATED DIFF    Collection Time: 12/08/20  3:29 AM   Result Value Ref Range    WBC 5.0 3.6 - 11.0 K/uL    RBC 4.11 3.80 - 5.20 M/uL    HGB 10.7 (L) 11.5 - 16.0 g/dL    HCT 34.2 (L) 35.0 - 47.0 %    MCV 83.2 80.0 - 99.0 FL    MCH 26.0 26.0 - 34.0 PG    MCHC 31.3 30.0 - 36.5 g/dL    RDW 19.3 (H) 11.5 - 14.5 %    PLATELET 726 726 - 716 K/uL    MPV 9.9 8.9 - 12.9 FL    NRBC 0.0 0  WBC    ABSOLUTE NRBC 0.00 0.00 - 0.01 K/uL    NEUTROPHILS 56 32 - 75 %    LYMPHOCYTES 27 12 - 49 %    MONOCYTES 13 5 - 13 %    EOSINOPHILS 3 0 - 7 %    BASOPHILS 1 0 - 1 %    IMMATURE GRANULOCYTES 0 0.0 - 0.5 %    ABS. NEUTROPHILS 2.8 1.8 - 8.0 K/UL    ABS. LYMPHOCYTES 1.4 0.8 - 3.5 K/UL    ABS. MONOCYTES 0.6 0.0 - 1.0 K/UL    ABS. EOSINOPHILS 0.1 0.0 - 0.4 K/UL    ABS. BASOPHILS 0.0 0.0 - 0.1 K/UL    ABS. IMM.  GRANS. 0.0 0.00 - 0.04 K/UL    DF AUTOMATED     METABOLIC PANEL, COMPREHENSIVE    Collection Time: 12/08/20  3:29 AM   Result Value Ref Range    Sodium 140 136 - 145 mmol/L    Potassium 3.5 3.5 - 5.1 mmol/L    Chloride 105 97 - 108 mmol/L    CO2 29 21 - 32 mmol/L    Anion gap 6 5 - 15 mmol/L    Glucose 108 (H) 65 - 100 mg/dL    BUN 6 6 - 20 MG/DL    Creatinine 0. 98 0.55 - 1.02 MG/DL    BUN/Creatinine ratio 6 (L) 12 - 20      GFR est AA >60 >60 ml/min/1.73m2    GFR est non-AA >60 >60 ml/min/1.73m2    Calcium 8.7 8.5 - 10.1 MG/DL    Bilirubin, total 0.4 0.2 - 1.0 MG/DL    ALT (SGPT) 56 12 - 78 U/L    AST (SGOT) 69 (H) 15 - 37 U/L    Alk. phosphatase 83 45 - 117 U/L    Protein, total 7.4 6.4 - 8.2 g/dL    Albumin 4.0 3.5 - 5.0 g/dL    Globulin 3.4 2.0 - 4.0 g/dL    A-G Ratio 1.2 1.1 - 2.2     CK W/ REFLX CKMB    Collection Time: 12/08/20  3:29 AM   Result Value Ref Range     (H) 26 - 192 U/L   TROPONIN I    Collection Time: 12/08/20  3:29 AM   Result Value Ref Range    Troponin-I, Qt. <0.05 <0.05 ng/mL   NT-PRO BNP    Collection Time: 12/08/20  3:29 AM   Result Value Ref Range    NT pro- (H) <125 PG/ML   CK-MB,QUANT.     Collection Time: 12/08/20  3:29 AM   Result Value Ref Range    CK - MB 6.3 (H) <3.6 NG/ML    CK-MB Index 0.7 0.0 - 2.5     POC LACTIC ACID    Collection Time: 12/08/20  3:31 AM   Result Value Ref Range    Lactic Acid (POC) 1.78 0.40 - 2.00 mmol/L   POC TROPONIN-I    Collection Time: 12/08/20  3:33 AM   Result Value Ref Range    Troponin-I (POC) <0.04 0.00 - 0.08 ng/mL   EKG, 12 LEAD, INITIAL    Collection Time: 12/08/20  3:36 AM   Result Value Ref Range    Ventricular Rate 103 BPM    Atrial Rate 103 BPM    P-R Interval 118 ms    QRS Duration 78 ms    Q-T Interval 358 ms    QTC Calculation (Bezet) 468 ms    Calculated P Axis 21 degrees    Calculated R Axis 49 degrees    Calculated T Axis 49 degrees    Diagnosis       Sinus tachycardia  Possible Left atrial enlargement  When compared with ECG of 24-SEP-2020 09:17,  No significant change was found     POC EG7    Collection Time: 12/08/20  5:10 AM   Result Value Ref Range    Calcium, ionized (POC) 1.14 1.12 - 1.32 mmol/L    FIO2 (POC) 50 %    pH (POC) 7.37 7.35 - 7.45      pCO2 (POC) 56.0 (H) 35.0 - 45.0 MMHG    pO2 (POC) 174 (H) 80 - 100 MMHG    HCO3 (POC) 32.5 (H) 22 - 26 MMOL/L    Base excess (POC) 7 mmol/L    sO2 (POC) 99 (H) 92 - 97 %    Site RIGHT RADIAL      Device: BIPAP      PEEP/CPAP (POC) 6 cmH2O    PIP (POC) 14      Allens test (POC) YES      Specimen type (POC) ARTERIAL      Total resp. rate 16         Radiologic Studies -   XR CHEST PORT   Final Result   IMPRESSION: No acute changes. CT Results  (Last 48 hours)    None        CXR Results  (Last 48 hours)               12/08/20 0355  XR CHEST PORT Final result    Impression:  IMPRESSION: No acute changes. Narrative:  Clinical indication: Cough. Portable AP semiupright view of the chest obtained, comparison September 24, 2020. The a heart size is normal. Calcified lymph nodes in the mediastinum. No   acute infiltrate. Medical Decision Making   I am the first provider for this patient. I reviewed the vital signs, available nursing notes, past medical history, past surgical history, family history and social history. Vital Signs-Reviewed the patient's vital signs. Patient Vitals for the past 12 hrs:   Temp Pulse Resp BP SpO2   12/08/20 0500 -- (!) 106 17 119/71 91 %   12/08/20 0456 -- -- -- -- 98 %   12/08/20 0430 -- (!) 103 17 131/75 90 %   12/08/20 0412 -- -- -- -- 98 %   12/08/20 0328 -- -- -- -- 100 %   12/08/20 0247 98.8 °F (37.1 °C) (!) 122 28 (!) 131/91 97 %       EKG at 3:36 AM on December 8, 2020 interpreted by me: Sinus tachycardia, 103 bpm, normal axis, normal AL, QRS, QTc intervals, no ischemic changes    Records Reviewed: Nursing Notes and Old Medical Records    Provider Notes (Medical Decision Making):   Differential diagnosis: COPD exacerbation, pneumonia, congestive heart failure, substance abuse, arrhythmia  We will check CBC, CMP, CPK, troponin, proBNP, chest x-ray, urine drug screen. Patient is in severe respiratory distress with respiratory rate in the 40s to 50s and markedly increased work of breathing.   Immediately on my exam, I contacted respiratory therapy to come and start BiPAP. Airway equipment was gathered at the bedside in case patient will need to be intubated. Nebs and steroids were ordered. Tech is working on getting IV access at this time. ED Course:   Initial assessment performed. The patients presenting problems have been discussed, and they are in agreement with the care plan formulated and outlined with them. I have encouraged them to ask questions as they arise throughout their visit. Progress Notes:  ED Course as of Dec 08 0629   Tue Dec 08, 2020   0320 Called to see patient for respiratory distress. Patient is tachypneic and tachycardic. She has expiratory wheezes throughout lung fields and decreased air movement bilaterally. Respiratory called to initiate BiPAP soon as possible. Nebs and steroids ordered    [AO]   0400 Patient becoming more and more drowsy on BiPAP and requiring stimulation to maintain tidal volumes and respiratory rate. Informed by EMS that patient admitted to using heroin prior to arrival.  Narcan 0.2 mg ordered    [AO]   0427 Patient received 0.2 mg Narcan and immediately woke up. She is talking in complete sentences and no longer drowsy. Her sats are 100% on nasal cannula. She is demanding to leave. Lungs are now clear with good air movement and there are no wheezes. [AO]   [id number] Patient now resting comfortably. Back on BiPAP because she became hypoxic on nasal cannula to the mid 80s. Blood gas shows pH of 7.37 PCO2 of 56 PO2 of 174 on settings of BiPAP 14/6, rate of 14, 50% FiO2.   Case discussed with Dr. Hector Malik (hospitalist) who will see and admit the patient.    [AO]      ED Course User Index  [AO] Meena Poole MD     CRITICAL CARE NOTE :        IMPENDING DETERIORATION -Airway, Respiratory, Cardiovascular, CNS and Metabolic    ASSOCIATED RISK FACTORS - Hypoxia, Dysrhythmia, Metabolic changes and CNS Decompensation    MANAGEMENT- Bedside Assessment and Supervision of Care    INTERPRETATION -  Xrays, Blood Gases, ECG and Blood Pressure    INTERVENTIONS - Neurologic interventions , Metobolic interventions and bipap    CASE REVIEW - Hospitalist and Nursing    TREATMENT RESPONSE -Improved    PERFORMED BY - Self        NOTES   :      I have spent 75 minutes of critical care time involved in lab review, consultations with specialist, family decision- making, bedside attention and documentation. During this entire length of time I was immediately available to the patient . Tania Prater MD        Disposition:  Admit to hospitalist        Diagnosis     Clinical Impression:   1. Accidental overdose of heroin, initial encounter ([facility]    2. Acute exacerbation of chronic obstructive pulmonary disease (COPD) ([facility]    3.  Acute respiratory failure with hypoxia ([facility]

## 2020-12-08 NOTE — ED NOTES
Pt given 0.2mg Narcan. Mentation improved drastically. Pt not wanting to be on BIPAP. Requesting water and ginger ale. Dr. Blanca Kaiser notified pt that she will be NPO @ this time. Pt keeps changing mind whether or not she wants to stay in ED.

## 2020-12-08 NOTE — H&P
Hospitalist Admission Note    NAME: Martin Oh   :  1975   MRN:  [id number]     Date/Time:  2020 7:21 AM    Patient PCP: None  ______________________________________________________________________  Given the patient's current clinical presentation, I have a high level of concern for decompensation if discharged from the emergency department. Complex decision making was performed, which includes reviewing the patient's available past medical records, laboratory results, and x-ray films. My assessment of this patient's clinical condition and my plan of care is as follows. Assessment / Plan: Altered mental status likely secondary to opiate/heroin overdose  -History of polysubstance abuse, heroine, cocaine, THC, tobacco  -Presents with altered mentation and respiratory distress  -Mentation initially improved s/p 1.2 mg of Narcan  -CK mildly elevated   -Admit to telemetry floor  -Monitor respiratory status and mentation  -Continue BiPAP for now, consider switching to NC if no improvement in mentation  -Check UDS, acetaminophen and aspirin level  -As needed Narcan. BVM for respiratory rate  <12 breaths/min.  -Monitor for signs of opioid withdrawal  -Keep n.p.o. until mentation improves  -Counseling once mental satatus improves      Acute on chronic hypoxic hypercapnic respiratory failure  Acute Asthma/COPD exacerbation  -ABG remarkable for PCO2 56, Po2/Fio2 348  -Chest x-ray without acute infiltrates  -Covid test pending  -Continue BiPAP  -Continue DuoNeb and montelukast  -We will add Solu-Medrol    Right upper extremity/anteriou shoulder swelling  - hx of abscess at IV injection site  - will get ultrasound  - toradol PRN for pain    Normocytic anemia  -Chronic  -Continue to monitor H&H    Hypertension  -Currently normotensive.  -Resume home dose of clonidine if BP goes up. Would also help if withdrawal sx occur.     Hepatitis C infection  -Hep C antibody test positive in 2017  -Elevated AST and ALT  -Outpatient follow up    Bipolar disorder  PTSD  -Continue home medications prazosin and Depakote    Polyubstance abuse - heroin, cocaine, THC, tobacco  -Counseling once mental status improves    Obesity: BMI 34      Code Status: Full code for now pending discussion once mental status improves. ACP docs from 2017 on chart reviewed and pt full code at that time. Surrogate Decision Maker:    DVT Prophylaxis: lovenox  GI Prophylaxis: not indicated    Baseline: Ambulatory      Subjective:   CHIEF COMPLAINT: Brought in by EMS for shortness of breath and altered mentation    HISTORY OF PRESENT ILLNESS:       Radha Joseph is a 53 y.o.  female with past medical history significant for COPD/asthma, bipolar disorder, and polysubstance abuse (cocaine, heroin, THC, tobacco) who is brought to the ED by EMS. Patient was on 4 L oxygen via nasal cannula on arrival and had received neb treatment in route. Per EMS patient admitted to using heroin prior to arrival.  Upon arrival to the ED patient was in respiratory distress, she was tachypneic, tachycardic, and had wheezes throughout the lung fields. She was placed on BiPAP. She was also given Narcan 0.2 mg following which patient woke up and became agitated. She was given a dose of Ativan. Patient was on BiPAP during my exam. She opens eyes to stimulation but unable to follow command. She moans but was unable to speak or answer simple yes or no questions. History taken mainly from chart review    We were asked to admit for work up and evaluation of the above problems.      Past Medical History:   Diagnosis Date    Asthma     Bipolar 1 disorder ([facility] [address]     Chronic obstructive pulmonary disease (HCC)     Chronic pain     back, leg    Cocaine abuse (HCC)     Depression     Heart failure (HCC)     Hepatitis B     Heroin abuse ([facility] [address]     HTN (hypertension)     Narcotic abuse ([facility] [address]     Polysubstance abuse ([facility] [address]     Sarcoidosis  Tobacco abuse         Past Surgical History:   Procedure Laterality Date    HX GYN      HX WISDOM TEETH EXTRACTION         Social History     Tobacco Use    Smoking status: Current Every Day Smoker     Packs/day: 0.25     Years: 15.00     Pack years: 3.75     Last attempt to quit: 8/3/2017     Years since quitting: 3.3    Smokeless tobacco: Never Used    Tobacco comment: \"I already quit\"   Substance Use Topics    Alcohol use: No        Family History   Problem Relation Age of Onset    Hypertension Father     Hypertension Mother      Allergies   Allergen Reactions    Vancomycin Anaphylaxis    Tomato Swelling     Tongue    Trazodone Angioedema        Prior to Admission medications    Medication Sig Start Date End Date Taking? Authorizing Provider   umeclidinium (Incruse Ellipta) 62.5 mcg/actuation inhaler Take 1 Puff by inhalation daily. 9/25/20   Florecita Champion MD   prazosin (MINIPRESS) 2 mg capsule Take 1 Cap by mouth nightly. Indications: posttraumatic stress syndrome 9/25/20   Florecita Champion MD   cloNIDine HCL (CATAPRES) 0.3 mg tablet Take 1 Tab by mouth two (2) times a day. 9/25/20   Florecita Champion MD   divalproex DR (Depakote) 500 mg tablet Take 1 Tab by mouth two (2) times a day. 9/25/20   Florecita Champion MD   therapeutic multivitamin [facility]) tablet Take 1 Tab by mouth daily. 9/26/20   Florecita Champion MD   thiamine HCL (B-1) 100 mg tablet Take 1 Tab by mouth daily. 9/26/20   Florecita Champion MD   montelukast (SINGULAIR) 10 mg tablet Take 1 Tab by mouth nightly. 9/25/20   Florecita Champion MD   folic acid (FOLVITE) 1 mg tablet Take 1 Tab by mouth daily. 9/26/20   Florecita Champion MD   albuterol-ipratropium (DUO-NEB) 2.5 mg-0.5 mg/3 ml nebu 3 mL by Nebulization route every four (4) hours as needed for Wheezing, Shortness of Breath or Respiratory Distress. 9/25/20   Florecita Champion MD   predniSONE (DELTASONE) 20 mg tablet Take 40 mg by mouth daily (with breakfast).  9/25/20   Meeta Rock, Manju Barrera MD       REVIEW OF SYSTEMS:     I am not able to complete the review of systems because: The patient is intubated and sedated   x The patient has altered mental status due to his acute medical problems    The patient has baseline aphasia from prior stroke(s)    The patient has baseline dementia and is not reliable historian    The patient is in acute medical distress and unable to provide information           Total of 12 systems reviewed as follows:       POSITIVE= underlined text  Negative = text not underlined  General:  fever, chills, sweats, generalized weakness, weight loss/gain,      loss of appetite   Eyes:    blurred vision, eye pain, loss of vision, double vision  ENT:    rhinorrhea, pharyngitis   Respiratory:   cough, sputum production, SOB, RIVAS, wheezing, pleuritic pain   Cardiology:   chest pain, palpitations, orthopnea, PND, edema, syncope   Gastrointestinal:  abdominal pain , N/V, diarrhea, dysphagia, constipation, bleeding   Genitourinary:  frequency, urgency, dysuria, hematuria, incontinence   Muskuloskeletal :  arthralgia, myalgia, back pain  Hematology:  easy bruising, nose or gum bleeding, lymphadenopathy   Dermatological: rash, ulceration, pruritis, color change / jaundice  Endocrine:   hot flashes or polydipsia   Neurological:  headache, dizziness, confusion, focal weakness, paresthesia,     Speech difficulties, memory loss, gait difficulty  Psychological: Feelings of anxiety, depression, agitation    Objective:   VITALS:    Visit Vitals  /85   Pulse 99   Temp 98.7 °F (37.1 °C)   Resp 13   Ht 5' 4\" (1.626 m)   Wt 90.7 kg (200 lb)   SpO2 97%   BMI 34.33 kg/m²       PHYSICAL EXAM:    General:    Lethargic, on BiPAP, appears stated age.      HEENT: Atraumatic, anicteric sclerae, pink conjunctivae     No oral ulcers, mucosa moist, throat clear, dentition fair, mid-sized pupil reactive to light bilaterally  Neck:  Supple, symmetrical,  thyroid: non tender  Lungs:   Clear to auscultation bilaterally. No Wheezing or Rhonchi. No rales. Chest wall:  No tenderness  No Accessory muscle use, on BiPAP  Heart:   Regular  rhythm,  No  murmur   No edema  Abdomen:   Soft, non-tender. Not distended. Bowel sounds normal  Extremities: No cyanosis. No clubbing,      Skin turgor normal, Capillary refill normal, Radial dial pulse 2+  Skin:     Not pale. Not Jaundiced  No rashes   Psych:  Deferred. Neurologic: Lethargic. Opens eyes upon stimulation but nonverbal, does not follow command    _______________________________________________________________________  Care Plan discussed with:    Comments   Patient     Family      RN x    Care Manager                    Consultant:      _______________________________________________________________________  Expected  Disposition:   Home with Family x   HH/PT/OT/RN    SNF/LTC    MAEGAN    ________________________________________________________________________  TOTAL TIME:  36 Minutes    Critical Care Provided     Minutes non procedure based      Comments    x Reviewed previous records   >50% of visit spent in counseling and coordination of care  Discussion with patient and/or family and questions answered       ________________________________________________________________________  Signed: Mendel Nephew, MD    Procedures: see electronic medical records for all procedures/Xrays and details which were not copied into this note but were reviewed prior to creation of Plan. LAB DATA REVIEWED:    Recent Results (from the past 24 hour(s))   SAMPLES BEING HELD    Collection Time: 12/08/20  3:29 AM   Result Value Ref Range    SAMPLES BEING HELD RD BL SST     COMMENT        Add-on orders for these samples will be processed based on acceptable specimen integrity and analyte stability, which may vary by analyte.    CBC WITH AUTOMATED DIFF    Collection Time: 12/08/20  3:29 AM   Result Value Ref Range    WBC 5.0 3.6 - 11.0 K/uL    RBC 4.11 3.80 - 5.20 M/uL    HGB 10.7 (L) 11.5 - 16.0 g/dL    HCT 34.2 (L) 35.0 - 47.0 %    MCV 83.2 80.0 - 99.0 FL    MCH 26.0 26.0 - 34.0 PG    MCHC 31.3 30.0 - 36.5 g/dL    RDW 19.3 (H) 11.5 - 14.5 %    PLATELET 235 840 - 946 K/uL    MPV 9.9 8.9 - 12.9 FL    NRBC 0.0 0  WBC    ABSOLUTE NRBC 0.00 0.00 - 0.01 K/uL    NEUTROPHILS 56 32 - 75 %    LYMPHOCYTES 27 12 - 49 %    MONOCYTES 13 5 - 13 %    EOSINOPHILS 3 0 - 7 %    BASOPHILS 1 0 - 1 %    IMMATURE GRANULOCYTES 0 0.0 - 0.5 %    ABS. NEUTROPHILS 2.8 1.8 - 8.0 K/UL    ABS. LYMPHOCYTES 1.4 0.8 - 3.5 K/UL    ABS. MONOCYTES 0.6 0.0 - 1.0 K/UL    ABS. EOSINOPHILS 0.1 0.0 - 0.4 K/UL    ABS. BASOPHILS 0.0 0.0 - 0.1 K/UL    ABS. IMM. GRANS. 0.0 0.00 - 0.04 K/UL    DF AUTOMATED     METABOLIC PANEL, COMPREHENSIVE    Collection Time: 12/08/20  3:29 AM   Result Value Ref Range    Sodium 140 136 - 145 mmol/L    Potassium 3.5 3.5 - 5.1 mmol/L    Chloride 105 97 - 108 mmol/L    CO2 29 21 - 32 mmol/L    Anion gap 6 5 - 15 mmol/L    Glucose 108 (H) 65 - 100 mg/dL    BUN 6 6 - 20 MG/DL    Creatinine 0.98 0.55 - 1.02 MG/DL    BUN/Creatinine ratio 6 (L) 12 - 20      GFR est AA >60 >60 ml/min/1.73m2    GFR est non-AA >60 >60 ml/min/1.73m2    Calcium 8.7 8.5 - 10.1 MG/DL    Bilirubin, total 0.4 0.2 - 1.0 MG/DL    ALT (SGPT) 56 12 - 78 U/L    AST (SGOT) 69 (H) 15 - 37 U/L    Alk. phosphatase 83 45 - 117 U/L    Protein, total 7.4 6.4 - 8.2 g/dL    Albumin 4.0 3.5 - 5.0 g/dL    Globulin 3.4 2.0 - 4.0 g/dL    A-G Ratio 1.2 1.1 - 2.2     CK W/ REFLX CKMB    Collection Time: 12/08/20  3:29 AM   Result Value Ref Range     (H) 26 - 192 U/L   TROPONIN I    Collection Time: 12/08/20  3:29 AM   Result Value Ref Range    Troponin-I, Qt. <0.05 <0.05 ng/mL   NT-PRO BNP    Collection Time: 12/08/20  3:29 AM   Result Value Ref Range    NT pro- (H) <125 PG/ML   CK-MB,QUANT.     Collection Time: 12/08/20  3:29 AM   Result Value Ref Range    CK - MB 6.3 (H) <3.6 NG/ML    CK-MB Index 0.7 0.0 - 2.5     POC LACTIC ACID    Collection Time: 12/08/20  3:31 AM   Result Value Ref Range    Lactic Acid (POC) 1.78 0.40 - 2.00 mmol/L   POC TROPONIN-I    Collection Time: 12/08/20  3:33 AM   Result Value Ref Range    Troponin-I (POC) <0.04 0.00 - 0.08 ng/mL   EKG, 12 LEAD, INITIAL    Collection Time: 12/08/20  3:36 AM   Result Value Ref Range    Ventricular Rate 103 BPM    Atrial Rate 103 BPM    P-R Interval 118 ms    QRS Duration 78 ms    Q-T Interval 358 ms    QTC Calculation (Bezet) 468 ms    Calculated P Axis 21 degrees    Calculated R Axis 49 degrees    Calculated T Axis 49 degrees    Diagnosis       Sinus tachycardia  Possible Left atrial enlargement  When compared with ECG of 24-SEP-2020 09:17,  No significant change was found     POC EG7    Collection Time: 12/08/20  5:10 AM   Result Value Ref Range    Calcium, ionized (POC) 1.14 1.12 - 1.32 mmol/L    FIO2 (POC) 50 %    pH (POC) 7.37 7.35 - 7.45      pCO2 (POC) 56.0 (H) 35.0 - 45.0 MMHG    pO2 (POC) 174 (H) 80 - 100 MMHG    HCO3 (POC) 32.5 (H) 22 - 26 MMOL/L    Base excess (POC) 7 mmol/L    sO2 (POC) 99 (H) 92 - 97 %    Site RIGHT RADIAL      Device: BIPAP      PEEP/CPAP (POC) 6 cmH2O    PIP (POC) 14      Allens test (POC) YES      Specimen type (POC) ARTERIAL      Total resp.  rate 16

## 2020-12-08 NOTE — ED NOTES
Pt presents to the ED tachypneic and tachycardic. On 4L NC. One breathing treatment being administered PTA. Pt able to answer using one word answers. Extremely agitated. Pt requesting different nurse other than this nurse due to raising voice to be heard. Dr. Antonio Milner @ bedside asking for RT to begin BIPAP and breathing treatments. 1mg ativan and solumedrol administered. US guided IV established in L basilic by [place] [facility] [place]. BIPAP @ 80%.

## 2020-12-08 NOTE — ED NOTES
Report given to Lion Tony RN. They were informed of patient chief complaint, current status, orders completed (to include IV access/medications/radiology testing), outstanding orders that still need to be completed, and the treatment plan. Ensured no questions or concerns regarding the patient prior to departure.

## 2020-12-09 LAB
AMPHET UR QL SCN: NEGATIVE
ANION GAP SERPL CALC-SCNC: 3 MMOL/L (ref 5–15)
BARBITURATES UR QL SCN: NEGATIVE
BENZODIAZ UR QL: POSITIVE
BUN SERPL-MCNC: 9 MG/DL (ref 6–20)
BUN/CREAT SERPL: 9 (ref 12–20)
CALCIUM SERPL-MCNC: 8.4 MG/DL (ref 8.5–10.1)
CANNABINOIDS UR QL SCN: POSITIVE
CHLORIDE SERPL-SCNC: 103 MMOL/L (ref 97–108)
CO2 SERPL-SCNC: 32 MMOL/L (ref 21–32)
COCAINE UR QL SCN: POSITIVE
CREAT SERPL-MCNC: 0.96 MG/DL (ref 0.55–1.02)
DRUG SCRN COMMENT,DRGCM: ABNORMAL
ERYTHROCYTE [DISTWIDTH] IN BLOOD BY AUTOMATED COUNT: 19.5 % (ref 11.5–14.5)
GLUCOSE SERPL-MCNC: 111 MG/DL (ref 65–100)
HCT VFR BLD AUTO: 35.3 % (ref 35–47)
HGB BLD-MCNC: 10.5 G/DL (ref 11.5–16)
MCH RBC QN AUTO: 25.5 PG (ref 26–34)
MCHC RBC AUTO-ENTMCNC: 29.7 G/DL (ref 30–36.5)
MCV RBC AUTO: 85.7 FL (ref 80–99)
METHADONE UR QL: NEGATIVE
NRBC # BLD: 0 K/UL (ref 0–0.01)
NRBC BLD-RTO: 0 PER 100 WBC
OPIATES UR QL: NEGATIVE
PCP UR QL: NEGATIVE
PLATELET # BLD AUTO: 276 K/UL (ref 150–400)
PMV BLD AUTO: 10.1 FL (ref 8.9–12.9)
POTASSIUM SERPL-SCNC: 4.1 MMOL/L (ref 3.5–5.1)
RBC # BLD AUTO: 4.12 M/UL (ref 3.8–5.2)
SODIUM SERPL-SCNC: 138 MMOL/L (ref 136–145)
WBC # BLD AUTO: 8.6 K/UL (ref 3.6–11)

## 2020-12-09 PROCEDURE — 36415 COLL VENOUS BLD VENIPUNCTURE: CPT

## 2020-12-09 PROCEDURE — 65270000015 HC RM PRIVATE ONCOLOGY

## 2020-12-09 PROCEDURE — 74011250637 HC RX REV CODE- 250/637: Performed by: NURSE PRACTITIONER

## 2020-12-09 PROCEDURE — 80307 DRUG TEST PRSMV CHEM ANLYZR: CPT

## 2020-12-09 PROCEDURE — 80048 BASIC METABOLIC PNL TOTAL CA: CPT

## 2020-12-09 PROCEDURE — 74011250637 HC RX REV CODE- 250/637: Performed by: STUDENT IN AN ORGANIZED HEALTH CARE EDUCATION/TRAINING PROGRAM

## 2020-12-09 PROCEDURE — 85027 COMPLETE CBC AUTOMATED: CPT

## 2020-12-09 PROCEDURE — 74011250636 HC RX REV CODE- 250/636: Performed by: STUDENT IN AN ORGANIZED HEALTH CARE EDUCATION/TRAINING PROGRAM

## 2020-12-09 RX ORDER — GABAPENTIN 300 MG/1
300 CAPSULE ORAL 3 TIMES DAILY
Status: DISCONTINUED | OUTPATIENT
Start: 2020-12-09 | End: 2020-12-11 | Stop reason: HOSPADM

## 2020-12-09 RX ORDER — BUPRENORPHINE AND NALOXONE 8; 2 MG/1; MG/1
2 FILM, SOLUBLE BUCCAL; SUBLINGUAL DAILY
Status: ON HOLD | COMMUNITY
End: 2021-02-09 | Stop reason: SDUPTHER

## 2020-12-09 RX ORDER — LEVETIRACETAM 500 MG/1
500 TABLET ORAL 2 TIMES DAILY
Status: ON HOLD | COMMUNITY
End: 2021-02-09 | Stop reason: SDUPTHER

## 2020-12-09 RX ORDER — PREDNISONE 20 MG/1
20 TABLET ORAL
Status: DISCONTINUED | OUTPATIENT
Start: 2020-12-10 | End: 2020-12-11 | Stop reason: HOSPADM

## 2020-12-09 RX ORDER — CLONIDINE HYDROCHLORIDE 0.3 MG/1
0.3 TABLET ORAL 3 TIMES DAILY
Status: ON HOLD | COMMUNITY
End: 2021-02-09 | Stop reason: SDUPTHER

## 2020-12-09 RX ORDER — FLUTICASONE PROPIONATE AND SALMETEROL 100; 50 UG/1; UG/1
1 POWDER RESPIRATORY (INHALATION) EVERY 12 HOURS
COMMUNITY
End: 2021-02-10

## 2020-12-09 RX ORDER — AMITRIPTYLINE HYDROCHLORIDE 50 MG/1
100 TABLET, FILM COATED ORAL
Status: DISCONTINUED | OUTPATIENT
Start: 2020-12-09 | End: 2020-12-11 | Stop reason: HOSPADM

## 2020-12-09 RX ORDER — AMITRIPTYLINE HYDROCHLORIDE 100 MG/1
100 TABLET, FILM COATED ORAL
Status: ON HOLD | COMMUNITY
End: 2020-12-15 | Stop reason: SDUPTHER

## 2020-12-09 RX ORDER — SERTRALINE HYDROCHLORIDE 50 MG/1
25 TABLET, FILM COATED ORAL DAILY
Status: DISCONTINUED | OUTPATIENT
Start: 2020-12-10 | End: 2020-12-11 | Stop reason: HOSPADM

## 2020-12-09 RX ORDER — QUETIAPINE FUMARATE 400 MG/1
400 TABLET, FILM COATED ORAL
Status: ON HOLD | COMMUNITY
End: 2020-12-15 | Stop reason: SDUPTHER

## 2020-12-09 RX ORDER — BUPRENORPHINE AND NALOXONE 8; 2 MG/1; MG/1
2 FILM, SOLUBLE BUCCAL; SUBLINGUAL DAILY
Status: DISCONTINUED | OUTPATIENT
Start: 2020-12-10 | End: 2020-12-11 | Stop reason: HOSPADM

## 2020-12-09 RX ORDER — LEVETIRACETAM 500 MG/1
500 TABLET ORAL 2 TIMES DAILY
Status: DISCONTINUED | OUTPATIENT
Start: 2020-12-09 | End: 2020-12-11 | Stop reason: HOSPADM

## 2020-12-09 RX ORDER — ALBUTEROL SULFATE 90 UG/1
2 AEROSOL, METERED RESPIRATORY (INHALATION)
Status: ON HOLD | COMMUNITY
End: 2021-02-09 | Stop reason: SDUPTHER

## 2020-12-09 RX ORDER — SERTRALINE HYDROCHLORIDE 25 MG/1
25 TABLET, FILM COATED ORAL DAILY
Status: ON HOLD | COMMUNITY
End: 2020-12-15 | Stop reason: SDUPTHER

## 2020-12-09 RX ORDER — GABAPENTIN 300 MG/1
300 CAPSULE ORAL 3 TIMES DAILY
Status: ON HOLD | COMMUNITY
End: 2020-12-15 | Stop reason: SDUPTHER

## 2020-12-09 RX ADMIN — Medication 10 ML: at 03:57

## 2020-12-09 RX ADMIN — PRAZOSIN HYDROCHLORIDE 2 MG: 1 CAPSULE ORAL at 22:03

## 2020-12-09 RX ADMIN — Medication 10 ML: at 22:05

## 2020-12-09 RX ADMIN — Medication 10 ML: at 09:13

## 2020-12-09 RX ADMIN — ACETAMINOPHEN 650 MG: 325 TABLET ORAL at 03:57

## 2020-12-09 RX ADMIN — MONTELUKAST 10 MG: 10 TABLET, FILM COATED ORAL at 00:50

## 2020-12-09 RX ADMIN — Medication 10 ML: at 15:21

## 2020-12-09 RX ADMIN — AMITRIPTYLINE HYDROCHLORIDE 100 MG: 50 TABLET, FILM COATED ORAL at 22:01

## 2020-12-09 RX ADMIN — KETOROLAC TROMETHAMINE 30 MG: 30 INJECTION, SOLUTION INTRAMUSCULAR; INTRAVENOUS at 16:19

## 2020-12-09 RX ADMIN — KETOROLAC TROMETHAMINE 30 MG: 30 INJECTION, SOLUTION INTRAMUSCULAR; INTRAVENOUS at 03:57

## 2020-12-09 RX ADMIN — KETOROLAC TROMETHAMINE 30 MG: 30 INJECTION, SOLUTION INTRAMUSCULAR; INTRAVENOUS at 09:11

## 2020-12-09 RX ADMIN — LEVETIRACETAM 500 MG: 500 TABLET ORAL at 17:54

## 2020-12-09 RX ADMIN — MONTELUKAST 10 MG: 10 TABLET, FILM COATED ORAL at 22:01

## 2020-12-09 RX ADMIN — PRAZOSIN HYDROCHLORIDE 2 MG: 1 CAPSULE ORAL at 00:50

## 2020-12-09 RX ADMIN — ACETAMINOPHEN 650 MG: 325 TABLET ORAL at 22:23

## 2020-12-09 RX ADMIN — METHYLPREDNISOLONE SODIUM SUCCINATE 40 MG: 40 INJECTION, POWDER, FOR SOLUTION INTRAMUSCULAR; INTRAVENOUS at 05:28

## 2020-12-09 RX ADMIN — DIVALPROEX SODIUM 500 MG: 500 TABLET, DELAYED RELEASE ORAL at 17:54

## 2020-12-09 RX ADMIN — GABAPENTIN 300 MG: 300 CAPSULE ORAL at 22:02
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
## 2020-12-09 NOTE — PROGRESS NOTES
End of Shift Note    Bedside shift change report given to Lobito Crow (oncoming nurse) by Mateo Fowler (offgoing nurse). Report included the following information SBAR, Kardex and MAR    Shift worked:  7am-7pm     Shift summary and any significant changes:     Patient admitted from the ED. Complaints of pain treated with PRN meds (see MAR). Hourly rounding completed and sitter at bedside. Medications given and education provide. Patient did refuse multiple medications and education provided. Seizure precautions in place. Psych consulted. Concerns for physician to address:       Zone phone for oncoming shift:          Activity:  Activity Level: Up with Assistance  Number times ambulated in hallways past shift: 0  Number of times OOB to chair past shift: 1    Cardiac:   Cardiac Monitoring: Yes      Cardiac Rhythm: Normal sinus rhythm    Access:   Current line(s): PIV     Genitourinary:   Urinary status: voiding    Respiratory:   O2 Device: Room air  Chronic home O2 use?: NO       GI:     Current diet:  DIET REGULAR  DIET ONE TIME MESSAGE  DIET ONE TIME MESSAGE  Passing flatus: YES  Tolerating current diet: YES       Pain Management:   Patient states pain is manageable on current regimen: YES    Skin:  Doc Score: 18  Interventions: turn team, float heels and increase time out of bed    Patient Safety:  Fall Score:  Total Score: 5  Interventions: bed/chair alarm, gripper socks, pt to call before getting OOB and sitter at bedside   High Fall Risk: Yes    Length of Stay:  Expected LOS: 3d 12h  Actual LOS: [address]

## 2020-12-09 NOTE — ED NOTES
1920 - Bedside shift change report given to Garcia RN  (oncoming nurse) by Sheng Velarde RN (offgoing nurse). Report included the following information SBAR, Kardex, ED Summary, Intake/Output and MAR. Initial preservation: ED visit d.t abd pain / shoulder pain / sob - arrived via EMS on 4L NC with resp distress, placed on BIPAP, given narcan and improved -     When assuming care from Sheng Velarde, pt was resting in bed, no resp distress, remained on nasal canula, 5 L - will wean after breathing tx given due to exp wheezing, able to speak in full sentences at this time - pt with strong AROM to all extremities with CMS(+)    Hx of COPD/asthma . Mental health problem / polysubstance abuse in past (cocaine, heroin, THC, tobacco)    Pt currently with 5 L NC, pt weaned down to 3L NC    2000 - Attempted report for admission bed at this time;    2015 - MD will cancel for 'droplet plus' - therefore pt will lose current bed placement - pt made aware     2100 - pt resting in bed with no acute distress noted at this time     0253 - pt sleeping in bed with no acute distress noted at this time - breathing is normal and unlabored, remains on 3 LNC     0530 - Sleeping in bed with ease - remains on 3L NC with POX at 94%, remains on cardiac monitor x4 - no wheezing noted at this time; bed locked and at lowest position, call bell within reach    0700 - NOTE, pt was initially in room 19 located in pod 1 where this nurse was during his night shift. Around 07:00 this patient was moved into room 33 by ANDRZEJ Hidalgo. This nurse was informed roughly at 07:35 by Henna Do RN that a fall occurred roughly at 07:05. Henna Do reports to this RN that the physician assistant kAhil Pearl assessed the patient and deferred from any new orders nor interventions. 1494 - pt assessed by this RN, pt resting in bed with no acute distress noted at this time.  Pt on room air with POX of 95%, remains on cardiac monitor x3 - A/Ox4 - pt denies head trauma, change in mental status, nausea, neck pain, SOB, CP, focal weakness, changes in vision nor blurry vision     Before leaving the room, I made sure the bed was locked in place, at lowest height, call bell within reach     [id number] [address] witnessed/unwitnessed fall occurred on 12/09/2020 (Date) at 07:05 (Time). The answers to the following questions summarize the fall: In the patient's own words,:  · What were you attempting to do when you fell? \"Going to bathroom\"  · Do you know why you fell? \"Bathroom\"   · Do you have any pain/discomfort or any other complaints? No  · Which part of your body made contact with the floor or other object? \"My butt and lower back\"    Nurse:  [facility] Was this an assisted fall? no, per Khadijah RN  pt was moved away from this nurse's pod, (moved from pod 1 to pod 5)   Was fall witnessed? No, per Khadijah RN   If witnessed, what part of the body made contact with the floor or other object? N/A   Patients mental status after the fall/when found: Alert and oriented   Any apparent injury:  No apparent injury   Immediate interventions for injury/suspected injury? No interventions needed   Patient assisted back to bed? Unknown by this nurse  [facility] Name of provider notified and time, any comments? Princess DONNELLY at bedside to eval, per [facility]         Immediate VS and physical assessment documented in flow sheets. Neuro assessment every hour x 4 (for potential head injury or unwitnessed fall) documented in flow sheets. Airam Isaac      2146 - Bedside shift change report given to [place] [address] (oncoming nurse) by Bruce Adame (offgoing nurse). Report included the following information SBAR, Kardex, ED Summary, Intake/Output and MAR.      Incoming nurse made aware of fall

## 2020-12-09 NOTE — CONSULTS
INITIAL PSYCHIATRIC INTERVIEW:      CHIEF COMPLAINT:  \"I dont want to talk with other people in the room\"      HISTORY OF PRESENTING COMPLAINT:  Samantha Moser is a 53 y.o. BLACK OR  female who is currently admitted to the medical unit at [facility] following an overdose. Maribel Beavers reports a hx heroin abuse, she is being treated at a MAT clinic with suboxone but has not had it for one week. Maribel Beavers reports getting into an argument with her son (whom she lives with) and relapsing. She denies overdosing intentionally though admits that she did not want to live. She expressed depression that has been progressing throughout the holidays. She is currently being treated for bipolar disorder but has been noncompliant with her treatment    PAST PSYCHIATRIC HISTORY and SUBSTANCE ABUSE HISTORY:  HX heroin abuse, recent relapse  Denies previous SA, active SI  Bipolar depression    PAST MEDICAL HISTORY:  Please see H&P for details. Past Medical History:   Diagnosis Date    Asthma     Bipolar 1 disorder ([address]     Chronic obstructive pulmonary disease (HCC)     Chronic pain     back, leg    Cocaine abuse (HCC)     Depression     Heart failure (HCC)     Hepatitis B     Heroin abuse ([address]     HTN (hypertension)     Narcotic abuse ([address]     Polysubstance abuse ([address]     Sarcoidosis     Tobacco abuse        Prior to Admission medications    Medication Sig Start Date End Date Taking? Authorizing Provider   amitriptyline (ELAVIL) 100 mg tablet Take 100 mg by mouth nightly. Yes Provider, Historical   albuterol (ProAir HFA) 90 mcg/actuation inhaler Take 2 Puffs by inhalation every six (6) hours as needed for Wheezing. Yes Provider, Historical   cloNIDine HCL (CATAPRES) 0.3 mg tablet Take 0.3 mg by mouth three (3) times daily. Yes Provider, Historical   gabapentin (NEURONTIN) 300 mg capsule Take 300 mg by mouth three (3) times daily.    Yes Provider, Historical   levETIRAcetam (Keppra) 500 mg tablet Take 500 mg by mouth two (2) times a day. Yes Provider, Historical   QUEtiapine (SEROquel) 400 mg tablet Take 400 mg by mouth nightly. Yes Provider, Historical   tiotropium (Spiriva with HandiHaler) 18 mcg inhalation capsule Take 1 Cap by inhalation daily. Yes Provider, Historical   fluticasone propion-salmeteroL (Advair Diskus) 100-50 mcg/dose diskus inhaler Take 1 Puff by inhalation every twelve (12) hours. Yes Provider, Historical   sertraline (ZOLOFT) 25 mg tablet Take 25 mg by mouth daily. Yes Provider, Historical   buprenorphine-naloxone (Suboxone) 8-2 mg film sublingaul film 2 Film by SubLINGual route daily. Yes Provider, Historical   prazosin (MINIPRESS) 2 mg capsule Take 1 Cap by mouth nightly. Indications: posttraumatic stress syndrome 9/25/20  Yes Azam Christy MD   albuterol-ipratropium (DUO-NEB) 2.5 mg-0.5 mg/3 ml nebu 3 mL by Nebulization route every four (4) hours as needed for Wheezing, Shortness of Breath or Respiratory Distress. 9/25/20  Yes Azam Christy MD         Lab Results   Component Value Date/Time    WBC 8.6 12/09/2020 03:50 AM    Hemoglobin (POC) 13.3 06/15/2009 05:10 PM    HGB 10.5 (L) 12/09/2020 03:50 AM    Hematocrit (POC) 39 06/15/2009 05:10 PM    HCT 35.3 12/09/2020 03:50 AM    PLATELET 230 93/90/4616 03:50 AM    MCV 85.7 12/09/2020 03:50 AM      Lab Results   Component Value Date/Time    Sodium 138 12/09/2020 03:50 AM    Potassium 4.1 12/09/2020 03:50 AM    Chloride 103 12/09/2020 03:50 AM    CO2 32 12/09/2020 03:50 AM    Anion gap 3 (L) 12/09/2020 03:50 AM    Glucose 111 (H) 12/09/2020 03:50 AM    Glucose 93 08/05/2018 06:56 AM    BUN 9 12/09/2020 03:50 AM    Creatinine 0.96 12/09/2020 03:50 AM    BUN/Creatinine ratio 9 (L) 12/09/2020 03:50 AM    GFR est AA >60 12/09/2020 03:50 AM    GFR est non-AA >60 12/09/2020 03:50 AM    Calcium 8.4 (L) 12/09/2020 03:50 AM    Bilirubin, total 0.4 12/08/2020 03:29 AM    Alk.  phosphatase 83 12/08/2020 03:29 AM    Protein, total 7.4 12/08/2020 03:29 AM    Albumin 4.0 12/08/2020 03:29 AM    Globulin 3.4 12/08/2020 03:29 AM    A-G Ratio 1.2 12/08/2020 03:29 AM    ALT (SGPT) 56 12/08/2020 03:29 AM      Vitals:    12/09/20 1100 12/09/20 1115 12/09/20 1238 12/09/20 1542   BP:  124/74 124/77 134/84   Pulse: 75 100 71 86   Resp: 16 25 18 18   Temp:  98 °F (36.7 °C) 97.5 °F (36.4 °C) 97.9 °F (36.6 °C)   SpO2: 97% 100% 100% 98%   Weight:       Height:             PSYCHOSOCIAL HISTORY:  Homeless, previously lived with son  Unemployed, reports SSDI is pending    MENTAL STATUS EXAM:  General appearance: well- groomed, psychomotor activity is relaxed  Eye contact: Fair eye contact  Speech: Spontaneous, soft, decreased output. Affect : Depressed, decreased range  Mood: Depressed, irritable  Thought Process: Logical, goal directed  Perception: Denies AH or VH. Thought Content:passive SI, no plan  Insight: Partial   Judgement: Fair  Cognition: Intact grossly. ASSESSMENT AND PLAN:  Lori Grubbs meets criteria for a diagnosis of   Bipolar depression, Opiate dependence. Recommendations: 1. Transfer to inpatient BHU when bed is available and patient is medically cleared.   If suboxone dose and appointment can be verified, will restart suboxone    Thank you for your consideration in the care for this patient

## 2020-12-09 NOTE — PROGRESS NOTES
Pharmacy Medication Reconciliation     Interviewed patient for medication history. She was able to tell me she takes the following:    -clonidine  -prazosin  -gabapentin  -an inhaler  -quetiapine  -Suboxone  -Elavil    Patient stated she uses [facility]. Contacted both to determine medication history as best as possible. Patient does not have a PCP and was seen in the ER at [address] [place] [address] when prescribed several medications. Also filled but patient did not note she was taking was Haldol and Abilify. Prior to Admission Medications   Prescriptions Last Dose Informant Taking? QUEtiapine (SEROquel) 400 mg tablet  Self Yes   Sig: Take 400 mg by mouth nightly. albuterol (ProAir HFA) 90 mcg/actuation inhaler  Self Yes   Sig: Take 2 Puffs by inhalation every six (6) hours as needed for Wheezing. albuterol-ipratropium (DUO-NEB) 2.5 mg-0.5 mg/3 ml nebu  Self Yes   Sig: 3 mL by Nebulization route every four (4) hours as needed for Wheezing, Shortness of Breath or Respiratory Distress. amitriptyline (ELAVIL) 100 mg tablet  Self Yes   Sig: Take 100 mg by mouth nightly. buprenorphine-naloxone (Suboxone) 8-2 mg film sublingaul film  Self Yes   Si Film by SubLINGual route daily. cloNIDine HCL (CATAPRES) 0.3 mg tablet  Self Yes   Sig: Take 0.3 mg by mouth three (3) times daily. fluticasone propion-salmeteroL (Advair Diskus) 100-50 mcg/dose diskus inhaler  Self Yes   Sig: Take 1 Puff by inhalation every twelve (12) hours. gabapentin (NEURONTIN) 300 mg capsule  Self Yes   Sig: Take 300 mg by mouth three (3) times daily. levETIRAcetam (Keppra) 500 mg tablet  Self Yes   Sig: Take 500 mg by mouth two (2) times a day. prazosin (MINIPRESS) 2 mg capsule  Self Yes   Sig: Take 1 Cap by mouth nightly. Indications: posttraumatic stress syndrome   sertraline (ZOLOFT) 25 mg tablet  Self Yes   Sig: Take 25 mg by mouth daily.    tiotropium (Spiriva with HandiHaler) 18 mcg inhalation capsule  Self Yes   Sig: Take 1 Cap by inhalation daily.       Facility-Administered Medications: None          Thank you,  Caprice Campa, ANGELAD

## 2020-12-09 NOTE — PROGRESS NOTES
Physician Progress Note      PATIENTComer Billy  CSN #:                  [id number]  :                       1975  ADMIT DATE:       2020 2:44 AM  DISCH DATE:  RESPONDING  PROVIDER #:        Jessica ARMAS NP          QUERY TEXT:    Pt admitted with Altered mental status likely secondary to opiate/heroin overdose. If possible, please document in the progress notes and discharge summary if you are evaluating and / or treating any of the following: The medical record reflects the following:  Risk Factors: 52 yo F with suicidal ideation was admitted with Altered mental status likely secondary to opiate/heroin overdose  Clinical Indicators: ED PN states \"Patient becoming more and more drowsy on BiPAP and requiring stimulation to maintain tidal volumes and respiratory rate. \" \" Patient received 0.2 mg Narcan and immediately woke up. \"  Treatment: given 0.2mg Narcan in ED & Atvian PRN  Options provided:  -- Toxic encephalopathy 2/2 opiate/heroin overdose  -- Encephalopathy due to opiate/heroin overdose  -- Other - I will add my own diagnosis  -- Disagree - Not applicable / Not valid  -- Disagree - Clinically unable to determine / Unknown  -- Refer to Clinical Documentation Reviewer    PROVIDER RESPONSE TEXT:    This patient has toxic encephalopathy 2/2 opiate/heroin overdose .     Query created by: Griffin Cohen on 2020 12:06 PM      Electronically signed by:  William Alvarez NP 2020 3:02 PM

## 2020-12-09 NOTE — ED NOTES
Bedside shift change report given to [facility]  (oncoming nurse) by Sandra Zhang  (offgoing nurse). Report included the following information SBAR, Kardex, ED Summary, [facility] and Recent Results.

## 2020-12-09 NOTE — PROGRESS NOTES
Bedside shift change report given to José Miguel Cedeno (oncoming nurse) by Nilesh Gallo (offgoing nurse). Report included the following information SBAR, Kardex, Procedure Summary, Intake/Output, MAR, Recent Results and Cardiac Rhythm NSR.

## 2020-12-10 ENCOUNTER — APPOINTMENT (OUTPATIENT)
Dept: ULTRASOUND IMAGING | Age: 45
DRG: 816 | End: 2020-12-10
Attending: NURSE PRACTITIONER
Payer: COMMERCIAL

## 2020-12-10 LAB
ALBUMIN SERPL-MCNC: 2.9 G/DL (ref 3.5–5)
ALBUMIN/GLOB SERPL: 0.9 {RATIO} (ref 1.1–2.2)
ALP SERPL-CCNC: 70 U/L (ref 45–117)
ALT SERPL-CCNC: 34 U/L (ref 12–78)
ANION GAP SERPL CALC-SCNC: 3 MMOL/L (ref 5–15)
AST SERPL-CCNC: 17 U/L (ref 15–37)
BASOPHILS # BLD: 0 K/UL (ref 0–0.1)
BASOPHILS NFR BLD: 0 % (ref 0–1)
BILIRUB SERPL-MCNC: 0.6 MG/DL (ref 0.2–1)
BUN SERPL-MCNC: 17 MG/DL (ref 6–20)
BUN/CREAT SERPL: 17 (ref 12–20)
CALCIUM SERPL-MCNC: 8.1 MG/DL (ref 8.5–10.1)
CHLORIDE SERPL-SCNC: 105 MMOL/L (ref 97–108)
CO2 SERPL-SCNC: 30 MMOL/L (ref 21–32)
CREAT SERPL-MCNC: 0.99 MG/DL (ref 0.55–1.02)
DIFFERENTIAL METHOD BLD: ABNORMAL
EOSINOPHIL # BLD: 0.1 K/UL (ref 0–0.4)
EOSINOPHIL NFR BLD: 1 % (ref 0–7)
ERYTHROCYTE [DISTWIDTH] IN BLOOD BY AUTOMATED COUNT: 19.7 % (ref 11.5–14.5)
GLOBULIN SER CALC-MCNC: 3.2 G/DL (ref 2–4)
GLUCOSE SERPL-MCNC: 86 MG/DL (ref 65–100)
HCT VFR BLD AUTO: 33 % (ref 35–47)
HGB BLD-MCNC: 9.8 G/DL (ref 11.5–16)
IMM GRANULOCYTES # BLD AUTO: 0 K/UL (ref 0–0.04)
IMM GRANULOCYTES NFR BLD AUTO: 1 % (ref 0–0.5)
LYMPHOCYTES # BLD: 1.1 K/UL (ref 0.8–3.5)
LYMPHOCYTES NFR BLD: 14 % (ref 12–49)
MCH RBC QN AUTO: 25.3 PG (ref 26–34)
MCHC RBC AUTO-ENTMCNC: 29.7 G/DL (ref 30–36.5)
MCV RBC AUTO: 85.3 FL (ref 80–99)
MONOCYTES # BLD: 0.6 K/UL (ref 0–1)
MONOCYTES NFR BLD: 8 % (ref 5–13)
NEUTS SEG # BLD: 5.9 K/UL (ref 1.8–8)
NEUTS SEG NFR BLD: 76 % (ref 32–75)
NRBC # BLD: 0 K/UL (ref 0–0.01)
NRBC BLD-RTO: 0 PER 100 WBC
PLATELET # BLD AUTO: 258 K/UL (ref 150–400)
PMV BLD AUTO: 9.7 FL (ref 8.9–12.9)
POTASSIUM SERPL-SCNC: 3.4 MMOL/L (ref 3.5–5.1)
PROT SERPL-MCNC: 6.1 G/DL (ref 6.4–8.2)
RBC # BLD AUTO: 3.87 M/UL (ref 3.8–5.2)
SODIUM SERPL-SCNC: 138 MMOL/L (ref 136–145)
WBC # BLD AUTO: 7.7 K/UL (ref 3.6–11)

## 2020-12-10 PROCEDURE — 85025 COMPLETE CBC W/AUTO DIFF WBC: CPT

## 2020-12-10 PROCEDURE — 74011250637 HC RX REV CODE- 250/637: Performed by: STUDENT IN AN ORGANIZED HEALTH CARE EDUCATION/TRAINING PROGRAM

## 2020-12-10 PROCEDURE — 74011250637 HC RX REV CODE- 250/637: Performed by: NURSE PRACTITIONER

## 2020-12-10 PROCEDURE — 80053 COMPREHEN METABOLIC PANEL: CPT

## 2020-12-10 PROCEDURE — 74011250636 HC RX REV CODE- 250/636: Performed by: STUDENT IN AN ORGANIZED HEALTH CARE EDUCATION/TRAINING PROGRAM

## 2020-12-10 PROCEDURE — 36415 COLL VENOUS BLD VENIPUNCTURE: CPT

## 2020-12-10 PROCEDURE — 74011636637 HC RX REV CODE- 636/637: Performed by: NURSE PRACTITIONER

## 2020-12-10 PROCEDURE — 65270000015 HC RM PRIVATE ONCOLOGY

## 2020-12-10 PROCEDURE — 76882 US LMTD JT/FCL EVL NVASC XTR: CPT

## 2020-12-10 RX ORDER — POTASSIUM CHLORIDE 20 MEQ/1
40 TABLET, EXTENDED RELEASE ORAL
Status: COMPLETED | OUTPATIENT
Start: 2020-12-10 | End: 2020-12-10

## 2020-12-10 RX ADMIN — GABAPENTIN 300 MG: 300 CAPSULE ORAL at 10:21

## 2020-12-10 RX ADMIN — KETOROLAC TROMETHAMINE 30 MG: 30 INJECTION, SOLUTION INTRAMUSCULAR; INTRAVENOUS at 12:29

## 2020-12-10 RX ADMIN — PREDNISONE 20 MG: 20 TABLET ORAL at 10:21

## 2020-12-10 RX ADMIN — Medication 10 ML: at 21:19

## 2020-12-10 RX ADMIN — Medication 100 MG: at 10:21

## 2020-12-10 RX ADMIN — GABAPENTIN 300 MG: 300 CAPSULE ORAL at 21:18

## 2020-12-10 RX ADMIN — ACETAMINOPHEN 650 MG: 325 TABLET ORAL at 04:51

## 2020-12-10 RX ADMIN — KETOROLAC TROMETHAMINE 30 MG: 30 INJECTION, SOLUTION INTRAMUSCULAR; INTRAVENOUS at 21:19

## 2020-12-10 RX ADMIN — DIVALPROEX SODIUM 500 MG: 500 TABLET, DELAYED RELEASE ORAL at 18:50

## 2020-12-10 RX ADMIN — POTASSIUM CHLORIDE 40 MEQ: 20 TABLET, EXTENDED RELEASE ORAL at 10:21

## 2020-12-10 RX ADMIN — DIVALPROEX SODIUM 500 MG: 500 TABLET, DELAYED RELEASE ORAL at 10:28

## 2020-12-10 RX ADMIN — CLONIDINE HYDROCHLORIDE 0.3 MG: 0.1 TABLET ORAL at 18:49

## 2020-12-10 RX ADMIN — BUPRENORPHINE HYDROCHLORIDE, NALOXONE HYDROCHLORIDE 2 FILM: 8; 2 FILM, SOLUBLE BUCCAL; SUBLINGUAL at 10:23

## 2020-12-10 RX ADMIN — FOLIC ACID 1 MG: 1 TABLET ORAL at 10:21

## 2020-12-10 RX ADMIN — Medication 10 ML: at 18:50

## 2020-12-10 RX ADMIN — LEVETIRACETAM 500 MG: 500 TABLET ORAL at 10:21

## 2020-12-10 RX ADMIN — PRAZOSIN HYDROCHLORIDE 2 MG: 1 CAPSULE ORAL at 21:44

## 2020-12-10 RX ADMIN — ONDANSETRON 4 MG: 2 INJECTION INTRAMUSCULAR; INTRAVENOUS at 12:29

## 2020-12-10 RX ADMIN — SERTRALINE HYDROCHLORIDE 25 MG: 50 TABLET ORAL at 10:22

## 2020-12-10 RX ADMIN — CLONIDINE HYDROCHLORIDE 0.3 MG: 0.1 TABLET ORAL at 12:36

## 2020-12-10 RX ADMIN — GABAPENTIN 300 MG: 300 CAPSULE ORAL at 18:50

## 2020-12-10 RX ADMIN — AMITRIPTYLINE HYDROCHLORIDE 100 MG: 50 TABLET, FILM COATED ORAL at 21:18

## 2020-12-10 RX ADMIN — LEVETIRACETAM 500 MG: 500 TABLET ORAL at 18:50

## 2020-12-10 RX ADMIN — MONTELUKAST 10 MG: 10 TABLET, FILM COATED ORAL at 21:18

## 2020-12-10 NOTE — PROGRESS NOTES
Hospitalist Progress Note    NAME: Demetrio Daniel   :  1975   MRN:  [id number]     I reviewed pertinent labs and imaging, and discussed /agreed on the plan of care with Dr. Es Heath. Assessment / Plan:    8657 Update - Patient seen by [facility], now in agreement to stay and await placement for inpatient psych. 1320 Update - Called by CM, patient threw lunch tray across room, demanding her \"papers\" so she can leave. Seen by NP at bedside, patient reports N/V since yesterday although no staff (she has a 1:1 sitter) have seen episodes of vomiting. Patient frequently asking for snacks and is currently eating flavored frozen ice. Patient rocking back and forth in bed and yelling at NP. Patient demanding ativan and something strong for pain or she will leave. Attempted to discuss alternative pain management with patient, patient responded \"I don't want to talk about this with you. You all did this to me and need to treat my pain. \" She is aware [facility] was notified to which she stated \"Fine, they can release me when they get here\". She is in agreement to await [facility] arrival. Discussed with CM who has been in contact with [facility].    Toxic Encephalopathy r/t opiate/heroin overdose - resolved   Acute on Chronic Hypoxic Hypercapnic Respiratory Failure - resolved  History of Polysubstance Abuse multiple admissions and ED visits for overdose  History of Suicidal Ideation 2018  History Noncompliance with Medical Treatment   · CXR  - No acute process  · COVID Negative   · History of polysubstance abuse - recent history of heroin overdose 2020  · UDS positive for benzodiazepines, cocaine and THC  · Patient is now alert and on RA  · Reports she has been depressed and has had thoughts of harming self but does not have a plan- denies intention to harm self with overdose   · Appreciate Psych input - recommending inpatient behavorial unit   · Continue suicide precautions   · Continue PTA Suboxone  · Patient is medically stable and ready to transfer to inpatient psych when bed available      Acute Asthma/COPD Exacerbation - resolved  · CXR 12/8 - No acute process  · COVID Negative   · Change IV solumedrol to PO prednisone to complete 5 days (now day 3)  · Continue montelukast      Right Upper Extremity Anterior Shoulder Swelling  · US RUE 12/8 - No abscess is identified overlying soft tissues right shoulder anteriorly   · Repeat US of ROMERO today shows no evidence of abscess  · Patient admits to injecting heroin into shoulder/upper arm  · Several old homemade cut downs noted to UE  · Continues to complain of pain to ROMERO    · Continue monitor - no other symptoms of infection  · Patient remains afebrile, WBC WNL   · Continue toradol PRN for pain      Hypertension   · Now hypertensive   · Resume PTA clonidine      History of Hepatitis B and Hepatitis C  · Hep C positive in 2017  · Elevated AST and ALT - follow   · Will need OP follow up      History of Bipolar Disorder/Depression Continue prazosin, depakote, keppra, amitriptyline and sertraline     Normocytic Anemia Stable, monitor    History of Sarcoidosis      30.0 - 39.9 Obese / Body mass index is 34.33 kg/m².     Code status: Full  Prophylaxis: Lovenox  Recommended Disposition: inpatient psych     Subjective:     Chief Complaint / Reason for Physician Visit  Patient seen at bedside, 1:1 sitter present. She c/o continued pain to ROMERO. No heat or swelling noted to arm. Denies any further complaints, only concerned with getting AM dose of suboxone. Discussed with RN events overnight.      Review of Systems:  Symptom Y/N Comments  Symptom Y/N Comments   Fever/Chills n   Chest Pain n    Poor Appetite n   Edema n    Cough n   Abdominal Pain n    Sputum n   Joint Pain n    SOB/RIVAS n   Pruritis/Rash n    Nausea/vomit n   Tolerating PT/OT n    Diarrhea n   Tolerating Diet y    Constipation n   Other y ROMERO pain      Could NOT obtain due to:      Objective: VITALS:   Last 24hrs VS reviewed since prior progress note. Most recent are:  Patient Vitals for the past 24 hrs:   Temp Pulse Resp BP SpO2   12/10/20 1009 98.7 °F (37.1 °C) 80 18 (!) 151/92 100 %   12/10/20 0300 98.3 °F (36.8 °C) 80 18 132/78 95 %   12/09/20 2315 98.7 °F (37.1 °C) 88 18 (!) 140/90 97 %   12/09/20 1542 97.9 °F (36.6 °C) 86 18 134/84 98 %   12/09/20 1238 97.5 °F (36.4 °C) 71 18 124/77 100 %     No intake or output data in the 24 hours ending 12/10/20 1143     I had a face to face encounter and independently examined this patient on 12/10/2020, as outlined below:  PHYSICAL EXAM:  General: WD, WN. Alert, cooperative, no acute distress, lying in bed     EENT:  EOMI. Anicteric sclerae. MMM  Resp:  CTA bilaterally, no wheezing or rales. No accessory muscle use  CV:  Regular  rhythm,  No edema  GI:  Soft, Non distended, Non tender. +Bowel sounds  Neurologic:  Alert and oriented X 3, normal speech,   Psych:   Good insight. Not anxious nor agitated  Skin:  No rashes. No jaundice    Reviewed most current lab test results and cultures  YES  Reviewed most current radiology test results   YES  Review and summation of old records today    NO  Reviewed patient's current orders and MAR    YES  PMH/ reviewed - no change compared to H&P  ________________________________________________________________________  Care Plan discussed with:    Comments   Patient x    Family      RN x    Care Manager x    Consultant                        Multidiciplinary team rounds were held today with , nursing, pharmacist and clinical coordinator. Patient's plan of care was discussed; medications were reviewed and discharge planning was addressed.      ________________________________________________________________________  Total NON critical care TIME:  25   Minutes    Total CRITICAL CARE TIME Spent:   Minutes non procedure based      Comments   >50% of visit spent in counseling and coordination of care x ________________________________________________________________________  Aurora Iverson NP     Procedures: see electronic medical records for all procedures/Xrays and details which were not copied into this note but were reviewed prior to creation of Plan. LABS:  I reviewed today's most current labs and imaging studies.   Pertinent labs include:  Recent Labs     12/10/20  0352 12/09/20  0350 12/08/20  0329   WBC 7.7 8.6 5.0   HGB 9.8* 10.5* 10.7*   HCT 33.0* 35.3 34.2*    276 283     Recent Labs     12/10/20  0352 12/09/20  0350 12/08/20  0329    138 140   K 3.4* 4.1 3.5    103 105   CO2 30 32 29   GLU 86 111* 108*   BUN 17 9 6   CREA 0.99 0.96 0.98   CA 8.1* 8.4* 8.7   ALB 2.9*  --  4.0   TBILI 0.6  --  0.4   ALT 34  --  56       Signed: Aurora Iverson NP

## 2020-12-10 NOTE — PROGRESS NOTES
ANGELICA Plan:  Inpatient psychiatry placement  [facility] medical transport at d/c   [facility] test collected on 12/8/2020    Chart reviewed. Consult received for assistance with discharge planning. Psychiatry recommending inpatient psychiatry placement pending medical stability. Spoke with hospitalist NP who confirmed that pt is medically cleared for placement. CM made aware by assigned RN today that pt was requesting to leave the hospital \"AMA\", very agitated, and asked for her discharge papers. CM made call to [facility] team ([phone]) spoke with Marybeth Mcgowan to make initial assessment. Faxed requested clinicals for review. [phone]. Later received call from Greg hamm ([phone]), clinician with [facility], and able to set up zoom call for possible TDO assessment. Zoom call completed and it was determined that pt is willing to remain in the hospital until able to identify psychiatry placement. Pt is willing to be admitted to behavioral health unit when bed is available. Pt has voiced that she agrees she needs to seek help and only stated earlier that she did not want to stay because she had the urge to use again. [facility] available to provide additional assistance if needed. CM has placed call to [facility] admissions ([phone]) to place pt on waiting list. Spoke with Taylor Boss who indicated a call had already been placed from [facility] and she was made aware of need for placement. Tayolr Boss is awaiting return call from [facility] to discuss outcome of assessment today. Taylor Boss will call this CM back with update prior to 5PM today. If no response by 5PM, CM will call back in AM to check bed availability. CM will continue to follow for d/c planning needs.     Care Management Interventions  PCP Verified by CM: No  Mode of Transport at Discharge: BLS  Transition of Care Consult (CM Consult): Discharge Planning  Discharge Durable Medical Equipment: No  Physical Therapy Consult: No  Occupational Therapy Consult: No  Speech Therapy Consult: No  Current Support Network: (homeless )  Confirm Follow Up Transport: [address] Provided?: No  Discharge Location  Discharge Placement: Psychiatric Unit      Vinayak Mast, [address], [place] [facility]  [phone]

## 2020-12-10 NOTE — PROGRESS NOTES
Called patient in the room. No answer will try again to complete initial assessment.           DORCAS Rivers

## 2020-12-10 NOTE — PROGRESS NOTES
End of Shift Note    Bedside shift change report given to Tuyet MOTT (oncoming nurse) by Pablito Schreiber (offgoing nurse). Report included the following information SBAR, Kardex, Procedure Summary, Intake/Output, MAR, Recent Results and Cardiac Rhythm NSR    Shift worked:  7p-7a     Shift summary and any significant changes:     patient rested well this night sleeping at short intervals, sitter at bedside     Concerns for physician to address:  none     Zone phone for oncoming shift:   none       Activity:  Activity Level: Up with Assistance  Number times ambulated in hallways past shift: 0  Number of times OOB to chair past shift: 0    Cardiac:   Cardiac Monitoring: Yes      Cardiac Rhythm: Normal sinus rhythm    Access:   Current line(s): PIV     Genitourinary:   Urinary status: voiding    Respiratory:   O2 Device: Room air  Chronic home O2 use?: NO  Incentive spirometer at bedside: NO     GI:     Current diet:  DIET REGULAR  DIET ONE TIME MESSAGE  DIET ONE TIME MESSAGE  Passing flatus: YES  Tolerating current diet: YES       Pain Management:   Patient states pain is manageable on current regimen: YES    Skin:  Doc Score: 18  Interventions: increase time out of bed    Patient Safety:  Fall Score:  Total Score: 5  Interventions: gripper socks and pt to call before getting OOB  High Fall Risk: Yes    Length of Stay:  Expected LOS: 3d 12h  Actual LOS: 2      Pablito Schreiber

## 2020-12-11 ENCOUNTER — HOSPITAL ENCOUNTER (INPATIENT)
Age: 45
LOS: 4 days | Discharge: HOME | DRG: 753 | End: 2020-12-15
Attending: INTERNAL MEDICINE | Admitting: PSYCHIATRY & NEUROLOGY
Payer: COMMERCIAL

## 2020-12-11 VITALS
TEMPERATURE: 97 F | BODY MASS INDEX: 34.15 KG/M2 | DIASTOLIC BLOOD PRESSURE: 81 MMHG | HEIGHT: 64 IN | SYSTOLIC BLOOD PRESSURE: 130 MMHG | RESPIRATION RATE: 20 BRPM | OXYGEN SATURATION: 100 % | WEIGHT: 200 LBS | HEART RATE: 71 BPM

## 2020-12-11 DIAGNOSIS — F19.10 POLYSUBSTANCE ABUSE (HCC): Primary | ICD-10-CM

## 2020-12-11 PROBLEM — R45.851 SUICIDAL IDEATION: Status: ACTIVE | Noted: 2020-12-11

## 2020-12-11 LAB — HCG UR QL: NEGATIVE

## 2020-12-11 PROCEDURE — 81025 URINE PREGNANCY TEST: CPT

## 2020-12-11 PROCEDURE — 74011250637 HC RX REV CODE- 250/637: Performed by: NURSE PRACTITIONER

## 2020-12-11 PROCEDURE — 74011250637 HC RX REV CODE- 250/637: Performed by: STUDENT IN AN ORGANIZED HEALTH CARE EDUCATION/TRAINING PROGRAM

## 2020-12-11 PROCEDURE — 65220000003 HC RM SEMIPRIVATE PSYCH

## 2020-12-11 PROCEDURE — 74011636637 HC RX REV CODE- 636/637: Performed by: NURSE PRACTITIONER

## 2020-12-11 RX ORDER — LANOLIN ALCOHOL/MO/W.PET/CERES
100 CREAM (GRAM) TOPICAL DAILY
Status: DISCONTINUED | OUTPATIENT
Start: 2020-12-12 | End: 2020-12-15 | Stop reason: HOSPADM

## 2020-12-11 RX ORDER — LORAZEPAM 2 MG/ML
1 INJECTION INTRAMUSCULAR
Status: DISCONTINUED | OUTPATIENT
Start: 2020-12-11 | End: 2020-12-15 | Stop reason: HOSPADM

## 2020-12-11 RX ORDER — HYDROXYZINE 50 MG/1
50 TABLET, FILM COATED ORAL
Status: DISCONTINUED | OUTPATIENT
Start: 2020-12-11 | End: 2020-12-15 | Stop reason: HOSPADM

## 2020-12-11 RX ORDER — BENZTROPINE MESYLATE 1 MG/1
1 TABLET ORAL
Status: DISCONTINUED | OUTPATIENT
Start: 2020-12-11 | End: 2020-12-15 | Stop reason: HOSPADM

## 2020-12-11 RX ORDER — FOLIC ACID 1 MG/1
1 TABLET ORAL DAILY
Status: DISCONTINUED | OUTPATIENT
Start: 2020-12-12 | End: 2020-12-15 | Stop reason: HOSPADM

## 2020-12-11 RX ORDER — HALOPERIDOL 5 MG/ML
5 INJECTION INTRAMUSCULAR
Status: DISCONTINUED | OUTPATIENT
Start: 2020-12-11 | End: 2020-12-15 | Stop reason: HOSPADM

## 2020-12-11 RX ORDER — METHADONE HYDROCHLORIDE 10 MG/1
30 TABLET ORAL ONCE
Status: DISCONTINUED | OUTPATIENT
Start: 2020-12-11 | End: 2020-12-11

## 2020-12-11 RX ORDER — OLANZAPINE 5 MG/1
5 TABLET ORAL
Status: DISCONTINUED | OUTPATIENT
Start: 2020-12-11 | End: 2020-12-15 | Stop reason: HOSPADM

## 2020-12-11 RX ORDER — CLONIDINE HYDROCHLORIDE 0.1 MG/1
0.1 TABLET ORAL
Status: DISCONTINUED | OUTPATIENT
Start: 2020-12-11 | End: 2020-12-15 | Stop reason: HOSPADM

## 2020-12-11 RX ORDER — THERA TABS 400 MCG
1 TAB ORAL DAILY
Status: DISCONTINUED | OUTPATIENT
Start: 2020-12-12 | End: 2020-12-15 | Stop reason: HOSPADM

## 2020-12-11 RX ORDER — ACETAMINOPHEN 325 MG/1
650 TABLET ORAL
Status: DISCONTINUED | OUTPATIENT
Start: 2020-12-11 | End: 2020-12-15 | Stop reason: HOSPADM

## 2020-12-11 RX ORDER — IPRATROPIUM BROMIDE AND ALBUTEROL SULFATE 2.5; .5 MG/3ML; MG/3ML
3 SOLUTION RESPIRATORY (INHALATION)
Status: DISCONTINUED | OUTPATIENT
Start: 2020-12-11 | End: 2020-12-15 | Stop reason: HOSPADM

## 2020-12-11 RX ORDER — DIVALPROEX SODIUM 500 MG/1
500 TABLET, DELAYED RELEASE ORAL 2 TIMES DAILY
Qty: 60 TAB | Refills: 0 | Status: ON HOLD
Start: 2020-12-11 | End: 2020-12-15 | Stop reason: SDUPTHER

## 2020-12-11 RX ORDER — MONTELUKAST SODIUM 10 MG/1
10 TABLET ORAL
Status: DISCONTINUED | OUTPATIENT
Start: 2020-12-11 | End: 2020-12-15 | Stop reason: HOSPADM

## 2020-12-11 RX ORDER — LOPERAMIDE HYDROCHLORIDE 2 MG/1
2 CAPSULE ORAL AS NEEDED
Status: DISCONTINUED | OUTPATIENT
Start: 2020-12-11 | End: 2020-12-15 | Stop reason: HOSPADM

## 2020-12-11 RX ORDER — IBUPROFEN 200 MG
1 TABLET ORAL EVERY 24 HOURS
Status: DISCONTINUED | OUTPATIENT
Start: 2020-12-11 | End: 2020-12-15 | Stop reason: HOSPADM

## 2020-12-11 RX ORDER — DIPHENHYDRAMINE HYDROCHLORIDE 50 MG/ML
50 INJECTION, SOLUTION INTRAMUSCULAR; INTRAVENOUS
Status: DISCONTINUED | OUTPATIENT
Start: 2020-12-11 | End: 2020-12-15 | Stop reason: HOSPADM

## 2020-12-11 RX ORDER — DICYCLOMINE HYDROCHLORIDE 10 MG/ML
20 INJECTION INTRAMUSCULAR
Status: DISCONTINUED | OUTPATIENT
Start: 2020-12-11 | End: 2020-12-15 | Stop reason: HOSPADM

## 2020-12-11 RX ORDER — PREDNISONE 20 MG/1
20 TABLET ORAL
Qty: 10 TAB | Refills: 0 | Status: SHIPPED
Start: 2020-12-11 | End: 2020-12-15

## 2020-12-11 RX ORDER — ADHESIVE BANDAGE
30 BANDAGE TOPICAL DAILY PRN
Status: DISCONTINUED | OUTPATIENT
Start: 2020-12-11 | End: 2020-12-15 | Stop reason: HOSPADM

## 2020-12-11 RX ORDER — PROMETHAZINE HYDROCHLORIDE 25 MG/1
25 TABLET ORAL
Status: DISCONTINUED | OUTPATIENT
Start: 2020-12-11 | End: 2020-12-15 | Stop reason: HOSPADM

## 2020-12-11 RX ADMIN — LEVETIRACETAM 500 MG: 500 TABLET ORAL at 09:40

## 2020-12-11 RX ADMIN — SERTRALINE HYDROCHLORIDE 25 MG: 50 TABLET ORAL at 09:41

## 2020-12-11 RX ADMIN — GABAPENTIN 300 MG: 300 CAPSULE ORAL at 09:40

## 2020-12-11 RX ADMIN — MONTELUKAST 10 MG: 10 TABLET, FILM COATED ORAL at 21:31

## 2020-12-11 RX ADMIN — DIVALPROEX SODIUM 500 MG: 500 TABLET, DELAYED RELEASE ORAL at 09:47

## 2020-12-11 RX ADMIN — PREDNISONE 20 MG: 20 TABLET ORAL at 09:40

## 2020-12-11 RX ADMIN — CLONIDINE HYDROCHLORIDE 0.3 MG: 0.1 TABLET ORAL at 09:40

## 2020-12-11 RX ADMIN — FOLIC ACID 1 MG: 1 TABLET ORAL at 09:40

## 2020-12-11 RX ADMIN — BUPRENORPHINE HYDROCHLORIDE, NALOXONE HYDROCHLORIDE 2 FILM: 8; 2 FILM, SOLUBLE BUCCAL; SUBLINGUAL at 09:42

## 2020-12-11 RX ADMIN — Medication 100 MG: at 09:00

## 2020-12-11 NOTE — DISCHARGE INSTRUCTIONS
Patient Education        Alcohol, Drug, or Poison Ingestion: Care Instructions  Your Care Instructions     A person can become very sick, or die, from swallowing or using alcohol, drugs, or poisons. Alcohol poisoning occurs when a person drinks a large amount of alcohol. Alcohol can stop nerve signals that control breathing. It can also stop the gag reflex that prevents choking. Alcohol poisoning is serious. It can lead to brain damage or death if it's not treated right away. Drugs can be used by accident or on purpose. They can be swallowed, inhaled, injected, or absorbed through the skin. Drugs include over-the-counter medicine (such as aspirin or acetaminophen) and prescription medicine. They also include vitamins and supplements. And they include illegal drugs such as cocaine and heroin. And poisons are all around us. They include household , cosmetics, houseplants, and garden chemicals. The doctor has checked you carefully, but problems can develop later. If you notice any problems or new symptoms, get medical treatment right away. Follow-up care is a key part of your treatment and safety. Be sure to make and go to all appointments, and call your doctor if you are having problems. It's also a good idea to know your test results and keep a list of the medicines you take. How can you care for yourself at home? Alcohol problems  · Talk to your doctor or counselor about programs that can help you stop using alcohol. · Plan ways to avoid being tempted to drink. ? Get rid of all alcohol in your home. ? Avoid places where you tend to drink. ? Stay away from places or events that offer alcohol. ? Stay away from people who drink a lot. Drug problems  · Talk to your doctor about programs that can help you stop using drugs. · Get rid of any drugs you might be tempted to misuse. · Learn how to say no when other people use drugs. · Don't spend time with people who use drugs.   Poison prevention  · Keep products in the containers they came in. Keep them with the original labels. · Be careful when you use cleaning products, paints, solvents, and pesticides. Read labels before use. Use a fan to move strong odors and fumes out of your home. · Do not mix cleaning products. Try to use nontoxic . These include vinegar, lemon juice, and baking soda. When should you call for help? Poison control centers, hospitals, or your doctor can give immediate advice in the case of a poisoning. The [employer] number is [phone]. Have the poison container with you so you can give complete information to the poison control center, such as what the poison or substance is, how much was taken and when. Do not try to make the person vomit. Call 911 anytime you think you may need emergency care. For example, call if you or someone else:    · Has used or currently uses alcohol or drugs and is very confused or can't stay awake.     · Has passed out (lost consciousness).     · Has severe trouble breathing.     · Is having a seizure. Call your doctor now or seek immediate medical care if you or someone else:    · Has new symptoms, or is not acting normally. Watch closely for changes in your health, and be sure to contact your doctor if:    · You do not get better as expected.     · You need help with drug or alcohol problems.     · You have problems with depression or other mental health issues. Where can you learn more? Go to http://www.gray.com/  Enter A385 in the search box to learn more about \"Alcohol, Drug, or Poison Ingestion: Care Instructions. \"  Current as of: June 26, 2019               Content Version: 12.6  © 6198-1056 [employer] Care instructions adapted under license by [employer] (which disclaims liability or warranty for this information).  If you have questions about a medical condition or this instruction, always ask your healthcare professional. [address] any warranty or liability for your use of this information. Patient Education        Chronic Obstructive Pulmonary Disease (COPD): Care Instructions  Your Care Instructions     Chronic obstructive pulmonary disease (COPD) is a general term for a group of lung diseases, including emphysema and chronic bronchitis. People with COPD have decreased airflow in and out of the lungs, which makes it hard to breathe. The airways also can get clogged with thick mucus. Cigarette smoking is a major cause of COPD. Although there is no cure for COPD, you can slow its progress. Following your treatment plan and taking care of yourself can help you feel better and live longer. Follow-up care is a key part of your treatment and safety. Be sure to make and go to all appointments, and call your doctor if you are having problems. It's also a good idea to know your test results and keep a list of the medicines you take. How can you care for yourself at home? Staying healthy    · Do not smoke. This is the most important step you can take to prevent more damage to your lungs. If you need help quitting, talk to your doctor about stop-smoking programs and medicines. These can increase your chances of quitting for good.     · Avoid colds and flu. Get a pneumococcal vaccine shot. If you have had one before, ask your doctor whether you need a second dose. Get the flu vaccine every fall. If you must be around people with colds or the flu, wash your hands often.     · Avoid secondhand smoke, air pollution, and high altitudes. Also avoid cold, dry air and hot, humid air. Stay at home with your windows closed when air pollution is bad. Medicines and oxygen therapy    · Take your medicines exactly as prescribed. Call your doctor if you think you are having a problem with your medicine. You may be taking medicines such as:  ? Bronchodilators. These help open your airways and make breathing easier. They are either short-acting (work for 6 to 9 hours) or long-acting (work for 24 hours). You inhale most bronchodilators, so they start to act quickly. Always carry your quick-relief inhaler with you in case you need it while you are away from home. ? Corticosteroids (prednisone, budesonide). These reduce airway inflammation. They come in pill or inhaled form. You must take these medicines every day for them to work well.     · Ask your doctor or pharmacist if a spacer is right for you. A spacer may help you get more inhaled medicine to your lungs. If you use one, ask how to use it properly.     · Do not take any vitamins, over-the-counter medicine, or herbal products without talking to your doctor first.     · If your doctor prescribed antibiotics, take them as directed. Do not stop taking them just because you feel better. You need to take the full course of antibiotics.     · If you use oxygen therapy, use the flow rate your doctor has recommended. Don't change it without talking to your doctor first. Oxygen therapy boosts the amount of oxygen in your blood and helps you breathe easier. Activity    · Get regular exercise. Walking is an easy way to get exercise. Start out slowly, and walk a little more each day.     · Pay attention to your breathing. You are exercising too hard if you can't talk while you exercise.     · Take short rest breaks when doing household chores and other activities.     · Learn breathing methods--such as breathing through pursed lips--to help you become less short of breath.     · If your doctor has not set you up with a pulmonary rehabilitation program, ask if rehab is right for you. Rehab includes exercise programs, education about your disease and how to manage it, help with diet and other changes, and emotional support. Diet    · Eat regular, healthy meals.  Use bronchodilators about 1 hour before you eat to make it easier to eat. Eat several small meals instead of three large ones. Drink beverages at the end of the meal. Avoid foods that are hard to chew.     · Eat foods that contain protein so you don't lose muscle mass.     · Talk with your doctor if you gain too much weight or if you lose weight without trying. Mental health    · Talk to your family, friends, or a therapist about your feelings. Some people feel frightened, angry, hopeless, helpless, and even guilty. Talking openly about bad feelings can help you cope. If these feelings last, talk to your doctor. When should you call for help? Call 911 anytime you think you may need emergency care. For example, call if:    · You have severe trouble breathing. Call your doctor now or seek immediate medical care if:    · You have new or worse trouble breathing.     · You cough up blood.     · You have a fever. Watch closely for changes in your health, and be sure to contact your doctor if:    · You cough more deeply or more often, especially if you notice more mucus or a change in the color of your mucus.     · You have new or worse swelling in your legs or belly.     · You are not getting better as expected. Where can you learn more? Go to http://www.gray.com/  Enter K463 in the search box to learn more about \"Chronic Obstructive Pulmonary Disease (COPD): Care Instructions. \"  Current as of: February 24, 2020               Content Version: 12.6  © 7932-4754 [employer]. Care instructions adapted under license by [employer] (which disclaims liability or warranty for this information). If you have questions about a medical condition or this instruction, always ask your healthcare professional. [place] [address] any warranty or liability for your use of this information.          Patient Education        Opioid Overdose: Care Instructions  Your Care Instructions     You have had treatment to help your body recover from taking too much of an opioid. You are getting better, but you may not feel well for a while. It takes time for the opioids to leave your body. How long it takes to feel better depends on which drug you took and how much you took of it. Opioids include illegal drugs such as heroin, often called smack, junk, H, and ska. Opioids also include medicines that doctors prescribe to treat pain. These are medicines such as oxycodone, methadone, and buprenorphine. They are sometimes sold and used illegally. Taking too much of an opioid can be dangerous. It may cause:  · Trouble breathing. · Low blood pressure. · A low heart rate. · A coma. When the doctor treated you for the overdose, he or she may have:  · Watched your symptoms or done tests to find out what kind of drug you took. · Given you fluids. · Given you oxygen to help you breathe. · Given you a medicine called naloxone to help reverse the effects of the opioid. · Done several tests, including blood tests, to see how you're responding to treatment. The doctor also watched you carefully to make sure you were recovering safely. Follow-up care is a key part of your treatment and safety. Be sure to make and go to all appointments, and call your doctor if you are having problems. It's also a good idea to know your test results and keep a list of the medicines you take. How can you care for yourself at home? · If you take opioids regularly, your body gets used to them. This is called physical dependence. If you are physically dependent on opioids, you may have withdrawal symptoms when you stop using them or use less. These symptoms can include nausea, sweating, chills, diarrhea, stomach cramps, and muscle aches. Withdrawal can last up to several weeks, depending on which opioid you took and how long you took it. You may feel very ill, but you probably aren't in medical danger. · Your doctor may give you medicine to help you feel better.  To help you get through withdrawal, you can also:  ? Get plenty of rest.  ? Drink plenty of fluids. ? Stay active. ? Eat a healthy diet. · If you had a tube in your throat to help you breathe, you may have a sore throat or hoarseness that can last a few days. Sip liquids to help soothe your throat. · Do not drink alcohol or take illegal drugs. · Do not take medicines that make you tired, like sleeping pills or muscle relaxers. · Do not drive if you feel sleepy or groggy while you recover from an overdose. · Get help to stop using opioids. Talk to your doctor about substance use treatment programs. · Talk to your doctor or pharmacist about having a naloxone rescue kit on hand. When should you call for help? Call 911 anytime you think you may need emergency care. For example, call if:    · You feel you cannot stop from hurting yourself or someone else. Call your doctor now or seek immediate medical care if:    · You have new or worse withdrawal symptoms, such as:  ? Stomach cramps. ? Vomiting. ? Diarrhea. ? Muscle aches. ? Sweating. Watch closely for changes in your health, and be sure to contact your doctor if:    · You do not get better as expected. Where can you learn more? Go to http://www.gray.com/  Enter Z171 in the search box to learn more about \"Opioid Overdose: Care Instructions. \"  Current as of: 2020               Content Version: 12.6  © 2331-4815 [employer]. Care instructions adapted under license by [employer] (which disclaims liability or warranty for this information). If you have questions about a medical condition or this instruction, always ask your healthcare professional. [place] [address] any warranty or liability for your use of this information.                HOSPITALIST DISCHARGE INSTRUCTIONS    NAME: Nely Chase   :  1975   MRN:  [id number]     Date/Time:  2020 8:54 AM    ADMIT DATE: 12/8/2020   DISCHARGE DATE: 12/11/2020     Attending Physician: Andres Hernandez NP    DISCHARGE DIAGNOSIS:    Toxic Encephalopathy r/t opiate/heroin overdose - resolved   Acute on Chronic Hypoxic Hypercapnic Respiratory Failure - resolved  History of Polysubstance Abuse   History of Suicidal Ideation 8/2018  History Noncompliance with Medical Treatment    Acute Asthma/COPD Exacerbation - resolved  Right Upper Extremity Anterior Shoulder Swelling  Hypertension   History of Hepatitis B and Hepatitis C  History of Bipolar Disorder/Depression  Normocytic Anemia   History of Sarcoidosis     Medications: Per above medication reconciliation. Pain Management: per above medications    Recommended diet: Regular Diet    Recommended activity: Activity as tolerated    Wound care: None    Indwelling devices:  None    Supplemental Oxygen: None    Required Lab work: none    Glucose management:  None    Code status: Full        Outside physician follow up: Follow-up Information     Follow up With Specialties Details Why Contact Info    None    None (395) Patient stated that they have no PCP                 Skilled nursing facility/ SNF MD responsible for above on discharge. Information obtained by :  I understand that if any problems occur once I am at home I am to contact my physician. I understand and acknowledge receipt of the instructions indicated above.                                                                                                                                            Physician's or R.N.'s Signature                                                                  Date/Time                                                                                                                                              Patient or Repres

## 2020-12-11 NOTE — PROGRESS NOTES
0725: Spoke with [facility] regarding transport of patient to [facility]. Waiting results of overnight urinalysis. Call Emanuel Ross before transport at [phone].

## 2020-12-11 NOTE — DISCHARGE SUMMARY
Hospitalist Discharge Summary     Patient ID:  Davin Johnston  [id number]  53 y.o.  1975 12/8/2020    PCP on record: None    Admit date: 12/8/2020  Discharge date and time: 12/11/2020    DISCHARGE DIAGNOSIS:    Toxic Encephalopathy r/t opiate/heroin overdose - resolved   Acute on Chronic Hypoxic Hypercapnic Respiratory Failure - resolved  History of Polysubstance Abuse   History of Suicidal Ideation 8/2018  History Noncompliance with Medical Treatment    Acute Asthma/COPD Exacerbation - resolved  Right Upper Extremity Anterior Shoulder Swelling  Hypertension   History of Hepatitis B and Hepatitis C  History of Bipolar Disorder/Depression  Normocytic Anemia   History of Sarcoidosis     CONSULTATIONS:  IP CONSULT TO PSYCHIATRY    Excerpted HPI from H&P of Shmuel Sharma MD:    ____Robin Burr Scheuermann is a 53 y.o.  female with past medical history significant for COPD/asthma, bipolar disorder, and polysubstance abuse (cocaine, heroin, THC, tobacco) who is brought to the ED by EMS. Patient was on 4 L oxygen via nasal cannula on arrival and had received neb treatment in route. Per EMS patient admitted to using heroin prior to arrival.  Upon arrival to the ED patient was in respiratory distress, she was tachypneic, tachycardic, and had wheezes throughout the lung fields. She was placed on BiPAP. She was also given Narcan 0.2 mg following which patient woke up and became agitated. She was given a dose of Ativan. Patient was on BiPAP during my exam. She opens eyes to stimulation but unable to follow command. She moans but was unable to speak or answer simple yes or no questions. History taken mainly from chart review     We were asked to admit for work up and evaluation of the above problems. __________________________________________________________________  DISCHARGE SUMMARY/HOSPITAL COURSE:  for full details see H&P, daily progress notes, labs, consult notes.      Toxic Encephalopathy r/t opiate/heroin overdose - resolved   Acute on Chronic Hypoxic Hypercapnic Respiratory Failure - resolved  History of Polysubstance Abuse multiple admissions and ED visits for overdose  History of Suicidal Ideation 8/2018  History Noncompliance with Medical Treatment   · CXR 12/8 - No acute process  · COVID Negative   · History of polysubstance abuse - recent history of heroin overdose 9/2020  · UDS positive for benzodiazepines, cocaine and THC  · Patient is now alert and on RA  · Reports she has been depressed and has had thoughts of harming self but does not have a plan- denies intention to harm self with overdose   · Appreciate Psych input - recommending inpatient behavorial unit   · Continue suicide precautions   · Continue PTA Suboxone  · Patient is medically stable and ready to transfer to inpatient psych when bed available   Acute Asthma/COPD Exacerbation - resolved  · CXR 12/8 - No acute process  · COVID Negative   · Change IV solumedrol to PO prednisone to complete 5 days (now day 4) - STOP date 12/12  · Continue montelukast  Right Upper Extremity Anterior Shoulder Swelling  · US RUE 12/8 - No abscess is identified overlying soft tissues right shoulder anteriorly   · Repeat US of ROMERO 12/10 shows no evidence of abscess  · Patient admits to injecting heroin into shoulder/upper arm  · Several old homemade cut downs noted to UE  · No symptoms of infection  · Patient remains afebrile, WBC WNL   Hypertension   · Now hypertensive   · Resume PTA clonidine   History of Hepatitis B and Hepatitis C  · Hep C positive in 2017  · Elevated AST and ALT - follow   History of Bipolar Disorder/Depression Continue prazosin, depakote, keppra, amitriptyline and sertraline     Normocytic Anemia Stable    History of Sarcoidosis     Patient is being transferred to [address] [place] [address] inpatient behavioral health unit.     _______________________________________________________________________  Patient seen and examined by me on discharge day. Pertinent Findings:  Gen:    Not in distress  Chest: Clear lungs  CVS:   Regular rhythm. No edema  Abd:  Soft,obese, not tender  Neuro:  Alert and orientec x3  _______________________________________________________________________  DISCHARGE MEDICATIONS:   Current Discharge Medication List      START taking these medications    Details   predniSONE (DELTASONE) 20 mg tablet Take 20 mg by mouth daily (with breakfast) for 1 day. Qty: 10 Tab, Refills: 0         CONTINUE these medications which have CHANGED    Details   divalproex DR (Depakote) 500 mg tablet Take 1 Tab by mouth two (2) times a day. Qty: 60 Tab, Refills: 0         CONTINUE these medications which have NOT CHANGED    Details   amitriptyline (ELAVIL) 100 mg tablet Take 100 mg by mouth nightly. albuterol (ProAir HFA) 90 mcg/actuation inhaler Take 2 Puffs by inhalation every six (6) hours as needed for Wheezing. cloNIDine HCL (CATAPRES) 0.3 mg tablet Take 0.3 mg by mouth three (3) times daily. gabapentin (NEURONTIN) 300 mg capsule Take 300 mg by mouth three (3) times daily. levETIRAcetam (Keppra) 500 mg tablet Take 500 mg by mouth two (2) times a day. QUEtiapine (SEROquel) 400 mg tablet Take 400 mg by mouth nightly. tiotropium (Spiriva with HandiHaler) 18 mcg inhalation capsule Take 1 Cap by inhalation daily. fluticasone propion-salmeteroL (Advair Diskus) 100-50 mcg/dose diskus inhaler Take 1 Puff by inhalation every twelve (12) hours. sertraline (ZOLOFT) 25 mg tablet Take 25 mg by mouth daily. buprenorphine-naloxone (Suboxone) 8-2 mg film sublingaul film 2 Film by SubLINGual route daily. Comments: .      prazosin (MINIPRESS) 2 mg capsule Take 1 Cap by mouth nightly.  Indications: posttraumatic stress syndrome  Qty: 30 Cap, Refills: 0      albuterol-ipratropium (DUO-NEB) 2.5 mg-0.5 mg/3 ml nebu 3 mL by Nebulization route every four (4) hours as needed for Wheezing, Shortness of Breath or Respiratory Distress. Qty: 30 Nebule, Refills: 0         STOP taking these medications       thiamine HCL (B-1) 100 mg tablet Comments:   Reason for Stopping:         folic acid (FOLVITE) 1 mg tablet Comments:   Reason for Stopping:                 Patient Follow Up Instructions:    Activity: Activity as tolerated  Diet: Regular Diet  Wound Care: None needed      Follow-up Information     Follow up With Specialties Details Why Contact Info    None    None (395) Patient stated that they have no PCP          ________________________________________________________________    Risk of deterioration: Low    Condition at Discharge:  Stable  __________________________________________________________________    Disposition  inpatient behavioral health     ____________________________________________________________________    Code Status: Full Code  ___________________________________________________________________      Total time in minutes spent coordinating this discharge (includes going over instructions, follow-up, prescriptions, and preparing report for sign off to her PCP) :  >30 minutes    Signed:  Christos Story NP

## 2020-12-11 NOTE — BH NOTES
Pt arrived at [facility] via [facility]    Dual skin assesment was performed by Juan Jose Del Angel, RN and Yogi Lr RN    Assessment was WDL. Doc score is 22  Pt did consent to safety while on the unit   Pt did sign consent forms up on arriving   Denies suicidal ideation   Denies homicidal ideation   Does not appear to be responding to internal stimuli   UDS was positive for Cocaine, Benzoa and THC. Pt was calm and cooperative, gave minimal answers during admission process.  Will continue to monitor and support q15min

## 2020-12-11 NOTE — PROGRESS NOTES
ANGELICA Plan:  Inpatient psychiatry unit (Acute)- [address] [facility] can accept today   [facility] medical transport ETA 10AM  EMTALA required for acute care transfer    CM aware of discharge order. [facility] bed placement ([phone]) this AM. Confirmed that urine test has resulted and pt has been accepted to [facility] for admission today. Pt going to room#728B. Nurse to call report to [phone]. Accepting physician is Dr. Tray Orantes. CM has arranged for [facility] medical transport with ETA of 10AM. CM has completed PCS form to include H&P and facesheet. Placed into chart. CM met with pt at bedside. She has been notified of acceptance and agreeable with d/c plan. No barriers to transfer identified at this time. Pt is ready for d/c from a CM standpoint. Assigned RN informed. Care Management Interventions  PCP Verified by CM: No  Mode of Transport at Discharge: [facility] Time of Discharge: 1000  Transition of Care Consult (CM Consult): Discharge Planning  Discharge Durable Medical Equipment: No  Physical Therapy Consult: No  Occupational Therapy Consult: No  Speech Therapy Consult: No  Current Support Network:  Other(homeless )  Confirm Follow Up Transport: Cab  The Patient and/or Patient Representative was Provided with a Choice of Provider and Agrees with the Discharge Plan?: Yes  Freedom of Choice List was Provided with Basic Dialogue that Supports the Patient's Individualized Plan of Care/Goals, Treatment Preferences and Shares the Quality Data Associated with the Providers?: Yes  Copen Resource Information Provided?: No  Discharge Location  Discharge Placement: Psychiatric Unit    Lena Santos, [facility], [facility]  [phone]

## 2020-12-11 NOTE — PROGRESS NOTES
End of Shift Note    Bedside shift change report given to Tuyet (oncoming nurse) by Dimitri Ellison RN (offgoing nurse). Report included the following information SBAR, Kardex and MAR    Shift worked:  7p-7a     Shift summary and any significant changes:     Pt has been stable through shift, poc pregnancy test done, meds given according to mar, sitter with pt     Concerns for physician to address:       Zone phone for oncoming shift:          Activity:  Activity Level: Up with Assistance  Number times ambulated in hallways past shift: 2  Number of times OOB to chair past shift: 0    Cardiac:   Cardiac Monitoring: Yes      Cardiac Rhythm: Normal sinus rhythm    Access:   Current line(s): PIV     Genitourinary:   Urinary status:     Respiratory:   O2 Device: Room air  Chronic home O2 use?: NO  Incentive spirometer at bedside: NO     GI:     Current diet:  DIET REGULAR  Passing flatus: NO  Tolerating current diet: YES       Pain Management:   Patient states pain is manageable on current regimen: YES    Skin:  Doc Score: 20  Interventions: turn team, float heels and increase time out of bed    Patient Safety:  Fall Score:  Total Score: 5  Interventions: bed/chair alarm, assistive device (walker, cane, etc), gripper socks and pt to call before getting OOB  High Fall Risk: Yes    Length of Stay:  Expected LOS: 3d 12h  Actual LOS: 3      Dimitri Ellison, RN

## 2020-12-11 NOTE — BH NOTES
TRANSFER - IN REPORT:    Verbal report received from [facility] [address], RN(name) on Yvan Purcell  being received from [facility](unit) for routine progression of care      Report consisted of patients Situation, Background, Assessment and   Recommendations(SBAR). Information from the following report(s) SBAR and ED Summary was reviewed with the receiving nurse. Opportunity for questions and clarification was provided. Assessment completed upon patients arrival to unit and care assumed.

## 2020-12-11 NOTE — H&P
History & Physical    Primary Care Provider: None  Source of Information: Chart review. History of Presenting Illness:   Dayna Arroyo is a 53 y.o. female with PMH of COPD/Asthma, Bipolar disorder, and Polysubstance abuse (cocaine, heroin, THC, tobacco) who is brought to the ED by EMS. Patient was on 4 L oxygen via nasal cannula on arrival and had received neb treatment in route. Per EMS patient admitted to using heroin prior to arrival. Upon arrival to the ED patient was in respiratory distress, she was tachypneic, tachycardic, and had wheezes throughout the lung fields. She was placed on BiPAP, given Narcan 0.2 mg following which patient woke up and became agitated then given a dose of Ativan, she had a period of drowsiness but returned to baseline AAOx3. The patient denies any fever, chills, chest pain, cough, congestion, recent illness, palpitations, or dysuria. Admitted to [facility] for SI's. Review of Systems:  A comprehensive review of systems was negative except for that written in the History of Present Illness. Past Medical History:   Diagnosis Date    Asthma     Bipolar 1 disorder ([address]     Chronic obstructive pulmonary disease (HCC)     Chronic pain     back, leg    Cocaine abuse (HCC)     Depression     Heart failure (HCC)     Hepatitis B     Heroin abuse ([address]     HTN (hypertension)     Narcotic abuse ([address]     Polysubstance abuse ([address]     Sarcoidosis     Tobacco abuse       Past Surgical History:   Procedure Laterality Date    HX GYN      HX WISDOM TEETH EXTRACTION       Prior to Admission medications    Medication Sig Start Date End Date Taking? Authorizing Provider   divalproex DR (Depakote) 500 mg tablet Take 1 Tab by mouth two (2) times a day. 12/11/20   Rafael Oliva NP   predniSONE (DELTASONE) 20 mg tablet Take 20 mg by mouth daily (with breakfast) for 1 day.  12/11/20 12/12/20  Rafael Oliva NP   amitriptyline (ELAVIL) 100 mg tablet Take 100 mg by mouth nightly. Provider, Historical   albuterol (ProAir HFA) 90 mcg/actuation inhaler Take 2 Puffs by inhalation every six (6) hours as needed for Wheezing. Provider, Historical   cloNIDine HCL (CATAPRES) 0.3 mg tablet Take 0.3 mg by mouth three (3) times daily. Provider, Historical   gabapentin (NEURONTIN) 300 mg capsule Take 300 mg by mouth three (3) times daily. Provider, Historical   levETIRAcetam (Keppra) 500 mg tablet Take 500 mg by mouth two (2) times a day. Provider, Historical   QUEtiapine (SEROquel) 400 mg tablet Take 400 mg by mouth nightly. Provider, Historical   tiotropium (Spiriva with HandiHaler) 18 mcg inhalation capsule Take 1 Cap by inhalation daily. Provider, Historical   fluticasone propion-salmeteroL (Advair Diskus) 100-50 mcg/dose diskus inhaler Take 1 Puff by inhalation every twelve (12) hours. Provider, Historical   sertraline (ZOLOFT) 25 mg tablet Take 25 mg by mouth daily. Provider, Historical   buprenorphine-naloxone (Suboxone) 8-2 mg film sublingaul film 2 Film by SubLINGual route daily. Provider, Historical   prazosin (MINIPRESS) 2 mg capsule Take 1 Cap by mouth nightly. Indications: posttraumatic stress syndrome 9/25/20   Charisse Last MD   albuterol-ipratropium (DUO-NEB) 2.5 mg-0.5 mg/3 ml nebu 3 mL by Nebulization route every four (4) hours as needed for Wheezing, Shortness of Breath or Respiratory Distress.  9/25/20   Charisse Last MD     Allergies   Allergen Reactions    Vancomycin Anaphylaxis    Tomato Swelling     Tongue    Trazodone Angioedema      Family History   Problem Relation Age of Onset    Hypertension Father     Hypertension Mother         SOCIAL HISTORY:  Patient resides: with son  Independently X   Assisted Living    SNF    With family care       Smoking history:   None    Former    Chronic X     Alcohol history:   None    Social    Chronic X   Drug history: Polysubstance abuse  None    Social    Chronic X Ambulates:   Independently X   w/cane    w/walker    w/wc    CODE STATUS:  DNR    Full X   Other      Objective:     Physical Exam:     Visit Vitals  /80 (BP 1 Location: Left arm)   Pulse 78   Temp 98.4 °F (36.9 °C)   Resp 16   SpO2 96%             Constitutional:  No acute distress, cooperative. ENT:  Oral mucosa moist.    Resp:  CTA bilaterally. No wheezing/rhonchi/rales. No accessory muscle use. CV:  Regular rhythm, normal rate, no murmurs, gallops, rubs. /GI:  Soft, non distended, non tender, no guarding, BS present. Musculoskeletal:  No edema, warm, 2+ pulses throughout. Neurologic:  Moves all extremities. AAOx3, CN II-XII reviewed. Skin:  Good turgor, no rashes or ulcers  Psych:  Poor insight. Depressed mood. No anxiety or agitation. EKG: Sinus tachycardia. Possible Left atrial enlargement. Data Review:     Recent Days:  Recent Labs     12/10/20  0352 12/09/20  0350   WBC 7.7 8.6   HGB 9.8* 10.5*   HCT 33.0* 35.3    276     Recent Labs     12/10/20  0352 12/09/20  0350    138   K 3.4* 4.1    103   CO2 30 32   GLU 86 111*   BUN 17 9   CREA 0.99 0.96   CA 8.1* 8.4*   ALB 2.9*  --    ALT 34  --      No results for input(s): PH, PCO2, PO2, HCO3, FIO2 in the last 72 hours. 24 Hour Results:  Recent Results (from the past 24 hour(s))   HCG URINE, QL    Collection Time: 12/11/20  5:42 AM   Result Value Ref Range    HCG urine, QL Negative NEG           Imaging:     Assessment:     Active Problems:    Suicidal ideation (12/11/2020)           Plan:     AMS 2/2 Opiate/Heroin OD: history of polysubstance abuse, heroine, cocaine, THC, tobacco. Presented with altered mentation and respiratory distress, mentation improved after 1.2 mg of Narcan then medicated for anxiety and agitation with lorazepam, now drowsy.   -CK: 887, monitor renal function  -Monitor respiratory status and mentation  -prn duonebs  -positive UDS  -acetaminophen level: <2  -COWS  -aspiration precautions     Acute on Chronic Hypoxic Hypercapnic Respiratory Failure (resolved)  Acute Asthma/COPD exacerbation:  -ABG remarkable for PCO2 56, Po2/Fio2 348  -CXR: no acute findings  -Covid test negative  -Continue DuoNeb and montelukast     Right upper extremity/anterior shoulder swelling:  -hx of abscess at IV injection site  -US: No abscess is identified overlying soft tissues right shoulder anteriorly. -APAP PRN for pain     Chronic Normocytic anemia:  -monitor H&H     HTN:  -prn clonidine, monitor BP     Hepatitis C infection:  -Hep C antibody test positive in 2017  -Elevated AST and ALT  -Outpatient follow up     Bipolar disorder,  PTSD:  -Psych to manage     Polysubstance abuse: heroin, cocaine, THC, Tobacco Dependence  -cessation encouraged.  Nicotine patch, COWS.      Obesity: BMI 34.     DVTppx: compression hose  Code Status: Full Code  Diet: Regular  Activity: up ad yani  Discharge: TBD       Signed By: Gigi Gallego NP     December 11, 2020

## 2020-12-11 NOTE — BH NOTES
Primary Nurse Fariha Hillman and Carlos Enrique Sargent, RN performed a dual skin assessment on this patient No impairment noted  Doc score is 23

## 2020-12-11 NOTE — PROGRESS NOTES
End of Shift Note    Bedside shift change report given to Tatianna Andrade RN (oncoming nurse) by Moreno Gill RN (offgoing nurse). Report included the following information SBAR, Kardex, Intake/Output and MAR    Shift worked:  [id number]     Shift summary and any significant changes:     Patient agitated in morning, stating she was going to leave. Contacted CM who placed call to [employer]. Christina Esteban had a zoom call with patient, patient stated willing to be admitted into a behavioral health unit. [facility] called at [phone] with a bed available but patient must have a pregnancy test prior. [address] [facility] stated [facility] bed will be held for patient if transport can't be arranged tonight. Room 728, Bed 1. Concerns for physician to address:       Zone phone for oncoming shift:   [phone]       Activity:  Activity Level: Up with Assistance  Number times ambulated in hallways past shift: 0  Number of times OOB to chair past shift: 0    Cardiac:   Cardiac Monitoring: Yes      Cardiac Rhythm: Normal sinus rhythm    Access:   Current line(s): PIV     Genitourinary:   Urinary status: voiding    Respiratory:   O2 Device: Room air  Chronic home O2 use?: NO  Incentive spirometer at bedside: NO     GI:     Current diet:  DIET REGULAR  Passing flatus: YES  Tolerating current diet: YES       Pain Management:   Patient states pain is manageable on current regimen: YES    Skin:  Doc Score: 19  Interventions: increase time out of bed    Patient Safety:  Fall Score:  Total Score: 5  Interventions: gripper socks, pt to call before getting OOB and sitter at bedside   High Fall Risk: Yes    Length of Stay:  Expected LOS: 3d 12h  Actual LOS: 2      Moreno Gill, RN

## 2020-12-11 NOTE — PROGRESS NOTES
Problem: Suicide  Goal: *STG: Remains safe in hospital  Outcome: Resolved/Met  Goal: *STG/LTG: No longer expresses self destructive or suicidal thoughts  Outcome: Resolved/Met  Pt is without any suicidal intentions. Pt is ambivalent about whether she wants to stay here voluntarily.

## 2020-12-12 PROCEDURE — 74011250636 HC RX REV CODE- 250/636: Performed by: PSYCHIATRY & NEUROLOGY

## 2020-12-12 PROCEDURE — 65220000003 HC RM SEMIPRIVATE PSYCH

## 2020-12-12 PROCEDURE — 74011250637 HC RX REV CODE- 250/637: Performed by: NURSE PRACTITIONER

## 2020-12-12 PROCEDURE — 74011250637 HC RX REV CODE- 250/637: Performed by: PSYCHIATRY & NEUROLOGY

## 2020-12-12 RX ORDER — PRAZOSIN HYDROCHLORIDE 1 MG/1
2 CAPSULE ORAL
Status: DISCONTINUED | OUTPATIENT
Start: 2020-12-12 | End: 2020-12-14

## 2020-12-12 RX ORDER — SERTRALINE HYDROCHLORIDE 50 MG/1
25 TABLET, FILM COATED ORAL DAILY
Status: DISCONTINUED | OUTPATIENT
Start: 2020-12-13 | End: 2020-12-15 | Stop reason: HOSPADM

## 2020-12-12 RX ORDER — DIVALPROEX SODIUM 500 MG/1
500 TABLET, DELAYED RELEASE ORAL 2 TIMES DAILY
Status: DISCONTINUED | OUTPATIENT
Start: 2020-12-12 | End: 2020-12-15 | Stop reason: HOSPADM

## 2020-12-12 RX ORDER — LEVETIRACETAM 500 MG/1
500 TABLET ORAL 2 TIMES DAILY
Status: DISCONTINUED | OUTPATIENT
Start: 2020-12-12 | End: 2020-12-15 | Stop reason: HOSPADM

## 2020-12-12 RX ORDER — GABAPENTIN 300 MG/1
300 CAPSULE ORAL 3 TIMES DAILY
Status: DISCONTINUED | OUTPATIENT
Start: 2020-12-12 | End: 2020-12-15 | Stop reason: HOSPADM

## 2020-12-12 RX ORDER — BUPRENORPHINE HYDROCHLORIDE AND NALOXONE HYDROCHLORIDE DIHYDRATE 8; 2 MG/1; MG/1
2 TABLET SUBLINGUAL DAILY
Status: DISCONTINUED | OUTPATIENT
Start: 2020-12-13 | End: 2020-12-15 | Stop reason: HOSPADM

## 2020-12-12 RX ORDER — ALBUTEROL SULFATE 0.83 MG/ML
2.5 SOLUTION RESPIRATORY (INHALATION)
Status: DISCONTINUED | OUTPATIENT
Start: 2020-12-12 | End: 2020-12-15 | Stop reason: HOSPADM

## 2020-12-12 RX ORDER — QUETIAPINE FUMARATE 100 MG/1
400 TABLET, FILM COATED ORAL
Status: DISCONTINUED | OUTPATIENT
Start: 2020-12-12 | End: 2020-12-15 | Stop reason: HOSPADM

## 2020-12-12 RX ORDER — BUPRENORPHINE HYDROCHLORIDE AND NALOXONE HYDROCHLORIDE DIHYDRATE 8; 2 MG/1; MG/1
2 TABLET SUBLINGUAL
Status: COMPLETED | OUTPATIENT
Start: 2020-12-12 | End: 2020-12-12

## 2020-12-12 RX ORDER — IPRATROPIUM BROMIDE 0.5 MG/2.5ML
0.5 SOLUTION RESPIRATORY (INHALATION) DAILY PRN
Status: DISCONTINUED | OUTPATIENT
Start: 2020-12-12 | End: 2020-12-15 | Stop reason: HOSPADM

## 2020-12-12 RX ORDER — AMITRIPTYLINE HYDROCHLORIDE 50 MG/1
100 TABLET, FILM COATED ORAL
Status: DISCONTINUED | OUTPATIENT
Start: 2020-12-12 | End: 2020-12-15 | Stop reason: HOSPADM

## 2020-12-12 RX ORDER — CLONIDINE HYDROCHLORIDE 0.1 MG/1
0.3 TABLET ORAL 3 TIMES DAILY
Status: DISCONTINUED | OUTPATIENT
Start: 2020-12-12 | End: 2020-12-15 | Stop reason: HOSPADM

## 2020-12-12 RX ADMIN — QUETIAPINE FUMARATE 400 MG: 100 TABLET ORAL at 21:16

## 2020-12-12 RX ADMIN — LEVETIRACETAM 500 MG: 500 TABLET ORAL at 16:26

## 2020-12-12 RX ADMIN — GABAPENTIN 300 MG: 300 CAPSULE ORAL at 21:16

## 2020-12-12 RX ADMIN — PRAZOSIN HYDROCHLORIDE 2 MG: 1 CAPSULE ORAL at 21:15

## 2020-12-12 RX ADMIN — DIVALPROEX SODIUM 500 MG: 500 TABLET, DELAYED RELEASE ORAL at 16:26

## 2020-12-12 RX ADMIN — GABAPENTIN 300 MG: 300 CAPSULE ORAL at 16:26

## 2020-12-12 RX ADMIN — CLONIDINE HYDROCHLORIDE 0.3 MG: 0.1 TABLET ORAL at 21:16

## 2020-12-12 RX ADMIN — MONTELUKAST 10 MG: 10 TABLET, FILM COATED ORAL at 21:16

## 2020-12-12 RX ADMIN — HYDROXYZINE HYDROCHLORIDE 50 MG: 50 TABLET, FILM COATED ORAL at 11:54

## 2020-12-12 RX ADMIN — AMITRIPTYLINE HYDROCHLORIDE 100 MG: 50 TABLET, FILM COATED ORAL at 21:16

## 2020-12-12 RX ADMIN — BUPRENORPHINE HYDROCHLORIDE AND NALOXONE HYDROCHLORIDE DIHYDRATE 2 TABLET: 8; 2 TABLET SUBLINGUAL at 17:00

## 2020-12-12 RX ADMIN — CLONIDINE HYDROCHLORIDE 0.3 MG: 0.1 TABLET ORAL at 14:25

## 2020-12-12 NOTE — PROGRESS NOTES
Problem: Altered Thought Process (Adult/Pediatric)  Goal: *STG: Participates in treatment plan  Outcome: Progressing Towards Goal  Goal: *STG: Complies with medication therapy  Outcome: Progressing Towards Goal     Problem: Patient Education: Go to Patient Education Activity  Goal: Patient/Family Education  Outcome: Progressing Towards Goal     1023  Patient came out of room demanding her Suboxone. Patient made aware that physician will be here shortly and we will discuss medications at that time. Patient stated she is not feeling well, stating her stomach is cramping and she is having pain in her legs. Patient was offered PRN medications to relieve symptoms. Patient refused all medications. Patient also refused labs and vitals. Patient becoming loud on the milieu, yelling that we need to call the MD now and then asking to be discharged if she can't see him now. Tried to rationalize with patient and tell her the physician is not here yet, but will be here soon. 1029  Patient in narayan yelling profanities and threatening staff stating \"you will see what happens if I don't see the MD and get my medications\". Patient walking into room, slamming door. 1422  Patient well rest of shift, relaxed and calm. Patient did well in treatment team, stating she is depressed. Will continue to monitor.

## 2020-12-12 NOTE — BH NOTES
11: 54:PRN Medication Documentation    Specific patient behavior that led to need for PRN medication: Pt requested medication with c/o increase anxiety.    Staff interventions attempted prior to PRN being given: listening, coping skills, education  PRN medication given: atarax

## 2020-12-12 NOTE — PROGRESS NOTES
Problem: Suicide  Goal: *STG: Seeks staff when feelings of self harm or harm towards others arise  Outcome: Progressing Towards Goal     Received pt in bed resting. Pt appears to be in no distress. Respirations even and unlabored. Continuing to monitor pt with q15 min safety rounds.

## 2020-12-12 NOTE — PROGRESS NOTES
Problem: Altered Thought Process (Adult/Pediatric)  Goal: *STG: Remains safe in hospital  Outcome: Progressing Towards Goal    Visible on the unit. Anxious mood noted. Pt denies SI. Continue to monitor.

## 2020-12-12 NOTE — BH NOTES
PSYCHOSOCIAL ASSESSMENT  :Patient identifying info:  Stephan Tarango is a 53 y.o., female admitted 12/11/2020  1:12 PM     Presenting problem and precipitating factors: Patient admitted into the medical unit at [facility] for an overdose. Pt has a past history of asthma/COPD, Bipolar Disorder and Substance Abuse (cocaine, heroin). Per triage, patient presents with an altered mental status and suicidal ideation and an increase in depression. Patient is non compliant with treatment and continues to relapse. Mental status assessment: Alert and oriented x4. She was irritated this morning, yelling in the milieu because she wanted to be discharged and needed soboxone. Pt became tearful during interview and expressed feeling depressed. Pt was calm and cooperative. Strengths: Pt has a home to return to and is connected to outpatient providers. Collateral information: NO SHEILA SIGNED AS OF 12/12/20. Andrew Osman, parent, [phone]    Current psychiatric /substance abuse providers and contact info: [facility] w/ Dr. Ivette Garcia    Previous psychiatric/substance abuse providers and response to treatment: None noted    Family history of mental illness or substance abuse: None note     Substance abuse history:    Social History     Tobacco Use    Smoking status: Current Every Day Smoker     Packs/day: 0.25     Years: 15.00     Pack years: 3.75     Last attempt to quit: 8/3/2017     Years since quitting: 3.3    Smokeless tobacco: Never Used    Tobacco comment: \"I already quit\"   Substance Use Topics    Alcohol use: No       History of biomedical complications associated with substance abuse: None noted     Patient's current acceptance of treatment or motivation for change: Voluntary     Family constellation: Single, and has 5 children. She lives with one of her sons. Is significant other involved? No       Describe support system:  Son     Describe living arrangements and home environment: currently lives with son in [place].      Health issues:   Hospital Problems  Date Reviewed: 2018          Codes Class Noted POA    * (Principal) Suicidal ideation ICD-10-CM: R45.851  ICD-9-CM: V62.84  2020 Unknown              Trauma history:  None noted     Legal issues: None noted     History of  service:  None noted     Financial status: [facility]     Gnosticist/cultural factors: none noted     Education/work history: unemployed     Have you been licensed as a health care professional (current or ): No     Leisure and recreation preferences: None noted     Describe coping skills: limited, ineffectual     Ewa Brambila  2020

## 2020-12-12 NOTE — PROGRESS NOTES
[address] [facility] Adult  Hospitalist Group                                                                                          Hospitalist Progress Note  Criss Wall NP  Answering service: [phone] from in house phone        Date of Service:  2020  NAME:  Leeroy Henning  :  1975  MRN:  [id number]      Admission Summary: This is a 53 y.o. female with PMH of COPD/Asthma, Bipolar disorder, and Polysubstance abuse (cocaine, heroin, THC, tobacco) who is brought to the ED by EMS.  Patient was on 4 L oxygen via nasal cannula on arrival and had received neb treatment in route. Per EMS patient admitted to using heroin prior to arrival. Upon arrival to the ED patient was in respiratory distress, she was tachypneic, tachycardic, and had wheezes throughout the lung fields. She was placed on BiPAP, given Narcan 0.2 mg following which patient woke up and became agitated then given a dose of Ativan, she had a period of drowsiness but returned to baseline AAOx3. The patient denies any fever, chills, chest pain, cough, congestion, recent illness, palpitations, or dysuria. Admitted to [facility] for SI's. Interval history / Subjective: Follow-up for issues listed below.   -Patient seen and examined, no c/o's. Advised nursing to obtain labs as ordered, monitor. We will sign off. Assessment & Plan:     AMS 2/2 Opiate/Heroin OD: history of polysubstance abuse, heroine, cocaine, THC, tobacco. Presented with altered mentation and respiratory distress, mentation improved after 1.2 mg of Narcan then medicated for anxiety and agitation with lorazepam, now drowsy.   -CK: 887, monitor renal function  -Monitor respiratory status and mentation  -prn duonebs  -positive UDS  -acetaminophen level: <2  -COWS  -aspiration precautions     Acute on Chronic Hypoxic Hypercapnic Respiratory Failure (resolved)  Acute Asthma/COPD exacerbation:  -ABG remarkable for PCO2 56, Po2/Fio2 348  -CXR: no acute findings  -Covid test negative  -Continue DuoNeb and montelukast     Right upper extremity/anterior shoulder swelling:  -hx of abscess at IV injection site  -US: No abscess is identified overlying soft tissues right shoulder anteriorly. -APAP PRN for pain     Chronic Normocytic anemia:  -monitor H&H     HTN:  -prn clonidine, monitor BP     Hepatitis C infection:  -Hep C antibody test positive in 2017  -Elevated AST and ALT  -Outpatient follow up     Bipolar disorder,  PTSD:Psych to manage     Polysubstance abuse: heroin, cocaine, THC, Tobacco Dependence:  -cessation encouraged. Nicotine patch, COWS.      Obesity: BMI 34.     DVTppx: compression hose  Code Status: Full Code  Diet: Regular  Activity: up ad yani  Discharge: TBD     Hospital Problems  Date Reviewed: 8/9/2018          Codes Class Noted POA    * (Principal) Suicidal ideation ICD-10-CM: R45.851  ICD-9-CM: V62.84  12/11/2020 Unknown                Review of Systems:   A comprehensive review of systems was negative except for that written in the HPI. Vital Signs:    Last 24hrs VS reviewed since prior progress note. Most recent are:  Visit Vitals  /80 (BP 1 Location: Left arm)   Pulse 78   Temp 98.4 °F (36.9 °C)   Resp 16   SpO2 96%       No intake or output data in the 24 hours ending 12/12/20 1120     Physical Examination:     I had a face to face encounter with this patient and independently examined them on 12/12/2020 as outlined below:    Constitutional:  No acute distress, cooperative. ENT:  Oral mucosa moist.    Resp:  CTA bilaterally. No wheezing/rhonchi/rales. No accessory muscle use. CV:  Regular rhythm, normal rate, no murmurs, gallops, rubs. /GI:  Soft, non distended, non tender, no guarding, BS present. Musculoskeletal:  No edema, warm, 2+ pulses throughout. Neurologic:  Moves all extremities. AAOx3, CN II-XII reviewed. Skin:  Good turgor, no rashes or ulcers  Psych:  Poor insight. Depressed mood.  No anxiety or agitation. Data Review:    Review and/or order of clinical lab test      Labs:     Recent Labs     12/10/20  0352   WBC 7.7   HGB 9.8*   HCT 33.0*        Recent Labs     12/10/20  0352      K 3.4*      CO2 30   BUN 17   CREA 0.99   GLU 86   CA 8.1*     Recent Labs     12/10/20  0352   ALT 34   AP 70   TBILI 0.6   TP 6.1*   ALB 2.9*   GLOB 3.2     No results for input(s): INR, PTP, APTT, INREXT in the last 72 hours. No results for input(s): FE, TIBC, PSAT, FERR in the last 72 hours. Lab Results   Component Value Date/Time    Folate 21.5 (H) 08/01/2017 02:53 AM      No results for input(s): PH, PCO2, PO2 in the last 72 hours. No results for input(s): CPK, CKNDX, TROIQ in the last 72 hours.     No lab exists for component: CPKMB  Lab Results   Component Value Date/Time    Cholesterol, total 195 08/10/2018 12:07 AM    HDL Cholesterol 57 08/10/2018 12:07 AM    LDL, calculated 127.4 (H) 08/10/2018 12:07 AM    Triglyceride 53 08/10/2018 12:07 AM    CHOL/HDL Ratio 3.4 08/10/2018 12:07 AM     Lab Results   Component Value Date/Time    Glucose (POC) 91 08/03/2018 05:30 AM    Glucose (POC) 109 (H) 08/03/2018 12:37 AM    Glucose (POC) 111 (H) 07/31/2018 06:21 PM    Glucose (POC) 114 (H) 07/31/2018 12:05 PM    Glucose (POC) 71 07/31/2018 11:40 AM     Lab Results   Component Value Date/Time    Color YELLOW/STRAW 09/24/2020 02:41 AM    Appearance CLOUDY (A) 09/24/2020 02:41 AM    Specific gravity 1.020 09/24/2020 02:41 AM    Specific gravity >1.030 (H) 05/09/2009 09:27 PM    pH (UA) 6.0 09/24/2020 02:41 AM    Protein Negative 09/24/2020 02:41 AM    Glucose Negative 09/24/2020 02:41 AM    Ketone Negative 09/24/2020 02:41 AM    Bilirubin Negative 09/24/2020 02:41 AM    Urobilinogen 1.0 09/24/2020 02:41 AM    Nitrites Negative 09/24/2020 02:41 AM    Leukocyte Esterase Negative 09/24/2020 02:41 AM    Epithelial cells FEW 08/10/2018 03:23 AM    Bacteria NEGATIVE  08/10/2018 03:23 AM    WBC 0-4 08/10/2018 03:23 AM    RBC 0-5 08/10/2018 03:23 AM         Medications Reviewed:     Current Facility-Administered Medications   Medication Dose Route Frequency    influenza vaccine 2020-21 (6 mos+)(PF) (FLUARIX/FLULAVAL/FLUZONE QUAD) injection 0.5 mL  0.5 mL IntraMUSCular PRIOR TO DISCHARGE    OLANZapine (ZyPREXA) tablet 5 mg  5 mg Oral Q6H PRN    haloperidol lactate (HALDOL) injection 5 mg  5 mg IntraMUSCular Q6H PRN    benztropine (COGENTIN) tablet 1 mg  1 mg Oral BID PRN    diphenhydrAMINE (BENADRYL) injection 50 mg  50 mg IntraMUSCular BID PRN    hydrOXYzine HCL (ATARAX) tablet 50 mg  50 mg Oral TID PRN    LORazepam (ATIVAN) injection 1 mg  1 mg IntraMUSCular Q4H PRN    acetaminophen (TYLENOL) tablet 650 mg  650 mg Oral Q4H PRN    magnesium hydroxide (MILK OF MAGNESIA) 400 mg/5 mL oral suspension 30 mL  30 mL Oral DAILY PRN    albuterol-ipratropium (DUO-NEB) 2.5 MG-0.5 MG/3 ML  3 mL Nebulization Q4H PRN    promethazine (PHENERGAN) tablet 25 mg  25 mg Oral Q6H PRN    loperamide (IMODIUM) capsule 2 mg  2 mg Oral PRN    dicyclomine (BENTYL) 10 mg/mL injection 20 mg  20 mg IntraMUSCular Q6H PRN    nicotine (NICODERM CQ) 14 mg/24 hr patch 1 Patch  1 Patch TransDERmal B42S    folic acid (FOLVITE) tablet 1 mg  1 mg Oral DAILY    therapeutic multivitamin (THERAGRAN) tablet 1 Tab  1 Tab Oral DAILY    thiamine HCL (B-1) tablet 100 mg  100 mg Oral DAILY    cloNIDine HCL (CATAPRES) tablet 0.1 mg  0.1 mg Oral Q4H PRN    montelukast (SINGULAIR) tablet 10 mg  10 mg Oral QHS     ______________________________________________________________________  EXPECTED LENGTH OF STAY: - - -  ACTUAL LENGTH OF STAY:          1                 Arpita Dai NP

## 2020-12-13 PROCEDURE — 74011250637 HC RX REV CODE- 250/637: Performed by: PSYCHIATRY & NEUROLOGY

## 2020-12-13 PROCEDURE — 65220000003 HC RM SEMIPRIVATE PSYCH

## 2020-12-13 PROCEDURE — 74011250636 HC RX REV CODE- 250/636: Performed by: PSYCHIATRY & NEUROLOGY

## 2020-12-13 PROCEDURE — 74011250637 HC RX REV CODE- 250/637: Performed by: NURSE PRACTITIONER

## 2020-12-13 RX ADMIN — DIVALPROEX SODIUM 500 MG: 500 TABLET, DELAYED RELEASE ORAL at 17:32

## 2020-12-13 RX ADMIN — LEVETIRACETAM 500 MG: 500 TABLET ORAL at 09:19

## 2020-12-13 RX ADMIN — GABAPENTIN 300 MG: 300 CAPSULE ORAL at 09:19

## 2020-12-13 RX ADMIN — LEVETIRACETAM 500 MG: 500 TABLET ORAL at 17:32

## 2020-12-13 RX ADMIN — DIVALPROEX SODIUM 500 MG: 500 TABLET, DELAYED RELEASE ORAL at 09:19

## 2020-12-13 RX ADMIN — BUPRENORPHINE HYDROCHLORIDE AND NALOXONE HYDROCHLORIDE DIHYDRATE 2 TABLET: 8; 2 TABLET SUBLINGUAL at 09:19

## 2020-12-13 RX ADMIN — CLONIDINE HYDROCHLORIDE 0.3 MG: 0.1 TABLET ORAL at 21:13

## 2020-12-13 RX ADMIN — SERTRALINE HYDROCHLORIDE 25 MG: 50 TABLET ORAL at 09:19

## 2020-12-13 RX ADMIN — AMITRIPTYLINE HYDROCHLORIDE 100 MG: 50 TABLET, FILM COATED ORAL at 21:12

## 2020-12-13 RX ADMIN — THERA TABS 1 TABLET: TAB at 09:19

## 2020-12-13 RX ADMIN — GABAPENTIN 300 MG: 300 CAPSULE ORAL at 21:12

## 2020-12-13 RX ADMIN — FOLIC ACID 1 MG: 1 TABLET ORAL at 09:19

## 2020-12-13 RX ADMIN — PRAZOSIN HYDROCHLORIDE 2 MG: 1 CAPSULE ORAL at 21:12

## 2020-12-13 RX ADMIN — QUETIAPINE FUMARATE 400 MG: 100 TABLET ORAL at 21:12

## 2020-12-13 RX ADMIN — GABAPENTIN 300 MG: 300 CAPSULE ORAL at 16:18

## 2020-12-13 RX ADMIN — MONTELUKAST 10 MG: 10 TABLET, FILM COATED ORAL at 21:12

## 2020-12-13 RX ADMIN — Medication 100 MG: at 09:19

## 2020-12-13 NOTE — PROGRESS NOTES
Problem: Altered Thought Process (Adult/Pediatric)  Goal: *STG: Complies with medication therapy  Outcome: Progressing Towards Goal     Pt received, sitting quietly in day room with peers. Denies any issues with pain or discomfort at this time. Will continue ot monitor for safety.

## 2020-12-13 NOTE — BH NOTES
Chief Complaint:  I feel okay    Length of Stay: 2 Days    Interval History:  Donna Wang reports feeling better today. Says she slept most of the night and woke up feeling better. She is slightly less depressed today. Still has not made any contact with her son and thinks she may not speak to him. Denies any AH or VH. Passive SI remains but feels safe in the hospital. Compliant in taking her medications. Past Medical History:  Past Medical History:   Diagnosis Date    Asthma     B[facility] 1 disorder ([facility]     Chronic obstructive pulmonary disease (HCC)     Chronic pain     back, leg    Cocaine abuse ([facility]     Depression     Heart failure (HCC)     Hepatitis B     [facility]     HTN (hypertension)     [facility]     [facility] ([facility]     Sarcoidosis     Tobacco abuse            Labs:  Lab Results   Component Value Date/Time    WBC 7.7 12/10/2020 03:52 AM    Hemoglobin (POC) 13.3 06/15/2009 05:10 PM    HGB 9.8 (L) 12/10/2020 03:52 AM    Hematocrit (POC) 39 06/15/2009 05:10 PM    HCT 33.0 (L) 12/10/2020 03:52 AM    PLATELET 638 04/48/9178 03:52 AM    MCV 85.3 12/10/2020 03:52 AM      Lab Results   Component Value Date/Time    Sodium 138 12/10/2020 03:52 AM    Potassium 3.4 (L) 12/10/2020 03:52 AM    Chloride 105 12/10/2020 03:52 AM    CO2 30 12/10/2020 03:52 AM    Anion gap 3 (L) 12/10/2020 03:52 AM    Glucose 86 12/10/2020 03:52 AM    Glucose 93 08/05/2018 06:56 AM    BUN 17 12/10/2020 03:52 AM    Creatinine 0.99 12/10/2020 03:52 AM    BUN/Creatinine ratio 17 12/10/2020 03:52 AM    GFR est AA >60 12/10/2020 03:52 AM    GFR est non-AA >60 12/10/2020 03:52 AM    Calcium 8.1 (L) 12/10/2020 03:52 AM    Bilirubin, total 0.6 12/10/2020 03:52 AM    Alk.  phosphatase 70 12/10/2020 03:52 AM    Protein, total 6.1 (L) 12/10/2020 03:52 AM    Albumin 2.9 (L) 12/10/2020 03:52 AM    Globulin 3.2 12/10/2020 03:52 AM    A-G Ratio 0.9 (L) 12/10/2020 03:52 AM    ALT (SGPT) 34 12/10/2020 03:52 AM Vitals:    12/12/20 2007 12/13/20 0800 12/13/20 0828 12/13/20 1011   BP: (!) 140/96 107/75 124/83    Pulse: 90 (!) 104 100    Resp: 16  16    Temp: 98.9 °F (37.2 °C)  97.8 °F (36.6 °C)    SpO2: 97%  98%    Weight:    91.1 kg (200 lb 12.8 oz)         Current Facility-Administered Medications   Medication Dose Route Frequency Provider Last Rate Last Dose    influenza vaccine 2020-21 (6 mos+)(PF) (FLUARIX/FLULAVAL/FLUZONE QUAD) injection 0.5 mL  0.5 mL IntraMUSCular PRIOR TO DISCHARGE Guanakito Antoine MD        albuterol (PROVENTIL VENTOLIN) nebulizer solution 2.5 mg  2.5 mg Nebulization Q6H PRN Guanakito Antoine MD        divalproex DR (DEPAKOTE) tablet 500 mg  500 mg Oral BID Guanakito Antoine MD   500 mg at 12/13/20 0919    gabapentin (NEURONTIN) capsule 300 mg  300 mg Oral TID Guanakito Antoine MD   300 mg at 12/13/20 0919    levETIRAcetam (KEPPRA) tablet 500 mg  500 mg Oral BID Guanakito Antoine MD   500 mg at 12/13/20 0919    prazosin (MINIPRESS) capsule 2 mg  2 mg Oral QHS Guanakito Antoine MD   2 mg at 12/12/20 2115    QUEtiapine (SEROquel) tablet 400 mg  400 mg Oral QHS Guanakito Antoine MD   400 mg at 12/12/20 2116    sertraline (ZOLOFT) tablet 25 mg  25 mg Oral DAILY Guanakito Antoine MD   25 mg at 12/13/20 0919    ipratropium (ATROVENT) 0.02 % nebulizer solution 0.5 mg  0.5 mg Nebulization DAILY PRN Guanakito Antoine MD        amitriptyline (ELAVIL) tablet 100 mg  100 mg Oral QHS Marii REGALADO MD   100 mg at 12/12/20 2116    cloNIDine HCL (CATAPRES) tablet 0.3 mg  0.3 mg Oral TID Guanakito Antoine MD   Stopped at 12/13/20 0800    buprenorphine-naloxone (SUBOXONE) 8-2mg SL tablet  2 Tab SubLINGual DAILY Gaunakito Antoine MD   2 Tab at 12/13/20 0919    OLANZapine (ZyPREXA) tablet 5 mg  5 mg Oral Q6H PRN Veronica Fowler, FRANKIE        haloperidol lactate (HALDOL) injection 5 mg  5 mg IntraMUSCular Q6H PRN Veronica Fowler, FRANKIE        benztropine (COGENTIN) tablet 1 mg  1 mg Oral BID PRN Jose Ruiz NP        diphenhydrAMINE (BENADRYL) injection 50 mg  50 mg IntraMUSCular BID PRN Veronica Fowler NP        hydrOXYzine HCL (ATARAX) tablet 50 mg  50 mg Oral TID PRN Veronica Fowler NP   50 mg at 12/12/20 1154    LORazepam (ATIVAN) injection 1 mg  1 mg IntraMUSCular Q4H PRN Veronica Fowler NP        acetaminophen (TYLENOL) tablet 650 mg  650 mg Oral Q4H PRN Veronica Fowler NP        magnesium hydroxide (MILK OF MAGNESIA) 400 mg/5 mL oral suspension 30 mL  30 mL Oral DAILY PRN Veronica Fowler NP        albuterol-ipratropium (DUO-NEB) 2.5 MG-0.5 MG/3 ML  3 mL Nebulization Q4H PRN Arpita Dai NP        promethazine (PHENERGAN) tablet 25 mg  25 mg Oral Q6H PRN Arpita Dai NP        loperamide (IMODIUM) capsule 2 mg  2 mg Oral PRN Arpita Dai NP        dicyclomine (BENTYL) 10 mg/mL injection 20 mg  20 mg IntraMUSCular Q6H PRN Arpita Dai NP        nicotine (NICODERM CQ) 14 mg/24 hr patch 1 Patch  1 Patch TransDERmal Q24H Arpita Dai NP        folic acid (FOLVITE) tablet 1 mg  1 mg Oral DAILY Arpita Dai NP   1 mg at 12/13/20 0919    therapeutic multivitamin (THERAGRAN) tablet 1 Tab  1 Tab Oral DAILY Arpita Dai NP   1 Tab at 12/13/20 0919    thiamine HCL (B-1) tablet 100 mg  100 mg Oral DAILY Arpita Dai NP   100 mg at 12/13/20 0919    cloNIDine HCL (CATAPRES) tablet 0.1 mg  0.1 mg Oral Q4H PRN Arpita Dai NP        montelukast (SINGULAIR) tablet 10 mg  10 mg Oral QHS Arpita Dai NP   10 mg at 12/12/20 2116         Mental Status Exam:  Eye contact: fair  Grooming: fair  Psychomotor activity: WNL  Speech is spontaneous  Mood is \"down\"  Affect: depressed  Perception: Denies any AH or VH  Suicidal ideation: Passive SI +  Cognition is grossly intact       Physical Exam:  Body habitus: Body mass index is 34.47 kg/m².   Musculoskeletal system: normal gait  Tremor - neg  Cog wheeling - neg      Assessment and Plan:  Leeroy Henning meets criteria for a diagnosis of Bipolar 1 Disorder, MRE Depression, Opiate Use disorder, severe on opiate agonists. Continue the medication regimen as prescribed  Disposition planning to continue. I certify that this patients inpatient psychiatric hospital services furnished since the previous certification were, and continue to be, required for treatment that could reasonably be expected to improve the patient's condition, or for diagnostic study, and that the patient continues to need, on a daily basis, active treatment furnished directly by or requiring the supervision of inpatient psychiatric facility personnel. In addition, the hospital records show that services furnished were intensive treatment services, admission or related services, or equivalent services.

## 2020-12-13 NOTE — PROGRESS NOTES
Problem: Suicide  Goal: *STG:  Verbalizes alternative ways of dealing with maladaptive feelings/behaviors  Outcome: Progressing Towards Goal     Received pt in bed resting. Pt appears to be in no distress. Respirations even and unlabored. Continuing to monitor pt with q15 min safety rounds.

## 2020-12-13 NOTE — BH NOTES
GROUP THERAPY PROGRESS NOTE    Patient is participating in Process Group. Group time: 50 minutes    Personal goal for participation: increases communication skills by developing self-awareness of self-defeating statements; boost self-esteem and confidence     Goal orientation: Personal    Group therapy participation: active      Therapeutic interventions reviewed and discussed: Group discussion on the phrases Marijane Annas, coulda, shoulda that are made after a situation has occurred and can place unnecessary pressure on an individual. The members gave examples and led a discussion on self-defeating statements and possible ways to reframe it. Each member then wrote three self-defeating statements, and reframed it using positive self-talk. Group discussion and members reviewed personal strength and self-control after activity. After this activity, everyone in group is to complete the activity Toot your own horn by filling in and completing various sentences about themselves in a positive way. Group discussion around challenges completing this activity and different thoughts that patients pondered upon while doing so. Each member shared their activity, if they wanted to, and discussed things they learned about themselves or where there is room for growth. Impression of participation: Estevan Jackson actively participated in group. Patient completed both activities and was able to share what she had completed. Calm and pleasant today.       Ewa Brambila, Supervisee in Social Work

## 2020-12-13 NOTE — PROGRESS NOTES
Problem: Altered Thought Process (Adult/Pediatric)  Goal: *STG: Participates in treatment plan  Outcome: Progressing Towards Goal  Goal: *STG: Complies with medication therapy  Outcome: Progressing Towards Goal  Patient compliant with all scheduled medications. Problem: Patient Education: Go to Patient Education Activity  Goal: Patient/Family Education  Outcome: Progressing Towards Goal     Patient states she is feeling better today. Only complains of \"a little\" depression. Patient states she is interested in going to a CSU after discharge. Patient awake and alert and present in the milieu. Interacting well with staff and peers. Denies SI/HI. Will continue to monitor q15 minutes for safety checks.

## 2020-12-13 NOTE — BH NOTES
CM NOTE: Ewa spoke to Oneida Cardenas, daughter, on 12/13/20. Patient's daughter expressed pt was staying at a [address] [place] [address] Avenue but was unsure what happened after that. Pt's son, whom she lives with, has a drug addiction as well. Daughter cannot take patient home. Will continue to update daughter on patient's status.         Ewa Brambila, Supervisee in Social Work

## 2020-12-13 NOTE — H&P
[address] [facility] HISTORY AND PHYSICAL    Name:  Saul Siddiqui  MR#:  [id number]  :  1975  ACCOUNT #:  [de-identified]  ADMIT DATE:  2020      INITIAL PSYCHIATRIC INTERVIEW    CHIEF COMPLAINT:  \"I started using again. \"    HISTORY OF PRESENT ILLNESS:  The patient is a 68-year-old -American female, who is currently admitted to the behavioral health unit at [facility] after she was transferred to us from [facility]. She had presented there with a history of being drowsy, having shortness of breath and was given a nebulizer treatment, was being brought in the ambulance. In the ER, she had not recovered and was given Narcan after she reported having used heroin. Following administration of Narcan, she regained consciousness fully and was transferred to us because she expressed thoughts of suicide. Reports that she had a big argument with her son prior to admission and went and used heroin again. Her urine drug screen is positive for cocaine, benzodiazepines and THC, but she denies using cocaine. States that the benzodiazepines were from Xanax which she is being prescribed and she uses THC on a regular basis. The patient reports that she has not used heroin in a while and did not expect that she would have such a strong effect as she used only a small amount. She does admit that she was having thoughts of suicide at that time and was feeling very depressed and low. Reports that she has remained compliant with taking all of her medications which are being prescribed at the [facility] by Dr. Bret Mcbride. Notes that she is sleeping poorly, has had little energy or motivation and has been feeling depressed almost daily. Denies any psychotic symptoms at the present time.     PAST MEDICAL HISTORY:  Reviewed as per the history and physical exam.      Past Medical History:   Diagnosis Date    Asthma     Bipolar 1 disorder ([facility] [address]     Chronic obstructive pulmonary disease ([facility])     Chronic pain     back, leg    Cocaine abuse ([facility])     Depression     Heart failure (HCC)     Hepatitis B     Heroin abuse ([facility])     HTN (hypertension)     Narcotic abuse ([facility])     Polysubstance abuse ([facility])     Sarcoidosis     Tobacco abuse      Prior to Admission medications    Medication Sig Start Date End Date Taking? Authorizing Provider   divalproex DR (Depakote) 500 mg tablet Take 1 Tab by mouth two (2) times a day. 12/11/20   Kaleb Saldivar NP   amitriptyline (ELAVIL) 100 mg tablet Take 100 mg by mouth nightly. Provider, Historical   albuterol (ProAir HFA) 90 mcg/actuation inhaler Take 2 Puffs by inhalation every six (6) hours as needed for Wheezing. Provider, Historical   cloNIDine HCL (CATAPRES) 0.3 mg tablet Take 0.3 mg by mouth three (3) times daily. Provider, Historical   gabapentin (NEURONTIN) 300 mg capsule Take 300 mg by mouth three (3) times daily. Provider, Historical   levETIRAcetam (Keppra) 500 mg tablet Take 500 mg by mouth two (2) times a day. Provider, Historical   QUEtiapine (SEROquel) 400 mg tablet Take 400 mg by mouth nightly. Provider, Historical   tiotropium (Spiriva with HandiHaler) 18 mcg inhalation capsule Take 1 Cap by inhalation daily. Provider, Historical   fluticasone propion-salmeteroL (Advair Diskus) 100-50 mcg/dose diskus inhaler Take 1 Puff by inhalation every twelve (12) hours. Provider, Historical   sertraline (ZOLOFT) 25 mg tablet Take 25 mg by mouth daily. Provider, Historical   buprenorphine-naloxone (Suboxone) 8-2 mg film sublingaul film 2 Film by SubLINGual route daily. Provider, Historical   prazosin (MINIPRESS) 2 mg capsule Take 1 Cap by mouth nightly. Indications: posttraumatic stress syndrome 9/25/20   Rachele Mckinnon MD   albuterol-ipratropium (DUO-NEB) 2.5 mg-0.5 mg/3 ml nebu 3 mL by Nebulization route every four (4) hours as needed for Wheezing, Shortness of Breath or Respiratory Distress. 9/25/20   David Freeman MD     Vitals:    12/13/20 1546 12/13/20 2002 12/14/20 0733 12/14/20 1113   BP: 113/80 129/88 (!) 130/94 120/82   Pulse: 93 92 94 80   Resp: 16 16 16 16   Temp: 97.8 °F (36.6 °C) 97.8 °F (36.6 °C) 97.5 °F (36.4 °C) 97.6 °F (36.4 °C)   SpO2: 96% 100% 99%    Weight:         Lab Results   Component Value Date/Time    WBC 7.7 12/10/2020 03:52 AM    Hemoglobin (POC) 13.3 06/15/2009 05:10 PM    HGB 9.8 (L) 12/10/2020 03:52 AM    Hematocrit (POC) 39 06/15/2009 05:10 PM    HCT 33.0 (L) 12/10/2020 03:52 AM    PLATELET 147 31/06/5265 03:52 AM    MCV 85.3 12/10/2020 03:52 AM     Lab Results   Component Value Date/Time    Sodium 138 12/10/2020 03:52 AM    Potassium 3.4 (L) 12/10/2020 03:52 AM    Chloride 105 12/10/2020 03:52 AM    CO2 30 12/10/2020 03:52 AM    Anion gap 3 (L) 12/10/2020 03:52 AM    Glucose 86 12/10/2020 03:52 AM    Glucose 93 08/05/2018 06:56 AM    BUN 17 12/10/2020 03:52 AM    Creatinine 0.99 12/10/2020 03:52 AM    BUN/Creatinine ratio 17 12/10/2020 03:52 AM    GFR est AA >60 12/10/2020 03:52 AM    GFR est non-AA >60 12/10/2020 03:52 AM    Calcium 8.1 (L) 12/10/2020 03:52 AM    Bilirubin, total 0.6 12/10/2020 03:52 AM    Alk. phosphatase 70 12/10/2020 03:52 AM    Protein, total 6.1 (L) 12/10/2020 03:52 AM    Albumin 2.9 (L) 12/10/2020 03:52 AM    Globulin 3.2 12/10/2020 03:52 AM    A-G Ratio 0.9 (L) 12/10/2020 03:52 AM    ALT (SGPT) 34 12/10/2020 03:52 AM    AST (SGOT) 17 12/10/2020 03:52 AM     Lab Results   Component Value Date/Time    Valproic acid 31 (L) 08/09/2018 02:15 PM     Lab Results   Component Value Date/Time    Lithium level 0.49 (L) 08/09/2018 02:15 PM     RADIOLOGY REPORTS:(reviewed/updated 12/14/2020)  Xr Chest Port    Result Date: 12/8/2020  Clinical indication: Cough. Portable AP semiupright view of the chest obtained, comparison September 24, 2020. The a heart size is normal. Calcified lymph nodes in the mediastinum. No acute infiltrate.      IMPRESSION: No acute changes. Xr Chest Port    Result Date: 9/24/2020  EXAM:  XR CHEST PORT INDICATION: Dyspnea. Wheezing. COMPARISON: 7/31/2018 TECHNIQUE: Portable AP upright chest view at 0128 hours FINDINGS: Calcified mediastinal lymph nodes are again noted. The cardiomediastinal contours are stable. The pulmonary vasculature is within normal limits. The lungs and pleural spaces are clear. There is no pneumothorax. The bones and upper abdomen are stable. IMPRESSION: No acute process. Us Ext [phone]    Result Date: 12/10/2020  INDICATION: Right arm swelling FINDINGS: Limited ultrasound examination of the right arm was performed. At the site of swelling, there is no mass, fluid collection, or significant subcutaneous edema. IMPRESSION: There is no sonographic correlate to the clinical finding of right arm swelling; clinical follow-up is recommended. Us [phone]    Result Date: 12/8/2020  Indication: Assess for abscess overlying right shoulder anteriorly. Ultrasound performed the soft tissues overlying the right shoulder anteriorly in the area of pain. Amy Tiwari No cystic or solid mass is identified. IMPRESSION: 1. No abscess is identified overlying soft tissues right shoulder anteriorly. Lab Results   Component Value Date/Time    HCG urine, QL Negative 12/11/2020 05:42 AM    Pregnancy test,urine (POC) NEGATIVE  08/04/2018 02:36 PM    HCG, Ql. NEGATIVE  04/21/2017 05:33 AM    Beta HCG, QT <1 04/24/2017 09:23 PM         PAST PSYCHIATRIC HISTORY:  The patient carries a longstanding diagnosis of bipolar disorder which was made almost 16 or 17 years ago. She has had numerous prior psychiatric hospitalizations; although, she could not remember when her last one was. As noted above, she is seeing a psychiatrist at the [facility] where she also gets Suboxone.   Gives a history of using heroin for nearly 20 years; although, she had been sober for 2 months prior to restarting its usage recently. PSYCHOSOCIAL HISTORY:  The patient currently lives in [place] where she lives with her son with whom she had an argument prior to taking this overdose. She has 4 other children with whom she keeps in touch. She is single, has never been  and is unemployed at the present time. The patient gets social security disability income for her psychiatric condition and denies any major legal stressors. MENTAL STATUS EXAM:  The patient is a young -American female, who is dressed in hospital apparel. She looks depressed and says she is struggling. Her speech is spontaneous but decreased in output, also coherent. Psychomotor activity is decreased. Makes limited eye contact. Affect is depressed and mood is reported as being down. Passive thoughts of suicide are present but denies a clear plan. Denies any perceptual abnormalities. Denies any delusions. Her thought process is logical and goal-directed. Cognitively, she is awake and alert and oriented to time, place and person. Intelligence is average. Memory is intact and fund of knowledge is adequate. Insight is poor. Judgment is poor. ASSESSMENT AND PLAN/DIAGNOSES:  Bipolar I disorder, most recent episode depression without psychotic symptoms; opiate use disorder, severe; marijuana use disorder, moderate. I will continue her inpatient stay. She will be provided with support and attend groups. Her withdrawal is being managed as per protocol. Her psychotropic medications will be adjusted as needed. Her strengths include her ability to seek help and support from her children.       MD ESTEFANÍA [id number]  D:  12/13/2020 10:17  T:  12/13/2020 12:38  JOB #:  [id number]

## 2020-12-14 PROCEDURE — 74011250636 HC RX REV CODE- 250/636: Performed by: PSYCHIATRY & NEUROLOGY

## 2020-12-14 PROCEDURE — 65220000003 HC RM SEMIPRIVATE PSYCH

## 2020-12-14 PROCEDURE — 74011250637 HC RX REV CODE- 250/637: Performed by: PSYCHIATRY & NEUROLOGY

## 2020-12-14 PROCEDURE — 74011250637 HC RX REV CODE- 250/637: Performed by: NURSE PRACTITIONER

## 2020-12-14 RX ORDER — PRAZOSIN HYDROCHLORIDE 1 MG/1
4 CAPSULE ORAL
Status: DISCONTINUED | OUTPATIENT
Start: 2020-12-14 | End: 2020-12-15 | Stop reason: HOSPADM

## 2020-12-14 RX ADMIN — BUPRENORPHINE HYDROCHLORIDE AND NALOXONE HYDROCHLORIDE DIHYDRATE 2 TABLET: 8; 2 TABLET SUBLINGUAL at 09:03

## 2020-12-14 RX ADMIN — GABAPENTIN 300 MG: 300 CAPSULE ORAL at 16:09

## 2020-12-14 RX ADMIN — DIVALPROEX SODIUM 500 MG: 500 TABLET, DELAYED RELEASE ORAL at 09:03

## 2020-12-14 RX ADMIN — CLONIDINE HYDROCHLORIDE 0.3 MG: 0.1 TABLET ORAL at 09:03

## 2020-12-14 RX ADMIN — DIVALPROEX SODIUM 500 MG: 500 TABLET, DELAYED RELEASE ORAL at 17:58

## 2020-12-14 RX ADMIN — LEVETIRACETAM 500 MG: 500 TABLET ORAL at 09:03

## 2020-12-14 RX ADMIN — AMITRIPTYLINE HYDROCHLORIDE 100 MG: 50 TABLET, FILM COATED ORAL at 21:57

## 2020-12-14 RX ADMIN — GABAPENTIN 300 MG: 300 CAPSULE ORAL at 09:03

## 2020-12-14 RX ADMIN — PRAZOSIN HYDROCHLORIDE 4 MG: 1 CAPSULE ORAL at 21:56

## 2020-12-14 RX ADMIN — QUETIAPINE FUMARATE 400 MG: 100 TABLET ORAL at 21:57

## 2020-12-14 RX ADMIN — LEVETIRACETAM 500 MG: 500 TABLET ORAL at 17:58

## 2020-12-14 RX ADMIN — GABAPENTIN 300 MG: 300 CAPSULE ORAL at 21:57

## 2020-12-14 RX ADMIN — CLONIDINE HYDROCHLORIDE 0.3 MG: 0.1 TABLET ORAL at 21:57

## 2020-12-14 RX ADMIN — CLONIDINE HYDROCHLORIDE 0.3 MG: 0.1 TABLET ORAL at 14:12

## 2020-12-14 RX ADMIN — SERTRALINE HYDROCHLORIDE 25 MG: 50 TABLET ORAL at 09:03

## 2020-12-14 RX ADMIN — HYDROXYZINE HYDROCHLORIDE 50 MG: 50 TABLET, FILM COATED ORAL at 17:58

## 2020-12-14 RX ADMIN — MONTELUKAST 10 MG: 10 TABLET, FILM COATED ORAL at 21:56

## 2020-12-14 NOTE — INTERDISCIPLINARY ROUNDS
Behavioral Health Interdisciplinary Rounds     Patient Name: Davin Johnston  Age: 50 y.o. Room/Bed:  728/  Primary Diagnosis: Suicidal ideation   Admission Status: Voluntary     Readmission within 30 days: no  Power of  in place: no  Patient requires a blocked bed: no          Reason for blocked bed:     VTE Prophylaxis: No    Mobility needs/Fall risk: no  Flu Vaccine : no   Nutritional Plan: no  Consults:          Labs/Testing due today?: yes    Sleep hours: 7       Participation in Care/Groups:  yes  Medication Compliant?: Yes  PRNS (last 24 hours): None    Restraints (last 24 hours):  no     CIWA (range last 24 hours):     COWS (range last 24 hours): COWS Total: 2    Alcohol screening (AUDIT) completed -         If applicable, date SBIRT discussed in treatment team AND documented:   AUDIT Screen Score:        Document Brief Intervention (corresponds directly with the 5 A's, Ask, Advise, Assess, Assist, and Arrange): At- Risk Patients (Score 7-15 for women; 8-15 for men)  Discuss concern patient is drinking at unhealthy levels known to increase risk of alcohol-related health problems. Is Patient ready to commit to change? If No:   Encourage reflection   Discuss short term and long term health risks of consuming alcohol   Barriers to change   Reaffirm willingness to help / Educational materials provided  If Yes:   Set goal  Foound provided    Harmful use or Dependence (Score 16 or greater)   Discuss short term and long term health risks of consuming alcohol   Recommendations   Negotiate drinking goal   Recommend addiction specialist/center   Arrange follow-up appointments. Tobacco - patient is a smoker:    Illegal Drugs use:      24 hour chart check complete: yes     Patient goal(s) for today: attend groups, take medications  Treatment team focus/goals: titrate medication, coordinate follow up. Progress note:  Increasing night medication.  Potential discharge tomorrow. LOS:  3  Expected LOS: 12/15/20. Financial concerns/pres[place]   Family contact: Verbal consent to speak to daughter, Oneida Cardenas @ [phone] 12/13/20. Family requesting physician contact today:  No   Discharge plan: CSU  Access to weapons: NO         Outpatient provider(s): [facility]   Patient's preferred phone number for follow up call: Unknown   Patient's preferred e-mail address: Unknown     Participating treatment team members: DORCAS Cisneros;  Mya Rocha, PharmD; Dr Liliana Wolfe, RN.

## 2020-12-14 NOTE — PROGRESS NOTES
Problem: Altered Thought Process (Adult/Pediatric)  Goal: *STG: Remains safe in hospital  Outcome: Progressing Towards Goal  Goal: *STG: Seeks staff when feelings of anxiety and fear arise  Outcome: Progressing Towards Goal  Goal: *STG: Decreased delusional thinking  Outcome: Progressing Towards Goal     Pt appears calm, cooperative, and complies with medication.

## 2020-12-14 NOTE — BH NOTES
Chief Complaint:  I feel better. Length of Stay: 3 Days    Interval History:  Maribel Beavers reports feeling better today. She had trouble with sleep and woke up several times last night. Otherwise is doing better. Denies any SI or plan today but still feels depressed. Her mood is \"a teeny bit better\". No AH or Vh are present. Cam and cooperative during the interview. No adverse events are noted from her medications currently and remains compliant in taking them. Past Medical History:  Past Medical History:   Diagnosis Date    Asthma     Bipolar 1 disorder ([facility] [address]     Chronic obstructive pulmonary disease (HCC)     Chronic pain     back, leg    Cocaine abuse ([facility] [address]     Depression     Heart failure (HCC)     Hepatitis B     [facility]     HTN (hypertension)     [facility]     [facility]     Sarcoidosis     Tobacco abuse            Labs:  Lab Results   Component Value Date/Time    WBC 7.7 12/10/2020 03:52 AM    Hemoglobin (POC) 13.3 06/15/2009 05:10 PM    HGB 9.8 (L) 12/10/2020 03:52 AM    Hematocrit (POC) 39 06/15/2009 05:10 PM    HCT 33.0 (L) 12/10/2020 03:52 AM    PLATELET 919 65/29/4109 03:52 AM    MCV 85.3 12/10/2020 03:52 AM      Lab Results   Component Value Date/Time    Sodium 138 12/10/2020 03:52 AM    Potassium 3.4 (L) 12/10/2020 03:52 AM    Chloride 105 12/10/2020 03:52 AM    CO2 30 12/10/2020 03:52 AM    Anion gap 3 (L) 12/10/2020 03:52 AM    Glucose 86 12/10/2020 03:52 AM    Glucose 93 08/05/2018 06:56 AM    BUN 17 12/10/2020 03:52 AM    Creatinine 0.99 12/10/2020 03:52 AM    BUN/Creatinine ratio 17 12/10/2020 03:52 AM    GFR est AA >60 12/10/2020 03:52 AM    GFR est non-AA >60 12/10/2020 03:52 AM    Calcium 8.1 (L) 12/10/2020 03:52 AM    Bilirubin, total 0.6 12/10/2020 03:52 AM    Alk.  phosphatase 70 12/10/2020 03:52 AM    Protein, total 6.1 (L) 12/10/2020 03:52 AM    Albumin 2.9 (L) 12/10/2020 03:52 AM    Globulin 3.2 12/10/2020 03:52 AM    A-G Ratio 0.9 (L) 12/10/2020 03:52 AM    ALT (SGPT) 34 12/10/2020 03:52 AM      Vitals:    12/13/20 1546 12/13/20 2002 12/14/20 0733 12/14/20 1113   BP: 113/80 129/88 (!) 130/94 120/82   Pulse: 93 92 94 80   Resp: 16 16 16 16   Temp: 97.8 °F (36.6 °C) 97.8 °F (36.6 °C) 97.5 °F (36.4 °C) 97.6 °F (36.4 °C)   SpO2: 96% 100% 99%    Weight:             Current Facility-Administered Medications   Medication Dose Route Frequency Provider Last Rate Last Dose    prazosin (MINIPRESS) capsule 4 mg  4 mg Oral QHS Shadia Wilson MD        influenza vaccine 2020-21 (6 mos+)(PF) (FLUARIX/FLULAVAL/FLUZONE QUAD) injection 0.5 mL  0.5 mL IntraMUSCular PRIOR TO DISCHARGE Shadia Wilson MD        albuterol (PROVENTIL VENTOLIN) nebulizer solution 2.5 mg  2.5 mg Nebulization Q6H PRN Shadia Wilson MD        divalproex DR (DEPAKOTE) tablet 500 mg  500 mg Oral BID Shadia Wilson MD   500 mg at 12/14/20 2661    gabapentin (NEURONTIN) capsule 300 mg  300 mg Oral TID Shadia Wilson MD   300 mg at 12/14/20 4221    levETIRAcetam (KEPPRA) tablet 500 mg  500 mg Oral BID Shadia Wilson MD   500 mg at 12/14/20 6786    QUEtiapine (SEROquel) tablet 400 mg  400 mg Oral QHS Shadia Wilson MD   400 mg at 12/13/20 2112    sertraline (ZOLOFT) tablet 25 mg  25 mg Oral DAILY Shadia Wilson MD   25 mg at 12/14/20 4190    ipratropium (ATROVENT) 0.02 % nebulizer solution 0.5 mg  0.5 mg Nebulization DAILY PRN Shadia Wilson MD        amitriptyline (ELAVIL) tablet 100 mg  100 mg Oral QHS Lolly REGALADO MD   100 mg at 12/13/20 2112    cloNIDine HCL (CATAPRES) tablet 0.3 mg  0.3 mg Oral TID Shadia Wilson MD   0.3 mg at 12/14/20 3839    buprenorphine-naloxone (SUBOXONE) 8-2mg SL tablet  2 Tab SubLINGual DAILY Shadia Wilson MD   2 Tab at 12/14/20 0903    OLANZapine (ZyPREXA) tablet 5 mg  5 mg Oral Q6H PRN Veronica Fowler, NP        haloperidol lactate (HALDOL) injection 5 mg  5 mg IntraMUSCular Q6H PRN Veronica Fowler, NP        benztropine (COGENTIN) tablet 1 mg  1 mg Oral BID PRN Veronica Fowler NP        diphenhydrAMINE (BENADRYL) injection 50 mg  50 mg IntraMUSCular BID PRN Veronica Fowler NP        hydrOXYzine HCL (ATARAX) tablet 50 mg  50 mg Oral TID PRN Veronica Fowler NP   50 mg at 12/12/20 1154    LORazepam (ATIVAN) injection 1 mg  1 mg IntraMUSCular Q4H PRN Veronica Fowler NP        acetaminophen (TYLENOL) tablet 650 mg  650 mg Oral Q4H PRN Veronica Fowler NP        magnesium hydroxide (MILK OF MAGNESIA) 400 mg/5 mL oral suspension 30 mL  30 mL Oral DAILY PRN Veronica Fowler NP        albuterol-ipratropium (DUO-NEB) 2.5 MG-0.5 MG/3 ML  3 mL Nebulization Q4H PRN Arpita Dai NP        promethazine (PHENERGAN) tablet 25 mg  25 mg Oral Q6H PRN Arpita Dai NP        loperamide (IMODIUM) capsule 2 mg  2 mg Oral PRN Arpita Dai NP        dicyclomine (BENTYL) 10 mg/mL injection 20 mg  20 mg IntraMUSCular Q6H PRN Arpita Dai NP        nicotine (NICODERM CQ) 14 mg/24 hr patch 1 Patch  1 Patch TransDERmal Q24H Arpita Dai NP        folic acid (FOLVITE) tablet 1 mg  1 mg Oral DAILY Arpita Dai NP   1 mg at 12/13/20 0919    therapeutic multivitamin (THERAGRAN) tablet 1 Tab  1 Tab Oral DAILY Arpita Dai NP   1 Tab at 12/13/20 0919    thiamine HCL (B-1) tablet 100 mg  100 mg Oral DAILY Arpita Dai NP   100 mg at 12/13/20 0919    cloNIDine HCL (CATAPRES) tablet 0.1 mg  0.1 mg Oral Q4H PRN Arpita Dai NP        montelukast (SINGULAIR) tablet 10 mg  10 mg Oral QHS Arpita Dai NP   10 mg at 12/13/20 2112         Mental Status Exam:  Eye contact: fair  Grooming: fair  Psychomotor activity: WNL  Speech is spontaneous  Mood is \"down\"  Affect: depressed  Perception: Denies any AH or VH  Suicidal ideation: Passive SI +  Cognition is grossly intact       Physical Exam:  Body habitus: Body mass index is 34.47 kg/m².   Musculoskeletal system: normal gait  Tremor - neg  Cog wheeling - neg      Assessment and Plan:  Cristy Gomez meets criteria for a diagnosis of Bipolar 1 Disorder, MRE Depression, Opiate Use disorder, severe on opiate agonists. Continue the medication regimen as prescribed  Disposition planning to continue. I certify that this patients inpatient psychiatric hospital services furnished since the previous certification were, and continue to be, required for treatment that could reasonably be expected to improve the patient's condition, or for diagnostic study, and that the patient continues to need, on a daily basis, active treatment furnished directly by or requiring the supervision of inpatient psychiatric facility personnel. In addition, the hospital records show that services furnished were intensive treatment services, admission or related services, or equivalent services.

## 2020-12-14 NOTE — BH NOTES
At approximately [phone] the patient became argumentative over not receiving enough hot tea. The patient has had multiple cups of hot tea provided by staff and two on the dinner tray. The patient continues to fixate on constantly having hot tea prepared and becomes fixated and argumentative if the patient does not receive hot tea in a timely manner. The patient had limits set by both techs working this evening.

## 2020-12-14 NOTE — PROGRESS NOTES
Pt received up on unit. Calm, cooperative, NAD noted. Pt engaging with nursing students on the unit. Staff to continue q15 minute checks for safety.       Problem: Suicide  Goal: *STG: Seeks staff when feelings of self harm or harm towards others arise  Outcome: Resolved/Met  Goal: *STG: Attends activities and groups  Outcome: Resolved/Met  Goal: *STG:  Verbalizes alternative ways of dealing with maladaptive feelings/behaviors  Outcome: Resolved/Met  Goal: *STG/LTG: Complies with medication therapy  Outcome: Resolved/Met  Goal: Interventions  Outcome: Resolved/Met     Problem: Patient Education: Go to Patient Education Activity  Goal: Patient/Family Education  Outcome: Resolved/Met     Problem: Altered Thought Process (Adult/Pediatric)  Goal: *STG: Participates in treatment plan  Outcome: Progressing Towards Goal  Goal: *STG: Remains safe in hospital  Outcome: Progressing Towards Goal  Goal: *STG: Seeks staff when feelings of anxiety and fear arise  Outcome: Progressing Towards Goal  Goal: *STG: Absence of lethality  Outcome: Progressing Towards Goal  Goal: Interventions  Outcome: Progressing Towards Goal     Problem: Depressed Mood (Adult/Pediatric)  Goal: *STG: Verbalizes anger, guilt, and other feelings in a constructive manor  Outcome: Progressing Towards Goal  Goal: *STG: Demonstrates reduction in symptoms and increase in insight into coping skills/future focused  Outcome: Progressing Towards Goal  Goal: Interventions  Outcome: Progressing Towards Goal

## 2020-12-14 NOTE — PROGRESS NOTES
Laboratory Monitoring for Antipsychotics: This patient is currently prescribed the following medication(s):   Current Facility-Administered Medications   Medication Dose Route Frequency    prazosin (MINIPRESS) capsule 4 mg  4 mg Oral QHS    influenza vaccine 2020-21 (6 mos+)(PF) (FLUARIX/FLULAVAL/FLUZONE QUAD) injection 0.5 mL  0.5 mL IntraMUSCular PRIOR TO DISCHARGE    divalproex DR (DEPAKOTE) tablet 500 mg  500 mg Oral BID    gabapentin (NEURONTIN) capsule 300 mg  300 mg Oral TID    levETIRAcetam (KEPPRA) tablet 500 mg  500 mg Oral BID    QUEtiapine (SEROquel) tablet 400 mg  400 mg Oral QHS    sertraline (ZOLOFT) tablet 25 mg  25 mg Oral DAILY    amitriptyline (ELAVIL) tablet 100 mg  100 mg Oral QHS    cloNIDine HCL (CATAPRES) tablet 0.3 mg  0.3 mg Oral TID    buprenorphine-naloxone (SUBOXONE) 8-2mg SL tablet  2 Tab SubLINGual DAILY    nicotine (NICODERM CQ) 14 mg/24 hr patch 1 Patch  1 Patch TransDERmal W44O    folic acid (FOLVITE) tablet 1 mg  1 mg Oral DAILY    therapeutic multivitamin (THERAGRAN) tablet 1 Tab  1 Tab Oral DAILY    thiamine HCL (B-1) tablet 100 mg  100 mg Oral DAILY    montelukast (SINGULAIR) tablet 10 mg  10 mg Oral QHS     The following labs have been completed for monitoring of antipsychotics and/or mood stabilizers:    Height, Weight, BMI Estimation  Estimated body mass index is 34.47 kg/m² as calculated from the following:    Height as of 12/8/20: 162.6 cm (64\"). Weight as of this encounter: 91.1 kg (200 lb 12.8 oz). Vital Signs/Blood Pressure  Visit Vitals  /82   Pulse 80   Temp 97.6 °F (36.4 °C)   Resp 16   Wt 91.1 kg (200 lb 12.8 oz)   SpO2 99%   BMI 34.47 kg/m²     Renal Function, Hepatic Function and Chemistry  Estimated Creatinine Clearance: 76.9 mL/min (by C-G formula based on SCr of 0.99 mg/dL).     Lab Results   Component Value Date/Time    Sodium 138 12/10/2020 03:52 AM    Potassium 3.4 (L) 12/10/2020 03:52 AM    Chloride 105 12/10/2020 03:52 AM    CO2 30 12/10/2020 03:52 AM    Anion gap 3 (L) 12/10/2020 03:52 AM    BUN 17 12/10/2020 03:52 AM    Creatinine 0.99 12/10/2020 03:52 AM    BUN/Creatinine ratio 17 12/10/2020 03:52 AM    Bilirubin, total 0.6 12/10/2020 03:52 AM    Protein, total 6.1 (L) 12/10/2020 03:52 AM    Albumin 2.9 (L) 12/10/2020 03:52 AM    Globulin 3.2 12/10/2020 03:52 AM    A-G Ratio 0.9 (L) 12/10/2020 03:52 AM    ALT (SGPT) 34 12/10/2020 03:52 AM    AST (SGOT) 17 12/10/2020 03:52 AM    Alk.  phosphatase 70 12/10/2020 03:52 AM     Lab Results   Component Value Date/Time    Glucose 86 12/10/2020 03:52 AM    Glucose 93 08/05/2018 06:56 AM    Glucose (POC) 91 08/03/2018 05:30 AM     Lab Results   Component Value Date/Time    Hemoglobin A1c 5.7 08/05/2018 06:56 AM     Hematology  Lab Results   Component Value Date/Time    WBC 7.7 12/10/2020 03:52 AM    RBC 3.87 12/10/2020 03:52 AM    HGB 9.8 (L) 12/10/2020 03:52 AM    HCT 33.0 (L) 12/10/2020 03:52 AM    MCV 85.3 12/10/2020 03:52 AM    MCH 25.3 (L) 12/10/2020 03:52 AM    MCHC 29.7 (L) 12/10/2020 03:52 AM    RDW 19.7 (H) 12/10/2020 03:52 AM    PLATELET 925 68/08/3588 03:52 AM     Lipids  Lab Results   Component Value Date/Time    Cholesterol, total 195 08/10/2018 12:07 AM    HDL Cholesterol 57 08/10/2018 12:07 AM    LDL, calculated 127.4 (H) 08/10/2018 12:07 AM    Triglyceride 53 08/10/2018 12:07 AM    CHOL/HDL Ratio 3.4 08/10/2018 12:07 AM     Thyroid Function  Lab Results   Component Value Date/Time    TSH 0.61 08/10/2018 12:07 AM    T3 Uptake 30 03/19/2009 06:05 AM    T4, Free 0.9 04/24/2017 09:23 PM    T4, Total 4.9 (L) 03/19/2009 06:05 AM    Free thyroxine index 1.5 03/19/2009 06:05 AM     Pregnancy Status  Lab Results   Component Value Date/Time    HCG urine, QL Negative 12/11/2020 05:42 AM    Pregnancy test,urine (POC) NEGATIVE  08/04/2018 02:36 PM    Beta HCG, QT <1 04/24/2017 09:23 PM     Assessment/Plan:  Will order lipid panel and hemoglobin A1c or fasting glucose to complete the recommended baseline laboratory monitoring based on the patient's current medication regimen.         Jocelin Bonds, ANGELAD

## 2020-12-14 NOTE — PROGRESS NOTES
Problem: Altered Thought Process (Adult/Pediatric)  Goal: *STG: Remains safe in hospital  Outcome: Progressing Towards Goal      Received pt in bed resting. Pt appears to be in no distress. Respirations even and unlabored. Continuing to monitor pt with q15 min safety rounds.

## 2020-12-15 VITALS
RESPIRATION RATE: 16 BRPM | WEIGHT: 200.8 LBS | OXYGEN SATURATION: 98 % | DIASTOLIC BLOOD PRESSURE: 84 MMHG | HEART RATE: 90 BPM | BODY MASS INDEX: 34.47 KG/M2 | SYSTOLIC BLOOD PRESSURE: 122 MMHG | TEMPERATURE: 97.8 F

## 2020-12-15 PROCEDURE — 74011250637 HC RX REV CODE- 250/637: Performed by: NURSE PRACTITIONER

## 2020-12-15 PROCEDURE — 74011250636 HC RX REV CODE- 250/636: Performed by: PSYCHIATRY & NEUROLOGY

## 2020-12-15 PROCEDURE — 74011250637 HC RX REV CODE- 250/637: Performed by: PSYCHIATRY & NEUROLOGY

## 2020-12-15 RX ORDER — QUETIAPINE FUMARATE 400 MG/1
400 TABLET, FILM COATED ORAL
Qty: 30 TAB | Refills: 0 | Status: ON HOLD | OUTPATIENT
Start: 2020-12-15 | End: 2021-02-09 | Stop reason: SDUPTHER

## 2020-12-15 RX ORDER — SERTRALINE HYDROCHLORIDE 25 MG/1
25 TABLET, FILM COATED ORAL DAILY
Qty: 30 TAB | Refills: 0 | Status: ON HOLD | OUTPATIENT
Start: 2020-12-15 | End: 2021-02-09 | Stop reason: SDUPTHER

## 2020-12-15 RX ORDER — GABAPENTIN 300 MG/1
300 CAPSULE ORAL 3 TIMES DAILY
Qty: 90 CAP | Refills: 0 | Status: ON HOLD | OUTPATIENT
Start: 2020-12-15 | End: 2021-02-09 | Stop reason: SDUPTHER

## 2020-12-15 RX ORDER — PRAZOSIN HYDROCHLORIDE 2 MG/1
4 CAPSULE ORAL
Qty: 60 CAP | Refills: 0 | Status: ON HOLD | OUTPATIENT
Start: 2020-12-15 | End: 2021-02-09 | Stop reason: SDUPTHER

## 2020-12-15 RX ORDER — AMITRIPTYLINE HYDROCHLORIDE 100 MG/1
100 TABLET, FILM COATED ORAL
Qty: 30 TAB | Refills: 0 | Status: ON HOLD | OUTPATIENT
Start: 2020-12-15 | End: 2021-02-09 | Stop reason: SDUPTHER

## 2020-12-15 RX ORDER — DIVALPROEX SODIUM 500 MG/1
500 TABLET, DELAYED RELEASE ORAL 2 TIMES DAILY
Qty: 60 TAB | Refills: 0 | Status: ON HOLD | OUTPATIENT
Start: 2020-12-15 | End: 2021-02-09 | Stop reason: SDUPTHER

## 2020-12-15 RX ORDER — MONTELUKAST SODIUM 10 MG/1
10 TABLET ORAL
Qty: 30 TAB | Refills: 0 | Status: ON HOLD | OUTPATIENT
Start: 2020-12-15 | End: 2021-02-09 | Stop reason: SDUPTHER

## 2020-12-15 RX ADMIN — BUPRENORPHINE HYDROCHLORIDE AND NALOXONE HYDROCHLORIDE DIHYDRATE 2 TABLET: 8; 2 TABLET SUBLINGUAL at 08:44

## 2020-12-15 RX ADMIN — SERTRALINE HYDROCHLORIDE 25 MG: 50 TABLET ORAL at 08:44

## 2020-12-15 RX ADMIN — Medication 100 MG: at 08:43

## 2020-12-15 RX ADMIN — DIVALPROEX SODIUM 500 MG: 500 TABLET, DELAYED RELEASE ORAL at 08:46

## 2020-12-15 RX ADMIN — CLONIDINE HYDROCHLORIDE 0.3 MG: 0.1 TABLET ORAL at 08:45

## 2020-12-15 RX ADMIN — MAGNESIUM HYDROXIDE 30 ML: 400 SUSPENSION ORAL at 08:59

## 2020-12-15 RX ADMIN — LEVETIRACETAM 500 MG: 500 TABLET ORAL at 08:46

## 2020-12-15 RX ADMIN — GABAPENTIN 300 MG: 300 CAPSULE ORAL at 08:46

## 2020-12-15 NOTE — INTERDISCIPLINARY ROUNDS
Behavioral Health Interdisciplinary Rounds     Patient Name: Marbella Lee  Age: 50 y.o. Room/Bed:  728/  Primary Diagnosis: Suicidal ideation   Admission Status: Voluntary     Readmission within 30 days: no  Power of  in place: no  Patient requires a blocked bed: no          Reason for blocked bed:     VTE Prophylaxis: No    Mobility needs/Fall risk: no  Flu Vaccine : no   Nutritional Plan: no  Consults:          Labs/Testing due today?: yes (refused)    Sleep hours: 6       Participation in Care/Groups:  yes  Medication Compliant?: Yes  PRNS (last 24 hours): Antianxiety    Restraints (last 24 hours):  no     CIWA (range last 24 hours):     COWS (range last 24 hours): COWS Total: 2    Alcohol screening (AUDIT) completed -         If applicable, date SBIRT discussed in treatment team AND documented:   AUDIT Screen Score:        Document Brief Intervention (corresponds directly with the 5 A's, Ask, Advise, Assess, Assist, and Arrange): At- Risk Patients (Score 7-15 for women; 8-15 for men)  Discuss concern patient is drinking at unhealthy levels known to increase risk of alcohol-related health problems. Is Patient ready to commit to change? If No:   Encourage reflection   Discuss short term and long term health risks of consuming alcohol   Barriers to change   Reaffirm willingness to help / Educational materials provided  If Yes:   Set goal  [employer] provided    Harmful use or Dependence (Score 16 or greater)   Discuss short term and long term health risks of consuming alcohol   Recommendations   Negotiate drinking goal   Recommend addiction specialist/center   Arrange follow-up appointments.     Tobacco - patient is a smoker: Have You Used Tobacco in the Past 30 Days: Yes  Illegal Drugs use: Have You Used Any Illegal Substances Over the Past 12 Months: Yes    24 hour chart check complete: yes     Patient goal(s) for today: prepare for discharge  Treatment team focus/goals: reconcile medications and facilitate discharge  Progress note: Pt is alert, oriented, clam, cooperative and psychiatrically stable for discharge. LOS:  4  Expected LOS: 12-15-20     Financial concerns/prescription [place]   Family contact: Verbal consent to speak to Oneida martell @ [phone] 12/13/20. Family requesting physician contact today:  No   Discharge plan: CSU  Access to weapons: NO                                                           Outpatient provider(s): [facility]   Patient's preferred phone number for follow up call: Unknown     Patient's preferred e-mail address: Unknown      Participating treatment team members: DORCAS Mejía;  Peace Castañeda, PharmD; Dr Beatriz Lassiter; Merari Lora, RN.

## 2020-12-15 NOTE — PROGRESS NOTES
Problem: Discharge Planning  Goal: *Discharge to safe environment  Outcome: Progressing Towards Goal  Note: Patient has a safe place to discharge to. Goal: *Knowledge of medication management  Outcome: Progressing Towards Goal  Note: Pt agrees to take medication as prescribed. Goal: *Knowledge of discharge instructions  Outcome: Progressing Towards Goal  Note: Pt verbalized understanding of goals for tx and discharge.

## 2020-12-15 NOTE — PROGRESS NOTES
Problem: Altered Thought Process (Adult/Pediatric)  Goal: *STG: Participates in treatment plan  Outcome: Progressing Towards Goal  Goal: *STG: Complies with medication therapy  Outcome: Progressing Towards Goal  Patient compliant with scheduled medications. Problem: Patient Education: Go to Patient Education Activity  Goal: Patient/Family Education  Outcome: Progressing Towards Goal     Patient awake and alert and present in the milieu. Minimal interaction with staff and peers. Mood and affect; irriatble  Denies SI/HI. Will continue to monitor q15 minutes for safety checks.

## 2020-12-15 NOTE — DISCHARGE SUMMARY
DISCHARGE SUMMARY    Some parts of the discharge summary are from the initial Psychiatric interview that was done on admission by the admitting psychiatrist.     Date of Admission: 12/11/2020    Date of Discharge: 12/15/2020     TYPE OF DISCHARGE:   REGULAR -  YES    AMA  RELEASED BY THE [facility] COURT    CHIEF COMPLAINT:  \"I started using again. \"     HISTORY OF PRESENT ILLNESS:  The patient is a 57-year-old -American female, who is currently admitted to the behavioral health unit at [facility] after she was transferred to us from [facility]. She had presented there with a history of being drowsy, having shortness of breath and was given a nebulizer treatment, was being brought in the ambulance. In the ER, she had not recovered and was given Narcan after she reported having used heroin. Following administration of Narcan, she regained consciousness fully and was transferred to us because she expressed thoughts of suicide. Reports that she had a big argument with her son prior to admission and went and used heroin again. Her urine drug screen is positive for cocaine, benzodiazepines and THC, but she denies using cocaine. States that the benzodiazepines were from Xanax which she is being prescribed and she uses THC on a regular basis. The patient reports that she has not used heroin in a while and did not expect that she would have such a strong effect as she used only a small amount. She does admit that she was having thoughts of suicide at that time and was feeling very depressed and low. Reports that she has remained compliant with taking all of her medications which are being prescribed at the [facility] by Dr. Rukhsana Mix. Notes that she is sleeping poorly, has had little energy or motivation and has been feeling depressed almost daily.   Denies any psychotic symptoms at the present time.     PAST MEDICAL HISTORY:  Reviewed as per the history and physical exam.       Past Medical History:   Diagnosis Date    Asthma      Bipolar 1 disorder (HCC)      Chronic obstructive pulmonary disease (HCC)      Chronic pain       back, leg    Cocaine abuse (HCC)      Depression      Heart failure (HCC)      Hepatitis B      Heroin abuse ([facility]      HTN (hypertension)      Narcotic abuse ([facility]      Polysubstance abuse ([facility]      Sarcoidosis      Tobacco abuse                Prior to Admission medications    Medication Sig Start Date End Date Taking? Authorizing Provider   divalproex DR (Depakote) 500 mg tablet Take 1 Tab by mouth two (2) times a day. 12/11/20     Phuong Twonsend NP   amitriptyline (ELAVIL) 100 mg tablet Take 100 mg by mouth nightly.       Provider, Historical   albuterol (ProAir HFA) 90 mcg/actuation inhaler Take 2 Puffs by inhalation every six (6) hours as needed for Wheezing.       Provider, Historical   cloNIDine HCL (CATAPRES) 0.3 mg tablet Take 0.3 mg by mouth three (3) times daily.       Provider, Historical   gabapentin (NEURONTIN) 300 mg capsule Take 300 mg by mouth three (3) times daily.       Provider, Historical   levETIRAcetam (Keppra) 500 mg tablet Take 500 mg by mouth two (2) times a day.       Provider, Historical   QUEtiapine (SEROquel) 400 mg tablet Take 400 mg by mouth nightly.       Provider, Historical   tiotropium (Spiriva with HandiHaler) 18 mcg inhalation capsule Take 1 Cap by inhalation daily.       Provider, Historical   fluticasone propion-salmeteroL (Advair Diskus) 100-50 mcg/dose diskus inhaler Take 1 Puff by inhalation every twelve (12) hours.       Provider, Historical   sertraline (ZOLOFT) 25 mg tablet Take 25 mg by mouth daily.       Provider, Historical   buprenorphine-naloxone (Suboxone) 8-2 mg film sublingaul film 2 Film by SubLINGual route daily.       Provider, Historical   prazosin (MINIPRESS) 2 mg capsule Take 1 Cap by mouth nightly.  Indications: posttraumatic stress syndrome 9/25/20     Stacey Palomino MD albuterol-ipratropium (DUO-NEB) 2.5 mg-0.5 mg/3 ml nebu 3 mL by Nebulization route every four (4) hours as needed for Wheezing, Shortness of Breath or Respiratory Distress. 9/25/20     Leydi Arredondo MD             Vitals:     12/13/20 1546 12/13/20 2002 12/14/20 0733 12/14/20 1113   BP: 113/80 129/88 (!) 130/94 120/82   Pulse: 93 92 94 80   Resp: 16 16 16 16   Temp: 97.8 °F (36.6 °C) 97.8 °F (36.6 °C) 97.5 °F (36.4 °C) 97.6 °F (36.4 °C)   SpO2: 96% 100% 99%     Weight:                    Lab Results   Component Value Date/Time     WBC 7.7 12/10/2020 03:52 AM     Hemoglobin (POC) 13.3 06/15/2009 05:10 PM     HGB 9.8 (L) 12/10/2020 03:52 AM     Hematocrit (POC) 39 06/15/2009 05:10 PM     HCT 33.0 (L) 12/10/2020 03:52 AM     PLATELET 725 84/21/1014 03:52 AM     MCV 85.3 12/10/2020 03:52 AM            Lab Results   Component Value Date/Time     Sodium 138 12/10/2020 03:52 AM     Potassium 3.4 (L) 12/10/2020 03:52 AM     Chloride 105 12/10/2020 03:52 AM     CO2 30 12/10/2020 03:52 AM     Anion gap 3 (L) 12/10/2020 03:52 AM     Glucose 86 12/10/2020 03:52 AM     Glucose 93 08/05/2018 06:56 AM     BUN 17 12/10/2020 03:52 AM     Creatinine 0.99 12/10/2020 03:52 AM     BUN/Creatinine ratio 17 12/10/2020 03:52 AM     GFR est AA >60 12/10/2020 03:52 AM     GFR est non-AA >60 12/10/2020 03:52 AM     Calcium 8.1 (L) 12/10/2020 03:52 AM     Bilirubin, total 0.6 12/10/2020 03:52 AM     Alk.  phosphatase 70 12/10/2020 03:52 AM     Protein, total 6.1 (L) 12/10/2020 03:52 AM     Albumin 2.9 (L) 12/10/2020 03:52 AM     Globulin 3.2 12/10/2020 03:52 AM     A-G Ratio 0.9 (L) 12/10/2020 03:52 AM     ALT (SGPT) 34 12/10/2020 03:52 AM     AST (SGOT) 17 12/10/2020 03:52 AM            Lab Results   Component Value Date/Time     Valproic acid 31 (L) 08/09/2018 02:15 PM            Lab Results   Component Value Date/Time     Lithium level 0.49 (L) 08/09/2018 02:15 PM      RADIOLOGY REPORTS:(reviewed/updated 12/14/2020)  Xr Chest Port     Result Date: 12/8/2020  Clinical indication: Cough. Portable AP semiupright view of the chest obtained, comparison September 24, 2020. The a heart size is normal. Calcified lymph nodes in the mediastinum. No acute infiltrate.      IMPRESSION: No acute changes.     Xr Chest Port     Result Date: 9/24/2020  EXAM:  XR CHEST PORT INDICATION: Dyspnea. Wheezing. COMPARISON: 7/31/2018 TECHNIQUE: Portable AP upright chest view at 0128 hours FINDINGS: Calcified mediastinal lymph nodes are again noted. The cardiomediastinal contours are stable. The pulmonary vasculature is within normal limits. The lungs and pleural spaces are clear. There is no pneumothorax. The bones and upper abdomen are stable.      IMPRESSION: No acute process.      [facility] [address]     Result Date: 12/10/2020  INDICATION: Right arm swelling FINDINGS: Limited ultrasound examination of the right arm was performed. At the site of swelling, there is no mass, fluid collection, or significant subcutaneous edema.      IMPRESSION: There is no sonographic correlate to the clinical finding of right arm swelling; clinical follow-up is recommended.     [facility] [address]     Result Date: 12/8/2020  Indication: Assess for abscess overlying right shoulder anteriorly. Ultrasound performed the soft tissues overlying the right shoulder anteriorly in the area of pain. Phill Sorrow No cystic or solid mass is identified.      IMPRESSION: 1. No abscess is identified overlying soft tissues right shoulder anteriorly.           Lab Results   Component Value Date/Time     HCG urine, QL Negative 12/11/2020 05:42 AM     Pregnancy test,urine (POC) NEGATIVE  08/04/2018 02:36 PM     HCG, Ql. NEGATIVE  04/21/2017 05:33 AM     Beta HCG, QT <1 04/24/2017 09:23 PM            PAST PSYCHIATRIC HISTORY:  The patient carries a longstanding diagnosis of bipolar disorder which was made almost 16 or 17 years ago.   She has had numerous prior psychiatric hospitalizations; although, she could not remember when her last one was. As noted above, she is seeing a psychiatrist at the [facility] where she also gets Suboxone. Gives a history of using heroin for nearly 20 years; although, she had been sober for 2 months prior to restarting its usage recently.     PSYCHOSOCIAL HISTORY:  The patient currently lives in [address] [facility] [address] where she lives with her son with whom she had an argument prior to taking this overdose. She has 4 other children with whom she keeps in touch. She is single, has never been  and is unemployed at the present time. The patient gets social security disability income for her psychiatric condition and denies any major legal stressors.     MENTAL STATUS EXAM:  The patient is a young -American female, who is dressed in hospital apparel. She looks depressed and says she is struggling. Her speech is spontaneous but decreased in output, also coherent. Psychomotor activity is decreased. Makes limited eye contact. Affect is depressed and mood is reported as being down. Passive thoughts of suicide are present but denies a clear plan. Denies any perceptual abnormalities. Denies any delusions. Her thought process is logical and goal-directed. Cognitively, she is awake and alert and oriented to time, place and person. Intelligence is average. Memory is intact and fund of knowledge is adequate. Insight is poor. Judgment is poor.     ASSESSMENT AND PLAN/DIAGNOSES:  Bipolar I disorder, most recent episode depression without psychotic symptoms; opiate use disorder, severe; marijuana use disorder, moderate.     I will continue her inpatient stay. She will be provided with support and attend groups. Her withdrawal is being managed as per protocol. Her psychotropic medications will be adjusted as needed.   Her strengths include her ability to seek help and support from her children.  Dilan Rodney 58:    Patient was admitted to the inpatient psychiatry unit for acute psychiatric stabilization in regards to symptomatology as described in the HPI above and placed on Q15 minute checks and withdrawal precautions. While on the unit Davion Ansari was involved in individual, group, occupational and milieu therapy. She was started back on her usual medication regimen as well as PRN medications including Seroquel, Zoloft, Gabapentin, Elavil, and Vistaril for anxiety. She improved gradually and was able to integrate into the milieu with help from the nursing staff. Patients symptoms improved gradually including low moods, SI, poor sleep, AH, low energy, cravings. She was quite on the unit, appropriate in her interactions, and cooperative with medications and the unit routine. Please see individual progress notes for more specific details regarding patient's hospitalization course. Patient was discharged as per the plan. She had been doing well on the unit as per the report of the nursing staff and my observations. No PRN medication for agitation, seclusion or restraints were required during the last 48 hours of her stay. Davion Ansari had improved progressively to the point of being stable for discharge and outpatient FU. At this time she did not offer any complaints. Patient denied any SI or HI. Denied any AH or VH. She denied any delusions. Was not considered a danger to self or to others and is safe for discharge. Will FU with her appointments and remains motivated to be in treatment. The patient verbalized understanding of her discharge instructions. DISCHARGE DIAGNOSIS:  Bipolar I disorder, most recent episode depression without psychotic symptoms; opiate use disorder, severe; marijuana use disorder, moderate. MENTAL STATUS EXAM ON DISCHARGE:    General appearance:   Davion Ansari is a 50 y.o.  BLACK OR  female who is well groomed, psychomotor activity is WNL  Eye contact: makes good eye contact  Speech: Spontaneous and coherent  Affect : Euthymic  Mood: \"OK\"  Thought Process: Logical, goal directed  Perception: Denies any AH or VH. Thought Content: Denies any SI or Plan  Insight: Partial  Judgement: Fair  Cognition: Intact grossly. Current Discharge Medication List      START taking these medications    Details   montelukast (SINGULAIR) 10 mg tablet Take 1 Tab by mouth nightly. Indications: controller medication for asthma  Qty: 30 Tab, Refills: 0         CONTINUE these medications which have CHANGED    Details   amitriptyline (ELAVIL) 100 mg tablet Take 1 Tab by mouth nightly. Indications: depression  Qty: 30 Tab, Refills: 0      sertraline (ZOLOFT) 25 mg tablet Take 1 Tab by mouth daily. Indications: major depressive disorder  Qty: 30 Tab, Refills: 0      divalproex DR (Depakote) 500 mg tablet Take 1 Tab by mouth two (2) times a day. Indications: bipolar depression  Qty: 60 Tab, Refills: 0      gabapentin (NEURONTIN) 300 mg capsule Take 1 Cap by mouth three (3) times daily. Max Daily Amount: 900 mg. Indications: neuropathic pain  Qty: 90 Cap, Refills: 0    Associated Diagnoses: Polysubstance abuse (HCC)      prazosin (MINIPRESS) 2 mg capsule Take 2 Caps by mouth nightly. Indications: posttraumatic stress syndrome  Qty: 60 Cap, Refills: 0      QUEtiapine (SEROquel) 400 mg tablet Take 1 Tab by mouth nightly. Indications: mood  Qty: 30 Tab, Refills: 0         CONTINUE these medications which have NOT CHANGED    Details   albuterol (ProAir HFA) 90 mcg/actuation inhaler Take 2 Puffs by inhalation every six (6) hours as needed for Wheezing. cloNIDine HCL (CATAPRES) 0.3 mg tablet Take 0.3 mg by mouth three (3) times daily. levETIRAcetam (Keppra) 500 mg tablet Take 500 mg by mouth two (2) times a day. tiotropium (Spiriva with HandiHaler) 18 mcg inhalation capsule Take 1 Cap by inhalation daily. fluticasone propion-salmeteroL (Advair Diskus) 100-50 mcg/dose diskus inhaler Take 1 Puff by inhalation every twelve (12) hours. buprenorphine-naloxone (Suboxone) 8-2 mg film sublingaul film 2 Film by SubLINGual route daily. Comments: .      albuterol-ipratropium (DUO-NEB) 2.5 mg-0.5 mg/3 ml nebu 3 mL by Nebulization route every four (4) hours as needed for Wheezing, Shortness of Breath or Respiratory Distress. Qty: 30 Nebule, Refills: 0         STOP taking these medications       predniSONE (DELTASONE) 20 mg tablet Comments:   Reason for Stopping:                Lab Results   Component Value Date/Time    Valproic acid 31 (L) 08/09/2018 02:15 PM     Lab Results   Component Value Date/Time    Lithium level 0.49 (L) 08/09/2018 02:15 PM       Follow-up Information     Follow up With Specialties Details Why [address] at [address] on 12/22/2020 11:30AM with [address], [address] [facility]  [phone] [address] [facility]  Call To complete intake and get set up with wrap around outpatient services, such as case management, therapy, etc. CALL Rapid Access: [phone]    [facility], Crisis Stabilization Unit  Go on 12/15/2020 They will pick you up at 1:00PM     None    None (395) Patient stated that they have no PCP          WOUND CARE: none needed. PROGNOSIS:   Good / Fair based on nature of patient's pathology/ies and treatment compliance issues. Prognosis is greatly dependent upon patient's ability to  follow up on psychiatric/psychotherapy appointments as well as to comply with psychiatric medications as prescribed.

## 2020-12-15 NOTE — GROUP NOTE
[facility] GROUP DOCUMENTATION INDIVIDUAL Group Therapy Note Date: 12/14/2020 Group Start Time: 2030 Group End Time: 2100 Group Topic: Reflection/Relaxation [address] [place] [address] GROUP DOCUMENTATION GROUP Group Therapy Note Attendees: 6/10 Attendance: Attended Patient's Goal:  N/A Interventions/techniques: Informed Follows Directions: Followed directions Interactions: Interacted appropriately Mental Status: Calm Behavior/appearance: Cooperative Goals Achieved: Able to engage in interactions Additional Notes: Patient was quiet and head was down the entire time during group. The patient was able to follow instructions, perform stretches, and participate in breathing exercises/ sleep hygiene techniques. The patient did not participate when it came time to share techniques used to promote good sleep, and was able to communicate it to the group. The patient did not communicate or speak during the entire group. Crow Panchal

## 2020-12-15 NOTE — DISCHARGE INSTRUCTIONS
DISCHARGE SUMMARY    NAME:Evgeny Mcgee  : 1975  MRN: [id number]    The patient Nely Chase exhibits the ability to control behavior in a less restrictive environment. Patient's level of functioning is improving. No assaultive/destructive behavior has been observed for the past 24 hours. No suicidal/homicidal threat or behavior has been observed for the past 24 hours. There is no evidence of serious medication side effects. Patient has not been in physical or protective restraints for at least the past 24 hours. If weapons involved, how are they secured? No     Is patient aware of and in agreement with discharge plan? Yes     Arrangements for medication:  Fill medications at [facility]. Copy of discharge instructions to provider?:  Gregorio Desai; [facility] [phone]    Arrangements for transportation home:  [facility] to . Keep all follow up appointments as scheduled, continue to take prescribed medications per physician instructions. Mental health crisis number:  [phone] or your local mental health crisis line number at [address] [facility] from Nurse    PATIENT INSTRUCTIONS:    What to do at Home:  Recommended activity: Activity as tolerated,     If you experience any of the following symptoms thoughts of hurting yourself or someone else, feelings of helplessness or hopelessness, please follow up with 911 or your local mental health crisis line number at [phone]. *  Please give a list of your current medications to your Primary Care Provider. *  Please update this list whenever your medications are discontinued, doses are      changed, or new medications (including over-the-counter products) are added. *  Please carry medication information at all times in case of emergency situations.     These are general instructions for a healthy lifestyle:    No smoking/ No tobacco products/ Avoid exposure to second hand smoke  Surgeon General's Warning:  Quitting smoking now greatly reduces serious risk to your health. Obesity, smoking, and sedentary lifestyle greatly increases your risk for illness    A healthy diet, regular physical exercise & weight monitoring are important for maintaining a healthy lifestyle    You may be retaining fluid if you have a history of heart failure or if you experience any of the following symptoms:  Weight gain of 3 pounds or more overnight or 5 pounds in a week, increased swelling in our hands or feet or shortness of breath while lying flat in bed. Please call your doctor as soon as you notice any of these symptoms; do not wait until your next office visit. The discharge information has been reviewed with the patient. The patient verbalized understanding. Discharge medications reviewed with the patient and appropriate educational materials and side effects teaching were provided. ___________________________________________________________________________________________________________________________________         SAFETY PLAN    A suicide Safety Plan is a document that supports someone when they are having thoughts of suicide. Warning Signs that indicate a suicidal crisis may be developing: What (situations, thoughts, feelings, body sensations, behaviors, etc.) do you experience that lets you know you are beginning to think about suicide? 1. Refuses to look at this  2.   3. Internal Coping Strategies:  What things can I do (relaxation techniques, hobbies, physical activities, etc.) to take my mind off my problems without contacting another person? 1. Refuses to look at this  2.   3.     People and social settings that provide distraction: Who can I call or where can I go to distract me? 1. Name: Refuses to look at this                                    Phone:   2. Name:                                     Phone:   3. Place:        4.  Place:     People whom I can ask for help: Who can I call when I need help - for example, friends, family, clergy, someone else? 1. Name:  Refuses to look at this                                            Phone:   2. Name:                                       Phone:   3. Name:                             Phone:    Professionals or [address] [place], [place] [address]vd I can contact during a crisis: Who can I call for help - for example, my doctor, my psychiatrist, my psychologist, a mental health provider, a suicide hotline? 1. Clinician Name:                                          Phone:       Clinician Pager or Emergency Contact #:     2. Clinician Name:                                          Phone:       Clinician Pager or Emergency Contact #:    3. Suicide Prevention Lifeline: 2-619-760-TALK (9485)    4. [address] [place] [address] Emergency Services -  for example, [address] suicide hotline, [facility] Hotline: 211      Emergency Services Address:       Emergency Services Phone:    Making the environment safe: How can I make my environment (house/apartment/living space) safer? For example, can I remove guns, medications, and other items?   1.   2.

## 2020-12-15 NOTE — BH NOTES
Behavioral Health Transition Record to Provider    Patient Name: Scottie Denver  YOB: 1975  Medical Record Number: [id number]  Date of Admission: 12/11/2020  Date of Discharge: 12/15/20    Attending Provider: Andreia Ibarra MD  Discharging Provider: Dr. Clayton Salinas   To contact this individual call [phone] and ask the  to page. If unavailable, ask to be transferred to [facility] Provider on call. [facility] Provider will be available on call 24/7 and during holidays. Primary Care Provider: None    Allergies   Allergen Reactions    Vancomycin Anaphylaxis    Tomato Swelling     Tongue    Trazodone Angioedema       Reason for Admission: CHIEF COMPLAINT:  \"I started using again. \"     HISTORY OF PRESENT ILLNESS:  The patient is a 66-year-old -American female, who is currently admitted to the behavioral health unit at [facility] after she was transferred to us from [facility]. Donavan Schwartz had presented there with a history of being drowsy, having shortness of breath and was given a nebulizer treatment, was being brought in the ambulance.  In the ER, she had not recovered and was given Narcan after she reported having used heroin.  Following administration of Narcan, she regained consciousness fully and was transferred to us because she expressed thoughts of suicide.  Reports that she had a big argument with her son prior to admission and went and used heroin again.  Her urine drug screen is positive for cocaine, benzodiazepines and THC, but she denies using cocaine.  States that the benzodiazepines were from Xanax which she is being prescribed and she uses THC on a regular basis.  The patient reports that she has not used heroin in a while and did not expect that she would have such a strong effect as she used only a small amount.  She does admit that she was having thoughts of suicide at that time and was feeling very depressed and low.  Reports that she has remained compliant with taking all of her medications which are being prescribed at the [facility] by Dr. Sada Mejias. [place] that she is sleeping poorly, has had little energy or motivation and has been feeling depressed almost daily.  Denies any psychotic symptoms at the present time. Admission Diagnosis: Suicidal ideation [R45.85]    * No surgery found *    Results for orders placed or performed during the hospital encounter of 12/08/20   SAMPLES BEING HELD   Result Value Ref Range    SAMPLES BEING HELD RD BL SST     COMMENT        Add-on orders for these samples will be processed based on acceptable specimen integrity and analyte stability, which may vary by analyte. CBC WITH AUTOMATED DIFF   Result Value Ref Range    WBC 5.0 3.6 - 11.0 K/uL    RBC 4.11 3.80 - 5.20 M/uL    HGB 10.7 (L) 11.5 - 16.0 g/dL    HCT 34.2 (L) 35.0 - 47.0 %    MCV 83.2 80.0 - 99.0 FL    MCH 26.0 26.0 - 34.0 PG    MCHC 31.3 30.0 - 36.5 g/dL    RDW 19.3 (H) 11.5 - 14.5 %    PLATELET 858 370 - 761 K/uL    MPV 9.9 8.9 - 12.9 FL    NRBC 0.0 0  WBC    ABSOLUTE NRBC 0.00 0.00 - 0.01 K/uL    NEUTROPHILS 56 32 - 75 %    LYMPHOCYTES 27 12 - 49 %    MONOCYTES 13 5 - 13 %    EOSINOPHILS 3 0 - 7 %    BASOPHILS 1 0 - 1 %    IMMATURE GRANULOCYTES 0 0.0 - 0.5 %    ABS. NEUTROPHILS 2.8 1.8 - 8.0 K/UL    ABS. LYMPHOCYTES 1.4 0.8 - 3.5 K/UL    ABS. MONOCYTES 0.6 0.0 - 1.0 K/UL    ABS. EOSINOPHILS 0.1 0.0 - 0.4 K/UL    ABS. BASOPHILS 0.0 0.0 - 0.1 K/UL    ABS. IMM.  GRANS. 0.0 0.00 - 0.04 K/UL    DF AUTOMATED     METABOLIC PANEL, COMPREHENSIVE   Result Value Ref Range    Sodium 140 136 - 145 mmol/L    Potassium 3.5 3.5 - 5.1 mmol/L    Chloride 105 97 - 108 mmol/L    CO2 29 21 - 32 mmol/L    Anion gap 6 5 - 15 mmol/L    Glucose 108 (H) 65 - 100 mg/dL    BUN 6 6 - 20 MG/DL    Creatinine 0.98 0.55 - 1.02 MG/DL    BUN/Creatinine ratio 6 (L) 12 - 20      GFR est AA >60 >60 ml/min/1.73m2    GFR est non-AA >60 >60 ml/min/1.73m2    Calcium 8.7 8.5 - 10.1 MG/DL    Bilirubin, total 0.4 0.2 - 1.0 MG/DL    ALT (SGPT) 56 12 - 78 U/L    AST (SGOT) 69 (H) 15 - 37 U/L    Alk. phosphatase 83 45 - 117 U/L    Protein, total 7.4 6.4 - 8.2 g/dL    Albumin 4.0 3.5 - 5.0 g/dL    Globulin 3.4 2.0 - 4.0 g/dL    A-G Ratio 1.2 1.1 - 2.2     CK W/ REFLX CKMB   Result Value Ref Range     (H) 26 - 192 U/L   TROPONIN I   Result Value Ref Range    Troponin-I, Qt. <0.05 <0.05 ng/mL   NT-PRO BNP   Result Value Ref Range    NT pro- (H) <125 PG/ML   DRUG SCREEN, URINE   Result Value Ref Range    AMPHETAMINES Negative NEG      BARBITURATES Negative NEG      BENZODIAZEPINES Positive (A) NEG      COCAINE Positive (A) NEG      METHADONE Negative NEG      OPIATES Negative NEG      PCP(PHENCYCLIDINE) Negative NEG      THC (TH-CANNABINOL) Positive (A) NEG      Drug screen comment (NOTE)    CK-MB,QUANT. Result Value Ref Range    CK - MB 6.3 (H) <3.6 NG/ML    CK-MB Index 0.7 0.0 - 2.5     POC EG7   Result Value Ref Range    Calcium, ionized (POC) 1.14 1.12 - 1.32 mmol/L    FIO2 (POC) 50 %    pH (POC) 7.37 7.35 - 7.45      pCO2 (POC) 56.0 (H) 35.0 - 45.0 MMHG    pO2 (POC) 174 (H) 80 - 100 MMHG    HCO3 (POC) 32.5 (H) 22 - 26 MMOL/L    Base excess (POC) 7 mmol/L    sO2 (POC) 99 (H) 92 - 97 %    Site RIGHT RADIAL      Device: BIPAP      PEEP/CPAP (POC) 6 cmH2O    PIP (POC) 14      Allens test (POC) YES      Specimen type (POC) ARTERIAL      Total resp.  rate 16     SARS-COV-2   Result Value Ref Range    Specimen source Nasopharyngeal      Specimen source Nasopharyngeal      COVID-19 rapid test Not detected NOTD      Specimen type NP Swab      Health status Symptomatic Testing     SALICYLATE   Result Value Ref Range    Salicylate level 2.4 (L) 2.8 - 20.0 MG/DL   ACETAMINOPHEN   Result Value Ref Range    Acetaminophen level <2 (L) 10 - 30 ug/mL   POC EG7   Result Value Ref Range    Calcium, ionized (POC) 1.19 1.12 - 1.32 mmol/L    pH (POC) 7.30 (L) 7.35 - 7.45      pCO2 (POC) 64. 4 (H) 35.0 - 45.0 MMHG    pO2 (POC) 104 (H) 80 - 100 MMHG    HCO3 (POC) 31.3 (H) 22 - 26 MMOL/L    Base excess (POC) 5 mmol/L    sO2 (POC) 97 92 - 97 %    Site LEFT RADIAL      Device: NASAL CANNULA      Flow rate (POC) 6 L/M    Allens test (POC) N/A      Specimen type (POC) ARTERIAL      Total resp. rate 16     METABOLIC PANEL, BASIC   Result Value Ref Range    Sodium 138 136 - 145 mmol/L    Potassium 4.1 3.5 - 5.1 mmol/L    Chloride 103 97 - 108 mmol/L    CO2 32 21 - 32 mmol/L    Anion gap 3 (L) 5 - 15 mmol/L    Glucose 111 (H) 65 - 100 mg/dL    BUN 9 6 - 20 MG/DL    Creatinine 0.96 0.55 - 1.02 MG/DL    BUN/Creatinine ratio 9 (L) 12 - 20      GFR est AA >60 >60 ml/min/1.73m2    GFR est non-AA >60 >60 ml/min/1.73m2    Calcium 8.4 (L) 8.5 - 10.1 MG/DL   CBC W/O DIFF   Result Value Ref Range    WBC 8.6 3.6 - 11.0 K/uL    RBC 4.12 3.80 - 5.20 M/uL    HGB 10.5 (L) 11.5 - 16.0 g/dL    HCT 35.3 35.0 - 47.0 %    MCV 85.7 80.0 - 99.0 FL    MCH 25.5 (L) 26.0 - 34.0 PG    MCHC 29.7 (L) 30.0 - 36.5 g/dL    RDW 19.5 (H) 11.5 - 14.5 %    PLATELET 213 720 - 822 K/uL    MPV 10.1 8.9 - 12.9 FL    NRBC 0.0 0  WBC    ABSOLUTE NRBC 0.00 0.00 - 0.01 K/uL   CBC WITH AUTOMATED DIFF   Result Value Ref Range    WBC 7.7 3.6 - 11.0 K/uL    RBC 3.87 3.80 - 5.20 M/uL    HGB 9.8 (L) 11.5 - 16.0 g/dL    HCT 33.0 (L) 35.0 - 47.0 %    MCV 85.3 80.0 - 99.0 FL    MCH 25.3 (L) 26.0 - 34.0 PG    MCHC 29.7 (L) 30.0 - 36.5 g/dL    RDW 19.7 (H) 11.5 - 14.5 %    PLATELET 817 958 - 675 K/uL    MPV 9.7 8.9 - 12.9 FL    NRBC 0.0 0  WBC    ABSOLUTE NRBC 0.00 0.00 - 0.01 K/uL    NEUTROPHILS 76 (H) 32 - 75 %    LYMPHOCYTES 14 12 - 49 %    MONOCYTES 8 5 - 13 %    EOSINOPHILS 1 0 - 7 %    BASOPHILS 0 0 - 1 %    IMMATURE GRANULOCYTES 1 (H) 0.0 - 0.5 %    ABS. NEUTROPHILS 5.9 1.8 - 8.0 K/UL    ABS. LYMPHOCYTES 1.1 0.8 - 3.5 K/UL    ABS. MONOCYTES 0.6 0.0 - 1.0 K/UL    ABS. EOSINOPHILS 0.1 0.0 - 0.4 K/UL    ABS. BASOPHILS 0.0 0.0 - 0.1 K/UL    ABS. IMM. GRANS. 0.0 0.00 - 0.04 K/UL    DF AUTOMATED     METABOLIC PANEL, COMPREHENSIVE   Result Value Ref Range    Sodium 138 136 - 145 mmol/L    Potassium 3.4 (L) 3.5 - 5.1 mmol/L    Chloride 105 97 - 108 mmol/L    CO2 30 21 - 32 mmol/L    Anion gap 3 (L) 5 - 15 mmol/L    Glucose 86 65 - 100 mg/dL    BUN 17 6 - 20 MG/DL    Creatinine 0.99 0.55 - 1.02 MG/DL    BUN/Creatinine ratio 17 12 - 20      GFR est AA >60 >60 ml/min/1.73m2    GFR est non-AA >60 >60 ml/min/1.73m2    Calcium 8.1 (L) 8.5 - 10.1 MG/DL    Bilirubin, total 0.6 0.2 - 1.0 MG/DL    ALT (SGPT) 34 12 - 78 U/L    AST (SGOT) 17 15 - 37 U/L    Alk. phosphatase 70 45 - 117 U/L    Protein, total 6.1 (L) 6.4 - 8.2 g/dL    Albumin 2.9 (L) 3.5 - 5.0 g/dL    Globulin 3.2 2.0 - 4.0 g/dL    A-G Ratio 0.9 (L) 1.1 - 2.2     HCG URINE, QL   Result Value Ref Range    HCG urine, QL Negative NEG     POC LACTIC ACID   Result Value Ref Range    Lactic Acid (POC) 1.78 0.40 - 2.00 mmol/L   POC TROPONIN-I   Result Value Ref Range    Troponin-I (POC) <0.04 0.00 - 0.08 ng/mL   EKG, 12 LEAD, INITIAL   Result Value Ref Range    Ventricular Rate 103 BPM    Atrial Rate 103 BPM    P-R Interval 118 ms    QRS Duration 78 ms    Q-T Interval 358 ms    QTC Calculation (Bezet) 468 ms    Calculated P Axis 21 degrees    Calculated R Axis 49 degrees    Calculated T Axis 49 degrees    Diagnosis       Sinus tachycardia  Possible Left atrial enlargement  Confirmed by Lucius Gomez ([id number]) on 12/8/2020 7:22:36 PM         Immunizations administered during this encounter: Immunization History   Administered Date(s) Administered    Influenza Vaccine 10/01/2016       Screening for Metabolic Disorders for Patients on Antipsychotic Medications  (Data obtained from the EMR)    Estimated Body Mass Index  Estimated body mass index is 34.47 kg/m² as calculated from the following:    Height as of 12/8/20: 5' 4\" (1.626 m). Weight as of this encounter: 91.1 kg (200 lb 12.8 oz).      Vital Signs/Blood Pressure  Visit Vitals  /84 (BP 1 Location: Left arm, BP Patient Position: Sitting)   Pulse 90   Temp 97.8 °F (36.6 °C)   Resp 16   Wt 91.1 kg (200 lb 12.8 oz)   SpO2 98%   BMI 34.47 kg/m²       Blood Glucose/Hemoglobin A1c  Lab Results   Component Value Date/Time    Glucose 86 12/10/2020 03:52 AM    Glucose 93 08/05/2018 06:56 AM    Glucose (POC) 91 08/03/2018 05:30 AM       Lab Results   Component Value Date/Time    Hemoglobin A1c 5.7 08/05/2018 06:56 AM        Lipid Panel  Lab Results   Component Value Date/Time    Cholesterol, total 195 08/10/2018 12:07 AM    HDL Cholesterol 57 08/10/2018 12:07 AM    LDL, calculated 127.4 (H) 08/10/2018 12:07 AM    Triglyceride 53 08/10/2018 12:07 AM    CHOL/HDL Ratio 3.4 08/10/2018 12:07 AM        Discharge Diagnosis: Bipolar I disorder, most recent episode depression without psychotic symptoms; opiate use disorder, severe; marijuana use disorder, moderate. Discharge Plan: The patient Vasile Aranda exhibits the ability to control behavior in a less restrictive environment. Patient's level of functioning is improving. No assaultive/destructive behavior has been observed for the past 24 hours. No suicidal/homicidal threat or behavior has been observed for the past 24 hours. There is no evidence of serious medication side effects. Patient has not been in physical or protective restraints for at least the past 24 hours. If weapons involved, how are they secured? No     Is patient aware of and in agreement with discharge plan? Yes     Arrangements for medication:  Fill medications at [address] [place] [address]. Copy of discharge instructions to provider?:  [facility]; [facility] [phone]    Arrangements for transportation home:  CSU to . Keep all follow up appointments as scheduled, continue to take prescribed medications per physician instructions.   Mental health crisis number:  [phone] or your local mental health crisis line number at [address]    A suicide Safety Plan is a document that supports someone when they are having thoughts of suicide. Warning Signs that indicate a suicidal crisis may be developing: What (situations, thoughts, feelings, body sensations, behaviors, etc.) do you experience that lets you know you are beginning to think about suicide? 1. Refuses to look at this  2.   3. Internal Coping Strategies:  What things can I do (relaxation techniques, hobbies, physical activities, etc.) to take my mind off my problems without contacting another person? 1. Refuses to look at this  2.   3.     People and social settings that provide distraction: Who can I call or where can I go to distract me? 1. Name: Refuses to look at this                                    Phone:   2. Name:                                     Phone:   3. Place:        4. Place:     People whom I can ask for help: Who can I call when I need help - for example, friends, family, clergy, someone else? 1. Name:  Refuses to look at this                                            Phone:   2. Name:                                       Phone:   3. Name:                             Phone:    Professionals or [address] [place] [address] I can contact during a crisis: Who can I call for help - for example, my doctor, my psychiatrist, my psychologist, a mental health provider, a suicide hotline? 1. Clinician Name:                                          Phone:       Clinician Pager or Emergency Contact #:     2. Clinician Name:                                          Phone:       Clinician Pager or Emergency Contact #:    3. Suicide Prevention Lifeline: 1-727-789-TALK (1093)    4. [address] [place] [address] Emergency Services -  for example, [facility] suicide hotline, [facility] Hotline: 211      Emergency Services Address:       Emergency Services Phone:    Making the environment safe:  How can I make my environment (house/apartment/living space) safer? For example, can I remove guns, medications, and other items? 1.   2.     Discharge Medication List and Instructions: Current Discharge Medication List      START taking these medications    Details   montelukast (SINGULAIR) 10 mg tablet Take 1 Tab by mouth nightly. Indications: controller medication for asthma  Qty: 30 Tab, Refills: 0         CONTINUE these medications which have CHANGED    Details   amitriptyline (ELAVIL) 100 mg tablet Take 1 Tab by mouth nightly. Indications: depression  Qty: 30 Tab, Refills: 0      sertraline (ZOLOFT) 25 mg tablet Take 1 Tab by mouth daily. Indications: major depressive disorder  Qty: 30 Tab, Refills: 0      divalproex DR (Depakote) 500 mg tablet Take 1 Tab by mouth two (2) times a day. Indications: bipolar depression  Qty: 60 Tab, Refills: 0      gabapentin (NEURONTIN) 300 mg capsule Take 1 Cap by mouth three (3) times daily. Max Daily Amount: 900 mg. Indications: neuropathic pain  Qty: 90 Cap, Refills: 0    Associated Diagnoses: Polysubstance abuse (HCC)      prazosin (MINIPRESS) 2 mg capsule Take 2 Caps by mouth nightly. Indications: posttraumatic stress syndrome  Qty: 60 Cap, Refills: 0      QUEtiapine (SEROquel) 400 mg tablet Take 1 Tab by mouth nightly. Indications: mood  Qty: 30 Tab, Refills: 0         CONTINUE these medications which have NOT CHANGED    Details   albuterol (ProAir HFA) 90 mcg/actuation inhaler Take 2 Puffs by inhalation every six (6) hours as needed for Wheezing. cloNIDine HCL (CATAPRES) 0.3 mg tablet Take 0.3 mg by mouth three (3) times daily. levETIRAcetam (Keppra) 500 mg tablet Take 500 mg by mouth two (2) times a day. tiotropium (Spiriva with HandiHaler) 18 mcg inhalation capsule Take 1 Cap by inhalation daily. fluticasone propion-salmeteroL (Advair Diskus) 100-50 mcg/dose diskus inhaler Take 1 Puff by inhalation every twelve (12) hours.       buprenorphine-naloxone (Suboxone) 8-2 mg film sublingaul film 2 Film by SubLINGual route daily. Comments: .      albuterol-ipratropium (DUO-NEB) 2.5 mg-0.5 mg/3 ml nebu 3 mL by Nebulization route every four (4) hours as needed for Wheezing, Shortness of Breath or Respiratory Distress. Qty: 30 Nebule, Refills: 0         STOP taking these medications       predniSONE (DELTASONE) 20 mg tablet Comments:   Reason for Stopping:               Unresulted Labs (24h ago, onward)     Start     Ordered    12/15/20 0600  VALPROIC ACID  TOMORROW AM,   R      12/14/20 1540    12/15/20 0600  LIPID PANEL  TOMORROW AM,   R     Comments:  Routine monitoring - antipsychotic      12/14/20 1540    12/15/20 0600  HEMOGLOBIN A1C WITH EAG  TOMORROW AM,   R     Comments:  Routine monitoring - antipsychotic      12/14/20 1540              To obtain results of studies pending at discharge, please contact [phone]    Follow-up Information     Follow up With Specialties Details Why [address] at [address] on 12/22/2020 11:30AM with [address], [address] [place] [address]  [phone]    [address] [place] [address]  Call To complete intake and get set up with wrap around outpatient services, such as case management, therapy, etc. CALL Rapid Access: [phone]    [facility], Crisis Stabilization Unit  Go on 12/15/2020 They will pick you up at 1:00PM     None    None (395) Patient stated that they have no PCP      Metabolic monitoring -  routine monitoring for antipsychotics   Patient refused to allow lab draw during this hospitalization. Recommend lipid panel/hemoglobin A1c/BP/BMI every 3 months for the first 12 months           Advanced Directive:   Does the patient have an appointed surrogate decision maker? No  Does the patient have a Medical Advance Directive? No  Does the patient have a Psychiatric Advance Directive?  No  If the patient does not have a surrogate or Medical Advance Directive AND Psychiatric Advance Directive, the patient was offered information on these advance directives Patient declined to complete    Patient Instructions: Please continue all medications until otherwise directed by physician. Tobacco Cessation Discharge Plan:   Is the patient a smoker and needs referral for smoking cessation? Yes  Patient referred to the following for smoking cessation with an appointment? No     Patient was offered medication to assist with smoking cessation at discharge? No  Was education for smoking cessation added to the discharge instructions? Yes    Alcohol/Substance Abuse Discharge Plan:   Does the patient have a history of substance/alcohol abuse and requires a referral for treatment? Yes  Patient referred to the following for substance/alcohol abuse treatment with an appointment? No   Patient was offered medication to assist with alcohol cessation at discharge? No  Was education for substance/alcohol abuse added to discharge instructions? Yes    Patient discharged to Home; discussed with patient/caregiver and provided to the patient/caregiver either in hard copy or electronically.

## 2020-12-15 NOTE — PROGRESS NOTES
Pharmacist Discharge Medication Reconciliation    Discharging Provider: Dr. Emmanuel Crow PMH:   Past Medical History:   Diagnosis Date    Asthma     Bipolar 1 disorder ([facility]     Chronic obstructive pulmonary disease (HCC)     Chronic pain     back, leg    Cocaine [facility] ([facility]     Depression     Heart failure ([facility]     Hepatitis B     Heroin abuse ([facility]     HTN (hypertension)     Narcotic abuse ([facility]     Polysubstance abuse ([facility]     Sarcoidosis     Tobacco abuse      Chief Complaint for this Admission: No chief complaint on file. Allergies: Vancomycin; Tomato; and Trazodone    Discharge Medications:   Current Discharge Medication List        START taking these medications    Details   montelukast (SINGULAIR) 10 mg tablet Take 1 Tab by mouth nightly. Indications: controller medication for asthma  Qty: 30 Tab, Refills: 0           CONTINUE these medications which have CHANGED    Details   amitriptyline (ELAVIL) 100 mg tablet Take 1 Tab by mouth nightly. Indications: depression  Qty: 30 Tab, Refills: 0      sertraline (ZOLOFT) 25 mg tablet Take 1 Tab by mouth daily. Indications: major depressive disorder  Qty: 30 Tab, Refills: 0      divalproex DR (Depakote) 500 mg tablet Take 1 Tab by mouth two (2) times a day. Indications: bipolar depression  Qty: 60 Tab, Refills: 0      gabapentin (NEURONTIN) 300 mg capsule Take 1 Cap by mouth three (3) times daily. Max Daily Amount: 900 mg. Indications: neuropathic pain  Qty: 90 Cap, Refills: 0    Associated Diagnoses: Polysubstance abuse (HCC)      prazosin (MINIPRESS) 2 mg capsule Take 2 Caps by mouth nightly. Indications: posttraumatic stress syndrome  Qty: 60 Cap, Refills: 0      QUEtiapine (SEROquel) 400 mg tablet Take 1 Tab by mouth nightly.  Indications: mood  Qty: 30 Tab, Refills: 0           CONTINUE these medications which have NOT CHANGED    Details   albuterol (ProAir HFA) 90 mcg/actuation inhaler Take 2 Puffs by inhalation every six (6) hours as needed for Wheezing. cloNIDine HCL (CATAPRES) 0.3 mg tablet Take 0.3 mg by mouth three (3) times daily. levETIRAcetam (Keppra) 500 mg tablet Take 500 mg by mouth two (2) times a day. tiotropium (Spiriva with HandiHaler) 18 mcg inhalation capsule Take 1 Cap by inhalation daily. fluticasone propion-salmeteroL (Advair Diskus) 100-50 mcg/dose diskus inhaler Take 1 Puff by inhalation every twelve (12) hours. buprenorphine-naloxone (Suboxone) 8-2 mg film sublingaul film 2 Film by SubLINGual route daily. Comments: <!--EPICS-->.<!--EPICE-->        albuterol-ipratropium (DUO-NEB) 2.5 mg-0.5 mg/3 ml nebu 3 mL by Nebulization route every four (4) hours as needed for Wheezing, Shortness of Breath or Respiratory Distress.   Qty: 30 Nebule, Refills: 0           STOP taking these medications       predniSONE (DELTASONE) 20 mg tablet Comments:   Reason for Stopping:             The patient's chart, MAR and AVS were reviewed by Mykel Mott, ANGELAD.

## 2020-12-19 NOTE — PROGRESS NOTES
08/03/17 1615   Vital Signs   Temp (refused)   Pulse (Heart Rate) (refused)   O2 Sat (%) (refused)   Level of Consciousness Alert   BP (refused) No

## 2021-02-05 ENCOUNTER — APPOINTMENT (OUTPATIENT)
Dept: GENERAL RADIOLOGY | Age: 46
DRG: 140 | End: 2021-02-05
Attending: EMERGENCY MEDICINE
Payer: COMMERCIAL

## 2021-02-05 ENCOUNTER — HOSPITAL ENCOUNTER (INPATIENT)
Age: 46
LOS: 4 days | Discharge: OTHER INSTITUTION | DRG: 140 | End: 2021-02-10
Attending: EMERGENCY MEDICINE | Admitting: INTERNAL MEDICINE
Payer: COMMERCIAL

## 2021-02-05 DIAGNOSIS — F11.10 NARCOTIC ABUSE (HCC): ICD-10-CM

## 2021-02-05 DIAGNOSIS — J45.901 ASTHMA WITH ACUTE EXACERBATION, UNSPECIFIED ASTHMA SEVERITY, UNSPECIFIED WHETHER PERSISTENT: Primary | ICD-10-CM

## 2021-02-05 DIAGNOSIS — F19.10 POLYSUBSTANCE ABUSE (HCC): ICD-10-CM

## 2021-02-05 LAB
ATRIAL RATE: 103 BPM
CALCULATED P AXIS, ECG09: 34 DEGREES
CALCULATED R AXIS, ECG10: 57 DEGREES
CALCULATED T AXIS, ECG11: 54 DEGREES
DIAGNOSIS, 93000: NORMAL
P-R INTERVAL, ECG05: 122 MS
Q-T INTERVAL, ECG07: 360 MS
QRS DURATION, ECG06: 76 MS
QTC CALCULATION (BEZET), ECG08: 471 MS
VENTRICULAR RATE, ECG03: 103 BPM

## 2021-02-05 PROCEDURE — 71046 X-RAY EXAM CHEST 2 VIEWS: CPT

## 2021-02-05 PROCEDURE — 93005 ELECTROCARDIOGRAM TRACING: CPT

## 2021-02-05 PROCEDURE — 74011000250 HC RX REV CODE- 250: Performed by: EMERGENCY MEDICINE

## 2021-02-05 PROCEDURE — 94640 AIRWAY INHALATION TREATMENT: CPT

## 2021-02-05 PROCEDURE — 94660 CPAP INITIATION&MGMT: CPT

## 2021-02-05 PROCEDURE — 99285 EMERGENCY DEPT VISIT HI MDM: CPT

## 2021-02-05 PROCEDURE — 99284 EMERGENCY DEPT VISIT MOD MDM: CPT

## 2021-02-05 PROCEDURE — 75810000455 HC PLCMT CENT VENOUS CATH LVL 2 5182

## 2021-02-05 PROCEDURE — 96374 THER/PROPH/DIAG INJ IV PUSH: CPT

## 2021-02-05 RX ORDER — IPRATROPIUM BROMIDE AND ALBUTEROL SULFATE 2.5; .5 MG/3ML; MG/3ML
3 SOLUTION RESPIRATORY (INHALATION)
Status: COMPLETED | OUTPATIENT
Start: 2021-02-05 | End: 2021-02-05

## 2021-02-05 RX ORDER — MAGNESIUM SULFATE HEPTAHYDRATE 40 MG/ML
2 INJECTION, SOLUTION INTRAVENOUS ONCE
Status: COMPLETED | OUTPATIENT
Start: 2021-02-05 | End: 2021-02-06

## 2021-02-05 RX ADMIN — IPRATROPIUM BROMIDE AND ALBUTEROL SULFATE 3 ML: .5; 3 SOLUTION RESPIRATORY (INHALATION) at 22:22

## 2021-02-05 RX ADMIN — IPRATROPIUM BROMIDE AND ALBUTEROL SULFATE 3 ML: .5; 3 SOLUTION RESPIRATORY (INHALATION) at 22:32

## 2021-02-05 RX ADMIN — IPRATROPIUM BROMIDE AND ALBUTEROL SULFATE 3 ML: .5; 3 SOLUTION RESPIRATORY (INHALATION) at 22:26

## 2021-02-05 NOTE — ED TRIAGE NOTES
She reports two days of shortness of breath. She has hx of COPD, and CHF. She says shes used her nebulizer and inhalers without relief. She denies known contact with COVID.

## 2021-02-05 NOTE — ED NOTES
Hx COPD, CHF. Tachycardic, 94% on RA. Audibly wheezing and visibly SOB after walking short distance. Pt has hx prior intubation for asthma.   [facility] to [facility], [address]  6:05 PM

## 2021-02-06 ENCOUNTER — APPOINTMENT (OUTPATIENT)
Dept: GENERAL RADIOLOGY | Age: 46
DRG: 140 | End: 2021-02-06
Attending: EMERGENCY MEDICINE
Payer: COMMERCIAL

## 2021-02-06 PROBLEM — J96.01 ACUTE RESPIRATORY FAILURE WITH HYPOXIA (HCC): Status: ACTIVE | Noted: 2021-02-06

## 2021-02-06 LAB
ALBUMIN SERPL-MCNC: 3.7 G/DL (ref 3.5–5)
ALBUMIN/GLOB SERPL: 1 {RATIO} (ref 1.1–2.2)
ALP SERPL-CCNC: 96 U/L (ref 45–117)
ALT SERPL-CCNC: 32 U/L (ref 12–78)
AMPHET UR QL SCN: NEGATIVE
ANION GAP SERPL CALC-SCNC: 7 MMOL/L (ref 5–15)
ARTERIAL PATENCY WRIST A: YES
AST SERPL-CCNC: 42 U/L (ref 15–37)
BARBITURATES UR QL SCN: POSITIVE
BASE DEFICIT BLD-SCNC: 3 MMOL/L
BASOPHILS # BLD: 0.1 K/UL (ref 0–0.1)
BASOPHILS NFR BLD: 1 % (ref 0–1)
BDY SITE: ABNORMAL
BENZODIAZ UR QL: NEGATIVE
BILIRUB SERPL-MCNC: 0.3 MG/DL (ref 0.2–1)
BNP SERPL-MCNC: 22 PG/ML
BUN SERPL-MCNC: 7 MG/DL (ref 6–20)
BUN/CREAT SERPL: 9 (ref 12–20)
CA-I BLD-SCNC: 1.2 MMOL/L (ref 1.12–1.32)
CALCIUM SERPL-MCNC: 8.9 MG/DL (ref 8.5–10.1)
CANNABINOIDS UR QL SCN: POSITIVE
CHLORIDE SERPL-SCNC: 104 MMOL/L (ref 97–108)
CO2 SERPL-SCNC: 24 MMOL/L (ref 21–32)
COCAINE UR QL SCN: NEGATIVE
COMMENT, HOLDF: NORMAL
COVID-19 RAPID TEST, COVR: NOT DETECTED
CREAT SERPL-MCNC: 0.8 MG/DL (ref 0.55–1.02)
D DIMER PPP FEU-MCNC: 0.19 MG/L FEU (ref 0–0.65)
DIFFERENTIAL METHOD BLD: ABNORMAL
DRUG SCRN COMMENT,DRGCM: ABNORMAL
EOSINOPHIL # BLD: 0.2 K/UL (ref 0–0.4)
EOSINOPHIL NFR BLD: 3 % (ref 0–7)
ERYTHROCYTE [DISTWIDTH] IN BLOOD BY AUTOMATED COUNT: 19 % (ref 11.5–14.5)
GAS FLOW.O2 O2 DELIVERY SYS: ABNORMAL L/MIN
GLOBULIN SER CALC-MCNC: 3.8 G/DL (ref 2–4)
GLUCOSE SERPL-MCNC: 108 MG/DL (ref 65–100)
HCG SERPL QL: NEGATIVE
HCO3 BLD-SCNC: 23.3 MMOL/L (ref 22–26)
HCT VFR BLD AUTO: 42.4 % (ref 35–47)
HGB BLD-MCNC: 13.7 G/DL (ref 11.5–16)
IMM GRANULOCYTES # BLD AUTO: 0 K/UL (ref 0–0.04)
IMM GRANULOCYTES NFR BLD AUTO: 0 % (ref 0–0.5)
LYMPHOCYTES # BLD: 2 K/UL (ref 0.8–3.5)
LYMPHOCYTES NFR BLD: 34 % (ref 12–49)
MAGNESIUM SERPL-MCNC: 1.5 MG/DL (ref 1.6–2.4)
MAGNESIUM SERPL-MCNC: 2.4 MG/DL (ref 1.6–2.4)
MCH RBC QN AUTO: 27.1 PG (ref 26–34)
MCHC RBC AUTO-ENTMCNC: 32.3 G/DL (ref 30–36.5)
MCV RBC AUTO: 83.8 FL (ref 80–99)
METHADONE UR QL: NEGATIVE
MONOCYTES # BLD: 0.5 K/UL (ref 0–1)
MONOCYTES NFR BLD: 9 % (ref 5–13)
NEUTS SEG # BLD: 3.1 K/UL (ref 1.8–8)
NEUTS SEG NFR BLD: 53 % (ref 32–75)
NRBC # BLD: 0 K/UL (ref 0–0.01)
NRBC BLD-RTO: 0 PER 100 WBC
O2/TOTAL GAS SETTING VFR VENT: 21 %
OPIATES UR QL: NEGATIVE
PCO2 BLD: 46.9 MMHG (ref 35–45)
PCP UR QL: NEGATIVE
PH BLD: 7.3 [PH] (ref 7.35–7.45)
PHOSPHATE SERPL-MCNC: 2.4 MG/DL (ref 2.6–4.7)
PLATELET # BLD AUTO: 304 K/UL (ref 150–400)
PMV BLD AUTO: 9.4 FL (ref 8.9–12.9)
PO2 BLD: 62 MMHG (ref 80–100)
POTASSIUM SERPL-SCNC: 3.9 MMOL/L (ref 3.5–5.1)
PROT SERPL-MCNC: 7.5 G/DL (ref 6.4–8.2)
RBC # BLD AUTO: 5.06 M/UL (ref 3.8–5.2)
SAMPLES BEING HELD,HOLD: NORMAL
SAO2 % BLD: 89 % (ref 92–97)
SARS-COV-2, COV2: NORMAL
SODIUM SERPL-SCNC: 135 MMOL/L (ref 136–145)
SOURCE, COVRS: NORMAL
SPECIMEN TYPE: ABNORMAL
TOTAL RESP. RATE, ITRR: 18
TROPONIN I SERPL-MCNC: <0.05 NG/ML
TROPONIN I SERPL-MCNC: <0.05 NG/ML
TSH SERPL DL<=0.05 MIU/L-ACNC: 0.95 UIU/ML (ref 0.36–3.74)
VALPROATE SERPL-MCNC: <3 UG/ML (ref 50–100)
WBC # BLD AUTO: 5.8 K/UL (ref 3.6–11)

## 2021-02-06 PROCEDURE — 84484 ASSAY OF TROPONIN QUANT: CPT

## 2021-02-06 PROCEDURE — 74011250636 HC RX REV CODE- 250/636: Performed by: EMERGENCY MEDICINE

## 2021-02-06 PROCEDURE — 85379 FIBRIN DEGRADATION QUANT: CPT

## 2021-02-06 PROCEDURE — 87635 SARS-COV-2 COVID-19 AMP PRB: CPT

## 2021-02-06 PROCEDURE — 94762 N-INVAS EAR/PLS OXIMTRY CONT: CPT

## 2021-02-06 PROCEDURE — 71045 X-RAY EXAM CHEST 1 VIEW: CPT

## 2021-02-06 PROCEDURE — 84443 ASSAY THYROID STIM HORMONE: CPT

## 2021-02-06 PROCEDURE — 84100 ASSAY OF PHOSPHORUS: CPT

## 2021-02-06 PROCEDURE — 80164 ASSAY DIPROPYLACETIC ACD TOT: CPT

## 2021-02-06 PROCEDURE — 02HV33Z INSERTION OF INFUSION DEVICE INTO SUPERIOR VENA CAVA, PERCUTANEOUS APPROACH: ICD-10-PCS | Performed by: EMERGENCY MEDICINE

## 2021-02-06 PROCEDURE — 80307 DRUG TEST PRSMV CHEM ANLYZR: CPT

## 2021-02-06 PROCEDURE — 74011250637 HC RX REV CODE- 250/637: Performed by: INTERNAL MEDICINE

## 2021-02-06 PROCEDURE — 36600 WITHDRAWAL OF ARTERIAL BLOOD: CPT

## 2021-02-06 PROCEDURE — 65660000001 HC RM ICU INTERMED STEPDOWN

## 2021-02-06 PROCEDURE — 80053 COMPREHEN METABOLIC PANEL: CPT

## 2021-02-06 PROCEDURE — U0005 INFEC AGEN DETEC AMPLI PROBE: HCPCS

## 2021-02-06 PROCEDURE — 83880 ASSAY OF NATRIURETIC PEPTIDE: CPT

## 2021-02-06 PROCEDURE — 36415 COLL VENOUS BLD VENIPUNCTURE: CPT

## 2021-02-06 PROCEDURE — 83735 ASSAY OF MAGNESIUM: CPT

## 2021-02-06 PROCEDURE — 85025 COMPLETE CBC W/AUTO DIFF WBC: CPT

## 2021-02-06 PROCEDURE — 74011636637 HC RX REV CODE- 636/637: Performed by: INTERNAL MEDICINE

## 2021-02-06 PROCEDURE — 82803 BLOOD GASES ANY COMBINATION: CPT

## 2021-02-06 PROCEDURE — 74011250636 HC RX REV CODE- 250/636: Performed by: INTERNAL MEDICINE

## 2021-02-06 PROCEDURE — 84703 CHORIONIC GONADOTROPIN ASSAY: CPT

## 2021-02-06 PROCEDURE — 94660 CPAP INITIATION&MGMT: CPT

## 2021-02-06 RX ORDER — DIVALPROEX SODIUM 500 MG/1
500 TABLET, DELAYED RELEASE ORAL 2 TIMES DAILY
Status: DISCONTINUED | OUTPATIENT
Start: 2021-02-06 | End: 2021-02-10 | Stop reason: HOSPADM

## 2021-02-06 RX ORDER — PREDNISONE 20 MG/1
40 TABLET ORAL
Status: COMPLETED | OUTPATIENT
Start: 2021-02-06 | End: 2021-02-10

## 2021-02-06 RX ORDER — AMITRIPTYLINE HYDROCHLORIDE 25 MG/1
100 TABLET, FILM COATED ORAL
Status: DISCONTINUED | OUTPATIENT
Start: 2021-02-06 | End: 2021-02-10 | Stop reason: HOSPADM

## 2021-02-06 RX ORDER — ONDANSETRON 2 MG/ML
4 INJECTION INTRAMUSCULAR; INTRAVENOUS
Status: DISCONTINUED | OUTPATIENT
Start: 2021-02-06 | End: 2021-02-10 | Stop reason: HOSPADM

## 2021-02-06 RX ORDER — MONTELUKAST SODIUM 10 MG/1
10 TABLET ORAL
Status: DISCONTINUED | OUTPATIENT
Start: 2021-02-06 | End: 2021-02-10 | Stop reason: HOSPADM

## 2021-02-06 RX ORDER — POLYETHYLENE GLYCOL 3350 17 G/17G
17 POWDER, FOR SOLUTION ORAL DAILY PRN
Status: DISCONTINUED | OUTPATIENT
Start: 2021-02-06 | End: 2021-02-10 | Stop reason: HOSPADM

## 2021-02-06 RX ORDER — ACETAMINOPHEN 650 MG/1
650 SUPPOSITORY RECTAL
Status: DISCONTINUED | OUTPATIENT
Start: 2021-02-06 | End: 2021-02-10 | Stop reason: HOSPADM

## 2021-02-06 RX ORDER — LEVETIRACETAM 500 MG/1
500 TABLET ORAL 2 TIMES DAILY
Status: DISCONTINUED | OUTPATIENT
Start: 2021-02-06 | End: 2021-02-10 | Stop reason: HOSPADM

## 2021-02-06 RX ORDER — CLONIDINE HYDROCHLORIDE 0.1 MG/1
0.3 TABLET ORAL 3 TIMES DAILY
Status: DISCONTINUED | OUTPATIENT
Start: 2021-02-06 | End: 2021-02-10 | Stop reason: HOSPADM

## 2021-02-06 RX ORDER — QUETIAPINE FUMARATE 100 MG/1
400 TABLET, FILM COATED ORAL
Status: DISCONTINUED | OUTPATIENT
Start: 2021-02-06 | End: 2021-02-10 | Stop reason: HOSPADM

## 2021-02-06 RX ORDER — PRAZOSIN HYDROCHLORIDE 1 MG/1
4 CAPSULE ORAL
Status: DISCONTINUED | OUTPATIENT
Start: 2021-02-06 | End: 2021-02-10 | Stop reason: HOSPADM

## 2021-02-06 RX ORDER — SERTRALINE HYDROCHLORIDE 50 MG/1
25 TABLET, FILM COATED ORAL DAILY
Status: DISCONTINUED | OUTPATIENT
Start: 2021-02-06 | End: 2021-02-10 | Stop reason: HOSPADM

## 2021-02-06 RX ORDER — IPRATROPIUM BROMIDE AND ALBUTEROL SULFATE 2.5; .5 MG/3ML; MG/3ML
3 SOLUTION RESPIRATORY (INHALATION)
Status: DISCONTINUED | OUTPATIENT
Start: 2021-02-06 | End: 2021-02-10 | Stop reason: HOSPADM

## 2021-02-06 RX ORDER — IBUPROFEN 200 MG
1 TABLET ORAL DAILY
Status: DISCONTINUED | OUTPATIENT
Start: 2021-02-06 | End: 2021-02-10 | Stop reason: HOSPADM

## 2021-02-06 RX ORDER — SODIUM CHLORIDE 0.9 % (FLUSH) 0.9 %
5-40 SYRINGE (ML) INJECTION EVERY 8 HOURS
Status: DISCONTINUED | OUTPATIENT
Start: 2021-02-06 | End: 2021-02-10 | Stop reason: HOSPADM

## 2021-02-06 RX ORDER — PROMETHAZINE HYDROCHLORIDE 25 MG/1
12.5 TABLET ORAL
Status: DISCONTINUED | OUTPATIENT
Start: 2021-02-06 | End: 2021-02-10 | Stop reason: HOSPADM

## 2021-02-06 RX ORDER — GABAPENTIN 300 MG/1
300 CAPSULE ORAL 3 TIMES DAILY
Status: DISCONTINUED | OUTPATIENT
Start: 2021-02-06 | End: 2021-02-10 | Stop reason: HOSPADM

## 2021-02-06 RX ORDER — ENOXAPARIN SODIUM 100 MG/ML
40 INJECTION SUBCUTANEOUS DAILY
Status: DISCONTINUED | OUTPATIENT
Start: 2021-02-06 | End: 2021-02-10 | Stop reason: HOSPADM

## 2021-02-06 RX ORDER — BUPRENORPHINE HYDROCHLORIDE AND NALOXONE HYDROCHLORIDE DIHYDRATE 8; 2 MG/1; MG/1
2 TABLET SUBLINGUAL DAILY
Status: DISCONTINUED | OUTPATIENT
Start: 2021-02-06 | End: 2021-02-10 | Stop reason: HOSPADM

## 2021-02-06 RX ORDER — ACETAMINOPHEN 325 MG/1
650 TABLET ORAL
Status: DISCONTINUED | OUTPATIENT
Start: 2021-02-06 | End: 2021-02-10 | Stop reason: HOSPADM

## 2021-02-06 RX ORDER — SODIUM CHLORIDE 0.9 % (FLUSH) 0.9 %
5-40 SYRINGE (ML) INJECTION AS NEEDED
Status: DISCONTINUED | OUTPATIENT
Start: 2021-02-06 | End: 2021-02-10 | Stop reason: HOSPADM

## 2021-02-06 RX ADMIN — ONDANSETRON 4 MG: 2 INJECTION INTRAMUSCULAR; INTRAVENOUS at 05:50

## 2021-02-06 RX ADMIN — SODIUM CHLORIDE 500 ML: 9 INJECTION, SOLUTION INTRAVENOUS at 03:39

## 2021-02-06 RX ADMIN — LEVETIRACETAM 500 MG: 500 TABLET ORAL at 08:54

## 2021-02-06 RX ADMIN — DIVALPROEX SODIUM 500 MG: 500 TABLET, DELAYED RELEASE ORAL at 08:55

## 2021-02-06 RX ADMIN — CLONIDINE HYDROCHLORIDE 0.3 MG: 0.2 TABLET ORAL at 08:55

## 2021-02-06 RX ADMIN — MAGNESIUM SULFATE HEPTAHYDRATE 2 G: 40 INJECTION, SOLUTION INTRAVENOUS at 03:39

## 2021-02-06 RX ADMIN — GABAPENTIN 300 MG: 100 CAPSULE ORAL at 21:05

## 2021-02-06 RX ADMIN — AZITHROMYCIN MONOHYDRATE 500 MG: 500 INJECTION, POWDER, LYOPHILIZED, FOR SOLUTION INTRAVENOUS at 05:29

## 2021-02-06 RX ADMIN — PREDNISONE 40 MG: 20 TABLET ORAL at 08:55

## 2021-02-06 RX ADMIN — QUETIAPINE FUMARATE 400 MG: 100 TABLET ORAL at 05:27

## 2021-02-06 RX ADMIN — Medication 10 ML: at 21:06

## 2021-02-06 RX ADMIN — MONTELUKAST 10 MG: 10 TABLET, FILM COATED ORAL at 23:14

## 2021-02-06 RX ADMIN — GABAPENTIN 300 MG: 100 CAPSULE ORAL at 08:54

## 2021-02-06 RX ADMIN — QUETIAPINE FUMARATE 400 MG: 100 TABLET ORAL at 23:13

## 2021-02-06 RX ADMIN — AMITRIPTYLINE HYDROCHLORIDE 100 MG: 25 TABLET, FILM COATED ORAL at 05:25

## 2021-02-06 RX ADMIN — CLONIDINE HYDROCHLORIDE 0.3 MG: 0.2 TABLET ORAL at 21:05

## 2021-02-06 RX ADMIN — SERTRALINE 25 MG: 50 TABLET, FILM COATED ORAL at 08:55

## 2021-02-06 RX ADMIN — METHYLPREDNISOLONE SODIUM SUCCINATE 125 MG: 40 INJECTION, POWDER, FOR SOLUTION INTRAMUSCULAR; INTRAVENOUS at 03:40

## 2021-02-06 RX ADMIN — MONTELUKAST 10 MG: 10 TABLET, FILM COATED ORAL at 05:27

## 2021-02-06 RX ADMIN — Medication 10 ML: at 05:28

## 2021-02-06 RX ADMIN — PRAZOSIN HYDROCHLORIDE 4 MG: 1 CAPSULE ORAL at 05:26

## 2021-02-06 RX ADMIN — AMITRIPTYLINE HYDROCHLORIDE 100 MG: 25 TABLET, FILM COATED ORAL at 23:13

## 2021-02-06 RX ADMIN — BUPRENORPHINE HYDROCHLORIDE AND NALOXONE HYDROCHLORIDE DIHYDRATE 2 TABLET: 8; 2 TABLET SUBLINGUAL at 08:53

## 2021-02-06 RX ADMIN — ACETAMINOPHEN 650 MG: 325 TABLET ORAL at 05:24

## 2021-02-06 NOTE — H&P
[address] [facility]  HISTORY AND PHYSICAL    Name:  Ezequiel Hartley  MR#:  [id number]  :  1975  ACCOUNT #:  [de-identified]  ADMIT DATE:  2021      The patient was seen, evaluated and admitted by me on 2021. PRIMARY CARE PHYSICIAN:  None. SOURCE OF INFORMATION:  The patient and review of ED and old electronic medical records. CHIEF COMPLAINT:  Shortness of breath. HISTORY OF PRESENT ILLNESS:  This is 47-year-old woman with past medical history significant for COPD, hypertension, bipolar disorder, and seizure disorder, who was in her usual state of health until about 2 days ago when the patient developed shortness of breath which is progressive and getting worse. This is also associated with cough which is nonproductive. The shortness of breath is worse with mild exertion such as walking. The patient has history of COPD, not on oxygen at home. The patient denies fever, rigor, or chills. No sick contact or contact any person with COVID-19 virus infection. The patient was brought to the emergency room for further evaluation. When the patient arrived at the emergency room, the patient was in significant respiratory distress and required being placed on BiPAP. The patient was subsequently referred to the hospitalist service for evaluation for admission. The patient was last admitted to the hospital from 2020 to 2020. The patient was admitted and treated for COPD/asthma exacerbation. PAST MEDICAL HISTORY:  1. COPD. 2.  Hypertension. 3.  Bipolar disorder. 4.  Seizure disorder. 5.  Polysubstance abuse. ALLERGIES:  THE PATIENT IS ALLERGIC TO VANCOMYCIN, TRAZODONE, AND TOMATO. MEDICATIONS:  1. Albuterol 90 mcg 2 puffs by inhalation every 6 hours as needed for wheezing. 2.  DuoNeb every 4 hours as needed for wheezing. 3.  Elavil 100 mg daily at bedtime. 4.  Suboxone, dosage as directed. 5.  Clonidine 0.3 mg 3 times daily.   6.  Depakote 500 mg twice daily.  7.  Advair 100/50 inhalations twice daily. 8.  Neurontin 300 mg 3 times daily. 9.  Keppra 500 mg twice daily. 10.  Singulair 10 mg daily. 11.  Minipress 2 capsules daily at bedtime. 12.  Seroquel 400 mg daily at bedtime. 13.  Zoloft 25 mg daily. 14.  Spiriva 18 mcg inhalation daily. FAMILY HISTORY:  This was reviewed. Her mother and father have hypertension. PAST SURGICAL HISTORY:  This is significant for cholecystectomy. SOCIAL HISTORY:  The patient smokes half a pack of cigarettes daily. Denies alcohol abuse. The patient also denies history of illicit drug use. REVIEW OF SYSTEMS:  HEAD, EYES, EARS, NOSE, AND THROAT:  No headache, no dizziness, no blurring of vision, no photophobia. RESPIRATORY SYSTEM:  This is positive for shortness of breath and cough. No hemoptysis. CARDIOVASCULAR SYSTEM:  No chest pain, no orthopnea, no palpitations. GASTROINTESTINAL SYSTEM:  No nausea or vomiting. No diarrhea, no constipation. GENITOURINARY SYSTEM:  No dysuria, no urgency, no frequency. All other systems are reviewed and they are negative. PHYSICAL EXAMINATION:  GENERAL APPEARANCE:  The patient appeared ill, in moderate distress. VITAL SIGNS:  On arrival at the emergency room; temperature 97.6, pulse 112, respiratory rate 20, blood pressure 161/108, oxygen saturation 94% on room air. HEENT:  Head:  Normocephalic, atraumatic. Eyes:  Normal eye movement. No redness, no drainage, no discharge. Ears:  Normal external ears with no evidence of drainage. Nose:  No deformity, no drainage. Mouth and Throat:  No visible oral lesion. NECK:  Neck is supple. No JVD, no thyromegaly. CHEST:  Diffuse expiratory wheezing. No crackles. HEART:  Normal S1 and S2, regular. No clinically appreciable murmur. ABDOMEN:  Soft, nontender. Normal bowel sounds. CNS:  Alert and oriented x3. No gross focal neurological deficit. EXTREMITIES:  No edema. Pulses 2+ bilaterally.   MUSCULOSKELETAL SYSTEM:  No obvious joint deformity or swelling. SKIN:  No active skin lesions seen in the exposed part of the body. PSYCHIATRY:  Normal mood and affect. LYMPHATIC SYSTEM:  No cervical lymphadenopathy. DIAGNOSTIC DATA:  Chest x-ray, no acute abnormalities. EKG shows sinus tachycardia and nonspecific T-waves abnormalities. LABORATORY DATA:  Hematology:  WBC 5.8, hemoglobin 13.7, hematocrit 42.4, platelets 922. Cardiac profile:  Troponin less than 0.05. Chemistry:  Sodium 135, potassium 3.9, chloride 104, CO2 of 24, glucose 108, BUN 7, creatinine 0.80, calcium 8.9, total bilirubin 0.3, ALT 32, AST 42, alkaline phosphatase 96, total protein 7.5, albumin level 3.7, globulin 3.8. Pregnancy test negative. Magnesium 1.5. Pro-BNP level 22. ASSESSMENT:  1. Acute respiratory failure with hypoxia. 2.  Chronic obstructive pulmonary disease with acute exacerbation. 3.  Hypertension. 4.  Bipolar disorder. 5.  Hypomagnesemia. 6.  Polysubstance abuse. 7.  Tobacco abuse. 8.  Seizure disorder. PLAN:  1. Acute respiratory failure with hypoxia: We will admit the patient for further evaluation and treatment. We will identify and treat underlying etiological factors which include COPD with acute exacerbation. BNP level is within normal limits. We will check serial cardiac markers to rule out acute myocardial infarction as another possible cause of acute respiratory failure with hypoxia. We will continue with supplemental oxygen. 2.  COPD with acute exacerbation: We will start the patient on short course of prednisone and Zithromax for any underlying chest infection. This is the most likely cause of the patient's acute respiratory failure with hypoxia. We will continue with supplemental oxygen as stated above. 3.  Hypertension: We will resume preadmission medication. We will monitor the patient's blood pressure closely. 4.  Bipolar disorder: We will continue with preadmission medication.   We will check a Depakote level. 5.  Hypomagnesemia: We will replace magnesium and repeat magnesium level. 6.  Polysubstance abuse: We will check a urine drug screen. We will continue with Suboxone. 7.  Tobacco abuse: The patient advised to quit smoking. We will place the patient on Nicoderm patch. 8.  Seizure disorder: We will continue with Keppra. OTHER ISSUES:  Code status: The patient is a full code. We will place the patient on Lovenox for DVT prophylaxis. FUNCTIONAL STATUS PRIOR TO ADMISSION:  The patient came from home. The patient is ambulatory with no assistant or device. COVID PRECAUTION:  The patient was wearing a face mask. I was wearing a face mask and gloves for this patient's encounter. The patient presented with shortness of breath and the patient will be screened for COVID-19 virus infection.         Chloe Villegas MD      [id number]  D:  02/06/2021 7:22  T:  02/06/2021 9:04  JOB #:  [id number]

## 2021-02-06 NOTE — ED NOTES
1145 - Pt refusing for this RN to complete a full assessment. She pushes this RN away. Pt is uncooperative. 1240 - Verbal reports given to TIERA George.

## 2021-02-06 NOTE — ED NOTES
Resumed care of patient from [facility]. Pt on bipap. VSS. Pt remains drowsy and continues to refuse care.

## 2021-02-06 NOTE — ED NOTES
Multiple attempts at PIV access, RN attempted unsuccessfully with ultrasound. MD to bedside to attempt ultrasound IV at this time.

## 2021-02-06 NOTE — ED PROVIDER NOTES
64-year-old female with a history of asthma, COPD, congestive heart failure presents with a chief complaint of shortness of breath for the last 2 days. She denies any exposure to Covid. She has not had a fever. She denies chest pain, abdominal pain, GI or urinary symptoms.            Past Medical History:   Diagnosis Date    Asthma     Bipolar 1 disorder (HCC)     Chronic obstructive pulmonary disease (HCC)     Chronic pain     back, leg    Cocaine abuse (HCC)     Depression     Heart failure (HCC)     Hepatitis B     Heroin abuse (HCC)     HTN (hypertension)     Narcotic abuse (HCC)     Polysubstance abuse ([facility] [address]     Sarcoidosis     Tobacco abuse        Past Surgical History:   Procedure Laterality Date    HX GYN      HX WISDOM TEETH EXTRACTION           Family History:   Problem Relation Age of Onset    Hypertension Father     Hypertension Mother        Social History     Socioeconomic History    Marital status: SINGLE     Spouse name: Not on file    Number of children: Not on file    Years of education: Not on file    Highest education level: Not on file   Occupational History    Not on file   Social Needs    Financial resource strain: Not on file    Food insecurity     Worry: Not on file     Inability: Not on file    Transportation needs     Medical: Not on file     Non-medical: Not on file   Tobacco Use    Smoking status: Current Every Day Smoker     Packs/day: 0.25     Years: 15.00     Pack years: 3.75     Last attempt to quit: 8/3/2017     Years since quitting: 3.5    Smokeless tobacco: Never Used    Tobacco comment: \"I already quit\"   Substance and Sexual Activity    Alcohol use: No    Drug use: Yes     Types: Marijuana, Heroin     Comment: Pt reports last used heroin on 9/23/20    Sexual activity: Yes     Partners: Female   Lifestyle    Physical activity     Days per week: Not on file     Minutes per session: Not on file    Stress: Not on file   Relationships    Social connections     Talks on phone: Not on file     Gets together: Not on file     Attends Anabaptism service: Not on file     Active member of club or organization: Not on file     Attends meetings of clubs or organizations: Not on file     Relationship status: Not on file    Intimate partner violence     Fear of current or ex partner: Not on file     Emotionally abused: Not on file     Physically abused: Not on file     Forced sexual activity: Not on file   Other Topics Concern    Not on file   Social History Narrative    Born in [place] [address],     [place], 5 children,    No legal charges. Pt graduated from high school. Pt was employed last month at [employer], currently she is unemployed. Pt lives with her mother and 8year old son. She has 4 adult children. ALLERGIES: Vancomycin, Tomato, and Trazodone    Review of Systems   Constitutional: Negative for fever. HENT: Negative for rhinorrhea. Respiratory: Positive for shortness of breath. Cardiovascular: Negative for chest pain. Gastrointestinal: Negative for abdominal pain. Genitourinary: Negative for dysuria. Musculoskeletal: Negative for back pain. Skin: Negative for wound. Neurological: Negative for headaches. Psychiatric/Behavioral: Negative for confusion. Vitals:    02/05/21 1714   BP: (!) 161/108   Pulse: (!) 112   Resp: 20   Temp: 97.6 °F (36.4 °C)   SpO2: 94%            Physical Exam  Vitals signs and nursing note reviewed. Constitutional:       Appearance: Normal appearance. She is well-developed. She is ill-appearing. HENT:      Head: Normocephalic and atraumatic. Eyes:      Extraocular Movements: Extraocular movements intact. Neck:      Musculoskeletal: Normal range of motion. Cardiovascular:      Rate and Rhythm: Normal rate and regular rhythm. Pulses: Normal pulses. Heart sounds: Normal heart sounds. Pulmonary:      Effort: Tachypnea present.       Breath sounds: Examination of the right-upper field reveals wheezing. Examination of the left-upper field reveals wheezing. Examination of the right-middle field reveals wheezing. Examination of the left-middle field reveals wheezing. Examination of the right-lower field reveals wheezing. Examination of the left-lower field reveals wheezing. Wheezing present. Abdominal:      General: There is no distension. Palpations: Abdomen is soft. Tenderness: There is no abdominal tenderness. Musculoskeletal: Normal range of motion. Skin:     General: Skin is warm and dry. Neurological:      Mental Status: She is alert and oriented to person, place, and time. Psychiatric:         Mood and Affect: Mood normal.          MDM  Number of Diagnoses or Management Options  Asthma with acute exacerbation, unspecified asthma severity, unspecified whether persistent  Diagnosis management comments: 40-year-old female presents with shortness of breath. She has significant wheezes on exam and there are audible without stethoscope. Patient was given multiple breathing treatments and IV fluids. Also treated her with magnesium and Solu-Medrol. IV access was extremely difficult and multiple nurses attempted including pediatric nurses. I attempted ultrasound-guided IVs with no success. Ultimately the patient required a central line. She was consented verbally for a left IJ which was placed without complication. See procedure note for details. Patient was placed on BiPAP with improvement in her work of breathing. She will be admitted to the hospital for asthma exacerbation. She is comfortable and agreeable with plan of care. EKG shows sinus tachycardia rate of 103, normal intervals, normal axis, no ischemic changes.       Perfect Serve Consult for Admission  3:00 AM    ED Room Number: ER05/05  Patient Name and age:  Donnal Pack 50 y.o.  female  Working Diagnosis: Asthma with acute exacerbation, unspecified asthma severity, unspecified whether persistent  (primary encounter diagnosis)    COVID-19 Suspicion:  no  Sepsis present:  no  Reassessment needed: no  Code Status:  Full Code  Readmission: no  Isolation Requirements:  no  Recommended Level of Care:  telemetry  Department:[facility] Adult ED - 21   Other:  Left IJ in place    Total critical care time spent exclusive of procedures:  37 minutes           Central Line    Date/Time: 2/6/2021 3:38 AM  Performed by: Shawna Soto MD  Authorized by: Shawna Soto MD     Consent:     Consent obtained:  Verbal    Consent given by:  Patient    Risks discussed:  Arterial puncture, bleeding, infection, incorrect placement and pneumothorax    Alternatives discussed:  No treatment  Pre-procedure details:     Hand hygiene: Hand hygiene performed prior to insertion      Sterile barrier technique: All elements of maximal sterile technique followed      Skin preparation:  2% chlorhexidine    Skin preparation agent: Skin preparation agent completely dried prior to procedure    Anesthesia (see MAR for exact dosages): Anesthesia method:  Local infiltration    Local anesthetic:  Lidocaine 1% w/o epi  Procedure details:     Location:  L internal jugular    Patient position:  Trendelenburg    Procedural supplies:  Triple lumen    Catheter size:  7.5 Fr    Landmarks identified: yes      Ultrasound guidance: yes      Sterile ultrasound techniques: Sterile gel and sterile probe covers were used      Number of attempts:  1    Successful placement: yes    Post-procedure details:     Post-procedure:  Dressing applied    Assessment:  Blood return through all ports, no pneumothorax on x-ray, free fluid flow and placement verified by x-ray    Patient tolerance of procedure:   Tolerated well, no immediate complications

## 2021-02-06 NOTE — ED NOTES
[address] [facility] care of patient from [address]. Pt drowsy and arrousable to voice. Pt falls asleep while talking. MEWS score 1. Call bell within reach. 1210 - Attempted to do PCR swab. Verbally explained to pt procedure. Pt refuses. Notified Dr Bethany Lawrence of this pt falling asleep while talking.

## 2021-02-07 LAB
ALBUMIN SERPL-MCNC: 3.2 G/DL (ref 3.5–5)
ALBUMIN/GLOB SERPL: 0.9 {RATIO} (ref 1.1–2.2)
ALP SERPL-CCNC: 83 U/L (ref 45–117)
ALT SERPL-CCNC: 27 U/L (ref 12–78)
ANION GAP SERPL CALC-SCNC: 7 MMOL/L (ref 5–15)
AST SERPL-CCNC: 27 U/L (ref 15–37)
BASOPHILS # BLD: 0 K/UL (ref 0–0.1)
BASOPHILS NFR BLD: 0 % (ref 0–1)
BILIRUB SERPL-MCNC: 0.3 MG/DL (ref 0.2–1)
BUN SERPL-MCNC: 13 MG/DL (ref 6–20)
BUN/CREAT SERPL: 16 (ref 12–20)
CALCIUM SERPL-MCNC: 8.6 MG/DL (ref 8.5–10.1)
CHLORIDE SERPL-SCNC: 106 MMOL/L (ref 97–108)
CO2 SERPL-SCNC: 24 MMOL/L (ref 21–32)
CREAT SERPL-MCNC: 0.79 MG/DL (ref 0.55–1.02)
DIFFERENTIAL METHOD BLD: ABNORMAL
EOSINOPHIL # BLD: 0 K/UL (ref 0–0.4)
EOSINOPHIL NFR BLD: 0 % (ref 0–7)
ERYTHROCYTE [DISTWIDTH] IN BLOOD BY AUTOMATED COUNT: 18.7 % (ref 11.5–14.5)
GLOBULIN SER CALC-MCNC: 3.6 G/DL (ref 2–4)
GLUCOSE SERPL-MCNC: 122 MG/DL (ref 65–100)
HCT VFR BLD AUTO: 35.3 % (ref 35–47)
HGB BLD-MCNC: 11.6 G/DL (ref 11.5–16)
IMM GRANULOCYTES # BLD AUTO: 0.1 K/UL (ref 0–0.04)
IMM GRANULOCYTES NFR BLD AUTO: 1 % (ref 0–0.5)
LYMPHOCYTES # BLD: 1 K/UL (ref 0.8–3.5)
LYMPHOCYTES NFR BLD: 9 % (ref 12–49)
MCH RBC QN AUTO: 27.7 PG (ref 26–34)
MCHC RBC AUTO-ENTMCNC: 32.9 G/DL (ref 30–36.5)
MCV RBC AUTO: 84.2 FL (ref 80–99)
MONOCYTES # BLD: 0.6 K/UL (ref 0–1)
MONOCYTES NFR BLD: 6 % (ref 5–13)
NEUTS SEG # BLD: 9.5 K/UL (ref 1.8–8)
NEUTS SEG NFR BLD: 84 % (ref 32–75)
NRBC # BLD: 0 K/UL (ref 0–0.01)
NRBC BLD-RTO: 0 PER 100 WBC
PLATELET # BLD AUTO: 257 K/UL (ref 150–400)
PMV BLD AUTO: 9.7 FL (ref 8.9–12.9)
POTASSIUM SERPL-SCNC: 4.3 MMOL/L (ref 3.5–5.1)
PROT SERPL-MCNC: 6.8 G/DL (ref 6.4–8.2)
RBC # BLD AUTO: 4.19 M/UL (ref 3.8–5.2)
SARS-COV-2, COV2: NOT DETECTED
SODIUM SERPL-SCNC: 137 MMOL/L (ref 136–145)
SPECIMEN SOURCE, FCOV2M: NORMAL
WBC # BLD AUTO: 11.2 K/UL (ref 3.6–11)

## 2021-02-07 PROCEDURE — 74011250636 HC RX REV CODE- 250/636: Performed by: INTERNAL MEDICINE

## 2021-02-07 PROCEDURE — 80053 COMPREHEN METABOLIC PANEL: CPT

## 2021-02-07 PROCEDURE — 74011250637 HC RX REV CODE- 250/637: Performed by: INTERNAL MEDICINE

## 2021-02-07 PROCEDURE — 94760 N-INVAS EAR/PLS OXIMETRY 1: CPT

## 2021-02-07 PROCEDURE — 77010033711 HC HIGH FLOW OXYGEN

## 2021-02-07 PROCEDURE — 74011636637 HC RX REV CODE- 636/637: Performed by: INTERNAL MEDICINE

## 2021-02-07 PROCEDURE — 85025 COMPLETE CBC W/AUTO DIFF WBC: CPT

## 2021-02-07 PROCEDURE — 65660000001 HC RM ICU INTERMED STEPDOWN

## 2021-02-07 PROCEDURE — 36415 COLL VENOUS BLD VENIPUNCTURE: CPT

## 2021-02-07 RX ORDER — MORPHINE SULFATE 2 MG/ML
2 INJECTION, SOLUTION INTRAMUSCULAR; INTRAVENOUS ONCE
Status: COMPLETED | OUTPATIENT
Start: 2021-02-07 | End: 2021-02-07

## 2021-02-07 RX ADMIN — Medication 10 ML: at 04:21

## 2021-02-07 RX ADMIN — CLONIDINE HYDROCHLORIDE 0.3 MG: 0.2 TABLET ORAL at 09:03

## 2021-02-07 RX ADMIN — SERTRALINE 25 MG: 50 TABLET, FILM COATED ORAL at 09:03

## 2021-02-07 RX ADMIN — AZITHROMYCIN MONOHYDRATE 500 MG: 500 INJECTION, POWDER, LYOPHILIZED, FOR SOLUTION INTRAVENOUS at 04:21

## 2021-02-07 RX ADMIN — MONTELUKAST 10 MG: 10 TABLET, FILM COATED ORAL at 21:54

## 2021-02-07 RX ADMIN — GABAPENTIN 300 MG: 100 CAPSULE ORAL at 15:42

## 2021-02-07 RX ADMIN — QUETIAPINE FUMARATE 400 MG: 100 TABLET ORAL at 21:56

## 2021-02-07 RX ADMIN — PREDNISONE 40 MG: 20 TABLET ORAL at 09:03

## 2021-02-07 RX ADMIN — MORPHINE SULFATE 2 MG: 2 INJECTION, SOLUTION INTRAMUSCULAR; INTRAVENOUS at 12:31

## 2021-02-07 RX ADMIN — CLONIDINE HYDROCHLORIDE 0.3 MG: 0.2 TABLET ORAL at 15:41

## 2021-02-07 RX ADMIN — ACETAMINOPHEN 650 MG: 325 TABLET ORAL at 15:48

## 2021-02-07 RX ADMIN — AMITRIPTYLINE HYDROCHLORIDE 100 MG: 25 TABLET, FILM COATED ORAL at 21:56

## 2021-02-07 RX ADMIN — LEVETIRACETAM 500 MG: 500 TABLET ORAL at 09:04

## 2021-02-07 RX ADMIN — GABAPENTIN 300 MG: 100 CAPSULE ORAL at 21:54

## 2021-02-07 RX ADMIN — BUPRENORPHINE HYDROCHLORIDE AND NALOXONE HYDROCHLORIDE DIHYDRATE 2 TABLET: 8; 2 TABLET SUBLINGUAL at 09:03

## 2021-02-07 RX ADMIN — CLONIDINE HYDROCHLORIDE 0.3 MG: 0.2 TABLET ORAL at 21:55

## 2021-02-07 RX ADMIN — DIVALPROEX SODIUM 500 MG: 500 TABLET, DELAYED RELEASE ORAL at 09:03

## 2021-02-07 RX ADMIN — GABAPENTIN 300 MG: 100 CAPSULE ORAL at 09:03

## 2021-02-07 NOTE — ED NOTES
Bedside and Verbal shift change report given to Kaiden Baker RN (oncoming nurse) by Barbara Johnson RN (offgoing nurse). Report included the following information SBAR, Kardex, Intake/Output, MAR, Recent Results and Cardiac Rhythm NSR.

## 2021-02-07 NOTE — ROUTINE PROCESS
TRANSFER - OUT REPORT:    Verbal report given to [employer] (name) on Ashly Anthony  being transferred to (unit) for routine progression of care       Report consisted of patients Situation, Background, Assessment and   Recommendations(SBAR). Information from the following report(s) SBAR, Kardex, ED Summary, [facility] and Recent Results was reviewed with the receiving nurse. Lines:   Triple Lumen 02/06/21 Left Neck (Active)       Peripheral IV (Active)        Opportunity for questions and clarification was provided.       Patient transported with:   Monitor  Registered Nurse  Tech

## 2021-02-07 NOTE — PROGRESS NOTES
02/07/21 1127   Oxygen Therapy   O2 Sat (%) 95 %   Pulse via Oximetry 88 beats per minute   O2 Device Hi flow nasal cannula  (midflow)   O2 Flow Rate (L/min) 10 l/min     Patient placed on HFNC(midflow) per MD order.

## 2021-02-07 NOTE — PROGRESS NOTES
Patient is a pleasamt 50 YOF admitted this AM by ramirezist. She is on BiPAP for acute hypoxic respiratory failure. ABG was ordered at midnight but not done until she had been on BiPAP almost 12 hours, and not sure if she was retaining CO2. She has a Hx of Hypercapenic and hypoxic respiratory failure. Will continue BiPAP, failed trial off. Continue steroids and abx for COPDe.  Drug screen s/f polysuubstance abuse

## 2021-02-07 NOTE — PROGRESS NOTES
Bedside and Verbal shift change report given to Tory Astudillo Rn (oncoming nurse) by Louis Cox (offgoing nurse). Report included the following information SBAR, Kardex, ED Summary, Procedure Summary, Intake/Output, MAR, Recent Results and Cardiac Rhythm NSR.

## 2021-02-07 NOTE — PROGRESS NOTES
[facility]  [facility]ist [facility]                                                                                          Hospitalist Progress Note  Bertram Paul MD  Answering service: [phone] OR [phone] from in house phone        Date of Service:  2021  NAME:  Mitch Lima  :  1975  MRN:  [id number]      Admission Summary: This is 47-year-old woman with past medical history significant for COPD, hypertension, bipolar disorder, and seizure disorder, who was in her usual state of health until about 2 days ago when the patient developed shortness of breath which is progressive and getting worse. This is also associated with cough which is nonproductive. The shortness of breath is worse with mild exertion such as walking. The patient has history of COPD, not on oxygen at home. The patient denies fever, rigor, or chills. No sick contact or contact any person with COVID-19 virus infection. The patient was brought to the emergency room for further evaluation. When the patient arrived at the emergency room, the patient was in significant respiratory distress and required being placed on BiPAP. The patient was subsequently referred to the hospitalist service for evaluation for admission. The patient was last admitted to the hospital from 2020 to 2020. The patient was admitted and treated for COPD/asthma exacerbation. Interval history / Subjective:   Patient states she still has intermittent dyspnea and chest tightness. She states it is substernal and R sided, and feels the same as prior COPDe. She has R sided neck and abdominal pain. She has no other complaints. She is requesting morphine    Overnight nursing was able to get her off BiPAP a few times (patient self removed) and her SaO2 would initially stay up, but each time she required it to be placed back.      Assessment & Plan:     Acute respiratory failure with hypoxia:   COPD with Acute Exacerbation  -- Likely COPDe  -- BiPAP on and off the last night  -- Continue Steroids  -- Continue Azithromycin  -- Discussed with respiratory therapy - patient will not keep bipap on respiratory will work on other options and try hi flow, mid flow  -- Discussed with nursing  -- Continuous pulse oximetry  -- NOT SAFE for DC - patient requires BiPAP intermittenly    Hypertension:    -- Vitals as per unit routine  -- Continue Prazosin and Clonidine    Bipolar disorder:   -- We will continue with preadmission medication. -- Depakote level was low, likely not taking  -- Restart and check level before DC    Hypomagnesemia  --  We will replace magnesium and repeat magnesium level    Polysubstance abuse:   -- UDS positivefor benzos and barbituates  -- Continue Home meds    Tobacco Abuse:  -- Patient provided 5 minutes of smoking cessation counseling  -- She is not interested in quitting    Seizure disorder:    -- We will continue with Keppra. Code status: Full  DVT prophylaxis: Lovenox    Care Plan discussed with: Patient/Family  Anticipated Disposition: Home w/Family  Anticipated Discharge: 24 hours to 48 hours     Hospital Problems  Date Reviewed: 2/6/2021          Codes Class Noted POA    * (Principal) Acute respiratory failure with hypoxia [facility]) ICD-10-CM: J96.01  ICD-9-CM: 518.81  2/6/2021 Yes                Review of Systems:   A comprehensive review of systems was negative except for that written in the HPI. Vital Signs:    Last 24hrs VS reviewed since prior progress note.  Most recent are:  Visit Vitals  /70 (BP 1 Location: Right upper arm, BP Patient Position: At rest)   Pulse (!) 107   Temp 98 °F (36.7 °C)   Resp 18   Ht 5' 5\" (1.651 m)   Wt 91.5 kg (201 lb 11.5 oz)   SpO2 98%   BMI 33.57 kg/m²       No intake or output data in the 24 hours ending 02/07/21 1105     Physical Examination:     I had a face to face encounter with this patient and independently examined them on 2/7/2021 as outlined below:          Constitutional:  No acute distress, cooperative, pleasant    ENT:  Oral mucosa moist, oropharynx benign. Resp:  CTA bilaterally. No wheezing/rhonchi/rales. No accessory muscle use   CV:  Regular rhythm, normal rate, no murmurs, gallops, rubs    GI:  Soft, non distended, non tender. normoactive bowel sounds, no hepatosplenomegaly     Musculoskeletal:  No edema, warm, 2+ pulses throughout    Neurologic:  Moves all extremities. AAOx3, CN II-XII reviewed     Psych:  Good insight, Not anxious nor agitated. Data Review:    Review and/or order of clinical lab test  Review and/or order of tests in the radiology section of [facility]      Labs:     Recent Labs     02/07/21 0432 02/06/21  0024   WBC 11.2* 5.8   HGB 11.6 13.7   HCT 35.3 42.4    304     Recent Labs     02/07/21 0432 02/06/21 0710 02/06/21 0024     --  135*   K 4.3  --  3.9     --  104   CO2 24  --  24   BUN 13  --  7   CREA 0.79  --  0.80   *  --  108*   CA 8.6  --  8.9   MG  --  2.4 1.5*   PHOS  --  2.4*  --      Recent Labs     02/07/21 0432 02/06/21 0024   ALT 27 32   AP 83 96   TBILI 0.3 0.3   TP 6.8 7.5   ALB 3.2* 3.7   GLOB 3.6 3.8     No results for input(s): INR, PTP, APTT, INREXT in the last 72 hours. No results for input(s): FE, TIBC, PSAT, FERR in the last 72 hours. Lab Results   Component Value Date/Time    Folate 21.5 (H) 08/01/2017 02:53 AM      No results for input(s): PH, PCO2, PO2 in the last 72 hours.   Recent Labs     02/06/21 0710 02/06/21  0024   TROIQ <0.05 <0.05     Lab Results   Component Value Date/Time    Cholesterol, total 195 08/10/2018 12:07 AM    HDL Cholesterol 57 08/10/2018 12:07 AM    LDL, calculated 127.4 (H) 08/10/2018 12:07 AM    Triglyceride 53 08/10/2018 12:07 AM    CHOL/HDL Ratio 3.4 08/10/2018 12:07 AM     Lab Results   Component Value Date/Time    Glucose (POC) 91 08/03/2018 05:30 AM    Glucose (POC) 109 (H) 08/03/2018 12:37 AM    Glucose (POC) 111 (H) 07/31/2018 06:21 PM    Glucose (POC) 114 (H) 07/31/2018 12:05 PM    Glucose (POC) 71 07/31/2018 11:40 AM     Lab Results   Component Value Date/Time    Color YELLOW/STRAW 09/24/2020 02:41 AM    Appearance CLOUDY (A) 09/24/2020 02:41 AM    Specific gravity 1.020 09/24/2020 02:41 AM    Specific gravity >1.030 (H) 05/09/2009 09:27 PM    pH (UA) 6.0 09/24/2020 02:41 AM    Protein Negative 09/24/2020 02:41 AM    Glucose Negative 09/24/2020 02:41 AM    Ketone Negative 09/24/2020 02:41 AM    Bilirubin Negative 09/24/2020 02:41 AM    Urobilinogen 1.0 09/24/2020 02:41 AM    Nitrites Negative 09/24/2020 02:41 AM    Leukocyte Esterase Negative 09/24/2020 02:41 AM    Epithelial cells FEW 08/10/2018 03:23 AM    Bacteria NEGATIVE  08/10/2018 03:23 AM    WBC 0-4 08/10/2018 03:23 AM    RBC 0-5 08/10/2018 03:23 AM         Medications Reviewed:     Current Facility-Administered Medications   Medication Dose Route Frequency    morphine injection 2 mg  2 mg IntraVENous ONCE    albuterol-ipratropium (DUO-NEB) 2.5 MG-0.5 MG/3 ML  3 mL Nebulization Q4H PRN    amitriptyline (ELAVIL) tablet 100 mg  100 mg Oral QHS    buprenorphine-naloxone (SUBOXONE) 8-2mg SL tablet  2 Tab SubLINGual DAILY    cloNIDine HCL (CATAPRES) tablet 0.3 mg  0.3 mg Oral TID    divalproex DR (DEPAKOTE) tablet 500 mg  500 mg Oral BID    gabapentin (NEURONTIN) capsule 300 mg  300 mg Oral TID    levETIRAcetam (KEPPRA) tablet 500 mg  500 mg Oral BID    montelukast (SINGULAIR) tablet 10 mg  10 mg Oral QHS    prazosin (MINIPRESS) capsule 4 mg  4 mg Oral QHS    sertraline (ZOLOFT) tablet 25 mg  25 mg Oral DAILY    QUEtiapine (SEROquel) tablet 400 mg  400 mg Oral QHS    sodium chloride (NS) flush 5-40 mL  5-40 mL IntraVENous Q8H    sodium chloride (NS) flush 5-40 mL  5-40 mL IntraVENous PRN    acetaminophen (TYLENOL) tablet 650 mg  650 mg Oral Q6H PRN    Or    acetaminophen (TYLENOL) suppository 650 mg  650 mg Rectal Q6H PRN    polyethylene glycol (MIRALAX) packet 17 g 17 g Oral DAILY PRN    promethazine (PHENERGAN) tablet 12.5 mg  12.5 mg Oral Q6H PRN    Or    ondansetron (ZOFRAN) injection 4 mg  4 mg IntraVENous Q6H PRN    enoxaparin (LOVENOX) injection 40 mg  40 mg SubCUTAneous DAILY    azithromycin (ZITHROMAX) 500 mg in 0.9% sodium chloride 250 mL (VIAL-MATE)  500 mg IntraVENous Q24H    predniSONE (DELTASONE) tablet 40 mg  40 mg Oral DAILY WITH BREAKFAST    nicotine (NICODERM CQ) 21 mg/24 hr patch 1 Patch  1 Patch TransDERmal DAILY     ______________________________________________________________________  EXPECTED LENGTH OF STAY: - - -  ACTUAL LENGTH OF STAY:          1                 Avni Rendon MD

## 2021-02-08 PROCEDURE — 65660000001 HC RM ICU INTERMED STEPDOWN

## 2021-02-08 PROCEDURE — 94760 N-INVAS EAR/PLS OXIMETRY 1: CPT

## 2021-02-08 PROCEDURE — 74011636637 HC RX REV CODE- 636/637: Performed by: INTERNAL MEDICINE

## 2021-02-08 PROCEDURE — 77010033711 HC HIGH FLOW OXYGEN

## 2021-02-08 PROCEDURE — 74011250637 HC RX REV CODE- 250/637: Performed by: INTERNAL MEDICINE

## 2021-02-08 PROCEDURE — 74011250636 HC RX REV CODE- 250/636: Performed by: INTERNAL MEDICINE

## 2021-02-08 RX ORDER — FLUCONAZOLE 100 MG/1
150 TABLET ORAL
Status: COMPLETED | OUTPATIENT
Start: 2021-02-09 | End: 2021-02-09

## 2021-02-08 RX ORDER — SODIUM,POTASSIUM PHOSPHATES 280-250MG
1 POWDER IN PACKET (EA) ORAL 4 TIMES DAILY
Status: DISCONTINUED | OUTPATIENT
Start: 2021-02-08 | End: 2021-02-10 | Stop reason: HOSPADM

## 2021-02-08 RX ADMIN — DIVALPROEX SODIUM 500 MG: 500 TABLET, DELAYED RELEASE ORAL at 08:37

## 2021-02-08 RX ADMIN — CLONIDINE HYDROCHLORIDE 0.3 MG: 0.2 TABLET ORAL at 21:24

## 2021-02-08 RX ADMIN — SERTRALINE 25 MG: 50 TABLET, FILM COATED ORAL at 08:37

## 2021-02-08 RX ADMIN — MONTELUKAST 10 MG: 10 TABLET, FILM COATED ORAL at 21:25

## 2021-02-08 RX ADMIN — AZITHROMYCIN MONOHYDRATE 500 MG: 500 INJECTION, POWDER, LYOPHILIZED, FOR SOLUTION INTRAVENOUS at 07:17

## 2021-02-08 RX ADMIN — GABAPENTIN 300 MG: 100 CAPSULE ORAL at 21:24

## 2021-02-08 RX ADMIN — AMITRIPTYLINE HYDROCHLORIDE 100 MG: 25 TABLET, FILM COATED ORAL at 21:25

## 2021-02-08 RX ADMIN — BUPRENORPHINE HYDROCHLORIDE AND NALOXONE HYDROCHLORIDE DIHYDRATE 2 TABLET: 8; 2 TABLET SUBLINGUAL at 08:36

## 2021-02-08 RX ADMIN — CLONIDINE HYDROCHLORIDE 0.3 MG: 0.2 TABLET ORAL at 13:27

## 2021-02-08 RX ADMIN — POTASSIUM & SODIUM PHOSPHATES POWDER PACK 280-160-250 MG 1 PACKET: 280-160-250 PACK at 18:17

## 2021-02-08 RX ADMIN — PREDNISONE 40 MG: 20 TABLET ORAL at 08:38

## 2021-02-08 RX ADMIN — LEVETIRACETAM 500 MG: 500 TABLET ORAL at 08:37

## 2021-02-08 RX ADMIN — POTASSIUM & SODIUM PHOSPHATES POWDER PACK 280-160-250 MG 1 PACKET: 280-160-250 PACK at 13:27

## 2021-02-08 RX ADMIN — LEVETIRACETAM 500 MG: 500 TABLET ORAL at 18:18

## 2021-02-08 RX ADMIN — GABAPENTIN 300 MG: 100 CAPSULE ORAL at 16:26

## 2021-02-08 RX ADMIN — GABAPENTIN 300 MG: 100 CAPSULE ORAL at 08:38

## 2021-02-08 RX ADMIN — QUETIAPINE FUMARATE 400 MG: 100 TABLET ORAL at 21:24

## 2021-02-08 RX ADMIN — PRAZOSIN HYDROCHLORIDE 4 MG: 1 CAPSULE ORAL at 21:25

## 2021-02-08 RX ADMIN — CLONIDINE HYDROCHLORIDE 0.3 MG: 0.2 TABLET ORAL at 08:36

## 2021-02-08 RX ADMIN — DIVALPROEX SODIUM 500 MG: 500 TABLET, DELAYED RELEASE ORAL at 18:18

## 2021-02-08 RX ADMIN — Medication 10 ML: at 04:20

## 2021-02-08 RX ADMIN — Medication 10 ML: at 07:18

## 2021-02-08 RX ADMIN — Medication 10 ML: at 21:26

## 2021-02-08 NOTE — PROGRESS NOTES
Bedside shift change report given to Nesha (oncoming nurse) by Amanda Eckert (offgoing nurse). Report included the following information SBAR, Kardex, ED Summary, Intake/Output, MAR, Accordion, Recent Results, Med Rec Status and Cardiac Rhythm NSR.

## 2021-02-08 NOTE — PROGRESS NOTES
Bedside shift change report given to Juanpablo Patel RN (oncoming nurse) by Kai Cowden RN (offgoing nurse). Report included the following information SBAR, Kardex, MAR and Cardiac Rhythm NSR.

## 2021-02-08 NOTE — PROGRESS NOTES
CM  note:    CM met with patient per her request- patient requesting crisis stabilization services. CM explained [facility] not [facility] but for acute care services. Patient seeking for a safe place to go - states she was staying with friends and it is temporary and she is not able to return there- CM left patient with brochure for [facility] respite program and paperwork that she needs to complete- patient does not have her own meds - asked patient about having someone bring in her meds specifically her suboxone- patient states she is out of this medicine and was suppose to go to her appt today to get it refilled- CM notified patient that CM staff would follow-up with pateint in the am and that attending would need to be in agreement with plan for referral to [facility] [address] [facility] as well. Pt states she has had a PCP in the past but has not seen anyone in the past year. If patient goes to [facility] she is agreeable to obtaining a PCP with [facility] if accepted. CM to notify MD and follow-up in am.  DORCAS Chaidez      Spoke with Dr Audrey Moreno and she is agreeable with initial referral to be sent to [phone].   DORCAS Chaidez

## 2021-02-08 NOTE — PROGRESS NOTES
She was on room air upon arrival.  Her saturation was 98% and she was sleeping comfortably. I stayed several minutes and she remained the same. I left her on room air.

## 2021-02-08 NOTE — PROGRESS NOTES
Transition of Care Plan   RUR- 38% High   DISPOSITION: The disposition plan is TBD/subject to change pending recommendation and medical progression.  Transport: Possibly Lyft    Reason for Admission:  Patient developed shortness of breath which is progressive and getting worse                    RUR Score:  38% High Risk                   Plan for utilizing home health: Pending recommendation    PCP: First and Last name:  Najma Curran MD   Name of Practice: [facility] in [place]   Are you a current patient: Yes/No: Yes   Approximate date of last visit: Patient reports that she has not scheduled an appointment to see her PCP yet. Can you participate in a virtual visit with your PCP: No                    Current Advanced Directive/Advance Care Plan: Full Code, ACP documentation on file. Transition of Care Plan:  Pending recommendation. Reviewed chart for transitions of care and discussed in rounds. CM met with patient at bedside to explain role and offer support. Patient is alert and oriented x4 and confirmed demographics. At 2:24pm - CM contacted patient via patient room phone due to contact. Patient reported, \"I am homeless and I need you to help me find housing. \" CM introduced herself and her role. Patient confirmed her home address as [address] [place] [address]. [place] [address]. \" Patient then reported, \"I can't go back there, I am homeless right now. \" Patient confirmed her PCP however states, \"I have one but I haven't seen her or met with her yet. \" Patient reports that she utilized a [address] about \"10 years ago but is unable to recall the name of the facility at this time. \" Patient reports that she has not utilized an IPR previously. Patient reports that she utilizes the [facility] on [address] and [employer] [address] [employer] for her prescriptions. Patient also reports that she does not utilize DMEs.  Patient was not able to confirm mode of transportation post discharge. Patient requested to speak to another  to retreive resources for housing. TW and another CM met with patient at bedside to attain more information. Patient reported that she lived at the above address with her brother, mother, 2 sons and Step father. Patient reports that, \"for the past month she has been living with a friend and is not able to go back. \" When asked why, patient appeared agitated and reported, \"I just can't go there and I need somewhere else to go. \" Patient informed both CM's that she does have a PCP however she has not scheduled an appointment with her prior to this admission. CM provided patient with a list of Substance Abuse resources, [place] resources, Homeless Services contact information and Kevin Higgins and Shreyas Dixon. Patient requested to speak with CM's supervisor for more support. CM and CM's supervisor attempted to complete the [facility] [address] Referral Request Forms. Patient provided general information for the referral and was encouraged to read the information on the Medical Respite so that CM can submit appropriate completed referral. Patient was provided a pen to complete the documentation. Patient lastly reports that she was scheduled to attend a Suboxone appointment today at 3:00pm, however missed this appointment. CM faxed Daily [facility] Respite Referral Request form - [phone]. CM supervisor received approval from patients assigned physician. CM will continue to follow. Care Management Interventions  PCP Verified by CM: Yes(Arletta )  Palliative Care Criteria Met (RRAT>21 & CHF Dx)?: No  Mode of Transport at Discharge:  Other (see comment)(Patient reports she is unsure at this time.)  Transition of Care Consult (CM Consult): Discharge Planning  MyChart Signup: No  Discharge Durable Medical Equipment: No  Physical Therapy Consult: No  Occupational Therapy Consult: No  Speech Therapy Consult: No  Current Support Network: Other(Patient reports that she can't go home at this time and is \"homeless. \")  Confirm Follow Up Transport: (Patient reports that she is homeless at this time and won't have anyone to pick her up post discharge.)  The Patient and/or Patient Representative was Provided with a Choice of Provider and Agrees with the Discharge Plan?: Yes  Freedom of Choice List was Provided with Basic Dialogue that Supports the Patient's Individualized Plan of Care/Goals, Treatment Preferences and Shares the Quality Data Associated with the Providers?: Yes    DORCAS Buchanan, CRM

## 2021-02-08 NOTE — PROGRESS NOTES
[facility]  Hospitalist Group                                                                                          Hospitalist Progress Note  Jovanna Cohn MD  Answering service: [phone] from in house phone        Date of Service:  2021  NAME:  Rod Gan  :  1975  MRN:  [id number]      Admission Summary: This is 17-year-old woman with past medical history significant for COPD, hypertension, bipolar disorder, and seizure disorder, who was in her usual state of health until about 2 days ago when the patient developed shortness of breath which is progressive and getting worse. This is also associated with cough which is nonproductive. The shortness of breath is worse with mild exertion such as walking. The patient has history of COPD, not on oxygen at home. The patient denies fever, rigor, or chills. No sick contact or contact any person with COVID-19 virus infection. The patient was brought to the emergency room for further evaluation. When the patient arrived at the emergency room, the patient was in significant respiratory distress and required being placed on BiPAP. The patient was subsequently referred to the hospitalist service for evaluation for admission. The patient was last admitted to the hospital from 2020 to 2020. The patient was admitted and treated for COPD/asthma exacerbation. Interval history / Subjective:   No acute overnight events. Pt currently off oxygen. Feeling well, NAD, no sob or cp.      Assessment & Plan:     Acute respiratory failure with hypoxia, improving  COPD with Acute Exacerbation  covid pcr negative  -- Continue PO Steroids  -- Continue Azithromycin  -- NOT SAFE for DC - patient requires BiPAP intermittenly  Leukocytosis 2/2 steroids    Hypertension, controlled  -- Continue Prazosin and Clonidine    Bipolar disorder:   -- cont home meds    -- Depakote level was low, likely not taking  -- Restart and check level before DC    Hypomagnesemia, repleted    Polysubstance abuse:   -- UDS positive for benzos and barbituates  -- Continue Home meds    Tobacco Abuse:  --s/p smoking cessation counseling  -- She is not interested in quitting    Seizure disorder:    -- continue Keppra    Code status: Full  DVT prophylaxis: Lovenox    Care Plan discussed with: Patient/Family  Anticipated Disposition: Home w/Family  Anticipated Discharge: 24 hours to 48 hours if doesn't require bipap     Hospital Problems  Date Reviewed: 2/6/2021          Codes Class Noted POA    * (Principal) Acute respiratory failure with hypoxia [facility]) ICD-10-CM: J96.01  ICD-9-CM: 518.81  2/6/2021 Yes                Review of Systems:   A comprehensive review of systems was negative except for that written in the HPI. Vital Signs:    Last 24hrs VS reviewed since prior progress note. Most recent are:  Visit Vitals  /78 (BP 1 Location: Right upper arm, BP Patient Position: At rest)   Pulse 84   Temp 97.6 °F (36.4 °C)   Resp 20   Ht 5' 5\" (1.651 m)   Wt 91.5 kg (201 lb 11.5 oz)   SpO2 97%   BMI 33.57 kg/m²         Intake/Output Summary (Last 24 hours) at 2/8/2021 4170  Last data filed at 2/8/2021 0400  Gross per 24 hour   Intake 240 ml   Output --   Net 240 ml        Physical Examination:     I had a face to face encounter with this patient and independently examined them on 2/8/2021 as outlined below:          Constitutional:  No acute distress, cooperative, pleasant    ENT:  Oral mucosa moist, oropharynx benign. Resp:  CTA bilaterally. No wheezing/rhonchi/rales. No accessory muscle use   CV:  Regular rhythm, normal rate, no murmurs    GI:  Soft, non distended, non tender     Musculoskeletal:  No edema, warm,    Neurologic:  Moves all extremities. AAO, CN II-XII reviewed     Psych:  Good insight, Not anxious nor agitated.        Data Review:    Review and/or order of clinical lab test  Review and/or order of tests in the radiology section of [facility]      Labs:     Recent Labs     02/07/21 0432 02/06/21 0024   WBC 11.2* 5.8   HGB 11.6 13.7   HCT 35.3 42.4    304     Recent Labs     02/07/21 0432 02/06/21  0710 02/06/21  0024     --  135*   K 4.3  --  3.9     --  104   CO2 24  --  24   BUN 13  --  7   CREA 0.79  --  0.80   *  --  108*   CA 8.6  --  8.9   MG  --  2.4 1.5*   PHOS  --  2.4*  --      Recent Labs     02/07/21 0432 02/06/21 0024   ALT 27 32   AP 83 96   TBILI 0.3 0.3   TP 6.8 7.5   ALB 3.2* 3.7   GLOB 3.6 3.8     No results for input(s): INR, PTP, APTT, INREXT, INREXT in the last 72 hours. No results for input(s): FE, TIBC, PSAT, FERR in the last 72 hours. Lab Results   Component Value Date/Time    Folate 21.5 (H) 08/01/2017 02:53 AM      No results for input(s): PH, PCO2, PO2 in the last 72 hours.   Recent Labs     02/06/21 0710 02/06/21 0024   TROIQ <0.05 <0.05     Lab Results   Component Value Date/Time    Cholesterol, total 195 08/10/2018 12:07 AM    HDL Cholesterol 57 08/10/2018 12:07 AM    LDL, calculated 127.4 (H) 08/10/2018 12:07 AM    Triglyceride 53 08/10/2018 12:07 AM    CHOL/HDL Ratio 3.4 08/10/2018 12:07 AM     Lab Results   Component Value Date/Time    Glucose (POC) 91 08/03/2018 05:30 AM    Glucose (POC) 109 (H) 08/03/2018 12:37 AM    Glucose (POC) 111 (H) 07/31/2018 06:21 PM    Glucose (POC) 114 (H) 07/31/2018 12:05 PM    Glucose (POC) 71 07/31/2018 11:40 AM     Lab Results   Component Value Date/Time    Color YELLOW/STRAW 09/24/2020 02:41 AM    Appearance CLOUDY (A) 09/24/2020 02:41 AM    Specific gravity 1.020 09/24/2020 02:41 AM    Specific gravity >1.030 (H) 05/09/2009 09:27 PM    pH (UA) 6.0 09/24/2020 02:41 AM    Protein Negative 09/24/2020 02:41 AM    Glucose Negative 09/24/2020 02:41 AM    Ketone Negative 09/24/2020 02:41 AM    Bilirubin Negative 09/24/2020 02:41 AM    Urobilinogen 1.0 09/24/2020 02:41 AM    Nitrites Negative 09/24/2020 02:41 AM    Leukocyte Esterase Negative 09/24/2020 02:41 AM    Epithelial cells FEW 08/10/2018 03:23 AM    Bacteria NEGATIVE  08/10/2018 03:23 AM    WBC 0-4 08/10/2018 03:23 AM    RBC 0-5 08/10/2018 03:23 AM         Medications Reviewed:     Current Facility-Administered Medications   Medication Dose Route Frequency    albuterol-ipratropium (DUO-NEB) 2.5 MG-0.5 MG/3 ML  3 mL Nebulization Q4H PRN    amitriptyline (ELAVIL) tablet 100 mg  100 mg Oral QHS    buprenorphine-naloxone (SUBOXONE) 8-2mg SL tablet  2 Tab SubLINGual DAILY    cloNIDine HCL (CATAPRES) tablet 0.3 mg  0.3 mg Oral TID    divalproex DR (DEPAKOTE) tablet 500 mg  500 mg Oral BID    gabapentin (NEURONTIN) capsule 300 mg  300 mg Oral TID    levETIRAcetam (KEPPRA) tablet 500 mg  500 mg Oral BID    montelukast (SINGULAIR) tablet 10 mg  10 mg Oral QHS    prazosin (MINIPRESS) capsule 4 mg  4 mg Oral QHS    sertraline (ZOLOFT) tablet 25 mg  25 mg Oral DAILY    QUEtiapine (SEROquel) tablet 400 mg  400 mg Oral QHS    sodium chloride (NS) flush 5-40 mL  5-40 mL IntraVENous Q8H    sodium chloride (NS) flush 5-40 mL  5-40 mL IntraVENous PRN    acetaminophen (TYLENOL) tablet 650 mg  650 mg Oral Q6H PRN    Or    acetaminophen (TYLENOL) suppository 650 mg  650 mg Rectal Q6H PRN    polyethylene glycol (MIRALAX) packet 17 g  17 g Oral DAILY PRN    promethazine (PHENERGAN) tablet 12.5 mg  12.5 mg Oral Q6H PRN    Or    ondansetron (ZOFRAN) injection 4 mg  4 mg IntraVENous Q6H PRN    enoxaparin (LOVENOX) injection 40 mg  40 mg SubCUTAneous DAILY    azithromycin (ZITHROMAX) 500 mg in 0.9% sodium chloride 250 mL (VIAL-MATE)  500 mg IntraVENous Q24H    predniSONE (DELTASONE) tablet 40 mg  40 mg Oral DAILY WITH BREAKFAST    nicotine (NICODERM CQ) 21 mg/24 hr patch 1 Patch  1 Patch TransDERmal DAILY     ______________________________________________________________________  EXPECTED LENGTH OF STAY: - - -  ACTUAL LENGTH OF STAY:          2                 Tarik Allen MD

## 2021-02-08 NOTE — PROGRESS NOTES
The CM met with patient, along with additional CM- patient is asking for crisis stabilization resources, CM explained that crisis stabilization is typically for psychiatric needs, at this time patient is stable. CM inquired about previous living situation- first endorsed she lived at address on-file with her brother, mother, two sons. Patient then endorses she can't go there- CM inquired where she was staying [id number], she then endorses that she was staying with a friend for a month (previously told writer she was with her family). The patient endorses being independent [id number], verified insurance information. The CM told patient that this writer will not be able to find permanent housing, can offer resources- patient became agitated, endorses she wants a list of crisis stabilization programs. CM provided resources on the [address] program (short-term homelessness program), substance abuse resources, homeless crisis line through [employer], etc.     Patient became agitated with this writer- requesting to speak to a supervisor. She is frustrated this writer can't provide a list of crisis stabilization (CM again discussed homeless programs this writer is aware of), patient states this writer is pushing her and wants to speak to a supervisor.  aware, CM asked  to come meet with patient.      DORCAS Rojas

## 2021-02-09 LAB
ANION GAP SERPL CALC-SCNC: 8 MMOL/L (ref 5–15)
BASOPHILS # BLD: 0 K/UL (ref 0–0.1)
BASOPHILS NFR BLD: 0 % (ref 0–1)
BUN SERPL-MCNC: 16 MG/DL (ref 6–20)
BUN/CREAT SERPL: 21 (ref 12–20)
CALCIUM SERPL-MCNC: 8.6 MG/DL (ref 8.5–10.1)
CHLORIDE SERPL-SCNC: 102 MMOL/L (ref 97–108)
CLUE CELLS VAG QL WET PREP: NORMAL
CO2 SERPL-SCNC: 28 MMOL/L (ref 21–32)
CREAT SERPL-MCNC: 0.75 MG/DL (ref 0.55–1.02)
DIFFERENTIAL METHOD BLD: ABNORMAL
EOSINOPHIL # BLD: 0 K/UL (ref 0–0.4)
EOSINOPHIL NFR BLD: 1 % (ref 0–7)
ERYTHROCYTE [DISTWIDTH] IN BLOOD BY AUTOMATED COUNT: 19.5 % (ref 11.5–14.5)
GLUCOSE SERPL-MCNC: 102 MG/DL (ref 65–100)
HCT VFR BLD AUTO: 35.2 % (ref 35–47)
HGB BLD-MCNC: 11.2 G/DL (ref 11.5–16)
IMM GRANULOCYTES # BLD AUTO: 0 K/UL (ref 0–0.04)
IMM GRANULOCYTES NFR BLD AUTO: 1 % (ref 0–0.5)
LYMPHOCYTES # BLD: 2.1 K/UL (ref 0.8–3.5)
LYMPHOCYTES NFR BLD: 34 % (ref 12–49)
MAGNESIUM SERPL-MCNC: 1.9 MG/DL (ref 1.6–2.4)
MCH RBC QN AUTO: 27.3 PG (ref 26–34)
MCHC RBC AUTO-ENTMCNC: 31.8 G/DL (ref 30–36.5)
MCV RBC AUTO: 85.6 FL (ref 80–99)
MONOCYTES # BLD: 0.5 K/UL (ref 0–1)
MONOCYTES NFR BLD: 7 % (ref 5–13)
NEUTS SEG # BLD: 3.6 K/UL (ref 1.8–8)
NEUTS SEG NFR BLD: 57 % (ref 32–75)
NRBC # BLD: 0 K/UL (ref 0–0.01)
NRBC BLD-RTO: 0 PER 100 WBC
PHOSPHATE SERPL-MCNC: 2.7 MG/DL (ref 2.6–4.7)
PLATELET # BLD AUTO: 242 K/UL (ref 150–400)
PMV BLD AUTO: 9.8 FL (ref 8.9–12.9)
POTASSIUM SERPL-SCNC: 3.8 MMOL/L (ref 3.5–5.1)
RBC # BLD AUTO: 4.11 M/UL (ref 3.8–5.2)
SODIUM SERPL-SCNC: 138 MMOL/L (ref 136–145)
T VAGINALIS VAG QL WET PREP: NORMAL
UR CULT HOLD, URHOLD: NORMAL
WBC # BLD AUTO: 6.3 K/UL (ref 3.6–11)

## 2021-02-09 PROCEDURE — 87210 SMEAR WET MOUNT SALINE/INK: CPT

## 2021-02-09 PROCEDURE — 74011250637 HC RX REV CODE- 250/637: Performed by: NURSE PRACTITIONER

## 2021-02-09 PROCEDURE — 74011250637 HC RX REV CODE- 250/637: Performed by: INTERNAL MEDICINE

## 2021-02-09 PROCEDURE — 84100 ASSAY OF PHOSPHORUS: CPT

## 2021-02-09 PROCEDURE — 80048 BASIC METABOLIC PNL TOTAL CA: CPT

## 2021-02-09 PROCEDURE — 85025 COMPLETE CBC W/AUTO DIFF WBC: CPT

## 2021-02-09 PROCEDURE — 74011250636 HC RX REV CODE- 250/636: Performed by: INTERNAL MEDICINE

## 2021-02-09 PROCEDURE — 36415 COLL VENOUS BLD VENIPUNCTURE: CPT

## 2021-02-09 PROCEDURE — 83735 ASSAY OF MAGNESIUM: CPT

## 2021-02-09 PROCEDURE — 87491 CHLMYD TRACH DNA AMP PROBE: CPT

## 2021-02-09 PROCEDURE — 74011636637 HC RX REV CODE- 636/637: Performed by: INTERNAL MEDICINE

## 2021-02-09 PROCEDURE — 65270000008 HC RM PRIVATE PEDIATRIC

## 2021-02-09 PROCEDURE — 74011000250 HC RX REV CODE- 250: Performed by: INTERNAL MEDICINE

## 2021-02-09 RX ORDER — LEVETIRACETAM 500 MG/1
500 TABLET ORAL 2 TIMES DAILY
Qty: 60 TAB | Refills: 0 | Status: ON HOLD | OUTPATIENT
Start: 2021-02-09 | End: 2021-02-22 | Stop reason: SDUPTHER

## 2021-02-09 RX ORDER — DIVALPROEX SODIUM 500 MG/1
500 TABLET, DELAYED RELEASE ORAL 2 TIMES DAILY
Qty: 60 TAB | Refills: 0 | Status: SHIPPED | OUTPATIENT
Start: 2021-02-09 | End: 2021-02-22

## 2021-02-09 RX ORDER — BUPRENORPHINE AND NALOXONE 8; 2 MG/1; MG/1
2 FILM, SOLUBLE BUCCAL; SUBLINGUAL DAILY
Qty: 14 FILM | Refills: 1 | Status: SHIPPED | OUTPATIENT
Start: 2021-02-09 | End: 2022-01-19

## 2021-02-09 RX ORDER — IPRATROPIUM BROMIDE AND ALBUTEROL SULFATE 2.5; .5 MG/3ML; MG/3ML
3 SOLUTION RESPIRATORY (INHALATION) 3 TIMES DAILY
Status: DISCONTINUED | OUTPATIENT
Start: 2021-02-09 | End: 2021-02-10 | Stop reason: HOSPADM

## 2021-02-09 RX ORDER — SERTRALINE HYDROCHLORIDE 25 MG/1
25 TABLET, FILM COATED ORAL DAILY
Qty: 30 TAB | Refills: 0 | Status: SHIPPED | OUTPATIENT
Start: 2021-02-09 | End: 2021-02-22

## 2021-02-09 RX ORDER — GABAPENTIN 300 MG/1
300 CAPSULE ORAL 3 TIMES DAILY
Qty: 90 CAP | Refills: 0 | Status: ON HOLD | OUTPATIENT
Start: 2021-02-09 | End: 2021-02-22 | Stop reason: SDUPTHER

## 2021-02-09 RX ORDER — CLONIDINE HYDROCHLORIDE 0.3 MG/1
0.3 TABLET ORAL 3 TIMES DAILY
Qty: 90 TAB | Refills: 0 | Status: SHIPPED | OUTPATIENT
Start: 2021-02-09 | End: 2021-02-22

## 2021-02-09 RX ORDER — FLUTICASONE PROPIONATE AND SALMETEROL 250; 50 UG/1; UG/1
1 POWDER RESPIRATORY (INHALATION) 2 TIMES DAILY
Qty: 2 INHALER | Refills: 1 | Status: ON HOLD | OUTPATIENT
Start: 2021-02-09 | End: 2021-02-22 | Stop reason: SDUPTHER

## 2021-02-09 RX ORDER — QUETIAPINE FUMARATE 400 MG/1
400 TABLET, FILM COATED ORAL
Qty: 30 TAB | Refills: 0 | Status: ON HOLD | OUTPATIENT
Start: 2021-02-09 | End: 2021-02-22 | Stop reason: SDUPTHER

## 2021-02-09 RX ORDER — POTASSIUM CHLORIDE 750 MG/1
20 TABLET, FILM COATED, EXTENDED RELEASE ORAL
Status: COMPLETED | OUTPATIENT
Start: 2021-02-09 | End: 2021-02-09

## 2021-02-09 RX ORDER — AMITRIPTYLINE HYDROCHLORIDE 100 MG/1
100 TABLET, FILM COATED ORAL
Qty: 30 TAB | Refills: 0 | Status: SHIPPED | OUTPATIENT
Start: 2021-02-09 | End: 2021-02-22

## 2021-02-09 RX ORDER — MONTELUKAST SODIUM 10 MG/1
10 TABLET ORAL
Qty: 30 TAB | Refills: 0 | Status: ON HOLD | OUTPATIENT
Start: 2021-02-09 | End: 2021-02-22 | Stop reason: SDUPTHER

## 2021-02-09 RX ORDER — IBUPROFEN 200 MG
1 TABLET ORAL DAILY
Qty: 30 PATCH | Refills: 0 | Status: SHIPPED | OUTPATIENT
Start: 2021-02-10 | End: 2021-03-12

## 2021-02-09 RX ORDER — PREDNISONE 50 MG/1
50 TABLET ORAL ONCE
Qty: 1 TAB | Refills: 0 | Status: SHIPPED | OUTPATIENT
Start: 2021-02-10 | End: 2021-02-10

## 2021-02-09 RX ORDER — PRAZOSIN HYDROCHLORIDE 2 MG/1
4 CAPSULE ORAL
Qty: 60 CAP | Refills: 0 | Status: SHIPPED | OUTPATIENT
Start: 2021-02-09 | End: 2021-02-22

## 2021-02-09 RX ORDER — ALBUTEROL SULFATE 90 UG/1
2 AEROSOL, METERED RESPIRATORY (INHALATION)
Qty: 2 INHALER | Refills: 1 | Status: ON HOLD | OUTPATIENT
Start: 2021-02-09 | End: 2021-02-22 | Stop reason: SDUPTHER

## 2021-02-09 RX ADMIN — CLONIDINE HYDROCHLORIDE 0.3 MG: 0.2 TABLET ORAL at 09:28

## 2021-02-09 RX ADMIN — LEVETIRACETAM 500 MG: 500 TABLET ORAL at 09:28

## 2021-02-09 RX ADMIN — SERTRALINE 25 MG: 50 TABLET, FILM COATED ORAL at 09:28

## 2021-02-09 RX ADMIN — GABAPENTIN 300 MG: 100 CAPSULE ORAL at 17:27

## 2021-02-09 RX ADMIN — GABAPENTIN 300 MG: 100 CAPSULE ORAL at 21:40

## 2021-02-09 RX ADMIN — DIVALPROEX SODIUM 500 MG: 500 TABLET, DELAYED RELEASE ORAL at 17:27

## 2021-02-09 RX ADMIN — ENOXAPARIN SODIUM 40 MG: 100 INJECTION SUBCUTANEOUS at 09:28

## 2021-02-09 RX ADMIN — AMITRIPTYLINE HYDROCHLORIDE 100 MG: 25 TABLET, FILM COATED ORAL at 21:40

## 2021-02-09 RX ADMIN — AZITHROMYCIN MONOHYDRATE 500 MG: 500 INJECTION, POWDER, LYOPHILIZED, FOR SOLUTION INTRAVENOUS at 05:10

## 2021-02-09 RX ADMIN — LEVETIRACETAM 500 MG: 500 TABLET ORAL at 17:27

## 2021-02-09 RX ADMIN — GABAPENTIN 300 MG: 100 CAPSULE ORAL at 09:00

## 2021-02-09 RX ADMIN — POTASSIUM & SODIUM PHOSPHATES POWDER PACK 280-160-250 MG 1 PACKET: 280-160-250 PACK at 21:39

## 2021-02-09 RX ADMIN — PREDNISONE 40 MG: 20 TABLET ORAL at 09:29

## 2021-02-09 RX ADMIN — QUETIAPINE FUMARATE 400 MG: 100 TABLET ORAL at 21:39

## 2021-02-09 RX ADMIN — CLONIDINE HYDROCHLORIDE 0.3 MG: 0.2 TABLET ORAL at 17:27

## 2021-02-09 RX ADMIN — BUPRENORPHINE HYDROCHLORIDE AND NALOXONE HYDROCHLORIDE DIHYDRATE 2 TABLET: 8; 2 TABLET SUBLINGUAL at 09:28

## 2021-02-09 RX ADMIN — POTASSIUM & SODIUM PHOSPHATES POWDER PACK 280-160-250 MG 1 PACKET: 280-160-250 PACK at 09:28

## 2021-02-09 RX ADMIN — DIVALPROEX SODIUM 500 MG: 500 TABLET, DELAYED RELEASE ORAL at 09:28

## 2021-02-09 RX ADMIN — Medication 10 ML: at 05:10

## 2021-02-09 RX ADMIN — Medication 10 ML: at 21:40

## 2021-02-09 RX ADMIN — MONTELUKAST 10 MG: 10 TABLET, FILM COATED ORAL at 21:40

## 2021-02-09 RX ADMIN — FLUCONAZOLE 150 MG: 100 TABLET ORAL at 00:49

## 2021-02-09 RX ADMIN — POTASSIUM CHLORIDE 20 MEQ: 750 TABLET, FILM COATED, EXTENDED RELEASE ORAL at 11:00

## 2021-02-09 NOTE — CONSULTS
Gynecology Consult    Name: Blanca Fuentes MRN: [id number] SSN: [id number]    YOB: 1975  Age: 50 y.o. Sex: female      Chief complaint:  malodorous vaginal discharge    Azam Carranza is a 50 y.o.  female para 5 who is admitted to the medicine service with a COPD exacerbation. I am being asked to see her at the request of Dr. Lit Gong for vaginal discharge. She has had vaginal discharge for the past 3 days. It initially started out as not having an odor but now is fishy. In the past she had this and it was \"a bacterial infection\" but she's not sure what it was treated with. She took Diflucan yesterday but it has not helped. She has not been sexually active for 8 months. Her last period was last week. She has 5 children, all born vaginally. Tubal ligation for contraception. Past Medical History:   Diagnosis Date    Asthma     [facility]     Chronic obstructive pulmonary disease (HCC)     Chronic pain     back, leg    Cocaine abuse (HCC)     Depression     Heart failure (HCC)     Hepatitis B     [facility]in abuse [facility]     HTN (hypertension)     Narcotic abuse ([facility]     Polysubstance abuse ([facility]     Sarcoidosis     Tobacco abuse      Past Surgical History:   Procedure Laterality Date    HX GYN      HX WISDOM TEETH EXTRACTION       OB History    No obstetric history on file.        Allergies   Allergen Reactions    Vancomycin Anaphylaxis    Tomato Swelling     Tongue    Trazodone Angioedema     Current Facility-Administered Medications   Medication Dose Route Frequency Provider Last Rate Last Admin    [START ON 2/10/2021] azithromycin (ZITHROMAX) 500 mg in 0.9% sodium chloride 250 mL (VIAL-MATE)  500 mg IntraVENous Q24H Oralia Nunez MD        albuterol-ipratropium (DUO-NEB) 2.5 MG-0.5 MG/3 ML  3 mL Nebulization TID Wilfred Loya MD        potassium, sodium phosphates (NEUTRA-PHOS) packet 1 Packet  1 Packet Oral QID Wilfred Loya MD   1 Packet at 02/09/21 4107    albuterol-ipratropium (DUO-NEB) 2.5 MG-0.5 MG/3 ML  3 mL Nebulization Q4H PRN Oralia Nunez MD        amitriptyline (ELAVIL) tablet 100 mg  100 mg Oral QHS Oralia Nunez MD   100 mg at 02/08/21 2125    buprenorphine-naloxone (SUBOXONE) 8-2mg SL tablet  2 Tab SubLINGual DAILY Chon Oralia Velásquez MD   2 Tab at 02/09/21 7915    cloNIDine HCL (CATAPRES) tablet 0.3 mg  0.3 mg Oral TID Oralia Nunez MD   0.3 mg at 02/09/21 0928    divalproex DR (DEPAKOTE) tablet 500 mg  500 mg Oral BID Oralia Nunez MD   500 mg at 02/09/21 5040    gabapentin (NEURONTIN) capsule 300 mg  300 mg Oral TID Oralia Nunez MD   300 mg at 02/09/21 0900    levETIRAcetam (KEPPRA) tablet 500 mg  500 mg Oral BID Oralia Nunez MD   500 mg at 02/09/21 0928    montelukast (SINGULAIR) tablet 10 mg  10 mg Oral QHS Oralia Nunez MD   10 mg at 02/08/21 2125    prazosin (MINIPRESS) capsule 4 mg  4 mg Oral QHS Oralia Nunez MD   4 mg at 02/08/21 2125    sertraline (ZOLOFT) tablet 25 mg  25 mg Oral DAILY Oralia Nunez MD   25 mg at 02/09/21 0928    QUEtiapine (SEROquel) tablet 400 mg  400 mg Oral QHS Oralia Nunez MD   400 mg at 02/08/21 2124    sodium chloride (NS) flush 5-40 mL  5-40 mL IntraVENous Q8H Oralia Nunez MD   10 mL at 02/09/21 0510    sodium chloride (NS) flush 5-40 mL  5-40 mL IntraVENous PRN Oralia Nunez MD   10 mL at 02/07/21 0421    acetaminophen (TYLENOL) tablet 650 mg  650 mg Oral Q6H PRN Oralia Nunez MD   650 mg at 02/07/21 1548    Or    acetaminophen (TYLENOL) suppository 650 mg  650 mg Rectal Q6H PRN Oralia Nunez MD        polyethylene glycol (MIRALAX) packet 17 g  17 g Oral DAILY PRN Oralia Nunez MD        promethazine (PHENERGAN) tablet 12.5 mg  12.5 mg Oral Q6H PRN Oralia Nunez MD        Or    ondansetron (ZOFRAN) injection 4 mg  4 mg IntraVENous Q6H PRN Oralia Nunez MD   4 mg at 02/06/21 0550    enoxaparin (LOVENOX) injection 40 mg  40 mg SubCUTAneous DAILY Oralia Nunez MD   40 mg at 02/09/21 0928    predniSONE (DELTASONE) tablet 40 mg  40 mg Oral DAILY WITH BREAKFAST Oralia Nunez MD   40 mg at 02/09/21 0929    nicotine (NICODERM CQ) 21 mg/24 hr patch 1 Patch  1 Patch TransDERmal DAILY Kailyn Baldwin MD   1 Patch at 02/09/21 2945       Family History   Problem Relation Age of Onset    Hypertension Father     Hypertension Mother      Social History     Socioeconomic History    Marital status: SINGLE     Spouse name: Not on file    Number of children: Not on file    Years of education: Not on file    Highest education level: Not on file   Occupational History    Not on file   Social Needs    Financial resource strain: Not on file    Food insecurity     Worry: Not on file     Inability: Not on file    Transportation needs     Medical: Not on file     Non-medical: Not on file   Tobacco Use    Smoking status: Current Every Day Smoker     Packs/day: 0.25     Years: 15.00     Pack years: 3.75     Last attempt to quit: 8/3/2017     Years since quitting: 3.5    Smokeless tobacco: Never Used    Tobacco comment: \"I already quit\"   Substance and Sexual Activity    Alcohol use: No    Drug use: Yes     Types: Marijuana, Heroin     Comment: Pt reports last used heroin on 9/23/20    Sexual activity: Yes     Partners: Female   Lifestyle    Physical activity     Days per week: Not on file     Minutes per session: Not on file    Stress: Not on file   Relationships    Social connections     Talks on phone: Not on file     Gets together: Not on file     Attends Worship service: Not on file     Active member of club or organization: Not on file     Attends meetings of clubs or organizations: Not on file     Relationship status: Not on file    Intimate partner violence     Fear of current or ex partner: Not on file     Emotionally abused: Not on file     Physically abused: Not on file     Forced sexual activity: Not on file   Other Topics Concern    Not on file   Social History Narrative    Born in [place], [place], 5 children,    No legal charges. Pt graduated from high school. Pt was employed last month at [employer], currently she is unemployed. Pt lives with her mother and 8year old son. She has 4 adult children. Review of Systems:  A comprehensive review of systems was negative except for that written in the History of Present Illness. Objective:     Vitals:    02/09/21 0526 02/09/21 0607 02/09/21 0930 02/09/21 1411   BP: 125/70  (!) 121/48 120/60   Pulse: 83  (!) 103 90   Resp: 16  18 18   Temp: 98.2 °F (36.8 °C)  98.3 °F (36.8 °C) 98.3 °F (36.8 °C)   SpO2: 98%  95% 95%   Weight:  92.1 kg (203 lb 0.7 oz)     Height:           General:  alert, cooperative, no distress, appears older than stated age   Lungs:  Normal respiratory effort   Heart:  Regular rate and rythym   Abdomen: soft, non-tender. Genitourinary: On external exam there is whitish discharge noted, wet prep swab obtained     Assessment:     49 yo female para 5 with malodorous vaginal discharge. Plan:     Wet prep specimen collected and given to nurse to send to lab. Order placed for gonorrhea and chlamydia from urine specimen  Even if wet prep is negative, malodorous discharge more likely to be bacterial vaginosis and ok to treat based on symptoms alone. Reasonable to treat with metronidazole 500 mg BID for 7 days. Please call with questions.

## 2021-02-09 NOTE — PROGRESS NOTES
Hospitalist Cross Coverage NP     Name: Alize Gibson  YOB: 1975  MRN: [id number]  Admission Date: 2/5/2021 10:07 PM    Date of service: 2/8/2021 11:40 PM                                Overnight update:        Paged by RN - patient reports yeast infection prior to arrival, treated with one day vaginal suppository. Now with foul smelling white vaginal discharge, irritation.   - 150mg fluconazole now, further management per day attending     Ciarra MALIK, JUJU  [phone] or Maximo

## 2021-02-09 NOTE — PROGRESS NOTES
Transition of Care Plan   RUR- 41% High Risk   DISPOSITION: The disposition plan is - pending application via [facility]/currently under review.  Transport: Likely Lyft     At 10:14am -  Supervisor/ Ab Mera met with patient at bedside. Patent reported that she was, \"feeling okay. \" Patient was informed and reminded that the [facility] referral was faxed yesterday to the [facility]. Patient was encouraged to review the guidelines and rules of the program and then sign. Patient reports that she is agreeable to services provided at the [facility]. CM Supervisor/ reviewed rules and guidelines of the program. CM Supervisor/ inquired about patient and having 30 days worth of medication. Patient reports that she has not been to the [facility] before. Patient reports that she utilized [facility] about 2 weeks ago but her usual pharmacy she uses for prescriptions is the [facility]. Patient reports, \"I lost my all of my medicine on the [employer] bus. \" CM faxed her application to the [facility] - [phone]. At 12:40pm -  Supervisor/ Ab Mera assisted patient with speaking to [facility] - representative regarding patient and patients interest to transition to the [facility] once medically stable to discharge. Patient is medically stable to discharge however patients medications are pending (suboxone authorization). Representative spoke to patient to obtain needed information and to confirm that patient is still interested in transitioning to the [facility]. CM faxed patients discharge summary to [facility]. Patient expected discharge date is set for Wednesday 10, 2021. CM will continue to follow.     DORCAS Jade, CRM

## 2021-02-09 NOTE — PROGRESS NOTES
.. TRANSFER - IN REPORT:    Verbal report received from [address] [place] [address],[address], RN (name) on Marge Johnston  being received from 4W (unit) for routine progression of care      Report consisted of patients Situation, Background, Assessment and   Recommendations(SBAR). Information from the following report(s) SBAR, Kardex and MAR was reviewed with the receiving nurse. Opportunity for questions and clarification was provided. Assessment completed upon patients arrival to unit and care assumed.      Patient will arrive in wheelchair

## 2021-02-09 NOTE — DISCHARGE SUMMARY
Discharge Summary     PATIENT ID: Johnna Beltran  MRN: [id number]   YOB: 1975    DATE OF ADMISSION: 2/5/2021 10:07 PM    DATE OF DISCHARGE: 2/10/2021  PRIMARY CARE PROVIDER: None   DISCHARGING PROVIDER: Laquita Knox MD    To contact this individual call [phone] and ask the  to page. If unavailable ask to be transferred the Adult Hospitalist Department. CONSULTATIONS: IP CONSULT TO OB GYN    PROCEDURES/SURGERIES: * No surgery found *    [address] COURSE:   This is 24-year-old woman with past medical history significant for COPD, hypertension, bipolar disorder, and seizure disorder, who was in her usual state of health until about 2 days ago when the patient developed shortness of breath which is progressive and getting worse.  This is also associated with cough which is nonproductive.  The shortness of breath is worse with mild exertion such as walking.  The patient has history of COPD, not on oxygen at home.  The patient denies fever, rigor, or chills.  No sick contact or contact any person with COVID-19 virus infection.  The patient was brought to the emergency room for further evaluation.  When the patient arrived at the emergency room, the patient was in significant respiratory distress and required being placed on BiPAP.  The patient was subsequently referred to the hospitalist service for evaluation for admission.  The patient was last admitted to the hospital from 12/08/2020 to 12/11/2020.  The patient was admitted and treated for COPD/asthma exacerbation.     DISCHARGE DIAGNOSES / PLAN:      Acute respiratory failure with hypoxia, improving  COPD with Acute Exacerbation  covid pcr negative  -- Continue PO Steroid course  -- s/p Azithromycin  -- hasn't required bipap since 2/6, remains stable on RA  Leukocytosis 2/2 steroids  Sent rxs for copd inhalers (LABA, LAMA, ICS) at d/c     Hypertension, controlled  -- Continue home Prazosin and Clonidine     Bipolar disorder:   -- cont home meds    -- Depakote level was low, likely not taking     Hypomagnesemia, repleted     Polysubstance abuse:   -- UDS positive for benzos and barbituates  -- Continue Home meds  PDMP reviewed and confirmed regular use of Suboxone 8mg-2mg 2 tabs daily; last filled 01/30/2021 x7 days, previously filled 01/19/2021 x14 days; therefore pt is due for new rx  PA sent for Suboxone     Tobacco Abuse:  --s/p smoking cessation counseling  -- She is not interested in quitting     Seizure disorder:    -- continue Keppra  QTc 480     Malodorous vaginal discharge  No improvement S/p PO diflucan x1  Appreciate gyn: Wet prep specimen collected, gonorrhea and chlamydia from urine  Wet prep with clue cells and trichomonas  Treat with metronidazole 500 mg BID for 7 days to treat both BV and trichomonas       ADDITIONAL CARE RECOMMENDATIONS: f/u with PCP at [facility] [place] [place] [address]    PENDING TEST RESULTS:   At the time of discharge the following test results are still pending: urine gc/chlamydia    Patient Instructions     FOLLOW UP APPOINTMENTS:    Follow-up Information     Follow up With Specialties Details Why Contact Info    None    None ([phone]) Patient stated that they have no PCP             DIET: Cardiac Diet    ACTIVITY: Activity as tolerated    NOTIFY YOUR PHYSICIAN FOR ANY OF THE FOLLOWING:   Fever over 101 degrees for 24 hours. Chest pain, shortness of breath, fever, chills, nausea, vomiting, diarrhea, change in mentation, falling, weakness, bleeding. Severe pain or pain not relieved by medications. Or, any other signs or symptoms that you may have questions about.     DISPOSITION:    Home With:   OT  PT  HH  RN       Long term SNF/Inpatient Rehab    Independent/assisted living    Hospice   x Other: [facility]       PATIENT CONDITION AT DISCHARGE:   Functional status    Poor     Deconditioned    x Independent      Cognition    x Lucid     Forgetful     Dementia Catheters/lines (plus indication)    Calvin     PICC     PEG    x None      Code status   x  Full code     DNR      PHYSICAL EXAMINATION AT DISCHARGE:  Constitutional:  No acute distress, cooperative   ENT:  Oral mucosa moist, oropharynx benign. Resp: Occ wheezing, no distress, No accessory muscle use   CV:  Regular rhythm, normal rate, no audible murmurs    GI:  Soft, non distended, non tender     Musculoskeletal:  No edema, warm,    Neurologic:  Moves all extremities. AAO, CN II-XII reviewed                         Psych:    Not anxious or agitated.     CHRONIC MEDICAL DIAGNOSES:  Problem List as of 2/10/2021 Date Reviewed: 2/6/2021          Codes Class Noted - Resolved    * (Principal) Acute respiratory failure with hypoxia [facility]) ICD-10-CM: J96.01  ICD-9-CM: 518.81  2/6/2021 - Present        Suicidal ideation ICD-10-CM: R45.851  ICD-9-CM: V62.84  12/11/2020 - Present        Heroin overdose ([facility] ICD-10-CM: T40.1X1A  ICD-9-CM: 965.01, E980.0  12/8/2020 - Present        Heroin withdrawal ([facility] ICD-10-CM: F11.23  ICD-9-CM: 292.0, 304.00  9/24/2020 - Present        Overdose ICD-10-CM: T50.901A  ICD-9-CM: 977.9, E980.5  8/9/2018 - Present        Poly-drug misuser ICD-10-CM: F19.90  ICD-9-CM: 305.90  8/4/2018 - Present        Asthma exacerbation ICD-10-CM: J45.901  ICD-9-CM: 493.92  7/26/2018 - Present        Drug overdose ICD-10-CM: T50.901A  ICD-9-CM: 977.9, E980.5  7/31/2017 - Present        Altered mental status, unspecified ICD-10-CM: R41.82  ICD-9-CM: 780.97  4/26/2017 - Present        Lactic acidosis ICD-10-CM: E87.2  ICD-9-CM: 276.2  4/25/2017 - Present        Acute encephalopathy ICD-10-CM: G93.40  ICD-9-CM: 348.30  4/25/2017 - Present        Potential for altered mental status ICD-10-CM: Z91.89  ICD-9-CM: V49.89  4/25/2017 - Present        Convulsion disorder ([facility] ICD-10-CM: R56.9  ICD-9-CM: 780.39  4/25/2017 - Present        Stenosis of both internal carotid arteries ICD-10-CM: I65.23  ICD-9-CM: 433.10, 433.30  4/25/2017 - Present        Convulsive syncope ICD-10-CM: R55  ICD-9-CM: 780.2, 780.39  4/25/2017 - Present        Seizure ([facility] ICD-10-CM: R56.9  ICD-9-CM: 780.39  4/20/2017 - Present        Bipolar 1 disorder (HCC) ICD-10-CM: F31.9  ICD-9-CM: 296.7  Unknown - Present        Chronic obstructive pulmonary disease ([facility] ICD-10-CM: J44.9  ICD-9-CM: 496  Unknown - Present        Chronic pain ICD-10-CM: G89.29  ICD-9-CM: 338.29  Unknown - Present    Overview Signed 2/22/2017  7:15 PM by Jimmy Valdez MD     back, leg             Hepatitis B ICD-10-CM: B19.10  ICD-9-CM: 070.30  Unknown - Present        Sarcoidosis ICD-10-CM: D86.9  ICD-9-CM: 135  Unknown - Present        Tobacco abuse ICD-10-CM: Z72.0  ICD-9-CM: 305.1  Unknown - Present        Anemia ICD-10-CM: D64.9  ICD-9-CM: 285.9  2/22/2017 - Present        Cocaine abuse ([facility] ICD-10-CM: F14.10  ICD-9-CM: 305.60  Unknown - Present        Polysubstance abuse ([facility] ICD-10-CM: F19.10  ICD-9-CM: 305.90  Unknown - Present        Narcotic abuse ([facility] ICD-10-CM: F11.10  ICD-9-CM: 305.40  Unknown - Present        HTN (hypertension) ICD-10-CM: I10  ICD-9-CM: 401.9  Unknown - Present        Depression ICD-10-CM: F32.9  ICD-9-CM: 957  1/25/2017 - Present        Sinus tachycardia ICD-10-CM: R00.0  ICD-9-CM: 427.89  1/10/2017 - Present        Heroin abuse ([facility] ICD-10-CM: F11.10  ICD-9-CM: 305.50  1/30/2014 - Present        RESOLVED: Asthma exacerbation ICD-10-CM: J45.901  ICD-9-CM: 493.92  2/22/2017 - 4/19/2017        RESOLVED: Asthma ICD-10-CM: J45.909  ICD-9-CM: 493.90  Unknown - 2/22/2017        RESOLVED: Depressive disorder ICD-10-CM: F32.9  ICD-9-CM: 311  1/25/2017 - 2/22/2017        RESOLVED: Polysubstance dependence ([facility] ICD-10-CM: G31.51  ICD-9-CM: 304.80  1/25/2017 - 2/22/2017        RESOLVED: Substance induced mood disorder ([facility] ICD-10-CM: L69.37  ICD-9-CM: 292.84  1/25/2017 - 2/22/2017        RESOLVED: HTN (hypertension) ICD-10-CM: I10  ICD-9-CM: 401.9  1/11/2017 - 2/22/2017        RESOLVED: Drug abuse ([facility] ICD-10-CM: F19.10  ICD-9-CM: 305.90  1/11/2017 - 2/22/2017        RESOLVED: Bipolar disorder ([facility] ICD-10-CM: F31.9  ICD-9-CM: 296.80  1/11/2017 - 2/22/2017        RESOLVED: COPD (chronic obstructive pulmonary disease) ([facility] ICD-10-CM: J44.9  ICD-9-CM: 496  1/9/2017 - 2/22/2017        RESOLVED: Unspecified asthma, with exacerbation ICD-10-CM: J45.901  ICD-9-CM: 493.92  1/30/2014 - 2/22/2017              DISCHARGE MEDICATIONS:  Current Discharge Medication List      START taking these medications    Details   metroNIDAZOLE (FLAGYL) 500 mg tablet Take 1 Tab by mouth two (2) times a day for 14 days. Qty: 28 Tab, Refills: 0      fluticasone propion-salmeteroL (Advair Diskus) 250-50 mcg/dose diskus inhaler Take 1 Puff by inhalation two (2) times a day. Qty: 2 Inhaler, Refills: 1      nicotine (NICODERM CQ) 21 mg/24 hr 1 Patch by TransDERmal route daily for 30 days. Qty: 30 Patch, Refills: 0      predniSONE (DELTASONE) 50 mg tablet Take 1 Tab by mouth once for 1 dose. Qty: 1 Tab, Refills: 0         CONTINUE these medications which have CHANGED    Details   albuterol (ProAir HFA) 90 mcg/actuation inhaler Take 2 Puffs by inhalation every four (4) hours as needed for Wheezing or Shortness of Breath. Indications: bronchospasm prevention  Qty: 2 Inhaler, Refills: 1      tiotropium (Spiriva with HandiHaler) 18 mcg inhalation capsule Take 1 Cap by inhalation daily. Indications: bronchospasm prevention with COPD  Qty: 30 Cap, Refills: 1      amitriptyline (ELAVIL) 100 mg tablet Take 1 Tab by mouth nightly. Indications: depression  Qty: 30 Tab, Refills: 0      montelukast (SINGULAIR) 10 mg tablet Take 1 Tab by mouth nightly. Indications: controller medication for asthma  Qty: 30 Tab, Refills: 0      prazosin (MINIPRESS) 2 mg capsule Take 2 Caps by mouth nightly.  Indications: posttraumatic stress syndrome  Qty: 60 Cap, Refills: 0      QUEtiapine (SEROquel) 400 mg tablet Take 1 Tab by mouth nightly. Indications: mood  Qty: 30 Tab, Refills: 0      sertraline (ZOLOFT) 25 mg tablet Take 1 Tab by mouth daily. Indications: major depressive disorder  Qty: 30 Tab, Refills: 0      buprenorphine-naloxone (Suboxone) 8-2 mg film sublingaul film 2 Film by SubLINGual route daily. Max Daily Amount: 2 Film. Indications: dependence on opioid-type drugs  Qty: 14 Film, Refills: 1    Comments: TERRENCE: [id number]  Associated Diagnoses: Narcotic abuse ([facility])      cloNIDine HCL (CATAPRES) 0.3 mg tablet Take 1 Tab by mouth three (3) times daily. Indications: high blood pressure  Qty: 90 Tab, Refills: 0      divalproex DR (Depakote) 500 mg tablet Take 1 Tab by mouth two (2) times a day. Indications: bipolar depression  Qty: 60 Tab, Refills: 0      gabapentin (NEURONTIN) 300 mg capsule Take 1 Cap by mouth three (3) times daily. Max Daily Amount: 900 mg. Indications: neuropathic pain  Qty: 90 Cap, Refills: 0    Associated Diagnoses: Polysubstance abuse ([facility])      levETIRAcetam (Keppra) 500 mg tablet Take 1 Tab by mouth two (2) times a day.  Indications: additional medication to treat partial seizures  Qty: 60 Tab, Refills: 0         STOP taking these medications       fluticasone propion-salmeteroL (Advair Diskus) 100-50 mcg/dose diskus inhaler Comments:   Reason for Stopping:         albuterol-ipratropium (DUO-NEB) 2.5 mg-0.5 mg/3 ml nebu Comments:   Reason for Stopping:             Greater than 30 minutes were spent with the patient on counseling and coordination of care    Signed:   Jovanna Cohn MD  2/10/2021  10:20 AM

## 2021-02-09 NOTE — PROGRESS NOTES
0730: Bedside shift change report given to Robbin Mackey (oncoming nurse) by Holger Bermudez RN (offgoing nurse). Report included the following information SBAR and Kardex. 1700: TRANSFER - OUT REPORT:    Verbal report given to Taylor MOTT(name) on [address] [facility]  being transferred to [facility](unit) for routine progression of care       Report consisted of patients Situation, Background, Assessment and   Recommendations(SBAR). Information from the following report(s) SBAR and Kardex was reviewed with the receiving nurse. Lines:   Triple Lumen 02/06/21 Left Neck (Active)   Central Line Being Utilized Yes 02/09/21 0930   Criteria for Appropriate Use Limited/no vessel suitable for conventional peripheral access 02/09/21 0930   Site Assessment Clean, dry, & intact; Clean;Dry 02/09/21 0930   Infiltration Assessment 0 02/09/21 0930   Affected Extremity/Extremities Color distal to insertion site pink (or appropriate for race) 02/09/21 0930   Date of Last Dressing Change 02/06/21 02/09/21 0930   Dressing Status Clean, dry, & intact 02/09/21 0930   Dressing Type Transparent;Tape;Disk with Chlorhexadine gluconate (CHG) 02/09/21 0930   Action Taken Open ports on tubing capped 02/09/21 0930   Proximal Hub Color/Line Status White;Capped 02/09/21 0930   Positive Blood Return (Medial Site) Yes 02/09/21 0930   Medial Hub Color/Line Status Flushed 02/09/21 0526   Positive Blood Return (Lateral Site) Yes 02/09/21 0930   Distal Hub Color/Line Status Brown;Capped 02/09/21 0930   Positive Blood Return (Site #3) Yes 02/09/21 0930   Alcohol Cap Used Yes 02/09/21 0930       Peripheral IV (Active)   Site Assessment Clean, dry, & intact 02/09/21 0930   Phlebitis Assessment 0 02/09/21 0930   Infiltration Assessment 0 02/09/21 0930   Dressing Status Clean, dry, & intact 02/09/21 0930   Dressing Type Transparent;Tape 02/09/21 0930   Hub Color/Line Status Pink;Capped 02/09/21 0930   Action Taken Open ports on tubing capped 02/09/21 0930   Alcohol Cap Used Yes 02/09/21 5081        Opportunity for questions and clarification was provided.       Patient transported with:   Registered Nurse  Tech

## 2021-02-09 NOTE — PROGRESS NOTES
Patient reported foul smelling milky vaginal discharge with irritation since end of period. Had yeast infection prior to to admission. Treated with cream insert but period came the day of or after. NP notified. Medication administered per order.  0740 Bedside and Verbal shift change report given to Giselle Rn (oncoming nurse) by Roselyn Guy (offgoing nurse). Report included the following information SBAR, Kardex, ED Summary, Procedure Summary, Intake/Output, MAR, Recent Results and Cardiac Rhythm NSR.

## 2021-02-09 NOTE — PROGRESS NOTES
[facility]                                                                                          Hospitalist Progress Note  Dontrell Brown MD  Answering service: [phone] from in house phone        Date of Service:  2021  NAME:  Suzy Ferreira  :  1975  MRN:  [id number]      Admission Summary: This is 67-year-old woman with past medical history significant for COPD, hypertension, bipolar disorder, and seizure disorder, who was in her usual state of health until about 2 days ago when the patient developed shortness of breath which is progressive and getting worse. This is also associated with cough which is nonproductive. The shortness of breath is worse with mild exertion such as walking. The patient has history of COPD, not on oxygen at home. The patient denies fever, rigor, or chills. No sick contact or contact any person with COVID-19 virus infection. The patient was brought to the emergency room for further evaluation. When the patient arrived at the emergency room, the patient was in significant respiratory distress and required being placed on BiPAP. The patient was subsequently referred to the hospitalist service for evaluation for admission. The patient was last admitted to the hospital from 2020 to 2020. The patient was admitted and treated for COPD/asthma exacerbation. Interval history / Subjective:   C/o yeast infection overnight, s/p diflucan x1. Pt remains on RA. Feeling ok, NAD, no sob or cp.      Assessment & Plan:     Acute respiratory failure with hypoxia, improving  COPD with Acute Exacerbation  covid pcr negative  -- Continue PO Steroid course  -- s/p Azithromycin  -- hasn't required bipap since , remains stable on RA  Leukocytosis 2/2 steroids  Sent rxs for copd inhalers (LABA, LAMA, ICS) at d/c     Hypertension, controlled  -- Continue home Prazosin and Clonidine     Bipolar disorder:   -- cont home meds    -- Depakote level was low, likely not taking  -- Restart and check level before DC     Hypomagnesemia, repleted     Polysubstance abuse:   -- UDS positive for benzos and barbituates  -- Continue Home meds  PDMP reviewed and confirmed regular use of Suboxone 8mg-2mg 2 tabs daily; last filled 01/30/2021 x7 days, previously filled 01/19/2021 x14 days; therefore pt is due for new rx     Tobacco Abuse:  --s/p smoking cessation counseling  -- She is not interested in quitting     Seizure disorder:    -- continue Keppra  QTc 480     Vaginal discharge/odor thought to be yeast infection  No improvement S/p PO diflucan x1  Will ask gyn to see, appreciate assistance     Code status: Full  Care Plan discussed with: Patient/Family  Anticipated Disposition: [facility]  Date Reviewed: 2/6/2021          Codes Class Noted POA    * (Principal) Acute respiratory failure with hypoxia [facility]) ICD-10-CM: J96.01  ICD-9-CM: 518.81  2/6/2021 Yes                Review of Systems:   A comprehensive review of systems was negative except for that written in the HPI. Vital Signs:    Last 24hrs VS reviewed since prior progress note. Most recent are:  Visit Vitals  BP (!) 121/48 (BP 1 Location: Right upper arm, BP Patient Position: At rest)   Pulse (!) 103   Temp 98.3 °F (36.8 °C)   Resp 18   Ht 5' 5\" (1.651 m)   Wt 92.1 kg (203 lb 0.7 oz)   SpO2 95%   BMI 33.79 kg/m²         Intake/Output Summary (Last 24 hours) at 2/9/2021 1245  Last data filed at 2/9/2021 0930  Gross per 24 hour   Intake 1740 ml   Output --   Net 1740 ml        Physical Examination:     I had a face to face encounter with this patient and independently examined them on 2/9/2021 as outlined below:          Constitutional:  No acute distress, cooperative   ENT:  Oral mucosa moist, oropharynx benign.     Resp:  Occ wheezing, no distress, No accessory muscle use   CV:  Regular rhythm, normal rate, no audible murmurs    GI:  Soft, non distended, non tender     Musculoskeletal:  No edema, warm,    Neurologic:  Moves all extremities.  AAO, CN II-XII reviewed                         Psych:               Not anxious or agitated. Data Review:    Review and/or order of clinical lab test  Review and/or order of tests in the radiology section of [facility]      Labs:     Recent Labs     02/09/21 0517 02/07/21 0432   WBC 6.3 11.2*   HGB 11.2* 11.6   HCT 35.2 35.3    257     Recent Labs     02/09/21 0517 02/07/21 0432    137   K 3.8 4.3    106   CO2 28 24   BUN 16 13   CREA 0.75 0.79   * 122*   CA 8.6 8.6   MG 1.9  --    PHOS 2.7  --      Recent Labs     02/07/21 0432   ALT 27   AP 83   TBILI 0.3   TP 6.8   ALB 3.2*   GLOB 3.6     No results for input(s): INR, PTP, APTT, INREXT, INREXT in the last 72 hours. No results for input(s): FE, TIBC, PSAT, FERR in the last 72 hours. Lab Results   Component Value Date/Time    Folate 21.5 (H) 08/01/2017 02:53 AM      No results for input(s): PH, PCO2, PO2 in the last 72 hours. No results for input(s): CPK, CKNDX, TROIQ in the last 72 hours.     No lab exists for component: CPKMB  Lab Results   Component Value Date/Time    Cholesterol, total 195 08/10/2018 12:07 AM    HDL Cholesterol 57 08/10/2018 12:07 AM    LDL, calculated 127.4 (H) 08/10/2018 12:07 AM    Triglyceride 53 08/10/2018 12:07 AM    CHOL/HDL Ratio 3.4 08/10/2018 12:07 AM     Lab Results   Component Value Date/Time    Glucose (POC) 91 08/03/2018 05:30 AM    Glucose (POC) 109 (H) 08/03/2018 12:37 AM    Glucose (POC) 111 (H) 07/31/2018 06:21 PM    Glucose (POC) 114 (H) 07/31/2018 12:05 PM    Glucose (POC) 71 07/31/2018 11:40 AM     Lab Results   Component Value Date/Time    Color YELLOW/STRAW 09/24/2020 02:41 AM    Appearance CLOUDY (A) 09/24/2020 02:41 AM    Specific gravity 1.020 09/24/2020 02:41 AM    Specific gravity >1.030 (H) 05/09/2009 09:27 PM    pH (UA) 6.0 09/24/2020 02:41 AM    Protein Negative 09/24/2020 02:41 AM    Glucose Negative 09/24/2020 02:41 AM    Ketone Negative 09/24/2020 02:41 AM    Bilirubin Negative 09/24/2020 02:41 AM    Urobilinogen 1.0 09/24/2020 02:41 AM    Nitrites Negative 09/24/2020 02:41 AM    Leukocyte Esterase Negative 09/24/2020 02:41 AM    Epithelial cells FEW 08/10/2018 03:23 AM    Bacteria NEGATIVE  08/10/2018 03:23 AM    WBC 0-4 08/10/2018 03:23 AM    RBC 0-5 08/10/2018 03:23 AM         Medications Reviewed:     Current Facility-Administered Medications   Medication Dose Route Frequency    [START ON 2/10/2021] azithromycin (ZITHROMAX) 500 mg in 0.9% sodium chloride 250 mL (VIAL-MATE)  500 mg IntraVENous Q24H    albuterol-ipratropium (DUO-NEB) 2.5 MG-0.5 MG/3 ML  3 mL Nebulization TID    potassium, sodium phosphates (NEUTRA-PHOS) packet 1 Packet  1 Packet Oral QID    albuterol-ipratropium (DUO-NEB) 2.5 MG-0.5 MG/3 ML  3 mL Nebulization Q4H PRN    amitriptyline (ELAVIL) tablet 100 mg  100 mg Oral QHS    buprenorphine-naloxone (SUBOXONE) 8-2mg SL tablet  2 Tab SubLINGual DAILY    cloNIDine HCL (CATAPRES) tablet 0.3 mg  0.3 mg Oral TID    divalproex DR (DEPAKOTE) tablet 500 mg  500 mg Oral BID    gabapentin (NEURONTIN) capsule 300 mg  300 mg Oral TID    levETIRAcetam (KEPPRA) tablet 500 mg  500 mg Oral BID    montelukast (SINGULAIR) tablet 10 mg  10 mg Oral QHS    prazosin (MINIPRESS) capsule 4 mg  4 mg Oral QHS    sertraline (ZOLOFT) tablet 25 mg  25 mg Oral DAILY    QUEtiapine (SEROquel) tablet 400 mg  400 mg Oral QHS    sodium chloride (NS) flush 5-40 mL  5-40 mL IntraVENous Q8H    sodium chloride (NS) flush 5-40 mL  5-40 mL IntraVENous PRN    acetaminophen (TYLENOL) tablet 650 mg  650 mg Oral Q6H PRN    Or    acetaminophen (TYLENOL) suppository 650 mg  650 mg Rectal Q6H PRN    polyethylene glycol (MIRALAX) packet 17 g  17 g Oral DAILY PRN    promethazine (PHENERGAN) tablet 12.5 mg  12.5 mg Oral Q6H PRN    Or    ondansetron (ZOFRAN) injection 4 mg  4 mg IntraVENous Q6H PRN    enoxaparin (LOVENOX) injection 40 mg  40 mg SubCUTAneous DAILY    predniSONE (DELTASONE) tablet 40 mg  40 mg Oral DAILY WITH BREAKFAST    nicotine (NICODERM CQ) 21 mg/24 hr patch 1 Patch  1 Patch TransDERmal DAILY     ______________________________________________________________________  EXPECTED LENGTH OF STAY: 3d 14h  ACTUAL LENGTH OF STAY:          3                 Lashay Olivera MD

## 2021-02-09 NOTE — PROGRESS NOTES
Bedside shift change report given to [facility] [id number] (oncoming nurse) by Lenin Cook RN (offgoing nurse). Report included the following information SBAR, Kardex, MAR and Cardiac Rhythm NSR.

## 2021-02-10 VITALS
SYSTOLIC BLOOD PRESSURE: 118 MMHG | TEMPERATURE: 99.2 F | HEIGHT: 65 IN | HEART RATE: 85 BPM | WEIGHT: 203.04 LBS | RESPIRATION RATE: 16 BRPM | BODY MASS INDEX: 33.83 KG/M2 | OXYGEN SATURATION: 100 % | DIASTOLIC BLOOD PRESSURE: 95 MMHG

## 2021-02-10 LAB
ANION GAP SERPL CALC-SCNC: 4 MMOL/L (ref 5–15)
BUN SERPL-MCNC: 20 MG/DL (ref 6–20)
BUN/CREAT SERPL: 24 (ref 12–20)
C TRACH DNA SPEC QL NAA+PROBE: NEGATIVE
CALCIUM SERPL-MCNC: 9.2 MG/DL (ref 8.5–10.1)
CHLORIDE SERPL-SCNC: 103 MMOL/L (ref 97–108)
CO2 SERPL-SCNC: 29 MMOL/L (ref 21–32)
CREAT SERPL-MCNC: 0.83 MG/DL (ref 0.55–1.02)
GLUCOSE SERPL-MCNC: 97 MG/DL (ref 65–100)
MAGNESIUM SERPL-MCNC: 1.8 MG/DL (ref 1.6–2.4)
N GONORRHOEA DNA SPEC QL NAA+PROBE: NEGATIVE
POTASSIUM SERPL-SCNC: 4.1 MMOL/L (ref 3.5–5.1)
SAMPLE TYPE: NORMAL
SERVICE CMNT-IMP: NORMAL
SODIUM SERPL-SCNC: 136 MMOL/L (ref 136–145)
SPECIMEN SOURCE: NORMAL

## 2021-02-10 PROCEDURE — 74011636637 HC RX REV CODE- 636/637: Performed by: INTERNAL MEDICINE

## 2021-02-10 PROCEDURE — 83735 ASSAY OF MAGNESIUM: CPT

## 2021-02-10 PROCEDURE — 74011250637 HC RX REV CODE- 250/637: Performed by: INTERNAL MEDICINE

## 2021-02-10 PROCEDURE — 74011250636 HC RX REV CODE- 250/636: Performed by: INTERNAL MEDICINE

## 2021-02-10 PROCEDURE — 36415 COLL VENOUS BLD VENIPUNCTURE: CPT

## 2021-02-10 PROCEDURE — 80048 BASIC METABOLIC PNL TOTAL CA: CPT

## 2021-02-10 PROCEDURE — 74011000250 HC RX REV CODE- 250: Performed by: INTERNAL MEDICINE

## 2021-02-10 PROCEDURE — 94760 N-INVAS EAR/PLS OXIMETRY 1: CPT

## 2021-02-10 RX ORDER — BACITRACIN 500 UNIT/G
1 PACKET (EA) TOPICAL AS NEEDED
Status: DISCONTINUED | OUTPATIENT
Start: 2021-02-10 | End: 2021-02-10 | Stop reason: HOSPADM

## 2021-02-10 RX ORDER — METRONIDAZOLE 250 MG/1
500 TABLET ORAL 2 TIMES DAILY
Status: DISCONTINUED | OUTPATIENT
Start: 2021-02-10 | End: 2021-02-10 | Stop reason: HOSPADM

## 2021-02-10 RX ORDER — METRONIDAZOLE 500 MG/1
500 TABLET ORAL 2 TIMES DAILY
Qty: 28 TAB | Refills: 0 | Status: SHIPPED | OUTPATIENT
Start: 2021-02-10 | End: 2021-02-22

## 2021-02-10 RX ADMIN — CLONIDINE HYDROCHLORIDE 0.3 MG: 0.2 TABLET ORAL at 09:19

## 2021-02-10 RX ADMIN — LEVETIRACETAM 500 MG: 500 TABLET ORAL at 09:18

## 2021-02-10 RX ADMIN — BACITRACIN 1 PACKET: 500 OINTMENT TOPICAL at 11:35

## 2021-02-10 RX ADMIN — Medication 10 ML: at 06:24

## 2021-02-10 RX ADMIN — AZITHROMYCIN MONOHYDRATE 500 MG: 500 INJECTION, POWDER, LYOPHILIZED, FOR SOLUTION INTRAVENOUS at 06:23

## 2021-02-10 RX ADMIN — BUPRENORPHINE HYDROCHLORIDE AND NALOXONE HYDROCHLORIDE DIHYDRATE 2 TABLET: 8; 2 TABLET SUBLINGUAL at 09:19

## 2021-02-10 RX ADMIN — SERTRALINE 25 MG: 50 TABLET, FILM COATED ORAL at 09:20

## 2021-02-10 RX ADMIN — GABAPENTIN 300 MG: 100 CAPSULE ORAL at 09:19

## 2021-02-10 RX ADMIN — ACETAMINOPHEN 650 MG: 325 TABLET ORAL at 01:38

## 2021-02-10 RX ADMIN — PREDNISONE 40 MG: 20 TABLET ORAL at 09:19

## 2021-02-10 RX ADMIN — METRONIDAZOLE 500 MG: 250 TABLET ORAL at 09:19

## 2021-02-10 NOTE — PROGRESS NOTES
Late entry for 2/9/21 1:30 pm See previous CM notes- Addendum to awaiting medical approval and bed availability with [facility] [address] and for meds to be authorized and ordered to arrive at [address]  tomorrow as [facility] will require meds to filled prior to arrival at their facility. PA information provided to attending for Suboxone. Patient states she called [facility] psychiatry clinic to inform them that she missed her appointment yesterday due to [facility] admit.  CM staff to follow-up in am. DORCAS Clark

## 2021-02-10 NOTE — PROGRESS NOTES
Bedside shift change report given to Armaan Rao RN  by Vandana Correia RN. Report included the following information SBAR, Kardex, MAR, Accordion, and Recent Results.

## 2021-02-10 NOTE — PROGRESS NOTES
Discharge medications reviewed with patient and appropriate educational materials and side effects teaching were provided. I have reviewed discharge instructions with the patient. The patient verbalized understanding. All medications were given to patient.

## 2021-02-16 ENCOUNTER — HOSPITAL ENCOUNTER (INPATIENT)
Age: 46
LOS: 6 days | Discharge: HOME | DRG: 750 | End: 2021-02-22
Attending: PSYCHIATRY & NEUROLOGY | Admitting: PSYCHIATRY & NEUROLOGY
Payer: COMMERCIAL

## 2021-02-16 ENCOUNTER — HOSPITAL ENCOUNTER (EMERGENCY)
Age: 46
Discharge: OTHER INSTITUTION | End: 2021-02-16
Attending: EMERGENCY MEDICINE
Payer: COMMERCIAL

## 2021-02-16 VITALS
TEMPERATURE: 97.5 F | BODY MASS INDEX: 34.15 KG/M2 | SYSTOLIC BLOOD PRESSURE: 96 MMHG | RESPIRATION RATE: 16 BRPM | HEIGHT: 64 IN | WEIGHT: 200 LBS | OXYGEN SATURATION: 99 % | DIASTOLIC BLOOD PRESSURE: 77 MMHG | HEART RATE: 71 BPM

## 2021-02-16 DIAGNOSIS — F19.10 POLYSUBSTANCE ABUSE (HCC): ICD-10-CM

## 2021-02-16 DIAGNOSIS — F22 DELUSIONAL DISORDER (HCC): Primary | ICD-10-CM

## 2021-02-16 DIAGNOSIS — R45.851 SUICIDAL IDEATION: ICD-10-CM

## 2021-02-16 DIAGNOSIS — R44.3 HALLUCINATIONS: ICD-10-CM

## 2021-02-16 DIAGNOSIS — Z00.8 MEDICAL CLEARANCE FOR PSYCHIATRIC ADMISSION: ICD-10-CM

## 2021-02-16 LAB
ALBUMIN SERPL-MCNC: 3.7 G/DL (ref 3.5–5)
ALBUMIN/GLOB SERPL: 0.9 {RATIO} (ref 1.1–2.2)
ALP SERPL-CCNC: 85 U/L (ref 45–117)
ALT SERPL-CCNC: 28 U/L (ref 12–78)
AMORPH CRY URNS QL MICRO: ABNORMAL
AMPHET UR QL SCN: NEGATIVE
ANION GAP SERPL CALC-SCNC: 8 MMOL/L (ref 5–15)
APPEARANCE UR: ABNORMAL
AST SERPL-CCNC: 35 U/L (ref 15–37)
BACTERIA URNS QL MICRO: NEGATIVE /HPF
BARBITURATES UR QL SCN: POSITIVE
BASOPHILS # BLD: 0 K/UL (ref 0–0.1)
BASOPHILS NFR BLD: 1 % (ref 0–1)
BENZODIAZ UR QL: NEGATIVE
BILIRUB SERPL-MCNC: 0.2 MG/DL (ref 0.2–1)
BILIRUB UR QL: NEGATIVE
BUN SERPL-MCNC: 19 MG/DL (ref 6–20)
BUN/CREAT SERPL: 18 (ref 12–20)
CALCIUM SERPL-MCNC: 9.5 MG/DL (ref 8.5–10.1)
CANNABINOIDS UR QL SCN: NEGATIVE
CHLORIDE SERPL-SCNC: 98 MMOL/L (ref 97–108)
CO2 SERPL-SCNC: 30 MMOL/L (ref 21–32)
COCAINE UR QL SCN: NEGATIVE
COLOR UR: ABNORMAL
COVID-19 RAPID TEST, COVR: NOT DETECTED
CREAT SERPL-MCNC: 1.08 MG/DL (ref 0.55–1.02)
DIFFERENTIAL METHOD BLD: ABNORMAL
DRUG SCRN COMMENT,DRGCM: ABNORMAL
EOSINOPHIL # BLD: 0.2 K/UL (ref 0–0.4)
EOSINOPHIL NFR BLD: 3 % (ref 0–7)
EPITH CASTS URNS QL MICRO: ABNORMAL /LPF
ERYTHROCYTE [DISTWIDTH] IN BLOOD BY AUTOMATED COUNT: 18.1 % (ref 11.5–14.5)
ETHANOL SERPL-MCNC: <10 MG/DL
GLOBULIN SER CALC-MCNC: 4 G/DL (ref 2–4)
GLUCOSE SERPL-MCNC: 112 MG/DL (ref 65–100)
GLUCOSE UR STRIP.AUTO-MCNC: NEGATIVE MG/DL
HCT VFR BLD AUTO: 39.7 % (ref 35–47)
HGB BLD-MCNC: 12.8 G/DL (ref 11.5–16)
HGB UR QL STRIP: NEGATIVE
IMM GRANULOCYTES # BLD AUTO: 0 K/UL (ref 0–0.04)
IMM GRANULOCYTES NFR BLD AUTO: 1 % (ref 0–0.5)
KETONES UR QL STRIP.AUTO: NEGATIVE MG/DL
LEUKOCYTE ESTERASE UR QL STRIP.AUTO: ABNORMAL
LYMPHOCYTES # BLD: 1.9 K/UL (ref 0.8–3.5)
LYMPHOCYTES NFR BLD: 31 % (ref 12–49)
MCH RBC QN AUTO: 27.3 PG (ref 26–34)
MCHC RBC AUTO-ENTMCNC: 32.2 G/DL (ref 30–36.5)
MCV RBC AUTO: 84.6 FL (ref 80–99)
METHADONE UR QL: NEGATIVE
MONOCYTES # BLD: 0.7 K/UL (ref 0–1)
MONOCYTES NFR BLD: 12 % (ref 5–13)
MUCOUS THREADS URNS QL MICRO: ABNORMAL /LPF
NEUTS SEG # BLD: 3.1 K/UL (ref 1.8–8)
NEUTS SEG NFR BLD: 52 % (ref 32–75)
NITRITE UR QL STRIP.AUTO: NEGATIVE
NRBC # BLD: 0 K/UL (ref 0–0.01)
NRBC BLD-RTO: 0 PER 100 WBC
OPIATES UR QL: NEGATIVE
PCP UR QL: NEGATIVE
PH UR STRIP: 7.5 [PH] (ref 5–8)
PLATELET # BLD AUTO: 236 K/UL (ref 150–400)
PMV BLD AUTO: 10.4 FL (ref 8.9–12.9)
POTASSIUM SERPL-SCNC: 4.1 MMOL/L (ref 3.5–5.1)
PROT SERPL-MCNC: 7.7 G/DL (ref 6.4–8.2)
PROT UR STRIP-MCNC: NEGATIVE MG/DL
RBC # BLD AUTO: 4.69 M/UL (ref 3.8–5.2)
RBC #/AREA URNS HPF: ABNORMAL /HPF (ref 0–5)
SARS-COV-2, COV2: NORMAL
SODIUM SERPL-SCNC: 136 MMOL/L (ref 136–145)
SOURCE, COVRS: NORMAL
SP GR UR REFRACTOMETRY: 1.02 (ref 1–1.03)
UROBILINOGEN UR QL STRIP.AUTO: 0.2 EU/DL (ref 0.2–1)
VALPROATE SERPL-MCNC: 51 UG/ML (ref 50–100)
WBC # BLD AUTO: 6 K/UL (ref 3.6–11)
WBC URNS QL MICRO: ABNORMAL /HPF (ref 0–4)

## 2021-02-16 PROCEDURE — 93005 ELECTROCARDIOGRAM TRACING: CPT

## 2021-02-16 PROCEDURE — 99285 EMERGENCY DEPT VISIT HI MDM: CPT

## 2021-02-16 PROCEDURE — 82077 ASSAY SPEC XCP UR&BREATH IA: CPT

## 2021-02-16 PROCEDURE — 80164 ASSAY DIPROPYLACETIC ACD TOT: CPT

## 2021-02-16 PROCEDURE — 74011250637 HC RX REV CODE- 250/637: Performed by: NURSE PRACTITIONER

## 2021-02-16 PROCEDURE — 81001 URINALYSIS AUTO W/SCOPE: CPT

## 2021-02-16 PROCEDURE — 80053 COMPREHEN METABOLIC PANEL: CPT

## 2021-02-16 PROCEDURE — U0005 INFEC AGEN DETEC AMPLI PROBE: HCPCS

## 2021-02-16 PROCEDURE — 36415 COLL VENOUS BLD VENIPUNCTURE: CPT

## 2021-02-16 PROCEDURE — 87635 SARS-COV-2 COVID-19 AMP PRB: CPT

## 2021-02-16 PROCEDURE — 85025 COMPLETE CBC W/AUTO DIFF WBC: CPT

## 2021-02-16 PROCEDURE — 80307 DRUG TEST PRSMV CHEM ANLYZR: CPT

## 2021-02-16 PROCEDURE — 65220000003 HC RM SEMIPRIVATE PSYCH

## 2021-02-16 RX ORDER — ALBUTEROL SULFATE 0.83 MG/ML
2.5 SOLUTION RESPIRATORY (INHALATION)
Status: DISCONTINUED | OUTPATIENT
Start: 2021-02-16 | End: 2021-02-17 | Stop reason: ALTCHOICE

## 2021-02-16 RX ORDER — BUDESONIDE 0.5 MG/2ML
500 INHALANT ORAL
Status: DISCONTINUED | OUTPATIENT
Start: 2021-02-16 | End: 2021-02-16

## 2021-02-16 RX ORDER — POLYETHYLENE GLYCOL 3350 17 G/17G
17 POWDER, FOR SOLUTION ORAL DAILY
Status: DISCONTINUED | OUTPATIENT
Start: 2021-02-17 | End: 2021-02-22 | Stop reason: HOSPADM

## 2021-02-16 RX ORDER — DOCUSATE SODIUM 100 MG/1
100 CAPSULE, LIQUID FILLED ORAL DAILY
Status: DISCONTINUED | OUTPATIENT
Start: 2021-02-17 | End: 2021-02-22 | Stop reason: HOSPADM

## 2021-02-16 RX ORDER — GABAPENTIN 300 MG/1
300 CAPSULE ORAL 3 TIMES DAILY
Status: DISCONTINUED | OUTPATIENT
Start: 2021-02-16 | End: 2021-02-22 | Stop reason: HOSPADM

## 2021-02-16 RX ORDER — METRONIDAZOLE 250 MG/1
500 TABLET ORAL 2 TIMES DAILY
Status: DISCONTINUED | OUTPATIENT
Start: 2021-02-16 | End: 2021-02-16

## 2021-02-16 RX ORDER — MONTELUKAST SODIUM 10 MG/1
10 TABLET ORAL
Status: DISCONTINUED | OUTPATIENT
Start: 2021-02-16 | End: 2021-02-22 | Stop reason: HOSPADM

## 2021-02-16 RX ORDER — THERA TABS 400 MCG
1 TAB ORAL DAILY
Status: DISCONTINUED | OUTPATIENT
Start: 2021-02-17 | End: 2021-02-22 | Stop reason: HOSPADM

## 2021-02-16 RX ORDER — FOLIC ACID 1 MG/1
1 TABLET ORAL DAILY
Status: DISCONTINUED | OUTPATIENT
Start: 2021-02-17 | End: 2021-02-22 | Stop reason: HOSPADM

## 2021-02-16 RX ORDER — LEVETIRACETAM 500 MG/1
500 TABLET ORAL 2 TIMES DAILY
Status: DISCONTINUED | OUTPATIENT
Start: 2021-02-16 | End: 2021-02-22 | Stop reason: HOSPADM

## 2021-02-16 RX ORDER — OLANZAPINE 5 MG/1
5 TABLET ORAL
Status: DISCONTINUED | OUTPATIENT
Start: 2021-02-16 | End: 2021-02-22 | Stop reason: HOSPADM

## 2021-02-16 RX ORDER — LANOLIN ALCOHOL/MO/W.PET/CERES
100 CREAM (GRAM) TOPICAL DAILY
Status: DISCONTINUED | OUTPATIENT
Start: 2021-02-17 | End: 2021-02-22 | Stop reason: HOSPADM

## 2021-02-16 RX ORDER — FLUTICASONE PROPIONATE AND SALMETEROL 250; 50 UG/1; UG/1
1 POWDER RESPIRATORY (INHALATION) 2 TIMES DAILY
Status: DISCONTINUED | OUTPATIENT
Start: 2021-02-17 | End: 2021-02-22 | Stop reason: HOSPADM

## 2021-02-16 RX ORDER — IBUPROFEN 200 MG
1 TABLET ORAL DAILY
Status: DISCONTINUED | OUTPATIENT
Start: 2021-02-17 | End: 2021-02-22 | Stop reason: HOSPADM

## 2021-02-16 RX ORDER — ARFORMOTEROL TARTRATE 15 UG/2ML
15 SOLUTION RESPIRATORY (INHALATION)
Status: DISCONTINUED | OUTPATIENT
Start: 2021-02-16 | End: 2021-02-16

## 2021-02-16 RX ORDER — ADHESIVE BANDAGE
30 BANDAGE TOPICAL DAILY PRN
Status: DISCONTINUED | OUTPATIENT
Start: 2021-02-16 | End: 2021-02-22 | Stop reason: HOSPADM

## 2021-02-16 RX ORDER — HYDROXYZINE 50 MG/1
50 TABLET, FILM COATED ORAL
Status: DISCONTINUED | OUTPATIENT
Start: 2021-02-16 | End: 2021-02-22 | Stop reason: HOSPADM

## 2021-02-16 RX ORDER — METRONIDAZOLE 250 MG/1
500 TABLET ORAL 2 TIMES DAILY
Status: COMPLETED | OUTPATIENT
Start: 2021-02-17 | End: 2021-02-17

## 2021-02-16 RX ORDER — DIPHENHYDRAMINE HYDROCHLORIDE 50 MG/ML
50 INJECTION, SOLUTION INTRAMUSCULAR; INTRAVENOUS
Status: DISCONTINUED | OUTPATIENT
Start: 2021-02-16 | End: 2021-02-22 | Stop reason: HOSPADM

## 2021-02-16 RX ORDER — ACETAMINOPHEN 325 MG/1
650 TABLET ORAL
Status: DISCONTINUED | OUTPATIENT
Start: 2021-02-16 | End: 2021-02-22 | Stop reason: HOSPADM

## 2021-02-16 RX ORDER — HALOPERIDOL 5 MG/ML
5 INJECTION INTRAMUSCULAR
Status: DISCONTINUED | OUTPATIENT
Start: 2021-02-16 | End: 2021-02-22 | Stop reason: HOSPADM

## 2021-02-16 RX ORDER — IPRATROPIUM BROMIDE 0.5 MG/2.5ML
0.5 SOLUTION RESPIRATORY (INHALATION)
Status: DISCONTINUED | OUTPATIENT
Start: 2021-02-16 | End: 2021-02-16

## 2021-02-16 RX ORDER — CLONIDINE HYDROCHLORIDE 0.1 MG/1
0.3 TABLET ORAL 3 TIMES DAILY
Status: DISCONTINUED | OUTPATIENT
Start: 2021-02-16 | End: 2021-02-18

## 2021-02-16 RX ORDER — LORAZEPAM 2 MG/ML
1 INJECTION INTRAMUSCULAR
Status: DISCONTINUED | OUTPATIENT
Start: 2021-02-16 | End: 2021-02-22 | Stop reason: HOSPADM

## 2021-02-16 RX ORDER — QUETIAPINE FUMARATE 100 MG/1
400 TABLET, FILM COATED ORAL
Status: DISCONTINUED | OUTPATIENT
Start: 2021-02-16 | End: 2021-02-22 | Stop reason: HOSPADM

## 2021-02-16 RX ORDER — BENZTROPINE MESYLATE 1 MG/1
1 TABLET ORAL
Status: DISCONTINUED | OUTPATIENT
Start: 2021-02-16 | End: 2021-02-22 | Stop reason: HOSPADM

## 2021-02-16 RX ADMIN — GABAPENTIN 300 MG: 300 CAPSULE ORAL at 21:29

## 2021-02-16 RX ADMIN — METRONIDAZOLE 500 MG: 250 TABLET ORAL at 20:30

## 2021-02-16 RX ADMIN — QUETIAPINE FUMARATE 400 MG: 100 TABLET ORAL at 21:29

## 2021-02-16 RX ADMIN — LEVETIRACETAM 500 MG: 500 TABLET ORAL at 20:30

## 2021-02-16 NOTE — ED NOTES
Attempted to call report, informed that RN from [facility] will call back as they are having shift change

## 2021-02-16 NOTE — ED NOTES
TRANSFER - OUT REPORT:    Verbal report given to Stella Johnson RN(name) on Norberto Jackson  being transferred to [address]) for routine progression of care       Report consisted of patients Situation, Background, Assessment and   Recommendations(SBAR). Information from the following report(s) SBAR and ED Summary was reviewed with the receiving nurse. Lines:       Opportunity for questions and clarification was provided.

## 2021-02-16 NOTE — ED NOTES
Pt had refused vitals signs from ED staff. Pt states \"I don't want you in my room, get out. \" Multiple attempts made to attempt to get new vitals from pt. Charge nurse aware. Pt left ED with [employer] officers and 3 belonging bags, a purse, and a larger bag. [place] notified of pt leaving ED.

## 2021-02-16 NOTE — ED NOTES
Mina Anglin NP accepted pt for Dr. Allison Monsivais. Plan is for pt to be transferred to [facility] room 734. Number for report is [phone]. Waiting on TDO. Pt resting in bed with call bell in reach and [employer] officer at bedside.

## 2021-02-16 NOTE — ED PROVIDER NOTES
EMERGENCY DEPARTMENT HISTORY AND PHYSICAL EXAM      Date: 2/16/2021  Patient Name: Suzy Ferreira    History of Presenting Illness     Chief Complaint   Patient presents with    Mental Health Problem     History Provided By: Patient, [facility] and Law Enforcement    HPI: Suzy Ferreira, 50 y.o. female with past medical history significant for asthma, COPD, bipolar, cocaine abuse, depression, Hepatitis B, congestive heart failure, hypertension, sarcoidosis, and prior heroin abuse who presents in police custody under an ECO to the ED for a mental health evaluation. Per patient, she was recently discharged from the ICU at [facility] for a COPD exacerbation and was discharged to the [email or web address] for medical respite. She tells me that the plan was eventually to be transferred to [facility] crisis stabilization unit once a bed was available. Today the staff at the [email or web address] informed her that she needed to pack her belongings and that she could no longer stay at their facility. The police were called and one point she mentioned that she \"would be better off dead\". She cannot tell me whether she is suicidal or not. She does endorse auditory and visual hallucinations, but does not appear to be responding to internal stimuli. She denies any illegal drug use and states that she has been clean from heroin for 8 years. Upon chart review, she has had 3 ED visits and one admission to the hospital for heroin overdose in the past 6 months. Per Carlita Boyce with [facility], patient has been having auditory hallucinations over the past few days and is paranoid and delusional.  Nobody asked her to leave today but the patient is hearing voices telling her that she has to leave. Ms. Didi Jenkins also stated that there was a female at the  of the [facility] that was rude to her but there is no  at the [facility] and no female working there in that position.     PMHx: Asthma, COPD, bipolar disorder, depression, hepatitis B, congestive heart failure, hypertension, sarcoidosis, polysubstance abuse  Social Hx: Smokes 2 cigarettes/day, denies alcohol use, denies any current illegal drug use    PCP: None    There are no other complaints, changes, or physical findings at this time. No current facility-administered medications on file prior to encounter. Current Outpatient Medications on File Prior to Encounter   Medication Sig Dispense Refill    metroNIDAZOLE (FLAGYL) 500 mg tablet Take 1 Tab by mouth two (2) times a day for 14 days. 28 Tab 0    albuterol (ProAir HFA) 90 mcg/actuation inhaler Take 2 Puffs by inhalation every four (4) hours as needed for Wheezing or Shortness of Breath. Indications: bronchospasm prevention 2 Inhaler 1    fluticasone propion-salmeteroL (Advair Diskus) 250-50 mcg/dose diskus inhaler Take 1 Puff by inhalation two (2) times a day. 2 Inhaler 1    tiotropium (Spiriva with HandiHaler) 18 mcg inhalation capsule Take 1 Cap by inhalation daily. Indications: bronchospasm prevention with COPD 30 Cap 1    amitriptyline (ELAVIL) 100 mg tablet Take 1 Tab by mouth nightly. Indications: depression 30 Tab 0    montelukast (SINGULAIR) 10 mg tablet Take 1 Tab by mouth nightly. Indications: controller medication for asthma 30 Tab 0    prazosin (MINIPRESS) 2 mg capsule Take 2 Caps by mouth nightly. Indications: posttraumatic stress syndrome 60 Cap 0    QUEtiapine (SEROquel) 400 mg tablet Take 1 Tab by mouth nightly. Indications: mood 30 Tab 0    sertraline (ZOLOFT) 25 mg tablet Take 1 Tab by mouth daily. Indications: major depressive disorder 30 Tab 0    buprenorphine-naloxone (Suboxone) 8-2 mg film sublingaul film 2 Film by SubLINGual route daily. Max Daily Amount: 2 Film. Indications: dependence on opioid-type drugs 14 Film 1    cloNIDine HCL (CATAPRES) 0.3 mg tablet Take 1 Tab by mouth three (3) times daily.  Indications: high blood pressure 90 Tab 0    divalproex DR (Depakote) 500 mg tablet Take 1 Tab by mouth two (2) times a day. Indications: bipolar depression 60 Tab 0    gabapentin (NEURONTIN) 300 mg capsule Take 1 Cap by mouth three (3) times daily. Max Daily Amount: 900 mg. Indications: neuropathic pain 90 Cap 0    levETIRAcetam (Keppra) 500 mg tablet Take 1 Tab by mouth two (2) times a day. Indications: additional medication to treat partial seizures 60 Tab 0    nicotine (NICODERM CQ) 21 mg/24 hr 1 Patch by TransDERmal route daily for 30 days. 30 Patch 0     Past History     Past Medical History:  Past Medical History:   Diagnosis Date    Asthma     Bipolar 1 disorder (HCC)     Chronic obstructive pulmonary disease (HCC)     Chronic pain     back, leg    Cocaine abuse (HCC)     Depression     Heart failure (HCC)     Hepatitis B     Heroin abuse (HCC)     HTN (hypertension)     Narcotic abuse (HCC)     Polysubstance abuse ([address]     Sarcoidosis     Tobacco abuse      Past Surgical History:  Past Surgical History:   Procedure Laterality Date    HX GYN      HX WISDOM TEETH EXTRACTION       Family History:  Family History   Problem Relation Age of Onset    Hypertension Father     Hypertension Mother      Social History:  Social History     Tobacco Use    Smoking status: Current Every Day Smoker     Packs/day: 0.25     Years: 15.00     Pack years: 3.75     Last attempt to quit: 8/3/2017     Years since quitting: 3.5    Smokeless tobacco: Never Used    Tobacco comment: \"I already quit\"   Substance Use Topics    Alcohol use: No    Drug use: Not Currently     Types: Marijuana, Heroin     Comment: Pt reports last used heroin on 9/23/20     Allergies: Allergies   Allergen Reactions    Vancomycin Anaphylaxis    Tomato Swelling     Tongue    Trazodone Angioedema     Review of Systems   Review of Systems   Constitutional: Negative for chills and fever. HENT: Negative for congestion, rhinorrhea, sneezing and sore throat. Eyes: Negative for redness and visual disturbance. Respiratory: Negative for shortness of breath. Cardiovascular: Negative for leg swelling. Gastrointestinal: Negative for abdominal pain, nausea and vomiting. Genitourinary: Negative for difficulty urinating and frequency. Musculoskeletal: Negative for back pain, myalgias and neck stiffness. Skin: Negative for rash. Neurological: Negative for dizziness, syncope, weakness and headaches. Hematological: Negative for adenopathy. Psychiatric/Behavioral: Positive for dysphoric mood and suicidal ideas. All other systems reviewed and are negative. Physical Exam   Physical Exam  Vitals signs and nursing note reviewed. Constitutional:       Appearance: Normal appearance. She is well-developed. HENT:      Head: Normocephalic and atraumatic. Eyes:      Conjunctiva/sclera: Conjunctivae normal.   Neck:      Musculoskeletal: Full passive range of motion without pain, normal range of motion and neck supple. Cardiovascular:      Rate and Rhythm: Normal rate and regular rhythm. Pulses: Normal pulses. Heart sounds: Normal heart sounds, S1 normal and S2 normal. No murmur. Pulmonary:      Effort: Pulmonary effort is normal. No respiratory distress. Breath sounds: Examination of the right-upper field reveals wheezing. Examination of the left-upper field reveals wheezing. Examination of the right-lower field reveals wheezing. Examination of the left-lower field reveals wheezing. Wheezing present. Abdominal:      General: Bowel sounds are normal. There is no distension. Palpations: Abdomen is soft. Tenderness: There is no abdominal tenderness. There is no rebound. Musculoskeletal: Normal range of motion. Skin:     General: Skin is warm and dry. Findings: No rash. Neurological:      Mental Status: She is alert and oriented to person, place, and time. Psychiatric:         Attention and Perception: She perceives auditory and visual hallucinations.          Mood and Affect: Mood is depressed. Affect is labile and tearful. Thought Content: Thought content is paranoid and delusional. Thought content includes suicidal ideation. Thought content does not include suicidal plan. Diagnostic Study Results   Labs -     Recent Results (from the past 12 hour(s))   EKG, 12 LEAD, INITIAL    Collection Time: 02/16/21 10:50 AM   Result Value Ref Range    Ventricular Rate 70 BPM    Atrial Rate 70 BPM    P-R Interval 136 ms    QRS Duration 82 ms    Q-T Interval 382 ms    QTC Calculation (Bezet) 412 ms    Calculated P Axis 53 degrees    Calculated R Axis 57 degrees    Calculated T Axis 43 degrees    Diagnosis       Normal sinus rhythm  Normal ECG  When compared with ECG of 05-FEB-2021 17:43,  Nonspecific T wave abnormality no longer evident in Lateral leads  QT has shortened     CBC WITH AUTOMATED DIFF    Collection Time: 02/16/21 11:04 AM   Result Value Ref Range    WBC 6.0 3.6 - 11.0 K/uL    RBC 4.69 3.80 - 5.20 M/uL    HGB 12.8 11.5 - 16.0 g/dL    HCT 39.7 35.0 - 47.0 %    MCV 84.6 80.0 - 99.0 FL    MCH 27.3 26.0 - 34.0 PG    MCHC 32.2 30.0 - 36.5 g/dL    RDW 18.1 (H) 11.5 - 14.5 %    PLATELET 925 829 - 963 K/uL    MPV 10.4 8.9 - 12.9 FL    NRBC 0.0 0  WBC    ABSOLUTE NRBC 0.00 0.00 - 0.01 K/uL    NEUTROPHILS 52 32 - 75 %    LYMPHOCYTES 31 12 - 49 %    MONOCYTES 12 5 - 13 %    EOSINOPHILS 3 0 - 7 %    BASOPHILS 1 0 - 1 %    IMMATURE GRANULOCYTES 1 (H) 0.0 - 0.5 %    ABS. NEUTROPHILS 3.1 1.8 - 8.0 K/UL    ABS. LYMPHOCYTES 1.9 0.8 - 3.5 K/UL    ABS. MONOCYTES 0.7 0.0 - 1.0 K/UL    ABS. EOSINOPHILS 0.2 0.0 - 0.4 K/UL    ABS. BASOPHILS 0.0 0.0 - 0.1 K/UL    ABS. IMM.  GRANS. 0.0 0.00 - 0.04 K/UL    DF AUTOMATED     METABOLIC PANEL, COMPREHENSIVE    Collection Time: 02/16/21 11:04 AM   Result Value Ref Range    Sodium 136 136 - 145 mmol/L    Potassium 4.1 3.5 - 5.1 mmol/L    Chloride 98 97 - 108 mmol/L    CO2 30 21 - 32 mmol/L    Anion gap 8 5 - 15 mmol/L    Glucose 112 (H) 65 - 100 mg/dL    BUN 19 6 - 20 MG/DL    Creatinine 1.08 (H) 0.55 - 1.02 MG/DL    BUN/Creatinine ratio 18 12 - 20      GFR est AA >60 >60 ml/min/1.73m2    GFR est non-AA 54 (L) >60 ml/min/1.73m2    Calcium 9.5 8.5 - 10.1 MG/DL    Bilirubin, total 0.2 0.2 - 1.0 MG/DL    ALT (SGPT) 28 12 - 78 U/L    AST (SGOT) 35 15 - 37 U/L    Alk.  phosphatase 85 45 - 117 U/L    Protein, total 7.7 6.4 - 8.2 g/dL    Albumin 3.7 3.5 - 5.0 g/dL    Globulin 4.0 2.0 - 4.0 g/dL    A-G Ratio 0.9 (L) 1.1 - 2.2     ETHYL ALCOHOL    Collection Time: 02/16/21 11:04 AM   Result Value Ref Range    ALCOHOL(ETHYL),SERUM <10 <10 MG/DL   DRUG SCREEN, URINE    Collection Time: 02/16/21 11:04 AM   Result Value Ref Range    AMPHETAMINES Negative NEG      BARBITURATES Positive (A) NEG      BENZODIAZEPINES Negative NEG      COCAINE Negative NEG      METHADONE Negative NEG      OPIATES Negative NEG      PCP(PHENCYCLIDINE) Negative NEG      THC (TH-CANNABINOL) Negative NEG      Drug screen comment (NOTE)    URINALYSIS W/ RFLX MICROSCOPIC    Collection Time: 02/16/21 11:04 AM   Result Value Ref Range    Color YELLOW/STRAW      Appearance CLOUDY (A) CLEAR      Specific gravity 1.020 1.003 - 1.030      pH (UA) 7.5 5.0 - 8.0      Protein Negative NEG mg/dL    Glucose Negative NEG mg/dL    Ketone Negative NEG mg/dL    Bilirubin Negative NEG      Blood Negative NEG      Urobilinogen 0.2 0.2 - 1.0 EU/dL    Nitrites Negative NEG      Leukocyte Esterase MODERATE (A) NEG     SARS-COV-2    Collection Time: 02/16/21 11:04 AM   Result Value Ref Range    SARS-CoV-2 Please find results under separate order     COVID-19 RAPID TEST    Collection Time: 02/16/21 11:04 AM   Result Value Ref Range    Specimen source Nasopharyngeal      COVID-19 rapid test Not detected NOTD     URINE MICROSCOPIC ONLY    Collection Time: 02/16/21 11:04 AM   Result Value Ref Range    WBC 5-10 0 - 4 /hpf    RBC 0-5 0 - 5 /hpf    Epithelial cells MODERATE (A) FEW /lpf    Bacteria Negative NEG /hpf Mucus 1+ (A) NEG /lpf    Amorphous Crystals 2+ (A) NEG   VALPROIC ACID    Collection Time: 02/16/21 11:04 AM   Result Value Ref Range    Valproic acid 51 50 - 100 ug/ml       Radiologic Studies -   No orders to display     No results found. Medical Decision Making   I am the first provider for this patient. I reviewed the vital signs, available nursing notes, past medical history, past surgical history, family history and social history. Vital Signs-Reviewed the patient's vital signs. Patient Vitals for the past 24 hrs:   Temp Pulse Resp BP SpO2   02/16/21 1020 98.2 °F (36.8 °C) 71 16 136/86 99 %     Pulse Oximetry Analysis - 99% on RA (normal)    ED EKG interpretation: 10:50  Rhythm: normal sinus rhythm; and regular . Rate (approx.): 70; Axis: normal; P wave: normal; QRS interval: normal ; ST/T wave: normal; Other findings: normal. This EKG was interpreted by Bailey Moody MD,ED Provider. Records Reviewed: Nursing Notes, Old Medical Records and Previous Laboratory Studies    Provider Notes (Medical Decision Making):   39-year-old female presents in police custody under an ECO for mental health evaluation. According to the staff at the [facility] where she was receiving medical respite, she is having auditory and visual hallucinations and carry on conversations with people that are not there. She is also delusional and the voices are telling her that she needs to leave today. She is supposed to be there for 30 days. When she got agitated and confrontational today, she was discharged from the program and brought to the ED for a medical and behavioral health evaluation. Will check basic labs and have [facility] crisis see the patient for psychiatric disposition. ED Course:   Initial assessment performed. The patients presenting problems have been discussed, and they are in agreement with the care plan formulated and outlined with them.   I have encouraged them to ask questions as they arise throughout their visit. Progress Note  1:19 PM  I have re-evaluated pt and she is medically cleared at this time. 2:19 PM  Per nursing, patient has been accepted by Muna Almaraz, nurse practitioner at [facility] under a psychiatric TDO. Progress Note:   Updated pt on all returned results and findings. Discussed the importance of proper follow up as referred below along with return precautions. Pt in agreement with the care plan and expresses agreement with and understanding of all items discussed. Disposition:  Transfer Note:  2:19 PM  Patient is being transferred to inpatient psychiatry at [facility], transfer accepted by Dr. King. The reasons for patient's transfer have been discussed with the patient and available family. They convey agreement and understanding for the need to be transferred as explained to them by Ishmael Claude, MD.     PLAN:  1.  TDO to inpatient psychiatry at [facility]. Diagnosis     Clinical Impression:   1. Delusional disorder ([address]    2. Suicidal ideation    3. Hallucinations    4. Medical clearance for psychiatric admission            Please note that this dictation was completed with Dragon, computer voice recognition software. Quite often unanticipated grammatical, syntax, homophones, and other interpretive errors are inadvertently transcribed by the computer software. Please disregard these errors. Additionally, please excuse any errors that have escaped final proofreading.

## 2021-02-16 NOTE — ED NOTES
Patient presents to the ED with c/o mental health. Pt was brought in under an ECO due to SI statements at [facility]. Pt reports that she was recently discharged from [facility] for COPD and went to the [facility] for stabilization and reports that they were going to make her leave today and she made SI statements saying she did not want to live. Pt unable to answer if she is SI currently. Pt denies HI. Pt reports auditory and visual hallucinations. EMS  Reports patient was yelling and cussing at them. Pt is alert and oriented. Pt skin is warm and dry. Pt is ambulatory independently. Pt is labile. Emergency Department Nursing Plan of Care       The Nursing Plan of Care is developed from the Nursing assessment and Emergency Department Attending provider initial evaluation. The plan of care may be reviewed in the ED Provider note.     The Plan of Care was developed with the following considerations:   Patient / Family readiness to learn indicated by:verbalized understanding  Persons(s) to be included in education: patient  Barriers to Learning/Limitations:No    Signed     Kriss Morales    2/16/2021   10:29 AM

## 2021-02-16 NOTE — BH NOTES
At [address] [facility]  Patient arrived via ambulatory from [facility] under the treatment of Dr. Jeniffer Yo. Admission status: TDO, Suarez. PTA medication(s): need verifying     Safety: Q 15 min safety checks, gripper socks on patient, patient in gown, all clothing checked    Skin and Pressure Documentation  Primary Nurse, Aydee Taveras RN and Jeffery Carvajal performed a dual skin assessment on this patient No impairment noted  Doc score is 23     Pressure Injury Documentation  (COMPLETE ONE LABEL PER PRESSURE INJURY)  For further information, please review corresponding Wound Care flowsheet. Cy Hammans has: No pressure injury noted and pressure ulcer prevention initiated. No pressure injury noted and pressure ulcer prevention initiated. Patient has LUE tract marks, no s/s of infection noted. Patient has healed wound on L neck from central line. No s/s of infection noted on L side of neck. Meds sent to pharmacy:  Naloxone 3 8-2 films and Neurontin 300 mg 74 caps    Home medications locked in cabinet with phone (all items placed in one bag):  Sertraline  Montelukast Sodium  Keppra  Metronidazole  Depakote  Prazosin  Seroquel  Amitriptyline  Stool softener  4 boxes nicotine patch    Meds sent to Pharmacy for labeling:  Spiriva Inhaler  Advair Diskus          TRANSFER - IN REPORT:    Verbal report received from TIERA Osei(name) on Cy Hammans  being received from [facility](unit) for routine progression of care      Report consisted of patients Situation, Background, Assessment and   Recommendations(SBAR). Information from the following report(s) SBAR was reviewed with the receiving nurse. Opportunity for questions and clarification was provided. Assessment completed upon patients arrival to unit and care assumed. Verbally aggression, active internal stimuli, yelling and screaming at AV, labile at times, pt will allow VS and labs. UDS + Barbs, Rapid -, PCR in progress. EGK NSR, VS WNL.

## 2021-02-16 NOTE — ED NOTES
Bedside and Verbal shift change report given to Georges Barrett  (oncoming nurse) by Renee Nettles RN  (offgoing nurse). Report included the following information SBAR, ED Summary, MAR and Recent Results.

## 2021-02-16 NOTE — ED NOTES
Per CRISIS patient was saying that she was not safe at this facility and was hearing auditory and visual hallucinations. Pt then became agitated and made SI statements.

## 2021-02-16 NOTE — ED NOTES
Room secured. Belongings secured (1pt bag of meds, 3 patient belongings bas, 1 purse and 1 black bag of luggage). Pt placed in a gown.

## 2021-02-17 LAB
ATRIAL RATE: 70 BPM
CALCULATED P AXIS, ECG09: 53 DEGREES
CALCULATED R AXIS, ECG10: 57 DEGREES
CALCULATED T AXIS, ECG11: 43 DEGREES
DIAGNOSIS, 93000: NORMAL
P-R INTERVAL, ECG05: 136 MS
Q-T INTERVAL, ECG07: 382 MS
QRS DURATION, ECG06: 82 MS
QTC CALCULATION (BEZET), ECG08: 412 MS
SARS-COV-2, XPLCVT: NOT DETECTED
SOURCE, COVRS: NORMAL
VENTRICULAR RATE, ECG03: 70 BPM

## 2021-02-17 PROCEDURE — 74011250637 HC RX REV CODE- 250/637: Performed by: NURSE PRACTITIONER

## 2021-02-17 PROCEDURE — 65220000003 HC RM SEMIPRIVATE PSYCH

## 2021-02-17 RX ORDER — ALBUTEROL SULFATE 90 UG/1
2 AEROSOL, METERED RESPIRATORY (INHALATION)
Status: DISCONTINUED | OUTPATIENT
Start: 2021-02-17 | End: 2021-02-17

## 2021-02-17 RX ORDER — HYDROXYZINE 50 MG/1
50 TABLET, FILM COATED ORAL
Status: ON HOLD | COMMUNITY
End: 2021-02-22 | Stop reason: SDUPTHER

## 2021-02-17 RX ORDER — DOXEPIN HYDROCHLORIDE 10 MG/1
10 CAPSULE ORAL
COMMUNITY
End: 2021-02-22

## 2021-02-17 RX ORDER — ARIPIPRAZOLE 10 MG/1
10 TABLET ORAL DAILY
COMMUNITY
End: 2021-02-22

## 2021-02-17 RX ORDER — ALBUTEROL SULFATE 90 UG/1
2 AEROSOL, METERED RESPIRATORY (INHALATION)
Status: DISCONTINUED | OUTPATIENT
Start: 2021-02-17 | End: 2021-02-22 | Stop reason: HOSPADM

## 2021-02-17 RX ADMIN — METRONIDAZOLE 500 MG: 250 TABLET ORAL at 17:25

## 2021-02-17 RX ADMIN — HYDROXYZINE HYDROCHLORIDE 50 MG: 50 TABLET, FILM COATED ORAL at 19:26

## 2021-02-17 RX ADMIN — GABAPENTIN 300 MG: 300 CAPSULE ORAL at 21:16

## 2021-02-17 RX ADMIN — LEVETIRACETAM 500 MG: 500 TABLET ORAL at 17:25

## 2021-02-17 RX ADMIN — OLANZAPINE 5 MG: 5 TABLET, FILM COATED ORAL at 14:30

## 2021-02-17 RX ADMIN — ACETAMINOPHEN 650 MG: 325 TABLET ORAL at 19:25

## 2021-02-17 RX ADMIN — GABAPENTIN 300 MG: 300 CAPSULE ORAL at 08:25

## 2021-02-17 RX ADMIN — OLANZAPINE 5 MG: 5 TABLET, FILM COATED ORAL at 21:16

## 2021-02-17 RX ADMIN — METRONIDAZOLE 500 MG: 250 TABLET ORAL at 08:25

## 2021-02-17 RX ADMIN — Medication 100 MG: at 08:25

## 2021-02-17 RX ADMIN — HYDROXYZINE HYDROCHLORIDE 50 MG: 50 TABLET, FILM COATED ORAL at 14:30

## 2021-02-17 RX ADMIN — DOCUSATE SODIUM 100 MG: 100 CAPSULE, LIQUID FILLED ORAL at 08:29

## 2021-02-17 RX ADMIN — GABAPENTIN 300 MG: 300 CAPSULE ORAL at 17:25

## 2021-02-17 RX ADMIN — FLUTICASONE PROPIONATE AND SALMETEROL 1 PUFF: 250; 50 POWDER RESPIRATORY (INHALATION) at 18:25

## 2021-02-17 RX ADMIN — LEVETIRACETAM 500 MG: 500 TABLET ORAL at 08:25

## 2021-02-17 RX ADMIN — FLUTICASONE PROPIONATE AND SALMETEROL 1 PUFF: 250; 50 POWDER RESPIRATORY (INHALATION) at 08:44

## 2021-02-17 RX ADMIN — THERA TABS 1 TABLET: TAB at 08:25

## 2021-02-17 RX ADMIN — QUETIAPINE FUMARATE 400 MG: 100 TABLET ORAL at 21:16

## 2021-02-17 RX ADMIN — FOLIC ACID 1 MG: 1 TABLET ORAL at 08:25

## 2021-02-17 NOTE — PROGRESS NOTES
Problem: Altered Thought Process (Adult/Pediatric)  Goal: *STG: Participates in treatment plan  Outcome: Progressing Towards Goal  Goal: *STG: Complies with medication therapy  Outcome: Progressing Towards Goal     Problem: Patient Education: Go to Patient Education Activity  Goal: Patient/Family Education  Outcome: Progressing Towards Goal     PRN Medication Documentation    Specific patient behavior that led to need for PRN medication: patient verbal altercation with other patient on unit. Staff interventions attempted prior to PRN being given: discussed coping strategies  PRN medication given: Zyprexa 5mg, Atarax 50mg  Patient response/effectiveness of PRN medication: will monitor    [phone]  Per Julio C Ratliff NP, unable to fill her Suboxone until it is verified with [facility]. Will try and call again tomorrow.

## 2021-02-17 NOTE — BH NOTES
GROUP THERAPY PROGRESS NOTE    Patient is participating in Coping Skills Group. Group time: 50 minutes    Personal goal for participation: To boost self-esteem and confidence     Goal orientation: Personal    Group therapy participation: active    Therapeutic interventions reviewed and discussed: Everyone in group is to complete the activity Toot your own horn by filling in and completing various sentences about themselves in a positive way. Group discussion around challenges completing this activity and different thoughts that patients had during it. Each member shared their strengths and coping skills they identified during this activity and discussed things they learned about themselves or where there is room for growth. Impression of participation: Fela Aragon actively participated in group. Patient was calm and pleasant in group. Supportive of other group members. Presented with a depressed mood and soft speech. Patient identified her strengths and coping skills.      Ewa Brambila, Supervisee in Social Work
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
## 2021-02-17 NOTE — GROUP NOTE
[facility]  GROUP DOCUMENTATION INDIVIDUAL                                                                          Group Therapy Note    Date: 2/17/2021    Group Start Time: 0830  Group End Time: 0930  Group Topic: [employer] Meeting    [facility] PSYCHIATRIC [place] [address] [facility] [address] [facility]    Alejandro Polo Masoud Mercedes    IP [address] [facility] GROUP DOCUMENTATION GROUP    Group Therapy Note    Attendees: 9/10         Attendance: Attended    Patient's Goal:  Talk with Dr about meds, when they are scheduled, and when they can get discharged. Interventions/techniques: Validated and Supported    Follows Directions: Followed directions    Interactions: Interacted appropriately    Mental Status: Calm    Behavior/appearance: Cooperative    Goals Achieved: Able to engage in interactions, Able to listen to others and Able to receive feedback      Additional Notes:  Staff met with patients individually, or in a small group. Discussed ]what happens in treatment team, groups, staff roles, and unit expectations.  Staff assisted patients in establishing a daily goal.       Dorcas Cervantes

## 2021-02-17 NOTE — PROGRESS NOTES
Problem: Falls - Risk of  Goal: *Absence of Falls  Description: Document Elba Estrada Fall Risk and appropriate interventions in the flowsheet.   Outcome: Progressing Towards Goal  Note: Fall Risk Interventions:            Medication Interventions: Patient to call before getting OOB     Patient in bed with eyes closed, safety measures in place, safety checks q15 mins, will continue to monitor

## 2021-02-17 NOTE — BH NOTES
Patient came out of room with complaints of shortness of breath, chest pain upon expiration. Vital signs taken and re-assessed per chart. Respiratory arrived on unit and gave pt. Respiratory treatment. Pt. Continued with expiratory wheeze, Ava Dan NP made aware of situation. Orders received, pt. Refusing any further treatment, use of cPAP by becoming agitated. Ava Dan NP made aware, will continue to monitor.

## 2021-02-17 NOTE — INTERDISCIPLINARY ROUNDS
Behavioral Health Interdisciplinary Rounds     Patient Name: Johnna Beltran  Age: 50 y.o. Room/Bed:  4/  Primary Diagnosis: <principal problem not specified>   Admission Status: TDO     Readmission within 30 days: no  Power of  in place: no  Patient requires a blocked bed: no          Reason for blocked bed:     VTE Prophylaxis: No    Mobility needs/Fall risk: no  Flu Vaccine : no   Nutritional Plan: no  Consults:          Labs/Testing due today?: no    Sleep hours:  6      Participation in Care/Groups: New admit  Medication Compliant?: Yes  PRNS (last 24 hours): None    Restraints (last 24 hours):  no     CIWA (range last 24 hours):     COWS (range last 24 hours):      Alcohol screening (AUDIT) completed -   AUDIT Score: 0     If applicable, date SBIRT discussed in treatment team AND documented:   AUDIT Screen Score: AUDIT Score: 0    Tobacco - patient is a smoker: Have You Used Tobacco in the Past 30 Days: Yes  Illegal Drugs use: Have You Used Any Illegal Substances Over the Past 12 Months: No    24 hour chart check complete: yes     Patient goal(s) for today:meet treatment team, acclimate to unit  Treatment team focus/goals: assess needs for treatment and safe discharge  Progress note: Pt admitted under TDO for SI and AH/VH. Pt denies SI but endorses AH/VH. She is confused but calm. Plan to stabilize psychiatrically and work on safe discharge plan.     LOS:  1  Expected LOS: TBD    Financial concerns/prescription coverage:  [facility] [place] [facility]  Family contact: momDevin ([phone])  Family requesting physician contact today:  no  Discharge plan: homeless  Access to weapons : no   Outpatient provider([facility]  Patient's preferred phone number for follow up call :   Patient's preferred e-mail address :    Participating treatment team members: Muriel Cortez, FRAKNIE - Tila Perea, RN - Sasha Tang, PharmD - Adina Murillo,

## 2021-02-17 NOTE — CONSULTS
Hospitalist H&WASHINGTON Gilmore, JUJU MALIK  Cell [phone] (7pm-7am)     Date of Service: 2/16/2021  Primary Care Provider: None  Source of Information: Patient, chart review    History of Presenting Illness:   Mitch Lima is a 50 y.o. female with past medical history of COPD, hypertension, bipolar disorder, CHF, seizure disorder, neuropathic pain, tobacco use, substance abuse transferred to [facility] inpatient behavioral health unit from [facility] emergency department for inpatient management of suicidal ideation. Patient presented to [facility] ED this morning from [facility] after making suicidal statements. Denies any current self-harm or plan to harm herself. Reports recently relapsing with heroin and alcohol abuse, last use of heroin was this morning. Documents from transferring emergency department have been reviewed and are summarized below. While in transfer emergency department, patient received medical screening examination including blood work, urinalysis, toxicity screening. These were grossly within normal limits with the exception of UDS positive for barbiturates. She did not receive any medications while in the emergency department. Patient was medically cleared for transfer to inpatient psychiatric facility by emergency department physician. The hospitalist group is consulted for medical management. At present, only medical concern is constipation. She reports no bowel movement since discharge. Denies abdominal pain, vomiting, diarrhea, hematochezia. She confirms her medical history listed above, and states that since discharge from the hospital on February 10 (for COPD exacerbation) she has been compliant with taking prescribed medications. She reports that since discharge from the hospital, her COPD symptoms have returned to baseline.   Additionally, she was treated for bacterial vaginosis while inpatient and she states that the symptoms have resolved. Tobacco use: 2 cigarettes/day  Alcohol use: Daily, half to 1 pint per day  Illicit drug use: Heroin, last use this morning     Assessment & Plan     COPD, history of  -With recent exacerbation requiring admission. Status post course of steroids and azithromycin  -COPD symptoms at her baseline at present  -Continue home nebs with as needed albuterol  -Singulair on hold till reviewed by primary team due to potential side effect of suicidal ideations    Hypertension, history of  -Mild hypotension at present 94/69, asymptomatic  -On clonidine and prazosin at home, will order and hold clonidine for the time being. If blood pressure begins to increase, recommend resuming home clonidine  -Prazosin per primary team as this is documented for her PTSD, but may contribute to hypotension    Seizure disorder, history of  -Continue home AEDs    Peripheral neuropathy  -Continue home gabapentin    Suspected bacterial vaginosis, trichomoniasis  -Evaluated by GYN during last admission due to malodorous vaginal discharge  -Started on Flagyl 500 mg twice daily x7 days.   It appears the prescription was sent for 14 days on discharge from the hospital; suspect this was an error as the discharge summary specifically says 7 days.  -Will complete 7-day course of Flagyl, 3 doses left    Constipation  -Bowel regimen started, Colace and MiraLAX    Alcohol use, history of  -Reports tremors when stopping alcohol suddenly in the past  -Recommend CIWA monitoring with medications as needed per primary team  -Nutritional support including thiamine, folic acid, MVI  -Advise alcohol abstinence    Tobacco use  -Nicotine patch  -Advised smoking cessation    Heroin abuse  -Last use this morning, advise cessation  -Reports being on Suboxone outpatient, management per primary team    DVT Prophylaxis: Up ad yani, none indicated  Code status: Full  Disposition: Per primary team    Thank you for giving us opportunity to participate in this patients care. Will sign off at this time, please re-consult if there are any further medical management needs or questions     Review of Systems:  Review of Systems   Constitutional: Negative for chills, fever and malaise/fatigue. HENT: Negative for congestion and sore throat. Respiratory: Positive for shortness of breath (Improving since discharge). Negative for cough and wheezing. Cardiovascular: Positive for leg swelling (Baseline). Negative for chest pain, palpitations and orthopnea. Gastrointestinal: Positive for nausea. Negative for abdominal pain, blood in stool, constipation, diarrhea, heartburn, melena and vomiting. Genitourinary: Negative for dysuria, flank pain, frequency, hematuria and urgency. Neurological: Negative for dizziness, weakness and headaches. Peripheral neuropathy, hx of   Psychiatric/Behavioral: Positive for depression, substance abuse and suicidal ideas. Past Medical History:   Diagnosis Date    Asthma     Bipolar 1 disorder ([facility] [address]     Chronic obstructive pulmonary disease (HCC)     Chronic pain     back, leg    Cocaine abuse (HCC)     Depression     Heart failure (HCC)     Hepatitis B     Heroin abuse ([facility] [address]     HTN (hypertension)     Narcotic abuse ([facility] [address] [facility]     Polysubstance abuse ([facility] [address] [facility]     Sarcoidosis     Tobacco abuse       Past Surgical History:   Procedure Laterality Date    HX GYN      HX WISDOM TEETH EXTRACTION       Prior to Admission medications    Medication Sig Start Date End Date Taking? Authorizing Provider   metroNIDAZOLE (FLAGYL) 500 mg tablet Take 1 Tab by mouth two (2) times a day for 14 days. 2/10/21 2/24/21  Faye Castro MD   albuterol (ProAir HFA) 90 mcg/actuation inhaler Take 2 Puffs by inhalation every four (4) hours as needed for Wheezing or Shortness of Breath.  Indications: bronchospasm prevention 2/9/21   Faye Castro MD   fluticasone propion-salmeteroL (Advair Diskus) 250-50 mcg/dose diskus inhaler Take 1 Puff by inhalation two (2) times a day. 2/9/21   Lady Eulalia MD   tiotropium (Spiriva with HandiHaler) 18 mcg inhalation capsule Take 1 Cap by inhalation daily. Indications: bronchospasm prevention with COPD 2/9/21   Lady Eulalia MD   amitriptyline (ELAVIL) 100 mg tablet Take 1 Tab by mouth nightly. Indications: depression 2/9/21   Lady Eulalia MD   montelukast (SINGULAIR) 10 mg tablet Take 1 Tab by mouth nightly. Indications: controller medication for asthma 2/9/21   Lady Eulalia MD   prazosin (MINIPRESS) 2 mg capsule Take 2 Caps by mouth nightly. Indications: posttraumatic stress syndrome 2/9/21   Lady Eulalia MD   QUEtiapine (SEROquel) 400 mg tablet Take 1 Tab by mouth nightly. Indications: mood 2/9/21   Lady Eulalia MD   sertraline (ZOLOFT) 25 mg tablet Take 1 Tab by mouth daily. Indications: major depressive disorder 2/9/21   Lady Eulalia MD   buprenorphine-naloxone (Suboxone) 8-2 mg film sublingaul film 2 Film by SubLINGual route daily. Max Daily Amount: 2 Film. Indications: dependence on opioid-type drugs 2/9/21   Lady Eulalia MD   cloNIDine HCL (CATAPRES) 0.3 mg tablet Take 1 Tab by mouth three (3) times daily. Indications: high blood pressure 2/9/21   Lady Eulalia MD   divalproex DR (Depakote) 500 mg tablet Take 1 Tab by mouth two (2) times a day. Indications: bipolar depression 2/9/21   Lady Eulalia MD   gabapentin (NEURONTIN) 300 mg capsule Take 1 Cap by mouth three (3) times daily. Max Daily Amount: 900 mg. Indications: neuropathic pain 2/9/21   Lady Eulalia MD   levETIRAcetam (Keppra) 500 mg tablet Take 1 Tab by mouth two (2) times a day. Indications: additional medication to treat partial seizures 2/9/21   Lady Eulalia MD   nicotine (NICODERM CQ) 21 mg/24 hr 1 Patch by TransDERmal route daily for 30 days.  2/10/21 3/12/21  Lady Eulalia MD     Allergies   Allergen Reactions    Vancomycin Anaphylaxis    Tomato Swelling     Tongue    Trazodone Angioedema      Family History   Problem Relation Age of Onset    Hypertension Father     Hypertension Mother         SOCIAL HISTORY:  Patient resides:  Independently [x]   Assisted Living []   SNF []   With family care []      Smoking history:   None []   Former []   Chronic []     Alcohol history:   None []   Social []   Chronic [x]     Ambulates:   Independently [x]   W/cane []   W/walker []   W/wc []     CODE STATUS:  DNR []   Full [x]   Other []     Objective:   Physical Exam:   Visit Vitals  BP 94/69   Pulse 88   Temp 98.1 °F (36.7 °C)   Resp 16   SpO2 96%           Physical Exam  Vitals signs and nursing note reviewed. Exam conducted with a chaperone present. Constitutional:       General: She is not in acute distress. Appearance: She is well-developed. She is not diaphoretic. HENT:      Head: Normocephalic and atraumatic. Mouth/Throat:      Mouth: Mucous membranes are moist.   Eyes:      Extraocular Movements: Extraocular movements intact. Conjunctiva/sclera: Conjunctivae normal.      Pupils: Pupils are equal, round, and reactive to light. Neck:      Musculoskeletal: Normal range of motion. Cardiovascular:      Rate and Rhythm: Normal rate and regular rhythm. Pulses: Normal pulses. Heart sounds: Normal heart sounds. No murmur. No friction rub. No gallop. Pulmonary:      Effort: Pulmonary effort is normal. No respiratory distress. Breath sounds: Wheezing (Scant end expiratory wheeze) present. No rales. Abdominal:      General: Bowel sounds are normal.      Palpations: Abdomen is soft. Tenderness: There is no abdominal tenderness. Musculoskeletal: Normal range of motion. General: No deformity. Right lower leg: Edema (BL LE nonpitting edema) present. Left lower leg: Edema present. Skin:     General: Skin is warm and dry. Capillary Refill: Capillary refill takes less than 2 seconds. Coloration: Skin is not pale. Findings: No erythema or rash.    Neurological: General: No focal deficit present. Mental Status: She is alert and oriented to person, place, and time. Mental status is at baseline. Psychiatric:      Comments: Calm and cooperative  [facility] historian         Data Review:   Lab results (past 7 days)  Admission on 02/16/2021, Discharged on 02/16/2021   Component Date Value    WBC 02/16/2021 6.0     RBC 02/16/2021 4.69     HGB 02/16/2021 12.8     HCT 02/16/2021 39.7     MCV 02/16/2021 84.6     MCH 02/16/2021 27.3     MCHC 02/16/2021 32.2     RDW 02/16/2021 18.1*    PLATELET 08/59/7910 792     MPV 02/16/2021 10.4     NRBC 02/16/2021 0.0     ABSOLUTE NRBC 02/16/2021 0.00     NEUTROPHILS 02/16/2021 52     LYMPHOCYTES 02/16/2021 31     MONOCYTES 02/16/2021 12     EOSINOPHILS 02/16/2021 3     BASOPHILS 02/16/2021 1     IMMATURE GRANULOCYTES 02/16/2021 1*    ABS. NEUTROPHILS 02/16/2021 3.1     ABS. LYMPHOCYTES 02/16/2021 1.9     ABS. MONOCYTES 02/16/2021 0.7     ABS. EOSINOPHILS 02/16/2021 0.2     ABS. BASOPHILS 02/16/2021 0.0     ABS. IMM. GRANS. 02/16/2021 0.0     DF 02/16/2021 AUTOMATED     Sodium 02/16/2021 136     Potassium 02/16/2021 4.1     Chloride 02/16/2021 98     CO2 02/16/2021 30     Anion gap 02/16/2021 8     Glucose 02/16/2021 112*    BUN 02/16/2021 19     Creatinine 02/16/2021 1.08*    BUN/Creatinine ratio 02/16/2021 18     GFR est AA 02/16/2021 >60     GFR est non-AA 02/16/2021 54*    Calcium 02/16/2021 9.5     Bilirubin, total 02/16/2021 0.2     ALT (SGPT) 02/16/2021 28     AST (SGOT) 02/16/2021 35     Alk.  phosphatase 02/16/2021 85     Protein, total 02/16/2021 7.7     Albumin 02/16/2021 3.7     Globulin 02/16/2021 4.0     A-G Ratio 02/16/2021 0.9*    ALCOHOL(ETHYL),SERUM 02/16/2021 <10     AMPHETAMINES 02/16/2021 Negative     BARBITURATES 02/16/2021 Positive*    BENZODIAZEPINES 02/16/2021 Negative     COCAINE 02/16/2021 Negative     METHADONE 02/16/2021 Negative     OPIATES 02/16/2021 Negative     PCP(PHENCYCLIDINE) 02/16/2021 Negative     THC (TH-CANNABINOL) 02/16/2021 Negative     Drug screen comment 02/16/2021 (NOTE)     Color 02/16/2021 YELLOW/STRAW     Appearance 02/16/2021 CLOUDY*    Specific gravity 02/16/2021 1.020     pH (UA) 02/16/2021 7.5     Protein 02/16/2021 Negative     Glucose 02/16/2021 Negative     Ketone 02/16/2021 Negative     Bilirubin 02/16/2021 Negative     Blood 02/16/2021 Negative     Urobilinogen 02/16/2021 0.2     Nitrites 02/16/2021 Negative     Leukocyte Esterase 02/16/2021 MODERATE*    SARS-CoV-2 02/16/2021 Please find results under separate order     Ventricular Rate 02/16/2021 70     Atrial Rate 02/16/2021 70     P-R Interval 02/16/2021 136     QRS Duration 02/16/2021 82     Q-T Interval 02/16/2021 382     QTC Calculation (Bezet) 02/16/2021 412     Calculated P Axis 02/16/2021 53     Calculated R Axis 02/16/2021 57     Calculated T Axis 02/16/2021 43     Diagnosis 02/16/2021                      Value:Normal sinus rhythm  Normal ECG  When compared with ECG of 05-FEB-2021 17:43,  Nonspecific T wave abnormality no longer evident in Lateral leads  QT has shortened      Specimen source 02/16/2021 Nasopharyngeal     COVID-19 rapid test 02/16/2021 Not detected     WBC 02/16/2021 5-10     RBC 02/16/2021 0-5     Epithelial cells 02/16/2021 MODERATE*    Bacteria 02/16/2021 Negative     Mucus 02/16/2021 1+*    Amorphous Crystals 02/16/2021 2+*    Valproic acid 02/16/2021 51    Admission on 02/05/2021, Discharged on 02/10/2021   Component Date Value    WBC 02/06/2021 5.8     RBC 02/06/2021 5.06     HGB 02/06/2021 13.7     HCT 02/06/2021 42.4     MCV 02/06/2021 83.8     MCH 02/06/2021 27.1     MCHC 02/06/2021 32.3     RDW 02/06/2021 19.0*    PLATELET 32/93/2384 557     MPV 02/06/2021 9.4     NRBC 02/06/2021 0.0     ABSOLUTE NRBC 02/06/2021 0.00     NEUTROPHILS 02/06/2021 53     LYMPHOCYTES 02/06/2021 34     MONOCYTES 02/06/2021 9     EOSINOPHILS 02/06/2021 3     BASOPHILS 02/06/2021 1     IMMATURE GRANULOCYTES 02/06/2021 0     ABS. NEUTROPHILS 02/06/2021 3.1     ABS. LYMPHOCYTES 02/06/2021 2.0     ABS. MONOCYTES 02/06/2021 0.5     ABS. EOSINOPHILS 02/06/2021 0.2     ABS. BASOPHILS 02/06/2021 0.1     ABS. IMM. GRANS. 02/06/2021 0.0     DF 02/06/2021 AUTOMATED     Sodium 02/06/2021 135*    Potassium 02/06/2021 3.9     Chloride 02/06/2021 104     CO2 02/06/2021 24     Anion gap 02/06/2021 7     Glucose 02/06/2021 108*    BUN 02/06/2021 7     Creatinine 02/06/2021 0.80     BUN/Creatinine ratio 02/06/2021 9*    GFR est AA 02/06/2021 >60     GFR est non-AA 02/06/2021 >60     Calcium 02/06/2021 8.9     Bilirubin, total 02/06/2021 0.3     ALT (SGPT) 02/06/2021 32     AST (SGOT) 02/06/2021 42*    Alk.  phosphatase 02/06/2021 96     Protein, total 02/06/2021 7.5     Albumin 02/06/2021 3.7     Globulin 02/06/2021 3.8     A-G Ratio 02/06/2021 1.0*    Ventricular Rate 02/05/2021 103     Atrial Rate 02/05/2021 103     P-R Interval 02/05/2021 122     QRS Duration 02/05/2021 76     Q-T Interval 02/05/2021 360     QTC Calculation (Bezet) 02/05/2021 471     Calculated P Axis 02/05/2021 34     Calculated R Axis 02/05/2021 57     Calculated T Axis 02/05/2021 54     Diagnosis 02/05/2021                      Value:Sinus tachycardia  Nonspecific T wave abnormality  When compared with ECG of 08-DEC-2020 03:36,  No significant change    Confirmed by Lemuel White ([id number]) on 2/5/2021 6:19:54 PM      Troponin-I, Qt. 02/06/2021 <0.05     NT pro-BNP 02/06/2021 22     Magnesium 02/06/2021 1.5*    HCG, Ql. 02/06/2021 Negative     TSH 02/06/2021 0.95     Phosphorus 02/06/2021 2.4*    Magnesium 02/06/2021 2.4     Troponin-I, Qt. 02/06/2021 <0.05     Valproic acid 02/06/2021 <3*    D-dimer 02/06/2021 0.19     Calcium, ionized (POC) 02/06/2021 1.20     FIO2 (POC) 02/06/2021 21     pH (POC) 02/06/2021 7.30*    pCO2 (POC) 02/06/2021 46.9*   • pO2 (POC) 02/06/2021 62*   • HCO3 (POC) 02/06/2021 23.3    • Base deficit (POC) 02/06/2021 3    • sO2 (POC) 02/06/2021 89*   • Site 02/06/2021 RIGHT RADIAL    • Device: 02/06/2021 ROOM AIR    • Allens test (POC) 02/06/2021 YES    • Specimen type (POC) 02/06/2021 ARTERIAL    • Total resp. rate 02/06/2021 18    • AMPHETAMINES 02/06/2021 Negative    • BARBITURATES 02/06/2021 Positive*   • BENZODIAZEPINES 02/06/2021 Negative    • COCAINE 02/06/2021 Negative    • METHADONE 02/06/2021 Negative    • OPIATES 02/06/2021 Negative    • PCP(PHENCYCLIDINE) 02/06/2021 Negative    • THC (TH-CANNABINOL) 02/06/2021 Positive*   • Drug screen comment 02/06/2021 (NOTE)    • SARS-CoV-2 02/06/2021 Please find results under separate order    • SAMPLES BEING HELD 02/06/2021 1UC    • COMMENT 02/06/2021 Add-on orders for these samples will be processed based on acceptable specimen integrity and analyte stability, which may vary by analyte.    • Specimen source 02/06/2021 Nasopharyngeal    • SARS-CoV-2 02/06/2021 Not detected    • Specimen source 02/06/2021 Nasopharyngeal    • COVID-19 rapid test 02/06/2021 Not detected    • Sodium 02/07/2021 137    • Potassium 02/07/2021 4.3    • Chloride 02/07/2021 106    • CO2 02/07/2021 24    • Anion gap 02/07/2021 7    • Glucose 02/07/2021 122*   • BUN 02/07/2021 13    • Creatinine 02/07/2021 0.79    • BUN/Creatinine ratio 02/07/2021 16    • GFR est AA 02/07/2021 >60    • GFR est non-AA 02/07/2021 >60    • Calcium 02/07/2021 8.6    • Bilirubin, total 02/07/2021 0.3    • ALT (SGPT) 02/07/2021 27    • AST (SGOT) 02/07/2021 27    • Alk. phosphatase 02/07/2021 83    • Protein, total 02/07/2021 6.8    • Albumin 02/07/2021 3.2*   • Globulin 02/07/2021 3.6    • A-G Ratio 02/07/2021 0.9*   • WBC 02/07/2021 11.2*   • RBC 02/07/2021 4.19    • HGB 02/07/2021 11.6    • HCT 02/07/2021 35.3    • MCV 02/07/2021 84.2    • MCH 02/07/2021 27.7    • MCHC 02/07/2021 32.9    • RDW 02/07/2021 18.7*   •  PLATELET 00/23/2642 719     MPV 02/07/2021 9.7     NRBC 02/07/2021 0.0     ABSOLUTE NRBC 02/07/2021 0.00     NEUTROPHILS 02/07/2021 84*    LYMPHOCYTES 02/07/2021 9*    MONOCYTES 02/07/2021 6     EOSINOPHILS 02/07/2021 0     BASOPHILS 02/07/2021 0     IMMATURE GRANULOCYTES 02/07/2021 1*    ABS. NEUTROPHILS 02/07/2021 9.5*    ABS. LYMPHOCYTES 02/07/2021 1.0     ABS. MONOCYTES 02/07/2021 0.6     ABS. EOSINOPHILS 02/07/2021 0.0     ABS. BASOPHILS 02/07/2021 0.0     ABS. IMM. GRANS. 02/07/2021 0.1*    DF 02/07/2021 AUTOMATED     WBC 02/09/2021 6.3     RBC 02/09/2021 4.11     HGB 02/09/2021 11.2*    HCT 02/09/2021 35.2     MCV 02/09/2021 85.6     MCH 02/09/2021 27.3     MCHC 02/09/2021 31.8     RDW 02/09/2021 19.5*    PLATELET 36/84/5271 542     MPV 02/09/2021 9.8     NRBC 02/09/2021 0.0     ABSOLUTE NRBC 02/09/2021 0.00     NEUTROPHILS 02/09/2021 57     LYMPHOCYTES 02/09/2021 34     MONOCYTES 02/09/2021 7     EOSINOPHILS 02/09/2021 1     BASOPHILS 02/09/2021 0     IMMATURE GRANULOCYTES 02/09/2021 1*    ABS. NEUTROPHILS 02/09/2021 3.6     ABS. LYMPHOCYTES 02/09/2021 2.1     ABS. MONOCYTES 02/09/2021 0.5     ABS. EOSINOPHILS 02/09/2021 0.0     ABS. BASOPHILS 02/09/2021 0.0     ABS. IMM.  GRANS. 02/09/2021 0.0     DF 02/09/2021 AUTOMATED     Sodium 02/09/2021 138     Potassium 02/09/2021 3.8     Chloride 02/09/2021 102     CO2 02/09/2021 28     Anion gap 02/09/2021 8     Glucose 02/09/2021 102*    BUN 02/09/2021 16     Creatinine 02/09/2021 0.75     BUN/Creatinine ratio 02/09/2021 21*    GFR est AA 02/09/2021 >60     GFR est non-AA 02/09/2021 >60     Calcium 02/09/2021 8.6     Magnesium 02/09/2021 1.9     Phosphorus 02/09/2021 2.7     Clue cells 02/09/2021 CLUE CELLS PRESENT     Wet prep 02/09/2021 TRICHOMONAS PRESENT     Sample type 02/09/2021 URINE     Source 02/09/2021 URINE     Chlamydia amplified 02/09/2021 Negative     N. gonorrhea, amplified 02/09/2021 Negative     Comment 02/09/2021 Testing performed by the Roche Barrett CT/NG method, utilizing PCR amplification to identify DNA of the pathogens. This method is not recommended as the sole method of evaluation of cases of sexual abuse nor for other medico-legal indications.  Urine culture hold 02/09/2021 Urine on hold in Microbiology dept for 2 days. If unpreserved urine is submitted, it cannot be used for addtional testing after 24 hours, recollection will be required.  Sodium 02/10/2021 136     Potassium 02/10/2021 4.1     Chloride 02/10/2021 103     CO2 02/10/2021 29     Anion gap 02/10/2021 4*    Glucose 02/10/2021 97     BUN 02/10/2021 20     Creatinine 02/10/2021 0.83     BUN/Creatinine ratio 02/10/2021 24*    GFR est AA 02/10/2021 >60     GFR est non-AA 02/10/2021 >60     Calcium 02/10/2021 9.2     Magnesium 02/10/2021 1.8         Imaging results (past 24 hours):  No results found.     Signed By: Yogi Brice NP     February 16, 2021

## 2021-02-17 NOTE — BH NOTES
GROUP THERAPY PROGRESS NOTE    Patient is participating in Process Group. Group time: 50 minutes    Personal goal for participation: Removing negative thoughts from your life    Goal orientation: Personal    Group therapy participation: active    Therapeutic interventions reviewed and discussed: Group discussion around negative thoughts and how to remove them from an individuals life. Group members were given a piece of paper that they cut up into sections. On these pieces, patients wrote down negative thoughts or negative things people have told them. They then ripped the sheet of paper and threw it in the trash and explained why that isnt true. Group discussion involved other ways to remove these thoughts: distraction, expressing gratitude, labeling your thoughts, and changing your relationship with your brain. To wrap up group, members were encouraged to say two positive things about themselves, and one compliment when hey look in the mirror. Impression of participation: Anthony Friedman actively participated in group. She shared her thought is \"gorilla,\" and patient explained that this represented her strength. Discussed her family support. Patient pulled MSW aside at the end of group and complained that other patients in the unit were making fun of her. Presents in a depressed mood.      Ewa Brambila, Supervisee in Social Work

## 2021-02-18 PROCEDURE — 65220000003 HC RM SEMIPRIVATE PSYCH

## 2021-02-18 PROCEDURE — 74011250637 HC RX REV CODE- 250/637: Performed by: NURSE PRACTITIONER

## 2021-02-18 PROCEDURE — 74011250636 HC RX REV CODE- 250/636: Performed by: NURSE PRACTITIONER

## 2021-02-18 RX ORDER — BUPRENORPHINE HYDROCHLORIDE AND NALOXONE HYDROCHLORIDE DIHYDRATE 8; 2 MG/1; MG/1
1 TABLET SUBLINGUAL 2 TIMES DAILY
Status: DISCONTINUED | OUTPATIENT
Start: 2021-02-18 | End: 2021-02-22 | Stop reason: HOSPADM

## 2021-02-18 RX ORDER — MIRTAZAPINE 15 MG/1
15 TABLET, FILM COATED ORAL
Status: DISCONTINUED | OUTPATIENT
Start: 2021-02-18 | End: 2021-02-22 | Stop reason: HOSPADM

## 2021-02-18 RX ADMIN — MONTELUKAST 10 MG: 10 TABLET, FILM COATED ORAL at 20:10

## 2021-02-18 RX ADMIN — GABAPENTIN 300 MG: 300 CAPSULE ORAL at 20:10

## 2021-02-18 RX ADMIN — THERA TABS 1 TABLET: TAB at 12:49

## 2021-02-18 RX ADMIN — BUPRENORPHINE HYDROCHLORIDE AND NALOXONE HYDROCHLORIDE DIHYDRATE 1 TABLET: 8; 2 TABLET SUBLINGUAL at 12:52

## 2021-02-18 RX ADMIN — GABAPENTIN 300 MG: 300 CAPSULE ORAL at 16:50

## 2021-02-18 RX ADMIN — MIRTAZAPINE 15 MG: 15 TABLET, FILM COATED ORAL at 20:10

## 2021-02-18 RX ADMIN — Medication 100 MG: at 12:49

## 2021-02-18 RX ADMIN — LEVETIRACETAM 500 MG: 500 TABLET ORAL at 16:50

## 2021-02-18 RX ADMIN — FOLIC ACID 1 MG: 1 TABLET ORAL at 12:50

## 2021-02-18 RX ADMIN — QUETIAPINE FUMARATE 400 MG: 100 TABLET ORAL at 20:10

## 2021-02-18 RX ADMIN — LEVETIRACETAM 500 MG: 500 TABLET ORAL at 12:49

## 2021-02-18 RX ADMIN — DOCUSATE SODIUM 100 MG: 100 CAPSULE, LIQUID FILLED ORAL at 12:50

## 2021-02-18 RX ADMIN — BUPRENORPHINE HYDROCHLORIDE AND NALOXONE HYDROCHLORIDE DIHYDRATE 1 TABLET: 8; 2 TABLET SUBLINGUAL at 20:10

## 2021-02-18 RX ADMIN — GABAPENTIN 300 MG: 300 CAPSULE ORAL at 12:49

## 2021-02-18 NOTE — BH NOTES
GROUP THERAPY PROGRESS NOTE    Patient is participating in Process group. Group time: 50 minutes     Personal goal for participation: To reflect on ones self      Goal orientation: Personal     Group therapy participation: active     Therapeutic interventions reviewed and discussed: Group members threw a beach ball that had several questions and comments written on it. Once the ball was thrown to a member, the question they land on had to be answered by them. Impression of participation: Felicia Hansen actively participated in group. Patient expressed feeling calm today but presented in a depressed mood. Pt participated in the activity and was able to answer the questions.       Ewa Brambila, Supervisee in Social Work

## 2021-02-18 NOTE — BH NOTES
Called and spoke with [facility] ([phone]), spoke with , took message for provider to verify if patient is still in good standing and able to continue to receive Suboxone upon discharge from our facility. Clinic stated I would receive a call back. [phone]  Patient seen in treatment team, stating she is doing well, less suicidal thoughts today. Patient calm and cooperative on the unit, attending all groups and interacting appropriately with peers. Patient will restart Suboxone today, appt will be made for follow up at [address] clinic at discharge. Will continue to monitor for safety q15 minutes.

## 2021-02-18 NOTE — BH NOTES
GROUP THERAPY PROGRESS NOTE    Patient is participating in Substance Abuse group. Group time: 50 minutes    Personal goal for participation: To understand addiction and relapse and identify triggers that increase urges and relapse. Goal orientation: Personal    Group therapy participation: active    Therapeutic interventions reviewed and discussed: Group discussion of substance use, abuse, and dependence and the urge cycle. Patients were able to explore their own urges to use various substances including cigarettes, alcohol, heroin, cocaine, and others. Group discussed how they feel when they are unable to use, and ways substance use has hindered their lives. Triggers for use and coping skills to avoid use or manage symptoms until craving subsides were discussed. Those without addiction issues used the urges handout to write down and draw pictures of things that make them anxious, angry, or sad. Impression of participation: Nena Peck actively participated in group. Patient kami her urges and cravings, but refused to share. Quiet throughout the group. Mood is depressed and presented with soft speech.      Ewa Brambila, Supervisee in Social Work

## 2021-02-18 NOTE — H&P
[address] [facility] HISTORY AND PHYSICAL    Name:  Sadaf Seals  MR#:  [id number]  :  1975  ACCOUNT #:  [de-identified]  ADMIT DATE:  2021    INITIAL PSYCHIATRIC EVALUATION    CHIEF COMPLAINT:  \"I don't know what happened. \"    HISTORY OF PRESENTING ILLNESS:  The patient is a 57-year-old female who is currently admitted at [address] [place] [address] on a temporary senior living order. She states that she will have her first appointment with Rohit Berger this coming Monday. Reports she has been admitted in different psychiatric facilities numerous times including [facility] and [facility].  She states that she does not know what happened and does not know what brought her to the hospital. She states that nobody explained to her what brought her to the hospital, all she can remember is that the lady at the [facility] was very disrespectful to her. Reportedly, the  from the [address] [place] [address] contacted Crisis. The patient was noted to be hallucinating, having conversations to herself, to the people who are not present. She also has been paranoid. The patient was also noted to have difficulty following conversation. She was accusing the people are trying to push her out. The patient was making references about somebody at the , but there was nobody there. She was disoriented. She was in the program for 6 days, and thought she was there for 3 days. She tells me that she has been having command auditory hallucinations; voices telling her to hurt herself. She also feels that people are out to get her and making fun of her. She also has been sleeping poorly. She has been paranoid that people want to kill her family. She has been eating poorly as well. She has been labile. She has been accusing the staff at the [facility] and blaming them.   Reportedly the staff at the [facility] informed her that she needed to pack her belongings and that she could no longer stay at the facility. She reported to the police that she would better be off dead. She states that she is endorsing suicidal ideation with a plan to cut her wrist.  She has a history of cutting. She also has been depressed. She states that she is tired of upsetting her family. She states that she has a long history of heroin and cocaine use. She has been using it for 26 years. She states that her longer sobriety was 3 years. Her urine drug screen is positive for barbiturates. Currently, she reports that she is receiving Suboxone at [facility] and she has been on it for 4 months. She states that her last dose was last Friday. She also has a history of smoking marijuana. She continues to endorse thoughts of suicide, hallucinations, and paranoia. She was admitted to the inpatient psychiatric unit for further stabilization and treatment. PAST MEDICAL HISTORY:  See H and P.     Past Medical History:   Diagnosis Date    Asthma     Bipolar 1 disorder ([address]     Chronic obstructive pulmonary disease (HCC)     Chronic pain     back, leg    Cocaine abuse ([address]     Depression     Heart failure (HCC)     Hepatitis B     [facility]     HTN (hypertension)     Narcotic abuse ([facility]     Polysubstance abuse ([address]     Sarcoidosis     Tobacco abuse        Labs: (reviewed/updated 2/18/2021)  Patient Vitals for the past 8 hrs:   BP Temp Pulse Resp SpO2   02/18/21 0841 109/70 98.3 °F (36.8 °C) 100 16 95 %     Labs Reviewed - No data to display  Lab Results   Component Value Date/Time    Sodium 136 02/16/2021 11:04 AM    Potassium 4.1 02/16/2021 11:04 AM    Chloride 98 02/16/2021 11:04 AM    CO2 30 02/16/2021 11:04 AM    Anion gap 8 02/16/2021 11:04 AM    Glucose 112 (H) 02/16/2021 11:04 AM    Glucose 93 08/05/2018 06:56 AM    BUN 19 02/16/2021 11:04 AM    Creatinine 1.08 (H) 02/16/2021 11:04 AM    BUN/Creatinine ratio 18 02/16/2021 11:04 AM    GFR est AA >60 02/16/2021 11:04 AM    GFR est non-AA 54 (L) 02/16/2021 11:04 AM    Calcium 9.5 02/16/2021 11:04 AM    Bilirubin, total 0.2 02/16/2021 11:04 AM    Alk. phosphatase 85 02/16/2021 11:04 AM    Protein, total 7.7 02/16/2021 11:04 AM    Albumin 3.7 02/16/2021 11:04 AM    Globulin 4.0 02/16/2021 11:04 AM    A-G Ratio 0.9 (L) 02/16/2021 11:04 AM    ALT (SGPT) 28 02/16/2021 11:04 AM     No visits with results within 2 Day(s) from this visit. Latest known visit with results is:   Admission on 02/16/2021, Discharged on 02/16/2021   Component Date Value Ref Range Status    WBC 02/16/2021 6.0  3.6 - 11.0 K/uL Final    RBC 02/16/2021 4.69  3.80 - 5.20 M/uL Final    HGB 02/16/2021 12.8  11.5 - 16.0 g/dL Final    HCT 02/16/2021 39.7  35.0 - 47.0 % Final    MCV 02/16/2021 84.6  80.0 - 99.0 FL Final    MCH 02/16/2021 27.3  26.0 - 34.0 PG Final    MCHC 02/16/2021 32.2  30.0 - 36.5 g/dL Final    RDW 02/16/2021 18.1* 11.5 - 14.5 % Final    PLATELET 78/79/0240 986  150 - 400 K/uL Final    MPV 02/16/2021 10.4  8.9 - 12.9 FL Final    NRBC 02/16/2021 0.0  0  WBC Final    ABSOLUTE NRBC 02/16/2021 0.00  0.00 - 0.01 K/uL Final    NEUTROPHILS 02/16/2021 52  32 - 75 % Final    LYMPHOCYTES 02/16/2021 31  12 - 49 % Final    MONOCYTES 02/16/2021 12  5 - 13 % Final    EOSINOPHILS 02/16/2021 3  0 - 7 % Final    BASOPHILS 02/16/2021 1  0 - 1 % Final    IMMATURE GRANULOCYTES 02/16/2021 1* 0.0 - 0.5 % Final    ABS. NEUTROPHILS 02/16/2021 3.1  1.8 - 8.0 K/UL Final    ABS. LYMPHOCYTES 02/16/2021 1.9  0.8 - 3.5 K/UL Final    ABS. MONOCYTES 02/16/2021 0.7  0.0 - 1.0 K/UL Final    ABS. EOSINOPHILS 02/16/2021 0.2  0.0 - 0.4 K/UL Final    ABS. BASOPHILS 02/16/2021 0.0  0.0 - 0.1 K/UL Final    ABS. IMM.  GRANS. 02/16/2021 0.0  0.00 - 0.04 K/UL Final    DF 02/16/2021 AUTOMATED    Final    Sodium 02/16/2021 136  136 - 145 mmol/L Final    Potassium 02/16/2021 4.1  3.5 - 5.1 mmol/L Final    Chloride 02/16/2021 98  97 - 108 mmol/L Final    CO2 02/16/2021 30  21 - 32 mmol/L Final    Anion gap 02/16/2021 8  5 - 15 mmol/L Final    Glucose 02/16/2021 112* 65 - 100 mg/dL Final    BUN 02/16/2021 19  6 - 20 MG/DL Final    Creatinine 02/16/2021 1.08* 0.55 - 1.02 MG/DL Final    BUN/Creatinine ratio 02/16/2021 18  12 - 20   Final    GFR est AA 02/16/2021 >60  >60 ml/min/1.73m2 Final    GFR est non-AA 02/16/2021 54* >60 ml/min/1.73m2 Final    Calcium 02/16/2021 9.5  8.5 - 10.1 MG/DL Final    Bilirubin, total 02/16/2021 0.2  0.2 - 1.0 MG/DL Final    ALT (SGPT) 02/16/2021 28  12 - 78 U/L Final    AST (SGOT) 02/16/2021 35  15 - 37 U/L Final    Alk.  phosphatase 02/16/2021 85  45 - 117 U/L Final    Protein, total 02/16/2021 7.7  6.4 - 8.2 g/dL Final    Albumin 02/16/2021 3.7  3.5 - 5.0 g/dL Final    Globulin 02/16/2021 4.0  2.0 - 4.0 g/dL Final    A-G Ratio 02/16/2021 0.9* 1.1 - 2.2   Final    ALCOHOL(ETHYL),SERUM 02/16/2021 <10  <10 MG/DL Final    AMPHETAMINES 02/16/2021 Negative  NEG   Final    BARBITURATES 02/16/2021 Positive* NEG   Final    BENZODIAZEPINES 02/16/2021 Negative  NEG   Final    COCAINE 02/16/2021 Negative  NEG   Final    METHADONE 02/16/2021 Negative  NEG   Final    OPIATES 02/16/2021 Negative  NEG   Final    PCP(PHENCYCLIDINE) 02/16/2021 Negative  NEG   Final    THC (TH-CANNABINOL) 02/16/2021 Negative  NEG   Final    Drug screen comment 02/16/2021 (NOTE)   Final    Color 02/16/2021 YELLOW/STRAW    Final    Appearance 02/16/2021 CLOUDY* CLEAR   Final    Specific gravity 02/16/2021 1.020  1.003 - 1.030   Final    pH (UA) 02/16/2021 7.5  5.0 - 8.0   Final    Protein 02/16/2021 Negative  NEG mg/dL Final    Glucose 02/16/2021 Negative  NEG mg/dL Final    Ketone 02/16/2021 Negative  NEG mg/dL Final    Bilirubin 02/16/2021 Negative  NEG   Final    Blood 02/16/2021 Negative  NEG   Final    Urobilinogen 02/16/2021 0.2  0.2 - 1.0 EU/dL Final    Nitrites 02/16/2021 Negative  NEG Final    Leukocyte Esterase 02/16/2021 MODERATE* NEG   Final    SARS-CoV-2 02/16/2021 Please find results under separate order    Final    Ventricular Rate 02/16/2021 70  BPM Final    Atrial Rate 02/16/2021 70  BPM Final    P-R Interval 02/16/2021 136  ms Final    QRS Duration 02/16/2021 82  ms Final    Q-T Interval 02/16/2021 382  ms Final    QTC Calculation (Bezet) 02/16/2021 412  ms Final    Calculated P Axis 02/16/2021 53  degrees Final    Calculated R Axis 02/16/2021 57  degrees Final    Calculated T Axis 02/16/2021 43  degrees Final    Diagnosis 02/16/2021    Final                    Value:Normal sinus rhythm  Normal ECG  When compared with ECG of 05-FEB-2021 17:43,  Nonspecific T wave abnormality no longer evident in Lateral leads  QT has shortened  Confirmed by Laurance Hatchet, M.D., [place] ([id number]) on 2/17/2021 11:57:12 AM      Specimen source 02/16/2021 Nasopharyngeal    Final    COVID-19 rapid test 02/16/2021 Not detected  NOTD   Final    WBC 02/16/2021 5-10  0 - 4 /hpf Final    RBC 02/16/2021 0-5  0 - 5 /hpf Final    Epithelial cells 02/16/2021 MODERATE* FEW /lpf Final    Bacteria 02/16/2021 Negative  NEG /hpf Final    Mucus 02/16/2021 1+* NEG /lpf Final    Amorphous Crystals 02/16/2021 2+* NEG Final    Valproic acid 02/16/2021 51  50 - 100 ug/ml Final    Specimen source 02/16/2021 Nasopharyngeal    Final    SARS-CoV-2 02/16/2021 Not detected  NOTD   Final     Vitals:    02/17/21 1658 02/17/21 2024 02/17/21 2101 02/18/21 0841   BP: 123/88 (!) 82/61 126/78 109/70   Pulse: 86 92  100   Resp: 16 16 16   Temp: 98 °F (36.7 °C) 98.4 °F (36.9 °C)  98.3 °F (36.8 °C)   SpO2: 98% 97%  95%   Weight:       Height:         No results found for this or any previous visit (from the past 24 hour(s)).     RADIOLOGY REPORTS:  Results from Hospital Encounter encounter on 02/05/21   XR CHEST PORT    Narrative Clinical history: Central venous catheter placement  INDICATION:   Central venous catheter placement  COMPARISON: 2/5/2021    FINDINGS:  AP portable upright view of the chest demonstrates a stable  cardiopericardial  silhouette. The lungs are unchanged in overall appearance. . Left sided central  venous access catheter is in place. Catheter tip is in appropriate position for  use. There is no associated pneumothorax. The osseous structures are  unremarkable. Patient is on a cardiac monitor. Impression Central venous acces catheter in position for use. No pneumothorax    No other significant interval change. Xr Chest Pa Lat    Result Date: 2/5/2021  EXAM:  XR CHEST PA LAT INDICATION:   sob COMPARISON: 2020. FINDINGS: PA and lateral radiographs of the chest demonstrate clear lungs. The cardiac and mediastinal contours and pulmonary vascularity are normal. Again noted are granulomatous changes. Nodule in the right lung base is unchanged consistent granuloma calcified mediastinal and hilar lymph nodes are noted. The bones and soft tissues are within normal limits. No acute abnormality     Xr Chest Port    Result Date: 2/6/2021  Clinical history: Central venous catheter placement INDICATION:   Central venous catheter placement COMPARISON: 2/5/2021 FINDINGS: AP portable upright view of the chest demonstrates a stable  cardiopericardial silhouette. The lungs are unchanged in overall appearance. . Left sided central venous access catheter is in place. Catheter tip is in appropriate position for use. There is no associated pneumothorax. The osseous structures are unremarkable. Patient is on a cardiac monitor. Central venous acces catheter in position for use. No pneumothorax No other significant interval change. Xr Chest Port    Result Date: 12/8/2020  Clinical indication: Cough. Portable AP semiupright view of the chest obtained, comparison September 24, 2020. The a heart size is normal. Calcified lymph nodes in the mediastinum. No acute infiltrate. IMPRESSION: No acute changes.     [id number]    Result Date: 12/10/2020  INDICATION: Right arm swelling FINDINGS: Limited ultrasound examination of the right arm was performed. At the site of swelling, there is no mass, fluid collection, or significant subcutaneous edema. IMPRESSION: There is no sonographic correlate to the clinical finding of right arm swelling; clinical follow-up is recommended. Us Ext [id number] Saniya Nix    Result Date: 12/8/2020  Indication: Assess for abscess overlying right shoulder anteriorly. Ultrasound performed the soft tissues overlying the right shoulder anteriorly in the area of pain. Aroldo Founds No cystic or solid mass is identified. IMPRESSION: 1. No abscess is identified overlying soft tissues right shoulder anteriorly. PAST PSYCHIATRIC HISTORY:  She states that she will have her first appointment with [facility] on Monday. She has been admitted at [facility]. PSYCHOSOCIAL HISTORY:  She is single and has 5 children. She finished high school. She is currently unemployed. She is homeless. MENTAL STATUS EXAMINATION:  She is alert. She is currently sitting up in chair, dressed in hospital apparel. She reports her mood is okay. Affect is blunted. Speech:  Normal rate and rhythm. Thought process:  Logical and goal directed. She continues to endorse suicidal ideation with a plan to cut her wrist, continues to endorse command auditory hallucinations. Paranoia is noteworthy. She has periods of confusion. Intelligence is below average. Insight is poor. Judgment is poor. DIAGNOSES:  Unspecified psychotic disorder, rule out schizoaffective disorder, rule out bipolar disorder. TREATMENT PLANNING:  I will continue her inpatient stay. She will be provided with support and encouraged to attend groups. Her safety will be monitored. Her medications will be modified and assessed. Case Management will work on discharge planning.     ASSETS AND STRENGTHS:  She is willing to seek help.  She is willing to take medications. ESTIMATED LENGTH OF STAY:  5-7 days.         ELMO MCCLURE NP      [id number]  D:  02/17/2021 19:04  T:  02/18/2021 0:04  JOB #:  [id number]

## 2021-02-18 NOTE — BH NOTES
1926  Vistaril 50 mg administered per pt request for c/o anxiety. 2025  No c/o anxiety. 2116 Zyprexa 5 mg administered per pt request for c/o agitation. 2200  Pt is resting in bed quietly with her eyes closed.

## 2021-02-18 NOTE — PROGRESS NOTES
PSYCHIATRIC PROGRESS NOTE       Patient: Ashly Anthony MRN: [id number]  SSN: [id number]    YOB: 1975  Age: 50 y.o. Sex: female      Admit Date: 2/16/2021    LOS: 2 days       Chief Complaint:  I am doing ok. Interval History:  Ashly Anthony is calm and pleasant. One of her peers took her ensure earlier today and she became upset. Her suboxone has been verified, we will restart. She states thoughts of suicide is decreasing, she continues to endorse command auditory hallucinations. She got tearful when she started talking about her daughter. She is tolerating her meds and denies side effects. Ekg shows NSR with Qtc of 412. Slept 7 hours but she reports she slept poorly last night.       Past Medical History:  Past Medical History:   Diagnosis Date    Asthma     [facility]     Chronic obstructive pulmonary disease ([facility]     Chronic pain     back, leg    Cocaine abuse ([facility]     Depression     Heart failure (HCC)     Hepatitis B     Heroin abuse (HCC)     HTN (hypertension)     Narcotic abuse (HCC)     Polysubstance abuse (HCC)     Sarcoidosis     Tobacco abuse          ALLERGIES:(reviewed/updated 2/18/2021)  Allergies   Allergen Reactions    Vancomycin Anaphylaxis    Tomato Swelling     Tongue    Trazodone Angioedema       Laboratory report:  Lab Results   Component Value Date/Time    WBC 6.0 02/16/2021 11:04 AM    Hemoglobin (POC) 13.3 06/15/2009 05:10 PM    HGB 12.8 02/16/2021 11:04 AM    Hematocrit (POC) 39 06/15/2009 05:10 PM    HCT 39.7 02/16/2021 11:04 AM    PLATELET 458 58/58/4288 11:04 AM    MCV 84.6 02/16/2021 11:04 AM      Lab Results   Component Value Date/Time    Sodium 136 02/16/2021 11:04 AM    Potassium 4.1 02/16/2021 11:04 AM    Chloride 98 02/16/2021 11:04 AM    CO2 30 02/16/2021 11:04 AM    Anion gap 8 02/16/2021 11:04 AM    Glucose 112 (H) 02/16/2021 11:04 AM    Glucose 93 08/05/2018 06:56 AM    BUN 19 02/16/2021 11:04 AM    Creatinine 1.08 (H) 02/16/2021 11:04 AM    BUN/Creatinine ratio 18 02/16/2021 11:04 AM    GFR est AA >60 02/16/2021 11:04 AM    GFR est non-AA 54 (L) 02/16/2021 11:04 AM    Calcium 9.5 02/16/2021 11:04 AM    Bilirubin, total 0.2 02/16/2021 11:04 AM    Alk. phosphatase 85 02/16/2021 11:04 AM    Protein, total 7.7 02/16/2021 11:04 AM    Albumin 3.7 02/16/2021 11:04 AM    Globulin 4.0 02/16/2021 11:04 AM    A-G Ratio 0.9 (L) 02/16/2021 11:04 AM    ALT (SGPT) 28 02/16/2021 11:04 AM      Vitals:    02/17/21 1658 02/17/21 2024 02/17/21 2101 02/18/21 0841   BP: 123/88 (!) 82/61 126/78 109/70   Pulse: 86 92  100   Resp: 16 16  16   Temp: 98 °F (36.7 °C) 98.4 °F (36.9 °C)  98.3 °F (36.8 °C)   SpO2: 98% 97%  95%   Weight:       Height:           Lab Results   Component Value Date/Time    Valproic acid 51 02/16/2021 11:04 AM     Lab Results   Component Value Date/Time    Lithium level 0.49 (L) 08/09/2018 02:15 PM       Vital Signs  Patient Vitals for the past 24 hrs:   Temp Pulse Resp BP SpO2   02/18/21 0841 98.3 °F (36.8 °C) 100 16 109/70 95 %   02/17/21 2101 -- -- -- 126/78 --   02/17/21 2024 98.4 °F (36.9 °C) 92 16 (!) 82/61 97 %   02/17/21 1658 98 °F (36.7 °C) 86 16 123/88 98 %     Wt Readings from Last 3 Encounters:   02/16/21 90.7 kg (200 lb)   02/16/21 90.7 kg (200 lb)   02/09/21 92.1 kg (203 lb 0.7 oz)     Temp Readings from Last 3 Encounters:   02/18/21 98.3 °F (36.8 °C)   02/16/21 97.5 °F (36.4 °C)   02/10/21 99.2 °F (37.3 °C)     BP Readings from Last 3 Encounters:   02/18/21 109/70   02/16/21 96/77   02/10/21 (!) 118/95     Pulse Readings from Last 3 Encounters:   02/18/21 100   02/16/21 71   02/10/21 85       Radiology (reviewed/updated 2/18/2021)  Xr Chest Pa Lat    Result Date: 2/5/2021  EXAM:  XR CHEST PA LAT INDICATION:   sob COMPARISON: 2020. FINDINGS: PA and lateral radiographs of the chest demonstrate clear lungs. The cardiac and mediastinal contours and pulmonary vascularity are normal. Again noted are granulomatous changes. Nodule in the right lung base is unchanged consistent granuloma calcified mediastinal and hilar lymph nodes are noted. The bones and soft tissues are within normal limits. No acute abnormality     Xr Chest Port    Result Date: 2/6/2021  Clinical history: Central venous catheter placement INDICATION:   Central venous catheter placement COMPARISON: 2/5/2021 FINDINGS: AP portable upright view of the chest demonstrates a stable  cardiopericardial silhouette. The lungs are unchanged in overall appearance. . Left sided central venous access catheter is in place. Catheter tip is in appropriate position for use. There is no associated pneumothorax. The osseous structures are unremarkable. Patient is on a cardiac monitor. Central venous acces catheter in position for use. No pneumothorax No other significant interval change. Xr Chest Port    Result Date: 12/8/2020  Clinical indication: Cough. Portable AP semiupright view of the chest obtained, comparison September 24, 2020. The a heart size is normal. Calcified lymph nodes in the mediastinum. No acute infiltrate. IMPRESSION: No acute changes. Us Ext [phone]    Result Date: 12/10/2020  INDICATION: Right arm swelling FINDINGS: Limited ultrasound examination of the right arm was performed. At the site of swelling, there is no mass, fluid collection, or significant subcutaneous edema. IMPRESSION: There is no sonographic correlate to the clinical finding of right arm swelling; clinical follow-up is recommended. Us Ext [phone]    Result Date: 12/8/2020  Indication: Assess for abscess overlying right shoulder anteriorly. Ultrasound performed the soft tissues overlying the right shoulder anteriorly in the area of pain. Zandra [place] No cystic or solid mass is identified. IMPRESSION: 1. No abscess is identified overlying soft tissues right shoulder anteriorly.       Current Facility-Administered Medications   Medication Dose Route Frequency Provider Last Rate Last Admin    buprenorphine-naloxone (SUBOXONE) 8-2mg SL tablet  1 Tab SubLINGual BID Veronica Fowler, NP        albuterol (PROVENTIL HFA, VENTOLIN HFA, PROAIR HFA) inhaler 2 Puff  2 Puff Inhalation Q4H PRN Vara Staff, NP        OLANZapine (ZyPREXA) tablet 5 mg  5 mg Oral Q6H PRN Park Sánchez, NP   5 mg at 02/17/21 2116    haloperidol lactate (HALDOL) injection 5 mg  5 mg IntraMUSCular Q6H PRN Murtazai, Ardath Blinks, NP        benztropine (COGENTIN) tablet 1 mg  1 mg Oral BID PRN Gonzalo, Ardath Blinks, NP        diphenhydrAMINE (BENADRYL) injection 50 mg  50 mg IntraMUSCular BID PRN Murtazai, Ardath Blinks, NP        hydrOXYzine HCL (ATARAX) tablet 50 mg  50 mg Oral TID PRN Park Sánchez, NP   50 mg at 02/17/21 1926    LORazepam (ATIVAN) injection 1 mg  1 mg IntraMUSCular Q4H PRN Gonzalo, Ardath Blinks, NP        acetaminophen (TYLENOL) tablet 650 mg  650 mg Oral Q4H PRN Park Sánchez, NP   650 mg at 02/17/21 1925    magnesium hydroxide (MILK OF MAGNESIA) 400 mg/5 mL oral suspension 30 mL  30 mL Oral DAILY PRN Harrison Sánchez Blinks, NP        levETIRAcetam (KEPPRA) tablet 500 mg  500 mg Oral BID Park Sánchez, NP   500 mg at 02/17/21 1725    nicotine (NICODERM CQ) 21 mg/24 hr patch 1 Patch  1 Patch TransDERmal DAILY Park Sánchez, NP        QUEtiapine (SEROquel) tablet 400 mg  400 mg Oral QHS Park Sánchez, NP   400 mg at 02/17/21 2116    [Held by provider] cloNIDine HCL (CATAPRES) tablet 0.3 mg  0.3 mg Oral TID Vara Staff, NP        gabapentin (NEURONTIN) capsule 300 mg  300 mg Oral TID Vara Staff, NP   300 mg at 02/17/21 2116    [Held by provider] montelukast (SINGULAIR) tablet 10 mg  10 mg Oral QHS David Humphreys, FRANKIE        polyethylene glycol (MIRALAX) packet 17 g  17 g Oral DAILY Vara Staff, NP        docusate sodium (COLACE) capsule 100 mg  100 mg Oral DAILY Olea Pulling, NP   100 mg at 23/78/17 2036    folic acid (FOLVITE) tablet 1 mg  1 mg Oral DAILY Olea Pulling, NP   1 mg at 02/17/21 0825    therapeutic multivitamin (THERAGRAN) tablet 1 Tab  1 Tab Oral DAILY Olea Pulling, NP   1 Tab at 02/17/21 0825    thiamine HCL (B-1) tablet 100 mg  100 mg Oral DAILY Olea Pulling, NP   100 mg at 02/17/21 0825    tiotropium (SPIRIVA) inhalation capsule 18 mcg (Patient Supplied)  18 mcg Inhalation DAILY Olea Pulling, NP   18 mcg at 02/17/21 5232    fluticasone-salmeterol (ADVAIR/WIXELA) 250mcg-50mcg/puff (Patient Supplied)  1 Puff Inhalation BID Olea Pulling, NP   1 Puff at 02/17/21 1825       Side Effects: (reviewed/updated 2/18/2021)  None reported or admitted to. Review of Systems: (reviewed/updated 2/18/2021)  Appetite: good  Sleep: poor  All other Review of Systems:     Mental Status Exam:  Eye contact: Good eye contact  Psychomotor activity: Relaxed   Speech is spontaneous  Thought process: Logical and goal directed   Mood is \"ok\"  Affect: Blunted  Perception: No avh  Suicidal ideation: passive si  Homicidal ideation: No hi  Insight/judgment: Poor  Cognition is grossly intact      Physical Exam:  Musculoskeletal system: steady gait  Tremor not present  Cog wheeling not present      Assessment and Plan:  Erica Mcgee meets criteria for a diagnosis of Unspecified psychotic disorder, rule out schizoaffective disorder, rule out bipolar disorder. Restart suboxone. Remeron 15mg qhs. Continue rest of medications as prescribed. We will closely monitor for safety. We will encourage reality orientation. Disposition planning to continue.        I certify that this patients inpatient psychiatric hospital services furnished since the previous certification were, and continue to be, required for treatment that could reasonably be expected to improve the patient's condition, or for diagnostic study, and that the patient continues to need, on a daily basis, active treatment furnished directly by or requiring the supervision of inpatient psychiatric facility personnel. In addition, the hospital records show that services furnished were intensive treatment services, admission or related services, or equivalent services.       Signed:  Wilfred Moss NP  2/18/2021

## 2021-02-18 NOTE — PROGRESS NOTES
Problem: Patient Education: Go to Patient Education Activity  Goal: Patient/Family Education  Outcome: Progressing Towards Goal     Problem: Altered Thought Process (Adult/Pediatric)  Goal: Interventions  Outcome: Progressing Towards Goal     1500: Patient awake in bed. Calm and cooperative. Safe w/ Q15 min checks

## 2021-02-18 NOTE — BH NOTES
GROUP THERAPY PROGRESS NOTE      Roxy Barclay is participating in [employer] / Wellness Group    Group time: 45 minutes    Personal goal for participation: Preparation for Today     Goal orientation: Eval Your Wellness and Coping In [employer]    Group therapy participation: minimal    Therapeutic interventions reviewed and discussed:     Impression of participation: Pt was present but her mood was very anxious and somewhat distracted form the group. Pt's contributions were very limited. She dis state she had no issues for the community but needed to talk with her [address] [place] [address] team but she did disclose any further information. She remained in group appearing to be listening.

## 2021-02-18 NOTE — PROGRESS NOTES
Problem: Discharge Planning  Goal: *Knowledge of medication management  Outcome: Progressing Towards Goal  Note: Patient agrees to take medications as prescribed. Problem: Discharge Planning  Goal: *Discharge to safe environment  Outcome: Not Progressing Towards Goal  Note: Patient is homeless and does not identify safe environment for discharge. Goal: *Knowledge of discharge instructions  Outcome: Not Progressing Towards Goal  Note: Patient does not verbalizes understanding of goal for treatment and safe discharge.

## 2021-02-18 NOTE — INTERDISCIPLINARY ROUNDS
Behavioral Health Interdisciplinary Rounds     Patient Name: Mitch Lima  Age: 50 y.o. Room/Bed:  734/  Primary Diagnosis: <principal problem not specified>   Admission Status: Voluntary Commitment     Readmission within 30 days: no  Power of  in place: no  Patient requires a blocked bed: no          Reason for blocked bed:     VTE Prophylaxis: No    Mobility needs/Fall risk: no  Flu Vaccine : no   Nutritional Plan: no  Consults:          Labs/Testing due today?: no    Sleep hours: 7       Participation in Care/Groups:  yes  Medication Compliant?: Yes  PRNS (last 24 hours): Antipsychotic (PO) and Antianxiety    Restraints (last 24 hours):  no     CIWA (range last 24 hours):     COWS (range last 24 hours):      Alcohol screening (AUDIT) completed -   AUDIT Score: 0     If applicable, date SBIRT discussed in treatment team AND documented:   AUDIT Screen Score: AUDIT Score: 0      Tobacco - patient is a smoker: Have You Used Tobacco in the Past 30 Days: Yes  Illegal Drugs use: Have You Used Any Illegal Substances Over the Past 12 Months: No    24 hour chart check complete: yes     Patient goal(s) for today: attend groups, take medications  Treatment team focus/goals: titrate medication, start mirtazapine, coordinate follow up  Progress note: Pt admitted under TDO for SI and AH/VH. PT is alert, oriented and reported her mood as \"so so\" and endorses SI remains but is less. She reports AH telling her she is upsetting her family, a failure and disappointment and she has not been sleeping well. Plan to stabilize psychiatrically and work on safe discharge plan.     LOS:  2  Expected LOS:  TBD     Financial concerns/prescription coverage:  [facility]  Family contact: momCindy ([phone])  Family requesting physician contact today:  no  Discharge plan: homeless  Access to weapons : no            Outpatient provider(s): [facility]  Patient's preferred phone number for follow up call :   Patient's preferred e-mail address :     Participating treatment team members: Christ Rodriguez NP - Gerard Siemens, RN - Inna Sommer, PharmD - DORCAS [place]

## 2021-02-19 PROCEDURE — 65220000003 HC RM SEMIPRIVATE PSYCH

## 2021-02-19 PROCEDURE — 74011250637 HC RX REV CODE- 250/637: Performed by: NURSE PRACTITIONER

## 2021-02-19 PROCEDURE — 74011250636 HC RX REV CODE- 250/636: Performed by: NURSE PRACTITIONER

## 2021-02-19 RX ADMIN — LEVETIRACETAM 500 MG: 500 TABLET ORAL at 17:29

## 2021-02-19 RX ADMIN — ACETAMINOPHEN 650 MG: 325 TABLET ORAL at 04:00

## 2021-02-19 RX ADMIN — DOCUSATE SODIUM 100 MG: 100 CAPSULE, LIQUID FILLED ORAL at 09:23

## 2021-02-19 RX ADMIN — LEVETIRACETAM 500 MG: 500 TABLET ORAL at 09:24

## 2021-02-19 RX ADMIN — QUETIAPINE FUMARATE 400 MG: 100 TABLET ORAL at 20:39

## 2021-02-19 RX ADMIN — GABAPENTIN 300 MG: 300 CAPSULE ORAL at 17:29

## 2021-02-19 RX ADMIN — MONTELUKAST 10 MG: 10 TABLET, FILM COATED ORAL at 20:39

## 2021-02-19 RX ADMIN — GABAPENTIN 300 MG: 300 CAPSULE ORAL at 09:24

## 2021-02-19 RX ADMIN — GABAPENTIN 300 MG: 300 CAPSULE ORAL at 20:40

## 2021-02-19 RX ADMIN — MIRTAZAPINE 15 MG: 15 TABLET, FILM COATED ORAL at 20:40

## 2021-02-19 RX ADMIN — THERA TABS 1 TABLET: TAB at 09:24

## 2021-02-19 RX ADMIN — BUPRENORPHINE HYDROCHLORIDE AND NALOXONE HYDROCHLORIDE DIHYDRATE 1 TABLET: 8; 2 TABLET SUBLINGUAL at 20:39

## 2021-02-19 RX ADMIN — BUPRENORPHINE HYDROCHLORIDE AND NALOXONE HYDROCHLORIDE DIHYDRATE 1 TABLET: 8; 2 TABLET SUBLINGUAL at 09:24

## 2021-02-19 RX ADMIN — Medication 100 MG: at 09:24

## 2021-02-19 RX ADMIN — FOLIC ACID 1 MG: 1 TABLET ORAL at 09:24

## 2021-02-19 NOTE — BH NOTES
GROUP THERAPY PROGRESS NOTE    Patient  is participating in [email or web address] time: 45 minutes    Personal goal for participation: Preparation for Today     Goal orientation: Eval Your Wellness and Coping In [employer]    Group therapy participation: Active    Therapeutic interventions reviewed and discussed:     Impression of participation: Leslie Sabillon was instrumental player  in today's group. She was able to describe her mood ( ( feeling great- happy) and her testimony was helpful to others. Pt stated she has learned to remove persons in her life that were  causing her pain and grief. She continued stating I am focused on me and not taking care of others. Finally she said\" My caretaker days are over\".

## 2021-02-19 NOTE — DISCHARGE INSTRUCTIONS
DISCHARGE SUMMARY    NAME:Evgeny Mcgee  : 1975  MRN: [id number]    The patient Patricia Mora exhibits the ability to control behavior in a less restrictive environment. Patient's level of functioning is improving. No assaultive/destructive behavior has been observed for the past 24 hours. No suicidal/homicidal threat or behavior has been observed for the past 24 hours. There is no evidence of serious medication side effects. Patient has not been in physical or protective restraints for at least the past 24 hours. If weapons involved, how are they secured? NA    Is patient aware of and in agreement with discharge plan? Yes     Arrangements for medication:  Prescriptions given to patient, 30 day supply filled at hospital.    Copy of discharge instructions to provider?:  [facility] ([phone])    Arrangements for transportation home:  [facility] to  patient and transport to [facility]. Keep all follow up appointments as scheduled, continue to take prescribed medications per physician instructions. Mental health crisis number:  140 or your local mental health crisis line number at [phone].       Mental Health Emergency WARM LINE      [phone] ([phone])      M-F: 9am to 9pm      Sat & Sun: 5pm - 9pm  National suicide prevention lines:                             2-437-XNQPIMS ([phone])       2-386-804-TALK ([phone])    Crisis Text Line:  Text HOME to 161690

## 2021-02-19 NOTE — BH NOTES
Sent referral for crisis stabilization services to [facility], Patient was accepted for crisis stabilization starting Monday.

## 2021-02-19 NOTE — INTERDISCIPLINARY ROUNDS
Behavioral Health Interdisciplinary Rounds     Patient Name: Kartik Lawler  Age: 50 y.o. Room/Bed:  730/01  Primary Diagnosis: <principal problem not specified>   Admission Status: Voluntary Commitment     Readmission within 30 days: no  Power of  in place: no  Patient requires a blocked bed: no          Reason for blocked bed:     VTE Prophylaxis: No    Mobility needs/Fall risk: no  Flu Vaccine : no   Nutritional Plan: no  Consults:          Labs/Testing due today?: no    Sleep hours: 6       Participation in Care/Groups:  yes  Medication Compliant?: Yes  PRNS (last 24 hours): Pain    Restraints (last 24 hours):  no     CIWA (range last 24 hours):     COWS (range last 24 hours):      Alcohol screening (AUDIT) completed -   AUDIT Score: 0     If applicable, date SBIRT discussed in treatment team AND documented:   AUDIT Screen Score: AUDIT Score: 0    Tobacco - patient is a smoker: Have You Used Tobacco in the Past 30 Days: Yes  Illegal Drugs use: Have You Used Any Illegal Substances Over the Past 12 Months: No    24 hour chart check complete: yes     Patient goal(s) for today:  attend groups, take medications  Treatment team focus/goals: titrate medication, coordinate follow up, look for CSUs  Progress note: Pt admitted under TDO for SI and AH/VH. Pt is alert, oriented, calm and ;peraitve. She denies SI and AH/VH.  Plan to stabilize psychiatrically and discharge Monday to [address] [place] [address] with follow [facility] through [facility].      LOS:  3  Expected LOS: 6     Financial concerns/prescription [facility]  Family contact: mom, Liliane Faina ([phone])  Family requesting physician contact today:  no  Discharge plan: homeless  Access to weapons : no            Outpatient provider(s): [facility]  Patient's preferred phone number for follow up call :   Patient's preferred e-mail address :     Participating treatment team members: Evgeny MARTINES Eula Cruz, NP -Federica Bruno, RN - Michelle Hernandez, PharmD - Josse Genao, MSW

## 2021-02-19 NOTE — PROGRESS NOTES
Problem: Falls - Risk of  Goal: *Absence of Falls  Description: Document Clydene Bone Fall Risk and appropriate interventions in the flowsheet. Outcome: Progressing Towards Goal  Note: Fall Risk Interventions:      Medication Interventions: Teach patient to arise slowly  Problem: Altered Thought Process (Adult/Pediatric)  Goal: *STG: Participates in treatment plan  Outcome: Progressing Towards Goal  Goal: *STG: Complies with medication therapy  Outcome: Progressing Towards Goal    Patient is calm, cooperative and engages in conversation with others. Attends group. Medication compliant.

## 2021-02-19 NOTE — BH NOTES
PRN Medication Documentation    Specific patient behavior that led to need for PRN medication: pt c/o of bilateral leg pain. Legs assessed, both are swollen.   Staff interventions attempted prior to PRN being given: pt provided with rolled up blankets to elevate feet  PRN medication given: tylenol  Patient response/effectiveness of PRN medication: will asssess

## 2021-02-19 NOTE — PROGRESS NOTES
Problem: Falls - Risk of  Goal: *Absence of Falls  Description: Document Karie Mccall Fall Risk and appropriate interventions in the flowsheet. Outcome: Progressing Towards Goal  Note: Fall Risk Interventions:            Medication Interventions: Teach patient to arise slowly         Pt. Resting in bed, NAD, respirations even and unlabored. Will continue q15 safety rounds.

## 2021-02-19 NOTE — PROGRESS NOTES
Problem: Falls - Risk of  Goal: *Absence of Falls  Description: Document Clydene Bone Fall Risk and appropriate interventions in the flowsheet. Outcome: Progressing Towards Goal  Note: Fall Risk Interventions:       Medication Interventions: Teach patient to arise slowly    Problem: Altered Thought Process (Adult/Pediatric)  Goal: *STG: Complies with medication therapy  Outcome: Progressing Towards Goal     Problem: Seizure Disorder (Adult)  Goal: *STG: Remains safe in hospital  Outcome: Progressing Towards Goal    Received pt up in room coloring and writing. Greets oncoming staff. No acute distress noted at this time. Staff will continue to monitor q 15 min checks.

## 2021-02-19 NOTE — PROGRESS NOTES
PSYCHIATRIC PROGRESS NOTE       Patient: Phares Dandy MRN: [id number]  SSN: [id number]    YOB: 1975  Age: 50 y.o. Sex: female      Admit Date: 2/16/2021    LOS: 3 days       Chief Complaint:  I am doing well. Interval History:  Phares Dandy is doing well. She is pleasant and cheerful. She's been visible in the unit and interacts well with staff and peers. Attending groups. She denies si hi. States hallucinations have been resolved. No paranoia noted. She remains compliant with her meds and denies side effects. Sleeping and eating well. Plan for dc on Monday if weekend goes well.       Past Medical History:  Past Medical History:   Diagnosis Date    Asthma     Bipolar 1 disorder ([facility]     Chronic obstructive pulmonary disease (HCC)     Chronic pain     back, leg    Cocaine abuse ([facility]     Depression     Heart failure (HCC)     Hepatitis B     Heroin abuse (HCC)     HTN (hypertension)     Narcotic abuse (HCC)     Polysubstance abuse ([facility]     Sarcoidosis     Tobacco abuse          ALLERGIES:(reviewed/updated 2/19/2021)  Allergies   Allergen Reactions    Vancomycin Anaphylaxis    Tomato Swelling     Tongue    Trazodone Angioedema       Laboratory report:  Lab Results   Component Value Date/Time    WBC 6.0 02/16/2021 11:04 AM    Hemoglobin (POC) 13.3 06/15/2009 05:10 PM    HGB 12.8 02/16/2021 11:04 AM    Hematocrit (POC) 39 06/15/2009 05:10 PM    HCT 39.7 02/16/2021 11:04 AM    PLATELET 577 95/34/2867 11:04 AM    MCV 84.6 02/16/2021 11:04 AM      Lab Results   Component Value Date/Time    Sodium 136 02/16/2021 11:04 AM    Potassium 4.1 02/16/2021 11:04 AM    Chloride 98 02/16/2021 11:04 AM    CO2 30 02/16/2021 11:04 AM    Anion gap 8 02/16/2021 11:04 AM    Glucose 112 (H) 02/16/2021 11:04 AM    Glucose 93 08/05/2018 06:56 AM    BUN 19 02/16/2021 11:04 AM    Creatinine 1.08 (H) 02/16/2021 11:04 AM    BUN/Creatinine ratio 18 02/16/2021 11:04 AM    GFR est AA >60 02/16/2021 11:04 AM GFR est non-AA 54 (L) 02/16/2021 11:04 AM    Calcium 9.5 02/16/2021 11:04 AM    Bilirubin, total 0.2 02/16/2021 11:04 AM    Alk. phosphatase 85 02/16/2021 11:04 AM    Protein, total 7.7 02/16/2021 11:04 AM    Albumin 3.7 02/16/2021 11:04 AM    Globulin 4.0 02/16/2021 11:04 AM    A-G Ratio 0.9 (L) 02/16/2021 11:04 AM    ALT (SGPT) 28 02/16/2021 11:04 AM      Vitals:    02/18/21 1251 02/18/21 1550 02/18/21 1914 02/19/21 0721   BP: 107/63 129/86 114/80 137/89   Pulse: 94 (!) 107 91 88   Resp: 16 18 16 16   Temp: 98 °F (36.7 °C) 98.3 °F (36.8 °C) 98.5 °F (36.9 °C) 98.4 °F (36.9 °C)   SpO2: 97% 96% 100% 98%   Weight:       Height:           Lab Results   Component Value Date/Time    Valproic acid 51 02/16/2021 11:04 AM     Lab Results   Component Value Date/Time    Lithium level 0.49 (L) 08/09/2018 02:15 PM       Vital Signs  Patient Vitals for the past 24 hrs:   Temp Pulse Resp BP SpO2   02/19/21 0721 98.4 °F (36.9 °C) 88 16 137/89 98 %   02/18/21 1914 98.5 °F (36.9 °C) 91 16 114/80 100 %   02/18/21 1550 98.3 °F (36.8 °C) (!) 107 18 129/86 96 %   02/18/21 1251 98 °F (36.7 °C) 94 16 107/63 97 %     Wt Readings from Last 3 Encounters:   02/16/21 90.7 kg (200 lb)   02/16/21 90.7 kg (200 lb)   02/09/21 92.1 kg (203 lb 0.7 oz)     Temp Readings from Last 3 Encounters:   02/19/21 98.4 °F (36.9 °C)   02/16/21 97.5 °F (36.4 °C)   02/10/21 99.2 °F (37.3 °C)     BP Readings from Last 3 Encounters:   02/19/21 137/89   02/16/21 96/77   02/10/21 (!) 118/95     Pulse Readings from Last 3 Encounters:   02/19/21 88   02/16/21 71   02/10/21 85       Radiology (reviewed/updated 2/19/2021)  Xr Chest Pa Lat    Result Date: 2/5/2021  EXAM:  XR CHEST PA LAT INDICATION:   sob COMPARISON: 2020. FINDINGS: PA and lateral radiographs of the chest demonstrate clear lungs. The cardiac and mediastinal contours and pulmonary vascularity are normal. Again noted are granulomatous changes.  Nodule in the right lung base is unchanged consistent granuloma calcified mediastinal and hilar lymph nodes are noted. The bones and soft tissues are within normal limits.     No acute abnormality     Xr Chest Port    Result Date: 2/6/2021  Clinical history: Central venous catheter placement INDICATION:   Central venous catheter placement COMPARISON: 2/5/2021 FINDINGS: AP portable upright view of the chest demonstrates a stable  cardiopericardial silhouette. The lungs are unchanged in overall appearance.. Left sided central venous access catheter is in place. Catheter tip is in appropriate position for use. There is no associated pneumothorax. The osseous structures are unremarkable. Patient is on a cardiac monitor.     Central venous acces catheter in position for use. No pneumothorax No other significant interval change.      Xr Chest Port    Result Date: 12/8/2020  Clinical indication: Cough. Portable AP semiupright view of the chest obtained, comparison September 24, 2020. The a heart size is normal. Calcified lymph nodes in the mediastinum. No acute infiltrate.     IMPRESSION: No acute changes.    Us Ext Nonvas Rt Ltd    Result Date: 12/10/2020  INDICATION: Right arm swelling FINDINGS: Limited ultrasound examination of the right arm was performed. At the site of swelling, there is no mass, fluid collection, or significant subcutaneous edema.     IMPRESSION: There is no sonographic correlate to the clinical finding of right arm swelling; clinical follow-up is recommended.    Us Ext Nonvas Rt Ltd    Result Date: 12/8/2020  Indication: Assess for abscess overlying right shoulder anteriorly. Ultrasound performed the soft tissues overlying the right shoulder anteriorly in the area of pain. . No cystic or solid mass is identified.     IMPRESSION: 1. No abscess is identified overlying soft tissues right shoulder anteriorly.      Current Facility-Administered Medications   Medication Dose Route Frequency Provider Last Rate Last Admin   • buprenorphine-naloxone (SUBOXONE) 8-2mg SL  tablet  1 Tab SubLINGual BID Vedia Abreu A, NP   1 Tab at 02/19/21 0924    mirtazapine (REMERON) tablet 15 mg  15 mg Oral QHS Veronica Fowler, NP   15 mg at 02/18/21 2010    albuterol (PROVENTIL HFA, VENTOLIN HFA, PROAIR HFA) inhaler 2 Puff  2 Puff Inhalation Q4H PRN Tre Maria, FRANKIE        OLANZapine (ZyPREXA) tablet 5 mg  5 mg Oral Q6H PRN Park Sánchez, NP   5 mg at 02/17/21 2116    haloperidol lactate (HALDOL) injection 5 mg  5 mg IntraMUSCular Q6H PRN Anh Sánchez, FRANKIE        benztropine (COGENTIN) tablet 1 mg  1 mg Oral BID PRN Anh Sánchez, NP        diphenhydrAMINE (BENADRYL) injection 50 mg  50 mg IntraMUSCular BID PRN Anh Sánchez NP        hydrOXYzine HCL (ATARAX) tablet 50 mg  50 mg Oral TID PRN Park Sánchez, NP   50 mg at 02/17/21 1926    LORazepam (ATIVAN) injection 1 mg  1 mg IntraMUSCular Q4H PRN Anh Sánchez NP        acetaminophen (TYLENOL) tablet 650 mg  650 mg Oral Q4H PRN Park Sánchez, NP   650 mg at 02/19/21 0400    magnesium hydroxide (MILK OF MAGNESIA) 400 mg/5 mL oral suspension 30 mL  30 mL Oral DAILY PRN Anh Sánchez NP        levETIRAcetam (KEPPRA) tablet 500 mg  500 mg Oral BID Park Sánchez, NP   500 mg at 02/19/21 0924    nicotine (NICODERM CQ) 21 mg/24 hr patch 1 Patch  1 Patch TransDERmal DAILY Park Sánchez, NP        QUEtiapine (SEROquel) tablet 400 mg  400 mg Oral QHS Park Sánchez, NP   400 mg at 02/18/21 2010    gabapentin (NEURONTIN) capsule 300 mg  300 mg Oral TID Tre Melter, NP   300 mg at 02/19/21 3372    montelukast (SINGULAIR) tablet 10 mg  10 mg Oral QHS Tre Melter, NP   10 mg at 02/18/21 2010    polyethylene glycol (MIRALAX) packet 17 g  17 g Oral DAILY Tre Melter, NP        docusate sodium (COLACE) capsule 100 mg  100 mg Oral DAILY Mervin Bers, NP   100 mg at 88/51/07 1480    folic acid (FOLVITE) tablet 1 mg  1 mg Oral DAILY Mervin Jeremys, NP   1 mg at 02/19/21 1562    therapeutic multivitamin (THERAGRAN) tablet 1 Tab  1 Tab Oral DAILY Mervin Lunas, NP   1 Tab at 02/19/21 9067    thiamine HCL (B-1) tablet 100 mg  100 mg Oral DAILY Mervin Jeremys, NP   100 mg at 02/19/21 7788    tiotropium (SPIRIVA) inhalation capsule 18 mcg (Patient Supplied)  18 mcg Inhalation DAILY Mervin Jeremys, NP   18 mcg at 02/17/21 4393    fluticasone-salmeterol (ADVAIR/WIXELA) 250mcg-50mcg/puff (Patient Supplied)  1 Puff Inhalation BID Mervin Hopkins, NP   1 Puff at 02/17/21 9155       Side Effects: (reviewed/updated 2/19/2021)  None reported or admitted to. Review of Systems: (reviewed/updated 2/19/2021)  Appetite: good  Sleep: poor  All other Review of Systems: denies    Mental Status Exam:  Eye contact: Good eye contact  Psychomotor activity: Relaxed   Speech is spontaneous  Thought process: Logical and goal directed   Mood is \"good\"  Affect: full  Perception: No avh  Suicidal ideation: denies si  Homicidal ideation: No hi  Insight/judgment: Partial  Cognition is grossly intact      Physical Exam:  Musculoskeletal system: steady gait  Tremor not present  Cog wheeling not present      Assessment and Plan:  Felix Mcgee meets criteria for a diagnosis of Unspecified psychotic disorder, rule out schizoaffective disorder, rule out bipolar disorder. Continue current medications as prescribed. We will closely monitor for safety. We will encourage reality orientation. Plan for dc Monday to [facility].       I certify that this patients inpatient psychiatric hospital services furnished since the previous certification were, and continue to be, required for treatment that could reasonably be expected to improve the patient's condition, or for diagnostic study, and that the patient continues to need, on a daily basis, active treatment furnished directly by or requiring the supervision of inpatient psychiatric facility personnel. In addition, the hospital records show that services furnished were intensive treatment services, admission or related services, or equivalent services.       Signed:  Candy Ayala NP  2/19/2021

## 2021-02-20 PROCEDURE — 74011250636 HC RX REV CODE- 250/636: Performed by: NURSE PRACTITIONER

## 2021-02-20 PROCEDURE — 74011250637 HC RX REV CODE- 250/637: Performed by: NURSE PRACTITIONER

## 2021-02-20 PROCEDURE — 65220000003 HC RM SEMIPRIVATE PSYCH

## 2021-02-20 RX ORDER — FUROSEMIDE 20 MG/1
40 TABLET ORAL ONCE
Status: COMPLETED | OUTPATIENT
Start: 2021-02-20 | End: 2021-02-20

## 2021-02-20 RX ADMIN — THERA TABS 1 TABLET: TAB at 08:39

## 2021-02-20 RX ADMIN — MONTELUKAST 10 MG: 10 TABLET, FILM COATED ORAL at 21:38

## 2021-02-20 RX ADMIN — GABAPENTIN 300 MG: 300 CAPSULE ORAL at 08:39

## 2021-02-20 RX ADMIN — BUPRENORPHINE HYDROCHLORIDE AND NALOXONE HYDROCHLORIDE DIHYDRATE 1 TABLET: 8; 2 TABLET SUBLINGUAL at 08:39

## 2021-02-20 RX ADMIN — GABAPENTIN 300 MG: 300 CAPSULE ORAL at 15:54

## 2021-02-20 RX ADMIN — ACETAMINOPHEN 650 MG: 325 TABLET ORAL at 21:38

## 2021-02-20 RX ADMIN — QUETIAPINE FUMARATE 400 MG: 100 TABLET ORAL at 21:38

## 2021-02-20 RX ADMIN — ACETAMINOPHEN 650 MG: 325 TABLET ORAL at 15:54

## 2021-02-20 RX ADMIN — LEVETIRACETAM 500 MG: 500 TABLET ORAL at 21:39

## 2021-02-20 RX ADMIN — Medication 100 MG: at 08:39

## 2021-02-20 RX ADMIN — BUPRENORPHINE HYDROCHLORIDE AND NALOXONE HYDROCHLORIDE DIHYDRATE 1 TABLET: 8; 2 TABLET SUBLINGUAL at 21:38

## 2021-02-20 RX ADMIN — FUROSEMIDE 40 MG: 20 TABLET ORAL at 10:04

## 2021-02-20 RX ADMIN — FOLIC ACID 1 MG: 1 TABLET ORAL at 08:38

## 2021-02-20 RX ADMIN — HYDROXYZINE HYDROCHLORIDE 50 MG: 50 TABLET, FILM COATED ORAL at 21:38

## 2021-02-20 RX ADMIN — DOCUSATE SODIUM 100 MG: 100 CAPSULE, LIQUID FILLED ORAL at 08:39

## 2021-02-20 RX ADMIN — MIRTAZAPINE 15 MG: 15 TABLET, FILM COATED ORAL at 21:38

## 2021-02-20 RX ADMIN — ACETAMINOPHEN 650 MG: 325 TABLET ORAL at 01:55

## 2021-02-20 RX ADMIN — GABAPENTIN 300 MG: 300 CAPSULE ORAL at 21:38

## 2021-02-20 RX ADMIN — LEVETIRACETAM 500 MG: 500 TABLET ORAL at 08:39

## 2021-02-20 RX ADMIN — HYDROXYZINE HYDROCHLORIDE 50 MG: 50 TABLET, FILM COATED ORAL at 12:46

## 2021-02-20 NOTE — BH NOTES
1248- PRN Medication Documentation    Specific patient behavior that led to need for PRN medication: pt c/o anxiety creating physical discomfort and muscle tension to shoulders  Staff interventions attempted prior to PRN being given: education, coping skills, therapeutic communication    PRN medication given: po 50 Mg atarax  Patient response/effectiveness of PRN medication: pt is using a warm compress to help relax      1554- PRN Medication Documentation    Specific patient behavior that led to need for PRN medication: pt c/o headache 3/10 onset now anterior   Staff interventions attempted prior to PRN being given: education, rest  PRN medication given: po 650 mg Tylenol   Patient response/effectiveness of PRN medication: pt is alert and oriented returns to her room to rest

## 2021-02-20 NOTE — PROGRESS NOTES
Problem: Falls - Risk of  Goal: *Absence of Falls  Description: Document Elvin Glad Fall Risk and appropriate interventions in the flowsheet. Outcome: Progressing Towards Goal  Note: Fall Risk Interventions:            Medication Interventions: Teach patient to arise slowly          Pt. Resting in bed, NAD, respirations even and unlabored. Will continue q15 safety rounds.

## 2021-02-20 NOTE — PROGRESS NOTES
Problem: Falls - Risk of  Goal: *Absence of Falls  Description: Document Sandrita Blood Fall Risk and appropriate interventions in the flowsheet. Outcome: Progressing Towards Goal  Note: Fall Risk Interventions:            Medication Interventions: Teach patient to arise slowly    1600: Patient received awake and alert and resting quietly in bed. Mood and affect; dull, flat, calm and cooperative. Denies SI/HI. Will continue to monitor q15 minutes for safety checks.

## 2021-02-20 NOTE — PROGRESS NOTES
PSYCHIATRIC PROGRESS NOTE       Patient: Evgeny Mcgee MRN: [id number]  SSN: [id number]    YOB: 1975  Age: 45 y.o.  Sex: female      Admit Date: 2/16/2021    LOS: 4 days       Chief Complaint:  I am very anxious this morning otherwise I am doing great.    Interval History:  Evgeny Mcgee is doing well. She is pleasant and reports that she is doing well, stating that she is feeling anxious this morning though she cannot identify anything specific that is causing her to feel more anxious.  Encouraged to use PRN medication for anxiety.  She's been visible in the unit and interacts well with staff and peers. Attending groups. She denies si hi. States hallucinations have been resolved. No paranoia noted. She remains compliant with her meds and denies side effects. Sleeping and eating well. Plan for dc on Monday if weekend goes well.      Past Medical History:  Past Medical History:   Diagnosis Date   • Asthma    • Bipolar 1 disorder (HCC)    • Chronic obstructive pulmonary disease (HCC)    • Chronic pain     back, leg   • Cocaine abuse (HCC)    • Depression    • Heart failure (HCC)    • Hepatitis B    • Heroin abuse (HCC)    • HTN (hypertension)    • Narcotic abuse (HCC)    • Polysubstance abuse (HCC)    • Sarcoidosis    • Tobacco abuse          ALLERGIES:(reviewed/updated 2/20/2021)  Allergies   Allergen Reactions   • Vancomycin Anaphylaxis   • Tomato Swelling     Tongue   • Trazodone Angioedema       Laboratory report:  Lab Results   Component Value Date/Time    WBC 6.0 02/16/2021 11:04 AM    Hemoglobin (POC) 13.3 06/15/2009 05:10 PM    HGB 12.8 02/16/2021 11:04 AM    Hematocrit (POC) 39 06/15/2009 05:10 PM    HCT 39.7 02/16/2021 11:04 AM    PLATELET 236 02/16/2021 11:04 AM    MCV 84.6 02/16/2021 11:04 AM      Lab Results   Component Value Date/Time    Sodium 136 02/16/2021 11:04 AM    Potassium 4.1 02/16/2021 11:04 AM    Chloride 98 02/16/2021 11:04 AM    CO2 30 02/16/2021 11:04 AM    Anion gap 8  02/16/2021 11:04 AM    Glucose 112 (H) 02/16/2021 11:04 AM    Glucose 93 08/05/2018 06:56 AM    BUN 19 02/16/2021 11:04 AM    Creatinine 1.08 (H) 02/16/2021 11:04 AM    BUN/Creatinine ratio 18 02/16/2021 11:04 AM    GFR est AA >60 02/16/2021 11:04 AM    GFR est non-AA 54 (L) 02/16/2021 11:04 AM    Calcium 9.5 02/16/2021 11:04 AM    Bilirubin, total 0.2 02/16/2021 11:04 AM    Alk.  phosphatase 85 02/16/2021 11:04 AM    Protein, total 7.7 02/16/2021 11:04 AM    Albumin 3.7 02/16/2021 11:04 AM    Globulin 4.0 02/16/2021 11:04 AM    A-G Ratio 0.9 (L) 02/16/2021 11:04 AM    ALT (SGPT) 28 02/16/2021 11:04 AM      Vitals:    02/19/21 1129 02/19/21 1601 02/19/21 2028 02/20/21 0803   BP: (!) 123/90 129/89 128/83 (!) 129/97   Pulse: 96 (!) 101 (!) 103 97   Resp: 16 16 16 10   Temp: 97.5 °F (36.4 °C) 98.4 °F (36.9 °C) 98.5 °F (36.9 °C) 98.1 °F (36.7 °C)   SpO2: 100% 95% 99% 100%   Weight:       Height:           Lab Results   Component Value Date/Time    Valproic acid 51 02/16/2021 11:04 AM     Lab Results   Component Value Date/Time    Lithium level 0.49 (L) 08/09/2018 02:15 PM       Vital Signs  Patient Vitals for the past 24 hrs:   Temp Pulse Resp BP SpO2   02/20/21 0803 98.1 °F (36.7 °C) 97 10 (!) 129/97 100 %   02/19/21 2028 98.5 °F (36.9 °C) (!) 103 16 128/83 99 %   02/19/21 1601 98.4 °F (36.9 °C) (!) 101 16 129/89 95 %   02/19/21 1129 97.5 °F (36.4 °C) 96 16 (!) 123/90 100 %     Wt Readings from Last 3 Encounters:   02/16/21 90.7 kg (200 lb)   02/16/21 90.7 kg (200 lb)   02/09/21 92.1 kg (203 lb 0.7 oz)     Temp Readings from Last 3 Encounters:   02/20/21 98.1 °F (36.7 °C)   02/16/21 97.5 °F (36.4 °C)   02/10/21 99.2 °F (37.3 °C)     BP Readings from Last 3 Encounters:   02/20/21 (!) 129/97   02/16/21 96/77   02/10/21 (!) 118/95     Pulse Readings from Last 3 Encounters:   02/20/21 97   02/16/21 71   02/10/21 85       Radiology (reviewed/updated 2/20/2021)  Xr Chest Pa Lat    Result Date: 2/5/2021  EXAM:  XR CHEST PA LAT INDICATION:   sob COMPARISON: 2020. FINDINGS: PA and lateral radiographs of the chest demonstrate clear lungs. The cardiac and mediastinal contours and pulmonary vascularity are normal. Again noted are granulomatous changes. Nodule in the right lung base is unchanged consistent granuloma calcified mediastinal and hilar lymph nodes are noted. The bones and soft tissues are within normal limits. No acute abnormality     Xr Chest Port    Result Date: 2/6/2021  Clinical history: Central venous catheter placement INDICATION:   Central venous catheter placement COMPARISON: 2/5/2021 FINDINGS: AP portable upright view of the chest demonstrates a stable  cardiopericardial silhouette. The lungs are unchanged in overall appearance. . Left sided central venous access catheter is in place. Catheter tip is in appropriate position for use. There is no associated pneumothorax. The osseous structures are unremarkable. Patient is on a cardiac monitor. Central venous acces catheter in position for use. No pneumothorax No other significant interval change. Xr Chest Port    Result Date: 12/8/2020  Clinical indication: Cough. Portable AP semiupright view of the chest obtained, comparison September 24, 2020. The a heart size is normal. Calcified lymph nodes in the mediastinum. No acute infiltrate. IMPRESSION: No acute changes. Us Ext [phone]    Result Date: 12/10/2020  INDICATION: Right arm swelling FINDINGS: Limited ultrasound examination of the right arm was performed. At the site of swelling, there is no mass, fluid collection, or significant subcutaneous edema. IMPRESSION: There is no sonographic correlate to the clinical finding of right arm swelling; clinical follow-up is recommended. Us Ext [phone]    Result Date: 12/8/2020  Indication: Assess for abscess overlying right shoulder anteriorly. Ultrasound performed the soft tissues overlying the right shoulder anteriorly in the area of pain. [facility]  No cystic or solid mass is identified. IMPRESSION: 1. No abscess is identified overlying soft tissues right shoulder anteriorly.       Current Facility-Administered Medications   Medication Dose Route Frequency Provider Last Rate Last Admin    furosemide (LASIX) tablet 40 mg  40 mg Oral ONCE Reece Siljose, FRANKIE        buprenorphine-naloxone (SUBOXONE) 8-2mg SL tablet  1 Tab SubLINGual BID April VELASCO NP   1 Tab at 02/20/21 0839    mirtazapine (REMERON) tablet 15 mg  15 mg Oral QHS Veronica Fowler NP   15 mg at 02/19/21 2040    albuterol (PROVENTIL HFA, VENTOLIN HFA, PROAIR HFA) inhaler 2 Puff  2 Puff Inhalation Q4H PRN Omega Alpha, NP        OLANZapine (ZyPREXA) tablet 5 mg  5 mg Oral Q6H PRN Park Sánchez, NP   5 mg at 02/17/21 2116    haloperidol lactate (HALDOL) injection 5 mg  5 mg IntraMUSCular Q6H PRN Bacilio-John, [place] Necessary, NP        benztropine (COGENTIN) tablet 1 mg  1 mg Oral BID PRN Gonzalo, [place] Necessary, NP        diphenhydrAMINE (BENADRYL) injection 50 mg  50 mg IntraMUSCular BID PRN Gonzalo, [place] Necessary, NP        hydrOXYzine HCL (ATARAX) tablet 50 mg  50 mg Oral TID PRN Park Sánchez, NP   50 mg at 02/17/21 1926    LORazepam (ATIVAN) injection 1 mg  1 mg IntraMUSCular Q4H PRN Gonzalo, [place] Necessary, NP        acetaminophen (TYLENOL) tablet 650 mg  650 mg Oral Q4H PRN Park Sánchez, NP   650 mg at 02/20/21 0155    magnesium hydroxide (MILK OF MAGNESIA) 400 mg/5 mL oral suspension 30 mL  30 mL Oral DAILY PRN Gonzalo, [place] Necessary, NP        levETIRAcetam (KEPPRA) tablet 500 mg  500 mg Oral BID Park Sánchez, NP   500 mg at 02/20/21 0839    nicotine (NICODERM CQ) 21 mg/24 hr patch 1 Patch  1 Patch TransDERmal DAILY Park Sánchez, NP        QUEtiapine (SEROquel) tablet 400 mg  400 mg Oral QHS Park Sánchez, NP   400 mg at 02/19/21 2039    gabapentin (NEURONTIN) capsule 300 mg  300 mg Oral TID Bary Feeler, NP   300 mg at 02/20/21 6305    montelukast (SINGULAIR) tablet 10 mg  10 mg Oral QHS Bary Feeler, NP   10 mg at 02/19/21 2039    polyethylene glycol (MIRALAX) packet 17 g  17 g Oral DAILY Bary Feeler, NP        docusate sodium (COLACE) capsule 100 mg  100 mg Oral DAILY Bary Feeler, NP   100 mg at 68/31/82 1252    folic acid (FOLVITE) tablet 1 mg  1 mg Oral DAILY Bary Feeler, NP   1 mg at 02/20/21 9021    therapeutic multivitamin (THERAGRAN) tablet 1 Tab  1 Tab Oral DAILY Bary Feeler, NP   1 Tab at 02/20/21 5457    thiamine HCL (B-1) tablet 100 mg  100 mg Oral DAILY Bary Feeler, NP   100 mg at 02/20/21 2035    tiotropium (SPIRIVA) inhalation capsule 18 mcg (Patient Supplied)  18 mcg Inhalation DAILY Bary Feeler, NP   18 mcg at 02/17/21 1305    fluticasone-salmeterol (ADVAIR/WIXELA) 250mcg-50mcg/puff (Patient Supplied)  1 Puff Inhalation BID Bary Feeler, NP   1 Puff at 02/17/21 1825       Side Effects: (reviewed/updated 2/20/2021)  None reported or admitted to. Review of Systems: (reviewed/updated 2/20/2021)  Appetite: good  Sleep: poor  All other Review of Systems: denies    Mental Status Exam:  Eye contact: Good eye contact  Psychomotor activity: Relaxed   Speech is spontaneous  Thought process: Logical and goal directed   Mood is \"good\"  Affect: full  Perception: No avh  Suicidal ideation: denies si  Homicidal ideation: No hi  Insight/judgment: Partial  Cognition is grossly intact      Physical Exam:  Musculoskeletal system: steady gait  Tremor not present  Cog wheeling not present      Assessment and Plan:  Lee Mcgee meets criteria for a diagnosis of Unspecified psychotic disorder, rule out schizoaffective disorder, rule out bipolar disorder. Continue current medications as prescribed. We will closely monitor for safety. We will encourage reality orientation.   Plan [facility] dc Monday to [facility]. I certify that this patients inpatient psychiatric hospital services furnished since the previous certification were, and continue to be, required for treatment that could reasonably be expected to improve the patient's condition, or for diagnostic study, and that the patient continues to need, on a daily basis, active treatment furnished directly by or requiring the supervision of inpatient psychiatric facility personnel. In addition, the hospital records show that services furnished were intensive treatment services, admission or related services, or equivalent services.       Signed:  Cuba Abdul NP  2/20/2021

## 2021-02-20 NOTE — PROGRESS NOTES
Problem: Altered Thought Process (Adult/Pediatric)  Goal: *STG: Participates in treatment plan  Outcome: Progressing Towards Goal  Goal: *STG: Complies with medication therapy  Outcome: Progressing Towards Goal  Patient compliant with all scheduled medications. Problem: Patient Education: Go to Patient Education Activity  Goal: Patient/Family Education  Outcome: Progressing Towards Goal     Patient seen in treatment team, stating she has increased anxiety today. Recommend patient take Atarax prn and not worry if she is taking too often. Patient given one time dose of Lasix to help with swelling in bilateral lower extremities. Patient denies SI/HI/AVH  Will continue to monitor q15 minutes for safety.

## 2021-02-21 PROCEDURE — 74011250637 HC RX REV CODE- 250/637: Performed by: NURSE PRACTITIONER

## 2021-02-21 PROCEDURE — 65220000003 HC RM SEMIPRIVATE PSYCH

## 2021-02-21 PROCEDURE — 93005 ELECTROCARDIOGRAM TRACING: CPT

## 2021-02-21 PROCEDURE — 74011250636 HC RX REV CODE- 250/636: Performed by: NURSE PRACTITIONER

## 2021-02-21 RX ORDER — MAG HYDROX/ALUMINUM HYD/SIMETH 200-200-20
30 SUSPENSION, ORAL (FINAL DOSE FORM) ORAL
Status: DISCONTINUED | OUTPATIENT
Start: 2021-02-21 | End: 2021-02-22 | Stop reason: HOSPADM

## 2021-02-21 RX ADMIN — HYDROXYZINE HYDROCHLORIDE 50 MG: 50 TABLET, FILM COATED ORAL at 13:08

## 2021-02-21 RX ADMIN — DOCUSATE SODIUM 100 MG: 100 CAPSULE, LIQUID FILLED ORAL at 08:34

## 2021-02-21 RX ADMIN — MIRTAZAPINE 15 MG: 15 TABLET, FILM COATED ORAL at 20:48

## 2021-02-21 RX ADMIN — LEVETIRACETAM 500 MG: 500 TABLET ORAL at 16:47

## 2021-02-21 RX ADMIN — QUETIAPINE FUMARATE 400 MG: 100 TABLET ORAL at 20:48

## 2021-02-21 RX ADMIN — BUPRENORPHINE HYDROCHLORIDE AND NALOXONE HYDROCHLORIDE DIHYDRATE 1 TABLET: 8; 2 TABLET SUBLINGUAL at 20:48

## 2021-02-21 RX ADMIN — ALUMINUM HYDROXIDE, MAGNESIUM HYDROXIDE, AND SIMETHICONE 30 ML: 200; 200; 20 SUSPENSION ORAL at 13:08

## 2021-02-21 RX ADMIN — GABAPENTIN 300 MG: 300 CAPSULE ORAL at 16:47

## 2021-02-21 RX ADMIN — THERA TABS 1 TABLET: TAB at 08:34

## 2021-02-21 RX ADMIN — FOLIC ACID 1 MG: 1 TABLET ORAL at 08:34

## 2021-02-21 RX ADMIN — MONTELUKAST 10 MG: 10 TABLET, FILM COATED ORAL at 20:48

## 2021-02-21 RX ADMIN — LEVETIRACETAM 500 MG: 500 TABLET ORAL at 08:34

## 2021-02-21 RX ADMIN — ACETAMINOPHEN 650 MG: 325 TABLET ORAL at 08:34

## 2021-02-21 RX ADMIN — ACETAMINOPHEN 650 MG: 325 TABLET ORAL at 20:47

## 2021-02-21 RX ADMIN — Medication 100 MG: at 08:34

## 2021-02-21 RX ADMIN — HYDROXYZINE HYDROCHLORIDE 50 MG: 50 TABLET, FILM COATED ORAL at 08:34

## 2021-02-21 RX ADMIN — ACETAMINOPHEN 650 MG: 325 TABLET ORAL at 14:15

## 2021-02-21 RX ADMIN — GABAPENTIN 300 MG: 300 CAPSULE ORAL at 20:48

## 2021-02-21 RX ADMIN — ACETAMINOPHEN 650 MG: 325 TABLET ORAL at 04:09

## 2021-02-21 RX ADMIN — HYDROXYZINE HYDROCHLORIDE 50 MG: 50 TABLET, FILM COATED ORAL at 20:47

## 2021-02-21 RX ADMIN — BUPRENORPHINE HYDROCHLORIDE AND NALOXONE HYDROCHLORIDE DIHYDRATE 1 TABLET: 8; 2 TABLET SUBLINGUAL at 08:34

## 2021-02-21 RX ADMIN — GABAPENTIN 300 MG: 300 CAPSULE ORAL at 08:34

## 2021-02-21 NOTE — PROGRESS NOTES
PRN Medication Documentation    Specific patient behavior that led to need for PRN medication: leg pain  Staff interventions attempted prior to PRN being given: encouraged to rest/elevate legs while in bed  PRN medication given: Tylenol 650mg PO  Patient response/effectiveness of PRN medication: Patient resting comfortably in bed/no complaints presented

## 2021-02-21 NOTE — PROGRESS NOTES
Problem: Patient Education: Go to Patient Education Activity  Goal: Patient/Family Education  Outcome: Progressing Towards Goal     Problem: Altered Thought Process (Adult/Pediatric)  Goal: *STG: Participates in treatment plan  Outcome: Progressing Towards Goal  Goal: Interventions  Outcome: Progressing Towards Goal     Problem: Patient Education: Go to Patient Education Activity  Goal: Patient/Family Education  Outcome: Progressing Towards Goal     1530: Patient sitting in dining room watching TV, calm, cooperative, safe, with Q15 minute rounds.      2047: PRN Medication Documentation    Specific patient behavior that led to need for PRN medication: patient having racing thoughts and complaining of anxiety  Staff interventions attempted prior to PRN being given: relaxation techniques  PRN medication given: atarax

## 2021-02-21 NOTE — PROGRESS NOTES
PSYCHIATRIC PROGRESS NOTE       Patient: Geraldine Peter MRN: [id number]  SSN: [id number]    YOB: 1975  Age: 50 y.o. Sex: female      Admit Date: 2/16/2021    LOS: 5 days       Chief Complaint:  I am feeling great     Interval History:  Geraldine Peter is doing well. She is pleasant and reports that she is doing well, other than complaints of chest pain that she believes may be some indigestion. Reports that her anxiety is better than yesterday. She's been visible in the unit and interacts well with staff and peers. Attending groups. She denies si hi. States hallucinations have been resolved. No paranoia noted. She remains compliant with her meds and denies side effects. States that she did not sleep well last night. She has been eating well. Plan for dc on Monday if weekend goes well.       Past Medical History:  Past Medical History:   Diagnosis Date    Asthma     Bipolar 1 disorder ([facility] [address]     Chronic obstructive pulmonary disease (HCC)     Chronic pain     back, leg    Cocaine abuse (HCC)     Depression     Heart failure (HCC)     Hepatitis B     Heroin abuse (HCC)     HTN (hypertension)     Narcotic abuse (HCC)     Polysubstance abuse (HCC)     Sarcoidosis     Tobacco abuse          ALLERGIES:(reviewed/updated 2/21/2021)  Allergies   Allergen Reactions    Vancomycin Anaphylaxis    Tomato Swelling     Tongue    Trazodone Angioedema       Laboratory report:  Lab Results   Component Value Date/Time    WBC 6.0 02/16/2021 11:04 AM    Hemoglobin (POC) 13.3 06/15/2009 05:10 PM    HGB 12.8 02/16/2021 11:04 AM    Hematocrit (POC) 39 06/15/2009 05:10 PM    HCT 39.7 02/16/2021 11:04 AM    PLATELET 988 80/24/9003 11:04 AM    MCV 84.6 02/16/2021 11:04 AM      Lab Results   Component Value Date/Time    Sodium 136 02/16/2021 11:04 AM    Potassium 4.1 02/16/2021 11:04 AM    Chloride 98 02/16/2021 11:04 AM    CO2 30 02/16/2021 11:04 AM    Anion gap 8 02/16/2021 11:04 AM    Glucose 112 (H) 02/16/2021 11:04 AM    Glucose 93 08/05/2018 06:56 AM    BUN 19 02/16/2021 11:04 AM    Creatinine 1.08 (H) 02/16/2021 11:04 AM    BUN/Creatinine ratio 18 02/16/2021 11:04 AM    GFR est AA >60 02/16/2021 11:04 AM    GFR est non-AA 54 (L) 02/16/2021 11:04 AM    Calcium 9.5 02/16/2021 11:04 AM    Bilirubin, total 0.2 02/16/2021 11:04 AM    Alk. phosphatase 85 02/16/2021 11:04 AM    Protein, total 7.7 02/16/2021 11:04 AM    Albumin 3.7 02/16/2021 11:04 AM    Globulin 4.0 02/16/2021 11:04 AM    A-G Ratio 0.9 (L) 02/16/2021 11:04 AM    ALT (SGPT) 28 02/16/2021 11:04 AM      Vitals:    02/21/21 0708 02/21/21 0938 02/21/21 1112 02/21/21 1113   BP: 105/76  (!) 155/105 (!) 131/93   Pulse: 100  (!) 111 (!) 110   Resp: 16   16   Temp: 98.1 °F (36.7 °C)   98.3 °F (36.8 °C)   SpO2: 96%   98%   Weight:  94.8 kg (208 lb 14.4 oz)     Height:           Lab Results   Component Value Date/Time    Valproic acid 51 02/16/2021 11:04 AM     Lab Results   Component Value Date/Time    Lithium level 0.49 (L) 08/09/2018 02:15 PM       Vital Signs  Patient Vitals for the past 24 hrs:   Temp Pulse Resp BP SpO2   02/21/21 1113 98.3 °F (36.8 °C) (!) 110 16 (!) 131/93 98 %   02/21/21 1112 -- (!) 111 -- (!) 155/105 --   02/21/21 0708 98.1 °F (36.7 °C) 100 16 105/76 96 %     Wt Readings from Last 3 Encounters:   02/21/21 94.8 kg (208 lb 14.4 oz)   02/16/21 90.7 kg (200 lb)   02/09/21 92.1 kg (203 lb 0.7 oz)     Temp Readings from Last 3 Encounters:   02/21/21 98.3 °F (36.8 °C)   02/16/21 97.5 °F (36.4 °C)   02/10/21 99.2 °F (37.3 °C)     BP Readings from Last 3 Encounters:   02/21/21 (!) 131/93   02/16/21 96/77   02/10/21 (!) 118/95     Pulse Readings from Last 3 Encounters:   02/21/21 (!) 110   02/16/21 71   02/10/21 85       Radiology (reviewed/updated 2/21/2021)  Xr Chest Pa Lat    Result Date: 2/5/2021  EXAM:  XR CHEST PA LAT INDICATION:   sob COMPARISON: 2020. FINDINGS: PA and lateral radiographs of the chest demonstrate clear lungs.  The cardiac and mediastinal contours and pulmonary vascularity are normal. Again noted are granulomatous changes. Nodule in the right lung base is unchanged consistent granuloma calcified mediastinal and hilar lymph nodes are noted. The bones and soft tissues are within normal limits. No acute abnormality     Xr Chest Port    Result Date: 2/6/2021  Clinical history: Central venous catheter placement INDICATION:   Central venous catheter placement COMPARISON: 2/5/2021 FINDINGS: AP portable upright view of the chest demonstrates a stable  cardiopericardial silhouette. The lungs are unchanged in overall appearance. . Left sided central venous access catheter is in place. Catheter tip is in appropriate position for use. There is no associated pneumothorax. The osseous structures are unremarkable. Patient is on a cardiac monitor. Central venous acces catheter in position for use. No pneumothorax No other significant interval change. Xr Chest Port    Result Date: 12/8/2020  Clinical indication: Cough. Portable AP semiupright view of the chest obtained, comparison September 24, 2020. The a heart size is normal. Calcified lymph nodes in the mediastinum. No acute infiltrate. IMPRESSION: No acute changes. Us Ext [phone]    Result Date: 12/10/2020  INDICATION: Right arm swelling FINDINGS: Limited ultrasound examination of the right arm was performed. At the site of swelling, there is no mass, fluid collection, or significant subcutaneous edema. IMPRESSION: There is no sonographic correlate to the clinical finding of right arm swelling; clinical follow-up is recommended. Us Ext [phone]    Result Date: 12/8/2020  Indication: Assess for abscess overlying right shoulder anteriorly. Ultrasound performed the soft tissues overlying the right shoulder anteriorly in the area of pain. Eric Simmons No cystic or solid mass is identified. IMPRESSION: 1.  No abscess is identified overlying soft tissues right shoulder anteriorly.       Current Facility-Administered Medications   Medication Dose Route Frequency Provider Last Rate Last Admin    buprenorphine-naloxone (SUBOXONE) 8-2mg SL tablet  1 Tab SubLINGual BID Veronica Fowler NP   1 Tab at 02/21/21 0834    mirtazapine (REMERON) tablet 15 mg  15 mg Oral QHS Veronica Fowler, NP   15 mg at 02/20/21 2138    albuterol (PROVENTIL HFA, VENTOLIN HFA, PROAIR HFA) inhaler 2 Puff  2 Puff Inhalation Q4H PRN Lefty Esquivel NP        OLANZapine (ZyPREXA) tablet 5 mg  5 mg Oral Q6H PRN Park Sánchez NP   5 mg at 02/17/21 2116    haloperidol lactate (HALDOL) injection 5 mg  5 mg IntraMUSCular Q6H PRN David Sánchez NP        benztropine (COGENTIN) tablet 1 mg  1 mg Oral BID PRN David Sánchez NP        diphenhydrAMINE (BENADRYL) injection 50 mg  50 mg IntraMUSCular BID PRN David Sánchez NP        hydrOXYzine HCL (ATARAX) tablet 50 mg  50 mg Oral TID PRN Park Sánchez NP   50 mg at 02/21/21 0834    LORazepam (ATIVAN) injection 1 mg  1 mg IntraMUSCular Q4H PRN David Sánchez NP        acetaminophen (TYLENOL) tablet 650 mg  650 mg Oral Q4H PRN aPrk Sánchez, NP   650 mg at 02/21/21 0834    magnesium hydroxide (MILK OF MAGNESIA) 400 mg/5 mL oral suspension 30 mL  30 mL Oral DAILY PRN David Sánchez NP        levETIRAcetam (KEPPRA) tablet 500 mg  500 mg Oral BID Park Sánchez, NP   500 mg at 02/21/21 0834    nicotine (NICODERM CQ) 21 mg/24 hr patch 1 Patch  1 Patch TransDERmal DAILY Ribera-John, Park D, NP        QUEtiapine (SEROquel) tablet 400 mg  400 mg Oral QHS Park Sánchez, NP   400 mg at 02/20/21 2138    gabapentin (NEURONTIN) capsule 300 mg  300 mg Oral TID Lefty Pulling, NP   300 mg at 02/21/21 0834    montelukast (SINGULAIR) tablet 10 mg  10 mg Oral QHS Lefty Pulling, NP   10 mg at 02/20/21 2138    polyethylene glycol (MIRALAX) packet 17 g  17 g Oral DAILY Roya Figueroa NP        docusate sodium (COLACE) capsule 100 mg  100 mg Oral DAILY Roya Figueroa NP   100 mg at 86/12/34 0266    folic acid (FOLVITE) tablet 1 mg  1 mg Oral DAILY Roya Figueroa NP   1 mg at 02/21/21 2352    therapeutic multivitamin (THERAGRAN) tablet 1 Tab  1 Tab Oral DAILY Roya Figueroa NP   1 Tab at 02/21/21 0568    thiamine HCL (B-1) tablet 100 mg  100 mg Oral DAILY Roya Figueroa, NP   100 mg at 02/21/21 5726    tiotropium (SPIRIVA) inhalation capsule 18 mcg (Patient Supplied)  18 mcg Inhalation DAILY Roya Figueroa NP   18 mcg at 02/17/21 4189    fluticasone-salmeterol (ADVAIR/WIXELA) 250mcg-50mcg/puff (Patient Supplied)  1 Puff Inhalation BID Roya Figueroa NP   1 Puff at 02/17/21 1825       Side Effects: (reviewed/updated 2/21/2021)  None reported or admitted to. Review of Systems: (reviewed/updated 2/21/2021)  Appetite: good  Sleep: poor  All other Review of Systems: denies    Mental Status Exam:  Eye contact: Good eye contact  Psychomotor activity: Relaxed   Speech is spontaneous  Thought process: Logical and goal directed   Mood is \"good\"  Affect: full  Perception: No avh  Suicidal ideation: denies si  Homicidal ideation: No hi  Insight/judgment: Partial  Cognition is grossly intact      Physical Exam:  Musculoskeletal system: steady gait  Tremor not present  Cog wheeling not present      Assessment and Plan:  Demetria Mcgee meets criteria for a diagnosis of Unspecified psychotic disorder, rule out schizoaffective disorder, rule out bipolar disorder. Continue current medications as prescribed. We will closely monitor for safety. We will encourage reality orientation. Plan for dc Monday to u.       I certify that this patients inpatient psychiatric hospital services furnished since the previous certification were, and continue to be, required for treatment that could reasonably be expected to improve the patient's condition, or for diagnostic study, and that the patient continues to need, on a daily basis, active treatment furnished directly by or requiring the supervision of inpatient psychiatric facility personnel. In addition, the hospital records show that services furnished were intensive treatment services, admission or related services, or equivalent services.       Signed:  Ernesto Reid NP  2/21/2021

## 2021-02-21 NOTE — SUICIDE SAFETY PLAN
SAFETY PLAN    A suicide Safety Plan is a document that supports someone when they are having thoughts of suicide. Warning Signs that indicate a suicidal crisis may be developing: What (situations, thoughts, feelings, body sensations, behaviors, etc.) do you experience that lets you know you are beginning to think about suicide? 1. Boredom  2. Lonliness  3. [address]    Internal Coping Strategies:  What things can I do (relaxation techniques, hobbies, physical activities, etc.) to take my mind off my problems without contacting another person? 1. Doing puzzles  2. Looking at a movie  3. Go shopping    People and social settings that provide distraction: Who can I call or where can I go to distract me? 1. Name: Daughter Phone: in phone  2. Name: Son    Phone: in phone  3. Name: Mother           4. Place: Father    People whom I can ask for help: Who can I call when I need help - for example, friends, family, clergy, someone else? 1. Name: [place]              Phone: all in phone  2. Name: Tegan Urbina   3. Name: Delores Connor or [address] [place] [address] I can contact during a crisis: Who can I call for help - for example, my doctor, my psychiatrist, my psychologist, a mental health provider, a suicide hotline? 1. Clinician Name: [facility]    Phone: [phone]      2. Clinician Name: Dr. Varghese Ward, Dr. Elle Nevarez    3. Suicide Prevention Lifeline: 3-703-694-TALK (3798)    4. [address] [facility]      Emergency Services Phone: [phone]    Making the environment safe: How can I make my environment (house/apartment/living space) safer? For example, can I remove guns, medications, and other items? 1. Remove medicines  2.   Remove alcohol

## 2021-02-21 NOTE — PROGRESS NOTES
Problem: Altered Thought Process (Adult/Pediatric)  Goal: *STG: Participates in treatment plan  Outcome: Progressing Towards Goal  Goal: *STG: Complies with medication therapy  Outcome: Progressing Towards Goal     Problem: Patient Education: Go to Patient Education Activity  Goal: Patient/Family Education  Outcome: Progressing Towards Goal     Patient seen in treatment team, patient states she is doing well today, decreased anxiety. Patient c/o \"heartburn\", EKG done, normal sinus rhythm. Patient states she is ready for discharge tomorrow  Patient calm and cooperative on unit, interacting appropriately with peers.   Will continue to monitor q15 minutes

## 2021-02-21 NOTE — INTERDISCIPLINARY ROUNDS
Behavioral Health Interdisciplinary Rounds     Patient Name: Ramya Thapa  Age: 50 y.o. Room/Bed:  730/01  Primary Diagnosis: <principal problem not specified>   Admission Status: Voluntary Commitment     Readmission within 30 days: no  Power of  in place: no  Patient requires a blocked bed: no          Reason for blocked bed:     VTE Prophylaxis: No    Mobility needs/Fall risk: no  Flu Vaccine : no   Nutritional Plan: no  Consults:          Labs/Testing due today?: no    Sleep hours: 3.5       Participation in Care/Groups:  yes  Medication Compliant?: Yes  PRNS (last 24 hours): Antianxiety and Pain    Restraints (last 24 hours):  no     CIWA (range last 24 hours):     COWS (range last 24 hours):      Alcohol screening (AUDIT) completed -   AUDIT Score: 0     If applicable, date SBIRT discussed in treatment team AND documented:   AUDIT Screen Score: AUDIT Score: 0      Document Brief Intervention (corresponds directly with the 5 A's, Ask, Advise, Assess, Assist, and Arrange): At- Risk Patients (Score 7-15 for women; 8-15 for men)  Discuss concern patient is drinking at unhealthy levels known to increase risk of alcohol-related health problems. Is Patient ready to commit to change? If No:   Encourage reflection   Discuss short term and long term health risks of consuming alcohol   Barriers to change   Reaffirm willingness to help / Educational materials provided  If Yes:   Set goal  Godigex provided    Harmful use or Dependence (Score 16 or greater)   Discuss short term and long term health risks of consuming alcohol   Recommendations   Negotiate drinking goal   Recommend addiction specialist/center   Arrange follow-up appointments.     Tobacco - patient is a smoker: Have You Used Tobacco in the Past 30 Days: Yes  Illegal Drugs use: Have You Used Any Illegal Substances Over the Past 12 Months: No    24 hour chart check complete: yes     Patient goal(s) for today:   Treatment team focus/goals:   Progress note     LOS:  5  Expected LOS:     Financial concerns/prescription coverage  Family contact:       Family requesting physician contact today:    Discharge plan:   Access to weapons :         Outpatient provider(s):   Patient's preferred phone number for follow up call :   Patient's preferred e-mail address :  Participating treatment team members: Patricia Mora

## 2021-02-21 NOTE — BH NOTES
GROUP THERAPY PROGRESS NOTE    Patient is participating in Discharge/Goals/Community Meeting Group. Group time: 50 minutes    Personal goal for participation: Process feelings related to discharge and/or feelings/goals for today. Goal orientation: Personal    Group therapy participation: active    Therapeutic interventions reviewed and discussed: Group discussion was focused on discharge plans and anxiety related to this. Group members discussed what they planned to do once discharge and discharge plans. Discussion also related to support and communication issues that arise. Group members verbalized how they are feeling today, their personal goal for today, and goals for the week. Patients were given an opportunity to share any concerns and issues they were having. Patients completed safety plan. Impression of participation: Piotr Malave actively participated in group. Patient completed her safety plan, and was able to discuss her discharge. Pt identified support, triggers and coping skills. Engaged in conversation and is supportive of other group members.       Ewa Brambila, Supervisee in Social Work

## 2021-02-21 NOTE — PROGRESS NOTES
Problem: Falls - Risk of  Goal: *Absence of Falls  Description: Document Leafy Fuchs Fall Risk and appropriate interventions in the flowsheet. 2/21/2021 0007 by Sparkle Mclean RN  Outcome: Progressing Towards Goal  Note: Fall Risk Interventions:            Medication Interventions: Teach patient to arise slowly    2300: Received patient resting in bed. Respirations even and unlabored.  Will continue to monitor for patient safety              2

## 2021-02-21 NOTE — BH NOTES
Pt is selective with her medications. Continues to decline nicotine patch, Miralax, and breathing treatments inhalers. 6920- PRN Medication Documentation    Specific patient behavior that led to need for PRN medication: pt request tylenol for headache onset not, and anti anxiety medication  Staff interventions attempted prior to PRN being given: education, coping skills, therapeutic communication   PRN medication given: po 50 mg Atarax, po 650 mg Tylenol   Patient response/effectiveness of PRN medication: pt is visible on the unit alert oriented talking to staff and peers    Pt is laughing talking engaged with peers. Participating in groups and activities. Coping skills are encouraged. Denies SI. Periodic anxiety. Verbalizes understanding of coping strategies. 1120- pt is alert oriented smiling talking to peers. States she just had indigestion but feels better now. EKG completed. Discussed in treatment team.     1310- PRN Medication Documentation    Specific patient behavior that led to need for PRN medication: pt is anxious restless pacing increasingly agitated  ; c/o indigestion   Staff interventions attempted prior to PRN being given: education   PRN medication given:  po 50 mg Atarax po 30 mg Mylanta   Patient response/effectiveness of PRN medication: pt is visible on the unit attempting to use coping skills   Preferred activity this afternoon inclued art and puzzles.     1417- PRN Medication Documentation    Specific patient behavior that led to need for PRN medication: pt c/o anterior headache now due to overactive milieu today    Staff interventions attempted prior to PRN being given: education dim lights   PRN medication given: po 650 mg Tylenol   Patient response/effectiveness of PRN medication: pt is alert and oriented  Visible on the unit

## 2021-02-22 VITALS
BODY MASS INDEX: 35.67 KG/M2 | RESPIRATION RATE: 16 BRPM | HEIGHT: 64 IN | HEART RATE: 95 BPM | WEIGHT: 208.9 LBS | OXYGEN SATURATION: 96 % | DIASTOLIC BLOOD PRESSURE: 87 MMHG | SYSTOLIC BLOOD PRESSURE: 135 MMHG | TEMPERATURE: 98.2 F

## 2021-02-22 LAB
ATRIAL RATE: 99 BPM
CALCULATED P AXIS, ECG09: 71 DEGREES
CALCULATED R AXIS, ECG10: 73 DEGREES
CALCULATED T AXIS, ECG11: 58 DEGREES
DIAGNOSIS, 93000: NORMAL
P-R INTERVAL, ECG05: 132 MS
Q-T INTERVAL, ECG07: 370 MS
QRS DURATION, ECG06: 84 MS
QTC CALCULATION (BEZET), ECG08: 474 MS
VENTRICULAR RATE, ECG03: 99 BPM

## 2021-02-22 PROCEDURE — 74011250637 HC RX REV CODE- 250/637: Performed by: NURSE PRACTITIONER

## 2021-02-22 PROCEDURE — 74011250636 HC RX REV CODE- 250/636: Performed by: NURSE PRACTITIONER

## 2021-02-22 RX ORDER — FLUTICASONE PROPIONATE AND SALMETEROL 250; 50 UG/1; UG/1
1 POWDER RESPIRATORY (INHALATION) 2 TIMES DAILY
Qty: 28 EACH | Refills: 0 | Status: SHIPPED | OUTPATIENT
Start: 2021-02-22 | End: 2022-01-19

## 2021-02-22 RX ORDER — GABAPENTIN 300 MG/1
300 CAPSULE ORAL 3 TIMES DAILY
Qty: 45 CAP | Refills: 0 | Status: SHIPPED | OUTPATIENT
Start: 2021-02-22 | End: 2022-01-19

## 2021-02-22 RX ORDER — QUETIAPINE FUMARATE 400 MG/1
400 TABLET, FILM COATED ORAL
Qty: 30 TAB | Refills: 0 | Status: SHIPPED | OUTPATIENT
Start: 2021-02-22 | End: 2022-01-19

## 2021-02-22 RX ORDER — MIRTAZAPINE 15 MG/1
15 TABLET, FILM COATED ORAL
Qty: 30 TAB | Refills: 0 | Status: SHIPPED | OUTPATIENT
Start: 2021-02-22 | End: 2022-01-19

## 2021-02-22 RX ORDER — LEVETIRACETAM 500 MG/1
500 TABLET ORAL 2 TIMES DAILY
Qty: 60 TAB | Refills: 0 | Status: ON HOLD | OUTPATIENT
Start: 2021-02-22 | End: 2022-10-24

## 2021-02-22 RX ORDER — MONTELUKAST SODIUM 10 MG/1
10 TABLET ORAL
Qty: 30 TAB | Refills: 0 | Status: ON HOLD | OUTPATIENT
Start: 2021-02-22 | End: 2022-10-24

## 2021-02-22 RX ORDER — ALBUTEROL SULFATE 90 UG/1
2 AEROSOL, METERED RESPIRATORY (INHALATION)
Qty: 2 INHALER | Refills: 0 | Status: ON HOLD | OUTPATIENT
Start: 2021-02-22 | End: 2022-10-31 | Stop reason: SDUPTHER

## 2021-02-22 RX ORDER — HYDROXYZINE 50 MG/1
50 TABLET, FILM COATED ORAL
Qty: 30 TAB | Refills: 0 | Status: SHIPPED | OUTPATIENT
Start: 2021-02-22 | End: 2022-01-19

## 2021-02-22 RX ADMIN — LEVETIRACETAM 500 MG: 500 TABLET ORAL at 08:29

## 2021-02-22 RX ADMIN — ACETAMINOPHEN 650 MG: 325 TABLET ORAL at 08:29

## 2021-02-22 RX ADMIN — FOLIC ACID 1 MG: 1 TABLET ORAL at 08:29

## 2021-02-22 RX ADMIN — Medication 100 MG: at 08:29

## 2021-02-22 RX ADMIN — DOCUSATE SODIUM 100 MG: 100 CAPSULE, LIQUID FILLED ORAL at 08:29

## 2021-02-22 RX ADMIN — BUPRENORPHINE HYDROCHLORIDE AND NALOXONE HYDROCHLORIDE DIHYDRATE 1 TABLET: 8; 2 TABLET SUBLINGUAL at 08:29

## 2021-02-22 RX ADMIN — GABAPENTIN 300 MG: 300 CAPSULE ORAL at 08:29

## 2021-02-22 RX ADMIN — ACETAMINOPHEN 650 MG: 325 TABLET ORAL at 13:53

## 2021-02-22 RX ADMIN — THERA TABS 1 TABLET: TAB at 08:29

## 2021-02-22 NOTE — BH NOTES
GROUP THERAPY PROGRESS NOTE    Patient is participating in Coping Skills-Depression Group. Group time: 50 minutes    Personal goal for participation: reduces symptom of depression and improve well-being     Goal orientation: Personal    Group therapy participation: active    Therapeutic interventions reviewed and discussed: Group members were handed a worksheet that discussed four research-supported techniques to alleviate symptoms of depression. These techniques include behavioral activation, using social support, positive journaling, and practicing mindfulness. Each member was encouraged to try each activity in group and process how it can apply outside of group. Impression of participation: Patient actively participated in group. Presented with euthymic mood, clear speech and logical thought process. She is leaving today and is happy about that. Cooperative in group. Engaged in discussion and conversation, and is supportive of other group members.      Ewa Brambila, Supervisee in Social Work

## 2021-02-22 NOTE — BH NOTES
GROUP THERAPY PROGRESS NOTE    Patient is participating in Community Meeting/Discharge Planning/Goals Group. Group time: 45 minutes    Personal goal for participation: Process feelings and goals related to discharge    Goal orientation: Personal    Group therapy participation: minimal    Therapeutic interventions reviewed and discussed: Group discussion was focused on discharge plans and anxiety related to this. Group members discussed what they planned to do once discharge and discharge plans. Patients discussed their goals for today as well and what they are working on with treatment team to get ready for discharge. Impression of participation: Pt presented with depressed mood, flat affect, poor eye contact. Pt stated she is not feeling well today, unwilling to elaborate. Pt completed goals activity but declined to verbally participate when prompted by . Pt irritated when two other patient's had a side conversation. Pt easy redirected.     DORCAS Payan

## 2021-02-22 NOTE — DISCHARGE SUMMARY
PSYCHIATRIC DISCHARGE SUMMARY      Patient: Favian Henley MRN: [id number]  SSN: [id number]    YOB: 1975  Age: 50 y.o. Sex: female        Date of Admission: 2/16/2021  Date of Discharge:2/22/2021       Type of Discharge:  REGULAR     Admission data:  CHIEF COMPLAINT: \"I don't know what happened. \"     HISTORY OF PRESENTING ILLNESS: The patient is a 52-year-old female who is currently admitted at [address] [place] [address] on a temporary [facility] order. She states that she will have her first appointment with Jeramie Gibbs this coming Monday. Reports she has been admitted in different psychiatric facilities numerous times including [facility] and [facility]. She states that she does not know what happened and does not know what brought her to the hospital. She states that nobody explained to her what brought her to the hospital, all she can remember is that the lady at the [facility] was very disrespectful to her. Reportedly, the  from the [address] [place] [address] contacted Crisis. The patient was noted to be hallucinating, having conversations to herself, to the people who are not present. She also has been paranoid. The patient was also noted to have difficulty following conversation. She was accusing the people are trying to push her out. The patient was making references about somebody at the , but there was nobody there. She was disoriented. She was in the program for 6 days, and thought she was there for 3 days. She tells me that she has been having command auditory hallucinations; voices telling her to hurt herself. She also feels that people are out to get her and making fun of her. She also has been sleeping poorly. She has been paranoid that people want to kill her family. She has been eating poorly as well. She has been labile. She has been accusing the staff at the [address] [place] [address] and blaming them.  Reportedly the staff at the [facility] informed her that she needed to pack her belongings and that she could no longer stay at the facility. She reported to the police that she would better be off dead. She states that she is endorsing suicidal ideation with a plan to cut her wrist. She has a history of cutting. She also has been depressed. She states that she is tired of upsetting her family. She states that she has a long history of heroin and cocaine use. She has been using it for 26 years. She states that her longer sobriety was 3 years. Her urine drug screen is positive for barbiturates. Currently, she reports that she is receiving Suboxone at [facility] and she has been on it for 4 months. She states that her last dose was last Friday. She also has a history of smoking marijuana. She continues to endorse thoughts of suicide, hallucinations, and paranoia. She was admitted to the inpatient psychiatric unit for further stabilization and treatment. PAST MEDICAL HISTORY: See H and P.         Past Medical History:   Diagnosis Date    Asthma     Bipolar 1 disorder ([address]     Chronic obstructive pulmonary disease (HCC)     Chronic pain     back, leg    Cocaine abuse ([address]     Depression     Heart failure (HCC)     Hepatitis B     [facility]in abuse [facility] [address]     HTN (hypertension)     Narcotic abuse ([address]     Polysubstance abuse ([address]     Sarcoidosis     Tobacco abuse      Labs: (reviewed/updated 2/18/2021)   Patient Vitals for the past 8 hrs:    BP Temp Pulse Resp SpO2   02/18/21 0841 109/70 98.3 °F (36.8 °C) 100 16 95 %     Labs Reviewed - No data to display         Lab Results   Component Value Date/Time    Sodium 136 02/16/2021 11:04 AM    Potassium 4.1 02/16/2021 11:04 AM    Chloride 98 02/16/2021 11:04 AM    CO2 30 02/16/2021 11:04 AM    Anion gap 8 02/16/2021 11:04 AM    Glucose 112 (H) 02/16/2021 11:04 AM    Glucose 93 08/05/2018 06:56 AM    BUN 19 02/16/2021 11:04 AM    Creatinine 1.08 (H) 02/16/2021 11:04 AM BUN/Creatinine ratio 18 02/16/2021 11:04 AM    GFR est AA >60 02/16/2021 11:04 AM    GFR est non-AA 54 (L) 02/16/2021 11:04 AM    Calcium 9.5 02/16/2021 11:04 AM    Bilirubin, total 0.2 02/16/2021 11:04 AM    Alk. phosphatase 85 02/16/2021 11:04 AM    Protein, total 7.7 02/16/2021 11:04 AM    Albumin 3.7 02/16/2021 11:04 AM    Globulin 4.0 02/16/2021 11:04 AM    A-G Ratio 0.9 (L) 02/16/2021 11:04 AM    ALT (SGPT) 28 02/16/2021 11:04 AM              No visits with results within 2 Day(s) from this visit. Latest known visit with results is:   Admission on 02/16/2021, Discharged on 02/16/2021   Component Date Value Ref Range Status     WBC 02/16/2021 6.0  3.6 - 11.0 K/uL Final    RBC 02/16/2021 4.69  3.80 - 5.20 M/uL Final    HGB 02/16/2021 12.8  11.5 - 16.0 g/dL Final    HCT 02/16/2021 39.7  35.0 - 47.0 % Final    MCV 02/16/2021 84.6  80.0 - 99.0 FL Final    MCH 02/16/2021 27.3  26.0 - 34.0 PG Final    MCHC 02/16/2021 32.2  30.0 - 36.5 g/dL Final    RDW 02/16/2021 18.1* 11.5 - 14.5 % Final    PLATELET 88/91/4979 227  150 - 400 K/uL Final    MPV 02/16/2021 10.4  8.9 - 12.9 FL Final    NRBC 02/16/2021 0.0  0  WBC Final    ABSOLUTE NRBC 02/16/2021 0.00  0.00 - 0.01 K/uL Final    NEUTROPHILS 02/16/2021 52  32 - 75 % Final    LYMPHOCYTES 02/16/2021 31  12 - 49 % Final    MONOCYTES 02/16/2021 12  5 - 13 % Final    EOSINOPHILS 02/16/2021 3  0 - 7 % Final    BASOPHILS 02/16/2021 1  0 - 1 % Final    IMMATURE GRANULOCYTES 02/16/2021 1* 0.0 - 0.5 % Final    ABS. NEUTROPHILS 02/16/2021 3.1  1.8 - 8.0 K/UL Final    ABS. LYMPHOCYTES 02/16/2021 1.9  0.8 - 3.5 K/UL Final    ABS. MONOCYTES 02/16/2021 0.7  0.0 - 1.0 K/UL Final    ABS. EOSINOPHILS 02/16/2021 0.2  0.0 - 0.4 K/UL Final    ABS. BASOPHILS 02/16/2021 0.0  0.0 - 0.1 K/UL Final    ABS. IMM.  GRANS. 02/16/2021 0.0  0.00 - 0.04 K/UL Final    DF 02/16/2021 AUTOMATED   Final    Sodium 02/16/2021 136  136 - 145 mmol/L Final    Potassium 02/16/2021 4.1  3.5 - 5.1 mmol/L Final    Chloride 02/16/2021 98  97 - 108 mmol/L Final    CO2 02/16/2021 30  21 - 32 mmol/L Final    Anion gap 02/16/2021 8  5 - 15 mmol/L Final    Glucose 02/16/2021 112* 65 - 100 mg/dL Final    BUN 02/16/2021 19  6 - 20 MG/DL Final    Creatinine 02/16/2021 1.08* 0.55 - 1.02 MG/DL Final    BUN/Creatinine ratio 02/16/2021 18  12 - 20  Final    GFR est AA 02/16/2021 >60  >60 ml/min/1.73m2 Final    GFR est non-AA 02/16/2021 54* >60 ml/min/1.73m2 Final    Calcium 02/16/2021 9.5  8.5 - 10.1 MG/DL Final    Bilirubin, total 02/16/2021 0.2  0.2 - 1.0 MG/DL Final    ALT (SGPT) 02/16/2021 28  12 - 78 U/L Final    AST (SGOT) 02/16/2021 35  15 - 37 U/L Final    Alk.  phosphatase 02/16/2021 85  45 - 117 U/L Final    Protein, total 02/16/2021 7.7  6.4 - 8.2 g/dL Final    Albumin 02/16/2021 3.7  3.5 - 5.0 g/dL Final    Globulin 02/16/2021 4.0  2.0 - 4.0 g/dL Final    A-G Ratio 02/16/2021 0.9* 1.1 - 2.2  Final    ALCOHOL(ETHYL),SERUM 02/16/2021 <10  <10 MG/DL Final    AMPHETAMINES 02/16/2021 Negative  NEG  Final    BARBITURATES 02/16/2021 Positive* NEG  Final    BENZODIAZEPINES 02/16/2021 Negative  NEG  Final    COCAINE 02/16/2021 Negative  NEG  Final    METHADONE 02/16/2021 Negative  NEG  Final    OPIATES 02/16/2021 Negative  NEG  Final    PCP(PHENCYCLIDINE) 02/16/2021 Negative  NEG  Final    THC (TH-CANNABINOL) 02/16/2021 Negative  NEG  Final    Drug screen comment 02/16/2021 (NOTE)   Final    Color 02/16/2021 YELLOW/STRAW   Final    Appearance 02/16/2021 CLOUDY* CLEAR  Final    Specific gravity 02/16/2021 1.020  1.003 - 1.030  Final    pH (UA) 02/16/2021 7.5  5.0 - 8.0  Final    Protein 02/16/2021 Negative  NEG mg/dL Final    Glucose 02/16/2021 Negative  NEG mg/dL Final    Ketone 02/16/2021 Negative  NEG mg/dL Final    Bilirubin 02/16/2021 Negative  NEG  Final    Blood 02/16/2021 Negative  NEG  Final    Urobilinogen 02/16/2021 0.2  0.2 - 1.0 EU/dL Final    Nitrites 02/16/2021 Negative  NEG  Final    Leukocyte Esterase 02/16/2021 MODERATE* NEG  Final    SARS-CoV-2 02/16/2021 Please find results under separate order   Final    Ventricular Rate 02/16/2021 70  BPM Final    Atrial Rate 02/16/2021 70  BPM Final    P-R Interval 02/16/2021 136  ms Final    QRS Duration 02/16/2021 82  ms Final    Q-T Interval 02/16/2021 382  ms Final    QTC Calculation (Bezet) 02/16/2021 412  ms Final    Calculated P Axis 02/16/2021 53  degrees Final    Calculated R Axis 02/16/2021 57  degrees Final    Calculated T Axis 02/16/2021 43  degrees Final    Diagnosis 02/16/2021   Final   Value:Normal sinus rhythm   Normal ECG   When compared with ECG of 05-FEB-2021 17:43,   Nonspecific T wave abnormality no longer evident in Lateral leads   QT has shortened   Confirmed by Nirav Rodriguez M.D., [facility] ([id number]) on 2/17/2021 11:57:12 AM     Specimen source 02/16/2021 Nasopharyngeal   Final    COVID-19 rapid test 02/16/2021 Not detected  NOTD  Final    WBC 02/16/2021 5-10  0 - 4 /hpf Final    RBC 02/16/2021 0-5  0 - 5 /hpf Final    Epithelial cells 02/16/2021 MODERATE* FEW /lpf Final    Bacteria 02/16/2021 Negative  NEG /hpf Final    Mucus 02/16/2021 1+* NEG /lpf Final    Amorphous Crystals 02/16/2021 2+* NEG Final    Valproic acid 02/16/2021 51  50 - 100 ug/ml Final    Specimen source 02/16/2021 Nasopharyngeal   Final    SARS-CoV-2 02/16/2021 Not detected  NOTD  Final            Vitals:    02/17/21 1658 02/17/21 2024 02/17/21 2101 02/18/21 0841   BP: 123/88 (!) 82/61 126/78 109/70   Pulse: 86 92  100   Resp: 16 16 16   Temp: 98 °F (36.7 °C) 98.4 °F (36.9 °C)  98.3 °F (36.8 °C)   SpO2: 98% 97%  95%   Weight:       Height:         Recent Results      RADIOLOGY REPORTS:        Results from Hospital Encounter encounter on 02/05/21   XR CHEST PORT    Narrative Clinical history: Central venous catheter placement   INDICATION: Central venous catheter placement   COMPARISON: 2/5/2021   FINDINGS:   AP portable upright view of the chest demonstrates a stable cardiopericardial   silhouette. The lungs are unchanged in overall appearance. . Left sided central   venous access catheter is in place. Catheter tip is in appropriate position for   use. There is no associated pneumothorax. The osseous structures are   unremarkable. Patient is on a cardiac monitor. Impression Central venous acces catheter in position for use. No pneumothorax   No other significant interval change. Xr Chest Pa Lat   Result Date: 2/5/2021   EXAM: XR CHEST PA LAT INDICATION: sob COMPARISON: 2020. FINDINGS: PA and lateral radiographs of the chest demonstrate clear lungs. The cardiac and mediastinal contours and pulmonary vascularity are normal. Again noted are granulomatous changes. Nodule in the right lung base is unchanged consistent granuloma calcified mediastinal and hilar lymph nodes are noted. The bones and soft tissues are within normal limits. No acute abnormality   Xr Chest Port   Result Date: 2/6/2021   Clinical history: Central venous catheter placement INDICATION: Central venous catheter placement COMPARISON: 2/5/2021 FINDINGS: AP portable upright view of the chest demonstrates a stable cardiopericardial silhouette. The lungs are unchanged in overall appearance. . Left sided central venous access catheter is in place. Catheter tip is in appropriate position for use. There is no associated pneumothorax. The osseous structures are unremarkable. Patient is on a cardiac monitor. Central venous acces catheter in position for use. No pneumothorax No other significant interval change. Xr Chest Port   Result Date: 12/8/2020   Clinical indication: Cough. Portable AP semiupright view of the chest obtained, comparison September 24, 2020. The a heart size is normal. Calcified lymph nodes in the mediastinum. No acute infiltrate. IMPRESSION: No acute changes.    Us Ext [phone]   Result Date: 12/10/2020   INDICATION: Right arm swelling FINDINGS: Limited ultrasound examination of the right arm was performed. At the site of swelling, there is no mass, fluid collection, or significant subcutaneous edema. IMPRESSION: There is no sonographic correlate to the clinical finding of right arm swelling; clinical follow-up is recommended. Us [phone]    Result Date: 12/8/2020   Indication: Assess for abscess overlying right shoulder anteriorly. Ultrasound performed the soft tissues overlying the right shoulder anteriorly in the area of pain. Kurt Sofia No cystic or solid mass is identified. IMPRESSION: 1. No abscess is identified overlying soft tissues right shoulder anteriorly. PAST PSYCHIATRIC HISTORY: She states that she will have her first appointment with [facility] on Monday. She has been admitted at [facility]. PSYCHOSOCIAL HISTORY: She is single and has 5 children. She finished high school. She is currently unemployed. She is homeless. MENTAL STATUS EXAMINATION: She is alert. She is currently sitting up in chair, dressed in hospital apparel. She reports her mood is okay. Affect is blunted. Speech: Normal rate and rhythm. Thought process: Logical and goal directed. She continues to endorse suicidal ideation with a plan to cut her wrist, continues to endorse command auditory hallucinations. Paranoia is noteworthy. She has periods of confusion. Intelligence is below average. Insight is poor. Judgment is poor. DIAGNOSES: Unspecified psychotic disorder, rule out schizoaffective disorder, rule out bipolar disorder. Hospital Course:    Patient was admitted to the Psychiatric services for acute psychiatric stabilization in regards to symptomatology as described in the HPI above and placed on Q15 minute checks and suicide precautions. She was started back on her usual medication regimen as well as PRN medications including singulair, seroquel, albuterol, advair, atarax, keppra, spiriva, neurontin, suboxone.  She was started on remeron. While on the unit Davion Ansari was involved in individual, group, occupational and milieu therapy. She improved gradually and was able to integrate into the milieu with help from the nursing staff. Patients symptoms improved gradually including paranoia, delusions, hallucinations, agitation, lability. She was appropriate in her interactions, and cooperative with medications and the unit routine. Please see individual progress notes for more specific details regarding patient's hospitalization course. Patient was discharged as per the plan. She had been doing well on the unit as per the report of the nursing staff and my observations. No PRN medication for agitation, seclusion or restraints were required during the last 48 hours of her stay. Davion Ansari had improved progressively to the point of being stable for discharge and outpatient FU. At this time she did not offer any complaints. Patient denied any SI or HI. Denied any AH or VH. She denied any delusions. Was not considered a danger to self or to others and is safe for discharge. Will FU with her appointments and remains motivated to be in treatment. The patient verbalized understanding of her discharge instructions. Allergies:(reviewed/updated 2/22/2021)  Allergies   Allergen Reactions    Vancomycin Anaphylaxis    Tomato Swelling     Tongue    Trazodone Angioedema       Side Effects: (reviewed/updated 2/22/2021)  None reported or admitted to.     Vital Signs:  Patient Vitals for the past 24 hrs:   Temp Pulse Resp BP SpO2   02/22/21 0742 98.2 °F (36.8 °C) 95 16 135/87 96 %   02/21/21 1528 -- -- 16 -- --     Wt Readings from Last 3 Encounters:   02/21/21 94.8 kg (208 lb 14.4 oz)   02/16/21 90.7 kg (200 lb)   02/09/21 92.1 kg (203 lb 0.7 oz)     Temp Readings from Last 3 Encounters:   02/22/21 98.2 °F (36.8 °C)   02/16/21 97.5 °F (36.4 °C)   02/10/21 99.2 °F (37.3 °C)     BP Readings from Last 3 Encounters:   02/22/21 135/87 02/16/21 96/77   02/10/21 (!) 118/95     Pulse Readings from Last 3 Encounters:   02/22/21 95   02/16/21 71   02/10/21 85       Labs: (reviewed/updated 2/22/2021)  No results found for this or any previous visit (from the past 24 hour(s)). Lab Results   Component Value Date/Time    Valproic acid 51 02/16/2021 11:04 AM     Lab Results   Component Value Date/Time    Lithium level 0.49 (L) 08/09/2018 02:15 PM       Radiology (reviewed/updated 2/22/2021)  Xr Chest Pa Lat    Result Date: 2/5/2021  EXAM:  XR CHEST PA LAT INDICATION:   sob COMPARISON: 2020. FINDINGS: PA and lateral radiographs of the chest demonstrate clear lungs. The cardiac and mediastinal contours and pulmonary vascularity are normal. Again noted are granulomatous changes. Nodule in the right lung base is unchanged consistent granuloma calcified mediastinal and hilar lymph nodes are noted. The bones and soft tissues are within normal limits. No acute abnormality     Xr Chest Port    Result Date: 2/6/2021  Clinical history: Central venous catheter placement INDICATION:   Central venous catheter placement COMPARISON: 2/5/2021 FINDINGS: AP portable upright view of the chest demonstrates a stable  cardiopericardial silhouette. The lungs are unchanged in overall appearance. . Left sided central venous access catheter is in place. Catheter tip is in appropriate position for use. There is no associated pneumothorax. The osseous structures are unremarkable. Patient is on a cardiac monitor. Central venous acces catheter in position for use. No pneumothorax No other significant interval change. Xr Chest Port    Result Date: 12/8/2020  Clinical indication: Cough. Portable AP semiupright view of the chest obtained, comparison September 24, 2020. The a heart size is normal. Calcified lymph nodes in the mediastinum. No acute infiltrate. IMPRESSION: No acute changes.     Us Ext [phone]    Result Date: 12/10/2020  INDICATION: Right arm swelling FINDINGS: Limited ultrasound examination of the right arm was performed. At the site of swelling, there is no mass, fluid collection, or significant subcutaneous edema. IMPRESSION: There is no sonographic correlate to the clinical finding of right arm swelling; clinical follow-up is recommended. Us Ext [phone]    Result Date: 12/8/2020  Indication: Assess for abscess overlying right shoulder anteriorly. Ultrasound performed the soft tissues overlying the right shoulder anteriorly in the area of pain. Rawleigh Billing No cystic or solid mass is identified. IMPRESSION: 1. No abscess is identified overlying soft tissues right shoulder anteriorly. Mental Status Exam on Discharge:  General appearance:   Ronald Steinberg is a 50 y.o. BLACK female who is well groomed, psychomotor activity is WNL  Eye contact: makes good eye contact  Speech: Spontaneous and coherent  Affect : Euthymic  Mood: \"OK\"  Thought Process: Logical, goal directed  Perception: Denies any AH or VH. Thought Content: Denies any SI or Plan  Insight: Partial  Judgement: Fair  Cognition: Intact grossly. Discharge Diagnosis:   Unspecified psychotic disorder      Discharge Medication List as of 2/22/2021  2:19 PM      START taking these medications    Details   mirtazapine (REMERON) 15 mg tablet Take 1 Tab by mouth nightly. Indications: major depressive disorder, Normal, Disp-30 Tab, R-0         CONTINUE these medications which have CHANGED    Details   montelukast (SINGULAIR) 10 mg tablet Take 1 Tab by mouth nightly. Indications: controller medication for asthma, Normal, Disp-30 Tab, R-0      QUEtiapine (SEROquel) 400 mg tablet Take 1 Tab by mouth nightly. Indications: mood, Normal, Disp-30 Tab, R-0      albuterol (ProAir HFA) 90 mcg/actuation inhaler Take 2 Puffs by inhalation every four (4) hours as needed for Wheezing or Shortness of Breath.  Indications: bronchospasm prevention, Normal, Disp-2 Inhaler, R-0      fluticasone propion-salmeteroL (Advair Diskus) 250-50 mcg/dose diskus inhaler Take 1 Puff by inhalation two (2) times a day. Indications: bronchospasm prevention with COPD, Normal, Disp-28 Each, R-0      gabapentin (NEURONTIN) 300 mg capsule Take 1 Cap by mouth three (3) times daily. Max Daily Amount: 900 mg. Indications: neuropathic pain, Normal, Disp-45 Cap, R-0      hydrOXYzine HCL (ATARAX) 50 mg tablet Take 1 Tab by mouth every six (6) hours as needed for Anxiety. Indications: anxious, Normal, Disp-30 Tab, R-0      levETIRAcetam (Keppra) 500 mg tablet Take 1 Tab by mouth two (2) times a day. Indications: additional medication to treat partial seizures, Normal, Disp-60 Tab, R-0      tiotropium (Spiriva with HandiHaler) 18 mcg inhalation capsule Take 1 Cap by inhalation daily. Indications: bronchospasm prevention with COPD, Normal, Disp-30 Cap, R-0         CONTINUE these medications which have NOT CHANGED    Details   buprenorphine-naloxone (Suboxone) 8-2 mg film sublingaul film 2 Film by SubLINGual route daily. Max Daily Amount: 2 Film. Indications: dependence on opioid-type drugs, Normal, Disp-14 Film, R-1NADEAN: [id number]      nicotine (NICODERM CQ) 21 mg/24 hr 1 Patch by TransDERmal route daily for 30 days. , Normal, Disp-30 Patch, R-0         STOP taking these medications       ARIPiprazole (ABILIFY) 10 mg tablet Comments:   Reason for Stopping:         doxepin (SINEquan) 10 mg capsule Comments:   Reason for Stopping:         metroNIDAZOLE (FLAGYL) 500 mg tablet Comments:   Reason for Stopping:         amitriptyline (ELAVIL) 100 mg tablet Comments:   Reason for Stopping:         prazosin (MINIPRESS) 2 mg capsule Comments:   Reason for Stopping:         sertraline (ZOLOFT) 25 mg tablet Comments:   Reason for Stopping:         cloNIDine HCL (CATAPRES) 0.3 mg tablet Comments:   Reason for Stopping:         divalproex DR (Depakote) 500 mg tablet Comments:   Reason for Stopping:                     Follow-up Information     Follow up With Specialties Details Why Contact Info    [facility]  On 2/22/2021 [facility] will pick you up for crisis stabilization services  [address]  [facility], [address]  Phone: [phone]    Chago Caballero NP  Go on 2/23/2021 9:40am for suboxone clinic [facility]  [address]  [place], [address] [place] [address] Road  Phone: [phone]    Georges Baumann  On 2/23/2021 He will see you after the [facility] [address] clinic [facility]  [address]  [place], [address] [place] [address] Road  Phone: [phone]    Sundeep Grier NP    You will be schedule for a psychiatry appointment during your appointment at [address] [place] [address] [address] [facility] 2/22/21. [facility] at [address]  [place], [address] [place] [address] Road  Phone: [phone]    None    None ([phone]) Patient stated that they have no PCP          WOUND CARE: none needed. Prognosis:   Good / [place] based on nature of patient's pathology/ies and treatment compliance issues. Prognosis is greatly dependent upon patient's ability to  follow up on psychiatric/psychotherapy appointments as well as to comply with psychiatric medications as prescribed. I certify that this patients inpatient psychiatric hospital services furnished since the previous certification were, and continue to be, required for treatment that could reasonably be expected to improve the patient's condition, or for diagnostic study, and that the patient continues to need, on a daily basis, active treatment furnished directly by or requiring the supervision of inpatient psychiatric facility personnel. In addition, the hospital records show that services furnished were intensive treatment services, admission or related services, or equivalent services.      Signed:  Elisha Landers NP  2/22/2021

## 2021-02-22 NOTE — BH NOTES
DISCHARGE SUMMARY from Nurse    PATIENT INSTRUCTIONS:    What to do at Home:  Recommended activity: Activity as tolerated,   *  Please give a list of your current medications to your Primary Care Provider.    *  Please update this list whenever your medications are discontinued, doses are      changed, or new medications (including over-the-counter products) are added.    *  Please carry medication information at all times in case of emergency situations.    These are general instructions for a healthy lifestyle:    No smoking/ No tobacco products/ Avoid exposure to second hand smoke  Surgeon General's Warning:  Quitting smoking now greatly reduces serious risk to your health.    Obesity, smoking, and sedentary lifestyle greatly increases your risk for illness    A healthy diet, regular physical exercise & weight monitoring are important for maintaining a healthy lifestyle    You may be retaining fluid if you have a history of heart failure or if you experience any of the following symptoms:  Weight gain of 3 pounds or more overnight or 5 pounds in a week, increased swelling in our hands or feet or shortness of breath while lying flat in bed.  Please call your doctor as soon as you notice any of these symptoms; do not wait until your next office visit.        The discharge information has been reviewed with the patient.  The patient verbalized understanding.  Discharge medications reviewed with the patient and appropriate educational materials and side effects teaching were provided. Transportation to be provided by Miss [facility] at approximately 1400.   ___________________________________________________________________________________________________________________________________

## 2021-02-22 NOTE — BH NOTES
GROUP THERAPY PROGRESS NOTE    Patient is participating in Process Group. Group time: 50 minutes    Personal goal for participation: Increase self-awareness of internal and external triggers. Goal orientation: Personal    Group therapy participation: active    Therapeutic interventions reviewed and discussed: Group discussion was focused on internal and external triggers, and the feelings/behaviors elicited as a result. Group members analyzed their emotional triggers and how it affects their anxiety. Each person was asked to color in or write where they felt the most anxiety on the stick figure given. Then, the group discussed ways to de-escalate situational anxiety, and use the Emotional Freedom Technique, known as the tapping technique. Impression of participation: Zan Ngo actively participated in group. Patient cried in group and processed her feelings related to the relationships she has created on the unit. Cooperative in group and supportive of other group members.      Ewa Brambila, Supervisee in Social Work

## 2021-02-22 NOTE — PROGRESS NOTES
Pharmacist Discharge Medication Reconciliation    Discharging Provider: Jacki Godinez     Significant PMH:   Past Medical History:   Diagnosis Date    Asthma     Bipolar 1 disorder ([facility]     Chronic obstructive pulmonary disease (HCC)     Chronic pain     back, leg    Cocaine abuse ([facility]     Depression     Heart failure (HCC)     Hepatitis B     H[facility] ([facility]     HTN (hypertension)     Narcotic abuse ([facility]     Polysubstance abuse ([facility]     Sarcoidosis     Tobacco abuse      Chief Complaint for this Admission: No chief complaint on file. Allergies: Vancomycin, Tomato, and Trazodone    Discharge Medications:   Current Discharge Medication List        START taking these medications    Details   mirtazapine (REMERON) 15 mg tablet Take 1 Tab by mouth nightly. Indications: major depressive disorder  Qty: 30 Tab, Refills: 0           CONTINUE these medications which have CHANGED    Details   montelukast (SINGULAIR) 10 mg tablet Take 1 Tab by mouth nightly. Indications: controller medication for asthma  Qty: 30 Tab, Refills: 0      QUEtiapine (SEROquel) 400 mg tablet Take 1 Tab by mouth nightly. Indications: mood  Qty: 30 Tab, Refills: 0      albuterol (ProAir HFA) 90 mcg/actuation inhaler Take 2 Puffs by inhalation every four (4) hours as needed for Wheezing or Shortness of Breath. Indications: bronchospasm prevention  Qty: 2 Inhaler, Refills: 0      fluticasone propion-salmeteroL (Advair Diskus) 250-50 mcg/dose diskus inhaler Take 1 Puff by inhalation two (2) times a day. Indications: bronchospasm prevention with COPD  Qty: 28 Each, Refills: 0      gabapentin (NEURONTIN) 300 mg capsule Take 1 Cap by mouth three (3) times daily. Max Daily Amount: 900 mg. Indications: neuropathic pain  Qty: 45 Cap, Refills: 0    Associated Diagnoses: Polysubstance abuse ([facility])      hydrOXYzine HCL (ATARAX) 50 mg tablet Take 1 Tab by mouth every six (6) hours as needed for Anxiety.  Indications: anxious  Qty: 30 Tab, Refills: 0 levETIRAcetam (Keppra) 500 mg tablet Take 1 Tab by mouth two (2) times a day. Indications: additional medication to treat partial seizures  Qty: 60 Tab, Refills: 0      tiotropium (Spiriva with HandiHaler) 18 mcg inhalation capsule Take 1 Cap by inhalation daily. Indications: bronchospasm prevention with COPD  Qty: 30 Cap, Refills: 0           CONTINUE these medications which have NOT CHANGED    Details   buprenorphine-naloxone (Suboxone) 8-2 mg film sublingaul film 2 Film by SubLINGual route daily. Max Daily Amount: 2 Film. Indications: dependence on opioid-type drugs  Qty: 14 Film, Refills: 1    Comments: TERRENCE: [id number]  Associated Diagnoses: Narcotic abuse ([facility]      nicotine (NICODERM CQ) 21 mg/24 hr 1 Patch by TransDERmal route daily for 30 days. Qty: 30 Patch, Refills: 0           STOP taking these medications       ARIPiprazole (ABILIFY) 10 mg tablet Comments:   Reason for Stopping:         doxepin (SINEquan) 10 mg capsule Comments:   Reason for Stopping:         metroNIDAZOLE (FLAGYL) 500 mg tablet Comments:   Reason for Stopping:         amitriptyline (ELAVIL) 100 mg tablet Comments:   Reason for Stopping:         prazosin (MINIPRESS) 2 mg capsule Comments:   Reason for Stopping:         sertraline (ZOLOFT) 25 mg tablet Comments:   Reason for Stopping:         cloNIDine HCL (CATAPRES) 0.3 mg tablet Comments:   Reason for Stopping:         divalproex DR (Depakote) 500 mg tablet Comments:   Reason for Stopping:               The patient's chart, MAR and AVS were reviewed by Rica Valente.     (Remove the following comments before filing to note)  Recommendations/Clarifications: None    Other Interventions (patient counseling, changes made to AVS, etc): None    Additional Comments: None    Time spent: 5 min

## 2021-02-22 NOTE — PROGRESS NOTES
2300: Resting quietly in bed with eyes closed. No apparent distress. Respirations are even and unlabored. Staff will continue to monitor q15 throughout the shift. Problem: Falls - Risk of  Goal: *Absence of Falls  Description: Document Marielos Mike Fall Risk and appropriate interventions in the flowsheet.   Outcome: Progressing Towards Goal  Note: Fall Risk Interventions:            Medication Interventions: Teach patient to arise slowly
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
## 2021-02-22 NOTE — PROGRESS NOTES
Problem: Altered Thought Process (Adult/Pediatric)  Goal: *STG: Participates in treatment plan  Outcome: Progressing Towards Goal  Participates appropriately related to treatment team  Goal: *STG: Complies with medication therapy  Outcome: Progressing Towards Goal    Has been medication compliant denies side effects. Problem: Seizure Disorder (Adult)  Goal: *STG: Remains safe in hospital  Outcome: Progressing Towards Goal   No seizure activity observed or reported.     Problem: Altered Thought Process (Adult/Pediatric)  Goal: *STG: Seeks staff when feelings of anxiety and fear arise  Outcome: Not Progressing Towards Goal   Tends to isolate to self

## 2021-02-22 NOTE — INTERDISCIPLINARY ROUNDS
Behavioral Health Interdisciplinary Rounds     Patient Name: Martha Xie  Age: 50 y.o. Room/Bed:  730/  Primary Diagnosis: <principal problem not specified>   Admission Status:Voluntary commitment     Readmission within 30 days: no  Power of  in place: no  Patient requires a blocked bed: no          Reason for blocked bed:     VTE Prophylaxis: No    Mobility needs/Fall risk: no  Flu Vaccine : no   Nutritional Plan: no  Consults:          Labs/Testing due today?: no    Sleep hours:  4.75        Participation in Care/Groups:    Medication Compliant?: Yes  PRNS (last 24 hours): Antianxiety and Pain    Restraints (last 24 hours):  no     CIWA (range last 24 hours):     COWS (range last 24 hours):      Alcohol screening (AUDIT) completed -   AUDIT Score: 0     If applicable, date SBIRT discussed in treatment team AND documented:   AUDIT Screen Score: AUDIT Score: 0    Tobacco - patient is a smoker: Have You Used Tobacco in the Past 30 Days: Yes  Illegal Drugs use: Have You Used Any Illegal Substances Over the Past 12 Months: No    24 hour chart check complete:  yes    Patient goal(s) for today:   Treatment team focus/goals: Plan for discharge today in care of [facility]   Progress note : she will be discharge today    LOS:  6  Expected LOS: Today     Financial concerns/prescription coverage:  Medicaid   Family contact:  No family contact      Family requesting physician contact today:    Discharge plan: Plan to discharge today in care of [address] [place] [address]   Access to weapons :  no       Outpatient provider(s): [facility]   Patient's preferred phone number for follow up call :   Patient's preferred e-mail address :  Participating treatment team members: Fei Rees NP- Bianca Christian, Jose FD.
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
## 2022-01-18 ENCOUNTER — APPOINTMENT (OUTPATIENT)
Dept: CT IMAGING | Age: 47
End: 2022-01-18
Attending: EMERGENCY MEDICINE
Payer: COMMERCIAL

## 2022-01-18 ENCOUNTER — HOSPITAL ENCOUNTER (OUTPATIENT)
Age: 47
Setting detail: OBSERVATION
Discharge: HOME | End: 2022-01-19
Attending: EMERGENCY MEDICINE | Admitting: STUDENT IN AN ORGANIZED HEALTH CARE EDUCATION/TRAINING PROGRAM
Payer: COMMERCIAL

## 2022-01-18 ENCOUNTER — APPOINTMENT (OUTPATIENT)
Dept: GENERAL RADIOLOGY | Age: 47
End: 2022-01-18
Attending: EMERGENCY MEDICINE
Payer: COMMERCIAL

## 2022-01-18 DIAGNOSIS — R29.898 RIGHT LEG WEAKNESS: Primary | ICD-10-CM

## 2022-01-18 DIAGNOSIS — U07.1 COVID: ICD-10-CM

## 2022-01-18 LAB
ALBUMIN SERPL-MCNC: 3.5 G/DL (ref 3.5–5)
ALBUMIN/GLOB SERPL: 0.9 {RATIO} (ref 1.1–2.2)
ALP SERPL-CCNC: 109 U/L (ref 45–117)
ALT SERPL-CCNC: 29 U/L (ref 12–78)
ANION GAP SERPL CALC-SCNC: 5 MMOL/L (ref 5–15)
AST SERPL-CCNC: 32 U/L (ref 15–37)
ATRIAL RATE: 80 BPM
BASOPHILS # BLD: 0 K/UL (ref 0–0.1)
BASOPHILS NFR BLD: 1 % (ref 0–1)
BILIRUB SERPL-MCNC: 0.3 MG/DL (ref 0.2–1)
BUN SERPL-MCNC: 16 MG/DL (ref 6–20)
BUN/CREAT SERPL: 15 (ref 12–20)
CALCIUM SERPL-MCNC: 9.8 MG/DL (ref 8.5–10.1)
CALCULATED P AXIS, ECG09: 68 DEGREES
CALCULATED R AXIS, ECG10: 74 DEGREES
CALCULATED T AXIS, ECG11: 75 DEGREES
CHLORIDE SERPL-SCNC: 103 MMOL/L (ref 97–108)
CO2 SERPL-SCNC: 27 MMOL/L (ref 21–32)
COVID-19 RAPID TEST, COVR: DETECTED
CREAT SERPL-MCNC: 1.09 MG/DL (ref 0.55–1.02)
DIAGNOSIS, 93000: NORMAL
DIFFERENTIAL METHOD BLD: NORMAL
EOSINOPHIL # BLD: 0.1 K/UL (ref 0–0.4)
EOSINOPHIL NFR BLD: 2 % (ref 0–7)
ERYTHROCYTE [DISTWIDTH] IN BLOOD BY AUTOMATED COUNT: 14.1 % (ref 11.5–14.5)
GLOBULIN SER CALC-MCNC: 4.1 G/DL (ref 2–4)
GLUCOSE BLD STRIP.AUTO-MCNC: 110 MG/DL (ref 65–117)
GLUCOSE SERPL-MCNC: 106 MG/DL (ref 65–100)
HCT VFR BLD AUTO: 36.5 % (ref 35–47)
HGB BLD-MCNC: 12.2 G/DL (ref 11.5–16)
IMM GRANULOCYTES # BLD AUTO: 0 K/UL (ref 0–0.04)
IMM GRANULOCYTES NFR BLD AUTO: 0 % (ref 0–0.5)
INR PPP: 1 (ref 0.9–1.1)
LYMPHOCYTES # BLD: 1.3 K/UL (ref 0.8–3.5)
LYMPHOCYTES NFR BLD: 35 % (ref 12–49)
MCH RBC QN AUTO: 28.8 PG (ref 26–34)
MCHC RBC AUTO-ENTMCNC: 33.4 G/DL (ref 30–36.5)
MCV RBC AUTO: 86.3 FL (ref 80–99)
MONOCYTES # BLD: 0.4 K/UL (ref 0–1)
MONOCYTES NFR BLD: 11 % (ref 5–13)
NEUTS SEG # BLD: 1.9 K/UL (ref 1.8–8)
NEUTS SEG NFR BLD: 51 % (ref 32–75)
NRBC # BLD: 0 K/UL (ref 0–0.01)
NRBC BLD-RTO: 0 PER 100 WBC
P-R INTERVAL, ECG05: 160 MS
PLATELET # BLD AUTO: 243 K/UL (ref 150–400)
PMV BLD AUTO: 9.2 FL (ref 8.9–12.9)
POTASSIUM SERPL-SCNC: 3.8 MMOL/L (ref 3.5–5.1)
PROT SERPL-MCNC: 7.6 G/DL (ref 6.4–8.2)
PROTHROMBIN TIME: 10.9 SEC (ref 9–11.1)
Q-T INTERVAL, ECG07: 412 MS
QRS DURATION, ECG06: 84 MS
QTC CALCULATION (BEZET), ECG08: 475 MS
RBC # BLD AUTO: 4.23 M/UL (ref 3.8–5.2)
SERVICE CMNT-IMP: NORMAL
SODIUM SERPL-SCNC: 135 MMOL/L (ref 136–145)
SOURCE, COVRS: ABNORMAL
TROPONIN-HIGH SENSITIVITY: 6 NG/L (ref 0–51)
UR CULT HOLD, URHOLD: NORMAL
VENTRICULAR RATE, ECG03: 80 BPM
WBC # BLD AUTO: 3.7 K/UL (ref 3.6–11)

## 2022-01-18 PROCEDURE — 80053 COMPREHEN METABOLIC PANEL: CPT

## 2022-01-18 PROCEDURE — 0042T CT CODE NEURO PERF W CBF: CPT

## 2022-01-18 PROCEDURE — 96374 THER/PROPH/DIAG INJ IV PUSH: CPT

## 2022-01-18 PROCEDURE — 71045 X-RAY EXAM CHEST 1 VIEW: CPT

## 2022-01-18 PROCEDURE — 74011000636 HC RX REV CODE- 636: Performed by: EMERGENCY MEDICINE

## 2022-01-18 PROCEDURE — 85610 PROTHROMBIN TIME: CPT

## 2022-01-18 PROCEDURE — 82962 GLUCOSE BLOOD TEST: CPT

## 2022-01-18 PROCEDURE — 85025 COMPLETE CBC W/AUTO DIFF WBC: CPT

## 2022-01-18 PROCEDURE — 70450 CT HEAD/BRAIN W/O DYE: CPT

## 2022-01-18 PROCEDURE — 93005 ELECTROCARDIOGRAM TRACING: CPT

## 2022-01-18 PROCEDURE — 87635 SARS-COV-2 COVID-19 AMP PRB: CPT

## 2022-01-18 PROCEDURE — 74011250636 HC RX REV CODE- 250/636

## 2022-01-18 PROCEDURE — 99285 EMERGENCY DEPT VISIT HI MDM: CPT

## 2022-01-18 PROCEDURE — 36415 COLL VENOUS BLD VENIPUNCTURE: CPT

## 2022-01-18 PROCEDURE — 70496 CT ANGIOGRAPHY HEAD: CPT

## 2022-01-18 PROCEDURE — 84484 ASSAY OF TROPONIN QUANT: CPT

## 2022-01-18 RX ORDER — NALOXONE HYDROCHLORIDE 0.4 MG/ML
INJECTION, SOLUTION INTRAMUSCULAR; INTRAVENOUS; SUBCUTANEOUS
Status: COMPLETED
Start: 2022-01-18 | End: 2022-01-18

## 2022-01-18 RX ORDER — NALOXONE HYDROCHLORIDE 0.4 MG/ML
0.4 INJECTION, SOLUTION INTRAMUSCULAR; INTRAVENOUS; SUBCUTANEOUS ONCE
Status: COMPLETED | OUTPATIENT
Start: 2022-01-18 | End: 2022-01-18

## 2022-01-18 RX ADMIN — IOPAMIDOL 20 ML: 755 INJECTION, SOLUTION INTRAVENOUS at 16:49

## 2022-01-18 RX ADMIN — NALOXONE HYDROCHLORIDE 0.4 MG: 0.4 INJECTION, SOLUTION INTRAMUSCULAR; INTRAVENOUS; SUBCUTANEOUS at 21:32

## 2022-01-18 RX ADMIN — IOPAMIDOL 100 ML: 755 INJECTION, SOLUTION INTRAVENOUS at 16:48

## 2022-01-18 RX ADMIN — NALXONE HYDROCHLORIDE 0.4 MG: 0.4 INJECTION INTRAMUSCULAR; INTRAVENOUS; SUBCUTANEOUS at 21:32

## 2022-01-18 NOTE — ED PROVIDER NOTES
Date of Service:  2022    Patient:  Radha Nuñez    Chief Complaint:  Slurred speech, weakness    HPI:  Radha Nuñez is a 48 y.o.  female who presents for evaluation of slurred speech, weakness and not feeling well. Patient is about 5 hours ago she started not feeling well. EMS found the patient to have some slurred speech they thought she had some generalized weakness, maybe some facial droop and some somnolence. Patient has a history of narcotic abuse, denies use today. She denies chest pain shortness of breath fevers or chills.  She does arrive somewhat somnolent unable to provide much other information           Past Medical History:   Diagnosis Date    Asthma     Bipolar 1 disorder (HCC)     Chronic obstructive pulmonary disease (HCC)     Chronic pain     back, leg    [facility] abuse ([facility]     Depression     Heart failure (HCC)     Hepatitis B     Heroin abuse ([facility]     HTN (hypertension)     Narcotic abuse (HCC)     Polysubstance abuse ([facility]     Sarcoidosis     Tobacco abuse        Past Surgical History:   Procedure Laterality Date    HX GYN      HX WISDOM TEETH EXTRACTION           Family History:   Problem Relation Age of Onset    Hypertension Father     Hypertension Mother        Social History     Socioeconomic History    Marital status: SINGLE     Spouse name: Not on file    Number of children: Not on file    Years of education: Not on file    Highest education level: Not on file   Occupational History    Not on file   Tobacco Use    Smoking status: Current Every Day Smoker     Packs/day: 0.25     Years: 15.00     Pack years: 3.75     Last attempt to quit: 8/3/2017     Years since quittin.4    Smokeless tobacco: Never Used    Tobacco comment: \"I already quit\"   Substance and Sexual Activity    Alcohol use: No    Drug use: Not Currently     Types: Marijuana, Heroin     Comment: Pt reports last used heroin on 20    Sexual activity: Yes     Partners: Female   Other Topics Concern     Service Not Asked    Blood Transfusions Not Asked    Caffeine Concern Not Asked    Occupational Exposure Not Asked    Hobby Hazards Not Asked    Sleep Concern Not Asked    Stress Concern Not Asked    Weight Concern Not Asked    Special Diet Not Asked    Back Care Not Asked    Exercise Not Asked    Bike Helmet Not Asked   [address] Not Asked    Self-Exams Not Asked   Social History Narrative    Born in [place] [address],     [place], 5 children,    No legal charges. Pt graduated from high school. Pt was employed last month at [employer], currently she is unemployed. Pt lives with her mother and 8year old son. She has 4 adult children. Social Determinants of Health     Financial Resource Strain:     Difficulty of Paying Living Expenses: Not on file   Food Insecurity:     Worried About Running Out of Food in the Last Year: Not on file    Christine of Food in the Last Year: Not on file   Transportation Needs:     Lack of Transportation (Medical): Not on file    Lack of Transportation (Non-Medical):  Not on file   Physical Activity:     Days of Exercise per Week: Not on file    Minutes of Exercise per Session: Not on file   Stress:     Feeling of Stress : Not on file   Social Connections:     Frequency of Communication with Friends and Family: Not on file    Frequency of Social Gatherings with Friends and Family: Not on file    Attends Church Services: Not on file    Active Member of [address] [place] [address] or Organizations: Not on file    Attends Club or Organization Meetings: Not on file    Marital Status: Not on file   Intimate Partner Violence:     Fear of Current or Ex-Partner: Not on file    Emotionally Abused: Not on file    Physically Abused: Not on file    Sexually Abused: Not on file   Housing Stability:     Unable to Pay for Housing in the Last Year: Not on file    Number of [place] in the Last Year: Not on file    Unstable Housing in the Last Year: Not on file ALLERGIES: Vancomycin, Tomato, and Trazodone    Review of Systems   Constitutional: Positive for fatigue. Neurological: Positive for facial asymmetry, speech difficulty and weakness. All other systems reviewed and are negative. There were no vitals filed for this visit. Physical Exam  Vitals and nursing note reviewed. HENT:      Head: Normocephalic and atraumatic. Nose: Nose normal.      Mouth/Throat:      Mouth: Mucous membranes are moist.   Eyes:      General: No scleral icterus. Right eye: No discharge. Left eye: No discharge. Extraocular Movements: Extraocular movements intact. Pupils: Pupils are equal, round, and reactive to light. Cardiovascular:      Rate and Rhythm: Normal rate. Pulmonary:      Effort: Pulmonary effort is normal. No respiratory distress. Musculoskeletal:         General: No deformity. Skin:     General: Skin is warm. Capillary Refill: Capillary refill takes less than 2 seconds. Neurological:      Mental Status: She is alert and oriented to person, place, and time.       Comments: General weakness, r>L   Psychiatric:         Mood and Affect: Mood normal.          MDM  Number of Diagnoses or Management Options       VITAL SIGNS:  Patient Vitals for the past 4 hrs:   Temp Pulse Resp BP SpO2   01/18/22 1744 97.8 °F (36.6 °C) 81 14 (!) 137/96 90 %         LABS:  Recent Results (from the past 6 hour(s))   GLUCOSE, POC    Collection Time: 01/18/22  5:00 PM   Result Value Ref Range    Glucose (POC) 110 65 - 117 mg/dL    Performed by Chico Bennett RN    METABOLIC PANEL, COMPREHENSIVE    Collection Time: 01/18/22  5:08 PM   Result Value Ref Range    Sodium 135 (L) 136 - 145 mmol/L    Potassium 3.8 3.5 - 5.1 mmol/L    Chloride 103 97 - 108 mmol/L    CO2 27 21 - 32 mmol/L    Anion gap 5 5 - 15 mmol/L    Glucose 106 (H) 65 - 100 mg/dL    BUN 16 6 - 20 MG/DL    Creatinine 1.09 (H) 0.55 - 1.02 MG/DL    BUN/Creatinine ratio 15 12 - 20      GFR est AA >60 >60 ml/min/1.73m2    GFR est non-AA 54 (L) >60 ml/min/1.73m2    Calcium 9.8 8.5 - 10.1 MG/DL    Bilirubin, total 0.3 0.2 - 1.0 MG/DL    ALT (SGPT) 29 12 - 78 U/L    AST (SGOT) 32 15 - 37 U/L    Alk. phosphatase 109 45 - 117 U/L    Protein, total 7.6 6.4 - 8.2 g/dL    Albumin 3.5 3.5 - 5.0 g/dL    Globulin 4.1 (H) 2.0 - 4.0 g/dL    A-G Ratio 0.9 (L) 1.1 - 2.2     PROTHROMBIN TIME + INR    Collection Time: 01/18/22  5:08 PM   Result Value Ref Range    INR 1.0 0.9 - 1.1      Prothrombin time 10.9 9.0 - 11.1 sec   TROPONIN-HIGH SENSITIVITY    Collection Time: 01/18/22  5:08 PM   Result Value Ref Range    Troponin-High Sensitivity 6 0 - 51 ng/L   URINE CULTURE HOLD SAMPLE    Collection Time: 01/18/22  5:08 PM    Specimen: Serum   Result Value Ref Range    Urine culture hold        Urine on hold in Microbiology dept for 2 days. If unpreserved urine is submitted, it cannot be used for addtional testing after 24 hours, recollection will be required. COVID-19 RAPID TEST    Collection Time: 01/18/22  5:59 PM   Result Value Ref Range    Specimen source Nasopharyngeal      COVID-19 rapid test Detected (A) NOTD     CBC WITH AUTOMATED DIFF    Collection Time: 01/18/22  5:59 PM   Result Value Ref Range    WBC 3.7 3.6 - 11.0 K/uL    RBC 4.23 3.80 - 5.20 M/uL    HGB 12.2 11.5 - 16.0 g/dL    HCT 36.5 35.0 - 47.0 %    MCV 86.3 80.0 - 99.0 FL    MCH 28.8 26.0 - 34.0 PG    MCHC 33.4 30.0 - 36.5 g/dL    RDW 14.1 11.5 - 14.5 %    PLATELET 046 157 - 735 K/uL    MPV 9.2 8.9 - 12.9 FL    NRBC 0.0 0  WBC    ABSOLUTE NRBC 0.00 0.00 - 0.01 K/uL    NEUTROPHILS 51 32 - 75 %    LYMPHOCYTES 35 12 - 49 %    MONOCYTES 11 5 - 13 %    EOSINOPHILS 2 0 - 7 %    BASOPHILS 1 0 - 1 %    IMMATURE GRANULOCYTES 0 0.0 - 0.5 %    ABS. NEUTROPHILS 1.9 1.8 - 8.0 K/UL    ABS. LYMPHOCYTES 1.3 0.8 - 3.5 K/UL    ABS. MONOCYTES 0.4 0.0 - 1.0 K/UL    ABS. EOSINOPHILS 0.1 0.0 - 0.4 K/UL    ABS. BASOPHILS 0.0 0.0 - 0.1 K/UL    ABS. IMM. GRANS. 0.0 0.00 - 0.04 K/UL    DF AUTOMATED     EKG, 12 LEAD, INITIAL    Collection Time: 01/18/22  6:02 PM   Result Value Ref Range    Ventricular Rate 80 BPM    Atrial Rate 80 BPM    P-R Interval 160 ms    QRS Duration 84 ms    Q-T Interval 412 ms    QTC Calculation (Bezet) 475 ms    Calculated P Axis 68 degrees    Calculated R Axis 74 degrees    Calculated T Axis 75 degrees    Diagnosis       Normal sinus rhythm  Normal ECG  When compared with ECG of 21-FEB-2021 11:39,  No significant change was found          IMAGING:  XR CHEST PORT   Final Result   Mild diffuse interstitial prominence as above. CTA CODE NEURO HEAD AND NECK W CONT         CT CODE NEURO PERF W CBF         CT CODE NEURO HEAD WO CONTRAST   Final Result   No acute intracranial process. Medications During Visit:  Medications   iopamidoL (ISOVUE-370) 76 % injection 100 mL (100 mL IntraVENous Given 1/18/22 1648)   iopamidoL (ISOVUE-370) 76 % injection 20 mL (20 mL IntraVENous Given 1/18/22 1649)         DECISION MAKING:  Umang Salvador is a 48 y.o. female who comes in as above. Diagnostics as above. I spoke with teleneurology who was concern for possibility of septic emboli from patient's probable drug use. No acute findings on imaging. At this time patient will be brought in for additional work-up. Patient found to be COVID-positive. Drug test pending however I do suspect opiate abuse. IMPRESSION:  1. Right leg weakness    2. COVID        DISPOSITION:  Admitted        Procedures    Perfect Serve Consult for Admission  7:15 PM    ED Room Number: ER29/29  Patient Name and age:  Umang Salvador 48 y.o.  female  Working Diagnosis:   1. Right leg weakness    2. COVID        COVID-19 Suspicion:  yes  Sepsis present:  no  Reassessment needed: no  Code Status:  Full Code  Readmission: no  Isolation Requirements:  no  Recommended Level of Care:  telemetry  Department:[facility] Adult ED - 21   Other:  Came in as stroke alert. Some weakness on right. Concerned for opiate use. teleneruo recommends workup.  Mri for septic emboli etc.

## 2022-01-18 NOTE — ACP (ADVANCE CARE PLANNING)
Neurocritical Care Code Stroke Documentation      Symptoms:   AMS, lethargy, slurred speech, aphasia per EMS   Last Known Well: Difficult to obtain--anywhere from several days to sometime this morning. Pt is poor historian. Medical hx: Past Medical History:   Diagnosis Date    Asthma     Bipolar 1 disorder (HCC)     Chronic obstructive pulmonary disease (HCC)     Chronic pain     back, leg    Cocaine abuse (HCC)     Depression     Heart failure (HCC)     Hepatitis B     Heroin abuse (HCC)     HTN (hypertension)     [facility]     Polysubstance abuse ([facility]     Sarcoidosis     Tobacco abuse       Anticoagulation:  none   VAN:   Negative   NIHSS:   1a-LOC: 1    1b-Month/Age: 0    1c-Open/Close Hand: 0    2-Best Gaze: 0    3-Visual Fields: 0    4-Facial Palsy: 0    5a-Left Arm: 0    5b-Right Arm: 1    6a-Left Le    6b-Right Le    7-Limb Ataxia: 0    8-Sensory: 1    9-Best Language: 0    10-Dysarthria: 0    11-Extinction/Inattention: 0  TOTAL SCORE: 3   Imaging:   CT: No acute processes    CTA: no LVO    CTP: No perfusion mismatch   Plan:   TPA Candidate: NO--out of window    Mechanical thrombectomy Candidate: NO     Discussed with: Dr. Chayo Abreu, ED RN, patient    Arrival time: 1365  Time spent: 45 minutes.      Dorothy Pete NP  Neurocritical Care Nurse Practitioner  [phone]

## 2022-01-18 NOTE — ED TRIAGE NOTES
EMS states about 4-5 hours ago she started feeling weird. Pt had aphasia and slurred speech. Ems brought her in. Pt states she has not been feeling well for a few days.

## 2022-01-19 VITALS
OXYGEN SATURATION: 97 % | DIASTOLIC BLOOD PRESSURE: 90 MMHG | SYSTOLIC BLOOD PRESSURE: 121 MMHG | HEIGHT: 64 IN | TEMPERATURE: 98.4 F | RESPIRATION RATE: 16 BRPM | BODY MASS INDEX: 34.15 KG/M2 | HEART RATE: 76 BPM | WEIGHT: 200 LBS

## 2022-01-19 PROBLEM — G93.40 ENCEPHALOPATHY: Status: ACTIVE | Noted: 2022-01-19

## 2022-01-19 LAB
ALBUMIN SERPL-MCNC: 3.5 G/DL (ref 3.5–5)
ALBUMIN/GLOB SERPL: 0.9 {RATIO} (ref 1.1–2.2)
ALP SERPL-CCNC: 108 U/L (ref 45–117)
ALT SERPL-CCNC: 31 U/L (ref 12–78)
AMMONIA PLAS-SCNC: 10 UMOL/L
AMPHET UR QL SCN: NEGATIVE
ANION GAP SERPL CALC-SCNC: 0 MMOL/L (ref 5–15)
APPEARANCE UR: CLEAR
AST SERPL-CCNC: 37 U/L (ref 15–37)
BACTERIA URNS QL MICRO: NEGATIVE /HPF
BARBITURATES UR QL SCN: NEGATIVE
BASOPHILS # BLD: 0 K/UL (ref 0–0.1)
BASOPHILS NFR BLD: 0 % (ref 0–1)
BENZODIAZ UR QL: POSITIVE
BILIRUB SERPL-MCNC: 0.3 MG/DL (ref 0.2–1)
BILIRUB UR QL: NEGATIVE
BUN SERPL-MCNC: 14 MG/DL (ref 6–20)
BUN/CREAT SERPL: 14 (ref 12–20)
CALCIUM SERPL-MCNC: 9.8 MG/DL (ref 8.5–10.1)
CANNABINOIDS UR QL SCN: POSITIVE
CHLORIDE SERPL-SCNC: 104 MMOL/L (ref 97–108)
CO2 SERPL-SCNC: 33 MMOL/L (ref 21–32)
COCAINE UR QL SCN: POSITIVE
COLOR UR: ABNORMAL
COMMENT, HOLDF: NORMAL
CREAT SERPL-MCNC: 1.03 MG/DL (ref 0.55–1.02)
DIFFERENTIAL METHOD BLD: NORMAL
DRUG SCRN COMMENT,DRGCM: ABNORMAL
EOSINOPHIL # BLD: 0.1 K/UL (ref 0–0.4)
EOSINOPHIL NFR BLD: 2 % (ref 0–7)
EPITH CASTS URNS QL MICRO: ABNORMAL /LPF
ERYTHROCYTE [DISTWIDTH] IN BLOOD BY AUTOMATED COUNT: 14.5 % (ref 11.5–14.5)
EST. AVERAGE GLUCOSE BLD GHB EST-MCNC: 126 MG/DL
GLOBULIN SER CALC-MCNC: 4.1 G/DL (ref 2–4)
GLUCOSE SERPL-MCNC: 112 MG/DL (ref 65–100)
GLUCOSE UR STRIP.AUTO-MCNC: NEGATIVE MG/DL
HBA1C MFR BLD: 6 % (ref 4–5.6)
HCT VFR BLD AUTO: 40.6 % (ref 35–47)
HGB BLD-MCNC: 13.3 G/DL (ref 11.5–16)
HGB UR QL STRIP: NEGATIVE
IMM GRANULOCYTES # BLD AUTO: 0 K/UL (ref 0–0.04)
IMM GRANULOCYTES NFR BLD AUTO: 0 % (ref 0–0.5)
KETONES UR QL STRIP.AUTO: ABNORMAL MG/DL
LEUKOCYTE ESTERASE UR QL STRIP.AUTO: NEGATIVE
LYMPHOCYTES # BLD: 1.7 K/UL (ref 0.8–3.5)
LYMPHOCYTES NFR BLD: 33 % (ref 12–49)
MCH RBC QN AUTO: 29 PG (ref 26–34)
MCHC RBC AUTO-ENTMCNC: 32.8 G/DL (ref 30–36.5)
MCV RBC AUTO: 88.6 FL (ref 80–99)
METHADONE UR QL: NEGATIVE
MONOCYTES # BLD: 0.5 K/UL (ref 0–1)
MONOCYTES NFR BLD: 10 % (ref 5–13)
NEUTS SEG # BLD: 2.8 K/UL (ref 1.8–8)
NEUTS SEG NFR BLD: 55 % (ref 32–75)
NITRITE UR QL STRIP.AUTO: NEGATIVE
NRBC # BLD: 0 K/UL (ref 0–0.01)
NRBC BLD-RTO: 0 PER 100 WBC
OPIATES UR QL: NEGATIVE
PCP UR QL: NEGATIVE
PH UR STRIP: 5.5 [PH] (ref 5–8)
PLATELET # BLD AUTO: 303 K/UL (ref 150–400)
PLATELET COMMENTS,PCOM: NORMAL
PMV BLD AUTO: 9.9 FL (ref 8.9–12.9)
POTASSIUM SERPL-SCNC: 3.7 MMOL/L (ref 3.5–5.1)
PROT SERPL-MCNC: 7.6 G/DL (ref 6.4–8.2)
PROT UR STRIP-MCNC: NEGATIVE MG/DL
RBC # BLD AUTO: 4.58 M/UL (ref 3.8–5.2)
RBC #/AREA URNS HPF: ABNORMAL /HPF (ref 0–5)
RBC MORPH BLD: NORMAL
SAMPLES BEING HELD,HOLD: NORMAL
SODIUM SERPL-SCNC: 137 MMOL/L (ref 136–145)
SP GR UR REFRACTOMETRY: 1.01 (ref 1–1.03)
TSH SERPL DL<=0.05 MIU/L-ACNC: 0.72 UIU/ML (ref 0.36–3.74)
UROBILINOGEN UR QL STRIP.AUTO: 1 EU/DL (ref 0.2–1)
WBC # BLD AUTO: 5.1 K/UL (ref 3.6–11)
WBC URNS QL MICRO: ABNORMAL /HPF (ref 0–4)

## 2022-01-19 PROCEDURE — 74011000250 HC RX REV CODE- 250: Performed by: STUDENT IN AN ORGANIZED HEALTH CARE EDUCATION/TRAINING PROGRAM

## 2022-01-19 PROCEDURE — 65660000000 HC RM CCU STEPDOWN

## 2022-01-19 PROCEDURE — 81001 URINALYSIS AUTO W/SCOPE: CPT

## 2022-01-19 PROCEDURE — 77010033678 HC OXYGEN DAILY

## 2022-01-19 PROCEDURE — 83036 HEMOGLOBIN GLYCOSYLATED A1C: CPT

## 2022-01-19 PROCEDURE — 85025 COMPLETE CBC W/AUTO DIFF WBC: CPT

## 2022-01-19 PROCEDURE — 36415 COLL VENOUS BLD VENIPUNCTURE: CPT

## 2022-01-19 PROCEDURE — 74011250636 HC RX REV CODE- 250/636

## 2022-01-19 PROCEDURE — 99222 1ST HOSP IP/OBS MODERATE 55: CPT | Performed by: PSYCHIATRY & NEUROLOGY

## 2022-01-19 PROCEDURE — 96376 TX/PRO/DX INJ SAME DRUG ADON: CPT

## 2022-01-19 PROCEDURE — 84443 ASSAY THYROID STIM HORMONE: CPT

## 2022-01-19 PROCEDURE — G0378 HOSPITAL OBSERVATION PER HR: HCPCS

## 2022-01-19 PROCEDURE — 94760 N-INVAS EAR/PLS OXIMETRY 1: CPT

## 2022-01-19 PROCEDURE — 80053 COMPREHEN METABOLIC PANEL: CPT

## 2022-01-19 PROCEDURE — 82140 ASSAY OF AMMONIA: CPT

## 2022-01-19 PROCEDURE — 80307 DRUG TEST PRSMV CHEM ANLYZR: CPT

## 2022-01-19 RX ORDER — QUETIAPINE FUMARATE 100 MG/1
400 TABLET, FILM COATED ORAL
Status: DISCONTINUED | OUTPATIENT
Start: 2022-01-19 | End: 2022-01-19

## 2022-01-19 RX ORDER — ONDANSETRON 4 MG/1
4 TABLET, ORALLY DISINTEGRATING ORAL
Status: DISCONTINUED | OUTPATIENT
Start: 2022-01-19 | End: 2022-01-19 | Stop reason: HOSPADM

## 2022-01-19 RX ORDER — SERTRALINE HYDROCHLORIDE 100 MG/1
200 TABLET, FILM COATED ORAL DAILY
Status: ON HOLD | COMMUNITY
End: 2022-10-24

## 2022-01-19 RX ORDER — PRAZOSIN HYDROCHLORIDE 1 MG/1
4 CAPSULE ORAL
Status: DISCONTINUED | OUTPATIENT
Start: 2022-01-19 | End: 2022-01-19 | Stop reason: HOSPADM

## 2022-01-19 RX ORDER — CLONIDINE HYDROCHLORIDE 0.1 MG/1
0.3 TABLET ORAL 3 TIMES DAILY
Status: DISCONTINUED | OUTPATIENT
Start: 2022-01-19 | End: 2022-01-19 | Stop reason: HOSPADM

## 2022-01-19 RX ORDER — AMITRIPTYLINE HYDROCHLORIDE 150 MG/1
75 TABLET, FILM COATED ORAL
COMMUNITY
End: 2022-01-19

## 2022-01-19 RX ORDER — BUDESONIDE 0.5 MG/2ML
500 INHALANT ORAL
Status: DISCONTINUED | OUTPATIENT
Start: 2022-01-19 | End: 2022-01-19

## 2022-01-19 RX ORDER — HALOPERIDOL 10 MG/1
10 TABLET ORAL DAILY
Status: DISCONTINUED | OUTPATIENT
Start: 2022-01-19 | End: 2022-01-19 | Stop reason: HOSPADM

## 2022-01-19 RX ORDER — NALOXONE HYDROCHLORIDE 0.4 MG/ML
0.4 INJECTION, SOLUTION INTRAMUSCULAR; INTRAVENOUS; SUBCUTANEOUS ONCE
Status: COMPLETED | OUTPATIENT
Start: 2022-01-19 | End: 2022-01-19

## 2022-01-19 RX ORDER — PRAZOSIN HYDROCHLORIDE 2 MG/1
4 CAPSULE ORAL
Status: ON HOLD | COMMUNITY
End: 2022-10-24

## 2022-01-19 RX ORDER — POLYETHYLENE GLYCOL 3350 17 G/17G
17 POWDER, FOR SOLUTION ORAL DAILY PRN
Status: DISCONTINUED | OUTPATIENT
Start: 2022-01-19 | End: 2022-01-19 | Stop reason: HOSPADM

## 2022-01-19 RX ORDER — ERGOCALCIFEROL 1.25 MG/1
50000 CAPSULE ORAL ONCE
Status: DISCONTINUED | OUTPATIENT
Start: 2022-01-19 | End: 2022-01-19 | Stop reason: HOSPADM

## 2022-01-19 RX ORDER — AMITRIPTYLINE HYDROCHLORIDE 150 MG/1
75 TABLET, FILM COATED ORAL
Status: ON HOLD | COMMUNITY
End: 2022-10-24

## 2022-01-19 RX ORDER — ACETAMINOPHEN 650 MG/1
650 SUPPOSITORY RECTAL
Status: DISCONTINUED | OUTPATIENT
Start: 2022-01-19 | End: 2022-01-19 | Stop reason: HOSPADM

## 2022-01-19 RX ORDER — ACETAMINOPHEN 325 MG/1
650 TABLET ORAL
Status: DISCONTINUED | OUTPATIENT
Start: 2022-01-19 | End: 2022-01-19 | Stop reason: HOSPADM

## 2022-01-19 RX ORDER — NALOXONE HYDROCHLORIDE 0.4 MG/ML
INJECTION, SOLUTION INTRAMUSCULAR; INTRAVENOUS; SUBCUTANEOUS
Status: COMPLETED
Start: 2022-01-19 | End: 2022-01-19

## 2022-01-19 RX ORDER — HYDROXYZINE 25 MG/1
50 TABLET, FILM COATED ORAL
Status: DISCONTINUED | OUTPATIENT
Start: 2022-01-19 | End: 2022-01-19 | Stop reason: HOSPADM

## 2022-01-19 RX ORDER — MONTELUKAST SODIUM 10 MG/1
10 TABLET ORAL
Status: DISCONTINUED | OUTPATIENT
Start: 2022-01-19 | End: 2022-01-19 | Stop reason: HOSPADM

## 2022-01-19 RX ORDER — FAMOTIDINE 20 MG/1
20 TABLET, FILM COATED ORAL 2 TIMES DAILY
Status: DISCONTINUED | OUTPATIENT
Start: 2022-01-19 | End: 2022-01-19 | Stop reason: HOSPADM

## 2022-01-19 RX ORDER — ENOXAPARIN SODIUM 100 MG/ML
40 INJECTION SUBCUTANEOUS EVERY 12 HOURS
Status: DISCONTINUED | OUTPATIENT
Start: 2022-01-19 | End: 2022-01-19 | Stop reason: HOSPADM

## 2022-01-19 RX ORDER — FUROSEMIDE 40 MG/1
40 TABLET ORAL DAILY
Status: DISCONTINUED | OUTPATIENT
Start: 2022-01-20 | End: 2022-01-19 | Stop reason: HOSPADM

## 2022-01-19 RX ORDER — GABAPENTIN 400 MG/1
400 CAPSULE ORAL 3 TIMES DAILY
Status: ON HOLD | COMMUNITY
End: 2022-10-24

## 2022-01-19 RX ORDER — PANTOPRAZOLE SODIUM 40 MG/1
40 TABLET, DELAYED RELEASE ORAL
Status: DISCONTINUED | OUTPATIENT
Start: 2022-01-20 | End: 2022-01-19 | Stop reason: HOSPADM

## 2022-01-19 RX ORDER — MIRTAZAPINE 15 MG/1
15 TABLET, FILM COATED ORAL
Status: DISCONTINUED | OUTPATIENT
Start: 2022-01-19 | End: 2022-01-19

## 2022-01-19 RX ORDER — FUROSEMIDE 40 MG/1
40 TABLET ORAL DAILY
Status: ON HOLD | COMMUNITY
End: 2022-10-24

## 2022-01-19 RX ORDER — QUETIAPINE FUMARATE 200 MG/1
200 TABLET, FILM COATED ORAL
Status: ON HOLD | COMMUNITY
End: 2022-10-24

## 2022-01-19 RX ORDER — ENOXAPARIN SODIUM 100 MG/ML
40 INJECTION SUBCUTANEOUS EVERY 24 HOURS
Status: DISCONTINUED | OUTPATIENT
Start: 2022-01-19 | End: 2022-01-19

## 2022-01-19 RX ORDER — CLONIDINE HYDROCHLORIDE 0.3 MG/1
0.3 TABLET ORAL 3 TIMES DAILY
Status: ON HOLD | COMMUNITY
End: 2022-10-24

## 2022-01-19 RX ORDER — SODIUM CHLORIDE 0.9 % (FLUSH) 0.9 %
5-40 SYRINGE (ML) INJECTION EVERY 8 HOURS
Status: DISCONTINUED | OUTPATIENT
Start: 2022-01-19 | End: 2022-01-19 | Stop reason: HOSPADM

## 2022-01-19 RX ORDER — ALBUTEROL SULFATE 90 UG/1
4 AEROSOL, METERED RESPIRATORY (INHALATION)
Status: DISCONTINUED | OUTPATIENT
Start: 2022-01-19 | End: 2022-01-19 | Stop reason: HOSPADM

## 2022-01-19 RX ORDER — AMLODIPINE BESYLATE 5 MG/1
5 TABLET ORAL DAILY
Status: ON HOLD | COMMUNITY
End: 2022-10-24

## 2022-01-19 RX ORDER — HALOPERIDOL 10 MG/1
10 TABLET ORAL DAILY
Status: ON HOLD | COMMUNITY
End: 2022-10-24

## 2022-01-19 RX ORDER — ALBUTEROL SULFATE 90 UG/1
4 AEROSOL, METERED RESPIRATORY (INHALATION)
Status: DISCONTINUED | OUTPATIENT
Start: 2022-01-19 | End: 2022-01-19

## 2022-01-19 RX ORDER — BUPRENORPHINE AND NALOXONE 8; 2 MG/1; MG/1
3 FILM, SOLUBLE BUCCAL; SUBLINGUAL DAILY
COMMUNITY
End: 2022-01-19

## 2022-01-19 RX ORDER — ASCORBIC ACID 500 MG
1000 TABLET ORAL 2 TIMES DAILY
Status: DISCONTINUED | OUTPATIENT
Start: 2022-01-19 | End: 2022-01-19 | Stop reason: HOSPADM

## 2022-01-19 RX ORDER — PANTOPRAZOLE SODIUM 40 MG/1
40 TABLET, DELAYED RELEASE ORAL DAILY
Status: ON HOLD | COMMUNITY
End: 2022-10-24

## 2022-01-19 RX ORDER — GABAPENTIN 400 MG/1
400 CAPSULE ORAL 3 TIMES DAILY
Status: DISCONTINUED | OUTPATIENT
Start: 2022-01-19 | End: 2022-01-19 | Stop reason: HOSPADM

## 2022-01-19 RX ORDER — DEXAMETHASONE 6 MG/1
6 TABLET ORAL
Qty: 9 TABLET | Refills: 0 | Status: SHIPPED | OUTPATIENT
Start: 2022-01-19 | End: 2022-01-28

## 2022-01-19 RX ORDER — ROPINIROLE 0.25 MG/1
0.25 TABLET, FILM COATED ORAL
Status: DISCONTINUED | OUTPATIENT
Start: 2022-01-19 | End: 2022-01-19 | Stop reason: HOSPADM

## 2022-01-19 RX ORDER — LEVETIRACETAM 500 MG/1
500 TABLET ORAL 2 TIMES DAILY
Status: DISCONTINUED | OUTPATIENT
Start: 2022-01-19 | End: 2022-01-19 | Stop reason: HOSPADM

## 2022-01-19 RX ORDER — ARFORMOTEROL TARTRATE 15 UG/2ML
15 SOLUTION RESPIRATORY (INHALATION)
Status: DISCONTINUED | OUTPATIENT
Start: 2022-01-19 | End: 2022-01-19

## 2022-01-19 RX ORDER — GUAIFENESIN 600 MG/1
1200 TABLET, EXTENDED RELEASE ORAL EVERY 12 HOURS
Status: DISCONTINUED | OUTPATIENT
Start: 2022-01-19 | End: 2022-01-19 | Stop reason: HOSPADM

## 2022-01-19 RX ORDER — BUPRENORPHINE AND NALOXONE 2; .5 MG/1; MG/1
2 FILM, SOLUBLE BUCCAL; SUBLINGUAL DAILY
Status: DISCONTINUED | OUTPATIENT
Start: 2022-01-19 | End: 2022-01-19

## 2022-01-19 RX ORDER — ONDANSETRON 2 MG/ML
4 INJECTION INTRAMUSCULAR; INTRAVENOUS
Status: DISCONTINUED | OUTPATIENT
Start: 2022-01-19 | End: 2022-01-19 | Stop reason: HOSPADM

## 2022-01-19 RX ORDER — DEXAMETHASONE 4 MG/1
6 TABLET ORAL DAILY
Status: DISCONTINUED | OUTPATIENT
Start: 2022-01-19 | End: 2022-01-19 | Stop reason: HOSPADM

## 2022-01-19 RX ORDER — HYDROXYZINE PAMOATE 100 MG/1
100 CAPSULE ORAL
Status: ON HOLD | COMMUNITY
End: 2022-10-24

## 2022-01-19 RX ORDER — LEVETIRACETAM 500 MG/1
500 TABLET ORAL 2 TIMES DAILY
Status: DISCONTINUED | OUTPATIENT
Start: 2022-01-19 | End: 2022-01-19

## 2022-01-19 RX ORDER — SODIUM CHLORIDE 0.9 % (FLUSH) 0.9 %
5-40 SYRINGE (ML) INJECTION AS NEEDED
Status: DISCONTINUED | OUTPATIENT
Start: 2022-01-19 | End: 2022-01-19 | Stop reason: HOSPADM

## 2022-01-19 RX ORDER — ROPINIROLE 0.25 MG/1
0.25 TABLET, FILM COATED ORAL
Status: ON HOLD | COMMUNITY
End: 2022-10-24

## 2022-01-19 RX ORDER — ZINC SULFATE 50(220)MG
1 CAPSULE ORAL DAILY
Status: DISCONTINUED | OUTPATIENT
Start: 2022-01-19 | End: 2022-01-19 | Stop reason: HOSPADM

## 2022-01-19 RX ADMIN — NALOXONE HYDROCHLORIDE 0.4 MG: 0.4 INJECTION, SOLUTION INTRAMUSCULAR; INTRAVENOUS; SUBCUTANEOUS at 01:41

## 2022-01-19 RX ADMIN — SODIUM CHLORIDE, PRESERVATIVE FREE 10 ML: 5 INJECTION INTRAVENOUS at 01:01

## 2022-01-19 NOTE — H&P
History & Physical    Primary Care Provider: None   Source of Information: Patient and chart review    History of Presenting Illness:   Samuel Grove is a 48 y.o. female with history of polysubstance abuse (cocaine, heroin, THC), copd, sarcoidosis, bipolar disorder, major depressive disorder who was brought in to the hospital via EMS for concerns of altered mental status. Patient provides very little history. Chart reviewed and it is unclear who activated EMS. Patient states that she does not recall being brought to the hospital and initially was unaware of where she was. Per chart review, EMS reported patient was aphasic confused and had slurred speech. At time of my consultation she is awake, belligerent and refuses to answer questions. She does deny any ongoing drug use and states her only current medication is Suboxone. The patient denies any fever, chills, chest or abdominal pain, nausea, vomiting, cough, congestion, recent illness, palpitations, or dysuria. Remarkable vitals on ER Presentations: /90. Pulse ox to 80s on room air intermittently  Labs Remarkable for: Rapid COVID-positive  ER Images: Chest x-ray: Personally reviewed; COVID-pneumonia. CT head unremarkable. Review of Systems:  A comprehensive review of systems was negative except for that written in the History of Present Illness. Past Medical History:   Diagnosis Date    Asthma     Bipolar 1 disorder ([address]     Chronic obstructive pulmonary disease (HCC)     Chronic pain     back, leg    Cocaine abuse (HCC)     Depression     Heart failure (HCC)     Hepatitis B     Heroin abuse ([address]     HTN (hypertension)     Narcotic abuse ([address]     [facility]ysubstance abuse ([facility]     Sarcoidosis     Tobacco abuse       Past Surgical History:   Procedure Laterality Date    HX GYN      HX WISDOM TEETH EXTRACTION       Prior to Admission medications    Medication Sig Start Date End Date Taking? Authorizing Provider   montelukast (SINGULAIR) 10 mg tablet Take 1 Tab by mouth nightly. Indications: controller medication for asthma 2/22/21   Aspen VELASCO NP   QUEtiapine (SEROquel) 400 mg tablet Take 1 Tab by mouth nightly. Indications: mood 2/22/21   Aspen VELASCO NP   albuterol (ProAir HFA) 90 mcg/actuation inhaler Take 2 Puffs by inhalation every four (4) hours as needed for Wheezing or Shortness of Breath. Indications: bronchospasm prevention 2/22/21   Aspen VELASCO NP   fluticasone propion-salmeteroL (Advair Diskus) 250-50 mcg/dose diskus inhaler Take 1 Puff by inhalation two (2) times a day. Indications: bronchospasm prevention with COPD 2/22/21   Aspen VELASCO NP   gabapentin (NEURONTIN) 300 mg capsule Take 1 Cap by mouth three (3) times daily. Max Daily Amount: 900 mg. Indications: neuropathic pain 2/22/21   Aspen VELASCO NP   hydrOXYzine HCL (ATARAX) 50 mg tablet Take 1 Tab by mouth every six (6) hours as needed for Anxiety. Indications: anxious 2/22/21   Aspen VELASCO NP   levETIRAcetam (Keppra) 500 mg tablet Take 1 Tab by mouth two (2) times a day. Indications: additional medication to treat partial seizures 2/22/21   Aspen VELASCO NP   mirtazapine (REMERON) 15 mg tablet Take 1 Tab by mouth nightly. Indications: major depressive disorder 2/22/21   Aspen VELASCO NP   tiotropium (Spiriva with HandiHaler) 18 mcg inhalation capsule Take 1 Cap by inhalation daily. Indications: bronchospasm prevention with COPD 2/22/21   Aspen VELASCO NP   buprenorphine-naloxone (Suboxone) 8-2 mg film sublingaul film 2 Film by SubLINGual route daily. Max Daily Amount: 2 Film.  Indications: dependence on opioid-type drugs 2/9/21   Sabra Dasilva MD     Allergies   Allergen Reactions    Vancomycin Anaphylaxis    Tomato Swelling     Tongue    Trazodone Angioedema      Family History   Problem Relation Age of Onset    Hypertension Father    Jose Moralezs Hypertension Mother         SOCIAL HISTORY:  Patient resides:  Independently x   Assisted Living    SNF    With family care       Smoking history:   None x   Former    Chronic      Alcohol history:   None x   Social    Chronic      Ambulates:   Independently x   w/cane    w/walker    w/wc    CODE STATUS:  DNR    Full x   Other      Objective:     Physical Exam:     Visit Vitals  /79   Pulse 77   Temp 97.8 °F (36.6 °C)   Resp 13   SpO2 93%           General:  Alert, cooperative, no distress, appears stated age. Head:  Normocephalic, without obvious abnormality, atraumatic. Eyes:  Conjunctivae/corneas clear. PERRL, EOMs intact. Nose: Nares normal. Septum midline. Mucosa normal.        Neck: Supple, symmetrical, trachea midline, no carotid bruit and no JVD. Lungs:    Anterior breath sounds clear to auscultation bilaterally. Chest wall:  No tenderness or deformity. Heart:  Regular rate and rhythm, S1, S2 normal, no murmur, click, rub or gallop. Abdomen:   Soft, non-tender. Bowel sounds normal. No masses,  No organomegaly. Extremities: Extremities normal, atraumatic, no cyanosis or edema. Pulses: 2+ and symmetric all extremities. Skin: Skin color, texture, turgor normal. No rashes or lesions   Neurologic: CNII-XII are grossly intact. Full neurological exam is unobtainable due to patient noncompliance. EKG: Normal sinus rhythm  Data Review:     Recent Days:  Recent Labs     01/18/22  1759   WBC 3.7   HGB 12.2   HCT 36.5        Recent Labs     01/18/22  1708   *   K 3.8      CO2 27   *   BUN 16   CREA 1.09*   CA 9.8   ALB 3.5   ALT 29   INR 1.0     No results for input(s): PH, PCO2, PO2, HCO3, FIO2 in the last 72 hours.     24 Hour Results:  Recent Results (from the past 24 hour(s))   GLUCOSE, POC    Collection Time: 01/18/22  5:00 PM   Result Value Ref Range    Glucose (POC) 110 65 - 117 mg/dL    Performed by Fouzia Gibbs RN    METABOLIC PANEL, COMPREHENSIVE    Collection Time: 01/18/22  5:08 PM   Result Value Ref Range    Sodium 135 (L) 136 - 145 mmol/L    Potassium 3.8 3.5 - 5.1 mmol/L    Chloride 103 97 - 108 mmol/L    CO2 27 21 - 32 mmol/L    Anion gap 5 5 - 15 mmol/L    Glucose 106 (H) 65 - 100 mg/dL    BUN 16 6 - 20 MG/DL    Creatinine 1.09 (H) 0.55 - 1.02 MG/DL    BUN/Creatinine ratio 15 12 - 20      GFR est AA >60 >60 ml/min/1.73m2    GFR est non-AA 54 (L) >60 ml/min/1.73m2    Calcium 9.8 8.5 - 10.1 MG/DL    Bilirubin, total 0.3 0.2 - 1.0 MG/DL    ALT (SGPT) 29 12 - 78 U/L    AST (SGOT) 32 15 - 37 U/L    Alk. phosphatase 109 45 - 117 U/L    Protein, total 7.6 6.4 - 8.2 g/dL    Albumin 3.5 3.5 - 5.0 g/dL    Globulin 4.1 (H) 2.0 - 4.0 g/dL    A-G Ratio 0.9 (L) 1.1 - 2.2     PROTHROMBIN TIME + INR    Collection Time: 01/18/22  5:08 PM   Result Value Ref Range    INR 1.0 0.9 - 1.1      Prothrombin time 10.9 9.0 - 11.1 sec   TROPONIN-HIGH SENSITIVITY    Collection Time: 01/18/22  5:08 PM   Result Value Ref Range    Troponin-High Sensitivity 6 0 - 51 ng/L   URINE CULTURE HOLD SAMPLE    Collection Time: 01/18/22  5:08 PM    Specimen: Serum   Result Value Ref Range    Urine culture hold        Urine on hold in Microbiology dept for 2 days. If unpreserved urine is submitted, it cannot be used for addtional testing after 24 hours, recollection will be required.    COVID-19 RAPID TEST    Collection Time: 01/18/22  5:59 PM   Result Value Ref Range    Specimen source Nasopharyngeal      COVID-19 rapid test Detected (A) NOTD     CBC WITH AUTOMATED DIFF    Collection Time: 01/18/22  5:59 PM   Result Value Ref Range    WBC 3.7 3.6 - 11.0 K/uL    RBC 4.23 3.80 - 5.20 M/uL    HGB 12.2 11.5 - 16.0 g/dL    HCT 36.5 35.0 - 47.0 %    MCV 86.3 80.0 - 99.0 FL    MCH 28.8 26.0 - 34.0 PG    MCHC 33.4 30.0 - 36.5 g/dL    RDW 14.1 11.5 - 14.5 %    PLATELET 102 580 - 312 K/uL    MPV 9.2 8.9 - 12.9 FL    NRBC 0.0 0  WBC    ABSOLUTE NRBC 0.00 0.00 - 0.01 K/uL NEUTROPHILS 51 32 - 75 %    LYMPHOCYTES 35 12 - 49 %    MONOCYTES 11 5 - 13 %    EOSINOPHILS 2 0 - 7 %    BASOPHILS 1 0 - 1 %    IMMATURE GRANULOCYTES 0 0.0 - 0.5 %    ABS. NEUTROPHILS 1.9 1.8 - 8.0 K/UL    ABS. LYMPHOCYTES 1.3 0.8 - 3.5 K/UL    ABS. MONOCYTES 0.4 0.0 - 1.0 K/UL    ABS. EOSINOPHILS 0.1 0.0 - 0.4 K/UL    ABS. BASOPHILS 0.0 0.0 - 0.1 K/UL    ABS. IMM. GRANS. 0.0 0.00 - 0.04 K/UL    DF AUTOMATED     EKG, 12 LEAD, INITIAL    Collection Time: 01/18/22  6:02 PM   Result Value Ref Range    Ventricular Rate 80 BPM    Atrial Rate 80 BPM    P-R Interval 160 ms    QRS Duration 84 ms    Q-T Interval 412 ms    QTC Calculation (Bezet) 475 ms    Calculated P Axis 68 degrees    Calculated R Axis 74 degrees    Calculated T Axis 75 degrees    Diagnosis       Normal sinus rhythm    When compared with ECG of 21-FEB-2021 11:39,  No significant change was found  Confirmed by Felipa Marquez M.D., [facility] ([id number]) on 1/18/2022 10:20:13 PM           Imaging:     Assessment:     Elsie Guzman is a 48 y.o. female with history of polysubstance abuse (cocaine, heroin, THC), sarcoidosis, bipolar disorder, major depressive disorder who is admitted for acute encephalopathy. Plan:       Encephalopathy  -Unclear etiology at this time but positive UDS for raises strong suspicion to likely substanceabuse etiology. -Received Narcan x2 on ER presentation with minimal  Effect.  -Patient is arousable and fully conversant when aroused  -Given her known history of seizure disorder, will also need to consider breakthrough seizure.   My potential postictal state  -Will loaded with 1 g Keppra  -CT head is unremarkable  -Teleneurology recommended CVA work-up but I doubt this is of ischemic etiology  -I will defer need for EEG and call MRI to neurology consult  -Continue volume resuscitation  -Counseled on cessation once fully awake  -Continue home Suboxone    Acute hypoxic respiratory failure secondary to COVID-pneumonia  -Obtain inflammatory markers  -Start dexamethasone daily  -Continue doxycycline pending Pro-Pablo  -Therapeutic vitamins    Bipolar disorder /major depressive disorder /generalized anxiety disorder  -Continue home medications    Asthma/COPD  -Home montelukast, Brovana plus Pulmicort  -Albuterol daily 6    Polysubstance abuse  -UDS positive for cocaine, THC use and benzo  -We will need counseling on cessation.           FEN/GI -  NS Bolus  Activity - as tolerated  DVT prophylaxis - LOVENOX  GI prophylaxis -  NI  Disposition - Home    CODE STATUS:  Full code       Signed By: Lilian Ramos MD     January 19, 2022

## 2022-01-19 NOTE — PROGRESS NOTES
[facility] Adult  Hospitalist Group                                                                                          Hospitalist Progress Note  Deric Berger MD  Answering service: [phone] OR [phone] from in house phone        Date of Service:  2022  NAME:  Gema Abebe  :  1975  MRN:  [id number]      Admission Summary:   Gema Abebe is a 48 y.o. female with history of polysubstance abuse (cocaine, heroin, THC), copd, sarcoidosis, bipolar disorder, major depressive disorder who was brought in to the hospital via EMS for concerns of altered mental status. Interval history / Subjective:   Pt demanding to go home with nursing staff. Disorganized thinking. Oriented. Coming out of the room despite having COVID. Educated. Refuses      Assessment & Plan:     Likely Toxic Encephalopathy - likely secondary to active drug use   -Received Narcan x2 on ER presentation with minimal  Effect.  - s/p 1 g keppra load  -CT head is unremarkable  -Teleneurology recommended CVA work-up but I doubt this is of ischemic etiology. She is now back to baseline.   -I will defer need for EEG and call MRI to neurology consult  -Counseled on cessation once fully awake  -Continue home Suboxone     Acute hypoxic respiratory failure secondary to COVID-pneumonia - unvaccinated  -Obtain inflammatory markers --> when CRP is back and pt agrees to wear pulse ox will see if she qualifies for additional treatment such as baricitinib or actemra   -Start dexamethasone daily  -Continue doxycycline pending Pro-Pablo, if negative will DC  -Therapeutic vitamins  - O2 as needed for saturations over 88%     Bipolar disorder /major depressive disorder /generalized anxiety disorder  -Continue home medications     Asthma/COPD  -Home montelukast, Brovana plus Pulmicort  -Albuterol daily 6     Polysubstance abuse  -UDS positive for cocaine, THC use and benzo  -We will need counseling on cessation.     Code status: FULL  DVT prophylaxis: Lovenox, change to COVID dosing     Care Plan discussed with: Patient/Family  Anticipated Disposition: Home w/Family  Anticipated Discharge: Greater than 48 hours, pt has demanded to leave AMA, discussed risks and benefit. Pt at this moment agreeable to stay, if patient decides to leave she is aware of risk and benefit. Cannot keep her here against her will. Hospital Problems  Date Reviewed: 2/6/2021          Codes Class Noted POA    Encephalopathy ICD-10-CM: G93.40  ICD-9-CM: 348.30  1/19/2022 Unknown                Review of Systems:   A comprehensive review of systems was negative except for that written in the HPI. Vital Signs:    Last 24hrs VS reviewed since prior progress note. Most recent are:  Visit Vitals  BP (!) 121/90 (BP 1 Location: Left arm, BP Patient Position: At rest)   Pulse 76   Temp 98.4 °F (36.9 °C)   Resp 16   Ht 5' 4\" (1.626 m)   Wt 90.7 kg (200 lb)   SpO2 97%   BMI 34.33 kg/m²       No intake or output data in the 24 hours ending 01/19/22 4260     Physical Examination:     I had a face to face encounter with this patient and independently examined them on 1/19/2022 as outlined below:          Constitutional:  No acute distress, awake and alert    ENT:  Oral mucosa moist, oropharynx benign. Resp:  Tachypneic, no increased WOB. Will not wear her pulse ox    CV:  Regular rhythm, normal rate, no murmurs, gallops, rubs    GI:  Soft, non distended, non tender.  normoactive bowel sounds, no hepatosplenomegaly     Musculoskeletal:  No edema, warm, 2+ pulses throughout    Neurologic:  Moves all extremities, strength 5/5 bilaterally  AAOx3, CN II-XII reviewed            Data Review:    Review and/or order of clinical lab test  Review and/or order of tests in the radiology section of CPT  Review and/or order of tests in the medicine section of CPT      Labs:     Recent Labs     01/19/22  0126 01/18/22  1759   WBC 5.1 3.7   HGB 13.3 12.2   HCT 40.6 36.5    243 Recent Labs     01/19/22  0126 01/18/22  1708    135*   K 3.7 3.8    103   CO2 33* 27   BUN 14 16   CREA 1.03* 1.09*   * 106*   CA 9.8 9.8     Recent Labs     01/19/22  0126 01/18/22  1708   ALT 31 29    109   TBILI 0.3 0.3   TP 7.6 7.6   ALB 3.5 3.5   GLOB 4.1* 4.1*     Recent Labs     01/18/22  1708   INR 1.0   PTP 10.9      No results for input(s): FE, TIBC, PSAT, FERR in the last 72 hours. Lab Results   Component Value Date/Time    Folate 21.5 (H) 08/01/2017 02:53 AM      No results for input(s): PH, PCO2, PO2 in the last 72 hours. No results for input(s): CPK, CKNDX, TROIQ in the last 72 hours.     No lab exists for component: CPKMB  Lab Results   Component Value Date/Time    Cholesterol, total 195 08/10/2018 12:07 AM    HDL Cholesterol 57 08/10/2018 12:07 AM    LDL, calculated 127.4 (H) 08/10/2018 12:07 AM    Triglyceride 53 08/10/2018 12:07 AM    CHOL/HDL Ratio 3.4 08/10/2018 12:07 AM     Lab Results   Component Value Date/Time    Glucose (POC) 110 01/18/2022 05:00 PM    Glucose (POC) 91 08/03/2018 05:30 AM    Glucose (POC) 109 (H) 08/03/2018 12:37 AM    Glucose (POC) 111 (H) 07/31/2018 06:21 PM    Glucose (POC) 114 (H) 07/31/2018 12:05 PM     Lab Results   Component Value Date/Time    Color YELLOW/STRAW 01/19/2022 03:35 AM    Appearance CLEAR 01/19/2022 03:35 AM    Specific gravity 1.010 01/19/2022 03:35 AM    Specific gravity 1.020 02/16/2021 11:04 AM    pH (UA) 5.5 01/19/2022 03:35 AM    Protein Negative 01/19/2022 03:35 AM    Glucose Negative 01/19/2022 03:35 AM    Ketone TRACE (A) 01/19/2022 03:35 AM    Bilirubin Negative 01/19/2022 03:35 AM    Urobilinogen 1.0 01/19/2022 03:35 AM    Nitrites Negative 01/19/2022 03:35 AM    Leukocyte Esterase Negative 01/19/2022 03:35 AM    Epithelial cells FEW 01/19/2022 03:35 AM    Bacteria Negative 01/19/2022 03:35 AM    WBC 0-4 01/19/2022 03:35 AM    RBC 0-5 01/19/2022 03:35 AM         Medications Reviewed:     Current Facility-Administered Medications   Medication Dose Route Frequency    sodium chloride (NS) flush 5-40 mL  5-40 mL IntraVENous Q8H    sodium chloride (NS) flush 5-40 mL  5-40 mL IntraVENous PRN    acetaminophen (TYLENOL) tablet 650 mg  650 mg Oral Q6H PRN    Or    acetaminophen (TYLENOL) suppository 650 mg  650 mg Rectal Q6H PRN    polyethylene glycol (MIRALAX) packet 17 g  17 g Oral DAILY PRN    ondansetron (ZOFRAN ODT) tablet 4 mg  4 mg Oral Q8H PRN    Or    ondansetron (ZOFRAN) injection 4 mg  4 mg IntraVENous Q6H PRN    . PHARMACY TO SUBSTITUTE PER PROTOCOL (Reordered from: buprenorphine-naloxone (Suboxone) 8-2 mg film sublingaul film)    Per Protocol    hydrOXYzine HCL (ATARAX) tablet 50 mg  50 mg Oral Q6H PRN    levETIRAcetam (KEPPRA) tablet 500 mg  500 mg Oral BID    levETIRAcetam (KEPPRA) 1,000 mg in 0.9% sodium chloride 100 mL IVPB  1,000 mg IntraVENous ONCE    mirtazapine (REMERON) tablet 15 mg  15 mg Oral QHS    montelukast (SINGULAIR) tablet 10 mg  10 mg Oral QHS    QUEtiapine (SEROquel) tablet 400 mg  400 mg Oral QHS    arformoteroL (BROVANA) neb solution 15 mcg  15 mcg Nebulization BID RT    And    budesonide (PULMICORT) 500 mcg/2 ml nebulizer suspension  500 mcg Nebulization BID RT    dexAMETHasone (DECADRON) tablet 6 mg  6 mg Oral DAILY    ergocalciferol capsule 50,000 Units  50,000 Units Oral ONCE    zinc sulfate (ZINCATE) 50 mg zinc (220 mg) capsule 1 Capsule  1 Capsule Oral DAILY    ascorbic acid (vitamin C) (VITAMIN C) tablet 1,000 mg  1,000 mg Oral BID    doxycycline (VIBRAMYCIN) 100 mg in 0.9% sodium chloride (MBP/ADV) 100 mL MBP  100 mg IntraVENous Q12H    enoxaparin (LOVENOX) injection 40 mg  40 mg SubCUTAneous Q24H    famotidine (PEPCID) tablet 40 mg  40 mg Oral BID    albuterol (PROVENTIL HFA, VENTOLIN HFA, PROAIR HFA) inhaler 4 Puff  4 Puff Inhalation Q4H RT     Current Outpatient Medications   Medication Sig    montelukast (SINGULAIR) 10 mg tablet Take 1 Tab by mouth nightly. Indications: controller medication for asthma    QUEtiapine (SEROquel) 400 mg tablet Take 1 Tab by mouth nightly. Indications: mood    albuterol (ProAir HFA) 90 mcg/actuation inhaler Take 2 Puffs by inhalation every four (4) hours as needed for Wheezing or Shortness of Breath. Indications: bronchospasm prevention    fluticasone propion-salmeteroL (Advair Diskus) 250-50 mcg/dose diskus inhaler Take 1 Puff by inhalation two (2) times a day. Indications: bronchospasm prevention with COPD    gabapentin (NEURONTIN) 300 mg capsule Take 1 Cap by mouth three (3) times daily. Max Daily Amount: 900 mg. Indications: neuropathic pain    hydrOXYzine HCL (ATARAX) 50 mg tablet Take 1 Tab by mouth every six (6) hours as needed for Anxiety. Indications: anxious    levETIRAcetam (Keppra) 500 mg tablet Take 1 Tab by mouth two (2) times a day. Indications: additional medication to treat partial seizures    mirtazapine (REMERON) 15 mg tablet Take 1 Tab by mouth nightly. Indications: major depressive disorder    tiotropium (Spiriva with HandiHaler) 18 mcg inhalation capsule Take 1 Cap by inhalation daily. Indications: bronchospasm prevention with COPD    buprenorphine-naloxone (Suboxone) 8-2 mg film sublingaul film 2 Film by SubLINGual route daily. Max Daily Amount: 2 Film.  Indications: dependence on opioid-type drugs     ______________________________________________________________________  EXPECTED LENGTH OF STAY: - - -  ACTUAL LENGTH OF STAY:          0                 Jeralyn Simmonds, MD

## 2022-01-19 NOTE — PROGRESS NOTES
Admission Medication Reconciliation:    Information obtained from:  Active orders list from [address] [place] [address])  RxQuery data available¹:  YES    Comments/Recommendations: Updated PTA meds/reviewed patient's allergies. 1)  med list transcribed into PTA meds, medications with recent fills left in the list but marked as \"not taking\"    2)  Medication changes (since last review): Added  - amitriptyline 75 mg daily (not on paperwork)  - amlodipine 5 mg daily  - furosemide 40 mg daily  - guaifenesin 1200 mg BID  - haloperidol 10 mg daily  - pantoprazole 40 mg daily  - prazosin 4 mg qhs  - ropinerole 0.25 mg qhs  - sertraline 200 mg daily (not on paperwork)    Adjusted  - hydroxyzine carmelina 100 mg TID prn (not on paperwork)  - gabapentin 400 mg TID (not on paperwork)  - quetiapine 200 mg qhs (not on paperwork)    Removed  - mirtazipine (allergy added from paperwork)  - Suboxone 3 films daily last filled for 3 days supply on 12/8/21 (not on paperwork)    3) blood pressure without medications 106//85. Restart antihypertensives as needed   ¹RxQuery pharmacy benefit data reflects medications filled and processed through the patient's insurance, however   this data does NOT capture whether the medication was picked up or is currently being taken by the patient.     Allergies:  Vancomycin, Ketorolac, Remeron [mirtazapine], Tomato, and Trazodone    Significant PMH/Disease States:   Past Medical History:   Diagnosis Date    Asthma     Bipolar 1 disorder (HCC)     Chronic obstructive pulmonary disease (HCC)     Chronic pain     back, leg    Cocaine abuse ([address]     Depression     Heart failure (HCC)     Hepatitis B     Heroin abuse ([address]     HTN (hypertension)     Narcotic abuse ([address]     Polysubstance abuse ([address]     Sarcoidosis     Tobacco abuse      Chief Complaint for this Admission:    Chief Complaint   Patient presents with    Lethargy     Prior to Admission Medications:   Prior to Admission Medications   Prescriptions Last Dose Informant Taking? QUEtiapine (SEROquel) 200 mg tablet Not Taking at Unknown time  No   Sig: Take 200 mg by mouth nightly. Patient not taking: Reported on 1/19/2022   albuterol (ProAir HFA) 90 mcg/actuation inhaler   No   Sig: Take 2 Puffs by inhalation every four (4) hours as needed for Wheezing or Shortness of Breath. Indications: bronchospasm prevention   amLODIPine (NORVASC) 5 mg tablet   Yes   Sig: Take 5 mg by mouth daily. amitriptyline (ELAVIL) 150 mg tablet   No   Sig: Take 75 mg by mouth nightly as needed for Sleep.   cloNIDine HCL (CATAPRES) 0.3 mg tablet   Yes   Sig: Take 0.3 mg by mouth three (3) times daily. furosemide (LASIX) 40 mg tablet   Yes   Sig: Take 40 mg by mouth daily. gabapentin (NEURONTIN) 400 mg capsule   Yes   Sig: Take 400 mg by mouth three (3) times daily. guaiFENesin (MUCINEX) 1,200 mg Ta12 ER tablet   Yes   Sig: Take 1,200 mg by mouth two (2) times a day.   haloperidoL (HALDOL) 10 mg tablet   Yes   Sig: Take 10 mg by mouth daily. hydrOXYzine pamoate (VISTARIL) 100 mg capsule   Yes   Sig: Take 100 mg by mouth three (3) times daily as needed for Anxiety. levETIRAcetam (Keppra) 500 mg tablet   No   Sig: Take 1 Tab by mouth two (2) times a day. Indications: additional medication to treat partial seizures   mirtazapine (REMERON) 15 mg tablet   No   Sig: Take 1 Tab by mouth nightly. Indications: major depressive disorder   mometasone-formoterol (DULERA) 200-5 mcg/actuation HFA inhaler Not Taking at Unknown time  No   Sig: Take 2 Puffs by inhalation two (2) times a day. Patient not taking: Reported on 1/19/2022   montelukast (SINGULAIR) 10 mg tablet   No   Sig: Take 1 Tab by mouth nightly. Indications: controller medication for asthma   pantoprazole (PROTONIX) 40 mg tablet   Yes   Sig: Take 40 mg by mouth daily. prazosin (MINIPRESS) 2 mg capsule   Yes   Sig: Take 4 mg by mouth nightly.    rOPINIRole (REQUIP) 0.25 mg tablet   Yes   Sig: Take 0.25 mg by mouth nightly. sertraline (ZOLOFT) 100 mg tablet Not Taking at Unknown time  No   Sig: Take 200 mg by mouth daily. Patient not taking: Reported on 1/19/2022      Facility-Administered Medications: None     Please contact the main inpatient pharmacy with any questions or concerns at [phone] and we will direct you to the clinical pharmacist covering this patient's care while in-house.    Erendira Hughes, ANGELAD

## 2022-01-19 NOTE — ED NOTES
Spoke with pt who said she was upset about no one coming to see her since she's been here. I explained to pt that she has been seen and the doctor has placed orders for her care but that she was saying earlier that she wants to leave AMA. Pt asked if we could help her get back home. I explained that if she stays for treatment, we can help facilitate that but we are not going to facilitate her leaving AMA because we want her to stay AMA. Pt became upset about her personal belongings and asked me where her clothes and other things were. I told her that I do not know but that I will try to get in touch with her group home to find out.

## 2022-01-19 NOTE — PROGRESS NOTES
Occupational Therapy Note:     Orders received, chart reviewed. Pt attempted to leave AMA overnight. Spoke with PT this morning and nursing reports pt is up ad yani in the room with no deficits. Pt is now COVID +. ADL assessment not indicated at this time. Will complete OT odrers.        Daija Espinoza, OT

## 2022-01-19 NOTE — ED NOTES
Pt agreeable to straight cath at this time. Straight cath performed using sterile technique and urine sent.

## 2022-01-19 NOTE — ED NOTES
Pt standing at room of her door yelling at this nurse to help her. When I asked what she needed, pt states that she needs to leave. I told her that I was just getting here and need to speak to the off-going nurse. Pt then yelled, \"I've been here all fucking night! I'm ready to go! \"     Pt's night shift nurse states that pt may leave AMA and asked me to remove her IV. When I told pt that I need to remove her IV in order for her to leave, she said, \"No the fuck you're not! You need to call me a ride! \" Beth RN entered room and told pt that she can leave now and have her IV removed or wait 20 min for physician to see her. Pt states that she will wait. Pt quiet in her room at this time.

## 2022-01-19 NOTE — ED NOTES
Pt refusing to go pee, be straight cath, or fill out mri form.   Pt will not let me put O2 pulse ox on her

## 2022-01-19 NOTE — PROGRESS NOTES
Physical Therapy  1/19/2022    Order received, chart reviewed. Per ED rounds, RN, and chart, patient up and ambulatory ad ynai, even leaving the room despite being COVID +. PT evaluation not indicated as mobility is back to baseline. Thank you.     Chelsie Woodie Denver, PT, DPT

## 2022-01-19 NOTE — CONSULTS
INPATIENT NEUROLOGY CONSULTATION  2022     Consulted by: Abdulkadir Osborn*        Patient ID:  Marii Villatoro  [id number]  48 y.o.  1975    Chief Complaint   Patient presents with    Lethargy       HPI    Armaan Bartholomew is a 47-year-old woman with a history of substance abuse, psychiatric disease, reported seizure history who presents with concern of confusion possibly seizure activity. When I visit with her, she is demanding to go home. Nurses were preparing AMA paperwork. I speak with her briefly. She says she takes Depakote twice a day but is not working. Her last seizure was the day she presented here. She tells me she feels her baseline now with the exception of some left arm and shoulder pain. She sees a neurologist reportedly. She has no headache. She is COVID-positive. Review of Systems   Musculoskeletal: Positive for myalgias. Neurological: Positive for seizures. All other systems reviewed and are negative.       Past Medical History:   Diagnosis Date    Asthma     Bipolar 1 disorder (HCC)     Chronic obstructive pulmonary disease (HCC)     Chronic pain     back, leg    Cocaine abuse (HCC)     Depression     Heart failure (HCC)     Hepatitis B     Heroin abuse ([address]     HTN (hypertension)     [facility]     Polysubstance abuse ([facility]     Sarcoidosis     Tobacco abuse      Family History   Problem Relation Age of Onset    Hypertension Father     Hypertension Mother      Social History     Socioeconomic History    Marital status: SINGLE     Spouse name: Not on file    Number of children: Not on file    Years of education: Not on file    Highest education level: Not on file   Occupational History    Not on file   Tobacco Use    Smoking status: Current Every Day Smoker     Packs/day: 0.25     Years: 15.00     Pack years: 3.75     Last attempt to quit: 8/3/2017     Years since quittin.4    Smokeless tobacco: Never Used    Tobacco comment: \"I already quit\"   Substance and Sexual Activity    Alcohol use: No    Drug use: Not Currently     Types: Marijuana, Heroin     Comment: Pt reports last used heroin on 9/23/20    Sexual activity: Yes     Partners: Female   Other Topics Concern     Service Not Asked    Blood Transfusions Not Asked    Caffeine Concern Not Asked    Occupational Exposure Not Asked    Hobby Hazards Not Asked    Sleep Concern Not Asked    Stress Concern Not Asked    Weight Concern Not Asked    Special Diet Not Asked    Back Care Not Asked    Exercise Not Asked    Bike Helmet Not Asked   [address] Not Asked    Self-Exams Not Asked   Social History Narrative    Born in [place], [place], 5 children,    No legal charges. Pt graduated from high school. Pt was employed last month at [employer], currently she is unemployed. Pt lives with her mother and 8year old son. She has 4 adult children. Social Determinants of Health     Financial Resource Strain:     Difficulty of Paying Living Expenses: Not on file   Food Insecurity:     Worried About Running Out of Food in the Last Year: Not on file    Christine of Food in the Last Year: Not on file   Transportation Needs:     Lack of Transportation (Medical): Not on file    Lack of Transportation (Non-Medical):  Not on file   Physical Activity:     Days of Exercise per Week: Not on file    Minutes of Exercise per Session: Not on file   Stress:     Feeling of Stress : Not on file   Social Connections:     Frequency of Communication with Friends and Family: Not on file    Frequency of Social Gatherings with Friends and Family: Not on file    Attends Tenriism Services: Not on file    Active Member of Clubs or Organizations: Not on file    Attends Club or Organization Meetings: Not on file    Marital Status: Not on file   Intimate Partner Violence:     Fear of Current or Ex-Partner: Not on file    Emotionally Abused: Not on file    Physically Abused: Not on file  Sexually Abused: Not on file   Housing Stability:     Unable to Pay for Housing in the Last Year: Not on file    Number of Places Lived in the Last Year: Not on file    Unstable Housing in the Last Year: Not on file     Current Facility-Administered Medications   Medication Dose Route Frequency    sodium chloride (NS) flush 5-40 mL  5-40 mL IntraVENous Q8H    sodium chloride (NS) flush 5-40 mL  5-40 mL IntraVENous PRN    acetaminophen (TYLENOL) tablet 650 mg  650 mg Oral Q6H PRN    Or    acetaminophen (TYLENOL) suppository 650 mg  650 mg Rectal Q6H PRN    polyethylene glycol (MIRALAX) packet 17 g  17 g Oral DAILY PRN    ondansetron (ZOFRAN ODT) tablet 4 mg  4 mg Oral Q8H PRN    Or    ondansetron (ZOFRAN) injection 4 mg  4 mg IntraVENous Q6H PRN    hydrOXYzine HCL (ATARAX) tablet 50 mg  50 mg Oral Q6H PRN    levETIRAcetam (KEPPRA) 1,000 mg in 0.9% sodium chloride 100 mL IVPB  1,000 mg IntraVENous ONCE    montelukast (SINGULAIR) tablet 10 mg  10 mg Oral QHS    dexAMETHasone (DECADRON) tablet 6 mg  6 mg Oral DAILY    ergocalciferol capsule 50,000 Units  50,000 Units Oral ONCE    zinc sulfate (ZINCATE) 50 mg zinc (220 mg) capsule 1 Capsule  1 Capsule Oral DAILY    ascorbic acid (vitamin C) (VITAMIN C) tablet 1,000 mg  1,000 mg Oral BID    doxycycline (VIBRAMYCIN) 100 mg in 0.9% sodium chloride (MBP/ADV) 100 mL MBP  100 mg IntraVENous Q12H    famotidine (PEPCID) tablet 20 mg  20 mg Oral BID    albuterol (PROVENTIL HFA, VENTOLIN HFA, PROAIR HFA) inhaler 4 Puff  4 Puff Inhalation Q4H RT    enoxaparin (LOVENOX) injection 40 mg  40 mg SubCUTAneous Q12H    levETIRAcetam (KEPPRA) tablet 500 mg  500 mg Oral BID    gabapentin (NEURONTIN) capsule 400 mg  400 mg Oral TID    guaiFENesin ER (MUCINEX) tablet 1,200 mg  1,200 mg Oral Q12H    haloperidoL (HALDOL) tablet 10 mg  10 mg Oral DAILY    [START ON 1/20/2022] pantoprazole (PROTONIX) tablet 40 mg  40 mg Oral ACB    prazosin (MINIPRESS) capsule 4 mg 4 mg Oral QHS    rOPINIRole (REQUIP) tablet 0.25 mg  0.25 mg Oral QHS    cloNIDine HCL (CATAPRES) tablet 0.3 mg  0.3 mg Oral TID    [START ON 1/20/2022] furosemide (LASIX) tablet 40 mg  40 mg Oral DAILY     Current Outpatient Medications   Medication Sig    dexAMETHasone (DECADRON) 6 mg tablet Take 1 Tablet by mouth Daily (before breakfast) for 9 days.  gabapentin (NEURONTIN) 400 mg capsule Take 400 mg by mouth three (3) times daily.  hydrOXYzine pamoate (VISTARIL) 100 mg capsule Take 100 mg by mouth three (3) times daily as needed for Anxiety.  amLODIPine (NORVASC) 5 mg tablet Take 5 mg by mouth daily.  cloNIDine HCL (CATAPRES) 0.3 mg tablet Take 0.3 mg by mouth three (3) times daily.  furosemide (LASIX) 40 mg tablet Take 40 mg by mouth daily.  guaiFENesin (MUCINEX) 1,200 mg Ta12 ER tablet Take 1,200 mg by mouth two (2) times a day.  haloperidoL (HALDOL) 10 mg tablet Take 10 mg by mouth daily.  pantoprazole (PROTONIX) 40 mg tablet Take 40 mg by mouth daily.  prazosin (MINIPRESS) 2 mg capsule Take 4 mg by mouth nightly.  rOPINIRole (REQUIP) 0.25 mg tablet Take 0.25 mg by mouth nightly.  mometasone-formoterol (DULERA) 200-5 mcg/actuation HFA inhaler Take 2 Puffs by inhalation two (2) times a day. (Patient not taking: Reported on 1/19/2022)    QUEtiapine (SEROquel) 200 mg tablet Take 200 mg by mouth nightly. (Patient not taking: Reported on 1/19/2022)    sertraline (ZOLOFT) 100 mg tablet Take 200 mg by mouth daily. (Patient not taking: Reported on 1/19/2022)    amitriptyline (ELAVIL) 150 mg tablet Take 75 mg by mouth nightly as needed for Sleep. (Patient not taking: Reported on 1/19/2022)    montelukast (SINGULAIR) 10 mg tablet Take 1 Tab by mouth nightly. Indications: controller medication for asthma    albuterol (ProAir HFA) 90 mcg/actuation inhaler Take 2 Puffs by inhalation every four (4) hours as needed for Wheezing or Shortness of Breath.  Indications: bronchospasm prevention    levETIRAcetam (Keppra) 500 mg tablet Take 1 Tab by mouth two (2) times a day. Indications: additional medication to treat partial seizures     Allergies   Allergen Reactions    Vancomycin Anaphylaxis    Ketorolac Unable to Obtain     From [facility] paperwork 1/19/22    Remeron [Mirtazapine] Unable to Obtain     From [facility] paperwork on 1/19/22    Tomato Swelling     Tongue    Trazodone Angioedema       Visit Vitals  BP (!) 121/90 (BP 1 Location: Left arm, BP Patient Position: At rest)   Pulse 76   Temp 98.4 °F (36.9 °C)   Resp 16   Ht 5' 4\" (1.626 m)   Wt 200 lb (90.7 kg)   SpO2 97%   BMI 34.33 kg/m²     Physical Exam  Vitals and nursing note reviewed. Neurological:      Mental Status: She is alert. Neurologic Exam     Mental Status   Age-appropriate woman awake and alert. Sitting upright on her gurney. Asking to go home. She knows where she is, she knows the month and the year and the situation. She is following commands. Exam is limited. Her face is grossly symmetric. Gaze is conjugate. Speech is clear without aphasia. She is activating all extremities against gravity easily with purpose. No abnormal movements. Gait appears steady in the exam room.             Lab Results   Component Value Date/Time    WBC 5.1 01/19/2022 01:26 AM    HGB 13.3 01/19/2022 01:26 AM    Hemoglobin (POC) 13.3 06/15/2009 05:10 PM    HCT 40.6 01/19/2022 01:26 AM    Hematocrit (POC) 39 06/15/2009 05:10 PM    PLATELET 591 89/40/3813 01:26 AM    MCV 88.6 01/19/2022 01:26 AM     Lab Results   Component Value Date/Time    Hemoglobin A1c 6.0 (H) 01/19/2022 01:07 AM    Hemoglobin A1c 5.7 08/05/2018 06:56 AM    Hemoglobin A1c 5.3 01/10/2017 05:28 AM    Glucose 112 (H) 01/19/2022 01:26 AM    Glucose 93 08/05/2018 06:56 AM    Glucose (POC) 110 01/18/2022 05:00 PM    LDL, calculated 127.4 (H) 08/10/2018 12:07 AM    Creatinine (POC) 1.2 06/15/2009 05:10 PM    Creatinine 1.03 (H) 01/19/2022 01:26 AM      Lab Results Component Value Date/Time    Cholesterol, total 195 08/10/2018 12:07 AM    HDL Cholesterol 57 08/10/2018 12:07 AM    LDL, calculated 127.4 (H) 08/10/2018 12:07 AM    Triglyceride 53 08/10/2018 12:07 AM    CHOL/HDL Ratio 3.4 08/10/2018 12:07 AM     Lab Results   Component Value Date/Time    ALT (SGPT) 31 01/19/2022 01:26 AM    Alk. phosphatase 108 01/19/2022 01:26 AM    Bilirubin, direct <0.1 07/31/2018 02:41 PM    Bilirubin, total 0.3 01/19/2022 01:26 AM    Albumin 3.5 01/19/2022 01:26 AM    Protein, total 7.6 01/19/2022 01:26 AM    Ammonia 10 01/19/2022 01:26 AM    INR 1.0 01/18/2022 05:08 PM    Prothrombin time 10.9 01/18/2022 05:08 PM    PLATELET 094 33/38/1265 01:26 AM    Hepatitis C RNA, QL, RAY Positive (A) 02/27/2017 05:19 AM    Hepatitis B surface Ag 0.12 02/22/2017 07:45 PM        CT Results (maximum last 3): Results from Hospital Encounter encounter on 01/18/22    CT CODE NEURO PERF W CBF    Narrative  *PRELIMINARY REPORT*    No acute thrombosis or flow limiting intracranial stenosis. No significant  perfusion defect. Preliminary report was provided by Dr. Melissa Christy, the on-call radiologist, at 5:42  PM.    Final report to follow. *END PRELIMINARY REPORT*      EXAM:  CT CODE NEURO PERF W CBF, CTA CODE NEURO HEAD AND NECK W CONT    INDICATION:   Code Neuro    COMPARISON:  None. CONTRAST: 100 mL of Isovue-370    TECHNIQUE: Unenhanced  images were obtained to localize the volume for  acquisition. Multislice helical axial CT angiography was performed from the  aortic arch to the top of the head during uneventful rapid bolus intravenous  contrast administration. Coronal and sagittal reformations and 3D post  processing was performed. CT dose reduction was achieved through use of a  standardized protocol tailored for this examination and automatic exposure  control for dose modulation.     CT brain perfusion was performed with generation of hemodynamic maps of multiple  parameters, including cerebral blood flow, cerebral blood volume, and MTT (mean  transit time). CT dose reduction was achieved through use of a standardized  protocol tailored for this examination and automatic exposure control for dose  modulation. This study was analyzed by the [address]  algorithm. FINDINGS:    CT Head with Contrast:  No abnormal enhancement. CTA Head:  There is no evidence of large vessel occlusion or flow-limiting stenosis of the  intracranial internal carotid, anterior cerebral, and middle cerebral arteries. The anterior communicating artery is patent. There is no evidence of large vessel occlusion or flow-limiting stenosis of the  intracranial vertebral arteries, basilar artery, or posterior cerebral arteries. The posterior communicating arteries are patent. There is no evidence of aneurysm or vascular malformation. The dural venous  sinuses and deep cerebral venous system are patent. CTA NECK:  NASCET method was utilized for calculating stenosis. The aortic arch is unremarkable. The right subclavian artery is not well seen  due to artifact. The common carotid arteries demonstrate no significant  stenosis. There is no evidence of significant stenosis in the cervical right  internal carotid artery. There is no evidence of significant stenosis in the  cervical left internal carotid artery. There is a codominant vertebrobasilar arterial system. The proximal right  vertebral artery not well seen due to artifact. The visualized cervical  vertebral arteries are normal in course, size and contour without significant  stenosis. Visualized soft tissues of the neck are unremarkable. Right maxillary sinus  mucus retention cysts. Visualized lung apices are clear. No acute fracture or  aggressive osseous lesion. CT Brain Perfusion:  There are no regional areas of elevated Tmax, decreased cerebral blood flow or  blood volume. rCBF < 30% = 0 cc. Tmax > 6 seconds = 0 cc. Impression  CTA Head:  1. No large vessel occlusion or significant flow-limiting stenosis. CTA Neck:  1. No large vessel occlusion or significant flow-limiting stenosis. CT Brain Perfusion:  1. No blood flow or volume deficits to suggest acute ischemia or tissue at  ischemic risk. CTA CODE NEURO HEAD AND NECK W CONT    Narrative  *PRELIMINARY REPORT*    No acute thrombosis or flow limiting intracranial stenosis. No significant  perfusion defect. Preliminary report was provided by Dr. Melissa Christy, the on-call radiologist, at 5:42  PM.    Final report to follow. *END PRELIMINARY REPORT*      EXAM:  CT CODE NEURO PERF W CBF, CTA CODE NEURO HEAD AND NECK W CONT    INDICATION:   Code Neuro    COMPARISON:  None. CONTRAST: 100 mL of Isovue-370    TECHNIQUE: Unenhanced  images were obtained to localize the volume for  acquisition. Multislice helical axial CT angiography was performed from the  aortic arch to the top of the head during uneventful rapid bolus intravenous  contrast administration. Coronal and sagittal reformations and 3D post  processing was performed. CT dose reduction was achieved through use of a  standardized protocol tailored for this examination and automatic exposure  control for dose modulation. CT brain perfusion was performed with generation of hemodynamic maps of multiple  parameters, including cerebral blood flow, cerebral blood volume, and MTT (mean  transit time). CT dose reduction was achieved through use of a standardized  protocol tailored for this examination and automatic exposure control for dose  modulation. This study was analyzed by the [address]  algorithm. FINDINGS:    CT Head with Contrast:  No abnormal enhancement. CTA Head:  There is no evidence of large vessel occlusion or flow-limiting stenosis of the  intracranial internal carotid, anterior cerebral, and middle cerebral arteries. The anterior communicating artery is patent.     There is no evidence of large vessel occlusion or flow-limiting stenosis of the  intracranial vertebral arteries, basilar artery, or posterior cerebral arteries. The posterior communicating arteries are patent. There is no evidence of aneurysm or vascular malformation. The dural venous  sinuses and deep cerebral venous system are patent. CTA NECK:  NASCET method was utilized for calculating stenosis. The aortic arch is unremarkable. The right subclavian artery is not well seen  due to artifact. The common carotid arteries demonstrate no significant  stenosis. There is no evidence of significant stenosis in the cervical right  internal carotid artery. There is no evidence of significant stenosis in the  cervical left internal carotid artery. There is a codominant vertebrobasilar arterial system. The proximal right  vertebral artery not well seen due to artifact. The visualized cervical  vertebral arteries are normal in course, size and contour without significant  stenosis. Visualized soft tissues of the neck are unremarkable. Right maxillary sinus  mucus retention cysts. Visualized lung apices are clear. No acute fracture or  aggressive osseous lesion. CT Brain Perfusion:  There are no regional areas of elevated Tmax, decreased cerebral blood flow or  blood volume. rCBF < 30% = 0 cc. Tmax > 6 seconds = 0 cc. Impression  CTA Head:  1. No large vessel occlusion or significant flow-limiting stenosis. CTA Neck:  1. No large vessel occlusion or significant flow-limiting stenosis. CT Brain Perfusion:  1. No blood flow or volume deficits to suggest acute ischemia or tissue at  ischemic risk. MRI Results (maximum last 3): No results found for this or any previous visit. VAS/US/Carotid Doppler Results (maximum last 3):   Results from Hospital Encounter encounter on 17    [address]   FINAL REPORT     Name: Jude Crisostomo  MRN: [id number]    Inpatient  : 14 Dec [address] [facility] #: [id number]  [address] [facility] Visit #: [id number]  Date: 25 Apr 2017    TYPE OF TEST: Cerebrovascular Duplex    REASON FOR TEST  AMS    Right Carotid:-  Proximal               Mid                 Distal  cm/s  Systolic  Diastolic  Systolic  Diastolic  Systolic  Diastolic  CCA:     04.0                                      81.0  Bulb:  ECA:    105.0  ICA:     94.0                 69.0                 83.0  ICA/CCA:  1.2    ICA Stenosis: Normal    Right Vertebral:-  Finding: Antegrade  Sys:       59.0  Jaci:    Right Subclavian: Normal    Left Carotid:-  Proximal                Mid                 Distal  cm/s  Systolic  Diastolic  Systolic  Diastolic  Systolic  Diastolic  CCA:    137.2      18.0                            87.0  Bulb:  ECA:    117.0  ICA:     54.0                 59.0                 69.0  ICA/CCA:  0.6    ICA Stenosis: Normal    Left Vertebral:-  Finding: Antegrade  Sys:       46.0  Jaci:    Left Subclavian: Normal    INTERPRETATION/FINDINGS  PROCEDURE:  Carotid Duplex Examination using B-mode, color and  spectral Doppler of the extracranial cerebrovascular arteries. 1. No evidence of significant arterial occlusive disease in the  internal carotid arteries. 2. No significant stenosis in the external carotid arteries  bilaterally. 3. Antegrade flow in both vertebral arteries. 4. Normal flow in both subclavian arteries. ADDITIONAL COMMENTS    I have personally reviewed the data relevant to the interpretation of  this  study. TECHNOLOGIST: Olivier Wagner RVT  Signed: 04/25/2017 02:53 PM    PHYSICIAN: Manjit Bragg MD  Signed: 04/25/2017 03:00 PM      PET Results (maximum last 3): No results found for this or any previous visit. Assessment and Plan        78-year-old woman who presents with episode of altered awareness possible seizure activity. She is not a very good historian. I have limited medical records to review.   At the moment she seems her baseline alert and oriented ambulating in her room. She can tell me about her Depakote. I would recommend she increase Depakote to 750 twice a day and see her neurologist.  I have no testing at this time since she seems to be back at her baseline. Certainly patients who have new COVID diagnoses with a history of seizure disorder I have been seeing more provoked events. I would increase her seizure medication in this case. She is in the process of signing out AMA. If she remains we will follow-up. This clinical note was dictated with an electronic dictation software that can make unintentional errors. If there are any questions, please contact me directly for clarification.       2 [facility], DO  NEUROLOGIST  Diplomate LAN  1/19/2022

## 2022-01-19 NOTE — ED NOTES
Verbal shift change report given to Liborio Quiñones RN (oncoming nurse) by Kostas Grajeda (offgoing nurse). Report included the following information SBAR and ED Summary.

## 2022-01-19 NOTE — ED NOTES
Bedside and Verbal shift change report given to TIERA Campos (oncoming nurse) by Jordan Santoro (offgoing nurse). Report included the following information SBAR, ED Summary, Intake/Output, MAR and Recent Results. 0445: Resting at this time. NAD.     7782: Refusing to keep oxygen on. SpO2 readings not showing accurate reading. Resting comfortably, NAD.     0730: Pt at doorway yelling and cursing at nursing staff. Demanding to leave and to have staff call a ride home. Increasingly agitated. Removed all cardiac leads. Unable to be redirected or reassured. Refusing to wear mask or remain in room. 0745: Per Hospitalist she can wait to be re-evaulated or leave AMA. Pt refusing to sign AMA paperwork. Pt refusing to have staff remove her IV. Now agreeable to wait for hospitalist.     7839: Bedside and Verbal shift change report given to TIERA Keith (oncoming nurse) by Dm Jimenez (offgoing nurse). Report included the following information SBAR, ED Summary, Intake/Output, MAR and Recent Results. Awaiting hospitalist to see patient. 0815: New orders to be completed by receiving RN.

## 2022-01-19 NOTE — ED NOTES
Entered pt's room, pt slumped to right side in stretcher. Pt had taken off pulse ox, placed pulse ox back on pt and O2 SATS were 86% on RA. Pt responsive to loud voice, sat pt up in stretcher and O2 SATS improved to 91%. Pt extremely lethargic and not responding to this RN's questions, 0.4mg narcan administered PIV per [facility].    0115: No change in pt's mental status after narcan administration. Pt still very lethargic and barely responsive to loud voice. Pt agitated after sternal rub. O2 sats 96% on 2L NC. Updated MD on pt's mental status, MD already aware. Orders to trial pt on RA again and if hypoxic, administer another dose of 0.4mg narcan. 0140: Pt O2 SATS 88% on RA. 0.4mg administered per [facility] and placed pt back on 2L NC.    0230: Pt still lethargic. O2 SATS 96% on 2L NC. All other VSS. MD aware of pt condition.

## 2022-01-19 NOTE — ED NOTES
I entered pt's room due to call light. Pt was adamant that she wants to go home. I told pt that she would be leaving AMA and pt verbalized understanding. Pt's orientation level at baseline; she is oriented to person, place, and time. IV removed and all belongings at bedside. Pt ambulatory with steady gait upon departure.

## 2022-01-19 NOTE — DISCHARGE SUMMARY
Discharge Summary       PATIENT ID: Chalmer Gilford  MRN: [id number]   YOB: 1975    DATE OF ADMISSION: 1/18/2022  4:51 PM    DATE OF DISCHARGE: 01/19/22    PRIMARY CARE PROVIDER: None     DISCHARGING PROVIDER: Denia Schneider MD    To contact this individual call [phone] and ask the  to page. If unavailable ask to be transferred the Adult Hospitalist Department. CONSULTATIONS: IP CONSULT TO NEUROLOGY    PROCEDURES/SURGERIES: * No surgery found *    DISCHARGE DIAGNOSES:   Toxic encephalopathy  Seizure disorder  Acute hypoxic respiratory failure   COVID19   Bipolar  Active polysubstance abuse         ADMISSION SUMMARY AND HOSPITAL COURSE:   Evgeny Mcgee is a 48 y. o. female with history of polysubstance abuse (cocaine, heroin, THC), copd, sarcoidosis, bipolar disorder, major depressive disorder who was brought in to the hospital via EMS for concerns of altered mental status. Pt admitted and remained in ER due to capacity black. Pt became more and more combative as she began waking up. On my visit she was unsure if she wanted to leave AMA. We discussed that she has COVID19, came in confused and had focal weakness. My medical recommendation would be to stay in the hospital and be treated for COVID, including oxygen, steroids, and supportive care. Additionally would recommend MRI as COVID can increase your risk of CVA> Neurology consulted, and recommended increasing her valproic level. Pt left AMA prior to being able to give any prescriptions. Pt not answering her phone. Pt did understand the risk and benefit of leaving and was able to verbalize that to me on our encounter. DISCHARGE DIAGNOSES / PLAN:      Toxic encephalopathy likely   Polysubstance abuse - s/p narcan, UDS + cocaine, THC, benzo  Acute hypoxic respiratory failure - report saturating in 80's on RA. Refusing to allow pulse ox to be hooked up at the time of my examination.    COVID19  Asthma/COPD  Bipolar/MDD/LEIDY - Pt left AMA, did not complete MRI, refused additional blood testing for inflammatory markers. Refused pulse ox testing for O2 needs. Refused O2. Discharged from the ER AMA    BMI: Body mass index is 34.33 kg/m². . This patient: Meets criteria for obesity given BMI >/= 30 and < 40 due to excess calories/nutritional. Weight loss and lifestyle modifications should be encouraged as an outpatient. ADDITIONAL CARE RECOMMENDATIONS:   LEFT AMA     NOTIFY YOUR PHYSICIAN FOR ANY OF THE FOLLOWING:   Fever over 101 degrees for 24 hours. Chest pain, shortness of breath, fever, chills, nausea, vomiting, diarrhea, change in mentation, falling, weakness, bleeding. Severe pain or pain not relieved by medications, as well as any other signs or symptoms that you may have questions about. DISCHARGE MEDICATIONS:  Discharge Medication List as of 1/19/2022 11:14 AM      START taking these medications    Details   dexAMETHasone (DECADRON) 6 mg tablet Take 1 Tablet by mouth Daily (before breakfast) for 9 days. , Normal, Disp-9 Tablet, R-0         CONTINUE these medications which have NOT CHANGED    Details   gabapentin (NEURONTIN) 400 mg capsule Take 400 mg by mouth three (3) times daily. , Historical Med      hydrOXYzine pamoate (VISTARIL) 100 mg capsule Take 100 mg by mouth three (3) times daily as needed for Anxiety. , Historical Med      amLODIPine (NORVASC) 5 mg tablet Take 5 mg by mouth daily. , Historical Med      cloNIDine HCL (CATAPRES) 0.3 mg tablet Take 0.3 mg by mouth three (3) times daily. , Historical Med      furosemide (LASIX) 40 mg tablet Take 40 mg by mouth daily. , Historical Med      guaiFENesin (MUCINEX) 1,200 mg Ta12 ER tablet Take 1,200 mg by mouth two (2) times a day., Historical Med      haloperidoL (HALDOL) 10 mg tablet Take 10 mg by mouth daily. , Historical Med      pantoprazole (PROTONIX) 40 mg tablet Take 40 mg by mouth daily. , Historical Med      prazosin (MINIPRESS) 2 mg capsule Take 4 mg by mouth nightly., Historical Med      rOPINIRole (REQUIP) 0.25 mg tablet Take 0.25 mg by mouth nightly., Historical Med      mometasone-formoterol (DULERA) 200-5 mcg/actuation HFA inhaler Take 2 Puffs by inhalation two (2) times a day., Historical Med      QUEtiapine (SEROquel) 200 mg tablet Take 200 mg by mouth nightly., Historical Med      sertraline (ZOLOFT) 100 mg tablet Take 200 mg by mouth daily. , Historical Med      amitriptyline (ELAVIL) 150 mg tablet Take 75 mg by mouth nightly as needed for Sleep., Historical Med      montelukast (SINGULAIR) 10 mg tablet Take 1 Tab by mouth nightly. Indications: controller medication for asthma, Normal, Disp-30 Tab, R-0      albuterol (ProAir HFA) 90 mcg/actuation inhaler Take 2 Puffs by inhalation every four (4) hours as needed for Wheezing or Shortness of Breath. Indications: bronchospasm prevention, Normal, Disp-2 Inhaler, R-0      levETIRAcetam (Keppra) 500 mg tablet Take 1 Tab by mouth two (2) times a day. Indications: additional medication to treat partial seizures, Normal, Disp-60 Tab, R-0      mirtazapine (REMERON) 15 mg tablet Take 1 Tab by mouth nightly. Indications: major depressive disorder, Normal, Disp-30 Tab, R-0         STOP taking these medications       hydrOXYzine HCL (ATARAX) 50 mg tablet Comments:   Reason for Stopping:         buprenorphine-naloxone (Suboxone) 8-2 mg film sublingaul film Comments:   Reason for Stopping:               DISPOSITION:    Home With:   OT  PT  HH  RN       Long term SNF/Inpatient Rehab    Independent/assisted living    Hospice   X Other: AMA       PATIENT CONDITION AT DISCHARGE:     Functional status    Poor     Deconditioned    X Independent      Cognition   X  Lucid     Forgetful     Dementia      Catheters/lines (plus indication)    Calvin     PICC     PEG    X None      Code status   X  Full code     DNR      PHYSICAL EXAMINATION AT DISCHARGE:  Visit Vitals  BP (!) 121/90 (BP 1 Location: Left arm, BP Patient Position:  At rest)   Pulse 76   Temp 98.4 °F (36.9 °C)   Resp 16   Ht 5' 4\" (1.626 m)   Wt 90.7 kg (200 lb)   SpO2 97%   BMI 34.33 kg/m²     Gen: NAD, awake in bed  HEENT: NC/AT, sclera anicteric, PERRL, EOMI  CV: RRR no m/r/g, normal S1 and S2, no pedal edema,   Resp: CTA b/l no increased work of breathing, no wheezing or rhonchi, speaking in full sentences + tachypnea  Abd: NT/ND, normal bowel sounds, no rebound or guarding  Ext: 2+ pulses, no edema  Skin: No rashes or lesions        CHRONIC MEDICAL DIAGNOSES:  Problem List as of 1/19/2022 Date Reviewed: 2/6/2021          Codes Class Noted - Resolved    Encephalopathy ICD-10-CM: G93.40  ICD-9-CM: 348.30  1/19/2022 - Present        Acute respiratory failure with hypoxia (HCC) ICD-10-CM: J96.01  ICD-9-CM: 518.81  2/6/2021 - Present        Suicidal ideation ICD-10-CM: R45.851  ICD-9-CM: V62.84  12/11/2020 - Present        Heroin overdose ([email or web address] ICD-10-CM: T40.1X1A  ICD-9-CM: 965.01, E980.0  12/8/2020 - Present        Heroin withdrawal ([facility] [[email or web address] 75.) ICD-10-CM: F11.23  ICD-9-CM: 292.0, 304.00  9/24/2020 - Present        Overdose ICD-10-CM: T50.901A  ICD-9-CM: 977.9, E980.5  8/9/2018 - Present        Poly-drug misuser ICD-10-CM: F19.90  ICD-9-CM: 305.90  8/4/2018 - Present        Asthma exacerbation ICD-10-CM: J45.901  ICD-9-CM: 493.92  7/26/2018 - Present        Drug overdose ICD-10-CM: T50.901A  ICD-9-CM: 977.9, E980.5  7/31/2017 - Present        Altered mental status, unspecified ICD-10-CM: R41.82  ICD-9-CM: 780.97  4/26/2017 - Present        Lactic acidosis ICD-10-CM: E87.2  ICD-9-CM: 276.2  4/25/2017 - Present        Acute encephalopathy ICD-10-CM: G93.40  ICD-9-CM: 348.30  4/25/2017 - Present        Potential for altered mental status ICD-10-CM: Z91.89  ICD-9-CM: V49.89  4/25/2017 - Present        Convulsion disorder ([facility] ICD-10-CM: R56.9  ICD-9-CM: 780.39  4/25/2017 - Present        Stenosis of both internal carotid arteries ICD-10-CM: I65.23  ICD-9-CM: 433.10, 433.30  4/25/2017 - Present        Convulsive syncope ICD-10-CM: R55  ICD-9-CM: 780.2, 780.39  4/25/2017 - Present        Seizure ([facility].) ICD-10-CM: R56.9  ICD-9-CM: 780.39  4/20/2017 - Present        Bipolar 1 disorder (HCC) ICD-10-CM: F31.9  ICD-9-CM: 296.7  Unknown - Present        Chronic obstructive pulmonary disease (HCC) ICD-10-CM: J44.9  ICD-9-CM: 813  Unknown - Present        Chronic pain ICD-10-CM: G89.29  ICD-9-CM: 338.29  Unknown - Present    Overview Signed 2/22/2017  7:15 PM by Chico Cano MD     back, leg             Hepatitis B ICD-10-CM: B19.10  ICD-9-CM: 070.30  Unknown - Present        Sarcoidosis ICD-10-CM: D86.9  ICD-9-CM: 135  Unknown - Present        Tobacco abuse ICD-10-CM: Z72.0  ICD-9-CM: 305.1  Unknown - Present        Anemia ICD-10-CM: D64.9  ICD-9-CM: 285.9  2/22/2017 - Present        Cocaine abuse ([facility].) ICD-10-CM: F14.10  ICD-9-CM: 305.60  Unknown - Present        Polysubstance abuse (HCC) ICD-10-CM: F19.10  ICD-9-CM: 305.90  Unknown - Present        Narcotic abuse (HCC) ICD-10-CM: F11.10  ICD-9-CM: 305.40  Unknown - Present        HTN (hypertension) ICD-10-CM: I10  ICD-9-CM: 401.9  Unknown - Present        Depression ICD-10-CM: F32. A  ICD-9-CM: 020  1/25/2017 - Present        Sinus tachycardia ICD-10-CM: R00.0  ICD-9-CM: 427.89  1/10/2017 - Present        Heroin abuse ([facility].) ICD-10-CM: F11.10  ICD-9-CM: 305.50  1/30/2014 - Present        RESOLVED: Asthma exacerbation ICD-10-CM: J45.901  ICD-9-CM: 493.92  2/22/2017 - 4/19/2017        RESOLVED: Asthma ICD-10-CM: J45.909  ICD-9-CM: 493.90  Unknown - 2/22/2017        RESOLVED: Depressive disorder ICD-10-CM: F32.9  ICD-9-CM: 689  1/25/2017 - 2/22/2017        RESOLVED: Polysubstance dependence ([facility].) ICD-10-CM: F19.20  ICD-9-CM: 304.80  1/25/2017 - 2/22/2017        RESOLVED: Substance induced mood disorder ([facility].) ICD-10-CM: I82.58  ICD-9-CM: 292.84  1/25/2017 - 2/22/2017        RESOLVED: HTN (hypertension) ICD-10-CM: I10  ICD-9-CM: 401.9  1/11/2017 - 2/22/2017        RESOLVED: Drug abuse ([facility].) ICD-10-CM: F19.10  ICD-9-CM: 305.90  1/11/2017 - 2/22/2017        RESOLVED: Bipolar disorder ([facility] 75.) ICD-10-CM: F31.9  ICD-9-CM: 296.80  1/11/2017 - 2/22/2017        RESOLVED: COPD (chronic obstructive pulmonary disease) ([facility].) ICD-10-CM: J44.9  ICD-9-CM: 496  1/9/2017 - 2/22/2017        RESOLVED: Unspecified asthma, with exacerbation ICD-10-CM: J45.901  ICD-9-CM: 493.92  1/30/2014 - 2/22/2017              Greater than 30 minutes were spent with the patient on counseling and coordination of care    Signed:   Christiano Diaz MD  1/19/2022  4:50 PM

## 2022-01-19 NOTE — PROGRESS NOTES
1/19/2022 -   TRANSITIONS OF CARE PLAN:   1. RUR: 13%  2. DESTINATION: Previous home environment   3. TRANSPORT: Bus  4. NEEDS FOR DISCHARGE: ANTICIPATED FOLLOW UPS: PCP  5. ONGOING INPATIENT NEEDS:     Anticipated Discharge is:    Reason for Admission:  Encephalopathy                   RUR Score:      13%               Plan for utilizing home health:          PCP: First and Last name:  None     Name of Practice:    Are you a current patient: Yes/No:    Approximate date of last visit:    Can you participate in a virtual visit with your PCP:                     Current Advanced Directive/Advance Care Plan: Full Code      Healthcare Decision Maker:   Click here to complete [address] including selection of the Healthcare Decision Maker Relationship (ie \"Primary\")                             Transition of Care Plan:                    CM was notified by nursing that patient is wanting to discharge today, 1/19. CM spoke with patient from outside of room. Patient identified that she does not have discharge transport at this time. Per 1/19 attending note, patient attempted to leave [facility] 1/18. Patient is not cleared for discharge at this time. Neuro is present at bedside at this time. CM identified to attending that patient is wanting to leave. Per Attending, patient would be leaving [place]. Patient and nursing are aware that CM Team cannot coordinate services for the patient at this time, including discharge transportation. Patient was escorted off the premises by security with plan for patient to use bus system for transport. Care Management Interventions  PCP Verified by CM: No  Mode of Transport at Discharge:  Other (see comment) (bus)  Transition of Care Consult (CM Consult): Group Home  Discharge Location  Patient Expects to be Discharged to[de-identified] Group home  CRM: Lawton Lesches, MPH, [address]; Z: [phone]

## 2022-01-20 ENCOUNTER — PATIENT OUTREACH (OUTPATIENT)
Dept: CASE MANAGEMENT | Age: 47
End: 2022-01-20

## 2022-01-21 NOTE — PROGRESS NOTES
Care Transitions Outreach Attempt    Call within 2 business days of discharge: Yes   Attempted to reach patient for transitions of care follow up. Unable to reach patient. No further outreach planned. Will close episode at this time. Patient: Samuel Grove Patient : 1975 MRN: [id number]    Last Discharge [facility]       Complaint Diagnosis Description Type Department Provider    20 Lethargy Right leg weakness . .. ED (ADMIT) Watson Askew MD; Chun minaya. .. Was this an external facility discharge? No Discharge Facility: n/a    Noted following upcoming appointments from discharge chart review:   [facility] follow up appointment(s): No future appointments.

## 2022-03-18 PROBLEM — D64.9 ANEMIA: Status: ACTIVE | Noted: 2017-02-22

## 2022-03-18 PROBLEM — R56.9 CONVULSION DISORDER (HCC): Status: ACTIVE | Noted: 2017-04-25

## 2022-03-18 PROBLEM — F32.A DEPRESSION: Status: ACTIVE | Noted: 2017-01-25

## 2022-03-18 PROBLEM — G93.40 ACUTE ENCEPHALOPATHY: Status: ACTIVE | Noted: 2017-04-25

## 2022-03-18 PROBLEM — J96.01 ACUTE RESPIRATORY FAILURE WITH HYPOXIA (HCC): Status: ACTIVE | Noted: 2021-02-06

## 2022-03-19 PROBLEM — Z91.89 POTENTIAL FOR ALTERED MENTAL STATUS: Status: ACTIVE | Noted: 2017-04-25

## 2022-03-19 PROBLEM — F11.93 HEROIN WITHDRAWAL (HCC): Status: ACTIVE | Noted: 2020-09-24

## 2022-03-19 PROBLEM — R00.0 SINUS TACHYCARDIA: Status: ACTIVE | Noted: 2017-01-10

## 2022-03-19 PROBLEM — T40.1X1A HEROIN OVERDOSE (HCC): Status: ACTIVE | Noted: 2020-12-08

## 2022-03-19 PROBLEM — T50.901A DRUG OVERDOSE: Status: ACTIVE | Noted: 2017-07-31

## 2022-03-19 PROBLEM — J45.901 ASTHMA EXACERBATION: Status: ACTIVE | Noted: 2018-07-26

## 2022-03-19 PROBLEM — T50.901A OVERDOSE: Status: ACTIVE | Noted: 2018-08-09

## 2022-03-19 PROBLEM — R41.82 ALTERED MENTAL STATUS, UNSPECIFIED: Status: ACTIVE | Noted: 2017-04-26

## 2022-03-19 PROBLEM — R45.851 SUICIDAL IDEATION: Status: ACTIVE | Noted: 2020-12-11

## 2022-03-19 PROBLEM — I65.23 STENOSIS OF BOTH INTERNAL CAROTID ARTERIES: Status: ACTIVE | Noted: 2017-04-25

## 2022-03-19 PROBLEM — R56.9 SEIZURE (HCC): Status: ACTIVE | Noted: 2017-04-20

## 2022-03-19 PROBLEM — R55 CONVULSIVE SYNCOPE: Status: ACTIVE | Noted: 2017-04-25

## 2022-03-19 PROBLEM — F19.90 POLY-DRUG MISUSER: Status: ACTIVE | Noted: 2018-08-04

## 2022-03-20 PROBLEM — E87.20 LACTIC ACIDOSIS: Status: ACTIVE | Noted: 2017-04-25

## 2022-03-20 PROBLEM — G93.40 ENCEPHALOPATHY: Status: ACTIVE | Noted: 2022-01-19

## 2022-10-23 ENCOUNTER — HOSPITAL ENCOUNTER (INPATIENT)
Age: 47
LOS: 9 days | Discharge: OTHER INSTITUTION | DRG: 753 | End: 2022-11-01
Attending: EMERGENCY MEDICINE | Admitting: PSYCHIATRY & NEUROLOGY
Payer: MEDICAID

## 2022-10-23 DIAGNOSIS — T74.91XA DOMESTIC VIOLENCE OF ADULT, INITIAL ENCOUNTER: ICD-10-CM

## 2022-10-23 DIAGNOSIS — R45.851 SUICIDAL IDEATIONS: Primary | ICD-10-CM

## 2022-10-23 DIAGNOSIS — F43.22 ADJUSTMENT DISORDER WITH ANXIOUS MOOD: ICD-10-CM

## 2022-10-23 DIAGNOSIS — E87.6 HYPOKALEMIA: ICD-10-CM

## 2022-10-23 DIAGNOSIS — F19.10 SUBSTANCE ABUSE (HCC): ICD-10-CM

## 2022-10-23 DIAGNOSIS — F10.20 ALCOHOL USE DISORDER, MODERATE, DEPENDENCE (HCC): ICD-10-CM

## 2022-10-23 DIAGNOSIS — F14.10 COCAINE USE DISORDER, MILD, ABUSE (HCC): ICD-10-CM

## 2022-10-23 DIAGNOSIS — F19.94 SUBSTANCE INDUCED MOOD DISORDER (HCC): ICD-10-CM

## 2022-10-23 LAB
ALBUMIN SERPL-MCNC: 3.1 G/DL (ref 3.5–5)
ALBUMIN SERPL-MCNC: 3.4 G/DL (ref 3.5–5)
ALBUMIN/GLOB SERPL: 0.9 {RATIO} (ref 1.1–2.2)
ALBUMIN/GLOB SERPL: 0.9 {RATIO} (ref 1.1–2.2)
ALP SERPL-CCNC: 89 U/L (ref 45–117)
ALP SERPL-CCNC: 97 U/L (ref 45–117)
ALT SERPL-CCNC: 24 U/L (ref 12–78)
ALT SERPL-CCNC: 28 U/L (ref 12–78)
AMPHET UR QL SCN: NEGATIVE
ANION GAP SERPL CALC-SCNC: 11 MMOL/L (ref 5–15)
ANION GAP SERPL CALC-SCNC: 8 MMOL/L (ref 5–15)
APAP SERPL-MCNC: <2 UG/ML (ref 10–30)
APPEARANCE UR: ABNORMAL
AST SERPL-CCNC: 45 U/L (ref 15–37)
AST SERPL-CCNC: 51 U/L (ref 15–37)
BACTERIA URNS QL MICRO: NEGATIVE /HPF
BARBITURATES UR QL SCN: NEGATIVE
BASOPHILS # BLD: 0 K/UL (ref 0–0.1)
BASOPHILS NFR BLD: 1 % (ref 0–1)
BENZODIAZ UR QL: POSITIVE
BILIRUB SERPL-MCNC: 0.4 MG/DL (ref 0.2–1)
BILIRUB SERPL-MCNC: 0.5 MG/DL (ref 0.2–1)
BILIRUB UR QL CFM: NEGATIVE
BUN SERPL-MCNC: 5 MG/DL (ref 6–20)
BUN SERPL-MCNC: 6 MG/DL (ref 6–20)
BUN/CREAT SERPL: 6 (ref 12–20)
BUN/CREAT SERPL: 7 (ref 12–20)
CALCIUM SERPL-MCNC: 8.6 MG/DL (ref 8.5–10.1)
CALCIUM SERPL-MCNC: 8.8 MG/DL (ref 8.5–10.1)
CANNABINOIDS UR QL SCN: POSITIVE
CHLORIDE SERPL-SCNC: 96 MMOL/L (ref 97–108)
CHLORIDE SERPL-SCNC: 98 MMOL/L (ref 97–108)
CO2 SERPL-SCNC: 29 MMOL/L (ref 21–32)
CO2 SERPL-SCNC: 31 MMOL/L (ref 21–32)
COCAINE UR QL SCN: POSITIVE
COLOR UR: ABNORMAL
CREAT SERPL-MCNC: 0.88 MG/DL (ref 0.55–1.02)
CREAT SERPL-MCNC: 0.88 MG/DL (ref 0.55–1.02)
DIFFERENTIAL METHOD BLD: NORMAL
DRUG SCRN COMMENT,DRGCM: ABNORMAL
EOSINOPHIL # BLD: 0.3 K/UL (ref 0–0.4)
EOSINOPHIL NFR BLD: 6 % (ref 0–7)
EPITH CASTS URNS QL MICRO: ABNORMAL /LPF
ERYTHROCYTE [DISTWIDTH] IN BLOOD BY AUTOMATED COUNT: 13.1 % (ref 11.5–14.5)
ETHANOL SERPL-MCNC: <10 MG/DL
FLUAV RNA SPEC QL NAA+PROBE: NOT DETECTED
FLUBV RNA SPEC QL NAA+PROBE: NOT DETECTED
GLOBULIN SER CALC-MCNC: 3.4 G/DL (ref 2–4)
GLOBULIN SER CALC-MCNC: 3.6 G/DL (ref 2–4)
GLUCOSE SERPL-MCNC: 111 MG/DL (ref 65–100)
GLUCOSE SERPL-MCNC: 132 MG/DL (ref 65–100)
GLUCOSE UR STRIP.AUTO-MCNC: NEGATIVE MG/DL
HCG UR QL: NEGATIVE
HCT VFR BLD AUTO: 45.3 % (ref 35–47)
HGB BLD-MCNC: 15.6 G/DL (ref 11.5–16)
HGB UR QL STRIP: NEGATIVE
IMM GRANULOCYTES # BLD AUTO: 0 K/UL (ref 0–0.04)
IMM GRANULOCYTES NFR BLD AUTO: 0 % (ref 0–0.5)
KETONES UR QL STRIP.AUTO: ABNORMAL MG/DL
LEUKOCYTE ESTERASE UR QL STRIP.AUTO: ABNORMAL
LYMPHOCYTES # BLD: 2.1 K/UL (ref 0.8–3.5)
LYMPHOCYTES NFR BLD: 44 % (ref 12–49)
MCH RBC QN AUTO: 30.4 PG (ref 26–34)
MCHC RBC AUTO-ENTMCNC: 34.4 G/DL (ref 30–36.5)
MCV RBC AUTO: 88.1 FL (ref 80–99)
METHADONE UR QL: POSITIVE
MONOCYTES # BLD: 0.5 K/UL (ref 0–1)
MONOCYTES NFR BLD: 10 % (ref 5–13)
NEUTS SEG # BLD: 1.8 K/UL (ref 1.8–8)
NEUTS SEG NFR BLD: 39 % (ref 32–75)
NITRITE UR QL STRIP.AUTO: NEGATIVE
NRBC # BLD: 0 K/UL (ref 0–0.01)
NRBC BLD-RTO: 0 PER 100 WBC
OPIATES UR QL: NEGATIVE
PCP UR QL: NEGATIVE
PH UR STRIP: 5.5 [PH] (ref 5–8)
PLATELET # BLD AUTO: 220 K/UL (ref 150–400)
PMV BLD AUTO: 9.7 FL (ref 8.9–12.9)
POTASSIUM SERPL-SCNC: 2.6 MMOL/L (ref 3.5–5.1)
POTASSIUM SERPL-SCNC: 3.1 MMOL/L (ref 3.5–5.1)
PROT SERPL-MCNC: 6.5 G/DL (ref 6.4–8.2)
PROT SERPL-MCNC: 7 G/DL (ref 6.4–8.2)
PROT UR STRIP-MCNC: 30 MG/DL
RBC # BLD AUTO: 5.14 M/UL (ref 3.8–5.2)
RBC #/AREA URNS HPF: ABNORMAL /HPF (ref 0–5)
SALICYLATES SERPL-MCNC: 2.5 MG/DL (ref 2.8–20)
SARS-COV-2, COV2: NOT DETECTED
SODIUM SERPL-SCNC: 136 MMOL/L (ref 136–145)
SODIUM SERPL-SCNC: 137 MMOL/L (ref 136–145)
SP GR UR REFRACTOMETRY: 1.02
UA: UC IF INDICATED,UAUC: ABNORMAL
UROBILINOGEN UR QL STRIP.AUTO: 1 EU/DL (ref 0.2–1)
VALPROATE SERPL-MCNC: <3 UG/ML (ref 50–100)
WBC # BLD AUTO: 4.7 K/UL (ref 3.6–11)
WBC URNS QL MICRO: ABNORMAL /HPF (ref 0–4)

## 2022-10-23 PROCEDURE — 65220000003 HC RM SEMIPRIVATE PSYCH

## 2022-10-23 PROCEDURE — 99285 EMERGENCY DEPT VISIT HI MDM: CPT

## 2022-10-23 PROCEDURE — 90791 PSYCH DIAGNOSTIC EVALUATION: CPT

## 2022-10-23 PROCEDURE — 81025 URINE PREGNANCY TEST: CPT

## 2022-10-23 PROCEDURE — 80164 ASSAY DIPROPYLACETIC ACD TOT: CPT

## 2022-10-23 PROCEDURE — 87636 SARSCOV2 & INF A&B AMP PRB: CPT

## 2022-10-23 PROCEDURE — 74011250637 HC RX REV CODE- 250/637: Performed by: NURSE PRACTITIONER

## 2022-10-23 PROCEDURE — 80179 DRUG ASSAY SALICYLATE: CPT

## 2022-10-23 PROCEDURE — 80053 COMPREHEN METABOLIC PANEL: CPT

## 2022-10-23 PROCEDURE — 36600 WITHDRAWAL OF ARTERIAL BLOOD: CPT

## 2022-10-23 PROCEDURE — 82077 ASSAY SPEC XCP UR&BREATH IA: CPT

## 2022-10-23 PROCEDURE — 81001 URINALYSIS AUTO W/SCOPE: CPT

## 2022-10-23 PROCEDURE — 93005 ELECTROCARDIOGRAM TRACING: CPT

## 2022-10-23 PROCEDURE — 74011250637 HC RX REV CODE- 250/637: Performed by: PHYSICIAN ASSISTANT

## 2022-10-23 PROCEDURE — 36415 COLL VENOUS BLD VENIPUNCTURE: CPT

## 2022-10-23 PROCEDURE — 80143 DRUG ASSAY ACETAMINOPHEN: CPT

## 2022-10-23 PROCEDURE — 80307 DRUG TEST PRSMV CHEM ANLYZR: CPT

## 2022-10-23 PROCEDURE — 85025 COMPLETE CBC W/AUTO DIFF WBC: CPT

## 2022-10-23 RX ORDER — FOLIC ACID 1 MG/1
1 TABLET ORAL
Status: COMPLETED | OUTPATIENT
Start: 2022-10-23 | End: 2022-10-23

## 2022-10-23 RX ORDER — DIVALPROEX SODIUM 250 MG/1
250 TABLET, DELAYED RELEASE ORAL
Status: COMPLETED | OUTPATIENT
Start: 2022-10-23 | End: 2022-10-23

## 2022-10-23 RX ORDER — DIVALPROEX SODIUM 500 MG/1
500 TABLET, DELAYED RELEASE ORAL
Status: DISCONTINUED | OUTPATIENT
Start: 2022-10-23 | End: 2022-10-23

## 2022-10-23 RX ORDER — OLANZAPINE 5 MG/1
5 TABLET ORAL
Status: DISCONTINUED | OUTPATIENT
Start: 2022-10-23 | End: 2022-11-01 | Stop reason: HOSPADM

## 2022-10-23 RX ORDER — PHENOBARBITAL 32.4 MG/1
16.2 TABLET ORAL 2 TIMES DAILY
Status: COMPLETED | OUTPATIENT
Start: 2022-10-27 | End: 2022-10-27

## 2022-10-23 RX ORDER — DIPHENHYDRAMINE HYDROCHLORIDE 50 MG/ML
50 INJECTION, SOLUTION INTRAMUSCULAR; INTRAVENOUS
Status: DISCONTINUED | OUTPATIENT
Start: 2022-10-23 | End: 2022-11-01 | Stop reason: HOSPADM

## 2022-10-23 RX ORDER — LANOLIN ALCOHOL/MO/W.PET/CERES
100 CREAM (GRAM) TOPICAL DAILY
Status: DISCONTINUED | OUTPATIENT
Start: 2022-10-24 | End: 2022-11-01 | Stop reason: HOSPADM

## 2022-10-23 RX ORDER — PHENOBARBITAL 32.4 MG/1
32.4 TABLET ORAL
Status: DISPENSED | OUTPATIENT
Start: 2022-10-24 | End: 2022-10-26

## 2022-10-23 RX ORDER — HYDROXYZINE 25 MG/1
50 TABLET, FILM COATED ORAL
Status: DISCONTINUED | OUTPATIENT
Start: 2022-10-23 | End: 2022-10-29

## 2022-10-23 RX ORDER — PHENOBARBITAL 32.4 MG/1
16.2 TABLET ORAL
Status: ACTIVE | OUTPATIENT
Start: 2022-10-26 | End: 2022-10-27

## 2022-10-23 RX ORDER — ADHESIVE BANDAGE
30 BANDAGE TOPICAL DAILY PRN
Status: DISCONTINUED | OUTPATIENT
Start: 2022-10-23 | End: 2022-11-01 | Stop reason: HOSPADM

## 2022-10-23 RX ORDER — ACETAMINOPHEN 325 MG/1
650 TABLET ORAL
Status: DISCONTINUED | OUTPATIENT
Start: 2022-10-23 | End: 2022-11-01 | Stop reason: HOSPADM

## 2022-10-23 RX ORDER — PHENOBARBITAL 32.4 MG/1
64.8 TABLET ORAL
Status: ACTIVE | OUTPATIENT
Start: 2022-10-23 | End: 2022-10-24

## 2022-10-23 RX ORDER — HALOPERIDOL 5 MG/ML
5 INJECTION INTRAMUSCULAR
Status: DISCONTINUED | OUTPATIENT
Start: 2022-10-23 | End: 2022-11-01 | Stop reason: HOSPADM

## 2022-10-23 RX ORDER — LORAZEPAM 2 MG/ML
1 INJECTION INTRAMUSCULAR
Status: DISCONTINUED | OUTPATIENT
Start: 2022-10-23 | End: 2022-11-01 | Stop reason: HOSPADM

## 2022-10-23 RX ORDER — LANOLIN ALCOHOL/MO/W.PET/CERES
100 CREAM (GRAM) TOPICAL
Status: DISCONTINUED | OUTPATIENT
Start: 2022-10-23 | End: 2022-10-23

## 2022-10-23 RX ORDER — THERA TABS 400 MCG
1 TAB ORAL DAILY
Status: DISCONTINUED | OUTPATIENT
Start: 2022-10-24 | End: 2022-11-01 | Stop reason: HOSPADM

## 2022-10-23 RX ORDER — BENZTROPINE MESYLATE 1 MG/1
1 TABLET ORAL
Status: DISCONTINUED | OUTPATIENT
Start: 2022-10-23 | End: 2022-11-01 | Stop reason: HOSPADM

## 2022-10-23 RX ORDER — DIAZEPAM 5 MG/1
10 TABLET ORAL
Status: DISCONTINUED | OUTPATIENT
Start: 2022-10-23 | End: 2022-10-23

## 2022-10-23 RX ORDER — PHENOBARBITAL 32.4 MG/1
64.8 TABLET ORAL 4 TIMES DAILY
Status: COMPLETED | OUTPATIENT
Start: 2022-10-23 | End: 2022-10-24

## 2022-10-23 RX ORDER — FOLIC ACID 1 MG/1
1 TABLET ORAL DAILY
Status: DISCONTINUED | OUTPATIENT
Start: 2022-10-24 | End: 2022-11-01 | Stop reason: HOSPADM

## 2022-10-23 RX ORDER — PHENOBARBITAL 32.4 MG/1
32.4 TABLET ORAL 4 TIMES DAILY
Status: COMPLETED | OUTPATIENT
Start: 2022-10-24 | End: 2022-10-25

## 2022-10-23 RX ORDER — DIAZEPAM 5 MG/1
5 TABLET ORAL
Status: COMPLETED | OUTPATIENT
Start: 2022-10-23 | End: 2022-10-23

## 2022-10-23 RX ORDER — POTASSIUM CHLORIDE 750 MG/1
40 TABLET, FILM COATED, EXTENDED RELEASE ORAL
Status: COMPLETED | OUTPATIENT
Start: 2022-10-23 | End: 2022-10-23

## 2022-10-23 RX ORDER — PHENOBARBITAL 32.4 MG/1
32.4 TABLET ORAL 2 TIMES DAILY
Status: COMPLETED | OUTPATIENT
Start: 2022-10-26 | End: 2022-10-26

## 2022-10-23 RX ORDER — LANOLIN ALCOHOL/MO/W.PET/CERES
100 CREAM (GRAM) TOPICAL
Status: COMPLETED | OUTPATIENT
Start: 2022-10-23 | End: 2022-10-23

## 2022-10-23 RX ADMIN — DIAZEPAM 5 MG: 5 TABLET ORAL at 13:57

## 2022-10-23 RX ADMIN — FOLIC ACID 1 MG: 1 TABLET ORAL at 13:57

## 2022-10-23 RX ADMIN — PHENOBARBITAL 64.8 MG: 32.4 TABLET ORAL at 23:30

## 2022-10-23 RX ADMIN — DIVALPROEX SODIUM 250 MG: 250 TABLET, DELAYED RELEASE ORAL at 13:12

## 2022-10-23 RX ADMIN — POTASSIUM CHLORIDE 40 MEQ: 750 TABLET, EXTENDED RELEASE ORAL at 14:34

## 2022-10-23 RX ADMIN — Medication 100 MG: at 13:57
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
## 2022-10-24 PROBLEM — F43.22 ADJUSTMENT DISORDER WITH ANXIOUS MOOD: Status: ACTIVE | Noted: 2022-10-24

## 2022-10-24 PROBLEM — F10.20 ALCOHOL USE DISORDER, MODERATE, DEPENDENCE (HCC): Status: ACTIVE | Noted: 2022-10-24

## 2022-10-24 PROCEDURE — 74011250636 HC RX REV CODE- 250/636: Performed by: PSYCHIATRY & NEUROLOGY

## 2022-10-24 PROCEDURE — 65220000003 HC RM SEMIPRIVATE PSYCH

## 2022-10-24 PROCEDURE — 74011250637 HC RX REV CODE- 250/637: Performed by: PSYCHIATRY & NEUROLOGY

## 2022-10-24 PROCEDURE — 74011250637 HC RX REV CODE- 250/637: Performed by: NURSE PRACTITIONER

## 2022-10-24 PROCEDURE — 99223 1ST HOSP IP/OBS HIGH 75: CPT | Performed by: PSYCHIATRY & NEUROLOGY

## 2022-10-24 RX ORDER — NALOXONE HYDROCHLORIDE 0.4 MG/ML
0.4 INJECTION, SOLUTION INTRAMUSCULAR; INTRAVENOUS; SUBCUTANEOUS
Status: DISCONTINUED | OUTPATIENT
Start: 2022-10-24 | End: 2022-11-01 | Stop reason: HOSPADM

## 2022-10-24 RX ORDER — BUPRENORPHINE HYDROCHLORIDE AND NALOXONE HYDROCHLORIDE DIHYDRATE 8; 2 MG/1; MG/1
1 TABLET SUBLINGUAL DAILY
Status: DISCONTINUED | OUTPATIENT
Start: 2022-10-24 | End: 2022-10-25

## 2022-10-24 RX ORDER — QUETIAPINE 300 MG/1
300 TABLET, FILM COATED, EXTENDED RELEASE ORAL
Status: DISCONTINUED | OUTPATIENT
Start: 2022-10-24 | End: 2022-10-28

## 2022-10-24 RX ADMIN — HYDROXYZINE HYDROCHLORIDE 50 MG: 25 TABLET, FILM COATED ORAL at 17:17

## 2022-10-24 RX ADMIN — BUPRENORPHINE HYDROCHLORIDE AND NALOXONE HYDROCHLORIDE DIHYDRATE 1 TABLET: 8; 2 TABLET SUBLINGUAL at 10:31

## 2022-10-24 RX ADMIN — Medication 100 MG: at 08:34

## 2022-10-24 RX ADMIN — PHENOBARBITAL 32.4 MG: 32.4 TABLET ORAL at 21:11

## 2022-10-24 RX ADMIN — PHENOBARBITAL 64.8 MG: 32.4 TABLET ORAL at 13:07

## 2022-10-24 RX ADMIN — OLANZAPINE 5 MG: 5 TABLET, FILM COATED ORAL at 21:11

## 2022-10-24 RX ADMIN — THERA TABS 1 TABLET: TAB at 08:34

## 2022-10-24 RX ADMIN — PHENOBARBITAL 64.8 MG: 32.4 TABLET ORAL at 17:17

## 2022-10-24 RX ADMIN — FOLIC ACID 1 MG: 1 TABLET ORAL at 08:34

## 2022-10-24 RX ADMIN — PHENOBARBITAL 64.8 MG: 32.4 TABLET ORAL at 08:35

## 2022-10-24 RX ADMIN — QUETIAPINE FUMARATE 300 MG: 300 TABLET, EXTENDED RELEASE ORAL at 21:11

## 2022-10-24 NOTE — PROGRESS NOTES
Laboratory monitoring for mood stabilizer and antipsychotics:    Recommended baseline monitoring has been completed based on this patient's current medication regimen. The patient is currently taking the following medication(s):   Current Facility-Administered Medications   Medication Dose Route Frequency    divalproex ER (DEPAKOTE ER) 24 hour tablet 1,000 mg  1,000 mg Oral QHS    buprenorphine-naloxone (SUBOXONE) 8-2mg SL tablet  1 Tablet SubLINGual BID    QUEtiapine SR (SEROquel XR) tablet 300 mg  300 mg Oral QHS    PHENobarbitaL (LUMINAL) tablet 16.2 mg  16.2 mg Oral BID    thiamine HCL (B-1) tablet 100 mg  100 mg Oral DAILY    folic acid (FOLVITE) tablet 1 mg  1 mg Oral DAILY    therapeutic multivitamin (THERAGRAN) tablet 1 Tablet  1 Tablet Oral DAILY       Height, Weight, BMI Estimation  Estimated body mass index is 20.6 kg/m² as calculated from the following:    Height as of this encounter: 162.6 cm (64\"). Weight as of this encounter: 54.4 kg (120 lb). Renal Function, Hepatic Function and Chemistry  Estimated Creatinine Clearance: 67.1 mL/min (by C-G formula based on SCr of 0.88 mg/dL). Lab Results   Component Value Date/Time    Sodium 137 10/23/2022 05:53 PM    Potassium 3.1 (L) 10/23/2022 05:53 PM    Chloride 98 10/23/2022 05:53 PM    CO2 31 10/23/2022 05:53 PM    Anion gap 8 10/23/2022 05:53 PM    Glucose 111 (H) 10/23/2022 05:53 PM    Glucose 93 08/05/2018 06:56 AM    BUN 5 (L) 10/23/2022 05:53 PM    Creatinine 0.88 10/23/2022 05:53 PM    BUN/Creatinine ratio 6 (L) 10/23/2022 05:53 PM    GFR est AA >60 01/19/2022 01:26 AM    GFR est non-AA 57 (L) 01/19/2022 01:26 AM    Calcium 8.6 10/23/2022 05:53 PM    ALT (SGPT) 24 10/23/2022 05:53 PM    Alk.  phosphatase 89 10/23/2022 05:53 PM    Protein, total 6.5 10/23/2022 05:53 PM    Albumin 3.1 (L) 10/23/2022 05:53 PM    Globulin 3.4 10/23/2022 05:53 PM    A-G Ratio 0.9 (L) 10/23/2022 05:53 PM    Bilirubin, total 0.4 10/23/2022 05:53 PM       Lab Results   Component Value Date/Time    Glucose 111 (H) 10/23/2022 05:53 PM    Glucose 93 08/05/2018 06:56 AM    Glucose (POC) 110 01/18/2022 05:00 PM       Lab Results   Component Value Date/Time    Hemoglobin A1c 6.0 (H) 01/19/2022 01:07 AM       Hematology  Lab Results   Component Value Date/Time    WBC 4.7 10/23/2022 12:49 PM    Hemoglobin (POC) 13.3 06/15/2009 05:10 PM    HGB 15.6 10/23/2022 12:49 PM    Hematocrit (POC) 39 06/15/2009 05:10 PM    HCT 45.3 10/23/2022 12:49 PM    PLATELET 257 53/82/9370 12:49 PM    MCV 88.1 10/23/2022 12:49 PM       Lipids  Lab Results   Component Value Date/Time    Cholesterol, total 199 10/27/2022 05:18 AM    HDL Cholesterol 49 10/27/2022 05:18 AM    LDL, calculated 115.2 (H) 10/27/2022 05:18 AM    Triglyceride 174 (H) 10/27/2022 05:18 AM    CHOL/HDL Ratio 4.1 10/27/2022 05:18 AM       Thyroid Function  Lab Results   Component Value Date/Time    TSH 0.72 01/19/2022 01:26 AM    T3 Uptake 30 03/19/2009 06:05 AM    T4, Free 0.9 04/24/2017 09:23 PM    T4, Total 4.9 (L) 03/19/2009 06:05 AM    Free thyroxine index 1.5 03/19/2009 06:05 AM     Vitals  Visit Vitals  /79   Pulse (!) 106   Temp 97.6 °F (36.4 °C)   Resp 16   Ht 162.6 cm (64\")   Wt 54.4 kg (120 lb)   SpO2 100%   Breastfeeding No   BMI 20.60 kg/m²       Pregnancy Test  Lab Results   Component Value Date/Time    HCG urine, QL Negative 12/11/2020 05:42 AM    Pregnancy test,urine (POC) Negative 10/23/2022 01:15 PM    HCG, Ql. Negative 02/06/2021 12:24 AM    Beta HCG, QT <1 04/24/2017 09:23 PM       4 [facility]  [phone] (pharmacy)

## 2022-10-24 NOTE — BH NOTES
PSYCHOSOCIAL ASSESSMENT  :Patient identifying info:   Ernie Ang is a 48 y.o., female admitted 10/23/2022 12:29 PM     Presenting problem and precipitating factors: Patient is a 48year old  female who voluntarily admitted herself to the hospital due to [facility] with plan to cut herself. Patient reports that she has been drinking a gallon and a half of vodka per day along with fetanoyl. Patient reports that she needs to participate in an inpatient substance abuse program. Per chart review, patient has multiple psychiatric admissions, but little to no compliance in the community. Patient has a significant hx of Polysubstance abuse. Patient reports that she is currently homeless and has 5 kids. Mental status assessment: Patient is alert and oriented x 4.  Patient presents with a depressed mood, flat affect, and linear thought process    Strengths/Recreation/Coping Skills:five children, supportive mother    Collateral information: did not release consent to anyone at this time    Current psychiatric /substance abuse providers and contact info: none current    Previous psychiatric/substance abuse providers and response to treatment: [facility]2021    Family history of mental illness or substance abuse: yes    Substance abuse history:  UDS+Benzodiazepines, Cocaine, & THC  Social History     Tobacco Use    Smoking status: Every Day     Packs/day: 0.25     Years: 15.00     Pack years: 3.75     Types: Cigarettes     Last attempt to quit: 8/3/2017     Years since quittin.2    Smokeless tobacco: Never    Tobacco comments:     \"I already quit\"   Substance Use Topics    Alcohol use: No       History of biomedical complications associated with substance abuse: denies    Patient's current acceptance of treatment or motivation for change: minimal    Family constellation: Patient is single, never  with 5 kids    Is significant other involved? no    Describe support system: reports that she does not have any healthy supports in the community at this time    Describe living arrangements and home environment: homeless    GUARDIAN/POA: NO    Guardian Name:     Guardian Contact:     Health issues:   Hospital Problems  Date Reviewed: 2021            Codes Class Noted POA    * (Principal) Adjustment disorder with anxious mood ICD-10-CM: F43.22  ICD-9-CM: 309.24  10/24/2022 Unknown        Alcohol use disorder, moderate, dependence ([facility] ICD-10-CM: F10.20  ICD-9-CM: 303.90  10/24/2022 Unknown        Cocaine use disorder, mild, abuse ([facility] ICD-10-CM: F14.10  ICD-9-CM: 305.60  Unknown Unknown           Trauma history: yes    Legal issues: denies    History of  service: none    Financial status: no income    Jainism/cultural factors: none    Education/work history: highschool degree, unemployed    Have you been licensed as a health care professional (current or ): n/a    Describe coping skills:limited and ineffective     Christiano Pederson  10/24/2022

## 2022-10-24 NOTE — PROGRESS NOTES
2300 Patient is resting in bed, respiration is even and unlabored. No violence recorded. Quick 15 minutes checks and RN hourly rounding ongoing for safety. 0000- 0700, patient had a quiet shift, Q15 and RN hourly rounding maintained. She slept a total of 6 hours.

## 2022-10-24 NOTE — PROGRESS NOTES
Writer met patient in her room. Patient observed to be resting quietly, awake, in bed and appears to be free of distress. Patient easy to awaken, calm and cooperative with vital signs and assessment. Patient A/O x 4 with clear, soft speech. Patient endorses depression (10/10), anxiety (10/10), passive SI verbalized as \"just want to die\", auditory hallucinations saying \"die\", and visual hallucinations of \"people\". Patient denies HI and pain. Patient contracts to safety with writer. Q 15 minutes safety checks continued and writer will continue to provide support. Patient compliant with meals and medications. Patient isolative to room.      Problem: Depressed Mood (Adult/Pediatric)  Goal: *STG: Remains safe in hospital  Outcome: Progressing Towards Goal  Goal: *STG: Complies with medication therapy  Outcome: Progressing Towards Goal

## 2022-10-24 NOTE — GROUP NOTE
[facility] GROUP DOCUMENTATION INDIVIDUAL                                                                          Group Therapy Note    Date: 10/24/2022    Group Start Time: 0900  Group End Time: 1000  Group Topic: Topic Group    [facility] ACUTE [facility]    [address] GROUP    Group Therapy Note    Attendees: 5       Attendance: Did not attend    Bassfield Legrn

## 2022-10-24 NOTE — PROGRESS NOTES
[facility] Pharmacy Medication Reconciliation     Information obtained from: patient interview (limited), [facility] pharmacy ([phone]), [address] ([phone])  RxQuery data available1: Yes    Comments/recommendations:  Patient reports last taking medications ~3 weeks ago. However, refill history supports non-compliance for >2 months. [facility] pharmacy reports that patient last picked-up medications in August (buprenorphine-naloxone). She had a recent prescription for quetiapine 100 mg QHS written on 10/13/22 that she never picked up. [facility] pharmacy reports that patient last filled any medications with them in May 2021. The [place] Prescription Monitoring Program () was accessed to determine fill history of any controlled medications. The following controlled medications have been filled within the past 6 months. Of note, patient self-reported history of taking buprenorphine-naloxone 12-3 mg three times daily. [facility]  fill [facility] below does not support that dose. Buprenorphine-naloxone 8-2 mg SL films  #42 for 14 DS filled 8/2/22  #9 for 3 DS filled 7/29  #42 for 14 DS filled 6/22  #84 for 28 DS filled 5/16  #36 for 12 DS filled 5/6  #21 for 7 DS filled 4/29  #14 for 7 DS filled 4/22  Gabapentin 400 mg capsules #90 for 30 DS filled 4/21/22  Buprenorphine (Sublocade) 300 mg/1.5 mL syringe 4/18/22    Medication changes (since last review):   Added  None  Removed  Amitriptyline 75 mg QHS PRN sleep  Amlodipine 5 mg daily  Clonidine 0.3 mg TID  Furosemide 40 mg daily  Gabapentin 400 mg TID  Guaifenesin 1200 mg BID  Haloperidol 10 mg daily  Hydroxyzine pamoate 100 mg TId PRN anxiety  Levetiracetam 500 mg BID  Mometasone-formoterol 200-5 mcg/actuation HFA inhaler - 2 puffs BID  Montelukast 10 mg nightly  Pantoprazole 40 mg daily  Prazosin 2 mg nightly  Quetiapine 200 mg nightly  Ropinirole 0.25 mg nightly  Sertraline 200 mg daily  Adjusted  None       1RxQuery pharmacy benefit data reflects medications filled and processed through the patient's insurance, however                this data does NOT capture whether the medication was picked up or is currently being taken by the patient. Patient allergies: Allergies as of 10/23/2022 - Fully Reviewed 10/23/2022   Allergen Reaction Noted    Vancomycin Anaphylaxis 09/24/2020    Ketorolac Unable to Obtain 01/19/2022    Remeron [mirtazapine] Unable to Obtain 01/19/2022    Tomato Swelling 01/25/2017    Trazodone Angioedema 09/24/2020         Prior to Admission Medications   Prescriptions Last Dose Informant Patient Reported? Taking? albuterol (ProAir HFA) 90 mcg/actuation inhaler   No Yes   Sig: Take 2 Puffs by inhalation every four (4) hours as needed for Wheezing or Shortness of Breath.  Indications: bronchospasm prevention      Facility-Administered Medications: None          Thank you,  BERNARD [facility] Specialist, [address] [place] [address]  Desk: [phone] ([id number])  Pharmacy: [phone] ([id number]
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
## 2022-10-25 LAB
ATRIAL RATE: 106 BPM
ATRIAL RATE: 97 BPM
CALCULATED P AXIS, ECG09: 54 DEGREES
CALCULATED P AXIS, ECG09: 59 DEGREES
CALCULATED R AXIS, ECG10: 57 DEGREES
CALCULATED R AXIS, ECG10: 59 DEGREES
CALCULATED T AXIS, ECG11: 27 DEGREES
CALCULATED T AXIS, ECG11: 32 DEGREES
DIAGNOSIS, 93000: NORMAL
DIAGNOSIS, 93000: NORMAL
P-R INTERVAL, ECG05: 116 MS
P-R INTERVAL, ECG05: 122 MS
Q-T INTERVAL, ECG07: 348 MS
Q-T INTERVAL, ECG07: 386 MS
QRS DURATION, ECG06: 78 MS
QRS DURATION, ECG06: 80 MS
QTC CALCULATION (BEZET), ECG08: 441 MS
QTC CALCULATION (BEZET), ECG08: 512 MS
VENTRICULAR RATE, ECG03: 106 BPM
VENTRICULAR RATE, ECG03: 97 BPM

## 2022-10-25 PROCEDURE — 99233 SBSQ HOSP IP/OBS HIGH 50: CPT | Performed by: PSYCHIATRY & NEUROLOGY

## 2022-10-25 PROCEDURE — 74011250637 HC RX REV CODE- 250/637: Performed by: NURSE PRACTITIONER

## 2022-10-25 PROCEDURE — 74011250637 HC RX REV CODE- 250/637: Performed by: PSYCHIATRY & NEUROLOGY

## 2022-10-25 PROCEDURE — 65220000003 HC RM SEMIPRIVATE PSYCH

## 2022-10-25 PROCEDURE — 74011250636 HC RX REV CODE- 250/636: Performed by: PSYCHIATRY & NEUROLOGY

## 2022-10-25 RX ORDER — BUPRENORPHINE HYDROCHLORIDE AND NALOXONE HYDROCHLORIDE DIHYDRATE 8; 2 MG/1; MG/1
1 TABLET SUBLINGUAL 2 TIMES DAILY
Status: DISCONTINUED | OUTPATIENT
Start: 2022-10-25 | End: 2022-10-28

## 2022-10-25 RX ADMIN — PHENOBARBITAL 32.4 MG: 32.4 TABLET ORAL at 23:40

## 2022-10-25 RX ADMIN — HYDROXYZINE HYDROCHLORIDE 50 MG: 25 TABLET, FILM COATED ORAL at 20:23

## 2022-10-25 RX ADMIN — BUPRENORPHINE HYDROCHLORIDE AND NALOXONE HYDROCHLORIDE DIHYDRATE 1 TABLET: 8; 2 TABLET SUBLINGUAL at 17:36

## 2022-10-25 RX ADMIN — PHENOBARBITAL 32.4 MG: 32.4 TABLET ORAL at 12:38

## 2022-10-25 RX ADMIN — QUETIAPINE FUMARATE 300 MG: 300 TABLET, EXTENDED RELEASE ORAL at 22:10

## 2022-10-25 RX ADMIN — ACETAMINOPHEN 650 MG: 325 TABLET, FILM COATED ORAL at 20:23

## 2022-10-25 RX ADMIN — OLANZAPINE 5 MG: 5 TABLET, FILM COATED ORAL at 20:23

## 2022-10-25 RX ADMIN — Medication 100 MG: at 08:37

## 2022-10-25 RX ADMIN — BUPRENORPHINE HYDROCHLORIDE AND NALOXONE HYDROCHLORIDE DIHYDRATE 1 TABLET: 8; 2 TABLET SUBLINGUAL at 08:37

## 2022-10-25 RX ADMIN — FOLIC ACID 1 MG: 1 TABLET ORAL at 08:38

## 2022-10-25 RX ADMIN — PHENOBARBITAL 32.4 MG: 32.4 TABLET ORAL at 08:38

## 2022-10-25 RX ADMIN — THERA TABS 1 TABLET: TAB at 08:38

## 2022-10-25 RX ADMIN — PHENOBARBITAL 32.4 MG: 32.4 TABLET ORAL at 17:36

## 2022-10-25 NOTE — PROGRESS NOTES
Problem: Depressed Mood (Adult/Pediatric)  Goal: *STG: Remains safe in hospital  Outcome: Progressing Towards Goal  Goal: *STG: Complies with medication therapy  Outcome: Progressing Towards Goal     PATIENT ALERT, CALM, COOPERATIVE, ISOLATIVE. DENIES SI/HI. DENIES PAIN. DENIES AVH. DENIES ANXIETY AND DEPRESSION. MEDICATION AND MEAL COMPLIANT. NO DISTRESS OBSERVED. PURPOSEFUL INTERACTION ONGOING. CIWA'S DONE, SCORED 3. WILL CONTINUE TO MONITOR FOR WITHDRAWAL SYMPTOMS.

## 2022-10-25 NOTE — PROGRESS NOTES
Behavioral Services  Medicare Certification Upon Admission    I certify that this patient's inpatient psychiatric hospital admission is medically necessary for:    [x] (1) Treatment which could reasonably be expected to improve this patient's condition,       [x] (2) Or for diagnostic study;     AND     [x](2) The inpatient psychiatric services are provided while the individual is under the care of a physician and are included in the individualized plan of care.     Estimated length of stay/service 5-7 days    Plan for post-hospital care home    Electronically signed by Amparo Razo MD on 10/24/2022 at 8:37 PM

## 2022-10-25 NOTE — BH NOTES
Behavioral Health Treatment Team Note      Patient goal(s) for today: prescribed, engage in unit activities Patient Continue taking meds as, participate in hygiene/ADLs, prepare for discharge, follow directions from staff, contact support team, attend groups   Treatment team focus/goals: will continue to maintain a calm and cooperative mood and communicate her needs with staff. Continue taking meds as prescribed, engage in unit activities, participate in hygiene/ADLs, prepare for discharge, follow directions from staff, contact support team, attend groups      Progress note: Patient met with treatment team. Patient is oriented x4. Pt makes good eye contact and has a cooperative attitude. Pt's mood is depressed, affect is flat and she has a linear thought process. Pt reported having \"bad withdrawals\" and was in agreement with MD regarding increasing her medication. Patient continues to take medications as prescribed and is still interested in rehab upon discharge.      LOS:  2                 Expected LOS: TBD     Insurance info/prescription coverage: unknown  Date of last family contact:  unknown  Family requesting physician contact today:  unknown  Discharge plan:  rehab  Guns in the home:  no   Outpatient provider(s):  to be linked     Participating treatment team members: Mery Carroll MSW, Radha Brito, Student, Luisa Das MD

## 2022-10-25 NOTE — GROUP NOTE
[facility]  GROUP DOCUMENTATION INDIVIDUAL                                                                          Group Therapy Note    Date: 10/25/2022    Group Start Time: 0900  Group End Time: 1000  Group Topic: Topic Group    [facility] [place] [facility] ACUTE BEHAV TH    [id number] GROUP    Group Therapy Note    Attendees: 8       Attendance: Did not attend      Gwen Ferguson

## 2022-10-25 NOTE — GROUP NOTE
[facility]  GROUP DOCUMENTATION INDIVIDUAL                                                                          Group Therapy Note    Date: 10/25/2022    Group Start Time: 1500  Group End Time: 1600  Group Topic: Recreational/Music Therapy    [facility] [place] [address] ACUTE BEHAV [facility]    [id number] GROUP    Group Therapy Note    Attendees: 6       Attendance: Did not attend      Holli Mosley

## 2022-10-25 NOTE — BH NOTES
PSYCHIATRIC PROGRESS NOTE         Patient Name  Cece Young   Date of Birth 1975   [facility] [id number]   Medical Record Number  [id number]      Age  48 y.o. PCP None   Admit date:  10/23/2022    Room Number  212/79  @ [facility]   Date of Service  10/25/2022         E & M PROGRESS NOTE:         HISTORY       CC:  \"suicidal ideation\"  HISTORY OF PRESENT ILLNESS/INTERVAL HISTORY:  (reviewed/updated 10/25/2022). per initial evaluation: The patient, Cece Young, is a 48 y.o. BLACK/ female with a past psychiatric history significant for stimulant use  and opiate use disorders, who presents at this time with complaints of (and/or evidence of) the following emotional symptoms: depression and suicidal thoughts/threats. Additional symptomatology include auditory hallucinations. The above symptoms have been present for 2+ weeks. These symptoms are of moderate to high severity. These symptoms are constant in nature. The patient's condition has been precipitated by psychosocial stressors. Patient's condition made worse by continued illicit drug use as well as treatment noncompliance. UDS: +benzos, cocaine, methadone, THC; BAL=0. Patient presented to the ED stating that her life was in danger due to conflict within her relationship. Patient was able to make her needs known but has been dysphoric on the unit. She reported being on Seroquel but has not been compliant. The patient also reports drinking up to a gallon of vodka daily. The patient is a fair historian. The patient corroborates the above narrative. The patient contracts for safety on the unit and gives consent for the team to contact collateral. The patient is amenable to initiating treatment while on the unit. 10/25 - the patient slept 7.75 hours overnight.  She got Zyprexa PRN for psychosis and c/o withdrawal symptoms, though CIWA score has been 3-5 since arrival. Patient compliant with medication and able to make her needs known. She is still rehab focused and denies any active thoughts of self harm. Patient requests a higher dose of Suboxone (per pharmacist she was previously on 24 mg daily). SIDE EFFECTS: (reviewed/updated 10/25/2022)  None reported or admitted to. ALLERGIES:(reviewed/updated 10/25/2022)  Allergies   Allergen Reactions    Vancomycin Anaphylaxis    Ketorolac Unable to Obtain     From [facility] paperwork 1/19/22    Remeron [Mirtazapine] Unable to Obtain     From [facility] paperwork on 1/19/22    Tomato Swelling     Tongue    Trazodone Angioedema      REVIEW OF SYSTEMS: (reviewed/updated 10/25/2022)  Appetite:no change from normal   Sleep: poor with DIMS (difficulty initiating & maintaining sleep)   All other Review of Systems: Negative except myalgias and sweat (withdrawal)         [facility] (MSE):    MSE FINDINGS ARE WITHIN NORMAL LIMITS (WNL) UNLESS OTHERWISE STATED BELOW. ( ALL OF THE BELOW CATEGORIES OF THE MSE HAVE BEEN REVIEWED (reviewed 10/25/2022) AND UPDATED AS DEEMED APPROPRIATE )  General Presentation age appropriate, cooperative and guarded   Orientation oriented to time, place and person   Vital Signs  See below (reviewed 10/25/2022); Vital Signs (BP, Pulse, & Temp) are within normal limits if not listed below.    Gait and Station Stable/steady, no ataxia   Musculoskeletal System No extrapyramidal symptoms (EPS); no abnormal muscular movements or Tardive Dyskinesia (TD); muscle strength and tone are within normal limits   Language No aphasia or dysarthria   Speech:  non-pressured and soft   Thought Processes coherent; slow rate of thoughts; fair abstract reasoning/computation   Thought Associations blocked    Thought Content preoccupations   Suicidal Ideations contracts for safety   Homicidal Ideations contracts for safety   Mood:  depressed and anhedonia   Affect:  constricted   Memory recent  impaired   Memory remote:  fair Concentration/Attention:  intact   Fund of Knowledge average   Insight:  fair   Reliability fair   Judgment:  limited          VITALS:     Patient Vitals for the past 24 hrs:   Temp Pulse Resp BP SpO2   10/25/22 0858 97 °F (36.1 °C) 93 13 118/85 100 %   10/24/22 2000 98 °F (36.7 °C) 82 14 117/71 95 %     Wt Readings from Last 3 Encounters:   10/23/22 54.4 kg (120 lb)   01/19/22 90.7 kg (200 lb)   02/21/21 94.8 kg (208 lb 14.4 oz)     Temp Readings from Last 3 Encounters:   10/25/22 97 °F (36.1 °C)   01/19/22 98.4 °F (36.9 °C)   02/22/21 98.2 °F (36.8 °C)     BP Readings from Last 3 Encounters:   10/25/22 118/85   01/19/22 (!) 121/90   02/22/21 135/87     Pulse Readings from Last 3 Encounters:   10/25/22 93   01/19/22 76   02/22/21 95            DATA     LABORATORY DATA:(reviewed/updated 10/25/2022)  No results found for this or any previous visit (from the past 24 hour(s)). Lab Results   Component Value Date/Time    Valproic acid <3 (L) 10/23/2022 12:49 PM     Lab Results   Component Value Date/Time    Lithium level 0.49 (L) 08/09/2018 02:15 PM      RADIOLOGY REPORTS:(reviewed/updated 10/25/2022)  No results found.        MEDICATIONS     ALL MEDICATIONS:   Current Facility-Administered Medications   Medication Dose Route Frequency    buprenorphine-naloxone (SUBOXONE) 8-2mg SL tablet  1 Tablet SubLINGual BID    QUEtiapine SR (SEROquel XR) tablet 300 mg  300 mg Oral QHS    naloxone (NARCAN) injection 0.4 mg  0.4 mg IntraMUSCular EVERY 2 MINUTES AS NEEDED    PHENobarbitaL (LUMINAL) tablet 32.4 mg  32.4 mg Oral QID    Followed by    Lissa Ram ON 10/26/2022] PHENobarbitaL (LUMINAL) tablet 32.4 mg  32.4 mg Oral BID    Followed by    Lissa Ram ON 10/27/2022] PHENobarbitaL (LUMINAL) tablet 16.2 mg  16.2 mg Oral BID    PHENobarbitaL (LUMINAL) tablet 32.4 mg  32.4 mg Oral Q6H PRN    Followed by    Lissa Ram ON 10/26/2022] PHENobarbitaL (LUMINAL) tablet 16.2 mg  16.2 mg Oral Q6H PRN    thiamine HCL (B-1) tablet 100 mg  100 mg Oral DAILY    folic acid (FOLVITE) tablet 1 mg  1 mg Oral DAILY    therapeutic multivitamin (THERAGRAN) tablet 1 Tablet  1 Tablet Oral DAILY    OLANZapine (ZyPREXA) tablet 5 mg  5 mg Oral Q6H PRN    haloperidol lactate (HALDOL) injection 5 mg  5 mg IntraMUSCular Q6H PRN    benztropine (COGENTIN) tablet 1 mg  1 mg Oral BID PRN    diphenhydrAMINE (BENADRYL) injection 50 mg  50 mg IntraMUSCular BID PRN    hydrOXYzine HCL (ATARAX) tablet 50 mg  50 mg Oral TID PRN    LORazepam (ATIVAN) injection 1 mg  1 mg IntraMUSCular Q4H PRN    acetaminophen (TYLENOL) tablet 650 mg  650 mg Oral Q4H PRN    magnesium hydroxide (MILK OF MAGNESIA) 400 mg/5 mL oral suspension 30 mL  30 mL Oral DAILY PRN      SCHEDULED MEDICATIONS:   Current Facility-Administered Medications   Medication Dose Route Frequency    buprenorphine-naloxone (SUBOXONE) 8-2mg SL tablet  1 Tablet SubLINGual BID    QUEtiapine SR (SEROquel XR) tablet 300 mg  300 mg Oral QHS    PHENobarbitaL (LUMINAL) tablet 32.4 mg  32.4 mg Oral QID    Followed by    Marquis Stout ON 10/26/2022] PHENobarbitaL (LUMINAL) tablet 32.4 mg  32.4 mg Oral BID    Followed by    Marquis Stout ON 10/27/2022] PHENobarbitaL (LUMINAL) tablet 16.2 mg  16.2 mg Oral BID    thiamine HCL (B-1) tablet 100 mg  100 mg Oral DAILY    folic acid (FOLVITE) tablet 1 mg  1 mg Oral DAILY    therapeutic multivitamin (THERAGRAN) tablet 1 Tablet  1 Tablet Oral DAILY          ASSESSMENT & PLAN     DIAGNOSES REQUIRING ACTIVE TREATMENT AND MONITORING: (reviewed/updated 10/25/2022)  Patient Active Hospital Problem List:         MDD ([facility] [address] (2017)           Assessment: the patient has been dysphoric and paranoid in the setting of stimulant and opiate use as well as alcohol use. Will continue detox and start agent for mood lability.     Plan:  -  Phenobarbital taper  - INCREASE Suboxone to 8 mg BID for opiate use disorder  - CONTINUE Seroquel  mg QHS for mood lability  - IGM therapy as tolerated  - Expand database / obtain collateral  - Dispo planning (rehab vs home)     In summary, Jama Patton, is a 48 y.o.  female who presents with a severe exacerbation of the principal diagnosis of Adjustment disorder with anxious mood    Patient's condition is not improving. Patient requires continued inpatient hospitalization for further stabilization, safety monitoring and medication management. I will continue to coordinate the provision of individual, milieu, occupational, group, and substance abuse therapies to address target symptoms/diagnoses as deemed appropriate for the individual patient. A coordinated, multidisplinary treatment team round was conducted with the patient (this team consists of the nurse, psychiatric unit pharmacist,  and writer). Complete current electronic health record for patient has been reviewed today including consultant notes, ancillary staff notes, nurses and psychiatric tech notes. Suicide risk assessment completed and patient deemed to be of low risk for suicide at this time. The following regarding medications was addressed during rounds with patient:   the risks and benefits of the proposed medication. The patient was given the opportunity to ask questions. Informed consent given to the use of the above medications. Will continue to adjust psychiatric and non-psychiatric medications (see above \"medication\" section and orders section for details) as deemed appropriate & based upon diagnoses and response to treatment. I will continue to order blood tests/labs and diagnostic tests as deemed appropriate and review results as they become available (see orders for details and above listed lab/test results). I will order psychiatric records from previous [facility] hospitals to further elucidate the nature of patient's psychopathology and review once available.     I will gather additional collateral information from friends, family and o/p treatment team to further elucidate the nature of patient's psychopathology and baselline level of psychiatric functioning. I certify that this patient's inpatient psychiatric hospital services furnished since the previous certification were, and continue to be, required for treatment that could reasonably be expected to improve the patient's condition, or for diagnostic study, and that the patient continues to need, on a daily basis, active treatment furnished directly by or requiring the supervision of inpatient psychiatric facility personnel. In addition, the hospital records show that services furnished were intensive treatment services, admission or related services, or equivalent services.     EXPECTED DISCHARGE DATE/DAY: TBD     DISPOSITION: Rehab       Signed By:   Kathy Diaz MD  10/25/2022

## 2022-10-25 NOTE — PROGRESS NOTES
Problem: Depressed Mood (Adult/Pediatric)  Goal: *STG: Participates in treatment plan  Outcome: Progressing Towards Goal  Goal: *STG: Verbalizes anger, guilt, and other feelings in a constructive manor  Outcome: Progressing Towards Goal  Goal: *STG: Complies with medication therapy  Outcome: Progressing Towards Goal     [id number]: Assumed care of patient after receiving shift report from outgoing nurse. Patient isolative to room although appropriately interactive with assessment. Affect is constricted, mood is anxious/depressed. Pt cooperative with vitals and assessment. A&O x 4. Independent in ADLs. Insight and concentration somewhat present. Gait is steady. Appetite patterns decreased. Small snack and fluids encouraged and tolerated without issue. Pt reports last BM  was 2 days ago. Pt reports no indications or symptoms of constipation and attributes lack of BM to decrease in appetite. Denies AVH/SI/HI. Patient reports heightened anxiety and mild agitation at this time. PRN zyprexa, along with teaching, given for agitation at 2111. Pt reported mild headache attributed to withdrawal. Fluids encouraged and scheduled Phenobarbital given. Patient medication and diet compliant. Pt encouraged to continue to participate in care. No further issues noted at this time. [id number]: Pt resting quietly in bed. No further issues noted at this time. [id number]: Staff attempted unsuccessfully to draw Pt lab. Pt refused a retry. Will communicate to oncoming shift and encourage fluids. Pt has been observed throughout the night. Total hours slept approximately 7.25. No s/s of distress noted. Patient has remained safe. Hourly rounding performed throughout shift.

## 2022-10-26 PROCEDURE — 74011250637 HC RX REV CODE- 250/637: Performed by: NURSE PRACTITIONER

## 2022-10-26 PROCEDURE — 74011250637 HC RX REV CODE- 250/637: Performed by: PSYCHIATRY & NEUROLOGY

## 2022-10-26 PROCEDURE — 74011250636 HC RX REV CODE- 250/636: Performed by: PSYCHIATRY & NEUROLOGY

## 2022-10-26 PROCEDURE — 99232 SBSQ HOSP IP/OBS MODERATE 35: CPT | Performed by: PSYCHIATRY & NEUROLOGY

## 2022-10-26 PROCEDURE — 65220000003 HC RM SEMIPRIVATE PSYCH

## 2022-10-26 RX ORDER — BUPRENORPHINE HYDROCHLORIDE AND NALOXONE HYDROCHLORIDE DIHYDRATE 8; 2 MG/1; MG/1
1 TABLET SUBLINGUAL ONCE
Status: COMPLETED | OUTPATIENT
Start: 2022-10-26 | End: 2022-10-26

## 2022-10-26 RX ORDER — DIVALPROEX SODIUM 500 MG/1
1000 TABLET, EXTENDED RELEASE ORAL
Status: DISCONTINUED | OUTPATIENT
Start: 2022-10-26 | End: 2022-11-01 | Stop reason: HOSPADM

## 2022-10-26 RX ORDER — DICYCLOMINE HYDROCHLORIDE 10 MG/1
10 CAPSULE ORAL
Status: DISCONTINUED | OUTPATIENT
Start: 2022-10-26 | End: 2022-11-01 | Stop reason: HOSPADM

## 2022-10-26 RX ADMIN — FOLIC ACID 1 MG: 1 TABLET ORAL at 08:37

## 2022-10-26 RX ADMIN — BUPRENORPHINE HYDROCHLORIDE AND NALOXONE HYDROCHLORIDE DIHYDRATE 1 TABLET: 8; 2 TABLET SUBLINGUAL at 10:11

## 2022-10-26 RX ADMIN — ACETAMINOPHEN 650 MG: 325 TABLET, FILM COATED ORAL at 04:09

## 2022-10-26 RX ADMIN — BUPRENORPHINE HYDROCHLORIDE AND NALOXONE HYDROCHLORIDE DIHYDRATE 1 TABLET: 8; 2 TABLET SUBLINGUAL at 08:37

## 2022-10-26 RX ADMIN — PHENOBARBITAL 32.4 MG: 32.4 TABLET ORAL at 16:30

## 2022-10-26 RX ADMIN — Medication 100 MG: at 08:00

## 2022-10-26 RX ADMIN — THERA TABS 1 TABLET: TAB at 08:37

## 2022-10-26 RX ADMIN — QUETIAPINE FUMARATE 300 MG: 300 TABLET, EXTENDED RELEASE ORAL at 21:42

## 2022-10-26 RX ADMIN — DIVALPROEX SODIUM 1000 MG: 500 TABLET, EXTENDED RELEASE ORAL at 21:41

## 2022-10-26 RX ADMIN — BUPRENORPHINE HYDROCHLORIDE AND NALOXONE HYDROCHLORIDE DIHYDRATE 1 TABLET: 8; 2 TABLET SUBLINGUAL at 16:30

## 2022-10-26 RX ADMIN — ACETAMINOPHEN 650 MG: 325 TABLET, FILM COATED ORAL at 20:18

## 2022-10-26 RX ADMIN — PHENOBARBITAL 32.4 MG: 32.4 TABLET ORAL at 08:00

## 2022-10-26 RX ADMIN — DICYCLOMINE HYDROCHLORIDE 10 MG: 10 CAPSULE ORAL at 18:18

## 2022-10-26 RX ADMIN — HYDROXYZINE HYDROCHLORIDE 50 MG: 25 TABLET, FILM COATED ORAL at 04:09

## 2022-10-26 RX ADMIN — DICYCLOMINE HYDROCHLORIDE 10 MG: 10 CAPSULE ORAL at 12:26

## 2022-10-26 RX ADMIN — HYDROXYZINE HYDROCHLORIDE 50 MG: 25 TABLET, FILM COATED ORAL at 21:42

## 2022-10-26 NOTE — GROUP NOTE
[facility]  GROUP DOCUMENTATION INDIVIDUAL                                                                          Group Therapy Note    Date: 10/26/2022    Group Start Time: 1000  Group End Time: 1100  Group Topic: Topic Group    [facility] [place] [address] ACUTE BEHAV [facility]    [place] [facility] GROUP DOCUMENTATION GROUP    Group Therapy Note    Attendees: 8       Attendance: Attended    Patient's Goal:  To participate in relaxation activity    Interventions/techniques: Supported-Art    Follows Directions:  Followed directions    Interactions: Interacted appropriately    Mental Status: Calm    Behavior/appearance: Attentive and Cooperative    Goals Achieved: Able to engage in interactions and Able to listen to others-worked on art project-good concentration    Alvaro Roa

## 2022-10-26 NOTE — BH NOTES
PSYCHIATRIC PROGRESS NOTE         Patient Name  Pam Cadet   Date of Birth 1975   [facility] [id number]   Medical Record Number  [id number]      Age  48 y.o. PCP None   Admit date:  10/23/2022    Room Number  489/64  @ [facility]   Date of Service  10/26/2022         E & M PROGRESS NOTE:         HISTORY       CC:  \"suicidal ideation\"  HISTORY OF PRESENT ILLNESS/INTERVAL HISTORY:  (reviewed/updated 10/26/2022). per initial evaluation: The patient, Pam Cadet, is a 48 y.o. BLACK/ female with a past psychiatric history significant for stimulant use  and opiate use disorders, who presents at this time with complaints of (and/or evidence of) the following emotional symptoms: depression and suicidal thoughts/threats. Additional symptomatology include auditory hallucinations. The above symptoms have been present for 2+ weeks. These symptoms are of moderate to high severity. These symptoms are constant in nature. The patient's condition has been precipitated by psychosocial stressors. Patient's condition made worse by continued illicit drug use as well as treatment noncompliance. UDS: +benzos, cocaine, methadone, THC; BAL=0. Patient presented to the ED stating that her life was in danger due to conflict within her relationship. Patient was able to make her needs known but has been dysphoric on the unit. She reported being on Seroquel but has not been compliant. The patient also reports drinking up to a gallon of vodka daily. The patient is a fair historian. The patient corroborates the above narrative. The patient contracts for safety on the unit and gives consent for the team to contact collateral. The patient is amenable to initiating treatment while on the unit. 10/25 - the patient slept 7.75 hours overnight.  She got Zyprexa PRN for psychosis and c/o withdrawal symptoms, though CIWA score has been 3-5 since arrival. Patient compliant with medication and able to make her needs known. She is still rehab focused and denies any active thoughts of self harm. Patient requests a higher dose of Suboxone (per pharmacist she was previously on 24 mg daily). 10/26 - no acute overnight events. The patient slept 6.5 hours overnight, though she states it was restless. She provides MD a long list of medications she has previously taken including Haldol, Depakote, Lithium, Zoloft and Prazosin and states she last got these agents while living in [place]. Patient medication compliant (per nurse she swallowed her SL Suboxone tablet this morning). Patient able to make her needs known and denies active thoughts of self harm. SIDE EFFECTS: (reviewed/updated 10/26/2022)  None reported or admitted to. ALLERGIES:(reviewed/updated 10/26/2022)  Allergies   Allergen Reactions    Vancomycin Anaphylaxis    Ketorolac Unable to Obtain     From [facility] paperwork 1/19/22    Remeron [Mirtazapine] Unable to Obtain     From [facility] paperwork on 1/19/22    Tomato Swelling     Tongue    Trazodone Angioedema      REVIEW OF SYSTEMS: (reviewed/updated 10/26/2022)  Appetite:no change from normal   Sleep: poor with DIMS (difficulty initiating & maintaining sleep)   All other Review of Systems: Negative except myalgias and sweat (withdrawal)         [facility] ([facility]):    [facility] FINDINGS ARE WITHIN NORMAL LIMITS (WNL) UNLESS OTHERWISE STATED BELOW. ( ALL OF THE BELOW CATEGORIES OF THE MSE HAVE BEEN REVIEWED (reviewed 10/26/2022) AND UPDATED AS DEEMED APPROPRIATE )  General Presentation age appropriate, cooperative and guarded   Orientation oriented to time, place and person   Vital Signs  See below (reviewed 10/26/2022); Vital Signs (BP, Pulse, & Temp) are within normal limits if not listed below.    Gait and Station Stable/steady, no ataxia   Musculoskeletal System No extrapyramidal symptoms (EPS); no abnormal muscular movements or Tardive Dyskinesia (TD); muscle strength and tone are within normal limits   Language No aphasia or dysarthria   Speech:  non-pressured and soft   Thought Processes coherent; slow rate of thoughts; fair abstract reasoning/computation   Thought Associations blocked    Thought Content preoccupations   Suicidal Ideations contracts for safety   Homicidal Ideations contracts for safety   Mood:  depressed and anhedonia   Affect:  constricted   Memory recent  impaired   Memory remote:  fair   Concentration/Attention:  intact   Fund of Knowledge average   Insight:  fair   Reliability fair   Judgment:  limited          VITALS:     Patient Vitals for the past 24 hrs:   Temp Pulse Resp BP SpO2   10/26/22 0800 98.2 °F (36.8 °C) 71 18 (!) 150/93 96 %   10/25/22 2100 98.2 °F (36.8 °C) 90 14 119/80 100 %       Wt Readings from Last 3 Encounters:   10/23/22 54.4 kg (120 lb)   01/19/22 90.7 kg (200 lb)   02/21/21 94.8 kg (208 lb 14.4 oz)     Temp Readings from Last 3 Encounters:   10/26/22 98.2 °F (36.8 °C)   01/19/22 98.4 °F (36.9 °C)   02/22/21 98.2 °F (36.8 °C)     BP Readings from Last 3 Encounters:   10/26/22 (!) 150/93   01/19/22 (!) 121/90   02/22/21 135/87     Pulse Readings from Last 3 Encounters:   10/26/22 71   01/19/22 76   02/22/21 95            DATA     LABORATORY DATA:(reviewed/updated 10/26/2022)  No results found for this or any previous visit (from the past 24 hour(s)). Lab Results   Component Value Date/Time    Valproic acid <3 (L) 10/23/2022 12:49 PM     Lab Results   Component Value Date/Time    Lithium level 0.49 (L) 08/09/2018 02:15 PM      RADIOLOGY REPORTS:(reviewed/updated 10/26/2022)  No results found.        MEDICATIONS     ALL MEDICATIONS:   Current Facility-Administered Medications   Medication Dose Route Frequency    buprenorphine-naloxone (SUBOXONE) 8-2mg SL tablet  1 Tablet SubLINGual ONCE    buprenorphine-naloxone (SUBOXONE) 8-2mg SL tablet  1 Tablet SubLINGual BID    QUEtiapine SR (SEROquel XR) tablet 300 mg  300 mg Oral QHS    naloxone [facility]) injection 0.4 mg  0.4 mg IntraMUSCular EVERY 2 MINUTES AS NEEDED    PHENobarbitaL (LUMINAL) tablet 32.4 mg  32.4 mg Oral BID    Followed by    Kalyn Mask ON 10/27/2022] PHENobarbitaL (LUMINAL) tablet 16.2 mg  16.2 mg Oral BID    PHENobarbitaL (LUMINAL) tablet 32.4 mg  32.4 mg Oral Q6H PRN    Followed by    PHENobarbitaL (LUMINAL) tablet 16.2 mg  16.2 mg Oral Q6H PRN    thiamine HCL (B-1) tablet 100 mg  100 mg Oral DAILY    folic acid (FOLVITE) tablet 1 mg  1 mg Oral DAILY    therapeutic multivitamin (THERAGRAN) tablet 1 Tablet  1 Tablet Oral DAILY    OLANZapine (ZyPREXA) tablet 5 mg  5 mg Oral Q6H PRN    haloperidol lactate (HALDOL) injection 5 mg  5 mg IntraMUSCular Q6H PRN    benztropine (COGENTIN) tablet 1 mg  1 mg Oral BID PRN    diphenhydrAMINE (BENADRYL) injection 50 mg  50 mg IntraMUSCular BID PRN    hydrOXYzine HCL (ATARAX) tablet 50 mg  50 mg Oral TID PRN    LORazepam (ATIVAN) injection 1 mg  1 mg IntraMUSCular Q4H PRN    acetaminophen (TYLENOL) tablet 650 mg  650 mg Oral Q4H PRN    magnesium hydroxide (MILK OF MAGNESIA) 400 mg/5 mL oral suspension 30 mL  30 mL Oral DAILY PRN      SCHEDULED MEDICATIONS:   Current Facility-Administered Medications   Medication Dose Route Frequency    buprenorphine-naloxone (SUBOXONE) 8-2mg SL tablet  1 Tablet SubLINGual ONCE    buprenorphine-naloxone (SUBOXONE) 8-2mg SL tablet  1 Tablet SubLINGual BID    QUEtiapine SR (SEROquel XR) tablet 300 mg  300 mg Oral QHS    PHENobarbitaL (LUMINAL) tablet 32.4 mg  32.4 mg Oral BID    Followed by    Kalyn Mask ON 10/27/2022] PHENobarbitaL (LUMINAL) tablet 16.2 mg  16.2 mg Oral BID    thiamine HCL (B-1) tablet 100 mg  100 mg Oral DAILY    folic acid (FOLVITE) tablet 1 mg  1 mg Oral DAILY    therapeutic multivitamin (THERAGRAN) tablet 1 Tablet  1 Tablet Oral DAILY          ASSESSMENT & PLAN     DIAGNOSES REQUIRING ACTIVE TREATMENT AND MONITORING: (reviewed/updated 10/26/2022)  Patient Active Hospital Problem List:         MDD ([address] (1/25/2017)           Assessment: the patient has been dysphoric and paranoid in the setting of stimulant and opiate use as well as alcohol use. Will continue detox and start agent for mood lability. Plan:  - CONTINUE Suboxone to 8 mg BID for opiate use disorder  - CONTINUE Seroquel  mg QHS for mood lability  - START Depakote ER 1,000 mg QHS for mood lability adjunct  - IGM therapy as tolerated  - Expand database / obtain collateral  - Dispo planning (rehab vs home)     In summary, Chalmer Gilford, is a 48 y.o.  female who presents with a severe exacerbation of the principal diagnosis of Adjustment disorder with anxious mood    Patient's condition is improving. Patient requires continued inpatient hospitalization for further stabilization, safety monitoring and medication management. I will continue to coordinate the provision of individual, milieu, occupational, group, and substance abuse therapies to address target symptoms/diagnoses as deemed appropriate for the individual patient. A coordinated, multidisplinary treatment team round was conducted with the patient (this team consists of the nurse, psychiatric unit pharmacist,  and writer). Complete current electronic health record for patient has been reviewed today including consultant notes, ancillary staff notes, nurses and psychiatric tech notes. Suicide risk assessment completed and patient deemed to be of low risk for suicide at this time. The following regarding medications was addressed during rounds with patient:   the risks and benefits of the proposed medication. The patient was given the opportunity to ask questions. Informed consent given to the use of the above medications. Will continue to adjust psychiatric and non-psychiatric medications (see above \"medication\" section and orders section for details) as deemed appropriate & based upon diagnoses and response to treatment.      I will continue to order blood tests/labs and diagnostic tests as deemed appropriate and review results as they become available (see orders for details and above listed lab/test results). I will order psychiatric records from previous [facility] hospitals to further elucidate the nature of patient's psychopathology and review once available. I will gather additional collateral information from friends, family and o/p treatment team to further elucidate the nature of patient's psychopathology and baselline level of psychiatric functioning. I certify that this patient's inpatient psychiatric hospital services furnished since the previous certification were, and continue to be, required for treatment that could reasonably be expected to improve the patient's condition, or for diagnostic study, and that the patient continues to need, on a daily basis, active treatment furnished directly by or requiring the supervision of inpatient psychiatric facility personnel. In addition, the hospital records show that services furnished were intensive treatment services, admission or related services, or equivalent services.     EXPECTED DISCHARGE DATE/DAY: TBD     DISPOSITION: Rehab       Signed By:   Nanette Lowry MD  10/26/2022

## 2022-10-26 NOTE — PROGRESS NOTES
Received care of patient sitting in the day room. Patient is alert with flat affect and depressed mood. Speech pattern is soft. Patient denies SI/HI at this time. Endorses auditory hallucinations, but states she does not want to listen to the voices. Reports that she feels cold and clammy and just generally achy. Patient is detoxing from multiple substances currently. Her morning CIWA scale is 9. Patient is medication compliant, but ate very little for breakfast. Monitoring to continue for safety support and situation.    Problem: Depressed Mood (Adult/Pediatric)  Goal: *STG: Complies with medication therapy  Outcome: Progressing Towards Goal

## 2022-10-26 NOTE — GROUP NOTE
CATARINO  GROUP DOCUMENTATION INDIVIDUAL                                                                          Group Therapy Note    Date: 10/26/2022    Group Start Time: 1400  Group End Time: 1500  Group Topic: Recreational/Music Therapy    [facility] [place] [address] ACUTE BEHAV [facility]    [place], [address] [facility] GROUP DOCUMENTATION GROUP    Group Therapy Note    Attendees: 7       Attendance: Attended    Patient's Goal:  To concentrate on selected task    Interventions/techniques: Supported-crafts,games,music    Follows Directions:  Followed directions    Interactions: Interacted appropriately    Mental Status: Calm    Behavior/appearance: Attentive and Cooperative    Goals Achieved: Able to engage in interactions and Able to listen to others-good concentration on selected task    [facility]

## 2022-10-26 NOTE — PROGRESS NOTES
Problem: Depressed Mood (Adult/Pediatric)  Goal: *STG: Participates in treatment plan  Outcome: Progressing Towards Goal  Goal: *STG: Verbalizes anger, guilt, and other feelings in a constructive manor  Outcome: Progressing Towards Goal  Goal: *STG: Remains safe in hospital  Outcome: Progressing Towards Goal  Goal: *STG: Complies with medication therapy  Outcome: Progressing Towards Goal     [id number]: Assumed care of patient after receiving shift report from outgoing nurse. Patient isolative to room upon assessment. Affect is sad, mood is anxious/depressed. Pt cooperative with vitals and assessment. A&O x 4. Independent in ADLs. Insight and concentration somewhat present. Gait is steady. Appetite patterns WNL. Denies AVH/SI/HI. Pt complaining of withdrawal symptoms at this time. Pt received PRN tylenol, atarax, and Zyprexa for pain, anxiety, and agitation at 2023. Patient medication and diet compliant. Pt encouraged to continue to participate in care. [id number]: Pt restless and uncomfortable at this time. Continuing to complain of restlessness/withdrawal symptoms. PRN Phenobarbital given at 2340. Pt restless but in bed at this time. 2161-4161: Pt awoke complaining of anxiety and pain. PRN atarax and tylenol given at 0409. No further issues noted at this time. 6969-4303: Pt has been observed throughout the night. Total hours slept approximately 6.5. No s/s of distress noted. Patient has remained safe. Hourly rounding performed throughout shift.

## 2022-10-26 NOTE — BH NOTES
Behavioral Health Treatment Team Note         Progress note: Patient met with treatment team. Patient was alert and oriented x 4. Patient still expressing withdrawal symptoms that are uncomfortable. Patient does not have insight about how dangerous her polysubstance use is to her mental and physical health at this time. Patient reports that she is still motivated to go to rehab. SW will continue to look for inpatient substance abuse programs. Patient remains isolative to her room and is not attending groups.      LOS:  3  Expected LOS: TBD    Insurance info/prescription coverage:  [facility] MEDICAID/[facility] [employer]  Date of last family contact:    Family requesting physician contact today:  no  Discharge plan:  Rehab  Guns in the home:  no   Outpatient provider(s):  to be linked    Participating treatment team members: DORCAS Hidalgo, Ginna Bright MD

## 2022-10-26 NOTE — PROGRESS NOTES
Auto-MST trigger per RN admission assessment. No acute nutrition or weight issues identifed BUT pt's weight was 165 # in 2018 and A1c was 5.3 in 2017. Pt's wt this admission is stated weight, please confirm. Pt is also now pre-diabetic and will need CC diet with 3 CHO choices. Supplements ordered for meal trays. Other hx notable for COPD, Hep B, substance abuse, HTN, sarcoidosis.     Ht: 5'4\"  Wt: 120 lb  BMI: 20.60 kg/(m^2) c/w normal weight  Est energy needs: 1810 kcal, 65 g protein, 1 mL/kcal fluids  Pt will consume > 75% of meals at follow up 7-10 days  LOS, MST

## 2022-10-27 LAB
CHOLEST SERPL-MCNC: 199 MG/DL
HDLC SERPL-MCNC: 49 MG/DL
HDLC SERPL: 4.1 {RATIO} (ref 0–5)
LDLC SERPL CALC-MCNC: 115.2 MG/DL (ref 0–100)
TRIGL SERPL-MCNC: 174 MG/DL (ref ?–150)
VLDLC SERPL CALC-MCNC: 34.8 MG/DL

## 2022-10-27 PROCEDURE — 65220000003 HC RM SEMIPRIVATE PSYCH

## 2022-10-27 PROCEDURE — 74011250637 HC RX REV CODE- 250/637: Performed by: PSYCHIATRY & NEUROLOGY

## 2022-10-27 PROCEDURE — 80061 LIPID PANEL: CPT

## 2022-10-27 PROCEDURE — 74011250636 HC RX REV CODE- 250/636: Performed by: PSYCHIATRY & NEUROLOGY

## 2022-10-27 PROCEDURE — 74011250637 HC RX REV CODE- 250/637: Performed by: NURSE PRACTITIONER

## 2022-10-27 PROCEDURE — 36415 COLL VENOUS BLD VENIPUNCTURE: CPT

## 2022-10-27 PROCEDURE — 99231 SBSQ HOSP IP/OBS SF/LOW 25: CPT | Performed by: PSYCHIATRY & NEUROLOGY

## 2022-10-27 RX ADMIN — HYDROXYZINE HYDROCHLORIDE 50 MG: 25 TABLET, FILM COATED ORAL at 13:22

## 2022-10-27 RX ADMIN — THERA TABS 1 TABLET: TAB at 09:17

## 2022-10-27 RX ADMIN — QUETIAPINE FUMARATE 300 MG: 300 TABLET, EXTENDED RELEASE ORAL at 21:09

## 2022-10-27 RX ADMIN — OLANZAPINE 5 MG: 5 TABLET, FILM COATED ORAL at 21:09

## 2022-10-27 RX ADMIN — OLANZAPINE 5 MG: 5 TABLET, FILM COATED ORAL at 13:22

## 2022-10-27 RX ADMIN — PHENOBARBITAL 16.2 MG: 32.4 TABLET ORAL at 09:18

## 2022-10-27 RX ADMIN — Medication 100 MG: at 09:18

## 2022-10-27 RX ADMIN — HYDROXYZINE HYDROCHLORIDE 50 MG: 25 TABLET, FILM COATED ORAL at 21:09

## 2022-10-27 RX ADMIN — DIVALPROEX SODIUM 1000 MG: 500 TABLET, EXTENDED RELEASE ORAL at 21:09

## 2022-10-27 RX ADMIN — BUPRENORPHINE HYDROCHLORIDE AND NALOXONE HYDROCHLORIDE DIHYDRATE 1 TABLET: 8; 2 TABLET SUBLINGUAL at 09:18

## 2022-10-27 RX ADMIN — FOLIC ACID 1 MG: 1 TABLET ORAL at 09:18

## 2022-10-27 RX ADMIN — BUPRENORPHINE HYDROCHLORIDE AND NALOXONE HYDROCHLORIDE DIHYDRATE 1 TABLET: 8; 2 TABLET SUBLINGUAL at 17:17

## 2022-10-27 RX ADMIN — BISMUTH SUBSALICYLATE 1050 MG: 525 LIQUID ORAL at 21:15

## 2022-10-27 RX ADMIN — ACETAMINOPHEN 650 MG: 325 TABLET, FILM COATED ORAL at 21:08

## 2022-10-27 RX ADMIN — PHENOBARBITAL 16.2 MG: 32.4 TABLET ORAL at 17:17

## 2022-10-27 NOTE — GROUP NOTE
CAT[facility]INO  GROUP DOCUMENTATION INDIVIDUAL                                                                          Group Therapy Note    Date: 10/27/2022    Group Start Time: 1000  Group End Time: 1100  Group Topic: Topic Group    [facility] [place] [facility] ACUTE BEHAV [facility]    [place], [address] [facility] GROUP DOCUMENTATION GROUP    Group Therapy Note    Attendees: 7       Attendance: Attended    Patient's Goal:  To participate in self esteem booster    Interventions/techniques: Supported-handout-People With Mental Illness Enrich Our Lives    Follows Directions:  Followed directions    Interactions: Interacted appropriately    Mental Status: Calm    Behavior/appearance: Attentive and Cooperative    Goals Achieved: Able to engage in interactions, Able to listen to others, Able to reflect/comment on own behavior, Able to self-disclose, Discussed coping, and Discussed self-esteem issues    Tony Ventura

## 2022-10-27 NOTE — GROUP NOTE
[facility]  GROUP DOCUMENTATION INDIVIDUAL                                                                          Group Therapy Note    Date: 10/27/2022    Group Start Time: 1400  Group End Time: 1500  Group Topic: Recreational/Music Therapy    [address] [facility] ACUTE BEHAV [facility]    [place], [address] [facility] GROUP DOCUMENTATION GROUP    Group Therapy Note    Attendees: 5       Attendance: Attended    Patient's Goal:  To concentrate on selected task    Interventions/techniques: Supported-crafts,games,music    Follows Directions:  Followed directions    Interactions: Interacted appropriately    Mental Status: Calm    Behavior/appearance: Attentive and Cooperative    Goals Achieved: Able to engage in interactions and Able to listen to others-active participant in game      Heber Gutiérrez

## 2022-10-27 NOTE — BH NOTES
Met the pt at the day room seated in the chair with with her puzzle on the hand. she was alert and oriented with bilateral even breathing  at room air,She c/o stomach pains back pain and leg pain. she was ambulant in the room and reported no genitourinary issues a she had a bowel movement a few minutes ago. She reported positive of depression and anxiety. 15 minutes safety checks were initiated I will continue to monitor. 2118 Pt pain reevaluated and it had resolved. Other medications were administered and pt complied. 0600 pt slept well and woke in the dayroom watching TV. Pt requested to have some pepto bismol for her stomachache. She slept for 8 hours ct monitoring and other care.

## 2022-10-27 NOTE — PROGRESS NOTES
KADIE contacted [facility] to inquire about rehab bed availabilities and was informed that they do not have any rehab beds open at this time. Representative from [facility] informed the KADIE that she could fax the Pt clinicals for when a bed becomes available. KADIE faxed the Pt clinical documents to [facility].

## 2022-10-27 NOTE — BH NOTES
After shift change report from [address] [facility] Manuel met the pt in her room where she was seated quietly focusing with her coloring. She reported positive of depression 9/10 and anxiety. she reported of having poor appetite and some abdominal pain and no visual,auditory or tactile hallucination. and had no suicide ideas. 15 minute safety check was initiated. Pt complied with medication and scored 5 for CIWA. The pt had  a calm night and slept for 6.5 hrs. Continue monitoring.

## 2022-10-27 NOTE — PROGRESS NOTES
Problem: Depressed Mood (Adult/Pediatric)  Goal: *STG: Participates in treatment plan  Outcome: Progressing Towards Goal  Goal: *STG: Verbalizes anger, guilt, and other feelings in a constructive manor  Outcome: Progressing Towards Goal  Goal: *STG: Remains safe in hospital  Outcome: Progressing Towards Goal  Goal: *STG: Complies with medication therapy  Outcome: Progressing Towards Goal  Goal: *LTG: Understands illness and can identify signs of relapse  Outcome: Progressing Towards Goal     Problem: Falls - Risk of  Goal: *Absence of Falls  Description: Document Criss Fall Risk and appropriate interventions in the flowsheet.   Outcome: Progressing Towards Goal  Note: Fall Risk Interventions:            Medication Interventions: Teach patient to arise slowly

## 2022-10-27 NOTE — PROGRESS NOTES
SW contacted [facility] to inquire if they had any rehab beds available. KADIE left a voicemail requesting a return phone call.

## 2022-10-27 NOTE — BH NOTES
Behavioral Health Treatment Team Note         Progress note: Patient met with treatment team. Patient slept 8 hours over night. Patient reports that she is feeling \"so so. \" Patient reports that her appetite is poor. Patient remains isolative to room. Patient reports that she is still wanting to attend an inpatient substance abuse program for disposition planning.      LOS:  4  Expected LOS: TBD    Insurance info/prescription coverage:  [facility] MEDICAID/[facility] [employer]  Date of last family contact:  does not consent   Family requesting physician contact today:  no  Discharge plan:  rehab   Guns in the home:  no   Outpatient provider(s):  to be linked     Participating treatment team members: DORCAS Gandara, Adelina Rey MD

## 2022-10-27 NOTE — BH NOTES
Pt received in day room watching TV. Quiet and withdrawn with flat, sad affect. Compliant with morning meds, CIWA scale =5, phenobarbital taper ends today. Pt denies SI/HI/AVH, endorses 8/10 depression and anxiety. Intermittently visible in day room minimally interacting with peers. Continues to c/o not feeling well (chills/ nausea), VSS. Reported she cannot \"stomach\" the hospital food, ate some of a turkey sandwich. Will continue to monitor and support.

## 2022-10-27 NOTE — BH NOTES
PSYCHIATRIC PROGRESS NOTE         Patient Name  Shahla Wilkes   Date of Birth 1975   [facility] [id number]   Medical Record Number  [id number]      Age  48 y.o. PCP None   Admit date:  10/23/2022    Room Number  354/44  @ [facility]   Date of Service  10/27/2022         E & M PROGRESS NOTE:         HISTORY       CC:  \"suicidal ideation\"  HISTORY OF PRESENT ILLNESS/INTERVAL HISTORY:  (reviewed/updated 10/27/2022). per initial evaluation: The patient, Shahla Wilkes, is a 48 y.o. BLACK/ female with a past psychiatric history significant for stimulant use  and opiate use disorders, who presents at this time with complaints of (and/or evidence of) the following emotional symptoms: depression and suicidal thoughts/threats. Additional symptomatology include auditory hallucinations. The above symptoms have been present for 2+ weeks. These symptoms are of moderate to high severity. These symptoms are constant in nature. The patient's condition has been precipitated by psychosocial stressors. Patient's condition made worse by continued illicit drug use as well as treatment noncompliance. UDS: +benzos, cocaine, methadone, THC; BAL=0. Patient presented to the ED stating that her life was in danger due to conflict within her relationship. Patient was able to make her needs known but has been dysphoric on the unit. She reported being on Seroquel but has not been compliant. The patient also reports drinking up to a gallon of vodka daily. The patient is a fair historian. The patient corroborates the above narrative. The patient contracts for safety on the unit and gives consent for the team to contact collateral. The patient is amenable to initiating treatment while on the unit. 10/25 - the patient slept 7.75 hours overnight.  She got Zyprexa PRN for psychosis and c/o withdrawal symptoms, though CIWA score has been 3-5 since arrival. Patient compliant with medication and able to make her needs known. She is still rehab focused and denies any active thoughts of self harm. Patient requests a higher dose of Suboxone (per pharmacist she was previously on 24 mg daily). 10/26 - no acute overnight events. The patient slept 6.5 hours overnight, though she states it was restless. She provides MD a long list of medications she has previously taken including Haldol, Depakote, Lithium, Zoloft and Prazosin and states she last got these agents while living in [place]. Patient medication compliant (per nurse she swallowed her SL Suboxone tablet this morning). Patient able to make her needs known and denies active thoughts of self harm. 10/27 - the patient remains in significant distress. She is unable to tolerate solid food though she is taking medications without issue. Patient still with abdominal pain, she denies active thoughts of self harm but endorse depressed mood. She got Atarax PRN for anxiety and slept 6 hours overnight. Patient requests an agent for indigestion. SIDE EFFECTS: (reviewed/updated 10/27/2022)  None reported or admitted to.      ALLERGIES:(reviewed/updated 10/27/2022)  Allergies   Allergen Reactions    Vancomycin Anaphylaxis    Ketorolac Unable to Obtain     From [facility] paperwork 1/19/22    Remeron [Mirtazapine] Unable to Obtain     From [facility] paperwork on 1/19/22    Tomato Swelling     Tongue    Trazodone Angioedema      REVIEW OF SYSTEMS: (reviewed/updated 10/27/2022)  Appetite:no change from normal   Sleep: poor with DIMS (difficulty initiating & maintaining sleep)   All other Review of Systems: Negative except myalgias and sweat (withdrawal)         [facility]):    MSE FINDINGS ARE WITHIN NORMAL LIMITS (WNL) UNLESS OTHERWISE STATED BELOW. ( ALL OF THE BELOW CATEGORIES OF THE MSE HAVE BEEN REVIEWED (reviewed 10/27/2022) AND UPDATED AS DEEMED APPROPRIATE )  General Presentation age appropriate, cooperative and guarded   Orientation oriented to time, place and person   Vital Signs  See below (reviewed 10/27/2022); Vital Signs (BP, Pulse, & Temp) are within normal limits if not listed below. Gait and Station Stable/steady, no ataxia   Musculoskeletal System No extrapyramidal symptoms (EPS); no abnormal muscular movements or Tardive Dyskinesia (TD); muscle strength and tone are within normal limits   Language No aphasia or dysarthria   Speech:  non-pressured and soft   Thought Processes coherent; slow rate of thoughts; fair abstract reasoning/computation   Thought Associations blocked    Thought Content preoccupations   Suicidal Ideations contracts for safety   Homicidal Ideations contracts for safety   Mood:  depressed and anhedonia   Affect:  constricted   Memory recent  impaired   Memory remote:  fair   Concentration/Attention:  intact   Fund of Knowledge average   Insight:  fair   Reliability fair   Judgment:  limited          VITALS:     Patient Vitals for the past 24 hrs:   Temp Pulse Resp BP SpO2   10/27/22 0912 97.6 °F (36.4 °C) (!) 106 16 110/79 100 %   10/27/22 0730 97.9 °F (36.6 °C) 91 18 115/86 98 %   10/26/22 2025 98.5 °F (36.9 °C) 96 18 133/88 98 %       Wt Readings from Last 3 Encounters:   10/23/22 54.4 kg (120 lb)   01/19/22 90.7 kg (200 lb)   02/21/21 94.8 kg (208 lb 14.4 oz)     Temp Readings from Last 3 Encounters:   10/27/22 97.6 °F (36.4 °C)   01/19/22 98.4 °F (36.9 °C)   02/22/21 98.2 °F (36.8 °C)     BP Readings from Last 3 Encounters:   10/27/22 110/79   01/19/22 (!) 121/90   02/22/21 135/87     Pulse Readings from Last 3 Encounters:   10/27/22 (!) 106   01/19/22 76   02/22/21 95            DATA     LABORATORY DATA:(reviewed/updated 10/27/2022)  No results found for this or any previous visit (from the past 24 hour(s)).   Lab Results   Component Value Date/Time    Valproic acid <3 (L) 10/23/2022 12:49 PM     Lab Results   Component Value Date/Time    Lithium level 0.49 (L) 08/09/2018 02:15 PM      RADIOLOGY REPORTS:(reviewed/updated 10/27/2022)  No results found.        MEDICATIONS     ALL MEDICATIONS:   Current Facility-Administered Medications   Medication Dose Route Frequency    bismuth subsalicylate (PEPTO-BISMOL MAXIMUM STRENGTH) 525 mg/15 mL oral suspension 1,050 mg  30 mL Oral Q6H PRN    dicyclomine (BENTYL) capsule 10 mg  10 mg Oral TID PRN    divalproex ER (DEPAKOTE ER) 24 hour tablet 1,000 mg  1,000 mg Oral QHS    buprenorphine-naloxone (SUBOXONE) 8-2mg SL tablet  1 Tablet SubLINGual BID    QUEtiapine SR (SEROquel XR) tablet 300 mg  300 mg Oral QHS    naloxone (NARCAN) injection 0.4 mg  0.4 mg IntraMUSCular EVERY 2 MINUTES AS NEEDED    PHENobarbitaL (LUMINAL) tablet 16.2 mg  16.2 mg Oral BID    PHENobarbitaL (LUMINAL) tablet 16.2 mg  16.2 mg Oral Q6H PRN    thiamine HCL (B-1) tablet 100 mg  100 mg Oral DAILY    folic acid (FOLVITE) tablet 1 mg  1 mg Oral DAILY    therapeutic multivitamin (THERAGRAN) tablet 1 Tablet  1 Tablet Oral DAILY    OLANZapine (ZyPREXA) tablet 5 mg  5 mg Oral Q6H PRN    haloperidol lactate (HALDOL) injection 5 mg  5 mg IntraMUSCular Q6H PRN    benztropine (COGENTIN) tablet 1 mg  1 mg Oral BID PRN    diphenhydrAMINE (BENADRYL) injection 50 mg  50 mg IntraMUSCular BID PRN    hydrOXYzine HCL (ATARAX) tablet 50 mg  50 mg Oral TID PRN    LORazepam (ATIVAN) injection 1 mg  1 mg IntraMUSCular Q4H PRN    acetaminophen (TYLENOL) tablet 650 mg  650 mg Oral Q4H PRN    magnesium hydroxide (MILK OF MAGNESIA) 400 mg/5 mL oral suspension 30 mL  30 mL Oral DAILY PRN      SCHEDULED MEDICATIONS:   Current Facility-Administered Medications   Medication Dose Route Frequency    divalproex ER (DEPAKOTE ER) 24 hour tablet 1,000 mg  1,000 mg Oral QHS    buprenorphine-naloxone (SUBOXONE) 8-2mg SL tablet  1 Tablet SubLINGual BID    QUEtiapine SR (SEROquel XR) tablet 300 mg  300 mg Oral QHS    PHENobarbitaL (LUMINAL) tablet 16.2 mg  16.2 mg Oral BID    thiamine HCL (B-1) tablet 100 mg  100 mg Oral DAILY    folic acid (FOLVITE) tablet 1 mg  1 mg Oral DAILY    therapeutic multivitamin (THERAGRAN) tablet 1 Tablet  1 Tablet Oral DAILY          ASSESSMENT & PLAN     DIAGNOSES REQUIRING ACTIVE TREATMENT AND MONITORING: (reviewed/updated 10/27/2022)  Patient Active Hospital Problem List:         MDD ([facility] [address] (1/25/2017)           Assessment: the patient has been dysphoric and paranoid in the setting of stimulant and opiate use as well as alcohol use. Will continue detox and start agent for mood lability. Plan:  - CONTINUE Suboxone to 8 mg BID for opiate use disorder  - CONTINUE Seroquel  mg QHS for mood lability, titrate as needed  - CONTINUE Depakote ER 1,000 mg QHS for mood lability adjunct  - IGM therapy as tolerated  - Expand database / obtain collateral  - Dispo planning (rehab vs home)     In summary, Meredith Gutierrez, is a 48 y.o.  female who presents with a severe exacerbation of the principal diagnosis of Adjustment disorder with anxious mood    Patient's condition is not improving. Patient requires continued inpatient hospitalization for further stabilization, safety monitoring and medication management. I will continue to coordinate the provision of individual, milieu, occupational, group, and substance abuse therapies to address target symptoms/diagnoses as deemed appropriate for the individual patient. A coordinated, multidisplinary treatment team round was conducted with the patient (this team consists of the nurse, psychiatric unit pharmacist,  and writer). Complete current electronic health record for patient has been reviewed today including consultant notes, ancillary staff notes, nurses and psychiatric tech notes. Suicide risk assessment completed and patient deemed to be of low risk for suicide at this time. The following regarding medications was addressed during rounds with patient:   the risks and benefits of the proposed medication.  The patient was given the opportunity to ask questions. Informed consent given to the use of the above medications. Will continue to adjust psychiatric and non-psychiatric medications (see above \"medication\" section and orders section for details) as deemed appropriate & based upon diagnoses and response to treatment. I will continue to order blood tests/labs and diagnostic tests as deemed appropriate and review results as they become available (see orders for details and above listed lab/test results). I will order psychiatric records from previous [facility] hospitals to further elucidate the nature of patient's psychopathology and review once available. I will gather additional collateral information from friends, family and o/p treatment team to further elucidate the nature of patient's psychopathology and baselline level of psychiatric functioning. I certify that this patient's inpatient psychiatric hospital services furnished since the previous certification were, and continue to be, required for treatment that could reasonably be expected to improve the patient's condition, or for diagnostic study, and that the patient continues to need, on a daily basis, active treatment furnished directly by or requiring the supervision of inpatient psychiatric facility personnel. In addition, the hospital records show that services furnished were intensive treatment services, admission or related services, or equivalent services.     EXPECTED DISCHARGE DATE/DAY: TBD     DISPOSITION: Rehab       Signed By:   Fouzia Mendez MD  10/27/2022

## 2022-10-28 PROCEDURE — 74011250637 HC RX REV CODE- 250/637: Performed by: PSYCHIATRY & NEUROLOGY

## 2022-10-28 PROCEDURE — 99233 SBSQ HOSP IP/OBS HIGH 50: CPT | Performed by: PSYCHIATRY & NEUROLOGY

## 2022-10-28 PROCEDURE — 74011250636 HC RX REV CODE- 250/636: Performed by: PSYCHIATRY & NEUROLOGY

## 2022-10-28 PROCEDURE — 74011250637 HC RX REV CODE- 250/637: Performed by: NURSE PRACTITIONER

## 2022-10-28 PROCEDURE — 65220000003 HC RM SEMIPRIVATE PSYCH

## 2022-10-28 RX ORDER — QUETIAPINE 200 MG/1
400 TABLET, FILM COATED, EXTENDED RELEASE ORAL
Status: DISCONTINUED | OUTPATIENT
Start: 2022-10-28 | End: 2022-11-01 | Stop reason: HOSPADM

## 2022-10-28 RX ORDER — BUPRENORPHINE HYDROCHLORIDE AND NALOXONE HYDROCHLORIDE DIHYDRATE 8; 2 MG/1; MG/1
1 TABLET SUBLINGUAL 3 TIMES DAILY
Status: DISCONTINUED | OUTPATIENT
Start: 2022-10-28 | End: 2022-11-01 | Stop reason: HOSPADM

## 2022-10-28 RX ADMIN — MAGNESIUM HYDROXIDE 30 ML: 400 SUSPENSION ORAL at 22:25

## 2022-10-28 RX ADMIN — BUPRENORPHINE HYDROCHLORIDE AND NALOXONE HYDROCHLORIDE DIHYDRATE 1 TABLET: 8; 2 TABLET SUBLINGUAL at 08:12

## 2022-10-28 RX ADMIN — THERA TABS 1 TABLET: TAB at 08:12

## 2022-10-28 RX ADMIN — OLANZAPINE 5 MG: 5 TABLET, FILM COATED ORAL at 21:07

## 2022-10-28 RX ADMIN — QUETIAPINE FUMARATE 400 MG: 200 TABLET, EXTENDED RELEASE ORAL at 21:07

## 2022-10-28 RX ADMIN — HYDROXYZINE HYDROCHLORIDE 50 MG: 25 TABLET, FILM COATED ORAL at 21:07

## 2022-10-28 RX ADMIN — FOLIC ACID 1 MG: 1 TABLET ORAL at 08:12

## 2022-10-28 RX ADMIN — BUPRENORPHINE HYDROCHLORIDE AND NALOXONE HYDROCHLORIDE DIHYDRATE 1 TABLET: 8; 2 TABLET SUBLINGUAL at 16:37

## 2022-10-28 RX ADMIN — Medication 100 MG: at 08:12

## 2022-10-28 RX ADMIN — DIVALPROEX SODIUM 1000 MG: 500 TABLET, EXTENDED RELEASE ORAL at 21:07

## 2022-10-28 RX ADMIN — BUPRENORPHINE HYDROCHLORIDE AND NALOXONE HYDROCHLORIDE DIHYDRATE 1 TABLET: 8; 2 TABLET SUBLINGUAL at 12:46

## 2022-10-28 NOTE — GROUP NOTE
ACTARINO  GROUP DOCUMENTATION INDIVIDUAL                                                                          Group Therapy Note    Date: 10/28/2022    Group Start Time: 1500  Group End Time: 1600  Group Topic: Recreational/Music Therapy    [facility] [place] [address] ACUTE BEHAV [facility]    [address] GROUP    Group Therapy Note    Attendees: 3       Attendance: Did not attend    Beatrice Trivedi

## 2022-10-28 NOTE — BH NOTES
PSYCHIATRIC PROGRESS NOTE         Patient Name  Jama Patton   Date of Birth 1975   [facility] [id number]   Medical Record Number  [id number]      Age  48 y.o. PCP None   Admit date:  10/23/2022    Room Number  097/25  @ [facility]   Date of Service  10/28/2022         E & M PROGRESS NOTE:         HISTORY       CC:  \"suicidal ideation\"  HISTORY OF PRESENT ILLNESS/INTERVAL HISTORY:  (reviewed/updated 10/28/2022). per initial evaluation: The patient, Jama Patton, is a 48 y.o. BLACK/ female with a past psychiatric history significant for stimulant use  and opiate use disorders, who presents at this time with complaints of (and/or evidence of) the following emotional symptoms: depression and suicidal thoughts/threats. Additional symptomatology include auditory hallucinations. The above symptoms have been present for 2+ weeks. These symptoms are of moderate to high severity. These symptoms are constant in nature. The patient's condition has been precipitated by psychosocial stressors. Patient's condition made worse by continued illicit drug use as well as treatment noncompliance. UDS: +benzos, cocaine, methadone, THC; BAL=0. Patient presented to the ED stating that her life was in danger due to conflict within her relationship. Patient was able to make her needs known but has been dysphoric on the unit. She reported being on Seroquel but has not been compliant. The patient also reports drinking up to a gallon of vodka daily. The patient is a fair historian. The patient corroborates the above narrative. The patient contracts for safety on the unit and gives consent for the team to contact collateral. The patient is amenable to initiating treatment while on the unit. 10/25 - the patient slept 7.75 hours overnight.  She got Zyprexa PRN for psychosis and c/o withdrawal symptoms, though CIWA score has been 3-5 since arrival. Patient compliant with medication and able to make her needs known. She is still rehab focused and denies any active thoughts of self harm. Patient requests a higher dose of Suboxone (per pharmacist she was previously on 24 mg daily). 10/26 - no acute overnight events. The patient slept 6.5 hours overnight, though she states it was restless. She provides MD a long list of medications she has previously taken including Haldol, Depakote, Lithium, Zoloft and Prazosin and states she last got these agents while living in [place]. Patient medication compliant (per nurse she swallowed her SL Suboxone tablet this morning). Patient able to make her needs known and denies active thoughts of self harm. 10/27 - the patient remains in significant distress. She is unable to tolerate solid food though she is taking medications without issue. Patient still with abdominal pain, she denies active thoughts of self harm but endorse depressed mood. She got Atarax PRN for anxiety and slept 6 hours overnight. Patient requests an agent for indigestion. 10/28 - the patient slept 7.5 hours overnight. She c/o abdominal cramping and anorexia. Patient got Atarax and Zyprexa PRN; she states her appetite is increasing and requests a normal diet. Patient still with opiate cravings, amenable with increase in Suboxone. Per pharmacist, there was no other prescription medications filled in [place]. Patient still dysphoric, tearful at times but able to make her needs known. SIDE EFFECTS: (reviewed/updated 10/28/2022)  None reported or admitted to.      ALLERGIES:(reviewed/updated 10/28/2022)  Allergies   Allergen Reactions    Vancomycin Anaphylaxis    Ketorolac Unable to Obtain     From [facility] paperwork 1/19/22    Remeron [Mirtazapine] Unable to Obtain     From [facility] paperwork on 1/19/22    Tomato Swelling     Tongue    Trazodone Angioedema      REVIEW OF SYSTEMS: (reviewed/updated 10/28/2022)  Appetite:no change from normal   Sleep: poor with DIMS (difficulty initiating & maintaining sleep) All other Review of Systems: Negative except myalgias and sweat (withdrawal)         MENTAL STATUS EXAM & VITALS     MENTAL STATUS EXAM (MSE):    MSE FINDINGS ARE WITHIN NORMAL LIMITS (WNL) UNLESS OTHERWISE STATED BELOW. ( ALL OF THE BELOW CATEGORIES OF THE MSE HAVE BEEN REVIEWED (reviewed 10/28/2022) AND UPDATED AS DEEMED APPROPRIATE )  General Presentation age appropriate, cooperative and guarded   Orientation oriented to time, place and person   Vital Signs  See below (reviewed 10/28/2022); Vital Signs (BP, Pulse, & Temp) are within normal limits if not listed below.    Gait and Station Stable/steady, no ataxia   Musculoskeletal System No extrapyramidal symptoms (EPS); no abnormal muscular movements or Tardive Dyskinesia (TD); muscle strength and tone are within normal limits   Language No aphasia or dysarthria   Speech:  non-pressured and soft   Thought Processes coherent; slow rate of thoughts; fair abstract reasoning/computation   Thought Associations blocked    Thought Content preoccupations   Suicidal Ideations contracts for safety   Homicidal Ideations contracts for safety   Mood:  depressed and anhedonia   Affect:  constricted   Memory recent  impaired   Memory remote:  fair   Concentration/Attention:  intact   Fund of Knowledge average   Insight:  fair   Reliability fair   Judgment:  limited          VITALS:     Patient Vitals for the past 24 hrs:   Temp Pulse Resp BP SpO2   10/28/22 0857 97.9 °F (36.6 °C) (!) 108 18 104/70 100 %   10/27/22 2023 98 °F (36.7 °C) 93 16 (!) 118/91 100 %       Wt Readings from Last 3 Encounters:   10/23/22 54.4 kg (120 lb)   01/19/22 90.7 kg (200 lb)   02/21/21 94.8 kg (208 lb 14.4 oz)     Temp Readings from Last 3 Encounters:   10/28/22 97.9 °F (36.6 °C)   01/19/22 98.4 °F (36.9 °C)   02/22/21 98.2 °F (36.8 °C)     BP Readings from Last 3 Encounters:   10/28/22 104/70   01/19/22 (!) 121/90   02/22/21 135/87     Pulse Readings from Last 3 Encounters:   10/28/22 (!) 108 01/19/22 76   02/22/21 95            DATA     LABORATORY DATA:(reviewed/updated 10/28/2022)  No results found for this or any previous visit (from the past 24 hour(s)). Lab Results   Component Value Date/Time    Valproic acid <3 (L) 10/23/2022 12:49 PM     Lab Results   Component Value Date/Time    Lithium level 0.49 (L) 08/09/2018 02:15 PM      RADIOLOGY REPORTS:(reviewed/updated 10/28/2022)  No results found.        MEDICATIONS     ALL MEDICATIONS:   Current Facility-Administered Medications   Medication Dose Route Frequency    buprenorphine-naloxone (SUBOXONE) 8-2mg SL tablet  1 Tablet SubLINGual TID    bismuth subsalicylate (PEPTO-BISMOL MAXIMUM STRENGTH) 525 mg/15 mL oral suspension 1,050 mg  30 mL Oral Q6H PRN    dicyclomine (BENTYL) capsule 10 mg  10 mg Oral TID PRN    divalproex ER (DEPAKOTE ER) 24 hour tablet 1,000 mg  1,000 mg Oral QHS    QUEtiapine SR (SEROquel XR) tablet 300 mg  300 mg Oral QHS    naloxone (NARCAN) injection 0.4 mg  0.4 mg IntraMUSCular EVERY 2 MINUTES AS NEEDED    thiamine HCL (B-1) tablet 100 mg  100 mg Oral DAILY    folic acid (FOLVITE) tablet 1 mg  1 mg Oral DAILY    therapeutic multivitamin (THERAGRAN) tablet 1 Tablet  1 Tablet Oral DAILY    OLANZapine (ZyPREXA) tablet 5 mg  5 mg Oral Q6H PRN    haloperidol lactate (HALDOL) injection 5 mg  5 mg IntraMUSCular Q6H PRN    benztropine (COGENTIN) tablet 1 mg  1 mg Oral BID PRN    diphenhydrAMINE (BENADRYL) injection 50 mg  50 mg IntraMUSCular BID PRN    hydrOXYzine HCL (ATARAX) tablet 50 mg  50 mg Oral TID PRN    LORazepam (ATIVAN) injection 1 mg  1 mg IntraMUSCular Q4H PRN    acetaminophen (TYLENOL) tablet 650 mg  650 mg Oral Q4H PRN    magnesium hydroxide (MILK OF MAGNESIA) 400 mg/5 mL oral suspension 30 mL  30 mL Oral DAILY PRN      SCHEDULED MEDICATIONS:   Current Facility-Administered Medications   Medication Dose Route Frequency    buprenorphine-naloxone (SUBOXONE) 8-2mg SL tablet  1 Tablet SubLINGual TID    divalproex ER (DEPAKOTE ER) 24 hour tablet 1,000 mg  1,000 mg Oral QHS    QUEtiapine SR (SEROquel XR) tablet 300 mg  300 mg Oral QHS    thiamine HCL (B-1) tablet 100 mg  100 mg Oral DAILY    folic acid (FOLVITE) tablet 1 mg  1 mg Oral DAILY    therapeutic multivitamin (THERAGRAN) tablet 1 Tablet  1 Tablet Oral DAILY          ASSESSMENT & PLAN     DIAGNOSES REQUIRING ACTIVE TREATMENT AND MONITORING: (reviewed/updated 10/28/2022)  Patient Active Hospital Problem List:         MDD ([facility] [address] (1/25/2017)           Assessment: the patient has been dysphoric and paranoid in the setting of stimulant and opiate use as well as alcohol use. Will continue detox and start agent for mood lability. Plan:  - INCREASE Suboxone to 8 mg TID for opiate use disorder  - INCREASE Seroquel XR to 400 mg QHS for mood lability, titrate as needed  - CONTINUE Depakote ER 1,000 mg QHS for mood lability adjunct  - IGM therapy as tolerated  - Expand database / obtain collateral  - Dispo planning (rehab vs home)     In summary, Erik Kanner, is a 48 y.o.  female who presents with a severe exacerbation of the principal diagnosis of Adjustment disorder with anxious mood    Patient's condition is not improving. Patient requires continued inpatient hospitalization for further stabilization, safety monitoring and medication management. I will continue to coordinate the provision of individual, milieu, occupational, group, and substance abuse therapies to address target symptoms/diagnoses as deemed appropriate for the individual patient. A coordinated, multidisplinary treatment team round was conducted with the patient (this team consists of the nurse, psychiatric unit pharmacist,  and writer). Complete current electronic health record for patient has been reviewed today including consultant notes, ancillary staff notes, nurses and psychiatric tech notes.     Suicide risk assessment completed and patient deemed to be of low risk for suicide at this time.     The following regarding medications was addressed during rounds with patient:   the risks and benefits of the proposed medication. The patient was given the opportunity to ask questions. Informed consent given to the use of the above medications. Will continue to adjust psychiatric and non-psychiatric medications (see above \"medication\" section and orders section for details) as deemed appropriate & based upon diagnoses and response to treatment. I will continue to order blood tests/labs and diagnostic tests as deemed appropriate and review results as they become available (see orders for details and above listed lab/test results). I will order psychiatric records from previous [facility] hospitals to further elucidate the nature of patient's psychopathology and review once available. I will gather additional collateral information from friends, family and o/p treatment team to further elucidate the nature of patient's psychopathology and baselline level of psychiatric functioning. I certify that this patient's inpatient psychiatric hospital services furnished since the previous certification were, and continue to be, required for treatment that could reasonably be expected to improve the patient's condition, or for diagnostic study, and that the patient continues to need, on a daily basis, active treatment furnished directly by or requiring the supervision of inpatient psychiatric facility personnel. In addition, the hospital records show that services furnished were intensive treatment services, admission or related services, or equivalent services.     EXPECTED DISCHARGE DATE/DAY: TBD     DISPOSITION: Rehab       Signed By:   Joseph Storey MD  10/28/2022

## 2022-10-28 NOTE — BH NOTES
Progress Note:        Patient met with treatment team. Patient was alert and oriented x 4. Patient reports increased depression and anxiety. Patient was tearful during meeting and not able to maintain her emotions. Patient reports that she is still in a lot of pain and is still experiencing withdrawals. Patient reports that she is still motivated for rehab. SW will meet with patient for individual session prior to discharge.         DORCAS Rutledge

## 2022-10-28 NOTE — GROUP NOTE
[facility]  GROUP DOCUMENTATION INDIVIDUAL                                                                          Group Therapy Note    Date: 10/28/2022    Group Start Time: 0900  Group End Time: 1000  Group Topic: Topic Group    [address] [facility] 3 ACUTE BEHAV [facility]    [place], [facility] GROUP DOCUMENTATION GROUP    Group Therapy Note    Attendees: 5       Attendance: Attended    Patient's Goal:  To participate in relaxation activity    Interventions/techniques: Supported-favorite ways to relax    Follows Directions:  Followed directions    Interactions: Interacted appropriately    Mental Status: Calm    Behavior/appearance: Attentive and Cooperative    Goals Achieved: Able to engage in interactions, Able to listen to others, and Discussed coping      Merlene Baker

## 2022-10-28 NOTE — PROGRESS NOTES
Problem: Depressed Mood (Adult/Pediatric)  Goal: *STG: Participates in treatment plan  Outcome: Progressing Towards Goal  Goal: *STG: Verbalizes anger, guilt, and other feelings in a constructive manor  Outcome: Progressing Towards Goal  Goal: *STG: Attends activities and groups  Outcome: Progressing Towards Goal  Goal: *STG: Remains safe in hospital  Outcome: Progressing Towards Goal  Goal: *STG: Complies with medication therapy  Outcome: Progressing Towards Goal     Problem: Falls - Risk of  Goal: *Absence of Falls  Description: Document Criss Fall Risk and appropriate interventions in the flowsheet.   Outcome: Progressing Towards Goal  Note: Fall Risk Interventions:            Medication Interventions: Teach patient to arise slowly

## 2022-10-28 NOTE — PROGRESS NOTES
Problem: Depressed Mood (Adult/Pediatric)  Goal: *STG: Remains safe in hospital  Outcome: Progressing Towards Goal  Goal: *STG: Complies with medication therapy  Outcome: Progressing Towards Goal     Patient alert, appears sad, flat affect, anxious, guarded, cooperative. Denies SI/HI. Denies AVH. Denies anxiety and depression. Denies pain. Medication and meal compliant. Visible in the community. No distress observed. Purposeful interaction ongoing.

## 2022-10-28 NOTE — PROGRESS NOTES
KADIE contacted [address] [place] [address] and completed a prescreen for the Pt to receive rehab services.  KADIE faxed the Pt clinicals to [address] [place] [address] and is waiting to hear if the Pt meets criteria for their rehab program.

## 2022-10-28 NOTE — BH NOTES
GROUP THERAPY PROGRESS NOTE    Willis Nichols is participating in Psychiatric. Group time: 35 minutes 11:00am-11:35am     Goal orientation:  Psycho -Education     Group therapy participation: active     Therapeutic interventions reviewed and discussed: Group faciltator discussed the Cognitive Behavioral Model this morning. The purpose of group was to help participants gain insights about situation, thoughts, emotions, and behaviors. Facilitator dicussed in detail how thoughts impact feelings and behaviors. Group members were provided two educational handouts to review at their own leisure as well. Impression of participation: Patient was able to serve as an active participant in group by practicing active listening techniques. Patient continues to show progress towards goals by attending groups on a regular basis.        DORCAS Pacheco

## 2022-10-29 PROCEDURE — 74011250637 HC RX REV CODE- 250/637: Performed by: NURSE PRACTITIONER

## 2022-10-29 PROCEDURE — 74011250637 HC RX REV CODE- 250/637: Performed by: PSYCHIATRY & NEUROLOGY

## 2022-10-29 PROCEDURE — 74011250636 HC RX REV CODE- 250/636: Performed by: PSYCHIATRY & NEUROLOGY

## 2022-10-29 PROCEDURE — 65220000003 HC RM SEMIPRIVATE PSYCH

## 2022-10-29 RX ORDER — DOXEPIN HYDROCHLORIDE 25 MG/1
25 CAPSULE ORAL
Status: DISCONTINUED | OUTPATIENT
Start: 2022-10-29 | End: 2022-11-01 | Stop reason: HOSPADM

## 2022-10-29 RX ORDER — HYDROXYZINE 25 MG/1
50 TABLET, FILM COATED ORAL
Status: DISCONTINUED | OUTPATIENT
Start: 2022-10-29 | End: 2022-11-01 | Stop reason: HOSPADM

## 2022-10-29 RX ADMIN — DOXEPIN HYDROCHLORIDE 25 MG: 25 CAPSULE ORAL at 21:22

## 2022-10-29 RX ADMIN — DICYCLOMINE HYDROCHLORIDE 10 MG: 10 CAPSULE ORAL at 09:24

## 2022-10-29 RX ADMIN — OLANZAPINE 5 MG: 5 TABLET, FILM COATED ORAL at 21:22

## 2022-10-29 RX ADMIN — FOLIC ACID 1 MG: 1 TABLET ORAL at 09:24

## 2022-10-29 RX ADMIN — BUPRENORPHINE HYDROCHLORIDE AND NALOXONE HYDROCHLORIDE DIHYDRATE 1 TABLET: 8; 2 TABLET SUBLINGUAL at 11:29

## 2022-10-29 RX ADMIN — HYDROXYZINE HYDROCHLORIDE 50 MG: 25 TABLET, FILM COATED ORAL at 09:24

## 2022-10-29 RX ADMIN — HYDROXYZINE HYDROCHLORIDE 50 MG: 25 TABLET, FILM COATED ORAL at 21:22

## 2022-10-29 RX ADMIN — THERA TABS 1 TABLET: TAB at 09:24

## 2022-10-29 RX ADMIN — DIVALPROEX SODIUM 1000 MG: 500 TABLET, EXTENDED RELEASE ORAL at 21:22

## 2022-10-29 RX ADMIN — BUPRENORPHINE HYDROCHLORIDE AND NALOXONE HYDROCHLORIDE DIHYDRATE 1 TABLET: 8; 2 TABLET SUBLINGUAL at 09:24

## 2022-10-29 RX ADMIN — Medication 100 MG: at 09:24

## 2022-10-29 RX ADMIN — BUPRENORPHINE HYDROCHLORIDE AND NALOXONE HYDROCHLORIDE DIHYDRATE 1 TABLET: 8; 2 TABLET SUBLINGUAL at 16:52

## 2022-10-29 RX ADMIN — BISMUTH SUBSALICYLATE 1050 MG: 525 LIQUID ORAL at 21:22

## 2022-10-29 RX ADMIN — QUETIAPINE FUMARATE 400 MG: 200 TABLET, EXTENDED RELEASE ORAL at 21:22

## 2022-10-29 NOTE — PROGRESS NOTES
Problem: Depressed Mood (Adult/Pediatric)  Goal: *STG: Participates in treatment plan  Outcome: Progressing Towards Goal  Goal: *STG: Remains safe in hospital  Outcome: Progressing Towards Goal  Goal: *STG: Complies with medication therapy  Outcome: Progressing Towards Goal     Received  pt visible in the dayroom doing some paintings, alert and oriented x 4, calm and cooperative. Pt is cooperative with vital signs check and assessment. Pt endorses anxiety of 7/10, depression of 8/10, and auditory hallucination, denies SI/HI/VH. Speech unremarkable. Pt reports going to groups and eating in the dayroom. Med and meal compliant. Pain level of 0/10 . Will continue to monitor for safety. Scheduled med and PRN Atarax and Zyprexa given @ 21:07. At 22:25 pt complained of upset abdomen, milk of magnesium 30 ml given. Pt slept for 5:15 hours.

## 2022-10-29 NOTE — PROGRESS NOTES
Rolf Pennington is awake, visible in the milieu watching TV with peers. She denies current SI/HI/AVH, but presents with a blunted affect and depressed mood. She is unable to verbalize why her mood is so low. States that withdrawal symptoms have eased, though continues to have some stomach cramping. PRN bentyl given. Compliant with all scheduled meds.  Will continue to monitor for safety    Problem: Depressed Mood (Adult/Pediatric)  Goal: *STG: Demonstrates reduction in symptoms and increase in insight into coping skills/future focused  Outcome: Progressing Towards Goal  Goal: *STG: Complies with medication therapy  Outcome: Progressing Towards Goal

## 2022-10-30 PROCEDURE — 74011250637 HC RX REV CODE- 250/637: Performed by: NURSE PRACTITIONER

## 2022-10-30 PROCEDURE — 65220000003 HC RM SEMIPRIVATE PSYCH

## 2022-10-30 PROCEDURE — 74011250636 HC RX REV CODE- 250/636: Performed by: PSYCHIATRY & NEUROLOGY

## 2022-10-30 PROCEDURE — 74011250637 HC RX REV CODE- 250/637: Performed by: PSYCHIATRY & NEUROLOGY

## 2022-10-30 RX ADMIN — BUPRENORPHINE HYDROCHLORIDE AND NALOXONE HYDROCHLORIDE DIHYDRATE 1 TABLET: 8; 2 TABLET SUBLINGUAL at 14:27

## 2022-10-30 RX ADMIN — ACETAMINOPHEN 650 MG: 325 TABLET, FILM COATED ORAL at 02:07

## 2022-10-30 RX ADMIN — QUETIAPINE FUMARATE 400 MG: 200 TABLET, EXTENDED RELEASE ORAL at 21:54

## 2022-10-30 RX ADMIN — HYDROXYZINE HYDROCHLORIDE 50 MG: 25 TABLET, FILM COATED ORAL at 08:28

## 2022-10-30 RX ADMIN — ACETAMINOPHEN 650 MG: 325 TABLET, FILM COATED ORAL at 21:59

## 2022-10-30 RX ADMIN — DIVALPROEX SODIUM 1000 MG: 500 TABLET, EXTENDED RELEASE ORAL at 21:54

## 2022-10-30 RX ADMIN — HYDROXYZINE HYDROCHLORIDE 50 MG: 25 TABLET, FILM COATED ORAL at 21:54

## 2022-10-30 RX ADMIN — THERA TABS 1 TABLET: TAB at 08:26

## 2022-10-30 RX ADMIN — BUPRENORPHINE HYDROCHLORIDE AND NALOXONE HYDROCHLORIDE DIHYDRATE 1 TABLET: 8; 2 TABLET SUBLINGUAL at 16:46

## 2022-10-30 RX ADMIN — FOLIC ACID 1 MG: 1 TABLET ORAL at 08:26

## 2022-10-30 RX ADMIN — BENZTROPINE MESYLATE 1 MG: 1 TABLET ORAL at 02:07

## 2022-10-30 RX ADMIN — Medication 100 MG: at 08:26

## 2022-10-30 RX ADMIN — DOXEPIN HYDROCHLORIDE 25 MG: 25 CAPSULE ORAL at 21:54

## 2022-10-30 RX ADMIN — BENZTROPINE MESYLATE 1 MG: 1 TABLET ORAL at 22:00

## 2022-10-30 RX ADMIN — BUPRENORPHINE HYDROCHLORIDE AND NALOXONE HYDROCHLORIDE DIHYDRATE 1 TABLET: 8; 2 TABLET SUBLINGUAL at 08:25

## 2022-10-30 NOTE — PROGRESS NOTES
Problem: Depressed Mood (Adult/Pediatric)  Goal: *STG: Participates in treatment plan  Outcome: Progressing Towards Goal  Goal: *STG: Remains safe in hospital  Outcome: Progressing Towards Goal  Goal: *STG: Complies with medication therapy  Outcome: Progressing Towards Goal     Received  pt visible in the dayroom watching TV, alert and oriented x 4, calm and cooperative. Pt is cooperative with vital signs check and assessment. Pt endorses anxiety,and depression of 8/10, and AH, denies SI/HI/VH. Speech unremarkable. Pt reports eating in the dayroom. Med and meal compliant. Pain level of 0/10 . Will continue to monitor for safety. Scheduled med and PRN Atarax, Zyprexa and Bismuth given @ 21:22. Pt complained of restless legs, PRN Cogentin and Tylenol given @ 02:07 with good effect. Pt slept for 5 hours.

## 2022-10-30 NOTE — PROGRESS NOTES
0730 Pt received in room. Pt denies si/hi/ah/vh. Pt reports going to groups and eating outside in the dayroom. Anxiety=0/10 depression= 0/10. Pt is Aox4. Pt is calm and cooperative. Med and meal compliant. Pain level of 0/10 .      Problem: Depressed Mood (Adult/Pediatric)  Goal: *STG: Participates in treatment plan  Outcome: Progressing Towards Goal  Goal: *STG: Remains safe in hospital  Outcome: Progressing Towards Goal

## 2022-10-30 NOTE — BH NOTES
PSYCHIATRIC PROGRESS NOTE         Patient Name  Mina Franklin   Date of Birth 1975   [facility] [id number]   Medical Record Number  [id number]      Age  48 y.o. PCP None   Admit date:  10/23/2022    Room Number  478/60  @ [facility]   Date of Service  10/30/2022         E & M PROGRESS NOTE:         HISTORY       CC:  \"suicidal ideation\"  HISTORY OF PRESENT ILLNESS/INTERVAL HISTORY:  (reviewed/updated 10/30/2022). per initial evaluation: The patient, Mina Franklin, is a 48 y.o. BLACK/ female with a past psychiatric history significant for stimulant use  and opiate use disorders, who presents at this time with complaints of (and/or evidence of) the following emotional symptoms: depression and suicidal thoughts/threats. Additional symptomatology include auditory hallucinations. The above symptoms have been present for 2+ weeks. These symptoms are of moderate to high severity. These symptoms are constant in nature. The patient's condition has been precipitated by psychosocial stressors. Patient's condition made worse by continued illicit drug use as well as treatment noncompliance. UDS: +benzos, cocaine, methadone, THC; BAL=0. Patient presented to the ED stating that her life was in danger due to conflict within her relationship. Patient was able to make her needs known but has been dysphoric on the unit. She reported being on Seroquel but has not been compliant. The patient also reports drinking up to a gallon of vodka daily. The patient is a fair historian. The patient corroborates the above narrative. The patient contracts for safety on the unit and gives consent for the team to contact collateral. The patient is amenable to initiating treatment while on the unit. 10/25 - the patient slept 7.75 hours overnight.  She got Zyprexa PRN for psychosis and c/o withdrawal symptoms, though CIWA score has been 3-5 since arrival. Patient compliant with medication and able to make her needs known. She is still rehab focused and denies any active thoughts of self harm. Patient requests a higher dose of Suboxone (per pharmacist she was previously on 24 mg daily). 10/26 - no acute overnight events. The patient slept 6.5 hours overnight, though she states it was restless. She provides MD a long list of medications she has previously taken including Haldol, Depakote, Lithium, Zoloft and Prazosin and states she last got these agents while living in [place]. Patient medication compliant (per nurse she swallowed her SL Suboxone tablet this morning). Patient able to make her needs known and denies active thoughts of self harm. 10/27 - the patient remains in significant distress. She is unable to tolerate solid food though she is taking medications without issue. Patient still with abdominal pain, she denies active thoughts of self harm but endorse depressed mood. She got Atarax PRN for anxiety and slept 6 hours overnight. Patient requests an agent for indigestion. 10/28 - the patient slept 7.5 hours overnight. She c/o abdominal cramping and anorexia. Patient got Atarax and Zyprexa PRN; she states her appetite is increasing and requests a normal diet. Patient still with opiate cravings, amenable with increase in Suboxone. Per pharmacist, there was no other prescription medications filled in [place]. Patient still dysphoric, tearful at times but able to make her needs known. 10/29 - Pam Cadet says she is ok. She is out in the unit, social with peers. Denies si hi or avh. Slept 5 hours last, requesting for a sleeping medication. Appetite is fair. Nursing staff report no acute behavioral issues. At the present time the patient Pam Cadet remains compliant with taking medications. Denies any adverse events from taking them and feels they have been beneficial.           SIDE EFFECTS: (reviewed/updated 10/30/2022)  None reported or admitted to.      ALLERGIES:(reviewed/updated 10/30/2022)  Allergies   Allergen Reactions    Vancomycin Anaphylaxis    Ketorolac Unable to Obtain     From [facility] paperwork 1/19/22    Remeron [Mirtazapine] Unable to Obtain     From [facility] paperwork on 1/19/22    Tomato Swelling     Tongue    Trazodone Angioedema      REVIEW OF SYSTEMS: (reviewed/updated 10/30/2022)  Appetite:no change from normal   Sleep: poor with DIMS (difficulty initiating & maintaining sleep)   All other Review of Systems: Negative except myalgias and sweat (withdrawal)         [facility] (MSE):    MSE FINDINGS ARE WITHIN NORMAL LIMITS (WNL) UNLESS OTHERWISE STATED BELOW. ( ALL OF THE BELOW CATEGORIES OF THE MSE HAVE BEEN REVIEWED (reviewed 10/30/2022) AND UPDATED AS DEEMED APPROPRIATE )  General Presentation age appropriate, cooperative and guarded   Orientation oriented to time, place and person   Vital Signs  See below (reviewed 10/30/2022); Vital Signs (BP, Pulse, & Temp) are within normal limits if not listed below.    Gait and Station Stable/steady, no ataxia   Musculoskeletal System No extrapyramidal symptoms (EPS); no abnormal muscular movements or Tardive Dyskinesia (TD); muscle strength and tone are within normal limits   Language No aphasia or dysarthria   Speech:  non-pressured and soft   Thought Processes coherent; slow rate of thoughts; fair abstract reasoning/computation   Thought Associations blocked    Thought Content preoccupations   Suicidal Ideations contracts for safety   Homicidal Ideations contracts for safety   Mood:  depressed and anhedonia   Affect:  constricted   Memory recent  impaired   Memory remote:  fair   Concentration/Attention:  intact   Fund of Knowledge average   Insight:  fair   Reliability fair   Judgment:  limited          VITALS:     Patient Vitals for the past 24 hrs:   Temp Pulse Resp BP SpO2   10/29/22 2000 98 °F (36.7 °C) 92 16 103/74 100 %   10/29/22 0955 97.5 °F (36.4 °C) (!) 103 16 (!) 127/98 99 %       Wt Readings from Last 3 Encounters:   10/23/22 54.4 kg (120 lb)   01/19/22 90.7 kg (200 lb)   02/21/21 94.8 kg (208 lb 14.4 oz)     Temp Readings from Last 3 Encounters:   10/29/22 98 °F (36.7 °C)   01/19/22 98.4 °F (36.9 °C)   02/22/21 98.2 °F (36.8 °C)     BP Readings from Last 3 Encounters:   10/29/22 103/74   01/19/22 (!) 121/90   02/22/21 135/87     Pulse Readings from Last 3 Encounters:   10/29/22 92   01/19/22 76   02/22/21 95            DATA     LABORATORY DATA:(reviewed/updated 10/30/2022)  No results found for this or any previous visit (from the past 24 hour(s)). Lab Results   Component Value Date/Time    Valproic acid <3 (L) 10/23/2022 12:49 PM     Lab Results   Component Value Date/Time    Lithium level 0.49 (L) 08/09/2018 02:15 PM      RADIOLOGY REPORTS:(reviewed/updated 10/30/2022)  No results found.        MEDICATIONS     ALL MEDICATIONS:   Current Facility-Administered Medications   Medication Dose Route Frequency    hydrOXYzine HCL (ATARAX) tablet 50 mg  50 mg Oral Q6H PRN    doxepin (SINEquan) capsule 25 mg  25 mg Oral QHS    buprenorphine-naloxone (SUBOXONE) 8-2mg SL tablet  1 Tablet SubLINGual TID    QUEtiapine SR (SEROquel XR) TABLET 400 mg  400 mg Oral QHS    bismuth subsalicylate (PEPTO-BISMOL MAXIMUM STRENGTH) 525 mg/15 mL oral suspension 1,050 mg  30 mL Oral Q6H PRN    dicyclomine (BENTYL) capsule 10 mg  10 mg Oral TID PRN    divalproex ER (DEPAKOTE ER) 24 hour tablet 1,000 mg  1,000 mg Oral QHS    naloxone (NARCAN) injection 0.4 mg  0.4 mg IntraMUSCular EVERY 2 MINUTES AS NEEDED    thiamine HCL (B-1) tablet 100 mg  100 mg Oral DAILY    folic acid (FOLVITE) tablet 1 mg  1 mg Oral DAILY    therapeutic multivitamin (THERAGRAN) tablet 1 Tablet  1 Tablet Oral DAILY    OLANZapine (ZyPREXA) tablet 5 mg  5 mg Oral Q6H PRN    haloperidol lactate (HALDOL) injection 5 mg  5 mg IntraMUSCular Q6H PRN    benztropine (COGENTIN) tablet 1 mg  1 mg Oral BID PRN    diphenhydrAMINE (BENADRYL) injection 50 mg  50 mg IntraMUSCular BID PRN    LORazepam (ATIVAN) injection 1 mg  1 mg IntraMUSCular Q4H PRN    acetaminophen (TYLENOL) tablet 650 mg  650 mg Oral Q4H PRN    magnesium hydroxide (MILK OF MAGNESIA) 400 mg/5 mL oral suspension 30 mL  30 mL Oral DAILY PRN      SCHEDULED MEDICATIONS:   Current Facility-Administered Medications   Medication Dose Route Frequency    doxepin (SINEquan) capsule 25 mg  25 mg Oral QHS    buprenorphine-naloxone (SUBOXONE) 8-2mg SL tablet  1 Tablet SubLINGual TID    QUEtiapine SR (SEROquel XR) TABLET 400 mg  400 mg Oral QHS    divalproex ER (DEPAKOTE ER) 24 hour tablet 1,000 mg  1,000 mg Oral QHS    thiamine HCL (B-1) tablet 100 mg  100 mg Oral DAILY    folic acid (FOLVITE) tablet 1 mg  1 mg Oral DAILY    therapeutic multivitamin (THERAGRAN) tablet 1 Tablet  1 Tablet Oral DAILY          ASSESSMENT & PLAN     DIAGNOSES REQUIRING ACTIVE TREATMENT AND MONITORING: (reviewed/updated 10/30/2022)  Patient Active Hospital Problem List:         [facility]           Assessment: the patient has been dysphoric and paranoid in the setting of stimulant and opiate use as well as alcohol use. Will continue detox and start agent for mood lability. Plan:  -Start doxepin 25 mg qhs prn for sleep. - INCREASE Suboxone to 8 mg TID for opiate use disorder  - INCREASE Seroquel XR to 400 mg QHS for mood lability, titrate as needed  - CONTINUE Depakote ER 1,000 mg QHS for mood lability adjunct  - IGM therapy as tolerated  - Expand database / obtain collateral  - Dispo planning (rehab vs home)     In summary, Samira Farrell, is a 48 y.o.  female who presents with a severe exacerbation of the principal diagnosis of Adjustment disorder with anxious mood    Patient's condition is not improving. Patient requires continued inpatient hospitalization for further stabilization, safety monitoring and medication management.   I will continue to coordinate the provision of individual, milieu, occupational, group, and substance abuse therapies to address target symptoms/diagnoses as deemed appropriate for the individual patient. A coordinated, multidisplinary treatment team round was conducted with the patient (this team consists of the nurse, psychiatric unit pharmacist,  and writer). Complete current electronic health record for patient has been reviewed today including consultant notes, ancillary staff notes, nurses and psychiatric tech notes. Suicide risk assessment completed and patient deemed to be of low risk for suicide at this time. The following regarding medications was addressed during rounds with patient:   the risks and benefits of the proposed medication. The patient was given the opportunity to ask questions. Informed consent given to the use of the above medications. Will continue to adjust psychiatric and non-psychiatric medications (see above \"medication\" section and orders section for details) as deemed appropriate & based upon diagnoses and response to treatment. I will continue to order blood tests/labs and diagnostic tests as deemed appropriate and review results as they become available (see orders for details and above listed lab/test results). I will order psychiatric records from previous [facility] hospitals to further elucidate the nature of patient's psychopathology and review once available. I will gather additional collateral information from friends, family and o/p treatment team to further elucidate the nature of patient's psychopathology and baselline level of psychiatric functioning.          I certify that this patient's inpatient psychiatric hospital services furnished since the previous certification were, and continue to be, required for treatment that could reasonably be expected to improve the patient's condition, or for diagnostic study, and that the patient continues to need, on a daily basis, active treatment furnished directly by or requiring the supervision of inpatient psychiatric facility personnel. In addition, the hospital records show that services furnished were intensive treatment services, admission or related services, or equivalent services.     EXPECTED DISCHARGE DATE/DAY: TBD     DISPOSITION: Rehab       Signed By:   Marsha Hooker NP  10/30/2022

## 2022-10-31 LAB
FLUAV RNA SPEC QL NAA+PROBE: NOT DETECTED
FLUBV RNA SPEC QL NAA+PROBE: NOT DETECTED
SARS-COV-2, COV2: NOT DETECTED
VALPROATE SERPL-MCNC: 61 UG/ML (ref 50–100)

## 2022-10-31 PROCEDURE — 65220000003 HC RM SEMIPRIVATE PSYCH

## 2022-10-31 PROCEDURE — 80164 ASSAY DIPROPYLACETIC ACD TOT: CPT

## 2022-10-31 PROCEDURE — 74011250637 HC RX REV CODE- 250/637: Performed by: NURSE PRACTITIONER

## 2022-10-31 PROCEDURE — 99232 SBSQ HOSP IP/OBS MODERATE 35: CPT | Performed by: PSYCHIATRY & NEUROLOGY

## 2022-10-31 PROCEDURE — 87636 SARSCOV2 & INF A&B AMP PRB: CPT

## 2022-10-31 PROCEDURE — 74011250637 HC RX REV CODE- 250/637: Performed by: PSYCHIATRY & NEUROLOGY

## 2022-10-31 PROCEDURE — 36415 COLL VENOUS BLD VENIPUNCTURE: CPT

## 2022-10-31 PROCEDURE — 74011250636 HC RX REV CODE- 250/636: Performed by: PSYCHIATRY & NEUROLOGY

## 2022-10-31 RX ORDER — DICYCLOMINE HYDROCHLORIDE 10 MG/1
10 CAPSULE ORAL
Qty: 90 CAPSULE | Refills: 1 | Status: SHIPPED | OUTPATIENT
Start: 2022-10-31 | End: 2022-11-01 | Stop reason: SDUPTHER

## 2022-10-31 RX ORDER — BUSPIRONE HYDROCHLORIDE 5 MG/1
5 TABLET ORAL 2 TIMES DAILY
Qty: 60 TABLET | Refills: 1 | Status: SHIPPED | OUTPATIENT
Start: 2022-10-31 | End: 2022-11-01 | Stop reason: SDUPTHER

## 2022-10-31 RX ORDER — BUPRENORPHINE HYDROCHLORIDE AND NALOXONE HYDROCHLORIDE DIHYDRATE 8; 2 MG/1; MG/1
1 TABLET SUBLINGUAL 3 TIMES DAILY
Qty: 90 TABLET | Refills: 0 | Status: SHIPPED | OUTPATIENT
Start: 2022-10-31 | End: 2022-11-01 | Stop reason: SDUPTHER

## 2022-10-31 RX ORDER — DOXEPIN HYDROCHLORIDE 25 MG/1
25 CAPSULE ORAL
Qty: 30 CAPSULE | Refills: 1 | Status: SHIPPED | OUTPATIENT
Start: 2022-10-31 | End: 2022-11-01 | Stop reason: SDUPTHER

## 2022-10-31 RX ORDER — ALBUTEROL SULFATE 90 UG/1
2 AEROSOL, METERED RESPIRATORY (INHALATION)
Qty: 1 EACH | Refills: 1 | Status: SHIPPED | OUTPATIENT
Start: 2022-10-31 | End: 2022-11-01 | Stop reason: SDUPTHER

## 2022-10-31 RX ORDER — QUETIAPINE 400 MG/1
400 TABLET, FILM COATED, EXTENDED RELEASE ORAL
Qty: 30 TABLET | Refills: 1 | Status: SHIPPED | OUTPATIENT
Start: 2022-10-31 | End: 2022-11-01 | Stop reason: SDUPTHER

## 2022-10-31 RX ORDER — DIVALPROEX SODIUM 500 MG/1
1000 TABLET, EXTENDED RELEASE ORAL
Qty: 60 TABLET | Refills: 1 | Status: SHIPPED | OUTPATIENT
Start: 2022-10-31 | End: 2022-11-01 | Stop reason: SDUPTHER

## 2022-10-31 RX ORDER — BUSPIRONE HYDROCHLORIDE 10 MG/1
5 TABLET ORAL 2 TIMES DAILY
Status: DISCONTINUED | OUTPATIENT
Start: 2022-10-31 | End: 2022-11-01 | Stop reason: HOSPADM

## 2022-10-31 RX ADMIN — QUETIAPINE FUMARATE 400 MG: 200 TABLET, EXTENDED RELEASE ORAL at 21:20

## 2022-10-31 RX ADMIN — HYDROXYZINE HYDROCHLORIDE 50 MG: 25 TABLET, FILM COATED ORAL at 08:50

## 2022-10-31 RX ADMIN — BUPRENORPHINE HYDROCHLORIDE AND NALOXONE HYDROCHLORIDE DIHYDRATE 1 TABLET: 8; 2 TABLET SUBLINGUAL at 12:05

## 2022-10-31 RX ADMIN — BENZTROPINE MESYLATE 1 MG: 1 TABLET ORAL at 21:28

## 2022-10-31 RX ADMIN — BUSPIRONE HYDROCHLORIDE 5 MG: 10 TABLET ORAL at 13:14

## 2022-10-31 RX ADMIN — DOXEPIN HYDROCHLORIDE 25 MG: 25 CAPSULE ORAL at 21:20

## 2022-10-31 RX ADMIN — BUPRENORPHINE HYDROCHLORIDE AND NALOXONE HYDROCHLORIDE DIHYDRATE 1 TABLET: 8; 2 TABLET SUBLINGUAL at 08:21

## 2022-10-31 RX ADMIN — HYDROXYZINE HYDROCHLORIDE 50 MG: 25 TABLET, FILM COATED ORAL at 21:20

## 2022-10-31 RX ADMIN — BUPRENORPHINE HYDROCHLORIDE AND NALOXONE HYDROCHLORIDE DIHYDRATE 1 TABLET: 8; 2 TABLET SUBLINGUAL at 17:00

## 2022-10-31 RX ADMIN — DIVALPROEX SODIUM 1000 MG: 500 TABLET, EXTENDED RELEASE ORAL at 21:20

## 2022-10-31 RX ADMIN — BUSPIRONE HYDROCHLORIDE 5 MG: 10 TABLET ORAL at 17:00

## 2022-10-31 NOTE — BH NOTES
Behavioral Health Treatment Team Note            Progress note: Patient met with treatment team. Patient endorsed depression and anxiety at a 10. Pt presented depressed mood, agitated behavior, and poor judgement. Pt reported \"having a hard time with anxiety and sleeping\" and noted that her grandmother passed away last night. She reported that she \"can't eat the food or sleep\" and that she's \"spiraling down\" the longer she stays in the hospital. Pt expressed concern for her son who is being discharged today. Pt reported still being interested in rehab but presents anxious regarding the time it may take to wait for an available bed at the rehab. Pt is interested in CSU but MD and SW expressed concerns that there would be a gap in service if she left the hospital today, stating to the pt that it may not be \"bed to bed\" so that it's not recommended she leave.      LOS:  8                      Expected LOS: TBD     Insurance info/prescription coverage:  [employer] [place]/[place] [employer]  Date of last family contact:  does not consent   Family requesting physician contact today:  no  Discharge plan:  rehab   Guns in the home:  no   Outpatient provider(s):  to be linked      Participating treatment team members: DORCAS El, Kurtis Duron, Student, Dali Alcocer MD

## 2022-10-31 NOTE — PROGRESS NOTES
KADIE assisted the Pt with completing a prescreen with [facility]. Pt is accepted to [facility] and can discharge 11/1/22. KADIE was asked for the Pt to complete a new COVID-19 test before discharging to [facility].    2:33pm-KADIE contacted [employer] to schedule a medicaid cab for 11/1/22 at 10:20am. Reference # for medicaid cab is [id number]

## 2022-10-31 NOTE — BH NOTES
PSYCHIATRIC PROGRESS NOTE         Patient Name  Mariposa Mora   Date of Birth 1975   [facility] [id number]   Medical Record Number  [id number]      Age  48 y.o. PCP None   Admit date:  10/23/2022    Room Number  154/89  @ [facility], [place]   Date of Service  10/30/2022         E & M PROGRESS NOTE:         HISTORY       CC:  \"suicidal ideation\"  HISTORY OF PRESENT ILLNESS/INTERVAL HISTORY:  (reviewed/updated 10/30/2022). per initial evaluation: The patient, Mariposa Mora, is a 48 y.o. BLACK/ female with a past psychiatric history significant for stimulant use  and opiate use disorders, who presents at this time with complaints of (and/or evidence of) the following emotional symptoms: depression and suicidal thoughts/threats. Additional symptomatology include auditory hallucinations. The above symptoms have been present for 2+ weeks. These symptoms are of moderate to high severity. These symptoms are constant in nature. The patient's condition has been precipitated by psychosocial stressors. Patient's condition made worse by continued illicit drug use as well as treatment noncompliance. UDS: +benzos, cocaine, methadone, THC; BAL=0. Patient presented to the ED stating that her life was in danger due to conflict within her relationship. Patient was able to make her needs known but has been dysphoric on the unit. She reported being on Seroquel but has not been compliant. The patient also reports drinking up to a gallon of vodka daily. The patient is a fair historian. The patient corroborates the above narrative. The patient contracts for safety on the unit and gives consent for the team to contact collateral. The patient is amenable to initiating treatment while on the unit. 10/25 - the patient slept 7.75 hours overnight.  She got Zyprexa PRN for psychosis and c/o withdrawal symptoms, though CIWA score has been 3-5 since arrival. Patient compliant with medication and able to make her needs known. She is still rehab focused and denies any active thoughts of self harm. Patient requests a higher dose of Suboxone (per pharmacist she was previously on 24 mg daily). 10/26 - no acute overnight events. The patient slept 6.5 hours overnight, though she states it was restless. She provides MD a long list of medications she has previously taken including Haldol, Depakote, Lithium, Zoloft and Prazosin and states she last got these agents while living in [place]. Patient medication compliant (per nurse she swallowed her SL Suboxone tablet this morning). Patient able to make her needs known and denies active thoughts of self harm. 10/27 - the patient remains in significant distress. She is unable to tolerate solid food though she is taking medications without issue. Patient still with abdominal pain, she denies active thoughts of self harm but endorse depressed mood. She got Atarax PRN for anxiety and slept 6 hours overnight. Patient requests an agent for indigestion. 10/28 - the patient slept 7.5 hours overnight. She c/o abdominal cramping and anorexia. Patient got Atarax and Zyprexa PRN; she states her appetite is increasing and requests a normal diet. Patient still with opiate cravings, amenable with increase in Suboxone. Per pharmacist, there was no other prescription medications filled in [place]. Patient still dysphoric, tearful at times but able to make her needs known. 10/29 - Willis Nichols says she is ok. She is out in the unit, social with peers. Denies si hi or avh. Slept 5 hours last, requesting for a sleeping medication. Appetite is fair. Nursing staff report no acute behavioral issues. At the present time the patient Willis Nichols remains compliant with taking medications. Denies any adverse events from taking them and feels they have been beneficial.     10/30 - Willis Nichols received a distressing phone call just now. She found out her grandmother suddenly passed away this morning. She is tearful during the interview. Denies si hi or avh. At the present time the patient Shahla Wilkes remains compliant with taking medications. Denies any adverse events from taking them and feels they have been beneficial.             SIDE EFFECTS: (reviewed/updated 10/30/2022)  None reported or admitted to. ALLERGIES:(reviewed/updated 10/30/2022)  Allergies   Allergen Reactions    Vancomycin Anaphylaxis    Ketorolac Unable to Obtain     From [facility] [employer] [facility] paperwork 1/19/22    Remeron [Mirtazapine] Unable to Obtain     From [facility] [employer] [facility] paperwork on 1/19/22    Tomato Swelling     Tongue    Trazodone Angioedema      REVIEW OF SYSTEMS: (reviewed/updated 10/30/2022)  Appetite:no change from normal   Sleep: poor with DIMS (difficulty initiating & maintaining sleep)   All other Review of Systems: Negative except myalgias and sweat (withdrawal)         [facility] ([facility] [place]    MSE FINDINGS ARE WITHIN NORMAL LIMITS (WNL) UNLESS OTHERWISE STATED BELOW. ( ALL OF THE BELOW CATEGORIES OF THE MSE HAVE BEEN REVIEWED (reviewed 10/30/2022) AND UPDATED AS DEEMED APPROPRIATE )  General Presentation age appropriate, cooperative and guarded   Orientation oriented to time, place and person   Vital Signs  See below (reviewed 10/30/2022); Vital Signs (BP, Pulse, & Temp) are within normal limits if not listed below.    Gait and Station Stable/steady, no ataxia   Musculoskeletal System No extrapyramidal symptoms (EPS); no abnormal muscular movements or Tardive Dyskinesia (TD); muscle strength and tone are within normal limits   Language No aphasia or dysarthria   Speech:  non-pressured and soft   Thought Processes coherent; slow rate of thoughts; fair abstract reasoning/computation   Thought Associations blocked    Thought Content preoccupations   Suicidal Ideations contracts for safety   Homicidal Ideations contracts for safety   Mood:  depressed and anhedonia   Affect:  constricted   Memory recent  impaired Memory remote:  fair   Concentration/Attention:  intact   Fund of Knowledge average   Insight:  fair   Reliability fair   Judgment:  limited          VITALS:     Patient Vitals for the past 24 hrs:   Temp Pulse Resp BP SpO2   10/30/22 2000 97.9 °F (36.6 °C) 93 14 108/75 99 %   10/30/22 0829 98 °F (36.7 °C) (!) 101 16 111/81 98 %       Wt Readings from Last 3 Encounters:   10/23/22 54.4 kg (120 lb)   01/19/22 90.7 kg (200 lb)   02/21/21 94.8 kg (208 lb 14.4 oz)     Temp Readings from Last 3 Encounters:   10/30/22 97.9 °F (36.6 °C)   01/19/22 98.4 °F (36.9 °C)   02/22/21 98.2 °F (36.8 °C)     BP Readings from Last 3 Encounters:   10/30/22 108/75   01/19/22 (!) 121/90   02/22/21 135/87     Pulse Readings from Last 3 Encounters:   10/30/22 93   01/19/22 76   02/22/21 95            DATA     LABORATORY DATA:(reviewed/updated 10/30/2022)  No results found for this or any previous visit (from the past 24 hour(s)). Lab Results   Component Value Date/Time    Valproic acid <3 (L) 10/23/2022 12:49 PM     Lab Results   Component Value Date/Time    Lithium level 0.49 (L) 08/09/2018 02:15 PM      RADIOLOGY REPORTS:(reviewed/updated 10/30/2022)  No results found.        MEDICATIONS     ALL MEDICATIONS:   Current Facility-Administered Medications   Medication Dose Route Frequency    hydrOXYzine HCL (ATARAX) tablet 50 mg  50 mg Oral Q6H PRN    doxepin (SINEquan) capsule 25 mg  25 mg Oral QHS    buprenorphine-naloxone (SUBOXONE) 8-2mg SL tablet  1 Tablet SubLINGual TID    QUEtiapine SR (SEROquel XR) TABLET 400 mg  400 mg Oral QHS    bismuth subsalicylate (PEPTO-BISMOL MAXIMUM STRENGTH) 525 mg/15 mL oral suspension 1,050 mg  30 mL Oral Q6H PRN    dicyclomine (BENTYL) capsule 10 mg  10 mg Oral TID PRN    divalproex ER (DEPAKOTE ER) 24 hour tablet 1,000 mg  1,000 mg Oral QHS    naloxone (NARCAN) injection 0.4 mg  0.4 mg IntraMUSCular EVERY 2 MINUTES AS NEEDED    thiamine HCL (B-1) tablet 100 mg  100 mg Oral DAILY    folic acid (FOLVITE) tablet 1 mg  1 mg Oral DAILY    therapeutic multivitamin (THERAGRAN) tablet 1 Tablet  1 Tablet Oral DAILY    OLANZapine (ZyPREXA) tablet 5 mg  5 mg Oral Q6H PRN    haloperidol lactate (HALDOL) injection 5 mg  5 mg IntraMUSCular Q6H PRN    benztropine (COGENTIN) tablet 1 mg  1 mg Oral BID PRN    diphenhydrAMINE (BENADRYL) injection 50 mg  50 mg IntraMUSCular BID PRN    LORazepam (ATIVAN) injection 1 mg  1 mg IntraMUSCular Q4H PRN    acetaminophen (TYLENOL) tablet 650 mg  650 mg Oral Q4H PRN    magnesium hydroxide (MILK OF MAGNESIA) 400 mg/5 mL oral suspension 30 mL  30 mL Oral DAILY PRN      SCHEDULED MEDICATIONS:   Current Facility-Administered Medications   Medication Dose Route Frequency    doxepin (SINEquan) capsule 25 mg  25 mg Oral QHS    buprenorphine-naloxone (SUBOXONE) 8-2mg SL tablet  1 Tablet SubLINGual TID    QUEtiapine SR (SEROquel XR) TABLET 400 mg  400 mg Oral QHS    divalproex ER (DEPAKOTE ER) 24 hour tablet 1,000 mg  1,000 mg Oral QHS    thiamine HCL (B-1) tablet 100 mg  100 mg Oral DAILY    folic acid (FOLVITE) tablet 1 mg  1 mg Oral DAILY    therapeutic multivitamin (THERAGRAN) tablet 1 Tablet  1 Tablet Oral DAILY          ASSESSMENT & PLAN     DIAGNOSES REQUIRING ACTIVE TREATMENT AND MONITORING: (reviewed/updated 10/30/2022)  Patient Active Hospital Problem List:         MDD ([facility] [address] (1/25/2017)           Assessment: the patient has been dysphoric and paranoid in the setting of stimulant and opiate use as well as alcohol use. Will continue detox and start agent for mood lability. Plan:  -Start doxepin 25 mg qhs prn for sleep.   - INCREASE Suboxone to 8 mg TID for opiate use disorder  - INCREASE Seroquel XR to 400 mg QHS for mood lability, titrate as needed  - CONTINUE Depakote ER 1,000 mg QHS for mood lability adjunct  - IGM therapy as tolerated  - Expand database / obtain collateral  - Dispo planning (rehab vs home)     In summary, Mayuri Scott, is a 48 y.o.  female who presents with a severe exacerbation of the principal diagnosis of Adjustment disorder with anxious mood    Patient's condition is not improving. Patient requires continued inpatient hospitalization for further stabilization, safety monitoring and medication management. I will continue to coordinate the provision of individual, milieu, occupational, group, and substance abuse therapies to address target symptoms/diagnoses as deemed appropriate for the individual patient. A coordinated, multidisplinary treatment team round was conducted with the patient (this team consists of the nurse, psychiatric unit pharmacist,  and writer). Complete current electronic health record for patient has been reviewed today including consultant notes, ancillary staff notes, nurses and psychiatric tech notes. Suicide risk assessment completed and patient deemed to be of low risk for suicide at this time. The following regarding medications was addressed during rounds with patient:   the risks and benefits of the proposed medication. The patient was given the opportunity to ask questions. Informed consent given to the use of the above medications. Will continue to adjust psychiatric and non-psychiatric medications (see above \"medication\" section and orders section for details) as deemed appropriate & based upon diagnoses and response to treatment. I will continue to order blood tests/labs and diagnostic tests as deemed appropriate and review results as they become available (see orders for details and above listed lab/test results). I will order psychiatric records from previous [facility] hospitals to further elucidate the nature of patient's psychopathology and review once available. I will gather additional collateral information from friends, family and o/p treatment team to further elucidate the nature of patient's psychopathology and baselline level of psychiatric functioning.          I certify that this patient's inpatient psychiatric hospital services furnished since the previous certification were, and continue to be, required for treatment that could reasonably be expected to improve the patient's condition, or for diagnostic study, and that the patient continues to need, on a daily basis, active treatment furnished directly by or requiring the supervision of inpatient psychiatric facility personnel. In addition, the hospital records show that services furnished were intensive treatment services, admission or related services, or equivalent services.     EXPECTED DISCHARGE DATE/DAY: TBD     DISPOSITION: Rehab       Signed By:   Jose Mcnulty NP  10/30/2022

## 2022-10-31 NOTE — BH NOTES
PSYCHIATRIC PROGRESS NOTE         Patient Name  Jose Roberto Alfred   Date of Birth 1975   [facility] [id number]   Medical Record Number  [id number]      Age  48 y.o. PCP None   Admit date:  10/23/2022    Room Number  154/12  @ [facility]   Date of Service  10/31/2022         E & M PROGRESS NOTE:         HISTORY       CC:  \"suicidal ideation\"  HISTORY OF PRESENT ILLNESS/INTERVAL HISTORY:  (reviewed/updated 10/31/2022). per initial evaluation: The patient, Jose Roberto Alfred, is a 48 y.o. BLACK/ female with a past psychiatric history significant for stimulant use  and opiate use disorders, who presents at this time with complaints of (and/or evidence of) the following emotional symptoms: depression and suicidal thoughts/threats. Additional symptomatology include auditory hallucinations. The above symptoms have been present for 2+ weeks. These symptoms are of moderate to high severity. These symptoms are constant in nature. The patient's condition has been precipitated by psychosocial stressors. Patient's condition made worse by continued illicit drug use as well as treatment noncompliance. UDS: +benzos, cocaine, methadone, THC; BAL=0. Patient presented to the ED stating that her life was in danger due to conflict within her relationship. Patient was able to make her needs known but has been dysphoric on the unit. She reported being on Seroquel but has not been compliant. The patient also reports drinking up to a gallon of vodka daily. The patient is a fair historian. The patient corroborates the above narrative. The patient contracts for safety on the unit and gives consent for the team to contact collateral. The patient is amenable to initiating treatment while on the unit. 10/25 - the patient slept 7.75 hours overnight.  She got Zyprexa PRN for psychosis and c/o withdrawal symptoms, though CIWA score has been 3-5 since arrival. Patient compliant with medication and able to make her needs known. She is still rehab focused and denies any active thoughts of self harm. Patient requests a higher dose of Suboxone (per pharmacist she was previously on 24 mg daily). 10/26 - no acute overnight events. The patient slept 6.5 hours overnight, though she states it was restless. She provides MD a long list of medications she has previously taken including Haldol, Depakote, Lithium, Zoloft and Prazosin and states she last got these agents while living in [place]. Patient medication compliant (per nurse she swallowed her SL Suboxone tablet this morning). Patient able to make her needs known and denies active thoughts of self harm. 10/27 - the patient remains in significant distress. She is unable to tolerate solid food though she is taking medications without issue. Patient still with abdominal pain, she denies active thoughts of self harm but endorse depressed mood. She got Atarax PRN for anxiety and slept 6 hours overnight. Patient requests an agent for indigestion. 10/28 - the patient slept 7.5 hours overnight. She c/o abdominal cramping and anorexia. Patient got Atarax and Zyprexa PRN; she states her appetite is increasing and requests a normal diet. Patient still with opiate cravings, amenable with increase in Suboxone. Per pharmacist, there was no other prescription medications filled in [place]. Patient still dysphoric, tearful at times but able to make her needs known. 10/29 - Pam Cadet says she is ok. She is out in the unit, social with peers. Denies si hi or avh. Slept 5 hours last, requesting for a sleeping medication. Appetite is fair. Nursing staff report no acute behavioral issues. At the present time the patient Pam Cadet remains compliant with taking medications. Denies any adverse events from taking them and feels they have been beneficial.     10/30 - Pam Cadet received a distressing phone call just now. She found out her grandmother suddenly passed away this morning. She is tearful during the interview. Denies si hi or avh. At the present time the patient Toña Vega remains compliant with taking medications. Denies any adverse events from taking them and feels they have been beneficial.     10/31 - the patient slept 7 hours overnight. She is anxious and c/o abdominal pain. Patient denies active thoughts of self harm. Her labwork was drawn and VPA therapeutic. Patient discharge focused, she is hoping to go to rehab ASAP and otherwise has been irritable but cooperative. Patient continues to state her Seroquel dose was significantly higher and gives more places for the team to contact to confirm this dose. Per , she can discharge tomorrow to a treatment facility. SIDE EFFECTS: (reviewed/updated 10/31/2022)  None reported or admitted to. ALLERGIES:(reviewed/updated 10/31/2022)  Allergies   Allergen Reactions    Vancomycin Anaphylaxis    Ketorolac Unable to Obtain     From [facility] paperwork 1/19/22    Remeron [Mirtazapine] Unable to Obtain     From [facility] paperwork on 1/19/22    Tomato Swelling     Tongue    Trazodone Angioedema      REVIEW OF SYSTEMS: (reviewed/updated 10/31/2022)  Appetite:no change from normal   Sleep: poor with DIMS (difficulty initiating & maintaining sleep)   All other Review of Systems: Negative except myalgias and sweat (withdrawal)         [facility] ([facility]):    [facility] FINDINGS ARE WITHIN NORMAL LIMITS (WNL) UNLESS OTHERWISE STATED BELOW. ( ALL OF THE BELOW CATEGORIES OF THE [facility] HAVE BEEN REVIEWED (reviewed 10/31/2022) AND UPDATED AS DEEMED APPROPRIATE )  General Presentation age appropriate, cooperative and guarded   Orientation oriented to time, place and person   Vital Signs  See below (reviewed 10/31/2022); Vital Signs (BP, Pulse, & Temp) are within normal limits if not listed below.    Gait and Station Stable/steady, no ataxia   Musculoskeletal System No extrapyramidal symptoms (EPS); no abnormal muscular movements or Tardive Dyskinesia (TD); muscle strength and tone are within normal limits   Language No aphasia or dysarthria   Speech:  non-pressured and soft   Thought Processes coherent; slow rate of thoughts; fair abstract reasoning/computation   Thought Associations blocked    Thought Content preoccupations   Suicidal Ideations contracts for safety   Homicidal Ideations contracts for safety   Mood:  depressed and anhedonia   Affect:  constricted   Memory recent  impaired   Memory remote:  fair   Concentration/Attention:  intact   Fund of Knowledge average   Insight:  fair   Reliability fair   Judgment:  limited          VITALS:     Patient Vitals for the past 24 hrs:   Temp Pulse Resp BP SpO2   10/31/22 0818 98.2 °F (36.8 °C) 96 16 112/76 99 %   10/30/22 2000 97.9 °F (36.6 °C) 93 14 108/75 99 %       Wt Readings from Last 3 Encounters:   10/23/22 54.4 kg (120 lb)   01/19/22 90.7 kg (200 lb)   02/21/21 94.8 kg (208 lb 14.4 oz)     Temp Readings from Last 3 Encounters:   10/31/22 98.2 °F (36.8 °C)   01/19/22 98.4 °F (36.9 °C)   02/22/21 98.2 °F (36.8 °C)     BP Readings from Last 3 Encounters:   10/31/22 112/76   01/19/22 (!) 121/90   02/22/21 135/87     Pulse Readings from Last 3 Encounters:   10/31/22 96   01/19/22 76   02/22/21 95            DATA     LABORATORY DATA:(reviewed/updated 10/31/2022)  Recent Results (from the past 24 hour(s))   VALPROIC ACID    Collection Time: 10/31/22  5:29 AM   Result Value Ref Range    Valproic acid 61 50 - 100 ug/ml     Lab Results   Component Value Date/Time    Valproic acid 61 10/31/2022 05:29 AM     Lab Results   Component Value Date/Time    Lithium level 0.49 (L) 08/09/2018 02:15 PM      RADIOLOGY REPORTS:(reviewed/updated 10/31/2022)  No results found.        MEDICATIONS     ALL MEDICATIONS:   Current Facility-Administered Medications   Medication Dose Route Frequency    busPIRone (BUSPAR) tablet 5 mg  5 mg Oral BID    hydrOXYzine HCL (ATARAX) tablet 50 mg  50 mg Oral Q6H PRN    doxepin (SINEquan) capsule 25 mg  25 mg Oral QHS    buprenorphine-naloxone (SUBOXONE) 8-2mg SL tablet  1 Tablet SubLINGual TID    QUEtiapine SR (SEROquel XR) TABLET 400 mg  400 mg Oral QHS    bismuth subsalicylate (PEPTO-BISMOL MAXIMUM STRENGTH) 525 mg/15 mL oral suspension 1,050 mg  30 mL Oral Q6H PRN    dicyclomine (BENTYL) capsule 10 mg  10 mg Oral TID PRN    divalproex ER (DEPAKOTE ER) 24 hour tablet 1,000 mg  1,000 mg Oral QHS    naloxone (NARCAN) injection 0.4 mg  0.4 mg IntraMUSCular EVERY 2 MINUTES AS NEEDED    thiamine HCL (B-1) tablet 100 mg  100 mg Oral DAILY    folic acid (FOLVITE) tablet 1 mg  1 mg Oral DAILY    therapeutic multivitamin (THERAGRAN) tablet 1 Tablet  1 Tablet Oral DAILY    OLANZapine (ZyPREXA) tablet 5 mg  5 mg Oral Q6H PRN    haloperidol lactate (HALDOL) injection 5 mg  5 mg IntraMUSCular Q6H PRN    benztropine (COGENTIN) tablet 1 mg  1 mg Oral BID PRN    diphenhydrAMINE (BENADRYL) injection 50 mg  50 mg IntraMUSCular BID PRN    LORazepam (ATIVAN) injection 1 mg  1 mg IntraMUSCular Q4H PRN    acetaminophen (TYLENOL) tablet 650 mg  650 mg Oral Q4H PRN    magnesium hydroxide (MILK OF MAGNESIA) 400 mg/5 mL oral suspension 30 mL  30 mL Oral DAILY PRN      SCHEDULED MEDICATIONS:   Current Facility-Administered Medications   Medication Dose Route Frequency    busPIRone (BUSPAR) tablet 5 mg  5 mg Oral BID    doxepin (SINEquan) capsule 25 mg  25 mg Oral QHS    buprenorphine-naloxone (SUBOXONE) 8-2mg SL tablet  1 Tablet SubLINGual TID    QUEtiapine SR (SEROquel XR) TABLET 400 mg  400 mg Oral QHS    divalproex ER (DEPAKOTE ER) 24 hour tablet 1,000 mg  1,000 mg Oral QHS    thiamine HCL (B-1) tablet 100 mg  100 mg Oral DAILY    folic acid (FOLVITE) tablet 1 mg  1 mg Oral DAILY    therapeutic multivitamin (THERAGRAN) tablet 1 Tablet  1 Tablet Oral DAILY          ASSESSMENT & PLAN     DIAGNOSES REQUIRING ACTIVE TREATMENT AND MONITORING: (reviewed/updated 10/31/2022)  Patient C/Whitney Charles 1008 Problem List:         MDD ([facility].) (1/25/2017)           Assessment: the patient has been dysphoric and paranoid in the setting of stimulant and opiate use as well as alcohol use. Will continue detox and start agent for mood lability. Plan:  - START Buspar 5 mg BID for anxiety  - CONTINUE Doxepin 25 mg QHS PRN insomnia  - CONTINUE Suboxone 8 mg TID for opiate use disorder  - CONTINUE Seroquel  mg QHS for mood lability, titrate as needed  - CONTINUE Depakote ER 1,000 mg QHS for mood lability adjunct  - IGM therapy as tolerated  - Expand database / obtain collateral  - Dispo planning (rehab vs home)     In summary, Radha Nuñez, is a 48 y.o.  female who presents with a severe exacerbation of the principal diagnosis of Adjustment disorder with anxious mood    Patient's condition is not improving. Patient requires continued inpatient hospitalization for further stabilization, safety monitoring and medication management. I will continue to coordinate the provision of individual, milieu, occupational, group, and substance abuse therapies to address target symptoms/diagnoses as deemed appropriate for the individual patient. A coordinated, multidisplinary treatment team round was conducted with the patient (this team consists of the nurse, psychiatric unit pharmacist,  and writer). Complete current electronic health record for patient has been reviewed today including consultant notes, ancillary staff notes, nurses and psychiatric tech notes. Suicide risk assessment completed and patient deemed to be of low risk for suicide at this time. The following regarding medications was addressed during rounds with patient:   the risks and benefits of the proposed medication. The patient was given the opportunity to ask questions. Informed consent given to the use of the above medications.  Will continue to adjust psychiatric and non-psychiatric medications (see above \"medication\" section and orders section for details) as deemed appropriate & based upon diagnoses and response to treatment. I will continue to order blood tests/labs and diagnostic tests as deemed appropriate and review results as they become available (see orders for details and above listed lab/test results). I will order psychiatric records from previous [facility] hospitals to further elucidate the nature of patient's psychopathology and review once available. I will gather additional collateral information from friends, family and o/p treatment team to further elucidate the nature of patient's psychopathology and baselline level of psychiatric functioning. I certify that this patient's inpatient psychiatric hospital services furnished since the previous certification were, and continue to be, required for treatment that could reasonably be expected to improve the patient's condition, or for diagnostic study, and that the patient continues to need, on a daily basis, active treatment furnished directly by or requiring the supervision of inpatient psychiatric facility personnel. In addition, the hospital records show that services furnished were intensive treatment services, admission or related services, or equivalent services.     EXPECTED DISCHARGE DATE/DAY: 11/01/22     DISPOSITION: Rehab       Signed By:   Eduard Randhawa MD  10/31/2022

## 2022-10-31 NOTE — PROGRESS NOTES
Problem: Depressed Mood (Adult/Pediatric)  Goal: *STG: Participates in treatment plan  Outcome: Progressing Towards Goal  Goal: *STG: Verbalizes anger, guilt, and other feelings in a constructive manor  Outcome: Progressing Towards Goal  Goal: *STG: Remains safe in hospital  Outcome: Progressing Towards Goal  Goal: *STG: Complies with medication therapy  Outcome: Progressing Towards Goal     Problem: Falls - Risk of  Goal: *Absence of Falls  Description: Document Criss Fall Risk and appropriate interventions in the flowsheet.   Outcome: Progressing Towards Goal  Note: Fall Risk Interventions:            Medication Interventions: Teach patient to arise slowly

## 2022-10-31 NOTE — BH NOTES
1:1    This writer met with patient. Patient reports that she is extremely frustrated with her treatment and does not understand why she is still here. She reports that she is upset with this writer, due to not being able to see her for individual Friday. SW apologized for this incident and offered her an individual session today, patient declined. Patient reports that the medications she is being prescribed are not working for her. Patient reports that she is upset and wants to leave today. SW will look into CSU placement. SW also reminded patient that she is here voluntarily here and she has the right to leave when she wants. She reports that she does not have a place to live. Patient reports that she is upset rehab is taking so long. Patient also reports that she found out that her grandmother passed away yesterday. SW was able to use supportive counseling and tried to identify a discharge plan where she would be able to attend the . Patient reports that she does not have anywhere else to live. SW also reminded patient, that this is her pattern when she is hospitalized and patient wants to leave before inpatient rehab is available. Patient was not in a place to accept feedback. SW will continue to look for CSU placement for discharge today.          DORCAS Glez

## 2022-10-31 NOTE — PROGRESS NOTES
Laboratory Monitoring for Valproic Acid    This patient is currently prescribed the following medication(s):   Current Facility-Administered Medications   Medication Dose Route Frequency    doxepin (SINEquan) capsule 25 mg  25 mg Oral QHS    buprenorphine-naloxone (SUBOXONE) 8-2mg SL tablet  1 Tablet SubLINGual TID    QUEtiapine SR (SEROquel XR) TABLET 400 mg  400 mg Oral QHS    divalproex ER (DEPAKOTE ER) 24 hour tablet 1,000 mg  1,000 mg Oral QHS    thiamine HCL (B-1) tablet 100 mg  100 mg Oral DAILY    folic acid (FOLVITE) tablet 1 mg  1 mg Oral DAILY    therapeutic multivitamin (THERAGRAN) tablet 1 Tablet  1 Tablet Oral DAILY       The following labs have been completed for monitoring of valproic acid:    Valproic Acid Serum Concentration  Lab Results   Component Value Date/Time    Valproic acid 61 10/31/2022 05:29 AM         Hepatic Function  Lab Results   Component Value Date/Time    Bilirubin, total 0.4 10/23/2022 05:53 PM    Protein, total 6.5 10/23/2022 05:53 PM    Albumin 3.1 (L) 10/23/2022 05:53 PM    Globulin 3.4 10/23/2022 05:53 PM    A-G Ratio 0.9 (L) 10/23/2022 05:53 PM    ALT (SGPT) 24 10/23/2022 05:53 PM    AST (SGOT) 45 (H) 10/23/2022 05:53 PM    Alk. phosphatase 89 10/23/2022 05:53 PM       Hematology  Lab Results   Component Value Date/Time    WBC 4.7 10/23/2022 12:49 PM    RBC 5.14 10/23/2022 12:49 PM    HGB 15.6 10/23/2022 12:49 PM    HCT 45.3 10/23/2022 12:49 PM    MCV 88.1 10/23/2022 12:49 PM    MCH 30.4 10/23/2022 12:49 PM    MCHC 34.4 10/23/2022 12:49 PM    RDW 13.1 10/23/2022 12:49 PM    PLATELET 418 81/36/7803 12:49 PM       Assessment/Plan:  Valproic acid level obtained on 10/31 is within therapeutic limits, 61 mcg/mL (range  mcg/mL). Level is reflective of steady-state concentration, drawn after 5 complete days of therapy. It was drawn early, 8.5 hours post-dose. True trough for divalproex ER product should be obtained 16-24 hours post-dose for most accurate level.  Therefore, true trough will be lower than reported above. Recommend to increase dose if clinically indicated.          [address], [facility]  [phone]

## 2022-10-31 NOTE — DISCHARGE INSTRUCTIONS
DISCHARGE SUMMARY    NAME:Evgeny Mcgee  : 1975  MRN: [id number]    The patient Jose Roberto Alfred exhibits the ability to control behavior in a less restrictive environment. Patient's level of functioning is improving. No assaultive/destructive behavior has been observed for the past 24 hours. No suicidal/homicidal threat or behavior has been observed for the past 24 hours. There is no evidence of serious medication side effects. Patient has not been in physical or protective restraints for at least the past 24 hours. If weapons involved, how are they secured? Pt does not have access to any weapons. Is patient aware of and in agreement with discharge plan? Yes    Arrangements for medication:  Prescriptions given to the Pt to take with her to [facility]    Copy of discharge instructions to provider?:  Yes to [address] for transportation home:  Pt will take a medicaid cab to [facility]    Keep all follow up appointments as scheduled, continue to take prescribed medications per physician instructions. Mental health crisis number:  222 or your local mental health crisis line number at [phone].       Mental Health Emergency WARM LINE      [phone] ([phone])      M-F: 9am to 9pm      Sat & Sun: 5pm - 9pm  National suicide prevention lines:                             1-688-MDZKVID (9-107-760-878-110-3988)       9-490-270-TALK ([phone])    Crisis Text Line:  Text HOME to 722430

## 2022-10-31 NOTE — BH NOTES
Writer met the pt after shift change report in the dayroom seated with a sad face while watching TV. She was positive of depression and anxiety at 10 and denied any suicidal ideations or plan to harm others. She reported having abdominal pain and had a bowel movement yesterday. 15 minutes safety checks was initiated I will continue to monitor. 0600 ordered labs drawn and throughout the shift pt slept for 7 hrs ct monitoring and other care as ordered.

## 2022-10-31 NOTE — PROGRESS NOTES
Auto-MST trigger per RN admission assessment. No acute nutrition or weight issues identifed BUT pt's weight was 165 # in 2018 and A1c was 5.3 in 2017. Pt's wt this admission is stated weight, please confirm. Pt is also now pre-diabetic and will need CC diet with 3 CHO choices. Supplements ordered for meal trays. Per MD pt does not want CC diet so reg meal trays ordered by MD. Per chart notes and pt, she is meal compliant. Other hx notable for COPD, Hep B, substance abuse, HTN, sarcoidosis.     Ht: 5'4\"  Wt: 120 lb  BMI: 20.60 kg/(m^2) c/w normal weight  Est energy needs: 1810 kcal, 65 g protein, 1 mL/kcal fluids  Pt will consume > 75% of meals at follow up 7-10 days  LOS, MST

## 2022-10-31 NOTE — GROUP NOTE
[facility]  GROUP DOCUMENTATION INDIVIDUAL                                                                          Group Therapy Note    Date: 10/31/2022    Group Start Time: 0900  Group End Time: 1000  Group Topic: Topic Group    [facility] ACUTE BEHAV TH    [place], [address] [facility] GROUP DOCUMENTATION GROUP    Group Therapy Note    Attendees: 6       Attendance: Attended    Patient's Goal:  To participate in relaxation activity  Intervention-Supported-art      Mental Status: Depressed    Behavior/appearance: Cooperative    Goals Achieved: Able to engage in interactions, Able to listen to others, Able to self-disclose, and Identified feelings-worked on art project      Additional Notes:  Pt reported feeling sad and tearful regarding recent loss of grandmother    Thomas Fowler

## 2022-10-31 NOTE — GROUP NOTE
[facility]  GROUP DOCUMENTATION INDIVIDUAL                                                                          Group Therapy Note    Date: 10/31/2022    Group Start Time: 1500  Group End Time: 1600  Group Topic: Recreational/Music Therapy    [facility] [place] [address] ACUTE BEHAV [facility]    [place], [address] [facility] GROUP DOCUMENTATION GROUP    Group Therapy Note    Attendees: 6       Attendance: Attended    Patient's Goal:  To concentrate on selected task    Interventions/techniques: Supported-crafts,games,music    Follows Directions:  Followed directions    Interactions: Interacted appropriately    Mental Status: Calm    Behavior/appearance: Attentive and Cooperative    Goals Achieved: Able to engage in interactions and Able to listen to others-active participant in game      Amador Goncalves

## 2022-10-31 NOTE — PROGRESS NOTES
Behavioral Services                                              Medicare Re-Certification    I certify that the inpatient psychiatric hospital services furnished since the previous certification/re-certification were, and continue to be, medically necessary for;    [x] (1) Treatment which could reasonably be expected to improve the patient's condition,    [x] (2) Or for diagnostic study. Estimated length of stay/service 1-2 days    Plan for post-hospital care home    This patient continues to need, on a daily basis, active treatment furnished directly by or requiring the supervision of inpatient psychiatric personnel.     Electronically signed by Adán Hamlin MD on 10/31/2022 at 8:46 AM

## 2022-11-01 VITALS
DIASTOLIC BLOOD PRESSURE: 78 MMHG | HEART RATE: 84 BPM | RESPIRATION RATE: 16 BRPM | OXYGEN SATURATION: 99 % | HEIGHT: 64 IN | TEMPERATURE: 98 F | BODY MASS INDEX: 20.49 KG/M2 | SYSTOLIC BLOOD PRESSURE: 116 MMHG | WEIGHT: 120 LBS

## 2022-11-01 PROCEDURE — 74011250637 HC RX REV CODE- 250/637: Performed by: PSYCHIATRY & NEUROLOGY

## 2022-11-01 PROCEDURE — 99239 HOSP IP/OBS DSCHRG MGMT >30: CPT | Performed by: PSYCHIATRY & NEUROLOGY

## 2022-11-01 PROCEDURE — 74011250636 HC RX REV CODE- 250/636: Performed by: PSYCHIATRY & NEUROLOGY

## 2022-11-01 PROCEDURE — 74011250637 HC RX REV CODE- 250/637: Performed by: NURSE PRACTITIONER

## 2022-11-01 RX ORDER — DICYCLOMINE HYDROCHLORIDE 10 MG/1
10 CAPSULE ORAL
Qty: 90 CAPSULE | Refills: 1 | Status: SHIPPED | OUTPATIENT
Start: 2022-11-01

## 2022-11-01 RX ORDER — DOXEPIN HYDROCHLORIDE 25 MG/1
25 CAPSULE ORAL
Qty: 30 CAPSULE | Refills: 1 | Status: SHIPPED | OUTPATIENT
Start: 2022-11-01

## 2022-11-01 RX ORDER — ALBUTEROL SULFATE 90 UG/1
2 AEROSOL, METERED RESPIRATORY (INHALATION)
Qty: 1 EACH | Refills: 1 | Status: SHIPPED | OUTPATIENT
Start: 2022-11-01

## 2022-11-01 RX ORDER — QUETIAPINE 400 MG/1
400 TABLET, FILM COATED, EXTENDED RELEASE ORAL
Qty: 30 TABLET | Refills: 1 | Status: SHIPPED | OUTPATIENT
Start: 2022-11-01

## 2022-11-01 RX ORDER — BUPRENORPHINE HYDROCHLORIDE AND NALOXONE HYDROCHLORIDE DIHYDRATE 8; 2 MG/1; MG/1
1 TABLET SUBLINGUAL 3 TIMES DAILY
Qty: 90 TABLET | Refills: 0 | Status: SHIPPED | OUTPATIENT
Start: 2022-11-01 | End: 2022-12-01

## 2022-11-01 RX ORDER — HYDROXYZINE 50 MG/1
50 TABLET, FILM COATED ORAL
Qty: 60 TABLET | Refills: 1 | Status: SHIPPED | OUTPATIENT
Start: 2022-11-01 | End: 2022-12-01

## 2022-11-01 RX ORDER — BUSPIRONE HYDROCHLORIDE 5 MG/1
5 TABLET ORAL 2 TIMES DAILY
Qty: 60 TABLET | Refills: 1 | Status: SHIPPED | OUTPATIENT
Start: 2022-11-01

## 2022-11-01 RX ORDER — DIVALPROEX SODIUM 500 MG/1
1000 TABLET, EXTENDED RELEASE ORAL
Qty: 60 TABLET | Refills: 1 | Status: SHIPPED | OUTPATIENT
Start: 2022-11-01

## 2022-11-01 RX ADMIN — BUSPIRONE HYDROCHLORIDE 5 MG: 10 TABLET ORAL at 16:32

## 2022-11-01 RX ADMIN — OLANZAPINE 5 MG: 5 TABLET, FILM COATED ORAL at 11:39

## 2022-11-01 RX ADMIN — Medication 100 MG: at 07:57

## 2022-11-01 RX ADMIN — BUSPIRONE HYDROCHLORIDE 5 MG: 10 TABLET ORAL at 07:57

## 2022-11-01 RX ADMIN — BUPRENORPHINE HYDROCHLORIDE AND NALOXONE HYDROCHLORIDE DIHYDRATE 1 TABLET: 8; 2 TABLET SUBLINGUAL at 16:33

## 2022-11-01 RX ADMIN — THERA TABS 1 TABLET: TAB at 07:57

## 2022-11-01 RX ADMIN — BUPRENORPHINE HYDROCHLORIDE AND NALOXONE HYDROCHLORIDE DIHYDRATE 1 TABLET: 8; 2 TABLET SUBLINGUAL at 12:00

## 2022-11-01 RX ADMIN — BUPRENORPHINE HYDROCHLORIDE AND NALOXONE HYDROCHLORIDE DIHYDRATE 1 TABLET: 8; 2 TABLET SUBLINGUAL at 07:57

## 2022-11-01 RX ADMIN — FOLIC ACID 1 MG: 1 TABLET ORAL at 07:57

## 2022-11-01 NOTE — PROGRESS NOTES
Problem: Depressed Mood (Adult/Pediatric)  Goal: *STG: Participates in treatment plan  Outcome: Progressing Towards Goal  Goal: *STG: Verbalizes anger, guilt, and other feelings in a constructive manor  Outcome: Progressing Towards Goal  Goal: *STG: Remains safe in hospital  Outcome: Progressing Towards Goal  Goal: *STG: Complies with medication therapy  Outcome: Progressing Towards Goal     [id number]: Assumed care of patient after receiving shift report from outgoing nurse. Patient was out and visible on unit, interacting appropriately with peers and staff. Affect is constricted, mood is anxious/depressed. Pt reports buspar is helping but she understands it takes time to properly build up in her system. Pt cooperative with vitals and assessment. A&O x 4. Independent in ADLs. Insight and concentration somewhat present. Gait is steady. Appetite patterns WNL. Denies AVH/SI/HI. Patient received PRN atarax for anxiety at 2120. Patient requested/received PRN cogentin for mild E.P.S. in legs at 2128. Pt denies pain. Patient medication and diet compliant. Pt encouraged to continue to participate in care. [id number]: Pt resting in room at this time. Pt awoke at approximately 0245 complaining of nightmares. Pt also reports she has been having them. Pt sitting in room at this time. [id number]: Pt has been observed throughout the night. Total hours slept approximately 2.75. No s/s of distress noted. Patient has remained safe. Hourly rounding performed throughout shift.

## 2022-11-01 NOTE — BH NOTES
Behavioral Health Treatment Team Note            Progress note: Patient met with treatment team. Pt has been accepted to [facility] and will be discharged today.       LOS:  9                      Expected LOS: Nov. 1     Insurance info/prescription coverage:  [facility] [place] [address] [place] [address]  Date of last family contact:  does not consent   Family requesting physician contact today:  no  Discharge plan:  rehab   Guns in the home:  no   Outpatient provider(s):  to be linked      Participating treatment team members: DORCAS Tamez, Rod Parikh, Student, Donna Bowles MD

## 2022-11-01 NOTE — PROGRESS NOTES
KADIE contacted [employer] transportation and scheduled a new ride for the Pt to discharge to [facility]. SW was informed that it would take 2-3 hours for a medicaid cab to reach the Pt. Medicaid reference # [id number].  KADIE contacted [facility] and informed them of the transportation issues occurring with medicaid and rescheduled the Pt admission to [facility] for 8:00pm.

## 2022-11-01 NOTE — BH NOTES
Pt alert and oriented x 4 and ambulates with a steady gait. Pt is discharging to acute rehab facility to continue treatment in [place], [place]. Pt denies SI/HI/AVH at time of discharge. Pt belongings, meds and valuables returned to patient. Pt mood affect appropriate to situation. Reviewed after-care summary with patient and provided opportunity for questions. Patient verbalized understanding. Signature obtained. Patient escorted off unit by Casie Palomino unit manager.

## 2022-11-01 NOTE — PROGRESS NOTES
Received care of patient resting in bed. Patient currently denies SI/HI/AVH. Patient rates her anxiety at 5/10 and requests ginger ale for her stomach as she feels slightly nauseated and attributes this to her anxiety. Patient is viewed in the dayroom eating breakfast and is observed to be both medication and meal compliant. Her affect is Euthymic/with an anxious mood which is appropriate to situation. Monitoring to continue for safety, support and situation.    Problem: Depressed Mood (Adult/Pediatric)  Goal: *STG: Participates in treatment plan  Outcome: Progressing Towards Goal  Goal: *STG: Remains safe in hospital  Outcome: Progressing Towards Goal

## 2022-11-01 NOTE — DISCHARGE SUMMARY
PSYCHIATRIC DISCHARGE SUMMARY         IDENTIFICATION:    Patient Name  Narinder Torres   Date of Birth 1975   [facility] [id number]   Medical Record Number  [id number]      Age  48 y.o.    PCP None   Admit date:  10/23/2022    Discharge date: 11/1/2022   Room Number  200/65  @ [facility]   Date of Service  11/1/2022            TYPE OF DISCHARGE: REGULAR               CONDITION AT DISCHARGE: improved and fair       PROVISIONAL & DISCHARGE DIAGNOSES:    Problem List  Date Reviewed: 2/6/2021            Codes Class    * (Principal) Adjustment disorder with anxious mood ICD-10-CM: F43.22  ICD-9-CM: 309.24         Alcohol use disorder, moderate, dependence (HCC) ICD-10-CM: F10.20  ICD-9-CM: 303.90         Substance induced mood disorder (HCC) ICD-10-CM: F19.94  ICD-9-CM: 292.84         Encephalopathy ICD-10-CM: G93.40  ICD-9-CM: 348.30         Acute respiratory failure with hypoxia (HCC) ICD-10-CM: J96.01  ICD-9-CM: 518.81         Suicidal ideation ICD-10-CM: R45.851  ICD-9-CM: V62.84         Heroin overdose ([facility] ICD-10-CM: T40.1X1A  ICD-9-CM: 965.01, E980.0         Heroin withdrawal ([facility] ICD-10-CM: F11.93  ICD-9-CM: 292.0, 305.50         Overdose ICD-10-CM: T50.901A  ICD-9-CM: 977.9, E980.5         Poly-drug misuser ICD-10-CM: F19.90  ICD-9-CM: 305.90         Asthma exacerbation ICD-10-CM: J45.901  ICD-9-CM: 493.92         Drug overdose ICD-10-CM: T50.901A  ICD-9-CM: 977.9, E980.5         Altered mental status, unspecified ICD-10-CM: R41.82  ICD-9-CM: 780.97         Lactic acidosis ICD-10-CM: E87.20  ICD-9-CM: 276.2         Acute encephalopathy ICD-10-CM: G93.40  ICD-9-CM: 348.30         Potential for altered mental status ICD-10-CM: Z91.89  ICD-9-CM: V49.89         Convulsion disorder (HCC) ICD-10-CM: R56.9  ICD-9-CM: 780.39         Stenosis of both internal carotid arteries ICD-10-CM: I65.23  ICD-9-CM: 433.10, 433.30         Convulsive syncope ICD-10-CM: R55  ICD-9-CM: 780.2, 780.39         Seizure ([facility] ICD-10-CM: R56.9  ICD-9-CM: 780.39         Bipolar 1 disorder (HCC) ICD-10-CM: F31.9  ICD-9-CM: 296.7         Chronic obstructive pulmonary disease ([facility] ICD-10-CM: J44.9  ICD-9-CM: 496         Chronic pain ICD-10-CM: G89.29  ICD-9-CM: 338.29     Overview Signed 2/22/2017  7:15 PM by Sumeet Jackson MD     back, leg             Hepatitis B ICD-10-CM: B19.10  ICD-9-CM: 070.30         Sarcoidosis ICD-10-CM: D86.9  ICD-9-CM: 135         Tobacco abuse ICD-10-CM: Z72.0  ICD-9-CM: 305.1         Anemia ICD-10-CM: D64.9  ICD-9-CM: 285.9         Cocaine use disorder, mild, abuse (HCC) ICD-10-CM: F14.10  ICD-9-CM: 305.60         Polysubstance abuse (HCC) ICD-10-CM: F19.10  ICD-9-CM: 305.90         Narcotic abuse (HCC) ICD-10-CM: F11.10  ICD-9-CM: 305.40         HTN (hypertension) ICD-10-CM: I10  ICD-9-CM: 401.9         Depression ICD-10-CM: F32. A  ICD-9-CM: 311         Sinus tachycardia ICD-10-CM: R00.0  ICD-9-CM: 427.89         Heroin abuse (HCC) ICD-10-CM: F11.10  ICD-9-CM: 305.50            Active Hospital Problems    *Adjustment disorder with anxious mood      Alcohol use disorder, moderate, dependence (HCC)      Cocaine use disorder, mild, abuse ([facility]        DISCHARGE DIAGNOSIS:   Axis I:  SEE ABOVE  Axis II: SEE ABOVE  Axis III: SEE ABOVE  Axis IV:  lack of structure  Axis V:  20 on admission, 60 on discharge     CC & HISTORY OF PRESENT ILLNESS:  \"Suicidal ideation / withdrawal\"     The patient, Willis Nichols, is a 48 y.o. BLACK/ female with a past psychiatric history significant for stimulant use  and opiate use disorders, who presents at this time with complaints of (and/or evidence of) the following emotional symptoms: depression and suicidal thoughts/threats. Additional symptomatology include auditory hallucinations. The above symptoms have been present for 2+ weeks. These symptoms are of moderate to high severity. These symptoms are constant in nature.   The patient's condition has been precipitated by psychosocial stressors. Patient's condition made worse by continued illicit drug use as well as treatment noncompliance. UDS: +benzos, cocaine, methadone, THC; BAL=0. Patient presented to the ED stating that her life was in danger due to conflict within her relationship. Patient was able to make her needs known but has been dysphoric on the unit. She reported being on Seroquel but has not been compliant. The patient also reports drinking up to a gallon of vodka daily. The patient is a fair historian. The patient corroborates the above narrative. The patient contracts for safety on the unit and gives consent for the team to contact collateral. The patient is amenable to initiating treatment while on the unit. 10/25 - the patient slept 7.75 hours overnight. She got Zyprexa PRN for psychosis and c/o withdrawal symptoms, though CIWA score has been 3-5 since arrival. Patient compliant with medication and able to make her needs known. She is still rehab focused and denies any active thoughts of self harm. Patient requests a higher dose of Suboxone (per pharmacist she was previously on 24 mg daily). 10/26 - no acute overnight events. The patient slept 6.5 hours overnight, though she states it was restless. She provides MD a long list of medications she has previously taken including Haldol, Depakote, Lithium, Zoloft and Prazosin and states she last got these agents while living in [place]. Patient medication compliant (per nurse she swallowed her SL Suboxone tablet this morning). Patient able to make her needs known and denies active thoughts of self harm. 10/27 - the patient remains in significant distress. She is unable to tolerate solid food though she is taking medications without issue. Patient still with abdominal pain, she denies active thoughts of self harm but endorse depressed mood. She got Atarax PRN for anxiety and slept 6 hours overnight.  Patient requests an agent for indigestion. 10/28 - the patient slept 7.5 hours overnight. She c/o abdominal cramping and anorexia. Patient got Atarax and Zyprexa PRN; she states her appetite is increasing and requests a normal diet. Patient still with opiate cravings, amenable with increase in Suboxone. Per pharmacist, there was no other prescription medications filled in [place]. Patient still dysphoric, tearful at times but able to make her needs known. 10/29 - Olena Tavarez says she is ok. She is out in the unit, social with peers. Denies si hi or avh. Slept 5 hours last, requesting for a sleeping medication. Appetite is fair. Nursing staff report no acute behavioral issues. At the present time the patient Olena Tavarez remains compliant with taking medications. Denies any adverse events from taking them and feels they have been beneficial.     10/30 - Olena Tavarez received a distressing phone call just now. She found out her grandmother suddenly passed away this morning. She is tearful during the interview. Denies si hi or avh. At the present time the patient Olena Tavarez remains compliant with taking medications. Denies any adverse events from taking them and feels they have been beneficial.     10/31 - the patient slept 7 hours overnight. She is anxious and c/o abdominal pain. Patient denies active thoughts of self harm. Her labwork was drawn and VPA therapeutic. Patient discharge focused, she is hoping to go to rehab [facility] and otherwise has been irritable but cooperative. Patient continues to state her Seroquel dose was significantly higher and gives more places for the team to contact to confirm this dose. Per , she can discharge tomorrow to a treatment facility.        SOCIAL HISTORY:    Social History     Socioeconomic History    Marital status: SINGLE     Spouse name: Not on file    Number of children: Not on file    Years of education: Not on file    Highest education level: Not on file   Occupational History    Not on file   Tobacco Use    Smoking status: Every Day     Packs/day: 0.25     Years: 15.00     Pack years: 3.75     Types: Cigarettes     Last attempt to quit: 8/3/2017     Years since quittin.2    Smokeless tobacco: Never    Tobacco comments:     \"I already quit\"   Substance and Sexual Activity    Alcohol use: No    Drug use: Yes     Types: Marijuana, Heroin, Cocaine, Inhalants, Opiates     Comment: Pt reports last used heroin on 18    Sexual activity: Yes     Partners: Female   Other Topics Concern     Service Not Asked    Blood Transfusions Not Asked    Caffeine Concern Not Asked    Occupational Exposure Not Asked    Hobby Hazards Not Asked    Sleep Concern Not Asked    Stress Concern Not Asked    Weight Concern Not Asked    Special Diet Not Asked    Back Care Not Asked    Exercise Not Asked    Bike Helmet Not Asked    Seat Belt Not Asked    Self-Exams Not Asked   Social History Narrative    Born in [address], 5 children,    No legal charges. Pt graduated from high school. Pt was employed last month at [employer], currently she is unemployed. Pt lives with her mother and 8year old son. She has 4 adult children. Social Determinants of Health     Financial Resource Strain: Not on file   Food Insecurity: Not on file   Transportation Needs: Not on file   Physical Activity: Not on file   Stress: Not on file   Social Connections: Not on file   Intimate Partner Violence: Not on file   Housing Stability: Not on file      FAMILY HISTORY:   Family History   Problem Relation Age of Onset    Hypertension Father     Hypertension Mother              HOSPITALIZATION COURSE:    Tono Gonzalez was admitted to the inpatient psychiatric unit [facility] for acute psychiatric stabilization in regards to symptomatology as described in the HPI above. The differential diagnosis at time of admission included: MDD vs adjustment disorder.   While on the unit Tono Gonzalez was involved in individual, group, occupational and milieu therapy. Psychiatric medications were adjusted during this hospitalization including Suboxone, Seroquel and Depakote. Evgeny Mcgee demonstrated a slow, but progressive improvement in overall condition. Much of patient's initial presentation appeared to be related to situational stressors, effects of medication non-compliance drugs of abuse, and psychological factors. Please see individual progress notes for more specific details regarding patient's hospitalization course. Patient with request for discharge today. There are no grounds to seek a TDO. At time of discharge, Nicolasa Hernandez is without significant problems of depression, psychosis, or zane. Patient free of suicidal and homicidal ideations (appears to be at very low risk of suicide or homicide) and reports many positive predictive factors in terms of not attempting suicide or homicide. Overall presentation at time of discharge is most consistent with the diagnosis of bipolar depression. Patient has maximized benefit to be derived from acute inpatient psychiatric treatment. All members of the treatment team concur with each other in regards to plans for discharge today. Patient and family are aware and in agreement with discharge and discharge plan.          LABS AND IMAGAING:    Labs Reviewed   METABOLIC PANEL, COMPREHENSIVE - Abnormal; Notable for the following components:       Result Value    Potassium 2.6 (*)     Chloride 96 (*)     Glucose 132 (*)     BUN/Creatinine ratio 7 (*)     AST (SGOT) 51 (*)     Albumin 3.4 (*)     A-G Ratio 0.9 (*)     All other components within normal limits   DRUG SCREEN, URINE - Abnormal; Notable for the following components:    BENZODIAZEPINES Positive (*)     COCAINE Positive (*)     METHADONE Positive (*)     THC (TH-CANNABINOL) Positive (*)     All other components within normal limits   SALICYLATE - Abnormal; Notable for the following components:    Salicylate level 2.5 (*) All other components within normal limits   ACETAMINOPHEN - Abnormal; Notable for the following components:    Acetaminophen level <2 (*)     All other components within normal limits   URINALYSIS W/ REFLEX CULTURE - Abnormal; Notable for the following components:    Appearance CLOUDY (*)     Protein 30 (*)     Ketone TRACE (*)     Leukocyte Esterase SMALL (*)     Epithelial cells MODERATE (*)     All other components within normal limits   VALPROIC ACID - Abnormal; Notable for the following components:    Valproic acid <3 (*)     All other components within normal limits   METABOLIC PANEL, COMPREHENSIVE - Abnormal; Notable for the following components:    Potassium 3.1 (*)     Glucose 111 (*)     BUN 5 (*)     BUN/Creatinine ratio 6 (*)     AST (SGOT) 45 (*)     Albumin 3.1 (*)     A-G Ratio 0.9 (*)     All other components within normal limits   LIPID PANEL - Abnormal; Notable for the following components:    Triglyceride 174 (*)     LDL, calculated 115.2 (*)     All other components within normal limits   COVID-19 WITH INFLUENZA A/B   COVID-19 WITH INFLUENZA A/B   ETHYL ALCOHOL   CBC WITH AUTOMATED DIFF   BILIRUBIN, CONFIRM   VALPROIC ACID   HCG URINE, QL. - POC     Lab Results   Component Value Date/Time    Valproic acid 61 10/31/2022 05:29 AM     Admission on 10/23/2022   Component Date Value Ref Range Status    ALCOHOL(ETHYL),SERUM 10/23/2022 <10  <10 MG/DL Final    WBC 10/23/2022 4.7  3.6 - 11.0 K/uL Final    RBC 10/23/2022 5.14  3.80 - 5.20 M/uL Final    HGB 10/23/2022 15.6  11.5 - 16.0 g/dL Final    HCT 10/23/2022 45.3  35.0 - 47.0 % Final    MCV 10/23/2022 88.1  80.0 - 99.0 FL Final    MCH 10/23/2022 30.4  26.0 - 34.0 PG Final    MCHC 10/23/2022 34.4  30.0 - 36.5 g/dL Final    RDW 10/23/2022 13.1  11.5 - 14.5 % Final    PLATELET 55/01/6515 647  150 - 400 K/uL Final    MPV 10/23/2022 9.7  8.9 - 12.9 FL Final    NRBC 10/23/2022 0.0  0  WBC Final    ABSOLUTE NRBC 10/23/2022 0.00  0.00 - 0.01 K/uL Final NEUTROPHILS 10/23/2022 39  32 - 75 % Final    LYMPHOCYTES 10/23/2022 44  12 - 49 % Final    MONOCYTES 10/23/2022 10  5 - 13 % Final    EOSINOPHILS 10/23/2022 6  0 - 7 % Final    BASOPHILS 10/23/2022 1  0 - 1 % Final    IMMATURE GRANULOCYTES 10/23/2022 0  0.0 - 0.5 % Final    ABS. NEUTROPHILS 10/23/2022 1.8  1.8 - 8.0 K/UL Final    ABS. LYMPHOCYTES 10/23/2022 2.1  0.8 - 3.5 K/UL Final    ABS. MONOCYTES 10/23/2022 0.5  0.0 - 1.0 K/UL Final    ABS. EOSINOPHILS 10/23/2022 0.3  0.0 - 0.4 K/UL Final    ABS. BASOPHILS 10/23/2022 0.0  0.0 - 0.1 K/UL Final    ABS. IMM. GRANS. 10/23/2022 0.0  0.00 - 0.04 K/UL Final    DF 10/23/2022 AUTOMATED    Final    Sodium 10/23/2022 136  136 - 145 mmol/L Final    Potassium 10/23/2022 2.6 (A)  3.5 - 5.1 mmol/L Final    Chloride 10/23/2022 96 (A)  97 - 108 mmol/L Final    CO2 10/23/2022 29  21 - 32 mmol/L Final    Anion gap 10/23/2022 11  5 - 15 mmol/L Final    Glucose 10/23/2022 132 (A)  65 - 100 mg/dL Final    BUN 10/23/2022 6  6 - 20 MG/DL Final    Creatinine 10/23/2022 0.88  0.55 - 1.02 MG/DL Final    BUN/Creatinine ratio 10/23/2022 7 (A)  12 - 20   Final    eGFR 10/23/2022 >60  >60 ml/min/1.73m2 Final    Calcium 10/23/2022 8.8  8.5 - 10.1 MG/DL Final    Bilirubin, total 10/23/2022 0.5  0.2 - 1.0 MG/DL Final    ALT (SGPT) 10/23/2022 28  12 - 78 U/L Final    AST (SGOT) 10/23/2022 51 (A)  15 - 37 U/L Final    Alk.  phosphatase 10/23/2022 97  45 - 117 U/L Final    Protein, total 10/23/2022 7.0  6.4 - 8.2 g/dL Final    Albumin 10/23/2022 3.4 (A)  3.5 - 5.0 g/dL Final    Globulin 10/23/2022 3.6  2.0 - 4.0 g/dL Final    A-G Ratio 10/23/2022 0.9 (A)  1.1 - 2.2   Final    AMPHETAMINES 10/23/2022 Negative  NEG   Final    BARBITURATES 10/23/2022 Negative  NEG   Final    BENZODIAZEPINES 10/23/2022 Positive (A)  NEG   Final    COCAINE 10/23/2022 Positive (A)  NEG   Final    METHADONE 10/23/2022 Positive (A)  NEG   Final    OPIATES 10/23/2022 Negative  NEG   Final    PCP(PHENCYCLIDINE) 10/23/2022 Negative  NEG   Final    THC (TH-CANNABINOL) 10/23/2022 Positive (A)  NEG   Final    Drug screen comment 10/23/2022 (NOTE)   Final    Salicylate level 72/18/2489 2.5 (A)  2.8 - 20.0 MG/DL Final    Acetaminophen level 10/23/2022 <2 (A)  10 - 30 ug/mL Final    Color 10/23/2022 DARK YELLOW    Final    Appearance 10/23/2022 CLOUDY (A)  CLEAR   Final    Specific gravity 10/23/2022 1.020    Final    pH (UA) 10/23/2022 5.5  5.0 - 8.0   Final    Protein 10/23/2022 30 (A)  NEG mg/dL Final    Glucose 10/23/2022 Negative  NEG mg/dL Final    Ketone 10/23/2022 TRACE (A)  NEG mg/dL Final    Blood 10/23/2022 Negative  NEG   Final    Urobilinogen 10/23/2022 1.0  0.2 - 1.0 EU/dL Final    Nitrites 10/23/2022 Negative  NEG   Final    Leukocyte Esterase 10/23/2022 SMALL (A)  NEG   Final    WBC 10/23/2022 5-10  0 - 4 /hpf Final    RBC 10/23/2022 0-5  0 - 5 /hpf Final    Epithelial cells 10/23/2022 MODERATE (A)  FEW /lpf Final    Bacteria 10/23/2022 Negative  NEG /hpf Final    UA:UC IF INDICATED 10/23/2022 CULTURE NOT INDICATED BY UA RESULT  CNI   Final    SARS-CoV-2 by PCR 10/23/2022 Not detected  NOTD   Final    Influenza A by PCR 10/23/2022 Not detected    Final    Influenza B by PCR 10/23/2022 Not detected    Final    Valproic acid 10/23/2022 <3 (A)  50 - 100 ug/ml Final    Pregnancy test,urine (POC) 10/23/2022 Negative  NEG   Final    Bilirubin UA, confirm 10/23/2022 Negative  NEG   Final    Ventricular Rate 10/23/2022 106  BPM Final    Atrial Rate 10/23/2022 106  BPM Final    P-R Interval 10/23/2022 116  ms Final    QRS Duration 10/23/2022 80  ms Final    Q-T Interval 10/23/2022 386  ms Final    QTC Calculation (Bezet) 10/23/2022 512  ms Final    Calculated P Axis 10/23/2022 59  degrees Final    Calculated R Axis 10/23/2022 57  degrees Final    Calculated T Axis 10/23/2022 27  degrees Final    Diagnosis 10/23/2022    Final                    Value:Sinus tachycardia  Possible Left atrial enlargement  Nonspecific T wave abnormality  Abnormal ECG  When compared with ECG of 18-JAN-2022 18:02,  Nonspecific T wave abnormality now evident in Inferior leads  Nonspecific T wave abnormality, worse in Anterolateral leads  Confirmed by Rob Amezcua M.D., Nelly Lujan ([id number]) on 10/25/2022 8:08:15 AM      Sodium 10/23/2022 137  136 - 145 mmol/L Final    Potassium 10/23/2022 3.1 (A)  3.5 - 5.1 mmol/L Final    Chloride 10/23/2022 98  97 - 108 mmol/L Final    CO2 10/23/2022 31  21 - 32 mmol/L Final    Anion gap 10/23/2022 8  5 - 15 mmol/L Final    Glucose 10/23/2022 111 (A)  65 - 100 mg/dL Final    BUN 10/23/2022 5 (A)  6 - 20 MG/DL Final    Creatinine 10/23/2022 0.88  0.55 - 1.02 MG/DL Final    BUN/Creatinine ratio 10/23/2022 6 (A)  12 - 20   Final    eGFR 10/23/2022 >60  >60 ml/min/1.73m2 Final    Calcium 10/23/2022 8.6  8.5 - 10.1 MG/DL Final    Bilirubin, total 10/23/2022 0.4  0.2 - 1.0 MG/DL Final    ALT (SGPT) 10/23/2022 24  12 - 78 U/L Final    AST (SGOT) 10/23/2022 45 (A)  15 - 37 U/L Final    Alk.  phosphatase 10/23/2022 89  45 - 117 U/L Final    Protein, total 10/23/2022 6.5  6.4 - 8.2 g/dL Final    Albumin 10/23/2022 3.1 (A)  3.5 - 5.0 g/dL Final    Globulin 10/23/2022 3.4  2.0 - 4.0 g/dL Final    A-G Ratio 10/23/2022 0.9 (A)  1.1 - 2.2   Final    Ventricular Rate 10/23/2022 97  BPM Final    Atrial Rate 10/23/2022 97  BPM Final    P-R Interval 10/23/2022 122  ms Final    QRS Duration 10/23/2022 78  ms Final    Q-T Interval 10/23/2022 348  ms Final    QTC Calculation (Bezet) 10/23/2022 441  ms Final    Calculated P Axis 10/23/2022 54  degrees Final    Calculated R Axis 10/23/2022 59  degrees Final    Calculated T Axis 10/23/2022 32  degrees Final    Diagnosis 10/23/2022    Final                    Value:Normal sinus rhythm  Possible Left atrial enlargement  T wave abnormality, consider inferior ischemia  Abnormal ECG  When compared with ECG of 23-OCT-2022 15:00,  No significant change    Confirmed by Rob Amezcua M.D., Nelly Lujan ([id number]) on 10/25/2022 8:09:29 AM      Cholesterol, total 10/27/2022 199  <200 MG/DL Final    Triglyceride 10/27/2022 174 (A)  <150 MG/DL Final    HDL Cholesterol 10/27/2022 49  MG/DL Final    LDL, calculated 10/27/2022 115.2 (A)  0 - 100 MG/DL Final    VLDL, calculated 10/27/2022 34.8  MG/DL Final    CHOL/HDL Ratio 10/27/2022 4.1  0.0 - 5.0   Final    Valproic acid 10/31/2022 61  50 - 100 ug/ml Final    SARS-CoV-2 by PCR 10/31/2022 Not detected  NOTD   Final    Influenza A by PCR 10/31/2022 Not detected    Final    Influenza B by PCR 10/31/2022 Not detected    Final     No results found. DISPOSITION:    Home. Patient to f/u with psychiatric and psychotherapy appointments. Patient is to f/u with internist as directed. Patient should have a Depakote level and associated labs checked within the next 1-2 weeks by patient's o/p psychiatrist/internist.               FOLLOW-UP CARE:    Activity as tolerated  Regular diet  Wound Care: none needed. Follow-up Information       Follow up With Specialties Details Why [address] [place] [address]  Go on 11/1/2022 Please go to [address] [place] [address] to receive rehab services. [address] [place] [address], Noemy Cuellar  [phone]    None    None ([phone]) Patient stated that they have no PCP                     PROGNOSIS:   Fair ---- based on nature of patient's pathology/ies and treatment compliance issues. Prognosis is greatly dependent upon patient's ability to remain sober and to follow up with scheduled appointments as well as to comply with psychiatric medications as prescribed. DISCHARGE MEDICATIONS:    Informed consent given for the use of following psychotropic medications:  Current Discharge Medication List        START taking these medications    Details   buprenorphine-naloxone 8-2 mg subl 1 Tablet by SubLINGual route three (3) times daily for 30 days. Max Daily Amount: 3 Tablets.  Indications: dependence on opioid-type drugs  Qty: 90 Tablet, Refills: 0  Start date: 11/1/2022, End date: 12/1/2022    CommentsBlas Row: [id number]  Associated Diagnoses: Substance induced mood disorder (HCC)      busPIRone (BUSPAR) 5 mg tablet Take 1 Tablet by mouth two (2) times a day. Indications: repeated episodes of anxiety  Qty: 60 Tablet, Refills: 1  Start date: 11/1/2022      dicyclomine (BENTYL) 10 mg capsule Take 1 Capsule by mouth three (3) times daily as needed for Abdominal Cramps. Indications: irritable colon  Qty: 90 Capsule, Refills: 1  Start date: 11/1/2022      divalproex ER (DEPAKOTE ER) 500 mg ER tablet Take 2 Tablets by mouth nightly. Indications: bipolar I disorder with most recent episode mixed  Qty: 60 Tablet, Refills: 1  Start date: 11/1/2022      doxepin (SINEquan) 25 mg capsule Take 1 Capsule by mouth nightly. Indications: bipolar depression  Qty: 30 Capsule, Refills: 1  Start date: 11/1/2022      QUEtiapine SR (SEROquel XR) 400 mg sr tablet Take 1 Tablet by mouth nightly. Indications: bipolar depression  Qty: 30 Tablet, Refills: 1  Start date: 11/1/2022      hydrOXYzine HCL (ATARAX) 50 mg tablet Take 1 Tablet by mouth every six (6) hours as needed for Anxiety for up to 30 days. Indications: anxious  Qty: 60 Tablet, Refills: 1  Start date: 11/1/2022, End date: 12/1/2022           CONTINUE these medications which have CHANGED    Details   albuterol (ProAir HFA) 90 mcg/actuation inhaler Take 2 Puffs by inhalation every four (4) hours as needed for Wheezing or Shortness of Breath. Indications: bronchospasm prevention  Qty: 1 Each, Refills: 1  Start date: 11/1/2022                    A coordinated, multidisplinary treatment team round was conducted with Evgeny Mcgee---this is done daily here at [facility]. This team consists of the nurse, psychiatric unit pharmacist,  and Daniel Colvin. I have spent greater than 35 minutes on discharge work.     Signed:  Sumi Steve MD  11/1/2022

## 2022-11-01 NOTE — BH NOTES
Behavioral Health Transition Record to Provider    Patient Name: Tono Gonzalez  YOB: 1975  Medical Record Number: [id number]  Date of Admission: 10/23/2022  Date of Discharge: 11/01/2022    Attending Provider: Jarod Otero, *  Discharging Provider: Jarod Otero, *    To contact this individual call [phone] and ask the  to page. If unavailable, ask to be transferred to [facility] Provider on call. [facility] Provider will be available on call 24/7 and during holidays. Primary Care Provider: None    Allergies   Allergen Reactions    Vancomycin Anaphylaxis    Ketorolac Unable to Obtain     From [facility] paperwork 1/19/22    Remeron [Mirtazapine] Unable to Obtain     From [facility] paperwork on 1/19/22    Tomato Swelling     Tongue    Trazodone Angioedema       Reason for Admission: \"suicidal ideation / AH\". Pt admitted under a voluntary basis for suicidal ideations and severe psychosis proving to be an imminent danger to self and others and an inability to care for self. The patient, Tono Gonzalez, is a 48 y.o. BLACK/ female with a past psychiatric history significant for stimulant use  and opiate use disorders, who presents at this time with complaints of (and/or evidence of) the following emotional symptoms: depression and suicidal thoughts/threats. Additional symptomatology include auditory hallucinations. The above symptoms have been present for 2+ weeks. These symptoms are of moderate to high severity. These symptoms are constant in nature. The patient's condition has been precipitated by psychosocial stressors. Patient's condition made worse by continued illicit drug use as well as treatment noncompliance. UDS: +benzos, cocaine, methadone, THC; BAL=0. Patient presented to the ED stating that her life was in danger due to conflict within her relationship. Patient was able to make her needs known but has been dysphoric on the unit. She reported being on Seroquel but has not been compliant. The patient also reports drinking up to a gallon of vodka daily. The patient is a fair historian. The patient corroborates the above narrative. The patient contracts for safety on the unit and gives consent for the team to contact collateral. The patient is amenable to initiating treatment while on the unit. Admission Diagnosis: Substance induced mood disorder ([facility] [address] [F19.94]  Depression [F32. A]    * No surgery found *    Results for orders placed or performed during the hospital encounter of 10/23/22   COVID-19 WITH INFLUENZA A/B   Result Value Ref Range    SARS-CoV-2 by PCR Not detected NOTD      Influenza A by PCR Not detected      Influenza B by PCR Not detected     COVID-19 WITH INFLUENZA A/B   Result Value Ref Range    SARS-CoV-2 by PCR Not detected NOTD      Influenza A by PCR Not detected      Influenza B by PCR Not detected     ETHYL ALCOHOL   Result Value Ref Range    ALCOHOL(ETHYL),SERUM <10 <10 MG/DL   CBC WITH AUTOMATED DIFF   Result Value Ref Range    WBC 4.7 3.6 - 11.0 K/uL    RBC 5.14 3.80 - 5.20 M/uL    HGB 15.6 11.5 - 16.0 g/dL    HCT 45.3 35.0 - 47.0 %    MCV 88.1 80.0 - 99.0 FL    MCH 30.4 26.0 - 34.0 PG    MCHC 34.4 30.0 - 36.5 g/dL    RDW 13.1 11.5 - 14.5 %    PLATELET 909 532 - 219 K/uL    MPV 9.7 8.9 - 12.9 FL    NRBC 0.0 0  WBC    ABSOLUTE NRBC 0.00 0.00 - 0.01 K/uL    NEUTROPHILS 39 32 - 75 %    LYMPHOCYTES 44 12 - 49 %    MONOCYTES 10 5 - 13 %    EOSINOPHILS 6 0 - 7 %    BASOPHILS 1 0 - 1 %    IMMATURE GRANULOCYTES 0 0.0 - 0.5 %    ABS. NEUTROPHILS 1.8 1.8 - 8.0 K/UL    ABS. LYMPHOCYTES 2.1 0.8 - 3.5 K/UL    ABS. MONOCYTES 0.5 0.0 - 1.0 K/UL    ABS. EOSINOPHILS 0.3 0.0 - 0.4 K/UL    ABS. BASOPHILS 0.0 0.0 - 0.1 K/UL    ABS. IMM.  GRANS. 0.0 0.00 - 0.04 K/UL    DF AUTOMATED     METABOLIC PANEL, COMPREHENSIVE   Result Value Ref Range    Sodium 136 136 - 145 mmol/L    Potassium 2.6 (LL) 3.5 - 5.1 mmol/L    Chloride 96 (L) 97 - 108 mmol/L    CO2 29 21 - 32 mmol/L    Anion gap 11 5 - 15 mmol/L    Glucose 132 (H) 65 - 100 mg/dL    BUN 6 6 - 20 MG/DL    Creatinine 0.88 0.55 - 1.02 MG/DL    BUN/Creatinine ratio 7 (L) 12 - 20      eGFR >60 >60 ml/min/1.73m2    Calcium 8.8 8.5 - 10.1 MG/DL    Bilirubin, total 0.5 0.2 - 1.0 MG/DL    ALT (SGPT) 28 12 - 78 U/L    AST (SGOT) 51 (H) 15 - 37 U/L    Alk.  phosphatase 97 45 - 117 U/L    Protein, total 7.0 6.4 - 8.2 g/dL    Albumin 3.4 (L) 3.5 - 5.0 g/dL    Globulin 3.6 2.0 - 4.0 g/dL    A-G Ratio 0.9 (L) 1.1 - 2.2     DRUG SCREEN, URINE   Result Value Ref Range    AMPHETAMINES Negative NEG      BARBITURATES Negative NEG      BENZODIAZEPINES Positive (A) NEG      COCAINE Positive (A) NEG      METHADONE Positive (A) NEG      OPIATES Negative NEG      PCP(PHENCYCLIDINE) Negative NEG      THC (TH-CANNABINOL) Positive (A) NEG      Drug screen comment (NOTE)    SALICYLATE   Result Value Ref Range    Salicylate level 2.5 (L) 2.8 - 20.0 MG/DL   ACETAMINOPHEN   Result Value Ref Range    Acetaminophen level <2 (L) 10 - 30 ug/mL   URINALYSIS W/ REFLEX CULTURE    Specimen: Miscellaneous sample; Urine    Urine specimen   Result Value Ref Range    Color DARK YELLOW      Appearance CLOUDY (A) CLEAR      Specific gravity 1.020      pH (UA) 5.5 5.0 - 8.0      Protein 30 (A) NEG mg/dL    Glucose Negative NEG mg/dL    Ketone TRACE (A) NEG mg/dL    Blood Negative NEG      Urobilinogen 1.0 0.2 - 1.0 EU/dL    Nitrites Negative NEG      Leukocyte Esterase SMALL (A) NEG      WBC 5-10 0 - 4 /hpf    RBC 0-5 0 - 5 /hpf    Epithelial cells MODERATE (A) FEW /lpf    Bacteria Negative NEG /hpf    UA:UC IF INDICATED CULTURE NOT INDICATED BY UA RESULT CNI     VALPROIC ACID   Result Value Ref Range    Valproic acid <3 (L) 50 - 100 ug/ml   BILIRUBIN, CONFIRM   Result Value Ref Range    Bilirubin UA, confirm Negative NEG     METABOLIC PANEL, COMPREHENSIVE   Result Value Ref Range    Sodium 137 136 - 145 mmol/L    Potassium 3.1 (L) 3.5 - 5.1 mmol/L    Chloride 98 97 - 108 mmol/L    CO2 31 21 - 32 mmol/L    Anion gap 8 5 - 15 mmol/L    Glucose 111 (H) 65 - 100 mg/dL    BUN 5 (L) 6 - 20 MG/DL    Creatinine 0.88 0.55 - 1.02 MG/DL    BUN/Creatinine ratio 6 (L) 12 - 20      eGFR >60 >60 ml/min/1.73m2    Calcium 8.6 8.5 - 10.1 MG/DL    Bilirubin, total 0.4 0.2 - 1.0 MG/DL    ALT (SGPT) 24 12 - 78 U/L    AST (SGOT) 45 (H) 15 - 37 U/L    Alk.  phosphatase 89 45 - 117 U/L    Protein, total 6.5 6.4 - 8.2 g/dL    Albumin 3.1 (L) 3.5 - 5.0 g/dL    Globulin 3.4 2.0 - 4.0 g/dL    A-G Ratio 0.9 (L) 1.1 - 2.2     LIPID PANEL   Result Value Ref Range    Cholesterol, total 199 <200 MG/DL    Triglyceride 174 (H) <150 MG/DL    HDL Cholesterol 49 MG/DL    LDL, calculated 115.2 (H) 0 - 100 MG/DL    VLDL, calculated 34.8 MG/DL    CHOL/HDL Ratio 4.1 0.0 - 5.0     VALPROIC ACID   Result Value Ref Range    Valproic acid 61 50 - 100 ug/ml   HCG URINE, QL. - POC   Result Value Ref Range    Pregnancy test,urine (POC) Negative NEG     EKG, 12 LEAD, INITIAL   Result Value Ref Range    Ventricular Rate 106 BPM    Atrial Rate 106 BPM    P-R Interval 116 ms    QRS Duration 80 ms    Q-T Interval 386 ms    QTC Calculation (Bezet) 512 ms    Calculated P Axis 59 degrees    Calculated R Axis 57 degrees    Calculated T Axis 27 degrees    Diagnosis       Sinus tachycardia  Possible Left atrial enlargement  Nonspecific T wave abnormality  Abnormal ECG  When compared with ECG of 18-JAN-2022 18:02,  Nonspecific T wave abnormality now evident in Inferior leads  Nonspecific T wave abnormality, worse in Anterolateral leads  Confirmed by Sagrario Meyer M.D., Osei Hanson ([id number]) on 10/25/2022 8:08:15 AM     EKG, 12 LEAD, INITIAL   Result Value Ref Range    Ventricular Rate 97 BPM    Atrial Rate 97 BPM    P-R Interval 122 ms    QRS Duration 78 ms    Q-T Interval 348 ms    QTC Calculation (Bezet) 441 ms    Calculated P Axis 54 degrees    Calculated R Axis 59 degrees    Calculated T Axis 32 degrees Diagnosis       Normal sinus rhythm  Possible Left atrial enlargement  T wave abnormality, consider inferior ischemia  Abnormal ECG  When compared with ECG of 23-OCT-2022 15:00,  No significant change    Confirmed by Rena Muñiz M.D., Fidel Nevarez ([id number]) on 10/25/2022 8:09:29 AM         Immunizations administered during this encounter:   Immunization History   Administered Date(s) Administered    Influenza Vaccine 10/01/2016       Screening for Metabolic Disorders for Patients on Antipsychotic Medications  (Data obtained from the EMR)    Estimated Body Mass Index  Estimated body mass index is 20.6 kg/m² as calculated from the following:    Height as of this encounter: 5' 4\" (1.626 m). Weight as of this encounter: 54.4 kg (120 lb). Vital Signs/Blood Pressure  Visit Vitals  /78   Pulse 84   Temp 98 °F (36.7 °C)   Resp 16   Ht 5' 4\" (1.626 m)   Wt 54.4 kg (120 lb)   SpO2 99%   Breastfeeding No   BMI 20.60 kg/m²       Blood Glucose/Hemoglobin A1c  Lab Results   Component Value Date/Time    Glucose 111 (H) 10/23/2022 05:53 PM    Glucose 93 08/05/2018 06:56 AM    Glucose (POC) 110 01/18/2022 05:00 PM       Lab Results   Component Value Date/Time    Hemoglobin A1c 6.0 (H) 01/19/2022 01:07 AM        Lipid Panel  Lab Results   Component Value Date/Time    Cholesterol, total 199 10/27/2022 05:18 AM    HDL Cholesterol 49 10/27/2022 05:18 AM    LDL, calculated 115.2 (H) 10/27/2022 05:18 AM    Triglyceride 174 (H) 10/27/2022 05:18 AM    CHOL/HDL Ratio 4.1 10/27/2022 05:18 AM        Discharge Diagnosis: [facility] [F19.94]  Depression [F32. A]    Discharge Plan: The patient Jarrett Ly exhibits the ability to control behavior in a less restrictive environment. Patient's level of functioning is improving. No assaultive/destructive behavior has been observed for the past 24 hours. No suicidal/homicidal threat or behavior has been observed for the past 24 hours.   There is no evidence of serious medication side effects. Patient has not been in physical or protective restraints for at least the past 24 hours. If weapons involved, how are they secured? Pt does not have access to any weapons. Is patient aware of and in agreement with discharge plan? Yes         Arrangements for medication:  Prescriptions given to patient, given a weeks supply or 30 day supply. Copy of discharge instructions to provider?: Yes to [facility] for transportation home:  Pt will take a medicaid cab to [facility] all follow up appointments as scheduled, continue to take prescribed medications per physician instructions. Mental health crisis number:  034 or your local mental health crisis line number at [address] [facility] Emergency WARM LINE        [phone] ([phone])        M-F: 9am to 9pm        Sat & Sun: 5pm - 9pm    National suicide prevention lines:                              6-352-FSQMKOO (3-338-126-391-075-9810)        1-865-168-TALK (1-983.528.2550)    24/7 Crisis Text Line:  Text HOME to 700571    Discharge Medication List and Instructions:   Current Discharge Medication List        START taking these medications    Details   buprenorphine-naloxone 8-2 mg subl 1 Tablet by SubLINGual route three (3) times daily for 30 days. Max Daily Amount: 3 Tablets. Indications: dependence on opioid-type drugs  Qty: 90 Tablet, Refills: 0  Start date: 11/1/2022, End date: 12/1/2022    Comments: TERRENCE: [id number]  Associated Diagnoses: Substance induced mood disorder (HCC)      busPIRone (BUSPAR) 5 mg tablet Take 1 Tablet by mouth two (2) times a day. Indications: repeated episodes of anxiety  Qty: 60 Tablet, Refills: 1  Start date: 11/1/2022      dicyclomine (BENTYL) 10 mg capsule Take 1 Capsule by mouth three (3) times daily as needed for Abdominal Cramps.  Indications: irritable colon  Qty: 90 Capsule, Refills: 1  Start date: 11/1/2022 divalproex ER (DEPAKOTE ER) 500 mg ER tablet Take 2 Tablets by mouth nightly. Indications: bipolar I disorder with most recent episode mixed  Qty: 60 Tablet, Refills: 1  Start date: 11/1/2022      doxepin (SINEquan) 25 mg capsule Take 1 Capsule by mouth nightly. Indications: bipolar depression  Qty: 30 Capsule, Refills: 1  Start date: 11/1/2022      QUEtiapine SR (SEROquel XR) 400 mg sr tablet Take 1 Tablet by mouth nightly. Indications: bipolar depression  Qty: 30 Tablet, Refills: 1  Start date: 11/1/2022      hydrOXYzine HCL (ATARAX) 50 mg tablet Take 1 Tablet by mouth every six (6) hours as needed for Anxiety for up to 30 days. Indications: anxious  Qty: 60 Tablet, Refills: 1  Start date: 11/1/2022, End date: 12/1/2022           CONTINUE these medications which have CHANGED    Details   albuterol (ProAir HFA) 90 mcg/actuation inhaler Take 2 Puffs by inhalation every four (4) hours as needed for Wheezing or Shortness of Breath. Indications: bronchospasm prevention  Qty: 1 Each, Refills: 1  Start date: 11/1/2022             Unresulted Labs (24h ago, onward)      None          To obtain results of studies pending at discharge, please contact [phone]    Follow-up Information       Follow up With Specialties Details Why [facility]  Go on 11/1/2022 Please go to [address] [place] [address] to receive rehab services. [address] [place], [place] [address]Alisa [place]  [phone]    None    None ([phone]) Patient stated that they have no PCP              Advanced Directive:   Does the patient have an appointed surrogate decision maker? No  Does the patient have a Medical Advance Directive? No  Does the patient have a Psychiatric Advance Directive?  No  If the patient does not have a surrogate or Medical Advance Directive AND Psychiatric Advance Directive, the patient was offered information on these advance directives, Yes, Pt declined    Patient Instructions: Please continue all medications until otherwise directed by physician. Tobacco Cessation Discharge Plan:   Is the patient a smoker and needs referral for smoking cessation? No  Patient referred to the following for smoking cessation with an appointment? No  Patient was offered medication to assist with smoking cessation at discharge? No  Was education for smoking cessation added to the discharge instructions? NO    Alcohol/Substance Abuse Discharge Plan:   Does the patient have a history of substance/alcohol abuse and requires a referral for treatment? Yes  Patient referred to the following for substance/alcohol abuse treatment with an appointment? Yes  Patient was offered medication to assist with alcohol cessation at discharge? No  Was education for substance/alcohol abuse added to discharge instructions?  Yes    Patient discharged to [facility]

## 2022-11-01 NOTE — GROUP NOTE
[facility]  GROUP DOCUMENTATION INDIVIDUAL                                                                          Group Therapy Note    Date: 11/1/2022    Group Start Time: 1500  Group End Time: 1600  Group Topic: Recreational/Music Therapy    [facility] 3 ACUTE BEHAV [facility]    [place], [address] [facility] GROUP DOCUMENTATION GROUP    Group Therapy Note    Attendees: 4       Attendance: Attended    Patient's Goal:  To concentrate on selected task    Interventions/techniques: Supported-crafts,games,music    Follows Directions:  Followed directions    Interactions: Interacted appropriately    Mental Status: Calm    Behavior/appearance: Attentive and Cooperative    Goals Achieved: Able to engage in interactions and Able to listen to others-active participant in game      Meryle Mcbride

## 2022-11-01 NOTE — GROUP NOTE
[facility]  GROUP DOCUMENTATION INDIVIDUAL                                                                          Group Therapy Note    Date: 11/1/2022    Group Start Time: 0900  Group End Time: 1000  Group Topic: Topic Group    [facility] 3 ACUTE BEHAV [facility]    [place], [address] [facility] GROUP DOCUMENTATION GROUP    Group Therapy Note    Attendees: 5       Attendance: Attended    Patient's Goal:  To participate in chair exercise routine    Interventions/techniques: Supported-benefits of exercise    Interactions: Interacted appropriately    Mental Status: Calm    Behavior/appearance: Cooperative    Goals Achieved:  Attended group session      Additional Notes:  Pt arrived late-worked on individual project    Felipa Engel

## 2022-11-02 NOTE — BH NOTES
Behavioral Health Transition Record to Provider    Patient Name: Shahla Wilkes  YOB: 1975  Medical Record Number: [id number]  Date of Admission: 10/23/2022  Date of Discharge: 11/01/2022    Attending Provider: Karina Phan MD  Discharging Provider: Karina Phan MD    To contact this individual call [phone] and ask the  to page. If unavailable, ask to be transferred to [address] [place] [address] Provider on call. [facility] Provider will be available on call 24/7 and during holidays. Primary Care Provider: None    Allergies   Allergen Reactions    Vancomycin Anaphylaxis    Ketorolac Unable to Obtain     From [facility] paperwork 1/19/22    Remeron [Mirtazapine] Unable to Obtain     From [facility] paperwork on 1/19/22    Tomato Swelling     Tongue    Trazodone Angioedema       Reason for Admission: \"Suicidal ideation / withdrawal\"      The patient, Shahla Wilkes, is a 48 y.o. BLACK/ female with a past psychiatric history significant for stimulant use  and opiate use disorders, who presents at this time with complaints of (and/or evidence of) the following emotional symptoms: depression and suicidal thoughts/threats. Additional symptomatology include auditory hallucinations. The above symptoms have been present for 2+ weeks. These symptoms are of moderate to high severity. These symptoms are constant in nature. The patient's condition has been precipitated by psychosocial stressors. Patient's condition made worse by continued illicit drug use as well as treatment noncompliance. UDS: +benzos, cocaine, methadone, THC; BAL=0. Patient presented to the ED stating that her life was in danger due to conflict within her relationship. Patient was able to make her needs known but has been dysphoric on the unit. She reported being on Seroquel but has not been compliant. The patient also reports drinking up to a gallon of vodka daily.      The patient is a fair historian. The patient corroborates the above narrative. The patient contracts for safety on the unit and gives consent for the team to contact collateral. The patient is amenable to initiating treatment while on the unit. Admission Diagnosis: Substance induced mood disorder ([facility] [address] [F19.94]  Depression [F32. A]    * No surgery found *    Results for orders placed or performed during the hospital encounter of 10/23/22   COVID-19 WITH INFLUENZA A/B   Result Value Ref Range    SARS-CoV-2 by PCR Not detected NOTD      Influenza A by PCR Not detected      Influenza B by PCR Not detected     COVID-19 WITH INFLUENZA A/B   Result Value Ref Range    SARS-CoV-2 by PCR Not detected NOTD      Influenza A by PCR Not detected      Influenza B by PCR Not detected     ETHYL ALCOHOL   Result Value Ref Range    ALCOHOL(ETHYL),SERUM <10 <10 MG/DL   CBC WITH AUTOMATED DIFF   Result Value Ref Range    WBC 4.7 3.6 - 11.0 K/uL    RBC 5.14 3.80 - 5.20 M/uL    HGB 15.6 11.5 - 16.0 g/dL    HCT 45.3 35.0 - 47.0 %    MCV 88.1 80.0 - 99.0 FL    MCH 30.4 26.0 - 34.0 PG    MCHC 34.4 30.0 - 36.5 g/dL    RDW 13.1 11.5 - 14.5 %    PLATELET 333 926 - 590 K/uL    MPV 9.7 8.9 - 12.9 FL    NRBC 0.0 0  WBC    ABSOLUTE NRBC 0.00 0.00 - 0.01 K/uL    NEUTROPHILS 39 32 - 75 %    LYMPHOCYTES 44 12 - 49 %    MONOCYTES 10 5 - 13 %    EOSINOPHILS 6 0 - 7 %    BASOPHILS 1 0 - 1 %    IMMATURE GRANULOCYTES 0 0.0 - 0.5 %    ABS. NEUTROPHILS 1.8 1.8 - 8.0 K/UL    ABS. LYMPHOCYTES 2.1 0.8 - 3.5 K/UL    ABS. MONOCYTES 0.5 0.0 - 1.0 K/UL    ABS. EOSINOPHILS 0.3 0.0 - 0.4 K/UL    ABS. BASOPHILS 0.0 0.0 - 0.1 K/UL    ABS. IMM.  GRANS. 0.0 0.00 - 0.04 K/UL    DF AUTOMATED     METABOLIC PANEL, COMPREHENSIVE   Result Value Ref Range    Sodium 136 136 - 145 mmol/L    Potassium 2.6 (LL) 3.5 - 5.1 mmol/L    Chloride 96 (L) 97 - 108 mmol/L    CO2 29 21 - 32 mmol/L    Anion gap 11 5 - 15 mmol/L    Glucose 132 (H) 65 - 100 mg/dL    BUN 6 6 - 20 MG/DL    Creatinine 0.88 0.55 - 1.02 MG/DL    BUN/Creatinine ratio 7 (L) 12 - 20      eGFR >60 >60 ml/min/1.73m2    Calcium 8.8 8.5 - 10.1 MG/DL    Bilirubin, total 0.5 0.2 - 1.0 MG/DL    ALT (SGPT) 28 12 - 78 U/L    AST (SGOT) 51 (H) 15 - 37 U/L    Alk.  phosphatase 97 45 - 117 U/L    Protein, total 7.0 6.4 - 8.2 g/dL    Albumin 3.4 (L) 3.5 - 5.0 g/dL    Globulin 3.6 2.0 - 4.0 g/dL    A-G Ratio 0.9 (L) 1.1 - 2.2     DRUG SCREEN, URINE   Result Value Ref Range    AMPHETAMINES Negative NEG      BARBITURATES Negative NEG      BENZODIAZEPINES Positive (A) NEG      COCAINE Positive (A) NEG      METHADONE Positive (A) NEG      OPIATES Negative NEG      PCP(PHENCYCLIDINE) Negative NEG      THC (TH-CANNABINOL) Positive (A) NEG      Drug screen comment (NOTE)    SALICYLATE   Result Value Ref Range    Salicylate level 2.5 (L) 2.8 - 20.0 MG/DL   ACETAMINOPHEN   Result Value Ref Range    Acetaminophen level <2 (L) 10 - 30 ug/mL   URINALYSIS W/ REFLEX CULTURE    Specimen: Miscellaneous sample; Urine    Urine specimen   Result Value Ref Range    Color DARK YELLOW      Appearance CLOUDY (A) CLEAR      Specific gravity 1.020      pH (UA) 5.5 5.0 - 8.0      Protein 30 (A) NEG mg/dL    Glucose Negative NEG mg/dL    Ketone TRACE (A) NEG mg/dL    Blood Negative NEG      Urobilinogen 1.0 0.2 - 1.0 EU/dL    Nitrites Negative NEG      Leukocyte Esterase SMALL (A) NEG      WBC 5-10 0 - 4 /hpf    RBC 0-5 0 - 5 /hpf    Epithelial cells MODERATE (A) FEW /lpf    Bacteria Negative NEG /hpf    UA:UC IF INDICATED CULTURE NOT INDICATED BY UA RESULT CNI     VALPROIC ACID   Result Value Ref Range    Valproic acid <3 (L) 50 - 100 ug/ml   BILIRUBIN, CONFIRM   Result Value Ref Range    Bilirubin UA, confirm Negative NEG     METABOLIC PANEL, COMPREHENSIVE   Result Value Ref Range    Sodium 137 136 - 145 mmol/L    Potassium 3.1 (L) 3.5 - 5.1 mmol/L    Chloride 98 97 - 108 mmol/L    CO2 31 21 - 32 mmol/L    Anion gap 8 5 - 15 mmol/L    Glucose 111 (H) 65 - 100 mg/dL    BUN 5 (L) 6 - 20 MG/DL    Creatinine 0.88 0.55 - 1.02 MG/DL    BUN/Creatinine ratio 6 (L) 12 - 20      eGFR >60 >60 ml/min/1.73m2    Calcium 8.6 8.5 - 10.1 MG/DL    Bilirubin, total 0.4 0.2 - 1.0 MG/DL    ALT (SGPT) 24 12 - 78 U/L    AST (SGOT) 45 (H) 15 - 37 U/L    Alk.  phosphatase 89 45 - 117 U/L    Protein, total 6.5 6.4 - 8.2 g/dL    Albumin 3.1 (L) 3.5 - 5.0 g/dL    Globulin 3.4 2.0 - 4.0 g/dL    A-G Ratio 0.9 (L) 1.1 - 2.2     LIPID PANEL   Result Value Ref Range    Cholesterol, total 199 <200 MG/DL    Triglyceride 174 (H) <150 MG/DL    HDL Cholesterol 49 MG/DL    LDL, calculated 115.2 (H) 0 - 100 MG/DL    VLDL, calculated 34.8 MG/DL    CHOL/HDL Ratio 4.1 0.0 - 5.0     VALPROIC ACID   Result Value Ref Range    Valproic acid 61 50 - 100 ug/ml   HCG URINE, QL. - POC   Result Value Ref Range    Pregnancy test,urine (POC) Negative NEG     EKG, 12 LEAD, INITIAL   Result Value Ref Range    Ventricular Rate 106 BPM    Atrial Rate 106 BPM    P-R Interval 116 ms    QRS Duration 80 ms    Q-T Interval 386 ms    QTC Calculation (Bezet) 512 ms    Calculated P Axis 59 degrees    Calculated R Axis 57 degrees    Calculated T Axis 27 degrees    Diagnosis       Sinus tachycardia  Possible Left atrial enlargement  Nonspecific T wave abnormality  Abnormal ECG  When compared with ECG of 18-JAN-2022 18:02,  Nonspecific T wave abnormality now evident in Inferior leads  Nonspecific T wave abnormality, worse in Anterolateral leads  Confirmed by Michael Reyes M.D., Adrienne Mcdonald ([id number]) on 10/25/2022 8:08:15 AM     EKG, 12 LEAD, INITIAL   Result Value Ref Range    Ventricular Rate 97 BPM    Atrial Rate 97 BPM    P-R Interval 122 ms    QRS Duration 78 ms    Q-T Interval 348 ms    QTC Calculation (Bezet) 441 ms    Calculated P Axis 54 degrees    Calculated R Axis 59 degrees    Calculated T Axis 32 degrees    Diagnosis       Normal sinus rhythm  Possible Left atrial enlargement  T wave abnormality, consider inferior ischemia  Abnormal ECG  When compared with ECG of 23-OCT-2022 15:00,  No significant change    Confirmed by Salvatore Romero M.D., Cortez Ellis ([id number]) on 10/25/2022 8:09:29 AM         Immunizations administered during this encounter:   Immunization History   Administered Date(s) Administered    Influenza Vaccine 10/01/2016       Screening for Metabolic Disorders for Patients on Antipsychotic Medications  (Data obtained from the EMR)    Estimated Body Mass Index  Estimated body mass index is 20.6 kg/m² as calculated from the following:    Height as of this encounter: 5' 4\" (1.626 m). Weight as of this encounter: 54.4 kg (120 lb). Vital Signs/Blood Pressure  Visit Vitals  /78   Pulse 84   Temp 98 °F (36.7 °C)   Resp 16   Ht 5' 4\" (1.626 m)   Wt 54.4 kg (120 lb)   SpO2 99%   Breastfeeding No   BMI 20.60 kg/m²       Blood Glucose/Hemoglobin A1c  Lab Results   Component Value Date/Time    Glucose 111 (H) 10/23/2022 05:53 PM    Glucose 93 08/05/2018 06:56 AM    Glucose (POC) 110 01/18/2022 05:00 PM       Lab Results   Component Value Date/Time    Hemoglobin A1c 6.0 (H) 01/19/2022 01:07 AM        Lipid Panel  Lab Results   Component Value Date/Time    Cholesterol, total 199 10/27/2022 05:18 AM    HDL Cholesterol 49 10/27/2022 05:18 AM    LDL, calculated 115.2 (H) 10/27/2022 05:18 AM    Triglyceride 174 (H) 10/27/2022 05:18 AM    CHOL/HDL Ratio 4.1 10/27/2022 05:18 AM        [facility]: [facility] [F19.94]  Depression [F32. A]    Discharge Plan: The patient Misael Mills exhibits the ability to control behavior in a less restrictive environment. Patient's level of functioning is improving. No assaultive/destructive behavior has been observed for the past 24 hours. No suicidal/homicidal threat or behavior has been observed for the past 24 hours. There is no evidence of serious medication side effects. Patient has not been in physical or protective restraints for at least the past 24 hours.          If weapons involved, how are they secured? Pt does not have access to any weapons. Is patient aware of and in agreement with discharge plan? Yes         Arrangements for medication:  Prescriptions given to patient, given a weeks supply or 30 day supply. Copy of discharge instructions to provider?:  Yes to [facility] for transportation home:  Pt took a medicaid cab to [facility] all follow up appointments as scheduled, continue to take prescribed medications per physician instructions. Mental health crisis number:  [phone] or your local mental health crisis line number at [address] [facility] Emergency WARM LINE        7-613-032-MHAV ([phone])        M-F: 9am to 9pm        Sat & Sun: 5pm - 9pm    National suicide prevention lines:                              5-091-UIFHHMF (3-645.418.4939)        7-949-956-TALK ([phone])    24/7 Crisis Text Line:  Text HOME to [phone]         Discharge Medication List and Instructions:   Discharge Medication List as of 11/1/2022  9:33 AM        START taking these medications    Details   hydrOXYzine HCL (ATARAX) 50 mg tablet Take 1 Tablet by mouth every six (6) hours as needed for Anxiety for up to 30 days. Indications: anxious, Print, Disp-60 Tablet, R-1           CONTINUE these medications which have CHANGED    Details   albuterol (ProAir HFA) 90 mcg/actuation inhaler Take 2 Puffs by inhalation every four (4) hours as needed for Wheezing or Shortness of Breath. Indications: bronchospasm prevention, Print, Disp-1 Each, R-1      buprenorphine-naloxone 8-2 mg subl 1 Tablet by SubLINGual route three (3) times daily for 30 days. Max Daily Amount: 3 Tablets. Indications: dependence on opioid-type drugs, Print, Disp-90 Tablet, R-0NADEAN: [id number]      busPIRone (BUSPAR) 5 mg tablet Take 1 Tablet by mouth two (2) times a day.  Indications: repeated episodes of anxiety, Print, Disp-60 Tablet, R-1      dicyclomine (BENTYL) 10 mg capsule Take 1 Capsule by mouth three (3) times daily as needed for Abdominal Cramps. Indications: irritable colon, Print, Disp-90 Capsule, R-1      divalproex ER (DEPAKOTE ER) 500 mg ER tablet Take 2 Tablets by mouth nightly. Indications: bipolar I disorder with most recent episode mixed, Print, Disp-60 Tablet, R-1      doxepin (SINEquan) 25 mg capsule Take 1 Capsule by mouth nightly. Indications: bipolar depression, Print, Disp-30 Capsule, R-1      QUEtiapine SR (SEROquel XR) 400 mg sr tablet Take 1 Tablet by mouth nightly. Indications: bipolar depression, Print, Disp-30 Tablet, R-1             Unresulted Labs (24h ago, onward)      None          To obtain results of studies pending at discharge, please contact [phone]    Follow-up Information       Follow up With Specialties Details Why [facility]  Go on 11/1/2022 Please go to [address] [place] [address] to receive rehab services. [address] [place] [address] Noemy Cuellar  [phone]    None    None (498) Patient stated that they have no PCP              Advanced Directive:   Does the patient have an appointed surrogate decision maker? No  Does the patient have a Medical Advance Directive? No  Does the patient have a Psychiatric Advance Directive? No  If the patient does not have a surrogate or Medical Advance Directive AND Psychiatric Advance Directive, the patient was offered information on these advance directives Yes Pt, declined    Patient Instructions: Please continue all medications until otherwise directed by physician. Tobacco Cessation Discharge Plan:   Is the patient a smoker and needs referral for smoking cessation? No  Patient referred to the following for smoking cessation with an appointment? No   Patient was offered medication to assist with smoking cessation at discharge? No  Was education for smoking cessation added to the discharge instructions?  No    Alcohol/Substance Abuse Discharge Plan:   Does the patient have a history of substance/alcohol abuse and requires a referral for treatment? Yes  Patient referred to the following for substance/alcohol abuse treatment with an appointment? Yes  Patient was offered medication to assist with alcohol cessation at discharge? No  Was education for substance/alcohol abuse added to discharge instructions?  Yes    Patient discharged to [facility]

## 2023-01-05 ENCOUNTER — HOSPITAL ENCOUNTER (EMERGENCY)
Age: 48
Discharge: HOME | End: 2023-01-05
Attending: EMERGENCY MEDICINE
Payer: COMMERCIAL

## 2023-01-05 VITALS
WEIGHT: 154.2 LBS | TEMPERATURE: 98.6 F | OXYGEN SATURATION: 97 % | DIASTOLIC BLOOD PRESSURE: 89 MMHG | BODY MASS INDEX: 26.32 KG/M2 | SYSTOLIC BLOOD PRESSURE: 129 MMHG | HEIGHT: 64 IN | RESPIRATION RATE: 16 BRPM | HEART RATE: 78 BPM

## 2023-01-05 DIAGNOSIS — Z72.0 TOBACCO ABUSE: ICD-10-CM

## 2023-01-05 DIAGNOSIS — R55 SYNCOPE, UNSPECIFIED SYNCOPE TYPE: Primary | ICD-10-CM

## 2023-01-05 LAB
ALBUMIN SERPL-MCNC: 3.7 G/DL (ref 3.5–5)
ALBUMIN/GLOB SERPL: 0.9 (ref 1.1–2.2)
ALP SERPL-CCNC: 125 U/L (ref 45–117)
ALT SERPL-CCNC: 26 U/L (ref 12–78)
ANION GAP SERPL CALC-SCNC: 5 MMOL/L (ref 5–15)
AST SERPL-CCNC: 35 U/L (ref 15–37)
BASOPHILS # BLD: 0 K/UL (ref 0–0.1)
BASOPHILS NFR BLD: 1 % (ref 0–1)
BILIRUB SERPL-MCNC: 0.2 MG/DL (ref 0.2–1)
BUN SERPL-MCNC: 8 MG/DL (ref 6–20)
BUN/CREAT SERPL: 9 (ref 12–20)
CALCIUM SERPL-MCNC: 8.7 MG/DL (ref 8.5–10.1)
CHLORIDE SERPL-SCNC: 103 MMOL/L (ref 97–108)
CO2 SERPL-SCNC: 32 MMOL/L (ref 21–32)
CREAT SERPL-MCNC: 0.85 MG/DL (ref 0.55–1.02)
DIFFERENTIAL METHOD BLD: ABNORMAL
EOSINOPHIL # BLD: 0.2 K/UL (ref 0–0.4)
EOSINOPHIL NFR BLD: 6 % (ref 0–7)
ERYTHROCYTE [DISTWIDTH] IN BLOOD BY AUTOMATED COUNT: 13.3 % (ref 11.5–14.5)
GLOBULIN SER CALC-MCNC: 4.2 G/DL (ref 2–4)
GLUCOSE SERPL-MCNC: 79 MG/DL (ref 65–100)
HCT VFR BLD AUTO: 42.3 % (ref 35–47)
HGB BLD-MCNC: 14 G/DL (ref 11.5–16)
IMM GRANULOCYTES # BLD AUTO: 0 K/UL (ref 0–0.04)
IMM GRANULOCYTES NFR BLD AUTO: 0 % (ref 0–0.5)
LYMPHOCYTES # BLD: 1.7 K/UL (ref 0.8–3.5)
LYMPHOCYTES NFR BLD: 45 % (ref 12–49)
MCH RBC QN AUTO: 31.5 PG (ref 26–34)
MCHC RBC AUTO-ENTMCNC: 33.1 G/DL (ref 30–36.5)
MCV RBC AUTO: 95.1 FL (ref 80–99)
MONOCYTES # BLD: 0.3 K/UL (ref 0–1)
MONOCYTES NFR BLD: 8 % (ref 5–13)
NEUTS SEG # BLD: 1.5 K/UL (ref 1.8–8)
NEUTS SEG NFR BLD: 40 % (ref 32–75)
NRBC # BLD: 0 K/UL (ref 0–0.01)
NRBC BLD-RTO: 0 PER 100 WBC
PLATELET # BLD AUTO: 304 K/UL (ref 150–400)
PMV BLD AUTO: 9.9 FL (ref 8.9–12.9)
POTASSIUM SERPL-SCNC: 3.8 MMOL/L (ref 3.5–5.1)
PROT SERPL-MCNC: 7.9 G/DL (ref 6.4–8.2)
RBC # BLD AUTO: 4.45 M/UL (ref 3.8–5.2)
SODIUM SERPL-SCNC: 140 MMOL/L (ref 136–145)
TROPONIN-HIGH SENSITIVITY: 7 NG/L (ref 0–51)
WBC # BLD AUTO: 3.8 K/UL (ref 3.6–11)

## 2023-01-05 PROCEDURE — 85025 COMPLETE CBC W/AUTO DIFF WBC: CPT

## 2023-01-05 PROCEDURE — 84484 ASSAY OF TROPONIN QUANT: CPT

## 2023-01-05 PROCEDURE — 99284 EMERGENCY DEPT VISIT MOD MDM: CPT

## 2023-01-05 PROCEDURE — 93005 ELECTROCARDIOGRAM TRACING: CPT

## 2023-01-05 PROCEDURE — 80053 COMPREHEN METABOLIC PANEL: CPT

## 2023-01-05 PROCEDURE — 36415 COLL VENOUS BLD VENIPUNCTURE: CPT

## 2023-01-05 NOTE — ED NOTES
Pt presents to ED complaining of syncope earlier today. Per EMS, pt was sitting at her rehab facility when she stood up suddenly and sustained a syncopal episode. EMS also states that pt is in rehab for opioid abuse and took her last dose of methadone earlier this morning and EMS gave pt 324 mg of aspirin en route. EMS also states that pt has hx of COPD and CHF. Pt is now complaining of chest pain. Pt is alert and oriented x 4, RR even and unlabored, skin is warm and dry. Assessment completed and pt updated on plan of care. Call bell in reach. Emergency Department Nursing Plan of Care       The Nursing Plan of Care is developed from the Nursing assessment and Emergency Department Attending provider initial evaluation. The plan of care may be reviewed in the ED Provider note.     The Plan of Care was developed with the following considerations:   Patient / Family readiness to learn indicated by:verbalized understanding  Persons(s) to be included in education: patient  Barriers to Learning/Limitations:No    Signed     Jevon Jain RN    1/5/2023

## 2023-01-05 NOTE — ED NOTES
Discharge instructions were given to the patient by Scout Dunbar RN  . The patient left the Emergency Department ambulatory, alert and oriented and in no acute distress with 0 prescriptions. The patient was encouraged to call or return to the ED for worsening issues or problems and was encouraged to schedule a follow up appointment for continuing care. The patient verbalized understanding of discharge instructions and prescriptions, all questions were answered. The patient has no further concerns at this time.

## 2023-01-05 NOTE — ED PROVIDER NOTES
[facility] EMERGENCY DEPT  EMERGENCY DEPARTMENT ENCOUNTER       Pt Name: Trude Holstein  MRN: [id number]  [place] 1975  Date of evaluation: 1/5/2023  Provider: Allie Garrett MD   PCP: None  Note Started: 12:00 PM 1/5/23     CHIEF COMPLAINT       Chief Complaint   Patient presents with    Syncope        HISTORY OF PRESENT ILLNESS: 1 or more elements      History From: Patient  HPI Limitations : None     Trude Holstein is a 50 y.o. female who presents with a syncopal event that occurred today while at her rehab facility. Patient states she was sitting there showing someone a replay of the football game from Monday night and stood up quickly and the next thing she remembers is waking up with people standing over top of her. She complains of a vague chest discomfort but denies any shortness of breath, nausea, vomiting, or diarrhea. She has not eaten or drank anything yet this morning. She denies any prior history of syncope. She does have a history of COPD and CHF and has an appointment to see her pulmonary doctor on the 16th of this month. She does not know who follows her for cardiology. Nursing Notes were all reviewed and agreed with or any disagreements were addressed in the HPI.      PAST HISTORY     Past Medical History:  Past Medical History:   Diagnosis Date    Asthma     Bipolar 1 disorder (HCC)     Chronic obstructive pulmonary disease (HCC)     Chronic pain     back, leg    Cocaine abuse (HCC)     Depression     Heart failure (HCC)     Hepatitis B     Heroin abuse (HCC)     HTN (hypertension)     Narcotic abuse (HCC)     Polysubstance abuse ([facility] [address]     Sarcoidosis     Tobacco abuse        Past Surgical History:  Past Surgical History:   Procedure Laterality Date    HX GYN      HX WISDOM TEETH EXTRACTION         Family History:  Family History   Problem Relation Age of Onset    Hypertension Father     Hypertension Mother        Social History:  Social History     Tobacco Use    Smoking status: Every Day     Packs/day: 0.25     Years: 15.00     Pack years: 3.75     Types: Cigarettes     Last attempt to quit: 8/3/2017     Years since quittin.4    Smokeless tobacco: Never    Tobacco comments:     \"I already quit\"   Substance Use Topics    Alcohol use: No    Drug use: Yes     Types: Marijuana, Heroin, Cocaine, Inhalants, Opiates     Comment: Pt reports last used heroin on 20       Allergies: Allergies   Allergen Reactions    Vancomycin Anaphylaxis    Ketorolac Unable to Obtain     [facility]    Remeron [Mirtazapine] Unable to Obtain     From [facility] paperwork on 22    Tomato Swelling     Tongue    Trazodone Angioedema       CURRENT MEDICATIONS      Previous Medications    ALBUTEROL (PROAIR HFA) 90 MCG/ACTUATION INHALER    Take 2 Puffs by inhalation every four (4) hours as needed for Wheezing or Shortness of Breath. Indications: bronchospasm prevention    BUSPIRONE (BUSPAR) 5 MG TABLET    Take 1 Tablet by mouth two (2) times a day. Indications: repeated episodes of anxiety    DICYCLOMINE (BENTYL) 10 MG CAPSULE    Take 1 Capsule by mouth three (3) times daily as needed for Abdominal Cramps. Indications: irritable colon    DIVALPROEX ER (DEPAKOTE ER) 500 MG ER TABLET    Take 2 Tablets by mouth nightly. Indications: bipolar I disorder with most recent episode mixed    DOXEPIN (SINEQUAN) 25 MG CAPSULE    Take 1 Capsule by mouth nightly. Indications: bipolar depression    QUETIAPINE SR (SEROQUEL XR) 400 MG SR TABLET    Take 1 Tablet by mouth nightly. Indications: bipolar depression       SCREENINGS               No data recorded        REVIEW OF SYSTEMS      Review of Systems   Constitutional:  Negative for chills and fever. HENT:  Negative for congestion, rhinorrhea, sneezing and sore throat. Eyes:  Negative for redness and visual disturbance. Respiratory:  Negative for shortness of breath. Cardiovascular:  Negative for leg swelling.    Gastrointestinal:  Negative for abdominal pain, nausea and vomiting. Genitourinary:  Negative for difficulty urinating and frequency. Musculoskeletal:  Negative for back pain, myalgias and neck stiffness. Skin:  Negative for rash. Neurological:  Positive for syncope. Negative for dizziness, weakness and headaches. Hematological:  Negative for adenopathy. All other systems reviewed and are negative. Positives and Pertinent negatives as per HPI. PHYSICAL EXAM      ED Triage Vitals [01/05/23 1158]   ED Encounter Vitals Group      /89      Pulse (Heart Rate) 78      Resp Rate 16      Temp 98.6 °F (37 °C)      Temp src       O2 Sat (%) 97 %      Weight 154 lb 3.2 oz      Height 5' 4\"        Physical Exam  Vitals and nursing note reviewed. Constitutional:       Appearance: Normal appearance. She is well-developed. HENT:      Head: Normocephalic and atraumatic. Eyes:      Conjunctiva/sclera: Conjunctivae normal.   Cardiovascular:      Rate and Rhythm: Normal rate and regular rhythm. Pulses: Normal pulses. Heart sounds: Normal heart sounds, S1 normal and S2 normal.   Pulmonary:      Effort: Pulmonary effort is normal. No respiratory distress. Breath sounds: Normal breath sounds. No wheezing. Abdominal:      General: Bowel sounds are normal. There is no distension. Palpations: Abdomen is soft. Tenderness: There is no abdominal tenderness. There is no rebound. Musculoskeletal:         General: Normal range of motion. Cervical back: Full passive range of motion without pain, normal range of motion and neck supple. Skin:     General: Skin is warm and dry. Findings: No rash. Neurological:      Mental Status: She is alert and oriented to person, place, and time. Psychiatric:         Speech: Speech normal.         Behavior: Behavior normal.         Thought Content:  Thought content normal.         Judgment: Judgment normal.        DIAGNOSTIC RESULTS   LABS:     Recent Results (from the past 12 hour(s))   EKG, 12 LEAD, INITIAL    Collection Time: 01/05/23 12:20 PM   Result Value Ref Range    Ventricular Rate 64 BPM    Atrial Rate 64 BPM    P-R Interval 116 ms    QRS Duration 80 ms    Q-T Interval 426 ms    QTC Calculation (Bezet) 439 ms    Calculated P Axis 59 degrees    Calculated R Axis 59 degrees    Calculated T Axis 65 degrees    Diagnosis       Normal sinus rhythm with sinus arrhythmia  Possible Left atrial enlargement  Borderline ECG  When compared with ECG of 23-OCT-2022 18:16,  Vent. rate has decreased BY  33 BPM  Non-specific change in ST segment in Anterior leads  Nonspecific T wave abnormality no longer evident in Inferior leads  Nonspecific T wave abnormality no longer evident in Anterolateral leads     CBC WITH AUTOMATED DIFF    Collection Time: 01/05/23  1:44 PM   Result Value Ref Range    WBC 3.8 3.6 - 11.0 K/uL    RBC 4.45 3.80 - 5.20 M/uL    HGB 14.0 11.5 - 16.0 g/dL    HCT 42.3 35.0 - 47.0 %    MCV 95.1 80.0 - 99.0 FL    MCH 31.5 26.0 - 34.0 PG    MCHC 33.1 30.0 - 36.5 g/dL    RDW 13.3 11.5 - 14.5 %    PLATELET 691 373 - 948 K/uL    MPV 9.9 8.9 - 12.9 FL    NRBC 0.0 0  WBC    ABSOLUTE NRBC 0.00 0.00 - 0.01 K/uL    NEUTROPHILS 40 32 - 75 %    LYMPHOCYTES 45 12 - 49 %    MONOCYTES 8 5 - 13 %    EOSINOPHILS 6 0 - 7 %    BASOPHILS 1 0 - 1 %    IMMATURE GRANULOCYTES 0 0.0 - 0.5 %    ABS. NEUTROPHILS 1.5 (L) 1.8 - 8.0 K/UL    ABS. LYMPHOCYTES 1.7 0.8 - 3.5 K/UL    ABS. MONOCYTES 0.3 0.0 - 1.0 K/UL    ABS. EOSINOPHILS 0.2 0.0 - 0.4 K/UL    ABS. BASOPHILS 0.0 0.0 - 0.1 K/UL    ABS. IMM.  GRANS. 0.0 0.00 - 0.04 K/UL    DF AUTOMATED     METABOLIC PANEL, COMPREHENSIVE    Collection Time: 01/05/23  1:44 PM   Result Value Ref Range    Sodium 140 136 - 145 mmol/L    Potassium 3.8 3.5 - 5.1 mmol/L    Chloride 103 97 - 108 mmol/L    CO2 32 21 - 32 mmol/L    Anion gap 5 5 - 15 mmol/L    Glucose 79 65 - 100 mg/dL    BUN 8 6 - 20 MG/DL    Creatinine 0.85 0.55 - 1.02 MG/DL    BUN/Creatinine ratio 9 (L) 12 - 20      eGFR >60 >60 ml/min/1.73m2    Calcium 8.7 8.5 - 10.1 MG/DL    Bilirubin, total 0.2 0.2 - 1.0 MG/DL    ALT (SGPT) 26 12 - 78 U/L    AST (SGOT) 35 15 - 37 U/L    Alk. phosphatase 125 (H) 45 - 117 U/L    Protein, total 7.9 6.4 - 8.2 g/dL    Albumin 3.7 3.5 - 5.0 g/dL    Globulin 4.2 (H) 2.0 - 4.0 g/dL    A-G Ratio 0.9 (L) 1.1 - 2.2        CBC and CMP normal    EKG:   Rhythm: normal sinus rhythm; and regular . Rate (approx.): 64; Axis: normal; P wave: normal; QRS interval: normal ; ST/T wave: normal; Other findings: borderline ekg. This EKG was interpreted by Juju Gould MD,ED Provider. PROCEDURES   Unless otherwise noted below, none  Procedures     CRITICAL CARE TIME   None    EMERGENCY DEPARTMENT COURSE and DIFFERENTIAL DIAGNOSIS/MDM   Vitals:    Patient Vitals for the past 24 hrs:   Temp Pulse Resp BP SpO2   01/05/23 1158 98.6 °F (37 °C) 78 16 129/89 97 %        Patient was given the following medications:  Medications - No data to display    Chronic Conditions: Asthma, sarcoidosis, hepatitis B, depression, COPD, bipolar, cocaine abuse, heroin abuse, hypertension, and CHF    Social Determinants affecting Dx or Tx: Patient has a substance abuse problem. Records Reviewed (source and summary): Nursing Notes, Old Medical Records, Previous Radiology Studies, and Previous Laboratory Studies    I reviewed patient's chart including her recent discharge summary from behavioral health from November 1, 2022. At that time patient was being seen for suicidal ideations and opioid withdrawals. CC/HPI Summary, DDx, ED Course, and Reassessment: 75-year-old female presents via EMS with a syncopal event when she stood up too quickly. Differential includes arrhythmia, orthostatic hypotension, vasovagal syncope, anemia, ACS, substance abuse, and low suspicion for PE, aortic aneurysm, or aortic dissection.   Will check basic labs using a troponin and urine drug screen, orthostatic blood pressures, treat with IV fluids and reassess. ED Course as of 01/05/23 1438   Thu Jan 05, 2023   1431 Patient states her symptoms have resolved and she is declining any further testing and treatment. She did not receive any IV fluids. She has ambulated to and from the bathroom twice. Will discharge with pulmonary follow-up on the 16th as scheduled and provide her with a list of primary care doctors. [MS]      ED Course User Index  [MS] Yaneth Hogue MD     Tobacco Cessation Counseling   I spent 5 minutes discussing the medical risks of prolonged smoking habits and advised the patient of the benefits of the cessation of smoking, providing specific suggestions on how to quit. Suhail Stone MD    Disposition Considerations (Tests not done, Shared Decision Making, Pt Expectation of Test or Tx.): Discussed with patient that her work-up is not complete but she feels well enough for discharge. Considered admission for this syncopal event but she declines. She agrees to follow-up with pulmonary at her scheduled appointment and I will provide her with a list of primary care doctors. FINAL IMPRESSION     1. Syncope, unspecified syncope type    2. Tobacco abuse          DISPOSITION/PLAN   Discharged    Discharge Note: The patient is stable for discharge home. The signs, symptoms, diagnosis, and discharge instructions have been discussed, understanding conveyed, and agreed upon. The patient is to follow up as recommended or return to ER should their symptoms worsen.       PATIENT REFERRED TO:  Follow-up Information       Follow up With Specialties Details Why [address] [facility] [address]  Call  to arrange primary care [address]    [address]  Call  to arrange primary care [address] [facility]    Francois Pierson MD Pulmonary Disease On 1/13/2023 and on the 16th at your scheduled appointment [address] [facility] [place] [address] [id number]  [phone]      [facility] EMERGENCY DEPT Emergency Medicine  As needed, If symptoms worsen [phone] MEDICATIONS:  Current Discharge Medication List            DISCONTINUED MEDICATIONS:  Current Discharge Medication List          I am the Primary Clinician of Record. Rowena Garcia MD (electronically signed)    (Please note that parts of this dictation were completed with voice recognition software. Quite often unanticipated grammatical, syntax, homophones, and other interpretive errors are inadvertently transcribed by the computer software. Please disregards these errors.  Please excuse any errors that have escaped final proofreading.)

## 2023-01-06 LAB
ATRIAL RATE: 64 BPM
CALCULATED P AXIS, ECG09: 59 DEGREES
CALCULATED R AXIS, ECG10: 59 DEGREES
CALCULATED T AXIS, ECG11: 65 DEGREES
DIAGNOSIS, 93000: NORMAL
P-R INTERVAL, ECG05: 116 MS
Q-T INTERVAL, ECG07: 426 MS
QRS DURATION, ECG06: 80 MS
QTC CALCULATION (BEZET), ECG08: 439 MS
VENTRICULAR RATE, ECG03: 64 BPM

## 2023-02-13 ENCOUNTER — HOSPITAL ENCOUNTER (EMERGENCY)
Age: 48
Discharge: HOME | End: 2023-02-13
Attending: EMERGENCY MEDICINE
Payer: MEDICAID

## 2023-02-13 VITALS
WEIGHT: 150 LBS | HEART RATE: 50 BPM | SYSTOLIC BLOOD PRESSURE: 132 MMHG | RESPIRATION RATE: 15 BRPM | HEIGHT: 65 IN | OXYGEN SATURATION: 96 % | DIASTOLIC BLOOD PRESSURE: 70 MMHG | BODY MASS INDEX: 24.99 KG/M2 | TEMPERATURE: 98 F

## 2023-02-13 DIAGNOSIS — F41.0 PANIC ATTACK: Primary | ICD-10-CM

## 2023-02-13 DIAGNOSIS — J45.31 MILD PERSISTENT ASTHMA WITH ACUTE EXACERBATION: ICD-10-CM

## 2023-02-13 LAB
ALBUMIN SERPL-MCNC: 3.1 G/DL (ref 3.5–5)
ALBUMIN/GLOB SERPL: 0.8 (ref 1.1–2.2)
ALP SERPL-CCNC: 121 U/L (ref 45–117)
ALT SERPL-CCNC: 30 U/L (ref 12–78)
ANION GAP SERPL CALC-SCNC: 5 MMOL/L (ref 5–15)
AST SERPL-CCNC: 37 U/L (ref 15–37)
ATRIAL RATE: 64 BPM
BASOPHILS # BLD: 0 K/UL (ref 0–0.1)
BASOPHILS NFR BLD: 0 % (ref 0–1)
BILIRUB SERPL-MCNC: 0.3 MG/DL (ref 0.2–1)
BNP SERPL-MCNC: 654 PG/ML
BUN SERPL-MCNC: 9 MG/DL (ref 6–20)
BUN/CREAT SERPL: 12 (ref 12–20)
CALCIUM SERPL-MCNC: 8.4 MG/DL (ref 8.5–10.1)
CALCULATED P AXIS, ECG09: 55 DEGREES
CALCULATED R AXIS, ECG10: 71 DEGREES
CALCULATED T AXIS, ECG11: 76 DEGREES
CHLORIDE SERPL-SCNC: 103 MMOL/L (ref 97–108)
CO2 SERPL-SCNC: 29 MMOL/L (ref 21–32)
COMMENT, HOLDF: NORMAL
CREAT SERPL-MCNC: 0.74 MG/DL (ref 0.55–1.02)
DIAGNOSIS, 93000: NORMAL
DIFFERENTIAL METHOD BLD: ABNORMAL
EOSINOPHIL # BLD: 0 K/UL (ref 0–0.4)
EOSINOPHIL NFR BLD: 0 % (ref 0–7)
ERYTHROCYTE [DISTWIDTH] IN BLOOD BY AUTOMATED COUNT: 13.2 % (ref 11.5–14.5)
FLUAV AG NPH QL IA: NEGATIVE
FLUBV AG NOSE QL IA: NEGATIVE
GLOBULIN SER CALC-MCNC: 4 G/DL (ref 2–4)
GLUCOSE SERPL-MCNC: 103 MG/DL (ref 65–100)
HCT VFR BLD AUTO: 37.8 % (ref 35–47)
HGB BLD-MCNC: 12.4 G/DL (ref 11.5–16)
IMM GRANULOCYTES # BLD AUTO: 0 K/UL (ref 0–0.04)
IMM GRANULOCYTES NFR BLD AUTO: 0 % (ref 0–0.5)
LACTATE BLD-SCNC: 0.77 MMOL/L (ref 0.4–2)
LYMPHOCYTES # BLD: 0.8 K/UL (ref 0.8–3.5)
LYMPHOCYTES NFR BLD: 17 % (ref 12–49)
MCH RBC QN AUTO: 31.1 PG (ref 26–34)
MCHC RBC AUTO-ENTMCNC: 32.8 G/DL (ref 30–36.5)
MCV RBC AUTO: 94.7 FL (ref 80–99)
MONOCYTES # BLD: 0.4 K/UL (ref 0–1)
MONOCYTES NFR BLD: 7 % (ref 5–13)
NEUTS SEG # BLD: 3.7 K/UL (ref 1.8–8)
NEUTS SEG NFR BLD: 76 % (ref 32–75)
NRBC # BLD: 0 K/UL (ref 0–0.01)
NRBC BLD-RTO: 0 PER 100 WBC
P-R INTERVAL, ECG05: 126 MS
PLATELET # BLD AUTO: 288 K/UL (ref 150–400)
PMV BLD AUTO: 9.5 FL (ref 8.9–12.9)
POTASSIUM SERPL-SCNC: 4.5 MMOL/L (ref 3.5–5.1)
PROT SERPL-MCNC: 7.1 G/DL (ref 6.4–8.2)
Q-T INTERVAL, ECG07: 454 MS
QRS DURATION, ECG06: 82 MS
QTC CALCULATION (BEZET), ECG08: 468 MS
RBC # BLD AUTO: 3.99 M/UL (ref 3.8–5.2)
SAMPLES BEING HELD,HOLD: NORMAL
SARS-COV-2 RDRP RESP QL NAA+PROBE: NOT DETECTED
SODIUM SERPL-SCNC: 137 MMOL/L (ref 136–145)
SOURCE, COVRS: NORMAL
TROPONIN I SERPL HS-MCNC: <4 NG/L (ref 0–51)
VENTRICULAR RATE, ECG03: 64 BPM
WBC # BLD AUTO: 5 K/UL (ref 3.6–11)

## 2023-02-13 PROCEDURE — 93005 ELECTROCARDIOGRAM TRACING: CPT

## 2023-02-13 PROCEDURE — 85025 COMPLETE CBC W/AUTO DIFF WBC: CPT

## 2023-02-13 PROCEDURE — 87804 INFLUENZA ASSAY W/OPTIC: CPT

## 2023-02-13 PROCEDURE — 83605 ASSAY OF LACTIC ACID: CPT

## 2023-02-13 PROCEDURE — 74011636637 HC RX REV CODE- 636/637: Performed by: EMERGENCY MEDICINE

## 2023-02-13 PROCEDURE — 80053 COMPREHEN METABOLIC PANEL: CPT

## 2023-02-13 PROCEDURE — 74011250637 HC RX REV CODE- 250/637: Performed by: EMERGENCY MEDICINE

## 2023-02-13 PROCEDURE — 83880 ASSAY OF NATRIURETIC PEPTIDE: CPT

## 2023-02-13 PROCEDURE — 36415 COLL VENOUS BLD VENIPUNCTURE: CPT

## 2023-02-13 PROCEDURE — 84484 ASSAY OF TROPONIN QUANT: CPT

## 2023-02-13 PROCEDURE — 99284 EMERGENCY DEPT VISIT MOD MDM: CPT

## 2023-02-13 PROCEDURE — 94640 AIRWAY INHALATION TREATMENT: CPT

## 2023-02-13 PROCEDURE — 87635 SARS-COV-2 COVID-19 AMP PRB: CPT

## 2023-02-13 RX ORDER — PREDNISONE 20 MG/1
60 TABLET ORAL
Status: COMPLETED | OUTPATIENT
Start: 2023-02-13 | End: 2023-02-13

## 2023-02-13 RX ORDER — PREDNISONE 20 MG/1
40 TABLET ORAL DAILY
Qty: 10 TABLET | Refills: 0 | Status: SHIPPED | OUTPATIENT
Start: 2023-02-13 | End: 2023-02-18

## 2023-02-13 RX ORDER — ALBUTEROL SULFATE 90 UG/1
2 AEROSOL, METERED RESPIRATORY (INHALATION)
Status: COMPLETED | OUTPATIENT
Start: 2023-02-13 | End: 2023-02-13

## 2023-02-13 RX ORDER — PREDNISONE 20 MG/1
40 TABLET ORAL DAILY
Qty: 10 TABLET | Refills: 0 | Status: SHIPPED | OUTPATIENT
Start: 2023-02-13 | End: 2023-02-13 | Stop reason: SDUPTHER

## 2023-02-13 RX ORDER — LORAZEPAM 1 MG/1
1 TABLET ORAL
Status: COMPLETED | OUTPATIENT
Start: 2023-02-13 | End: 2023-02-13

## 2023-02-13 RX ADMIN — LORAZEPAM 1 MG: 1 TABLET ORAL at 10:01

## 2023-02-13 RX ADMIN — PREDNISONE 60 MG: 20 TABLET ORAL at 10:01

## 2023-02-13 RX ADMIN — ALBUTEROL SULFATE 2 PUFF: 90 AEROSOL, METERED RESPIRATORY (INHALATION) at 09:35

## 2023-02-13 NOTE — DISCHARGE INSTRUCTIONS
It was a pleasure taking care of you at [facility] Emergency Department today. We know that when you come to [facility], you are entrusting us with your health, comfort, and safety. Our physicians and nurses honor that trust, and we truly appreciate the opportunity to care for you and your loved ones. We also value your feedback. If you receive a survey about your Emergency Department experience today, please fill it out. We care about our patients' feedback, and we listen to what you have to say. Thank you!

## 2023-02-13 NOTE — ED PROVIDER NOTES
EMERGENCY DEPARTMENT HISTORY AND PHYSICAL EXAM    Date: 2023  Patient Name: Baldev Burroughs  Patient Age and Sex: 50 y.o. female  MRN:  [id number]  CSN:  [id number]    History of Present Illness     Chief Complaint   Patient presents with    Shortness of Breath     Sob and felling jitter        History Provided By: Patient    Ability to gather history was limited by:  HPI Limitations : None    HPI: Baldev Burroughs, 50 y.o. female daily smoker with history of COPD and sarcoidosis, on home oxygen, also history of depression, anxiety, polysubstance abuse, bipolar disorder, reports that she felt \" jittery\" this morning when she went to the methadone clinic. She notes that her oxygen nasal cannula had fallen off and she was without her nasal cannula at the time that she felt jittery. Also had some mild palpitations. No chest pain. She feels essentially back to normal now, perhaps mild jittery sensation. Tobacco Use      Smoking status: Every Day        Packs/day: 0.25        Years: 15.00        Pack years: 3.75        Types: Cigarettes        Last attempt to quit: 8/3/2017        Years since quittin.5      Smokeless tobacco: Never      Tobacco comments: \"I already quit\"     Past History   The patient's medical, surgical, and social history were reviewed by me today. Current Medications:  No current facility-administered medications on file prior to encounter. Current Outpatient Medications on File Prior to Encounter   Medication Sig Dispense Refill    albuterol (ProAir HFA) 90 mcg/actuation inhaler Take 2 Puffs by inhalation every four (4) hours as needed for Wheezing or Shortness of Breath. Indications: bronchospasm prevention 1 Each 1    busPIRone (BUSPAR) 5 mg tablet Take 1 Tablet by mouth two (2) times a day. Indications: repeated episodes of anxiety 60 Tablet 1    dicyclomine (BENTYL) 10 mg capsule Take 1 Capsule by mouth three (3) times daily as needed for Abdominal Cramps.  Indications: irritable colon 90 Capsule 1    divalproex ER (DEPAKOTE ER) 500 mg ER tablet Take 2 Tablets by mouth nightly. Indications: bipolar I disorder with most recent episode mixed 60 Tablet 1    doxepin (SINEquan) 25 mg capsule Take 1 Capsule by mouth nightly. Indications: bipolar depression 30 Capsule 1    QUEtiapine SR (SEROquel XR) 400 mg sr tablet Take 1 Tablet by mouth nightly. Indications: bipolar depression 30 Tablet 1       Past Medical History:   Diagnosis Date    Asthma     Bipolar 1 disorder ([facility]     Chronic obstructive pulmonary disease (HCC)     Chronic pain     back, leg    Cocaine abuse (HCC)     Depression     Heart failure (HCC)     Hepatitis B     Heroin abuse (HCC)     HTN (hypertension)     Narcotic abuse ([facility]     Polysubstance abuse ([facility]     Sarcoidosis     Tobacco abuse        Past Surgical History:   Procedure Laterality Date    HX GYN      HX WISDOM TEETH EXTRACTION         Social History     Tobacco Use    Smoking status: Every Day     Packs/day: 0.25     Years: 15.00     Pack years: 3.75     Types: Cigarettes     Last attempt to quit: 8/3/2017     Years since quittin.5    Smokeless tobacco: Never    Tobacco comments:     \"I already quit\"   Substance Use Topics    Alcohol use: No    Drug use: Yes     Types: Marijuana, Heroin, Cocaine, Inhalants, Opiates     Comment: Pt reports last used heroin on 20       Allergies: Allergies   Allergen Reactions    Vancomycin Anaphylaxis    Ketorolac Unable to Obtain     From [facility] paperwork 22    Remeron [Mirtazapine] Unable to Obtain     From [facility] paperwork on 22    Tomato Swelling     Tongue    Trazodone Angioedema     Review of Systems   A Review of Systems was reviewed by me today during this encounter. Pertinent positive and negative elements are noted in the HPI and MDM sections. Review of Systems   Constitutional:  Negative for fatigue and fever. Respiratory:  Positive for chest tightness.  Negative for cough and shortness of breath. Cardiovascular:  Positive for palpitations. Negative for chest pain. All other systems reviewed and are negative. Physical Exam   Vital Signs  Patient Vitals for the past 8 hrs:   Temp Pulse Resp BP SpO2   02/13/23 1037 -- 64 -- -- 95 %   02/13/23 1030 -- 65 15 121/78 96 %   02/13/23 0936 -- -- -- -- 98 %   02/13/23 0930 -- (!) 44 15 (!) 125/92 96 %   02/13/23 0848 98 °F (36.7 °C) -- -- -- --   02/13/23 0847 -- (!) 55 14 -- 99 %   02/13/23 0846 -- -- -- 126/85 93 %          Physical Exam  Vitals and nursing note reviewed. Constitutional:       General: She is not in acute distress. Appearance: Normal appearance. She is well-developed. She is not ill-appearing. HENT:      Head: Normocephalic and atraumatic. Mouth/Throat:      Mouth: Mucous membranes are moist.   Eyes:      General:         Right eye: No discharge. Left eye: No discharge. Conjunctiva/sclera: Conjunctivae normal.   Cardiovascular:      Rate and Rhythm: Normal rate and regular rhythm. Heart sounds: Normal heart sounds. No murmur heard. Pulmonary:      Effort: Pulmonary effort is normal. No respiratory distress. Breath sounds: Wheezing (Mild wheezing) present. Abdominal:      General: There is no distension. Palpations: Abdomen is soft. Tenderness: There is no abdominal tenderness. Musculoskeletal:         General: No deformity. Normal range of motion. Cervical back: Normal range of motion and neck supple. Skin:     General: Skin is warm and dry. Findings: No rash. Neurological:      General: No focal deficit present. Mental Status: She is alert and oriented to person, place, and time.    Psychiatric:         Speech: Speech normal.         Behavior: Behavior normal.         Cognition and Memory: Cognition normal.       Diagnostic Study Results   Labs  Recent Results (from the past 24 hour(s))   EKG, 12 LEAD, INITIAL    Collection Time: 02/13/23  8:48 AM   Result Value Ref Range    Ventricular Rate 64 BPM    Atrial Rate 64 BPM    P-R Interval 126 ms    QRS Duration 82 ms    Q-T Interval 454 ms    QTC Calculation (Bezet) 468 ms    Calculated P Axis 55 degrees    Calculated R Axis 71 degrees    Calculated T Axis 76 degrees    Diagnosis       Normal sinus rhythm  Normal ECG  When compared with ECG of 05-JAN-2023 12:20,  No significant change was found  Confirmed by Kinsey Mitchell ([id number]) on 2/13/2023 10:17:11 AM     CBC WITH AUTOMATED DIFF    Collection Time: 02/13/23  9:09 AM   Result Value Ref Range    WBC 5.0 3.6 - 11.0 K/uL    RBC 3.99 3.80 - 5.20 M/uL    HGB 12.4 11.5 - 16.0 g/dL    HCT 37.8 35.0 - 47.0 %    MCV 94.7 80.0 - 99.0 FL    MCH 31.1 26.0 - 34.0 PG    MCHC 32.8 30.0 - 36.5 g/dL    RDW 13.2 11.5 - 14.5 %    PLATELET 882 333 - 916 K/uL    MPV 9.5 8.9 - 12.9 FL    NRBC 0.0 0  WBC    ABSOLUTE NRBC 0.00 0.00 - 0.01 K/uL    NEUTROPHILS 76 (H) 32 - 75 %    LYMPHOCYTES 17 12 - 49 %    MONOCYTES 7 5 - 13 %    EOSINOPHILS 0 0 - 7 %    BASOPHILS 0 0 - 1 %    IMMATURE GRANULOCYTES 0 0.0 - 0.5 %    ABS. NEUTROPHILS 3.7 1.8 - 8.0 K/UL    ABS. LYMPHOCYTES 0.8 0.8 - 3.5 K/UL    ABS. MONOCYTES 0.4 0.0 - 1.0 K/UL    ABS. EOSINOPHILS 0.0 0.0 - 0.4 K/UL    ABS. BASOPHILS 0.0 0.0 - 0.1 K/UL    ABS. IMM.  GRANS. 0.0 0.00 - 0.04 K/UL    DF AUTOMATED     NT-PRO BNP    Collection Time: 02/13/23  9:09 AM   Result Value Ref Range    NT pro- (H) <010 PG/ML   METABOLIC PANEL, COMPREHENSIVE    Collection Time: 02/13/23  9:09 AM   Result Value Ref Range    Sodium 137 136 - 145 mmol/L    Potassium 4.5 3.5 - 5.1 mmol/L    Chloride 103 97 - 108 mmol/L    CO2 29 21 - 32 mmol/L    Anion gap 5 5 - 15 mmol/L    Glucose 103 (H) 65 - 100 mg/dL    BUN 9 6 - 20 MG/DL    Creatinine 0.74 0.55 - 1.02 MG/DL    BUN/Creatinine ratio 12 12 - 20      eGFR >60 >60 ml/min/1.73m2    Calcium 8.4 (L) 8.5 - 10.1 MG/DL    Bilirubin, total 0.3 0.2 - 1.0 MG/DL    ALT (SGPT) 30 12 - 78 U/L    AST (SGOT) 37 15 - 37 U/L    Alk. phosphatase 121 (H) 45 - 117 U/L    Protein, total 7.1 6.4 - 8.2 g/dL    Albumin 3.1 (L) 3.5 - 5.0 g/dL    Globulin 4.0 2.0 - 4.0 g/dL    A-G Ratio 0.8 (L) 1.1 - 2.2     TROPONIN-HIGH SENSITIVITY    Collection Time: 02/13/23  9:09 AM   Result Value Ref Range    Troponin-High Sensitivity <4 0 - 51 ng/L   SAMPLES BEING HELD    Collection Time: 02/13/23  9:09 AM   Result Value Ref Range    SAMPLES BEING HELD HEML     COMMENT        Add-on orders for these samples will be processed based on acceptable specimen integrity and analyte stability, which may vary by analyte.    COVID-19 RAPID TEST    Collection Time: 02/13/23  9:09 AM   Result Value Ref Range    Specimen source Nasopharyngeal      COVID-19 rapid test Not detected NOTD     INFLUENZA A+B VIRAL AGS    Collection Time: 02/13/23  9:09 AM   Result Value Ref Range    Influenza A Antigen Negative NEG      Influenza B Antigen Negative NEG     POC LACTIC ACID    Collection Time: 02/13/23  9:15 AM   Result Value Ref Range    Lactic Acid (POC) 0.77 0.40 - 2.00 mmol/L       ==============================================================    Radiologic Studies  CT Results  (Last 48 hours)      None          CXR Results  (Last 48 hours)      None            Critical Care and Billable Procedures   EKG reviewed by ED Physician Liya Buck in the absence of a cardiologist: Yes  EKG below was independently interpreted by vivi Santiago MD)    EKG    Date/Time: 2/13/2023 9:11 AM  Performed by: Diana Freeman MD  Authorized by: Diana Freeman MD     ECG reviewed by ED Physician in the absence of a cardiologist: yes    Interpretation:     Interpretation: normal    Rate:     ECG rate assessment: normal    Rhythm:     Rhythm: sinus rhythm    Ectopy:     Ectopy: none    QRS:     QRS axis:  Normal  ST segments:     ST segments:  Normal  T waves:     T waves: normal      Medical Decision Making   I reviewed the patient's most recent Emergency Dept notes and diagnostic tests in formulating my MDM on today's visit. Medications administered during ED course:  Medications   LORazepam (ATIVAN) tablet 1 mg (1 mg Oral Given 2/13/23 1001)   predniSONE (DELTASONE) tablet 60 mg (60 mg Oral Given 2/13/23 1001)   albuterol (PROVENTIL HFA, VENTOLIN HFA, PROAIR HFA) inhaler 2 Puff (2 Puffs Inhalation Given 2/13/23 0924)     Medical Decision Making // ED Course // Reassessment:  Chris Mccain, 50 y.o. female daily smoker with history of COPD and sarcoidosis, on home oxygen, also history of depression, anxiety, polysubstance abuse, bipolar disorder, reports that she felt \" jittery\" this morning when she went to the methadone clinic. She notes that her oxygen nasal cannula had fallen off and she was without her nasal cannula at the time that she felt jittery. Also had some mild palpitations. No chest pain. She feels essentially back to normal now, perhaps mild jittery sensation. Well-appearing, no distress, reassuring vital signs. Mild wheezing bilaterally. No rhonchi. Laboratories are all reassuring as she felt significantly improved after dose of lorazepam and oral prednisone. No significant concern for pneumonia at this time. Suspect that she had some mild transient hypoxia in the setting of removing her nasal cannula, also with some mild anxiety/panic attack and also perhaps some mild asthma exacerbation. Radiology results independently interpreted by me:     External Records reviewed: Prior medical records    Consults:  None    Tests considered but not performed: Chest x-ray    Differential Diagnoses ruled out: ACS, MI, cardiac arrhythmia    Final Diagnosis: Panic attack, mild asthma exacerbation    Additional documentation if relevant for this encounter     Social Determinants affecting diagnosis or treatment:   Patient has a substance abuse problem.      Tobacco use and counseling:    Tobacco Use      Smoking status: Every Day        Packs/day: 0.25 Years: 15.00        Pack years: 3.75        Types: Cigarettes        Last attempt to quit: 8/3/2017        Years since quittin.5      Smokeless tobacco: Never      Tobacco comments: \"I already quit\"     Smoking Cessation Discussion? TOBACCO COUNSELING:  Upon evaluation, the patient expressed that they are a current tobacco user. For 4 minutes, I counseled the patient on the hazards of smoking. The patient was encouraged to quit as soon as possible in order to decrease further risks to their health. We discussed nicotine replacement therapies and medical treatment options to decrease cravings and improve chances of success quitting. The patient has conveyed their understanding of the risks involved should they continue to use tobacco products. Darrell Madison MD    Shared decision making:     Diagnosis and Disposition     Disposition:  Discharged    Final Diagnosis:   1. Panic attack    2. Mild persistent asthma with acute exacerbation        Current Discharge Medication List        START taking these medications    Details   predniSONE (DELTASONE) 20 mg tablet Take 2 Tablets by mouth daily for 5 days. With Breakfast  Qty: 10 Tablet, Refills: 0  Start date: 2023, End date: 2023              Follow up: Follow-up Information       Follow up With Specialties Details Why Contact Info    [facility] EMERGENCY DEPT Emergency Medicine   [address] [place] [address]  [phone]          Disclaimers   I was the first provider for this patient on this visit. To the best of my ability I reviewed relevant prior medical records, electrocardiograms, laboratories, and radiologic studies. The patient's presenting problems were discussed, and the patient was in agreement with the care plan formulated and outlined with them. Please note that this dictation was completed with Dragon voice recognition software.  Quite often unanticipated grammatical, syntax, homophones, and other interpretive errors are inadvertently transcribed by the computer software. Please disregard these errors and excuse any errors that have escaped final proofreading. Umang Nunez MD    I personally performed the services described in this documentation on this date 2/13/2023 for patient Svitlana Caballero.       Umang Nunez MD  9:08 AM

## 2023-02-20 ENCOUNTER — OFFICE VISIT (OUTPATIENT)
Dept: INTERNAL MEDICINE CLINIC | Age: 48
End: 2023-02-20
Payer: MEDICAID

## 2023-02-20 VITALS
DIASTOLIC BLOOD PRESSURE: 88 MMHG | TEMPERATURE: 97.9 F | WEIGHT: 153 LBS | RESPIRATION RATE: 18 BRPM | HEART RATE: 77 BPM | HEIGHT: 65 IN | BODY MASS INDEX: 25.49 KG/M2 | SYSTOLIC BLOOD PRESSURE: 144 MMHG | OXYGEN SATURATION: 98 %

## 2023-02-20 DIAGNOSIS — Z12.11 COLON CANCER SCREENING: ICD-10-CM

## 2023-02-20 DIAGNOSIS — Z71.6 ENCOUNTER FOR SMOKING CESSATION COUNSELING: ICD-10-CM

## 2023-02-20 DIAGNOSIS — G25.81 RESTLESS LEGS SYNDROME (RLS): ICD-10-CM

## 2023-02-20 DIAGNOSIS — I10 HYPERTENSION, UNSPECIFIED TYPE: ICD-10-CM

## 2023-02-20 DIAGNOSIS — Z86.79 HISTORY OF HEART FAILURE: Primary | ICD-10-CM

## 2023-02-20 DIAGNOSIS — R09.89 ABNORMAL LUNG SOUNDS: ICD-10-CM

## 2023-02-20 DIAGNOSIS — Z86.19 HISTORY OF HEPATITIS B: ICD-10-CM

## 2023-02-20 PROCEDURE — 99204 OFFICE O/P NEW MOD 45 MIN: CPT

## 2023-02-20 RX ORDER — HALOPERIDOL 10 MG/1
10 TABLET ORAL DAILY
COMMUNITY
Start: 2023-02-08

## 2023-02-20 RX ORDER — CLONIDINE HYDROCHLORIDE 0.3 MG/1
0.3 TABLET ORAL 2 TIMES DAILY
COMMUNITY
Start: 2023-02-06 | End: 2023-02-20 | Stop reason: SDUPTHER

## 2023-02-20 RX ORDER — IBUPROFEN 200 MG
1 TABLET ORAL EVERY 24 HOURS
Qty: 30 PATCH | Refills: 2 | Status: SHIPPED | OUTPATIENT
Start: 2023-02-20 | End: 2023-03-22

## 2023-02-20 RX ORDER — CLONIDINE HYDROCHLORIDE 0.2 MG/1
0.2 TABLET ORAL 2 TIMES DAILY
Qty: 60 TABLET | Refills: 2 | Status: SHIPPED | OUTPATIENT
Start: 2023-02-20

## 2023-02-20 RX ORDER — SERTRALINE HYDROCHLORIDE 100 MG/1
100 TABLET, FILM COATED ORAL DAILY
COMMUNITY
Start: 2023-02-08

## 2023-02-20 NOTE — PROGRESS NOTES
Chief Complaint   Patient presents with    Establish Care     1. Have you been to the ER, urgent care clinic since your last visit? Hospitalized since your last visit? No    2. Have you seen or consulted any other health care providers outside of the [address] [place] [address] since your last visit? Include any pap smears or colon screening.  No

## 2023-02-20 NOTE — PROGRESS NOTES
Allan Smalls is a 50 y.o. female , new patient, here for evaluation of the following chief complaint(s): Establish Care and asking to fill disability paperwork. Reported that our office doesn't fill paperwork until second visit. Subjective:    Patient states that she was moved here recently from [place]. Used to see cardiologist for heart failure, neurologist for restless leg syndrome and already has pulmonologist for COPD, on 5 L home oxygen 24 hrs. Patient was in the ED on Feb 13 for shortness of breath, received 5 days course of prednisone and states that she just completed the treatment. States that she is feeling better now. Cardiovascular Review:  The patient has history of hypertension and heart failure. Diet and Lifestyle: generally follows a low fat low cholesterol diet, generally follows a low sodium diet, exercises sporadically, smoker and wants to quit. Home BP Monitoring: is not measured at home. Pertinent ROS: not taking medications as instructed, was taking clonidine 0.2 mg twice a day but ran out of medicine 2 days ago, no medication side effects noted, no TIA's, no chest pain on exertion, no dyspnea on exertion, no swelling of ankles. Depression:  Pt. already seeing psychiatrist and getting treatment. Currently on haldol, zoloft, buspar, doxepin and seroquel. Denies any thoughts of self harming or others. History of Substance Abuse:  Going to methadone clinic everyday    Health Maintenance:  Cervical cancer screening in 2022 in [facility], normal results. Colonoscopy, will refer to GI today. Review of Systems  Constitutional: negative for fevers, chills, anorexia and weight loss  Eyes:   negative for visual disturbance and irritation  ENT:   negative for tinnitus,sore throat,nasal congestion,ear pains. hoarseness  Respiratory:  negative for hemoptysis, dyspnea, wheezing, positive for cough  CV:   negative for chest pain, palpitations, lower extremity edema  GI:   negative for nausea, vomiting, diarrhea, abdominal pain,melena  Endo:               negative for polyuria,polydipsia,polyphagia,heat intolerance  Genitourinary: negative for frequency, dysuria and hematuria  Integument:  negative for rash and pruritus  Hematologic:  negative for easy bruising and gum/nose bleeding  Musculoskel: negative for myalgias, arthralgias, back pain, muscle weakness, joint pain  Neurological:  negative for headaches, dizziness, vertigo, memory problems and gait     Past Medical History:   Diagnosis Date    Ascariosis     Asthma     Bipolar 1 disorder (HCC)     Chronic obstructive pulmonary disease (HCC)     Chronic pain     back, leg    Cocaine abuse (HCC)     Depression     Heart failure (HCC)     Hepatitis B     Heroin abuse (HCC)     HTN (hypertension)     Narcotic abuse ([facility] [address]     Polysubstance abuse ([facility] [address]     Sarcoidosis     Tobacco abuse      Past Surgical History:   Procedure Laterality Date    HX GYN      HX WISDOM TEETH EXTRACTION       Social History     Socioeconomic History    Marital status: SINGLE   Tobacco Use    Smoking status: Every Day     Packs/day: 0.25     Years: 15.00     Pack years: 3.75     Types: Cigarettes     Last attempt to quit: 8/3/2017     Years since quittin.5    Smokeless tobacco: Never    Tobacco comments:     \"I already quit\"   Substance and Sexual Activity    Alcohol use: No    Drug use: Yes     Types: Marijuana, Heroin, Cocaine, Inhalants, Opiates     Comment: Pt reports last used heroin on 20    Sexual activity: Yes     Partners: Female   Social History Narrative    Born in [place],     Single, 5 children,    No legal charges. Pt graduated from high school. Pt was employed last month at [employer], currently she is unemployed. Pt lives with her mother and 8year old son. She has 4 adult children.          Family History   Problem Relation Age of Onset    Hypertension Father     Hypertension Mother      Current Outpatient Medications   Medication Sig Dispense Refill    haloperidoL (HALDOL) 10 mg tablet Take 10 mg by mouth daily. sertraline (ZOLOFT) 100 mg tablet Take 100 mg by mouth daily. cloNIDine HCL (CATAPRES) 0.2 mg tablet Take 1 Tablet by mouth two (2) times a day. 60 Tablet 2    nicotine (NICODERM CQ) 21 mg/24 hr 1 Patch by TransDERmal route every twenty-four (24) hours for 30 days. 30 Patch 2    albuterol (ProAir HFA) 90 mcg/actuation inhaler Take 2 Puffs by inhalation every four (4) hours as needed for Wheezing or Shortness of Breath. Indications: bronchospasm prevention 1 Each 1    doxepin (SINEquan) 25 mg capsule Take 1 Capsule by mouth nightly. Indications: bipolar depression 30 Capsule 1    QUEtiapine SR (SEROquel XR) 400 mg sr tablet Take 1 Tablet by mouth nightly. Indications: bipolar depression 30 Tablet 1    busPIRone (BUSPAR) 5 mg tablet Take 1 Tablet by mouth two (2) times a day.  Indications: repeated episodes of anxiety (Patient not taking: Reported on 2/20/2023) 60 Tablet 1     Allergies   Allergen Reactions    Vancomycin Anaphylaxis    Ketorolac Unable to Obtain     From [facility] paperwork 1/19/22    Remeron [Mirtazapine] Unable to Obtain     From [facility] paperwork on 1/19/22    Tomato Swelling     Tongue    Trazodone Angioedema     3 most recent PHQ Screens 2/20/2023   Little interest or pleasure in doing things Several days   Feeling down, depressed, irritable, or hopeless Several days   Total Score PHQ 2 2     Objective:  Visit Vitals  BP (!) 144/88 (BP 1 Location: Left upper arm, BP Patient Position: Sitting, BP Cuff Size: Small adult)   Pulse 77   Temp 97.9 °F (36.6 °C) (Oral)   Resp 18   Ht 5' 5\" (1.651 m)   Wt 153 lb (69.4 kg)   LMP 02/15/2023 (Approximate)   SpO2 98%   BMI 25.46 kg/m²     Physical Exam:   General appearance - alert, well appearing, and in no distress  Mental status - alert, oriented to person, place, and time  Chest - coarse to auscultation, not cleared with cough, no wheezes, rales or rhonchi, symmetric air entry   Heart - normal rate, regular rhythm, normal S1, S2, no murmurs, rubs, clicks or gallops   Abdomen - soft, nontender, nondistended, no masses or organomegaly  Lymph- no adenopathy palpable  Ext-peripheral pulses normal, no pedal edema, no clubbing or cyanosis  Skin-Warm and dry. no hyperpigmentation, vitiligo, or suspicious lesions  Neuro -alert, oriented, normal speech, no focal findings or movement disorder noted    Results for orders placed or performed during the hospital encounter of 02/13/23   COVID-19 RAPID TEST   Result Value Ref Range    Specimen source Nasopharyngeal      COVID-19 rapid test Not detected NOTD     CBC WITH AUTOMATED DIFF   Result Value Ref Range    WBC 5.0 3.6 - 11.0 K/uL    RBC 3.99 3.80 - 5.20 M/uL    HGB 12.4 11.5 - 16.0 g/dL    HCT 37.8 35.0 - 47.0 %    MCV 94.7 80.0 - 99.0 FL    MCH 31.1 26.0 - 34.0 PG    MCHC 32.8 30.0 - 36.5 g/dL    RDW 13.2 11.5 - 14.5 %    PLATELET 847 527 - 456 K/uL    MPV 9.5 8.9 - 12.9 FL    NRBC 0.0 0  WBC    ABSOLUTE NRBC 0.00 0.00 - 0.01 K/uL    NEUTROPHILS 76 (H) 32 - 75 %    LYMPHOCYTES 17 12 - 49 %    MONOCYTES 7 5 - 13 %    EOSINOPHILS 0 0 - 7 %    BASOPHILS 0 0 - 1 %    IMMATURE GRANULOCYTES 0 0.0 - 0.5 %    ABS. NEUTROPHILS 3.7 1.8 - 8.0 K/UL    ABS. LYMPHOCYTES 0.8 0.8 - 3.5 K/UL    ABS. MONOCYTES 0.4 0.0 - 1.0 K/UL    ABS. EOSINOPHILS 0.0 0.0 - 0.4 K/UL    ABS. BASOPHILS 0.0 0.0 - 0.1 K/UL    ABS. IMM.  GRANS. 0.0 0.00 - 0.04 K/UL    DF AUTOMATED     NT-PRO BNP   Result Value Ref Range    NT pro- (H) <676 PG/ML   METABOLIC PANEL, COMPREHENSIVE   Result Value Ref Range    Sodium 137 136 - 145 mmol/L    Potassium 4.5 3.5 - 5.1 mmol/L    Chloride 103 97 - 108 mmol/L    CO2 29 21 - 32 mmol/L    Anion gap 5 5 - 15 mmol/L    Glucose 103 (H) 65 - 100 mg/dL    BUN 9 6 - 20 MG/DL    Creatinine 0.74 0.55 - 1.02 MG/DL    BUN/Creatinine ratio 12 12 - 20      eGFR >60 >60 ml/min/1.73m2    Calcium 8.4 (L) 8.5 - 10.1 MG/DL    Bilirubin, total 0.3 0.2 - 1.0 MG/DL    ALT (SGPT) 30 12 - 78 U/L    AST (SGOT) 37 15 - 37 U/L    Alk. phosphatase 121 (H) 45 - 117 U/L    Protein, total 7.1 6.4 - 8.2 g/dL    Albumin 3.1 (L) 3.5 - 5.0 g/dL    Globulin 4.0 2.0 - 4.0 g/dL    A-G Ratio 0.8 (L) 1.1 - 2.2     TROPONIN-HIGH SENSITIVITY   Result Value Ref Range    Troponin-High Sensitivity <4 0 - 51 ng/L   SAMPLES BEING HELD   Result Value Ref Range    SAMPLES BEING HELD HEML     COMMENT        Add-on orders for these samples will be processed based on acceptable specimen integrity and analyte stability, which may vary by analyte. INFLUENZA A+B VIRAL AGS   Result Value Ref Range    Influenza A Antigen Negative NEG      Influenza B Antigen Negative NEG     POC LACTIC ACID   Result Value Ref Range    Lactic Acid (POC) 0.77 0.40 - 2.00 mmol/L   EKG, 12 LEAD, INITIAL   Result Value Ref Range    Ventricular Rate 64 BPM    Atrial Rate 64 BPM    P-R Interval 126 ms    QRS Duration 82 ms    Q-T Interval 454 ms    QTC Calculation (Bezet) 468 ms    Calculated P Axis 55 degrees    Calculated R Axis 71 degrees    Calculated T Axis 76 degrees    Diagnosis       Normal sinus rhythm  Normal ECG  When compared with ECG of 05-JAN-2023 12:20,  No significant change was found  Confirmed by Nohemi Reed ([id number]) on 2/13/2023 10:17:11 AM         Assessment/Plan:    Medication Side Effects and Warnings were discussed with patient: yes   Patient Labs were reviewed: yes  Patient Past Records were reviewed: yes  See orders below      ICD-10-CM ICD-9-CM    1. History of heart failure  Z86.79 V12.59 REFERRAL TO CARDIOLOGY      cloNIDine HCL (CATAPRES) 0.2 mg tablet      2. Hypertension, unspecified type  I10 401.9 cloNIDine HCL (CATAPRES) 0.2 mg tablet      3. Encounter for smoking cessation counseling  Z71.6 V65.42 nicotine (NICODERM CQ) 21 mg/24 hr     305.1       4. History of hepatitis B  Z86.19 V12.09 REFERRAL TO GASTROENTEROLOGY      5.  Colon cancer screening  Z12.11 V76.51 REFERRAL TO GASTROENTEROLOGY 6. Restless legs syndrome (RLS)  G25.81 333.94 REFERRAL TO NEUROLOGY      7. Abnormal lung sounds  R09.89 786.7 Follow up with your pulmonologist.  Use your albuterol as needed for shortness of breath. Continue cough and deep breath exercise. Stop smoking and use nicotine patch as instructed. If any fever, shortness of breath, chest pain or any abnormalities, go to the ER immediately. Orders Placed This Encounter    [address] [place] [address] Cardiology [address] [facility]     Referral Priority:   Routine     Referral Type:   Consultation     Referral Reason:   Specialty Services Required     Referred to Provider:   Stephanie Olivera MD     Number of Visits Requested:   1    [facility] [address] [facility]     Referral Priority:   Routine     Referral Type:   Consultation     Referral Reason:   Specialty Services Required     Referred to Provider:   Brianna Davis MD     Requested Specialty:   Gastroenterology     Number of Visits Requested:   [address] Neurology [address] [facility]     Referral Priority:   Routine     Referral Type:   Consultation     Referral Reason:   Specialty Services Required     Referred to Provider:   DAISY Leonardo     Number of Visits Requested:   1    DISCONTD: cloNIDine HCL (CATAPRES) 0.3 mg tablet     Sig: Take 0.3 mg by mouth two (2) times a day.    haloperidoL (HALDOL) 10 mg tablet     Sig: Take 10 mg by mouth daily. sertraline (ZOLOFT) 100 mg tablet     Sig: Take 100 mg by mouth daily. cloNIDine HCL (CATAPRES) 0.2 mg tablet     Sig: Take 1 Tablet by mouth two (2) times a day. Dispense:  60 Tablet     Refill:  2    nicotine (NICODERM CQ) 21 mg/24 hr     Si Patch by TransDERmal route every twenty-four (24) hours for 30 days. Dispense:  30 Patch     Refill:  2       Patient Instructions   Call and make your appointment with cardiologist, neurologist and gastroenterologist as discussed.         Follow-up and Dispositions    Return in about 4 weeks (around 3/20/2023) for OV: Annual and labs. I have reviewed with the patient details of the assessment and plan and all questions were answered. Relevent patient education was performed. The most recent lab findings were reviewed with the patient. An After Visit Summary was printed and given to the patient.     Signed By: Giuliana Castellon NP     February 20, 2023         -----------------------------------------------------------------------------------------------------------------------------------------

## 2023-03-05 NOTE — PROGRESS NOTES
Orthopedic Consult Note    S: Pt was running this morning tripped and fell onto her R arm in an outstretched fashion; she had immediate pain and deformity. Pain well controlled with medications; Denies numbness, tingling, focal weakness, elbow or other extremity pain. O: NAD, respirations unlabored; R wrist with obvious deformity, skin closed. TTP at fracture site, nonTTP elbow, fingers. Moves all digits, SILT median, ulnar, radial distribution. Radial pulse 2+, CRB all digits. No data found. No results for input(s): HGB, HCT, PLT, BUN, CREA, K, NA, HGBEXT, HCTEXT, PLTEXT in the last 72 hours. XR ELBOW RT AP/LAT  Narrative: EXAM: XR WRIST RT AP/LAT, XR FOREARM RT AP/LAT, XR ELBOW RT AP/LAT    INDICATION: Trauma. prob forearm fx. COMPARISON: None. FINDINGS:     Two views of the right wrist demonstrate an acute distal radius fracture with  dorsal impaction. There is marked osteopenia. A moderate deformity of the distal  radius is seen. Two views of the right radius and ulna demonstrate no fracture of the midshaft. There are multiple radiopaque flecks along the palmar aspect of the distal  forearm, embedded in what appears to be a laceration. Diffuse osteopenia is  present. 2 views of the right elbow demonstrate no fracture, dislocation, effusion or  other abnormality. Diffuse osteopenia. Impression: Acute distal radial dorsally impacted fracture  Palmar forearm laceration with multiple embedded foreign bodies  Osteopenic right elbow  XR WRIST RT AP/LAT  Narrative: EXAM: XR WRIST RT AP/LAT, XR FOREARM RT AP/LAT, XR ELBOW RT AP/LAT    INDICATION: Trauma. prob forearm fx. COMPARISON: None. FINDINGS:     Two views of the right wrist demonstrate an acute distal radius fracture with  dorsal impaction. There is marked osteopenia. A moderate deformity of the distal  radius is seen. Two views of the right radius and ulna demonstrate no fracture of the midshaft.   There are multiple radiopaque Problem: Falls - Risk of  Goal: *Absence of Falls  Description: Document Shawn Bare Fall Risk and appropriate interventions in the flowsheet.   Outcome: Progressing Towards Goal  Note: Fall Risk Interventions:            Medication Interventions: Teach patient to arise slowly                   Problem: Altered Thought Process (Adult/Pediatric)  Goal: *STG: Participates in treatment plan  Outcome: Progressing Towards Goal  Note: Patient is participatory in treatment team.  Goal: *STG: Complies with medication therapy  Outcome: Progressing Towards Goal  Goal: Interventions  Outcome: Progressing Towards Goal flecks along the palmar aspect of the distal  forearm, embedded in what appears to be a laceration. Diffuse osteopenia is  present. 2 views of the right elbow demonstrate no fracture, dislocation, effusion or  other abnormality. Diffuse osteopenia. Impression: Acute distal radial dorsally impacted fracture  Palmar forearm laceration with multiple embedded foreign bodies  Osteopenic right elbow  XR FOREARM RT AP/LAT  Narrative: EXAM: XR WRIST RT AP/LAT, XR FOREARM RT AP/LAT, XR ELBOW RT AP/LAT    INDICATION: Trauma. prob forearm fx. COMPARISON: None. FINDINGS:     Two views of the right wrist demonstrate an acute distal radius fracture with  dorsal impaction. There is marked osteopenia. A moderate deformity of the distal  radius is seen. Two views of the right radius and ulna demonstrate no fracture of the midshaft. There are multiple radiopaque flecks along the palmar aspect of the distal  forearm, embedded in what appears to be a laceration. Diffuse osteopenia is  present. 2 views of the right elbow demonstrate no fracture, dislocation, effusion or  other abnormality. Diffuse osteopenia. Impression: Acute distal radial dorsally impacted fracture  Palmar forearm laceration with multiple embedded foreign bodies  Osteopenic right elbow       A/P:  Closed Right Distal Radius fracture without complications; NVI    See separate Procedure note. Stay in splint, keep elevated, sling for comfort  APAP, NSAIDs for pain, Ice. Stay NWB RUE  Make follow up appointment with Dr. Gm Novoa or Debo early next week. Call first thing Monday morning.      AIDEN Yanez  Orthopedic Trauma Service  [address] [facility]

## 2023-03-29 ENCOUNTER — OFFICE VISIT (OUTPATIENT)
Dept: INTERNAL MEDICINE CLINIC | Age: 48
End: 2023-03-29
Payer: MEDICAID

## 2023-03-29 VITALS
BODY MASS INDEX: 25.16 KG/M2 | WEIGHT: 151 LBS | RESPIRATION RATE: 17 BRPM | HEIGHT: 65 IN | DIASTOLIC BLOOD PRESSURE: 66 MMHG | OXYGEN SATURATION: 95 % | SYSTOLIC BLOOD PRESSURE: 106 MMHG | TEMPERATURE: 98.2 F | HEART RATE: 103 BPM

## 2023-03-29 DIAGNOSIS — Z01.89 ENCOUNTER FOR LABORATORY TEST: Primary | ICD-10-CM

## 2023-03-29 PROCEDURE — 99213 OFFICE O/P EST LOW 20 MIN: CPT

## 2023-03-29 RX ORDER — FLUTICASONE FUROATE, UMECLIDINIUM BROMIDE AND VILANTEROL TRIFENATATE 200; 62.5; 25 UG/1; UG/1; UG/1
POWDER RESPIRATORY (INHALATION)
COMMUNITY
Start: 2023-01-25

## 2023-03-29 RX ORDER — HYDROXYZINE PAMOATE 25 MG/1
CAPSULE ORAL
COMMUNITY
Start: 2023-02-28

## 2023-03-29 RX ORDER — SACUBITRIL AND VALSARTAN 24; 26 MG/1; MG/1
1 TABLET, FILM COATED ORAL 2 TIMES DAILY
COMMUNITY
Start: 2023-03-15

## 2023-03-29 RX ORDER — SPIRONOLACTONE 25 MG/1
25 TABLET ORAL DAILY
COMMUNITY
Start: 2023-03-15

## 2023-03-29 RX ORDER — IPRATROPIUM BROMIDE 42 UG/1
SPRAY, METERED NASAL
COMMUNITY
Start: 2023-03-10

## 2023-03-29 NOTE — PATIENT INSTRUCTIONS
Will call with lab results and discuss plan accordingly.     Continue follow up with your specialists, cardiology, pulmonology, neurologist and psychiatrist.

## 2023-03-29 NOTE — PROGRESS NOTES
Chief Complaint   Patient presents with    Physical    Labs     1. Have you been to the ER, urgent care clinic since your last visit? Hospitalized since your last visit? No    2. Have you seen or consulted any other health care providers outside of the [address] [place] [address] since your last visit? Include any pap smears or colon screening.  No

## 2023-03-29 NOTE — PROGRESS NOTES
Omega Gregory is a 50 y.o. female , new patient, here for evaluation of the following chief complaint(s): Physical and Labs and asking to fill disability paperwork. Reported that our office doesn't fill paperwork until second visit. Subjective:    Patient was referred to cardiologist for heart failure and hypertension management, neurologist for restless leg syndrome and GI for hep c management. Patient states that she has appointment coming up with all the specialist. Following with pulmonologist for COPD management. Smoker and not ready to quit. Cervical cancer screening in 2022 in [facility], normal results. Colonoscopy, was referred to GI on last visit. Review of Systems  Constitutional: negative for fevers, chills, anorexia and weight loss  Eyes:   negative for visual disturbance and irritation  ENT:   negative for tinnitus,sore throat,nasal congestion,ear pains. hoarseness  Respiratory:  negative for hemoptysis, dyspnea, wheezing, positive for cough  CV:   negative for chest pain, palpitations, lower extremity edema  GI:   negative for nausea, vomiting, diarrhea, abdominal pain,melena  Endo:               negative for polyuria,polydipsia,polyphagia,heat intolerance  Genitourinary: negative for frequency, dysuria and hematuria  Integument:  negative for rash and pruritus  Hematologic:  negative for easy bruising and gum/nose bleeding  Musculoskel: negative for myalgias, arthralgias, back pain, muscle weakness, joint pain  Neurological:  negative for headaches, dizziness, vertigo, memory problems and gait     Past Medical History:   Diagnosis Date    Ascariosis     Asthma     Bipolar 1 disorder (HCC)     Chronic obstructive pulmonary disease (HCC)     Chronic pain     back, leg    Cocaine abuse ([address]     Depression     Heart failure (HCC)     Hepatitis B     Heroin abuse (HCC)     HTN (hypertension)     Narcotic abuse ([address]     Polysubstance abuse ([address]     Sarcoidosis     Tobacco abuse      Past Surgical History:   Procedure Laterality Date    HX GYN      HX WISDOM TEETH EXTRACTION       Social History     Socioeconomic History    Marital status: SINGLE   Tobacco Use    Smoking status: Every Day     Packs/day: 0.25     Years: 15.00     Pack years: 3.75     Types: Cigarettes     Last attempt to quit: 8/3/2017     Years since quittin.6    Smokeless tobacco: Never    Tobacco comments:     \"I already quit\"   Substance and Sexual Activity    Alcohol use: No    Drug use: Yes     Types: Marijuana, Heroin, Cocaine, Inhalants, Opiates     Comment: Pt reports last used heroin on 20    Sexual activity: Yes     Partners: Female   Social History Narrative    Born in [facility],     Single, 5 children,    No legal charges. Pt graduated from high school. Pt was employed last month at [employer], currently she is unemployed. Pt lives with her mother and 8year old son. She has 4 adult children. Family History   Problem Relation Age of Onset    Hypertension Father     Hypertension Mother      Current Outpatient Medications   Medication Sig Dispense Refill    spironolactone (ALDACTONE) 25 mg tablet Take 25 mg by mouth daily. hydrOXYzine pamoate (VISTARIL) 25 mg capsule TAKE 1 CAPSULE BY MOUTH TWICE A DAY AS NEEDED      tiotropium (Spiriva with HandiHaler) 18 mcg inhalation capsule USE 1 CAPSULE BY MOUTH DAILY      haloperidoL (HALDOL) 10 mg tablet Take 10 mg by mouth daily. sertraline (ZOLOFT) 100 mg tablet Take 100 mg by mouth daily. albuterol (ProAir HFA) 90 mcg/actuation inhaler Take 2 Puffs by inhalation every four (4) hours as needed for Wheezing or Shortness of Breath. Indications: bronchospasm prevention 1 Each 1    doxepin (SINEquan) 25 mg capsule Take 1 Capsule by mouth nightly. Indications: bipolar depression 30 Capsule 1    QUEtiapine SR (SEROquel XR) 400 mg sr tablet Take 1 Tablet by mouth nightly.  Indications: bipolar depression 30 Tablet 1    Trelegy Ellipta 200-62.5-25 mcg inhaler TAKE 1 PUFF BY MOUTH EVERY DAY      ipratropium (ATROVENT) 42 mcg (0.06 %) nasal spray SPRAY 1 SPRAY INTO EACH NOSTRIL 3 TIMES A DAY AS NEEDED      Entresto 24-26 mg tablet Take 1 Tablet by mouth two (2) times a day. Allergies   Allergen Reactions    Vancomycin Anaphylaxis    Ketorolac Unable to Obtain     From [facility] paperwork 1/19/22    Remeron [Mirtazapine] Unable to Obtain     From [facility] paperwork on 1/19/22    Tomato Swelling     Tongue    Trazodone Angioedema     3 most recent PHQ Screens 2/20/2023   Little interest or pleasure in doing things Several days   Feeling down, depressed, irritable, or hopeless Several days   Total Score PHQ 2 2     Objective:  Visit Vitals  /66 (BP 1 Location: Left upper arm, BP Patient Position: Sitting, BP Cuff Size: Adult)   Pulse (!) 103   Temp 98.2 °F (36.8 °C) (Oral)   Resp 17   Ht 5' 5\" (1.651 m)   Wt 151 lb (68.5 kg)   LMP  (LMP Unknown)   SpO2 95%   BMI 25.13 kg/m²     Physical Exam:   General appearance - alert, well appearing, and in no distress  Mental status - alert, oriented to person, place, and time  Chest - coarse to auscultation, not cleared with cough, no wheezes, rales or rhonchi, symmetric air entry   Heart - normal rate, regular rhythm, normal S1, S2, no murmurs, rubs, clicks or gallops   Abdomen - soft, nontender, nondistended, no masses or organomegaly  Lymph- no adenopathy palpable  Ext-peripheral pulses normal, no pedal edema, no clubbing or cyanosis  Skin-Warm and dry. no hyperpigmentation, vitiligo, or suspicious lesions  Neuro -alert, oriented, normal speech, no focal findings or movement disorder noted    Assessment/Plan:    Medication Side Effects and Warnings were discussed with patient: yes   Patient Labs were reviewed: yes  Patient Past Records were reviewed: yes  See orders below    ICD-10-CM ICD-9-CM    1.  Encounter for laboratory test  W58.76 B68.09 METABOLIC PANEL, COMPREHENSIVE      CBC W/O DIFF      HEMOGLOBIN A1C WITH EAG LIPID PANEL      VITAMIN D, 25 HYDROXY                                       Orders Placed This Encounter    METABOLIC PANEL, COMPREHENSIVE     Standing Status:   Future     Number of Occurrences:   1     Standing Expiration Date:   3/29/2024    CBC W/O DIFF     Standing Status:   Future     Number of Occurrences:   1     Standing Expiration Date:   3/29/2024    HEMOGLOBIN A1C WITH EAG     Standing Status:   Future     Number of Occurrences:   1     Standing Expiration Date:   3/29/2024    LIPID PANEL     Standing Status:   Future     Number of Occurrences:   1     Standing Expiration Date:   3/29/2024    VITAMIN D, 25 HYDROXY     Standing Status:   Future     Number of Occurrences:   1     Standing Expiration Date:   3/29/2024    spironolactone (ALDACTONE) 25 mg tablet     Sig: Take 25 mg by mouth daily. hydrOXYzine pamoate (VISTARIL) 25 mg capsule     Sig: TAKE 1 CAPSULE BY MOUTH TWICE A DAY AS NEEDED    Trelegy Ellipta 200-62.5-25 mcg inhaler     Sig: TAKE 1 PUFF BY MOUTH EVERY DAY    ipratropium (ATROVENT) 42 mcg (0.06 %) nasal spray     Sig: SPRAY 1 SPRAY INTO EACH NOSTRIL 3 TIMES A DAY AS NEEDED    Entresto 24-26 mg tablet     Sig: Take 1 Tablet by mouth two (2) times a day. tiotropium (Spiriva with HandiHaler) 18 mcg inhalation capsule     Sig: USE 1 CAPSULE BY MOUTH DAILY       Patient Instructions   Will call with lab results and discuss plan accordingly. Continue follow up with your specialists, cardiology, pulmonology, neurologist and psychiatrist.       Verónica Riedel and Dispositions    Return in about 1 year (around 3/29/2024) for OV: Annual and labs. I have reviewed with the patient details of the assessment and plan and all questions were answered. Relevent patient education was performed. The most recent lab findings were reviewed with the patient. An After Visit Summary was printed and given to the patient.     Signed By: Skyla Craven NP     March 29, 2023 -----------------------------------------------------------------------------------------------------------------------------------------

## 2023-03-30 DIAGNOSIS — E55.9 VITAMIN D INSUFFICIENCY: Primary | ICD-10-CM

## 2023-03-30 LAB
25(OH)D3 SERPL-MCNC: 23.4 NG/ML (ref 30–100)
ALBUMIN SERPL-MCNC: 3.9 G/DL (ref 3.5–5)
ALBUMIN/GLOB SERPL: 1 (ref 1.1–2.2)
ALP SERPL-CCNC: 130 U/L (ref 45–117)
ALT SERPL-CCNC: 29 U/L (ref 12–78)
ANION GAP SERPL CALC-SCNC: 2 MMOL/L (ref 5–15)
AST SERPL-CCNC: 28 U/L (ref 15–37)
BILIRUB SERPL-MCNC: 0.2 MG/DL (ref 0.2–1)
BUN SERPL-MCNC: 13 MG/DL (ref 6–20)
BUN/CREAT SERPL: 14 (ref 12–20)
CALCIUM SERPL-MCNC: 9.4 MG/DL (ref 8.5–10.1)
CHLORIDE SERPL-SCNC: 104 MMOL/L (ref 97–108)
CHOLEST SERPL-MCNC: 252 MG/DL
CO2 SERPL-SCNC: 31 MMOL/L (ref 21–32)
CREAT SERPL-MCNC: 0.92 MG/DL (ref 0.55–1.02)
ERYTHROCYTE [DISTWIDTH] IN BLOOD BY AUTOMATED COUNT: 13.4 % (ref 11.5–14.5)
EST. AVERAGE GLUCOSE BLD GHB EST-MCNC: 117 MG/DL
GLOBULIN SER CALC-MCNC: 3.8 G/DL (ref 2–4)
GLUCOSE SERPL-MCNC: 89 MG/DL (ref 65–100)
HBA1C MFR BLD: 5.7 % (ref 4–5.6)
HCT VFR BLD AUTO: 43.6 % (ref 35–47)
HDLC SERPL-MCNC: 95 MG/DL
HDLC SERPL: 2.7 (ref 0–5)
HGB BLD-MCNC: 13.9 G/DL (ref 11.5–16)
LDLC SERPL CALC-MCNC: 138.2 MG/DL (ref 0–100)
MCH RBC QN AUTO: 30.5 PG (ref 26–34)
MCHC RBC AUTO-ENTMCNC: 31.9 G/DL (ref 30–36.5)
MCV RBC AUTO: 95.6 FL (ref 80–99)
NRBC # BLD: 0 K/UL (ref 0–0.01)
NRBC BLD-RTO: 0 PER 100 WBC
PLATELET # BLD AUTO: 249 K/UL (ref 150–400)
PMV BLD AUTO: 10.4 FL (ref 8.9–12.9)
POTASSIUM SERPL-SCNC: 4.1 MMOL/L (ref 3.5–5.1)
PROT SERPL-MCNC: 7.7 G/DL (ref 6.4–8.2)
RBC # BLD AUTO: 4.56 M/UL (ref 3.8–5.2)
SODIUM SERPL-SCNC: 137 MMOL/L (ref 136–145)
TRIGL SERPL-MCNC: 94 MG/DL (ref ?–150)
VLDLC SERPL CALC-MCNC: 18.8 MG/DL
WBC # BLD AUTO: 4.6 K/UL (ref 3.6–11)

## 2023-03-30 RX ORDER — MELATONIN
1000 DAILY
Qty: 90 TABLET | Refills: 1 | Status: SHIPPED | OUTPATIENT
Start: 2023-03-30

## 2023-03-30 NOTE — PROGRESS NOTES
Called patient twice to discuss lab results, no answer, left voicemail, elevated ALK, history of hep B and hep C positive, low vitamin D, daily supplement of vitamin D 1000 units sent to the pharmacy, abnormal lipids and A1c, patient to follow low-cholesterol, carb, low-fat diet, walk daily for 30 minutes for total of 150 minutes/week, will repeat labs next appointment.

## 2023-07-12 ENCOUNTER — HOSPITAL ENCOUNTER (EMERGENCY)
Facility: HOSPITAL | Age: 48
Discharge: HOME | End: 2023-07-12
Attending: EMERGENCY MEDICINE
Payer: COMMERCIAL

## 2023-07-12 VITALS
OXYGEN SATURATION: 99 % | BODY MASS INDEX: 25.01 KG/M2 | HEART RATE: 95 BPM | TEMPERATURE: 98.4 F | SYSTOLIC BLOOD PRESSURE: 112 MMHG | HEIGHT: 65 IN | RESPIRATION RATE: 16 BRPM | WEIGHT: 150.13 LBS | DIASTOLIC BLOOD PRESSURE: 86 MMHG

## 2023-07-12 DIAGNOSIS — T40.601A OPIATE OVERDOSE, ACCIDENTAL OR UNINTENTIONAL, INITIAL ENCOUNTER (HCC): Primary | ICD-10-CM

## 2023-07-12 PROCEDURE — 99283 EMERGENCY DEPT VISIT LOW MDM: CPT

## 2023-07-12 NOTE — ED NOTES
Pt appears to be resting comfortably in bed at this time. Respirations even and unlabored.  Remains on continuous cardiac monitoring      Smitha Weinstein RN  07/12/23 [id number]

## 2023-07-12 NOTE — ED NOTES
Pt unable to ambulate and remains drowsy. Will continue to monitor.  Family at bedside      Cass Reyes, [place]  07/12/23 2855

## 2023-07-12 NOTE — ED PROVIDER NOTES
EMERGENCY DEPARTMENT HISTORY AND PHYSICAL EXAM    Date: 2023  Patient Name: Nohemi Good  Patient Age and Sex: 50 y.o. female  MRN:  [id number]  CSN:  [id number]    History of Present Illness     Chief Complaint   Patient presents with    Drug Overdose     Pt arrives via EMS from outpatient rehab clinic. Pt has a hx of etoh/opiod abuse. EMS believes pt overdosed on methadone, no narcan given PTA. Pt wears 2LNC at baseline for COPD but EMS reports she was not on anything at the facility-pt is on room air and in the upper 90s. Pt is currently asleep, with a patent airway. History Provided By: Patient's     Ability to gather history was limited by: Altered mental status, somnolence    HPI: Nohemi Good, 50 y.o. female   With history of tobacco abuse, COPD on 2 L home oxygen, bipolar disorder, cocaine abuse, methadone, heroin abuse, polysubstance abuse, brought to the emergency department from her methadone clinic where she reportedly was minimally responsive. She was noted to have pinpoint pupils by EMS and was felt to have most likely overdosed on opioids. There were no reported falls or injuries. She is not anticoagulated. She is unable to provide any history secondary to somnolence      Tobacco Use      Smoking status: Every Day        Packs/day: 0.25        Types: Cigarettes        Last attempt to quit: 8/3/2017        Years since quittin.9      Smokeless tobacco: Never     Past History   The patient's medical, surgical, and social history were reviewed by me today. Current Medications:  No current facility-administered medications on file prior to encounter.      Current Outpatient Medications on File Prior to Encounter   Medication Sig Dispense Refill    albuterol sulfate HFA (PROVENTIL;VENTOLIN;PROAIR) 108 (90 Base) MCG/ACT inhaler Inhale 2 puffs into the lungs every 4 hours as needed      busPIRone (BUSPAR) 5 MG tablet Take by mouth 2 times daily      cloNIDine (CATAPRES) 0.2 MG

## 2023-07-12 NOTE — ED NOTES
Pt discharged via hospital to home back home with  accompanying pt   Ambulatory upon [address], RN  07/12/23 0002

## 2023-07-12 NOTE — DISCHARGE INSTRUCTIONS
You are seen in the emergency department today for altered mental status. You were observed with an improvement in her mental status. You should follow-up with the provider who prescribes her methadone for dose adjustments to ensure that there are no further complications from your methadone use. When taking methadone, it is important to avoid taking any further opioid containing medications as these may cause sedation.

## 2023-07-12 NOTE — ED NOTES
Went over all dc papers with pt at time of dc. Pt verbalized understanding. Iv removed tip intact .    Domingo Fabian, RN  07/12/23 [address], RN  07/12/23 2168

## 2023-07-17 ENCOUNTER — HOSPITAL ENCOUNTER (INPATIENT)
Facility: HOSPITAL | Age: 48
LOS: 1 days | Discharge: LEFT BEFORE DISCHARGE | DRG: 754 | End: 2023-07-18
Attending: EMERGENCY MEDICINE | Admitting: PSYCHIATRY & NEUROLOGY
Payer: COMMERCIAL

## 2023-07-17 DIAGNOSIS — F14.10 COCAINE ABUSE (HCC): ICD-10-CM

## 2023-07-17 DIAGNOSIS — R45.851 DEPRESSION WITH SUICIDAL IDEATION: Primary | ICD-10-CM

## 2023-07-17 DIAGNOSIS — Z91.148 NONCOMPLIANCE WITH MEDICATION REGIMEN: ICD-10-CM

## 2023-07-17 DIAGNOSIS — F32.A DEPRESSION WITH SUICIDAL IDEATION: Primary | ICD-10-CM

## 2023-07-17 DIAGNOSIS — F11.10 OPIOID ABUSE (HCC): ICD-10-CM

## 2023-07-17 LAB
ALBUMIN SERPL-MCNC: 3.2 G/DL (ref 3.5–5)
ALBUMIN/GLOB SERPL: 1 (ref 1.1–2.2)
ALP SERPL-CCNC: 74 U/L (ref 45–117)
ALT SERPL-CCNC: 20 U/L (ref 12–78)
AMPHET UR QL SCN: NEGATIVE
ANION GAP SERPL CALC-SCNC: 7 MMOL/L (ref 5–15)
APPEARANCE UR: ABNORMAL
AST SERPL-CCNC: 30 U/L (ref 15–37)
BACTERIA URNS QL MICRO: NEGATIVE /HPF
BARBITURATES UR QL SCN: NEGATIVE
BASOPHILS # BLD: 0 K/UL (ref 0–0.1)
BASOPHILS NFR BLD: 1 % (ref 0–1)
BENZODIAZ UR QL: POSITIVE
BILIRUB SERPL-MCNC: 0.2 MG/DL (ref 0.2–1)
BILIRUB UR QL: NEGATIVE
BUN SERPL-MCNC: 6 MG/DL (ref 6–20)
BUN/CREAT SERPL: 7 (ref 12–20)
CALCIUM SERPL-MCNC: 8.3 MG/DL (ref 8.5–10.1)
CANNABINOIDS UR QL SCN: POSITIVE
CAOX CRY URNS QL MICRO: ABNORMAL
CHLORIDE SERPL-SCNC: 100 MMOL/L (ref 97–108)
CO2 SERPL-SCNC: 30 MMOL/L (ref 21–32)
COCAINE UR QL SCN: POSITIVE
COLOR UR: ABNORMAL
CREAT SERPL-MCNC: 0.87 MG/DL (ref 0.55–1.02)
DATE LAST DOSE: ABNORMAL
DIFFERENTIAL METHOD BLD: ABNORMAL
DOSE AMOUNT: ABNORMAL UNITS
DOSE DATE/TIME: ABNORMAL
EKG ATRIAL RATE: 53 BPM
EKG DIAGNOSIS: NORMAL
EKG P AXIS: 53 DEGREES
EKG P-R INTERVAL: 130 MS
EKG Q-T INTERVAL: 470 MS
EKG QRS DURATION: 82 MS
EKG QTC CALCULATION (BAZETT): 441 MS
EKG R AXIS: 75 DEGREES
EKG T AXIS: 69 DEGREES
EKG VENTRICULAR RATE: 53 BPM
EOSINOPHIL # BLD: 0.2 K/UL (ref 0–0.4)
EOSINOPHIL NFR BLD: 5 % (ref 0–7)
EPITH CASTS URNS QL MICRO: ABNORMAL /LPF
ERYTHROCYTE [DISTWIDTH] IN BLOOD BY AUTOMATED COUNT: 13.2 % (ref 11.5–14.5)
ETHANOL SERPL-MCNC: <10 MG/DL (ref 0–0.08)
FLUAV RNA SPEC QL NAA+PROBE: NOT DETECTED
FLUBV RNA SPEC QL NAA+PROBE: NOT DETECTED
GLOBULIN SER CALC-MCNC: 3.1 G/DL (ref 2–4)
GLUCOSE SERPL-MCNC: 90 MG/DL (ref 65–100)
GLUCOSE UR STRIP.AUTO-MCNC: NEGATIVE MG/DL
HCG UR QL: NEGATIVE
HCT VFR BLD AUTO: 39.1 % (ref 35–47)
HGB BLD-MCNC: 13.2 G/DL (ref 11.5–16)
HGB UR QL STRIP: NEGATIVE
IMM GRANULOCYTES # BLD AUTO: 0 K/UL (ref 0–0.04)
IMM GRANULOCYTES NFR BLD AUTO: 0 % (ref 0–0.5)
KETONES UR QL STRIP.AUTO: ABNORMAL MG/DL
LEUKOCYTE ESTERASE UR QL STRIP.AUTO: ABNORMAL
LITHIUM SERPL-SCNC: <0.2 MMOL/L (ref 0.6–1.2)
LYMPHOCYTES # BLD: 1.8 K/UL (ref 0.8–3.5)
LYMPHOCYTES NFR BLD: 44 % (ref 12–49)
Lab: ABNORMAL
MCH RBC QN AUTO: 31 PG (ref 26–34)
MCHC RBC AUTO-ENTMCNC: 33.8 G/DL (ref 30–36.5)
MCV RBC AUTO: 91.8 FL (ref 80–99)
METHADONE UR QL: POSITIVE
MONOCYTES # BLD: 0.3 K/UL (ref 0–1)
MONOCYTES NFR BLD: 9 % (ref 5–13)
NEUTS SEG # BLD: 1.6 K/UL (ref 1.8–8)
NEUTS SEG NFR BLD: 41 % (ref 32–75)
NITRITE UR QL STRIP.AUTO: NEGATIVE
NRBC # BLD: 0 K/UL (ref 0–0.01)
NRBC BLD-RTO: 0 PER 100 WBC
OPIATES UR QL: NEGATIVE
PCP UR QL: NEGATIVE
PH UR STRIP: 6.5 (ref 5–8)
PLATELET # BLD AUTO: 196 K/UL (ref 150–400)
PMV BLD AUTO: 9.4 FL (ref 8.9–12.9)
POTASSIUM SERPL-SCNC: 3.7 MMOL/L (ref 3.5–5.1)
PROT SERPL-MCNC: 6.3 G/DL (ref 6.4–8.2)
PROT UR STRIP-MCNC: NEGATIVE MG/DL
RBC # BLD AUTO: 4.26 M/UL (ref 3.8–5.2)
RBC #/AREA URNS HPF: ABNORMAL /HPF (ref 0–5)
SARS-COV-2 RNA RESP QL NAA+PROBE: NOT DETECTED
SODIUM SERPL-SCNC: 137 MMOL/L (ref 136–145)
SP GR UR REFRACTOMETRY: 1.02
URINE CULTURE IF INDICATED: ABNORMAL
UROBILINOGEN UR QL STRIP.AUTO: 1 EU/DL (ref 0.2–1)
WBC # BLD AUTO: 3.9 K/UL (ref 3.6–11)
WBC URNS QL MICRO: ABNORMAL /HPF (ref 0–4)

## 2023-07-17 PROCEDURE — 90791 PSYCH DIAGNOSTIC EVALUATION: CPT

## 2023-07-17 PROCEDURE — 85025 COMPLETE CBC W/AUTO DIFF WBC: CPT

## 2023-07-17 PROCEDURE — 99285 EMERGENCY DEPT VISIT HI MDM: CPT

## 2023-07-17 PROCEDURE — 36415 COLL VENOUS BLD VENIPUNCTURE: CPT

## 2023-07-17 PROCEDURE — 81001 URINALYSIS AUTO W/SCOPE: CPT

## 2023-07-17 PROCEDURE — 1240000000 HC EMOTIONAL WELLNESS R&B

## 2023-07-17 PROCEDURE — 81025 URINE PREGNANCY TEST: CPT

## 2023-07-17 PROCEDURE — 80053 COMPREHEN METABOLIC PANEL: CPT

## 2023-07-17 PROCEDURE — 82077 ASSAY SPEC XCP UR&BREATH IA: CPT

## 2023-07-17 PROCEDURE — 93005 ELECTROCARDIOGRAM TRACING: CPT | Performed by: EMERGENCY MEDICINE

## 2023-07-17 PROCEDURE — 80178 ASSAY OF LITHIUM: CPT

## 2023-07-17 PROCEDURE — 80307 DRUG TEST PRSMV CHEM ANLYZR: CPT

## 2023-07-17 PROCEDURE — 87636 SARSCOV2 & INF A&B AMP PRB: CPT

## 2023-07-17 RX ORDER — DIPHENHYDRAMINE HYDROCHLORIDE 50 MG/ML
50 INJECTION INTRAMUSCULAR; INTRAVENOUS EVERY 4 HOURS PRN
Status: DISCONTINUED | OUTPATIENT
Start: 2023-07-17 | End: 2023-07-18 | Stop reason: HOSPADM

## 2023-07-17 RX ORDER — NICOTINE 21 MG/24HR
1 PATCH, TRANSDERMAL 24 HOURS TRANSDERMAL DAILY
Status: DISCONTINUED | OUTPATIENT
Start: 2023-07-17 | End: 2023-07-18 | Stop reason: HOSPADM

## 2023-07-17 RX ORDER — HALOPERIDOL 5 MG/ML
5 INJECTION INTRAMUSCULAR EVERY 4 HOURS PRN
Status: DISCONTINUED | OUTPATIENT
Start: 2023-07-17 | End: 2023-07-18 | Stop reason: HOSPADM

## 2023-07-17 RX ORDER — HYDROXYZINE HYDROCHLORIDE 25 MG/1
50 TABLET, FILM COATED ORAL 3 TIMES DAILY PRN
Status: DISCONTINUED | OUTPATIENT
Start: 2023-07-17 | End: 2023-07-18 | Stop reason: HOSPADM

## 2023-07-17 RX ORDER — ACETAMINOPHEN 325 MG/1
650 TABLET ORAL EVERY 4 HOURS PRN
Status: DISCONTINUED | OUTPATIENT
Start: 2023-07-17 | End: 2023-07-18 | Stop reason: HOSPADM

## 2023-07-17 RX ORDER — POLYETHYLENE GLYCOL 3350 17 G/17G
17 POWDER, FOR SOLUTION ORAL DAILY PRN
Status: DISCONTINUED | OUTPATIENT
Start: 2023-07-17 | End: 2023-07-18 | Stop reason: HOSPADM

## 2023-07-17 RX ORDER — HALOPERIDOL 5 MG/1
5 TABLET ORAL EVERY 4 HOURS PRN
Status: DISCONTINUED | OUTPATIENT
Start: 2023-07-17 | End: 2023-07-18 | Stop reason: HOSPADM

## 2023-07-17 RX ORDER — MAGNESIUM HYDROXIDE/ALUMINUM HYDROXICE/SIMETHICONE 120; 1200; 1200 MG/30ML; MG/30ML; MG/30ML
30 SUSPENSION ORAL EVERY 6 HOURS PRN
Status: DISCONTINUED | OUTPATIENT
Start: 2023-07-17 | End: 2023-07-18 | Stop reason: HOSPADM

## 2023-07-17 ASSESSMENT — SLEEP AND FATIGUE QUESTIONNAIRES
DO YOU USE A SLEEP AID: COMMENT
DO YOU HAVE DIFFICULTY SLEEPING: YES
SLEEP PATTERN: DIFFICULTY FALLING ASLEEP;DISTURBED/INTERRUPTED SLEEP
AVERAGE NUMBER OF SLEEP HOURS: 6

## 2023-07-17 ASSESSMENT — LIFESTYLE VARIABLES
HOW OFTEN DO YOU HAVE A DRINK CONTAINING ALCOHOL: NEVER
HOW MANY STANDARD DRINKS CONTAINING ALCOHOL DO YOU HAVE ON A TYPICAL DAY: PATIENT DOES NOT DRINK

## 2023-07-17 ASSESSMENT — PAIN - FUNCTIONAL ASSESSMENT: PAIN_FUNCTIONAL_ASSESSMENT: 0-10

## 2023-07-17 ASSESSMENT — PAIN SCALES - GENERAL
PAINLEVEL_OUTOF10: 0
PAINLEVEL_OUTOF10: 0

## 2023-07-17 NOTE — PROGRESS NOTES
[facility] assessment completed, and suicide risk level noted to be high. Primary Nurse Star Odom and Physician Dr Catrachita Ricardo notified. Concerns not observed. Security/Off- has not been notified.

## 2023-07-17 NOTE — ED PROVIDER NOTES
[facility] EMERGENCY DEPT  EMERGENCY DEPARTMENT ENCOUNTER    Patient Name: Haydee Sanchez  MRN: [id number]  YOB: 1975  Provider: Raphael White MD  PCP: DESIREE Coleman NP   Time/Date of evaluation: 10:45 AM EDT on 7/17/23    History of Presenting Illness     Chief Complaint   Patient presents with    Mental Health Problem     History Provided by: Patient   History is limited by: Nothing    HISTORY (Narrative):   Haydee Sanchez is a 50 y.o. female with a PMHX of asthma, bipolar disorder, COPD, cocaine abuse, congestive heart failure, hepatitis B, opioid abuse, and sarcoidosis  who presents to the emergency department (room 6) by POV C/O depression with suicidal ideations. Patient states she has been feeling this way for the past week. She stopped taking her medications approximately a week and a half ago because she did not want to live. She tells me she plans on cutting her wrist.  Patient also tells me she has been using fentanyl for the past 2 weeks and last used yesterday. She denies any other illegal drug use. Nursing Notes were all reviewed and agreed with or any disagreements were addressed in the HPI.     Past History     PAST MEDICAL HISTORY:  Past Medical History:   Diagnosis Date    Ascariosis     Asthma     Bipolar 1 disorder (HCC)     Chronic obstructive pulmonary disease (HCC)     Chronic pain     back, leg    Cocaine abuse (HCC)     Depression     Heart failure (HCC)     Hepatitis B     Heroin abuse (HCC)     HTN (hypertension)     Narcotic abuse (HCC)     Polysubstance abuse ([address])     Sarcoidosis     Tobacco abuse        PAST SURGICAL HISTORY:  Past Surgical History:   Procedure Laterality Date    GYN      WISDOM TOOTH EXTRACTION         FAMILY HISTORY:  Family History   Problem Relation Age of Onset    Hypertension Father     Hypertension Mother        SOCIAL HISTORY:  Social History     Tobacco Use    Smoking status: Every Day     Packs/day: 0.25     Types: Cigarettes     Last

## 2023-07-17 NOTE — PROGRESS NOTES
Patient received on the unit at 1600. She is a voluntary patient. Patient endorses SI stating that she will not do anything to hurt herself in the hospital.  Per Ankush Holguin NP, okay to discontinue constant observation and suicide precautions. Denies HI/AVH, endorses a depression level of 8 and an anxiety level of 10. Patient came to the ED due to SI, she states, \"I wanted to hurt myself\". \"I didn't want to live\". Skin assessment performed by this nurse and Camila Fontenot RN. Patient has a couple of surgical scars on her abdomen, otherwise skin is unremarkable. Patient has had several SA in the past by OD. The most recent was 7 months ago. Patient states she feels helpless and has not been eating and sleeping. She has lost 15 pounds recently. Patient was positive for Methadone, Benzos, Cocaine and THC on admission. Patient oriented to the unit and walked to her room. Will continue to monitor for safety.

## 2023-07-17 NOTE — GROUP NOTE
Group Therapy Note    Date: 7/17/2023    Group Start Time: 1500  Group End Time: 1600  Group Topic: Recreational    [facility] [place] [address] [address]    [address]        Group Therapy Note    Attendees:        Patient's Goal:  To concentrate on selected task    Notes:  Pt did not attend session    Discipline Responsible: Recreational Therapist      Signature:  Shadia Duran

## 2023-07-17 NOTE — PLAN OF CARE
Problem: Self Harm/Suicidality  Goal: Will have no self-injury during hospital stay  Description: INTERVENTIONS:  1. Ensure constant observer at bedside with Q15M safety checks  2. Maintain a safe environment  3. Secure patient belongings  4. Ensure family/visitors adhere to safety recommendations  5. Ensure safety tray has been added to patient's diet order  6.   Every shift and PRN: Re-assess suicidal risk via Frequent Screener    Outcome: Progressing

## 2023-07-17 NOTE — ED NOTES
Pt difficult IV stick, attempt x4, 2 by zachary Robb, two times by this RN- once in Right hand and once in RAC, was able to obtain blood. But notified by lab quantity is insufficient. Charge RN made aware at this time pt will need US line.       Kofi Baird RN  07/17/23 7791

## 2023-07-17 NOTE — ED NOTES
TRANSFER - OUT REPORT:    Verbal report given to EBEN Minor on Calvin Weinstein  being transferred to [facility] [id number] for routine progression of patient care       Report consisted of patient's Situation, Background, Assessment and   Recommendations(SBAR). Information from the following report(s) Nurse Handoff Report, ED Encounter Summary, ED SBAR, Adult Overview, and Recent Results was reviewed with the receiving nurse. [place] Fall Assessment:    Presents to emergency department  because of falls (Syncope, seizure, or loss of consciousness): No  Age > 70: No  Altered Mental Status, Intoxication with alcohol or substance confusion (Disorientation, impaired judgment, poor safety awaremess, or inability to follow instructions): No  Impaired Mobility: Ambulates or transfers with assistive devices or assistance; Unable to ambulate or transer.: No  Nursing Judgement: No          Lines:       Opportunity for questions and clarification was provided.       Patient transported with:  Security, with belongingsWinston RN  07/17/23 6210

## 2023-07-17 NOTE — BSMART NOTE
Comprehensive Assessment Form Part 1      Section I - Disposition    Primary Diagnosis: Dual Dx: Major Depressive Disorder without psychosis and Substance use d/o (opiates/fentanyl)  Secondary Diagnosis: Substance use d/o (opiates/fentanyl)                           Bereavement  Polysubstance abuse (cocaine, heroin, THC), copd, CHF, sarcoidosis, bipolar disorder all by hx    The Medical Doctor to Psychiatrist conference was notcompleted. Medical doctor is in agreement with this counselor's assessment and plan of care. The plan is to admit to psych pending medical clearance. The provider consulted was Dr. Ramy Conti. The admitting Psychiatrist will be Dr. Nitza Fajardo. 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 The Payor source is [address] [place] [address]. [place] Suicide Scale - This writer reviewed the [place] Suicide Severity Rating Scale in nursing flow sheet and the risk level assigned is high. Based on this assessment, the risk of suicide is high and the plan is to admit pending medical clearance. Section II - Integrated Summary  Summary:     Per triage/provider:  Patient is a 49 yo female who arrives at ED via EMS with chief complaint of SI, depression with plan to overdose on pills X3 days. Per provider chart note on 7/14/23: Pt has history of tobacco abuse, COPD on 2 L home oxygen, bipolar disorder, cocaine abuse, methadone, heroin abuse, polysubstance abuse, brought to the emergency department from her methadone clinic where she reportedly was minimally responsive. She was noted to have pinpoint pupils by EMS and was felt to have most likely overdosed on opioids. Patient presents depressed, despondent, and tearful. She demonstrates poor eye contact, flat affect, and slow, low volume in speech. Patient reports not wanting to live anymore.  She had a ED admission on 7/12/23 for opiate OD and was subsequently

## 2023-07-18 VITALS
DIASTOLIC BLOOD PRESSURE: 78 MMHG | BODY MASS INDEX: 25.51 KG/M2 | HEIGHT: 64 IN | HEART RATE: 60 BPM | WEIGHT: 149.4 LBS | TEMPERATURE: 98.4 F | SYSTOLIC BLOOD PRESSURE: 129 MMHG | RESPIRATION RATE: 18 BRPM | OXYGEN SATURATION: 99 %

## 2023-07-18 PROCEDURE — 93010 ELECTROCARDIOGRAM REPORT: CPT | Performed by: SPECIALIST

## 2023-07-18 RX ORDER — METHADONE HYDROCHLORIDE 10 MG/5ML
90 SOLUTION ORAL DAILY
COMMUNITY

## 2023-07-18 RX ORDER — BUDESONIDE AND FORMOTEROL FUMARATE DIHYDRATE 160; 4.5 UG/1; UG/1
2 AEROSOL RESPIRATORY (INHALATION) 2 TIMES DAILY
Status: DISCONTINUED | OUTPATIENT
Start: 2023-07-18 | End: 2023-07-18 | Stop reason: HOSPADM

## 2023-07-18 RX ORDER — PRAZOSIN HYDROCHLORIDE 1 MG/1
2 CAPSULE ORAL NIGHTLY
Status: DISCONTINUED | OUTPATIENT
Start: 2023-07-18 | End: 2023-07-18 | Stop reason: HOSPADM

## 2023-07-18 RX ORDER — SPIRONOLACTONE 25 MG/1
25 TABLET ORAL DAILY
Status: DISCONTINUED | OUTPATIENT
Start: 2023-07-18 | End: 2023-07-18 | Stop reason: HOSPADM

## 2023-07-18 RX ORDER — SACUBITRIL AND VALSARTAN 24; 26 MG/1; MG/1
1 TABLET, FILM COATED ORAL 2 TIMES DAILY
COMMUNITY

## 2023-07-18 RX ORDER — METHADONE HYDROCHLORIDE 10 MG/1
90 TABLET ORAL DAILY
Status: DISCONTINUED | OUTPATIENT
Start: 2023-07-18 | End: 2023-07-18 | Stop reason: HOSPADM

## 2023-07-18 RX ORDER — ALBUTEROL SULFATE 90 UG/1
2 AEROSOL, METERED RESPIRATORY (INHALATION) EVERY 4 HOURS PRN
Status: DISCONTINUED | OUTPATIENT
Start: 2023-07-18 | End: 2023-07-18 | Stop reason: HOSPADM

## 2023-07-18 RX ORDER — QUETIAPINE FUMARATE 25 MG/1
50 TABLET, FILM COATED ORAL NIGHTLY
Status: DISCONTINUED | OUTPATIENT
Start: 2023-07-18 | End: 2023-07-18 | Stop reason: HOSPADM

## 2023-07-18 RX ORDER — PRAZOSIN HYDROCHLORIDE 2 MG/1
2 CAPSULE ORAL NIGHTLY
COMMUNITY

## 2023-07-18 RX ORDER — GABAPENTIN 600 MG/1
600 TABLET ORAL 3 TIMES DAILY
COMMUNITY

## 2023-07-18 RX ORDER — BUDESONIDE AND FORMOTEROL FUMARATE DIHYDRATE 160; 4.5 UG/1; UG/1
2 AEROSOL RESPIRATORY (INHALATION) 2 TIMES DAILY
COMMUNITY

## 2023-07-18 RX ORDER — QUETIAPINE FUMARATE 400 MG/1
400 TABLET, FILM COATED ORAL
COMMUNITY

## 2023-07-18 RX ORDER — SPIRONOLACTONE 25 MG/1
25 TABLET ORAL DAILY
COMMUNITY

## 2023-07-18 NOTE — CARE COORDINATION
07/18/23 0853   ITP   Date of Plan 07/18/23   Date of Next Review 07/19/23   Primary Diagnosis Code Cocaine abuse   Barriers to Treatment Need for psychoeducation; Other (comment)  (Substance use)   Plan of Care   Long Term Goal (LTG) Stated in patient/guardian terms To maintain stability independently   Short Term Goal 1   Short Term Goal 1 Client will learn and demonstrate effective coping skills   Baseline Functioning Client engaging in maladaptive coping, client using substances   Target Client engages in healthy coping mechanisms   Objectives Other (comment)  (Client will form new coping skills)   Intervention 1 Group therapy   Frequency Daily   Measured by Self report;Staff observation   Staff Responsible Clinical staff;[facility] staff   STG Goal 1 Status: Patient Appears to be  Progressing toward treatment plan goal   Short Term Goal 2   Short Term Goal 2 Client will learn and demonstrate improved self management skills   Baseline Functioning Client engaging in substance use, client attempting to OD   Target Client will receive treatment for substance use   Objectives Other (comment)  (Client will be referred to substance use treatment)   Intervention 1 Referral to community services   Frequency Prior to discharge   Measured by Staff observation;Self report   Staff Responsible Clinical staff;[facility] staff   STG Goal 2 Status: Patient Appears to be  Progressing toward treatment plan goal   Crisis/Safety/Discharge Plan   Crisis/Safety Plan Standard program interventions and protocol   Comprehensive Assessment Completion Date 07/18/23   Discharge Plan Substance use treatment

## 2023-07-18 NOTE — GROUP NOTE
Group Therapy Note    Date: 7/18/2023    Group Start Time: 1500  Group End Time: 1600  Group Topic: Recreational    [facility] [place] [address] [address]    [address]        Group Therapy Note    Attendees:        Patient's Goal:  To concentrate on selected task    Notes:  Pt did not attend session    Discipline Responsible: Recreational Therapist      Signature:  Salvador Mccormick

## 2023-07-18 NOTE — PROGRESS NOTES
[facility] Admission Pharmacy Medication Reconciliation    Information obtained from: Patient interview, [facility] methadone clinic, [facility] pharmacy ([phone]), [facility], [place] , patient's medication bottles  [facility] data available1: Yes    Comments/recommendations:  Methadone dose confirmed with Hussein Madera LPN at [facility]. Patient receives methadone 90 mg oral solution daily, last dosed at the clinic on 7/17. She is able to return to the clinic at discharge. Patient reports taking lithium and lorazepam but no fills at [facility] [employer]). No lorazepam fills in [place]     The [facility] ([facility]) was accessed to determine fill history of any controlled medications. The only fill in 2023 is for gabapentin 600 mg tabs #90 for 30 DS filled 6/14/23. Medication changes (since last review): Added  Budesonide-formoterol   Gabapentin  Methadone  Prazosin  Quetiapine  Sacubitril-valsartan   Spironolactone   Removed  Buspirone  Clonidine  Doxepin  Haloperidol  Nicotine patch  Adjusted  Quetiapine XR to quetiapine IR  Sertraline 100 mg to 50 mg       1[employer] pharmacy benefit data reflects medications filled and processed through the patient's insurance, however                this data does NOT capture whether the medication was picked up or is currently being taken by the patient. Patient allergies: Allergies as of 07/17/2023 - Fully Reviewed 07/17/2023   Allergen Reaction Noted    Vancomycin Anaphylaxis 09/24/2020    Ketorolac  01/19/2022    Mirtazapine  01/19/2022    Tomato Swelling 01/25/2017    Trazodone Angioedema 09/24/2020         Prior to Admission Medications   Prescriptions Last Dose Informant Patient Reported? Taking?    QUEtiapine (SEROQUEL) 400 MG tablet  Outside Pharmacy/PCP Yes Yes   Sig: Take 1 tablet by mouth nightly   albuterol sulfate HFA (PROVENTIL;VENTOLIN;PROAIR) 108 (90 Base) MCG/ACT inhaler  Self Yes No   Sig: Inhale 2 puffs into the lungs every

## 2023-07-18 NOTE — GROUP NOTE
Group Therapy Note    Date: 7/18/2023    Group Start Time: 0900  Group End Time: 1000  Group Topic: Topic Group    [place] [address] [address] [facility]        Group Therapy Note    Attendees:        Patient's Goal:  To identify positive coping strategies    Notes:  Pt did not attend session    Discipline Responsible: Recreational Therapist      Signature:  Ezequiel Villegas

## 2023-07-18 NOTE — PROGRESS NOTES
Patient denied SI while in hospital and denied HI and AVH. She remained in her room. She had no scheduled meds. She appears to be sleeping well.

## 2023-07-19 NOTE — BH NOTE
DISCHARGE UPDATE:      PATIENT WILL LEAVE AMA.  SW SPOKE WITH PATIENT'S BOYFRIEND VIA TELEPHONE AND CONFIRMED THAT PATIENT CAN RETURN AND THAT SHE DOES NOT HAVE ACCESS TO GUNS OR WEAPONS      Rex Werner, supervisee in social work
PSYCHOSOCIAL ASSESSMENT  :Patient identifying info:   Carvin Saint is a 50 y.o., female admitted 7/17/2023 10:02 AM     Presenting problem and precipitating factors: Pt presents to ED with possible overdose on opioids. Pt was brought to ED from her methadone clinic where she was reportedly minimally responsive. Pt was noted to have pinpoint pupils by EMS and was felt to have most likely overdosed. Pt reports she had an ED admission on 7-12-23 for opiate OD and was discharged, pt reports now that this overdose was intentional. Pt reports she started using fentanyl and THC beginning 3 months ago, one month after her father passed away. Pt reports she lives with her significant other and attends the [address] [place] [address] program, reporting it is not a good place and that everyone is on drugs. Pt had psych admission 2-16-21 TDO for psychosis. Pt is prescribed Seroquel, Lithium, and Zoloft by Dr Petty Christina in the [address] [place] [address] program. Pt reports noncompliance with meds for past 2 weeks. Mental status assessment: Pt presents ao x 4. Pt presents well kempt. Pt appears to be attending to ADLs. Pt denies SI/HI/AVH at this time. Pt endorses depression. Pt insight fair and judgment poor. Strengths/Recreation/Coping Skills:Pt has significant other. Pt has housing. Pt is insured. Collateral information:     [facility]    Plan: [id number] Member: [id number] Effective from: 7/1/2023   Subscriber: Carvin Saint Subscriber ID: [id number] Guarantor: JAYLON JACKSON       Current psychiatric /substance abuse providers and contact info: [address] [place] [address]    Previous psychiatric/substance abuse providers and response to treatment: Pt was last hospitalized with [facility] in February 2021. Family history of mental illness or substance abuse: Pt reports she started abusing fentanyl 3 months ago.     Substance abuse history:    Social History     Tobacco Use    Smoking status: Every Day     Packs/day: 0.25     Types: Cigarettes
Patient alert and verbal. She discharged AMA. Patient belongings, valuables, home medications returned. She was escorted to the ED exit.
Patient alert, isolative, withdrawn, guarded, cooperative. Denies SI/HI. Denies pain. Denies depression. Endorsed anxiety. Atarax given as needed. No distress observed. Patient expressed concerns about her Methadone. Nela (Pharmacist) verified her dosage at [facility]. NP Lorraine joseph. Q15 minutes and hourly checks ongoing.
drug: budesonide-formoterol                To obtain results of studies pending at discharge, please contact [phone] ask for records. Follow-up Information    None         Advanced Directive:   Does the patient have an appointed surrogate decision maker? No  Does the patient have a Medical Advance Directive? No  Does the patient have a Psychiatric Advance Directive? No  If the patient does not have a surrogate or Medical Advance Directive AND Psychiatric Advance Directive, the patient was offered information on these advance directives No    Patient Instructions: Please continue all medications until otherwise directed by physician. Tobacco Cessation Discharge Plan:   Is the patient a smoker and needs referral for smoking cessation? No  Patient referred to the following for smoking cessation with an appointment? No    Patient was offered medication to assist with smoking cessation at discharge? No  Was education for smoking cessation added to the discharge instructions? No    Alcohol/Substance Abuse Discharge Plan:   Does the patient have a history of substance/alcohol abuse and requires a referral for treatment? No  Patient referred to the following for substance/alcohol abuse treatment with an appointment? No  Patient was offered medication to assist with alcohol cessation at discharge? No  Was education for substance/alcohol abuse added to discharge instructions? No    Patient discharged to Home/Self Care. Discharge information discussed with patient/caregiver and provided to the patient/caregiver either in hard copy or electronically.
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
## 2024-02-06 ENCOUNTER — HOSPITAL ENCOUNTER (EMERGENCY)
Facility: HOSPITAL | Age: 49
Discharge: ANOTHER HOSPITAL | End: 2024-02-06
Attending: EMERGENCY MEDICINE
Payer: MEDICAID

## 2024-02-06 ENCOUNTER — APPOINTMENT (OUTPATIENT)
Facility: HOSPITAL | Age: 49
End: 2024-02-06
Payer: MEDICAID

## 2024-02-06 VITALS
HEIGHT: 64 IN | HEART RATE: 83 BPM | WEIGHT: 188.49 LBS | SYSTOLIC BLOOD PRESSURE: 122 MMHG | BODY MASS INDEX: 32.18 KG/M2 | TEMPERATURE: 98.3 F | OXYGEN SATURATION: 98 % | DIASTOLIC BLOOD PRESSURE: 75 MMHG | RESPIRATION RATE: 15 BRPM

## 2024-02-06 DIAGNOSIS — J96.21 ACUTE ON CHRONIC HYPOXIC RESPIRATORY FAILURE (HCC): ICD-10-CM

## 2024-02-06 DIAGNOSIS — J44.1 COPD EXACERBATION (HCC): Primary | ICD-10-CM

## 2024-02-06 DIAGNOSIS — R10.11 ABDOMINAL PAIN, RIGHT UPPER QUADRANT: ICD-10-CM

## 2024-02-06 LAB
ALBUMIN SERPL-MCNC: 3.4 G/DL (ref 3.5–5)
ALBUMIN/GLOB SERPL: 0.9 (ref 1.1–2.2)
ALP SERPL-CCNC: 100 U/L (ref 45–117)
ALT SERPL-CCNC: 42 U/L (ref 12–78)
ANION GAP BLD CALC-SCNC: 11 (ref 10–20)
ANION GAP SERPL CALC-SCNC: 7 MMOL/L (ref 5–15)
AST SERPL-CCNC: 34 U/L (ref 15–37)
BASE EXCESS BLD CALC-SCNC: 4.5 MMOL/L
BASE EXCESS BLDV CALC-SCNC: 2.9 MMOL/L
BASOPHILS # BLD: 0 K/UL (ref 0–0.1)
BASOPHILS NFR BLD: 0 % (ref 0–1)
BILIRUB SERPL-MCNC: 0.2 MG/DL (ref 0.2–1)
BUN SERPL-MCNC: 7 MG/DL (ref 6–20)
BUN/CREAT SERPL: 8 (ref 12–20)
CA-I BLD-MCNC: 1.17 MMOL/L (ref 1.12–1.32)
CALCIUM SERPL-MCNC: 8.3 MG/DL (ref 8.5–10.1)
CHLORIDE BLD-SCNC: 98 MMOL/L (ref 100–108)
CHLORIDE SERPL-SCNC: 102 MMOL/L (ref 97–108)
CO2 BLD-SCNC: 33 MMOL/L (ref 19–24)
CO2 SERPL-SCNC: 35 MMOL/L (ref 21–32)
CREAT SERPL-MCNC: 0.89 MG/DL (ref 0.55–1.02)
CREAT UR-MCNC: 0.9 MG/DL (ref 0.6–1.3)
DIFFERENTIAL METHOD BLD: NORMAL
EOSINOPHIL # BLD: 0.2 K/UL (ref 0–0.4)
EOSINOPHIL NFR BLD: 4 % (ref 0–7)
ERYTHROCYTE [DISTWIDTH] IN BLOOD BY AUTOMATED COUNT: 13.1 % (ref 11.5–14.5)
FLUAV AG NPH QL IA: NEGATIVE
FLUBV AG NOSE QL IA: NEGATIVE
GLOBULIN SER CALC-MCNC: 3.9 G/DL (ref 2–4)
GLUCOSE BLD STRIP.AUTO-MCNC: 89 MG/DL (ref 74–106)
GLUCOSE SERPL-MCNC: 87 MG/DL (ref 65–100)
HCO3 BLDA-SCNC: 34 MMOL/L
HCO3 BLDV-SCNC: 31.9 MMOL/L (ref 23–28)
HCT VFR BLD AUTO: 37.9 % (ref 35–47)
HGB BLD-MCNC: 12.1 G/DL (ref 11.5–16)
IMM GRANULOCYTES # BLD AUTO: 0 K/UL (ref 0–0.04)
IMM GRANULOCYTES NFR BLD AUTO: 0 % (ref 0–0.5)
LACTATE BLD-SCNC: <0.4 MMOL/L (ref 0.4–2)
LIPASE SERPL-CCNC: 14 U/L (ref 13–75)
LYMPHOCYTES # BLD: 1.5 K/UL (ref 0.8–3.5)
LYMPHOCYTES NFR BLD: 30 % (ref 12–49)
MCH RBC QN AUTO: 30.8 PG (ref 26–34)
MCHC RBC AUTO-ENTMCNC: 31.9 G/DL (ref 30–36.5)
MCV RBC AUTO: 96.4 FL (ref 80–99)
MONOCYTES # BLD: 0.6 K/UL (ref 0–1)
MONOCYTES NFR BLD: 11 % (ref 5–13)
NEUTS SEG # BLD: 2.8 K/UL (ref 1.8–8)
NEUTS SEG NFR BLD: 55 % (ref 32–75)
NRBC # BLD: 0 K/UL (ref 0–0.01)
NRBC BLD-RTO: 0 PER 100 WBC
NT PRO BNP: 28 PG/ML (ref 0–125)
PCO2 BLD: 70.9 MMHG (ref 35–45)
PCO2 BLDV: 69.8 MMHG (ref 41–51)
PH BLD: 7.28 (ref 7.35–7.45)
PH BLDV: 7.27 (ref 7.32–7.42)
PLATELET # BLD AUTO: 227 K/UL (ref 150–400)
PMV BLD AUTO: 9.6 FL (ref 8.9–12.9)
PO2 BLD: 116 MMHG (ref 80–100)
PO2 BLDV: 47 MMHG (ref 25–40)
POTASSIUM BLD-SCNC: 3.9 MMOL/L (ref 3.5–5.5)
POTASSIUM SERPL-SCNC: 3.9 MMOL/L (ref 3.5–5.1)
PROT SERPL-MCNC: 7.3 G/DL (ref 6.4–8.2)
RBC # BLD AUTO: 3.93 M/UL (ref 3.8–5.2)
SAO2 % BLD: 98 %
SAO2 % BLDV: 74 % (ref 65–88)
SARS-COV-2 RDRP RESP QL NAA+PROBE: NOT DETECTED
SERVICE CMNT-IMP: ABNORMAL
SODIUM BLD-SCNC: 142 MMOL/L (ref 136–145)
SODIUM SERPL-SCNC: 144 MMOL/L (ref 136–145)
SOURCE: NORMAL
SPECIMEN SITE: ABNORMAL
SPECIMEN TYPE: ABNORMAL
TROPONIN I SERPL HS-MCNC: <4 NG/L (ref 0–51)
WBC # BLD AUTO: 5.1 K/UL (ref 3.6–11)

## 2024-02-06 PROCEDURE — 36415 COLL VENOUS BLD VENIPUNCTURE: CPT

## 2024-02-06 PROCEDURE — 6360000002 HC RX W HCPCS: Performed by: NURSE PRACTITIONER

## 2024-02-06 PROCEDURE — 96375 TX/PRO/DX INJ NEW DRUG ADDON: CPT

## 2024-02-06 PROCEDURE — 85025 COMPLETE CBC W/AUTO DIFF WBC: CPT

## 2024-02-06 PROCEDURE — 74177 CT ABD & PELVIS W/CONTRAST: CPT

## 2024-02-06 PROCEDURE — 99285 EMERGENCY DEPT VISIT HI MDM: CPT

## 2024-02-06 PROCEDURE — 84484 ASSAY OF TROPONIN QUANT: CPT

## 2024-02-06 PROCEDURE — 6360000002 HC RX W HCPCS: Performed by: STUDENT IN AN ORGANIZED HEALTH CARE EDUCATION/TRAINING PROGRAM

## 2024-02-06 PROCEDURE — 87804 INFLUENZA ASSAY W/OPTIC: CPT

## 2024-02-06 PROCEDURE — 82947 ASSAY GLUCOSE BLOOD QUANT: CPT

## 2024-02-06 PROCEDURE — 71275 CT ANGIOGRAPHY CHEST: CPT

## 2024-02-06 PROCEDURE — 94762 N-INVAS EAR/PLS OXIMTRY CONT: CPT

## 2024-02-06 PROCEDURE — 6360000004 HC RX CONTRAST MEDICATION: Performed by: NURSE PRACTITIONER

## 2024-02-06 PROCEDURE — 84132 ASSAY OF SERUM POTASSIUM: CPT

## 2024-02-06 PROCEDURE — 93005 ELECTROCARDIOGRAM TRACING: CPT | Performed by: NURSE PRACTITIONER

## 2024-02-06 PROCEDURE — 84295 ASSAY OF SERUM SODIUM: CPT

## 2024-02-06 PROCEDURE — 6370000000 HC RX 637 (ALT 250 FOR IP): Performed by: STUDENT IN AN ORGANIZED HEALTH CARE EDUCATION/TRAINING PROGRAM

## 2024-02-06 PROCEDURE — 6370000000 HC RX 637 (ALT 250 FOR IP): Performed by: NURSE PRACTITIONER

## 2024-02-06 PROCEDURE — 83880 ASSAY OF NATRIURETIC PEPTIDE: CPT

## 2024-02-06 PROCEDURE — 96374 THER/PROPH/DIAG INJ IV PUSH: CPT

## 2024-02-06 PROCEDURE — 87635 SARS-COV-2 COVID-19 AMP PRB: CPT

## 2024-02-06 PROCEDURE — 83690 ASSAY OF LIPASE: CPT

## 2024-02-06 PROCEDURE — 80053 COMPREHEN METABOLIC PANEL: CPT

## 2024-02-06 PROCEDURE — 76705 ECHO EXAM OF ABDOMEN: CPT

## 2024-02-06 PROCEDURE — 82330 ASSAY OF CALCIUM: CPT

## 2024-02-06 PROCEDURE — 82803 BLOOD GASES ANY COMBINATION: CPT

## 2024-02-06 RX ORDER — IPRATROPIUM BROMIDE AND ALBUTEROL SULFATE 2.5; .5 MG/3ML; MG/3ML
1 SOLUTION RESPIRATORY (INHALATION)
Status: COMPLETED | OUTPATIENT
Start: 2024-02-06 | End: 2024-02-06

## 2024-02-06 RX ORDER — METOCLOPRAMIDE HYDROCHLORIDE 5 MG/ML
5 INJECTION INTRAMUSCULAR; INTRAVENOUS ONCE
Status: COMPLETED | OUTPATIENT
Start: 2024-02-06 | End: 2024-02-06

## 2024-02-06 RX ORDER — ALBUTEROL SULFATE 2.5 MG/3ML
2.5 SOLUTION RESPIRATORY (INHALATION) ONCE
Status: COMPLETED | OUTPATIENT
Start: 2024-02-06 | End: 2024-02-06

## 2024-02-06 RX ORDER — DIVALPROEX SODIUM 500 MG/1
500 TABLET, DELAYED RELEASE ORAL DAILY
COMMUNITY
Start: 2021-02-10

## 2024-02-06 RX ORDER — IPRATROPIUM BROMIDE AND ALBUTEROL SULFATE 2.5; .5 MG/3ML; MG/3ML
1 SOLUTION RESPIRATORY (INHALATION)
Status: DISCONTINUED | OUTPATIENT
Start: 2024-02-06 | End: 2024-02-06 | Stop reason: HOSPADM

## 2024-02-06 RX ADMIN — IPRATROPIUM BROMIDE AND ALBUTEROL SULFATE 1 DOSE: 2.5; .5 SOLUTION RESPIRATORY (INHALATION) at 18:26

## 2024-02-06 RX ADMIN — IPRATROPIUM BROMIDE AND ALBUTEROL SULFATE 1 DOSE: 2.5; .5 SOLUTION RESPIRATORY (INHALATION) at 16:05

## 2024-02-06 RX ADMIN — ALBUTEROL SULFATE 2.5 MG: 2.5 SOLUTION RESPIRATORY (INHALATION) at 18:26

## 2024-02-06 RX ADMIN — IOPAMIDOL 100 ML: 755 INJECTION, SOLUTION INTRAVENOUS at 17:06

## 2024-02-06 RX ADMIN — METOCLOPRAMIDE 5 MG: 5 INJECTION, SOLUTION INTRAMUSCULAR; INTRAVENOUS at 19:46

## 2024-02-06 RX ADMIN — METHYLPREDNISOLONE SODIUM SUCCINATE 125 MG: 125 INJECTION, POWDER, FOR SOLUTION INTRAMUSCULAR; INTRAVENOUS at 16:05

## 2024-02-06 ASSESSMENT — PAIN DESCRIPTION - ORIENTATION: ORIENTATION: RIGHT;UPPER

## 2024-02-06 ASSESSMENT — PAIN SCALES - GENERAL: PAINLEVEL_OUTOF10: 6

## 2024-02-06 ASSESSMENT — PAIN - FUNCTIONAL ASSESSMENT
PAIN_FUNCTIONAL_ASSESSMENT: PREVENTS OR INTERFERES SOME ACTIVE ACTIVITIES AND ADLS
PAIN_FUNCTIONAL_ASSESSMENT: 0-10

## 2024-02-06 ASSESSMENT — LIFESTYLE VARIABLES
HOW MANY STANDARD DRINKS CONTAINING ALCOHOL DO YOU HAVE ON A TYPICAL DAY: PATIENT DOES NOT DRINK
HOW OFTEN DO YOU HAVE A DRINK CONTAINING ALCOHOL: NEVER

## 2024-02-06 ASSESSMENT — PAIN DESCRIPTION - FREQUENCY: FREQUENCY: CONTINUOUS

## 2024-02-06 ASSESSMENT — PAIN DESCRIPTION - DESCRIPTORS: DESCRIPTORS: PATIENT UNABLE TO DESCRIBE

## 2024-02-06 ASSESSMENT — PAIN DESCRIPTION - PAIN TYPE: TYPE: ACUTE PAIN

## 2024-02-06 ASSESSMENT — PAIN DESCRIPTION - LOCATION: LOCATION: ABDOMEN

## 2024-02-06 ASSESSMENT — PAIN DESCRIPTION - ONSET: ONSET: ON-GOING

## 2024-02-06 NOTE — ED PROVIDER NOTES
[facility] EMERGENCY CTR  EMERGENCY DEPARTMENT ENCOUNTER      Pt Name: David Car  MRN: [id number]  Birthdate 1975  Date of evaluation: 2/6/2024  Provider: DESIREE Turner   Patient is a 48 y.o. female with pmhx of COPD, chronic hypoxic respiratory failure on 5 to 6 L oxygen at baseline, CHF (preserved ejection fraction ), opioid use disorder on methadone, prior cocaine use, possible sarcoidosis and mood disorder presents to the emergency department via EMS for abdominal pain.  Of note patient was recently admitted to [facility] MRICU on 1/25 for respiratory distress in setting of COPD exacerbation requiring BiPAP. Was intubated to protect airway in setting of increased respiratory distress/stridor on 1/27 and self extubated on 1/28 was ultimately discharged on course of prednisone.  She states that she is unaware why EMS was called and she was brought to the emergency department today-stating that her  called.  She does note that she has had right upper quadrant abdominal pain for the last 4 days.  Denies nausea, vomiting, diarrhea, hematuria, dysuria.  Endorses constipation since recent discharge.  Denies history of abdominal surgeries and states she is perimenopausal.     Of note patient not a good historian, continues to fall asleep throughout interview.     There are no other complaints, changes or physical findings at this time.      PAST MEDICAL HISTORY     Past Medical History:   Diagnosis Date    Ascariosis     Asthma     Bipolar 1 disorder (HCC)     Chronic obstructive pulmonary disease (HCC)     Chronic pain     back, leg    Cocaine abuse (HCC)     Depression     Heart failure (HCC)     Hepatitis B     Heroin abuse (HCC)     HTN (hypertension)     Narcotic abuse (HCC)     Polysubstance abuse (HCC)     Required emergency intubation     Sarcoidosis     Seizure (HCC)     Tobacco abuse          SURGICAL HISTORY       Past Surgical History:   Procedure Laterality Date    GYN      WISDOM TOOTH

## 2024-02-06 NOTE — ED NOTES
Unable to get a good reading of et CO@ on the monitor, charge nurse is notifird about equipment malfunction. Patient is stable, VS: HR 84, O2 99%, /76, Bipat settings 165/10, VTE -505.

## 2024-02-06 NOTE — ED NOTES
Bedside and Verbal shift change report given to EBEN Peña (oncoming nurse) by EBEN Pablo (offgoing nurse). Report included the following information Nurse Handoff Report, ED Encounter Summary, ED SBAR, Adult Overview, Intake/Output, MAR, and Recent Results.

## 2024-02-06 NOTE — ED TRIAGE NOTES
Pt reports right upper quadrant abdominal pain. Patient falling asleep frequently while speaking. Pt relates that she last took Methadone yesterday. RUQ ABD tender to palpation. Pt states that she wears 6 Liters Oxygen routinely at home for COPD. EMS relate that the patient was intubated within the past week at [facility].

## 2024-02-06 NOTE — ED NOTES
Transfer Center called at this time to request transfer to [facility] for stepdown level of care per ED NP request.

## 2024-02-06 NOTE — ED NOTES
Patient is placed on Bipap per order 10/5. VS: HR 85, O2 98% on 50L, RR15, egMS013. Waiting for  To verify the settings.

## 2024-02-06 NOTE — ED NOTES
Patient is unable to stay awake for Bipap at this time. O2 saturation noted 89%-92 on 4L NC when took reports. Patient is placed on baseline 5L with assistance of non re breather. VS: HR 86, O2 100%, RR 14, etCO25, /77 . Patient is lethargic, requires stimulation to wake up.

## 2024-02-07 LAB
EKG ATRIAL RATE: 89 BPM
EKG DIAGNOSIS: NORMAL
EKG P AXIS: 67 DEGREES
EKG P-R INTERVAL: 124 MS
EKG Q-T INTERVAL: 384 MS
EKG QRS DURATION: 76 MS
EKG QTC CALCULATION (BAZETT): 467 MS
EKG R AXIS: 68 DEGREES
EKG T AXIS: 68 DEGREES
EKG VENTRICULAR RATE: 89 BPM

## 2024-02-07 PROCEDURE — 93010 ELECTROCARDIOGRAM REPORT: CPT | Performed by: SPECIALIST

## 2024-02-07 NOTE — ED NOTES
Pt has requested BIPAP to be taken off. MD at bedside. Pt put on 5L INO2 prior transport. SP02 93%.

## 2024-02-07 NOTE — ED NOTES
Patient is alert, reports abd pain, medication was ordered and given. Patient is able to follow commands. Patient asked to remove Bipap, patient was educate on the need to Bipap, agreed at this time. VS are stable, etCO2 27 at this time. Patient has expiratory granting sound no inspiratory wheezing at this time. Patient denies using a restroom.     Report was given at the bedside to EBEN Huggins. Patient was awake for reports.

## 2024-02-07 NOTE — ED NOTES
Taken over from EBEN Plasencia. Pt on BIPAP 15/10, S02 50%. Pt doing well on BIPAP. Still has RUQ tenderness with guarding. Vital signs WDL. Awaiting bed assignment at U.

## 2024-02-07 NOTE — ED NOTES
TRANSFER - OUT REPORT:    Verbal report given to EBEN Aranda on David Car  being transferred to [facility] 4th floor 146 MRICU for routine progression of patient care       Report consisted of patient's Situation, Background, Assessment and   Recommendations(SBAR).     Information from the following report(s) Nurse Handoff Report, ED Encounter Summary, ED SBAR, Adult Overview, MAR, Recent Results, Med Rec Status, and Cardiac Rhythm NSR  was reviewed with the receiving nurse.    [place] Fall Assessment:    Presents to emergency department  because of falls (Syncope, seizure, or loss of consciousness): No  Age > 70: No  Altered Mental Status, Intoxication with alcohol or substance confusion (Disorientation, impaired judgment, poor safety awaremess, or inability to follow instructions): No  Impaired Mobility: Ambulates or transfers with assistive devices or assistance; Unable to ambulate or transer.: No  Nursing Judgement: Yes          Lines:   Peripheral IV 02/06/24 Right;Anterior Hand (Active)   Site Assessment Clean, dry & intact 02/06/24 1454   Line Status Blood return noted;Flushed 02/06/24 1454   Phlebitis Assessment No symptoms 02/06/24 1454   Infiltration Assessment 0 02/06/24 1454        Opportunity for questions and clarification was provided.      Patient transported with:  Monitor, O2 @ BIPAP lpm, and Tech

## 2024-06-01 NOTE — ED NOTES
Hemodynamics:   Parameter   at 2000   CVP 10   SvO2 54   CO  6.9   CI 3.8           DOBUTamine IV infusion (non-titrating)  5 mcg/kg/min Intravenous Continuous 5.8 mL/hr at 06/01/24 0000 5 mcg/kg/min at 06/01/24 0000    EPINEPHrine  0.02 mcg/kg/min Intravenous Continuous 4.7 mL/hr at 06/01/24 0000 0.02 mcg/kg/min at 06/01/24 0000    furosemide (Lasix) 500 mg in 50 mL infusion (conc: 10 mg/mL)  30 mg/hr Intravenous Continuous 3 mL/hr at 06/01/24 0000 30 mg/hr at 06/01/24 0000         Recent Labs   Lab 05/31/24  0317 05/31/24  1129 05/31/24  2153      < > 134*   K 3.1*   < > 3.3*   CL 96   < > 92*   CO2 26   < > 29   BUN 29*   < > 23*   CREATININE 2.0*   < > 1.8*   CALCIUM 9.1   < > 9.5   MG 2.2  --   --     < > = values in this interval not displayed.     Recent Labs   Lab 05/31/24 0317   WBC 7.31   RBC 3.88*   HGB 8.8*   HCT 27.7*      MCV 71*   MCH 22.7*   MCHC 31.8*     Recent Labs   Lab 05/31/24  1533   INR 1.1   APTT 29.3         UOP 24Hours:   Intake/Output Summary (Last 24 hours) at 6/1/2024 0040  Last data filed at 6/1/2024 0000  Gross per 24 hour   Intake 1672.07 ml   Output 4985 ml   Net -3312.93 ml       Assessment/Plan:  - Will add another dose of diuril  - Plan was discussed with on-call attending    Jack Santiago MD  Cardiovascular Disease PGY-5  [facility]     MD unsuccessful at ultrasound IV, will do art stick for labs at this time. Pt still without peripheral access.

## 2025-02-03 ENCOUNTER — APPOINTMENT (OUTPATIENT)
Facility: HOSPITAL | Age: 50
DRG: 140 | End: 2025-02-03
Payer: MEDICAID

## 2025-02-03 ENCOUNTER — HOSPITAL ENCOUNTER (INPATIENT)
Facility: HOSPITAL | Age: 50
LOS: 9 days | Discharge: HOME HEALTH | DRG: 140 | End: 2025-02-12
Attending: STUDENT IN AN ORGANIZED HEALTH CARE EDUCATION/TRAINING PROGRAM | Admitting: INTERNAL MEDICINE
Payer: MEDICAID

## 2025-02-03 DIAGNOSIS — J96.22 ACUTE ON CHRONIC RESPIRATORY FAILURE WITH HYPOXIA AND HYPERCAPNIA (HCC): ICD-10-CM

## 2025-02-03 DIAGNOSIS — J44.1 COPD EXACERBATION (HCC): Primary | ICD-10-CM

## 2025-02-03 DIAGNOSIS — R22.31 ARM MASS, RIGHT: ICD-10-CM

## 2025-02-03 DIAGNOSIS — J96.21 ACUTE ON CHRONIC RESPIRATORY FAILURE WITH HYPOXIA AND HYPERCAPNIA (HCC): ICD-10-CM

## 2025-02-03 PROBLEM — J96.01 ACUTE HYPOXIC RESPIRATORY FAILURE: Status: ACTIVE | Noted: 2025-02-03

## 2025-02-03 LAB
ALBUMIN SERPL-MCNC: 3.7 G/DL (ref 3.5–5)
ALBUMIN/GLOB SERPL: 1 (ref 1.1–2.2)
ALP SERPL-CCNC: 113 U/L (ref 45–117)
ALT SERPL-CCNC: 34 U/L (ref 12–78)
ANION GAP BLD CALC-SCNC: 6 (ref 10–20)
ANION GAP SERPL CALC-SCNC: 0 MMOL/L (ref 2–12)
AST SERPL-CCNC: 33 U/L (ref 15–37)
BASE EXCESS BLD CALC-SCNC: 11.4 MMOL/L
BASE EXCESS BLDV CALC-SCNC: 5.2 MMOL/L
BASOPHILS # BLD: 0.02 K/UL (ref 0–0.1)
BASOPHILS NFR BLD: 0.5 % (ref 0–1)
BDY SITE: ABNORMAL
BILIRUB SERPL-MCNC: 0.2 MG/DL (ref 0.2–1)
BUN SERPL-MCNC: 13 MG/DL (ref 6–20)
BUN/CREAT SERPL: 19 (ref 12–20)
CA-I BLD-MCNC: 1.26 MMOL/L (ref 1.15–1.33)
CA-I BLD-SCNC: 0.97 MMOL/L (ref 1.13–1.32)
CALCIUM SERPL-MCNC: 8.9 MG/DL (ref 8.5–10.1)
CHLORIDE BLD-SCNC: 96 MMOL/L (ref 100–111)
CHLORIDE SERPL-SCNC: 98 MMOL/L (ref 97–108)
CO2 BLD-SCNC: 41 MMOL/L (ref 22–29)
CO2 SERPL-SCNC: 38 MMOL/L (ref 21–32)
COMMENT:: NORMAL
CREAT SERPL-MCNC: 0.7 MG/DL (ref 0.55–1.02)
CREAT UR-MCNC: 0.6 MG/DL (ref 0.6–1.3)
DIFFERENTIAL METHOD BLD: NORMAL
EKG ATRIAL RATE: 94 BPM
EKG DIAGNOSIS: NORMAL
EKG P AXIS: 61 DEGREES
EKG P-R INTERVAL: 124 MS
EKG Q-T INTERVAL: 374 MS
EKG QRS DURATION: 82 MS
EKG QTC CALCULATION (BAZETT): 467 MS
EKG R AXIS: 67 DEGREES
EKG T AXIS: 55 DEGREES
EKG VENTRICULAR RATE: 94 BPM
EOSINOPHIL # BLD: 0.18 K/UL (ref 0–0.4)
EOSINOPHIL NFR BLD: 4.8 % (ref 0–7)
ERYTHROCYTE [DISTWIDTH] IN BLOOD BY AUTOMATED COUNT: 13.5 % (ref 11.5–14.5)
FIO2 ON VENT: 35 %
FLUAV RNA SPEC QL NAA+PROBE: NOT DETECTED
FLUBV RNA SPEC QL NAA+PROBE: NOT DETECTED
GLOBULIN SER CALC-MCNC: 3.6 G/DL (ref 2–4)
GLUCOSE BLD STRIP.AUTO-MCNC: 102 MG/DL (ref 74–99)
GLUCOSE SERPL-MCNC: 95 MG/DL (ref 65–100)
HCO3 BLDA-SCNC: 42 MMOL/L
HCO3 BLDV-SCNC: 34 MMOL/L (ref 23–28)
HCT VFR BLD AUTO: 36.2 % (ref 35–47)
HGB BLD-MCNC: 11.8 G/DL (ref 11.5–16)
IMM GRANULOCYTES # BLD AUTO: 0.01 K/UL (ref 0–0.04)
IMM GRANULOCYTES NFR BLD AUTO: 0.3 % (ref 0–0.5)
IPAP/PIP: 12
LACTATE BLD-SCNC: 1.14 MMOL/L (ref 0.4–2)
LYMPHOCYTES # BLD: 0.98 K/UL (ref 0.8–3.5)
LYMPHOCYTES NFR BLD: 26.3 % (ref 12–49)
MCH RBC QN AUTO: 31 PG (ref 26–34)
MCHC RBC AUTO-ENTMCNC: 32.6 G/DL (ref 30–36.5)
MCV RBC AUTO: 95 FL (ref 80–99)
MONOCYTES # BLD: 0.34 K/UL (ref 0–1)
MONOCYTES NFR BLD: 9.1 % (ref 5–13)
NEUTS SEG # BLD: 2.19 K/UL (ref 1.8–8)
NEUTS SEG NFR BLD: 59 % (ref 32–75)
NRBC # BLD: 0 K/UL (ref 0–0.01)
NRBC BLD-RTO: 0 PER 100 WBC
NT PRO BNP: 48 PG/ML
PCO2 BLDV: 71.4 MMHG (ref 41–51)
PCO2 BLDV: 83.6 MMHG (ref 41–51)
PEEP RESPIRATORY: 6
PH BLDV: 7.3 (ref 7.32–7.42)
PH BLDV: 7.3 (ref 7.32–7.42)
PLATELET # BLD AUTO: 189 K/UL (ref 150–400)
PMV BLD AUTO: 9.7 FL (ref 8.9–12.9)
PO2 BLDV: 46 MMHG (ref 25–40)
PO2 BLDV: 48 MMHG (ref 25–40)
POTASSIUM BLD-SCNC: 4.9 MMOL/L (ref 3.5–5.5)
POTASSIUM SERPL-SCNC: 4 MMOL/L (ref 3.5–5.1)
PROT SERPL-MCNC: 7.3 G/DL (ref 6.4–8.2)
RBC # BLD AUTO: 3.81 M/UL (ref 3.8–5.2)
SAO2 % BLD: 77 % (ref 94–98)
SAO2 % BLDV: 76 % (ref 65–88)
SAO2% DEVICE SAO2% SENSOR NAME: ABNORMAL
SARS-COV-2 RNA RESP QL NAA+PROBE: NOT DETECTED
SERVICE CMNT-IMP: ABNORMAL
SODIUM BLD-SCNC: 143 MMOL/L (ref 136–145)
SODIUM SERPL-SCNC: 136 MMOL/L (ref 136–145)
SOURCE: NORMAL
SPECIMEN HOLD: NORMAL
SPECIMEN SITE: ABNORMAL
SPECIMEN SITE: ABNORMAL
TROPONIN I SERPL HS-MCNC: 4 NG/L (ref 0–51)
WBC # BLD AUTO: 3.7 K/UL (ref 3.6–11)

## 2025-02-03 PROCEDURE — 96374 THER/PROPH/DIAG INJ IV PUSH: CPT

## 2025-02-03 PROCEDURE — 6360000002 HC RX W HCPCS

## 2025-02-03 PROCEDURE — 2500000003 HC RX 250 WO HCPCS: Performed by: INTERNAL MEDICINE

## 2025-02-03 PROCEDURE — 80053 COMPREHEN METABOLIC PANEL: CPT

## 2025-02-03 PROCEDURE — 6370000000 HC RX 637 (ALT 250 FOR IP): Performed by: INTERNAL MEDICINE

## 2025-02-03 PROCEDURE — 82330 ASSAY OF CALCIUM: CPT

## 2025-02-03 PROCEDURE — 6360000002 HC RX W HCPCS: Performed by: INTERNAL MEDICINE

## 2025-02-03 PROCEDURE — 93005 ELECTROCARDIOGRAM TRACING: CPT | Performed by: STUDENT IN AN ORGANIZED HEALTH CARE EDUCATION/TRAINING PROGRAM

## 2025-02-03 PROCEDURE — 84295 ASSAY OF SERUM SODIUM: CPT

## 2025-02-03 PROCEDURE — 82947 ASSAY GLUCOSE BLOOD QUANT: CPT

## 2025-02-03 PROCEDURE — 2580000003 HC RX 258: Performed by: INTERNAL MEDICINE

## 2025-02-03 PROCEDURE — 71045 X-RAY EXAM CHEST 1 VIEW: CPT

## 2025-02-03 PROCEDURE — 84132 ASSAY OF SERUM POTASSIUM: CPT

## 2025-02-03 PROCEDURE — 94640 AIRWAY INHALATION TREATMENT: CPT

## 2025-02-03 PROCEDURE — 2700000000 HC OXYGEN THERAPY PER DAY

## 2025-02-03 PROCEDURE — 36415 COLL VENOUS BLD VENIPUNCTURE: CPT

## 2025-02-03 PROCEDURE — 6360000002 HC RX W HCPCS: Performed by: STUDENT IN AN ORGANIZED HEALTH CARE EDUCATION/TRAINING PROGRAM

## 2025-02-03 PROCEDURE — 6370000000 HC RX 637 (ALT 250 FOR IP): Performed by: STUDENT IN AN ORGANIZED HEALTH CARE EDUCATION/TRAINING PROGRAM

## 2025-02-03 PROCEDURE — 85025 COMPLETE CBC W/AUTO DIFF WBC: CPT

## 2025-02-03 PROCEDURE — 94660 CPAP INITIATION&MGMT: CPT

## 2025-02-03 PROCEDURE — 82803 BLOOD GASES ANY COMBINATION: CPT

## 2025-02-03 PROCEDURE — 76882 US LMTD JT/FCL EVL NVASC XTR: CPT

## 2025-02-03 PROCEDURE — 2500000003 HC RX 250 WO HCPCS

## 2025-02-03 PROCEDURE — 6370000000 HC RX 637 (ALT 250 FOR IP)

## 2025-02-03 PROCEDURE — 2000000000 HC ICU R&B

## 2025-02-03 PROCEDURE — 83880 ASSAY OF NATRIURETIC PEPTIDE: CPT

## 2025-02-03 PROCEDURE — 99285 EMERGENCY DEPT VISIT HI MDM: CPT

## 2025-02-03 PROCEDURE — 87636 SARSCOV2 & INF A&B AMP PRB: CPT

## 2025-02-03 PROCEDURE — 84484 ASSAY OF TROPONIN QUANT: CPT

## 2025-02-03 RX ORDER — ONDANSETRON 4 MG/1
4 TABLET, ORALLY DISINTEGRATING ORAL EVERY 8 HOURS PRN
Status: DISCONTINUED | OUTPATIENT
Start: 2025-02-03 | End: 2025-02-12 | Stop reason: HOSPADM

## 2025-02-03 RX ORDER — POTASSIUM CHLORIDE 1500 MG/1
40 TABLET, EXTENDED RELEASE ORAL PRN
Status: DISCONTINUED | OUTPATIENT
Start: 2025-02-03 | End: 2025-02-04

## 2025-02-03 RX ORDER — METHADONE HYDROCHLORIDE 10 MG/ML
120 CONCENTRATE ORAL ONCE
Status: COMPLETED | OUTPATIENT
Start: 2025-02-03 | End: 2025-02-03

## 2025-02-03 RX ORDER — ALBUTEROL SULFATE 0.83 MG/ML
2.5 SOLUTION RESPIRATORY (INHALATION)
Status: DISCONTINUED | OUTPATIENT
Start: 2025-02-03 | End: 2025-02-12 | Stop reason: HOSPADM

## 2025-02-03 RX ORDER — ONDANSETRON 2 MG/ML
4 INJECTION INTRAMUSCULAR; INTRAVENOUS EVERY 6 HOURS PRN
Status: DISCONTINUED | OUTPATIENT
Start: 2025-02-03 | End: 2025-02-12 | Stop reason: HOSPADM

## 2025-02-03 RX ORDER — SPIRONOLACTONE 25 MG/1
25 TABLET ORAL DAILY
Status: DISCONTINUED | OUTPATIENT
Start: 2025-02-03 | End: 2025-02-12 | Stop reason: HOSPADM

## 2025-02-03 RX ORDER — SODIUM CHLORIDE 0.9 % (FLUSH) 0.9 %
5-40 SYRINGE (ML) INJECTION PRN
Status: DISCONTINUED | OUTPATIENT
Start: 2025-02-03 | End: 2025-02-12 | Stop reason: HOSPADM

## 2025-02-03 RX ORDER — METHADONE HYDROCHLORIDE 10 MG/ML
120 CONCENTRATE ORAL DAILY
Status: DISCONTINUED | OUTPATIENT
Start: 2025-02-04 | End: 2025-02-12 | Stop reason: HOSPADM

## 2025-02-03 RX ORDER — GABAPENTIN 300 MG/1
600 CAPSULE ORAL 3 TIMES DAILY
Status: DISCONTINUED | OUTPATIENT
Start: 2025-02-03 | End: 2025-02-12 | Stop reason: HOSPADM

## 2025-02-03 RX ORDER — SACUBITRIL AND VALSARTAN 24; 26 MG/1; MG/1
1 TABLET, FILM COATED ORAL 2 TIMES DAILY
Status: DISCONTINUED | OUTPATIENT
Start: 2025-02-03 | End: 2025-02-12 | Stop reason: HOSPADM

## 2025-02-03 RX ORDER — ENOXAPARIN SODIUM 100 MG/ML
40 INJECTION SUBCUTANEOUS DAILY
Status: DISCONTINUED | OUTPATIENT
Start: 2025-02-03 | End: 2025-02-04 | Stop reason: ALTCHOICE

## 2025-02-03 RX ORDER — CYCLOBENZAPRINE HCL 10 MG
10 TABLET ORAL 3 TIMES DAILY PRN
Status: DISCONTINUED | OUTPATIENT
Start: 2025-02-03 | End: 2025-02-12 | Stop reason: HOSPADM

## 2025-02-03 RX ORDER — PRAZOSIN HYDROCHLORIDE 1 MG/1
2 CAPSULE ORAL NIGHTLY
Status: DISCONTINUED | OUTPATIENT
Start: 2025-02-03 | End: 2025-02-04

## 2025-02-03 RX ORDER — POTASSIUM CHLORIDE 7.45 MG/ML
10 INJECTION INTRAVENOUS PRN
Status: DISCONTINUED | OUTPATIENT
Start: 2025-02-03 | End: 2025-02-04

## 2025-02-03 RX ORDER — MAGNESIUM SULFATE IN WATER 40 MG/ML
2000 INJECTION, SOLUTION INTRAVENOUS PRN
Status: DISCONTINUED | OUTPATIENT
Start: 2025-02-03 | End: 2025-02-04

## 2025-02-03 RX ORDER — POLYETHYLENE GLYCOL 3350 17 G/17G
17 POWDER, FOR SOLUTION ORAL DAILY PRN
Status: DISCONTINUED | OUTPATIENT
Start: 2025-02-03 | End: 2025-02-04

## 2025-02-03 RX ORDER — QUETIAPINE FUMARATE 100 MG/1
400 TABLET, FILM COATED ORAL NIGHTLY
Status: DISCONTINUED | OUTPATIENT
Start: 2025-02-03 | End: 2025-02-12 | Stop reason: HOSPADM

## 2025-02-03 RX ORDER — ACETAMINOPHEN 650 MG/1
650 SUPPOSITORY RECTAL EVERY 6 HOURS PRN
Status: DISCONTINUED | OUTPATIENT
Start: 2025-02-03 | End: 2025-02-03

## 2025-02-03 RX ORDER — ACETAMINOPHEN 325 MG/1
650 TABLET ORAL EVERY 6 HOURS SCHEDULED
Status: DISCONTINUED | OUTPATIENT
Start: 2025-02-03 | End: 2025-02-05

## 2025-02-03 RX ORDER — DIVALPROEX SODIUM 500 MG/1
500 TABLET, FILM COATED, EXTENDED RELEASE ORAL DAILY
Status: DISCONTINUED | OUTPATIENT
Start: 2025-02-03 | End: 2025-02-04 | Stop reason: ALTCHOICE

## 2025-02-03 RX ORDER — ACETAMINOPHEN 325 MG/1
650 TABLET ORAL EVERY 6 HOURS PRN
Status: DISCONTINUED | OUTPATIENT
Start: 2025-02-03 | End: 2025-02-03

## 2025-02-03 RX ORDER — SODIUM CHLORIDE 0.9 % (FLUSH) 0.9 %
5-40 SYRINGE (ML) INJECTION EVERY 12 HOURS SCHEDULED
Status: DISCONTINUED | OUTPATIENT
Start: 2025-02-03 | End: 2025-02-12 | Stop reason: HOSPADM

## 2025-02-03 RX ORDER — IPRATROPIUM BROMIDE AND ALBUTEROL SULFATE 2.5; .5 MG/3ML; MG/3ML
1 SOLUTION RESPIRATORY (INHALATION)
Status: COMPLETED | OUTPATIENT
Start: 2025-02-03 | End: 2025-02-03

## 2025-02-03 RX ORDER — ALBUTEROL SULFATE 5 MG/ML
15 SOLUTION RESPIRATORY (INHALATION)
Status: COMPLETED | OUTPATIENT
Start: 2025-02-03 | End: 2025-02-03

## 2025-02-03 RX ORDER — SODIUM CHLORIDE 9 MG/ML
INJECTION, SOLUTION INTRAVENOUS PRN
Status: DISCONTINUED | OUTPATIENT
Start: 2025-02-03 | End: 2025-02-12 | Stop reason: HOSPADM

## 2025-02-03 RX ORDER — IPRATROPIUM BROMIDE AND ALBUTEROL SULFATE 2.5; .5 MG/3ML; MG/3ML
1 SOLUTION RESPIRATORY (INHALATION)
Status: DISCONTINUED | OUTPATIENT
Start: 2025-02-03 | End: 2025-02-04

## 2025-02-03 RX ADMIN — WATER 60 MG: 1 INJECTION INTRAMUSCULAR; INTRAVENOUS; SUBCUTANEOUS at 14:23

## 2025-02-03 RX ADMIN — METHADONE HYDROCHLORIDE 120 MG: 10 CONCENTRATE ORAL at 09:51

## 2025-02-03 RX ADMIN — PRAZOSIN HYDROCHLORIDE 2 MG: 1 CAPSULE ORAL at 20:57

## 2025-02-03 RX ADMIN — ALBUTEROL SULFATE 15 MG: 2.5 SOLUTION RESPIRATORY (INHALATION) at 10:29

## 2025-02-03 RX ADMIN — IPRATROPIUM BROMIDE AND ALBUTEROL SULFATE 1 DOSE: .5; 3 SOLUTION RESPIRATORY (INHALATION) at 10:36

## 2025-02-03 RX ADMIN — WATER 60 MG: 1 INJECTION INTRAMUSCULAR; INTRAVENOUS; SUBCUTANEOUS at 20:55

## 2025-02-03 RX ADMIN — SACUBITRIL AND VALSARTAN 1 TABLET: 24; 26 TABLET, FILM COATED ORAL at 20:57

## 2025-02-03 RX ADMIN — IPRATROPIUM BROMIDE AND ALBUTEROL SULFATE 1 DOSE: 2.5; .5 SOLUTION RESPIRATORY (INHALATION) at 16:46

## 2025-02-03 RX ADMIN — DIVALPROEX SODIUM 500 MG: 500 TABLET, EXTENDED RELEASE ORAL at 14:23

## 2025-02-03 RX ADMIN — IPRATROPIUM BROMIDE AND ALBUTEROL SULFATE 1 DOSE: .5; 3 SOLUTION RESPIRATORY (INHALATION) at 10:35

## 2025-02-03 RX ADMIN — AZITHROMYCIN MONOHYDRATE 500 MG: 500 INJECTION, POWDER, LYOPHILIZED, FOR SOLUTION INTRAVENOUS at 12:27

## 2025-02-03 RX ADMIN — GABAPENTIN 600 MG: 300 CAPSULE ORAL at 20:57

## 2025-02-03 RX ADMIN — ACETAMINOPHEN 650 MG: 325 TABLET ORAL at 23:41

## 2025-02-03 RX ADMIN — GABAPENTIN 600 MG: 300 CAPSULE ORAL at 14:23

## 2025-02-03 RX ADMIN — Medication 1 AMPULE: at 20:58

## 2025-02-03 RX ADMIN — CYCLOBENZAPRINE 10 MG: 10 TABLET, FILM COATED ORAL at 15:54

## 2025-02-03 RX ADMIN — SODIUM CHLORIDE, PRESERVATIVE FREE 10 ML: 5 INJECTION INTRAVENOUS at 14:22

## 2025-02-03 RX ADMIN — IPRATROPIUM BROMIDE AND ALBUTEROL SULFATE 1 DOSE: 2.5; .5 SOLUTION RESPIRATORY (INHALATION) at 20:34

## 2025-02-03 RX ADMIN — CYCLOBENZAPRINE 10 MG: 10 TABLET, FILM COATED ORAL at 21:02

## 2025-02-03 RX ADMIN — SPIRONOLACTONE 25 MG: 25 TABLET ORAL at 17:13

## 2025-02-03 RX ADMIN — SERTRALINE HYDROCHLORIDE 50 MG: 50 TABLET ORAL at 14:23

## 2025-02-03 RX ADMIN — IPRATROPIUM BROMIDE AND ALBUTEROL SULFATE 1 DOSE: .5; 3 SOLUTION RESPIRATORY (INHALATION) at 10:37

## 2025-02-03 RX ADMIN — WATER 125 MG: 1 INJECTION INTRAMUSCULAR; INTRAVENOUS; SUBCUTANEOUS at 09:51

## 2025-02-03 RX ADMIN — QUETIAPINE FUMARATE 400 MG: 100 TABLET ORAL at 20:57

## 2025-02-03 RX ADMIN — SODIUM CHLORIDE, PRESERVATIVE FREE 10 ML: 5 INJECTION INTRAVENOUS at 20:55

## 2025-02-03 ASSESSMENT — PAIN DESCRIPTION - LOCATION
LOCATION: ARM;SHOULDER
LOCATION: ARM
LOCATION: ARM;SHOULDER
LOCATION: ARM;SHOULDER

## 2025-02-03 ASSESSMENT — PAIN DESCRIPTION - DESCRIPTORS
DESCRIPTORS: ACHING
DESCRIPTORS: ACHING;DISCOMFORT
DESCRIPTORS: DISCOMFORT
DESCRIPTORS: ACHING;DISCOMFORT

## 2025-02-03 ASSESSMENT — PAIN SCALES - GENERAL
PAINLEVEL_OUTOF10: 8
PAINLEVEL_OUTOF10: 8
PAINLEVEL_OUTOF10: 5
PAINLEVEL_OUTOF10: 0
PAINLEVEL_OUTOF10: 6
PAINLEVEL_OUTOF10: 8

## 2025-02-03 ASSESSMENT — PAIN DESCRIPTION - ORIENTATION
ORIENTATION: RIGHT

## 2025-02-03 ASSESSMENT — PAIN DESCRIPTION - FREQUENCY
FREQUENCY: INTERMITTENT
FREQUENCY: INTERMITTENT
FREQUENCY: CONTINUOUS

## 2025-02-03 ASSESSMENT — PAIN DESCRIPTION - PAIN TYPE
TYPE: ACUTE PAIN

## 2025-02-03 ASSESSMENT — PAIN - FUNCTIONAL ASSESSMENT
PAIN_FUNCTIONAL_ASSESSMENT: 0-10
PAIN_FUNCTIONAL_ASSESSMENT: ACTIVITIES ARE NOT PREVENTED

## 2025-02-03 ASSESSMENT — PAIN DESCRIPTION - ONSET: ONSET: SUDDEN

## 2025-02-03 NOTE — ED NOTES
This RN gave SBAR report to Ingrid TREADWELL att. Pt on continuous monitoring, bed locked & in lowest position, on bipap, call bell within reach & x2 side rails up. Pt has no complaints att

## 2025-02-03 NOTE — H&P
[facility] H[facility].      Name: David Car   : 1975   MRN: [id number]   Date: 2/3/2025      Chief Complaint   Patient presents with    Shortness of Breath     Pt arrives via EMS from home w CC of SOB & wheezing onset \"a couple of days\". PTA EMS gave duoneb tx. Pt reports wearing 10L NC at home, pt reports self increasing to 10L from 6L ordered by provider. Pt reports having CHF, COPD       Reason for ICU:     Acute hypoxic respiratory failure     HPI & Hospital course     48 yo F with history of HTN, HCV, seizure d/o, polysubstance abuse on methadone, COPD/CHRF on 6L oxygen, Sarcoidosis presents in ER with worsening sob. Pt reports that her symptoms have been going on for 1 week. Reports that she has not been able to do any activity. Denies any fever, chills, chest pain. Reports nausea off and on. Pt also reports rt upper arm swelling for 2 days. Reports that its painful, getting worse. Denies any trauma     In ER   CXR -ve for any acute process   ABG consistent with chronic hypercarbic respiratory failure.   Pt was placed on BIPAP and ICU consulted for further management.           Assessment & Plan     48 yo F with history of HTN, HCV, seizure d/o, polysubstance abuse on methadone, COPD/CHRF on 6L oxygen, Sarcoidosis admitted to ICU with acute hypoxic respiratory failure 2/2 copd exacerbation.     # Acute hypoxic respiratory failure 2/2 COPD exacerbation   Cont IV solumedrol 60mg q6   Started azithromycin in the setting of copd exacerbation   Duo nebs   Cont BIPAP 4 on and 4 off   ABG reviewed consistent with acute hypercarbic respiratory failure   CXR -ve for any acute process   Viral panel -ve     # Rt upper arm swelling:   US upper extremity ordered   Does not appear infected   Will hold antibiotics     # Chronic conditions:   Cont home meds     Code status: Full code   SUP: Peripheral IV   DVT prophylaxis: Lovenox  \    Care Plan discussed with: Patient/Family and Nurse    I personally spent

## 2025-02-03 NOTE — ED NOTES
Assumed care of patient at this time. Patient currently on Bipap and this RN clarified with EBEN Hebert that patient wears 6L O2 at baseline at home. Patient has lump on R shoulder that patient states is painful and states she first noticed it a week ago. This RN provided patient with blankets at this time and ensured Bipap is fitting comfortably. Patient on monitor x3 with side rails x2 and call bell within reach. Bed in lowest position.

## 2025-02-03 NOTE — ED PROVIDER NOTES
[facility] EMERGENCY DEPARTMENT  EMERGENCY DEPARTMENT ENCOUNTER       Pt Name: David Car  MRN: [id number]  Birthdate 1975  Date of evaluation: 2/3/2025  Provider: Saleem Valencia MD   PCP: Ese Epps APRN - NP  Note Started: 9:21 AM EST 2/3/25     CHIEF COMPLAINT       Chief Complaint   Patient presents with    Shortness of Breath     Pt arrives via EMS from home w CC of SOB & wheezing onset \"a couple of days\". PTA EMS gave duoneb tx. Pt reports wearing 10L NC at home, pt reports self increasing to 10L from 6L ordered by provider. Pt reports having CHF, COPD        HISTORY OF PRESENT ILLNESS: 1 or more elements      History From: patient, History limited by: none     David Car is a 49 y.o. female w hx of below including COPD and CHF presents to the ED with shortness of breath and wheezing that begain 48 hours ago.  No improvement with inhalers that she is using regularly at home.  She has increased her oxygen at home from 6LNC to 10LNC.  She has also developed area of focal right shoulder swelling and pain over past week.       Please See MDM for Additional Details of the HPI/PMH  Nursing Notes were all reviewed and agreed with or any disagreements were addressed in the HPI.     REVIEW OF SYSTEMS        Positives and Pertinent negatives as per HPI.    PAST HISTORY     Past Medical History:  Past Medical History:   Diagnosis Date    Ascariosis     Asthma     Bipolar 1 disorder (HCC)     Chronic obstructive pulmonary disease (HCC)     Chronic pain     back, leg    Cocaine abuse (HCC)     Depression     Heart failure (HCC)     Hepatitis B     Heroin abuse (HCC)     HTN (hypertension)     Narcotic abuse (HCC)     Polysubstance abuse (HCC)     Required emergency intubation     Sarcoidosis     Seizure (HCC)     Tobacco abuse        Past Surgical History:  Past Surgical History:   Procedure Laterality Date    GYN      WISDOM TOOTH EXTRACTION         Family History:  Family History   Problem

## 2025-02-03 NOTE — ED NOTES
TRANSFER - OUT REPORT:    Verbal report given to EBEN Gipson on David Car  being transferred to CCU 2515 for routine progression of patient care       Report consisted of patient's Situation, Background, Assessment and   Recommendations(SBAR).     Information from the following report(s) Nurse Handoff Report, Index, ED Encounter Summary, ED SBAR, Adult Overview, Surgery Report, Intake/Output, MAR, Recent Results, Med Rec Status, and Cardiac Rhythm Sinus Tach (105)  was reviewed with the receiving nurse.    [place] Fall Assessment:    Presents to emergency department  because of falls (Syncope, seizure, or loss of consciousness): No  Age > 70: No  Altered Mental Status, Intoxication with alcohol or substance confusion (Disorientation, impaired judgment, poor safety awaremess, or inability to follow instructions): No  Impaired Mobility: Ambulates or transfers with assistive devices or assistance; Unable to ambulate or transer.: No  Nursing Judgement: No          Lines:   Peripheral IV 02/03/25 Distal;Right Cephalic (Active)        Opportunity for questions and clarification was provided.      Patient transported with:  Monitor, O2 @ Bipap (w/ RT) lpm, and Registered Nurse

## 2025-02-03 NOTE — PROGRESS NOTES
1305: Report received from EBEN Quintero.     1330: Admission assessment complete. Pt arrives to CCU A&Ox4. On BIPAP 35% FIO2. Endorses CARDOSO. NSR to sinus tachy on telemetry. Bump noted to R shoulder. Pt endorsing pain at the site, US of RUE suggestive of lipoma. Pure wick in place, pt due to void. See flowsheets for assessment.     1600: Reassessment complete. Pt doing well, transitioned to 2L NC. Pain management ongoing for R shoulder/arm pain.     1900: Bedside shift report given to EBEN Olsen.

## 2025-02-04 LAB
ANION GAP SERPL CALC-SCNC: 3 MMOL/L (ref 2–12)
BUN SERPL-MCNC: 10 MG/DL (ref 6–20)
BUN/CREAT SERPL: 14 (ref 12–20)
CALCIUM SERPL-MCNC: 9.3 MG/DL (ref 8.5–10.1)
CHLORIDE SERPL-SCNC: 99 MMOL/L (ref 97–108)
CO2 SERPL-SCNC: 34 MMOL/L (ref 21–32)
CREAT SERPL-MCNC: 0.73 MG/DL (ref 0.55–1.02)
ERYTHROCYTE [DISTWIDTH] IN BLOOD BY AUTOMATED COUNT: 13.3 % (ref 11.5–14.5)
GLUCOSE SERPL-MCNC: 125 MG/DL (ref 65–100)
HCT VFR BLD AUTO: 35.5 % (ref 35–47)
HGB BLD-MCNC: 11.6 G/DL (ref 11.5–16)
MAGNESIUM SERPL-MCNC: 2 MG/DL (ref 1.6–2.4)
MCH RBC QN AUTO: 31.2 PG (ref 26–34)
MCHC RBC AUTO-ENTMCNC: 32.7 G/DL (ref 30–36.5)
MCV RBC AUTO: 95.4 FL (ref 80–99)
NRBC # BLD: 0 K/UL (ref 0–0.01)
NRBC BLD-RTO: 0 PER 100 WBC
PHOSPHATE SERPL-MCNC: 1.9 MG/DL (ref 2.6–4.7)
PLATELET # BLD AUTO: 220 K/UL (ref 150–400)
PMV BLD AUTO: 9.7 FL (ref 8.9–12.9)
POTASSIUM SERPL-SCNC: 3.6 MMOL/L (ref 3.5–5.1)
RBC # BLD AUTO: 3.72 M/UL (ref 3.8–5.2)
SODIUM SERPL-SCNC: 136 MMOL/L (ref 136–145)
WBC # BLD AUTO: 8.6 K/UL (ref 3.6–11)

## 2025-02-04 PROCEDURE — 2500000003 HC RX 250 WO HCPCS: Performed by: INTERNAL MEDICINE

## 2025-02-04 PROCEDURE — 36415 COLL VENOUS BLD VENIPUNCTURE: CPT

## 2025-02-04 PROCEDURE — 6370000000 HC RX 637 (ALT 250 FOR IP): Performed by: INTERNAL MEDICINE

## 2025-02-04 PROCEDURE — 80048 BASIC METABOLIC PNL TOTAL CA: CPT

## 2025-02-04 PROCEDURE — 83735 ASSAY OF MAGNESIUM: CPT

## 2025-02-04 PROCEDURE — 6360000002 HC RX W HCPCS: Performed by: INTERNAL MEDICINE

## 2025-02-04 PROCEDURE — 2000000000 HC ICU R&B

## 2025-02-04 PROCEDURE — 2700000000 HC OXYGEN THERAPY PER DAY

## 2025-02-04 PROCEDURE — 84100 ASSAY OF PHOSPHORUS: CPT

## 2025-02-04 PROCEDURE — 94640 AIRWAY INHALATION TREATMENT: CPT

## 2025-02-04 PROCEDURE — 2580000003 HC RX 258: Performed by: INTERNAL MEDICINE

## 2025-02-04 PROCEDURE — 85027 COMPLETE CBC AUTOMATED: CPT

## 2025-02-04 RX ORDER — TIOTROPIUM BROMIDE 18 UG/1
18 CAPSULE ORAL; RESPIRATORY (INHALATION) DAILY
Status: ON HOLD | COMMUNITY
End: 2025-02-12 | Stop reason: HOSPADM

## 2025-02-04 RX ORDER — OXCARBAZEPINE 150 MG/1
150 TABLET, FILM COATED ORAL 2 TIMES DAILY
Status: ON HOLD | COMMUNITY
End: 2025-02-12

## 2025-02-04 RX ORDER — LIDOCAINE 4 G/G
1 PATCH TOPICAL DAILY
Status: COMPLETED | OUTPATIENT
Start: 2025-02-04 | End: 2025-02-05

## 2025-02-04 RX ORDER — BUPROPION HYDROCHLORIDE 300 MG/1
300 TABLET ORAL EVERY MORNING
Status: ON HOLD | COMMUNITY
End: 2025-02-12

## 2025-02-04 RX ORDER — FLUTICASONE PROPIONATE AND SALMETEROL 250; 50 UG/1; UG/1
1 POWDER RESPIRATORY (INHALATION) 2 TIMES DAILY
Status: ON HOLD | COMMUNITY
End: 2025-02-12 | Stop reason: HOSPADM

## 2025-02-04 RX ORDER — POLYETHYLENE GLYCOL 3350 17 G/17G
17 POWDER, FOR SOLUTION ORAL DAILY
Status: DISCONTINUED | OUTPATIENT
Start: 2025-02-04 | End: 2025-02-06

## 2025-02-04 RX ORDER — LEVETIRACETAM 500 MG/1
500 TABLET ORAL 2 TIMES DAILY
Status: ON HOLD | COMMUNITY
End: 2025-02-12

## 2025-02-04 RX ORDER — CLONIDINE HYDROCHLORIDE 0.1 MG/1
0.2 TABLET ORAL 2 TIMES DAILY
Status: DISCONTINUED | OUTPATIENT
Start: 2025-02-04 | End: 2025-02-12 | Stop reason: HOSPADM

## 2025-02-04 RX ORDER — OXCARBAZEPINE 300 MG/1
150 TABLET, FILM COATED ORAL 2 TIMES DAILY
Status: DISCONTINUED | OUTPATIENT
Start: 2025-02-04 | End: 2025-02-12 | Stop reason: HOSPADM

## 2025-02-04 RX ORDER — IPRATROPIUM BROMIDE AND ALBUTEROL SULFATE 2.5; .5 MG/3ML; MG/3ML
1 SOLUTION RESPIRATORY (INHALATION) EVERY 4 HOURS PRN
Status: DISCONTINUED | OUTPATIENT
Start: 2025-02-04 | End: 2025-02-12 | Stop reason: HOSPADM

## 2025-02-04 RX ORDER — BUPROPION HYDROCHLORIDE 150 MG/1
300 TABLET ORAL EVERY MORNING
Status: DISCONTINUED | OUTPATIENT
Start: 2025-02-05 | End: 2025-02-12 | Stop reason: HOSPADM

## 2025-02-04 RX ORDER — LEVETIRACETAM 500 MG/1
500 TABLET ORAL 2 TIMES DAILY
Status: DISCONTINUED | OUTPATIENT
Start: 2025-02-04 | End: 2025-02-12 | Stop reason: HOSPADM

## 2025-02-04 RX ORDER — CLONIDINE HYDROCHLORIDE 0.2 MG/1
0.2 TABLET ORAL 3 TIMES DAILY
Status: ON HOLD | COMMUNITY
End: 2025-02-12

## 2025-02-04 RX ORDER — PRAZOSIN HYDROCHLORIDE 1 MG/1
4 CAPSULE ORAL NIGHTLY
Status: DISCONTINUED | OUTPATIENT
Start: 2025-02-04 | End: 2025-02-12 | Stop reason: HOSPADM

## 2025-02-04 RX ADMIN — METHADONE HYDROCHLORIDE 120 MG: 10 CONCENTRATE ORAL at 10:09

## 2025-02-04 RX ADMIN — Medication 1 AMPULE: at 21:18

## 2025-02-04 RX ADMIN — LEVETIRACETAM 500 MG: 500 TABLET, FILM COATED ORAL at 21:17

## 2025-02-04 RX ADMIN — AZITHROMYCIN MONOHYDRATE 500 MG: 500 INJECTION, POWDER, LYOPHILIZED, FOR SOLUTION INTRAVENOUS at 12:54

## 2025-02-04 RX ADMIN — SODIUM CHLORIDE, PRESERVATIVE FREE 10 ML: 5 INJECTION INTRAVENOUS at 10:14

## 2025-02-04 RX ADMIN — GABAPENTIN 600 MG: 300 CAPSULE ORAL at 21:16

## 2025-02-04 RX ADMIN — GABAPENTIN 600 MG: 300 CAPSULE ORAL at 10:07

## 2025-02-04 RX ADMIN — PRAZOSIN HYDROCHLORIDE 4 MG: 1 CAPSULE ORAL at 19:57

## 2025-02-04 RX ADMIN — SODIUM CHLORIDE, PRESERVATIVE FREE 10 ML: 5 INJECTION INTRAVENOUS at 21:12

## 2025-02-04 RX ADMIN — WATER 60 MG: 1 INJECTION INTRAMUSCULAR; INTRAVENOUS; SUBCUTANEOUS at 03:37

## 2025-02-04 RX ADMIN — Medication 1 AMPULE: at 10:13

## 2025-02-04 RX ADMIN — SACUBITRIL AND VALSARTAN 1 TABLET: 24; 26 TABLET, FILM COATED ORAL at 19:57

## 2025-02-04 RX ADMIN — QUETIAPINE FUMARATE 400 MG: 100 TABLET ORAL at 21:17

## 2025-02-04 RX ADMIN — IPRATROPIUM BROMIDE AND ALBUTEROL SULFATE 1 DOSE: 2.5; .5 SOLUTION RESPIRATORY (INHALATION) at 12:30

## 2025-02-04 RX ADMIN — SACUBITRIL AND VALSARTAN 1 TABLET: 24; 26 TABLET, FILM COATED ORAL at 10:07

## 2025-02-04 RX ADMIN — WATER 60 MG: 1 INJECTION INTRAMUSCULAR; INTRAVENOUS; SUBCUTANEOUS at 15:22

## 2025-02-04 RX ADMIN — OXCARBAZEPINE 150 MG: 300 TABLET, FILM COATED ORAL at 21:15

## 2025-02-04 RX ADMIN — SERTRALINE HYDROCHLORIDE 50 MG: 50 TABLET ORAL at 10:08

## 2025-02-04 RX ADMIN — DIVALPROEX SODIUM 500 MG: 500 TABLET, EXTENDED RELEASE ORAL at 10:07

## 2025-02-04 RX ADMIN — WATER 60 MG: 1 INJECTION INTRAMUSCULAR; INTRAVENOUS; SUBCUTANEOUS at 10:08

## 2025-02-04 RX ADMIN — ARFORMOTEROL TARTRATE: 15 SOLUTION RESPIRATORY (INHALATION) at 21:15

## 2025-02-04 RX ADMIN — ONDANSETRON 4 MG: 2 INJECTION INTRAMUSCULAR; INTRAVENOUS at 13:28

## 2025-02-04 RX ADMIN — SPIRONOLACTONE 25 MG: 25 TABLET ORAL at 10:07

## 2025-02-04 RX ADMIN — CLONIDINE HYDROCHLORIDE 0.2 MG: 0.1 TABLET ORAL at 21:14

## 2025-02-04 RX ADMIN — GABAPENTIN 600 MG: 300 CAPSULE ORAL at 15:21

## 2025-02-04 ASSESSMENT — PAIN DESCRIPTION - LOCATION: LOCATION: SHOULDER

## 2025-02-04 ASSESSMENT — PAIN DESCRIPTION - PAIN TYPE: TYPE: CHRONIC PAIN

## 2025-02-04 ASSESSMENT — PAIN DESCRIPTION - ORIENTATION: ORIENTATION: RIGHT

## 2025-02-04 ASSESSMENT — PAIN SCALES - GENERAL
PAINLEVEL_OUTOF10: 9
PAINLEVEL_OUTOF10: 0

## 2025-02-04 ASSESSMENT — PAIN DESCRIPTION - DESCRIPTORS: DESCRIPTORS: ACHING

## 2025-02-04 NOTE — PROGRESS NOTES
Pharmacy Medication History Note    The patient was interviewed regarding current PTA medication list, use and drug allergies. The patient was questioned regarding use of any other inhalers, topical products, over the counter medications, herbal medications, vitamin products or ophthalmic/nasal/otic medication use.     Allergy Update: Vancomycin, Ketorolac, Mirtazapine, Tomato, and Trazodone    Recommendations/Findings:   The following amendments were made to the patient's active medication list on file at [facility]:     1) Additions: Eliquis, bupropion, clonidine, Advair, Keppra, oxcarbazepine, sertraline, Spiriva    2) Deletions: Depakote, Symbicort     3) Changes: Entresto, prazosin      Pertinent Findings: None    Clarified PTA med list with patient and RX Query. PTA medication list was corrected to the following:     Prior to Admission Medications   Prescriptions Last Dose Informant   OXcarbazepine (TRILEPTAL) 150 MG tablet     Sig: Take 1 tablet by mouth 2 times daily   QUEtiapine (SEROQUEL) 400 MG tablet 2/2/2025    Sig: Take 2 tablets by mouth nightly   albuterol sulfate HFA (PROVENTIL;VENTOLIN;PROAIR) 108 (90 Base) MCG/ACT inhaler     Sig: Inhale 2 puffs into the lungs every 4 hours as needed   apixaban (ELIQUIS) 5 MG TABS tablet     Sig: Take 1 tablet by mouth 2 times daily   buPROPion (WELLBUTRIN XL) 300 MG extended release tablet     Sig: Take 1 tablet by mouth every morning   cloNIDine (CATAPRES) 0.2 MG tablet     Sig: Take 1 tablet by mouth in the morning, at noon, and at bedtime   fluticasone-salmeterol (ADVAIR DISKUS) 250-50 MCG/ACT AEPB diskus inhaler     Sig: Inhale 1 puff into the lungs 2 times daily   gabapentin (NEURONTIN) 600 MG tablet     Sig: Take 1 tablet by mouth 3 times daily.   levETIRAcetam (KEPPRA) 500 MG tablet     Sig: Take 1 tablet by mouth 2 times daily   methadone 10 MG/5ML solution     Sig: Take 60 mLs by mouth daily. [facility] [place]  [id number] [place] [address] Rd, [place], [place]

## 2025-02-04 NOTE — PROGRESS NOTES
SUMMARY:  49 F with multiple medical problems including multiple psychiatric diagnoses who has chronic hypoxic/hypercapnic respiratory failure on the basis of COPD, pulmonary sarcoidosis, chronic heart failure.  She presented to the emergency department on 02/03 with worsening dyspnea and hypoxemia.  At her baseline she wears nasal cannula oxygen at 6 LPM and will sometimes turn it up to 10 LPM when she feels increasingly short of breath.  In the emergency department her arterial blood gas revealed acute on chronic hypercapnic respiratory failure with hypoxemia.  She was admitted with a diagnosis of COPD exacerbation.    SUBJ:  Dyspneic with speech this morning.  Oxygen saturations were excellent on nasal cannula oxygen 4 LPM.  No new complaints.    OBJ:  Vitals:    02/04/25 1230 02/04/25 1300 02/04/25 1400 02/04/25 1509   BP:  (!) 158/98 (!) 150/103    Pulse: 89 (!) 103 (!) 106    Resp: 18 19 13    Temp:   97.9 °F (36.6 °C)    TempSrc:   Oral    SpO2: 93% 90% 95%    Weight:       Height:    1.626 m (5' 4\")     Cognition fully intact  Appears dyspneic and tachypneic  HEENT: NCAT, sclerae white  Neck: JVP cannot be visualized  Chest: Distant breath sounds without wheezes or other adventitious sounds  Cardiac: Distant heart sounds, regular, no murmurs heard  Abdomen: Obese, soft, NT, NABS  Extremities: Symmetric pitting pretibial edema  Neuro: No focal deficits    I have reviewed the lab results from this day. Relevant abnormalities are discussed in the impression and plan    CXR: (I have personally reviewed)  02/03 no acute findings    IMPRESSION:  Acute on chronic hypercapnic/hypoxemic respiratory failure  Former smoker  COPD-not presently wheezing  History of pulmonary sarcoidosis  Probable component of OHS    PLAN:  Continuous pulse oximetry monitoring  Supplemental O2 as needed to maintain SpO2 88-94%  BiPAP as needed during day  Mandatory BiPAP with sleep  Scheduled nebulized

## 2025-02-04 NOTE — INTERDISCIPLINARY ROUNDS
Interdisciplinary team rounds were held 2/4/2025 with the following team members: intensivist, pharmacy, nutrition, nursing, .    Plan of care discussed. See clinical pathway and/or care plan for interventions and desired outcomes.    Goals of the Day: medications reviewed. Bowel regime.

## 2025-02-04 NOTE — PROGRESS NOTES
Spiritual Health History and Assessment/Progress Note  [facility]    Loneliness/Social Isolation,  , Adjustment to illness,      Name: David Car MRN: [id number]    Age: 49 y.o.     Sex: female   Language: English   Muslim: Mu-ism   Acute hypoxic respiratory failure     Date: 2/4/2025            Total Time Calculated: 39 min              Spiritual Assessment began in [facility]IT[facility] CARE        Referral/Consult From: Multi-disciplinary team   Encounter Overview/Reason: Loneliness/Social Isolation  Service Provided For: Patient and family together    Parul, Belief, Meaning:   Patient has beliefs or practices that help with coping during difficult times  Family/Friends have beliefs or practices that help with coping during difficult times      Importance and Influence:  Patient has spiritual/personal beliefs that influence decisions regarding their health  Family/Friends have spiritual/personal beliefs that influence decisions regarding the patient's health    Community:  Patient feels well-supported. Support system includes: Spouse/Partner, Parent/s, Children, Parul Community, Friends, and Extended family  Family/Friends feel well-supported. Support system includes: Spouse/Partner, Parent/s, Children, Parul Community, Friends, and Extended family    Assessment and Plan of Care:     Patient Interventions include: Facilitated expression of thoughts and feelings, Explored spiritual coping/struggle/distress, Affirmed coping skills/support systems, and Facilitated life review and/ or legacy  Family/Friends Interventions include: Facilitated expression of thoughts and feelings, Explored spiritual coping/struggle/distress, Affirmed coping skills/support systems, and Facilitated life review and/or legacy    Patient Plan of Care: No spiritual needs identified for follow-up and Spiritual Care available upon further referral  Family/Friends Plan of Care: No spiritual needs identified for follow-up and

## 2025-02-04 NOTE — PROGRESS NOTES
Comprehensive Nutrition Assessment    Type and Reason for Visit:  Initial, Positive nutrition screen    Nutrition Recommendations/Plan:   RD to liberalize diet to No Added Salt  Agree with Ensure TID  Please document % meals and supplements consumed in flowsheet I/O's under intake   Start scheduled bowel regimen      Malnutrition Assessment:  Malnutrition Status:  At risk for malnutrition (02/04/25 1512)    Context:  Acute Illness     Findings of the 6 clinical characteristics of malnutrition:  Energy Intake:  Mild decrease in energy intake  Weight Loss:  Mild weight loss (10lb over 2-3 weeks, UTD if related to fluid fluctuationns or caloric deficit)     Body Fat Loss:  No body fat loss     Muscle Mass Loss:  No muscle mass loss    Fluid Accumulation:  No fluid accumulation     Strength:  Not Performed    Nutrition Assessment:  Pt admitted with acute hypoxic respiratory failure.  PMH: HTN, COPD, sarcoidosis, psych hx, previous substance abuse (on methadone).  MST triggered for wt loss and poor appetite.  Chart reviewed, case discussed during CCU rounds.  Pt sitting up in bed awake and alert.  She reports a 10lb wt loss over the last 2-3 weeks as well as a decline in her appetite.  She does not attribute her poor appetite to any particular Sx like early satiety, n/v/d but does admit to some abdominal pain (points to lower abdomen).  No BM in 4 days and she is on methadone, discussed how constipation could be contributing and she is open to a bowel regimen.  She likes Ensure, this is already ordered, prefers strawberry.  Pt ordered a grilled cheese for lunch, has not eaten it yet as she was unable due to being MARILIN?  But reports she plans to eat it.  Pt frustrated by diet restrictions, will lift low fat and liberalize to No added salt.  Discussed need for some Na restriction as she has HTN, pt agreeable.  No questions at this time.  UTD if reported wt loss is fluid related given CHF hx and she is on diuretics.

## 2025-02-04 NOTE — PROGRESS NOTES
Pt pulled off pulse-ox, when coming in to change her pulse-ox and informing her that we need it on her finger and not to pull it off; she stated that it fell off. I informed her that they don't come off that easily; pt got upset and asked for a new nurse to take care of her. EBEN Johnson came in to replace pulse-ox, give morning meds, and to draw labs.

## 2025-02-05 LAB
ANION GAP SERPL CALC-SCNC: 3 MMOL/L (ref 2–12)
BUN SERPL-MCNC: 21 MG/DL (ref 6–20)
BUN/CREAT SERPL: 29 (ref 12–20)
CALCIUM SERPL-MCNC: 8.8 MG/DL (ref 8.5–10.1)
CHLORIDE SERPL-SCNC: 102 MMOL/L (ref 97–108)
CO2 SERPL-SCNC: 33 MMOL/L (ref 21–32)
CREAT SERPL-MCNC: 0.72 MG/DL (ref 0.55–1.02)
ERYTHROCYTE [DISTWIDTH] IN BLOOD BY AUTOMATED COUNT: 14.2 % (ref 11.5–14.5)
GLUCOSE SERPL-MCNC: 130 MG/DL (ref 65–100)
HCT VFR BLD AUTO: 35.6 % (ref 35–47)
HGB BLD-MCNC: 11.4 G/DL (ref 11.5–16)
MAGNESIUM SERPL-MCNC: 2 MG/DL (ref 1.6–2.4)
MCH RBC QN AUTO: 30.8 PG (ref 26–34)
MCHC RBC AUTO-ENTMCNC: 32 G/DL (ref 30–36.5)
MCV RBC AUTO: 96.2 FL (ref 80–99)
NRBC # BLD: 0 K/UL (ref 0–0.01)
NRBC BLD-RTO: 0 PER 100 WBC
PHOSPHATE SERPL-MCNC: 2.6 MG/DL (ref 2.6–4.7)
PLATELET # BLD AUTO: 236 K/UL (ref 150–400)
PMV BLD AUTO: 9.8 FL (ref 8.9–12.9)
POTASSIUM SERPL-SCNC: 3.6 MMOL/L (ref 3.5–5.1)
RBC # BLD AUTO: 3.7 M/UL (ref 3.8–5.2)
SODIUM SERPL-SCNC: 138 MMOL/L (ref 136–145)
WBC # BLD AUTO: 11.1 K/UL (ref 3.6–11)

## 2025-02-05 PROCEDURE — 6370000000 HC RX 637 (ALT 250 FOR IP): Performed by: INTERNAL MEDICINE

## 2025-02-05 PROCEDURE — 85027 COMPLETE CBC AUTOMATED: CPT

## 2025-02-05 PROCEDURE — 97530 THERAPEUTIC ACTIVITIES: CPT | Performed by: OCCUPATIONAL THERAPIST

## 2025-02-05 PROCEDURE — 2500000003 HC RX 250 WO HCPCS: Performed by: INTERNAL MEDICINE

## 2025-02-05 PROCEDURE — 80048 BASIC METABOLIC PNL TOTAL CA: CPT

## 2025-02-05 PROCEDURE — 6360000002 HC RX W HCPCS: Performed by: INTERNAL MEDICINE

## 2025-02-05 PROCEDURE — 94660 CPAP INITIATION&MGMT: CPT

## 2025-02-05 PROCEDURE — 36415 COLL VENOUS BLD VENIPUNCTURE: CPT

## 2025-02-05 PROCEDURE — 94640 AIRWAY INHALATION TREATMENT: CPT

## 2025-02-05 PROCEDURE — 83735 ASSAY OF MAGNESIUM: CPT

## 2025-02-05 PROCEDURE — 84100 ASSAY OF PHOSPHORUS: CPT

## 2025-02-05 PROCEDURE — 97535 SELF CARE MNGMENT TRAINING: CPT | Performed by: OCCUPATIONAL THERAPIST

## 2025-02-05 PROCEDURE — 97116 GAIT TRAINING THERAPY: CPT

## 2025-02-05 PROCEDURE — 97165 OT EVAL LOW COMPLEX 30 MIN: CPT | Performed by: OCCUPATIONAL THERAPIST

## 2025-02-05 PROCEDURE — 2580000003 HC RX 258: Performed by: INTERNAL MEDICINE

## 2025-02-05 PROCEDURE — 2000000000 HC ICU R&B

## 2025-02-05 PROCEDURE — 2700000000 HC OXYGEN THERAPY PER DAY

## 2025-02-05 PROCEDURE — 97162 PT EVAL MOD COMPLEX 30 MIN: CPT

## 2025-02-05 RX ORDER — LIDOCAINE 4 G/G
1 PATCH TOPICAL DAILY
Status: DISCONTINUED | OUTPATIENT
Start: 2025-02-05 | End: 2025-02-12 | Stop reason: HOSPADM

## 2025-02-05 RX ORDER — ACETAMINOPHEN 325 MG/1
650 TABLET ORAL EVERY 6 HOURS PRN
Status: DISCONTINUED | OUTPATIENT
Start: 2025-02-05 | End: 2025-02-12 | Stop reason: HOSPADM

## 2025-02-05 RX ORDER — ALPRAZOLAM 0.25 MG
0.25 TABLET ORAL 4 TIMES DAILY PRN
Status: DISCONTINUED | OUTPATIENT
Start: 2025-02-05 | End: 2025-02-12 | Stop reason: HOSPADM

## 2025-02-05 RX ADMIN — GABAPENTIN 600 MG: 300 CAPSULE ORAL at 08:46

## 2025-02-05 RX ADMIN — ONDANSETRON 4 MG: 2 INJECTION INTRAMUSCULAR; INTRAVENOUS at 20:40

## 2025-02-05 RX ADMIN — SACUBITRIL AND VALSARTAN 1 TABLET: 24; 26 TABLET, FILM COATED ORAL at 08:48

## 2025-02-05 RX ADMIN — SACUBITRIL AND VALSARTAN 1 TABLET: 24; 26 TABLET, FILM COATED ORAL at 20:19

## 2025-02-05 RX ADMIN — WATER 40 MG: 1 INJECTION INTRAMUSCULAR; INTRAVENOUS; SUBCUTANEOUS at 14:34

## 2025-02-05 RX ADMIN — ONDANSETRON 4 MG: 2 INJECTION INTRAMUSCULAR; INTRAVENOUS at 00:12

## 2025-02-05 RX ADMIN — IPRATROPIUM BROMIDE 0.5 MG: 0.5 SOLUTION RESPIRATORY (INHALATION) at 10:49

## 2025-02-05 RX ADMIN — IPRATROPIUM BROMIDE AND ALBUTEROL SULFATE 1 DOSE: 2.5; .5 SOLUTION RESPIRATORY (INHALATION) at 16:29

## 2025-02-05 RX ADMIN — BUPROPION HYDROCHLORIDE 300 MG: 150 TABLET, EXTENDED RELEASE ORAL at 08:46

## 2025-02-05 RX ADMIN — GABAPENTIN 600 MG: 300 CAPSULE ORAL at 14:30

## 2025-02-05 RX ADMIN — SPIRONOLACTONE 25 MG: 25 TABLET ORAL at 08:49

## 2025-02-05 RX ADMIN — ARFORMOTEROL TARTRATE: 15 SOLUTION RESPIRATORY (INHALATION) at 10:44

## 2025-02-05 RX ADMIN — GABAPENTIN 600 MG: 300 CAPSULE ORAL at 22:00

## 2025-02-05 RX ADMIN — ARFORMOTEROL TARTRATE: 15 SOLUTION RESPIRATORY (INHALATION) at 20:23

## 2025-02-05 RX ADMIN — OXCARBAZEPINE 150 MG: 300 TABLET, FILM COATED ORAL at 22:01

## 2025-02-05 RX ADMIN — CLONIDINE HYDROCHLORIDE 0.2 MG: 0.1 TABLET ORAL at 08:46

## 2025-02-05 RX ADMIN — Medication 1 AMPULE: at 08:45

## 2025-02-05 RX ADMIN — SERTRALINE HYDROCHLORIDE 100 MG: 50 TABLET ORAL at 08:49

## 2025-02-05 RX ADMIN — QUETIAPINE FUMARATE 400 MG: 100 TABLET ORAL at 22:00

## 2025-02-05 RX ADMIN — Medication 1 AMPULE: at 20:09

## 2025-02-05 RX ADMIN — METHADONE HYDROCHLORIDE 120 MG: 10 CONCENTRATE ORAL at 08:47

## 2025-02-05 RX ADMIN — ALPRAZOLAM 0.25 MG: 0.25 TABLET ORAL at 20:18

## 2025-02-05 RX ADMIN — AZITHROMYCIN MONOHYDRATE 500 MG: 500 INJECTION, POWDER, LYOPHILIZED, FOR SOLUTION INTRAVENOUS at 11:36

## 2025-02-05 RX ADMIN — WATER 40 MG: 1 INJECTION INTRAMUSCULAR; INTRAVENOUS; SUBCUTANEOUS at 05:42

## 2025-02-05 RX ADMIN — LEVETIRACETAM 500 MG: 500 TABLET, FILM COATED ORAL at 08:47

## 2025-02-05 RX ADMIN — PRAZOSIN HYDROCHLORIDE 4 MG: 1 CAPSULE ORAL at 20:18

## 2025-02-05 RX ADMIN — LEVETIRACETAM 500 MG: 500 TABLET, FILM COATED ORAL at 21:59

## 2025-02-05 RX ADMIN — SODIUM CHLORIDE, PRESERVATIVE FREE 10 ML: 5 INJECTION INTRAVENOUS at 08:58

## 2025-02-05 RX ADMIN — CLONIDINE HYDROCHLORIDE 0.2 MG: 0.1 TABLET ORAL at 21:59

## 2025-02-05 RX ADMIN — IPRATROPIUM BROMIDE 0.5 MG: 0.5 SOLUTION RESPIRATORY (INHALATION) at 20:19

## 2025-02-05 RX ADMIN — SODIUM CHLORIDE, PRESERVATIVE FREE 10 ML: 5 INJECTION INTRAVENOUS at 20:08

## 2025-02-05 RX ADMIN — OXCARBAZEPINE 150 MG: 300 TABLET, FILM COATED ORAL at 08:48

## 2025-02-05 RX ADMIN — IPRATROPIUM BROMIDE AND ALBUTEROL SULFATE 1 DOSE: 2.5; .5 SOLUTION RESPIRATORY (INHALATION) at 10:55

## 2025-02-05 RX ADMIN — APIXABAN 5 MG: 5 TABLET, FILM COATED ORAL at 22:00

## 2025-02-05 RX ADMIN — APIXABAN 5 MG: 5 TABLET, FILM COATED ORAL at 08:46

## 2025-02-05 RX ADMIN — ALPRAZOLAM 0.25 MG: 0.25 TABLET ORAL at 15:32

## 2025-02-05 ASSESSMENT — PAIN SCALES - GENERAL
PAINLEVEL_OUTOF10: 7
PAINLEVEL_OUTOF10: 5
PAINLEVEL_OUTOF10: 7
PAINLEVEL_OUTOF10: 10
PAINLEVEL_OUTOF10: 9
PAINLEVEL_OUTOF10: 7
PAINLEVEL_OUTOF10: 7

## 2025-02-05 ASSESSMENT — PAIN DESCRIPTION - ORIENTATION: ORIENTATION: RIGHT

## 2025-02-05 ASSESSMENT — PAIN DESCRIPTION - DESCRIPTORS
DESCRIPTORS: ACHING;SORE
DESCRIPTORS: ACHING

## 2025-02-05 ASSESSMENT — PAIN - FUNCTIONAL ASSESSMENT: PAIN_FUNCTIONAL_ASSESSMENT: ACTIVITIES ARE NOT PREVENTED

## 2025-02-05 ASSESSMENT — PAIN DESCRIPTION - PAIN TYPE
TYPE: CHRONIC PAIN
TYPE: CHRONIC PAIN

## 2025-02-05 ASSESSMENT — PAIN DESCRIPTION - LOCATION
LOCATION: GENERALIZED
LOCATION: SHOULDER

## 2025-02-05 ASSESSMENT — PAIN DESCRIPTION - ONSET: ONSET: ON-GOING

## 2025-02-05 NOTE — PROGRESS NOTES
OT orders received, chart reviewed. Spoke with PT who discussed patient with RN. RN reports that pt is experiencing increased SOB/wheezing this AM and is requiring BiPAP support at this time. RN stating pt is not appropriate for participation in therapy evaluations due to current respiratory status. Will defer however continue to follow.    Marco Antonio Gannon, OTR/L

## 2025-02-05 NOTE — PROGRESS NOTES
Pt wore bipap for approximately 2 hours last night. Pt took bipap off at 0000 due to c/o nausea. RT asked if pt wanted to go back on bipap 2AM but pt states still nauseous and does not want to wear bipap.

## 2025-02-05 NOTE — PROGRESS NOTES
1910- Bedside and Verbal shift change report given to EBEN Robb (oncoming nurse) by EBEN Navarrete (offgoing nurse). Report included the following information Nurse Handoff Report, Intake/Output, Recent Results, Cardiac Rhythm NSR-ST, and Neuro Assessment.    2000- Shift assessment completed. See flowsheets for details.    0000- Upon entering room, patients bipap mask was on ground and machine was completely off, with nasal cannula back on patient. Patient states she is nauseous and that is why she took the bipap mask off. PRN zofran given. Reassessment completed. See MAR and flowsheets for details.    0300- Reassessment completed. See flowsheets for details.    0705- Bedside and Verbal shift change report given to EBEN Snowden (oncoming nurse) by EBEN Robb (offgoing nurse). Report included the following information Nurse Handoff Report, Intake/Output, Recent Results, Cardiac Rhythm NSR, and Neuro Assessment.

## 2025-02-05 NOTE — PROGRESS NOTES
SUMMARY:  49 F with multiple medical problems including multiple psychiatric diagnoses who has chronic hypoxic/hypercapnic respiratory failure on the basis of COPD, pulmonary sarcoidosis, chronic heart failure.  She presented to the emergency department on 02/03 with worsening dyspnea and hypoxemia.  At her baseline she wears nasal cannula oxygen at 6 LPM and will sometimes turn it up to 10 LPM when she feels increasingly short of breath.  In the emergency department her arterial blood gas revealed acute on chronic hypercapnic respiratory failure with hypoxemia.  She was admitted with a diagnosis of COPD exacerbation.    SUBJ:  Refused to wear BiPAP last night but agreed to wear this morning.  Overall looks better but becomes very dyspneic with minimal activity.  Increase in bilateral wheezes    OBJ:  Vitals:    02/05/25 0846 02/05/25 0900 02/05/25 1000 02/05/25 1200   BP: (!) 128/96 (!) 131/90 (!) 148/98    Pulse:  86 91 90   Resp:  13 14 23   Temp:    97.9 °F (36.6 °C)   TempSrc:    Axillary   SpO2:  100% 97% 97%   Weight:       Height:         Cognition fully intact  Appears dyspneic and tachypneic  HEENT: NCAT, sclerae white  Neck: JVP cannot be visualized  Chest: Bilateral wheezes  Cardiac: Distant heart sounds, regular, no murmurs heard  Abdomen: Obese, soft, NT, NABS  Extremities: Symmetric pitting pretibial edema  Neuro: No focal deficits    I have reviewed the lab results from this day. Relevant abnormalities are discussed in the impression and plan    CXR: (I have personally reviewed)  02/03 no acute findings    IMPRESSION:  Acute on chronic hypercapnic/hypoxemic respiratory failure  Former smoker  COPD-not presently wheezing  History of pulmonary sarcoidosis  Probable component of OHS  Complex home medical regimen which has been reconciled    PLAN:  Continuous pulse oximetry monitoring  Supplemental O2 as needed to maintain SpO2 88-94%  BiPAP as needed during day  Mandatory BiPAP with

## 2025-02-05 NOTE — PROGRESS NOTES
0710  Bedside shift change report received from EBEN Robb (offgoing nurse). Assumed care of pt at this time. Report included the following information SBAR, Kardex, Intake/Output, MAR, Recent Results, Heart rhythm (SR 87) and Medications. O2 @ 4 LM via NC.  Pt and significant other snoring.     0800  Assessment complete, see doc flow.  Placed on Bi-PAP per pt request, audible exp wheezing, SPO2 96%    1000  Up to BR with walker and 1 asst.    1200  Up in chair.  CHG bath given.    1600  Up in chair. Eating strawberries and cottage cheese.     1920  BS report with EBEN Robb

## 2025-02-05 NOTE — CARE COORDINATION
Care Management Initial Assessment       RUR: 13%  Readmission? No  1st IM letter given? No--N/A  1st  letter given: No--N/A          02/05/25 2722   Service Assessment   Patient Orientation Alert and Oriented;Person;Place;Situation;Self   Cognition Alert   History Provided By Patient   Primary Caregiver Self   Support Systems Family Members;Parent   Patient's Healthcare Decision Maker is: Legal Next of Kin   PCP Verified by CM Yes   Last Visit to PCP   (Patient saw her pcp on 1/16/25)   Prior Functional Level Independent in ADLs/IADLs   Current Functional Level Assistance with the following:;Bathing;Dressing;Toileting;Mobility   Can patient return to prior living arrangement Other (see comment)  (Patient wants to see how she progresses.)   Family able to assist with home care needs: Other (comment)  (Patient wants to see how she progresses.)   Would you like for me to discuss the discharge plan with any other family members/significant others, and if so, who? No   Financial Resources Medicaid   Community Resources None   Social/Functional History   Lives With Parent;Son;Other (Comment)  (Lives with her mother, brother and 18 year old son.)   Type of Home House  (One story.)   Home Equipment Oxygen;Rollator  (She uses 6 liters of oxygen at home.)   Active  No   Patient's  Info [employer]   Discharge Planning   Type of Residence Other (Comment)  (Patient wants to see how she progresses.)   Current Services Prior To Admission Oxygen Therapy   Patient expects to be discharged to: Other (comment)  (Patient wants to see how she progresses.)             Advance Care Planning     General Advance Care Planning (ACP) Conversation    Date of Conversation: 2/5/2025  Conducted with: Patient with Decision Making Capacity  Other persons present: None    Healthcare Decision Maker: No healthcare decision makers have been documented.     Today we documented Decision Maker(s) consistent with Legal Next of Kin  hierarchy.  Content/Action Overview:     Reviewed DNR/DNI and patient elects Full Code (Attempt Resuscitation)        Length of Voluntary ACP Conversation in minutes:  <16 minutes (Non-Billable)    LISSETH HILARIO RN

## 2025-02-05 NOTE — PLAN OF CARE
Problem: Physical Therapy - Adult  Goal: By Discharge: Performs mobility at highest level of function for planned discharge setting.  See evaluation for individualized goals.  Description: FUNCTIONAL STATUS PRIOR TO ADMISSION: Ambulates household distances with use of rollator walker. Wears 6L O2 at baseline. Reports 1 recent fall. States she sits on seat of rollator for most ADLs and also has assist from mother.     HOME SUPPORT PRIOR TO ADMISSION: The patient lived with son, brother, and mother who all assist as needed.    Physical Therapy Goals  Initiated 2/5/2025  1.  Patient will move from supine to sit and sit to supine, scoot up and down, and roll side to side in bed with supervision/set-up within 7 day(s).    2.  Patient will perform sit to stand with supervision/set-up within 7 day(s).  3.  Patient will transfer from bed to chair and chair to bed with supervision/set-up using the least restrictive device within 7 day(s).  4.  Patient will ambulate with supervision/set-up for 100 feet with the least restrictive device within 7 day(s).   5.  Patient will ascend/descend 4 stairs with 1 handrail(s) with supervision/set-up within 7 day(s).   Outcome: Progressing   PHYSICAL THERAPY EVALUATION    Patient: David Car (49 y.o. female)  Date: 2/5/2025  Primary Diagnosis: Severe asthma with exacerbation, unspecified whether persistent [J45.901]  Acute hypoxic respiratory failure [J96.01]       Precautions:                        ASSESSMENT :   DEFICITS/IMPAIRMENTS:   The patient is limited by fluctuating level of alertness, impaired safety awareness, BLE weakness, altered gait pattern, impaired standing balance, impaired activity tolerance, and overall impaired functional mobility. Pt received seated in bedside chair on 35LHHF, 80% FiO2, requiring max verbal and tactile cueing for arousal. Mild confusion present throughout therapy session. Pt mobilized with CGAx1 and use of rolling walker, ambulating 20ft before  fatigue. Pt with notable BLE weakness/tremulousness of BLEs, resulting in unsteady gait. However, no overt LOB noted. O2 sats 99% post activity on 35LHHF. Pending further progress with therapy, recommend HHPT w/ continued assist from family.      Patient will benefit from skilled intervention to address the above impairments.    Functional Outcome Measure:  The patient scored 18/24 on the [facility] outcome measure which is indicative of decreased likelihood that pt will require SNF/IPR at discharge.           PLAN :  Recommendations and Planned Interventions:   bed mobility training, transfer training, gait training, therapeutic exercises, patient and family training/education, and therapeutic activities    Frequency/Duration: Patient will be followed by physical therapy to address goals, PT Plan of Care: 3 times/week to address goals.    Recommendation for discharge: (in order for the patient to meet his/her long term goals):   Intermittent physical therapy up to 2-3x/week in previous living setting w/ continued assist from family    Other factors to consider for discharge: poor safety awareness, impaired cognition, high risk for falls, and concern for safely navigating or managing the home environment    IF patient discharges home will need the following DME: patient owns DME required for discharge                SUBJECTIVE:   Patient stated “my brother helps me up the stairs, I don't have a railing.”    OBJECTIVE DATA SUMMARY:       Past Medical History:   Diagnosis Date    Ascariosis     Asthma     Bipolar 1 disorder (HCC)     Chronic obstructive pulmonary disease (HCC)     Chronic pain     back, leg    Cocaine abuse (HCC)     Depression     Heart failure (HCC)     Hepatitis B     Heroin abuse (HCC)     HTN (hypertension)     Narcotic abuse (HCC)     Polysubstance abuse (HCC)     Required emergency intubation     Sarcoidosis     Seizure (HCC)     Tobacco abuse      Past Surgical History:   Procedure Laterality Date

## 2025-02-05 NOTE — PLAN OF CARE
Problem: Occupational Therapy - Adult  Goal: By Discharge: Performs self-care activities at highest level of function for planned discharge setting.  See evaluation for individualized goals.  Description: FUNCTIONAL STATUS PRIOR TO ADMISSION:  Ambulates household distances with use of rollator walker and was independent to min A for ADLs. Wears 6L O2 at baseline and uses energy conservation techniques such as sitting on shower seat for bathing and sits on seat of rollator for most ADLs. Mother assists with ADLs per patient. Reports 1 recent fall    HOME SUPPORT PRIOR TO ADMISSION: The patient lived with son, brother, and mother who all assist as needed.       Occupational Therapy Goals:  Initiated 2/5/2025  1.  Patient will perform upper body dressing with Set-up and Supervision within 7 day(s).  2.  Patient will perform lower body dressing with Set-up and Supervision within 7 day(s).  3.  Patient will perform toilet transfers with Supervision  within 7 day(s).  4.  Patient will perform all aspects of toileting with Set-up and Supervision within 7 day(s).  5.  Patient will sponge bathing with Set-up and Supervision within 7 day(s).     2/5/2025 1842 by Marco Antonio Gannon, OTR/L  Outcome: Progressing     OCCUPATIONAL THERAPY EVALUATION    Patient: David Car (49 y.o. female)  Date: 2/5/2025  Primary Diagnosis: Severe asthma with exacerbation, unspecified whether persistent [J45.901]  Acute hypoxic respiratory failure [J96.01]         Precautions:                    ASSESSMENT :  The patient is currently limited by GW,  poor activity tolerance, decreased safety awareness and decreased standing balance which is impairing her functional independence. She is functioning below her independent to min A baseline, now performing ADLs at an independent to mod A level and is CGA for transfers and ambulation. At this time the patient will continue to benefit from acute OT and she will need HHOT, PT and the assistance of family

## 2025-02-05 NOTE — INTERDISCIPLINARY ROUNDS
Interdisciplinary team rounds were held 2/5/2025 with the following team members: Physician, Nursing, Dietitian, Pharmacist, , and the CCU Charge RN.    Plan of care discussed. See clinical pathway and/or care plan for interventions and desired outcomes.

## 2025-02-06 LAB
ANION GAP SERPL CALC-SCNC: 4 MMOL/L (ref 2–12)
BUN SERPL-MCNC: 21 MG/DL (ref 6–20)
BUN/CREAT SERPL: 33 (ref 12–20)
CALCIUM SERPL-MCNC: 8.6 MG/DL (ref 8.5–10.1)
CHLORIDE SERPL-SCNC: 101 MMOL/L (ref 97–108)
CO2 SERPL-SCNC: 32 MMOL/L (ref 21–32)
CREAT SERPL-MCNC: 0.64 MG/DL (ref 0.55–1.02)
ERYTHROCYTE [DISTWIDTH] IN BLOOD BY AUTOMATED COUNT: 14.5 % (ref 11.5–14.5)
GLUCOSE SERPL-MCNC: 102 MG/DL (ref 65–100)
HCT VFR BLD AUTO: 35.2 % (ref 35–47)
HGB BLD-MCNC: 11.2 G/DL (ref 11.5–16)
MAGNESIUM SERPL-MCNC: 2.2 MG/DL (ref 1.6–2.4)
MCH RBC QN AUTO: 31 PG (ref 26–34)
MCHC RBC AUTO-ENTMCNC: 31.8 G/DL (ref 30–36.5)
MCV RBC AUTO: 97.5 FL (ref 80–99)
NRBC # BLD: 0 K/UL (ref 0–0.01)
NRBC BLD-RTO: 0 PER 100 WBC
PHOSPHATE SERPL-MCNC: 3 MG/DL (ref 2.6–4.7)
PLATELET # BLD AUTO: 221 K/UL (ref 150–400)
PMV BLD AUTO: 9.8 FL (ref 8.9–12.9)
POTASSIUM SERPL-SCNC: 3.9 MMOL/L (ref 3.5–5.1)
RBC # BLD AUTO: 3.61 M/UL (ref 3.8–5.2)
SODIUM SERPL-SCNC: 137 MMOL/L (ref 136–145)
WBC # BLD AUTO: 7.1 K/UL (ref 3.6–11)

## 2025-02-06 PROCEDURE — 2500000003 HC RX 250 WO HCPCS: Performed by: INTERNAL MEDICINE

## 2025-02-06 PROCEDURE — 6370000000 HC RX 637 (ALT 250 FOR IP): Performed by: INTERNAL MEDICINE

## 2025-02-06 PROCEDURE — 85027 COMPLETE CBC AUTOMATED: CPT

## 2025-02-06 PROCEDURE — 2060000000 HC ICU INTERMEDIATE R&B

## 2025-02-06 PROCEDURE — 84100 ASSAY OF PHOSPHORUS: CPT

## 2025-02-06 PROCEDURE — 94660 CPAP INITIATION&MGMT: CPT

## 2025-02-06 PROCEDURE — 6360000002 HC RX W HCPCS: Performed by: INTERNAL MEDICINE

## 2025-02-06 PROCEDURE — 36415 COLL VENOUS BLD VENIPUNCTURE: CPT

## 2025-02-06 PROCEDURE — 94640 AIRWAY INHALATION TREATMENT: CPT

## 2025-02-06 PROCEDURE — 83735 ASSAY OF MAGNESIUM: CPT

## 2025-02-06 PROCEDURE — 2700000000 HC OXYGEN THERAPY PER DAY

## 2025-02-06 PROCEDURE — 80048 BASIC METABOLIC PNL TOTAL CA: CPT

## 2025-02-06 RX ORDER — POLYETHYLENE GLYCOL 3350 17 G/17G
17 POWDER, FOR SOLUTION ORAL DAILY PRN
Status: DISCONTINUED | OUTPATIENT
Start: 2025-02-06 | End: 2025-02-12 | Stop reason: HOSPADM

## 2025-02-06 RX ORDER — BISACODYL 10 MG
10 SUPPOSITORY, RECTAL RECTAL DAILY PRN
Status: DISCONTINUED | OUTPATIENT
Start: 2025-02-06 | End: 2025-02-12 | Stop reason: HOSPADM

## 2025-02-06 RX ADMIN — CYCLOBENZAPRINE 10 MG: 10 TABLET, FILM COATED ORAL at 10:02

## 2025-02-06 RX ADMIN — BUPROPION HYDROCHLORIDE 300 MG: 150 TABLET, EXTENDED RELEASE ORAL at 08:34

## 2025-02-06 RX ADMIN — SODIUM CHLORIDE, PRESERVATIVE FREE 10 ML: 5 INJECTION INTRAVENOUS at 21:08

## 2025-02-06 RX ADMIN — LEVETIRACETAM 500 MG: 500 TABLET, FILM COATED ORAL at 08:30

## 2025-02-06 RX ADMIN — BISACODYL 10 MG: 10 SUPPOSITORY RECTAL at 08:52

## 2025-02-06 RX ADMIN — GABAPENTIN 600 MG: 300 CAPSULE ORAL at 08:34

## 2025-02-06 RX ADMIN — Medication 1 AMPULE: at 09:02

## 2025-02-06 RX ADMIN — SACUBITRIL AND VALSARTAN 1 TABLET: 24; 26 TABLET, FILM COATED ORAL at 08:29

## 2025-02-06 RX ADMIN — SPIRONOLACTONE 25 MG: 25 TABLET ORAL at 08:29

## 2025-02-06 RX ADMIN — ARFORMOTEROL TARTRATE: 15 SOLUTION RESPIRATORY (INHALATION) at 08:39

## 2025-02-06 RX ADMIN — SACUBITRIL AND VALSARTAN 1 TABLET: 24; 26 TABLET, FILM COATED ORAL at 21:09

## 2025-02-06 RX ADMIN — GABAPENTIN 600 MG: 300 CAPSULE ORAL at 13:59

## 2025-02-06 RX ADMIN — APIXABAN 5 MG: 5 TABLET, FILM COATED ORAL at 21:09

## 2025-02-06 RX ADMIN — ALPRAZOLAM 0.25 MG: 0.25 TABLET ORAL at 17:20

## 2025-02-06 RX ADMIN — OXCARBAZEPINE 150 MG: 300 TABLET, FILM COATED ORAL at 21:10

## 2025-02-06 RX ADMIN — IPRATROPIUM BROMIDE 0.5 MG: 0.5 SOLUTION RESPIRATORY (INHALATION) at 19:56

## 2025-02-06 RX ADMIN — SODIUM CHLORIDE, PRESERVATIVE FREE 10 ML: 5 INJECTION INTRAVENOUS at 08:34

## 2025-02-06 RX ADMIN — IPRATROPIUM BROMIDE 0.5 MG: 0.5 SOLUTION RESPIRATORY (INHALATION) at 08:46

## 2025-02-06 RX ADMIN — ALPRAZOLAM 0.25 MG: 0.25 TABLET ORAL at 08:52

## 2025-02-06 RX ADMIN — METHADONE HYDROCHLORIDE 120 MG: 10 CONCENTRATE ORAL at 08:29

## 2025-02-06 RX ADMIN — ALBUTEROL SULFATE 2.5 MG: 2.5 SOLUTION RESPIRATORY (INHALATION) at 21:32

## 2025-02-06 RX ADMIN — APIXABAN 5 MG: 5 TABLET, FILM COATED ORAL at 08:30

## 2025-02-06 RX ADMIN — CLONIDINE HYDROCHLORIDE 0.2 MG: 0.1 TABLET ORAL at 21:09

## 2025-02-06 RX ADMIN — SERTRALINE HYDROCHLORIDE 100 MG: 50 TABLET ORAL at 08:29

## 2025-02-06 RX ADMIN — LEVETIRACETAM 500 MG: 500 TABLET, FILM COATED ORAL at 21:10

## 2025-02-06 RX ADMIN — CLONIDINE HYDROCHLORIDE 0.2 MG: 0.1 TABLET ORAL at 08:34

## 2025-02-06 RX ADMIN — ARFORMOTEROL TARTRATE: 15 SOLUTION RESPIRATORY (INHALATION) at 20:00

## 2025-02-06 RX ADMIN — WATER 40 MG: 1 INJECTION INTRAMUSCULAR; INTRAVENOUS; SUBCUTANEOUS at 04:25

## 2025-02-06 RX ADMIN — Medication 1 AMPULE: at 21:07

## 2025-02-06 RX ADMIN — IPRATROPIUM BROMIDE 0.5 MG: 0.5 SOLUTION RESPIRATORY (INHALATION) at 16:02

## 2025-02-06 RX ADMIN — OXCARBAZEPINE 150 MG: 300 TABLET, FILM COATED ORAL at 08:30

## 2025-02-06 ASSESSMENT — PAIN DESCRIPTION - ORIENTATION: ORIENTATION: RIGHT

## 2025-02-06 ASSESSMENT — PAIN SCALES - GENERAL
PAINLEVEL_OUTOF10: 0
PAINLEVEL_OUTOF10: 7
PAINLEVEL_OUTOF10: 0

## 2025-02-06 ASSESSMENT — PAIN DESCRIPTION - LOCATION: LOCATION: ARM

## 2025-02-06 ASSESSMENT — PAIN DESCRIPTION - DESCRIPTORS: DESCRIPTORS: ACHING

## 2025-02-06 ASSESSMENT — PAIN DESCRIPTION - PAIN TYPE: TYPE: CHRONIC PAIN

## 2025-02-06 NOTE — PROGRESS NOTES
SUMMARY:  49 F with multiple medical problems including multiple psychiatric diagnoses who has chronic hypoxic/hypercapnic respiratory failure on the basis of COPD, pulmonary sarcoidosis, chronic heart failure.  She presented to the emergency department on 02/03 with worsening dyspnea and hypoxemia.  At her baseline she wears nasal cannula oxygen at 6 LPM and will sometimes turn it up to 10 LPM when she feels increasingly short of breath.  In the emergency department her arterial blood gas revealed acute on chronic hypercapnic respiratory failure with hypoxemia.  She was admitted with a diagnosis of COPD exacerbation.    SUBJ:  Wearing BiPAP for very brief intervals. Seems comfortable on HHFNC.     OBJ:  Vitals:    02/06/25 0859 02/06/25 0900 02/06/25 1020 02/06/25 1100   BP:  (!) 119/105 (!) 140/98 (!) 126/113   Pulse:  78 73 79   Resp: 14 15 15 15   Temp:       TempSrc:       SpO2:  98%     Weight:       Height:         Cognition fully intact  Appears dyspneic and tachypneic  HEENT: NCAT, sclerae white  Neck: + pseudowheeze  Chest: diminished BS without adventitious sounds  Cardiac: Distant heart sounds, regular, no murmurs heard  Abdomen: Obese, soft, NT, NABS  Extremities: Symmetric pitting pretibial edema  Neuro: No focal deficits    I have reviewed the lab results from this day. Relevant abnormalities are discussed in the impression and plan    CXR: (I have personally reviewed)  02/03 no acute findings    IMPRESSION:  Acute on chronic hypercapnic/hypoxemic respiratory failure  Former smoker  COPD-not presently wheezing  History of pulmonary sarcoidosis  Suspect vocal cord dysfunction (pseudowheeze generated at level of larynx)  Probable component of OHS  Extensive psychiatric history on multiple medications -  asks for review of her psych meds    PLAN:  Continuous pulse oximetry monitoring  Supplemental O2 as needed to maintain SpO2 88-94%  BiPAP as needed during day  Mandatory BiPAP with sleep if

## 2025-02-06 NOTE — INTERDISCIPLINARY ROUNDS
Interdisciplinary team rounds were held 2/6/2025 with the following team members: Physician, Nursing, Dietitian, Pharmacist, , and the CCU Charge RN.    Plan of care discussed. See clinical pathway and/or care plan for interventions and desired outcomes.    Goals of the Day: Psych consult for medication management.

## 2025-02-06 NOTE — PROGRESS NOTES
Attended Interdisciplinary rounds in CCU where patient care was discussed.      Visited by: Chaplain Homero Steele M.Div., [facility].   Paging Service: [phone])

## 2025-02-06 NOTE — PROGRESS NOTES
Spiritual Health History and Assessment/Progress Note  [facility]    Follow-up,  , Adjustment to illness, Life Adjustments,      Name: David Car MRN: [id number]    Age: 49 y.o.     Sex: female   Language: English   Church: Amish   Acute hypoxic respiratory failure     Date: 2/6/2025            Total Time Calculated: 21 min              Spiritual Assessment continued in [facility] CRITICAL CARE        Referral/Consult From: Rounding   Encounter Overview/Reason: Follow-up  Service Provided For: Patient and family together    Parul, Belief, Meaning:   Patient is connected with a parul tradition or spiritual practice and has beliefs or practices that help with coping during difficult times  Family/Friends have beliefs or practices that help with coping during difficult times      Importance and Influence:  Patient has spiritual/personal beliefs that influence decisions regarding their health  Family/Friends have spiritual/personal beliefs that influence decisions regarding the patient's health    Community:  Patient feels well-supported. Support system includes: Spouse/Partner, Children, Friends, and Extended family  Family/Friends feel well-supported. Support system includes: Extended family    Assessment and Plan of Care:     Patient Interventions include: Facilitated expression of thoughts and feelings, Explored spiritual coping/struggle/distress, Affirmed coping skills/support systems, and Facilitated life review and/ or legacy. Patient's spouse was present during visit, she was very open about some life struggles and stated she would like to have her life back. Encouraging words and assurance of prayer offered. Patient seemed encouraged by the visit and was appreciative.   Family/Friends Interventions include: Facilitated expression of thoughts and feelings, Explored spiritual coping/struggle/distress, Affirmed coping skills/support systems, and Facilitated life review and/or

## 2025-02-06 NOTE — PROGRESS NOTES
0710 Bedside and Verbal shift change report given to Hayley RN (oncoming nurse) by Clarisse RN (offgoing nurse). Report included the following information Nurse Handoff Report, Intake/Output, MAR, Cardiac Rhythm NSR, and Quality Measures.     0810 Shift assessment done, see flowsheet for details.     0830 Spoke to patient about psych consult placed by Dr. Cota, education done, patient consented for consult. MD discontinued drug screen.   Per patient to wait to be placed back on BiPAP after breakfast. PRN suppository requested instead of polyethelyne glycol.    1027 IDR complete, plan to wean down on HHFNC.    1035 RT Katalina Pollard called , patient ready for Bipap    1118 Psych NP Morisette on video call with patient now.    1730 Called RT Katalina Pollard if she can wean patient from HHFNC 35 L 60 %, saturation currently at 100 %, patient has no SOB.    1930 Bedside and Verbal shift change report given to Clarisse RN (oncoming nurse) by Hayley RN (offgoing nurse). Report included the following information  N SR.

## 2025-02-06 NOTE — CONSULTS
Chief complaint  \"My father passed and I don't know how to grieve.\"    History of present illness  David reports experiencing significant respiratory issues, including COPD, CHF, and \"sarcoidosis,\" which have recently worsened. Approximately two weeks ago, she developed a cough that escalated to the point where she could not perform basic activities such as walking to the bathroom or dressing herself, requiring assistance from her mother. This deterioration in her condition led to her hospitalization, where she was diagnosed with double pneumonia. She expresses fear and concern over her health, noting that her inhalers were ineffective during this episode.    David has been struggling with sleep disturbances, both at home and in the hospital. She finds it difficult to fall asleep and stay asleep, attributing some of her sleep issues to the hospital environment. At home, she has a history of sleep problems and has previously taken Ambien, which she discontinued due to undesirable side effects. She is currently on Seroquel, which she has been taking at 800 mg for about ten years, but she reports that it no longer aids her sleep, comparing its effect to that of a Tylenol.    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 She is on methadone, which she has been taking for nearly three years without any issues, as part of a substance abuse program. She also takes Trileptal, prazosin, zoloft, and gabapentin. Prazosin is used for nightmares, while gabapentin is taken for nerve pain and in addition aids her anxiet, as she is diagnosed with \"bipolar disorder and borderline schizophrenia.\"     David reports hearing voices, with the last occurrence being three days ago. She attributes this to not taking her medication as prescribed, noting that when she feels good, she sometimes skips doses, which she recognizes as a mistake. She emphasizes the importance of staying on her medication

## 2025-02-06 NOTE — PROGRESS NOTES
Pt agreed to wearing bipap at 0002AM, placed on bipap 14/8 35%. At 0048AM, Pt takes bipap off per RN, indicates she does not want to wear anymore. Pt back on HFNC. Rt asked again at 0100 to place back on bipap, pt does not want to wear, continues on HFNC.

## 2025-02-06 NOTE — CONSULTS
Hospitalist Admission Note    NAME:   David Car   : 1975   MRN: [id number]     Date/Time: 2025 12:07 PM    Patient PCP: Ese Epps APRN - NP    ______________________________________________________________________  Given the patient's current clinical presentation, I have a high level of concern for decompensation if discharged from the emergency department.  Complex decision making was performed, which includes reviewing the patient's available past medical records, laboratory results, and x-ray films.       My assessment of this patient's clinical condition and my plan of care is as follows.    Assessment / Plan:    Acute on chronic hypercapnic/hypoxemic respiratory failure  Former smoker  COPD  History of pulmonary sarcoidosis  Suspect vocal cord dysfunction (pseudowheeze generated at level of larynx)  Probable component of OHS    -Chest x-ray shows no acute process.  proBNP is 48  -Steroids were discontinued by pulmonary due to lack of wheezing  -Continue DuoNeb and also home inhalers  -She is currently on heated high flow nasal cannula at 35% FiO2 and on 35 L and is being weaned  -Will consult pulmonary after she is out of ICU  -Check CBC and BMP in a.m. tomorrow    Right upper extremity swelling  History of DVT of proximal internal jugular vein  -Continue PTA Eliquis.  Right upper extremity ultrasound on 2/3 shows evidence of lipoma.  Will need outpatient follow-up    On chronic methadone  Seizure  PTSD  Bipolar disorder  Schizophrenia  Anxiety  Hypertension  History of HCV  ?  History of CHF  -Continue PTA methadone, Keppra, Trileptal, Seroquel, Zoloft  -Continue PTA Entresto                    Medical Decision Making:   I personally reviewed labs: CBC, BMP  I personally reviewed imaging: Chest x-ray  I personally reviewed EKG:  Toxic drug monitoring:   Discussed case with: ED provider. After discussion I am in agreement that acuity of patient's medical condition necessitates hospital      Findings most compatible with a lipoma. No ultrasound features for malignancy at this time. Recommend biopsy or excisional biopsy if pain or growth develops. Electronically signed by Viky Torres    XR CHEST PORTABLE    Result Date: 2/3/2025  EXAM:  XR CHEST PORTABLE INDICATION: sob COMPARISON: CTA chest 2/6/2024, chest radiograph 1/18/2022 TECHNIQUE: upright portable chest AP view FINDINGS: The cardiac silhouette is within normal limits. The pulmonary vasculature is within normal limits. The lungs and pleural spaces are clear. The visualized bones and upper abdomen are age-appropriate.     No acute process on portable chest. Electronically signed by ACACIA ORO     _______________________________________________________________________    TOTAL TIME:  76 Minutes    Critical Care Provided     Minutes non procedure based    Signed: Monty Mark MD    Procedures: see electronic medical records for all procedures/Xrays and details which were not copied into this note but were reviewed prior to creation of Plan.

## 2025-02-06 NOTE — PROGRESS NOTES
1920- Bedside and Verbal shift change report given to EBEN Robb (oncoming nurse) by EBEN Snowden (offgoing nurse). Report included the following information Nurse Handoff Report, Intake/Output, MAR, Cardiac Rhythm NSR, and Neuro Assessment.      2000- Shift assessment completed. Patient sitting up in chair, c/o anxiety. PRN xanax given. See MAR for details.    2040- Patient c/o nausea. PRN zofran given. See MAR for details.  at bedside visiting.    2210- Patient assisted to bathroom and back to bed at this time.    0000- Reassessment completed. RT at bedside to place patient on bipap. See flowsheets for details.    0046- Bipap alarming. Upon entering room, patient had taken bipap mask off and states she will put it back on in an hour. HHFNC placed back on patient. RT and NP notified.    0400- Reassessment completed. See flowsheets for details.    0710- Bedside and Verbal shift change report given to EBEN Hardy (oncoming nurse) by EBEN Robb (offgoing nurse). Report included the following information Nurse Handoff Report, Intake/Output, Recent Results, Cardiac Rhythm NSR, and Neuro Assessment.

## 2025-02-06 NOTE — BSMART NOTE
Initial BSMART Liaison Assessment Form     Section I - Integrated Summary    Reason for consult is: medication management.    LOS:  3 days     Presenting problem/Summary:  Patient is a 49 year old female that was seen on the medical floor at [facility]. This writer met with patient face to face. Introduced self and role. Pt was alert and oriented. Cooperative.  was speaking with pt prior to this writer coming into the room. Discussed with pt the importance of living in the present and short term goals. Pt showed this writer something on her arm and stated it is hurting. She reported shortness of breath. Pt talked about her medical issues and was sad about it. She denied SI/HI/AHVH. She likes her outpatient psychiatric provider. Pt stated the medication help her not worry and \"get my mind straight.\" Pt reported some concerns with sleep and insomnia. She lives with her mother. Per pt, her mother does not believe in recovery or mental health. Discussed the difficulty navigating this situation. Pt shared she is a recovering addict and has been clean for 3 years. Pt reported she went to [employer] and got clean after that. She recently got disability a month ago, but has applied for it since 2022. Pt reported her father passed away a few weeks ago and this was difficult. Pt was engaged in discussion and appreciative. Liaison to continue to follow, monitor and support.     Precipitant Factors are medical issues.    The information is given by the patient.  Current Psychiatrist and/or  is Beatriz Butcher.  Previous Hospitalizations/Treatment: yes    Plan: defer to most recent psychiatric consult note for disposition and recommendation    Lethality Assessment:  The potential for suicide is not noted.    The potential for homicide is not noted.  The patient has not been a perpetrator of sexual or physical abuse.    There are not pending charges.  The patient is not felt to be at risk for

## 2025-02-07 PROCEDURE — 5A09357 ASSISTANCE WITH RESPIRATORY VENTILATION, LESS THAN 24 CONSECUTIVE HOURS, CONTINUOUS POSITIVE AIRWAY PRESSURE: ICD-10-PCS | Performed by: INTERNAL MEDICINE

## 2025-02-07 PROCEDURE — 2060000000 HC ICU INTERMEDIATE R&B

## 2025-02-07 PROCEDURE — 6370000000 HC RX 637 (ALT 250 FOR IP): Performed by: INTERNAL MEDICINE

## 2025-02-07 PROCEDURE — 2700000000 HC OXYGEN THERAPY PER DAY

## 2025-02-07 PROCEDURE — 6360000002 HC RX W HCPCS: Performed by: INTERNAL MEDICINE

## 2025-02-07 PROCEDURE — 94640 AIRWAY INHALATION TREATMENT: CPT

## 2025-02-07 PROCEDURE — 94660 CPAP INITIATION&MGMT: CPT

## 2025-02-07 RX ADMIN — OXCARBAZEPINE 150 MG: 300 TABLET, FILM COATED ORAL at 22:00

## 2025-02-07 RX ADMIN — PRAZOSIN HYDROCHLORIDE 4 MG: 1 CAPSULE ORAL at 21:59

## 2025-02-07 RX ADMIN — GABAPENTIN 600 MG: 300 CAPSULE ORAL at 09:14

## 2025-02-07 RX ADMIN — CLONIDINE HYDROCHLORIDE 0.2 MG: 0.1 TABLET ORAL at 22:08

## 2025-02-07 RX ADMIN — CYCLOBENZAPRINE 10 MG: 10 TABLET, FILM COATED ORAL at 02:08

## 2025-02-07 RX ADMIN — IPRATROPIUM BROMIDE 0.5 MG: 0.5 SOLUTION RESPIRATORY (INHALATION) at 19:55

## 2025-02-07 RX ADMIN — METHADONE HYDROCHLORIDE 120 MG: 10 CONCENTRATE ORAL at 09:14

## 2025-02-07 RX ADMIN — ARFORMOTEROL TARTRATE: 15 SOLUTION RESPIRATORY (INHALATION) at 07:50

## 2025-02-07 RX ADMIN — QUETIAPINE FUMARATE 400 MG: 100 TABLET ORAL at 21:59

## 2025-02-07 RX ADMIN — LEVETIRACETAM 500 MG: 500 TABLET, FILM COATED ORAL at 09:14

## 2025-02-07 RX ADMIN — ALPRAZOLAM 0.25 MG: 0.25 TABLET ORAL at 22:13

## 2025-02-07 RX ADMIN — ONDANSETRON 4 MG: 4 TABLET, ORALLY DISINTEGRATING ORAL at 14:39

## 2025-02-07 RX ADMIN — IPRATROPIUM BROMIDE 0.5 MG: 0.5 SOLUTION RESPIRATORY (INHALATION) at 14:44

## 2025-02-07 RX ADMIN — ARFORMOTEROL TARTRATE: 15 SOLUTION RESPIRATORY (INHALATION) at 20:00

## 2025-02-07 RX ADMIN — GABAPENTIN 600 MG: 300 CAPSULE ORAL at 21:58

## 2025-02-07 RX ADMIN — SACUBITRIL AND VALSARTAN 1 TABLET: 24; 26 TABLET, FILM COATED ORAL at 22:00

## 2025-02-07 RX ADMIN — SERTRALINE HYDROCHLORIDE 100 MG: 50 TABLET ORAL at 09:14

## 2025-02-07 RX ADMIN — GABAPENTIN 600 MG: 300 CAPSULE ORAL at 14:39

## 2025-02-07 RX ADMIN — APIXABAN 5 MG: 5 TABLET, FILM COATED ORAL at 09:14

## 2025-02-07 RX ADMIN — OXCARBAZEPINE 150 MG: 300 TABLET, FILM COATED ORAL at 09:14

## 2025-02-07 RX ADMIN — ALPRAZOLAM 0.25 MG: 0.25 TABLET ORAL at 01:50

## 2025-02-07 RX ADMIN — ALPRAZOLAM 0.25 MG: 0.25 TABLET ORAL at 17:38

## 2025-02-07 RX ADMIN — LEVETIRACETAM 500 MG: 500 TABLET, FILM COATED ORAL at 22:00

## 2025-02-07 RX ADMIN — IPRATROPIUM BROMIDE 0.5 MG: 0.5 SOLUTION RESPIRATORY (INHALATION) at 07:50

## 2025-02-07 RX ADMIN — BUPROPION HYDROCHLORIDE 300 MG: 150 TABLET, EXTENDED RELEASE ORAL at 09:14

## 2025-02-07 RX ADMIN — APIXABAN 5 MG: 5 TABLET, FILM COATED ORAL at 21:56

## 2025-02-07 RX ADMIN — ALPRAZOLAM 0.25 MG: 0.25 TABLET ORAL at 11:44

## 2025-02-07 NOTE — PROGRESS NOTES
Pt wore Bipap from 2230PM to approximately 0150AM, pt did not want to go back on bipap, RN at bedside administering meds and pt indicates she wants to eat.

## 2025-02-07 NOTE — PROGRESS NOTES
1920- Bedside and Verbal shift change report given to EBEN Robb (oncoming nurse) by EBEN Hardy (offgoing nurse). Report included the following information Nurse Handoff Report, Intake/Output, Cardiac Rhythm NSR-ST, and Neuro Assessment.    2100- Shift assessment completed. See flowsheets for details.    0000- Reassessment completed. See flowsheets for details.    0400- Reassessment completed. See flowsheets for details.    0710- Bedside and Verbal shift change report given to EBEN Pisano (oncoming nurse) by EBEN Robb (offgoing nurse). Report included the following information Nurse Handoff Report, Intake/Output, Cardiac Rhythm NSR, and Neuro Assessment.

## 2025-02-07 NOTE — CARE COORDINATION
Transition of Care Plan:    RUR:  12%   Prior Level of Functioning: independent  Disposition: home with [employer]  WENDY: 2/10/25  If SNF or IPR: Date FOC offered:   Date FOC received:   Accepting facility:   Date authorization started with reference number:   Date authorization received and expires:   Follow up appointments: PCP, Specialists  DME needed:   Transportation at discharge: Medicaid transport  IM/IMM Medicare/ letter given: n/a  Is patient a [place] and connected with [facility]?    If yes, was  transfer form completed and VA notified?   Caregiver Contact: Brenda Diego (Parent)  [phone] (Home Phone)   Discharge Caregiver contacted prior to discharge?   Care Conference needed?   Barriers to discharge:     CM met with pt at bedside to discuss transition of care plan.  FOC offered.  Pt in agreement with HH being set up with someone who takes her insurance.  CM will send referral.    11:26 a.m. Care [facility] accepted pt.  CM will place on AVS.     Miroslava Watkins, JESSE, LCSW  Care Management, [facility]  [phone]

## 2025-02-07 NOTE — PROGRESS NOTES
End of Shift Note    Bedside shift change report given to Lori TREADWELL (oncoming nurse) by Oneida Avitia RN (offgoing nurse).  Report included the following information SBAR    Shift worked:  7 am-7 pm      Shift summary and any significant changes:     Transferred form ICU, weaned off high flow to 4L NC. Bi pap intermittently when sleeping       Concerns for physician to address:  -     Zone phone for oncoming shift:   [phone]       Activity:  Level of Assistance: Moderate assist, patient does 50-74%  Number times ambulated in hallways past shift: 0  Number of times OOB to chair past shift: 1    Cardiac:   Cardiac Monitoring: Yes      Cardiac Rhythm: Sinus rhythm    Access:  Current line(s): PIV    Genitourinary:   Urinary Status: Voiding    Respiratory:   O2 Device: Nasal cannula  Chronic home O2 use?: YES  Incentive spirometer at bedside: NO    GI:  Last BM (including prior to admit): 02/06/25  Current diet:  ADULT ORAL NUTRITION SUPPLEMENT; Breakfast, Lunch, Dinner; Standard High Calorie/High Protein Oral Supplement  ADULT DIET; Regular; No Added Salt (3-4 gm); strawberry ensure please  Passing flatus: YES    Pain Management:   Patient states pain is manageable on current regimen: YES    Skin:  Brennen Scale Score: 18  Interventions: Wound Offloading (Prevention Methods): Pillows, Repositioning    Patient Safety:  Fall Risk: Nursing Judgement-Fall Risk High(Add Comments): Yes  Fall Risk Interventions  Nursing Judgement-Fall Risk High(Add Comments): Yes  Toilet Every 2 Hours-In Advance of Need: Yes  Hourly Visual Checks: In bed  Fall Visual Posted: Fall sign posted, Socks  Room Door Open: Yes  Alarm On: Bed  Patient Moved Closer to Nursing Station: No    Active Consults:   IP CONSULT TO PSYCHIATRY  IP CONSULT TO CASE MANAGEMENT    Length of Stay:  Expected LOS: 7  Actual LOS: 4    Oneida Avitia RN

## 2025-02-07 NOTE — BSMART NOTE
BSMART Liaison Team Note     LOS:  4     Patient goal(s) for today: take medications as prescribed, make needs known in an appropriate manner, use coping skills  BSMART Liaison team focus/goals: assess needs, provide support and education    Progress note: Pt came to ED 2/3/25 via EMS c/o SOB. Psychiatry consulted 2/6/25 \"at request of her , please review and adjust her regimen of pstchiatry medications.\"     Liaison met with Pt at bedside. Pt was alert, oriented x4, anxious and depressed. Pt reported poor sleep and appetite. Pt reported AH telling her \"go home you don't need to be here\" but denied SI, HI and VH. Pt opened up about her MH experiences, Sx, and Tx. She expressed feeling minimal support from family and feeling that she minimized her Sx in front of other for their emotional benefit. Pt processed grief over the passing of her father 3 weeks ago and aunt 3 months ago. Liaison encouraged emotional expression and provided active listening. Liaison helped Pt identify commonalities between herself, aunt and dad that point to her resiliences as well as can offer her encouragement to seek support from family. Pt was minimally receptive and focused on complaints about care at hospital. Liaison provided active listening, provided number for patient advocate and agreed to update Pt's nurse about her concerns. Pt denied any further MH questions or concerns.     EBEN Mohamud updated and aware of Pt's concerns.     Barriers to Discharge: medical    Outpatient provider(s):  Beatriz Butcher.   Insurance info/prescription coverage:  [place] [employer]    Diagnosis: Bipolar I disorder  Schizophrenia    Plan:  No indication for BHU admission. Refer to psychiatric consult note for further assessment and recommendation. Defer to medical team for discharge plans.    Follow up Psych Consult placed? No .   Psychiatrist updated? No        Participating treatment team members: David Car, Bonny Miller [facility]

## 2025-02-07 NOTE — PROGRESS NOTES
Occupational Therapy    Chart reviewed in prep for OT follow up. Pt unavailable 2/2 transferring off CCU. OT will continue to follow as medically appropriate and available.     Thank you,  Marco Antonio Gannon, OTR/L

## 2025-02-07 NOTE — PROGRESS NOTES
Hospitalist Progress Note    NAME:   David Car   : 1975   MRN: [id number]     Date/Time: 2025 6:32 PM    Patient PCP: Ese Epps APRN - NP    ______________________________________________________________________  Given the patient's current clinical presentation, I have a high level of concern for decompensation if discharged from the emergency department.  Complex decision making was performed, which includes reviewing the patient's available past medical records, laboratory results, and x-ray films.       My assessment of this patient's clinical condition and my plan of care is as follows.    Assessment / Plan:    Acute on chronic hypercapnic/hypoxemic respiratory failure POA  COPD with acute exacerbation POA  Pulmonary sarcoidosis POA  Suspect vocal cord dysfunction POA (pseudowheeze generated at level of larynx)  Probable component of OHS  Chronic hypoxic respiratory failure on 6 L nasal cannula oxygen  Baseline on 6 L nasal cannula  Admitted to ICU 2/3/2025   Presented with wheezing and shortness of breath on several days  Chest x-ray shows no acute process.    proBNP 48  Procalcitonin < 0.05  Influenza A/B, COVID-19 rapid PCR negative  Initial VBG pH 7.30, pCO2 84, placed on BiPAP  Eventually required heated high flow at 35 L, FiO2 35%  Clinically improved  Transferred to floor 2025  Reports feeling better but still wheezy and short of breath   Weaned to 6 L by the evening of  when I saw  Continue DuoNebs  Off steroids, will add 5 day course prednisone  Consult pulmonology in AM, sees pulmonary associates at baseline  Ambulate    Right upper extremity swelling POA  DVT of proximal internal jugular vein  Continue PTA Eliquis.    Right upper extremity ultrasound on 2/3 shows evidence of lipoma.     Seizure disorder POA  Continue baseline Keppra 500 mg twice daily    PTSD  Bipolar disorder  Schizophrenia  Anxiety  Continue PTA Trileptal, Seroquel, Zoloft    Chronic methadone  view FINDINGS: The cardiac silhouette is within normal limits. The pulmonary vasculature is within normal limits. The lungs and pleural spaces are clear. The visualized bones and upper abdomen are age-appropriate.     No acute process on portable chest. Electronically signed by ACACIA ORO     _______________________________________________________________________    TOTAL TIME:  76 Minutes    Critical Care Provided     Minutes non procedure based    Signed: Saleem Cast Jr, MD    Procedures: see electronic medical records for all procedures/Xrays and details which were not copied into this note but were reviewed prior to creation of Plan.

## 2025-02-07 NOTE — PROGRESS NOTES
Bedside and Verbal shift change report given to Sonia (oncoming nurse) by Clarisse (offgoing nurse). Report included the following information Nurse Handoff Report, MAR, Recent Results, and Cardiac Rhythm NSR .       8:40 AM patient has a bed assignment.  Called receiving unit.  Receiving nurse is in another unit and will need to return my call.      0914- some am medications given.  BP medications temporarily held based on patient BP 98/62.  Patient requested Xanax, but it was explained to her that based on her BP and the medications she just received, we'll want to get a repeat blood pressure before giving any additional meds for her safety.  Verbalized understanding.  9:30 AM  TRANSFER - OUT REPORT:    Verbal report given to Genna on David Car  being transferred to [facility] for routine progression of patient care       Report consisted of patient's Situation, Background, Assessment and   Recommendations(SBAR).     Information from the following report(s) Nurse Handoff Report, MAR, Recent Results, and Cardiac Rhythm NSR  was reviewed with the receiving nurse.

## 2025-02-07 NOTE — PROGRESS NOTES
Comprehensive Nutrition Assessment    Type and Reason for Visit:  Reassess    Nutrition Recommendations/Plan:   Continue current diet and supplements  Please document % meals and supplements consumed in flowsheet I/O's under intake      Malnutrition Assessment:  Malnutrition Status:  At risk for malnutrition (02/04/25 1512)    Context:  Acute Illness     Findings of the 6 clinical characteristics of malnutrition:  Energy Intake:  Mild decrease in energy intake  Weight Loss:  Mild weight loss (10lb over 2-3 weeks, UTD if related to fluid fluctuationns or caloric deficit)     Body Fat Loss:  No body fat loss     Muscle Mass Loss:  No muscle mass loss    Fluid Accumulation:  No fluid accumulation     Strength:  Not Performed    Nutrition Assessment:     Chart reviewed. Pt unavailable on multiple attempts to visit today. She transferred out of CCU this morning. Intake of meals per documentation has remained quite poor. She does better with supplements. She is on a regular diet, just with no added salt. Would encourage meal selection to optimize intake. Will continue monitoring.     Patient Vitals for the past 120 hrs:   PO Meals Eaten (%)   02/06/25 1300 1 - 25%   02/06/25 0938 26 - 50%   02/05/25 1643 26 - 50%   02/05/25 1300 26 - 50%   02/05/25 1200 26 - 50%     Patient Vitals for the past 120 hrs:   PO Supplement (%)   02/06/25 1852 76 - 100%   02/06/25 1300 26 - 50%   02/06/25 0938 76 - 100%   02/05/25 1643 76 - 100%   02/05/25 1300 51 - 75%   02/05/25 1200 76 - 100%       Nutrition Related Findings:    Labs: reviewed.   Meds: Zofran.    2/6.   Wound Type: None       Current Nutrition Intake & Therapies:    Average Meal Intake: 1-25%  Average Supplements Intake: 26-50%, %  ADULT ORAL NUTRITION SUPPLEMENT; Breakfast, Lunch, Dinner; Standard High Calorie/High Protein Oral Supplement  ADULT DIET; Regular; No Added Salt (3-4 gm); strawberry ensure please    Anthropometric Measures:  Height: 162.6 cm (5'

## 2025-02-07 NOTE — PLAN OF CARE
Problem: Pain  Goal: Verbalizes/displays adequate comfort level or baseline comfort level  Outcome: Progressing  Flowsheets (Taken 2/7/2025 1358)  Verbalizes/displays adequate comfort level or baseline comfort level:   Encourage patient to monitor pain and request assistance   Assess pain using appropriate pain scale   Administer analgesics based on type and severity of pain and evaluate response   Implement non-pharmacological measures as appropriate and evaluate response     Problem: Safety - Adult  Goal: Free from fall injury  Outcome: Progressing  Flowsheets (Taken 2/7/2025 1358)  Free From Fall Injury: Instruct family/caregiver on patient safety     Problem: Respiratory - Adult  Goal: Achieves optimal ventilation and oxygenation  Outcome: Progressing  Flowsheets (Taken 2/7/2025 1358)  Achieves optimal ventilation and oxygenation: Assess for changes in respiratory status

## 2025-02-07 NOTE — PROGRESS NOTES
Physical Therapy     Chart reviewed in prep for PT follow up. Pt unavailable 2/2 transferring off CCU. PT will continue to follow as medically appropriate and available.     Thank you,  SYLVIA CESPEDES, PT, DPT

## 2025-02-08 LAB
ALBUMIN SERPL-MCNC: 3.3 G/DL (ref 3.5–5)
ALBUMIN/GLOB SERPL: 0.9 (ref 1.1–2.2)
ALP SERPL-CCNC: 110 U/L (ref 45–117)
ALT SERPL-CCNC: 35 U/L (ref 12–78)
ANION GAP SERPL CALC-SCNC: 4 MMOL/L (ref 2–12)
ANION GAP SERPL CALC-SCNC: 5 MMOL/L (ref 2–12)
ARTERIAL PATENCY WRIST A: POSITIVE
AST SERPL-CCNC: 38 U/L (ref 15–37)
BASE EXCESS BLD CALC-SCNC: 9.4 MMOL/L
BASOPHILS # BLD: 0.01 K/UL (ref 0–0.1)
BASOPHILS NFR BLD: 0.2 % (ref 0–1)
BDY SITE: ABNORMAL
BILIRUB SERPL-MCNC: 0.4 MG/DL (ref 0.2–1)
BUN SERPL-MCNC: 20 MG/DL (ref 6–20)
BUN SERPL-MCNC: 22 MG/DL (ref 6–20)
BUN/CREAT SERPL: 22 (ref 12–20)
BUN/CREAT SERPL: 22 (ref 12–20)
CALCIUM SERPL-MCNC: 8.7 MG/DL (ref 8.5–10.1)
CALCIUM SERPL-MCNC: 8.8 MG/DL (ref 8.5–10.1)
CHLORIDE SERPL-SCNC: 96 MMOL/L (ref 97–108)
CHLORIDE SERPL-SCNC: 98 MMOL/L (ref 97–108)
CO2 SERPL-SCNC: 33 MMOL/L (ref 21–32)
CO2 SERPL-SCNC: 34 MMOL/L (ref 21–32)
CREAT SERPL-MCNC: 0.9 MG/DL (ref 0.55–1.02)
CREAT SERPL-MCNC: 1 MG/DL (ref 0.55–1.02)
DIFFERENTIAL METHOD BLD: ABNORMAL
EOSINOPHIL # BLD: 0.18 K/UL (ref 0–0.4)
EOSINOPHIL NFR BLD: 4.2 % (ref 0–7)
ERYTHROCYTE [DISTWIDTH] IN BLOOD BY AUTOMATED COUNT: 13.9 % (ref 11.5–14.5)
EST. AVERAGE GLUCOSE BLD GHB EST-MCNC: 105 MG/DL
GAS FLOW.O2 O2 DELIVERY SYS: ABNORMAL
GAS FLOW.O2 SETTING OXYMISER: 16 BPM
GLOBULIN SER CALC-MCNC: 3.5 G/DL (ref 2–4)
GLUCOSE SERPL-MCNC: 116 MG/DL (ref 65–100)
GLUCOSE SERPL-MCNC: 97 MG/DL (ref 65–100)
HBA1C MFR BLD: 5.3 % (ref 4–5.6)
HCO3 BLD-SCNC: 37.1 MMOL/L (ref 21–28)
HCT VFR BLD AUTO: 35.9 % (ref 35–47)
HGB BLD-MCNC: 11.9 G/DL (ref 11.5–16)
IMM GRANULOCYTES # BLD AUTO: 0.04 K/UL (ref 0–0.04)
IMM GRANULOCYTES NFR BLD AUTO: 0.9 % (ref 0–0.5)
IPAP/PIP/HIGH PEEP: 14
LYMPHOCYTES # BLD: 1.59 K/UL (ref 0.8–3.5)
LYMPHOCYTES NFR BLD: 37.1 % (ref 12–49)
MCH RBC QN AUTO: 31.1 PG (ref 26–34)
MCHC RBC AUTO-ENTMCNC: 33.1 G/DL (ref 30–36.5)
MCV RBC AUTO: 93.7 FL (ref 80–99)
MONOCYTES # BLD: 0.5 K/UL (ref 0–1)
MONOCYTES NFR BLD: 11.7 % (ref 5–13)
NEUTS SEG # BLD: 1.96 K/UL (ref 1.8–8)
NEUTS SEG NFR BLD: 45.9 % (ref 32–75)
NRBC # BLD: 0 K/UL (ref 0–0.01)
NRBC BLD-RTO: 0 PER 100 WBC
O2/TOTAL GAS SETTING VFR VENT: 100 %
PCO2 BLD: 65.2 MMHG (ref 35–48)
PEEP RESPIRATORY: 8 CMH2O
PH BLD: 7.36 (ref 7.35–7.45)
PLATELET # BLD AUTO: 221 K/UL (ref 150–400)
PMV BLD AUTO: 9.6 FL (ref 8.9–12.9)
PO2 BLD: 460 MMHG (ref 83–108)
POTASSIUM SERPL-SCNC: 3.6 MMOL/L (ref 3.5–5.1)
POTASSIUM SERPL-SCNC: 3.6 MMOL/L (ref 3.5–5.1)
PROCALCITONIN SERPL-MCNC: <0.05 NG/ML
PROT SERPL-MCNC: 6.8 G/DL (ref 6.4–8.2)
RBC # BLD AUTO: 3.83 M/UL (ref 3.8–5.2)
SAO2 % BLD: 100 % (ref 92–97)
SODIUM SERPL-SCNC: 134 MMOL/L (ref 136–145)
SODIUM SERPL-SCNC: 136 MMOL/L (ref 136–145)
SPECIMEN TYPE: ABNORMAL
WBC # BLD AUTO: 4.3 K/UL (ref 3.6–11)

## 2025-02-08 PROCEDURE — 6370000000 HC RX 637 (ALT 250 FOR IP): Performed by: INTERNAL MEDICINE

## 2025-02-08 PROCEDURE — 6370000000 HC RX 637 (ALT 250 FOR IP)

## 2025-02-08 PROCEDURE — 83036 HEMOGLOBIN GLYCOSYLATED A1C: CPT

## 2025-02-08 PROCEDURE — 2700000000 HC OXYGEN THERAPY PER DAY

## 2025-02-08 PROCEDURE — 94640 AIRWAY INHALATION TREATMENT: CPT

## 2025-02-08 PROCEDURE — 85025 COMPLETE CBC W/AUTO DIFF WBC: CPT

## 2025-02-08 PROCEDURE — 2500000003 HC RX 250 WO HCPCS: Performed by: INTERNAL MEDICINE

## 2025-02-08 PROCEDURE — 82803 BLOOD GASES ANY COMBINATION: CPT

## 2025-02-08 PROCEDURE — 2060000000 HC ICU INTERMEDIATE R&B

## 2025-02-08 PROCEDURE — 6360000002 HC RX W HCPCS: Performed by: INTERNAL MEDICINE

## 2025-02-08 PROCEDURE — 36600 WITHDRAWAL OF ARTERIAL BLOOD: CPT

## 2025-02-08 PROCEDURE — 36415 COLL VENOUS BLD VENIPUNCTURE: CPT

## 2025-02-08 PROCEDURE — 80053 COMPREHEN METABOLIC PANEL: CPT

## 2025-02-08 PROCEDURE — 84145 PROCALCITONIN (PCT): CPT

## 2025-02-08 RX ORDER — IPRATROPIUM BROMIDE AND ALBUTEROL SULFATE 2.5; .5 MG/3ML; MG/3ML
1 SOLUTION RESPIRATORY (INHALATION) ONCE
Status: COMPLETED | OUTPATIENT
Start: 2025-02-08 | End: 2025-02-08

## 2025-02-08 RX ORDER — PREDNISONE 20 MG/1
40 TABLET ORAL DAILY
Status: DISCONTINUED | OUTPATIENT
Start: 2025-02-08 | End: 2025-02-08

## 2025-02-08 RX ORDER — PREDNISONE 20 MG/1
60 TABLET ORAL DAILY
Status: DISCONTINUED | OUTPATIENT
Start: 2025-02-09 | End: 2025-02-11

## 2025-02-08 RX ADMIN — CLONIDINE HYDROCHLORIDE 0.2 MG: 0.1 TABLET ORAL at 08:56

## 2025-02-08 RX ADMIN — BUPROPION HYDROCHLORIDE 300 MG: 150 TABLET, EXTENDED RELEASE ORAL at 08:56

## 2025-02-08 RX ADMIN — Medication 1 AMPULE: at 08:55

## 2025-02-08 RX ADMIN — OXCARBAZEPINE 150 MG: 300 TABLET, FILM COATED ORAL at 08:56

## 2025-02-08 RX ADMIN — SERTRALINE HYDROCHLORIDE 100 MG: 50 TABLET ORAL at 08:57

## 2025-02-08 RX ADMIN — GABAPENTIN 600 MG: 300 CAPSULE ORAL at 21:22

## 2025-02-08 RX ADMIN — IPRATROPIUM BROMIDE AND ALBUTEROL SULFATE 1 DOSE: .5; 3 SOLUTION RESPIRATORY (INHALATION) at 04:45

## 2025-02-08 RX ADMIN — GABAPENTIN 600 MG: 300 CAPSULE ORAL at 08:56

## 2025-02-08 RX ADMIN — PRAZOSIN HYDROCHLORIDE 4 MG: 1 CAPSULE ORAL at 21:21

## 2025-02-08 RX ADMIN — APIXABAN 5 MG: 5 TABLET, FILM COATED ORAL at 21:21

## 2025-02-08 RX ADMIN — ALPRAZOLAM 0.25 MG: 0.25 TABLET ORAL at 21:23

## 2025-02-08 RX ADMIN — SODIUM CHLORIDE, PRESERVATIVE FREE 10 ML: 5 INJECTION INTRAVENOUS at 08:58

## 2025-02-08 RX ADMIN — IPRATROPIUM BROMIDE 0.5 MG: 0.5 SOLUTION RESPIRATORY (INHALATION) at 14:30

## 2025-02-08 RX ADMIN — APIXABAN 5 MG: 5 TABLET, FILM COATED ORAL at 08:57

## 2025-02-08 RX ADMIN — SPIRONOLACTONE 25 MG: 25 TABLET ORAL at 08:56

## 2025-02-08 RX ADMIN — OXCARBAZEPINE 150 MG: 300 TABLET, FILM COATED ORAL at 21:23

## 2025-02-08 RX ADMIN — SODIUM CHLORIDE, PRESERVATIVE FREE 10 ML: 5 INJECTION INTRAVENOUS at 21:23

## 2025-02-08 RX ADMIN — ARFORMOTEROL TARTRATE: 15 SOLUTION RESPIRATORY (INHALATION) at 10:03

## 2025-02-08 RX ADMIN — SACUBITRIL AND VALSARTAN 1 TABLET: 24; 26 TABLET, FILM COATED ORAL at 08:56

## 2025-02-08 RX ADMIN — LEVETIRACETAM 500 MG: 500 TABLET, FILM COATED ORAL at 21:22

## 2025-02-08 RX ADMIN — ONDANSETRON 4 MG: 2 INJECTION INTRAMUSCULAR; INTRAVENOUS at 23:07

## 2025-02-08 RX ADMIN — IPRATROPIUM BROMIDE AND ALBUTEROL SULFATE 1 DOSE: 2.5; .5 SOLUTION RESPIRATORY (INHALATION) at 16:26

## 2025-02-08 RX ADMIN — QUETIAPINE FUMARATE 400 MG: 100 TABLET ORAL at 21:22

## 2025-02-08 RX ADMIN — SACUBITRIL AND VALSARTAN 1 TABLET: 24; 26 TABLET, FILM COATED ORAL at 21:21

## 2025-02-08 RX ADMIN — IPRATROPIUM BROMIDE 0.5 MG: 0.5 SOLUTION RESPIRATORY (INHALATION) at 10:03

## 2025-02-08 RX ADMIN — ALPRAZOLAM 0.25 MG: 0.25 TABLET ORAL at 12:54

## 2025-02-08 RX ADMIN — METHADONE HYDROCHLORIDE 120 MG: 10 CONCENTRATE ORAL at 08:55

## 2025-02-08 RX ADMIN — LEVETIRACETAM 500 MG: 500 TABLET, FILM COATED ORAL at 08:56

## 2025-02-08 RX ADMIN — CLONIDINE HYDROCHLORIDE 0.2 MG: 0.1 TABLET ORAL at 21:21

## 2025-02-08 RX ADMIN — PREDNISONE 40 MG: 20 TABLET ORAL at 10:42

## 2025-02-08 NOTE — SIGNIFICANT EVENT
RAPID RESPONSE TEAM NOTE:    2/8/25 0427 - Respiratory Distress    Assessment:    Pt admitted to CCU 2/3 for hypoxic respiratory failure on bipap. Yesterday was transferred out to [facility]. Per primary nurse, pt had called out to go to the bathroom. Upon standing had marked dyspnea. When seated started to tripod and gasping breaths. On arrival to bedside, pt up in chair and RT is placing on bipap. Pt drowsy but responds to voice and oriented x2. Lung sounds tight with expiratory wheezes. Pt RR 30s abdominal breathing. Able to assist in transfer back to bed.    Vital signs as follows: HR 91, /77 map 91, RR 35, sat 100% on bipap    Interventions:    Elizabeth Diaz NP at bedside, received orders for:    - ABG  - Duo-neb  - Bipap    Outcome:    Patient to remain in room at this time    Please call with any questions or concerns  Yumiko Russo RN  RRT [phone]

## 2025-02-08 NOTE — PROGRESS NOTES
0200:  pt requesting popsicles    0245:  Pt O2 sats in the 80's.  Recommended that pt put on her bipap.  She refused stating she wants to finish her popsicles first.      0455:  see Rapid Nurse notes regarding rapid response event.      0615:  heard bipap alarming and came in to find she had removed it and was attempting to eat a popsicle.  Educated her on the importance of keeping the bipap on at this time and educated family at the bedside who provided her with the popsicle.

## 2025-02-08 NOTE — CONSULTS
[facility]  Consultation Note      Name: David Car MRN: [id number]   : 1975 Hospital: [facility]   Date: 2025 11:55 AM Admission: 2/3/2025     Impression Plan   Acute on chronic hypoxic/hypercapnic respiratory failure-unclear trigger although she does suffer from severe COPD underlying.  On and off BiPAP during admission.  On 4-6 L nasal cannula at home  Severe COPD/asthma-FEV1 0.6 L, 25%.  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  Multiple life stressors have impacted compliance with medications  Pulmonary sarcoidosis-unclear history of diagnosis.  Suspect inactive disease  Prior tobacco abuse  Bipolar disorder/schizophrenia  Prior DVT-on chronic Eliquis Continue BiPAP.  May take breaks as tolerated.  Encouraged nocturnal use of BiPAP.  The goal is to wean back to home 4-6 L nasal cannula  Increase prednisone to 60 mg daily.  Anticipate slow taper  Arformoterol/budesonide nebs-resume home Trelegy at discharge  DuoNebs as needed  No clear indication for broad-spectrum antibiotics at this time  Home Eliquis  Avoid any potentially sedating agents  Given her disease severity she may be a candidate for biologic therapy which can be discussed in our office at discharge    Pulmonary will follow along but please call with any questions or clinical changes.  We will ensure close outpatient follow-up at time of discharge    Thank you for the consult.     History of Present Illness:    49-year-old female with a history of severe COPD/asthma (FEV1 0.6 L, 25%, %), prior tobacco abuse, bipolar disorder, DVT (on chronic Eliquis), prior IVDA (on methadone), questionable sarcoidosis presented on 2/3 with complaints of progressive shortness of breath, wheeze and chest tightness.  She was initially mid to the ICU requiring BiPAP.  She was started on IV steroids along with bronchodilator therapy.  Chest x-ray was clear of any acute  Oral Q8H PRN    Or    ondansetron (ZOFRAN) injection 4 mg  4 mg IntraVENous Q6H PRN    albuterol (PROVENTIL) (2.5 MG/3ML) 0.083% nebulizer solution 2.5 mg  2.5 mg Nebulization Q2H PRN    QUEtiapine (SEROQUEL) tablet 400 mg  400 mg Oral Nightly    gabapentin (NEURONTIN) capsule 600 mg  600 mg Oral TID    alcohol 62% (NOZIN) nasal  1 ampule  1 ampule Nasal Q12H    cyclobenzaprine (FLEXERIL) tablet 10 mg  10 mg Oral TID PRN    methadone (DOLOPHINE) 10 MG/ML solution 120 mg  120 mg Oral Daily    sacubitril-valsartan (ENTRESTO) 24-26 MG per tablet 1 tablet  1 tablet Oral BID    spironolactone (ALDACTONE) tablet 25 mg  25 mg Oral Daily         Labs:  ABG Invalid input(s): \"PHI\", \"PCO2I\", \"PO2I\", \"HCO3I\", \"SO2I\", \"FIO2I\"     CBC Recent Labs     02/06/25  0424 02/08/25  0516   WBC 7.1 4.3   HGB 11.2* 11.9   HCT 35.2 35.9    221   MCV 97.5 93.7   MCH 31.0 31.1        Metabolic  Panel Recent Labs     02/06/25  0424 02/08/25  0313 02/08/25  0516    134* 136   K 3.9 3.6 3.6    96* 98   CO2 32 34* 33*   BUN 21* 22* 20   MG 2.2  --   --    PHOS 3.0  --   --    ALT  --  35  --          US EXTREMITY RIGHT NON VASC LIMITED  Narrative: Soft tissue ultrasound    HISTORY: Painful soft tissue right shoulder mass.    Targeted ultrasound was performed of the area of interest.    Corresponding to the area of patient directed concern, in the subcutaneous  tissues, superficial to the deltoid, there is an 11.1 x 6.0 x 2.1 cm homogeneous  avascular slightly pliable mass that is isoechoic to fat. This respects fascial  layers.  Impression: Findings most compatible with a lipoma. No ultrasound features for malignancy at  this time. Recommend biopsy or excisional biopsy if pain or growth develops.    Electronically signed by Viky Melissa  XR CHEST PORTABLE  Narrative: EXAM:  XR CHEST PORTABLE    INDICATION: sob     COMPARISON: CTA chest 2/6/2024, chest radiograph 1/18/2022    TECHNIQUE: upright portable chest AP

## 2025-02-08 NOTE — PROGRESS NOTES
Responded to Rapid Response with DEANDRE Flores RRT and patient received up in chair on BIPAP with DEANDRE Flores RRT drawing an ABGSpO2 100% , HR 67bpm. ABG x 1 attempt successful from L radial artery and tolerated well. FiO2 decreased from 100% post ABG results and provider at bedside requested neb.

## 2025-02-08 NOTE — PROGRESS NOTES
Responded to Rapid Response with Kuldeep , RRT and Patient was trying to get out of chair to go to bathroom. Patient went agonal breathing and tri poding. Patient was on 6LPM nasal cannula and was placed on BIPAP. FIO2 was increased to 100%.  0435: ABG obtained. Results suggested FIO to be lowered. NP wanted IPAP increased from 14 to 16.  Nurses put patient  back in bed on BIPAP machine while I made sure BIPAP stayed on.  0444: NP wanted PRN neb to be given. It was given. Patient looks calm  and following commands.

## 2025-02-08 NOTE — PROGRESS NOTES
End of Shift Note    Bedside shift change report given to RN (oncoming nurse) by Oneida Avitia RN (offgoing nurse).  Report included the following information SBAR    Shift worked:  7 am-7 pm     Shift summary and any significant changes:     Pt is no compliant with bipap  Pt appeared lethargic but easily arousable        Concerns for physician to address:  -     Zone phone for oncoming shift:   [phone]       Activity:  Level of Assistance: Moderate assist, patient does 50-74%  Number times ambulated in hallways past shift: 0  Number of times OOB to chair past shift: 0    Cardiac:   Cardiac Monitoring: Yes      Cardiac Rhythm: Sinus rhythm    Access:  Current line(s): PIV    Genitourinary:   Urinary Status: Voiding    Respiratory:   O2 Device: Nasal cannula  Chronic home O2 use?: YES  Incentive spirometer at bedside: YES    GI:  Last BM (including prior to admit): 02/07/25  Current diet:  ADULT ORAL NUTRITION SUPPLEMENT; Breakfast, Lunch, Dinner; Standard High Calorie/High Protein Oral Supplement  ADULT DIET; Regular; No Added Salt (3-4 gm); strawberry ensure please  Passing flatus: YES    Pain Management:   Patient states pain is manageable on current regimen: YES    Skin:  Brennen Scale Score: 19  Interventions: Wound Offloading (Prevention Methods): Bed, pressure redistribution/air, Pillows    Patient Safety:  Fall Risk: Nursing Judgement-Fall Risk High(Add Comments): Yes  Fall Risk Interventions  Nursing Judgement-Fall Risk High(Add Comments): Yes  Toilet Every 2 Hours-In Advance of Need: Yes  Hourly Visual Checks: Awake, In bed  Fall Visual Posted: Fall sign posted  Room Door Open: Deferred to decrease stimulation  Alarm On: Bed  Patient Moved Closer to Nursing Station: No    Active Consults:   IP CONSULT TO PSYCHIATRY  IP CONSULT TO CASE MANAGEMENT  IP CONSULT TO PULMONOLOGY    Length of Stay:  Expected LOS: 7  Actual LOS: 5    Oneida Avitia RN

## 2025-02-08 NOTE — PROGRESS NOTES
Nocturnist/cross cover provider called to room 2165 as RRT was reported to be called for drowsiness, respiratory distress.     Patient Vitals for the past 8 hrs:   BP Temp Temp src Pulse Resp SpO2   02/08/25 0315 104/61 98.3 °F (36.8 °C) Oral 75 18 98 %   02/08/25 0039 -- -- -- 92 20 --   02/07/25 2300 -- 97 °F (36.1 °C) Oral -- 25 --   02/07/25 2159 132/86 -- -- -- -- --   02/07/25 2049 -- -- -- 87 20 --       Intake/Output Summary (Last 24 hours) at 2/8/2025 0442  Last data filed at 2/7/2025 0500  Gross per 24 hour   Intake --   Output 900 ml   Net -900 ml         BACKGROUND/ SITUATION:  Attending provider following patient: Dr Cast    49 y.o. female h/o  has a past medical history of Ascariosis, Asthma, Bipolar 1 disorder (HCC), Chronic obstructive pulmonary disease (HCC), Chronic pain, Cocaine abuse (HCC), Depression, Heart failure (HCC), Hepatitis B, Heroin abuse (HCC), HTN (hypertension), Narcotic abuse (HCC), Polysubstance abuse (HCC), Required emergency intubation, Sarcoidosis, Seizure (HCC), and Tobacco abuse.   admitted 2/3/2025 for Severe asthma with exacerbation, unspecified whether persistent [J45.901]  Acute hypoxic respiratory failure [J96.01].  See prior hospitalist group notes for complete details of course of treatment.      FINDINGS:  Rapid response called for acute respiratory failure.  Patient was wearing nasal cannula in the recliner having snacks overnight, denying BiPAP.  She desatted with exertion to 80% when standing to go to the restroom.  Patient was assisted back to recliner, BiPAP applied with improvement of sats to 100%.  Patient is drowsy but responds to voice.  She was downgraded yesterday from ICU where she was on reportedly continuous BiPAP. Mild wheezing, resp rate 21, bp 151/77, HR 90s.    Patient denies any other concerns or complaints on exam. Discussed with patient RECs, plan of care as below. Risk vs benefits of admit/treatment vs non-treatment discussed with patient,

## 2025-02-09 PROCEDURE — 6370000000 HC RX 637 (ALT 250 FOR IP): Performed by: INTERNAL MEDICINE

## 2025-02-09 PROCEDURE — 2700000000 HC OXYGEN THERAPY PER DAY

## 2025-02-09 PROCEDURE — 2500000003 HC RX 250 WO HCPCS: Performed by: INTERNAL MEDICINE

## 2025-02-09 PROCEDURE — 6360000002 HC RX W HCPCS: Performed by: INTERNAL MEDICINE

## 2025-02-09 PROCEDURE — 2060000000 HC ICU INTERMEDIATE R&B

## 2025-02-09 PROCEDURE — 94640 AIRWAY INHALATION TREATMENT: CPT

## 2025-02-09 PROCEDURE — 94660 CPAP INITIATION&MGMT: CPT

## 2025-02-09 RX ADMIN — IPRATROPIUM BROMIDE AND ALBUTEROL SULFATE 1 DOSE: 2.5; .5 SOLUTION RESPIRATORY (INHALATION) at 03:42

## 2025-02-09 RX ADMIN — ALPRAZOLAM 0.25 MG: 0.25 TABLET ORAL at 10:23

## 2025-02-09 RX ADMIN — ALBUTEROL SULFATE 2.5 MG: 2.5 SOLUTION RESPIRATORY (INHALATION) at 03:42

## 2025-02-09 RX ADMIN — APIXABAN 5 MG: 5 TABLET, FILM COATED ORAL at 21:17

## 2025-02-09 RX ADMIN — Medication 1 AMPULE: at 08:55

## 2025-02-09 RX ADMIN — QUETIAPINE FUMARATE 400 MG: 100 TABLET ORAL at 21:16

## 2025-02-09 RX ADMIN — SERTRALINE HYDROCHLORIDE 100 MG: 50 TABLET ORAL at 08:51

## 2025-02-09 RX ADMIN — CLONIDINE HYDROCHLORIDE 0.2 MG: 0.1 TABLET ORAL at 21:17

## 2025-02-09 RX ADMIN — GABAPENTIN 600 MG: 300 CAPSULE ORAL at 08:50

## 2025-02-09 RX ADMIN — PREDNISONE 60 MG: 20 TABLET ORAL at 08:50

## 2025-02-09 RX ADMIN — SODIUM CHLORIDE, PRESERVATIVE FREE 10 ML: 5 INJECTION INTRAVENOUS at 21:18

## 2025-02-09 RX ADMIN — ALPRAZOLAM 0.25 MG: 0.25 TABLET ORAL at 17:17

## 2025-02-09 RX ADMIN — CLONIDINE HYDROCHLORIDE 0.2 MG: 0.1 TABLET ORAL at 08:50

## 2025-02-09 RX ADMIN — METHADONE HYDROCHLORIDE 120 MG: 10 CONCENTRATE ORAL at 08:55

## 2025-02-09 RX ADMIN — LEVETIRACETAM 500 MG: 500 TABLET, FILM COATED ORAL at 21:17

## 2025-02-09 RX ADMIN — OXCARBAZEPINE 150 MG: 300 TABLET, FILM COATED ORAL at 21:15

## 2025-02-09 RX ADMIN — SPIRONOLACTONE 25 MG: 25 TABLET ORAL at 08:51

## 2025-02-09 RX ADMIN — BUPROPION HYDROCHLORIDE 300 MG: 150 TABLET, EXTENDED RELEASE ORAL at 08:50

## 2025-02-09 RX ADMIN — SACUBITRIL AND VALSARTAN 1 TABLET: 24; 26 TABLET, FILM COATED ORAL at 21:17

## 2025-02-09 RX ADMIN — SODIUM CHLORIDE, PRESERVATIVE FREE 10 ML: 5 INJECTION INTRAVENOUS at 08:57

## 2025-02-09 RX ADMIN — APIXABAN 5 MG: 5 TABLET, FILM COATED ORAL at 08:51

## 2025-02-09 RX ADMIN — SACUBITRIL AND VALSARTAN 1 TABLET: 24; 26 TABLET, FILM COATED ORAL at 08:50

## 2025-02-09 RX ADMIN — GABAPENTIN 600 MG: 300 CAPSULE ORAL at 21:17

## 2025-02-09 RX ADMIN — GABAPENTIN 600 MG: 300 CAPSULE ORAL at 15:07

## 2025-02-09 RX ADMIN — ONDANSETRON 4 MG: 4 TABLET, ORALLY DISINTEGRATING ORAL at 10:23

## 2025-02-09 RX ADMIN — LEVETIRACETAM 500 MG: 500 TABLET, FILM COATED ORAL at 08:50

## 2025-02-09 RX ADMIN — OXCARBAZEPINE 150 MG: 300 TABLET, FILM COATED ORAL at 08:50

## 2025-02-09 RX ADMIN — PRAZOSIN HYDROCHLORIDE 4 MG: 1 CAPSULE ORAL at 21:16

## 2025-02-09 ASSESSMENT — PAIN SCALES - GENERAL: PAINLEVEL_OUTOF10: 0

## 2025-02-09 NOTE — PLAN OF CARE
Problem: Discharge Planning  Goal: Discharge to home or other facility with appropriate resources  Outcome: Progressing  Flowsheets (Taken 2/9/2025 0928)  Discharge to home or other facility with appropriate resources:   Identify barriers to discharge with patient and caregiver   Identify discharge learning needs (meds, wound care, etc)     Problem: Pain  Goal: Verbalizes/displays adequate comfort level or baseline comfort level  Outcome: Progressing  Flowsheets (Taken 2/9/2025 0928)  Verbalizes/displays adequate comfort level or baseline comfort level:   Assess pain using appropriate pain scale   Encourage patient to monitor pain and request assistance   Implement non-pharmacological measures as appropriate and evaluate response     Problem: Skin/Tissue Integrity  Goal: Skin integrity remains intact  Description: 1.  Monitor for areas of redness and/or skin breakdown  2.  Assess vascular access sites hourly  3.  Every 4-6 hours minimum:  Change oxygen saturation probe site  4.  Every 4-6 hours:  If on nasal continuous positive airway pressure, respiratory therapy assess nares and determine need for appliance change or resting period  Outcome: Progressing  Flowsheets (Taken 2/9/2025 0928)  Skin Integrity Remains Intact: Monitor for areas of redness and/or skin breakdown     Problem: Safety - Adult  Goal: Free from fall injury  2/8/2025 2213 by Lori Fields, RN  Outcome: Progressing

## 2025-02-09 NOTE — PROGRESS NOTES
Pt continues to refuse to use bedside commode or bipap. Pt states she \"has been walking to the bathroom for days\". Upon ambulation back from restroom, pt displayed signs of increased work of breathing and became extremely anxious. Pt was educated of the importance of the  use of the bipap machine. Upon return to the chair and use of the bipap, pt o2 returned to 100% and HR in the 80s.

## 2025-02-09 NOTE — PROGRESS NOTES
2000:  pt rang out to go to the bathroom.  I suggested she use BSC.  She refused and insisted on going to the bathroom.      2015:  Pt returned from bathroom and had noted wheezing and increased work of breathing.  Strongly suggested that she go on bipap.  She has refused.  I educated her that she runs the risk of decompensating.  She continues to refuse and states she will consider putting it on later.      0305:  Pt took off bipap to drink water and have a snack.  Refuses to put back on at this time.      0341:  pt called out in respiratory distress.  Placed her back on bipap and respiratory was paged to give a prn breathing treatment.

## 2025-02-09 NOTE — PROGRESS NOTES
Hospitalist Progress Note    NAME:   David Car   : 1975   MRN: [id number]     Date/Time: 2025 9:10 PM    Patient PCP: Ese Epps APRN - NP    ______________________________________________________________________  Given the patient's current clinical presentation, I have a high level of concern for decompensation if discharged from the emergency department.  Complex decision making was performed, which includes reviewing the patient's available past medical records, laboratory results, and x-ray films.       My assessment of this patient's clinical condition and my plan of care is as follows.    Assessment / Plan:    Acute on chronic hypercapnic/hypoxemic respiratory failure POA  COPD with acute exacerbation POA  Pulmonary sarcoidosis POA  Suspect vocal cord dysfunction POA   Probable component of OHS  Chronic hypoxic respiratory failure POA on 6 L nasal cannula oxygen  Baseline on 6 L nasal cannula  Admitted to ICU 2/3/2025   Presented with wheezing and shortness of breath on several days  Chest x-ray shows no acute process.    proBNP 48  Procalcitonin < 0.05  Influenza A/B, COVID-19 rapid PCR negative  Initial VBG pH 7.30, pCO2 84, placed on BiPAP  Eventually required heated high flow at 35 L, FiO2 35%  Clinically improved  Transferred to floor 2025  Still feels short of breath, but weaned to 1 to 2 L nasal cannula oxygen  Continue DuoNebs  Off steroids, 5 day course prednisone  Appreciate pulmonology input  Ambulate    Right upper extremity swelling POA  DVT of proximal internal jugular vein  Continue PTA Eliquis.    Right upper extremity ultrasound on 2/3 shows evidence of lipoma.     Seizure disorder POA  Continue baseline Keppra 500 mg twice daily    PTSD  Bipolar disorder  Schizophrenia  Anxiety  Continue PTA Trileptal, Seroquel, Zoloft    Chronic methadone therapy  Continue chronic methadone 120 mg daily    Code Status: Full code  DVT Prophylaxis: Eliquis  Baseline:     Medical  Decision Making:   I personally reviewed labs: CBC, BMP  I personally reviewed imaging: Chest x-ray  I personally reviewed EKG:  Toxic drug monitoring:   Discussed case with: Patient, , nursing staff    Subjective:     \"I still feel shortness of breath\"  Transferred from ICU 2025  Initially on high flow 20 liters this AM, weaned to 1 to 2 L  No other complaints  Seen by pulmonology    RRT overnight for increased shortness of breath  Short of breath walking to bathroom  Required BiPAP  ABG 7.36, pCO2 65, pO2 460 on 100%      Objective:   VITALS:    Patient Vitals for the past 24 hrs:   BP Temp Temp src Pulse Resp SpO2   25 1945 90/65 97.5 °F (36.4 °C) -- 81 22 99 %   25 1500 110/69 -- -- 80 18 98 %   25 1045 108/61 -- Oral 80 20 96 %   25 0715 121/67 97.5 °F (36.4 °C) Oral 77 20 100 %   25 0511 -- -- -- -- 19 --   25 0444 -- -- -- 84 19 100 %   25 0439 130/78 -- -- 86 -- 97 %   25 0431 (!) 151/77 -- -- 91 23 100 %   25 0315 104/61 98.3 °F (36.8 °C) Oral 75 18 98 %   25 0039 -- -- -- 92 20 --   25 2300 -- 97 °F (36.1 °C) Oral -- 25 --   25 2159 132/86 -- -- -- -- --       Temp (24hrs), Av.6 °F (36.4 °C), Min:97 °F (36.1 °C), Max:98.3 °F (36.8 °C)      O2 Flow Rate (L/min): 4 L/min O2 Device: Nasal cannula    Wt Readings from Last 12 Encounters:   25 88.3 kg (194 lb 10.7 oz)   24 85.5 kg (188 lb 7.9 oz)   23 68.1 kg (150 lb 2.1 oz)   23 68.5 kg (151 lb)   23 69.4 kg (153 lb)         PHYSICAL EXAM:  General:    Alert, cooperative, appears stated age, audible wheezing  Lungs:   Marked decreased BS, wheezing present in upper airway, no crackles    No accessory muscle use  Chest wall:  No tenderness.  No accessory muscle use.  Heart:   Regular  rhythm,  No  Murmur.   No edema  Abdomen:   Soft, NT. ND  BS+  Extremities: No cyanosis.  No clubbing,    Neurologic: No aphasia or slurred speech. Symmetrical

## 2025-02-09 NOTE — PLAN OF CARE
Problem: Safety - Adult  Goal: Free from fall injury  Outcome: Progressing     Problem: Respiratory - Adult  Goal: Achieves optimal ventilation and oxygenation  2/8/2025 1002 by Samanta Young RCP  Outcome: Progressing  Flowsheets (Taken 2/8/2025 0855 by Oneida Avitia RN)  Achieves optimal ventilation and oxygenation:   Assess for changes in respiratory status   Assess for changes in mentation and behavior   Position to facilitate oxygenation and minimize respiratory effort

## 2025-02-10 PROCEDURE — 6370000000 HC RX 637 (ALT 250 FOR IP): Performed by: INTERNAL MEDICINE

## 2025-02-10 PROCEDURE — 94660 CPAP INITIATION&MGMT: CPT

## 2025-02-10 PROCEDURE — 94640 AIRWAY INHALATION TREATMENT: CPT

## 2025-02-10 PROCEDURE — 2060000000 HC ICU INTERMEDIATE R&B

## 2025-02-10 PROCEDURE — 6360000002 HC RX W HCPCS: Performed by: INTERNAL MEDICINE

## 2025-02-10 PROCEDURE — 2500000003 HC RX 250 WO HCPCS: Performed by: INTERNAL MEDICINE

## 2025-02-10 PROCEDURE — 2700000000 HC OXYGEN THERAPY PER DAY

## 2025-02-10 RX ADMIN — SERTRALINE HYDROCHLORIDE 100 MG: 50 TABLET ORAL at 10:14

## 2025-02-10 RX ADMIN — SODIUM CHLORIDE, PRESERVATIVE FREE 10 ML: 5 INJECTION INTRAVENOUS at 10:14

## 2025-02-10 RX ADMIN — ALPRAZOLAM 0.25 MG: 0.25 TABLET ORAL at 22:06

## 2025-02-10 RX ADMIN — ALPRAZOLAM 0.25 MG: 0.25 TABLET ORAL at 10:19

## 2025-02-10 RX ADMIN — CLONIDINE HYDROCHLORIDE 0.2 MG: 0.1 TABLET ORAL at 22:10

## 2025-02-10 RX ADMIN — SODIUM CHLORIDE, PRESERVATIVE FREE 10 ML: 5 INJECTION INTRAVENOUS at 22:11

## 2025-02-10 RX ADMIN — LEVETIRACETAM 500 MG: 500 TABLET, FILM COATED ORAL at 10:13

## 2025-02-10 RX ADMIN — PREDNISONE 60 MG: 20 TABLET ORAL at 10:13

## 2025-02-10 RX ADMIN — APIXABAN 5 MG: 5 TABLET, FILM COATED ORAL at 22:10

## 2025-02-10 RX ADMIN — ARFORMOTEROL TARTRATE: 15 SOLUTION RESPIRATORY (INHALATION) at 20:52

## 2025-02-10 RX ADMIN — IPRATROPIUM BROMIDE 0.5 MG: 0.5 SOLUTION RESPIRATORY (INHALATION) at 09:53

## 2025-02-10 RX ADMIN — BISACODYL 10 MG: 10 SUPPOSITORY RECTAL at 16:15

## 2025-02-10 RX ADMIN — ONDANSETRON 4 MG: 4 TABLET, ORALLY DISINTEGRATING ORAL at 14:04

## 2025-02-10 RX ADMIN — PRAZOSIN HYDROCHLORIDE 4 MG: 1 CAPSULE ORAL at 22:10

## 2025-02-10 RX ADMIN — METHADONE HYDROCHLORIDE 120 MG: 10 CONCENTRATE ORAL at 10:13

## 2025-02-10 RX ADMIN — IPRATROPIUM BROMIDE AND ALBUTEROL SULFATE 1 DOSE: 2.5; .5 SOLUTION RESPIRATORY (INHALATION) at 02:20

## 2025-02-10 RX ADMIN — ARFORMOTEROL TARTRATE: 15 SOLUTION RESPIRATORY (INHALATION) at 10:00

## 2025-02-10 RX ADMIN — BUPROPION HYDROCHLORIDE 300 MG: 150 TABLET, EXTENDED RELEASE ORAL at 10:13

## 2025-02-10 RX ADMIN — GABAPENTIN 600 MG: 300 CAPSULE ORAL at 22:07

## 2025-02-10 RX ADMIN — OXCARBAZEPINE 150 MG: 300 TABLET, FILM COATED ORAL at 22:07

## 2025-02-10 RX ADMIN — CLONIDINE HYDROCHLORIDE 0.2 MG: 0.1 TABLET ORAL at 10:13

## 2025-02-10 RX ADMIN — SPIRONOLACTONE 25 MG: 25 TABLET ORAL at 10:13

## 2025-02-10 RX ADMIN — SACUBITRIL AND VALSARTAN 1 TABLET: 24; 26 TABLET, FILM COATED ORAL at 10:13

## 2025-02-10 RX ADMIN — APIXABAN 5 MG: 5 TABLET, FILM COATED ORAL at 10:14

## 2025-02-10 RX ADMIN — GABAPENTIN 600 MG: 300 CAPSULE ORAL at 10:14

## 2025-02-10 RX ADMIN — LEVETIRACETAM 500 MG: 500 TABLET, FILM COATED ORAL at 22:10

## 2025-02-10 RX ADMIN — QUETIAPINE FUMARATE 400 MG: 100 TABLET ORAL at 22:08

## 2025-02-10 RX ADMIN — ALPRAZOLAM 0.25 MG: 0.25 TABLET ORAL at 16:15

## 2025-02-10 RX ADMIN — SACUBITRIL AND VALSARTAN 1 TABLET: 24; 26 TABLET, FILM COATED ORAL at 22:08

## 2025-02-10 RX ADMIN — ALPRAZOLAM 0.25 MG: 0.25 TABLET ORAL at 02:09

## 2025-02-10 RX ADMIN — GABAPENTIN 600 MG: 300 CAPSULE ORAL at 14:04

## 2025-02-10 RX ADMIN — IPRATROPIUM BROMIDE AND ALBUTEROL SULFATE 1 DOSE: 2.5; .5 SOLUTION RESPIRATORY (INHALATION) at 09:57

## 2025-02-10 RX ADMIN — OXCARBAZEPINE 150 MG: 300 TABLET, FILM COATED ORAL at 10:14

## 2025-02-10 RX ADMIN — IPRATROPIUM BROMIDE 0.5 MG: 0.5 SOLUTION RESPIRATORY (INHALATION) at 20:45

## 2025-02-10 NOTE — PROGRESS NOTES
Hospitalist Progress Note    NAME:   David Car   : 1975   MRN: [id number]     Date/Time: 2/10/2025 9:52 AM    Patient PCP: Ese Epps APRN - NP    ______________________________________________________________________  Given the patient's current clinical presentation, I have a high level of concern for decompensation if discharged from the emergency department.  Complex decision making was performed, which includes reviewing the patient's available past medical records, laboratory results, and x-ray films.       My assessment of this patient's clinical condition and my plan of care is as follows.    Assessment / Plan:    Acute on chronic hypercapnic/hypoxemic respiratory failure POA  COPD with acute exacerbation POA  Pulmonary sarcoidosis POA  Suspect vocal cord dysfunction POA   Probable component of OHS  Chronic hypoxic respiratory failure POA on 6 L nasal cannula oxygen  Baseline on 6 L nasal cannula  Admitted to ICU 2/3/2025   Presented with wheezing and shortness of breath on several days  Chest x-ray shows no acute process.    proBNP 48  Procalcitonin < 0.05  Influenza A/B, COVID-19 rapid PCR negative  Initial VBG pH 7.30, pCO2 84, placed on BiPAP  Eventually required heated high flow at 35 L, FiO2 35%  Clinically improved  Transferred to floor 2025  Weaned to 4 L L nasal cannula oxygen   Continue DuoNebs  5-day course of prednisone  Appreciate pulmonology input  Still insistent walking to the bathroom  nursing staff reports she starts shaking and gets very wheezy  Was refusing to wear BiPAP at times overnight because she could not get her Xanax  At times she gets very sleepy per nursing staff  This morning when I saw her she was sleeping on the BiPAP    Right upper extremity swelling POA  DVT of proximal internal jugular vein  Continue PTA Eliquis.    Right upper extremity ultrasound on 2/3 shows evidence of lipoma.     Seizure disorder POA  Continue baseline Keppra 500 mg twice

## 2025-02-10 NOTE — BSMART NOTE
BSMART Liaison Team Note     LOS:  7     Patient goal(s) for today: take medications as prescribed, make needs known in an appropriate manner, use coping skills  BSMART Liaison team focus/goals: assess needs, provide support and education    Progress note: Pt came to ED 2/3/25 via EMS c/o SOB. Psychiatry consulted 2/6/25 \"at request of her , please review and adjust her regimen of psychiatry medications.\" Liaison met with Pt at bedside. Pt was alert, oriented x4, presenting as depressed and helpless. Pt denied SI, HI and AVH. She reported poor appetite and sleep. Pt expressed feeling anxious and depressed, citing her anxiety as the most bothersome. She spoke about missing her father who passed 3 weeks prior. Liaison provided active listening and encouraged emotional expression. When liaison offered psycho education on grief process and provided list of MH resources, citing specific grief counseling support, Pt stated \"I'm not grieving. I don't need help with that.\" The proceeded to reflect on her hospital stay expressing frustration with waiting for staff to respond to her needs or not being able to anticipate her needs as well has her family does at home and she feels \"I get better care at home.\" Pt is talking to family on the phone daily for support and her husbands visits daily. Pt presents with limited insight to her situation and managing her expectations. Pt expressed fear of discharging without needed medications; liaison provided active listening and advised Pt on how to communicate concerns with RN and CM at discharge, advocated for medications to be filled at [facility] if possible, and reviewing discharged information prior to leaving the hospital to ensure she understands next steps. Pt became disengaged during educations periods. Pt denied any other current MH concerns but continued to express dissatisfaction with hospital experiences.        Barriers to Discharge: medical     Outpatient provider(s):   Beatriz Butcher.   Insurance info/prescription coverage:  [place] [employer]     Diagnosis: Bipolar I disorder  Schizophrenia     Plan:  No indication for BHU admission. Refer to psychiatric consult note for further assessment and recommendation. Defer to medical team for discharge plans.    Follow up Psych Consult placed? No .   Psychiatrist updated? No        Participating treatment team members: David Car, Bonny Miller, [facility]

## 2025-02-10 NOTE — PROGRESS NOTES
2314: pt called out requesting prn xanax.  BP low - 98/46 and she was drowsy with delayed responses.  Let pt know it was not safe at this time to give any med that is sedating given her low bp and current presentation.  She became angry and is now refusing to go on bipap.      0158:  pt family member called out stating she is in respiratory distress.  She has audible wheeze and tachypnea in the 30's.  O2 sats at 100 %. Applied bipap.

## 2025-02-10 NOTE — PROGRESS NOTES
Spiritual Health History and Assessment/Progress Note  [facility]    Follow-up,  , Adjustment to illness,      Name: David Car MRN: [id number]    Age: 49 y.o.     Sex: female   Language: English   Shinto: Adventist   Acute hypoxic respiratory failure     Date: 2/10/2025            Total Time Calculated: 64 min              Spiritual Assessment continued in [facility] CARDIOPULMONARY CARE        Referral/Consult From: Rounding   Encounter Overview/Reason: Follow-up  Service Provided For: Patient and family together    Parul, Belief, Meaning:   Patient is connected with a parul tradition or spiritual practice and has beliefs or practices that help with coping during difficult times  Family/Friends are connected with a parul tradition or spiritual practice and have beliefs or practices that help with coping during difficult times      Importance and Influence:  Patient has spiritual/personal beliefs that influence decisions regarding their health  Family/Friends have spiritual/personal beliefs that influence decisions regarding the patient's health    Community:  Patient is connected with a spiritual community and feels well-supported. Support system includes: Spouse/Partner, Children, Parul Community, Friends, and Extended family  Family/Friends feel well-supported. Support system includes: Children, Friends, and Extended family    Assessment and Plan of Care:     Patient Interventions include: Facilitated expression of thoughts and feelings, Explored spiritual coping/struggle/distress, Engaged in theological reflection, Affirmed coping skills/support systems, and Facilitated life review and/ or legacy  Family/Friends Interventions include: Facilitated expression of thoughts and feelings, Explored spiritual coping/struggle/distress, Engaged in theological reflection, Affirmed coping skills/support systems, and Facilitated life review and/or legacy    Patient Plan of Care: Spiritual Care available

## 2025-02-10 NOTE — PROGRESS NOTES
Shift summary - Awaiting clearance from pulmonology and awaiting direction from pulmonology on whether pt needs trilogy prior to discharge. Pt had BM today. Compliant with instructions on using BSC only for toileting needs. Pt experienced anxiety through shift and received 2 doses of xanex.

## 2025-02-10 NOTE — CARE COORDINATION
Transition of Care Plan:     RUR:  12%     Prior Level of Functioning: independent    Disposition: home with [employer] [facility]  WENDY: 2/12/25    5 PM   CM completed chart review.   Waiting on Pulmonary clearance.   Pt BL oxygen is 6 lpm via NC.   Pt does not have home BIPAP/Trilogy.  Please consult CM if that needs to be ordered prior to DC.   CM will need to send update to [facility].  and notify them prior to DC.     CM talked to Care Coordinator with [employer]: [phone] ext [phone] and provided update earlier today.     If SNF or IPR: Date FOC offered:   Date FOC received:   Accepting facility:   Date authorization started with reference number:   Date authorization received and expires:     Follow up appointments: PCP, Specialists  DME needed:   Transportation at discharge: Medicaid transport  IM/IMM Medicare/ letter given: n/a  Is patient a [place] and connected with VA?               If yes, was  transfer form completed and VA notified?   Caregiver Contact: Brenda Diego (Parent)  [phone] (Home Phone)   Discharge Caregiver contacted prior to discharge? Upon pt request  Care Conference needed?   Barriers to discharge:    Pulmonary clearance  BIPAP?    CM will continue to monitor discharge plan.     Clara Jauregui CM  [id number]

## 2025-02-10 NOTE — PROGRESS NOTES
[facility]  Progress Note      Name: David Car MRN: [id number]   : 1975 Hospital: [facility]   Date: 2/10/2025 3:59 PM Admission: 2/3/2025     Impression Plan   Acute on chronic hypoxic/hypercapnic respiratory failure-unclear trigger although she does suffer from severe COPD underlying.  On and off BiPAP during admission.  On 4-6 L nasal cannula at home  Severe COPD/asthma-FEV1 0.6 L, 25%.  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  Multiple life stressors have impacted compliance with medications  Vocal cord dysfunction   Pulmonary sarcoidosis-unclear history of diagnosis.  Suspect inactive disease  Prior tobacco abuse  Bipolar disorder/schizophrenia  Prior DVT-on chronic Eliquis Continue supplemental O2 to maintain SpO2 >88%  Continue bipap qHS   Needs to follow up with our office for sleep study   Continue Prednisone 60mg   Arformoterol/budesonide nebs-resume home Trelegy at discharge  DuoNebs as needed  No clear indication for broad-spectrum antibiotics at this time  Home Eliquis  Avoid any potentially sedating agents  Given her disease severity she may be a candidate for biologic therapy which can be discussed in our office at discharge  Was referred to ENT already as an outpatient      History of Present Illness:    2/10 Patient seen this afternoon sitting in chair. States she is feeling somewhat better than on admission. Has occasional dry cough. Wants to go home. On 6L NC.     49-year-old female with a history of severe COPD/asthma (FEV1 0.6 L, 25%, %), prior tobacco abuse, bipolar disorder, DVT (on chronic Eliquis), prior IVDA (on methadone), questionable sarcoidosis presented on 2/3 with complaints of progressive shortness of breath, wheeze and chest tightness.  She was initially mid to the ICU requiring BiPAP.  She was started on IV steroids along with bronchodilator therapy.  Chest x-ray was clear of any

## 2025-02-10 NOTE — PROGRESS NOTES
Hospitalist Progress Note    NAME:   David Car   : 1975   MRN: [id number]     Date/Time: 2025 11:28 PM    Patient PCP: Ese Epps APRN - NP    ______________________________________________________________________  Given the patient's current clinical presentation, I have a high level of concern for decompensation if discharged from the emergency department.  Complex decision making was performed, which includes reviewing the patient's available past medical records, laboratory results, and x-ray films.       My assessment of this patient's clinical condition and my plan of care is as follows.    Assessment / Plan:    Acute on chronic hypercapnic/hypoxemic respiratory failure POA  COPD with acute exacerbation POA  Pulmonary sarcoidosis POA  Suspect vocal cord dysfunction POA   Probable component of OHS  Chronic hypoxic respiratory failure POA on 6 L nasal cannula oxygen  Baseline on 6 L nasal cannula  Admitted to ICU 2/3/2025   Presented with wheezing and shortness of breath on several days  Chest x-ray shows no acute process.    proBNP 48  Procalcitonin < 0.05  Influenza A/B, COVID-19 rapid PCR negative  Initial VBG pH 7.30, pCO2 84, placed on BiPAP  Eventually required heated high flow at 35 L, FiO2 35%  Clinically improved  Transferred to floor 2025  Still feels short of breath, but weaned to 1 to 2 L nasal cannula oxygen  Continue DuoNebs  Off steroids, 5 day course prednisone  Appreciate pulmonology input  Ambulate    Right upper extremity swelling POA  DVT of proximal internal jugular vein  Continue PTA Eliquis.    Right upper extremity ultrasound on 2/3 shows evidence of lipoma.     Seizure disorder POA  Continue baseline Keppra 500 mg twice daily    PTSD  Bipolar disorder  Schizophrenia  Anxiety  Continue PTA Trileptal, Seroquel, Zoloft    Chronic methadone therapy  Continue chronic methadone 120 mg daily    Code Status: Full code  DVT Prophylaxis: Eliquis  Baseline:     Medical

## 2025-02-11 PROCEDURE — 6370000000 HC RX 637 (ALT 250 FOR IP): Performed by: INTERNAL MEDICINE

## 2025-02-11 PROCEDURE — 2500000003 HC RX 250 WO HCPCS: Performed by: INTERNAL MEDICINE

## 2025-02-11 PROCEDURE — 6360000002 HC RX W HCPCS: Performed by: INTERNAL MEDICINE

## 2025-02-11 PROCEDURE — 94660 CPAP INITIATION&MGMT: CPT

## 2025-02-11 PROCEDURE — 97530 THERAPEUTIC ACTIVITIES: CPT

## 2025-02-11 PROCEDURE — 76937 US GUIDE VASCULAR ACCESS: CPT

## 2025-02-11 PROCEDURE — 97535 SELF CARE MNGMENT TRAINING: CPT

## 2025-02-11 PROCEDURE — 2700000000 HC OXYGEN THERAPY PER DAY

## 2025-02-11 PROCEDURE — 94640 AIRWAY INHALATION TREATMENT: CPT

## 2025-02-11 PROCEDURE — 97116 GAIT TRAINING THERAPY: CPT

## 2025-02-11 PROCEDURE — 2060000000 HC ICU INTERMEDIATE R&B

## 2025-02-11 RX ADMIN — APIXABAN 5 MG: 5 TABLET, FILM COATED ORAL at 20:31

## 2025-02-11 RX ADMIN — OXCARBAZEPINE 150 MG: 300 TABLET, FILM COATED ORAL at 20:33

## 2025-02-11 RX ADMIN — LEVETIRACETAM 500 MG: 500 TABLET, FILM COATED ORAL at 20:32

## 2025-02-11 RX ADMIN — GABAPENTIN 600 MG: 300 CAPSULE ORAL at 08:47

## 2025-02-11 RX ADMIN — GABAPENTIN 600 MG: 300 CAPSULE ORAL at 20:32

## 2025-02-11 RX ADMIN — PRAZOSIN HYDROCHLORIDE 4 MG: 1 CAPSULE ORAL at 20:32

## 2025-02-11 RX ADMIN — APIXABAN 5 MG: 5 TABLET, FILM COATED ORAL at 08:48

## 2025-02-11 RX ADMIN — SPIRONOLACTONE 25 MG: 25 TABLET ORAL at 08:48

## 2025-02-11 RX ADMIN — Medication 1 AMPULE: at 09:08

## 2025-02-11 RX ADMIN — SODIUM CHLORIDE, PRESERVATIVE FREE 10 ML: 5 INJECTION INTRAVENOUS at 20:35

## 2025-02-11 RX ADMIN — CLONIDINE HYDROCHLORIDE 0.2 MG: 0.1 TABLET ORAL at 08:48

## 2025-02-11 RX ADMIN — SERTRALINE HYDROCHLORIDE 100 MG: 50 TABLET ORAL at 08:47

## 2025-02-11 RX ADMIN — GABAPENTIN 600 MG: 300 CAPSULE ORAL at 15:42

## 2025-02-11 RX ADMIN — IPRATROPIUM BROMIDE AND ALBUTEROL SULFATE 1 DOSE: 2.5; .5 SOLUTION RESPIRATORY (INHALATION) at 05:09

## 2025-02-11 RX ADMIN — BISACODYL 10 MG: 10 SUPPOSITORY RECTAL at 10:08

## 2025-02-11 RX ADMIN — SACUBITRIL AND VALSARTAN 1 TABLET: 24; 26 TABLET, FILM COATED ORAL at 20:32

## 2025-02-11 RX ADMIN — CLONIDINE HYDROCHLORIDE 0.2 MG: 0.1 TABLET ORAL at 20:31

## 2025-02-11 RX ADMIN — SODIUM CHLORIDE, PRESERVATIVE FREE 10 ML: 5 INJECTION INTRAVENOUS at 08:49

## 2025-02-11 RX ADMIN — QUETIAPINE FUMARATE 400 MG: 100 TABLET ORAL at 20:32

## 2025-02-11 RX ADMIN — METHADONE HYDROCHLORIDE 120 MG: 10 CONCENTRATE ORAL at 08:52

## 2025-02-11 RX ADMIN — PREDNISONE 60 MG: 20 TABLET ORAL at 08:47

## 2025-02-11 RX ADMIN — OXCARBAZEPINE 150 MG: 300 TABLET, FILM COATED ORAL at 08:51

## 2025-02-11 RX ADMIN — IPRATROPIUM BROMIDE 0.5 MG: 0.5 SOLUTION RESPIRATORY (INHALATION) at 15:03

## 2025-02-11 RX ADMIN — ALPRAZOLAM 0.25 MG: 0.25 TABLET ORAL at 05:06

## 2025-02-11 RX ADMIN — Medication 1 AMPULE: at 20:31

## 2025-02-11 RX ADMIN — LEVETIRACETAM 500 MG: 500 TABLET, FILM COATED ORAL at 08:48

## 2025-02-11 RX ADMIN — SACUBITRIL AND VALSARTAN 1 TABLET: 24; 26 TABLET, FILM COATED ORAL at 08:48

## 2025-02-11 RX ADMIN — BUPROPION HYDROCHLORIDE 300 MG: 150 TABLET, EXTENDED RELEASE ORAL at 08:45

## 2025-02-11 RX ADMIN — ONDANSETRON 4 MG: 2 INJECTION INTRAMUSCULAR; INTRAVENOUS at 10:08

## 2025-02-11 RX ADMIN — ONDANSETRON 4 MG: 2 INJECTION INTRAMUSCULAR; INTRAVENOUS at 20:43

## 2025-02-11 ASSESSMENT — PAIN DESCRIPTION - DESCRIPTORS: DESCRIPTORS: ACHING

## 2025-02-11 ASSESSMENT — PAIN SCALES - GENERAL
PAINLEVEL_OUTOF10: 0
PAINLEVEL_OUTOF10: 8

## 2025-02-11 ASSESSMENT — PAIN DESCRIPTION - ORIENTATION: ORIENTATION: MID

## 2025-02-11 ASSESSMENT — PAIN DESCRIPTION - LOCATION: LOCATION: OTHER (COMMENT)

## 2025-02-11 NOTE — PROGRESS NOTES
End of Shift Note    Bedside shift change report given to Edna TREADWELL (oncoming nurse) by Kalen Lerner RN (offgoing nurse).  Report included the following information SBAR and MAR    Shift worked:  5p-7p     Shift summary and any significant changes:     Pt environment was safe with call bell in reach, and  chair alarm on.        Concerns for physician to address:  N/A     Zone phone for oncoming shift:   N/A       Activity:  Level of Assistance: Moderate assist, patient does 50-74%  Number times ambulated in hallways past shift: 0  Number of times OOB to chair past shift: 3    Cardiac:   Cardiac Monitoring: Yes      Cardiac Rhythm: Sinus rhythm    Access:  Current line(s): PIV     Genitourinary:   Urinary Status: Voiding    Respiratory:   O2 Device: Nasal cannula  Chronic home O2 use?: YES  Incentive spirometer at bedside: NO    GI:  Last BM (including prior to admit): 02/10/25  Current diet:  ADULT ORAL NUTRITION SUPPLEMENT; Breakfast, Lunch, Dinner; Standard High Calorie/High Protein Oral Supplement  ADULT DIET; Regular; No Added Salt (3-4 gm); strawberry ensure please  Passing flatus: YES    Pain Management:   Patient states pain is manageable on current regimen: YES    Skin:  Brennen Scale Score: 19  Interventions: Wound Offloading (Prevention Methods): Bed, pressure reduction mattress    Patient Safety:  Fall Risk: Nursing Judgement-Fall Risk High(Add Comments): Yes  Fall Risk Interventions  Nursing Judgement-Fall Risk High(Add Comments): Yes  Toilet Every 2 Hours-In Advance of Need: Yes  Hourly Visual Checks: Other (Comment)  Fall Visual Posted: Fall sign posted  Room Door Open: Yes  Alarm On: Bed, Chair  Patient Moved Closer to Nursing Station: No    Active Consults:   IP CONSULT TO PSYCHIATRY  IP CONSULT TO CASE MANAGEMENT  IP CONSULT TO PULMONOLOGY    Length of Stay:  Expected LOS: 9  Actual LOS: 8    Kalen Lerner, RN

## 2025-02-11 NOTE — PROGRESS NOTES
[facility]  Progress Note      Name: David Car MRN: [id number]   : 1975 Hospital: [facility]   Date: 2025 9:29 AM Admission: 2/3/2025     Impression Plan   Acute on chronic hypoxic/hypercapnic respiratory failure-unclear trigger although she does suffer from severe COPD underlying.  On and off BiPAP during admission.  On 4-6 L nasal cannula at home  Severe COPD/asthma-FEV1 0.6 L, 25%.  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  Multiple life stressors have impacted compliance with medications  Vocal cord dysfunction   Pulmonary sarcoidosis-unclear history of diagnosis.  Suspect inactive disease  Prior tobacco abuse  Bipolar disorder/schizophrenia  Prior DVT-on chronic Eliquis Continue supplemental O2 to maintain SpO2 >88%  Continue bipap qHS   Will arrange home NIV with [facility]      Ordering AVAPS-AE for chronic respiratory failure with hypercapnia due to COPD. The addition of the auto-titrating feature is required for this hypercapnic patient to decrease the PaCO2 more efficently and rapidly due to the severity of his condiiton. Patient continues with persistent hypercapnia with BIPAP use as prescribed during this episode of care. Volume ventilation is indicated. Ordering NIV with AVAPS-AE for use during hours of sleep as well as during waking hours when symptomatics. Home BIPAP/Home Bipap with VAPS is not appropraite for meeting this patient's ventilatory requirements due to the reasons below:  BIPAP-AVAPS flow capability is extremely limited (1/3 of what the NIV can achieve)  NIV with AVAPS-AE is preferred due to auto adjusting EPAP function allowing for continued upper airway patency throughout use.   NIV with AVAPS- AE has dual prescription modality allowing for seamless transition between modes when changes in the patient's clinical condition requires.     Taper prednisone to 50mg   Arformoterol/budesonide  Medications   Medication Dose Route Frequency    predniSONE (DELTASONE) tablet 60 mg  60 mg Oral Daily    bisacodyl (DULCOLAX) suppository 10 mg  10 mg Rectal Daily PRN    ipratropium (ATROVENT) 0.02 % nebulizer solution 0.5 mg  0.5 mg Nebulization TID RT    polyethylene glycol (GLYCOLAX) packet 17 g  17 g Oral Daily PRN    acetaminophen (TYLENOL) tablet 650 mg  650 mg Oral Q6H PRN    lidocaine 4 % external patch 1 patch  1 patch TransDERmal Daily    ALPRAZolam (XANAX) tablet 0.25 mg  0.25 mg Oral 4x Daily PRN    sertraline (ZOLOFT) tablet 100 mg  100 mg Oral Daily    prazosin (MINIPRESS) capsule 4 mg  4 mg Oral Nightly    levETIRAcetam (KEPPRA) tablet 500 mg  500 mg Oral BID    OXcarbazepine (TRILEPTAL) tablet 150 mg  150 mg Oral BID    cloNIDine (CATAPRES) tablet 0.2 mg  0.2 mg Oral BID    buPROPion (WELLBUTRIN XL) extended release tablet 300 mg  300 mg Oral QAM    apixaban (ELIQUIS) tablet 5 mg  5 mg Oral BID    arformoterol 15 mcg-budesonide 0.25 mg neb solution   Nebulization BID RT    ipratropium 0.5 mg-albuterol 2.5 mg (DUONEB) nebulizer solution 1 Dose  1 Dose Inhalation Q4H PRN    sodium chloride flush 0.9 % injection 5-40 mL  5-40 mL IntraVENous 2 times per day    sodium chloride flush 0.9 % injection 5-40 mL  5-40 mL IntraVENous PRN    0.9 % sodium chloride infusion   IntraVENous PRN    ondansetron (ZOFRAN-ODT) disintegrating tablet 4 mg  4 mg Oral Q8H PRN    Or    ondansetron (ZOFRAN) injection 4 mg  4 mg IntraVENous Q6H PRN    albuterol (PROVENTIL) (2.5 MG/3ML) 0.083% nebulizer solution 2.5 mg  2.5 mg Nebulization Q2H PRN    QUEtiapine (SEROQUEL) tablet 400 mg  400 mg Oral Nightly    gabapentin (NEURONTIN) capsule 600 mg  600 mg Oral TID    alcohol 62% (NOZIN) nasal  1 ampule  1 ampule Nasal Q12H    cyclobenzaprine (FLEXERIL) tablet 10 mg  10 mg Oral TID PRN    methadone (DOLOPHINE) 10 MG/ML solution 120 mg  120 mg Oral Daily    sacubitril-valsartan (ENTRESTO) 24-26 MG per tablet 1 tablet  1

## 2025-02-11 NOTE — CARE COORDINATION
Transition of Care Plan:    RUR: 13%  Prior Level of Functioning: independent  Disposition: home with [facility]   WNEDY: 2/12/2025  If SNF or IPR: Date FOC offered:   Date FOC received:   Accepting facility:   Date authorization started with reference number:   Date authorization received and expires:   Follow up appointments:   DME needed: NIV via [facility] - auth pending at this time  Transportation at discharge: medicaid   IM/IMM Medicare/ letter given:   Is patient a  and connected with VA?    If yes, was [place] transfer form completed and VA notified?   Caregiver Contact: Brenda Diego (Parent)  [phone] (Home Phone)   Discharge Caregiver contacted prior to discharge?   Care Conference needed?   Barriers to discharge: pulm clearance - NIV for home    CM spoke with Mae with [facility] [phone] fax#[phone] -pulmonary ordering NIV for home - working on obtaining auth at this time. SHAILA ARMENTA, MSW  [id number]

## 2025-02-11 NOTE — PLAN OF CARE
Problem: Physical Therapy - Adult  Goal: By Discharge: Performs mobility at highest level of function for planned discharge setting.  See evaluation for individualized goals.  Description: FUNCTIONAL STATUS PRIOR TO ADMISSION: Ambulates household distances with use of rollator walker. Wears 6L O2 at baseline. Reports 1 recent fall. States she sits on seat of rollator for most ADLs and also has assist from mother.     HOME SUPPORT PRIOR TO ADMISSION: The patient lived with son, brother, and mother who all assist as needed.    Physical Therapy Goals  Initiated 2/5/2025  1.  Patient will move from supine to sit and sit to supine, scoot up and down, and roll side to side in bed with supervision/set-up within 7 day(s).    2.  Patient will perform sit to stand with supervision/set-up within 7 day(s).  3.  Patient will transfer from bed to chair and chair to bed with supervision/set-up using the least restrictive device within 7 day(s).  4.  Patient will ambulate with supervision/set-up for 100 feet with the least restrictive device within 7 day(s).   5.  Patient will ascend/descend 4 stairs with 1 handrail(s) with supervision/set-up within 7 day(s).     Re-Assessment 2/11/25 goals remain appropriate    1.  Patient will move from supine to sit and sit to supine, scoot up and down, and roll side to side in bed with supervision/set-up within 7 day(s).    2.  Patient will perform sit to stand with supervision/set-up within 7 day(s).  3.  Patient will transfer from bed to chair and chair to bed with supervision/set-up using the least restrictive device within 7 day(s).  4.  Patient will ambulate with supervision/set-up for 100 feet with the least restrictive device within 7 day(s).   5.  Patient will ascend/descend 4 stairs with 1 handrail(s) with supervision/set-up within 7 day(s).     Outcome: Progressing   PHYSICAL THERAPY TREATMENT: WEEKLY REASSESSMENT    Patient: David Car (49 y.o. female)  Date: 2/11/2025  Primary  consider for discharge: no additional factors    IF patient discharges home will need the following DME: patient owns DME required for discharge       SUBJECTIVE:   Patient stated “People don't understand how this feels.”    OBJECTIVE DATA SUMMARY:     Past Medical History:   Diagnosis Date    Ascariosis     Asthma     Bipolar 1 disorder (HCC)     Chronic obstructive pulmonary disease (HCC)     Chronic pain     back, leg    Cocaine abuse (HCC)     Depression     Heart failure (HCC)     Hepatitis B     Heroin abuse (HCC)     HTN (hypertension)     Narcotic abuse (HCC)     Polysubstance abuse (HCC)     Required emergency intubation     Sarcoidosis     Seizure (HCC)     Tobacco abuse      Past Surgical History:   Procedure Laterality Date    GYN      WISDOM TOOTH EXTRACTION         Home Situation:  Social/Functional History  Lives With: Parent, Son, Other (Comment) (Lives with her mother, brother and 18 year old son.)  Type of Home: House (One story.)  Home Layout: One level  Home Access: Stairs to enter without rails  Entrance Stairs - Number of Steps: 4  Bathroom Shower/Tub: Tub/Shower unit  Bathroom Toilet: Standard  Bathroom Equipment: Shower chair  Home Equipment: Reacher  Has the patient had two or more falls in the past year or any fall with injury in the past year?: Yes  Active : No  Patient's  Info: [employer]  Critical Behavior:          Hearing:        Vision/Perceptual:                  Strength:    Strength: Generally decreased, functional    Tone & Sensation:   Tone: Normal       Range Of Motion:  AROM: Within functional limits       Functional Mobility:  Bed Mobility:     Bed Mobility Training  Bed Mobility Training: No  Transfers:     Transfer Training  Interventions: Safety awareness training;Verbal cues  Sit to Stand: Contact guard assistance  Stand to Sit: Contact guard assistance  Balance:               Balance  Sitting: Intact  Standing: Impaired  Standing - Static: Constant

## 2025-02-11 NOTE — PROGRESS NOTES
Occupational Therapy    Duplicate referral received. Pt currently on caseload 3x/week, with recommendation for HH with family assist. Will be seen per POC.

## 2025-02-11 NOTE — PLAN OF CARE
Problem: Occupational Therapy - Adult  Goal: By Discharge: Performs self-care activities at highest level of function for planned discharge setting.  See evaluation for individualized goals.  Description: FUNCTIONAL STATUS PRIOR TO ADMISSION:  Ambulates household distances with use of rollator walker and was independent to min A for ADLs. Wears 6L O2 at baseline and uses energy conservation techniques such as sitting on shower seat for bathing and sits on seat of rollator for most ADLs. Mother assists with ADLs per patient. Reports 1 recent fall    HOME SUPPORT PRIOR TO ADMISSION: The patient lived with son, brother, and mother who all assist as needed.       Occupational Therapy Goals:  Initiated 2/5/2025  1.  Patient will perform upper body dressing with Set-up and Supervision within 7 day(s).  2.  Patient will perform lower body dressing with Set-up and Supervision within 7 day(s).  3.  Patient will perform toilet transfers with Supervision  within 7 day(s).  4.  Patient will perform all aspects of toileting with Set-up and Supervision within 7 day(s).  5.  Patient will sponge bathing with Set-up and Supervision within 7 day(s).     Outcome: Progressing   OCCUPATIONAL THERAPY TREATMENT  Patient: David Car (49 y.o. female)  Date: 2/11/2025  Primary Diagnosis: Severe asthma with exacerbation, unspecified whether persistent [J45.901]  Acute hypoxic respiratory failure [J96.01]       Precautions:                  Chart, occupational therapy assessment, plan of care, and goals were reviewed.    ASSESSMENT  Patient continues to benefit from skilled OT services and is progressing towards goals. Pt tolerated OT session well. Demonstrates improvement with standing balance, standing tolerance, activity tolerance, and energy conservation. Progressed mobility within room. Pt receptive to breathing and calming techniques to help manage anxiety and normalize breathing during and post activity. Pt continues to benefit from  assistance  Functional Mobility Skilled Clinical Factors: with RW; primarily CGA, with min assist required at end of  mobility trial 2/2 progressive weakness, no overt LOB                  Pain Ratin/10   Pain Intervention(s):         Activity Tolerance:   Fair , requires rest breaks, observed shortness of breath on exertion, and desaturates with activity and requires oxygen  Please refer to the flowsheet for vital signs taken during this treatment.    After treatment:   Patient left in no apparent distress sitting up in chair, Call bell within reach, Bed/ chair alarm activated, and Caregiver / family present    COMMUNICATION/EDUCATION:   The patient's plan of care was discussed with: physical therapist and registered nurse    Patient Education  Education Given To: Patient;Family  Education Provided: Role of Therapy;Plan of Care;Transfer Training;Energy Conservation;Mobility Training;ADL Adaptive Strategies;Precautions;Fall Prevention Strategies  Education Method: Verbal  Barriers to Learning: None  Education Outcome: Verbalized understanding;Demonstrated understanding;Continued education needed    Thank you for this referral.  Adilene Larkin OT  Minutes: 40

## 2025-02-12 VITALS
HEART RATE: 84 BPM | WEIGHT: 194.67 LBS | SYSTOLIC BLOOD PRESSURE: 104 MMHG | BODY MASS INDEX: 33.23 KG/M2 | OXYGEN SATURATION: 98 % | TEMPERATURE: 97.9 F | HEIGHT: 64 IN | RESPIRATION RATE: 18 BRPM | DIASTOLIC BLOOD PRESSURE: 81 MMHG

## 2025-02-12 LAB
ALBUMIN SERPL-MCNC: 3.4 G/DL (ref 3.5–5)
ALBUMIN/GLOB SERPL: 0.9 (ref 1.1–2.2)
ALP SERPL-CCNC: 120 U/L (ref 45–117)
ALT SERPL-CCNC: 46 U/L (ref 12–78)
ANION GAP SERPL CALC-SCNC: 7 MMOL/L (ref 2–12)
AST SERPL-CCNC: 40 U/L (ref 15–37)
BASOPHILS # BLD: 0.03 K/UL (ref 0–0.1)
BASOPHILS NFR BLD: 0.4 % (ref 0–1)
BILIRUB SERPL-MCNC: 0.3 MG/DL (ref 0.2–1)
BUN SERPL-MCNC: 24 MG/DL (ref 6–20)
BUN/CREAT SERPL: 27 (ref 12–20)
CALCIUM SERPL-MCNC: 9.3 MG/DL (ref 8.5–10.1)
CHLORIDE SERPL-SCNC: 96 MMOL/L (ref 97–108)
CO2 SERPL-SCNC: 32 MMOL/L (ref 21–32)
CREAT SERPL-MCNC: 0.9 MG/DL (ref 0.55–1.02)
DIFFERENTIAL METHOD BLD: ABNORMAL
EOSINOPHIL # BLD: 0.13 K/UL (ref 0–0.4)
EOSINOPHIL NFR BLD: 1.7 % (ref 0–7)
ERYTHROCYTE [DISTWIDTH] IN BLOOD BY AUTOMATED COUNT: 14.1 % (ref 11.5–14.5)
GLOBULIN SER CALC-MCNC: 3.6 G/DL (ref 2–4)
GLUCOSE SERPL-MCNC: 85 MG/DL (ref 65–100)
HCT VFR BLD AUTO: 34.9 % (ref 35–47)
HGB BLD-MCNC: 11.1 G/DL (ref 11.5–16)
IMM GRANULOCYTES # BLD AUTO: 0.08 K/UL (ref 0–0.04)
IMM GRANULOCYTES NFR BLD AUTO: 1 % (ref 0–0.5)
LYMPHOCYTES # BLD: 1.78 K/UL (ref 0.8–3.5)
LYMPHOCYTES NFR BLD: 22.8 % (ref 12–49)
MCH RBC QN AUTO: 31 PG (ref 26–34)
MCHC RBC AUTO-ENTMCNC: 31.8 G/DL (ref 30–36.5)
MCV RBC AUTO: 97.5 FL (ref 80–99)
MONOCYTES # BLD: 1.16 K/UL (ref 0–1)
MONOCYTES NFR BLD: 14.9 % (ref 5–13)
NEUTS SEG # BLD: 4.61 K/UL (ref 1.8–8)
NEUTS SEG NFR BLD: 59.2 % (ref 32–75)
NRBC # BLD: 0 K/UL (ref 0–0.01)
NRBC BLD-RTO: 0 PER 100 WBC
PLATELET # BLD AUTO: 233 K/UL (ref 150–400)
PMV BLD AUTO: 10 FL (ref 8.9–12.9)
POTASSIUM SERPL-SCNC: 3.9 MMOL/L (ref 3.5–5.1)
PROT SERPL-MCNC: 7 G/DL (ref 6.4–8.2)
RBC # BLD AUTO: 3.58 M/UL (ref 3.8–5.2)
SODIUM SERPL-SCNC: 135 MMOL/L (ref 136–145)
WBC # BLD AUTO: 7.8 K/UL (ref 3.6–11)

## 2025-02-12 PROCEDURE — 6370000000 HC RX 637 (ALT 250 FOR IP): Performed by: INTERNAL MEDICINE

## 2025-02-12 PROCEDURE — 2700000000 HC OXYGEN THERAPY PER DAY

## 2025-02-12 PROCEDURE — 6370000000 HC RX 637 (ALT 250 FOR IP): Performed by: PHYSICIAN ASSISTANT

## 2025-02-12 PROCEDURE — 85025 COMPLETE CBC W/AUTO DIFF WBC: CPT

## 2025-02-12 PROCEDURE — 80053 COMPREHEN METABOLIC PANEL: CPT

## 2025-02-12 PROCEDURE — 94660 CPAP INITIATION&MGMT: CPT

## 2025-02-12 PROCEDURE — 36415 COLL VENOUS BLD VENIPUNCTURE: CPT

## 2025-02-12 PROCEDURE — 2500000003 HC RX 250 WO HCPCS: Performed by: INTERNAL MEDICINE

## 2025-02-12 RX ORDER — CLONIDINE HYDROCHLORIDE 0.2 MG/1
0.2 TABLET ORAL 2 TIMES DAILY
Qty: 60 TABLET | Refills: 1 | Status: SHIPPED | OUTPATIENT
Start: 2025-02-12

## 2025-02-12 RX ORDER — SACUBITRIL AND VALSARTAN 24; 26 MG/1; MG/1
1 TABLET, FILM COATED ORAL 2 TIMES DAILY
Qty: 60 TABLET | Refills: 0 | Status: SHIPPED | OUTPATIENT
Start: 2025-02-12

## 2025-02-12 RX ORDER — SERTRALINE HYDROCHLORIDE 100 MG/1
200 TABLET, FILM COATED ORAL DAILY
Qty: 60 TABLET | Refills: 0 | Status: SHIPPED | OUTPATIENT
Start: 2025-02-12 | End: 2025-03-14

## 2025-02-12 RX ORDER — PRAZOSIN HYDROCHLORIDE 2 MG/1
4 CAPSULE ORAL NIGHTLY
Qty: 30 CAPSULE | Refills: 0 | Status: SHIPPED | OUTPATIENT
Start: 2025-02-12

## 2025-02-12 RX ORDER — PREDNISONE 10 MG/1
TABLET ORAL
Qty: 1 EACH | Refills: 0 | Status: SHIPPED | OUTPATIENT
Start: 2025-02-12

## 2025-02-12 RX ORDER — OXCARBAZEPINE 150 MG/1
150 TABLET, FILM COATED ORAL 2 TIMES DAILY
Qty: 60 TABLET | Refills: 0 | Status: SHIPPED | OUTPATIENT
Start: 2025-02-12 | End: 2025-03-14

## 2025-02-12 RX ORDER — QUETIAPINE FUMARATE 400 MG/1
400 TABLET, FILM COATED ORAL
Qty: 30 TABLET | Refills: 0 | Status: SHIPPED | OUTPATIENT
Start: 2025-02-12 | End: 2025-03-14

## 2025-02-12 RX ORDER — FLUTICASONE FUROATE, UMECLIDINIUM BROMIDE AND VILANTEROL TRIFENATATE 200; 62.5; 25 UG/1; UG/1; UG/1
1 POWDER RESPIRATORY (INHALATION) DAILY
Qty: 1 EACH | Refills: 0 | Status: SHIPPED | OUTPATIENT
Start: 2025-02-12

## 2025-02-12 RX ORDER — SPIRONOLACTONE 25 MG/1
25 TABLET ORAL DAILY
Qty: 30 TABLET | Refills: 0 | Status: SHIPPED | OUTPATIENT
Start: 2025-02-12 | End: 2025-03-14

## 2025-02-12 RX ORDER — BUPROPION HYDROCHLORIDE 300 MG/1
300 TABLET ORAL EVERY MORNING
Qty: 30 TABLET | Refills: 0 | Status: SHIPPED | OUTPATIENT
Start: 2025-02-12 | End: 2025-03-14

## 2025-02-12 RX ORDER — LEVETIRACETAM 500 MG/1
500 TABLET ORAL 2 TIMES DAILY
Qty: 60 TABLET | Refills: 0 | Status: SHIPPED | OUTPATIENT
Start: 2025-02-12 | End: 2025-03-14

## 2025-02-12 RX ADMIN — CLONIDINE HYDROCHLORIDE 0.2 MG: 0.1 TABLET ORAL at 09:15

## 2025-02-12 RX ADMIN — METHADONE HYDROCHLORIDE 120 MG: 10 CONCENTRATE ORAL at 09:16

## 2025-02-12 RX ADMIN — SPIRONOLACTONE 25 MG: 25 TABLET ORAL at 09:16

## 2025-02-12 RX ADMIN — GABAPENTIN 600 MG: 300 CAPSULE ORAL at 14:29

## 2025-02-12 RX ADMIN — OXCARBAZEPINE 150 MG: 300 TABLET, FILM COATED ORAL at 09:14

## 2025-02-12 RX ADMIN — BUPROPION HYDROCHLORIDE 300 MG: 150 TABLET, EXTENDED RELEASE ORAL at 09:15

## 2025-02-12 RX ADMIN — GABAPENTIN 600 MG: 300 CAPSULE ORAL at 09:14

## 2025-02-12 RX ADMIN — ALPRAZOLAM 0.25 MG: 0.25 TABLET ORAL at 14:29

## 2025-02-12 RX ADMIN — LEVETIRACETAM 500 MG: 500 TABLET, FILM COATED ORAL at 09:15

## 2025-02-12 RX ADMIN — APIXABAN 5 MG: 5 TABLET, FILM COATED ORAL at 09:15

## 2025-02-12 RX ADMIN — BISACODYL 10 MG: 10 SUPPOSITORY RECTAL at 09:21

## 2025-02-12 RX ADMIN — PREDNISONE 50 MG: 5 TABLET ORAL at 09:15

## 2025-02-12 RX ADMIN — ALPRAZOLAM 0.25 MG: 0.25 TABLET ORAL at 09:14

## 2025-02-12 RX ADMIN — SACUBITRIL AND VALSARTAN 1 TABLET: 24; 26 TABLET, FILM COATED ORAL at 09:15

## 2025-02-12 RX ADMIN — SERTRALINE HYDROCHLORIDE 100 MG: 50 TABLET ORAL at 09:15

## 2025-02-12 RX ADMIN — SODIUM CHLORIDE, PRESERVATIVE FREE 10 ML: 5 INJECTION INTRAVENOUS at 09:21

## 2025-02-12 ASSESSMENT — PAIN SCALES - GENERAL
PAINLEVEL_OUTOF10: 0
PAINLEVEL_OUTOF10: 0

## 2025-02-12 NOTE — CARE COORDINATION
Pt is cleared for d/c from a CM standpoint.     Transition of Care Plan:     RUR: 13%  Prior Level of Functioning: independent  Disposition: home with [facility]   WENDY: 2/12/2025  If SNF or IPR: Date FOC offered:   Date FOC received:   Accepting facility:   Date authorization started with reference number:   Date authorization received and expires:   Follow up appointments:   DME needed: NIV via [facility] Transportation at discharge: medicaid   IM/IMM Medicare/ letter given:   Is patient a  and connected with VA?               If yes, was [place] transfer form completed and VA notified?   Caregiver Contact: Dione Diegoyce (Parent)  [phone] (Home Phone)   Discharge Caregiver contacted prior to discharge?   Care Conference needed?   Barriers to discharge:     CM spoke with pt who shared her NIV is supposed to be delivered by 4 p.m.  Pt shared she will need a Medicaid ride home.  CM will arrange through [employer] [phone] for 4:30 p.m.   Pt shared she would like her meds delivered to bed.  CM will message attending.      2:46 p.m. CM called [facility] and spoke will [place] to arrange ride.  They will be here at 4:30 p.m. Trip # [id number].  CM called back and requested ambulance transport as pt does not have her o2 tank for the ride home.       02/12/25 1436   Services At/After Discharge   Transition of Care Consult (CM Consult) Discharge Planning   Services At/After Discharge Home Health  ([facility])   Mode of Transport at Discharge Other (see comment)  (Medicaid transport)   Condition of Participation: Discharge Planning   The Patient and/or Patient Representative was provided with a Choice of Provider? Patient   The Patient and/Or Patient Representative agree with the Discharge Plan? Yes   Freedom of Choice list was provided with basic dialogue that supports the patient's individualized plan of care/goals, treatment preferences, and shares the quality data associated with the providers?  Yes

## 2025-02-12 NOTE — DISCHARGE SUMMARY
Discharge Summary    Name: David Car  [id number]  YOB: 1975 (Age: 49 y.o.)   Date of Admission: 2/3/2025  Date of Discharge: 2/12/25  Attending Physician: No att. providers found    Discharge Diagnosis:   Acute on chronic hypercapnic/hypoxemic respiratory failure POA  COPD with acute exacerbation POA  Pulmonary sarcoidosis POA  Suspect vocal cord dysfunction POA   Probable component of OHS  Chronic hypoxic respiratory failure POA on 6 L nasal cannula oxygen  Right upper extremity swelling POA  DVT of proximal internal jugular vein  Seizure disorder POA  PTSD  Bipolar disorder  Schizophrenia  Anxiety    Consultations:  IP CONSULT TO PSYCHIATRY  IP CONSULT TO CASE MANAGEMENT  IP CONSULT TO PULMONOLOGY      Brief Admission History/Reason for Admission Per Danna Gil MD:   50 yo F with history of HTN, HCV, seizure d/o, polysubstance abuse on methadone, COPD/CHRF on 6L oxygen, Sarcoidosis presents in ER with worsening sob. Pt reports that her symptoms have been going on for 1 week. Reports that she has not been able to do any activity. Denies any fever, chills, chest pain. Reports nausea off and on. Pt also reports rt upper arm swelling for 2 days. Reports that its painful, getting worse. Denies any trauma      In ER   CXR -ve for any acute process   ABG consistent with chronic hypercarbic respiratory failure.   Pt was placed on BIPAP and ICU consulted for further management.        Brief Hospital Course by Main Problems:   Acute on chronic hypercapnic/hypoxemic respiratory failure POA  COPD with acute exacerbation POA  Pulmonary sarcoidosis POA  Suspect vocal cord dysfunction POA   Probable component of OHS  Chronic hypoxic respiratory failure POA on 6 L nasal cannula oxygen  Baseline on 6 L nasal cannula  Admitted to ICU 2/3/2025              Presented with wheezing and shortness of breath on several days  Chest x-ray shows no acute process.    proBNP  fluticasone-umeclidin-vilant  Inhale 1 puff into the lungs daily            CHANGE how you take these medications      cloNIDine 0.2 MG tablet  Commonly known as: CATAPRES  Take 1 tablet by mouth in the morning and at bedtime  What changed: when to take this     QUEtiapine 400 MG tablet  Commonly known as: SEROQUEL  Take 1 tablet by mouth nightly  What changed: how much to take            CONTINUE taking these medications      albuterol sulfate  (90 Base) MCG/ACT inhaler  Commonly known as: PROVENTIL;VENTOLIN;PROAIR     apixaban 5 MG Tabs tablet  Commonly known as: Eliquis  Take 1 tablet by mouth 2 times daily     buPROPion 300 MG extended release tablet  Commonly known as: WELLBUTRIN XL  Take 1 tablet by mouth every morning     gabapentin 600 MG tablet  Commonly known as: NEURONTIN     levETIRAcetam 500 MG tablet  Commonly known as: KEPPRA  Take 1 tablet by mouth 2 times daily     methadone 10 MG/5ML solution     OXcarbazepine 150 MG tablet  Commonly known as: TRILEPTAL  Take 1 tablet by mouth 2 times daily     prazosin 2 MG capsule  Commonly known as: MINIPRESS  Take 2 capsules by mouth nightly     sertraline 100 MG tablet  Commonly known as: ZOLOFT  Take 2 tablets by mouth daily     spironolactone 25 MG tablet  Commonly known as: ALDACTONE  Take 1 tablet by mouth daily            STOP taking these medications      Advair Diskus 250-50 MCG/ACT Aepb diskus inhaler  Generic drug: fluticasone-salmeterol     divalproex 500 MG DR tablet  Commonly known as: DEPAKOTE     sacubitril-valsartan  MG per tablet  Commonly known as: ENTRESTO  Replaced by: sacubitril-valsartan 24-26 MG per tablet     Symbicort 160-4.5 MCG/ACT Aero  Generic drug: budesonide-formoterol     tiotropium 18 MCG inhalation capsule  Commonly known as: SPIRIVA               Where to Get Your Medications        These medications were sent to [facility]/pharmacy #[address] [place] [address] - [address] - P [phone] - F [phone]

## 2025-02-12 NOTE — PROGRESS NOTES
Rt called to room by RN for patient stating she was ready for bipap. Rt asked pt if she was ready and pt stated she \"wanted 20 more minutes and wanted a popsicle to eat\". Pt refusing at this time and wants more time. Rt will try to place bipap later.   0130 - Pt still eating and needs more time  0158 - Pt now ready and agreeable to wearing bipap. Pt placed on bipap, RN aware.

## 2025-02-12 NOTE — PROGRESS NOTES
Comprehensive Nutrition Assessment    Type and Reason for Visit:  Reassess    Nutrition Recommendations/Plan:   Continue current diet and supplements  Please document % meals and supplements consumed in flowsheet I/O's under intake      Malnutrition Assessment:  Malnutrition Status:  At risk for malnutrition (02/04/25 1512)    Context:  Acute Illness     Findings of the 6 clinical characteristics of malnutrition:  Energy Intake:  Mild decrease in energy intake  Weight Loss:  Mild weight loss (10lb over 2-3 weeks, UTD if related to fluid fluctuationns or caloric deficit)     Body Fat Loss:  No body fat loss     Muscle Mass Loss:  No muscle mass loss    Fluid Accumulation:  No fluid accumulation     Strength:  Not Performed    Nutrition Assessment:     Chart reviewed. Pt remains on a regular diet with no added salt. She is receiving Ensure Plus HP TID. No recent PO intake documented for review. RD unable to visit pt at bedside today. Of note, she is nearing discharge pending arrangement of home oxygen equipment. Continue to encourage intake of meals and supplements. Will continue monitoring.     Wt Readings from Last 5 Encounters:   02/03/25 88.3 kg (194 lb 10.7 oz)   02/06/24 85.5 kg (188 lb 7.9 oz)   07/12/23 68.1 kg (150 lb 2.1 oz)   03/29/23 68.5 kg (151 lb)   02/20/23 69.4 kg (153 lb)   ]    Nutrition Related Findings:    Labs: reviewed.   Meds: Prednisone, Dulcolax.   Trace edema BUE.   BM 2/10.   Wound Type: None       Current Nutrition Intake & Therapies:    Average Meal Intake: Unable to assess  Average Supplements Intake: Unable to assess  ADULT ORAL NUTRITION SUPPLEMENT; Breakfast, Lunch, Dinner; Standard High Calorie/High Protein Oral Supplement  ADULT DIET; Regular; No Added Salt (3-4 gm); strawberry ensure please    Anthropometric Measures:  Height: 162.6 cm (5' 4\")  Ideal Body Weight (IBW): 120 lbs (55 kg)       Current Body Weight: 88.3 kg (194 lb 10.7 oz), 162.2 % IBW. Weight Source: Bed  scale  Current BMI (kg/m2): 33.4  Usual Body Weight: 94.3 kg (208 lb) (per pt report 2-3 weeks ago)     % Weight Change (Calculated): -6.4                    BMI Categories: Obese Class 1 (BMI 30.0-34.9)    Estimated Daily Nutrient Needs:  Energy Requirements Based On: Formula     Energy (kcal/day): MSJ 1930 (1484 x 1.3)  Weight Used for Protein Requirements: Current  Protein (g/day): 88-105g (1-1.2gPro/kg)  Method Used for Fluid Requirements: Defer to provider  Fluid (ml/day): per MD    Nutrition Diagnosis:   Predicted inadequate energy intake related to decreased appetite as evidenced by intake 0-25%, intake 26-50%    Nutrition Interventions:   Food and/or Nutrient Delivery: Continue Current Diet, Continue Oral Nutrition Supplement  Nutrition Education/Counseling: No recommendation at this time  Coordination of Nutrition Care: Continue to monitor while inpatient, Interdisciplinary Rounds       Goals:  Goals: PO intake 50% or greater, PO intake 75% or greater, by next RD assessment  Type of Goal: Continue current goal  Previous Goal Met: Progressing toward Goal(s)    Nutrition Monitoring and Evaluation:   Behavioral-Environmental Outcomes: None Identified  Food/Nutrient Intake Outcomes: Food and Nutrient Intake, Supplement Intake  Physical Signs/Symptoms Outcomes: Biochemical Data, Nutrition Focused Physical Findings, Skin, Weight    Discharge Planning:    Continue Oral Nutrition Supplement, Continue current diet     Aubree Moore RD  Contact: [phone]

## 2025-02-12 NOTE — PROGRESS NOTES
Occupational Therapy    Chart reviewed and interventions attempted. Nursing currently passing meds and getting ready to administer suppository. Will defer and continue to follow.

## 2025-02-12 NOTE — DISCHARGE INSTRUCTIONS
HOSPITALIST DISCHARGE INSTRUCTIONS    NAME: David Car   :  1975   MRN:  [id number]     Date/Time:  2025 2:33 PM    ADMIT DATE: 2/3/2025   DISCHARGE DATE: 2025     Acute on chronic hypercapnic/hypoxemic respiratory failure POA  COPD with acute exacerbation POA  Pulmonary sarcoidosis POA  Suspect vocal cord dysfunction POA   Probable component of OHS  Chronic hypoxic respiratory failure POA on 6 L nasal cannula oxygen  Right upper extremity swelling POA  DVT of proximal internal jugular vein  Seizure disorder POA  PTSD  Bipolar disorder  Schizophrenia  Anxiety    It is important that you take the medication exactly as they are prescribed.   Keep your medication in the bottles provided by the pharmacist and keep a list of the medication names, dosages, and times to be taken in your wallet.   Do not take other medications without consulting your doctor.       What to do at Home    Recommended diet:  regular diet    Recommended activity: activity as tolerated      If you have questions regarding the hospital related prescriptions or hospital related issues please call [facility]' office at . You can always direct your questions to your primary care doctor if you are unable to reach your hospital physician; your PCP works as an extension of your hospital doctor just like your hospital doctor is an extension of your PCP for your time at the hospital ([facility])    If you experience any of the following symptoms then please call your primary care physician or return to the emergency room if you cannot get hold of your doctor:    Fever, chills, nausea, vomiting, or persistent diarrhea  Worsening weakness or new problems with your speech or balance  Dark stools or visible blood in your stools  New Leg swelling or shortness of breath as these could be signs of a clot    Additional Instructions:      Bring these papers with you to your follow up appointments. The papers will help

## 2025-02-12 NOTE — PROGRESS NOTES
End of Shift Note    Bedside shift change report given to  (oncoming nurse) by Eb Kuhn RN (offgoing nurse).  Report included the following information SBAR    Shift worked: 7AM TO 7PM   Shift summary and any significant changes:    No new concern , patient oxygen  1005 on bipap and 90 to 98 on  oxygen via nasal prongs   Concerns for physician to address:    Zone phone for oncoming shift:       Activity:  Level of Assistance: Moderate assist, patient does 50-74%  Number times ambulated in hallways past shift: 0  Number of times OOB to chair past shift: 1    Cardiac:   Cardiac Monitoring: Yes      Cardiac Rhythm: Sinus rhythm    Access:  Current line(s): PIV     Genitourinary:   Urinary Status: Voiding, Bathroom privileges    Respiratory:   O2 Device: PAP (positive airway pressure)  Chronic home O2 use?: YES  Incentive spirometer at bedside: YES    GI:  Last BM (including prior to admit): 02/10/25  Current diet:  ADULT ORAL NUTRITION SUPPLEMENT; Breakfast, Lunch, Dinner; Standard High Calorie/High Protein Oral Supplement  ADULT DIET; Regular; No Added Salt (3-4 gm); strawberry ensure please  Passing flatus: YES    Pain Management:   Patient states pain is manageable on current regimen: YES    Skin:  Brennen Scale Score: 20  Interventions: Wound Offloading (Prevention Methods): Bed, pressure redistribution/air    Patient Safety:  Fall Risk: Nursing Judgement-Fall Risk High(Add Comments): Yes  Fall Risk Interventions  Nursing Judgement-Fall Risk High(Add Comments): Yes  Toilet Every 2 Hours-In Advance of Need: Yes  Hourly Visual Checks: In bed  Fall Visual Posted: Fall sign posted  Room Door Open: Yes  Alarm On: Bed, Chair  Patient Moved Closer to Nursing Station: No    Active Consults:   IP CONSULT TO PSYCHIATRY  IP CONSULT TO CASE MANAGEMENT  IP CONSULT TO PULMONOLOGY    Length of Stay:  Expected LOS: 9  Actual LOS: 9    Eb Kuhn RN

## 2025-02-12 NOTE — PROGRESS NOTES
Spiritual Care Partner Volunteer visited patient at [facility] in [facility] CARDIOPULMONARY CARE on 2/12/2025   Documented by:    RHODA Momin, [facility]   Paging Service [phone])

## 2025-02-12 NOTE — PROGRESS NOTES
[facility]  Progress Note      Name: David Car MRN: [id number]   : 1975 Hospital: [facility]   Date: 2025 11:04 AM Admission: 2/3/2025     Impression Plan   Acute on chronic hypoxic/hypercapnic respiratory failure-unclear trigger although she does suffer from severe COPD underlying.  On and off BiPAP during admission.  On 4-6 L nasal cannula at home  Severe COPD/asthma-FEV1 0.6 L, 25%.  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  Multiple life stressors have impacted compliance with medications  Vocal cord dysfunction   Pulmonary sarcoidosis-unclear history of diagnosis.  Suspect inactive disease  Prior tobacco abuse  Bipolar disorder/schizophrenia  Prior DVT-on chronic Eliquis Continue supplemental O2 to maintain SpO2 >88%  Continue bipap qHS   NIV arranged for home with [facility]   12 day prednisone at discharge   Arformoterol/budesonide nebs-resume home Trelegy at discharge  DuoNebs as needed  No clear indication for broad-spectrum antibiotics at this time  Home Eliquis  Avoid any potentially sedating agents  Given her disease severity she may be a candidate for biologic therapy which can be discussed in our office at discharge  Was referred to ENT already as an outpatient   Will need to follow up with our NIV clinic and pulmonary office after discharge. I will arrange    Okay for discharge from pulmonary standpoint.      History of Present Illness:     Patient seen this morning sitting in chair. Family at bedside. States she slept with NIV on and did well. On 4L NC.      Patient seen this morning lying in bed. States she feels so-so. Did not sleep well. Has not been ambulating much. On 6L NC which is baseline (unable to get accurate pulse ox due to thick nail polish).     2/10 Patient seen this afternoon sitting in chair. States she is feeling somewhat better than on admission. Has occasional dry cough. Wants  extended release tablet 300 mg  300 mg Oral QAM    apixaban (ELIQUIS) tablet 5 mg  5 mg Oral BID    arformoterol 15 mcg-budesonide 0.25 mg neb solution   Nebulization BID RT    ipratropium 0.5 mg-albuterol 2.5 mg (DUONEB) nebulizer solution 1 Dose  1 Dose Inhalation Q4H PRN    sodium chloride flush 0.9 % injection 5-40 mL  5-40 mL IntraVENous 2 times per day    sodium chloride flush 0.9 % injection 5-40 mL  5-40 mL IntraVENous PRN    0.9 % sodium chloride infusion   IntraVENous PRN    ondansetron (ZOFRAN-ODT) disintegrating tablet 4 mg  4 mg Oral Q8H PRN    Or    ondansetron (ZOFRAN) injection 4 mg  4 mg IntraVENous Q6H PRN    albuterol (PROVENTIL) (2.5 MG/3ML) 0.083% nebulizer solution 2.5 mg  2.5 mg Nebulization Q2H PRN    QUEtiapine (SEROQUEL) tablet 400 mg  400 mg Oral Nightly    gabapentin (NEURONTIN) capsule 600 mg  600 mg Oral TID    alcohol 62% (NOZIN) nasal  1 ampule  1 ampule Nasal Q12H    cyclobenzaprine (FLEXERIL) tablet 10 mg  10 mg Oral TID PRN    methadone (DOLOPHINE) 10 MG/ML solution 120 mg  120 mg Oral Daily    sacubitril-valsartan (ENTRESTO) 24-26 MG per tablet 1 tablet  1 tablet Oral BID    spironolactone (ALDACTONE) tablet 25 mg  25 mg Oral Daily         Labs:  ABG Invalid input(s): \"PHI\", \"PCO2I\", \"PO2I\", \"HCO3I\", \"SO2I\", \"FIO2I\"     CBC Recent Labs     02/12/25 0158   WBC 7.8   HGB 11.1*   HCT 34.9*      MCV 97.5   MCH 31.0          Metabolic  Panel Recent Labs     02/12/25 0158   *   K 3.9   CL 96*   CO2 32   BUN 24*   ALT 46           US EXTREMITY RIGHT NON VASC LIMITED  Narrative: Soft tissue ultrasound    HISTORY: Painful soft tissue right shoulder mass.    Targeted ultrasound was performed of the area of interest.    Corresponding to the area of patient directed concern, in the subcutaneous  tissues, superficial to the deltoid, there is an 11.1 x 6.0 x 2.1 cm homogeneous  avascular slightly pliable mass that is isoechoic to fat. This respects

## 2025-02-12 NOTE — PLAN OF CARE
Problem: Skin/Tissue Integrity  Goal: Skin integrity remains intact  Description: 1.  Monitor for areas of redness and/or skin breakdown  2.  Assess vascular access sites hourly  3.  Every 4-6 hours minimum:  Change oxygen saturation probe site  4.  Every 4-6 hours:  If on nasal continuous positive airway pressure, respiratory therapy assess nares and determine need for appliance change or resting period  Outcome: Not Progressing     Problem: Skin/Tissue Integrity  Goal: Skin integrity remains intact  Description: 1.  Monitor for areas of redness and/or skin breakdown  2.  Assess vascular access sites hourly  3.  Every 4-6 hours minimum:  Change oxygen saturation probe site  4.  Every 4-6 hours:  If on nasal continuous positive airway pressure, respiratory therapy assess nares and determine need for appliance change or resting period  Outcome: Not Progressing

## 2025-02-12 NOTE — PROGRESS NOTES
Hospitalist Progress Note    NAME:   David Car   : 1975   MRN: [id number]     Date/Time: 2025 11:41 PM    Patient PCP: Ese Epps APRN - NP    ______________________________________________________________________  Given the patient's current clinical presentation, I have a high level of concern for decompensation if discharged from the emergency department.  Complex decision making was performed, which includes reviewing the patient's available past medical records, laboratory results, and x-ray films.       My assessment of this patient's clinical condition and my plan of care is as follows.    Assessment / Plan:    Acute on chronic hypercapnic/hypoxemic respiratory failure POA  COPD with acute exacerbation POA  Pulmonary sarcoidosis POA  Suspect vocal cord dysfunction POA   Probable component of OHS  Chronic hypoxic respiratory failure POA on 6 L nasal cannula oxygen  Baseline on 6 L nasal cannula  Admitted to ICU 2/3/2025   Presented with wheezing and shortness of breath on several days  Chest x-ray shows no acute process.    proBNP 48  Procalcitonin < 0.05  Influenza A/B, COVID-19 rapid PCR negative  Initial VBG pH 7.30, pCO2 84, placed on BiPAP  Eventually required heated high flow at 35 L, FiO2 35%  Clinically improved  Transferred to floor 2025  Weaned to 4 L L nasal cannula oxygen   Continue DuoNebs  5-day course of prednisone  Appreciate pulmonology input  Still insistent walking to the bathroom  nursing staff reports she starts shaking and gets very wheezy  Overall feels better this morning  Pulmonology setting up AVAPS-AE for chronic respiratory failure with hypercapnia due to COPD   Will discharge once set up for home      Right upper extremity swelling POA  DVT of proximal internal jugular vein  Continue PTA Eliquis.    Right upper extremity ultrasound on 2/3 shows evidence of lipoma.     Seizure disorder POA  Continue baseline Keppra 500 mg twice daily    PTSD  Bipolar

## 2025-03-12 ENCOUNTER — APPOINTMENT (OUTPATIENT)
Facility: HOSPITAL | Age: 50
End: 2025-03-12
Payer: MEDICAID

## 2025-03-12 ENCOUNTER — HOSPITAL ENCOUNTER (INPATIENT)
Facility: HOSPITAL | Age: 50
LOS: 10 days | Discharge: NURSING, REHAB OR LONG-TERM CARE | End: 2025-03-22
Admitting: STUDENT IN AN ORGANIZED HEALTH CARE EDUCATION/TRAINING PROGRAM
Payer: MEDICAID

## 2025-03-12 DIAGNOSIS — F41.9 ANXIETY: ICD-10-CM

## 2025-03-12 DIAGNOSIS — J44.1 COPD EXACERBATION (HCC): ICD-10-CM

## 2025-03-12 DIAGNOSIS — J96.01 ACUTE HYPOXIC RESPIRATORY FAILURE: ICD-10-CM

## 2025-03-12 DIAGNOSIS — G89.29 OTHER CHRONIC PAIN: ICD-10-CM

## 2025-03-12 DIAGNOSIS — G93.40 ENCEPHALOPATHY: Primary | ICD-10-CM

## 2025-03-12 LAB
ALBUMIN SERPL-MCNC: 3.6 G/DL (ref 3.5–5)
ALBUMIN/GLOB SERPL: 1 (ref 1.1–2.2)
ALP SERPL-CCNC: 93 U/L (ref 45–117)
ALT SERPL-CCNC: 28 U/L (ref 12–78)
AMMONIA PLAS-SCNC: 19 UMOL/L
AMPHET UR QL SCN: POSITIVE
ANION GAP SERPL CALC-SCNC: 4 MMOL/L (ref 2–12)
AST SERPL-CCNC: 42 U/L (ref 15–37)
BARBITURATES UR QL SCN: NEGATIVE
BASE EXCESS BLDV CALC-SCNC: 9.1 MMOL/L
BASOPHILS # BLD: 0.02 K/UL (ref 0–0.1)
BASOPHILS NFR BLD: 0.6 % (ref 0–1)
BENZODIAZ UR QL: POSITIVE
BILIRUB SERPL-MCNC: 0.3 MG/DL (ref 0.2–1)
BUN SERPL-MCNC: 13 MG/DL (ref 6–20)
BUN/CREAT SERPL: 13 (ref 12–20)
CALCIUM SERPL-MCNC: 8.9 MG/DL (ref 8.5–10.1)
CANNABINOIDS UR QL SCN: POSITIVE
CHLORIDE SERPL-SCNC: 102 MMOL/L (ref 97–108)
CO2 SERPL-SCNC: 33 MMOL/L (ref 21–32)
COCAINE UR QL SCN: NEGATIVE
COMMENT:: NORMAL
CREAT SERPL-MCNC: 0.99 MG/DL (ref 0.55–1.02)
DIFFERENTIAL METHOD BLD: ABNORMAL
EOSINOPHIL # BLD: 0.09 K/UL (ref 0–0.4)
EOSINOPHIL NFR BLD: 2.8 % (ref 0–7)
ERYTHROCYTE [DISTWIDTH] IN BLOOD BY AUTOMATED COUNT: 11.9 % (ref 11.5–14.5)
FLUAV RNA SPEC QL NAA+PROBE: NOT DETECTED
FLUBV RNA SPEC QL NAA+PROBE: NOT DETECTED
GLOBULIN SER CALC-MCNC: 3.5 G/DL (ref 2–4)
GLUCOSE BLD STRIP.AUTO-MCNC: 179 MG/DL (ref 65–117)
GLUCOSE SERPL-MCNC: 132 MG/DL (ref 65–100)
HCO3 BLDV-SCNC: 37.3 MMOL/L (ref 23–28)
HCT VFR BLD AUTO: 34.2 % (ref 35–47)
HGB BLD-MCNC: 11.6 G/DL (ref 11.5–16)
IMM GRANULOCYTES # BLD AUTO: 0.01 K/UL (ref 0–0.04)
IMM GRANULOCYTES NFR BLD AUTO: 0.3 % (ref 0–0.5)
LYMPHOCYTES # BLD: 1.13 K/UL (ref 0.8–3.5)
LYMPHOCYTES NFR BLD: 35 % (ref 12–49)
Lab: ABNORMAL
MCH RBC QN AUTO: 31.4 PG (ref 26–34)
MCHC RBC AUTO-ENTMCNC: 33.9 G/DL (ref 30–36.5)
MCV RBC AUTO: 92.4 FL (ref 80–99)
METHADONE UR QL: POSITIVE
MONOCYTES # BLD: 0.43 K/UL (ref 0–1)
MONOCYTES NFR BLD: 13.3 % (ref 5–13)
NEUTS SEG # BLD: 1.55 K/UL (ref 1.8–8)
NEUTS SEG NFR BLD: 48 % (ref 32–75)
NRBC # BLD: 0 K/UL (ref 0–0.01)
NRBC BLD-RTO: 0 PER 100 WBC
OPIATES UR QL: NEGATIVE
PCO2 BLDV: 70 MMHG (ref 41–51)
PCP UR QL: NEGATIVE
PH BLDV: 7.34 (ref 7.32–7.42)
PLATELET # BLD AUTO: 182 K/UL (ref 150–400)
PMV BLD AUTO: 9.7 FL (ref 8.9–12.9)
PO2 BLDV: 56 MMHG (ref 25–40)
POTASSIUM SERPL-SCNC: 3.1 MMOL/L (ref 3.5–5.1)
PROT SERPL-MCNC: 7.1 G/DL (ref 6.4–8.2)
RBC # BLD AUTO: 3.7 M/UL (ref 3.8–5.2)
SAO2 % BLDV: 85.1 % (ref 65–88)
SARS-COV-2 RNA RESP QL NAA+PROBE: NOT DETECTED
SERVICE CMNT-IMP: ABNORMAL
SERVICE CMNT-IMP: ABNORMAL
SODIUM SERPL-SCNC: 139 MMOL/L (ref 136–145)
SOURCE: NORMAL
SPECIMEN HOLD: NORMAL
SPECIMEN TYPE: ABNORMAL
TROPONIN I SERPL HS-MCNC: 7 NG/L (ref 0–51)
WBC # BLD AUTO: 3.2 K/UL (ref 3.6–11)

## 2025-03-12 PROCEDURE — 80307 DRUG TEST PRSMV CHEM ANLYZR: CPT

## 2025-03-12 PROCEDURE — 6360000002 HC RX W HCPCS: Performed by: STUDENT IN AN ORGANIZED HEALTH CARE EDUCATION/TRAINING PROGRAM

## 2025-03-12 PROCEDURE — 82803 BLOOD GASES ANY COMBINATION: CPT

## 2025-03-12 PROCEDURE — 2700000000 HC OXYGEN THERAPY PER DAY

## 2025-03-12 PROCEDURE — 80053 COMPREHEN METABOLIC PANEL: CPT

## 2025-03-12 PROCEDURE — 85025 COMPLETE CBC W/AUTO DIFF WBC: CPT

## 2025-03-12 PROCEDURE — 6370000000 HC RX 637 (ALT 250 FOR IP): Performed by: STUDENT IN AN ORGANIZED HEALTH CARE EDUCATION/TRAINING PROGRAM

## 2025-03-12 PROCEDURE — 2580000003 HC RX 258: Performed by: STUDENT IN AN ORGANIZED HEALTH CARE EDUCATION/TRAINING PROGRAM

## 2025-03-12 PROCEDURE — 6360000002 HC RX W HCPCS

## 2025-03-12 PROCEDURE — 82962 GLUCOSE BLOOD TEST: CPT

## 2025-03-12 PROCEDURE — 80177 DRUG SCRN QUAN LEVETIRACETAM: CPT

## 2025-03-12 PROCEDURE — 94640 AIRWAY INHALATION TREATMENT: CPT

## 2025-03-12 PROCEDURE — 6370000000 HC RX 637 (ALT 250 FOR IP)

## 2025-03-12 PROCEDURE — 70450 CT HEAD/BRAIN W/O DYE: CPT

## 2025-03-12 PROCEDURE — 2500000003 HC RX 250 WO HCPCS: Performed by: STUDENT IN AN ORGANIZED HEALTH CARE EDUCATION/TRAINING PROGRAM

## 2025-03-12 PROCEDURE — 71045 X-RAY EXAM CHEST 1 VIEW: CPT

## 2025-03-12 PROCEDURE — 96374 THER/PROPH/DIAG INJ IV PUSH: CPT

## 2025-03-12 PROCEDURE — 82140 ASSAY OF AMMONIA: CPT

## 2025-03-12 PROCEDURE — 84484 ASSAY OF TROPONIN QUANT: CPT

## 2025-03-12 PROCEDURE — 99285 EMERGENCY DEPT VISIT HI MDM: CPT

## 2025-03-12 PROCEDURE — 87636 SARSCOV2 & INF A&B AMP PRB: CPT

## 2025-03-12 PROCEDURE — 2060000000 HC ICU INTERMEDIATE R&B

## 2025-03-12 PROCEDURE — 93005 ELECTROCARDIOGRAM TRACING: CPT

## 2025-03-12 PROCEDURE — 36415 COLL VENOUS BLD VENIPUNCTURE: CPT

## 2025-03-12 RX ORDER — SODIUM CHLORIDE 9 MG/ML
INJECTION, SOLUTION INTRAVENOUS PRN
Status: DISCONTINUED | OUTPATIENT
Start: 2025-03-12 | End: 2025-03-22 | Stop reason: HOSPADM

## 2025-03-12 RX ORDER — LEVETIRACETAM 500 MG/1
500 TABLET ORAL 2 TIMES DAILY
Status: DISCONTINUED | OUTPATIENT
Start: 2025-03-12 | End: 2025-03-22 | Stop reason: HOSPADM

## 2025-03-12 RX ORDER — CLONIDINE HYDROCHLORIDE 0.1 MG/1
0.2 TABLET ORAL 2 TIMES DAILY
Status: DISCONTINUED | OUTPATIENT
Start: 2025-03-12 | End: 2025-03-13

## 2025-03-12 RX ORDER — POTASSIUM CHLORIDE 7.45 MG/ML
10 INJECTION INTRAVENOUS
Status: DISCONTINUED | OUTPATIENT
Start: 2025-03-12 | End: 2025-03-12

## 2025-03-12 RX ORDER — SODIUM CHLORIDE 0.9 % (FLUSH) 0.9 %
5-40 SYRINGE (ML) INJECTION EVERY 12 HOURS SCHEDULED
Status: DISCONTINUED | OUTPATIENT
Start: 2025-03-12 | End: 2025-03-22 | Stop reason: HOSPADM

## 2025-03-12 RX ORDER — NALOXONE HYDROCHLORIDE 0.4 MG/ML
0.4 INJECTION, SOLUTION INTRAMUSCULAR; INTRAVENOUS; SUBCUTANEOUS ONCE
Status: COMPLETED | OUTPATIENT
Start: 2025-03-12 | End: 2025-03-12

## 2025-03-12 RX ORDER — ARFORMOTEROL TARTRATE 15 UG/2ML
15 SOLUTION RESPIRATORY (INHALATION)
Status: DISCONTINUED | OUTPATIENT
Start: 2025-03-12 | End: 2025-03-22 | Stop reason: HOSPADM

## 2025-03-12 RX ORDER — DEXAMETHASONE SODIUM PHOSPHATE 10 MG/ML
10 INJECTION, SOLUTION INTRAMUSCULAR; INTRAVENOUS ONCE
Status: COMPLETED | OUTPATIENT
Start: 2025-03-12 | End: 2025-03-12

## 2025-03-12 RX ORDER — ONDANSETRON 2 MG/ML
4 INJECTION INTRAMUSCULAR; INTRAVENOUS EVERY 6 HOURS PRN
Status: DISCONTINUED | OUTPATIENT
Start: 2025-03-12 | End: 2025-03-22 | Stop reason: HOSPADM

## 2025-03-12 RX ORDER — ACETAMINOPHEN 650 MG/1
650 SUPPOSITORY RECTAL EVERY 6 HOURS PRN
Status: DISCONTINUED | OUTPATIENT
Start: 2025-03-12 | End: 2025-03-22 | Stop reason: HOSPADM

## 2025-03-12 RX ORDER — SODIUM CHLORIDE 9 MG/ML
INJECTION, SOLUTION INTRAVENOUS CONTINUOUS
Status: ACTIVE | OUTPATIENT
Start: 2025-03-12 | End: 2025-03-13

## 2025-03-12 RX ORDER — IPRATROPIUM BROMIDE AND ALBUTEROL SULFATE 2.5; .5 MG/3ML; MG/3ML
1 SOLUTION RESPIRATORY (INHALATION)
Status: DISCONTINUED | OUTPATIENT
Start: 2025-03-12 | End: 2025-03-13 | Stop reason: SDUPTHER

## 2025-03-12 RX ORDER — POLYETHYLENE GLYCOL 3350 17 G/17G
17 POWDER, FOR SOLUTION ORAL DAILY PRN
Status: DISCONTINUED | OUTPATIENT
Start: 2025-03-12 | End: 2025-03-22 | Stop reason: HOSPADM

## 2025-03-12 RX ORDER — NALOXONE HYDROCHLORIDE 0.4 MG/ML
0.2 INJECTION, SOLUTION INTRAMUSCULAR; INTRAVENOUS; SUBCUTANEOUS EVERY 5 MIN PRN
Status: DISCONTINUED | OUTPATIENT
Start: 2025-03-12 | End: 2025-03-22 | Stop reason: HOSPADM

## 2025-03-12 RX ORDER — SODIUM CHLORIDE 0.9 % (FLUSH) 0.9 %
5-40 SYRINGE (ML) INJECTION PRN
Status: DISCONTINUED | OUTPATIENT
Start: 2025-03-12 | End: 2025-03-22 | Stop reason: HOSPADM

## 2025-03-12 RX ORDER — IPRATROPIUM BROMIDE AND ALBUTEROL SULFATE 2.5; .5 MG/3ML; MG/3ML
3 SOLUTION RESPIRATORY (INHALATION)
Status: COMPLETED | OUTPATIENT
Start: 2025-03-12 | End: 2025-03-12

## 2025-03-12 RX ORDER — POTASSIUM CHLORIDE 1500 MG/1
20 TABLET, EXTENDED RELEASE ORAL ONCE
Status: COMPLETED | OUTPATIENT
Start: 2025-03-12 | End: 2025-03-13

## 2025-03-12 RX ORDER — ACETAMINOPHEN 325 MG/1
650 TABLET ORAL EVERY 6 HOURS PRN
Status: DISCONTINUED | OUTPATIENT
Start: 2025-03-12 | End: 2025-03-22 | Stop reason: HOSPADM

## 2025-03-12 RX ORDER — ONDANSETRON 4 MG/1
4 TABLET, ORALLY DISINTEGRATING ORAL EVERY 8 HOURS PRN
Status: DISCONTINUED | OUTPATIENT
Start: 2025-03-12 | End: 2025-03-22 | Stop reason: HOSPADM

## 2025-03-12 RX ORDER — POTASSIUM CHLORIDE 1500 MG/1
40 TABLET, EXTENDED RELEASE ORAL ONCE
Status: DISCONTINUED | OUTPATIENT
Start: 2025-03-12 | End: 2025-03-12

## 2025-03-12 RX ORDER — NALOXONE HYDROCHLORIDE 0.4 MG/ML
1 INJECTION, SOLUTION INTRAMUSCULAR; INTRAVENOUS; SUBCUTANEOUS ONCE
Status: COMPLETED | OUTPATIENT
Start: 2025-03-12 | End: 2025-03-12

## 2025-03-12 RX ORDER — BUDESONIDE 0.5 MG/2ML
1 INHALANT ORAL
Status: DISCONTINUED | OUTPATIENT
Start: 2025-03-12 | End: 2025-03-22 | Stop reason: HOSPADM

## 2025-03-12 RX ORDER — SPIRONOLACTONE 25 MG/1
25 TABLET ORAL DAILY
Status: DISCONTINUED | OUTPATIENT
Start: 2025-03-12 | End: 2025-03-22 | Stop reason: HOSPADM

## 2025-03-12 RX ADMIN — SODIUM CHLORIDE, PRESERVATIVE FREE 10 ML: 5 INJECTION INTRAVENOUS at 20:59

## 2025-03-12 RX ADMIN — NALXONE HYDROCHLORIDE 0.4 MG: 0.4 INJECTION INTRAMUSCULAR; INTRAVENOUS; SUBCUTANEOUS at 23:05

## 2025-03-12 RX ADMIN — DEXAMETHASONE SODIUM PHOSPHATE 10 MG: 10 INJECTION, SOLUTION INTRAMUSCULAR; INTRAVENOUS at 12:31

## 2025-03-12 RX ADMIN — ARFORMOTEROL TARTRATE 15 MCG: 15 SOLUTION RESPIRATORY (INHALATION) at 19:47

## 2025-03-12 RX ADMIN — NALXONE HYDROCHLORIDE 1 MG: 0.4 INJECTION INTRAMUSCULAR; INTRAVENOUS; SUBCUTANEOUS at 17:51

## 2025-03-12 RX ADMIN — IPRATROPIUM BROMIDE AND ALBUTEROL SULFATE 3 DOSE: .5; 3 SOLUTION RESPIRATORY (INHALATION) at 12:05

## 2025-03-12 RX ADMIN — CLONIDINE HYDROCHLORIDE 0.2 MG: 0.1 TABLET ORAL at 20:59

## 2025-03-12 RX ADMIN — SACUBITRIL AND VALSARTAN 1 TABLET: 24; 26 TABLET, FILM COATED ORAL at 20:59

## 2025-03-12 RX ADMIN — APIXABAN 5 MG: 5 TABLET, FILM COATED ORAL at 20:59

## 2025-03-12 RX ADMIN — SPIRONOLACTONE 25 MG: 25 TABLET ORAL at 21:10

## 2025-03-12 RX ADMIN — LEVETIRACETAM 500 MG: 500 TABLET, FILM COATED ORAL at 20:58

## 2025-03-12 RX ADMIN — POTASSIUM CHLORIDE 10 MEQ: 7.46 INJECTION, SOLUTION INTRAVENOUS at 20:57

## 2025-03-12 RX ADMIN — IPRATROPIUM BROMIDE AND ALBUTEROL SULFATE 1 DOSE: .5; 3 SOLUTION RESPIRATORY (INHALATION) at 19:42

## 2025-03-12 RX ADMIN — POTASSIUM CHLORIDE 10 MEQ: 7.46 INJECTION, SOLUTION INTRAVENOUS at 17:48

## 2025-03-12 RX ADMIN — BUDESONIDE 1000 MCG: 0.5 INHALANT RESPIRATORY (INHALATION) at 19:47

## 2025-03-12 RX ADMIN — SODIUM CHLORIDE: 0.9 INJECTION, SOLUTION INTRAVENOUS at 20:57

## 2025-03-12 ASSESSMENT — PAIN SCALES - GENERAL
PAINLEVEL_OUTOF10: 0
PAINLEVEL_OUTOF10: 3

## 2025-03-12 NOTE — CARE COORDINATION
Care Management Initial Assessment       RUR: 18% moderate risk for readmission  Readmission? Yes - 2/3-2/12/25 at [facility] for Acute Hypoxic Respiratory Failure  1st IM letter given? No  1st  letter given: No    Initial note: Chart review completed prior to assessment. CM completed assessment with pt's mother, Brenda Diego, via phone due to pt being asleep during attempted bedside encounter. CM introduced self, role of CM, verified demographics to ensure accuracy, and discussed transition of care planning. Pt resides in home at [address] [place] [address] with mother, brother, and adult son. Pt ambulates with rollator and wears continuous O2 at 6L via NC per chart review with unknown supplier. Pt ordered and received home NIV at previous admission through [facility]. Pt's mother reports that pt receives assistance from her with bathing, meal preparation, laundry, and household tasks. Pt's mother shares that pt has been very somnolent and sleeping much of the day each day since returning home and questions medication regimen. Pharmacy preference is [facility] on [address] skilled home health was coordinated, which pt's mother reports never started for unknown reasons.    Per chart review, pt's [facility] Care Coordinator's phone number is: [phone] ext [phone].    Care management will continue to be available to assist as transition of care planning needs arise. Full readmission assessment below:       03/12/25 1530   Service Assessment   Patient Orientation Other (see comment)  (Pt asleep during attempted bedside assessment; assessment completed with pt's mother, Brenda Diego, via phone)   Cognition Other (see comment)  (Pt asleep during attempted bedside assessment; assessment completed with pt's mother, Brenda Diego, via phone)   History Provided By Other (see comment)  (Mother, Brenda Diego)   Primary Caregiver Self  (Assist from mother at home)   Accompanied By/Relationship N/A   Support

## 2025-03-12 NOTE — ED NOTES
Patient is now awake and oriented x 4 following narcan. Patient's  is at bedside. Patient states she is in a tremendous amount of pain r/t IV potassium. Would like to \"get up and walk around\" but this RN advises patient to stay in bed att. MD Tenzin messaged via [employer] and made aware.

## 2025-03-12 NOTE — ED TRIAGE NOTES
Pt to er via ems for c/o sob, chest tightness, nausea and vomiting x 3 days. Pt has hx of copd. Received duoneb en route via ems. On 2L. Sleepy during triage but states she took her dose of seroquel this am.

## 2025-03-12 NOTE — ED PROVIDER NOTES
[facility] EMERGENCY DEPARTMENT  EMERGENCY DEPARTMENT ENCOUNTER    Patient Name: David Car  MRN: [id number]  YOB: 1975  Provider: Santiago Gonzalez MD  PCP: Ese Epps APRN - NP  Time/Date of evaluation:  3/12/25    History of Presenting Illness     Chief Complaint   Patient presents with    Shortness of Breath       HISTORY (Narrative):   David Car is a 49 y.o. female presents to the Emergency Department with worsening difficulty breathing and a painful bump on her arm. Patient reports experiencing breathing difficulties for about a week, with significant worsening last night. She received medication (Duoneb only) en route to the hospital, providing only minimal relief. At home, she typically uses oxygen via a concentrator at 5 liters for ambulation and activities, but admits to not using it during sleep as prescribed.     The patient also notes a painful bump on her arm present for about a month, progressively worsening, with pain radiating to her elbow. This was recently identified as a right upper extremity lipoma during a visit to [facility].     The patient had a recent ER evaluation at [facility], Where she was discharged. She uses albuterol for COPD management at home.    Medical History  - Right upper extremity lipoma  - Chronic hypoxic respiratory failure on 6-liters nasal cannula  - COPD  - Pulmonary sarcoidosis  - Vocal cord dysfunction  - Recent ER Visit at [facility]    Current and Past Medications and Supplements  - Albuterol  - Home oxygen therapy (5 liters via concentrator)  - Duoneb    Review of Systems  General: Fatigue  Respiratory: Difficulty breathing  Musculoskeletal: Arm pain      Nursing Notes were all reviewed and agreed with or any disagreements were addressed in the HPI.    Past History     PAST MEDICAL HISTORY:  Past Medical History:   Diagnosis Date    Ascariosis     Asthma     Bipolar 1 disorder (HCC)     Chronic obstructive pulmonary disease (HCC)     Chronic pain     back,

## 2025-03-12 NOTE — H&P
Hospitalist Admission Note    NAME:   David Car   : 1975   MRN: [id number]     Date/Time: 3/12/2025 5:33 PM    Patient PCP: Ese Epps APRN - NP    ______________________________________________________________________  Given the patient's current clinical presentation, I have a high level of concern for decompensation if discharged from the emergency department.  Complex decision making was performed, which includes reviewing the patient's available past medical records, laboratory results, and x-ray films.       My assessment of this patient's clinical condition and my plan of care is as follows.    Assessment / Plan:  Acute on chronic hypercapnic/hypoxemic respiratory failure POA  COPD with acute exacerbation POA  Pulmonary sarcoidosis POA  Suspect vocal cord dysfunction POA   Probable component of OHS  Chronic hypoxic respiratory failure POA on 6 L nasal cannula oxygen  Right upper extremity swelling POA  DVT of proximal internal jugular vein  Seizure disorder POA  PTSD  Bipolar disorder  Schizophrenia  Anxiety      Paln:    Metabolic encephalopathy (for 4 weeks per patient mother's report)  History of bipolar disorder  Schizophrenia  Anxiety  CT head reassuring  Patient on multiple medications  Patient on Seroquel, sertraline, Keppra  Will check Keppra level  Will consult psych  Patient GCS above 8, ABG CO2 70  Repeat ABG now stat  Will try Narcan    Seizure disorder  Patient on Keppra  Also on Seroquel, and gabapentin  Check Keppra level  Will consult neuro    COPD acute on chronic at 6 L baseline   History of pulmonary sarcoidosis POA (from previous chart)  Suspected vocal cord dysfunction ?POA (from previous chart)  No wheezing on auscultation  She already get 1 dose of 10 mg of Decadron  DuoNeb every 6 hours    History of right upper extremity DVT  Continue with Eliquis    From previous chart patient on methadone  Pharmacy med rec    Medical Decision Making:   I personally reviewed labs:

## 2025-03-13 ENCOUNTER — APPOINTMENT (OUTPATIENT)
Facility: HOSPITAL | Age: 50
End: 2025-03-13
Payer: MEDICAID

## 2025-03-13 LAB
ANION GAP SERPL CALC-SCNC: 6 MMOL/L (ref 2–12)
ANION GAP SERPL CALC-SCNC: 9 MMOL/L (ref 2–12)
BASOPHILS # BLD: 0.01 K/UL (ref 0–0.1)
BASOPHILS # BLD: 0.01 K/UL (ref 0–0.1)
BASOPHILS NFR BLD: 0.2 % (ref 0–1)
BASOPHILS NFR BLD: 0.2 % (ref 0–1)
BUN SERPL-MCNC: 8 MG/DL (ref 6–20)
BUN SERPL-MCNC: 9 MG/DL (ref 6–20)
BUN/CREAT SERPL: 10 (ref 12–20)
BUN/CREAT SERPL: 8 (ref 12–20)
CALCIUM SERPL-MCNC: 8.7 MG/DL (ref 8.5–10.1)
CALCIUM SERPL-MCNC: 8.8 MG/DL (ref 8.5–10.1)
CHLORIDE SERPL-SCNC: 104 MMOL/L (ref 97–108)
CHLORIDE SERPL-SCNC: 105 MMOL/L (ref 97–108)
CO2 SERPL-SCNC: 27 MMOL/L (ref 21–32)
CO2 SERPL-SCNC: 30 MMOL/L (ref 21–32)
CREAT SERPL-MCNC: 0.8 MG/DL (ref 0.55–1.02)
CREAT SERPL-MCNC: 1.07 MG/DL (ref 0.55–1.02)
DIFFERENTIAL METHOD BLD: ABNORMAL
DIFFERENTIAL METHOD BLD: ABNORMAL
EKG ATRIAL RATE: 86 BPM
EKG DIAGNOSIS: NORMAL
EKG P AXIS: 67 DEGREES
EKG P-R INTERVAL: 124 MS
EKG Q-T INTERVAL: 414 MS
EKG QRS DURATION: 80 MS
EKG QTC CALCULATION (BAZETT): 495 MS
EKG R AXIS: 72 DEGREES
EKG T AXIS: 62 DEGREES
EKG VENTRICULAR RATE: 86 BPM
EOSINOPHIL # BLD: 0 K/UL (ref 0–0.4)
EOSINOPHIL # BLD: 0.01 K/UL (ref 0–0.4)
EOSINOPHIL NFR BLD: 0 % (ref 0–7)
EOSINOPHIL NFR BLD: 0.2 % (ref 0–7)
ERYTHROCYTE [DISTWIDTH] IN BLOOD BY AUTOMATED COUNT: 11.9 % (ref 11.5–14.5)
ERYTHROCYTE [DISTWIDTH] IN BLOOD BY AUTOMATED COUNT: 11.9 % (ref 11.5–14.5)
GLUCOSE SERPL-MCNC: 143 MG/DL (ref 65–100)
GLUCOSE SERPL-MCNC: 148 MG/DL (ref 65–100)
HCT VFR BLD AUTO: 33.6 % (ref 35–47)
HCT VFR BLD AUTO: 36.1 % (ref 35–47)
HGB BLD-MCNC: 11.2 G/DL (ref 11.5–16)
HGB BLD-MCNC: 11.9 G/DL (ref 11.5–16)
IMM GRANULOCYTES # BLD AUTO: 0.03 K/UL (ref 0–0.04)
IMM GRANULOCYTES # BLD AUTO: 0.04 K/UL (ref 0–0.04)
IMM GRANULOCYTES NFR BLD AUTO: 0.5 % (ref 0–0.5)
IMM GRANULOCYTES NFR BLD AUTO: 1 % (ref 0–0.5)
LYMPHOCYTES # BLD: 1.01 K/UL (ref 0.8–3.5)
LYMPHOCYTES # BLD: 1.17 K/UL (ref 0.8–3.5)
LYMPHOCYTES NFR BLD: 16.9 % (ref 12–49)
LYMPHOCYTES NFR BLD: 28.5 % (ref 12–49)
MAGNESIUM SERPL-MCNC: 1.7 MG/DL (ref 1.6–2.4)
MCH RBC QN AUTO: 31.4 PG (ref 26–34)
MCH RBC QN AUTO: 31.5 PG (ref 26–34)
MCHC RBC AUTO-ENTMCNC: 33 G/DL (ref 30–36.5)
MCHC RBC AUTO-ENTMCNC: 33.3 G/DL (ref 30–36.5)
MCV RBC AUTO: 94.6 FL (ref 80–99)
MCV RBC AUTO: 95.3 FL (ref 80–99)
MONOCYTES # BLD: 0.29 K/UL (ref 0–1)
MONOCYTES # BLD: 0.6 K/UL (ref 0–1)
MONOCYTES NFR BLD: 10.1 % (ref 5–13)
MONOCYTES NFR BLD: 7.1 % (ref 5–13)
NEUTS SEG # BLD: 2.59 K/UL (ref 1.8–8)
NEUTS SEG # BLD: 4.3 K/UL (ref 1.8–8)
NEUTS SEG NFR BLD: 63.2 % (ref 32–75)
NEUTS SEG NFR BLD: 72.1 % (ref 32–75)
NRBC # BLD: 0 K/UL (ref 0–0.01)
NRBC # BLD: 0 K/UL (ref 0–0.01)
NRBC BLD-RTO: 0 PER 100 WBC
NRBC BLD-RTO: 0 PER 100 WBC
PLATELET # BLD AUTO: 188 K/UL (ref 150–400)
PLATELET # BLD AUTO: 212 K/UL (ref 150–400)
PMV BLD AUTO: 10 FL (ref 8.9–12.9)
PMV BLD AUTO: 10 FL (ref 8.9–12.9)
POTASSIUM SERPL-SCNC: 3.3 MMOL/L (ref 3.5–5.1)
POTASSIUM SERPL-SCNC: 3.3 MMOL/L (ref 3.5–5.1)
RBC # BLD AUTO: 3.55 M/UL (ref 3.8–5.2)
RBC # BLD AUTO: 3.79 M/UL (ref 3.8–5.2)
SODIUM SERPL-SCNC: 140 MMOL/L (ref 136–145)
SODIUM SERPL-SCNC: 141 MMOL/L (ref 136–145)
WBC # BLD AUTO: 4.1 K/UL (ref 3.6–11)
WBC # BLD AUTO: 6 K/UL (ref 3.6–11)

## 2025-03-13 PROCEDURE — 6360000002 HC RX W HCPCS: Performed by: STUDENT IN AN ORGANIZED HEALTH CARE EDUCATION/TRAINING PROGRAM

## 2025-03-13 PROCEDURE — 2500000003 HC RX 250 WO HCPCS: Performed by: STUDENT IN AN ORGANIZED HEALTH CARE EDUCATION/TRAINING PROGRAM

## 2025-03-13 PROCEDURE — 6360000004 HC RX CONTRAST MEDICATION: Performed by: PSYCHIATRY & NEUROLOGY

## 2025-03-13 PROCEDURE — A9579 GAD-BASE MR CONTRAST NOS,1ML: HCPCS | Performed by: PSYCHIATRY & NEUROLOGY

## 2025-03-13 PROCEDURE — 99222 1ST HOSP IP/OBS MODERATE 55: CPT | Performed by: PSYCHIATRY & NEUROLOGY

## 2025-03-13 PROCEDURE — 6370000000 HC RX 637 (ALT 250 FOR IP): Performed by: PHYSICIAN ASSISTANT

## 2025-03-13 PROCEDURE — 6370000000 HC RX 637 (ALT 250 FOR IP): Performed by: STUDENT IN AN ORGANIZED HEALTH CARE EDUCATION/TRAINING PROGRAM

## 2025-03-13 PROCEDURE — 36415 COLL VENOUS BLD VENIPUNCTURE: CPT

## 2025-03-13 PROCEDURE — 80048 BASIC METABOLIC PNL TOTAL CA: CPT

## 2025-03-13 PROCEDURE — 97116 GAIT TRAINING THERAPY: CPT

## 2025-03-13 PROCEDURE — 70553 MRI BRAIN STEM W/O & W/DYE: CPT

## 2025-03-13 PROCEDURE — 2060000000 HC ICU INTERMEDIATE R&B

## 2025-03-13 PROCEDURE — 94640 AIRWAY INHALATION TREATMENT: CPT

## 2025-03-13 PROCEDURE — 6360000002 HC RX W HCPCS: Performed by: INTERNAL MEDICINE

## 2025-03-13 PROCEDURE — 97162 PT EVAL MOD COMPLEX 30 MIN: CPT

## 2025-03-13 PROCEDURE — 97535 SELF CARE MNGMENT TRAINING: CPT | Performed by: OCCUPATIONAL THERAPIST

## 2025-03-13 PROCEDURE — 97530 THERAPEUTIC ACTIVITIES: CPT

## 2025-03-13 PROCEDURE — 85025 COMPLETE CBC W/AUTO DIFF WBC: CPT

## 2025-03-13 PROCEDURE — 97530 THERAPEUTIC ACTIVITIES: CPT | Performed by: OCCUPATIONAL THERAPIST

## 2025-03-13 PROCEDURE — 97165 OT EVAL LOW COMPLEX 30 MIN: CPT | Performed by: OCCUPATIONAL THERAPIST

## 2025-03-13 PROCEDURE — 83735 ASSAY OF MAGNESIUM: CPT

## 2025-03-13 PROCEDURE — 6370000000 HC RX 637 (ALT 250 FOR IP): Performed by: INTERNAL MEDICINE

## 2025-03-13 PROCEDURE — 2700000000 HC OXYGEN THERAPY PER DAY

## 2025-03-13 RX ORDER — METHADONE HYDROCHLORIDE 10 MG/ML
120 CONCENTRATE ORAL DAILY
Refills: 0 | Status: DISCONTINUED | OUTPATIENT
Start: 2025-03-14 | End: 2025-03-22 | Stop reason: HOSPADM

## 2025-03-13 RX ORDER — SODIUM CHLORIDE 9 MG/ML
INJECTION, SOLUTION INTRAVENOUS CONTINUOUS
Status: DISCONTINUED | OUTPATIENT
Start: 2025-03-13 | End: 2025-03-13

## 2025-03-13 RX ORDER — SENNOSIDES A AND B 8.6 MG/1
1 TABLET, FILM COATED ORAL NIGHTLY
Status: DISCONTINUED | OUTPATIENT
Start: 2025-03-13 | End: 2025-03-20

## 2025-03-13 RX ORDER — BUPROPION HYDROCHLORIDE 150 MG/1
300 TABLET ORAL EVERY MORNING
Status: DISCONTINUED | OUTPATIENT
Start: 2025-03-14 | End: 2025-03-22 | Stop reason: HOSPADM

## 2025-03-13 RX ORDER — GABAPENTIN 300 MG/1
300 CAPSULE ORAL 3 TIMES DAILY
Status: DISCONTINUED | OUTPATIENT
Start: 2025-03-13 | End: 2025-03-14

## 2025-03-13 RX ORDER — IPRATROPIUM BROMIDE AND ALBUTEROL SULFATE 2.5; .5 MG/3ML; MG/3ML
1 SOLUTION RESPIRATORY (INHALATION)
Status: DISCONTINUED | OUTPATIENT
Start: 2025-03-13 | End: 2025-03-22 | Stop reason: HOSPADM

## 2025-03-13 RX ORDER — METHADONE HYDROCHLORIDE 10 MG/ML
120 CONCENTRATE ORAL DAILY
Refills: 0 | Status: DISCONTINUED | OUTPATIENT
Start: 2025-03-13 | End: 2025-03-13 | Stop reason: SDUPTHER

## 2025-03-13 RX ORDER — CLONIDINE HYDROCHLORIDE 0.1 MG/1
0.1 TABLET ORAL 2 TIMES DAILY
Status: DISCONTINUED | OUTPATIENT
Start: 2025-03-13 | End: 2025-03-22 | Stop reason: HOSPADM

## 2025-03-13 RX ORDER — POLYETHYLENE GLYCOL 3350 17 G/17G
17 POWDER, FOR SOLUTION ORAL DAILY
Status: DISCONTINUED | OUTPATIENT
Start: 2025-03-14 | End: 2025-03-20

## 2025-03-13 RX ORDER — CLONIDINE HYDROCHLORIDE 0.3 MG/1
0.3 TABLET ORAL 2 TIMES DAILY PRN
Status: ON HOLD | COMMUNITY
End: 2025-03-19 | Stop reason: HOSPADM

## 2025-03-13 RX ORDER — QUETIAPINE FUMARATE 100 MG/1
400 TABLET, FILM COATED ORAL
Status: DISCONTINUED | OUTPATIENT
Start: 2025-03-13 | End: 2025-03-22 | Stop reason: HOSPADM

## 2025-03-13 RX ORDER — 0.9 % SODIUM CHLORIDE 0.9 %
500 INTRAVENOUS SOLUTION INTRAVENOUS ONCE
Status: DISCONTINUED | OUTPATIENT
Start: 2025-03-13 | End: 2025-03-22 | Stop reason: HOSPADM

## 2025-03-13 RX ORDER — LORAZEPAM 2 MG/ML
1 INJECTION INTRAMUSCULAR
Status: COMPLETED | OUTPATIENT
Start: 2025-03-13 | End: 2025-03-13

## 2025-03-13 RX ORDER — NAPROXEN SODIUM 220 MG/1
220 TABLET, FILM COATED ORAL 2 TIMES DAILY PRN
COMMUNITY

## 2025-03-13 RX ORDER — SERTRALINE HYDROCHLORIDE 100 MG/1
150 TABLET, FILM COATED ORAL DAILY
COMMUNITY

## 2025-03-13 RX ORDER — QUETIAPINE FUMARATE 400 MG/1
800 TABLET, FILM COATED ORAL
Status: ON HOLD | COMMUNITY
End: 2025-03-22

## 2025-03-13 RX ORDER — OXCARBAZEPINE 300 MG/1
150 TABLET, FILM COATED ORAL 2 TIMES DAILY
Status: DISCONTINUED | OUTPATIENT
Start: 2025-03-13 | End: 2025-03-22 | Stop reason: HOSPADM

## 2025-03-13 RX ORDER — GABAPENTIN 300 MG/1
300 CAPSULE ORAL 3 TIMES DAILY
Status: DISCONTINUED | OUTPATIENT
Start: 2025-03-13 | End: 2025-03-13

## 2025-03-13 RX ADMIN — IPRATROPIUM BROMIDE AND ALBUTEROL SULFATE 1 DOSE: .5; 3 SOLUTION RESPIRATORY (INHALATION) at 09:14

## 2025-03-13 RX ADMIN — LEVETIRACETAM 500 MG: 500 TABLET, FILM COATED ORAL at 09:24

## 2025-03-13 RX ADMIN — GABAPENTIN 300 MG: 300 CAPSULE ORAL at 20:47

## 2025-03-13 RX ADMIN — ARFORMOTEROL TARTRATE 15 MCG: 15 SOLUTION RESPIRATORY (INHALATION) at 09:19

## 2025-03-13 RX ADMIN — APIXABAN 5 MG: 5 TABLET, FILM COATED ORAL at 09:25

## 2025-03-13 RX ADMIN — BUDESONIDE 1000 MCG: 0.5 INHALANT RESPIRATORY (INHALATION) at 09:19

## 2025-03-13 RX ADMIN — GABAPENTIN 300 MG: 300 CAPSULE ORAL at 14:35

## 2025-03-13 RX ADMIN — IPRATROPIUM BROMIDE AND ALBUTEROL SULFATE 1 DOSE: 2.5; .5 SOLUTION RESPIRATORY (INHALATION) at 20:16

## 2025-03-13 RX ADMIN — POTASSIUM BICARBONATE 40 MEQ: 782 TABLET, EFFERVESCENT ORAL at 14:34

## 2025-03-13 RX ADMIN — LEVETIRACETAM 500 MG: 500 TABLET, FILM COATED ORAL at 20:47

## 2025-03-13 RX ADMIN — SERTRALINE HYDROCHLORIDE 150 MG: 50 TABLET ORAL at 17:51

## 2025-03-13 RX ADMIN — LORAZEPAM 1 MG: 2 INJECTION INTRAMUSCULAR; INTRAVENOUS at 19:05

## 2025-03-13 RX ADMIN — APIXABAN 5 MG: 5 TABLET, FILM COATED ORAL at 20:47

## 2025-03-13 RX ADMIN — QUETIAPINE FUMARATE 400 MG: 100 TABLET ORAL at 20:49

## 2025-03-13 RX ADMIN — SODIUM CHLORIDE, PRESERVATIVE FREE 10 ML: 5 INJECTION INTRAVENOUS at 20:50

## 2025-03-13 RX ADMIN — ARFORMOTEROL TARTRATE 15 MCG: 15 SOLUTION RESPIRATORY (INHALATION) at 20:21

## 2025-03-13 RX ADMIN — GADOTERIDOL 20 ML: 279.3 INJECTION, SOLUTION INTRAVENOUS at 19:37

## 2025-03-13 RX ADMIN — BUDESONIDE 1000 MCG: 0.5 INHALANT RESPIRATORY (INHALATION) at 20:21

## 2025-03-13 RX ADMIN — OXCARBAZEPINE 150 MG: 300 TABLET, FILM COATED ORAL at 20:47

## 2025-03-13 RX ADMIN — POTASSIUM CHLORIDE 20 MEQ: 1500 TABLET, EXTENDED RELEASE ORAL at 06:53

## 2025-03-13 RX ADMIN — IPRATROPIUM BROMIDE 0.5 MG: 0.5 SOLUTION RESPIRATORY (INHALATION) at 02:23

## 2025-03-13 ASSESSMENT — PAIN SCALES - GENERAL
PAINLEVEL_OUTOF10: 5
PAINLEVEL_OUTOF10: 0

## 2025-03-13 NOTE — PROGRESS NOTES
RT responded to rapid response called, pt was unresponsive. Narcan administered by RN, No respiratory interventions were needed after administration of narcan. Pt remains on 4L nasal cannula.

## 2025-03-13 NOTE — CONSULTS
Chief complaint  \"I couldn't breathe\"    History of present illness  David Car is a 49 year old female in the hospital with COPD exacerbation and possible polysubstance abuse. Patient reports for the past two weeks, there have been significant issues with the arm, which started acting up and progressively worsened, leading to severe aching and pain. This was accompanied by breathing difficulties, which the patient attributes to possibly injecting more than usual, although she does not specify what she might have been injecting. The combination of arm pain and breathing issues was severe enough to cause significant distress, with the patient feeling as though she was dying. She describes the experience as enough to make anyone depressed due to the intensity of the pain.    The patient reports taking methadone, 120 mg daily, for 2.5 years and has not used other drugs such as heroin or cocaine during this period. She admits to smoking marijuana but denies using any other substances, including benzodiazepines. She has a history of COPD, which she believes may have contributed to her recent symptoms. The patient mentions that she was unresponsive but was not due to polysubstance abuse, although she insists she has not abused methadone recently.    Regarding her psychiatric history, the patient is currently on medications for schizophrenia and borderline personality disorder, which she has been taking as prescribed. She denies having any thoughts of self-harm or harm to others and reports no hallucinations or hearing things that others do not. She continues to take Seroquel at night. The patient expresses difficulty sleeping and mentions that she is unable to rest properly. She is under the care of a psychiatrist at [facility] and has a follow-up appointment scheduled for next month. Additionally, she has a psychiatric history of bipolar I disorder and substance-induced mood disorder.    Past Psychiatric and Medical History  -  abuse. However, the patient reports only using cannabis and methadone, with no recent abuse of other substances. She has a psychiatric history that includes bipolar I disorder, substance-induced mood disorder, and schizophrenia. The patient is currently under psychiatric care at [facility] with an upcoming appointment next month. She denies any thoughts of self-harm or harm to others and reports no hallucinations. The patient continues to take Seroquel at night but experiences difficulty sleeping. Her history of congestive heart failure and COPD may have contributed to her recent symptoms.    Plan  - Continue and restart outside psychiatric medications as previously prescribed.  - Ensure follow-up with outpatient psychiatrist at [facility], with the next appointment scheduled for next month.

## 2025-03-13 NOTE — PLAN OF CARE
assistance       LE Dressing: Maximum assistance       Toileting: Moderate assistance         Pain Rating:  Chronic back pain and RUE pain    Pain Intervention(s):   rest and repositioning    Activity Tolerance:   Poor, observed shortness of breath on exertion, and desaturates with activity and requires oxygen  VSS on 5 L NC with activity    After treatment:   Patient left in no apparent distress sitting up in chair, Call bell within reach, Bed/ chair alarm activated, and Caregiver / family present    COMMUNICATION/EDUCATION:   The patient's plan of care was discussed with: physical therapist and registered nurse    Patient Education  Education Given To: Patient  Education Provided: Role of Therapy;Plan of Care  Education Method: Verbal  Barriers to Learning: Cognition  Education Outcome: Verbalized understanding    Thank you for this referral.  Marco Antonio Gannon, OTR/L  Minutes: 28

## 2025-03-13 NOTE — PROGRESS NOTES
End of Shift Note    Bedside shift change report given to Jessica (oncoming nurse) by JORGE LUIS MELENDREZ RN (offgoing nurse).  Report included the following information SBAR, Kardex, and MAR    Shift worked:  7a-7p     Shift summary and any significant changes:     Pain unrelieved. Methadone to restart in the morning. Pt off the floor at shift change for MRI.     Concerns for physician to address:       Zone phone for oncoming shift:          Activity:  Level of Assistance: Independent  Number times ambulated in hallways past shift: 0  Number of times OOB to chair past shift: 1    Cardiac:   Cardiac Monitoring: Yes      Cardiac Rhythm: Sinus rhythm    Access:  Current line(s): PIV     Genitourinary:   Urinary Status: Voiding    Respiratory:   O2 Device: Nasal cannula  Chronic home O2 use?: YES  Incentive spirometer at bedside: NO    GI:     Current diet:  ADULT DIET; Regular  ADULT ORAL NUTRITION SUPPLEMENT; Breakfast, Lunch, Dinner; Standard High Calorie/High Protein Oral Supplement  Passing flatus: YES    Pain Management:   Patient states pain is manageable on current regimen: NO    Skin:  Brennen Scale Score: 19  Interventions:      Patient Safety:  Fall Risk: Nursing Judgement-Fall Risk High(Add Comments): Yes  Fall Risk Interventions  Nursing Judgement-Fall Risk High(Add Comments): Yes  Toilet Every 2 Hours-In Advance of Need: Yes  Hourly Visual Checks: Awake, In bed  Fall Visual Posted: Fall sign posted, Socks  Room Door Open: Yes  Alarm On: Bed, Chair, Other (Comment) (avasist)  Patient Moved Closer to Nursing Station: No    Active Consults:   IP CONSULT TO PSYCHIATRY  IP CONSULT TO NEUROLOGY  IP CONSULT TO PHARMACY  IP CONSULT TO ADDICTION MEDICINE  IP CONSULT TO PULMONOLOGY    Length of Stay:  Expected LOS: 5  Actual LOS: 1    JORGE LUIS MELENDREZ, RN

## 2025-03-13 NOTE — CONSULTS
Pulmonary, Critical Care, and Sleep Medicine~Consult Note    Name: David Car MRN: [id number]   : 1975 Hospital: [facility]   Date: 3/13/2025 12:47 PM Admission: 3/12/2025     Impression Plan   Acute on chronic hypoxic/hypercapnic respiratory failure-On 4-6 L nasal cannula at home  Acute metabolic encephalopathy   Severe COPD/asthma  Vocal cord dysfunction   Former smoker   Hx of pulmonary sarcoidosis unknown details suspect no active disease   Polysubstance use- UDS +methadone, amphetamines, benzos, THC  Bipolar disorder/schizophrenia  Prior DVT-on chronic Eliquis Continue supplemental O2 to maintain SpO2 >88%  Continue NIV qHS   Check procal, bnp  Nebulized equivalent of home trelegy   Duonebs   Eliquis   Educated on complete cessation of smoking any substance   Already referred to ENT as an outpatient  Will need to follow up outpatient with NIV clinic and pulmonary. Consider starting ohtuvayre of roflumilast     Thank you for this consult, will follow.     Daily Progression:    Consult Note    49 yof with pmhx significant for chronic hypoxemic/hypercapnic respiratory failure on 4-6L NC, severe COPD/asthma overlap, VCD, DVT on eliquis presented to the ED 3/12 with altered mental statsus. In the ED, satting well on 2L NC. No leukocytosis. COVID and flu negative. VBG 7.34/70. +methadone, amphetamines, benzos, THC. CXR negative. CTH negative.     Patient states she had increasing dyspnea the last 1.5 weeks with exertion and at rest. She denies cough, fever, sick contacts. She has been complaint with trelegy, NIV each night.     Patient of Dr. Devi. Last office visit Dec 2024. PFTs 2023. FEV1 25% predicted. Treated for acute exacerbation with prednisone, doxycycline. Maintained on trelegy, albuterol. Referred to ENT for VCD.     Social hx: former cigarette smoker, + smoking marijuana  Family hx: no inheritable lung disease     I have reviewed the labs and  previous day’s notes.    Pertinent items are noted in HPI.  Past Medical History:   Diagnosis Date    Ascariosis     Asthma     Bipolar 1 disorder (HCC)     Chronic obstructive pulmonary disease (HCC)     Chronic pain     back, leg    Cocaine abuse (HCC)     Depression     Heart failure (HCC)     Hepatitis B     Heroin abuse (HCC)     HTN (hypertension)     Narcotic abuse (HCC)     Polysubstance abuse (HCC)     Required emergency intubation     Sarcoidosis     Seizure (HCC)     Tobacco abuse       Past Surgical History:   Procedure Laterality Date    GYN      WISDOM TOOTH EXTRACTION        Prior to Admission medications    Medication Sig Start Date End Date Taking? Authorizing Provider   predniSONE 10 MG (48) TBPK Take as instructed 2/12/25   Delmer Obrien MD   sacubitril-valsartan (ENTRESTO) 24-26 MG per tablet Take 1 tablet by mouth 2 times daily 2/12/25   Delmer Obrien MD   buPROPion (WELLBUTRIN XL) 300 MG extended release tablet Take 1 tablet by mouth every morning 2/12/25 3/14/25  Delmer Obrien MD   spironolactone (ALDACTONE) 25 MG tablet Take 1 tablet by mouth daily 2/12/25 3/14/25  Delmer Obrien MD   sertraline (ZOLOFT) 100 MG tablet Take 2 tablets by mouth daily 2/12/25 3/14/25  Delmer Obrien MD   QUEtiapine (SEROQUEL) 400 MG tablet Take 1 tablet by mouth nightly 2/12/25 3/14/25  Delmer Obrien MD   OXcarbazepine (TRILEPTAL) 150 MG tablet Take 1 tablet by mouth 2 times daily 2/12/25 3/14/25  Delmer Obrien MD   levETIRAcetam (KEPPRA) 500 MG tablet Take 1 tablet by mouth 2 times daily 2/12/25 3/14/25  Delmer Obrien MD   apixaban (ELIQUIS) 5 MG TABS tablet Take 1 tablet by mouth 2 times daily 2/12/25 3/14/25  Delmer Obrien MD   fluticasone-umeclidin-vilant (TRELEGY ELLIPTA) 200-62.5-25 MCG/ACT AEPB inhaler Inhale 1 puff into the lungs daily 2/12/25   Delmer Obrien MD   cloNIDine (CATAPRES) 0.2 MG tablet Take 1 tablet by mouth in the morning and at bedtime 2/12/25   Delmer Obrien,

## 2025-03-13 NOTE — PROGRESS NOTES
RAPID RESPONSE NOTE:    BACKGROUND/ SITUATION:  49-year-old -American female admitted for acute on chronic hypercapnic hypoxemic respiratory failure in setting of known COPD with history of pulmonary sarcoidosis, suspected vocal cord dysfunction, suspected polysubstance abuse (UDS positive for amphetamines, benzodiazepines, methadone, THC) and encephalopathy.  Patient was reportedly conversant and A&O x 4 noted to be unresponsive on nursing reassessment prompting rapid response.    FINDINGS:  Unresponsive middle-age obese -American female  Bradypnea with respiratory rate around 6 with sonorous respirations  Pinpoint pupils  Mild diaphoresis    Saturating 100% with nasal cannula  Hemodynamically stable    Multiple PTA medication bottles present in the room    ASSESSMENT    Opioid overdose-suspect methadone  Suspected polysubstance abuse  Multifactorial encephalopathy  Acute on chronic hypoxic hypercarbic respiratory failure secondary to COPD and pulmonary sarcoidosis  Intermittent vocal cord dysfunction    INTERVENTION/ RESPONSE  0.4 mg Narcan administered with immediate return to baseline mentation and agitation  Transferred to stepdown unit for closer monitoring  If this represents methadone overdose suspect she will require further doses of Narcan given long half-life of this medication.  May require Narcan infusion which will require transfer to ICU, but will allow up to 3 bolus doses of Narcan prior to initiating this  Virtual sitter given inpatient overdose and agitation  All patient's belongings and medications to be stored outside of room  Will allow visitor after extensive counseling about risk of death and injury from opioid overdose and explicitly stating all activities will be monitored by virtual Sitter      Santos Delacruz DO  [facility] [place]ist/ Nocturnist  3/12/2025 11:18 PM    Please do not hesitate to reach out to myself or assigned provider on-call via  [employer] system with questions or concerns.     Justification for critical care billing:  Opioid overdose with respiratory compromise requiring opioid reversal    I have spent  45 minutes of critical care time involved in lab review, consultations with specialist, family decision- making, bedside attention and documentation. During this entire length of time I was immediately available to the patient .     Critical Care:  The reason for providing this level of medical care for this critically ill patient was due to a critical illness that impaired one or more vital organ systems, such that there was a high probability of imminent or life threatening deterioration in the patient's condition. This care involved high complexity decision making to assess, manipulate, and support vital system functions, to treat this degree of vital organ system failure, and to prevent further life threatening deterioration of the patient’s condition.

## 2025-03-13 NOTE — PROGRESS NOTES
Received Rapid transfer from [facility]. Personal belongings search, meds given to pharmacy. Gabapentin and methadone re-counted and witnessed with Gayatri Darden. All other meds place on green belonging bag and handed to pharmacist Katalina. The cigarettes, lighters, and a \"white bottle\" given to security.

## 2025-03-13 NOTE — CONSULTS
thoughts of self-harm or harm to others and reports no hallucinations. The patient continues to take Seroquel at night but experiences difficulty sleeping. Her history of congestive heart failure and COPD may have contributed to her recent symptoms.    Diagnosis:  Bipolar 1 disorder  Schizophrenia    Plan  - Patient is on a stable psychiatric regimen. Recommend restarting outside psychiatric medications as previously prescribed when medically appropriate.  - Ensure follow-up with outpatient psychiatrist at [facility], with the next appointment scheduled for next month.      Thank you for the consult! Please feel free to reach out to us again as needed.

## 2025-03-13 NOTE — PROGRESS NOTES
Hospitalist Progress Note    NAME:   David Car   : 1975   MRN: [id number]     Date/Time: 3/13/2025 4:10 PM  Patient PCP: Ese Epps APRN - NP    Estimated discharge date:  Barriers:       Hypotension  Systolic blood pressure in the 80s this morning, ordered IV fluid bolus and maintenance fluid  Hold all BP meds  S/p rapid response for unresponsiveness overnight due to possible methadone overdose  Patient was unresponsive yesterday, responded to Narcan  This morning she was awake alert  Complaining of pain on her right upper extremity.  Patient has a chronic right upper extremity pain    Metabolic encephalopathy (for 4 weeks per patient mother's report)  History of bipolar disorder  Schizophrenia  Anxiety  CT head reassuring  Patient on multiple medications  Patient on Seroquel, sertraline, Keppra  Will check Keppra level  Med rec done, resume psych meds per psychiatry  Will reduce gabapentin dose  Patient is on Seroquel 400 mg daily at bedtime    Seizure disorder  Patient on Keppra  Also on Seroquel, and gabapentin  Check Keppra level  With neurology, order MRI of the brain, per neurology unlikely seizure activity     COPD acute on chronic at 6 L baseline   History of pulmonary sarcoidosis POA (from previous chart)  Suspected vocal cord dysfunction ?POA (from previous chart)  No wheezing on auscultation  She already get 1 dose of 10 mg of Decadron  DuoNeb every 6 hours  Patient has a stridor  History of right upper extremity DVT  Continue with Eliquis     From previous chart patient on methadone  Spoke with pharmacy, verified, restart methadone tomorrow  Patient reportedly took methadone 200mg daily instead of 120 mg     Medical Decision Making:   I personally reviewed labs: yes  I personally reviewed imaging:yes  I personally reviewed EKG:  Toxic drug monitoring: yes  Discussed case with: Patient, patient ,  pharmacy regarding med rec    Code Status: full   DVT Prophylaxis:  radiology test results   YES  Review and summation of old records today    NO  Reviewed patient's current orders and MAR    YES  PMH/SH reviewed - no change compared to H&P    Procedures: see electronic medical records for all procedures/Xrays and details which were not copied into this note but were reviewed prior to creation of Plan.      LABS:  I reviewed today's most current labs and imaging studies.  Pertinent labs include:  Recent Labs     03/12/25  1148 03/12/25 2337 03/13/25  0339   WBC 3.2* 4.1 6.0   HGB 11.6 11.9 11.2*   HCT 34.2* 36.1 33.6*    188 212     Recent Labs     03/12/25  1148 03/12/25 2337 03/13/25  0339    140 141   K 3.1* 3.3* 3.3*    104 105   CO2 33* 27 30   GLUCOSE 132* 143* 148*   BUN 13 9 8   CREATININE 0.99 1.07* 0.80   CALCIUM 8.9 8.7 8.8   MG  --   --  1.7   BILITOT 0.3  --   --    AST 42*  --   --    ALT 28  --   --        Signed: Kinjal Charlton MD

## 2025-03-13 NOTE — PROGRESS NOTES
Patient was alert and oriented x 4 during assessment,later noted the patient to be unresponsive . Rapid response activated the night provider and rapid team came to the bed side ,0.4 mg of narcan administered  and patient returned to baseline with agitation behavior. Patient transferred to PCU for further management.

## 2025-03-13 NOTE — CONSULTS
CONSULT - Neurology      Name:  David Car       MRN: [id number]  Location:     Date: 3/13/2025  Time:  7:46 AM        Chief Complaint:   Chief Complaint   Patient presents with    Shortness of Breath       HPI:  It is a great pleasure to see David Car, a 49 y.o. female today in the hospital . Briefly these are the events happened as per the chart H&P and taken from the chart. The patient was doing fine and the symptoms started with increasing sleepiness for the last 4 weeks, and the only thing the patient doing is go to the bathroom and sleep . The symptoms fluctuated in the beginning. The patient was brought to the emergency room for further evaluation.       PAST MEDICAL HISTORY:  Past Medical History:   Diagnosis Date    Ascariosis     Asthma     Bipolar 1 disorder (HCC)     Chronic obstructive pulmonary disease (HCC)     Chronic pain     back, leg    Cocaine abuse (HCC)     Depression     Heart failure (HCC)     Hepatitis B     Heroin abuse (HCC)     HTN (hypertension)     Narcotic abuse (HCC)     Polysubstance abuse (HCC)     Required emergency intubation     Sarcoidosis     Seizure (HCC)     Tobacco abuse      PAST SURGICAL HISTORY:    Past Surgical History:   Procedure Laterality Date    GYN      WISDOM TOOTH EXTRACTION       FAMILY HISTORY:    Family History   Problem Relation Age of Onset    Hypertension Father     Hypertension Mother      SOCIAL HISTORY:   Social History     Tobacco Use    Smoking status: Every Day     Current packs/day: 0.00     Types: Cigarettes     Last attempt to quit: 8/3/2017     Years since quittin.6    Smokeless tobacco: Never   Substance Use Topics    Alcohol use: No      ALLERGIES:   Allergies   Allergen Reactions    Vancomycin Anaphylaxis    Ketorolac      Other reaction(s): Unable to Obtain  From [facility] paperwork 22    Mirtazapine      Other reaction(s): Unable to Obtain  From [facility] paperwork on 22    Tomato Swelling     Tongue    Trazodone Angioedema

## 2025-03-13 NOTE — PLAN OF CARE
Problem: Physical Therapy - Adult  Goal: By Discharge: Performs mobility at highest level of function for planned discharge setting.  See evaluation for individualized goals.  Description: FUNCTIONAL STATUS PRIOR TO ADMISSION: Ambulates household distances with use of rollator walker. Wears 6L O2 at baseline. History of multiple falls.     HOME SUPPORT PRIOR TO ADMISSION: The patient currently lives with 80 yr old mother.  checks on her daily and assists with ADLs.     Physical Therapy Goals  Initiated 3/13/2025  1.  Patient will move from supine to sit and sit to supine, scoot up and down, and roll side to side in bed with supervision/set-up within 7 day(s).    2.  Patient will perform sit to stand with supervision/set-up within 7 day(s).  3.  Patient will transfer from bed to chair and chair to bed with supervision/set-up using the least restrictive device within 7 day(s).  4.  Patient will ambulate with supervision/set-up for 150 feet with the least restrictive device within 7 day(s).   5.  Patient will ascend/descend 4 stairs with 1 handrail(s) with supervision/set-up within 7 day(s).   Outcome: Progressing   PHYSICAL THERAPY EVALUATION    Patient: David Car (49 y.o. female)  Date: 3/13/2025  Primary Diagnosis: COPD exacerbation (HCC) [J44.1]  COPD with acute exacerbation (HCC) [J44.1]  Acute hypoxic respiratory failure (HCC) [J96.01]       Precautions:              ASSESSMENT :   DEFICITS/IMPAIRMENTS:   The patient is limited by decreased level of alertness, impaired safety awareness, decreased A/P ROM of R shoulder due to fall and injury xmonths ago, decreased endurance, impaired activity tolerance, generalized weakness, altered gait pattern, impaired standing balance, and overall impaired functional mobility. Pt received seated in recliner chair on 5L O2, agreeable to participation with therapy. Overall, pt mobilized with CGAx1 and use of rolling walker, ambulating 25ft before fatigue. Pt with  Retrospective, Observational Study. Arch Rehabil Res Clin Transl. 2022 Jul 16;4(3):237356. doi: 10.1016/j.arrct.2022.944454. PMID: 58700597; PMCID: [id number].  4. Laila HERNÁNDEZ, Asya S, Jonna W, Layne GONZALEZ. AM-PAC Short Forms Manual 4.0. Revised 2/2020.                                                                                                                                                                                                                              Pain Rating:  Reported chronic back pain and BLE pain    Activity Tolerance:   Fair     After treatment:   Patient left in no apparent distress sitting up in chair, Call bell within reach, Bed/ chair alarm activated, Caregiver / family present, and Updated patient's board on functional status and mobility recommendations    COMMUNICATION/EDUCATION:   The patient's plan of care was discussed with: occupational therapist and registered nurse    Patient Education  Education Given To: Patient  Education Provided: Role of Therapy  Education Method: Verbal  Barriers to Learning: Readiness to Learn  Education Outcome: Continued education needed;Verbalized understanding    Thank you for this referral.  Loyda Quinteros, PT, DPT  Minutes: 29      Physical Therapy Evaluation Charge Determination   History Examination Presentation Decision-Making   MEDIUM  Complexity : 1-2 comorbidities / personal factors will impact the outcome/ POC  MEDIUM Complexity : 3 Standardized tests and measures addressin body structure, function, activity limitation and / or participation in recreation  MEDIUM Complexity : Evolving with changing characteristics  AM-PAC  MEDIUM   Based on the above components, the patient evaluation is determined to be of the following complexity level: Medium

## 2025-03-13 NOTE — PROGRESS NOTES
Pharmacy Medication History Note    The patient was interviewed regarding current PTA medication list, use and drug allergies; Her  present in room and obtained permission from patient to discuss drug regimen with visitor(s) present. The patient was questioned regarding use of any other inhalers, topical products, over the counter medications, herbal medications, vitamin products or ophthalmic/nasal/otic medication use.     Allergy Update: Vancomycin, Ketorolac, Mirtazapine, Tomato, and Trazodone    Recommendations/Findings:   The following amendments were made to the patient's active medication list on file at [facility]:   1) Additions:  Clonidine 0.3 mg    2) Deletions:   Clonidine 0.2 mg  Prednisone 10 mg pack    3) Changes:  Quetiapine 400 mg nightly => 800 mg nightly  Sertraline 200 mg daily => 150 mg daily    Pertinent Findings: Confirmed patient's methadone dose by reviewing her take home bottles from the clinic (#7 bottles total, 3 are empty; all are written for 120 mg). Patient stated that she was recently started on Klonopin and Lunesta, but I did not see a fill history for this in RxQuery or PMDP. Patient also stated that she takes her clonidine 0.3 mg TID, but her bottle states q12h prn. She stated that she does not take the 0.2 mg dose of clonidine.    Clarified PTA med list with patient and review of medication bottles that were stored in the central pharmacy safe. PTA medication list was corrected to the following:     Prior to Admission Medications   Prescriptions Last Dose Informant   OXcarbazepine (TRILEPTAL) 150 MG tablet 3/12/2025 Morning Self, Other   Sig: Take 1 tablet by mouth 2 times daily   QUEtiapine (SEROQUEL) 400 MG tablet 3/11/2025 Bedtime Self, Other   Sig: Take 2 tablets by mouth nightly   albuterol sulfate HFA (PROVENTIL;VENTOLIN;PROAIR) 108 (90 Base) MCG/ACT inhaler 3/12/2025 Morning Self   Sig: Inhale 2 puffs into the lungs every 4 hours as needed   apixaban (ELIQUIS) 5 MG TABS  tablet 3/12/2025 Morning Self   Sig: Take 1 tablet by mouth 2 times daily   buPROPion (WELLBUTRIN XL) 300 MG extended release tablet 3/12/2025 Morning Self   Sig: Take 1 tablet by mouth every morning   cloNIDine (CATAPRES) 0.3 MG tablet 3/12/2025 Morning Self, Other   Sig: Take 1 tablet by mouth 2 times daily as needed (aniexty and panic)   fluticasone-umeclidin-vilant (TRELEGY ELLIPTA) 200-62.5-25 MCG/ACT AEPB inhaler 3/12/2025 Morning Self   Sig: Inhale 1 puff into the lungs daily   gabapentin (NEURONTIN) 600 MG tablet 3/12/2025 Morning Self   Sig: Take 1 tablet by mouth 3 times daily.   levETIRAcetam (KEPPRA) 500 MG tablet 3/12/2025 Morning Self, Other   Sig: Take 1 tablet by mouth 2 times daily   methadone (DOLOPHINE) 10 MG/ML solution 3/12/2025 Morning Self, Other   Sig: Take 12 mLs by mouth daily. [facility] [place]  [address], [place] [address] [phone]   naproxen sodium (ALEVE) 220 MG tablet  Other   Sig: Take 1 tablet by mouth 2 times daily as needed for Pain   prazosin (MINIPRESS) 2 MG capsule 3/11/2025 Bedtime Self, Other   Sig: Take 2 capsules by mouth nightly   sacubitril-valsartan (ENTRESTO) 24-26 MG per tablet 3/12/2025 Morning Self, Other   Sig: Take 1 tablet by mouth 2 times daily   sertraline (ZOLOFT) 100 MG tablet 3/12/2025 Morning Self   Sig: Take 1.5 tablets by mouth daily   spironolactone (ALDACTONE) 25 MG tablet 3/12/2025 Morning Self, Other   Sig: Take 1 tablet by mouth daily      Facility-Administered Medications: None      Thank you,  DEVIN CABALLERO [facility]

## 2025-03-13 NOTE — SIGNIFICANT EVENT
RAPID RESPONSE TEAM    Overhead rapid response paged to room 135.    Reason for rapid response: unresponsive due to polysubstance abuse    Initial assessment: upon arrival to bedside patient responds to pain briefly then became unresponsive. Vital signs as follows: /76, HR 80s, RR 6-10/min, sats 100% on nasal cannula 6LPM.    DO Jayme at the bedside, orders received for the following interventions:     EKG-pending as the patient became agitated  Narcan 0.4mg IV once  Labs: CBC and BMP  Transfer to PCU  Virtual sitter    Outcome:  Patient is awake but agitated. Transferred to PCU 2311 together with the supervisor, and EBEN [place] with [place] attached.    Please call with any questions or concerns    Zehra Cordero  RRT [phone]

## 2025-03-13 NOTE — BSMART NOTE
Initial BSMART Liaison Assessment Form     Section I - Integrated Summary    Reason for consult is: hx schizophrenia .    LOS:  1     Presenting problem/Summary:    Pt was admitted to the hospital due to shortness of breath. Pt was seen face to face on the medical floor at [facility]. Liaison introduced self and role. Pt's  was present in the room. Pt was sitting up in the chair with a nasal canula.  Pt was emotional when liaison entered the room. Pt reported she felt like she was going to die last night when she couldn't breath due to complications with COPD. Per pt's  pt couldn't breathe and he called for help but the response time was slow.   Pt has a history of schizophrenia that she is being treated for , she is unable to recall name of her medications. Pt lives with her mother and she requires assistance with most ADL's. Pt reported feeling depressed by denies SI/HI/AH/VH. Per  they are trying to find a place together.  Pt remained tearful throughout the assessment.   Pt reported that she uses methadone and THC. Pt reported physical pain in her arm due to fall a few weeks ago.    PT came in and conducted a session.     Precipitant Factors are schizophrenia ..    The information is given by the patient.  Current Psychiatrist and/or  is Dr. Beatriz Frank .  Previous Hospitalizations/Treatment: several years ago     Plan: Liaison will monitor and support     Lethality Assessment:  The potential for suicide is not noted.    The potential for homicide is not noted.  The patient has not been a perpetrator of sexual or physical abuse.    There are not pending charges.  The patient is not felt to be at risk for self-harm or harm to others.      Section II - Psychosocial  The patient's overall mood and attitude is depressed .  Feelings of helplessness and hopelessness are observed by self report .  Generalized anxiety is not observed.  Panic is not observed. Phobias are not observed.

## 2025-03-13 NOTE — PROGRESS NOTES
End of Shift Note    Bedside shift change report given to CAL (oncoming nurse) by Arsalan Grullon RN (offgoing nurse).  Report included the following information SBAR, Kardex, ED Summary, OR Summary, MAR, Accordion, Cardiac Rhythm S steve, Alarm Parameters , Procedure Verification, and Quality Measures    Shift worked:  1900-0730     Shift summary and any significant changes:    Patient was transfrred from neuro as aresult of polysubstsnce abuse,she is more alert now vitally stable , had a lot of medications inside the room inclusive of opiod which had to give to pharmacy for storage       Concerns for physician to address:         Zone phone for oncoming shift:            Activity:  Level of Assistance: Independent  Number times ambulated in hallways past shift: 1  Number of times OOB to chair past shift: 1    Cardiac:   Cardiac Monitoring: Yes      Cardiac Rhythm: Sinus steve    Access:  Current line(s): PIV     Genitourinary:   Urinary Status: Voiding, Bathroom privileges    Respiratory:   O2 Device: Nasal cannula  Chronic home O2 use?: NO  Incentive spirometer at bedside: YES    GI:     Current diet:  ADULT DIET; Regular; Low Fat/Low Chol/High Fiber/SHABBIR  Passing flatus: YES    Pain Management:   Patient states pain is manageable on current regimen: YES    Skin:  Brennen Scale Score: 19  Interventions:      Patient Safety:  Fall Risk: Nursing Judgement-Fall Risk High(Add Comments): Yes  Fall Risk Interventions  Nursing Judgement-Fall Risk High(Add Comments): Yes  Toilet Every 2 Hours-In Advance of Need: Yes  Hourly Visual Checks: Eyes closed, Quiet, In bed  Fall Visual Posted: Fall sign posted  Room Door Open: Yes  Alarm On: Bed, Chair  Patient Moved Closer to Nursing Station: No    Active Consults:   IP CONSULT TO PSYCHIATRY  IP CONSULT TO NEUROLOGY  IP CONSULT TO PHARMACY    Length of Stay:  Expected LOS: 2  Actual LOS: 1    Arsalan Grullon RN

## 2025-03-14 ENCOUNTER — APPOINTMENT (OUTPATIENT)
Facility: HOSPITAL | Age: 50
End: 2025-03-14
Attending: INTERNAL MEDICINE
Payer: MEDICAID

## 2025-03-14 LAB
NT PRO BNP: 161 PG/ML
PROCALCITONIN SERPL-MCNC: <0.05 NG/ML

## 2025-03-14 PROCEDURE — 93971 EXTREMITY STUDY: CPT

## 2025-03-14 PROCEDURE — 83880 ASSAY OF NATRIURETIC PEPTIDE: CPT

## 2025-03-14 PROCEDURE — 6370000000 HC RX 637 (ALT 250 FOR IP): Performed by: PHYSICIAN ASSISTANT

## 2025-03-14 PROCEDURE — 99233 SBSQ HOSP IP/OBS HIGH 50: CPT

## 2025-03-14 PROCEDURE — 2700000000 HC OXYGEN THERAPY PER DAY

## 2025-03-14 PROCEDURE — 6370000000 HC RX 637 (ALT 250 FOR IP): Performed by: STUDENT IN AN ORGANIZED HEALTH CARE EDUCATION/TRAINING PROGRAM

## 2025-03-14 PROCEDURE — 6360000002 HC RX W HCPCS: Performed by: STUDENT IN AN ORGANIZED HEALTH CARE EDUCATION/TRAINING PROGRAM

## 2025-03-14 PROCEDURE — 2060000000 HC ICU INTERMEDIATE R&B

## 2025-03-14 PROCEDURE — 6370000000 HC RX 637 (ALT 250 FOR IP): Performed by: INTERNAL MEDICINE

## 2025-03-14 PROCEDURE — 84145 PROCALCITONIN (PCT): CPT

## 2025-03-14 PROCEDURE — 2500000003 HC RX 250 WO HCPCS: Performed by: STUDENT IN AN ORGANIZED HEALTH CARE EDUCATION/TRAINING PROGRAM

## 2025-03-14 PROCEDURE — 94640 AIRWAY INHALATION TREATMENT: CPT

## 2025-03-14 PROCEDURE — 36415 COLL VENOUS BLD VENIPUNCTURE: CPT

## 2025-03-14 PROCEDURE — 94660 CPAP INITIATION&MGMT: CPT

## 2025-03-14 RX ORDER — HYDROXYZINE HYDROCHLORIDE 10 MG/1
10 TABLET, FILM COATED ORAL 3 TIMES DAILY PRN
Status: DISCONTINUED | OUTPATIENT
Start: 2025-03-14 | End: 2025-03-17

## 2025-03-14 RX ORDER — GABAPENTIN 300 MG/1
600 CAPSULE ORAL 3 TIMES DAILY
Status: DISCONTINUED | OUTPATIENT
Start: 2025-03-14 | End: 2025-03-19

## 2025-03-14 RX ORDER — PREDNISONE 20 MG/1
40 TABLET ORAL DAILY
Status: COMPLETED | OUTPATIENT
Start: 2025-03-14 | End: 2025-03-18

## 2025-03-14 RX ADMIN — SENNOSIDES 8.6 MG: 8.6 TABLET ORAL at 01:07

## 2025-03-14 RX ADMIN — SERTRALINE HYDROCHLORIDE 150 MG: 50 TABLET ORAL at 09:19

## 2025-03-14 RX ADMIN — APIXABAN 5 MG: 5 TABLET, FILM COATED ORAL at 20:48

## 2025-03-14 RX ADMIN — METHADONE HYDROCHLORIDE 120 MG: 10 CONCENTRATE ORAL at 07:45

## 2025-03-14 RX ADMIN — ACETAMINOPHEN 650 MG: 325 TABLET ORAL at 17:33

## 2025-03-14 RX ADMIN — GLYCERIN 2 G: 2 SUPPOSITORY RECTAL at 04:30

## 2025-03-14 RX ADMIN — SODIUM CHLORIDE, PRESERVATIVE FREE 10 ML: 5 INJECTION INTRAVENOUS at 09:20

## 2025-03-14 RX ADMIN — GABAPENTIN 300 MG: 300 CAPSULE ORAL at 09:19

## 2025-03-14 RX ADMIN — IPRATROPIUM BROMIDE AND ALBUTEROL SULFATE 1 DOSE: 2.5; .5 SOLUTION RESPIRATORY (INHALATION) at 14:22

## 2025-03-14 RX ADMIN — ONDANSETRON 4 MG: 4 TABLET, ORALLY DISINTEGRATING ORAL at 17:36

## 2025-03-14 RX ADMIN — APIXABAN 5 MG: 5 TABLET, FILM COATED ORAL at 09:19

## 2025-03-14 RX ADMIN — GABAPENTIN 600 MG: 300 CAPSULE ORAL at 20:48

## 2025-03-14 RX ADMIN — BUPROPION HYDROCHLORIDE 300 MG: 150 TABLET, EXTENDED RELEASE ORAL at 09:18

## 2025-03-14 RX ADMIN — SODIUM CHLORIDE, PRESERVATIVE FREE 10 ML: 5 INJECTION INTRAVENOUS at 20:48

## 2025-03-14 RX ADMIN — OXCARBAZEPINE 150 MG: 300 TABLET, FILM COATED ORAL at 09:19

## 2025-03-14 RX ADMIN — LEVETIRACETAM 500 MG: 500 TABLET, FILM COATED ORAL at 09:22

## 2025-03-14 RX ADMIN — IPRATROPIUM BROMIDE AND ALBUTEROL SULFATE 1 DOSE: 2.5; .5 SOLUTION RESPIRATORY (INHALATION) at 19:39

## 2025-03-14 RX ADMIN — LEVETIRACETAM 500 MG: 500 TABLET, FILM COATED ORAL at 20:48

## 2025-03-14 RX ADMIN — POLYETHYLENE GLYCOL 3350 17 G: 17 POWDER, FOR SOLUTION ORAL at 09:19

## 2025-03-14 RX ADMIN — PREDNISONE 40 MG: 20 TABLET ORAL at 13:25

## 2025-03-14 RX ADMIN — OXCARBAZEPINE 150 MG: 300 TABLET, FILM COATED ORAL at 20:48

## 2025-03-14 RX ADMIN — QUETIAPINE FUMARATE 400 MG: 100 TABLET ORAL at 20:47

## 2025-03-14 RX ADMIN — GABAPENTIN 600 MG: 300 CAPSULE ORAL at 13:25

## 2025-03-14 RX ADMIN — ARFORMOTEROL TARTRATE 15 MCG: 15 SOLUTION RESPIRATORY (INHALATION) at 19:44

## 2025-03-14 RX ADMIN — BUDESONIDE 1000 MCG: 0.5 INHALANT RESPIRATORY (INHALATION) at 19:44

## 2025-03-14 RX ADMIN — HYDROXYZINE HYDROCHLORIDE 10 MG: 10 TABLET ORAL at 21:29

## 2025-03-14 ASSESSMENT — PAIN SCALES - GENERAL: PAINLEVEL_OUTOF10: 0

## 2025-03-14 ASSESSMENT — PAIN DESCRIPTION - DESCRIPTORS: DESCRIPTORS: ACHING;DISCOMFORT

## 2025-03-14 ASSESSMENT — PAIN DESCRIPTION - LOCATION: LOCATION: ARM

## 2025-03-14 ASSESSMENT — PAIN DESCRIPTION - ORIENTATION: ORIENTATION: RIGHT

## 2025-03-14 NOTE — PROGRESS NOTES
End of Shift Note    Bedside shift change report given to Geo (oncoming nurse) by Maxine Borden RN (offgoing nurse).  Report included the following information SBAR, Intake/Output, MAR, Recent Results, Cardiac Rhythm  , and Quality Measures    Shift worked:  1605-2444     Shift summary and any significant changes:     This shift was eventful for patient going for MRI, patient had ativan for this test and was sleepy for most of the night, waking up asking a snack and popsicle. Patient was on oxygen for the night. Patient asked for suppository and refused miralax. RN gave patient suppository. Patient has still not had a BM states in over 2 weeks.      Concerns for physician to address:  Patient asking for suppository for constipation. Order given and patient had the medication.      [facility]   [phone]       Activity:  Level of Assistance: Independent  Number times ambulated in hallways past shift: 0  Number of times OOB to chair past shift: 0    Cardiac:   Cardiac Monitoring: Yes      Cardiac Rhythm: Sinus rhythm    Access:  Current line(s): PIV     Genitourinary:   Urinary Status: Voiding    Respiratory:   O2 Device: Nasal cannula  Chronic home O2 use?: YES  Incentive spirometer at bedside: YES    GI:  Last BM (including prior to admit):  (Patient states its been two weeks.)  Current diet:  ADULT DIET; Regular  ADULT ORAL NUTRITION SUPPLEMENT; Breakfast, Lunch, Dinner; Standard High Calorie/High Protein Oral Supplement  Passing flatus: YES    Pain Management:   Patient states pain is manageable on current regimen: YES    Skin:  Brennen Scale Score: 20  Interventions: Wound Offloading (Prevention Methods): Repositioning    Patient Safety:  Fall Risk: Nursing Judgement-Fall Risk High(Add Comments): Yes  Fall Risk Interventions  Nursing Judgement-Fall Risk High(Add Comments): Yes  Toilet Every 2 Hours-In Advance of Need: Yes  Hourly Visual Checks: Eyes closed, In bed  Fall Visual Posted: Armband,

## 2025-03-14 NOTE — PROGRESS NOTES
End of Shift Note    Bedside shift change report given to Will (oncoming nurse) by Geo Aviles RN (offgoing nurse).  Report included the following information SBAR    Shift worked:  7a7p     Shift summary and any significant changes:     Patient is alert and oriented x4. No acute events. Up in chair. Ambulates with assist . Eating well. O2 titrated to 3L/min. SPO2 is above 90%at 3L. Eating well.      Concerns for physician to address:       Zone phone for oncoming shift:          Activity:  Level of Assistance: Independent  Number times ambulated in hallways past shift: 0  Number of times OOB to chair past shift: 2    Cardiac:   Cardiac Monitoring: Yes      Cardiac Rhythm: Sinus rhythm    Access:  Current line(s): PIV    Genitourinary:   Urinary Status: Voiding, Bathroom privileges    Respiratory:   O2 Device: Nasal cannula  Chronic home O2 use?: YES  Incentive spirometer at bedside: YES    GI:  Last BM (including prior to admit): 03/14/25  Current diet:  ADULT DIET; Regular  ADULT ORAL NUTRITION SUPPLEMENT; Breakfast, Lunch, Dinner; Standard High Calorie/High Protein Oral Supplement  Passing flatus: YES    Pain Management:   Patient states pain is manageable on current regimen: YES    Skin:  Brennen Scale Score: 20  Interventions: Wound Offloading (Prevention Methods): Repositioning    Patient Safety:  Fall Risk: Nursing Judgement-Fall Risk High(Add Comments): Yes  Fall Risk Interventions  Nursing Judgement-Fall Risk High(Add Comments): Yes  Toilet Every 2 Hours-In Advance of Need: Yes  Hourly Visual Checks: Eyes closed, In bed  Fall Visual Posted: Armband, Socks, Fall sign posted  Room Door Open: Deferred to promote rest  Alarm On: Bed  Patient Moved Closer to Nursing Station: No    Active Consults:   IP CONSULT TO PSYCHIATRY  IP CONSULT TO NEUROLOGY  IP CONSULT TO PHARMACY  IP CONSULT TO ADDICTION MEDICINE  IP CONSULT TO PULMONOLOGY    Length of Stay:  Expected LOS: 5  Actual LOS: 2    Geo Aviles RN

## 2025-03-14 NOTE — PLAN OF CARE
Patient is alert and oriented x4. No acute events. Up in chair. Ambulates with assist . Eating well. O2 titrated to 3L/min. SPO2 is above 90%at 3L.  Eating well.     Problem: Discharge Planning  Goal: Discharge to home or other facility with appropriate resources  Outcome: Progressing     Problem: Pain  Goal: Verbalizes/displays adequate comfort level or baseline comfort level  Outcome: Progressing     Problem: Respiratory - Adult  Goal: Achieves optimal ventilation and oxygenation  Outcome: Progressing

## 2025-03-14 NOTE — PROGRESS NOTES
imaging:yes  I personally reviewed EKG:  Toxic drug monitoring: yes  Discussed case with: Patient, patient   pharmacy    Code Status: full   DVT Prophylaxis: Eliquis  Baseline: ambultory     Subjective:     Chief Complaint / Reason for Physician Visit  \"Fu altered mental status, overdose on methadone\".  Discussed with RN events overnight.   Continues to complain of pain  Reported feeling short of breath  On 6 L of oxygen, has persistent stridor  Objective:     VITALS:   Last 24hrs VS reviewed since prior progress note. Most recent are:  Patient Vitals for the past 24 hrs:   BP Temp Temp src Pulse Resp SpO2   03/14/25 0745 -- 97.8 °F (36.6 °C) Oral -- 18 --   03/14/25 0500 -- -- -- 87 22 99 %   03/14/25 0407 (!) 130/105 -- -- 72 -- 98 %   03/14/25 0406 -- 97.7 °F (36.5 °C) Oral 75 18 97 %   03/14/25 0400 -- -- -- 71 -- --   03/14/25 0000 -- -- -- 75 -- 99 %   03/13/25 2310 92/63 97.7 °F (36.5 °C) Oral -- -- --   03/13/25 2045 102/68 97.1 °F (36.2 °C) Axillary -- 16 99 %   03/13/25 2031 -- -- -- 82 16 99 %   03/13/25 2026 -- -- -- 82 16 98 %   03/13/25 2025 -- -- -- -- -- 100 %   03/13/25 2021 -- -- -- 84 16 99 %   03/13/25 2016 -- -- -- 84 16 99 %   03/13/25 1700 108/68 98 °F (36.7 °C) Oral 88 18 97 %         Intake/Output Summary (Last 24 hours) at 3/14/2025 1312  Last data filed at 3/13/2025 2050  Gross per 24 hour   Intake 490 ml   Output --   Net 490 ml        I had a face to face encounter and independently examined this patient on 3/14/2025, as outlined below:  PHYSICAL EXAM:  General: Alert, cooperative  EENT:  EOMI. Anicteric sclerae.  Resp:  CTA bilaterally, no wheezing or rales.  No accessory muscle use  CV:  Regular  rhythm,  No edema  GI:  Soft, Non distended, Non tender.  +Bowel sounds  Neurologic:  Alert and oriented X 3, normal speech,   Psych:   fair insight. Not anxious nor agitated  Skin:  No rashes.  No jaundice    Reviewed most current lab test results and cultures  YES  Reviewed most  current radiology test results   YES  Review and summation of old records today    NO  Reviewed patient's current orders and MAR    YES  PMH/SH reviewed - no change compared to H&P    Procedures: see electronic medical records for all procedures/Xrays and details which were not copied into this note but were reviewed prior to creation of Plan.      LABS:  I reviewed today's most current labs and imaging studies.  Pertinent labs include:  Recent Labs     03/12/25  1148 03/12/25 2337 03/13/25  0339   WBC 3.2* 4.1 6.0   HGB 11.6 11.9 11.2*   HCT 34.2* 36.1 33.6*    188 212     Recent Labs     03/12/25  1148 03/12/25 2337 03/13/25  0339    140 141   K 3.1* 3.3* 3.3*    104 105   CO2 33* 27 30   GLUCOSE 132* 143* 148*   BUN 13 9 8   CREATININE 0.99 1.07* 0.80   CALCIUM 8.9 8.7 8.8   MG  --   --  1.7   BILITOT 0.3  --   --    AST 42*  --   --    ALT 28  --   --        Signed: Kinjal Charlton MD

## 2025-03-14 NOTE — PROGRESS NOTES
HOSPITAL NEUROLOGY NOTE     Chief Complaint   Patient presents with    Shortness of Breath        [facility]  David Car is a 49 y.o. female  with history of hypertension, polysubstance abuse, sarcoidosis, seizures, tobacco use, cocaine use, heroin abuse, chronic pain, COPD, bipolar 1 disorder, who presented to the ED on 3/12/2025 with a chief complaint of increasing sleepiness for the past 4 weeks and the only thing she did was going to the bathroom and sleep.  It was reported by her mom that her sleepiness increased when she takes her medication but not sure which medications patient took.  Patient was admitted for further evaluation and workup.  Neurology was consulted and patient was seen by Dr. Stafford.    Head CT without contrast from 3/12/2025 was normal.    Chest x-ray from 3/12/2025 showed no acute process.    Pertinent labs were reviewed which showed WBC 3.2, potassium 3.1, CO2 33, glucose 132, AST 42, ammonia 19, UDS positive for amphetamine, benzodiazepine, methadone, THC.  Levetiracetam level pending.        INTERIM DATA: At the time of my evaluation this morning, patient was awake, alert, and oriented.  She was slightly confused to year but was able to correct herself.  She denied any weakness or numbness in extremities, visual disturbances, speech, or swallowing difficulty.  She did report right upper extremity pain and swelling which is chronic in etiology.  Primary care aware.  She followed commands in all extremities with the exception of right upper extremity due to discomfort/pain.    Nurse was at bedside.      ROS  A ten system review of constitutional, cardiovascular, respiratory, musculoskeletal, endocrine, skin, SHEENT, genitourinary, psychiatric and neurologic systems was obtained and is unremarkable except as stated in HPI     PMH  Past Medical History:   Diagnosis Date    Ascariosis     Asthma     Bipolar 1 disorder (HCC)     Chronic obstructive pulmonary disease (HCC)     Chronic pain     back,  primary neurologist after discharge.    Pain, and swelling on the right upper extremity: Reported to be chronic in etiology  - Will defer this to primary team.    Neurology will continue to follow closely in this complicated patient with ongoing neurological symptoms necessitating additional testing. Updated orders placed.  Care plan discussed with primary team. Thank you very much for this consultation.    I spent 55 minutes providing care to this acutely ill inpatient with > 50% of the time counseling as well as reviewing the patient's chart, notes, labs, medications and preparing documentation along with assisting in the coordination of care of the patient on the patient's hospital floor/unit.     DESIREE Lopez - NATHAN     Collaborating physician:   Dr. Rivera Toure

## 2025-03-14 NOTE — PROGRESS NOTES
Pulmonary, Critical Care, and Sleep Medicine~Progress Note    Name: David Car MRN: [id number]   : 1975 Hospital: [facility]   Date: 3/14/2025 12:28 PM Admission: 3/12/2025     Impression Plan   Acute on chronic hypoxic/hypercapnic respiratory failure-On 4-6 L nasal cannula at home  Acute metabolic encephalopathy   Severe COPD/asthma  Vocal cord dysfunction   Former smoker   Hx of pulmonary sarcoidosis unknown details suspect no active disease   Upper arm pain   Polysubstance use- UDS +methadone, amphetamines, benzos, THC  Bipolar disorder/schizophrenia  Prior DVT-on chronic Eliquis Continue supplemental O2 to maintain SpO2 >88%  Continue NIV qHS   Nebulized equivalent of home trelegy   Add prednisone- wheezing today   Duonebs   Eliquis   Educated on complete cessation of smoking any substance   Psych consulted   UE duplex ordered   MRI brain ordered   Already referred to ENT as an outpatient  Will need to follow up outpatient with NIV clinic and pulmonary. Consider starting ohtuvayre of roflumilast     Will see PRN over the weekend. Please call with questions.      Daily Progression:    3/14 Patient seen this morning sitting in chair. Complains of arm pain and difficulty breathing. No cough. On 5L NC.     Consult Note    49 yof with pmhx significant for chronic hypoxemic/hypercapnic respiratory failure on 4-6L NC, severe COPD/asthma overlap, VCD, DVT on eliquis presented to the ED 3/12 with altered mental statsus. In the ED, satting well on 2L NC. No leukocytosis. COVID and flu negative. VBG 7.34/70. +methadone, amphetamines, benzos, THC. CXR negative. CTH negative.     Patient states she had increasing dyspnea the last 1.5 weeks with exertion and at rest. She denies cough, fever, sick contacts. She has been complaint with trelegy, NIV each night.     Patient of Dr. Devi. Last office visit Dec 2024. PFTs 2023. FEV1 25% predicted. Treated for acute

## 2025-03-14 NOTE — PROGRESS NOTES
Assessed patient at the beginning of shift and asked if patient had a home CPAP. Pt initially told me no. RT came back to assess pt, pt's  was awake at this time to answer questions. He stated that pt did have a machine at home that she uses sometimes. Pt's  was encouraged to have the pt's home machine brought in for use. A [facility] issued V60 was set up and placed in pt's room at this time. Pt did not want to go on at this time, and wanted to eat a sandwich. Pt also stated she was nauseous. Primary RN made aware of nausea.        03/14/25 0500   NIV Type   $NIV $Daily Charge   Ventilator ID [id number]   NIV Started/Stopped (S)  Off  (Placed V60 in patient's room. Pt is more awake now. pt's  is now up and is awake as well and able to answer questions. Pt's  stated that the pt does have a BiPAP machine at home and uses sometimes. He is unsure of pt's settings.)   Equipment Type V60   Mode AVAPS   Mask Type Full face mask   Assessment   Heart Rate Source Monitor   Respirations 22   Level of Consciousness 0   Comfort Level Good   Using Accessory Muscles No   Mask Compliance Good   Skin Assessment Clean, dry, & intact   Breath Sounds   Respiratory Pattern Tachypneic   Upper Airway Sounds Other (comment)  (upper airway wheeze)   Breath Sounds Bilateral Diminished   Settings/Measurements   CPAP/EPAP 5 cmH2O   IPAP Min 12 cmH2O   IPAP Max 20 cmH2O   Vt (Set, mL) 325 mL   Rate Ordered 10   Insp Rise Time (%) 3 %   FiO2  40 %   I Time/ I Time % 1 s   Patient's Home Machine No   Alarm Settings   Alarms On Y   Low Pressure (cmH2O) 5 cmH2O   High Pressure (cmH2O) 35 cmH2O   Apnea (secs) 20 secs   RR Low (bpm) 8   RR High (bpm) 45 br/min

## 2025-03-15 ENCOUNTER — APPOINTMENT (OUTPATIENT)
Facility: HOSPITAL | Age: 50
End: 2025-03-15
Payer: MEDICAID

## 2025-03-15 PROCEDURE — 2700000000 HC OXYGEN THERAPY PER DAY

## 2025-03-15 PROCEDURE — 6360000002 HC RX W HCPCS: Performed by: STUDENT IN AN ORGANIZED HEALTH CARE EDUCATION/TRAINING PROGRAM

## 2025-03-15 PROCEDURE — 94640 AIRWAY INHALATION TREATMENT: CPT

## 2025-03-15 PROCEDURE — 6370000000 HC RX 637 (ALT 250 FOR IP): Performed by: STUDENT IN AN ORGANIZED HEALTH CARE EDUCATION/TRAINING PROGRAM

## 2025-03-15 PROCEDURE — 6370000000 HC RX 637 (ALT 250 FOR IP): Performed by: PHYSICIAN ASSISTANT

## 2025-03-15 PROCEDURE — 2060000000 HC ICU INTERMEDIATE R&B

## 2025-03-15 PROCEDURE — 2500000003 HC RX 250 WO HCPCS: Performed by: STUDENT IN AN ORGANIZED HEALTH CARE EDUCATION/TRAINING PROGRAM

## 2025-03-15 PROCEDURE — 73560 X-RAY EXAM OF KNEE 1 OR 2: CPT

## 2025-03-15 PROCEDURE — 6370000000 HC RX 637 (ALT 250 FOR IP): Performed by: INTERNAL MEDICINE

## 2025-03-15 RX ORDER — LIDOCAINE 4 G/G
1 PATCH TOPICAL DAILY
Status: DISCONTINUED | OUTPATIENT
Start: 2025-03-15 | End: 2025-03-22 | Stop reason: HOSPADM

## 2025-03-15 RX ORDER — PROCHLORPERAZINE EDISYLATE 5 MG/ML
10 INJECTION INTRAMUSCULAR; INTRAVENOUS ONCE
Status: COMPLETED | OUTPATIENT
Start: 2025-03-15 | End: 2025-03-15

## 2025-03-15 RX ADMIN — LEVETIRACETAM 500 MG: 500 TABLET, FILM COATED ORAL at 08:52

## 2025-03-15 RX ADMIN — GABAPENTIN 600 MG: 300 CAPSULE ORAL at 08:52

## 2025-03-15 RX ADMIN — HYDROXYZINE HYDROCHLORIDE 10 MG: 10 TABLET ORAL at 06:04

## 2025-03-15 RX ADMIN — QUETIAPINE FUMARATE 400 MG: 100 TABLET ORAL at 21:09

## 2025-03-15 RX ADMIN — OXCARBAZEPINE 150 MG: 300 TABLET, FILM COATED ORAL at 21:09

## 2025-03-15 RX ADMIN — IPRATROPIUM BROMIDE AND ALBUTEROL SULFATE 1 DOSE: 2.5; .5 SOLUTION RESPIRATORY (INHALATION) at 14:40

## 2025-03-15 RX ADMIN — OXCARBAZEPINE 150 MG: 300 TABLET, FILM COATED ORAL at 08:52

## 2025-03-15 RX ADMIN — GABAPENTIN 600 MG: 300 CAPSULE ORAL at 13:05

## 2025-03-15 RX ADMIN — BUPROPION HYDROCHLORIDE 300 MG: 150 TABLET, EXTENDED RELEASE ORAL at 08:52

## 2025-03-15 RX ADMIN — APIXABAN 5 MG: 5 TABLET, FILM COATED ORAL at 21:10

## 2025-03-15 RX ADMIN — APIXABAN 5 MG: 5 TABLET, FILM COATED ORAL at 08:52

## 2025-03-15 RX ADMIN — IPRATROPIUM BROMIDE AND ALBUTEROL SULFATE 1 DOSE: 2.5; .5 SOLUTION RESPIRATORY (INHALATION) at 19:49

## 2025-03-15 RX ADMIN — METHADONE HYDROCHLORIDE 120 MG: 10 CONCENTRATE ORAL at 06:19

## 2025-03-15 RX ADMIN — BUDESONIDE 1000 MCG: 0.5 INHALANT RESPIRATORY (INHALATION) at 19:54

## 2025-03-15 RX ADMIN — GABAPENTIN 600 MG: 300 CAPSULE ORAL at 21:10

## 2025-03-15 RX ADMIN — CLONIDINE HYDROCHLORIDE 0.1 MG: 0.1 TABLET ORAL at 10:22

## 2025-03-15 RX ADMIN — GLYCERIN 2 G: 2 SUPPOSITORY RECTAL at 06:06

## 2025-03-15 RX ADMIN — SODIUM CHLORIDE, PRESERVATIVE FREE 10 ML: 5 INJECTION INTRAVENOUS at 08:53

## 2025-03-15 RX ADMIN — LEVETIRACETAM 500 MG: 500 TABLET, FILM COATED ORAL at 21:10

## 2025-03-15 RX ADMIN — PREDNISONE 40 MG: 20 TABLET ORAL at 08:52

## 2025-03-15 RX ADMIN — SERTRALINE HYDROCHLORIDE 150 MG: 50 TABLET ORAL at 08:52

## 2025-03-15 RX ADMIN — PROCHLORPERAZINE EDISYLATE 10 MG: 5 INJECTION INTRAMUSCULAR; INTRAVENOUS at 21:12

## 2025-03-15 RX ADMIN — CLONIDINE HYDROCHLORIDE 0.1 MG: 0.1 TABLET ORAL at 21:11

## 2025-03-15 RX ADMIN — ARFORMOTEROL TARTRATE 15 MCG: 15 SOLUTION RESPIRATORY (INHALATION) at 19:54

## 2025-03-15 RX ADMIN — SODIUM CHLORIDE, PRESERVATIVE FREE 10 ML: 5 INJECTION INTRAVENOUS at 21:18

## 2025-03-15 RX ADMIN — SENNOSIDES 8.6 MG: 8.6 TABLET ORAL at 21:11

## 2025-03-15 ASSESSMENT — PAIN SCALES - GENERAL
PAINLEVEL_OUTOF10: 0
PAINLEVEL_OUTOF10: 5
PAINLEVEL_OUTOF10: 0

## 2025-03-15 NOTE — SIGNIFICANT EVENT
Pt had a witnessed fall on her kness   Pt seen and examined after the fall , she c/o R josué pain . No bruises or bleeding present   Xray R knee showed no fracture   Prn lidocaine patch , tylenol and ice    Non billable round

## 2025-03-15 NOTE — PROGRESS NOTES
End of Shift Note    Bedside shift change report given to EBEN Quinn (oncoming nurse) by Geo Aviles RN (offgoing nurse).  Report included the following information SBAR    Shift worked:  7a7p     Shift summary and any significant changes:        had witnessed fall while getting up while getting up from Radha chair. No bruises or no injuries. Patient complained pain to right knee. Xray to right knee done.. Pending MD to review.  Lidocaine patch applied to right knee. Patient is now resting quietly.            Concerns for physician to address:       Zone phone for oncoming shift:          Activity:  Level of Assistance: Independent  Number times ambulated in hallways past shift: 0  Number of times OOB to chair past shift: 2    Cardiac:   Cardiac Monitoring: Yes      Cardiac Rhythm: Sinus rhythm    Access:  Current line(s): PIV    Genitourinary:   Urinary Status: Voiding    Respiratory:   O2 Device: Nasal cannula  Chronic home O2 use?: YES  Incentive spirometer at bedside: YES    GI:  Last BM (including prior to admit): 03/14/25  Current diet:  ADULT DIET; Regular  ADULT ORAL NUTRITION SUPPLEMENT; Breakfast, Lunch, Dinner; Standard High Calorie/High Protein Oral Supplement  Passing flatus: YES    Pain Management:   Patient states pain is manageable on current regimen: YES    Skin:  Brennen Scale Score: 19  Interventions: Wound Offloading (Prevention Methods): Turning, Repositioning, Pillows    Patient Safety:  Fall Risk: Nursing Judgement-Fall Risk High(Add Comments): Yes  Fall Risk Interventions  Nursing Judgement-Fall Risk High(Add Comments): Yes  Toilet Every 2 Hours-In Advance of Need: Yes  Hourly Visual Checks: Eyes closed, In bed  Fall Visual Posted: Armband, Socks, Fall sign posted  Room Door Open: Deferred to promote rest  Alarm On: Bed  Patient Moved Closer to Nursing Station: No    Active Consults:   IP CONSULT TO PSYCHIATRY  IP CONSULT TO NEUROLOGY  IP CONSULT TO PHARMACY  IP CONSULT TO ADDICTION  MEDICINE  IP CONSULT TO PULMONOLOGY    Length of Stay:  Expected LOS: 5  Actual LOS: 3    Geo Aviles RN

## 2025-03-15 NOTE — PROGRESS NOTES
End of Shift Note    Bedside shift change report given to Geo  by Tahmina Monge RN .  Report included the following information SBAR    Shift worked:    1900 pm to 0700 am    Shift summary and any significant changes:     No significant change      Concerns for physician to address:       Zone phone for oncoming shift:          Activity:  Level of Assistance: Moderate assist, patient does 50-74%  Number times ambulated in hallways past shift: 0  Number of times OOB to chair past shift: 0    Cardiac:   Cardiac Monitoring: Yes      Cardiac Rhythm: Sinus rhythm    Access:  Current line(s): PIV    Genitourinary:   Urinary Status: Voiding    Respiratory:   O2 Device: Other (Comment) (oxymizer)  Chronic home O2 use?: YES  Incentive spirometer at bedside: YES    GI:  Last BM (including prior to admit): 03/14/25  Current diet:  ADULT DIET; Regular  ADULT ORAL NUTRITION SUPPLEMENT; Breakfast, Lunch, Dinner; Standard High Calorie/High Protein Oral Supplement  Passing flatus: YES    Pain Management:   Patient states pain is manageable on current regimen: YES    Skin:  Brennen Scale Score: 18  Interventions: Wound Offloading (Prevention Methods): Turning, Repositioning, Pillows    Patient Safety:  Fall Risk: Nursing Judgement-Fall Risk High(Add Comments): Yes  Fall Risk Interventions  Nursing Judgement-Fall Risk High(Add Comments): Yes  Toilet Every 2 Hours-In Advance of Need: Yes  Hourly Visual Checks: Awake, In bed  Fall Visual Posted: Armband, Socks  Room Door Open: No (Comment)  Alarm On: Bed, Chair  Patient Moved Closer to Nursing Station: No    Active Consults:   IP CONSULT TO PSYCHIATRY  IP CONSULT TO NEUROLOGY  IP CONSULT TO PHARMACY  IP CONSULT TO ADDICTION MEDICINE  IP CONSULT TO PULMONOLOGY    Length of Stay:  Expected LOS: 5  Actual LOS: 3    Tahmina Monge RN

## 2025-03-15 NOTE — CASE COMMUNICATION
COMMUNICATION NOTE - Neurology Service    Name:  David Car     MRN: [id number]         Communication Note:   MRI brain - No acute intracranial abnormality.   I explained in detail about the current condition.   Rest of the management per primary team.  Neurology will sign off.   Please do not hesitate to call with questions or concerns.  Please let us know if we can provide any additional information.

## 2025-03-15 NOTE — PLAN OF CARE
Patient had witnessed fall while getting up while getting up from Radha chair. No bruises or no injuries. Patient complained pain to right knee. Xray to right knee done.. Pending MD to review.  Lidocaine patch applied to right knee. Patient is now resting quietly.      Problem: Discharge Planning  Goal: Discharge to home or other facility with appropriate resources  Outcome: Progressing     Problem: Pain  Goal: Verbalizes/displays adequate comfort level or baseline comfort level  Outcome: Progressing     Problem: Respiratory - Adult  Goal: Achieves optimal ventilation and oxygenation  Outcome: Progressing     Problem: Safety - Adult  Goal: Free from fall injury  Outcome: Progressing

## 2025-03-15 NOTE — PROGRESS NOTES
Hospitalist Progress Note    NAME:   David Car   : 1975   MRN: [id number]     Date/Time: 3/15/2025 1:13 PM  Patient PCP: Ese Epps APRN - NP    Estimated discharge date:  Barriers: Clinical improvement        Metabolic encephalopathy (for 4 weeks per patient mother's report)  History of bipolar disorder  Schizophrenia  Anxiety  CT head reassuring  Patient on multiple medications  Patient on Seroquel, sertraline, Keppra  Will check Keppra level  Resume psych meds per med rec   patient is on Seroquel 400 mg daily at bedtime  Resume home dose of methadone  Hypotension resolved  Systolic blood pressure in the 80s this morning, ordered IV fluid bolus and maintenance fluid  Hold all BP meds  Blood pressure improved  S/p rapid response for unresponsiveness overnight due to possible methadone overdose on   Patient was unresponsive yesterday, responded to Narcan  This morning she was awake alert  Complaining of pain on her right upper extremity.  Patient has a chronic right upper extremity pain  Seizure disorder  Patient on Keppra  Also on Seroquel, and gabapentin  Check Keppra level  Discussed with neurology, order MRI of the brain, per neurology unlikely seizure activity  MRI of the brain showed no acute issues  COPD acute on chronic at 6 L baseline   History of pulmonary sarcoidosis POA (from previous chart)  Suspected vocal cord dysfunction ?POA (from previous chart)  No wheezing on auscultation  She already get 1 dose of 10 mg of Decadron  DuoNeb every 6 hours  Patient has a stridor  Outpatient follow-up with the ENT  History of right upper extremity DVT  Continue with Eliquis  Repeat vascular duplex did not show any DVT    Severe cervical spinal stenosis   Spoke with pharmacy, verified, restart methadone tomorrow  Patient reportedly took methadone 200mg daily instead of 120 mg  Resume home dose methadone  Medical Decision Making:   I personally reviewed labs: yes  I personally reviewed  orders and MAR    YES  PMH/SH reviewed - no change compared to H&P    Procedures: see electronic medical records for all procedures/Xrays and details which were not copied into this note but were reviewed prior to creation of Plan.      LABS:  I reviewed today's most current labs and imaging studies.  Pertinent labs include:  Recent Labs     03/12/25 2337 03/13/25  0339   WBC 4.1 6.0   HGB 11.9 11.2*   HCT 36.1 33.6*    212     Recent Labs     03/12/25 2337 03/13/25  0339    141   K 3.3* 3.3*    105   CO2 27 30   GLUCOSE 143* 148*   BUN 9 8   CREATININE 1.07* 0.80   CALCIUM 8.7 8.8   MG  --  1.7       Signed: Kinjal Charlton MD

## 2025-03-15 NOTE — PLAN OF CARE
Problem: Discharge Planning  Goal: Discharge to home or other facility with appropriate resources  3/14/2025 2300 by Tahmina Monge RN  Outcome: Progressing  3/14/2025 1623 by Geo Aviles RN  Outcome: Progressing     Problem: Pain  Goal: Verbalizes/displays adequate comfort level or baseline comfort level  3/14/2025 2300 by Tahmina Monge RN  Outcome: Progressing  3/14/2025 1623 by Geo Aviles RN  Outcome: Progressing     Problem: Respiratory - Adult  Goal: Achieves optimal ventilation and oxygenation  3/14/2025 2300 by Tahmina Monge RN  Outcome: Progressing  3/14/2025 1938 by Katharina Flores, RT  Outcome: Progressing  3/14/2025 1623 by Geo Aviles RN  Outcome: Progressing     Problem: Safety - Adult  Goal: Free from fall injury  Outcome: Progressing

## 2025-03-16 PROCEDURE — 6360000002 HC RX W HCPCS: Performed by: STUDENT IN AN ORGANIZED HEALTH CARE EDUCATION/TRAINING PROGRAM

## 2025-03-16 PROCEDURE — 6370000000 HC RX 637 (ALT 250 FOR IP): Performed by: PHYSICIAN ASSISTANT

## 2025-03-16 PROCEDURE — 6370000000 HC RX 637 (ALT 250 FOR IP): Performed by: STUDENT IN AN ORGANIZED HEALTH CARE EDUCATION/TRAINING PROGRAM

## 2025-03-16 PROCEDURE — 2700000000 HC OXYGEN THERAPY PER DAY

## 2025-03-16 PROCEDURE — 6370000000 HC RX 637 (ALT 250 FOR IP): Performed by: INTERNAL MEDICINE

## 2025-03-16 PROCEDURE — 2500000003 HC RX 250 WO HCPCS: Performed by: STUDENT IN AN ORGANIZED HEALTH CARE EDUCATION/TRAINING PROGRAM

## 2025-03-16 PROCEDURE — 2060000000 HC ICU INTERMEDIATE R&B

## 2025-03-16 PROCEDURE — 94640 AIRWAY INHALATION TREATMENT: CPT

## 2025-03-16 PROCEDURE — 94660 CPAP INITIATION&MGMT: CPT

## 2025-03-16 RX ADMIN — SENNOSIDES 8.6 MG: 8.6 TABLET ORAL at 20:31

## 2025-03-16 RX ADMIN — OXCARBAZEPINE 150 MG: 300 TABLET, FILM COATED ORAL at 20:33

## 2025-03-16 RX ADMIN — LEVETIRACETAM 500 MG: 500 TABLET, FILM COATED ORAL at 08:54

## 2025-03-16 RX ADMIN — CLONIDINE HYDROCHLORIDE 0.1 MG: 0.1 TABLET ORAL at 08:54

## 2025-03-16 RX ADMIN — HYDROXYZINE HYDROCHLORIDE 10 MG: 10 TABLET ORAL at 20:34

## 2025-03-16 RX ADMIN — OXCARBAZEPINE 150 MG: 300 TABLET, FILM COATED ORAL at 08:54

## 2025-03-16 RX ADMIN — SODIUM CHLORIDE, PRESERVATIVE FREE 10 ML: 5 INJECTION INTRAVENOUS at 08:55

## 2025-03-16 RX ADMIN — IPRATROPIUM BROMIDE AND ALBUTEROL SULFATE 1 DOSE: 2.5; .5 SOLUTION RESPIRATORY (INHALATION) at 10:16

## 2025-03-16 RX ADMIN — GABAPENTIN 600 MG: 300 CAPSULE ORAL at 14:05

## 2025-03-16 RX ADMIN — ARFORMOTEROL TARTRATE 15 MCG: 15 SOLUTION RESPIRATORY (INHALATION) at 10:16

## 2025-03-16 RX ADMIN — SACUBITRIL AND VALSARTAN 1 TABLET: 24; 26 TABLET, FILM COATED ORAL at 08:54

## 2025-03-16 RX ADMIN — GABAPENTIN 600 MG: 300 CAPSULE ORAL at 08:54

## 2025-03-16 RX ADMIN — GABAPENTIN 600 MG: 300 CAPSULE ORAL at 20:31

## 2025-03-16 RX ADMIN — CLONIDINE HYDROCHLORIDE 0.1 MG: 0.1 TABLET ORAL at 20:36

## 2025-03-16 RX ADMIN — BUPROPION HYDROCHLORIDE 300 MG: 150 TABLET, EXTENDED RELEASE ORAL at 08:54

## 2025-03-16 RX ADMIN — QUETIAPINE FUMARATE 400 MG: 100 TABLET ORAL at 20:33

## 2025-03-16 RX ADMIN — LEVETIRACETAM 500 MG: 500 TABLET, FILM COATED ORAL at 20:32

## 2025-03-16 RX ADMIN — METHADONE HYDROCHLORIDE 120 MG: 10 CONCENTRATE ORAL at 06:08

## 2025-03-16 RX ADMIN — SACUBITRIL AND VALSARTAN 1 TABLET: 24; 26 TABLET, FILM COATED ORAL at 20:36

## 2025-03-16 RX ADMIN — APIXABAN 5 MG: 5 TABLET, FILM COATED ORAL at 08:54

## 2025-03-16 RX ADMIN — APIXABAN 5 MG: 5 TABLET, FILM COATED ORAL at 20:31

## 2025-03-16 RX ADMIN — PREDNISONE 40 MG: 20 TABLET ORAL at 08:54

## 2025-03-16 RX ADMIN — BUDESONIDE 1000 MCG: 0.5 INHALANT RESPIRATORY (INHALATION) at 10:16

## 2025-03-16 RX ADMIN — SODIUM CHLORIDE, PRESERVATIVE FREE 10 ML: 5 INJECTION INTRAVENOUS at 20:37

## 2025-03-16 RX ADMIN — SERTRALINE HYDROCHLORIDE 150 MG: 50 TABLET ORAL at 08:54

## 2025-03-16 ASSESSMENT — PAIN SCALES - GENERAL
PAINLEVEL_OUTOF10: 5
PAINLEVEL_OUTOF10: 0
PAINLEVEL_OUTOF10: 0
PAINLEVEL_OUTOF10: 5

## 2025-03-16 NOTE — PLAN OF CARE
Patient had witnessed fall  while getting up from Radha chair. No bruises or no injuries. Patient complained pain to right knee. Xray to right knee done.. Pending MD to review.  Lidocaine patch applied to right knee. Patient is now resting quietly.      Problem: Discharge Planning  Goal: Discharge to home or other facility with appropriate resources  Outcome: Progressing     Problem: Pain  Goal: Verbalizes/displays adequate comfort level or baseline comfort level  Outcome: Progressing     Problem: Respiratory - Adult  Goal: Achieves optimal ventilation and oxygenation  Outcome: Progressing     Problem: Safety - Adult  Goal: Free from fall injury  Outcome: Progressing

## 2025-03-16 NOTE — PROGRESS NOTES
Hospitalist Progress Note    NAME:   David Car   : 1975   MRN: [id number]     Date/Time: 3/16/2025 11:58 AM  Patient PCP: Ese Epps APRN - NP    Estimated discharge date:  Barriers: Pending placement        Metabolic encephalopathy resolved  History of bipolar disorder  Schizophrenia  Anxiety  CT head reassuring  Patient on multiple medications  Patient on Seroquel, sertraline, Keppra  Will check Keppra level  Resume psych meds per med rec   patient is on Seroquel 400 mg daily at bedtime  Resume home dose of methadone  : Clinically improving.  Back to her 6 L of home oxygen.  Patient requesting to talk to  as she would like to go to rehab  Hypotension resolved  Systolic blood pressure in the 80s this morning, ordered IV fluid bolus and maintenance fluid  Hold all BP meds  Blood pressure improved  S/p rapid response for unresponsiveness overnight due to possible methadone overdose on   Patient was unresponsive yesterday, responded to Narcan  This morning she was awake alert  Complaining of pain on her right upper extremity.  Patient has a chronic right upper extremity pain  Seizure disorder  Patient on Keppra  Also on Seroquel, and gabapentin  Check Keppra level  Discussed with neurology, order MRI of the brain, per neurology unlikely seizure activity  MRI of the brain showed no acute issues  Status post fall on 03/15  X-ray of the right knee reviewed, no fracture  COPD acute on chronic at 6 L baseline   History of pulmonary sarcoidosis POA (from previous chart)  Suspected vocal cord dysfunction ?POA (from previous chart)  No wheezing on auscultation  She already get 1 dose of 10 mg of Decadron  DuoNeb every 6 hours  Patient has a stridor  Outpatient follow-up with the ENT  History of right upper extremity DVT  Continue with Eliquis  Repeat vascular duplex did not show any DVT    Severe cervical spinal stenosis   Spoke with pharmacy, verified, restart methadone

## 2025-03-16 NOTE — PROGRESS NOTES
End of Shift Note    Bedside shift change report given to EBEN Avina (oncoming nurse) by Geo Aviles RN (offgoing nurse).  Report included the following information SBAR    Shift worked:  7a7p     Shift summary and any significant changes:     Patient continue to ask for anti anxiety medication. Complains RUE pain. Lidocaine patch applied per order.     Concerns for physician to address:       Zone phone for oncoming shift:          Activity:  Level of Assistance: Independent  Number times ambulated in hallways past shift: 0  Number of times OOB to chair past shift: 3    Cardiac:   Cardiac Monitoring: No      Cardiac Rhythm: Sinus rhythm    Access:  Current line(s): PIV    Genitourinary:   Urinary Status: Voiding    Respiratory:   O2 Device: Nasal cannula  Chronic home O2 use?: YES  Incentive spirometer at bedside: YES    GI:  Last BM (including prior to admit): 03/14/25  Current diet:  ADULT DIET; Regular  ADULT ORAL NUTRITION SUPPLEMENT; Breakfast, Lunch, Dinner; Standard High Calorie/High Protein Oral Supplement  Passing flatus: YES    Pain Management:   Patient states pain is manageable on current regimen: YES    Skin:  Brennen Scale Score: 20  Interventions: Wound Offloading (Prevention Methods): Bed, pressure reduction mattress, Repositioning, Pillows, Turning    Patient Safety:  Fall Risk: Nursing Judgement-Fall Risk High(Add Comments): Yes  Fall Risk Interventions  Nursing Judgement-Fall Risk High(Add Comments): Yes  Toilet Every 2 Hours-In Advance of Need: Yes  Hourly Visual Checks: Eyes closed, In bed  Fall Visual Posted: Armband, Socks, Fall sign posted  Room Door Open: Deferred to promote rest  Alarm On: Bed  Patient Moved Closer to Nursing Station: No    Active Consults:   IP CONSULT TO PSYCHIATRY  IP CONSULT TO NEUROLOGY  IP CONSULT TO PHARMACY  IP CONSULT TO ADDICTION MEDICINE  IP CONSULT TO PULMONOLOGY  IP CONSULT TO CASE MANAGEMENT    Length of Stay:  Expected LOS: 5  Actual LOS: 4    Geo Aviles  RN

## 2025-03-17 ENCOUNTER — APPOINTMENT (OUTPATIENT)
Facility: HOSPITAL | Age: 50
End: 2025-03-17
Payer: MEDICAID

## 2025-03-17 LAB
ANION GAP SERPL CALC-SCNC: 3 MMOL/L (ref 2–12)
BASOPHILS # BLD: 0.02 K/UL (ref 0–0.1)
BASOPHILS NFR BLD: 0.4 % (ref 0–1)
BUN SERPL-MCNC: 13 MG/DL (ref 6–20)
BUN/CREAT SERPL: 14 (ref 12–20)
CALCIUM SERPL-MCNC: 9 MG/DL (ref 8.5–10.1)
CHLORIDE SERPL-SCNC: 98 MMOL/L (ref 97–108)
CO2 SERPL-SCNC: 33 MMOL/L (ref 21–32)
CREAT SERPL-MCNC: 0.92 MG/DL (ref 0.55–1.02)
DIFFERENTIAL METHOD BLD: ABNORMAL
ECHO BSA: 2.12 M2
EOSINOPHIL # BLD: 0.02 K/UL (ref 0–0.4)
EOSINOPHIL NFR BLD: 0.4 % (ref 0–7)
ERYTHROCYTE [DISTWIDTH] IN BLOOD BY AUTOMATED COUNT: 12.1 % (ref 11.5–14.5)
GLUCOSE SERPL-MCNC: 116 MG/DL (ref 65–100)
HCT VFR BLD AUTO: 39.8 % (ref 35–47)
HGB BLD-MCNC: 12.7 G/DL (ref 11.5–16)
IMM GRANULOCYTES # BLD AUTO: 0.03 K/UL (ref 0–0.04)
IMM GRANULOCYTES NFR BLD AUTO: 0.5 % (ref 0–0.5)
LEVETIRACETAM SERPL-MCNC: 9.3 UG/ML (ref 10–40)
LYMPHOCYTES # BLD: 0.82 K/UL (ref 0.8–3.5)
LYMPHOCYTES NFR BLD: 14.4 % (ref 12–49)
MCH RBC QN AUTO: 31.1 PG (ref 26–34)
MCHC RBC AUTO-ENTMCNC: 31.9 G/DL (ref 30–36.5)
MCV RBC AUTO: 97.5 FL (ref 80–99)
MONOCYTES # BLD: 0.17 K/UL (ref 0–1)
MONOCYTES NFR BLD: 3 % (ref 5–13)
NEUTS SEG # BLD: 4.65 K/UL (ref 1.8–8)
NEUTS SEG NFR BLD: 81.3 % (ref 32–75)
NRBC # BLD: 0 K/UL (ref 0–0.01)
NRBC BLD-RTO: 0 PER 100 WBC
PLATELET # BLD AUTO: 226 K/UL (ref 150–400)
PMV BLD AUTO: 9.8 FL (ref 8.9–12.9)
POTASSIUM SERPL-SCNC: 4.4 MMOL/L (ref 3.5–5.1)
RBC # BLD AUTO: 4.08 M/UL (ref 3.8–5.2)
SODIUM SERPL-SCNC: 134 MMOL/L (ref 136–145)
WBC # BLD AUTO: 5.7 K/UL (ref 3.6–11)

## 2025-03-17 PROCEDURE — 2700000000 HC OXYGEN THERAPY PER DAY

## 2025-03-17 PROCEDURE — 6370000000 HC RX 637 (ALT 250 FOR IP): Performed by: STUDENT IN AN ORGANIZED HEALTH CARE EDUCATION/TRAINING PROGRAM

## 2025-03-17 PROCEDURE — 6360000002 HC RX W HCPCS: Performed by: INTERNAL MEDICINE

## 2025-03-17 PROCEDURE — 2060000000 HC ICU INTERMEDIATE R&B

## 2025-03-17 PROCEDURE — 6370000000 HC RX 637 (ALT 250 FOR IP): Performed by: PHYSICIAN ASSISTANT

## 2025-03-17 PROCEDURE — 6360000002 HC RX W HCPCS: Performed by: STUDENT IN AN ORGANIZED HEALTH CARE EDUCATION/TRAINING PROGRAM

## 2025-03-17 PROCEDURE — 2500000003 HC RX 250 WO HCPCS: Performed by: STUDENT IN AN ORGANIZED HEALTH CARE EDUCATION/TRAINING PROGRAM

## 2025-03-17 PROCEDURE — 6370000000 HC RX 637 (ALT 250 FOR IP): Performed by: INTERNAL MEDICINE

## 2025-03-17 PROCEDURE — 36415 COLL VENOUS BLD VENIPUNCTURE: CPT

## 2025-03-17 PROCEDURE — 85025 COMPLETE CBC W/AUTO DIFF WBC: CPT

## 2025-03-17 PROCEDURE — 94660 CPAP INITIATION&MGMT: CPT

## 2025-03-17 PROCEDURE — 71250 CT THORAX DX C-: CPT

## 2025-03-17 PROCEDURE — 80048 BASIC METABOLIC PNL TOTAL CA: CPT

## 2025-03-17 PROCEDURE — 94640 AIRWAY INHALATION TREATMENT: CPT

## 2025-03-17 RX ORDER — GUAIFENESIN 600 MG/1
1200 TABLET, EXTENDED RELEASE ORAL 2 TIMES DAILY
Status: DISCONTINUED | OUTPATIENT
Start: 2025-03-17 | End: 2025-03-22 | Stop reason: HOSPADM

## 2025-03-17 RX ORDER — HYDROXYZINE HYDROCHLORIDE 25 MG/1
25 TABLET, FILM COATED ORAL 3 TIMES DAILY PRN
Status: DISCONTINUED | OUTPATIENT
Start: 2025-03-17 | End: 2025-03-18

## 2025-03-17 RX ORDER — ACETYLCYSTEINE 200 MG/ML
200 SOLUTION ORAL; RESPIRATORY (INHALATION)
Status: DISCONTINUED | OUTPATIENT
Start: 2025-03-17 | End: 2025-03-22 | Stop reason: HOSPADM

## 2025-03-17 RX ADMIN — SENNOSIDES 8.6 MG: 8.6 TABLET ORAL at 21:49

## 2025-03-17 RX ADMIN — LEVETIRACETAM 500 MG: 500 TABLET, FILM COATED ORAL at 21:50

## 2025-03-17 RX ADMIN — SACUBITRIL AND VALSARTAN 1 TABLET: 24; 26 TABLET, FILM COATED ORAL at 09:20

## 2025-03-17 RX ADMIN — SERTRALINE HYDROCHLORIDE 150 MG: 50 TABLET ORAL at 09:20

## 2025-03-17 RX ADMIN — GUAIFENESIN 1200 MG: 600 TABLET, EXTENDED RELEASE ORAL at 21:50

## 2025-03-17 RX ADMIN — GABAPENTIN 600 MG: 300 CAPSULE ORAL at 15:13

## 2025-03-17 RX ADMIN — GABAPENTIN 600 MG: 300 CAPSULE ORAL at 09:19

## 2025-03-17 RX ADMIN — ARFORMOTEROL TARTRATE 15 MCG: 15 SOLUTION RESPIRATORY (INHALATION) at 07:54

## 2025-03-17 RX ADMIN — HYDROXYZINE HYDROCHLORIDE 10 MG: 10 TABLET ORAL at 09:28

## 2025-03-17 RX ADMIN — GABAPENTIN 600 MG: 300 CAPSULE ORAL at 21:50

## 2025-03-17 RX ADMIN — QUETIAPINE FUMARATE 400 MG: 100 TABLET ORAL at 21:50

## 2025-03-17 RX ADMIN — CLONIDINE HYDROCHLORIDE 0.1 MG: 0.1 TABLET ORAL at 09:19

## 2025-03-17 RX ADMIN — HYDROXYZINE HYDROCHLORIDE 25 MG: 25 TABLET, FILM COATED ORAL at 19:02

## 2025-03-17 RX ADMIN — SODIUM CHLORIDE, PRESERVATIVE FREE 10 ML: 5 INJECTION INTRAVENOUS at 09:21

## 2025-03-17 RX ADMIN — BUPROPION HYDROCHLORIDE 300 MG: 150 TABLET, EXTENDED RELEASE ORAL at 09:19

## 2025-03-17 RX ADMIN — METHADONE HYDROCHLORIDE 120 MG: 10 CONCENTRATE ORAL at 07:00

## 2025-03-17 RX ADMIN — IPRATROPIUM BROMIDE AND ALBUTEROL SULFATE 1 DOSE: 2.5; .5 SOLUTION RESPIRATORY (INHALATION) at 14:29

## 2025-03-17 RX ADMIN — CLONIDINE HYDROCHLORIDE 0.1 MG: 0.1 TABLET ORAL at 21:50

## 2025-03-17 RX ADMIN — IPRATROPIUM BROMIDE AND ALBUTEROL SULFATE 1 DOSE: 2.5; .5 SOLUTION RESPIRATORY (INHALATION) at 07:49

## 2025-03-17 RX ADMIN — APIXABAN 5 MG: 5 TABLET, FILM COATED ORAL at 21:50

## 2025-03-17 RX ADMIN — HYDROXYZINE HYDROCHLORIDE 10 MG: 10 TABLET ORAL at 05:33

## 2025-03-17 RX ADMIN — GUAIFENESIN 1200 MG: 600 TABLET, EXTENDED RELEASE ORAL at 09:27

## 2025-03-17 RX ADMIN — BUDESONIDE 1000 MCG: 0.5 INHALANT RESPIRATORY (INHALATION) at 07:53

## 2025-03-17 RX ADMIN — BUDESONIDE 1000 MCG: 0.5 INHALANT RESPIRATORY (INHALATION) at 20:08

## 2025-03-17 RX ADMIN — IPRATROPIUM BROMIDE AND ALBUTEROL SULFATE 1 DOSE: 2.5; .5 SOLUTION RESPIRATORY (INHALATION) at 19:58

## 2025-03-17 RX ADMIN — SACUBITRIL AND VALSARTAN 1 TABLET: 24; 26 TABLET, FILM COATED ORAL at 21:50

## 2025-03-17 RX ADMIN — ARFORMOTEROL TARTRATE 15 MCG: 15 SOLUTION RESPIRATORY (INHALATION) at 20:08

## 2025-03-17 RX ADMIN — LEVETIRACETAM 500 MG: 500 TABLET, FILM COATED ORAL at 09:20

## 2025-03-17 RX ADMIN — OXCARBAZEPINE 150 MG: 300 TABLET, FILM COATED ORAL at 21:55

## 2025-03-17 RX ADMIN — ACETYLCYSTEINE 200 MG: 200 INHALANT RESPIRATORY (INHALATION) at 20:00

## 2025-03-17 RX ADMIN — PREDNISONE 40 MG: 20 TABLET ORAL at 09:19

## 2025-03-17 RX ADMIN — APIXABAN 5 MG: 5 TABLET, FILM COATED ORAL at 09:20

## 2025-03-17 RX ADMIN — ACETYLCYSTEINE 200 MG: 200 INHALANT RESPIRATORY (INHALATION) at 14:29

## 2025-03-17 RX ADMIN — OXCARBAZEPINE 150 MG: 300 TABLET, FILM COATED ORAL at 09:20

## 2025-03-17 ASSESSMENT — PAIN SCALES - GENERAL
PAINLEVEL_OUTOF10: 5
PAINLEVEL_OUTOF10: 10
PAINLEVEL_OUTOF10: 0
PAINLEVEL_OUTOF10: 5
PAINLEVEL_OUTOF10: 8

## 2025-03-17 ASSESSMENT — PAIN DESCRIPTION - LOCATION
LOCATION: SHOULDER;LEG
LOCATION: KNEE;SHOULDER

## 2025-03-17 ASSESSMENT — PAIN DESCRIPTION - ORIENTATION: ORIENTATION: RIGHT

## 2025-03-17 NOTE — PROGRESS NOTES
Chart reviewed.Attempted to see pt for PT intervention howver pt currently off the floor. Will defer however continue to follow.    Loyda Quinteros, PT, PT

## 2025-03-17 NOTE — PROGRESS NOTES
End of Shift Note    Bedside shift change report given to Samina (oncoming nurse) by Allison Guajardo RN (offgoing nurse).  Report included the following information SBAR    Shift worked:  7a-7P     Shift summary and any significant changes:     Increased dosage in Anthrax to 25mg . CT of chest completed. Home O2 eval completed      Concerns for physician to address:         Zone phone for oncoming shift:            Activity:  Level of Assistance: Standby assist, set-up cues, supervision of patient - no hands on  Number times ambulated in hallways past shift: 1  Number of times OOB to chair past shift: 0    Cardiac:   Cardiac Monitoring: No      Cardiac Rhythm: Sinus rhythm    Access:  Current line(s): PIV     Genitourinary:   Urinary Status: Voiding, Bathroom privileges, Incontinent briefs    Respiratory:   O2 Device: None (Room air)  Chronic home O2 use?: YES  Incentive spirometer at bedside: YES    GI:  Last BM (including prior to admit): 03/12/25  Current diet:  ADULT DIET; Regular  ADULT ORAL NUTRITION SUPPLEMENT; Breakfast, Lunch, Dinner; Standard High Calorie/High Protein Oral Supplement  Passing flatus: YES    Pain Management:   Patient states pain is manageable on current regimen: YES    Skin:  Brennen Scale Score: 20  Interventions: Wound Offloading (Prevention Methods): Turning, Pillows, Repositioning    Patient Safety:  Fall Risk: Nursing Judgement-Fall Risk High(Add Comments): Yes  Fall Risk Interventions  Nursing Judgement-Fall Risk High(Add Comments): Yes  Toilet Every 2 Hours-In Advance of Need: Yes  Hourly Visual Checks: In bed, Awake  Fall Visual Posted: Armband, Socks, Fall sign posted  Room Door Open: Yes  Alarm On: Bed  Patient Moved Closer to Nursing Station: No    Active Consults:   IP CONSULT TO PSYCHIATRY  IP CONSULT TO NEUROLOGY  IP CONSULT TO PHARMACY  IP CONSULT TO ADDICTION MEDICINE  IP CONSULT TO PULMONOLOGY  IP CONSULT TO CASE MANAGEMENT    Length of Stay:  Expected LOS: 6  Actual LOS:  5    Allison Guajardo RN

## 2025-03-17 NOTE — PLAN OF CARE
Problem: Discharge Planning  Goal: Discharge to home or other facility with appropriate resources  Outcome: Progressing  Flowsheets (Taken 3/17/2025 0916)  Discharge to home or other facility with appropriate resources:   Arrange for needed discharge resources and transportation as appropriate   Identify barriers to discharge with patient and caregiver   Identify discharge learning needs (meds, wound care, etc)   Refer to discharge planning if patient needs post-hospital services based on physician order or complex needs related to functional status, cognitive ability or social support system     Problem: Pain  Goal: Verbalizes/displays adequate comfort level or baseline comfort level  Outcome: Progressing     Problem: Respiratory - Adult  Goal: Achieves optimal ventilation and oxygenation  3/17/2025 1118 by Allison Guajardo, RN  Outcome: Progressing  Flowsheets (Taken 3/17/2025 0916)  Achieves optimal ventilation and oxygenation:   Assess for changes in respiratory status   Assess for changes in mentation and behavior   Position to facilitate oxygenation and minimize respiratory effort   Initiate smoking cessation protocol as indicated   Oxygen supplementation based on oxygen saturation or arterial blood gases   Encourage broncho-pulmonary hygiene including cough, deep breathe, incentive spirometry   Assess the need for suctioning and aspirate as needed   Assess and instruct to report shortness of breath or any respiratory difficulty   Respiratory therapy support as indicated  3/17/2025 0757 by Bushra Seo, RT  Outcome: Progressing  Flowsheets (Taken 3/16/2025 2038 by Kailyn Live, RN)  Achieves optimal ventilation and oxygenation: Assess for changes in respiratory status     Problem: Safety - Adult  Goal: Free from fall injury  Outcome: Progressing
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
## 2025-03-17 NOTE — PROGRESS NOTES
Delmer ASTORGA MD   buPROPion (WELLBUTRIN XL) 300 MG extended release tablet Take 1 tablet by mouth every morning 2/12/25 3/14/25 Yes Delmer Obrien MD   spironolactone (ALDACTONE) 25 MG tablet Take 1 tablet by mouth daily 2/12/25 3/14/25 Yes Delmer Obrien MD   OXcarbazepine (TRILEPTAL) 150 MG tablet Take 1 tablet by mouth 2 times daily 2/12/25 3/14/25 Yes Delmer Obrien MD   levETIRAcetam (KEPPRA) 500 MG tablet Take 1 tablet by mouth 2 times daily 2/12/25 3/14/25 Yes Delmer Obrien MD   apixaban (ELIQUIS) 5 MG TABS tablet Take 1 tablet by mouth 2 times daily 2/12/25 3/14/25 Yes Delmer Obrien MD   fluticasone-umeclidin-vilant (TRELEGY ELLIPTA) 200-62.5-25 MCG/ACT AEPB inhaler Inhale 1 puff into the lungs daily 25  Yes Delmer Obrien MD   methadone (DOLOPHINE) 10 MG/ML solution Take 12 mLs by mouth daily. [facility] Cartwright  [address], [place] [address] [phone]   Yes ProviderSukhdeep MD   gabapentin (NEURONTIN) 600 MG tablet Take 1 tablet by mouth 3 times daily.   Yes Provider, MD Sukhdeep   albuterol sulfate HFA (PROVENTIL;VENTOLIN;PROAIR) 108 (90 Base) MCG/ACT inhaler Inhale 2 puffs into the lungs every 4 hours as needed 22  Yes Automatic Reconciliation, Ar   prazosin (MINIPRESS) 2 MG capsule Take 2 capsules by mouth nightly 25   Delmer Obrien MD     Allergies   Allergen Reactions    Vancomycin Anaphylaxis    Ketorolac      Other reaction(s): Unable to Obtain  From [facility] paperwork 22    Mirtazapine      Other reaction(s): Unable to Obtain  From [facility] paperwork on 22    Tomato Swelling     Tongue    Trazodone Angioedema      Social History     Tobacco Use    Smoking status: Every Day     Current packs/day: 0.00     Types: Cigarettes     Last attempt to quit: 8/3/2017     Years since quittin.6    Smokeless tobacco: Never   Substance Use Topics    Alcohol use: No      Family History   Problem Relation Age of Onset    Hypertension Father     Hypertension  150 mg Oral BID    sertraline (ZOLOFT) tablet 150 mg  150 mg Oral Daily    senna (SENOKOT) tablet 8.6 mg  1 tablet Oral Nightly    polyethylene glycol (GLYCOLAX) packet 17 g  17 g Oral Daily    apixaban (ELIQUIS) tablet 5 mg  5 mg Oral BID    budesonide (PULMICORT) nebulizer suspension 1,000 mcg  1 mg Nebulization BID RT    And    arformoterol tartrate (BROVANA) nebulizer solution 15 mcg  15 mcg Nebulization BID RT    levETIRAcetam (KEPPRA) tablet 500 mg  500 mg Oral BID    sacubitril-valsartan (ENTRESTO) 24-26 MG per tablet 1 tablet  1 tablet Oral BID    [Held by provider] spironolactone (ALDACTONE) tablet 25 mg  25 mg Oral Daily    sodium chloride flush 0.9 % injection 5-40 mL  5-40 mL IntraVENous 2 times per day    sodium chloride flush 0.9 % injection 5-40 mL  5-40 mL IntraVENous PRN    0.9 % sodium chloride infusion   IntraVENous PRN    ondansetron (ZOFRAN-ODT) disintegrating tablet 4 mg  4 mg Oral Q8H PRN    Or    ondansetron (ZOFRAN) injection 4 mg  4 mg IntraVENous Q6H PRN    polyethylene glycol (GLYCOLAX) packet 17 g  17 g Oral Daily PRN    acetaminophen (TYLENOL) tablet 650 mg  650 mg Oral Q6H PRN    Or    acetaminophen (TYLENOL) suppository 650 mg  650 mg Rectal Q6H PRN    naloxone (NARCAN) injection 0.2 mg  0.2 mg IntraVENous Q5 Min PRN       Labs:  ABG Invalid input(s): \"PHI\", \"PCO2I\", \"PO2I\", \"HCO3I\", \"SO2I\", \"FIO2I\"     CBC No results for input(s): \"WBC\", \"HGB\", \"HCT\", \"PLT\", \"MCV\", \"MCH\" in the last 72 hours.       Metabolic  Panel No results for input(s): \"NA\", \"K\", \"CL\", \"CO2\", \"GLU\", \"BUN\", \"MG\", \"PHOS\", \"ALT\", \"INR\" in the last 72 hours.    Invalid input(s): \"CREA\", \"CA\", \"ALB\", \"TBIL\", \"SGOT\"       Pertinent Labs                Kike Sandra MD  3/17/2025

## 2025-03-17 NOTE — CARE COORDINATION
Transition of Care Plan:    RUR: 16%  Prior Level of Functioning: Dependent  Disposition: Home with HH   WENDY: 3/18/25  If HH: Date FOC offered: 3/17/25  Date FOC received:   Accepting facility:   Date authorization started with reference number:   Date authorization received and expires:   Follow up appointments: PCP, Specialist  DME needed: none  Transportation at discharge: Medicaid transportation  IM/IMM Medicare/ letter given:  N/A -Medicaid  Is patient a [place] and connected with VA? N/A   If yes, was  transfer form completed and VA notified?   Caregiver Contact: Mother- Brenda Khoury -[phone] (Home Phone)   Discharge Caregiver contacted prior to discharge? CM to discuss with mother and pt  Care Conference needed? none  Barriers to discharge: Medical clearance, Pulmonary clearance    Updated Note 4:15pm  CM met with pt to discuss d/c plan. When CM asked pt if she was interested in LTC, she became tearful and said yes.  CM informed pt that CM would call to inquire about the available LTC beds at facility.     CM called Yumiko Alcaraz to update on d/c plan. Per Yumiko, to has been to [facility] in the past and has a [facility] ( a copy was provided to VILMA).    CM  inquired with [facility] about LTC beds. Per Thomas, send referrals to [facility].    Referrals sent via CC link.    Initial note 2:33pm  CM reviewed pt chart. Pt not medically stable for d/c at this time.    CM spoke with Yumiko Alcaraz  with [facility], [phone], [phone]. Per Izabela , pt is interested in LTC.     VILMA shared she would discuss with pt.     LISA BecerraN,RN  Care   [facility]  Phone: [phone]

## 2025-03-17 NOTE — PROGRESS NOTES
Hospitalist Progress Note    NAME:   David Car   : 1975   MRN: [id number]     Date/Time: 3/17/2025 1:37 PM  Patient PCP: Ese Epps APRN - NP    Estimated discharge date:  Barriers: pulm clearance         COPD acute on chronic at 6 L baseline   History of pulmonary sarcoidosis POA (from previous chart)  Suspected vocal cord dysfunction ?POA (from previous chart)  No wheezing on auscultation  She already get 1 dose of 10 mg of Decadron  DuoNeb every 6 hours  Patient has a stridor  Outpatient follow-up with the ENT  : Discussed with pulmonologist, he is concerned about patient expiratory wheezing in addition to her stridor  CT of the chest did not show any tracheomalacia, no mucous plugging  He recommended to start Mucomyst nebulizers, Mucinex  Continue with prednisone  Metabolic encephalopathy resolved  History of bipolar disorder  Schizophrenia  Anxiety  CT head reassuring  Patient on multiple medications  Patient on Seroquel, sertraline, Keppra  Will check Keppra level  Resume psych meds per med rec   patient is on Seroquel 400 mg daily at bedtime  Resume home dose of methadone  : Clinically improving.  Back to her 6 L of home oxygen.  Patient requesting to talk to  as she would like to go to rehab'  : Discussed with PT OT, patient is not a candidate for rehab, is high functioning, needs home health    Hypotension resolved  Systolic blood pressure in the 80s this morning, ordered IV fluid bolus and maintenance fluid  Hold all BP meds  Blood pressure improved  S/p rapid response for unresponsiveness overnight due to possible methadone overdose on   Patient was unresponsive yesterday, responded to Narcan  This morning she was awake alert  Complaining of pain on her right upper extremity.  Patient has a chronic right upper extremity pain  Seizure disorder  Patient on Keppra  Also on Seroquel, and gabapentin  Discussed with neurology, order MRI of the brain,  Stelara Counseling:  I discussed with the patient the risks of ustekinumab including but not limited to immunosuppression, malignancy, posterior leukoencephalopathy syndrome, and serious infections.  The patient understands that monitoring is required including a PPD at baseline and must alert us or the primary physician if symptoms of infection or other concerning signs are noted.

## 2025-03-17 NOTE — PROGRESS NOTES
Home Oxygen    Resting (Room Air):  SpO2:  94  HR:  104    Resting (On O2):  SpO2:     HR:    O2 Device:    O2 Flow Rate (L/min):    FIO2 (%):    Comments:      During Walk (Room Air):  SpO2:  94-96  HR:  110  Walk/Assistance Device:    Rate of Dyspnea:    Symptoms: SOB, wheezing   Comments:      During Walk (On O2):  SpO2:    HR:    O2 Device:    O2 Flow Rate (L/min):    O2 Flow Rate:    O2 Saturation:    O2 Flow Rate:    O2 Saturation:    O2 Flow Rate:    O2 Saturation:    O2 Flow Rate:    O2 Saturation:    O2 Flow Rate:    O2 Saturation:    Walk/Assistance Device:    Rate of Dyspnea:    Symptoms:    Comments:      After Walk:  SpO2:  95  HR:    O2 Device:  RA  O2 Flow Rate (L/min):    FIO2 (%):    Rate of Dyspnea:  SOB, Wheezing  Symptoms:    Comments:    Does the Patient Qualify for Home O2:    Liter Flow at Rest:    Liter Flow on Exertion:    Does the Patient Need Portable Oxygen Tanks:        Additional Comments: Sats remained above 90% on RA. Pt places on 2L for anxiety    Electronically signed by Priscilla Carrasco RN on 3/17/2025 at 6:53 PM

## 2025-03-18 PROCEDURE — 94640 AIRWAY INHALATION TREATMENT: CPT

## 2025-03-18 PROCEDURE — 97530 THERAPEUTIC ACTIVITIES: CPT | Performed by: OCCUPATIONAL THERAPIST

## 2025-03-18 PROCEDURE — 94664 DEMO&/EVAL PT USE INHALER: CPT

## 2025-03-18 PROCEDURE — 2060000000 HC ICU INTERMEDIATE R&B

## 2025-03-18 PROCEDURE — 97535 SELF CARE MNGMENT TRAINING: CPT | Performed by: OCCUPATIONAL THERAPIST

## 2025-03-18 PROCEDURE — 97116 GAIT TRAINING THERAPY: CPT

## 2025-03-18 PROCEDURE — 6360000002 HC RX W HCPCS: Performed by: STUDENT IN AN ORGANIZED HEALTH CARE EDUCATION/TRAINING PROGRAM

## 2025-03-18 PROCEDURE — 97530 THERAPEUTIC ACTIVITIES: CPT

## 2025-03-18 PROCEDURE — 6370000000 HC RX 637 (ALT 250 FOR IP): Performed by: STUDENT IN AN ORGANIZED HEALTH CARE EDUCATION/TRAINING PROGRAM

## 2025-03-18 PROCEDURE — 2500000003 HC RX 250 WO HCPCS: Performed by: STUDENT IN AN ORGANIZED HEALTH CARE EDUCATION/TRAINING PROGRAM

## 2025-03-18 PROCEDURE — 6370000000 HC RX 637 (ALT 250 FOR IP): Performed by: PHYSICIAN ASSISTANT

## 2025-03-18 PROCEDURE — 98960 EDU&TRN PT SELF-MGMT NQHP 1: CPT

## 2025-03-18 PROCEDURE — 2700000000 HC OXYGEN THERAPY PER DAY

## 2025-03-18 PROCEDURE — 6370000000 HC RX 637 (ALT 250 FOR IP): Performed by: INTERNAL MEDICINE

## 2025-03-18 PROCEDURE — 94010 BREATHING CAPACITY TEST: CPT

## 2025-03-18 PROCEDURE — 6360000002 HC RX W HCPCS: Performed by: INTERNAL MEDICINE

## 2025-03-18 RX ORDER — LORAZEPAM 0.5 MG/1
0.5 TABLET ORAL 2 TIMES DAILY PRN
Status: DISCONTINUED | OUTPATIENT
Start: 2025-03-18 | End: 2025-03-22 | Stop reason: HOSPADM

## 2025-03-18 RX ADMIN — SERTRALINE HYDROCHLORIDE 150 MG: 50 TABLET ORAL at 08:17

## 2025-03-18 RX ADMIN — SACUBITRIL AND VALSARTAN 1 TABLET: 24; 26 TABLET, FILM COATED ORAL at 08:18

## 2025-03-18 RX ADMIN — GABAPENTIN 600 MG: 300 CAPSULE ORAL at 08:18

## 2025-03-18 RX ADMIN — LEVETIRACETAM 500 MG: 500 TABLET, FILM COATED ORAL at 08:18

## 2025-03-18 RX ADMIN — GABAPENTIN 600 MG: 300 CAPSULE ORAL at 21:30

## 2025-03-18 RX ADMIN — IPRATROPIUM BROMIDE AND ALBUTEROL SULFATE 1 DOSE: 2.5; .5 SOLUTION RESPIRATORY (INHALATION) at 07:36

## 2025-03-18 RX ADMIN — BUPROPION HYDROCHLORIDE 300 MG: 150 TABLET, EXTENDED RELEASE ORAL at 08:17

## 2025-03-18 RX ADMIN — METHADONE HYDROCHLORIDE 120 MG: 10 CONCENTRATE ORAL at 08:18

## 2025-03-18 RX ADMIN — LORAZEPAM 0.5 MG: 0.5 TABLET ORAL at 11:20

## 2025-03-18 RX ADMIN — QUETIAPINE FUMARATE 400 MG: 100 TABLET ORAL at 21:31

## 2025-03-18 RX ADMIN — POLYETHYLENE GLYCOL 3350 17 G: 17 POWDER, FOR SOLUTION ORAL at 11:19

## 2025-03-18 RX ADMIN — GABAPENTIN 600 MG: 300 CAPSULE ORAL at 13:40

## 2025-03-18 RX ADMIN — CLONIDINE HYDROCHLORIDE 0.1 MG: 0.1 TABLET ORAL at 08:18

## 2025-03-18 RX ADMIN — OXCARBAZEPINE 150 MG: 300 TABLET, FILM COATED ORAL at 08:18

## 2025-03-18 RX ADMIN — SPIRONOLACTONE 25 MG: 25 TABLET ORAL at 08:17

## 2025-03-18 RX ADMIN — OXCARBAZEPINE 150 MG: 300 TABLET, FILM COATED ORAL at 21:31

## 2025-03-18 RX ADMIN — CLONIDINE HYDROCHLORIDE 0.1 MG: 0.1 TABLET ORAL at 21:30

## 2025-03-18 RX ADMIN — APIXABAN 5 MG: 5 TABLET, FILM COATED ORAL at 08:17

## 2025-03-18 RX ADMIN — LORAZEPAM 0.5 MG: 0.5 TABLET ORAL at 21:42

## 2025-03-18 RX ADMIN — SODIUM CHLORIDE, PRESERVATIVE FREE 10 ML: 5 INJECTION INTRAVENOUS at 08:22

## 2025-03-18 RX ADMIN — ACETYLCYSTEINE 200 MG: 200 INHALANT RESPIRATORY (INHALATION) at 07:36

## 2025-03-18 RX ADMIN — BUDESONIDE 1000 MCG: 0.5 INHALANT RESPIRATORY (INHALATION) at 07:41

## 2025-03-18 RX ADMIN — APIXABAN 5 MG: 5 TABLET, FILM COATED ORAL at 21:31

## 2025-03-18 RX ADMIN — LEVETIRACETAM 500 MG: 500 TABLET, FILM COATED ORAL at 21:30

## 2025-03-18 RX ADMIN — GUAIFENESIN 1200 MG: 600 TABLET, EXTENDED RELEASE ORAL at 21:31

## 2025-03-18 RX ADMIN — SACUBITRIL AND VALSARTAN 1 TABLET: 24; 26 TABLET, FILM COATED ORAL at 21:31

## 2025-03-18 RX ADMIN — GUAIFENESIN 1200 MG: 600 TABLET, EXTENDED RELEASE ORAL at 08:18

## 2025-03-18 RX ADMIN — ARFORMOTEROL TARTRATE 15 MCG: 15 SOLUTION RESPIRATORY (INHALATION) at 07:40

## 2025-03-18 RX ADMIN — PREDNISONE 40 MG: 20 TABLET ORAL at 08:17

## 2025-03-18 RX ADMIN — HYDROXYZINE HYDROCHLORIDE 25 MG: 25 TABLET, FILM COATED ORAL at 08:37

## 2025-03-18 ASSESSMENT — PULMONARY FUNCTION TESTS
FEV1 (%PREDICTED): 25
POST BRONCHODILATOR FEV1/FVC: 32

## 2025-03-18 ASSESSMENT — PAIN SCALES - GENERAL
PAINLEVEL_OUTOF10: 9
PAINLEVEL_OUTOF10: 0
PAINLEVEL_OUTOF10: 6

## 2025-03-18 ASSESSMENT — COPD QUESTIONNAIRES
QUESTION3_CHESTTIGHTNESS: 5
QUESTION2_CHESTPHLEGM: 5
QUESTION5_HOMEACTIVITIES: 5
QUESTION8_ENERGYLEVEL: 5
GOLD_GROUP: GROUP E
CAT_TOTALSCORE: 38
QUESTION1_COUGHFREQUENCY: 3
QUESTION7_SLEEPQUALITY: 5
QUESTION6_LEAVINGHOUSE: 5
QUESTION4_WALKINCLINE: 5
GOLD_GRADE: 4

## 2025-03-18 NOTE — PROGRESS NOTES
Spiritual Health History and Assessment/Progress Note  [facility]    Loneliness/Social Isolation,  ,  ,      Name: David Car MRN: [id number]    Age: 49 y.o.     Sex: female   Language: English   Scientology: Latter-day   COPD with acute exacerbation (HCC)     Date: 3/18/2025            Total Time Calculated: 14 min              Spiritual Assessment began in [facility] 2 PROGRESSIVE CARE        Referral/Consult From: Rounding   Encounter Overview/Reason: Loneliness/Social Isolation  Service Provided For: Patient    Parul, Belief, Meaning:   Patient unable to assess at this time  Family/Friends No family/friends present      Importance and Influence:  Patient unable to assess at this time  Family/Friends No family/friends present    Community:  Patient Other: unable to assess at this time  Family/Friends No family/friends present    Assessment and Plan of Care:     Patient Interventions include: Other: patient declined visit   Family/Friends Interventions include: No family/friends present    Patient Plan of Care: No spiritual needs identified for follow-up and Spiritual Care available upon further referral  Family/Friends Plan of Care: No family/friends present    Electronically signed by Chaplain UMBERTO on 3/18/2025 at 11:34 AM

## 2025-03-18 NOTE — PROGRESS NOTES
End of Shift Note    Bedside shift change report given to Samina (oncoming nurse) by Allison Guajardo RN (offgoing nurse).  Report included the following information SBAR    Shift worked:  7A-7P     Shift summary and any significant changes:     Ativan 0.5mg ordered. Plan for d/c to SNF tomorrow      Concerns for physician to address:         Zone phone for oncoming shift:   [phone]       Activity:  Level of Assistance: Minimal assist, patient does 75% or more  Number times ambulated in hallways past shift: 1  Number of times OOB to chair past shift: 2    Cardiac:   Cardiac Monitoring: Yes      Cardiac Rhythm: Sinus rhythm    Access:  Current line(s): PIV     Genitourinary:   Urinary Status: Voiding, Bathroom privileges    Respiratory:   O2 Device: Nasal cannula  Chronic home O2 use?: YES  Incentive spirometer at bedside: YES    GI:  Last BM (including prior to admit): 03/12/25  Current diet:  ADULT DIET; Regular  ADULT ORAL NUTRITION SUPPLEMENT; Breakfast, Lunch, Dinner; Standard High Calorie/High Protein Oral Supplement  Passing flatus: YES    Pain Management:   Patient states pain is manageable on current regimen: YES    Skin:  Brennen Scale Score: 20  Interventions: Wound Offloading (Prevention Methods): Repositioning, Pillows    Patient Safety:  Fall Risk: Nursing Judgement-Fall Risk High(Add Comments): Yes  Fall Risk Interventions  Nursing Judgement-Fall Risk High(Add Comments): Yes  Toilet Every 2 Hours-In Advance of Need: Yes  Hourly Visual Checks: In bed, Awake  Fall Visual Posted: Armband, Socks, Fall sign posted  Room Door Open: Yes  Alarm On: Bed  Patient Moved Closer to Nursing Station: No    Active Consults:   IP CONSULT TO PSYCHIATRY  IP CONSULT TO NEUROLOGY  IP CONSULT TO PHARMACY  IP CONSULT TO ADDICTION MEDICINE  IP CONSULT TO PULMONOLOGY  IP CONSULT TO CASE MANAGEMENT    Length of Stay:  Expected LOS: 7  Actual LOS: 6    Allison Guajardo RN

## 2025-03-18 NOTE — PROGRESS NOTES
Predictive Model Details          34 (Caution)  Factor Value    Calculated 3/18/2025 00:49 32% Supplemental oxygen Nasal cannula    Deterioration Index Model 26% Respiratory rate 12     24% Age 49 years old     6% Potassium 4.4 mmol/L     5% Sodium abnormal (134 mmol/L)     4% Systolic 136     2% Pulse oximetry 99 %     2% Pulse 90     0% Temperature 98 °F (36.7 °C)     0% Hematocrit 39.8 %     0% WBC count 5.7 K/uL       End of Shift Note    Bedside shift change report given to EBEN Wells (oncoming nurse) by Samina Sanchez RN (offgoing nurse).  Report included the following information SBAR, ED Summary, Intake/Output, MAR, Accordion, Recent Results, Med Rec Status, Cardiac Rhythm NSR, Alarm Parameters , and Quality Measures    Shift worked:  Night     Shift summary and any significant changes:    Placed back on tele- Per floor protocol pt is step-down requires continues tele  Pt impulsive, refuses stay with me/ bedside commute    Concerns for physician to address:  See above     Zone phone for oncoming shift:   [phone]       Activity:  Level of Assistance: Minimal assist, patient does 75% or more  Number times ambulated in hallways past shift: 0  Number of times OOB to chair past shift: 1    Cardiac:   Cardiac Monitoring: Yes      Cardiac Rhythm: Sinus rhythm    Access:  Current line(s): PIV     Genitourinary:   Urinary Status: Voiding, Bathroom privileges    Respiratory:   O2 Device: Nasal cannula  Chronic home O2 use?: YES  Incentive spirometer at bedside: NO    GI:  Last BM (including prior to admit): 03/12/25  Current diet:  ADULT DIET; Regular  ADULT ORAL NUTRITION SUPPLEMENT; Breakfast, Lunch, Dinner; Standard High Calorie/High Protein Oral Supplement  Passing flatus: YES    Pain Management:   Patient states pain is manageable on current regimen: YES    Skin:  Brennen Scale Score: 20  Interventions: Wound Offloading (Prevention Methods): Repositioning, Pillows    Patient Safety:  Fall Risk: Nursing Judgement-Fall  Risk High(Add Comments): Yes  Fall Risk Interventions  Nursing Judgement-Fall Risk High(Add Comments): Yes  Toilet Every 2 Hours-In Advance of Need: Yes  Hourly Visual Checks: In bed, Awake  Fall Visual Posted: Armband, Socks, Fall sign posted  Room Door Open: Deferred to promote rest  Alarm On: Bed  Patient Moved Closer to Nursing Station: No    Active Consults:   IP CONSULT TO PSYCHIATRY  IP CONSULT TO NEUROLOGY  IP CONSULT TO PHARMACY  IP CONSULT TO ADDICTION MEDICINE  IP CONSULT TO PULMONOLOGY  IP CONSULT TO CASE MANAGEMENT    Length of Stay:  Expected LOS: 6  Actual LOS: 6    Samina Sanchez RN

## 2025-03-18 NOTE — PLAN OF CARE
Problem: Discharge Planning  Goal: Discharge to home or other facility with appropriate resources  Outcome: Progressing  Flowsheets (Taken 3/18/2025 0800)  Discharge to home or other facility with appropriate resources:   Identify barriers to discharge with patient and caregiver   Arrange for needed discharge resources and transportation as appropriate   Identify discharge learning needs (meds, wound care, etc)   Refer to discharge planning if patient needs post-hospital services based on physician order or complex needs related to functional status, cognitive ability or social support system     Problem: Pain  Goal: Verbalizes/displays adequate comfort level or baseline comfort level  Outcome: Progressing     Problem: Respiratory - Adult  Goal: Achieves optimal ventilation and oxygenation  3/18/2025 1017 by Allison Guajardo, RN  Outcome: Progressing  Flowsheets (Taken 3/18/2025 0800)  Achieves optimal ventilation and oxygenation:   Assess for changes in respiratory status   Assess for changes in mentation and behavior   Position to facilitate oxygenation and minimize respiratory effort   Oxygen supplementation based on oxygen saturation or arterial blood gases   Initiate smoking cessation protocol as indicated   Encourage broncho-pulmonary hygiene including cough, deep breathe, incentive spirometry   Assess the need for suctioning and aspirate as needed   Assess and instruct to report shortness of breath or any respiratory difficulty   Respiratory therapy support as indicated  3/18/2025 0744 by Bushra Seo, RT  Outcome: Progressing     Problem: Safety - Adult  Goal: Free from fall injury  Outcome: Progressing     Problem: ABCDS Injury Assessment  Goal: Absence of physical injury  Outcome: Progressing

## 2025-03-18 NOTE — PLAN OF CARE
Problem: Physical Therapy - Adult  Goal: By Discharge: Performs mobility at highest level of function for planned discharge setting.  See evaluation for individualized goals.  Description: FUNCTIONAL STATUS PRIOR TO ADMISSION: Ambulates household distances with use of rollator walker. Wears 6L O2 at baseline. History of multiple falls.     HOME SUPPORT PRIOR TO ADMISSION: The patient currently lives with 80 yr old mother.  checks on her daily and assists with ADLs.     Physical Therapy Goals  Initiated 3/13/2025  1.  Patient will move from supine to sit and sit to supine, scoot up and down, and roll side to side in bed with supervision/set-up within 7 day(s).    2.  Patient will perform sit to stand with supervision/set-up within 7 day(s).  3.  Patient will transfer from bed to chair and chair to bed with supervision/set-up using the least restrictive device within 7 day(s).  4.  Patient will ambulate with supervision/set-up for 150 feet with the least restrictive device within 7 day(s).   5.  Patient will ascend/descend 4 stairs with 1 handrail(s) with supervision/set-up within 7 day(s).   Outcome: Progressing     PHYSICAL THERAPY TREATMENT    Patient: David Car (49 y.o. female)  Date: 3/18/2025  Diagnosis: COPD exacerbation (HCC) [J44.1]  COPD with acute exacerbation (HCC) [J44.1]  Acute hypoxic respiratory failure (HCC) [J96.01] COPD with acute exacerbation (HCC)      Precautions: Restrictions/Precautions  Restrictions/Precautions: Fall Risk          ASSESSMENT:  Patient continues to benefit from skilled PT services and is progressing towards goals. Able to progress overall gait distance with rolling walker with 4LPM of O2 at rest and 6LPM with activity. Pt with fluctuations in gait pattern, often taking exaggerated steps and attempting to scissor BLEs but no overt loss of balance. Needing cues for widened stance, stepping, and safe assistive device management. Heavy reliance on UEs and quick fatigue

## 2025-03-18 NOTE — PLAN OF CARE
Problem: Occupational Therapy - Adult  Goal: By Discharge: Performs self-care activities at highest level of function for planned discharge setting.  See evaluation for individualized goals.  Description: FUNCTIONAL STATUS PRIOR TO ADMISSION:  Patient reports being able to perform toileting, self feeding and standing grooming independently, requires max A for all dressing and bathing due to her poor activity tolerance, and ambulates with a rollator at a mod I level. Her  provided assist for ADLs and IADLs. She is on 6L NC at all times at baseline.  HOME SUPPORT: Patient has been living with her 79 y/o mother recently, but her  checks on her daily and assists her with ADLs.     Occupational Therapy Goals:  Initiated 3/13/2025  1.  Patient will perform grooming standing at sink for 3 minutes with Supervision within 7 day(s).  2.  Patient will perform upper body dressing with Moderate Assist and use of pacing techniques within 7 day(s).  3.  Patient will perform lower body dressing with Moderate Assist and use of pacing techniques within 7 day(s).  4.  Patient will perform toilet transfers with Stand by Assist  within 7 day(s).  5.  Patient will perform all aspects of toileting with Stand by Assist and use of pacing techniques within 7 day(s).  6.  Patient will sponge bathing with Moderate Assist and use of pacing techniques within 7 day(s).     Outcome: Progressing     OCCUPATIONAL THERAPY TREATMENT  Patient: David Car (49 y.o. female)  Date: 3/18/2025  Primary Diagnosis: COPD exacerbation (HCC) [J44.1]  COPD with acute exacerbation (HCC) [J44.1]  Acute hypoxic respiratory failure (HCC) [J96.01]       Precautions: Bed Alarm, Fall Risk                Chart, occupational therapy assessment, plan of care, and goals were reviewed.    ASSESSMENT  Patient continues to benefit from skilled OT services and is slowly progressing towards goals. Pt was motivated to participate and had improved endurance, LB

## 2025-03-18 NOTE — PROGRESS NOTES
Hospitalist Progress Note    NAME:   David Car   : 1975   MRN: [id number]     Date/Time: 3/18/2025 3:26 PM  Patient PCP: Ese Epps APRN - NP    Estimated discharge date:  Barriers: pulm clearance         COPD acute on chronic at 6 L baseline   History of pulmonary sarcoidosis POA (from previous chart)  Suspected vocal cord dysfunction ?POA (from previous chart)  No wheezing on auscultation  She already get 1 dose of 10 mg of Decadron  DuoNeb every 6 hours  Patient has a stridor  Outpatient follow-up with the ENT  : Discussed with pulmonologist, he is concerned about patient expiratory wheezing in addition to her stridor  CT of the chest did not show any tracheomalacia, no mucous plugging  He recommended to start Mucomyst nebulizers, Mucinex  Continue with prednisone  : Patient desaturating while working with therapy, was initially at 4 L at rest, activity 1.26 L with activity  Discussed with Dr. Sandra CT of the chest results, questionable tracheomalacia  Metabolic encephalopathy resolved  History of bipolar disorder  Schizophrenia  Anxiety  CT head reassuring  Patient on multiple medications  Patient on Seroquel, sertraline, Keppra  Will check Keppra level  Resume psych meds per med rec   patient is on Seroquel 400 mg daily at bedtime  Resume home dose of methadone  : Clinically improving.  Back to her 6 L of home oxygen.  Patient requesting to talk to  as she would like to go to rehab'  : Discussed with PT OT, patient is not a candidate for rehab, is high functioning, needs home health  : Discussed with case management, plan is for patient to go to rehab  Hypotension resolved  Systolic blood pressure in the 80s this morning, ordered IV fluid bolus and maintenance fluid  Hold all BP meds  Blood pressure improved  S/p rapid response for unresponsiveness overnight due to possible methadone overdose on   Patient was unresponsive yesterday, responded  to face encounter and independently examined this patient on 3/18/2025, as outlined below:  PHYSICAL EXAM:  General: Alert, cooperative  EENT:  EOMI. Anicteric sclerae.  Resp:  CTA bilaterally, no wheezing or rales.  No accessory muscle use  CV:  Regular  rhythm,  No edema  GI:  Soft, Non distended, Non tender.  +Bowel sounds  Neurologic:  Alert and oriented X 3, normal speech,   Psych:   fair insight. Not anxious nor agitated  Skin:  No rashes.  No jaundice    Reviewed most current lab test results and cultures  YES  Reviewed most current radiology test results   YES  Review and summation of old records today    NO  Reviewed patient's current orders and MAR    YES  PMH/SH reviewed - no change compared to H&P    Procedures: see electronic medical records for all procedures/Xrays and details which were not copied into this note but were reviewed prior to creation of Plan.      LABS:  I reviewed today's most current labs and imaging studies.  Pertinent labs include:  Recent Labs     03/17/25  1425   WBC 5.7   HGB 12.7   HCT 39.8          Recent Labs     03/17/25  1239   *   K 4.4   CL 98   CO2 33*   GLUCOSE 116*   BUN 13   CREATININE 0.92   CALCIUM 9.0         Signed: Kinjal Charlton MD

## 2025-03-18 NOTE — PLAN OF CARE
Problem: Discharge Planning  Goal: Discharge to home or other facility with appropriate resources  3/17/2025 2006 by Samina Sanchez RN  Outcome: Progressing  3/17/2025 1118 by Allison Guajardo RN  Outcome: Progressing  Flowsheets (Taken 3/17/2025 0916)  Discharge to home or other facility with appropriate resources:   Arrange for needed discharge resources and transportation as appropriate   Identify barriers to discharge with patient and caregiver   Identify discharge learning needs (meds, wound care, etc)   Refer to discharge planning if patient needs post-hospital services based on physician order or complex needs related to functional status, cognitive ability or social support system     Problem: Pain  Goal: Verbalizes/displays adequate comfort level or baseline comfort level  3/17/2025 2006 by Samina Sanchez RN  Outcome: Progressing  3/17/2025 1118 by Allison Guajardo RN  Outcome: Progressing     Problem: Respiratory - Adult  Goal: Achieves optimal ventilation and oxygenation  3/17/2025 2006 by Samina Sanchez RN  Outcome: Progressing  3/17/2025 1118 by Allison Guajardo RN  Outcome: Progressing  Flowsheets (Taken 3/17/2025 0916)  Achieves optimal ventilation and oxygenation:   Assess for changes in respiratory status   Assess for changes in mentation and behavior   Position to facilitate oxygenation and minimize respiratory effort   Initiate smoking cessation protocol as indicated   Oxygen supplementation based on oxygen saturation or arterial blood gases   Encourage broncho-pulmonary hygiene including cough, deep breathe, incentive spirometry   Assess the need for suctioning and aspirate as needed   Assess and instruct to report shortness of breath or any respiratory difficulty   Respiratory therapy support as indicated  3/17/2025 0757 by Bushra Seo, RT  Outcome: Progressing  Flowsheets (Taken 3/16/2025 2038 by Kailyn Live, RN)  Achieves optimal ventilation and oxygenation: Assess for changes in respiratory status      Problem: Safety - Adult  Goal: Free from fall injury  3/17/2025 2006 by Samina Sanchez, RN  Outcome: Progressing  3/17/2025 1118 by Alliosn Guajardo, RN  Outcome: Progressing

## 2025-03-18 NOTE — PROGRESS NOTES
obvious tracheobronchomalacia described on CT scan reports.  Static appearance though of proximal left and right mainstem bronchi does show concavity posteriorly.  Discussed with radiology.  Another scan could be performed with more vigorous inspiratory expiratory maneuvers but to what end?  Physiology of possible tracheobronchomalacia explained to the patient.  No medications can reverse this.  Can occur with longstanding steroid use and has been associated with other conditions including panniculitis.  Encourage patient to attenuate her cough intensity otherwise she risks dynamic airway collapse, more shortness of breath and trapped secretions.  Discussed with attending.      3/17/2025: Patient has been in recovery for 4 years and quit smoking as well.  Audible expiratory wheeze.  Has never had a sleep study.  Family at bedside.  Telesitter.  On chronic oxygen at home.  Saw my partner Dr. Devi in clinic month ago    3/14 Patient seen this morning sitting in chair. Complains of arm pain and difficulty breathing. No cough. On 5L NC.     Consult Note    49 yof with pmhx significant for chronic hypoxemic/hypercapnic respiratory failure on 4-6L NC, severe COPD/asthma overlap, VCD, DVT on eliquis presented to the ED 3/12 with altered mental statsus. In the ED, satting well on 2L NC. No leukocytosis. COVID and flu negative. VBG 7.34/70. +methadone, amphetamines, benzos, THC. CXR negative. CTH negative.     Patient states she had increasing dyspnea the last 1.5 weeks with exertion and at rest. She denies cough, fever, sick contacts. She has been complaint with trelegy, NIV each night.     Patient of Dr. Devi. Last office visit Dec 2024. PFTs Jan 2023. FEV1 25% predicted. Treated for acute exacerbation with prednisone, doxycycline. Maintained on trelegy, albuterol. Referred to ENT for VCD.     Social hx: former cigarette smoker, + smoking marijuana  Family hx: no inheritable lung disease     I have reviewed the labs and  5-40 mL IntraVENous 2 times per day    sodium chloride flush 0.9 % injection 5-40 mL  5-40 mL IntraVENous PRN    0.9 % sodium chloride infusion   IntraVENous PRN    ondansetron (ZOFRAN-ODT) disintegrating tablet 4 mg  4 mg Oral Q8H PRN    Or    ondansetron (ZOFRAN) injection 4 mg  4 mg IntraVENous Q6H PRN    polyethylene glycol (GLYCOLAX) packet 17 g  17 g Oral Daily PRN    acetaminophen (TYLENOL) tablet 650 mg  650 mg Oral Q6H PRN    Or    acetaminophen (TYLENOL) suppository 650 mg  650 mg Rectal Q6H PRN    naloxone (NARCAN) injection 0.2 mg  0.2 mg IntraVENous Q5 Min PRN       Labs:  ABG Invalid input(s): \"PHI\", \"PCO2I\", \"PO2I\", \"HCO3I\", \"SO2I\", \"FIO2I\"     CBC Recent Labs     03/17/25  1425   WBC 5.7   HGB 12.7   HCT 39.8      MCV 97.5   MCH 31.1          Metabolic  Panel Recent Labs     03/17/25  1239   *   K 4.4   CL 98   CO2 33*   BUN 13          Pertinent Labs                Kike Sandra MD  3/18/2025

## 2025-03-18 NOTE — PROGRESS NOTES
Pulmonary Disease Navigator Note  [facility]    Current GOLD classification for David Car                                         Review of Patient's Chart by Pulmonary Disease Navigator for Compliance with GOLD Guidelines         Please review the following recommendations for both pharmacological and non-pharmacological treatment for a patient with obstructive lung disease, in accordance with the Global Initiative for Chronic Obstructive Lung Disease (GOLD) standards.      Current Pharmacological Treatment:   albuterol sulfate HFA (PROVENTIL;VENTOLIN;PROAIR) 108 (90 Base) MCG/ACT inhaler       fluticasone-umeclidin-vilant (TRELEGY ELLIPTA) 200-62.5-25 MCG/ACT AEPB inhaler         Spirometry Assessment     GOLD Stage:  Gold rdGrdrrdarddrderd:rd rd3rd Group: Group E  Current eosinophil count: 0.02  Recorded domestic exacerbations past 12 months:  (PATIENT ADVISED TO MANY TO COUNT)                Observed PIF      (INCHECK DIAL: 100LPM mdi:  65LPM DPI)      Combination  ICS-LABA Inhaler Acceptable   Therapy  Device For Use   Salmeterol/fluticasone Advair  Diskus Yes   Vilanterol/fluticasone  Breo  Ellipta Yes   Formoterol/mometasone  Dulera MDI Yes   Formoterol/budesonide  Symbicort MDI Yes   Triple Therapy Recommended (For Group E) [id number]     Fluticasone/umeclidinium/vilanterol  Trelegy  Ellipta Yes   Budesonide/glycopyrrolate/formoterol fumarate  Breztri  MDI Yes   Recommended (For Group A & B) LAMA/LABA     Vilanterol/umeclidinium  Anoro  Ellipta Yes   Olodaterol/tiotropium  Stiolto  Respimat Yes   Formoterol/glycopyrronium  Bevespi  MDI Yes   Aclidinium/formoterol Duaklir  Pressair      *Nebulizer Options    LAMA LABA ICS   Nayelypemya Maher Budesonide    Performist        The CAT provides a reliable measure of the impact of COPD on a patient's health status. Range of CAT scores from 0-40. Higher scores denote a more severe impact of COPD on a patient’s life. Scores <10 have a low

## 2025-03-19 LAB
ALBUMIN SERPL-MCNC: 3.1 G/DL (ref 3.5–5)
ALBUMIN/GLOB SERPL: 0.8 (ref 1.1–2.2)
ALP SERPL-CCNC: 88 U/L (ref 45–117)
ALT SERPL-CCNC: 39 U/L (ref 12–78)
ANION GAP SERPL CALC-SCNC: 4 MMOL/L (ref 2–12)
AST SERPL-CCNC: 41 U/L (ref 15–37)
BASE EXCESS BLDV CALC-SCNC: 7.1 MMOL/L
BASOPHILS # BLD: 0.03 K/UL (ref 0–0.1)
BASOPHILS NFR BLD: 0.3 % (ref 0–1)
BDY SITE: ABNORMAL
BILIRUB SERPL-MCNC: 0.3 MG/DL (ref 0.2–1)
BUN SERPL-MCNC: 17 MG/DL (ref 6–20)
BUN/CREAT SERPL: 20 (ref 12–20)
CALCIUM SERPL-MCNC: 8.9 MG/DL (ref 8.5–10.1)
CHLORIDE SERPL-SCNC: 101 MMOL/L (ref 97–108)
CO2 SERPL-SCNC: 29 MMOL/L (ref 21–32)
CREAT SERPL-MCNC: 0.84 MG/DL (ref 0.55–1.02)
DIFFERENTIAL METHOD BLD: ABNORMAL
EKG ATRIAL RATE: 107 BPM
EKG DIAGNOSIS: NORMAL
EKG P AXIS: 61 DEGREES
EKG P-R INTERVAL: 116 MS
EKG Q-T INTERVAL: 366 MS
EKG QRS DURATION: 84 MS
EKG QTC CALCULATION (BAZETT): 488 MS
EKG R AXIS: 63 DEGREES
EKG T AXIS: 66 DEGREES
EKG VENTRICULAR RATE: 107 BPM
EOSINOPHIL # BLD: 0.12 K/UL (ref 0–0.4)
EOSINOPHIL NFR BLD: 1.4 % (ref 0–7)
ERYTHROCYTE [DISTWIDTH] IN BLOOD BY AUTOMATED COUNT: 12.4 % (ref 11.5–14.5)
GAS FLOW.O2 O2 DELIVERY SYS: 5 L/MIN
GLOBULIN SER CALC-MCNC: 3.9 G/DL (ref 2–4)
GLUCOSE SERPL-MCNC: 83 MG/DL (ref 65–100)
HCO3 BLDV-SCNC: 32 MMOL/L (ref 23–28)
HCT VFR BLD AUTO: 37.8 % (ref 35–47)
HGB BLD-MCNC: 11.9 G/DL (ref 11.5–16)
IMM GRANULOCYTES # BLD AUTO: 0.13 K/UL (ref 0–0.04)
IMM GRANULOCYTES NFR BLD AUTO: 1.5 % (ref 0–0.5)
LYMPHOCYTES # BLD: 2.64 K/UL (ref 0.8–3.5)
LYMPHOCYTES NFR BLD: 29.8 % (ref 12–49)
MAGNESIUM SERPL-MCNC: 2.2 MG/DL (ref 1.6–2.4)
MCH RBC QN AUTO: 31.4 PG (ref 26–34)
MCHC RBC AUTO-ENTMCNC: 31.5 G/DL (ref 30–36.5)
MCV RBC AUTO: 99.7 FL (ref 80–99)
MONOCYTES # BLD: 0.75 K/UL (ref 0–1)
MONOCYTES NFR BLD: 8.5 % (ref 5–13)
NEUTS SEG # BLD: 5.18 K/UL (ref 1.8–8)
NEUTS SEG NFR BLD: 58.5 % (ref 32–75)
NRBC # BLD: 0 K/UL (ref 0–0.01)
NRBC BLD-RTO: 0 PER 100 WBC
PCO2 BLDV: 47.2 MMHG (ref 41–51)
PH BLDV: 7.45 (ref 7.32–7.42)
PHOSPHATE SERPL-MCNC: 3.5 MG/DL (ref 2.6–4.7)
PLATELET # BLD AUTO: 220 K/UL (ref 150–400)
PMV BLD AUTO: 9.6 FL (ref 8.9–12.9)
PO2 BLDV: 57 MMHG (ref 25–40)
POTASSIUM SERPL-SCNC: 4.5 MMOL/L (ref 3.5–5.1)
PROT SERPL-MCNC: 7 G/DL (ref 6.4–8.2)
RBC # BLD AUTO: 3.79 M/UL (ref 3.8–5.2)
SAO2 % BLDV: 91 % (ref 65–88)
SAO2% DEVICE SAO2% SENSOR NAME: ABNORMAL
SODIUM SERPL-SCNC: 134 MMOL/L (ref 136–145)
SPECIMEN SITE: ABNORMAL
TROPONIN I SERPL HS-MCNC: <4 NG/L (ref 0–51)
WBC # BLD AUTO: 8.9 K/UL (ref 3.6–11)

## 2025-03-19 PROCEDURE — 2500000003 HC RX 250 WO HCPCS: Performed by: STUDENT IN AN ORGANIZED HEALTH CARE EDUCATION/TRAINING PROGRAM

## 2025-03-19 PROCEDURE — 6370000000 HC RX 637 (ALT 250 FOR IP): Performed by: INTERNAL MEDICINE

## 2025-03-19 PROCEDURE — 84484 ASSAY OF TROPONIN QUANT: CPT

## 2025-03-19 PROCEDURE — 80053 COMPREHEN METABOLIC PANEL: CPT

## 2025-03-19 PROCEDURE — 2060000000 HC ICU INTERMEDIATE R&B

## 2025-03-19 PROCEDURE — 84100 ASSAY OF PHOSPHORUS: CPT

## 2025-03-19 PROCEDURE — 6360000002 HC RX W HCPCS: Performed by: INTERNAL MEDICINE

## 2025-03-19 PROCEDURE — 2700000000 HC OXYGEN THERAPY PER DAY

## 2025-03-19 PROCEDURE — 94660 CPAP INITIATION&MGMT: CPT

## 2025-03-19 PROCEDURE — 82803 BLOOD GASES ANY COMBINATION: CPT

## 2025-03-19 PROCEDURE — 94640 AIRWAY INHALATION TREATMENT: CPT

## 2025-03-19 PROCEDURE — 85025 COMPLETE CBC W/AUTO DIFF WBC: CPT

## 2025-03-19 PROCEDURE — 6370000000 HC RX 637 (ALT 250 FOR IP): Performed by: STUDENT IN AN ORGANIZED HEALTH CARE EDUCATION/TRAINING PROGRAM

## 2025-03-19 PROCEDURE — 6370000000 HC RX 637 (ALT 250 FOR IP): Performed by: PHYSICIAN ASSISTANT

## 2025-03-19 PROCEDURE — 36415 COLL VENOUS BLD VENIPUNCTURE: CPT

## 2025-03-19 PROCEDURE — 83735 ASSAY OF MAGNESIUM: CPT

## 2025-03-19 PROCEDURE — 93005 ELECTROCARDIOGRAM TRACING: CPT | Performed by: STUDENT IN AN ORGANIZED HEALTH CARE EDUCATION/TRAINING PROGRAM

## 2025-03-19 RX ORDER — BENZONATATE 100 MG/1
100 CAPSULE ORAL 3 TIMES DAILY PRN
Status: DISCONTINUED | OUTPATIENT
Start: 2025-03-19 | End: 2025-03-22 | Stop reason: HOSPADM

## 2025-03-19 RX ORDER — LORAZEPAM 0.5 MG/1
0.5 TABLET ORAL 2 TIMES DAILY PRN
Qty: 1 TABLET | Refills: 0 | Status: SHIPPED
Start: 2025-03-19 | End: 2025-03-22

## 2025-03-19 RX ORDER — GUAIFENESIN 600 MG/1
600 TABLET, EXTENDED RELEASE ORAL 2 TIMES DAILY
Qty: 6 TABLET | Refills: 0 | Status: SHIPPED | OUTPATIENT
Start: 2025-03-19 | End: 2025-03-22

## 2025-03-19 RX ORDER — PREDNISONE 10 MG/1
TABLET ORAL
Qty: 24 TABLET | Refills: 0 | Status: SHIPPED | OUTPATIENT
Start: 2025-03-19 | End: 2025-03-22

## 2025-03-19 RX ORDER — LORAZEPAM 0.5 MG/1
0.5 TABLET ORAL EVERY 6 HOURS PRN
Status: ON HOLD | COMMUNITY
Start: 2024-10-07 | End: 2025-03-19

## 2025-03-19 RX ORDER — GABAPENTIN 300 MG/1
300 CAPSULE ORAL 3 TIMES DAILY
Status: DISCONTINUED | OUTPATIENT
Start: 2025-03-19 | End: 2025-03-22 | Stop reason: HOSPADM

## 2025-03-19 RX ORDER — CLONIDINE HYDROCHLORIDE 0.1 MG/1
0.1 TABLET ORAL 2 TIMES DAILY
Qty: 60 TABLET | Refills: 3 | Status: SHIPPED | OUTPATIENT
Start: 2025-03-19

## 2025-03-19 RX ORDER — BENZONATATE 100 MG/1
100 CAPSULE ORAL 3 TIMES DAILY PRN
Qty: 15 CAPSULE | Refills: 0 | Status: SHIPPED | OUTPATIENT
Start: 2025-03-19 | End: 2025-03-24

## 2025-03-19 RX ADMIN — APIXABAN 5 MG: 5 TABLET, FILM COATED ORAL at 09:08

## 2025-03-19 RX ADMIN — CLONIDINE HYDROCHLORIDE 0.1 MG: 0.1 TABLET ORAL at 09:08

## 2025-03-19 RX ADMIN — GABAPENTIN 600 MG: 300 CAPSULE ORAL at 09:08

## 2025-03-19 RX ADMIN — SACUBITRIL AND VALSARTAN 1 TABLET: 24; 26 TABLET, FILM COATED ORAL at 09:08

## 2025-03-19 RX ADMIN — POLYETHYLENE GLYCOL 3350 17 G: 17 POWDER, FOR SOLUTION ORAL at 09:09

## 2025-03-19 RX ADMIN — SPIRONOLACTONE 25 MG: 25 TABLET ORAL at 09:07

## 2025-03-19 RX ADMIN — PREDNISONE 30 MG: 5 TABLET ORAL at 15:09

## 2025-03-19 RX ADMIN — OXCARBAZEPINE 150 MG: 300 TABLET, FILM COATED ORAL at 09:07

## 2025-03-19 RX ADMIN — LEVETIRACETAM 500 MG: 500 TABLET, FILM COATED ORAL at 09:07

## 2025-03-19 RX ADMIN — METHADONE HYDROCHLORIDE 120 MG: 10 CONCENTRATE ORAL at 06:06

## 2025-03-19 RX ADMIN — IPRATROPIUM BROMIDE AND ALBUTEROL SULFATE 1 DOSE: 2.5; .5 SOLUTION RESPIRATORY (INHALATION) at 00:53

## 2025-03-19 RX ADMIN — ACETYLCYSTEINE 200 MG: 200 INHALANT RESPIRATORY (INHALATION) at 00:59

## 2025-03-19 RX ADMIN — BUPROPION HYDROCHLORIDE 300 MG: 150 TABLET, EXTENDED RELEASE ORAL at 09:07

## 2025-03-19 RX ADMIN — LORAZEPAM 0.5 MG: 0.5 TABLET ORAL at 09:15

## 2025-03-19 RX ADMIN — SERTRALINE HYDROCHLORIDE 150 MG: 50 TABLET ORAL at 09:08

## 2025-03-19 RX ADMIN — GUAIFENESIN 1200 MG: 600 TABLET, EXTENDED RELEASE ORAL at 09:07

## 2025-03-19 RX ADMIN — SODIUM CHLORIDE, PRESERVATIVE FREE 10 ML: 5 INJECTION INTRAVENOUS at 09:09

## 2025-03-19 ASSESSMENT — PAIN SCALES - GENERAL
PAINLEVEL_OUTOF10: 10
PAINLEVEL_OUTOF10: 0

## 2025-03-19 NOTE — PLAN OF CARE
Problem: Discharge Planning  Goal: Discharge to home or other facility with appropriate resources  3/19/2025 1047 by Allison Guajardo RN  Outcome: Progressing  Flowsheets (Taken 3/19/2025 0900)  Discharge to home or other facility with appropriate resources:   Arrange for needed discharge resources and transportation as appropriate   Identify barriers to discharge with patient and caregiver   Identify discharge learning needs (meds, wound care, etc)   Refer to discharge planning if patient needs post-hospital services based on physician order or complex needs related to functional status, cognitive ability or social support system  3/18/2025 2121 by Samina Sanchez RN  Outcome: Progressing     Problem: Pain  Goal: Verbalizes/displays adequate comfort level or baseline comfort level  3/19/2025 1047 by Allison Guajardo RN  Outcome: Progressing  3/18/2025 2121 by Samina Sanchez RN  Outcome: Progressing     Problem: Respiratory - Adult  Goal: Achieves optimal ventilation and oxygenation  3/19/2025 1047 by Allison Guajardo RN  Outcome: Progressing  Flowsheets (Taken 3/19/2025 0900)  Achieves optimal ventilation and oxygenation:   Assess for changes in respiratory status   Assess for changes in mentation and behavior   Position to facilitate oxygenation and minimize respiratory effort   Oxygen supplementation based on oxygen saturation or arterial blood gases   Initiate smoking cessation protocol as indicated   Encourage broncho-pulmonary hygiene including cough, deep breathe, incentive spirometry   Assess the need for suctioning and aspirate as needed   Assess and instruct to report shortness of breath or any respiratory difficulty   Respiratory therapy support as indicated  3/19/2025 0303 by Kelby Huizar RCP  Outcome: Progressing  3/18/2025 2121 by Samina Sanchez RN  Outcome: Progressing     Problem: Safety - Adult  Goal: Free from fall injury  3/19/2025 1047 by Allison Guajardo RN  Outcome: Progressing  3/18/2025 2121 by Samina Sanchez

## 2025-03-19 NOTE — DISCHARGE INSTRUCTIONS
You were treated for a COPD exacerbation and seen by Pulmonology. You were also seen by Neurology and Psychiatry. Please take the medications as listed and followup with the doctors as listed in this paperwork.

## 2025-03-19 NOTE — PROGRESS NOTES
Evaluation for Home Oxygen    Resting (Room Air):  SpO2: 86       Resting (On O2):4   SpO2:   90-92  O2 Device:  nasal cannula  O2 Flow Rate (L/min):  4        During Walk (On O2):  SpO2:  94-95  O2 Device:  Nasal cannula  O2 Flow Rate (L/min): 6             Electronically signed by Allison Guajardo RN on 3/19/2025 at 1:23 PM

## 2025-03-19 NOTE — CARE COORDINATION
Transition of Care Plan:    RUR: 18% Moderate RUR  Prior Level of Functioning: Dependent  Disposition: Plan A: long term care facility   Plan B: home with home health   WENDY: 3/20/25  If HH: Date FOC offered: 3/17/25  Date FOC received: 3/18  Accepting facility: Pending  Date authorization started with reference number:   Date authorization received and expires:   Follow up appointments: PCP, Specialist  DME needed: deferred to LTC  Transportation at discharge: Medicaid transportation need is anticipated  IM/IMM Medicare/ letter given:  N/A -Medicaid  Is patient a [place] and connected with [facility]? N/A   If yes, was [place] transfer form completed and VA notified?   Caregiver Contact: Mother- Brenda Khoury -[phone] (Home Phone)   Discharge Caregiver contacted prior to discharge? CM to discuss with mother and pt  Care Conference needed? none  Barriers to discharge: bed at LTC      10:30 CM has contacted Priya Broussard [phone] inquiring regarding a facility that is able to accommodate. However, none of them is able at this time but stated will check and call CM back.  CM sent mass referral via [employer].     13:33 CM contacted De once again who is still checking. [facility]      Lori Azevedo, RN  Case Management  [phone]

## 2025-03-19 NOTE — PROGRESS NOTES
End of Shift Note    Bedside shift change report given to Samina (oncoming nurse) by Allison Guajardo RN (offgoing nurse).  Report included the following information SBAR    Shift worked:  7A-7P     Shift summary and any significant changes:     PT became drowsy during shift, stated she did not take anything. VBG completed. Gabapentin decreased to 300mg. Psych consult ordered.     Concerns for physician to address:         Zone phone for oncoming shift:            Activity:  Level of Assistance: Minimal assist, patient does 75% or more  Number times ambulated in hallways past shift: 2  Number of times OOB to chair past shift: 0    Cardiac:   Cardiac Monitoring: Yes      Cardiac Rhythm: Sinus tachy, Sinus rhythm (alternates bewteen normal sinus and tachy)    Access:  Current line(s): PIV     Genitourinary:   Urinary Status: Voiding, Bathroom privileges    Respiratory:   O2 Device: Nasal cannula  Chronic home O2 use?: YES  Incentive spirometer at bedside: NO    GI:  Last BM (including prior to admit): 03/18/25  Current diet:  ADULT DIET; Regular  ADULT ORAL NUTRITION SUPPLEMENT; Breakfast, Lunch, Dinner; Standard High Calorie/High Protein Oral Supplement  Passing flatus: YES    Pain Management:   Patient states pain is manageable on current regimen: YES    Skin:  Brennen Scale Score: 20  Interventions: Wound Offloading (Prevention Methods): Turning, Repositioning, Pillows    Patient Safety:  Fall Risk: Nursing Judgement-Fall Risk High(Add Comments): Yes  Fall Risk Interventions  Nursing Judgement-Fall Risk High(Add Comments): Yes  Toilet Every 2 Hours-In Advance of Need: Yes  Hourly Visual Checks: In chair, Awake  Fall Visual Posted: Armband, Socks  Room Door Open: Yes  Alarm On: Bed  Patient Moved Closer to Nursing Station: No    Active Consults:   IP CONSULT TO PSYCHIATRY  IP CONSULT TO NEUROLOGY  IP CONSULT TO PHARMACY  IP CONSULT TO ADDICTION MEDICINE  IP CONSULT TO PULMONOLOGY  IP CONSULT TO CASE MANAGEMENT  IP CONSULT

## 2025-03-19 NOTE — PLAN OF CARE
Problem: Discharge Planning  Goal: Discharge to home or other facility with appropriate resources  3/18/2025 2121 by Samina Sanchze RN  Outcome: Progressing  3/18/2025 1017 by Allison Guajardo RN  Outcome: Progressing  Flowsheets (Taken 3/18/2025 0800)  Discharge to home or other facility with appropriate resources:   Identify barriers to discharge with patient and caregiver   Arrange for needed discharge resources and transportation as appropriate   Identify discharge learning needs (meds, wound care, etc)   Refer to discharge planning if patient needs post-hospital services based on physician order or complex needs related to functional status, cognitive ability or social support system     Problem: Pain  Goal: Verbalizes/displays adequate comfort level or baseline comfort level  3/18/2025 2121 by Samina Sanchez RN  Outcome: Progressing  3/18/2025 1017 by Allison Guajardo RN  Outcome: Progressing     Problem: Respiratory - Adult  Goal: Achieves optimal ventilation and oxygenation  3/18/2025 2121 by Samina Sanchez RN  Outcome: Progressing  3/18/2025 1017 by Allison Guajardo RN  Outcome: Progressing  Flowsheets (Taken 3/18/2025 0800)  Achieves optimal ventilation and oxygenation:   Assess for changes in respiratory status   Assess for changes in mentation and behavior   Position to facilitate oxygenation and minimize respiratory effort   Oxygen supplementation based on oxygen saturation or arterial blood gases   Initiate smoking cessation protocol as indicated   Encourage broncho-pulmonary hygiene including cough, deep breathe, incentive spirometry   Assess the need for suctioning and aspirate as needed   Assess and instruct to report shortness of breath or any respiratory difficulty   Respiratory therapy support as indicated  3/18/2025 0744 by Bushra Seo, RT  Outcome: Progressing     Problem: Safety - Adult  Goal: Free from fall injury  3/18/2025 2121 by Samina Sanchez RN  Outcome: Progressing  3/18/2025 1017 by Allison Guajardo

## 2025-03-19 NOTE — PROGRESS NOTES
Pt drowsy, falling asleep, head nodding during conversation. Pt states she did not take anything. Notified MD , VBG's obtained. RRT inserted IV    Pt has redness and warmth to right upper arm at old IV site. Ice pack place on site

## 2025-03-19 NOTE — PROGRESS NOTES
Hospitalist Progress Note    NAME:   David Car   : 1975   MRN: [id number]     Date/Time: 3/19/2025 7:57 PM  Patient PCP: Ese Epps APRN - NP    Estimated discharge date:  Barriers: placement for LTC, Psych reeval      COPD exacerbation  Chronic respiratory failure - at 6 L baseline   History of pulmonary sarcoidosis POA (from previous chart)  Suspected vocal cord dysfunction ?POA (from previous chart)  She already get 1 dose of 10 mg of Decadron  DuoNeb every 6 hours  Patient has a stridor  Outpatient follow-up with the ENT  : Discussed with pulmonologist, he is concerned about patient expiratory wheezing in addition to her stridor  CT of the chest did not show any tracheomalacia, no mucous plugging  He recommended to start Mucomyst nebulizers, Mucinex  Continue with prednisone  : Patient desaturating while working with therapy, was initially at 4 L at rest, activity 1.26 L with activity  Discussed with Dr. Sandra CT of the chest results, questionable tracheomalacia  - respiratory status stable, dc on 4L at rest, 6L with ambulation    Metabolic encephalopathy - intermittent  History of bipolar disorder  Schizophrenia  Anxiety  CT head reassuring  Patient on multiple medications  Patient on Seroquel, sertraline, Keppra  Will check Keppra level  Resume psych meds per med rec   patient is on Seroquel 400 mg daily at bedtime  Resume home dose of methadone  : Clinically improving.  Back to her 6 L of home oxygen.  Patient requesting to talk to  as she would like to go to rehab'  : Discussed with PT OT, patient is not a candidate for rehab, is high functioning, needs home health  : Discussed with case management, plan is for patient to go to rehab  - more lethargic today. VBG obtained, normal CO2. Suspect medication induced  - consulted Psych again for clarification of psychoactive medication list    Hypotension resolved  Systolic blood pressure in the 80s

## 2025-03-19 NOTE — PROGRESS NOTES
at rest. She denies cough, fever, sick contacts. She has been complaint with trelegy, NIV each night.     Patient of Dr. Devi. Last office visit Dec 2024. PFTs Jan 2023. FEV1 25% predicted. Treated for acute exacerbation with prednisone, doxycycline. Maintained on trelegy, albuterol. Referred to ENT for VCD.     Social hx: former cigarette smoker, + smoking marijuana  Family hx: no inheritable lung disease     I have reviewed the labs and previous day’s notes.    Pertinent items are noted in HPI.  Past Medical History:   Diagnosis Date    Ascariosis     Asthma     Bipolar 1 disorder (HCC)     Chronic obstructive pulmonary disease (HCC)     Chronic pain     back, leg    Cocaine abuse (HCC)     Depression     Heart failure (HCC)     Hepatitis B     Heroin abuse (HCC)     HTN (hypertension)     Narcotic abuse (HCC)     Polysubstance abuse (HCC)     Required emergency intubation     Sarcoidosis     Seizure (HCC)     Tobacco abuse       Past Surgical History:   Procedure Laterality Date    GYN      WISDOM TOOTH EXTRACTION        Prior to Admission medications    Medication Sig Start Date End Date Taking? Authorizing Provider   QUEtiapine (SEROQUEL) 400 MG tablet Take 2 tablets by mouth nightly   Yes ProviderSukhdeep MD   sertraline (ZOLOFT) 100 MG tablet Take 1.5 tablets by mouth daily   Yes ProviderSukhdeep MD   naproxen sodium (ALEVE) 220 MG tablet Take 1 tablet by mouth 2 times daily as needed for Pain   Yes ProviderSukhdeep MD   cloNIDine (CATAPRES) 0.3 MG tablet Take 1 tablet by mouth 2 times daily as needed (aniexty and panic)   Yes ProviderSukhdeep MD   sacubitril-valsartan (ENTRESTO) 24-26 MG per tablet Take 1 tablet by mouth 2 times daily 2/12/25  Yes Delmer Obrien MD   buPROPion (WELLBUTRIN XL) 300 MG extended release tablet Take 1 tablet by mouth every morning 2/12/25 3/14/25 Yes Delmer Obrien MD   spironolactone (ALDACTONE) 25 MG tablet Take 1 tablet by mouth daily 2/12/25 3/14/25

## 2025-03-19 NOTE — PROGRESS NOTES
Comprehensive Nutrition Assessment    Type and Reason for Visit:  Initial, LOS, Positive nutrition screen    Nutrition Recommendations/Plan:   Continue current diet and supplements      Malnutrition Assessment:  Malnutrition Status:  No malnutrition (03/19/25 1559)    Context:  Acute Illness     Findings of the 6 clinical characteristics of malnutrition:  Energy Intake:  No decrease in energy intake  Weight Loss:  No weight loss     Body Fat Loss:  No body fat loss     Muscle Mass Loss:  No muscle mass loss    Fluid Accumulation:  No fluid accumulation     Strength:  Not Performed    Nutrition Assessment:    Patient medically noted for COPD, pulmonary sarcoidosis, bipolar, schizophrenia, anxiety, and HTN. MST referral received; chart also reviewed for length of stay. Patient falling asleep mid sentence during visit. Visitor at bedside reports her appetite is fine but she doesn't always like the food. Menu is at bedside and they're aware of how to order meals. ONS added yesterday; patient loves strawberry ensure. No weight loss noted on chart review (currently indicates weight gain). Discharge planning underway; patient awaiting placement.     Wt Readings from Last 5 Encounters:   03/12/25 99.8 kg (220 lb)   02/03/25 88.3 kg (194 lb 10.7 oz)   02/06/24 85.5 kg (188 lb 7.9 oz)   07/12/23 68.1 kg (150 lb 2.1 oz)   03/29/23 68.5 kg (151 lb)     Patient Vitals for the past 120 hrs:   PO Meals Eaten (%)   03/17/25 1300 76 - 100%   03/17/25 0916 76 - 100%   03/16/25 1845 76 - 100%     Nutrition Related Findings:    Labs reviewed   BM 3/18   Wellbutrin, Keppra, Methadone, Glycolax, Prednisone, Seroquel, Senokot, Zoloft, Aldactone   Wound Type: None       Current Nutrition Intake & Therapies:    Average Meal Intake: %  Average Supplements Intake: %  ADULT DIET; Regular  ADULT ORAL NUTRITION SUPPLEMENT; Breakfast, Lunch, Dinner; Standard High Calorie/High Protein Oral Supplement    Anthropometric

## 2025-03-19 NOTE — PROGRESS NOTES
Predictive Model Details          36 (Caution)  Factor Value    Calculated 3/19/2025 06:23 33% Respiratory rate 11    Deterioration Index Model 31% Supplemental oxygen PAP (positive airway pressure)     23% Age 49 years old     7% Potassium 4.5 mmol/L     5% Sodium abnormal (134 mmol/L)     2% Pulse 90     0% Pulse oximetry 97 %     0% Hematocrit 39.8 %     0% Systolic 111     0% Temperature 98.2 °F (36.8 °C)     0% WBC count 5.7 K/uL       End of Shift Note    Bedside shift change report given to Sonido Cooper RN (oncoming nurse) by Samina Sanchez RN (offgoing nurse).  Report included the following information SBAR, ED Summary, Intake/Output, MAR, Accordion, Recent Results, Med Rec Status, Cardiac Rhythm Tachy, Alarm Parameters , and Quality Measures    Shift worked:  Night     Shift summary and any significant changes:     Unremarkable shift      Concerns for physician to address: None     Zone phone for oncoming shift:   [phone]       Activity:  Level of Assistance: Minimal assist, patient does 75% or more  Number times ambulated in hallways past shift: 0  Number of times OOB to chair past shift: 3    Cardiac:   Cardiac Monitoring: Yes      Cardiac Rhythm: Sinus rhythm    Access:  Current line(s): PIV     Genitourinary:   Urinary Status: Voiding, Bathroom privileges    Respiratory:   O2 Device: PAP (positive airway pressure)  Chronic home O2 use?: YES  Incentive spirometer at bedside: YES    GI:  Last BM (including prior to admit): 03/18/25  Current diet:  ADULT DIET; Regular  ADULT ORAL NUTRITION SUPPLEMENT; Breakfast, Lunch, Dinner; Standard High Calorie/High Protein Oral Supplement  Passing flatus: YES    Pain Management:   Patient states pain is manageable on current regimen: YES    Skin:  Brennen Scale Score: 20  Interventions: Wound Offloading (Prevention Methods): Repositioning, Turning, Pillows    Patient Safety:  Fall Risk: Nursing Judgement-Fall Risk High(Add Comments): Yes  Fall Risk  Interventions  Nursing Judgement-Fall Risk High(Add Comments): Yes  Toilet Every 2 Hours-In Advance of Need: Yes  Hourly Visual Checks: In bed, Awake  Fall Visual Posted: Armband, Socks, Fall sign posted  Room Door Open: Yes  Alarm On: Bed  Patient Moved Closer to Nursing Station: No    Active Consults:   IP CONSULT TO PSYCHIATRY  IP CONSULT TO NEUROLOGY  IP CONSULT TO PHARMACY  IP CONSULT TO ADDICTION MEDICINE  IP CONSULT TO PULMONOLOGY  IP CONSULT TO CASE MANAGEMENT    Length of Stay:  Expected LOS: 7  Actual LOS: 7    Samina Sanchez RN

## 2025-03-20 LAB
ANION GAP SERPL CALC-SCNC: 1 MMOL/L (ref 2–12)
BASOPHILS # BLD: 0.03 K/UL (ref 0–0.1)
BASOPHILS NFR BLD: 0.3 % (ref 0–1)
BUN SERPL-MCNC: 15 MG/DL (ref 6–20)
BUN/CREAT SERPL: 24 (ref 12–20)
CALCIUM SERPL-MCNC: 9.5 MG/DL (ref 8.5–10.1)
CHLORIDE SERPL-SCNC: 99 MMOL/L (ref 97–108)
CO2 SERPL-SCNC: 34 MMOL/L (ref 21–32)
CREAT SERPL-MCNC: 0.63 MG/DL (ref 0.55–1.02)
DIFFERENTIAL METHOD BLD: ABNORMAL
EKG ATRIAL RATE: 85 BPM
EKG DIAGNOSIS: NORMAL
EKG P AXIS: 78 DEGREES
EKG P-R INTERVAL: 124 MS
EKG Q-T INTERVAL: 382 MS
EKG QRS DURATION: 82 MS
EKG QTC CALCULATION (BAZETT): 454 MS
EKG R AXIS: 73 DEGREES
EKG T AXIS: 64 DEGREES
EKG VENTRICULAR RATE: 85 BPM
EOSINOPHIL # BLD: 0.02 K/UL (ref 0–0.4)
EOSINOPHIL NFR BLD: 0.2 % (ref 0–7)
ERYTHROCYTE [DISTWIDTH] IN BLOOD BY AUTOMATED COUNT: 12.4 % (ref 11.5–14.5)
GLUCOSE SERPL-MCNC: 125 MG/DL (ref 65–100)
HCT VFR BLD AUTO: 34.9 % (ref 35–47)
HGB BLD-MCNC: 11.4 G/DL (ref 11.5–16)
IMM GRANULOCYTES # BLD AUTO: 0.14 K/UL (ref 0–0.04)
IMM GRANULOCYTES NFR BLD AUTO: 1.4 % (ref 0–0.5)
LYMPHOCYTES # BLD: 1.23 K/UL (ref 0.8–3.5)
LYMPHOCYTES NFR BLD: 12.1 % (ref 12–49)
MAGNESIUM SERPL-MCNC: 2.3 MG/DL (ref 1.6–2.4)
MCH RBC QN AUTO: 31.9 PG (ref 26–34)
MCHC RBC AUTO-ENTMCNC: 32.7 G/DL (ref 30–36.5)
MCV RBC AUTO: 97.8 FL (ref 80–99)
MONOCYTES # BLD: 0.64 K/UL (ref 0–1)
MONOCYTES NFR BLD: 6.3 % (ref 5–13)
NEUTS SEG # BLD: 8.1 K/UL (ref 1.8–8)
NEUTS SEG NFR BLD: 79.7 % (ref 32–75)
NRBC # BLD: 0 K/UL (ref 0–0.01)
NRBC BLD-RTO: 0 PER 100 WBC
PHOSPHATE SERPL-MCNC: 2.9 MG/DL (ref 2.6–4.7)
PLATELET # BLD AUTO: 251 K/UL (ref 150–400)
PMV BLD AUTO: 9.4 FL (ref 8.9–12.9)
POTASSIUM SERPL-SCNC: 4.8 MMOL/L (ref 3.5–5.1)
RBC # BLD AUTO: 3.57 M/UL (ref 3.8–5.2)
SODIUM SERPL-SCNC: 134 MMOL/L (ref 136–145)
WBC # BLD AUTO: 10.2 K/UL (ref 3.6–11)

## 2025-03-20 PROCEDURE — 2700000000 HC OXYGEN THERAPY PER DAY

## 2025-03-20 PROCEDURE — 97116 GAIT TRAINING THERAPY: CPT

## 2025-03-20 PROCEDURE — 6370000000 HC RX 637 (ALT 250 FOR IP): Performed by: INTERNAL MEDICINE

## 2025-03-20 PROCEDURE — 97535 SELF CARE MNGMENT TRAINING: CPT

## 2025-03-20 PROCEDURE — 2500000003 HC RX 250 WO HCPCS: Performed by: STUDENT IN AN ORGANIZED HEALTH CARE EDUCATION/TRAINING PROGRAM

## 2025-03-20 PROCEDURE — 97530 THERAPEUTIC ACTIVITIES: CPT

## 2025-03-20 PROCEDURE — 80048 BASIC METABOLIC PNL TOTAL CA: CPT

## 2025-03-20 PROCEDURE — 85025 COMPLETE CBC W/AUTO DIFF WBC: CPT

## 2025-03-20 PROCEDURE — 36415 COLL VENOUS BLD VENIPUNCTURE: CPT

## 2025-03-20 PROCEDURE — 6370000000 HC RX 637 (ALT 250 FOR IP): Performed by: STUDENT IN AN ORGANIZED HEALTH CARE EDUCATION/TRAINING PROGRAM

## 2025-03-20 PROCEDURE — 6370000000 HC RX 637 (ALT 250 FOR IP)

## 2025-03-20 PROCEDURE — 83735 ASSAY OF MAGNESIUM: CPT

## 2025-03-20 PROCEDURE — 6370000000 HC RX 637 (ALT 250 FOR IP): Performed by: PHYSICIAN ASSISTANT

## 2025-03-20 PROCEDURE — 2060000000 HC ICU INTERMEDIATE R&B

## 2025-03-20 PROCEDURE — 84100 ASSAY OF PHOSPHORUS: CPT

## 2025-03-20 RX ORDER — BISACODYL 10 MG
10 SUPPOSITORY, RECTAL RECTAL DAILY PRN
Status: DISCONTINUED | OUTPATIENT
Start: 2025-03-20 | End: 2025-03-22 | Stop reason: HOSPADM

## 2025-03-20 RX ORDER — SENNOSIDES A AND B 8.6 MG/1
2 TABLET, FILM COATED ORAL NIGHTLY
Status: DISCONTINUED | OUTPATIENT
Start: 2025-03-20 | End: 2025-03-22 | Stop reason: HOSPADM

## 2025-03-20 RX ORDER — POLYETHYLENE GLYCOL 3350 17 G/17G
17 POWDER, FOR SOLUTION ORAL 2 TIMES DAILY
Status: DISCONTINUED | OUTPATIENT
Start: 2025-03-20 | End: 2025-03-22 | Stop reason: HOSPADM

## 2025-03-20 RX ADMIN — GABAPENTIN 300 MG: 300 CAPSULE ORAL at 08:36

## 2025-03-20 RX ADMIN — IPRATROPIUM BROMIDE AND ALBUTEROL SULFATE 1 DOSE: 2.5; .5 SOLUTION RESPIRATORY (INHALATION) at 02:21

## 2025-03-20 RX ADMIN — SODIUM CHLORIDE, PRESERVATIVE FREE 10 ML: 5 INJECTION INTRAVENOUS at 08:38

## 2025-03-20 RX ADMIN — CLONIDINE HYDROCHLORIDE 0.1 MG: 0.1 TABLET ORAL at 02:08

## 2025-03-20 RX ADMIN — GABAPENTIN 300 MG: 300 CAPSULE ORAL at 21:47

## 2025-03-20 RX ADMIN — GUAIFENESIN 1200 MG: 600 TABLET, EXTENDED RELEASE ORAL at 22:25

## 2025-03-20 RX ADMIN — GUAIFENESIN 1200 MG: 600 TABLET, EXTENDED RELEASE ORAL at 02:08

## 2025-03-20 RX ADMIN — SACUBITRIL AND VALSARTAN 1 TABLET: 24; 26 TABLET, FILM COATED ORAL at 02:08

## 2025-03-20 RX ADMIN — GUAIFENESIN 1200 MG: 600 TABLET, EXTENDED RELEASE ORAL at 08:35

## 2025-03-20 RX ADMIN — LORAZEPAM 0.5 MG: 0.5 TABLET ORAL at 05:58

## 2025-03-20 RX ADMIN — APIXABAN 5 MG: 5 TABLET, FILM COATED ORAL at 08:36

## 2025-03-20 RX ADMIN — OXCARBAZEPINE 150 MG: 300 TABLET, FILM COATED ORAL at 21:47

## 2025-03-20 RX ADMIN — OXCARBAZEPINE 150 MG: 300 TABLET, FILM COATED ORAL at 08:36

## 2025-03-20 RX ADMIN — APIXABAN 5 MG: 5 TABLET, FILM COATED ORAL at 02:08

## 2025-03-20 RX ADMIN — SACUBITRIL AND VALSARTAN 1 TABLET: 24; 26 TABLET, FILM COATED ORAL at 08:36

## 2025-03-20 RX ADMIN — LEVETIRACETAM 500 MG: 500 TABLET, FILM COATED ORAL at 02:08

## 2025-03-20 RX ADMIN — SERTRALINE HYDROCHLORIDE 150 MG: 50 TABLET ORAL at 08:36

## 2025-03-20 RX ADMIN — APIXABAN 5 MG: 5 TABLET, FILM COATED ORAL at 21:48

## 2025-03-20 RX ADMIN — SODIUM CHLORIDE, PRESERVATIVE FREE 10 ML: 5 INJECTION INTRAVENOUS at 21:49

## 2025-03-20 RX ADMIN — CLONIDINE HYDROCHLORIDE 0.1 MG: 0.1 TABLET ORAL at 21:47

## 2025-03-20 RX ADMIN — BUPROPION HYDROCHLORIDE 300 MG: 150 TABLET, EXTENDED RELEASE ORAL at 08:35

## 2025-03-20 RX ADMIN — METHADONE HYDROCHLORIDE 120 MG: 10 CONCENTRATE ORAL at 05:58

## 2025-03-20 RX ADMIN — GABAPENTIN 300 MG: 300 CAPSULE ORAL at 13:24

## 2025-03-20 RX ADMIN — CLONIDINE HYDROCHLORIDE 0.1 MG: 0.1 TABLET ORAL at 08:36

## 2025-03-20 RX ADMIN — LEVETIRACETAM 500 MG: 500 TABLET, FILM COATED ORAL at 21:47

## 2025-03-20 RX ADMIN — QUETIAPINE FUMARATE 400 MG: 100 TABLET ORAL at 21:47

## 2025-03-20 RX ADMIN — BISACODYL 10 MG: 10 SUPPOSITORY RECTAL at 22:25

## 2025-03-20 RX ADMIN — SACUBITRIL AND VALSARTAN 1 TABLET: 24; 26 TABLET, FILM COATED ORAL at 21:48

## 2025-03-20 RX ADMIN — PREDNISONE 30 MG: 5 TABLET ORAL at 08:35

## 2025-03-20 RX ADMIN — LORAZEPAM 0.5 MG: 0.5 TABLET ORAL at 18:32

## 2025-03-20 RX ADMIN — SENNOSIDES 8.6 MG: 8.6 TABLET ORAL at 02:08

## 2025-03-20 RX ADMIN — LEVETIRACETAM 500 MG: 500 TABLET, FILM COATED ORAL at 08:36

## 2025-03-20 RX ADMIN — SPIRONOLACTONE 25 MG: 25 TABLET ORAL at 08:36

## 2025-03-20 RX ADMIN — QUETIAPINE FUMARATE 400 MG: 100 TABLET ORAL at 02:08

## 2025-03-20 ASSESSMENT — PAIN SCALES - GENERAL: PAINLEVEL_OUTOF10: 0

## 2025-03-20 NOTE — CARE COORDINATION
Transition of Care Plan:    RUR: 18% Moderate RUR  Prior Level of Functioning: Dependent  Disposition: Plan A: long term care facility   Plan B: home with home health   WENDY: 3/20/25  If HH: Date FOC offered: 3/17/25  Date FOC received: 3/18  Accepting facility: [facility] Address: [address] [place] [address] Phone: [phone]  Date authorization started with reference number: na  Date authorization received and expires: na  Follow up appointments: PCP, Specialist  DME needed: deferred to LTC  Transportation at discharge: Medicaid transportation need is anticipated  IM/IMM Medicare/ letter given:  N/A -Medicaid  Is patient a  and connected with VA? N/A   If yes, was [place] transfer form completed and VA notified?   Caregiver Contact: Mother- Brenda Khoury -[phone] (Home Phone)   Discharge Caregiver contacted prior to discharge? CM to discuss with mother and pt  Care Conference needed? none  Barriers to discharge: bed at LTC      Priya Moisemoncho [phone] has contacted CM to inform him that the patient has been accepted by [facility] Address: [address], [place] [address] Phone: [phone]. A UAI was provided by  Kristina to this  who sent it to De via email. The facility does not have a bed today but anticipated to have a bed tomorrow.     Lori Azevedo RN  Case Management  [phone]

## 2025-03-20 NOTE — PROGRESS NOTES
Video sitter- called at 0950.    The telesitter stated that it was observed that something was placed in her (2311)  ensure drink during dayshift and he is currently making her drink that ensure now.     Ran to the room, stopped pt from drinking the ensure and removed all ensures. Explained the reason for my behavior.     Nursing supervisor called and notified  Covering NP notified.   Pt awake and alert.     2230 - nurse supervisor on the floor   2240 - nurse supervisor at bedside with security. Pt  asked to leave due to several warning regarding this behavior.  Pt V/S- baseline.     2250-  left with security    0036- pt sd \"Yall got my  upset. I tell you one thing, he ain't going to give me nothing I ain't going to take. Wit his scary a**.\"

## 2025-03-20 NOTE — PROGRESS NOTES
Hospitalist Progress Note    NAME:   David Car   : 1975   MRN: [id number]     Date/Time: 3/20/2025 7:43 PM  Patient PCP: Ese Epps APRN - NP    Estimated discharge date: medically stable  Barriers: placement for LTC      COPD exacerbation  Chronic respiratory failure - at 6 L baseline   History of pulmonary sarcoidosis POA (from previous chart)  Suspected vocal cord dysfunction ?POA (from previous chart)  She already get 1 dose of 10 mg of Decadron  DuoNeb every 6 hours  Patient has a stridor  Outpatient follow-up with the ENT  : Discussed with pulmonologist, he is concerned about patient expiratory wheezing in addition to her stridor  CT of the chest did not show any tracheomalacia, no mucous plugging  He recommended to start Mucomyst nebulizers, Mucinex  Continue with prednisone  : Patient desaturating while working with therapy, was initially at 4 L at rest, activity 1.26 L with activity  Discussed with Dr. Sandra CT of the chest results, questionable tracheomalacia  - respiratory status stable, dc on 4L at rest, 6L with ambulation    Metabolic encephalopathy - intermittent  History of bipolar disorder  Schizophrenia  Anxiety  CT head reassuring  Patient on multiple medications  Patient on Seroquel, sertraline, Keppra  Will check Keppra level  Resume psych meds per med rec   patient is on Seroquel 400 mg daily at bedtime  Resume home dose of methadone  : Clinically improving.  Back to her 6 L of home oxygen.  Patient requesting to talk to  as she would like to go to rehab'  : Discussed with PT OT, patient is not a candidate for rehab, is high functioning, needs home health  : Discussed with case management, plan is for patient to go to rehab  - more lethargic today. VBG obtained, normal CO2. Suspect medication induced  - consulted Psych again for clarification of psychoactive medication list, appreciate assistance and recommendations. Would like to avoid  0415  Gross per 24 hour   Intake --   Output 1200 ml   Net -1200 ml        I had a face to face encounter and independently examined this patient on 3/20/2025, as outlined below:  PHYSICAL EXAM:  General: Alert, cooperative  EENT:  EOMI. Anicteric sclerae.  Resp:  CTA bilaterally, no wheezing or rales.  No accessory muscle use  CV:  Regular  rate,  No edema  GI:  Soft, Non distended, Non tender.    Neurologic:  Alert and oriented X 3, normal speech,  Skin:  No rashes.  No jaundice    Reviewed most current lab test results and cultures  YES  Reviewed most current radiology test results   YES  Review and summation of old records today    NO  Reviewed patient's current orders and MAR    YES  PMH/SH reviewed - no change compared to H&P    Procedures: see electronic medical records for all procedures/Xrays and details which were not copied into this note but were reviewed prior to creation of Plan.      LABS:  I reviewed today's most current labs and imaging studies.  Pertinent labs include:  Recent Labs     03/19/25  0646 03/20/25  1336   WBC 8.9 10.2   HGB 11.9 11.4*   HCT 37.8 34.9*    251       Recent Labs     03/19/25  0105 03/20/25  1336   * 134*   K 4.5 4.8    99   CO2 29 34*   GLUCOSE 83 125*   BUN 17 15   CREATININE 0.84 0.63   CALCIUM 8.9 9.5   MG 2.2 2.3   PHOS 3.5 2.9   BILITOT 0.3  --    AST 41*  --    ALT 39  --          Signed: David L Mendel, MD

## 2025-03-20 NOTE — CONSULTS
Chief complaint  \"I can't sleep.\"    History of present illness  This is a re-consult for medication clarifications. David Car is a 49 year old female w reports feeling \"so, so\" and is currently waiting on placement for rehabilitation. She expresses excitement about regaining independence and performing daily activities such as standing, sitting, lifting, and walking without assistance. David finds it challenging to rely on others for tasks she used to do herself and looks forward to being able to manage these activities independently again.    David has been experiencing significant fatigue, which she attributes to being in the hospital. She has a long-standing history of insomnia, which has worsened during her hospital stay. Despite efforts to tire herself out by staying awake during the day, she has not been able to achieve restful sleep. David describes her lifelong struggle with insomnia, expressing envy towards those who can fall asleep easily. She has been on Seroquel for about ten years at a dosage of 400 mg, but it no longer seems effective in aiding her sleep, possibly due to tolerance. David is frustrated with her ongoing motley to achieve a normal sleep pattern and desires to sleep uninterrupted for at least four to five hours.    David is currently taking several psychiatric medications, including Wellbutrin 300 mg, Trileptal 150 mg twice daily, Seroquel 400 mg at bedtime, and Zoloft 150 mg daily. She has been on prednisone since she was 14 years old. Despite these medications, she continues to struggle with sleep issues. David notes that her anxiety has been present throughout the week, and she believes her sleep difficulties are contributing to her anxiety. She recalls a recent instance where her blood pressure was elevated, which she associates with her anxiety. David mentions a family history of sleep issues, referencing her grandmother's tendency to fall asleep easily, which she jokingly  when administering Trazodone or Mirtazapine to the current medication regimen. Given the potential for QT prolongation with some of these medications, regular ECG assessments are recommended to prevent any cardiac complications.    Thank you for the consult! Please feel free to reach out to us again as needed.

## 2025-03-20 NOTE — PLAN OF CARE
Problem: Pain  Goal: Verbalizes/displays adequate comfort level or baseline comfort level  Outcome: Progressing     Problem: Respiratory - Adult  Goal: Achieves optimal ventilation and oxygenation  Outcome: Progressing     Problem: Safety - Adult  Goal: Free from fall injury  Outcome: Progressing

## 2025-03-20 NOTE — PLAN OF CARE
Problem: Occupational Therapy - Adult  Goal: By Discharge: Performs self-care activities at highest level of function for planned discharge setting.  See evaluation for individualized goals.  Description: FUNCTIONAL STATUS PRIOR TO ADMISSION:  Patient reports being able to perform toileting, self feeding and standing grooming independently, requires max A for all dressing and bathing due to her poor activity tolerance, and ambulates with a rollator at a mod I level. Her  provided assist for ADLs and IADLs. She is on 6L NC at all times at baseline.  HOME SUPPORT: Patient has been living with her 81 y/o mother recently, but her  checks on her daily and assists her with ADLs.     Occupational Therapy Goals:  Initiated 3/13/2025  Weekly reassessment: 3/20/2025. Goals met. Progress to Independent level within 7 days  1.  Patient will perform grooming standing at sink for 3 minutes with Supervision within 7 day(s).  2.  Patient will perform upper body dressing with Moderate Assist and use of pacing techniques within 7 day(s).  3.  Patient will perform lower body dressing with Moderate Assist and use of pacing techniques within 7 day(s).  4.  Patient will perform toilet transfers with Stand by Assist  within 7 day(s).  5.  Patient will perform all aspects of toileting with Stand by Assist and use of pacing techniques within 7 day(s).  6.  Patient will sponge bathing with Moderate Assist and use of pacing techniques within 7 day(s).     Outcome: Progressing   OCCUPATIONAL THERAPY TREATMENT: WEEKLY REASSESSMENT    Patient: David Car (49 y.o. female)  Date: 3/20/2025  Primary Diagnosis: COPD exacerbation (HCC) [J44.1]  COPD with acute exacerbation (HCC) [J44.1]  Acute hypoxic respiratory failure (HCC) [J96.01]       Precautions: Bed Alarm, Fall Risk                Chart, occupational therapy assessment, plan of care, and goals were reviewed.    ASSESSMENT  Patient continues to benefit from skilled OT services  Yes  Interventions: Safety awareness training;Verbal cues  Sit to Stand: Contact guard assistance  Stand to Sit: Contact guard assistance  Bed to Chair: Contact guard assistance  Toilet Transfer: Contact guard assistance (RW)    Balance:     Balance  Sitting: Intact  Sitting - Static: Good (unsupported)  Sitting - Dynamic: Good (unsupported)  Standing: Impaired;With support  Standing - Static: Good;Constant support  Standing - Dynamic: Constant support;Good    ADL Intervention:    Feeding: Independent       Grooming: Stand by assistance  Grooming Skilled Clinical Factors: standing at sink    UE Bathing: Stand by assistance  UE Bathing Skilled Clinical Factors: standing at sink    LE Bathing: Minimal assistance  LE Bathing Skilled Clinical Factors: standing/seated    UE Dressing: Stand by assistance  UE Dressing Skilled Clinical Factors: donning gown as robe seated edge of bed    LE Dressing: Supervision  LE Dressing Skilled Clinical Factors: EOB    Toileting: Stand by assistance       Functional Mobility: Contact guard assistance  Functional Mobility Skilled Clinical Factors: RW; assist for safe management with obstacles and managing oxygen tubing     Product Used : Incontinent cleanser    Pain Rating:  No c/o pain    Activity Tolerance:   Fair , requires rest breaks, and desaturates with activity and requires oxygen  Please refer to the flowsheet for vital signs taken during this treatment.    After treatment:   Patient left in no apparent distress sitting up in chair and Call bell within reach    COMMUNICATION/EDUCATION:   The patient's plan of care was discussed with: physical therapist    Thank you for this referral.  Renetta Roberts OT  Minutes: 30

## 2025-03-20 NOTE — PROGRESS NOTES
End of Shift Note    Bedside shift change report given to EBEN Blackburn  (oncoming nurse) by Geo Aviles RN (offgoing nurse).  Report included the following information SBAR    Shift worked:  7a7p     Shift summary and any significant changes:     Patient noted with increased anxiety. Given Ativan.     Concerns for physician to address:       Zone phone for oncoming shift:          Activity:  Level of Assistance: Independent  Number times ambulated in hallways past shift: 1  Number of times OOB to chair past shift: 2    Cardiac:   Cardiac Monitoring: Yes      Cardiac Rhythm: Sinus rhythm    Access:  Current line(s): PIV    Genitourinary:   Urinary Status: Voiding, Bathroom privileges    Respiratory:   O2 Device: Nasal cannula  Chronic home O2 use?: YES  Incentive spirometer at bedside: YES    GI:  Last BM (including prior to admit): 03/18/25  Current diet:  ADULT DIET; Regular  ADULT ORAL NUTRITION SUPPLEMENT; Breakfast, Lunch, Dinner; Standard High Calorie/High Protein Oral Supplement  Passing flatus: YES    Pain Management:   Patient states pain is manageable on current regimen: YES    Skin:  Brennen Scale Score: 20  Interventions: Wound Offloading (Prevention Methods): Repositioning, Pillows    Patient Safety:  Fall Risk: Nursing Judgement-Fall Risk High(Add Comments): Yes  Fall Risk Interventions  Nursing Judgement-Fall Risk High(Add Comments): Yes  Toilet Every 2 Hours-In Advance of Need: Yes  Hourly Visual Checks: Awake  Fall Visual Posted: Armband, Socks  Room Door Open: Yes  Alarm On: Bed, Chair  Patient Moved Closer to Nursing Station: No    Active Consults:   IP CONSULT TO PSYCHIATRY  IP CONSULT TO NEUROLOGY  IP CONSULT TO PHARMACY  IP CONSULT TO ADDICTION MEDICINE  IP CONSULT TO PULMONOLOGY  IP CONSULT TO CASE MANAGEMENT  IP CONSULT TO PSYCHIATRY    Length of Stay:  Expected LOS: 8  Actual LOS: 8    Geo Aviles RN

## 2025-03-20 NOTE — CARE COORDINATION
Transition of Care Plan:    RUR: 18% Moderate RUR  Prior Level of Functioning: Dependent  Disposition: Plan A: long term care facility   Plan B: home with home health   WENDY: 3/20/25  If HH: Date FOC offered: 3/17/25  Date FOC received: 3/18  Accepting facility: [facility] Address: [address] [place] [address] Phone: [phone]  Date authorization started with reference number: na  Date authorization received and expires: na  Follow up appointments: PCP, Specialist  DME needed: deferred to LTC  Transportation at discharge: Medicaid transportation need is anticipated  IM/IMM Medicare/ letter given:  N/A -Medicaid  Is patient a  and connected with VA? N/A   If yes, was [place] transfer form completed and VA notified?   Caregiver Contact: Mother- Brenda Khoury -[phone] (Home Phone)   Discharge Caregiver contacted prior to discharge? CM to discuss with mother and pt  Care Conference needed? none  Barriers to discharge: bed at LTC      CM received a confirmation from Priya Broussard [phone] that the patient has been accepted to [facility] Address: [address], [place] [address] Phone: [phone]. The patient is agreeable to be discharged to long term care once  a bed is available.         Lori Azevedo, RN  Case Management  [phone]

## 2025-03-20 NOTE — PROGRESS NOTES
Repositioning, Pillows    Patient Safety:  Fall Risk: Nursing Judgement-Fall Risk High(Add Comments): Yes  Fall Risk Interventions  Nursing Judgement-Fall Risk High(Add Comments): Yes  Toilet Every 2 Hours-In Advance of Need: Yes  Hourly Visual Checks: In chair, Awake  Fall Visual Posted: Armband, Socks  Room Door Open: Yes  Alarm On: Bed  Patient Moved Closer to Nursing Station: No    Active Consults:   IP CONSULT TO PSYCHIATRY  IP CONSULT TO NEUROLOGY  IP CONSULT TO PHARMACY  IP CONSULT TO ADDICTION MEDICINE  IP CONSULT TO PULMONOLOGY  IP CONSULT TO CASE MANAGEMENT  IP CONSULT TO PSYCHIATRY    Length of Stay:  Expected LOS: 8  Actual LOS: 8    Samina Sanchez RN

## 2025-03-20 NOTE — PROGRESS NOTES
Ketorolac      Other reaction(s): Unable to Obtain  From [facility] paperwork 22    Mirtazapine      Other reaction(s): Unable to Obtain  From [facility] paperwork on 22    Tomato Swelling     Tongue    Trazodone Angioedema      Social History     Tobacco Use    Smoking status: Every Day     Current packs/day: 0.00     Types: Cigarettes     Last attempt to quit: 8/3/2017     Years since quittin.6    Smokeless tobacco: Never   Substance Use Topics    Alcohol use: No      Family History   Problem Relation Age of Onset    Hypertension Father     Hypertension Mother      OBJECTIVE:     Vital Signs:     /62   Pulse 91   Temp 97.6 °F (36.4 °C) (Axillary)   Resp 10   Ht 1.626 m (5' 4\")   Wt 99.8 kg (220 lb)   SpO2 99%   BMI 37.76 kg/m²    Temp (24hrs), Av.8 °F (36.6 °C), Min:97.3 °F (36.3 °C), Max:98.5 °F (36.9 °C)     Intake/Output:     Last shift: No intake/output data recorded.    Last 3 shifts:  1901 -  0700  In: -   Out:  [Urine:2000]        Intake/Output Summary (Last 24 hours) at 3/20/2025 1454  Last data filed at 3/20/2025 0415  Gross per 24 hour   Intake --   Output 2000 ml   Net -2000 ml         Physical Exam:                                        Exam Findings Other   General: No resp distress noted, appears stated age    HEENT:  NCAT     Chest: normal chest rise b/l    HEART:  RRR,     Lungs:  Upper airway stridor relieved with purse lip breathing, faint wheeze in LLL     ABD: Soft/NT    EXT: No edema     Skin: Warm, dry     Neuro: A/O x 3        Medications:  Current Facility-Administered Medications   Medication Dose Route Frequency    polyethylene glycol (GLYCOLAX) packet 17 g  17 g Oral BID    senna (SENOKOT) tablet 17.2 mg  2 tablet Oral Nightly    benzonatate (TESSALON) capsule 100 mg  100 mg Oral TID PRN    predniSONE (DELTASONE) tablet 30 mg  30 mg Oral Daily    gabapentin (NEURONTIN) capsule 300 mg  300 mg Oral TID    LORazepam (ATIVAN) tablet 0.5 mg  0.5 mg Oral BID  Labs:  ABG Invalid input(s): \"PHI\", \"PCO2I\", \"PO2I\", \"HCO3I\", \"SO2I\", \"FIO2I\"     CBC Recent Labs     03/19/25  0646   WBC 8.9   HGB 11.9   HCT 37.8      MCV 99.7*   MCH 31.4          Metabolic  Panel Recent Labs     03/19/25  0105   *   K 4.5      CO2 29   BUN 17   MG 2.2   PHOS 3.5   ALT 39          Pertinent Labs                Kike Sandra MD  3/20/2025

## 2025-03-20 NOTE — PLAN OF CARE
Problem: Discharge Planning  Goal: Discharge to home or other facility with appropriate resources  3/19/2025 2134 by Samina Sanchez RN  Outcome: Progressing  3/19/2025 1047 by Allison Guajardo RN  Outcome: Progressing  Flowsheets (Taken 3/19/2025 0900)  Discharge to home or other facility with appropriate resources:   Arrange for needed discharge resources and transportation as appropriate   Identify barriers to discharge with patient and caregiver   Identify discharge learning needs (meds, wound care, etc)   Refer to discharge planning if patient needs post-hospital services based on physician order or complex needs related to functional status, cognitive ability or social support system     Problem: Pain  Goal: Verbalizes/displays adequate comfort level or baseline comfort level  3/19/2025 2134 by Samina Sanchez RN  Outcome: Progressing  3/19/2025 1047 by Allison Guajardo RN  Outcome: Progressing     Problem: Respiratory - Adult  Goal: Achieves optimal ventilation and oxygenation  3/19/2025 2134 by Samina Sanchez RN  Outcome: Progressing  3/19/2025 1047 by Allison Guajardo RN  Outcome: Progressing  Flowsheets (Taken 3/19/2025 0900)  Achieves optimal ventilation and oxygenation:   Assess for changes in respiratory status   Assess for changes in mentation and behavior   Position to facilitate oxygenation and minimize respiratory effort   Oxygen supplementation based on oxygen saturation or arterial blood gases   Initiate smoking cessation protocol as indicated   Encourage broncho-pulmonary hygiene including cough, deep breathe, incentive spirometry   Assess the need for suctioning and aspirate as needed   Assess and instruct to report shortness of breath or any respiratory difficulty   Respiratory therapy support as indicated     Problem: Safety - Adult  Goal: Free from fall injury  3/19/2025 2134 by Samina Sanchez RN  Outcome: Progressing  3/19/2025 1047 by Allison Guajardo RN  Outcome: Progressing     Problem: ABCDS Injury

## 2025-03-20 NOTE — PLAN OF CARE
Problem: Physical Therapy - Adult  Goal: By Discharge: Performs mobility at highest level of function for planned discharge setting.  See evaluation for individualized goals.  Description: FUNCTIONAL STATUS PRIOR TO ADMISSION: Ambulates household distances with use of rollator walker. Wears 6L O2 at baseline. History of multiple falls.     HOME SUPPORT PRIOR TO ADMISSION: The patient currently lives with 80 yr old mother.  checks on her daily and assists with ADLs.     Physical Therapy Goals  Initiated 3/13/2025. Weekly re-assessment performed 3/20/2025 and goals remain appropriate:  1.  Patient will move from supine to sit and sit to supine, scoot up and down, and roll side to side in bed with supervision/set-up within 7 day(s).    2.  Patient will perform sit to stand with supervision/set-up within 7 day(s).  3.  Patient will transfer from bed to chair and chair to bed with supervision/set-up using the least restrictive device within 7 day(s).  4.  Patient will ambulate with supervision/set-up for 150 feet with the least restrictive device within 7 day(s).   5.  Patient will ascend/descend 4 stairs with 1 handrail(s) with supervision/set-up within 7 day(s).   Outcome: Progressing   PHYSICAL THERAPY TREATMENT: WEEKLY REASSESSMENT    Patient: David Car (49 y.o. female)  Date: 3/20/2025  Primary Diagnosis: COPD exacerbation (HCC) [J44.1]  COPD with acute exacerbation (HCC) [J44.1]  Acute hypoxic respiratory failure (HCC) [J96.01]       Precautions: Restrictions/Precautions  Restrictions/Precautions: Bed Alarm, Fall Risk          ASSESSMENT :  Patient continues to benefit from skilled PT services and is slowly progressing towards goals. Pt received for PT session supine in bed, on 4L O2 via NC with sats 100%, agreeable to mobility with encouragement. Pt performed bed mobility with supervision and rail. Sit <>Stand EOB with RW and CGA. Pt walked 30ft x2 with RW and CGA, short steps and slow speed. 1 sitting  awareness training;Verbal cues  Base of Support: Narrowed  Speed/Samara: Slow;Shuffled  Step Length: Right shortened;Left shortened  Gait Abnormalities: Decreased step clearance;Path deviations                                                                                                                                                                                                                                           [facility] AM-PAC®      Basic Mobility Inpatient Short Form (6-Clicks) Version 2  How much HELP from another person do you currently need... (If the patient hasn't done an activity recently, how much help from another person do you think they would need if they tried?) Total A Lot A Little None   1.  Turning from your back to your side while in a flat bed without using bedrails? []  1 []  2 []  3  [x]  4   2.  Moving from lying on your back to sitting on the side of a flat bed without using bedrails? []  1 []  2 []  3  [x]  4   3.  Moving to and from a bed to a chair (including a wheelchair)? []  1 []  2 [x]  3  []  4   4. Standing up from a chair using your arms (e.g. wheelchair or bedside chair)? []  1 []  2 [x]  3  []  4   5.  Walking in hospital room? []  1 []  2 [x]  3  []  4   6.  Climbing 3-5 steps with a railing? []  1 [x]  2 []  3  []  4     Raw Score: 17/24                            Cutoff score <=171,2,3 had higher odds of discharging home with home health or need of SNF/IPR.    1. Chitra Ulloa, Laura Shay, Fortunato Marroquin, Alka Bailey, Axel Maria, Mateus Ulloa.  Validity of the AM-PAC “6-Clicks” Inpatient Daily Activity and Basic Mobility Short Forms. Physical Therapy Mar 2014, 94 (3) 379-391; DOI: 10.2522/ptj.59379235  2. Akbar RAYMUNDO, Obie J, Magali J, Hussain MCCALL. Association of AM-PAC \"6-Clicks\" Basic Mobility and Daily Activity Scores With Discharge Destination. Phys Ther. 2021 Apr 4;101(4):sxcu641. doi: 10.1093/ptj/yezo630. PMID: [id number].  3.

## 2025-03-21 PROCEDURE — 6370000000 HC RX 637 (ALT 250 FOR IP): Performed by: INTERNAL MEDICINE

## 2025-03-21 PROCEDURE — 6370000000 HC RX 637 (ALT 250 FOR IP): Performed by: STUDENT IN AN ORGANIZED HEALTH CARE EDUCATION/TRAINING PROGRAM

## 2025-03-21 PROCEDURE — 2500000003 HC RX 250 WO HCPCS: Performed by: STUDENT IN AN ORGANIZED HEALTH CARE EDUCATION/TRAINING PROGRAM

## 2025-03-21 PROCEDURE — 1100000003 HC PRIVATE W/ TELEMETRY

## 2025-03-21 RX ADMIN — APIXABAN 5 MG: 5 TABLET, FILM COATED ORAL at 21:19

## 2025-03-21 RX ADMIN — GUAIFENESIN 1200 MG: 600 TABLET, EXTENDED RELEASE ORAL at 21:19

## 2025-03-21 RX ADMIN — LORAZEPAM 0.5 MG: 0.5 TABLET ORAL at 18:05

## 2025-03-21 RX ADMIN — LORAZEPAM 0.5 MG: 0.5 TABLET ORAL at 03:56

## 2025-03-21 RX ADMIN — LEVETIRACETAM 500 MG: 500 TABLET, FILM COATED ORAL at 09:15

## 2025-03-21 RX ADMIN — GABAPENTIN 300 MG: 300 CAPSULE ORAL at 09:16

## 2025-03-21 RX ADMIN — PREDNISONE 30 MG: 5 TABLET ORAL at 09:15

## 2025-03-21 RX ADMIN — GABAPENTIN 300 MG: 300 CAPSULE ORAL at 14:27

## 2025-03-21 RX ADMIN — GABAPENTIN 300 MG: 300 CAPSULE ORAL at 21:19

## 2025-03-21 RX ADMIN — OXCARBAZEPINE 150 MG: 300 TABLET, FILM COATED ORAL at 09:19

## 2025-03-21 RX ADMIN — CLONIDINE HYDROCHLORIDE 0.1 MG: 0.1 TABLET ORAL at 21:19

## 2025-03-21 RX ADMIN — SPIRONOLACTONE 25 MG: 25 TABLET ORAL at 09:17

## 2025-03-21 RX ADMIN — SERTRALINE HYDROCHLORIDE 150 MG: 50 TABLET ORAL at 09:17

## 2025-03-21 RX ADMIN — METHADONE HYDROCHLORIDE 120 MG: 10 CONCENTRATE ORAL at 06:12

## 2025-03-21 RX ADMIN — CLONIDINE HYDROCHLORIDE 0.1 MG: 0.1 TABLET ORAL at 09:17

## 2025-03-21 RX ADMIN — SACUBITRIL AND VALSARTAN 1 TABLET: 24; 26 TABLET, FILM COATED ORAL at 09:17

## 2025-03-21 RX ADMIN — SODIUM CHLORIDE, PRESERVATIVE FREE 10 ML: 5 INJECTION INTRAVENOUS at 21:22

## 2025-03-21 RX ADMIN — BUPROPION HYDROCHLORIDE 300 MG: 150 TABLET, EXTENDED RELEASE ORAL at 09:17

## 2025-03-21 RX ADMIN — GUAIFENESIN 1200 MG: 600 TABLET, EXTENDED RELEASE ORAL at 09:17

## 2025-03-21 RX ADMIN — QUETIAPINE FUMARATE 400 MG: 100 TABLET ORAL at 21:19

## 2025-03-21 RX ADMIN — SODIUM CHLORIDE, PRESERVATIVE FREE 10 ML: 5 INJECTION INTRAVENOUS at 09:18

## 2025-03-21 RX ADMIN — APIXABAN 5 MG: 5 TABLET, FILM COATED ORAL at 09:17

## 2025-03-21 RX ADMIN — SACUBITRIL AND VALSARTAN 1 TABLET: 24; 26 TABLET, FILM COATED ORAL at 21:19

## 2025-03-21 RX ADMIN — POLYETHYLENE GLYCOL 3350 17 G: 17 POWDER, FOR SOLUTION ORAL at 09:17

## 2025-03-21 RX ADMIN — OXCARBAZEPINE 150 MG: 300 TABLET, FILM COATED ORAL at 21:20

## 2025-03-21 RX ADMIN — LEVETIRACETAM 500 MG: 500 TABLET, FILM COATED ORAL at 21:19

## 2025-03-21 ASSESSMENT — PAIN SCALES - GENERAL: PAINLEVEL_OUTOF10: 0

## 2025-03-21 NOTE — PLAN OF CARE
Problem: Discharge Planning  Goal: Discharge to home or other facility with appropriate resources  3/21/2025 0854 by Romeo Garcia RN  Outcome: Progressing  3/21/2025 0036 by Tahmina Monge RN  Outcome: Progressing     Problem: Pain  Goal: Verbalizes/displays adequate comfort level or baseline comfort level  3/21/2025 0854 by Romeo Garcia RN  Outcome: Progressing  3/21/2025 0036 by Tahmina Monge RN  Outcome: Progressing     Problem: Respiratory - Adult  Goal: Achieves optimal ventilation and oxygenation  3/21/2025 0036 by Tahmina Monge RN  Outcome: Progressing     Problem: Safety - Adult  Goal: Free from fall injury  3/21/2025 0854 by Romeo Garcia RN  Outcome: Progressing  3/21/2025 0036 by Tahmina Monge RN  Outcome: Progressing     Problem: ABCDS Injury Assessment  Goal: Absence of physical injury  3/21/2025 0854 by Romeo Garcia RN  Outcome: Progressing  3/21/2025 0036 by Tahmina Monge RN  Outcome: Progressing

## 2025-03-21 NOTE — PROGRESS NOTES
..End of Shift Note    Bedside shift change report given to EBEN Blackburn (oncoming nurse) by Romeo Garcia RN (offgoing nurse).  Report included the following information SBAR    Shift worked:  0700 - 1900     Shift summary and any significant changes:    Pt. Tolerated all medication w/o issue.  No issue ambulating to the bathroom.  Plan is to go to a long term care facility.     Concerns for physician to address: No     Zone phone for oncoming shift:  No       Activity:  Level of Assistance: Minimal assist, patient does 75% or more  Number times ambulated in hallways past shift: 0  Number of times OOB to chair past shift: 0    Cardiac:   Cardiac Monitoring: Yes      Cardiac Rhythm: Sinus rhythm, Sinus tachy    Access:  Current line(s): PIV     Genitourinary:   Urinary Status: Voiding, Bathroom privileges    Respiratory:   O2 Device: Nasal cannula  Chronic home O2 use?: NO  Incentive spirometer at bedside: YES    GI:  Last BM (including prior to admit): 03/21/25  Current diet:  ADULT DIET; Regular  ADULT ORAL NUTRITION SUPPLEMENT; Breakfast, Lunch, Dinner; Standard High Calorie/High Protein Oral Supplement  Passing flatus: YES    Pain Management:   Patient states pain is manageable on current regimen: YES    Skin:  Brennen Scale Score: 18  Interventions: Wound Offloading (Prevention Methods): Pillows, Repositioning, Turning    Patient Safety:  Fall Risk: Nursing Judgement-Fall Risk High(Add Comments): Yes  Fall Risk Interventions  Nursing Judgement-Fall Risk High(Add Comments): Yes  Toilet Every 2 Hours-In Advance of Need: Yes  Hourly Visual Checks: Awake, In bed  Fall Visual Posted: Armband, Socks  Room Door Open: Yes  Alarm On: Bed  Patient Moved Closer to Nursing Station: No    Active Consults:   IP CONSULT TO PSYCHIATRY  IP CONSULT TO NEUROLOGY  IP CONSULT TO PHARMACY  IP CONSULT TO ADDICTION MEDICINE  IP CONSULT TO PULMONOLOGY  IP CONSULT TO CASE MANAGEMENT  IP CONSULT TO PSYCHIATRY    Length of Stay:  Expected  LOS: 10  Actual LOS: 9    Romeo Garcia RN

## 2025-03-21 NOTE — PROGRESS NOTES
End of Shift Note    Bedside shift change report given to Romeo by Tahmina Monge RN . Report included the following information SBAR    Shift worked:    1900 to 0700 am    Shift summary and any significant changes:       2022:an epic message place to Elizabeth EVANS in regard patient request Bisacodyl suppository for constipation.  2130: Elizabeth NP place an order for Bisacodyl suppository.    Concerns for physician to address:       Zone phone for oncoming shift:          Activity:  Level of Assistance: Minimal assist, patient does 75% or more  Number times ambulated in hallways past shift: 1  Number of times OOB to chair past shift: 1    Cardiac:   Cardiac Monitoring: Yes      Cardiac Rhythm: Sinus rhythm, Sinus tachy    Access:  Current line(s): PIV    Genitourinary:   Urinary Status: Voiding, Bathroom privileges    Respiratory:   O2 Device: Nasal cannula  Chronic home O2 use?: YES  Incentive spirometer at bedside: NO    GI:  Last BM (including prior to admit): 03/21/25  Current diet:  ADULT DIET; Regular  ADULT ORAL NUTRITION SUPPLEMENT; Breakfast, Lunch, Dinner; Standard High Calorie/High Protein Oral Supplement  Passing flatus: YES    Pain Management:   Patient states pain is manageable on current regimen: YES    Skin:  Brennen Scale Score: 18  Interventions: Wound Offloading (Prevention Methods): Pillows, Repositioning, Turning    Patient Safety:  Fall Risk: Nursing Judgement-Fall Risk High(Add Comments): Yes  Fall Risk Interventions  Nursing Judgement-Fall Risk High(Add Comments): Yes  Toilet Every 2 Hours-In Advance of Need: Yes  Hourly Visual Checks: Awake, In bed  Fall Visual Posted: Armband, Socks  Room Door Open: Yes  Alarm On: Bed  Patient Moved Closer to Nursing Station: No    Active Consults:   IP CONSULT TO PSYCHIATRY  IP CONSULT TO NEUROLOGY  IP CONSULT TO PHARMACY  IP CONSULT TO ADDICTION MEDICINE  IP CONSULT TO PULMONOLOGY  IP CONSULT TO CASE MANAGEMENT  IP CONSULT TO PSYCHIATRY    Length of

## 2025-03-21 NOTE — CARE COORDINATION
Transition of Care Plan:    RUR: 18% Moderate RUR  Prior Level of Functioning: Dependent  Disposition: Plan A: long term care facility   WENDY: 3/22/25  If HH: Date FOC offered: 3/17/25  Date FOC received: 3/18  Accepting facility: [facility] Address: [address] [place] [address] Phone: [phone]  Date authorization started with reference number: na  Date authorization received and expires: na  Follow up appointments: PCP, Specialist  DME needed: deferred to LTC  Transportation at discharge: Medicaid transportation need is anticipated  IM/IMM Medicare/ letter given:  N/A -Medicaid  Is patient a [place] and connected with VA? N/A   If yes, was [place] transfer form completed and VA notified?   Caregiver Contact: Mother- Brenda Khoury -[phone] (Home Phone)   Discharge Caregiver contacted prior to discharge? CM to discuss with mother and pt  Care Conference needed? none  Barriers to discharge: bed at LTC      CM received a call from Priya Broussard [phone] stating that [facility] Address: [address] [place] [address] Phone: [phone] is anticipated to have a bed over the weekend. Also, the patient has a virtual  that must be removed prior to releasing the patient.         Lori Azevedo RN  Case Management  [phone]

## 2025-03-21 NOTE — PLAN OF CARE
Saw at bedside, is medically stable, waiting on placement, appreciate CM assistance.    David L Mendel, MD

## 2025-03-21 NOTE — PLAN OF CARE
Problem: Discharge Planning  Goal: Discharge to home or other facility with appropriate resources  Outcome: Progressing     Problem: Pain  Goal: Verbalizes/displays adequate comfort level or baseline comfort level  3/21/2025 0036 by Tahmina Monge RN  Outcome: Progressing  3/20/2025 1841 by Geo Aviles RN  Outcome: Progressing     Problem: Respiratory - Adult  Goal: Achieves optimal ventilation and oxygenation  3/21/2025 0036 by Tahmina Monge RN  Outcome: Progressing  3/20/2025 1841 by Geo Aviles RN  Outcome: Progressing     Problem: Safety - Adult  Goal: Free from fall injury  3/21/2025 0036 by Tahmina Monge RN  Outcome: Progressing  3/20/2025 1841 by Geo Aviles RN  Outcome: Progressing     Problem: Physical Therapy - Adult  Goal: By Discharge: Performs mobility at highest level of function for planned discharge setting.  See evaluation for individualized goals.  Description: FUNCTIONAL STATUS PRIOR TO ADMISSION: Ambulates household distances with use of rollator walker. Wears 6L O2 at baseline. History of multiple falls.     HOME SUPPORT PRIOR TO ADMISSION: The patient currently lives with 80 yr old mother.  checks on her daily and assists with ADLs.     Physical Therapy Goals  Initiated 3/13/2025. Weekly re-assessment performed 3/20/2025 and goals remain appropriate:  1.  Patient will move from supine to sit and sit to supine, scoot up and down, and roll side to side in bed with supervision/set-up within 7 day(s).    2.  Patient will perform sit to stand with supervision/set-up within 7 day(s).  3.  Patient will transfer from bed to chair and chair to bed with supervision/set-up using the least restrictive device within 7 day(s).  4.  Patient will ambulate with supervision/set-up for 150 feet with the least restrictive device within 7 day(s).   5.  Patient will ascend/descend 4 stairs with 1 handrail(s) with supervision/set-up within 7 day(s).   3/20/2025 1308 by Lisa Pemberton

## 2025-03-22 VITALS
SYSTOLIC BLOOD PRESSURE: 118 MMHG | HEART RATE: 87 BPM | WEIGHT: 220 LBS | TEMPERATURE: 98 F | OXYGEN SATURATION: 95 % | RESPIRATION RATE: 14 BRPM | HEIGHT: 64 IN | BODY MASS INDEX: 37.56 KG/M2 | DIASTOLIC BLOOD PRESSURE: 80 MMHG

## 2025-03-22 PROCEDURE — 6370000000 HC RX 637 (ALT 250 FOR IP): Performed by: INTERNAL MEDICINE

## 2025-03-22 PROCEDURE — 6370000000 HC RX 637 (ALT 250 FOR IP): Performed by: PHYSICIAN ASSISTANT

## 2025-03-22 PROCEDURE — 6370000000 HC RX 637 (ALT 250 FOR IP): Performed by: STUDENT IN AN ORGANIZED HEALTH CARE EDUCATION/TRAINING PROGRAM

## 2025-03-22 PROCEDURE — 6360000002 HC RX W HCPCS: Performed by: STUDENT IN AN ORGANIZED HEALTH CARE EDUCATION/TRAINING PROGRAM

## 2025-03-22 PROCEDURE — 2700000000 HC OXYGEN THERAPY PER DAY

## 2025-03-22 PROCEDURE — 6360000002 HC RX W HCPCS: Performed by: INTERNAL MEDICINE

## 2025-03-22 PROCEDURE — 94640 AIRWAY INHALATION TREATMENT: CPT

## 2025-03-22 PROCEDURE — 2500000003 HC RX 250 WO HCPCS: Performed by: STUDENT IN AN ORGANIZED HEALTH CARE EDUCATION/TRAINING PROGRAM

## 2025-03-22 RX ORDER — QUETIAPINE FUMARATE 400 MG/1
400 TABLET, FILM COATED ORAL
Qty: 1 TABLET | Refills: 0 | Status: SHIPPED | OUTPATIENT
Start: 2025-03-22 | End: 2025-03-23

## 2025-03-22 RX ORDER — LORAZEPAM 0.5 MG/1
0.5 TABLET ORAL 2 TIMES DAILY PRN
Qty: 1 TABLET | Refills: 0 | Status: SHIPPED | OUTPATIENT
Start: 2025-03-22 | End: 2025-03-23

## 2025-03-22 RX ORDER — PREDNISONE 10 MG/1
TABLET ORAL
Qty: 12 TABLET | Refills: 0 | Status: SHIPPED | OUTPATIENT
Start: 2025-03-22 | End: 2025-03-30

## 2025-03-22 RX ORDER — GABAPENTIN 300 MG/1
300 CAPSULE ORAL 3 TIMES DAILY
Qty: 3 CAPSULE | Refills: 0 | Status: SHIPPED | OUTPATIENT
Start: 2025-03-22 | End: 2025-03-23

## 2025-03-22 RX ADMIN — PREDNISONE 30 MG: 5 TABLET ORAL at 07:49

## 2025-03-22 RX ADMIN — BUPROPION HYDROCHLORIDE 300 MG: 150 TABLET, EXTENDED RELEASE ORAL at 07:50

## 2025-03-22 RX ADMIN — METHADONE HYDROCHLORIDE 120 MG: 10 CONCENTRATE ORAL at 06:31

## 2025-03-22 RX ADMIN — SPIRONOLACTONE 25 MG: 25 TABLET ORAL at 07:49

## 2025-03-22 RX ADMIN — SACUBITRIL AND VALSARTAN 1 TABLET: 24; 26 TABLET, FILM COATED ORAL at 07:49

## 2025-03-22 RX ADMIN — GABAPENTIN 300 MG: 300 CAPSULE ORAL at 07:50

## 2025-03-22 RX ADMIN — ACETYLCYSTEINE 200 MG: 200 INHALANT RESPIRATORY (INHALATION) at 08:54

## 2025-03-22 RX ADMIN — SODIUM CHLORIDE, PRESERVATIVE FREE 10 ML: 5 INJECTION INTRAVENOUS at 07:49

## 2025-03-22 RX ADMIN — APIXABAN 5 MG: 5 TABLET, FILM COATED ORAL at 07:49

## 2025-03-22 RX ADMIN — GABAPENTIN 300 MG: 300 CAPSULE ORAL at 14:08

## 2025-03-22 RX ADMIN — BUDESONIDE 1000 MCG: 0.5 INHALANT RESPIRATORY (INHALATION) at 08:54

## 2025-03-22 RX ADMIN — OXCARBAZEPINE 150 MG: 300 TABLET, FILM COATED ORAL at 07:50

## 2025-03-22 RX ADMIN — SERTRALINE HYDROCHLORIDE 150 MG: 50 TABLET ORAL at 07:48

## 2025-03-22 RX ADMIN — LEVETIRACETAM 500 MG: 500 TABLET, FILM COATED ORAL at 07:50

## 2025-03-22 RX ADMIN — CLONIDINE HYDROCHLORIDE 0.1 MG: 0.1 TABLET ORAL at 07:50

## 2025-03-22 RX ADMIN — IPRATROPIUM BROMIDE AND ALBUTEROL SULFATE 1 DOSE: 2.5; .5 SOLUTION RESPIRATORY (INHALATION) at 08:54

## 2025-03-22 RX ADMIN — LORAZEPAM 0.5 MG: 0.5 TABLET ORAL at 06:57

## 2025-03-22 RX ADMIN — ARFORMOTEROL TARTRATE 15 MCG: 15 SOLUTION RESPIRATORY (INHALATION) at 08:54

## 2025-03-22 ASSESSMENT — PAIN SCALES - GENERAL: PAINLEVEL_OUTOF10: 0

## 2025-03-22 NOTE — PROGRESS NOTES
End of Shift Note    Bedside shift change report given to Heri by Tahmina Monge RN . Report included the following information SBAR    Shift worked:    1900 pm to 0700 am   Shift summary and any significant changes:       No significant change   Concerns for physician to address:       Zone phone for oncoming shift:          Activity:  Level of Assistance: Minimal assist, patient does 75% or more  Number times ambulated in hallways past shift: 1  Number of times OOB to chair past shift: 1    Cardiac:   Cardiac Monitoring: Yes      Cardiac Rhythm: Sinus rhythm, Sinus tachy    Access:  Current line(s): PIV    Genitourinary:   Urinary Status: Voiding    Respiratory:   O2 Device: Nasal cannula  Chronic home O2 use?: YES  Incentive spirometer at bedside: NO    GI:  Last BM (including prior to admit): 03/21/25  Current diet:  ADULT DIET; Regular  ADULT ORAL NUTRITION SUPPLEMENT; Breakfast, Lunch, Dinner; Standard High Calorie/High Protein Oral Supplement  Passing flatus: YES    Pain Management:   Patient states pain is manageable on current regimen: YES    Skin:  Brennen Scale Score: 19  Interventions: Wound Offloading (Prevention Methods): Pillows, Turning, Repositioning    Patient Safety:  Fall Risk: Nursing Judgement-Fall Risk High(Add Comments): Yes  Fall Risk Interventions  Nursing Judgement-Fall Risk High(Add Comments): Yes  Toilet Every 2 Hours-In Advance of Need: Yes  Hourly Visual Checks: Awake, In bed  Fall Visual Posted: Armband, Socks  Room Door Open: Yes  Alarm On: Bed  Patient Moved Closer to Nursing Station: No    Active Consults:   IP CONSULT TO PSYCHIATRY  IP CONSULT TO NEUROLOGY  IP CONSULT TO PHARMACY  IP CONSULT TO ADDICTION MEDICINE  IP CONSULT TO PULMONOLOGY  IP CONSULT TO CASE MANAGEMENT  IP CONSULT TO PSYCHIATRY    Length of Stay:  Expected LOS: 10  Actual LOS: 10    Tahmina Monge RN

## 2025-03-22 NOTE — DISCHARGE SUMMARY
Discharge Summary    Name: David Car  [id number]  YOB: 1975 (Age: 49 y.o.)   Date of Admission: 3/12/2025  Date of Discharge: 3/22/2025  Attending Physician: Mendel, David L, MD    Discharge Diagnosis:     COPD exacerbation  Chronic respiratory failure - at 6 L baseline   History of pulmonary sarcoidosis POA (from previous chart)  Suspected vocal cord dysfunction ?POA (from previous chart)  Metabolic encephalopathy - intermittent  History of bipolar disorder  Schizophrenia  Anxiety  Hypotension   Seizure disorder   Status post fall on 03/15   History of right upper extremity DVT  chronic right upper extremity pain  Severe cervical spinal stenosis      Consultations:  IP CONSULT TO PSYCHIATRY  IP CONSULT TO NEUROLOGY  IP CONSULT TO PHARMACY  IP CONSULT TO ADDICTION MEDICINE  IP CONSULT TO PULMONOLOGY  IP CONSULT TO CASE MANAGEMENT  IP CONSULT TO PSYCHIATRY      Brief Admission History/Reason for Admission Per Matilde Dave MD:     David Car is a 49 y.o.  female with PMHx significant as below brought to the emergency room today because increasing sleepiness, patient cannot provide any history, history taken from the chart and her mother over the phone, patient's mother stated that been going for the last 4 weeks, and the only thing the patient doing is go to the bathroom and sleep, yesterday patient's mom needed to watch her while going to the bathroom to pee because she was even more sleepy than the days before, no other symptoms reported, but the mom stated her sleepiness increased once she takes her medication (mother not sure which medicines are)  We were asked to admit for work up and evaluation of the above problems.     Brief Hospital Course by Main Problems:       COPD exacerbation  Chronic respiratory failure - at 6 L baseline   History of pulmonary sarcoidosis POA (from previous chart)  Suspected vocal cord dysfunction ?POA (from previous chart)  She  days     predniSONE 10 MG tablet  Commonly known as: DELTASONE  Take 2 tablets by mouth daily for 4 days, THEN 1 tablet daily for 4 days.  Start taking on: March 22, 2025  Replaces: predniSONE 10 MG (48) Tbpk            CHANGE how you take these medications      cloNIDine 0.1 MG tablet  Commonly known as: CATAPRES  Take 1 tablet by mouth in the morning and at bedtime  What changed:   medication strength  how much to take  Another medication with the same name was removed. Continue taking this medication, and follow the directions you see here.     LORazepam 0.5 MG tablet  Commonly known as: ATIVAN  Take 1 tablet by mouth 2 times daily as needed for Anxiety for up to 1 day. Max Daily Amount: 1 mg  What changed:   when to take this  reasons to take this     QUEtiapine 400 MG tablet  Commonly known as: SEROQUEL  Take 1 tablet by mouth nightly for 1 day  What changed:   how much to take  Another medication with the same name was removed. Continue taking this medication, and follow the directions you see here.     sertraline 100 MG tablet  Commonly known as: ZOLOFT  What changed: Another medication with the same name was removed. Continue taking this medication, and follow the directions you see here.            CONTINUE taking these medications      albuterol sulfate  (90 Base) MCG/ACT inhaler  Commonly known as: PROVENTIL;VENTOLIN;PROAIR     Aleve 220 MG tablet  Generic drug: naproxen sodium     apixaban 5 MG Tabs tablet  Commonly known as: Eliquis  Take 1 tablet by mouth 2 times daily     buPROPion 300 MG extended release tablet  Commonly known as: WELLBUTRIN XL  Take 1 tablet by mouth every morning     levETIRAcetam 500 MG tablet  Commonly known as: KEPPRA  Take 1 tablet by mouth 2 times daily     methadone 10 MG/ML solution  Commonly known as: DOLOPHINE     OXcarbazepine 150 MG tablet  Commonly known as: TRILEPTAL  Take 1 tablet by mouth 2 times daily     sacubitril-valsartan 24-26 MG per tablet  Commonly

## 2025-03-22 NOTE — CARE COORDINATION
Call report to Phone: [phone] to room 44B [facility]    Nursing Please include all hard scripts for controlled substances, med rec and dc summary, and AVS in packet.   gabapentin (NEURONTIN) capsule 300 mgDose: 300 mg  :  Oral  :  3 TIMES DAILY   LORazepam (ATIVAN) tablet 0.5 mgDose: 0.5 mg  :  Oral  :  2 TIMES DAILY PRN  :  Anxiety   methadone (DOLOPHINE) 10 MG/ML solution 120 mgDose: 120 mg  :  Oral  :  DAILY  :       Transition of Care Plan:    RUR: 18% Moderate RUR  Prior Level of Functioning: Dependent  Disposition: Plan A: long term care facility   WENDY: 3/22/25  If HH: Date FOC offered: 3/17/25  Date FOC received: 3/18  Accepting facility: [facility] Address: [address] [place] [address] Phone: [phone]  Date authorization started with reference number: na  Date authorization received and expires: na  Follow up appointments: PCP, Specialist  DME needed: deferred to LTC  Transportation at discharge: [facility] Phone: [phone] 3 p.m.  IM/IMM Medicare/ letter given:  N/A -Medicaid   Is patient a  and connected with VA? N/A   If yes, was  transfer form completed and VA notified?   Caregiver Contact: Mother- Brenda Khoury -[phone] (Home Phone)   Discharge Caregiver contacted prior to discharge? CM to discuss with mother and pt  Care Conference needed? none  Barriers to discharge: bed at LTC    08:15 CM contacted Priya Broussard [phone] inquring about accommodation at  [facility] Address: [address] [place] [address] Phone: [phone]   09:42 CM received a call back confriming that facility is maura to accommodate at 3 p.m. CM noitified the bhanu nurse,  Ky TREADWELL who confirmed as well as Dr. Mendel. CM also notified the patient and called her mother Brenda but no answer; left a voicemail.    Transition of Care Plan to  SNF/Rehab    Communication to Patient/Family:  Met with patient and family and they are agreeable to the transition plan. The Plan for Transition of Care is related to the following treatment goals: Care Transitions: Facilitate smooth transitions of care between different healthcare settings, such as rehabilitation facilities, and home care, ensuring that patients receive appropriate follow-up care and support to prevent gaps in care and reduce the risk of adverse events.      The Patient and/or patient representative was provided with a choice of provider and agrees  with the discharge plan.      Yes [x] No []    A Freedom of choice list was provided with basic dialogue that supports the patient's individualized plan of care/goals and shares the quality data associated with the providers.       Yes [x] No []    SNF/Rehab Transition:  Patient has been accepted to [facility] Address: [address] [place] [address] Phone: [phone] SNF/Rehab and meets criteria for admission.   Date of Inpatient Status Order:   Patient will transported by [facility] and expected to leave at 3 P.M.    Communication to SNF/Rehab:  Bedside RN, Ky, has been notified to update the transition plan to the facility and call report Phone: [phone]  Discharge information has been updated on the AVS. And communicated to facility via [employer], or CC link.     Discharge instructions to be fax'd to facility at (Fax #).     Nursing Please include all hard scripts for controlled substances, med rec and dc summary, and AVS in packet.     Reviewed and confirmed with facility, [facility] Address: [address] [place] [address]   can manage the patient care needs for the following:     Rayshawn with (X) only those applicable:  Medication:  [x]Medications are available at the facility  []IV Antibiotics    [x]Controlled Substance - hard copies available

## 2025-03-22 NOTE — PROGRESS NOTES
1120 - pt very demanding this morning.  Pt restless and walking around the room at will without letting staffing know. Pt is upset why she has both chair and bed alarm.  Stood with pt observation while walking around at will in the room for 45 minutes.  Pt constantly wanting popsicles (had about 6 so far this shift and ran out on PCU). Told pt we are out but pt demands to walk to another unit for popsicles. Told pt I'm unable to leave the unit at this time because I need to accompany her with oxygen to do so.  Pt stated its not her fault that this is my job.  Pt set to be discharged to rehab at about 3 pm.    1230 - attempted to call report to [facility] but no answer.      1310 - attempted again to call report with no answer.

## 2025-03-22 NOTE — PROGRESS NOTES
End of Shift Note    Bedside shift change report given to tarun by Tahmina Monge RN .Report included the following information SBAR    Shift worked:    1900pm to 0700 am    Shift summary and any significant changes:     No significant change      Concerns for physician to address:       Zone phone for oncoming shift:          Activity:  Level of Assistance: Minimal assist, patient does 75% or more  Number times ambulated in hallways past shift: 1  Number of times OOB to chair past shift: 1    Cardiac:   Cardiac Monitoring: Yes      Cardiac Rhythm: Sinus rhythm, Sinus tachy    Access:  Current line(s): PIV    Genitourinary:   Urinary Status: Voiding    Respiratory:   O2 Device: Nasal cannula  Chronic home O2 use?: YES  Incentive spirometer at bedside: NO    GI:  Last BM (including prior to admit): 03/21/25  Current diet:  ADULT DIET; Regular  ADULT ORAL NUTRITION SUPPLEMENT; Breakfast, Lunch, Dinner; Standard High Calorie/High Protein Oral Supplement  Passing flatus: YES    Pain Management:   Patient states pain is manageable on current regimen: YES    Skin:  Brennen Scale Score: 19  Interventions: Wound Offloading (Prevention Methods): Pillows, Turning, Repositioning    Patient Safety:  Fall Risk: Nursing Judgement-Fall Risk High(Add Comments): Yes  Fall Risk Interventions  Nursing Judgement-Fall Risk High(Add Comments): Yes  Toilet Every 2 Hours-In Advance of Need: Yes  Hourly Visual Checks: Awake, In bed  Fall Visual Posted: Armband, Socks  Room Door Open: Yes  Alarm On: Bed  Patient Moved Closer to Nursing Station: No    Active Consults:   IP CONSULT TO PSYCHIATRY  IP CONSULT TO NEUROLOGY  IP CONSULT TO PHARMACY  IP CONSULT TO ADDICTION MEDICINE  IP CONSULT TO PULMONOLOGY  IP CONSULT TO CASE MANAGEMENT  IP CONSULT TO PSYCHIATRY    Length of Stay:  Expected LOS: 10  Actual LOS: 9    Tahmina Monge RN

## 2025-03-22 NOTE — PLAN OF CARE
Problem: Discharge Planning  Goal: Discharge to home or other facility with appropriate resources  3/21/2025 2314 by Tahmina Monge RN  Outcome: Progressing  3/21/2025 1833 by Romeo Garcia RN  Outcome: Progressing     Problem: Pain  Goal: Verbalizes/displays adequate comfort level or baseline comfort level  3/21/2025 2314 by Tahmina Monge RN  Outcome: Progressing  3/21/2025 1833 by Romeo Garcia RN  Outcome: Progressing     Problem: Respiratory - Adult  Goal: Achieves optimal ventilation and oxygenation  3/21/2025 2314 by Tahmina Monge RN  Outcome: Progressing  3/21/2025 1942 by Katharina Flores, RT  Outcome: Progressing     Problem: Safety - Adult  Goal: Free from fall injury  3/21/2025 2314 by Tahmina Monge RN  Outcome: Progressing  3/21/2025 1833 by Romeo Garcia RN  Outcome: Progressing     Problem: ABCDS Injury Assessment  Goal: Absence of physical injury  Outcome: Progressing     Problem: Skin/Tissue Integrity  Goal: Skin integrity remains intact  Description: 1.  Monitor for areas of redness and/or skin breakdown  2.  Assess vascular access sites hourly  3.  Every 4-6 hours minimum:  Change oxygen saturation probe site  4.  Every 4-6 hours:  If on nasal continuous positive airway pressure, respiratory therapy assess nares and determine need for appliance change or resting period  Outcome: Progressing

## 2025-03-22 NOTE — PROGRESS NOTES
End of Shift Note    Bedside shift change report given to Ky TREADWELL (oncoming nurse) by Arsalan Grullon RN (offgoing nurse).  Report included the following information SBAR, Kardex, Intake/Output, MAR, Recent Results, Med Rec Status, Cardiac Rhythm NSR, Alarm Parameters , Procedure Verification, and Quality Measures    Shift worked:  [id number]     Shift summary and any significant changes:    Had apeaceful night, vitally stable with no issues currently scheduled to go to rehab on discharge     Concerns for physician to address:       Zone phone for oncoming shift:          Activity:  Level of Assistance: Minimal assist, patient does 75% or more  Number times ambulated in hallways past shift: 1  Number of times OOB to chair past shift: 1    Cardiac:   Cardiac Monitoring: Yes      Cardiac Rhythm: Sinus rhythm, Sinus tachy    Access:  Current line(s): PIV     Genitourinary:   Urinary Status: Voiding, Bathroom privileges    Respiratory:   O2 Device: Nasal cannula  Chronic home O2 use?: YES  Incentive spirometer at bedside: YES    GI:  Last BM (including prior to admit): 03/21/25  Current diet:  ADULT DIET; Regular  ADULT ORAL NUTRITION SUPPLEMENT; Breakfast, Lunch, Dinner; Standard High Calorie/High Protein Oral Supplement  Passing flatus: YES    Pain Management:   Patient states pain is manageable on current regimen: YES    Skin:  Brennen Scale Score: 21  Interventions: Wound Offloading (Prevention Methods): Turning, Wedge pillows    Patient Safety:  Fall Risk: Nursing Judgement-Fall Risk High(Add Comments): Yes  Fall Risk Interventions  Nursing Judgement-Fall Risk High(Add Comments): Yes  Toilet Every 2 Hours-In Advance of Need: Yes  Hourly Visual Checks: Awake, In bed  Fall Visual Posted: Armband, Socks  Room Door Open: Yes  Alarm On: Bed  Patient Moved Closer to Nursing Station: No    Active Consults:   IP CONSULT TO PSYCHIATRY  IP CONSULT TO NEUROLOGY  IP CONSULT TO PHARMACY  IP CONSULT TO ADDICTION  MEDICINE  IP CONSULT TO PULMONOLOGY  IP CONSULT TO CASE MANAGEMENT  IP CONSULT TO PSYCHIATRY    Length of Stay:  Expected LOS: 10  Actual LOS: 10    Arsalan Grullon RN

## 2025-07-13 ENCOUNTER — APPOINTMENT (OUTPATIENT)
Facility: HOSPITAL | Age: 50
DRG: 053 | End: 2025-07-13
Payer: MEDICAID

## 2025-07-13 ENCOUNTER — HOSPITAL ENCOUNTER (INPATIENT)
Facility: HOSPITAL | Age: 50
LOS: 2 days | Discharge: LEFT BEFORE DISCHARGE | DRG: 053 | End: 2025-07-15
Attending: STUDENT IN AN ORGANIZED HEALTH CARE EDUCATION/TRAINING PROGRAM | Admitting: HOSPITALIST
Payer: MEDICAID

## 2025-07-13 DIAGNOSIS — R56.9 SEIZURE-LIKE ACTIVITY (HCC): ICD-10-CM

## 2025-07-13 DIAGNOSIS — G89.29 OTHER CHRONIC PAIN: ICD-10-CM

## 2025-07-13 DIAGNOSIS — G93.41 METABOLIC ENCEPHALOPATHY: ICD-10-CM

## 2025-07-13 DIAGNOSIS — R41.82 ALTERED MENTAL STATUS, UNSPECIFIED ALTERED MENTAL STATUS TYPE: Primary | ICD-10-CM

## 2025-07-13 LAB
ALBUMIN SERPL-MCNC: 3.5 G/DL (ref 3.5–5)
ALBUMIN/GLOB SERPL: 1 (ref 1.1–2.2)
ALP SERPL-CCNC: 82 U/L (ref 45–117)
ALT SERPL-CCNC: 52 U/L (ref 12–78)
ANION GAP SERPL CALC-SCNC: 1 MMOL/L (ref 2–12)
AST SERPL-CCNC: 131 U/L (ref 15–37)
BASOPHILS # BLD: 0.01 K/UL (ref 0–0.1)
BASOPHILS NFR BLD: 0.2 % (ref 0–1)
BILIRUB SERPL-MCNC: 0.3 MG/DL (ref 0.2–1)
BUN SERPL-MCNC: 12 MG/DL (ref 6–20)
BUN/CREAT SERPL: 12 (ref 12–20)
CALCIUM SERPL-MCNC: 9.1 MG/DL (ref 8.5–10.1)
CHLORIDE SERPL-SCNC: 99 MMOL/L (ref 97–108)
CO2 SERPL-SCNC: 39 MMOL/L (ref 21–32)
CREAT SERPL-MCNC: 1.04 MG/DL (ref 0.55–1.02)
DIFFERENTIAL METHOD BLD: ABNORMAL
EKG ATRIAL RATE: 101 BPM
EKG DIAGNOSIS: NORMAL
EKG P AXIS: 58 DEGREES
EKG P-R INTERVAL: 132 MS
EKG Q-T INTERVAL: 378 MS
EKG QRS DURATION: 86 MS
EKG QTC CALCULATION (BAZETT): 490 MS
EKG R AXIS: 72 DEGREES
EKG T AXIS: 36 DEGREES
EKG VENTRICULAR RATE: 101 BPM
EOSINOPHIL # BLD: 0.14 K/UL (ref 0–0.4)
EOSINOPHIL NFR BLD: 3 % (ref 0–7)
ERYTHROCYTE [DISTWIDTH] IN BLOOD BY AUTOMATED COUNT: 12.9 % (ref 11.5–14.5)
GLOBULIN SER CALC-MCNC: 3.6 G/DL (ref 2–4)
GLUCOSE BLD STRIP.AUTO-MCNC: 100 MG/DL (ref 65–117)
GLUCOSE BLD STRIP.AUTO-MCNC: 109 MG/DL (ref 65–117)
GLUCOSE SERPL-MCNC: 90 MG/DL (ref 65–100)
HCT VFR BLD AUTO: 33.4 % (ref 35–47)
HGB BLD-MCNC: 10.6 G/DL (ref 11.5–16)
IMM GRANULOCYTES # BLD AUTO: 0.02 K/UL (ref 0–0.04)
IMM GRANULOCYTES NFR BLD AUTO: 0.4 % (ref 0–0.5)
LYMPHOCYTES # BLD: 0.83 K/UL (ref 0.8–3.5)
LYMPHOCYTES NFR BLD: 17.6 % (ref 12–49)
MCH RBC QN AUTO: 31.9 PG (ref 26–34)
MCHC RBC AUTO-ENTMCNC: 31.7 G/DL (ref 30–36.5)
MCV RBC AUTO: 100.6 FL (ref 80–99)
MONOCYTES # BLD: 0.56 K/UL (ref 0–1)
MONOCYTES NFR BLD: 11.9 % (ref 5–13)
NEUTS SEG # BLD: 3.15 K/UL (ref 1.8–8)
NEUTS SEG NFR BLD: 66.9 % (ref 32–75)
NRBC # BLD: 0 K/UL (ref 0–0.01)
NRBC BLD-RTO: 0 PER 100 WBC
PLATELET # BLD AUTO: 191 K/UL (ref 150–400)
PMV BLD AUTO: 10.1 FL (ref 8.9–12.9)
POTASSIUM SERPL-SCNC: 4.1 MMOL/L (ref 3.5–5.1)
PROT SERPL-MCNC: 7.1 G/DL (ref 6.4–8.2)
RBC # BLD AUTO: 3.32 M/UL (ref 3.8–5.2)
SERVICE CMNT-IMP: NORMAL
SERVICE CMNT-IMP: NORMAL
SODIUM SERPL-SCNC: 139 MMOL/L (ref 136–145)
TROPONIN I SERPL HS-MCNC: 9 NG/L (ref 0–51)
WBC # BLD AUTO: 4.7 K/UL (ref 3.6–11)

## 2025-07-13 PROCEDURE — 99285 EMERGENCY DEPT VISIT HI MDM: CPT

## 2025-07-13 PROCEDURE — 36415 COLL VENOUS BLD VENIPUNCTURE: CPT

## 2025-07-13 PROCEDURE — 84484 ASSAY OF TROPONIN QUANT: CPT

## 2025-07-13 PROCEDURE — 2580000003 HC RX 258: Performed by: HOSPITALIST

## 2025-07-13 PROCEDURE — 85025 COMPLETE CBC W/AUTO DIFF WBC: CPT

## 2025-07-13 PROCEDURE — 71045 X-RAY EXAM CHEST 1 VIEW: CPT

## 2025-07-13 PROCEDURE — 2580000003 HC RX 258: Performed by: STUDENT IN AN ORGANIZED HEALTH CARE EDUCATION/TRAINING PROGRAM

## 2025-07-13 PROCEDURE — 2700000000 HC OXYGEN THERAPY PER DAY

## 2025-07-13 PROCEDURE — 70450 CT HEAD/BRAIN W/O DYE: CPT

## 2025-07-13 PROCEDURE — 6360000002 HC RX W HCPCS: Performed by: STUDENT IN AN ORGANIZED HEALTH CARE EDUCATION/TRAINING PROGRAM

## 2025-07-13 PROCEDURE — 80053 COMPREHEN METABOLIC PANEL: CPT

## 2025-07-13 PROCEDURE — 6360000002 HC RX W HCPCS

## 2025-07-13 PROCEDURE — 96372 THER/PROPH/DIAG INJ SC/IM: CPT

## 2025-07-13 PROCEDURE — 1100000000 HC RM PRIVATE

## 2025-07-13 PROCEDURE — 96374 THER/PROPH/DIAG INJ IV PUSH: CPT

## 2025-07-13 PROCEDURE — 80177 DRUG SCRN QUAN LEVETIRACETAM: CPT

## 2025-07-13 PROCEDURE — 82962 GLUCOSE BLOOD TEST: CPT

## 2025-07-13 RX ORDER — 0.9 % SODIUM CHLORIDE 0.9 %
1000 INTRAVENOUS SOLUTION INTRAVENOUS ONCE
Status: COMPLETED | OUTPATIENT
Start: 2025-07-13 | End: 2025-07-13

## 2025-07-13 RX ORDER — BUDESONIDE 0.25 MG/2ML
0.25 INHALANT ORAL
Status: DISCONTINUED | OUTPATIENT
Start: 2025-07-13 | End: 2025-07-13

## 2025-07-13 RX ORDER — MIDAZOLAM HYDROCHLORIDE 5 MG/5ML
2 INJECTION, SOLUTION INTRAMUSCULAR; INTRAVENOUS ONCE
Status: DISCONTINUED | OUTPATIENT
Start: 2025-07-13 | End: 2025-07-13

## 2025-07-13 RX ORDER — QUETIAPINE FUMARATE 100 MG/1
400 TABLET, FILM COATED ORAL
Status: DISCONTINUED | OUTPATIENT
Start: 2025-07-13 | End: 2025-07-15 | Stop reason: HOSPADM

## 2025-07-13 RX ORDER — ENOXAPARIN SODIUM 100 MG/ML
40 INJECTION SUBCUTANEOUS DAILY
Status: DISCONTINUED | OUTPATIENT
Start: 2025-07-14 | End: 2025-07-14

## 2025-07-13 RX ORDER — ACETAMINOPHEN 650 MG/1
650 SUPPOSITORY RECTAL EVERY 6 HOURS PRN
Status: DISCONTINUED | OUTPATIENT
Start: 2025-07-13 | End: 2025-07-15 | Stop reason: HOSPADM

## 2025-07-13 RX ORDER — SODIUM CHLORIDE 0.9 % (FLUSH) 0.9 %
5-40 SYRINGE (ML) INJECTION EVERY 12 HOURS SCHEDULED
Status: DISCONTINUED | OUTPATIENT
Start: 2025-07-13 | End: 2025-07-15 | Stop reason: HOSPADM

## 2025-07-13 RX ORDER — POTASSIUM CHLORIDE 1500 MG/1
40 TABLET, EXTENDED RELEASE ORAL PRN
Status: DISCONTINUED | OUTPATIENT
Start: 2025-07-13 | End: 2025-07-15 | Stop reason: HOSPADM

## 2025-07-13 RX ORDER — ONDANSETRON 2 MG/ML
2 INJECTION INTRAMUSCULAR; INTRAVENOUS EVERY 6 HOURS PRN
Status: DISCONTINUED | OUTPATIENT
Start: 2025-07-13 | End: 2025-07-15 | Stop reason: HOSPADM

## 2025-07-13 RX ORDER — MIDAZOLAM HYDROCHLORIDE 5 MG/5ML
2 INJECTION, SOLUTION INTRAMUSCULAR; INTRAVENOUS ONCE
Status: COMPLETED | OUTPATIENT
Start: 2025-07-13 | End: 2025-07-13

## 2025-07-13 RX ORDER — LEVETIRACETAM 500 MG/5ML
500 INJECTION, SOLUTION, CONCENTRATE INTRAVENOUS EVERY 12 HOURS
Status: DISCONTINUED | OUTPATIENT
Start: 2025-07-14 | End: 2025-07-15 | Stop reason: HOSPADM

## 2025-07-13 RX ORDER — ARFORMOTEROL TARTRATE 15 UG/2ML
15 SOLUTION RESPIRATORY (INHALATION)
Status: DISCONTINUED | OUTPATIENT
Start: 2025-07-14 | End: 2025-07-15 | Stop reason: HOSPADM

## 2025-07-13 RX ORDER — NALOXONE HYDROCHLORIDE 0.4 MG/ML
1 INJECTION, SOLUTION INTRAMUSCULAR; INTRAVENOUS; SUBCUTANEOUS ONCE
Status: DISCONTINUED | OUTPATIENT
Start: 2025-07-13 | End: 2025-07-13

## 2025-07-13 RX ORDER — POLYETHYLENE GLYCOL 3350 17 G/17G
17 POWDER, FOR SOLUTION ORAL DAILY PRN
Status: DISCONTINUED | OUTPATIENT
Start: 2025-07-13 | End: 2025-07-15 | Stop reason: HOSPADM

## 2025-07-13 RX ORDER — POTASSIUM CHLORIDE 7.45 MG/ML
10 INJECTION INTRAVENOUS PRN
Status: DISCONTINUED | OUTPATIENT
Start: 2025-07-13 | End: 2025-07-15 | Stop reason: HOSPADM

## 2025-07-13 RX ORDER — LEVETIRACETAM 500 MG/5ML
1000 INJECTION, SOLUTION, CONCENTRATE INTRAVENOUS ONCE
Status: COMPLETED | OUTPATIENT
Start: 2025-07-13 | End: 2025-07-13

## 2025-07-13 RX ORDER — ARFORMOTEROL TARTRATE 15 UG/2ML
15 SOLUTION RESPIRATORY (INHALATION)
Status: DISCONTINUED | OUTPATIENT
Start: 2025-07-13 | End: 2025-07-13

## 2025-07-13 RX ORDER — OXCARBAZEPINE 300 MG/1
150 TABLET, FILM COATED ORAL 2 TIMES DAILY
Status: DISCONTINUED | OUTPATIENT
Start: 2025-07-13 | End: 2025-07-15 | Stop reason: HOSPADM

## 2025-07-13 RX ORDER — ONDANSETRON 4 MG/1
4 TABLET, ORALLY DISINTEGRATING ORAL EVERY 8 HOURS PRN
Status: DISCONTINUED | OUTPATIENT
Start: 2025-07-13 | End: 2025-07-15 | Stop reason: HOSPADM

## 2025-07-13 RX ORDER — DEXTROSE MONOHYDRATE AND SODIUM CHLORIDE 5; .9 G/100ML; G/100ML
INJECTION, SOLUTION INTRAVENOUS CONTINUOUS
Status: DISCONTINUED | OUTPATIENT
Start: 2025-07-13 | End: 2025-07-14

## 2025-07-13 RX ORDER — BUDESONIDE 0.25 MG/2ML
0.25 INHALANT ORAL
Status: DISCONTINUED | OUTPATIENT
Start: 2025-07-14 | End: 2025-07-15 | Stop reason: HOSPADM

## 2025-07-13 RX ORDER — MAGNESIUM SULFATE IN WATER 40 MG/ML
2000 INJECTION, SOLUTION INTRAVENOUS PRN
Status: DISCONTINUED | OUTPATIENT
Start: 2025-07-13 | End: 2025-07-15 | Stop reason: HOSPADM

## 2025-07-13 RX ORDER — SODIUM CHLORIDE 9 MG/ML
INJECTION, SOLUTION INTRAVENOUS PRN
Status: DISCONTINUED | OUTPATIENT
Start: 2025-07-13 | End: 2025-07-15 | Stop reason: HOSPADM

## 2025-07-13 RX ORDER — ACETAMINOPHEN 325 MG/1
650 TABLET ORAL EVERY 6 HOURS PRN
Status: DISCONTINUED | OUTPATIENT
Start: 2025-07-13 | End: 2025-07-15 | Stop reason: HOSPADM

## 2025-07-13 RX ADMIN — DEXTROSE AND SODIUM CHLORIDE: 5; .9 INJECTION, SOLUTION INTRAVENOUS at 20:36

## 2025-07-13 RX ADMIN — LEVETIRACETAM 1000 MG: 100 INJECTION INTRAVENOUS at 17:08

## 2025-07-13 RX ADMIN — SODIUM CHLORIDE 1000 ML: 0.9 INJECTION, SOLUTION INTRAVENOUS at 17:08

## 2025-07-13 RX ADMIN — MIDAZOLAM 2 MG: 1 INJECTION INTRAMUSCULAR; INTRAVENOUS at 16:13

## 2025-07-13 NOTE — ED PROVIDER NOTES
[facility] EMERGENCY DEPARTMENT  EMERGENCY DEPARTMENT ENCOUNTER       Pt Name: David Car  MRN: [id number]  Birthdate 1975  Date of evaluation: 7/13/2025  Provider: Chris Avlarenga DO   PCP: Ese Epps APRN - NP  Note Started: 3:58 PM EDT 7/13/25  Attending Physician: Dr. Huizar    CHIEF COMPLAINT       Chief Complaint   Patient presents with    Altered Mental Status     BIBEMS for AMS and twitching. Family unable to to give a good hx. Does state she was in [facility] and rehab on methadone, hx of OD. Twitching more than usual. On 7L NC at baseline. Pt will not respond to questions however intermittently tracks with her eyes & yelled threats of wanting to assault staff upon attempting IV access.        HISTORY OF PRESENT ILLNESS: 1 or more elements      History From: EMS, History limited by: altered mentation     David Car is a 49 y.o. female asthma, COPD, hypertension, seizure disorder, cocaine use on methadone and recent admission in March 2025 for COPD exacerbation and metabolic encephalopathy presents to the ED via EMS for altered mental status.  Per EMS she is on 7 L nasal cannula at baseline.  EMS reports that patient was at [facility] and rehab on methadone.  They note tremors which patient has at baseline.  Limited ROS due to altered mentation.       Please See MDM for Additional Details of the HPI/PMH  Nursing Notes were all reviewed and agreed with or any disagreements were addressed in the HPI.     REVIEW OF SYSTEMS        Positives and Pertinent negatives as per HPI.    PAST HISTORY     Past Medical History:  Past Medical History:   Diagnosis Date    Ascariosis     Asthma     Bipolar 1 disorder (HCC)     Chronic obstructive pulmonary disease (HCC)     Chronic pain     back, leg    Cocaine abuse (HCC)     Depression     Heart failure (HCC)     Hepatitis B     Heroin abuse (HCC)     HTN (hypertension)     Narcotic abuse (HCC)     Polysubstance abuse (HCC)     Required  Decision Making  David Car is a 49 y.o. female asthma, COPD, hypertension, seizure disorder, cocaine use on methadone and recent admission on March 2025 for COPD exacerbation and metabolic encephalopathy presents to the ED via EMS for altered mental status.  Tachycardic.  99% SpO2 on 7 L nasal cannula which is her baseline.  No increased work of breathing noted.  Patient is moving extremities spontaneously and opening eyes spontaneously as well.  She follows commands intermittently and withdraws to pain.  She is agitated and threatening to assault staff.  No obvious injury noted. Heart and lungs unremarkable. No lower leg edema noted. No erythema or signs of cellulitis noted. Differential includes ACS, metabolic encephalopathy, hypoglycemia, brain ischemia/bleed, electrolyte abnormality, infection.  Will obtain broad workup including antiepileptic levels.  Will give Keppra load and fluid bolus.  Will consider giving Narcan. Disposition pending workup and clinical course but anticipate admission.    Amount and/or Complexity of Data Reviewed  Labs: ordered. Decision-making details documented in ED Course.  Radiology: ordered.  ECG/medicine tests: ordered.    Risk  Prescription drug management.            ED Course as of 07/13/25 1658   Sun Jul 13, 2025   1557 POC Glucose: 109 [HR]   1639 Review, patient on 4 L at baseline during rest and 6 L during ambulation.  Will trial 4 L here and see how patient does. [HR]      ED Course User Index  [HR] Chris Alvarenga DO           FINAL IMPRESSION     1. Altered mental status, unspecified altered mental status type          DISPOSITION/PLAN   David Car's  results have been reviewed with her.  She has been counseled regarding her diagnosis, treatment, and plan.  She verbally conveys understanding and agreement of the signs, symptoms, diagnosis, treatment and prognosis and additionally agrees to follow up as discussed.  She also agrees with the care-plan and conveys that all
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
## 2025-07-13 NOTE — H&P
Hospitalist Admission Note    NAME:   David Car   : 1975   MRN: [id number]     Date/Time: 2025 6:50 PM    Patient PCP: Ese Epps APRN - NP    ______________________________________________________________________  Given the patient's current clinical presentation, I have a high level of concern for decompensation if discharged from the emergency department.  Complex decision making was performed, which includes reviewing the patient's available past medical records, laboratory results, and x-ray films.       My assessment of this patient's clinical condition and my plan of care is as follows.    Assessment / Plan:    Altered mental status/acute encephalopathy: Unclear etiology but may be related to post ictal state vs drug over dose  Head Ct scan no acute intracranial process  We will offer supportive care  Obtain brain EEG  Consult neurology  Gentle IVF hydration    H/o Seizure: resume home seizure medications.  Switch keppra to I.V.   Seizure precautions    Diet: Keep NPO for now until bedside swallow evaluation        Medical Decision Making:  I personally reviewed labs  I personally reviewed imaging  Toxic drug monitoring  I personally reviewed EKG  Discussed case with: ED provider. After discussion I am in agreement that acuity of patient's medical condition necessitates hospital stay.      Code Status: Full  DVT Prophylaxis: Lovenox  Baseline:     Subjective:   CHIEF COMPLAINT: Altered mental status    HISTORY OF PRESENT ILLNESS:     David Car is a 49 y.o.  female with PMHx significant for COPD, hypertension, seizure disorder, polysubstance abuse, cocaine abuse on methadone.  Patient presents to the ED on account of altered mental status  She is currently sedated and history is not obtainable  As per documentation, she was at [facility] and rehab on metahdone and was noted to be twitching more than usual.    Er course: VS: unremarkable  Blood work; Unremarkable  Urine drug screen  sinuses and mastoid air cells are clear.     No acute process identified Electronically signed by Viky Torres    XR CHEST PORTABLE  Result Date: 7/13/2025  EXAM:  XR CHEST PORTABLE INDICATION: altered mental status COMPARISON: 3/12/2025 TECHNIQUE: portable chest AP view FINDINGS: The cardiac silhouette is within normal limits. The pulmonary vasculature is within normal limits. Lung volumes are moderate with mild perihilar and bibasilar interstitial prominence and atelectasis. No focal consolidation.. The visualized bones and upper abdomen are age-appropriate.     Mild perihilar and bibasilar interstitial prominence and atelectasis. Electronically signed by FREDERIC REMY     _______________________________________________________________________    TOTAL TIME:  76 Minutes    Critical Care Provided     Minutes non procedure based    Signed: Ethan Jaeger MD    Procedures: see electronic medical records for all procedures/Xrays and details which were not copied into this note but were reviewed prior to creation of Plan.

## 2025-07-14 ENCOUNTER — APPOINTMENT (OUTPATIENT)
Facility: HOSPITAL | Age: 50
DRG: 053 | End: 2025-07-14
Payer: MEDICAID

## 2025-07-14 PROBLEM — R56.9 SEIZURE-LIKE ACTIVITY (HCC): Status: ACTIVE | Noted: 2017-04-20

## 2025-07-14 LAB
AMPHET UR QL SCN: POSITIVE
ANION GAP SERPL CALC-SCNC: 3 MMOL/L (ref 2–12)
APPEARANCE UR: CLEAR
BACTERIA URNS QL MICRO: NEGATIVE /HPF
BARBITURATES UR QL SCN: NEGATIVE
BASOPHILS # BLD: 0.01 K/UL (ref 0–0.1)
BASOPHILS NFR BLD: 0.2 % (ref 0–1)
BENZODIAZ UR QL: POSITIVE
BILIRUB UR QL: NEGATIVE
BUN SERPL-MCNC: 8 MG/DL (ref 6–20)
BUN/CREAT SERPL: 9 (ref 12–20)
CALCIUM SERPL-MCNC: 8.9 MG/DL (ref 8.5–10.1)
CANNABINOIDS UR QL SCN: POSITIVE
CHLORIDE SERPL-SCNC: 101 MMOL/L (ref 97–108)
CO2 SERPL-SCNC: 33 MMOL/L (ref 21–32)
COCAINE UR QL SCN: NEGATIVE
COLOR UR: ABNORMAL
CREAT SERPL-MCNC: 0.88 MG/DL (ref 0.55–1.02)
DIFFERENTIAL METHOD BLD: ABNORMAL
EOSINOPHIL # BLD: 0.15 K/UL (ref 0–0.4)
EOSINOPHIL NFR BLD: 3.7 % (ref 0–7)
EPITH CASTS URNS QL MICRO: ABNORMAL /LPF
ERYTHROCYTE [DISTWIDTH] IN BLOOD BY AUTOMATED COUNT: 12.6 % (ref 11.5–14.5)
GLUCOSE SERPL-MCNC: 120 MG/DL (ref 65–100)
GLUCOSE UR STRIP.AUTO-MCNC: NEGATIVE MG/DL
HCT VFR BLD AUTO: 33.7 % (ref 35–47)
HGB BLD-MCNC: 10.7 G/DL (ref 11.5–16)
HGB UR QL STRIP: NEGATIVE
HYALINE CASTS URNS QL MICRO: ABNORMAL /LPF (ref 0–2)
IMM GRANULOCYTES # BLD AUTO: 0.01 K/UL (ref 0–0.04)
IMM GRANULOCYTES NFR BLD AUTO: 0.2 % (ref 0–0.5)
KETONES UR QL STRIP.AUTO: ABNORMAL MG/DL
LEUKOCYTE ESTERASE UR QL STRIP.AUTO: NEGATIVE
LYMPHOCYTES # BLD: 0.7 K/UL (ref 0.8–3.5)
LYMPHOCYTES NFR BLD: 17 % (ref 12–49)
Lab: ABNORMAL
MCH RBC QN AUTO: 31.9 PG (ref 26–34)
MCHC RBC AUTO-ENTMCNC: 31.8 G/DL (ref 30–36.5)
MCV RBC AUTO: 100.6 FL (ref 80–99)
METHADONE UR QL: POSITIVE
MONOCYTES # BLD: 0.46 K/UL (ref 0–1)
MONOCYTES NFR BLD: 11.3 % (ref 5–13)
NEUTS SEG # BLD: 2.77 K/UL (ref 1.8–8)
NEUTS SEG NFR BLD: 67.6 % (ref 32–75)
NITRITE UR QL STRIP.AUTO: NEGATIVE
NRBC # BLD: 0 K/UL (ref 0–0.01)
NRBC BLD-RTO: 0 PER 100 WBC
OPIATES UR QL: NEGATIVE
PCP UR QL: NEGATIVE
PH UR STRIP: 6.5 (ref 5–8)
PLATELET # BLD AUTO: 179 K/UL (ref 150–400)
PMV BLD AUTO: 9.8 FL (ref 8.9–12.9)
POTASSIUM SERPL-SCNC: 3.8 MMOL/L (ref 3.5–5.1)
PROT UR STRIP-MCNC: ABNORMAL MG/DL
RBC # BLD AUTO: 3.35 M/UL (ref 3.8–5.2)
RBC #/AREA URNS HPF: ABNORMAL /HPF (ref 0–5)
RBC MORPH BLD: ABNORMAL
RBC MORPH BLD: ABNORMAL
SODIUM SERPL-SCNC: 137 MMOL/L (ref 136–145)
SP GR UR REFRACTOMETRY: 1.02
URINE CULTURE IF INDICATED: ABNORMAL
UROBILINOGEN UR QL STRIP.AUTO: 1 EU/DL (ref 0.2–1)
WBC # BLD AUTO: 4.1 K/UL (ref 3.6–11)
WBC URNS QL MICRO: ABNORMAL /HPF (ref 0–4)

## 2025-07-14 PROCEDURE — 97161 PT EVAL LOW COMPLEX 20 MIN: CPT | Performed by: PHYSICAL THERAPIST

## 2025-07-14 PROCEDURE — 94640 AIRWAY INHALATION TREATMENT: CPT

## 2025-07-14 PROCEDURE — 95816 EEG AWAKE AND DROWSY: CPT

## 2025-07-14 PROCEDURE — 51701 INSERT BLADDER CATHETER: CPT

## 2025-07-14 PROCEDURE — 73030 X-RAY EXAM OF SHOULDER: CPT

## 2025-07-14 PROCEDURE — 36415 COLL VENOUS BLD VENIPUNCTURE: CPT

## 2025-07-14 PROCEDURE — 99223 1ST HOSP IP/OBS HIGH 75: CPT | Performed by: PSYCHIATRY & NEUROLOGY

## 2025-07-14 PROCEDURE — 6370000000 HC RX 637 (ALT 250 FOR IP): Performed by: INTERNAL MEDICINE

## 2025-07-14 PROCEDURE — 81001 URINALYSIS AUTO W/SCOPE: CPT

## 2025-07-14 PROCEDURE — 94761 N-INVAS EAR/PLS OXIMETRY MLT: CPT

## 2025-07-14 PROCEDURE — 97165 OT EVAL LOW COMPLEX 30 MIN: CPT

## 2025-07-14 PROCEDURE — 95957 EEG DIGITAL ANALYSIS: CPT

## 2025-07-14 PROCEDURE — 2700000000 HC OXYGEN THERAPY PER DAY

## 2025-07-14 PROCEDURE — 51798 US URINE CAPACITY MEASURE: CPT

## 2025-07-14 PROCEDURE — 6360000002 HC RX W HCPCS: Performed by: HOSPITALIST

## 2025-07-14 PROCEDURE — 80307 DRUG TEST PRSMV CHEM ANLYZR: CPT

## 2025-07-14 PROCEDURE — 6360000002 HC RX W HCPCS: Performed by: INTERNAL MEDICINE

## 2025-07-14 PROCEDURE — 97535 SELF CARE MNGMENT TRAINING: CPT

## 2025-07-14 PROCEDURE — 80048 BASIC METABOLIC PNL TOTAL CA: CPT

## 2025-07-14 PROCEDURE — 85025 COMPLETE CBC W/AUTO DIFF WBC: CPT

## 2025-07-14 PROCEDURE — 1100000000 HC RM PRIVATE

## 2025-07-14 PROCEDURE — 80177 DRUG SCRN QUAN LEVETIRACETAM: CPT

## 2025-07-14 PROCEDURE — 6370000000 HC RX 637 (ALT 250 FOR IP): Performed by: NURSE PRACTITIONER

## 2025-07-14 PROCEDURE — 95816 EEG AWAKE AND DROWSY: CPT | Performed by: PSYCHIATRY & NEUROLOGY

## 2025-07-14 PROCEDURE — 97530 THERAPEUTIC ACTIVITIES: CPT | Performed by: PHYSICAL THERAPIST

## 2025-07-14 PROCEDURE — 80183 DRUG SCRN QUANT OXCARBAZEPIN: CPT

## 2025-07-14 PROCEDURE — 6370000000 HC RX 637 (ALT 250 FOR IP): Performed by: HOSPITALIST

## 2025-07-14 PROCEDURE — 2500000003 HC RX 250 WO HCPCS: Performed by: HOSPITALIST

## 2025-07-14 RX ORDER — MORPHINE SULFATE 2 MG/ML
2 INJECTION, SOLUTION INTRAMUSCULAR; INTRAVENOUS EVERY 4 HOURS PRN
Refills: 0 | Status: DISCONTINUED | OUTPATIENT
Start: 2025-07-14 | End: 2025-07-15 | Stop reason: HOSPADM

## 2025-07-14 RX ORDER — SERTRALINE HYDROCHLORIDE 100 MG/1
150 TABLET, FILM COATED ORAL DAILY
Qty: 45 TABLET | Refills: 1 | Status: SHIPPED | OUTPATIENT
Start: 2025-07-14

## 2025-07-14 RX ORDER — ALBUTEROL SULFATE 90 UG/1
2 INHALANT RESPIRATORY (INHALATION) EVERY 4 HOURS PRN
Qty: 18 G | Refills: 1 | Status: SHIPPED | OUTPATIENT
Start: 2025-07-14

## 2025-07-14 RX ORDER — CLONIDINE HYDROCHLORIDE 0.1 MG/1
0.1 TABLET ORAL 2 TIMES DAILY
Qty: 60 TABLET | Refills: 1 | Status: SHIPPED | OUTPATIENT
Start: 2025-07-14

## 2025-07-14 RX ORDER — POLYETHYLENE GLYCOL 3350 17 G/17G
17 POWDER, FOR SOLUTION ORAL DAILY
Qty: 30 PACKET | Refills: 0 | Status: SHIPPED | OUTPATIENT
Start: 2025-07-14 | End: 2025-08-13

## 2025-07-14 RX ORDER — LEVETIRACETAM 500 MG/1
500 TABLET ORAL 2 TIMES DAILY
Qty: 60 TABLET | Refills: 1 | Status: SHIPPED | OUTPATIENT
Start: 2025-07-14 | End: 2025-08-13

## 2025-07-14 RX ORDER — SACUBITRIL AND VALSARTAN 24; 26 MG/1; MG/1
1 TABLET, FILM COATED ORAL 2 TIMES DAILY
Qty: 60 TABLET | Refills: 1 | Status: SHIPPED | OUTPATIENT
Start: 2025-07-14

## 2025-07-14 RX ORDER — OXCARBAZEPINE 150 MG/1
150 TABLET, FILM COATED ORAL 2 TIMES DAILY
Qty: 60 TABLET | Refills: 1 | Status: SHIPPED | OUTPATIENT
Start: 2025-07-14 | End: 2025-08-13

## 2025-07-14 RX ORDER — METHADONE HYDROCHLORIDE 10 MG/ML
120 CONCENTRATE ORAL DAILY
Refills: 0 | Status: DISCONTINUED | OUTPATIENT
Start: 2025-07-14 | End: 2025-07-15 | Stop reason: HOSPADM

## 2025-07-14 RX ORDER — TRAMADOL HYDROCHLORIDE 50 MG/1
50 TABLET ORAL EVERY 6 HOURS PRN
Status: DISCONTINUED | OUTPATIENT
Start: 2025-07-14 | End: 2025-07-15 | Stop reason: HOSPADM

## 2025-07-14 RX ORDER — LEVETIRACETAM 500 MG/1
500 TABLET ORAL ONCE
Status: COMPLETED | OUTPATIENT
Start: 2025-07-14 | End: 2025-07-14

## 2025-07-14 RX ORDER — FLUTICASONE FUROATE, UMECLIDINIUM BROMIDE AND VILANTEROL TRIFENATATE 200; 62.5; 25 UG/1; UG/1; UG/1
1 POWDER RESPIRATORY (INHALATION) DAILY
Qty: 1 EACH | Refills: 1 | Status: SHIPPED | OUTPATIENT
Start: 2025-07-14

## 2025-07-14 RX ADMIN — TRAMADOL HYDROCHLORIDE 50 MG: 50 TABLET, COATED ORAL at 06:42

## 2025-07-14 RX ADMIN — SODIUM CHLORIDE, PRESERVATIVE FREE 10 ML: 5 INJECTION INTRAVENOUS at 00:05

## 2025-07-14 RX ADMIN — ARFORMOTEROL TARTRATE 15 MCG: 15 SOLUTION RESPIRATORY (INHALATION) at 07:22

## 2025-07-14 RX ADMIN — OXCARBAZEPINE 150 MG: 300 TABLET, FILM COATED ORAL at 21:00

## 2025-07-14 RX ADMIN — TRAMADOL HYDROCHLORIDE 50 MG: 50 TABLET, COATED ORAL at 18:39

## 2025-07-14 RX ADMIN — LEVETIRACETAM 500 MG: 100 INJECTION INTRAVENOUS at 05:51

## 2025-07-14 RX ADMIN — OXCARBAZEPINE 150 MG: 300 TABLET, FILM COATED ORAL at 00:09

## 2025-07-14 RX ADMIN — MORPHINE SULFATE 2 MG: 2 INJECTION, SOLUTION INTRAMUSCULAR; INTRAVENOUS at 22:02

## 2025-07-14 RX ADMIN — SERTRALINE 150 MG: 50 TABLET, FILM COATED ORAL at 08:56

## 2025-07-14 RX ADMIN — SODIUM CHLORIDE, PRESERVATIVE FREE 10 ML: 5 INJECTION INTRAVENOUS at 08:57

## 2025-07-14 RX ADMIN — LEVETIRACETAM 500 MG: 500 TABLET, FILM COATED ORAL at 18:12

## 2025-07-14 RX ADMIN — QUETIAPINE FUMARATE 400 MG: 100 TABLET ORAL at 00:06

## 2025-07-14 RX ADMIN — QUETIAPINE FUMARATE 400 MG: 100 TABLET ORAL at 22:01

## 2025-07-14 RX ADMIN — BUDESONIDE 250 MCG: 0.25 INHALANT RESPIRATORY (INHALATION) at 07:23

## 2025-07-14 RX ADMIN — ENOXAPARIN SODIUM 40 MG: 100 INJECTION SUBCUTANEOUS at 08:56

## 2025-07-14 RX ADMIN — SODIUM CHLORIDE, PRESERVATIVE FREE 5 ML: 5 INJECTION INTRAVENOUS at 22:07

## 2025-07-14 RX ADMIN — IPRATROPIUM BROMIDE 0.5 MG: 0.5 SOLUTION RESPIRATORY (INHALATION) at 07:23

## 2025-07-14 RX ADMIN — METHADONE HYDROCHLORIDE 120 MG: 10 CONCENTRATE ORAL at 11:55

## 2025-07-14 RX ADMIN — OXCARBAZEPINE 150 MG: 300 TABLET, FILM COATED ORAL at 08:55

## 2025-07-14 ASSESSMENT — PAIN DESCRIPTION - FREQUENCY
FREQUENCY: INTERMITTENT

## 2025-07-14 ASSESSMENT — PAIN DESCRIPTION - ORIENTATION
ORIENTATION: RIGHT

## 2025-07-14 ASSESSMENT — PAIN DESCRIPTION - PAIN TYPE
TYPE: CHRONIC PAIN
TYPE: CHRONIC PAIN
TYPE: ACUTE PAIN
TYPE: ACUTE PAIN
TYPE: CHRONIC PAIN

## 2025-07-14 ASSESSMENT — PAIN SCALES - GENERAL
PAINLEVEL_OUTOF10: 9
PAINLEVEL_OUTOF10: 3
PAINLEVEL_OUTOF10: 10
PAINLEVEL_OUTOF10: 9
PAINLEVEL_OUTOF10: 10
PAINLEVEL_OUTOF10: 6
PAINLEVEL_OUTOF10: 3

## 2025-07-14 ASSESSMENT — PAIN DESCRIPTION - LOCATION
LOCATION: SHOULDER
LOCATION: BACK
LOCATION: SHOULDER
LOCATION: SHOULDER

## 2025-07-14 ASSESSMENT — PAIN DESCRIPTION - DESCRIPTORS
DESCRIPTORS: ACHING
DESCRIPTORS: ACHING;CRAMPING;DISCOMFORT;TENDER
DESCRIPTORS: ACHING
DESCRIPTORS: DISCOMFORT
DESCRIPTORS: HYPERSENSITIVITY;DISCOMFORT

## 2025-07-14 ASSESSMENT — PAIN - FUNCTIONAL ASSESSMENT
PAIN_FUNCTIONAL_ASSESSMENT: PREVENTS OR INTERFERES SOME ACTIVE ACTIVITIES AND ADLS
PAIN_FUNCTIONAL_ASSESSMENT: ACTIVITIES ARE NOT PREVENTED

## 2025-07-14 ASSESSMENT — PAIN DESCRIPTION - ONSET
ONSET: ON-GOING

## 2025-07-14 NOTE — PROGRESS NOTES
End of Shift Note    Bedside shift change report given to Ton TREADWELL (oncoming nurse) by Claudia Kraus LPN (offgoing nurse).  Report included the following information SBAR, Kardex, and MAR    Shift worked:  1900 o 0700     Shift summary and any significant changes:     Patient was received aox2 initially , vital signs stable. Patient took all medications as per order. Bedside shift report was given to on coming shift nurse.      Concerns for physician to address:    none   Zone phone for oncoming shift:     [phone]       Claudia Kraus LPN

## 2025-07-14 NOTE — PROGRESS NOTES
Discharge paperwork reviewed with patient. Patient verbalized understanding. Patient confirmed pharmacy and confirmed will be making follow-up appointments. All belongings sent with patient and all LDAs removed. Patient discharging to home with HH.

## 2025-07-14 NOTE — PROGRESS NOTES
End of Shift Note    Bedside shift change report given to Genaro TREADWELL (oncoming nurse) by Ton Wood RN (offgoing nurse).  Report included the following information SBAR, Kardex, and MAR    Shift worked:  Days     Shift summary and any significant changes:     EEG done. Therapy saw patient. Ortho consult pending and shoulder xray pending. NO IV access, MD aware.     Concerns for physician to address:    none   Zone phone for oncoming shift:     [phone]       Ton Wood, RN

## 2025-07-14 NOTE — PROCEDURES
PROCEDURE NOTE  Date: 7/14/2025   Name: David Car  YOB: 1975    Procedures        This Routine EEG was performed in real-time by an EEG technologist.  Electrodes placed according to the 10-20 International System.  Standard sensitivity 7uV/mm and high filter 70 Hz and low filter 1 Hz settings were set at initiation of the recording and adjustments, as appropriate, and noted on the recording.  Baseline electrode impedances were at or below 10k Ohms.  Per protocol, this recording data is uploaded/transferred from the EEG equipment to a central storage location in real time.  Duration of this EEG was (21:49) minutes.  Photic stimulation was performed, and Hyperventilation was not performed.  Digital analysis is recorded using a software tool that analyzes EEG data to identify seizures called Persyst.

## 2025-07-14 NOTE — CARE COORDINATION
Initial note: Chart reviewed. IDRs completed. MD requested methadone dosage from [facility] facility. CM contacted liaison to confirm dosage. Facility reports that the patient was on 120mg once a day according to their records. LTC liaison advised CM that patient was discharged from [facility] on 7/6/25 to home. CM updated MD on facilities last recorded methadone dose. MD also notified that patient was admitted from home versus LTC. MD reported that patient will need a methadone clinic if patient discharges home.     CM will meet with patient to complete full assessment.     Krystal Martinez  Care Management  [phone]

## 2025-07-14 NOTE — PROCEDURES
EEG REPORT    Patient Name: David Car  : 1975  Age: 49 y.o.    Ordering physician: Dr. Cast  Date of EE2025  10:46 - 11:08  Diagnosis: seizure  Interpreting physician: Willie Sheridan D.O. FAAN    Procedure: EEG    CLINICAL INDICATION: The patient is a 49 y.o. female who is being evaluated for baseline electro cerebral activities and to rule out seizure focus.      Current Facility-Administered Medications   Medication Dose Route Frequency    traMADol (ULTRAM) tablet 50 mg  50 mg Oral Q6H PRN    methadone (DOLOPHINE) 10 MG/ML solution 120 mg  120 mg Oral Daily    OXcarbazepine (TRILEPTAL) tablet 150 mg  150 mg Oral BID    QUEtiapine (SEROQUEL) tablet 400 mg  400 mg Oral QHS    sertraline (ZOLOFT) tablet 150 mg  150 mg Oral Daily    sodium chloride flush 0.9 % injection 5-40 mL  5-40 mL IntraVENous 2 times per day    0.9 % sodium chloride infusion   IntraVENous PRN    potassium chloride (KLOR-CON M) extended release tablet 40 mEq  40 mEq Oral PRN    Or    potassium bicarb-citric acid (EFFER-K) effervescent tablet 40 mEq  40 mEq Oral PRN    Or    potassium chloride 10 mEq/100 mL IVPB (Peripheral Line)  10 mEq IntraVENous PRN    magnesium sulfate 2000 mg in 50 mL IVPB premix  2,000 mg IntraVENous PRN    enoxaparin (LOVENOX) injection 40 mg  40 mg SubCUTAneous Daily    ondansetron (ZOFRAN-ODT) disintegrating tablet 4 mg  4 mg Oral Q8H PRN    Or    ondansetron (ZOFRAN) injection 2 mg  2 mg IntraVENous Q6H PRN    melatonin tablet 1.5 mg  1.5 mg Oral Nightly PRN    polyethylene glycol (GLYCOLAX) packet 17 g  17 g Oral Daily PRN    acetaminophen (TYLENOL) tablet 650 mg  650 mg Oral Q6H PRN    Or    acetaminophen (TYLENOL) suppository 650 mg  650 mg Rectal Q6H PRN    levETIRAcetam (KEPPRA) injection 500 mg  500 mg IntraVENous Q12H    budesonide (PULMICORT) nebulizer suspension 250 mcg  0.25 mg Nebulization BID RT    And    arformoterol tartrate (BROVANA) nebulizer solution 15 mcg  15 mcg Nebulization

## 2025-07-14 NOTE — PROGRESS NOTES
Dr. Cast assessed patient and would like to hold off on DC to obtain right shoulder xrays. MD aware of patient not having PIV due to discharge plans and OK to keep out.

## 2025-07-14 NOTE — PROGRESS NOTES
Assumed care of patient, was lethargic and responded to painful stimuli., will continue to monitor.

## 2025-07-14 NOTE — PLAN OF CARE
Problem: Occupational Therapy - Adult  Goal: By Discharge: Performs self-care activities at highest level of function for planned discharge setting.  See evaluation for individualized goals.  Description: FUNCTIONAL STATUS PRIOR TO ADMISSION:  Pt recently discharged from [facility] on 7/6/2025 and returned home. Unclear PLOF, as pt is inconsistent historian and grossly confused throughout evaluation. Appears to have been at [facility] since 3/2025.  Receives Help From: Family, Prior Level of Assist for ADLs: Needs assistance,  ,  ,  ,  ,  , Prior Level of Assist for Homemaking: Needs assistance, Ambulation Assistance: Needs assistance, Prior Level of Assist for Transfers: Independent, Active : No     HOME SUPPORT: Unclear level of support at home or who pt lives with.    Occupational Therapy Goals:  Initiated 7/14/2025  1.  Patient will perform grooming in stance with Stand by Assist within 7 day(s).  2.  Patient will perform lower body dressing with Minimal Assist within 7 day(s).  3.  Patient will perform toileting with Stand by Assist within 7 day(s).  4.  Patient will perform toilet transfers with Stand by Assist  within 7 day(s).  5.  Patient will perform functional ambulation with least restrictive AD with Contact Guard Assist within 7 day(s).      Outcome: Progressing   OCCUPATIONAL THERAPY EVALUATION    Patient: David Car (49 y.o. female)  Date: 7/14/2025  Primary Diagnosis: Metabolic encephalopathy [G93.41]  Seizure-like activity (HCC) [R56.9]  Acute encephalopathy [G93.40]  Altered mental status, unspecified altered mental status type [R41.82]         Precautions: Seizure                  ASSESSMENT :  The patient is limited by decreased functional mobility, independence in ADLs, high-level IADLs, ROM, strength, activity tolerance, endurance, safety awareness, cognition, command following, attention/concentration, coordination, balance, posture, fine-motor control, increased pain levels. Pt  activity limitations and/or participation restrictions LOW Complexity: No comorbidities that affect functional and  no verbal  or physical assist needed to complete eval tasks   Based on the above components, the patient evaluation is determined to be of the following complexity level: Low

## 2025-07-14 NOTE — PROGRESS NOTES
PT Note:    Orders received, chart reviewed and patient evaluated by physical therapy. Pending progression with skilled acute physical therapy, recommend:    Moderate intensity short-term skilled physical therapy up to 5x/week    Recommend with nursing OOB to chair 3x/day with min assist and rolling walker. Thank you for completing as able in order to maintain patient strength, endurance and independence.     Full evaluation to follow.      Velma Parsons, PT

## 2025-07-14 NOTE — CARE COORDINATION
Care Management Initial Assessment       RUR: 17% Moderate Risk  Readmission? No  1st IM letter given? N/A - Medicaid  1st  letter given: N/A - Medicaid    1540 - CM met with patient at bedside to given HH agency information. Patient is aware that their HH agency is [facility] and to expect a call to schedule the visits. Patient confirmed that their mother and brother will be home to receive her when she arrives with the Medicaid transportation. Patient's meds to bed medications have been delivered.    Patient is cleared from a CM standpoint.    1516 - CM arranged home health with [facility] and placed the agency information on the AVS. Medicaid has three hours to assign trip, however, ETA of transport is 1816. CM requested and confirmed that a WC and O2 will be provided for patient's transport home. Patient confirmed that she will be going to [address] [place] [id number]. Medicaid trip number [id number]. CM requested  call the RN station 15 min prior to arrival.     CM met with at bedside. CM introduced self and role and completed initial assessment. CM verified demographic and clinical information.     PCP is Dr. Escamilla, preferred pharmacy is [facility] on [facility].     Patient is not a .    Pt lives with her mother in a 1 level home, 4 DALIA. Reports needing assistance with ADLs. Reports a rollator for DME at home. Reports O2 at home. Patient reports that she is on 6L at baseline and [facility] provides O2 supplies. Reports they are supported by her mother. Patient is not an active . Reports history of LTC at [facility]. Patient was recently discharge from [facility] on 7/6/25 per LTC liaison. Recommendations from therapy team discussed; pt electing to return home with .Pt identified need for Medicaid transport; CM to arrange once d/c order is entered.    CM to send home health referrals.    CM will continue to follow.     Advance Care Planning     General

## 2025-07-14 NOTE — PLAN OF CARE
Problem: Physical Therapy - Adult  Goal: By Discharge: Performs mobility at highest level of function for planned discharge setting.  See evaluation for individualized goals.  Description: FUNCTIONAL STATUS PRIOR TO ADMISSION: Patient was modified independent using a rollator for functional mobility.    HOME SUPPORT PRIOR TO ADMISSION: The patient lived with mother and recently returned home following SNF stay.    Physical Therapy Goals  Initiated 7/14/2025  1.  Patient will move from supine to sit and sit to supine, scoot up and down, and roll side to side in bed with independence within 7 day(s).    2.  Patient will perform sit to stand with modified independence within 7 day(s).  3.  Patient will transfer from bed to chair and chair to bed with modified independence using the least restrictive device within 7 day(s).  4.  Patient will ambulate with modified independence for 150 feet with the least restrictive device within 7 day(s).     Outcome: Progressing      PHYSICAL THERAPY EVALUATION    Patient: David Car (49 y.o. female)  Date: 7/14/2025  Primary Diagnosis: Metabolic encephalopathy [G93.41]  Seizure-like activity (HCC) [R56.9]  Acute encephalopathy [G93.40]  Altered mental status, unspecified altered mental status type [R41.82]       Precautions: Restrictions/Precautions  Restrictions/Precautions: Seizure            ASSESSMENT :   DEFICITS/IMPAIRMENTS:   The patient is limited by decreased functional mobility, strength, activity tolerance, endurance, safety awareness, cognition, attention/concentration, balance, increased pain levels . Pt to ED with AMS and \"twitching\", CT was negative for any acute abnormalities MRI is pending. Pt was recently dced home from LTC facility and stated she is in the hospital because she OD ed ( screen + for benzos, methadone and THC). Pt received supine in bed, agreeable to get up with encouragement. Frequent redirection needed during assessment. CGA for bed mobility,

## 2025-07-14 NOTE — PLAN OF CARE
Problem: Discharge Planning  Goal: Discharge to home or other facility with appropriate resources  Outcome: Progressing  Flowsheets (Taken 7/13/2025 2135)  Discharge to home or other facility with appropriate resources: Identify barriers to discharge with patient and caregiver     Problem: Seizure Precautions  Goal: Remains free of injury related to seizures activity  Outcome: Progressing     Problem: Skin/Tissue Integrity  Goal: Skin integrity remains intact  Description: 1.  Monitor for areas of redness and/or skin breakdown  2.  Assess vascular access sites hourly  3.  Every 4-6 hours minimum:  Change oxygen saturation probe site  4.  Every 4-6 hours:  If on nasal continuous positive airway pressure, respiratory therapy assess nares and determine need for appliance change or resting period  Outcome: Progressing

## 2025-07-14 NOTE — CONSULTS
Neurology Note    Patient ID:  David Car  [id number]  49 y.o.  1975      Date of Consultation:  July 14, 2025    Referring Physician: Dr. Jaeger    Reason for Consultation:  altered mental status      Assessment and Plan:    The patient is a 49-year-old female with multiple medical conditions including reported history of prior seizures who presented to the hospital with worsening mental status, abnormal movements and worsening right shoulder pain.  Her current neurological examination revealed no focal sensorimotor deficit however guarded proximal right upper extremity.  She does have occasional involuntary muscle spasms.    Altered mental status:  Differential includes metabolic encephalopathy, medication induced a seizure with postictal state  Her mental status appears to be improved from what was documented in the emergency department  Unclear of baseline mental status  Head CT with no acute abnormality  Correct any underlying metabolic derangements  Optimize nutritional status  Depending on clinical course, may need brain mri.  Recent brain mri from March 2025 was normal    Reported history of seizures:  Unclear who her primary neurologist is.   Patient unable to provide additional information today  Will continue home doses of Keppra and oxcarbazepine at this time  Will check levels to ensure patient is not toxic.  Prior keppra level from March was low    Occasional involuntary twitching:  This would not be consistent with seizure  Differential includes metabolic abnormality, medication side effect  Reportedly chronic with intermittent flares  Will continue to follow for the time being.    History of polysubstance abuse and chronic pain:  Patient is on methadone treatment  Thc and methadone positive    Right shoulder pain:  Will defer to primary team for evaluation and treatment  Sensation intact    Bipolar disorder/depression  Patient has been continued on current home doses of Zoloft and  10.6 07/13/2025 05:00 PM     07/13/2025 05:00 PM    .2 03/29/2023 12:28 PM                      Principal Problem:    Acute encephalopathy  Resolved Problems:    * No resolved hospital problems. *      Complex decision making was necessary due to the patient's current medical condition and other medical co-morbidities.            Signed By:  Willie Sheridan DO FAAN    July 14, 2025

## 2025-07-14 NOTE — PROGRESS NOTES
Spiritual Health History and Assessment/Progress Note  [facility]    Loneliness/Social Isolation,  , Adjustment to illness, Life Adjustments,      Name: David Car MRN: [id number]    Age: 49 y.o.     Sex: female   Language: English   Samaritan: Taoist   Acute encephalopathy     Date: 7/14/2025            Total Time Calculated: 24 min              Spiritual Assessment began in [facility] NEUROSCIENCE TELEMETRY        Referral/Consult From: Multi-disciplinary team   Encounter Overview/Reason: Loneliness/Social Isolation  Service Provided For: Patient    Parul, Belief, Meaning:   Patient is connected with a parul tradition or spiritual practice and has beliefs or practices that help with coping during difficult times  Family/Friends No family/friends present      Importance and Influence:  Patient has spiritual/personal beliefs that influence decisions regarding their health  Family/Friends No family/friends present    Community:  Patient feels well-supported. Support system includes: Spouse/Partner, Parent/s, Children, and Extended family  Family/Friends No family/friends present    Assessment and Plan of Care:     Patient Interventions include: Facilitated expression of thoughts and feelings, Explored spiritual coping/struggle/distress, Affirmed coping skills/support systems, and Facilitated life review and/ or legacy  Family/Friends Interventions include: No family/friends present    Patient Plan of Care: Spiritual Care available upon further referral  Family/Friends Plan of Care: No family/friends present    Electronically signed by MAGNUS Vogel on 7/14/2025 at 10:24 AM

## 2025-07-14 NOTE — PROGRESS NOTES
Critical electrolyte abnormalities requiring IV replacement and close serial monitoring  [] Insulin - monitoring serial FSBS for Hypoglycemic adverse drug reaction  [] Other -   [] Change in code status:    [] Decision to escalate care:    [] Major surgery/procedure with associated risk factors:    ----------------------------------------------------------------------  C. Data (any 2)  [x] Discussed current management and discharge planning options with Case Management.  [x] Discussed management of the case with:   Patient, IDR rounds  [] Telemetry personally reviewed and interpreted as documented above    [] Imaging personally reviewed and interpreted, includes:     [x] Data Review (any 3)  [x] All available Consultant notes from yesterday/today were reviewed  [x] All current labs were reviewed and interpreted for clinical significance   [x] Appropriate follow-up labs were ordered  [] Collateral history obtained from:      Total NON critical care TIME:  40  Minutes    Total CRITICAL CARE TIME Spent:   Minutes non procedure based    Objective:     VITALS:   Last 24hrs VS reviewed since prior progress note. Most recent are:  Patient Vitals for the past 24 hrs:   BP Temp Temp src Pulse Resp SpO2 Height Weight   07/14/25 1839 -- -- -- -- 18 -- -- --   07/14/25 0924 -- -- -- -- -- -- 1.626 m (5' 4\") --   07/14/25 0811 (!) 111/94 98.8 °F (37.1 °C) Oral (!) 101 18 95 % -- --   07/14/25 0744 -- -- -- -- -- 99 % -- --   07/14/25 0726 -- -- -- -- -- 99 % -- --   07/14/25 0704 -- -- -- (!) 102 -- -- -- --   07/14/25 0642 -- -- -- -- 18 -- -- --   07/14/25 0606 120/70 98.6 °F (37 °C) Oral 96 18 98 % -- --   07/14/25 0045 -- -- -- -- -- -- -- 100.8 kg (222 lb 3.6 oz)   07/13/25 2135 122/75 97.9 °F (36.6 °C) Oral 87 14 100 % -- --   07/13/25 2000 130/69 -- -- 90 13 100 % -- --   07/13/25 1945 136/86 -- -- 89 13 100 % -- --   07/13/25 1930 127/82 -- -- 90 13 100 % -- --   07/13/25 1915 132/73 -- -- 90 13 100 % -- --   07/13/25  1900 126/78 -- -- 89 12 100 % -- --   07/13/25 1845 126/77 -- -- 91 12 100 % -- --         Intake/Output Summary (Last 24 hours) at 7/14/2025 1843  Last data filed at 7/14/2025 0000  Gross per 24 hour   Intake --   Output 800 ml   Net -800 ml        I had a face to face encounter and independently examined this patient on 7/14/2025, as outlined below:    PHYSICAL EXAM:  General: Alert, cooperative  EENT:  Anicteric sclerae.  Resp:  CTA bilaterally, no wheezing or rales.  No accessory muscle use  CV:  Regular  rhythm,  No edema  GI:  Soft, Non distended, Non tender.  +Bowel sounds  Neurologic:  Alert and oriented X 3, normal speech,   Psych:   Good insight. Not anxious nor agitated  MS:  Tender over right shoulder joint, pain on motion of the joint  Skin:  No rashes.  No jaundice    Reviewed most current lab test results and cultures  YES  Reviewed most current radiology test results   YES  Review and summation of old records today    NO  Reviewed patient's current orders and MAR    YES  PMH/SH reviewed - no change compared to H&P    ________________________________________________________________________        Comments   >50% of visit spent in counseling and coordination of care     ________________________________________________________________________  Saleem Cast Jr, MD     Procedures: see electronic medical records for all procedures/Xrays and details which were not copied into this note but were reviewed prior to creation of Plan.      LABS:  I reviewed today's most current labs and imaging studies.  Pertinent labs include:  Recent Labs     07/13/25  1700 07/14/25  0855   WBC 4.7 4.1   HGB 10.6* 10.7*   HCT 33.4* 33.7*    179     Recent Labs     07/13/25  1700 07/14/25  0855    137   K 4.1 3.8   CL 99 101   CO2 39* 33*   GLUCOSE 90 120*   BUN 12 8   CREATININE 1.04* 0.88   CALCIUM 9.1 8.9   BILITOT 0.3  --    *  --    ALT 52  --      Invalid input(s): \"CK\", \"TROPONIN\",

## 2025-07-15 ENCOUNTER — APPOINTMENT (OUTPATIENT)
Facility: HOSPITAL | Age: 50
DRG: 053 | End: 2025-07-15
Payer: MEDICAID

## 2025-07-15 VITALS
RESPIRATION RATE: 13 BRPM | WEIGHT: 222.22 LBS | SYSTOLIC BLOOD PRESSURE: 145 MMHG | BODY MASS INDEX: 37.94 KG/M2 | HEART RATE: 90 BPM | TEMPERATURE: 97.5 F | DIASTOLIC BLOOD PRESSURE: 93 MMHG | HEIGHT: 64 IN | OXYGEN SATURATION: 96 %

## 2025-07-15 LAB
ALBUMIN SERPL-MCNC: 3.3 G/DL (ref 3.5–5)
ALBUMIN/GLOB SERPL: 0.8 (ref 1.1–2.2)
ALP SERPL-CCNC: 76 U/L (ref 45–117)
ALT SERPL-CCNC: 48 U/L (ref 12–78)
ANION GAP SERPL CALC-SCNC: 3 MMOL/L (ref 2–12)
AST SERPL-CCNC: 84 U/L (ref 15–37)
BASOPHILS # BLD: 0.01 K/UL (ref 0–0.1)
BASOPHILS NFR BLD: 0.3 % (ref 0–1)
BILIRUB SERPL-MCNC: 0.5 MG/DL (ref 0.2–1)
BUN SERPL-MCNC: 8 MG/DL (ref 6–20)
BUN/CREAT SERPL: 10 (ref 12–20)
CALCIUM SERPL-MCNC: 8.9 MG/DL (ref 8.5–10.1)
CHLORIDE SERPL-SCNC: 98 MMOL/L (ref 97–108)
CK SERPL-CCNC: 1830 U/L (ref 26–192)
CO2 SERPL-SCNC: 34 MMOL/L (ref 21–32)
CREAT SERPL-MCNC: 0.79 MG/DL (ref 0.55–1.02)
DIFFERENTIAL METHOD BLD: ABNORMAL
EOSINOPHIL # BLD: 0.14 K/UL (ref 0–0.4)
EOSINOPHIL NFR BLD: 3.9 % (ref 0–7)
ERYTHROCYTE [DISTWIDTH] IN BLOOD BY AUTOMATED COUNT: 12.6 % (ref 11.5–14.5)
GLOBULIN SER CALC-MCNC: 3.9 G/DL (ref 2–4)
GLUCOSE SERPL-MCNC: 94 MG/DL (ref 65–100)
HCT VFR BLD AUTO: 35 % (ref 35–47)
HGB BLD-MCNC: 11.1 G/DL (ref 11.5–16)
IMM GRANULOCYTES # BLD AUTO: 0.03 K/UL (ref 0–0.04)
IMM GRANULOCYTES NFR BLD AUTO: 0.8 % (ref 0–0.5)
LYMPHOCYTES # BLD: 0.74 K/UL (ref 0.8–3.5)
LYMPHOCYTES NFR BLD: 20.6 % (ref 12–49)
MCH RBC QN AUTO: 31.9 PG (ref 26–34)
MCHC RBC AUTO-ENTMCNC: 31.7 G/DL (ref 30–36.5)
MCV RBC AUTO: 100.6 FL (ref 80–99)
MONOCYTES # BLD: 0.37 K/UL (ref 0–1)
MONOCYTES NFR BLD: 10.4 % (ref 5–13)
NEUTS SEG # BLD: 2.3 K/UL (ref 1.8–8)
NEUTS SEG NFR BLD: 64 % (ref 32–75)
NRBC # BLD: 0 K/UL (ref 0–0.01)
NRBC BLD-RTO: 0 PER 100 WBC
PLATELET # BLD AUTO: 201 K/UL (ref 150–400)
PMV BLD AUTO: 10.1 FL (ref 8.9–12.9)
POTASSIUM SERPL-SCNC: 3.7 MMOL/L (ref 3.5–5.1)
PROT SERPL-MCNC: 7.2 G/DL (ref 6.4–8.2)
RBC # BLD AUTO: 3.48 M/UL (ref 3.8–5.2)
RBC MORPH BLD: ABNORMAL
SODIUM SERPL-SCNC: 135 MMOL/L (ref 136–145)
WBC # BLD AUTO: 3.6 K/UL (ref 3.6–11)

## 2025-07-15 PROCEDURE — 99233 SBSQ HOSP IP/OBS HIGH 50: CPT

## 2025-07-15 PROCEDURE — 82550 ASSAY OF CK (CPK): CPT

## 2025-07-15 PROCEDURE — 6370000000 HC RX 637 (ALT 250 FOR IP): Performed by: INTERNAL MEDICINE

## 2025-07-15 PROCEDURE — 76882 US LMTD JT/FCL EVL NVASC XTR: CPT

## 2025-07-15 PROCEDURE — 6370000000 HC RX 637 (ALT 250 FOR IP): Performed by: HOSPITALIST

## 2025-07-15 PROCEDURE — 36415 COLL VENOUS BLD VENIPUNCTURE: CPT

## 2025-07-15 PROCEDURE — 80053 COMPREHEN METABOLIC PANEL: CPT

## 2025-07-15 PROCEDURE — 94761 N-INVAS EAR/PLS OXIMETRY MLT: CPT

## 2025-07-15 PROCEDURE — 6360000002 HC RX W HCPCS: Performed by: HOSPITALIST

## 2025-07-15 PROCEDURE — 6360000002 HC RX W HCPCS: Performed by: INTERNAL MEDICINE

## 2025-07-15 PROCEDURE — 2500000003 HC RX 250 WO HCPCS: Performed by: HOSPITALIST

## 2025-07-15 PROCEDURE — 2700000000 HC OXYGEN THERAPY PER DAY

## 2025-07-15 PROCEDURE — 85025 COMPLETE CBC W/AUTO DIFF WBC: CPT

## 2025-07-15 RX ORDER — SODIUM CHLORIDE, SODIUM LACTATE, POTASSIUM CHLORIDE, CALCIUM CHLORIDE 600; 310; 30; 20 MG/100ML; MG/100ML; MG/100ML; MG/100ML
INJECTION, SOLUTION INTRAVENOUS CONTINUOUS
Status: DISCONTINUED | OUTPATIENT
Start: 2025-07-15 | End: 2025-07-15 | Stop reason: HOSPADM

## 2025-07-15 RX ADMIN — MORPHINE SULFATE 2 MG: 2 INJECTION, SOLUTION INTRAMUSCULAR; INTRAVENOUS at 10:45

## 2025-07-15 RX ADMIN — LEVETIRACETAM 500 MG: 100 INJECTION INTRAVENOUS at 06:06

## 2025-07-15 RX ADMIN — SODIUM CHLORIDE, PRESERVATIVE FREE 10 ML: 5 INJECTION INTRAVENOUS at 10:00

## 2025-07-15 RX ADMIN — MORPHINE SULFATE 2 MG: 2 INJECTION, SOLUTION INTRAMUSCULAR; INTRAVENOUS at 03:03

## 2025-07-15 RX ADMIN — OXCARBAZEPINE 150 MG: 300 TABLET, FILM COATED ORAL at 09:54

## 2025-07-15 RX ADMIN — METHADONE HYDROCHLORIDE 120 MG: 10 CONCENTRATE ORAL at 09:57

## 2025-07-15 RX ADMIN — SERTRALINE 150 MG: 50 TABLET, FILM COATED ORAL at 09:55

## 2025-07-15 ASSESSMENT — PAIN DESCRIPTION - PAIN TYPE
TYPE: ACUTE PAIN

## 2025-07-15 ASSESSMENT — PAIN - FUNCTIONAL ASSESSMENT
PAIN_FUNCTIONAL_ASSESSMENT: PREVENTS OR INTERFERES SOME ACTIVE ACTIVITIES AND ADLS

## 2025-07-15 ASSESSMENT — PAIN DESCRIPTION - ONSET
ONSET: ON-GOING
ONSET: ON-GOING

## 2025-07-15 ASSESSMENT — PAIN DESCRIPTION - DESCRIPTORS
DESCRIPTORS: ACHING

## 2025-07-15 ASSESSMENT — PAIN SCALES - GENERAL
PAINLEVEL_OUTOF10: 8
PAINLEVEL_OUTOF10: 9
PAINLEVEL_OUTOF10: 9
PAINLEVEL_OUTOF10: 4
PAINLEVEL_OUTOF10: 10

## 2025-07-15 ASSESSMENT — PAIN DESCRIPTION - LOCATION
LOCATION: SHOULDER

## 2025-07-15 ASSESSMENT — PAIN DESCRIPTION - ORIENTATION
ORIENTATION: RIGHT

## 2025-07-15 ASSESSMENT — PAIN DESCRIPTION - FREQUENCY
FREQUENCY: INTERMITTENT
FREQUENCY: INTERMITTENT

## 2025-07-15 NOTE — PROGRESS NOTES
Occupational Therapy    Chart reviewed and interventions attempted. Pt MARILIN for US of RUE. Will defer and continue to follow.

## 2025-07-15 NOTE — PROGRESS NOTES
Attempted to schedule PCP hospital follow up appointment.  offered a virtual follow up with NP Kailyn De La Vega on 7/18/2025 at 820 but there was no additional information so I requested to cancel until I asked the . Called back 3 times Unable to reach anyone, unable to leave voicemail. PCP office phone rings to a [email or web address] service at this time. Pending patient discharge.?

## 2025-07-15 NOTE — PLAN OF CARE
Problem: Discharge Planning  Goal: Discharge to home or other facility with appropriate resources  7/14/2025 1525 by Ton Wood, RN  Outcome: Completed     Problem: Seizure Precautions  Goal: Remains free of injury related to seizures activity  7/14/2025 1525 by Ton Wood RN  Outcome: Completed     Problem: Skin/Tissue Integrity  Goal: Skin integrity remains intact  Description: 1.  Monitor for areas of redness and/or skin breakdown  2.  Assess vascular access sites hourly  3.  Every 4-6 hours minimum:  Change oxygen saturation probe site  4.  Every 4-6 hours:  If on nasal continuous positive airway pressure, respiratory therapy assess nares and determine need for appliance change or resting period  7/14/2025 1525 by Ton Wood RN  Outcome: Completed     Problem: Safety - Adult  Goal: Free from fall injury  7/14/2025 1525 by Ton Wood, RN  Outcome: Completed     Problem: Neurosensory - Adult  Goal: Achieves stable or improved neurological status  7/14/2025 1525 by Ton Wood, RN  Outcome: Completed  Goal: Absence of seizures  7/14/2025 1525 by Ton Wood, RN  Outcome: Completed  Goal: Achieves maximal functionality and self care  7/14/2025 1525 by Ton Wood, RN  Outcome: Completed

## 2025-07-15 NOTE — DISCHARGE INSTRUCTIONS
DISCHARGE DIAGNOSIS:  Acute metabolic encephalopathy POA  Suspected breakthrough seizure with postictal state POA   Altered mental status:  Reported history of seizures   Occasional involuntary twitching:  Severe right shoulder pain unclear etiology  Elevated AST POA raises concern for rhabdomyolysis  lipoma   Chronic drug use on methadone         MEDICATIONS:  It is important that you take the medication exactly as they are prescribed.   Keep your medication in the bottles provided by the pharmacist and keep a list of the medication names, dosages, and times to be taken in your wallet.   Do not take other medications without consulting your doctor.     Pain Management: per above medications    What to do at Home    Recommended diet:  {diet:31767}    Recommended activity: {discharge activity:35819}    If you have questions regarding the hospital related prescriptions or hospital related issues please call [facility] at . You can always direct your questions to your primary care doctor if you are unable to reach your hospital physician; your PCP works as an extension of your hospital doctor just like your hospital doctor is an extension of your PCP for your time at the hospital ([facility]).    If you experience any of the following symptoms then please call your primary care physician or return to the emergency room if you cannot get hold of your doctor:  Fever, chills, nausea, vomiting, diarrhea, change in mentation, falling, bleeding, shortness of breath, ***

## 2025-07-15 NOTE — PROGRESS NOTES
End of Shift Note    Bedside shift change report given to EBEN Bravo (oncoming nurse) by Linda Newell RN (offgoing nurse).  Report included the following information SBAR, Kardex, and MAR    Shift worked:  Nights   Shift summary and any significant changes:    -still with complaints of right shoulder pain, xray completed, x2 morphine IV for pain.      Concerns for physician to address:    none   Zone phone for oncoming shift:     [phone]       Linda Newell, RN

## 2025-07-15 NOTE — PROGRESS NOTES
HOSPITAL NEUROLOGY NOTE     Chief Complaint   Patient presents with    Altered Mental Status     BIBEMS for AMS and twitching. Family unable to to give a good hx. Does state she was in [facility] and rehab on methadone, hx of OD. Twitching more than usual. On 7L NC at baseline. Pt will not respond to questions however intermittently tracks with her eyes & yelled threats of wanting to assault staff upon attempting IV access.        HPI  History excerpted from Dr. Sheridan's note on 7/14/2025  \"David Car is a 49 y.o. female  with a history of COPD, hypertension, seizure disorder, polysubstance abuse who presented to the hospital with altered mental status.  While in the emergency department she was significantly sedated.  It was noted that she was at [facility] and rehabilitation and was on methadone treatment.  She was noted to be twitching more than normal.  In the emergency department she was given 2 mg of Versed and a dose of Keppra.  Reportedly the patient also does have tremors at baseline     The history was difficult to obtain from the patient today.  The patient reports that she had been living at home which was different than documented in the medical records.  She stated that her primary concern today is the severe pain that she is having in her right shoulder.  She has also noticed increasing twitching that has occurred throughout her body that is random in frequency and duration.  She denies any focal numbness, tingling, or weakness.  Pain in her right shoulder has her preoccupied with further discussion.  She denies having a history of seizures but is on 2 seizure medications at home from review of the medical records.     Pertinent labs include a sodium 139, potassium of 4.1, creatinine of 1.04.  Albumin 3.5.  AST of 131 and ALT of 52.  Urine drug screen was positive for amphetamines, benzodiazepine and THC.  White blood cell count 4.7, hemoglobin 10.6, platelets of 191     I did independently  suggest acute infarction.  No overt parenchymal signal abnormality, hemorrhage, or parenchymal or meningeal  enhancement. The ventricles and sulci are appropriate for age. The main  intracranial arterial flow-voids are normal. No hemorrhage.    Impression  Normal within motion limitation.    Electronically signed by De Almaguer      MRI CERVICAL SPINE WO CONTRAST 07/31/2024    Narrative  STUDY: MRI CERVICAL SPINE WITHOUT CONTRAST    HISTORY: Pt with UMN findings in shoulders as well as distal arm numbness    TECHNIQUE: Multisequence, multiplanar MR imaging was performed through the cervical spine without IV contrast material.    COMPARISONS: Cervical spine CT 08/09/2021. CT soft tissue neck 01/30/2024.    FINDINGS:  Significantly limited study due to motion artifacts.    The cervical spine demonstrates anatomical alignment. The vertebral bodies are normal in height. There is a normal bone marrow signal pattern. Diffuse disc desiccation. Endplate changes and disc space narrowing at C5-6 and C6-C7.    Evaluation of cord signal is limited due to motion artifacts. The craniocervical junction is normal. The included intracranial structures are grossly normal.    The paraspinal soft tissues are normal.    Evaluation of the individual axial levels demonstrates:  C2-C3: No spinal stenosis or neural foramen narrowing    C3-C4: Disc bulge and mild left uncovertebral and facet hypertrophy. Mild spinal canal stenosis and mild left neural foraminal narrowing.    C4-C5: Disc bulge. No spinal canal stenosis. Mild bilateral neural foraminal narrowing.    C5-C6: Broad-based central disc herniation. Mild spinal canal stenosis. Moderate left neural foraminal narrowing.    C6-C7: Broad-based central disc herniation. Mild spinal stenosis. Moderate left neural foramen narrowing    C7-T1: No spinal stenosis or neural foramen narrowing    Impression  Final report  1.  Degenerative changes in the cervical spine with mild spinal

## 2025-07-15 NOTE — CARE COORDINATION
Home w/ HH - [facility]  Medicaid trip#: [id number]   scheduled to arrive 1615    Transition of Care Plan:    RUR: 16% Moderate risk  Prior Level of Functioning: Needing assistance w/ ADLs  Disposition: Home with HH - [facility]  WENDY: 7/15/25  If SNF or IPR: Date FOC offered:   Date FOC received:   Accepting facility:   Date authorization started with reference number:   Date authorization received and expires:   Follow up appointments: PCP and specialists as indicated  DME needed: None  Transportation at discharge: Medicaid transport  IM/IMM Medicare/ letter given: N/A - Medicaid  Is patient a  and connected with VA? No - patient is not a    If yes, was  transfer form completed and VA notified?   Caregiver Contact: Brenda Diego - [phone]  Discharge Caregiver contacted prior to discharge? Patient will contact prior to discharge  Care Conference needed? Not at this time  Barriers to discharge: Medical clearance    1335 - Medicaid transport scheduled for 1615 today. CM confirmed that the transportation will bring a WC and O2 for the ride home. Medicaid trip#: [id number]    Initial note: Chart reviewed. SNF recommendations discussed, patient is electing to return home with HH. Patient identified need for a Medicaid transport; CM to arrange once d/c order has been entered.     CM confirmed patient's HH is confirmed with [facility] on 7/14/25.    CM to follow for discharge needs.     Krystal Martinez  Care Management         07/15/25 1451   Services At/After Discharge   Transition of Care Consult (CM Consult) Home Health  ([facility] )   Services At/After Discharge Home Health  ([facility] )   [place] Resource Information Provided? Yes  (Patient is not a )   Mode of Transport at Discharge Other (see comment)  (Medicaid transport - WC and O2)   Hospital Transport Time of Discharge 1615   Confirm Follow Up Transport Other (see comment)  (Patient uses

## 2025-07-15 NOTE — CONSULTS
Orthopedic consult note:    49-year-old female admitted due to acute encephalopathy.  Our services are consulted to evaluate the patient's right shoulder pain which she had radiographs for last night.  Patient admits to a surgical history approximately 10 or 15 years ago on her right shoulder, but she is unsure exactly what the surgery was.  She states that she has had swelling and pain in her right shoulder for approximately 3 to 4 weeks which comes and goes.  Patient complains of pain with range of motion of the shoulder in all planes.  She denies any known any recent injuries.  She denies any sensation changes in the upper extremity.  She denies any other complaints at this time.    Exam: Inspection of the right shoulder reveals multiple well-healed surgical scars with some mild keloid formation.  There is also notable soft tissue fluid collection over the area of the anterior deltoid.  No obvious erythema or warmth noted to the touch.  Patient experiences pain to palpation of this area and diffusely throughout her entire shoulder and right arm.  Patient is able to tolerate gentle passive range of motion to above the head.  Patient is performing some active range of motion, but this is limited secondary to pain.  Patient when attentive to my physical exam expresses pain when palpating the fluid collection, but when distracted patient is not showing signs of pain with passive range of motion or palpation of her shoulder.  Sensation light touch intact in the right upper extremity.    Imaging:EXAM: XR SHOULDER RIGHT (MIN 2 VIEWS)     INDICATION: severe right shoulder pain.     COMPARISON: None.     FINDINGS: Three views of the right shoulder demonstrate no fracture, dislocation  or other acute abnormality.     IMPRESSION:  No acute abnormality.    Plan:  -No obvious signs of infection of the swelling of the patient's shoulder.  Her pain does not seem to be significant when distracted.  WBC within normal limits and  afebrile.  -Patient's symptoms have been present for several weeks per her account.  -Would recommend outpatient follow-up with shoulder specialist.  -If patient remains inpatient and develops any acute signs of infection or worsening, please contact us.  Will sign off otherwise.    Dr. Pederson aware and agrees.  MEÑO Meredith

## 2025-07-16 LAB
LEVETIRACETAM SERPL-MCNC: 13.7 UG/ML (ref 10–40)
LEVETIRACETAM SERPL-MCNC: <2 UG/ML (ref 10–40)
OXCARBAZEPINE SERPL-MCNC: 7 UG/ML (ref 10–35)

## 2025-07-17 NOTE — DISCHARGE SUMMARY
medical conditions including reported history of prior seizures who presented to the hospital with worsening mental status, abnormal movements and worsening right shoulder pain.  Her current neurological examination revealed no focal sensorimotor deficit however guarded proximal right upper extremity.  She does have occasional involuntary muscle spasms.     Altered mental status:  Differential includes metabolic encephalopathy, medication induced a seizure with postictal state  Her mental status appears to be improved from what was documented in the emergency department  Unclear of baseline mental status  Head CT with no acute abnormality  Correct any underlying metabolic derangements  Optimize nutritional status  Depending on clinical course, may need brain mri.  Recent brain mri from March 2025 was normal     Reported history of seizures:  Unclear who her primary neurologist is.   Patient unable to provide additional information today  Will continue home doses of Keppra and oxcarbazepine at this time  Will check levels to ensure patient is not toxic.  Prior keppra level from March was low     Occasional involuntary twitching:  This would not be consistent with seizure  Differential includes metabolic abnormality, medication side effect  Reportedly chronic with intermittent flares  Will continue to follow for the time being.\"  EEG Clinical Interpretation:             This EEG, performed during wakefulness and drowsiness, is normal.    There was no clear focal abnormalities or epileptiform activity.  Consult neurology  Gentle IVF hydration  Continue home seizure meds     Severe right shoulder pain unclear etiology  Elevated AST POA raises concern for rhabdomyolysis  Severe pain in the right shoulder feels mildly swollen              Cannot lift on HER OWN   x-rays right shoulder negative for acute findings   US shoulder showed lipoma   CPK elevated , started on IVF but pt elected to leave AMA despite risks of  leaving AMA explained to patient including worsening creatinine , rhabdomyolysis   Ortho input noted , outpatient follow up   Pain control     Chronic drug use on methadone  Continue methadone    Discharge Exam:  Patient seen and examined by me on discharge day.  Pertinent Findings:  No data found.    Gen:    Not in distress  Chest: Clear lungs  CVS:   Regular rhythm.  No edema  Abd:  Soft, not distended, not tender  Neuro: awake, moving all exts    Discharge/Recent Laboratory Results:  Recent Labs     07/15/25  0426   *   K 3.7   CL 98   CO2 34*   BUN 8   CREATININE 0.79   GLUCOSE 94   CALCIUM 8.9     Recent Labs     07/15/25  0426   HGB 11.1*   HCT 35.0   WBC 3.6          Discharge Medications:     Medication List        START taking these medications      polyethylene glycol 17 g packet  Commonly known as: GLYCOLAX  Take 1 packet by mouth daily            CHANGE how you take these medications      albuterol sulfate  (90 Base) MCG/ACT inhaler  Commonly known as: PROVENTIL;VENTOLIN;PROAIR  Inhale 2 puffs into the lungs every 4 hours as needed for Shortness of Breath or Wheezing  What changed: reasons to take this            CONTINUE taking these medications      apixaban 5 MG Tabs tablet  Commonly known as: Eliquis  Take 1 tablet by mouth 2 times daily     cloNIDine 0.1 MG tablet  Commonly known as: CATAPRES  Take 1 tablet by mouth in the morning and at bedtime     levETIRAcetam 500 MG tablet  Commonly known as: KEPPRA  Take 1 tablet by mouth 2 times daily     methadone 10 MG/ML solution  Commonly known as: DOLOPHINE     OXcarbazepine 150 MG tablet  Commonly known as: TRILEPTAL  Take 1 tablet by mouth 2 times daily     QUEtiapine 400 MG tablet  Commonly known as: SEROQUEL  Take 1 tablet by mouth nightly for 1 day     sacubitril-valsartan 24-26 MG per tablet  Commonly known as: ENTRESTO  Take 1 tablet by mouth 2 times daily     sertraline 100 MG tablet  Commonly known as: ZOLOFT  Take 1.5 tablets

## 2025-07-29 ENCOUNTER — APPOINTMENT (OUTPATIENT)
Facility: HOSPITAL | Age: 50
End: 2025-07-29
Payer: MEDICAID

## 2025-07-29 ENCOUNTER — HOSPITAL ENCOUNTER (INPATIENT)
Facility: HOSPITAL | Age: 50
LOS: 10 days | Discharge: NURSING, REHAB OR LONG-TERM CARE | End: 2025-08-08
Attending: EMERGENCY MEDICINE | Admitting: STUDENT IN AN ORGANIZED HEALTH CARE EDUCATION/TRAINING PROGRAM
Payer: MEDICAID

## 2025-07-29 DIAGNOSIS — J96.12 ACUTE HYPOXIC ON CHRONIC HYPERCAPNIC RESPIRATORY FAILURE (HCC): Primary | ICD-10-CM

## 2025-07-29 DIAGNOSIS — J96.01 ACUTE HYPOXIC ON CHRONIC HYPERCAPNIC RESPIRATORY FAILURE (HCC): Primary | ICD-10-CM

## 2025-07-29 DIAGNOSIS — J44.1 COPD EXACERBATION (HCC): ICD-10-CM

## 2025-07-29 PROBLEM — R65.10 SIRS (SYSTEMIC INFLAMMATORY RESPONSE SYNDROME) (HCC): Status: ACTIVE | Noted: 2025-07-29

## 2025-07-29 PROBLEM — E86.0 DEHYDRATION DETERMINED BY EXAMINATION: Status: ACTIVE | Noted: 2025-07-29

## 2025-07-29 PROBLEM — R53.81 PHYSICAL DECONDITIONING: Status: ACTIVE | Noted: 2025-07-29

## 2025-07-29 PROBLEM — I10 UNCONTROLLED HYPERTENSION: Status: ACTIVE | Noted: 2025-07-29

## 2025-07-29 PROBLEM — R62.7 FTT (FAILURE TO THRIVE) IN ADULT: Status: ACTIVE | Noted: 2025-07-29

## 2025-07-29 PROBLEM — J96.21 ACUTE ON CHRONIC RESPIRATORY FAILURE WITH HYPOXIA (HCC): Status: ACTIVE | Noted: 2025-07-29

## 2025-07-29 PROBLEM — G40.909 SEIZURE DISORDER (HCC): Status: RESOLVED | Noted: 2020-10-08 | Resolved: 2025-07-29

## 2025-07-29 PROBLEM — F60.3 BORDERLINE PERSONALITY DISORDER (HCC): Status: RESOLVED | Noted: 2020-10-16 | Resolved: 2025-07-29

## 2025-07-29 PROBLEM — J96.22 ACUTE ON CHRONIC RESPIRATORY FAILURE WITH HYPOXIA AND HYPERCAPNIA (HCC): Status: ACTIVE | Noted: 2025-07-29

## 2025-07-29 PROBLEM — R06.1 STRIDOR: Status: RESOLVED | Noted: 2024-02-08 | Resolved: 2025-07-29

## 2025-07-29 PROBLEM — F19.90 POLYSUBSTANCE USE DISORDER: Status: ACTIVE | Noted: 2025-07-29

## 2025-07-29 PROBLEM — F43.12 CHRONIC POST-TRAUMATIC STRESS DISORDER (PTSD): Status: RESOLVED | Noted: 2020-10-16 | Resolved: 2025-07-29

## 2025-07-29 LAB
ALBUMIN SERPL-MCNC: 4.2 G/DL (ref 3.5–5.2)
ALBUMIN/GLOB SERPL: 1.1 (ref 1.1–2.2)
ALP SERPL-CCNC: 115 U/L (ref 35–104)
ALT SERPL-CCNC: 32 U/L (ref 10–35)
ANION GAP BLD CALC-SCNC: 14 (ref 10–20)
ANION GAP SERPL CALC-SCNC: 14 MMOL/L (ref 2–14)
AST SERPL-CCNC: 51 U/L (ref 10–35)
BASE EXCESS BLD CALC-SCNC: 6.7 MMOL/L
BASOPHILS # BLD: 0.01 K/UL (ref 0–0.1)
BASOPHILS NFR BLD: 0.3 % (ref 0–1)
BILIRUB SERPL-MCNC: 0.3 MG/DL (ref 0–1.2)
BUN SERPL-MCNC: 7 MG/DL (ref 6–20)
BUN/CREAT SERPL: 9 (ref 12–20)
CA-I BLD-MCNC: 1.25 MMOL/L (ref 1.15–1.33)
CALCIUM SERPL-MCNC: 9.8 MG/DL (ref 8.6–10)
CHLORIDE BLD-SCNC: 100 MMOL/L (ref 100–111)
CHLORIDE SERPL-SCNC: 99 MMOL/L (ref 98–107)
CO2 BLD-SCNC: 35 MMOL/L (ref 22–29)
CO2 SERPL-SCNC: 28 MMOL/L (ref 20–29)
CREAT SERPL-MCNC: 0.81 MG/DL (ref 0.6–1)
CREAT UR-MCNC: 0.8 MG/DL (ref 0.6–1.3)
CRP SERPL-MCNC: 1.1 MG/DL (ref 0–0.5)
DIFFERENTIAL METHOD BLD: NORMAL
EKG ATRIAL RATE: 85 BPM
EKG DIAGNOSIS: NORMAL
EKG P AXIS: 74 DEGREES
EKG P-R INTERVAL: 132 MS
EKG Q-T INTERVAL: 380 MS
EKG QRS DURATION: 76 MS
EKG QTC CALCULATION (BAZETT): 452 MS
EKG R AXIS: 67 DEGREES
EKG T AXIS: 53 DEGREES
EKG VENTRICULAR RATE: 85 BPM
EOSINOPHIL # BLD: 0.13 K/UL (ref 0–0.4)
EOSINOPHIL NFR BLD: 3.5 % (ref 0–7)
ERYTHROCYTE [DISTWIDTH] IN BLOOD BY AUTOMATED COUNT: 11.5 % (ref 11.5–14.5)
GLOBULIN SER CALC-MCNC: 3.8 G/DL (ref 2–4)
GLUCOSE BLD STRIP.AUTO-MCNC: 87 MG/DL (ref 74–99)
GLUCOSE SERPL-MCNC: 91 MG/DL (ref 65–100)
HCO3 BLDA-SCNC: 36 MMOL/L
HCT VFR BLD AUTO: 39.8 % (ref 35–47)
HGB BLD-MCNC: 12.9 G/DL (ref 11.5–16)
IMM GRANULOCYTES # BLD AUTO: 0 K/UL (ref 0–0.04)
IMM GRANULOCYTES NFR BLD AUTO: 0 % (ref 0–0.5)
LACTATE BLD-SCNC: 1.09 MMOL/L (ref 0.4–2)
LYMPHOCYTES # BLD: 1.13 K/UL (ref 0.8–3.5)
LYMPHOCYTES NFR BLD: 30.7 % (ref 12–49)
MAGNESIUM SERPL-MCNC: 1.9 MG/DL (ref 1.6–2.6)
MCH RBC QN AUTO: 31.7 PG (ref 26–34)
MCHC RBC AUTO-ENTMCNC: 32.4 G/DL (ref 30–36.5)
MCV RBC AUTO: 97.8 FL (ref 80–99)
MONOCYTES # BLD: 0.34 K/UL (ref 0–1)
MONOCYTES NFR BLD: 9.2 % (ref 5–13)
NEUTS SEG # BLD: 2.07 K/UL (ref 1.8–8)
NEUTS SEG NFR BLD: 56.3 % (ref 32–75)
NRBC # BLD: 0 K/UL (ref 0–0.01)
NRBC BLD-RTO: 0 PER 100 WBC
NT PRO BNP: 488 PG/ML (ref 0–125)
PCO2 BLDV: 69.2 MMHG (ref 41–51)
PH BLDV: 7.32 (ref 7.32–7.42)
PLATELET # BLD AUTO: 282 K/UL (ref 150–400)
PMV BLD AUTO: 9.7 FL (ref 8.9–12.9)
PO2 BLDV: <27 MMHG (ref 25–40)
POTASSIUM BLD-SCNC: 4.2 MMOL/L (ref 3.5–5.5)
POTASSIUM SERPL-SCNC: 4.5 MMOL/L (ref 3.5–5.1)
PROT SERPL-MCNC: 8 G/DL (ref 6.4–8.3)
RBC # BLD AUTO: 4.07 M/UL (ref 3.8–5.2)
SODIUM BLD-SCNC: 149 MMOL/L (ref 136–145)
SODIUM SERPL-SCNC: 141 MMOL/L (ref 136–145)
SPECIMEN SITE: ABNORMAL
TROPONIN T SERPL HS-MCNC: 14.6 NG/L (ref 0–14)
WBC # BLD AUTO: 3.7 K/UL (ref 3.6–11)

## 2025-07-29 PROCEDURE — 6370000000 HC RX 637 (ALT 250 FOR IP)

## 2025-07-29 PROCEDURE — 83880 ASSAY OF NATRIURETIC PEPTIDE: CPT

## 2025-07-29 PROCEDURE — 82330 ASSAY OF CALCIUM: CPT

## 2025-07-29 PROCEDURE — 83735 ASSAY OF MAGNESIUM: CPT

## 2025-07-29 PROCEDURE — 5A09557 ASSISTANCE WITH RESPIRATORY VENTILATION, GREATER THAN 96 CONSECUTIVE HOURS, CONTINUOUS POSITIVE AIRWAY PRESSURE: ICD-10-PCS | Performed by: STUDENT IN AN ORGANIZED HEALTH CARE EDUCATION/TRAINING PROGRAM

## 2025-07-29 PROCEDURE — 80053 COMPREHEN METABOLIC PANEL: CPT

## 2025-07-29 PROCEDURE — 99285 EMERGENCY DEPT VISIT HI MDM: CPT

## 2025-07-29 PROCEDURE — 86140 C-REACTIVE PROTEIN: CPT

## 2025-07-29 PROCEDURE — 84484 ASSAY OF TROPONIN QUANT: CPT

## 2025-07-29 PROCEDURE — 93005 ELECTROCARDIOGRAM TRACING: CPT | Performed by: EMERGENCY MEDICINE

## 2025-07-29 PROCEDURE — 84132 ASSAY OF SERUM POTASSIUM: CPT

## 2025-07-29 PROCEDURE — 6370000000 HC RX 637 (ALT 250 FOR IP): Performed by: STUDENT IN AN ORGANIZED HEALTH CARE EDUCATION/TRAINING PROGRAM

## 2025-07-29 PROCEDURE — 36415 COLL VENOUS BLD VENIPUNCTURE: CPT

## 2025-07-29 PROCEDURE — 96365 THER/PROPH/DIAG IV INF INIT: CPT

## 2025-07-29 PROCEDURE — 85025 COMPLETE CBC W/AUTO DIFF WBC: CPT

## 2025-07-29 PROCEDURE — 84295 ASSAY OF SERUM SODIUM: CPT

## 2025-07-29 PROCEDURE — 2060000000 HC ICU INTERMEDIATE R&B

## 2025-07-29 PROCEDURE — 82803 BLOOD GASES ANY COMBINATION: CPT

## 2025-07-29 PROCEDURE — 96366 THER/PROPH/DIAG IV INF ADDON: CPT

## 2025-07-29 PROCEDURE — 71045 X-RAY EXAM CHEST 1 VIEW: CPT

## 2025-07-29 PROCEDURE — 2700000000 HC OXYGEN THERAPY PER DAY

## 2025-07-29 PROCEDURE — 6360000002 HC RX W HCPCS: Performed by: EMERGENCY MEDICINE

## 2025-07-29 PROCEDURE — 82947 ASSAY GLUCOSE BLOOD QUANT: CPT

## 2025-07-29 PROCEDURE — 6360000002 HC RX W HCPCS

## 2025-07-29 PROCEDURE — 94640 AIRWAY INHALATION TREATMENT: CPT

## 2025-07-29 PROCEDURE — 96375 TX/PRO/DX INJ NEW DRUG ADDON: CPT

## 2025-07-29 PROCEDURE — 84145 PROCALCITONIN (PCT): CPT

## 2025-07-29 PROCEDURE — 94660 CPAP INITIATION&MGMT: CPT

## 2025-07-29 RX ORDER — ALBUTEROL SULFATE 0.83 MG/ML
2.5 SOLUTION RESPIRATORY (INHALATION)
Status: DISCONTINUED | OUTPATIENT
Start: 2025-07-29 | End: 2025-07-30

## 2025-07-29 RX ORDER — PREDNISONE 20 MG/1
40 TABLET ORAL DAILY
Status: DISCONTINUED | OUTPATIENT
Start: 2025-08-01 | End: 2025-07-29

## 2025-07-29 RX ORDER — BUPROPION HYDROCHLORIDE 150 MG/1
300 TABLET ORAL EVERY MORNING
Status: DISCONTINUED | OUTPATIENT
Start: 2025-07-30 | End: 2025-08-08 | Stop reason: HOSPADM

## 2025-07-29 RX ORDER — POLYETHYLENE GLYCOL 3350 17 G/17G
17 POWDER, FOR SOLUTION ORAL DAILY PRN
Status: DISCONTINUED | OUTPATIENT
Start: 2025-07-29 | End: 2025-08-08 | Stop reason: HOSPADM

## 2025-07-29 RX ORDER — GUAIFENESIN 600 MG/1
600 TABLET, EXTENDED RELEASE ORAL 2 TIMES DAILY
Status: COMPLETED | OUTPATIENT
Start: 2025-07-29 | End: 2025-08-03

## 2025-07-29 RX ORDER — GABAPENTIN 300 MG/1
600 CAPSULE ORAL 3 TIMES DAILY
Status: DISCONTINUED | OUTPATIENT
Start: 2025-07-29 | End: 2025-08-08 | Stop reason: HOSPADM

## 2025-07-29 RX ORDER — SACUBITRIL AND VALSARTAN 24; 26 MG/1; MG/1
1 TABLET, FILM COATED ORAL 2 TIMES DAILY
Status: DISCONTINUED | OUTPATIENT
Start: 2025-07-29 | End: 2025-08-08 | Stop reason: HOSPADM

## 2025-07-29 RX ORDER — BUPROPION HYDROCHLORIDE 300 MG/1
300 TABLET ORAL EVERY MORNING
Status: ON HOLD | COMMUNITY
End: 2025-08-08 | Stop reason: HOSPADM

## 2025-07-29 RX ORDER — DEXAMETHASONE SODIUM PHOSPHATE 10 MG/ML
10 INJECTION, SOLUTION INTRAMUSCULAR; INTRAVENOUS ONCE
Status: COMPLETED | OUTPATIENT
Start: 2025-07-29 | End: 2025-07-29

## 2025-07-29 RX ORDER — PRAZOSIN HYDROCHLORIDE 1 MG/1
2 CAPSULE ORAL NIGHTLY
Status: DISCONTINUED | OUTPATIENT
Start: 2025-07-29 | End: 2025-08-08 | Stop reason: HOSPADM

## 2025-07-29 RX ORDER — METHADONE HYDROCHLORIDE 10 MG/ML
120 CONCENTRATE ORAL DAILY
Refills: 0 | Status: DISCONTINUED | OUTPATIENT
Start: 2025-07-30 | End: 2025-08-08 | Stop reason: HOSPADM

## 2025-07-29 RX ORDER — QUETIAPINE FUMARATE 100 MG/1
600 TABLET, FILM COATED ORAL NIGHTLY
Status: DISCONTINUED | OUTPATIENT
Start: 2025-07-29 | End: 2025-08-08 | Stop reason: HOSPADM

## 2025-07-29 RX ORDER — SENNA AND DOCUSATE SODIUM 50; 8.6 MG/1; MG/1
2 TABLET, FILM COATED ORAL NIGHTLY
Status: DISCONTINUED | OUTPATIENT
Start: 2025-07-29 | End: 2025-08-08 | Stop reason: HOSPADM

## 2025-07-29 RX ORDER — ACETAMINOPHEN 325 MG/1
650 TABLET ORAL EVERY 6 HOURS PRN
Status: DISCONTINUED | OUTPATIENT
Start: 2025-07-29 | End: 2025-08-08 | Stop reason: HOSPADM

## 2025-07-29 RX ORDER — ACETAMINOPHEN 325 MG/1
650 TABLET ORAL EVERY 4 HOURS PRN
Status: DISCONTINUED | OUTPATIENT
Start: 2025-07-29 | End: 2025-08-08 | Stop reason: HOSPADM

## 2025-07-29 RX ORDER — ALBUTEROL SULFATE 0.83 MG/ML
2.5 SOLUTION RESPIRATORY (INHALATION) EVERY 6 HOURS PRN
Status: ON HOLD | COMMUNITY
End: 2025-08-08 | Stop reason: HOSPADM

## 2025-07-29 RX ORDER — POTASSIUM CHLORIDE 7.45 MG/ML
10 INJECTION INTRAVENOUS PRN
Status: DISCONTINUED | OUTPATIENT
Start: 2025-07-29 | End: 2025-08-08 | Stop reason: HOSPADM

## 2025-07-29 RX ORDER — BUDESONIDE 0.5 MG/2ML
1 INHALANT ORAL
Status: DISCONTINUED | OUTPATIENT
Start: 2025-07-29 | End: 2025-07-30

## 2025-07-29 RX ORDER — MAGNESIUM SULFATE IN WATER 40 MG/ML
2000 INJECTION, SOLUTION INTRAVENOUS PRN
Status: DISCONTINUED | OUTPATIENT
Start: 2025-07-29 | End: 2025-08-08 | Stop reason: HOSPADM

## 2025-07-29 RX ORDER — ACETAMINOPHEN 650 MG/1
650 SUPPOSITORY RECTAL EVERY 6 HOURS PRN
Status: DISCONTINUED | OUTPATIENT
Start: 2025-07-29 | End: 2025-08-08 | Stop reason: HOSPADM

## 2025-07-29 RX ORDER — HYDRALAZINE HYDROCHLORIDE 20 MG/ML
10 INJECTION INTRAMUSCULAR; INTRAVENOUS EVERY 6 HOURS PRN
Status: ACTIVE | OUTPATIENT
Start: 2025-07-29 | End: 2025-07-31

## 2025-07-29 RX ORDER — CLONIDINE HYDROCHLORIDE 0.1 MG/1
0.1 TABLET ORAL 2 TIMES DAILY
Status: DISCONTINUED | OUTPATIENT
Start: 2025-07-29 | End: 2025-08-08 | Stop reason: HOSPADM

## 2025-07-29 RX ORDER — PROCHLORPERAZINE EDISYLATE 5 MG/ML
5 INJECTION INTRAMUSCULAR; INTRAVENOUS EVERY 6 HOURS PRN
Status: DISCONTINUED | OUTPATIENT
Start: 2025-07-29 | End: 2025-08-08 | Stop reason: HOSPADM

## 2025-07-29 RX ORDER — QUETIAPINE FUMARATE 100 MG/1
250 TABLET, FILM COATED ORAL EVERY MORNING
COMMUNITY

## 2025-07-29 RX ORDER — MAGNESIUM SULFATE IN WATER 40 MG/ML
2000 INJECTION, SOLUTION INTRAVENOUS ONCE
Status: COMPLETED | OUTPATIENT
Start: 2025-07-29 | End: 2025-07-29

## 2025-07-29 RX ORDER — IPRATROPIUM BROMIDE AND ALBUTEROL SULFATE 2.5; .5 MG/3ML; MG/3ML
1 SOLUTION RESPIRATORY (INHALATION)
Status: DISCONTINUED | OUTPATIENT
Start: 2025-07-29 | End: 2025-07-30

## 2025-07-29 RX ORDER — ONDANSETRON 2 MG/ML
4 INJECTION INTRAMUSCULAR; INTRAVENOUS EVERY 6 HOURS PRN
Status: DISCONTINUED | OUTPATIENT
Start: 2025-07-29 | End: 2025-08-08 | Stop reason: HOSPADM

## 2025-07-29 RX ORDER — GABAPENTIN 600 MG/1
600 TABLET ORAL 3 TIMES DAILY
COMMUNITY

## 2025-07-29 RX ORDER — QUETIAPINE FUMARATE 200 MG/1
400-800 TABLET, FILM COATED ORAL
COMMUNITY

## 2025-07-29 RX ORDER — DIAZEPAM 5 MG/1
5 TABLET ORAL EVERY 8 HOURS PRN
Status: DISCONTINUED | OUTPATIENT
Start: 2025-07-29 | End: 2025-07-30

## 2025-07-29 RX ORDER — IPRATROPIUM BROMIDE AND ALBUTEROL SULFATE 2.5; .5 MG/3ML; MG/3ML
3 SOLUTION RESPIRATORY (INHALATION)
Status: DISCONTINUED | OUTPATIENT
Start: 2025-07-29 | End: 2025-07-29

## 2025-07-29 RX ORDER — PRAZOSIN HYDROCHLORIDE 2 MG/1
2 CAPSULE ORAL NIGHTLY
COMMUNITY

## 2025-07-29 RX ORDER — BENZONATATE 100 MG/1
100 CAPSULE ORAL 3 TIMES DAILY PRN
Status: ACTIVE | OUTPATIENT
Start: 2025-07-29 | End: 2025-08-01

## 2025-07-29 RX ORDER — OXCARBAZEPINE 300 MG/1
150 TABLET, FILM COATED ORAL 2 TIMES DAILY
Status: DISCONTINUED | OUTPATIENT
Start: 2025-07-29 | End: 2025-08-08 | Stop reason: HOSPADM

## 2025-07-29 RX ORDER — LEVETIRACETAM 500 MG/1
500 TABLET ORAL 2 TIMES DAILY
Status: DISCONTINUED | OUTPATIENT
Start: 2025-07-29 | End: 2025-08-08 | Stop reason: HOSPADM

## 2025-07-29 RX ORDER — ACETAMINOPHEN 500 MG
1000 TABLET ORAL
Status: COMPLETED | OUTPATIENT
Start: 2025-07-29 | End: 2025-07-29

## 2025-07-29 RX ORDER — PREDNISONE 20 MG/1
40 TABLET ORAL DAILY
Status: DISCONTINUED | OUTPATIENT
Start: 2025-08-01 | End: 2025-07-30

## 2025-07-29 RX ORDER — POTASSIUM CHLORIDE 1500 MG/1
40 TABLET, EXTENDED RELEASE ORAL PRN
Status: DISCONTINUED | OUTPATIENT
Start: 2025-07-29 | End: 2025-08-08 | Stop reason: HOSPADM

## 2025-07-29 RX ORDER — ARFORMOTEROL TARTRATE 15 UG/2ML
15 SOLUTION RESPIRATORY (INHALATION)
Status: DISCONTINUED | OUTPATIENT
Start: 2025-07-29 | End: 2025-07-30

## 2025-07-29 RX ORDER — SODIUM CHLORIDE, SODIUM LACTATE, POTASSIUM CHLORIDE, CALCIUM CHLORIDE 600; 310; 30; 20 MG/100ML; MG/100ML; MG/100ML; MG/100ML
INJECTION, SOLUTION INTRAVENOUS CONTINUOUS
Status: DISCONTINUED | OUTPATIENT
Start: 2025-07-29 | End: 2025-07-30

## 2025-07-29 RX ORDER — ALBUTEROL SULFATE 5 MG/ML
10 SOLUTION RESPIRATORY (INHALATION)
Status: DISPENSED | OUTPATIENT
Start: 2025-07-29 | End: 2025-07-29

## 2025-07-29 RX ADMIN — SENNOSIDES AND DOCUSATE SODIUM 2 TABLET: 8.6; 5 TABLET ORAL at 21:22

## 2025-07-29 RX ADMIN — LEVETIRACETAM 500 MG: 500 TABLET, FILM COATED ORAL at 21:23

## 2025-07-29 RX ADMIN — SACUBITRIL AND VALSARTAN 1 TABLET: 24; 26 TABLET, FILM COATED ORAL at 21:24

## 2025-07-29 RX ADMIN — ONDANSETRON HYDROCHLORIDE 4 MG: 2 INJECTION, SOLUTION INTRAMUSCULAR; INTRAVENOUS at 14:40

## 2025-07-29 RX ADMIN — APIXABAN 5 MG: 5 TABLET, FILM COATED ORAL at 21:24

## 2025-07-29 RX ADMIN — GUAIFENESIN 600 MG: 600 TABLET, EXTENDED RELEASE ORAL at 21:22

## 2025-07-29 RX ADMIN — CLONIDINE HYDROCHLORIDE 0.1 MG: 0.1 TABLET ORAL at 21:23

## 2025-07-29 RX ADMIN — OXCARBAZEPINE 150 MG: 300 TABLET, FILM COATED ORAL at 21:23

## 2025-07-29 RX ADMIN — GABAPENTIN 600 MG: 300 CAPSULE ORAL at 21:22

## 2025-07-29 RX ADMIN — DIAZEPAM 5 MG: 5 TABLET ORAL at 21:28

## 2025-07-29 RX ADMIN — PRAZOSIN HYDROCHLORIDE 2 MG: 1 CAPSULE ORAL at 21:23

## 2025-07-29 RX ADMIN — ALBUTEROL SULFATE 10 MG: 2.5 SOLUTION RESPIRATORY (INHALATION) at 14:19

## 2025-07-29 RX ADMIN — IPRATROPIUM BROMIDE 0.5 MG: 0.5 SOLUTION RESPIRATORY (INHALATION) at 14:19

## 2025-07-29 RX ADMIN — QUETIAPINE FUMARATE 600 MG: 100 TABLET ORAL at 22:20

## 2025-07-29 RX ADMIN — DEXAMETHASONE SODIUM PHOSPHATE 10 MG: 10 INJECTION, SOLUTION INTRAMUSCULAR; INTRAVENOUS at 14:32

## 2025-07-29 RX ADMIN — MAGNESIUM SULFATE HEPTAHYDRATE 2000 MG: 40 INJECTION, SOLUTION INTRAVENOUS at 14:40

## 2025-07-29 RX ADMIN — ACETAMINOPHEN 1000 MG: 500 TABLET ORAL at 16:39

## 2025-07-29 ASSESSMENT — PAIN SCALES - GENERAL
PAINLEVEL_OUTOF10: 0
PAINLEVEL_OUTOF10: 5
PAINLEVEL_OUTOF10: 0
PAINLEVEL_OUTOF10: 10
PAINLEVEL_OUTOF10: 9

## 2025-07-29 ASSESSMENT — PAIN DESCRIPTION - LOCATION
LOCATION: BACK

## 2025-07-29 ASSESSMENT — PAIN DESCRIPTION - DESCRIPTORS: DESCRIPTORS: ACHING

## 2025-07-29 ASSESSMENT — PAIN DESCRIPTION - ORIENTATION: ORIENTATION: LOWER

## 2025-07-30 ENCOUNTER — APPOINTMENT (OUTPATIENT)
Facility: HOSPITAL | Age: 50
End: 2025-07-30
Attending: STUDENT IN AN ORGANIZED HEALTH CARE EDUCATION/TRAINING PROGRAM
Payer: MEDICAID

## 2025-07-30 ENCOUNTER — APPOINTMENT (OUTPATIENT)
Facility: HOSPITAL | Age: 50
End: 2025-07-30
Payer: MEDICAID

## 2025-07-30 LAB
25(OH)D3 SERPL-MCNC: 40.2 NG/ML (ref 30–100)
ALBUMIN SERPL-MCNC: 4 G/DL (ref 3.5–5.2)
ALBUMIN/GLOB SERPL: 0.9 (ref 1.1–2.2)
ALP SERPL-CCNC: 111 U/L (ref 35–104)
ALT SERPL-CCNC: 25 U/L (ref 10–35)
ANION GAP SERPL CALC-SCNC: 16 MMOL/L (ref 2–14)
ARTERIAL PATENCY WRIST A: YES
AST SERPL-CCNC: 37 U/L (ref 10–35)
BASE EXCESS BLDA CALC-SCNC: 4.9 MMOL/L
BASOPHILS # BLD: 0.02 K/UL (ref 0–0.1)
BASOPHILS NFR BLD: 0.5 % (ref 0–1)
BDY SITE: ABNORMAL
BILIRUB SERPL-MCNC: 0.4 MG/DL (ref 0–1.2)
BUN SERPL-MCNC: 8 MG/DL (ref 6–20)
BUN/CREAT SERPL: 11 (ref 12–20)
CALCIUM SERPL-MCNC: 10 MG/DL (ref 8.6–10)
CHLORIDE SERPL-SCNC: 98 MMOL/L (ref 98–107)
CHOLEST SERPL-MCNC: 295 MG/DL (ref 0–200)
CO2 SERPL-SCNC: 22 MMOL/L (ref 20–29)
CREAT SERPL-MCNC: 0.75 MG/DL (ref 0.6–1)
DIFFERENTIAL METHOD BLD: ABNORMAL
ECHO AO ASC DIAM: 2.1 CM
ECHO AO ASCENDING AORTA INDEX: 1.05 CM/M2
ECHO AO ROOT DIAM: 2.5 CM
ECHO AO ROOT INDEX: 1.25 CM/M2
ECHO AV AREA PEAK VELOCITY: 1.6 CM2
ECHO AV AREA VTI: 2.1 CM2
ECHO AV AREA/BSA PEAK VELOCITY: 0.8 CM2/M2
ECHO AV AREA/BSA VTI: 1.1 CM2/M2
ECHO AV CUSP MM: 1.7 CM
ECHO AV MEAN GRADIENT: 5 MMHG
ECHO AV MEAN VELOCITY: 0.9 M/S
ECHO AV PEAK GRADIENT: 11 MMHG
ECHO AV PEAK VELOCITY: 1.7 M/S
ECHO AV VELOCITY RATIO: 0.59
ECHO AV VTI: 28.3 CM
ECHO BSA: 2.06 M2
ECHO LA DIAMETER INDEX: 1.3 CM/M2
ECHO LA DIAMETER: 2.6 CM
ECHO LA TO AORTIC ROOT RATIO: 1.04
ECHO LA VOL A-L A2C: 59 ML (ref 22–52)
ECHO LA VOL A-L A4C: 47 ML (ref 22–52)
ECHO LA VOL MOD A2C: 57 ML (ref 22–52)
ECHO LA VOL MOD A4C: 45 ML (ref 22–52)
ECHO LA VOLUME AREA LENGTH: 53 ML
ECHO LA VOLUME INDEX A-L A2C: 30 ML/M2 (ref 16–34)
ECHO LA VOLUME INDEX A-L A4C: 24 ML/M2 (ref 16–34)
ECHO LA VOLUME INDEX AREA LENGTH: 27 ML/M2 (ref 16–34)
ECHO LA VOLUME INDEX MOD A2C: 29 ML/M2 (ref 16–34)
ECHO LA VOLUME INDEX MOD A4C: 23 ML/M2 (ref 16–34)
ECHO LV E' LATERAL VELOCITY: 10.08 CM/S
ECHO LV E' SEPTAL VELOCITY: 8.53 CM/S
ECHO LV EF PHYSICIAN: 55 %
ECHO LV FRACTIONAL SHORTENING: 22 % (ref 28–44)
ECHO LV INTERNAL DIMENSION DIASTOLE INDEX: 2.3 CM/M2
ECHO LV INTERNAL DIMENSION DIASTOLIC: 4.6 CM (ref 3.9–5.3)
ECHO LV INTERNAL DIMENSION SYSTOLIC INDEX: 1.8 CM/M2
ECHO LV INTERNAL DIMENSION SYSTOLIC: 3.6 CM
ECHO LV IVSD: 0.8 CM (ref 0.6–0.9)
ECHO LV MASS 2D: 137.7 G (ref 67–162)
ECHO LV MASS INDEX 2D: 68.9 G/M2 (ref 43–95)
ECHO LV POSTERIOR WALL DIASTOLIC: 1 CM (ref 0.6–0.9)
ECHO LV RELATIVE WALL THICKNESS RATIO: 0.43
ECHO LVOT AREA: 2.8 CM2
ECHO LVOT AV VTI INDEX: 0.77
ECHO LVOT DIAM: 1.9 CM
ECHO LVOT MEAN GRADIENT: 2 MMHG
ECHO LVOT PEAK GRADIENT: 4 MMHG
ECHO LVOT PEAK VELOCITY: 1 M/S
ECHO LVOT STROKE VOLUME INDEX: 31 ML/M2
ECHO LVOT SV: 62.1 ML
ECHO LVOT VTI: 21.9 CM
ECHO MV A VELOCITY: 0.95 M/S
ECHO MV E DECELERATION TIME (DT): 266 MS
ECHO MV E VELOCITY: 0.79 M/S
ECHO MV E/A RATIO: 0.83
ECHO MV E/E' LATERAL: 7.84
ECHO MV E/E' RATIO (AVERAGED): 8.55
ECHO MV E/E' SEPTAL: 9.26
ECHO MV REGURGITANT PEAK GRADIENT: 19 MMHG
ECHO MV REGURGITANT PEAK VELOCITY: 2.2 M/S
ECHO PV MAX VELOCITY: 1.2 M/S
ECHO PV PEAK GRADIENT: 6 MMHG
ECHO PVEIN A DURATION: 102.8 MS
ECHO PVEIN A VELOCITY: 0.3 M/S
ECHO PVEIN PEAK D VELOCITY: 0.5 M/S
ECHO PVEIN PEAK S VELOCITY: 0.6 M/S
ECHO PVEIN S/D RATIO: 1.2
ECHO RV FREE WALL PEAK S': 16.8 CM/S
ECHO RV INTERNAL DIMENSION: 2.9 CM
ECHO RV TAPSE: 2.2 CM (ref 1.7–?)
ECHO TV REGURGITANT MAX VELOCITY: 2.15 M/S
ECHO TV REGURGITANT PEAK GRADIENT: 19 MMHG
EKG ATRIAL RATE: 101 BPM
EKG DIAGNOSIS: NORMAL
EKG P AXIS: 58 DEGREES
EKG P-R INTERVAL: 132 MS
EKG Q-T INTERVAL: 378 MS
EKG QRS DURATION: 86 MS
EKG QTC CALCULATION (BAZETT): 490 MS
EKG R AXIS: 72 DEGREES
EKG T AXIS: 36 DEGREES
EKG VENTRICULAR RATE: 101 BPM
EOSINOPHIL # BLD: 0.01 K/UL (ref 0–0.4)
EOSINOPHIL NFR BLD: 0.3 % (ref 0–7)
ERYTHROCYTE [DISTWIDTH] IN BLOOD BY AUTOMATED COUNT: 12 % (ref 11.5–14.5)
FOLATE SERPL-MCNC: 27.3 NG/ML (ref 4.8–24.2)
GAS FLOW.O2 O2 DELIVERY SYS: 6 L/MIN
GLOBULIN SER CALC-MCNC: 4.4 G/DL (ref 2–4)
GLUCOSE SERPL-MCNC: 92 MG/DL (ref 65–100)
HCO3 BLDA-SCNC: 33 MMOL/L (ref 22–26)
HCT VFR BLD AUTO: 43.9 % (ref 35–47)
HDLC SERPL-MCNC: 83 MG/DL (ref 40–60)
HDLC SERPL: 3.6
HGB BLD-MCNC: 13.8 G/DL (ref 11.5–16)
IMM GRANULOCYTES # BLD AUTO: 0.01 K/UL (ref 0–0.04)
IMM GRANULOCYTES NFR BLD AUTO: 0.3 % (ref 0–0.5)
LDLC SERPL CALC-MCNC: 203 MG/DL
LYMPHOCYTES # BLD: 0.68 K/UL (ref 0.8–3.5)
LYMPHOCYTES NFR BLD: 18.4 % (ref 12–49)
MAGNESIUM SERPL-MCNC: 2.3 MG/DL (ref 1.6–2.6)
MCH RBC QN AUTO: 32.2 PG (ref 26–34)
MCHC RBC AUTO-ENTMCNC: 31.4 G/DL (ref 30–36.5)
MCV RBC AUTO: 102.6 FL (ref 80–99)
MONOCYTES # BLD: 0.23 K/UL (ref 0–1)
MONOCYTES NFR BLD: 6.1 % (ref 5–13)
NEUTS SEG # BLD: 2.75 K/UL (ref 1.8–8)
NEUTS SEG NFR BLD: 74.4 % (ref 32–75)
NRBC # BLD: 0 K/UL (ref 0–0.01)
NRBC BLD-RTO: 0 PER 100 WBC
PCO2 BLDA: 60 MMHG (ref 35–45)
PH BLDA: 7.35 (ref 7.35–7.45)
PHOSPHATE SERPL-MCNC: 2.7 MG/DL (ref 2.5–4.5)
PLATELET # BLD AUTO: 180 K/UL (ref 150–400)
PMV BLD AUTO: 10.3 FL (ref 8.9–12.9)
PO2 BLDA: 141 MMHG (ref 80–100)
POTASSIUM SERPL-SCNC: 4.5 MMOL/L (ref 3.5–5.1)
PROCALCITONIN SERPL-MCNC: 0.04 NG/ML
PROT SERPL-MCNC: 8.4 G/DL (ref 6.4–8.3)
RBC # BLD AUTO: 4.28 M/UL (ref 3.8–5.2)
RBC MORPH BLD: ABNORMAL
SAO2 % BLD: 99 % (ref 92–97)
SAO2% DEVICE SAO2% SENSOR NAME: ABNORMAL
SODIUM SERPL-SCNC: 135 MMOL/L (ref 136–145)
SPECIMEN SITE: ABNORMAL
TRIGL SERPL-MCNC: 45 MG/DL (ref 0–150)
TROPONIN T SERPL HS-MCNC: 9.1 NG/L (ref 0–14)
VIT B12 SERPL-MCNC: 1174 PG/ML (ref 232–1245)
VLDLC SERPL CALC-MCNC: 9 MG/DL
WBC # BLD AUTO: 3.7 K/UL (ref 3.6–11)

## 2025-07-30 PROCEDURE — 36600 WITHDRAWAL OF ARTERIAL BLOOD: CPT

## 2025-07-30 PROCEDURE — 80061 LIPID PANEL: CPT

## 2025-07-30 PROCEDURE — 6370000000 HC RX 637 (ALT 250 FOR IP): Performed by: INTERNAL MEDICINE

## 2025-07-30 PROCEDURE — 82306 VITAMIN D 25 HYDROXY: CPT

## 2025-07-30 PROCEDURE — 93308 TTE F-UP OR LMTD: CPT

## 2025-07-30 PROCEDURE — 84100 ASSAY OF PHOSPHORUS: CPT

## 2025-07-30 PROCEDURE — 83735 ASSAY OF MAGNESIUM: CPT

## 2025-07-30 PROCEDURE — 6370000000 HC RX 637 (ALT 250 FOR IP): Performed by: STUDENT IN AN ORGANIZED HEALTH CARE EDUCATION/TRAINING PROGRAM

## 2025-07-30 PROCEDURE — 36415 COLL VENOUS BLD VENIPUNCTURE: CPT

## 2025-07-30 PROCEDURE — 2060000000 HC ICU INTERMEDIATE R&B

## 2025-07-30 PROCEDURE — 84484 ASSAY OF TROPONIN QUANT: CPT

## 2025-07-30 PROCEDURE — 94660 CPAP INITIATION&MGMT: CPT

## 2025-07-30 PROCEDURE — 71045 X-RAY EXAM CHEST 1 VIEW: CPT

## 2025-07-30 PROCEDURE — 82164 ANGIOTENSIN I ENZYME TEST: CPT

## 2025-07-30 PROCEDURE — 97116 GAIT TRAINING THERAPY: CPT

## 2025-07-30 PROCEDURE — 82607 VITAMIN B-12: CPT

## 2025-07-30 PROCEDURE — 80053 COMPREHEN METABOLIC PANEL: CPT

## 2025-07-30 PROCEDURE — 94640 AIRWAY INHALATION TREATMENT: CPT

## 2025-07-30 PROCEDURE — 6360000002 HC RX W HCPCS

## 2025-07-30 PROCEDURE — 2580000003 HC RX 258: Performed by: STUDENT IN AN ORGANIZED HEALTH CARE EDUCATION/TRAINING PROGRAM

## 2025-07-30 PROCEDURE — 97166 OT EVAL MOD COMPLEX 45 MIN: CPT | Performed by: OCCUPATIONAL THERAPIST

## 2025-07-30 PROCEDURE — 6360000002 HC RX W HCPCS: Performed by: STUDENT IN AN ORGANIZED HEALTH CARE EDUCATION/TRAINING PROGRAM

## 2025-07-30 PROCEDURE — 82803 BLOOD GASES ANY COMBINATION: CPT

## 2025-07-30 PROCEDURE — 2700000000 HC OXYGEN THERAPY PER DAY

## 2025-07-30 PROCEDURE — 97535 SELF CARE MNGMENT TRAINING: CPT | Performed by: OCCUPATIONAL THERAPIST

## 2025-07-30 PROCEDURE — 82746 ASSAY OF FOLIC ACID SERUM: CPT

## 2025-07-30 PROCEDURE — 97162 PT EVAL MOD COMPLEX 30 MIN: CPT

## 2025-07-30 PROCEDURE — 85025 COMPLETE CBC W/AUTO DIFF WBC: CPT

## 2025-07-30 PROCEDURE — 36410 VNPNXR 3YR/> PHY/QHP DX/THER: CPT

## 2025-07-30 PROCEDURE — 2500000003 HC RX 250 WO HCPCS: Performed by: STUDENT IN AN ORGANIZED HEALTH CARE EDUCATION/TRAINING PROGRAM

## 2025-07-30 PROCEDURE — 2500000003 HC RX 250 WO HCPCS

## 2025-07-30 PROCEDURE — 6360000002 HC RX W HCPCS: Performed by: INTERNAL MEDICINE

## 2025-07-30 PROCEDURE — 84443 ASSAY THYROID STIM HORMONE: CPT

## 2025-07-30 RX ORDER — PREDNISONE 20 MG/1
40 TABLET ORAL DAILY
Status: COMPLETED | OUTPATIENT
Start: 2025-08-01 | End: 2025-08-03

## 2025-07-30 RX ORDER — ARFORMOTEROL TARTRATE 15 UG/2ML
15 SOLUTION RESPIRATORY (INHALATION)
Status: DISCONTINUED | OUTPATIENT
Start: 2025-07-30 | End: 2025-08-06

## 2025-07-30 RX ORDER — LEVALBUTEROL INHALATION SOLUTION 1.25 MG/3ML
1.25 SOLUTION RESPIRATORY (INHALATION)
Status: DISCONTINUED | OUTPATIENT
Start: 2025-07-30 | End: 2025-08-06

## 2025-07-30 RX ORDER — SODIUM CHLORIDE 9 MG/ML
INJECTION, SOLUTION INTRAVENOUS PRN
Status: DISCONTINUED | OUTPATIENT
Start: 2025-07-30 | End: 2025-08-08 | Stop reason: HOSPADM

## 2025-07-30 RX ORDER — IPRATROPIUM BROMIDE AND ALBUTEROL SULFATE 2.5; .5 MG/3ML; MG/3ML
1 SOLUTION RESPIRATORY (INHALATION) EVERY 4 HOURS PRN
Status: DISCONTINUED | OUTPATIENT
Start: 2025-07-30 | End: 2025-07-30

## 2025-07-30 RX ORDER — PREDNISONE 20 MG/1
20 TABLET ORAL DAILY
Status: DISCONTINUED | OUTPATIENT
Start: 2025-08-07 | End: 2025-08-08 | Stop reason: HOSPADM

## 2025-07-30 RX ORDER — LORAZEPAM 0.5 MG/1
0.5 TABLET ORAL EVERY 6 HOURS PRN
Status: DISCONTINUED | OUTPATIENT
Start: 2025-07-30 | End: 2025-08-08 | Stop reason: HOSPADM

## 2025-07-30 RX ORDER — BUDESONIDE 0.5 MG/2ML
1 INHALANT ORAL
Status: DISCONTINUED | OUTPATIENT
Start: 2025-07-30 | End: 2025-08-06

## 2025-07-30 RX ORDER — LEVALBUTEROL INHALATION SOLUTION 1.25 MG/3ML
1.25 SOLUTION RESPIRATORY (INHALATION) EVERY 4 HOURS PRN
Status: DISCONTINUED | OUTPATIENT
Start: 2025-07-30 | End: 2025-08-08 | Stop reason: HOSPADM

## 2025-07-30 RX ORDER — SODIUM CHLORIDE 0.9 % (FLUSH) 0.9 %
5-40 SYRINGE (ML) INJECTION EVERY 12 HOURS SCHEDULED
Status: DISCONTINUED | OUTPATIENT
Start: 2025-07-30 | End: 2025-08-08 | Stop reason: HOSPADM

## 2025-07-30 RX ORDER — PREDNISONE 5 MG/1
10 TABLET ORAL DAILY
Status: DISCONTINUED | OUTPATIENT
Start: 2025-08-10 | End: 2025-08-08 | Stop reason: HOSPADM

## 2025-07-30 RX ORDER — SODIUM CHLORIDE 0.9 % (FLUSH) 0.9 %
5-40 SYRINGE (ML) INJECTION PRN
Status: DISCONTINUED | OUTPATIENT
Start: 2025-07-30 | End: 2025-08-08 | Stop reason: HOSPADM

## 2025-07-30 RX ADMIN — OXCARBAZEPINE 150 MG: 300 TABLET, FILM COATED ORAL at 21:27

## 2025-07-30 RX ADMIN — GABAPENTIN 600 MG: 300 CAPSULE ORAL at 21:24

## 2025-07-30 RX ADMIN — AZITHROMYCIN MONOHYDRATE 500 MG: 500 INJECTION, POWDER, LYOPHILIZED, FOR SOLUTION INTRAVENOUS at 19:02

## 2025-07-30 RX ADMIN — ARFORMOTEROL TARTRATE 15 MCG: 15 SOLUTION RESPIRATORY (INHALATION) at 20:02

## 2025-07-30 RX ADMIN — LEVALBUTEROL HYDROCHLORIDE 1.25 MG: 1.25 SOLUTION RESPIRATORY (INHALATION) at 19:56

## 2025-07-30 RX ADMIN — GUAIFENESIN 600 MG: 600 TABLET, EXTENDED RELEASE ORAL at 08:28

## 2025-07-30 RX ADMIN — SACUBITRIL AND VALSARTAN 1 TABLET: 24; 26 TABLET, FILM COATED ORAL at 21:24

## 2025-07-30 RX ADMIN — SENNOSIDES AND DOCUSATE SODIUM 2 TABLET: 8.6; 5 TABLET ORAL at 21:23

## 2025-07-30 RX ADMIN — GABAPENTIN 600 MG: 300 CAPSULE ORAL at 14:33

## 2025-07-30 RX ADMIN — BUDESONIDE 1000 MCG: 0.5 INHALANT RESPIRATORY (INHALATION) at 20:02

## 2025-07-30 RX ADMIN — LEVETIRACETAM 500 MG: 500 TABLET, FILM COATED ORAL at 08:28

## 2025-07-30 RX ADMIN — QUETIAPINE FUMARATE 250 MG: 100 TABLET ORAL at 08:27

## 2025-07-30 RX ADMIN — PRAZOSIN HYDROCHLORIDE 2 MG: 1 CAPSULE ORAL at 21:24

## 2025-07-30 RX ADMIN — METHYLPREDNISOLONE SODIUM SUCCINATE 40 MG: 40 INJECTION, POWDER, LYOPHILIZED, FOR SOLUTION INTRAMUSCULAR; INTRAVENOUS at 14:33

## 2025-07-30 RX ADMIN — LEVETIRACETAM 500 MG: 500 TABLET, FILM COATED ORAL at 21:29

## 2025-07-30 RX ADMIN — IPRATROPIUM BROMIDE AND ALBUTEROL SULFATE 1 DOSE: .5; 3 SOLUTION RESPIRATORY (INHALATION) at 15:01

## 2025-07-30 RX ADMIN — APIXABAN 5 MG: 5 TABLET, FILM COATED ORAL at 08:28

## 2025-07-30 RX ADMIN — METHYLPREDNISOLONE SODIUM SUCCINATE 40 MG: 40 INJECTION, POWDER, LYOPHILIZED, FOR SOLUTION INTRAMUSCULAR; INTRAVENOUS at 18:00

## 2025-07-30 RX ADMIN — METHADONE HYDROCHLORIDE 120 MG: 10 CONCENTRATE ORAL at 10:06

## 2025-07-30 RX ADMIN — GABAPENTIN 600 MG: 300 CAPSULE ORAL at 08:26

## 2025-07-30 RX ADMIN — SERTRALINE 150 MG: 50 TABLET, FILM COATED ORAL at 08:25

## 2025-07-30 RX ADMIN — WATER 2000 MG: 1 INJECTION INTRAMUSCULAR; INTRAVENOUS; SUBCUTANEOUS at 19:02

## 2025-07-30 RX ADMIN — METHYLPREDNISOLONE SODIUM SUCCINATE 40 MG: 40 INJECTION, POWDER, LYOPHILIZED, FOR SOLUTION INTRAMUSCULAR; INTRAVENOUS at 04:52

## 2025-07-30 RX ADMIN — CLONIDINE HYDROCHLORIDE 0.1 MG: 0.1 TABLET ORAL at 08:27

## 2025-07-30 RX ADMIN — CLONIDINE HYDROCHLORIDE 0.1 MG: 0.1 TABLET ORAL at 21:27

## 2025-07-30 RX ADMIN — APIXABAN 5 MG: 5 TABLET, FILM COATED ORAL at 21:25

## 2025-07-30 RX ADMIN — QUETIAPINE FUMARATE 600 MG: 100 TABLET ORAL at 21:25

## 2025-07-30 RX ADMIN — GUAIFENESIN 600 MG: 600 TABLET, EXTENDED RELEASE ORAL at 21:24

## 2025-07-30 RX ADMIN — SACUBITRIL AND VALSARTAN 1 TABLET: 24; 26 TABLET, FILM COATED ORAL at 08:25

## 2025-07-30 RX ADMIN — BUPROPION HYDROCHLORIDE 300 MG: 150 TABLET, EXTENDED RELEASE ORAL at 08:25

## 2025-07-30 RX ADMIN — LORAZEPAM 0.5 MG: 0.5 TABLET ORAL at 14:33

## 2025-07-30 RX ADMIN — OXCARBAZEPINE 150 MG: 300 TABLET, FILM COATED ORAL at 08:26

## 2025-07-30 RX ADMIN — LORAZEPAM 0.5 MG: 0.5 TABLET ORAL at 21:27

## 2025-07-30 RX ADMIN — ALBUTEROL SULFATE 2.5 MG: 2.5 SOLUTION RESPIRATORY (INHALATION) at 04:50

## 2025-07-30 ASSESSMENT — PAIN DESCRIPTION - LOCATION
LOCATION: BACK
LOCATION: BACK
LOCATION: BACK;CHEST
LOCATION: BACK

## 2025-07-30 ASSESSMENT — PAIN DESCRIPTION - ORIENTATION: ORIENTATION: MID

## 2025-07-30 ASSESSMENT — PAIN SCALES - GENERAL
PAINLEVEL_OUTOF10: 9
PAINLEVEL_OUTOF10: 0
PAINLEVEL_OUTOF10: 9
PAINLEVEL_OUTOF10: 7
PAINLEVEL_OUTOF10: 7
PAINLEVEL_OUTOF10: 9
PAINLEVEL_OUTOF10: 7
PAINLEVEL_OUTOF10: 9

## 2025-07-30 ASSESSMENT — PAIN DESCRIPTION - DESCRIPTORS: DESCRIPTORS: ACHING

## 2025-07-30 ASSESSMENT — PAIN DESCRIPTION - PAIN TYPE: TYPE: CHRONIC PAIN

## 2025-07-31 LAB
ALBUMIN SERPL-MCNC: 3.8 G/DL (ref 3.5–5.2)
ALBUMIN/GLOB SERPL: 0.9 (ref 1.1–2.2)
ALP SERPL-CCNC: 108 U/L (ref 35–104)
ALT SERPL-CCNC: 23 U/L (ref 10–35)
ANION GAP SERPL CALC-SCNC: 13 MMOL/L (ref 2–14)
ARTERIAL PATENCY WRIST A: YES
AST SERPL-CCNC: 24 U/L (ref 10–35)
BASE EXCESS BLDA CALC-SCNC: 3.9 MMOL/L
BASOPHILS # BLD: 0.01 K/UL (ref 0–0.1)
BASOPHILS NFR BLD: 0.1 % (ref 0–1)
BDY SITE: ABNORMAL
BILIRUB SERPL-MCNC: 0.3 MG/DL (ref 0–1.2)
BUN SERPL-MCNC: 16 MG/DL (ref 6–20)
BUN/CREAT SERPL: 19 (ref 12–20)
CALCIUM SERPL-MCNC: 10.1 MG/DL (ref 8.6–10)
CHLORIDE SERPL-SCNC: 98 MMOL/L (ref 98–107)
CO2 SERPL-SCNC: 27 MMOL/L (ref 20–29)
CREAT SERPL-MCNC: 0.81 MG/DL (ref 0.6–1)
DIFFERENTIAL METHOD BLD: ABNORMAL
EOSINOPHIL # BLD: 0 K/UL (ref 0–0.4)
EOSINOPHIL NFR BLD: 0 % (ref 0–7)
ERYTHROCYTE [DISTWIDTH] IN BLOOD BY AUTOMATED COUNT: 11.9 % (ref 11.5–14.5)
GAS FLOW.O2 O2 DELIVERY SYS: 4 L/MIN
GLOBULIN SER CALC-MCNC: 4.2 G/DL (ref 2–4)
GLUCOSE SERPL-MCNC: 130 MG/DL (ref 65–100)
HCO3 BLDA-SCNC: 30 MMOL/L (ref 22–26)
HCT VFR BLD AUTO: 41.8 % (ref 35–47)
HGB BLD-MCNC: 13.4 G/DL (ref 11.5–16)
IMM GRANULOCYTES # BLD AUTO: 0.05 K/UL (ref 0–0.04)
IMM GRANULOCYTES NFR BLD AUTO: 0.6 % (ref 0–0.5)
LYMPHOCYTES # BLD: 0.73 K/UL (ref 0.8–3.5)
LYMPHOCYTES NFR BLD: 8.8 % (ref 12–49)
MAGNESIUM SERPL-MCNC: 2 MG/DL (ref 1.6–2.6)
MCH RBC QN AUTO: 31.1 PG (ref 26–34)
MCHC RBC AUTO-ENTMCNC: 32.1 G/DL (ref 30–36.5)
MCV RBC AUTO: 97 FL (ref 80–99)
MONOCYTES # BLD: 0.2 K/UL (ref 0–1)
MONOCYTES NFR BLD: 2.4 % (ref 5–13)
NEUTS SEG # BLD: 7.31 K/UL (ref 1.8–8)
NEUTS SEG NFR BLD: 88.1 % (ref 32–75)
NRBC # BLD: 0 K/UL (ref 0–0.01)
NRBC BLD-RTO: 0 PER 100 WBC
NT PRO BNP: 47 PG/ML (ref 0–125)
PCO2 BLDA: 51 MMHG (ref 35–45)
PH BLDA: 7.39 (ref 7.35–7.45)
PHOSPHATE SERPL-MCNC: 2.1 MG/DL (ref 2.5–4.5)
PLATELET # BLD AUTO: 306 K/UL (ref 150–400)
PMV BLD AUTO: 9.9 FL (ref 8.9–12.9)
PO2 BLDA: 81 MMHG (ref 80–100)
POTASSIUM SERPL-SCNC: 4.3 MMOL/L (ref 3.5–5.1)
PROCALCITONIN SERPL-MCNC: 0.04 NG/ML
PROT SERPL-MCNC: 8.1 G/DL (ref 6.4–8.3)
RBC # BLD AUTO: 4.31 M/UL (ref 3.8–5.2)
RBC MORPH BLD: ABNORMAL
SAO2 % BLD: 96 % (ref 92–97)
SAO2% DEVICE SAO2% SENSOR NAME: ABNORMAL
SODIUM SERPL-SCNC: 138 MMOL/L (ref 136–145)
SPECIMEN SITE: ABNORMAL
WBC # BLD AUTO: 8.3 K/UL (ref 3.6–11)

## 2025-07-31 PROCEDURE — 2500000003 HC RX 250 WO HCPCS: Performed by: STUDENT IN AN ORGANIZED HEALTH CARE EDUCATION/TRAINING PROGRAM

## 2025-07-31 PROCEDURE — 2700000000 HC OXYGEN THERAPY PER DAY

## 2025-07-31 PROCEDURE — 6370000000 HC RX 637 (ALT 250 FOR IP): Performed by: INTERNAL MEDICINE

## 2025-07-31 PROCEDURE — 2580000003 HC RX 258: Performed by: STUDENT IN AN ORGANIZED HEALTH CARE EDUCATION/TRAINING PROGRAM

## 2025-07-31 PROCEDURE — 94760 N-INVAS EAR/PLS OXIMETRY 1: CPT

## 2025-07-31 PROCEDURE — 2500000003 HC RX 250 WO HCPCS

## 2025-07-31 PROCEDURE — 85025 COMPLETE CBC W/AUTO DIFF WBC: CPT

## 2025-07-31 PROCEDURE — 84100 ASSAY OF PHOSPHORUS: CPT

## 2025-07-31 PROCEDURE — 83735 ASSAY OF MAGNESIUM: CPT

## 2025-07-31 PROCEDURE — 6360000002 HC RX W HCPCS: Performed by: STUDENT IN AN ORGANIZED HEALTH CARE EDUCATION/TRAINING PROGRAM

## 2025-07-31 PROCEDURE — 82803 BLOOD GASES ANY COMBINATION: CPT

## 2025-07-31 PROCEDURE — 36600 WITHDRAWAL OF ARTERIAL BLOOD: CPT

## 2025-07-31 PROCEDURE — 97116 GAIT TRAINING THERAPY: CPT

## 2025-07-31 PROCEDURE — 80053 COMPREHEN METABOLIC PANEL: CPT

## 2025-07-31 PROCEDURE — 84145 PROCALCITONIN (PCT): CPT

## 2025-07-31 PROCEDURE — 97530 THERAPEUTIC ACTIVITIES: CPT

## 2025-07-31 PROCEDURE — 2060000000 HC ICU INTERMEDIATE R&B

## 2025-07-31 PROCEDURE — 94640 AIRWAY INHALATION TREATMENT: CPT

## 2025-07-31 PROCEDURE — 6370000000 HC RX 637 (ALT 250 FOR IP): Performed by: STUDENT IN AN ORGANIZED HEALTH CARE EDUCATION/TRAINING PROGRAM

## 2025-07-31 PROCEDURE — 6360000002 HC RX W HCPCS: Performed by: INTERNAL MEDICINE

## 2025-07-31 PROCEDURE — 6360000002 HC RX W HCPCS

## 2025-07-31 PROCEDURE — 36415 COLL VENOUS BLD VENIPUNCTURE: CPT

## 2025-07-31 PROCEDURE — 83880 ASSAY OF NATRIURETIC PEPTIDE: CPT

## 2025-07-31 RX ADMIN — LEVALBUTEROL HYDROCHLORIDE 1.25 MG: 1.25 SOLUTION RESPIRATORY (INHALATION) at 19:56

## 2025-07-31 RX ADMIN — SACUBITRIL AND VALSARTAN 1 TABLET: 24; 26 TABLET, FILM COATED ORAL at 08:54

## 2025-07-31 RX ADMIN — SODIUM CHLORIDE, PRESERVATIVE FREE 10 ML: 5 INJECTION INTRAVENOUS at 09:28

## 2025-07-31 RX ADMIN — GABAPENTIN 600 MG: 300 CAPSULE ORAL at 13:14

## 2025-07-31 RX ADMIN — PRAZOSIN HYDROCHLORIDE 2 MG: 1 CAPSULE ORAL at 20:45

## 2025-07-31 RX ADMIN — APIXABAN 5 MG: 5 TABLET, FILM COATED ORAL at 20:45

## 2025-07-31 RX ADMIN — GABAPENTIN 600 MG: 300 CAPSULE ORAL at 08:54

## 2025-07-31 RX ADMIN — OXCARBAZEPINE 150 MG: 300 TABLET, FILM COATED ORAL at 20:45

## 2025-07-31 RX ADMIN — LEVETIRACETAM 500 MG: 500 TABLET, FILM COATED ORAL at 20:46

## 2025-07-31 RX ADMIN — BUDESONIDE 1000 MCG: 0.5 INHALANT RESPIRATORY (INHALATION) at 20:02

## 2025-07-31 RX ADMIN — METHYLPREDNISOLONE SODIUM SUCCINATE 40 MG: 40 INJECTION, POWDER, LYOPHILIZED, FOR SOLUTION INTRAMUSCULAR; INTRAVENOUS at 00:23

## 2025-07-31 RX ADMIN — BUDESONIDE 1000 MCG: 0.5 INHALANT RESPIRATORY (INHALATION) at 10:15

## 2025-07-31 RX ADMIN — SACUBITRIL AND VALSARTAN 1 TABLET: 24; 26 TABLET, FILM COATED ORAL at 20:45

## 2025-07-31 RX ADMIN — SODIUM CHLORIDE, PRESERVATIVE FREE 10 ML: 5 INJECTION INTRAVENOUS at 20:44

## 2025-07-31 RX ADMIN — GUAIFENESIN 600 MG: 600 TABLET, EXTENDED RELEASE ORAL at 20:46

## 2025-07-31 RX ADMIN — MELATONIN 3 MG: at 20:43

## 2025-07-31 RX ADMIN — GUAIFENESIN 600 MG: 600 TABLET, EXTENDED RELEASE ORAL at 08:54

## 2025-07-31 RX ADMIN — GABAPENTIN 600 MG: 300 CAPSULE ORAL at 20:46

## 2025-07-31 RX ADMIN — WATER 2000 MG: 1 INJECTION INTRAMUSCULAR; INTRAVENOUS; SUBCUTANEOUS at 17:20

## 2025-07-31 RX ADMIN — QUETIAPINE FUMARATE 250 MG: 100 TABLET ORAL at 08:54

## 2025-07-31 RX ADMIN — QUETIAPINE FUMARATE 600 MG: 100 TABLET ORAL at 20:44

## 2025-07-31 RX ADMIN — LORAZEPAM 0.5 MG: 0.5 TABLET ORAL at 09:28

## 2025-07-31 RX ADMIN — ARFORMOTEROL TARTRATE 15 MCG: 15 SOLUTION RESPIRATORY (INHALATION) at 10:16

## 2025-07-31 RX ADMIN — BUPROPION HYDROCHLORIDE 300 MG: 150 TABLET, EXTENDED RELEASE ORAL at 08:54

## 2025-07-31 RX ADMIN — METHYLPREDNISOLONE SODIUM SUCCINATE 40 MG: 40 INJECTION, POWDER, LYOPHILIZED, FOR SOLUTION INTRAMUSCULAR; INTRAVENOUS at 06:42

## 2025-07-31 RX ADMIN — CLONIDINE HYDROCHLORIDE 0.1 MG: 0.1 TABLET ORAL at 08:54

## 2025-07-31 RX ADMIN — LEVALBUTEROL HYDROCHLORIDE 1.25 MG: 1.25 SOLUTION RESPIRATORY (INHALATION) at 10:10

## 2025-07-31 RX ADMIN — METHADONE HYDROCHLORIDE 120 MG: 10 CONCENTRATE ORAL at 08:52

## 2025-07-31 RX ADMIN — LEVETIRACETAM 500 MG: 500 TABLET, FILM COATED ORAL at 08:54

## 2025-07-31 RX ADMIN — APIXABAN 5 MG: 5 TABLET, FILM COATED ORAL at 08:54

## 2025-07-31 RX ADMIN — LEVALBUTEROL HYDROCHLORIDE 1.25 MG: 1.25 SOLUTION RESPIRATORY (INHALATION) at 15:00

## 2025-07-31 RX ADMIN — AZITHROMYCIN MONOHYDRATE 500 MG: 500 INJECTION, POWDER, LYOPHILIZED, FOR SOLUTION INTRAVENOUS at 17:17

## 2025-07-31 RX ADMIN — OXCARBAZEPINE 150 MG: 300 TABLET, FILM COATED ORAL at 08:53

## 2025-07-31 RX ADMIN — METHYLPREDNISOLONE SODIUM SUCCINATE 40 MG: 40 INJECTION, POWDER, LYOPHILIZED, FOR SOLUTION INTRAMUSCULAR; INTRAVENOUS at 13:14

## 2025-07-31 RX ADMIN — ACETAMINOPHEN 650 MG: 325 TABLET ORAL at 20:42

## 2025-07-31 RX ADMIN — SERTRALINE 150 MG: 50 TABLET, FILM COATED ORAL at 08:54

## 2025-07-31 RX ADMIN — CLONIDINE HYDROCHLORIDE 0.1 MG: 0.1 TABLET ORAL at 20:46

## 2025-07-31 RX ADMIN — SENNOSIDES AND DOCUSATE SODIUM 2 TABLET: 8.6; 5 TABLET ORAL at 20:44

## 2025-07-31 RX ADMIN — ARFORMOTEROL TARTRATE 15 MCG: 15 SOLUTION RESPIRATORY (INHALATION) at 20:02

## 2025-07-31 RX ADMIN — METHYLPREDNISOLONE SODIUM SUCCINATE 40 MG: 40 INJECTION, POWDER, LYOPHILIZED, FOR SOLUTION INTRAMUSCULAR; INTRAVENOUS at 18:04

## 2025-07-31 ASSESSMENT — PAIN DESCRIPTION - PAIN TYPE
TYPE: CHRONIC PAIN
TYPE: CHRONIC PAIN

## 2025-07-31 ASSESSMENT — PAIN DESCRIPTION - LOCATION
LOCATION: BACK

## 2025-07-31 ASSESSMENT — PAIN DESCRIPTION - ORIENTATION: ORIENTATION: MID

## 2025-07-31 ASSESSMENT — PAIN SCALES - GENERAL
PAINLEVEL_OUTOF10: 9
PAINLEVEL_OUTOF10: 3
PAINLEVEL_OUTOF10: 8

## 2025-07-31 ASSESSMENT — PAIN - FUNCTIONAL ASSESSMENT: PAIN_FUNCTIONAL_ASSESSMENT: ACTIVITIES ARE NOT PREVENTED

## 2025-07-31 ASSESSMENT — PAIN DESCRIPTION - DESCRIPTORS: DESCRIPTORS: ACHING

## 2025-08-01 LAB
ACE SERPL-CCNC: 129 U/L (ref 14–82)
ANION GAP SERPL CALC-SCNC: 14 MMOL/L (ref 2–14)
BUN SERPL-MCNC: 17 MG/DL (ref 6–20)
BUN/CREAT SERPL: 21 (ref 12–20)
CALCIUM SERPL-MCNC: 9.6 MG/DL (ref 8.6–10)
CHLORIDE SERPL-SCNC: 98 MMOL/L (ref 98–107)
CO2 SERPL-SCNC: 26 MMOL/L (ref 20–29)
CREAT SERPL-MCNC: 0.79 MG/DL (ref 0.6–1)
ERYTHROCYTE [DISTWIDTH] IN BLOOD BY AUTOMATED COUNT: 11.9 % (ref 11.5–14.5)
GLUCOSE SERPL-MCNC: 113 MG/DL (ref 65–100)
HCT VFR BLD AUTO: 39.5 % (ref 35–47)
HGB BLD-MCNC: 12.7 G/DL (ref 11.5–16)
MCH RBC QN AUTO: 31.8 PG (ref 26–34)
MCHC RBC AUTO-ENTMCNC: 32.2 G/DL (ref 30–36.5)
MCV RBC AUTO: 98.8 FL (ref 80–99)
NRBC # BLD: 0 K/UL (ref 0–0.01)
NRBC BLD-RTO: 0 PER 100 WBC
PLATELET # BLD AUTO: 281 K/UL (ref 150–400)
PMV BLD AUTO: 9.6 FL (ref 8.9–12.9)
POTASSIUM SERPL-SCNC: 3.5 MMOL/L (ref 3.5–5.1)
RBC # BLD AUTO: 4 M/UL (ref 3.8–5.2)
SODIUM SERPL-SCNC: 138 MMOL/L (ref 136–145)
TSH SERPL DL<=0.05 MIU/L-ACNC: 0.5 UIU/ML (ref 0.45–4.5)
WBC # BLD AUTO: 10.3 K/UL (ref 3.6–11)

## 2025-08-01 PROCEDURE — 94760 N-INVAS EAR/PLS OXIMETRY 1: CPT

## 2025-08-01 PROCEDURE — 2500000003 HC RX 250 WO HCPCS: Performed by: STUDENT IN AN ORGANIZED HEALTH CARE EDUCATION/TRAINING PROGRAM

## 2025-08-01 PROCEDURE — 6370000000 HC RX 637 (ALT 250 FOR IP): Performed by: INTERNAL MEDICINE

## 2025-08-01 PROCEDURE — 85027 COMPLETE CBC AUTOMATED: CPT

## 2025-08-01 PROCEDURE — 36415 COLL VENOUS BLD VENIPUNCTURE: CPT

## 2025-08-01 PROCEDURE — 2700000000 HC OXYGEN THERAPY PER DAY

## 2025-08-01 PROCEDURE — 6370000000 HC RX 637 (ALT 250 FOR IP): Performed by: STUDENT IN AN ORGANIZED HEALTH CARE EDUCATION/TRAINING PROGRAM

## 2025-08-01 PROCEDURE — 6360000002 HC RX W HCPCS

## 2025-08-01 PROCEDURE — 80048 BASIC METABOLIC PNL TOTAL CA: CPT

## 2025-08-01 PROCEDURE — 2060000000 HC ICU INTERMEDIATE R&B

## 2025-08-01 PROCEDURE — 6370000000 HC RX 637 (ALT 250 FOR IP)

## 2025-08-01 PROCEDURE — 6360000002 HC RX W HCPCS: Performed by: INTERNAL MEDICINE

## 2025-08-01 PROCEDURE — 2500000003 HC RX 250 WO HCPCS

## 2025-08-01 PROCEDURE — 94640 AIRWAY INHALATION TREATMENT: CPT

## 2025-08-01 PROCEDURE — 6360000002 HC RX W HCPCS: Performed by: STUDENT IN AN ORGANIZED HEALTH CARE EDUCATION/TRAINING PROGRAM

## 2025-08-01 PROCEDURE — 94660 CPAP INITIATION&MGMT: CPT

## 2025-08-01 RX ADMIN — LORAZEPAM 0.5 MG: 0.5 TABLET ORAL at 04:36

## 2025-08-01 RX ADMIN — SODIUM CHLORIDE, PRESERVATIVE FREE 10 ML: 5 INJECTION INTRAVENOUS at 09:53

## 2025-08-01 RX ADMIN — WATER 2000 MG: 1 INJECTION INTRAMUSCULAR; INTRAVENOUS; SUBCUTANEOUS at 17:33

## 2025-08-01 RX ADMIN — GUAIFENESIN 600 MG: 600 TABLET, EXTENDED RELEASE ORAL at 20:05

## 2025-08-01 RX ADMIN — SERTRALINE 150 MG: 50 TABLET, FILM COATED ORAL at 09:49

## 2025-08-01 RX ADMIN — SACUBITRIL AND VALSARTAN 1 TABLET: 24; 26 TABLET, FILM COATED ORAL at 09:50

## 2025-08-01 RX ADMIN — ONDANSETRON HYDROCHLORIDE 4 MG: 2 INJECTION, SOLUTION INTRAMUSCULAR; INTRAVENOUS at 17:21

## 2025-08-01 RX ADMIN — LEVALBUTEROL HYDROCHLORIDE 1.25 MG: 1.25 SOLUTION RESPIRATORY (INHALATION) at 07:45

## 2025-08-01 RX ADMIN — GUAIFENESIN 600 MG: 600 TABLET, EXTENDED RELEASE ORAL at 09:50

## 2025-08-01 RX ADMIN — WATER 2000 MG: 1 INJECTION INTRAMUSCULAR; INTRAVENOUS; SUBCUTANEOUS at 17:17

## 2025-08-01 RX ADMIN — SACUBITRIL AND VALSARTAN 1 TABLET: 24; 26 TABLET, FILM COATED ORAL at 20:06

## 2025-08-01 RX ADMIN — QUETIAPINE FUMARATE 250 MG: 100 TABLET ORAL at 09:48

## 2025-08-01 RX ADMIN — LEVALBUTEROL HYDROCHLORIDE 1.25 MG: 1.25 SOLUTION RESPIRATORY (INHALATION) at 13:51

## 2025-08-01 RX ADMIN — LEVETIRACETAM 500 MG: 500 TABLET, FILM COATED ORAL at 09:49

## 2025-08-01 RX ADMIN — QUETIAPINE FUMARATE 600 MG: 100 TABLET ORAL at 22:23

## 2025-08-01 RX ADMIN — LEVETIRACETAM 500 MG: 500 TABLET, FILM COATED ORAL at 20:06

## 2025-08-01 RX ADMIN — METHADONE HYDROCHLORIDE 120 MG: 10 CONCENTRATE ORAL at 09:51

## 2025-08-01 RX ADMIN — PRAZOSIN HYDROCHLORIDE 2 MG: 1 CAPSULE ORAL at 20:05

## 2025-08-01 RX ADMIN — GABAPENTIN 600 MG: 300 CAPSULE ORAL at 20:06

## 2025-08-01 RX ADMIN — SODIUM CHLORIDE, PRESERVATIVE FREE 10 ML: 5 INJECTION INTRAVENOUS at 20:04

## 2025-08-01 RX ADMIN — CLONIDINE HYDROCHLORIDE 0.1 MG: 0.1 TABLET ORAL at 09:49

## 2025-08-01 RX ADMIN — APIXABAN 5 MG: 5 TABLET, FILM COATED ORAL at 20:06

## 2025-08-01 RX ADMIN — CLONIDINE HYDROCHLORIDE 0.1 MG: 0.1 TABLET ORAL at 20:04

## 2025-08-01 RX ADMIN — LORAZEPAM 0.5 MG: 0.5 TABLET ORAL at 11:22

## 2025-08-01 RX ADMIN — GABAPENTIN 600 MG: 300 CAPSULE ORAL at 13:40

## 2025-08-01 RX ADMIN — BUDESONIDE 1000 MCG: 0.5 INHALANT RESPIRATORY (INHALATION) at 07:45

## 2025-08-01 RX ADMIN — BUPROPION HYDROCHLORIDE 300 MG: 150 TABLET, EXTENDED RELEASE ORAL at 09:51

## 2025-08-01 RX ADMIN — OXCARBAZEPINE 150 MG: 300 TABLET, FILM COATED ORAL at 09:50

## 2025-08-01 RX ADMIN — GABAPENTIN 600 MG: 300 CAPSULE ORAL at 09:49

## 2025-08-01 RX ADMIN — SENNOSIDES AND DOCUSATE SODIUM 2 TABLET: 8.6; 5 TABLET ORAL at 20:04

## 2025-08-01 RX ADMIN — APIXABAN 5 MG: 5 TABLET, FILM COATED ORAL at 09:48

## 2025-08-01 RX ADMIN — PREDNISONE 40 MG: 20 TABLET ORAL at 09:50

## 2025-08-01 RX ADMIN — OXCARBAZEPINE 150 MG: 300 TABLET, FILM COATED ORAL at 20:04

## 2025-08-01 RX ADMIN — ARFORMOTEROL TARTRATE 15 MCG: 15 SOLUTION RESPIRATORY (INHALATION) at 07:45

## 2025-08-01 ASSESSMENT — PAIN SCALES - GENERAL
PAINLEVEL_OUTOF10: 8
PAINLEVEL_OUTOF10: 0
PAINLEVEL_OUTOF10: 7
PAINLEVEL_OUTOF10: 0

## 2025-08-01 ASSESSMENT — PAIN DESCRIPTION - ORIENTATION
ORIENTATION: LOWER
ORIENTATION: LOWER

## 2025-08-01 ASSESSMENT — PAIN DESCRIPTION - LOCATION
LOCATION: CHEST;BACK
LOCATION: CHEST;BACK

## 2025-08-01 ASSESSMENT — PAIN DESCRIPTION - DESCRIPTORS
DESCRIPTORS: ACHING
DESCRIPTORS: ACHING

## 2025-08-02 LAB
ANION GAP SERPL CALC-SCNC: 12 MMOL/L (ref 2–14)
ARTERIAL PATENCY WRIST A: YES
BASE EXCESS BLDA CALC-SCNC: 0.6 MMOL/L
BDY SITE: ABNORMAL
BUN SERPL-MCNC: 22 MG/DL (ref 6–20)
BUN/CREAT SERPL: 14 (ref 12–20)
CALCIUM SERPL-MCNC: 9.7 MG/DL (ref 8.6–10)
CHLORIDE SERPL-SCNC: 98 MMOL/L (ref 98–107)
CO2 SERPL-SCNC: 28 MMOL/L (ref 20–29)
CREAT SERPL-MCNC: 1.63 MG/DL (ref 0.6–1)
ERYTHROCYTE [DISTWIDTH] IN BLOOD BY AUTOMATED COUNT: 12.2 % (ref 11.5–14.5)
FIO2 ON VENT: 0.3 %
GAS FLOW.O2 SETTING OXYMISER: 14
GLUCOSE SERPL-MCNC: 97 MG/DL (ref 65–100)
HCO3 BLDA-SCNC: 27 MMOL/L (ref 22–26)
HCT VFR BLD AUTO: 37.9 % (ref 35–47)
HGB BLD-MCNC: 12 G/DL (ref 11.5–16)
MCH RBC QN AUTO: 31.3 PG (ref 26–34)
MCHC RBC AUTO-ENTMCNC: 31.7 G/DL (ref 30–36.5)
MCV RBC AUTO: 98.7 FL (ref 80–99)
NRBC # BLD: 0 K/UL (ref 0–0.01)
NRBC BLD-RTO: 0 PER 100 WBC
PCO2 BLDA: 52 MMHG (ref 35–45)
PEEP RESPIRATORY: 5
PH BLDA: 7.34 (ref 7.35–7.45)
PLATELET # BLD AUTO: 314 K/UL (ref 150–400)
PMV BLD AUTO: 9.8 FL (ref 8.9–12.9)
PO2 BLDA: 109 MMHG (ref 80–100)
POTASSIUM SERPL-SCNC: 3.8 MMOL/L (ref 3.5–5.1)
RBC # BLD AUTO: 3.84 M/UL (ref 3.8–5.2)
SAO2 % BLD: 98 % (ref 92–97)
SAO2% DEVICE SAO2% SENSOR NAME: ABNORMAL
SODIUM SERPL-SCNC: 138 MMOL/L (ref 136–145)
SPECIMEN SITE: ABNORMAL
VENTILATION MODE VENT: ABNORMAL
WBC # BLD AUTO: 8.6 K/UL (ref 3.6–11)

## 2025-08-02 PROCEDURE — 2500000003 HC RX 250 WO HCPCS

## 2025-08-02 PROCEDURE — 2060000000 HC ICU INTERMEDIATE R&B

## 2025-08-02 PROCEDURE — 85027 COMPLETE CBC AUTOMATED: CPT

## 2025-08-02 PROCEDURE — 2700000000 HC OXYGEN THERAPY PER DAY

## 2025-08-02 PROCEDURE — 36600 WITHDRAWAL OF ARTERIAL BLOOD: CPT

## 2025-08-02 PROCEDURE — 6360000002 HC RX W HCPCS: Performed by: STUDENT IN AN ORGANIZED HEALTH CARE EDUCATION/TRAINING PROGRAM

## 2025-08-02 PROCEDURE — 6370000000 HC RX 637 (ALT 250 FOR IP): Performed by: STUDENT IN AN ORGANIZED HEALTH CARE EDUCATION/TRAINING PROGRAM

## 2025-08-02 PROCEDURE — 94640 AIRWAY INHALATION TREATMENT: CPT

## 2025-08-02 PROCEDURE — 6360000002 HC RX W HCPCS

## 2025-08-02 PROCEDURE — 82803 BLOOD GASES ANY COMBINATION: CPT

## 2025-08-02 PROCEDURE — 36415 COLL VENOUS BLD VENIPUNCTURE: CPT

## 2025-08-02 PROCEDURE — 94660 CPAP INITIATION&MGMT: CPT

## 2025-08-02 PROCEDURE — 2500000003 HC RX 250 WO HCPCS: Performed by: STUDENT IN AN ORGANIZED HEALTH CARE EDUCATION/TRAINING PROGRAM

## 2025-08-02 PROCEDURE — 6370000000 HC RX 637 (ALT 250 FOR IP)

## 2025-08-02 PROCEDURE — 6360000002 HC RX W HCPCS: Performed by: INTERNAL MEDICINE

## 2025-08-02 PROCEDURE — 6370000000 HC RX 637 (ALT 250 FOR IP): Performed by: INTERNAL MEDICINE

## 2025-08-02 PROCEDURE — 80048 BASIC METABOLIC PNL TOTAL CA: CPT

## 2025-08-02 RX ADMIN — GABAPENTIN 600 MG: 300 CAPSULE ORAL at 14:20

## 2025-08-02 RX ADMIN — QUETIAPINE FUMARATE 250 MG: 100 TABLET ORAL at 09:01

## 2025-08-02 RX ADMIN — SACUBITRIL AND VALSARTAN 1 TABLET: 24; 26 TABLET, FILM COATED ORAL at 09:03

## 2025-08-02 RX ADMIN — CLONIDINE HYDROCHLORIDE 0.1 MG: 0.1 TABLET ORAL at 20:25

## 2025-08-02 RX ADMIN — SODIUM CHLORIDE, PRESERVATIVE FREE 10 ML: 5 INJECTION INTRAVENOUS at 20:25

## 2025-08-02 RX ADMIN — LORAZEPAM 0.5 MG: 0.5 TABLET ORAL at 10:49

## 2025-08-02 RX ADMIN — METHADONE HYDROCHLORIDE 120 MG: 10 CONCENTRATE ORAL at 09:03

## 2025-08-02 RX ADMIN — CLONIDINE HYDROCHLORIDE 0.1 MG: 0.1 TABLET ORAL at 09:03

## 2025-08-02 RX ADMIN — PRAZOSIN HYDROCHLORIDE 2 MG: 1 CAPSULE ORAL at 20:25

## 2025-08-02 RX ADMIN — ARFORMOTEROL TARTRATE 15 MCG: 15 SOLUTION RESPIRATORY (INHALATION) at 20:41

## 2025-08-02 RX ADMIN — OXCARBAZEPINE 150 MG: 300 TABLET, FILM COATED ORAL at 20:25

## 2025-08-02 RX ADMIN — SODIUM CHLORIDE, PRESERVATIVE FREE 10 ML: 5 INJECTION INTRAVENOUS at 09:01

## 2025-08-02 RX ADMIN — LEVETIRACETAM 500 MG: 500 TABLET, FILM COATED ORAL at 09:01

## 2025-08-02 RX ADMIN — LEVALBUTEROL HYDROCHLORIDE 1.25 MG: 1.25 SOLUTION RESPIRATORY (INHALATION) at 20:36

## 2025-08-02 RX ADMIN — PREDNISONE 40 MG: 20 TABLET ORAL at 09:02

## 2025-08-02 RX ADMIN — GUAIFENESIN 600 MG: 600 TABLET, EXTENDED RELEASE ORAL at 20:26

## 2025-08-02 RX ADMIN — BUDESONIDE 1000 MCG: 0.5 INHALANT RESPIRATORY (INHALATION) at 20:41

## 2025-08-02 RX ADMIN — GABAPENTIN 600 MG: 300 CAPSULE ORAL at 09:02

## 2025-08-02 RX ADMIN — LEVALBUTEROL HYDROCHLORIDE 1.25 MG: 1.25 SOLUTION RESPIRATORY (INHALATION) at 07:39

## 2025-08-02 RX ADMIN — BUDESONIDE 1000 MCG: 0.5 INHALANT RESPIRATORY (INHALATION) at 07:31

## 2025-08-02 RX ADMIN — LEVALBUTEROL HYDROCHLORIDE 1.25 MG: 1.25 SOLUTION RESPIRATORY (INHALATION) at 13:03

## 2025-08-02 RX ADMIN — GUAIFENESIN 600 MG: 600 TABLET, EXTENDED RELEASE ORAL at 09:03

## 2025-08-02 RX ADMIN — WATER 2000 MG: 1 INJECTION INTRAMUSCULAR; INTRAVENOUS; SUBCUTANEOUS at 18:09

## 2025-08-02 RX ADMIN — LORAZEPAM 0.5 MG: 0.5 TABLET ORAL at 01:33

## 2025-08-02 RX ADMIN — APIXABAN 5 MG: 5 TABLET, FILM COATED ORAL at 09:02

## 2025-08-02 RX ADMIN — ARFORMOTEROL TARTRATE 15 MCG: 15 SOLUTION RESPIRATORY (INHALATION) at 07:31

## 2025-08-02 RX ADMIN — BUPROPION HYDROCHLORIDE 300 MG: 150 TABLET, EXTENDED RELEASE ORAL at 09:03

## 2025-08-02 RX ADMIN — SENNOSIDES AND DOCUSATE SODIUM 2 TABLET: 8.6; 5 TABLET ORAL at 20:26

## 2025-08-02 RX ADMIN — GABAPENTIN 600 MG: 300 CAPSULE ORAL at 20:26

## 2025-08-02 RX ADMIN — SERTRALINE 150 MG: 50 TABLET, FILM COATED ORAL at 09:02

## 2025-08-02 RX ADMIN — QUETIAPINE FUMARATE 600 MG: 100 TABLET ORAL at 20:27

## 2025-08-02 RX ADMIN — OXCARBAZEPINE 150 MG: 300 TABLET, FILM COATED ORAL at 09:02

## 2025-08-02 RX ADMIN — LEVETIRACETAM 500 MG: 500 TABLET, FILM COATED ORAL at 20:26

## 2025-08-02 RX ADMIN — APIXABAN 5 MG: 5 TABLET, FILM COATED ORAL at 20:26

## 2025-08-02 ASSESSMENT — PAIN SCALES - GENERAL
PAINLEVEL_OUTOF10: 0

## 2025-08-03 LAB
ANION GAP SERPL CALC-SCNC: 10 MMOL/L (ref 2–14)
BUN SERPL-MCNC: 23 MG/DL (ref 6–20)
BUN/CREAT SERPL: 19 (ref 12–20)
CALCIUM SERPL-MCNC: 9.4 MG/DL (ref 8.6–10)
CHLORIDE SERPL-SCNC: 97 MMOL/L (ref 98–107)
CO2 SERPL-SCNC: 29 MMOL/L (ref 20–29)
CREAT SERPL-MCNC: 1.21 MG/DL (ref 0.6–1)
ERYTHROCYTE [DISTWIDTH] IN BLOOD BY AUTOMATED COUNT: 11.9 % (ref 11.5–14.5)
GLUCOSE SERPL-MCNC: 103 MG/DL (ref 65–100)
HCT VFR BLD AUTO: 35.3 % (ref 35–47)
HGB BLD-MCNC: 11.3 G/DL (ref 11.5–16)
MCH RBC QN AUTO: 30.8 PG (ref 26–34)
MCHC RBC AUTO-ENTMCNC: 32 G/DL (ref 30–36.5)
MCV RBC AUTO: 96.2 FL (ref 80–99)
NRBC # BLD: 0 K/UL (ref 0–0.01)
NRBC BLD-RTO: 0 PER 100 WBC
PLATELET # BLD AUTO: 238 K/UL (ref 150–400)
PMV BLD AUTO: 9.6 FL (ref 8.9–12.9)
POTASSIUM SERPL-SCNC: 3.9 MMOL/L (ref 3.5–5.1)
RBC # BLD AUTO: 3.67 M/UL (ref 3.8–5.2)
SODIUM SERPL-SCNC: 137 MMOL/L (ref 136–145)
WBC # BLD AUTO: 8.3 K/UL (ref 3.6–11)

## 2025-08-03 PROCEDURE — 94660 CPAP INITIATION&MGMT: CPT

## 2025-08-03 PROCEDURE — 6370000000 HC RX 637 (ALT 250 FOR IP): Performed by: STUDENT IN AN ORGANIZED HEALTH CARE EDUCATION/TRAINING PROGRAM

## 2025-08-03 PROCEDURE — 80048 BASIC METABOLIC PNL TOTAL CA: CPT

## 2025-08-03 PROCEDURE — 6370000000 HC RX 637 (ALT 250 FOR IP): Performed by: INTERNAL MEDICINE

## 2025-08-03 PROCEDURE — 2700000000 HC OXYGEN THERAPY PER DAY

## 2025-08-03 PROCEDURE — 6370000000 HC RX 637 (ALT 250 FOR IP)

## 2025-08-03 PROCEDURE — 85027 COMPLETE CBC AUTOMATED: CPT

## 2025-08-03 PROCEDURE — 36415 COLL VENOUS BLD VENIPUNCTURE: CPT

## 2025-08-03 PROCEDURE — 6360000002 HC RX W HCPCS: Performed by: INTERNAL MEDICINE

## 2025-08-03 PROCEDURE — 6360000002 HC RX W HCPCS

## 2025-08-03 PROCEDURE — 2500000003 HC RX 250 WO HCPCS

## 2025-08-03 PROCEDURE — 2060000000 HC ICU INTERMEDIATE R&B

## 2025-08-03 PROCEDURE — 94640 AIRWAY INHALATION TREATMENT: CPT

## 2025-08-03 RX ADMIN — CLONIDINE HYDROCHLORIDE 0.1 MG: 0.1 TABLET ORAL at 20:42

## 2025-08-03 RX ADMIN — ARFORMOTEROL TARTRATE 15 MCG: 15 SOLUTION RESPIRATORY (INHALATION) at 08:38

## 2025-08-03 RX ADMIN — LEVALBUTEROL HYDROCHLORIDE 1.25 MG: 1.25 SOLUTION RESPIRATORY (INHALATION) at 08:38

## 2025-08-03 RX ADMIN — GUAIFENESIN 600 MG: 600 TABLET, EXTENDED RELEASE ORAL at 09:27

## 2025-08-03 RX ADMIN — LEVETIRACETAM 500 MG: 500 TABLET, FILM COATED ORAL at 09:26

## 2025-08-03 RX ADMIN — SODIUM CHLORIDE, PRESERVATIVE FREE 10 ML: 5 INJECTION INTRAVENOUS at 09:25

## 2025-08-03 RX ADMIN — OXCARBAZEPINE 150 MG: 300 TABLET, FILM COATED ORAL at 09:25

## 2025-08-03 RX ADMIN — PRAZOSIN HYDROCHLORIDE 2 MG: 1 CAPSULE ORAL at 20:41

## 2025-08-03 RX ADMIN — LORAZEPAM 0.5 MG: 0.5 TABLET ORAL at 09:27

## 2025-08-03 RX ADMIN — SERTRALINE 150 MG: 50 TABLET, FILM COATED ORAL at 09:26

## 2025-08-03 RX ADMIN — SODIUM CHLORIDE, PRESERVATIVE FREE 10 ML: 5 INJECTION INTRAVENOUS at 20:43

## 2025-08-03 RX ADMIN — APIXABAN 5 MG: 5 TABLET, FILM COATED ORAL at 20:43

## 2025-08-03 RX ADMIN — SENNOSIDES AND DOCUSATE SODIUM 2 TABLET: 8.6; 5 TABLET ORAL at 20:42

## 2025-08-03 RX ADMIN — BUDESONIDE 1000 MCG: 0.5 INHALANT RESPIRATORY (INHALATION) at 08:38

## 2025-08-03 RX ADMIN — LEVETIRACETAM 500 MG: 500 TABLET, FILM COATED ORAL at 20:43

## 2025-08-03 RX ADMIN — LEVALBUTEROL HYDROCHLORIDE 1.25 MG: 1.25 SOLUTION RESPIRATORY (INHALATION) at 14:48

## 2025-08-03 RX ADMIN — METHADONE HYDROCHLORIDE 120 MG: 10 CONCENTRATE ORAL at 09:53

## 2025-08-03 RX ADMIN — GABAPENTIN 600 MG: 300 CAPSULE ORAL at 20:42

## 2025-08-03 RX ADMIN — GABAPENTIN 600 MG: 300 CAPSULE ORAL at 09:26

## 2025-08-03 RX ADMIN — QUETIAPINE FUMARATE 600 MG: 100 TABLET ORAL at 20:43

## 2025-08-03 RX ADMIN — LORAZEPAM 0.5 MG: 0.5 TABLET ORAL at 16:25

## 2025-08-03 RX ADMIN — OXCARBAZEPINE 150 MG: 300 TABLET, FILM COATED ORAL at 20:42

## 2025-08-03 RX ADMIN — APIXABAN 5 MG: 5 TABLET, FILM COATED ORAL at 09:27

## 2025-08-03 RX ADMIN — PREDNISONE 40 MG: 20 TABLET ORAL at 09:26

## 2025-08-03 RX ADMIN — GABAPENTIN 600 MG: 300 CAPSULE ORAL at 14:03

## 2025-08-03 RX ADMIN — CLONIDINE HYDROCHLORIDE 0.1 MG: 0.1 TABLET ORAL at 09:26

## 2025-08-03 RX ADMIN — QUETIAPINE FUMARATE 250 MG: 100 TABLET ORAL at 09:25

## 2025-08-03 ASSESSMENT — PAIN SCALES - GENERAL
PAINLEVEL_OUTOF10: 0

## 2025-08-04 LAB
ANION GAP SERPL CALC-SCNC: 8 MMOL/L (ref 2–14)
BUN SERPL-MCNC: 17 MG/DL (ref 6–20)
BUN/CREAT SERPL: 22 (ref 12–20)
CALCIUM SERPL-MCNC: 9 MG/DL (ref 8.6–10)
CHLORIDE SERPL-SCNC: 97 MMOL/L (ref 98–107)
CO2 SERPL-SCNC: 30 MMOL/L (ref 20–29)
CREAT SERPL-MCNC: 0.77 MG/DL (ref 0.6–1)
ERYTHROCYTE [DISTWIDTH] IN BLOOD BY AUTOMATED COUNT: 12.1 % (ref 11.5–14.5)
GLUCOSE SERPL-MCNC: 100 MG/DL (ref 65–100)
HCT VFR BLD AUTO: 32.3 % (ref 35–47)
HGB BLD-MCNC: 10.4 G/DL (ref 11.5–16)
MCH RBC QN AUTO: 31.2 PG (ref 26–34)
MCHC RBC AUTO-ENTMCNC: 32.2 G/DL (ref 30–36.5)
MCV RBC AUTO: 97 FL (ref 80–99)
NRBC # BLD: 0 K/UL (ref 0–0.01)
NRBC BLD-RTO: 0 PER 100 WBC
PLATELET # BLD AUTO: 225 K/UL (ref 150–400)
PMV BLD AUTO: 9.7 FL (ref 8.9–12.9)
POTASSIUM SERPL-SCNC: 3.6 MMOL/L (ref 3.5–5.1)
RBC # BLD AUTO: 3.33 M/UL (ref 3.8–5.2)
SODIUM SERPL-SCNC: 135 MMOL/L (ref 136–145)
WBC # BLD AUTO: 8.1 K/UL (ref 3.6–11)

## 2025-08-04 PROCEDURE — 6370000000 HC RX 637 (ALT 250 FOR IP)

## 2025-08-04 PROCEDURE — 36415 COLL VENOUS BLD VENIPUNCTURE: CPT

## 2025-08-04 PROCEDURE — 80048 BASIC METABOLIC PNL TOTAL CA: CPT

## 2025-08-04 PROCEDURE — 6370000000 HC RX 637 (ALT 250 FOR IP): Performed by: INTERNAL MEDICINE

## 2025-08-04 PROCEDURE — 94660 CPAP INITIATION&MGMT: CPT

## 2025-08-04 PROCEDURE — 85027 COMPLETE CBC AUTOMATED: CPT

## 2025-08-04 PROCEDURE — 97535 SELF CARE MNGMENT TRAINING: CPT

## 2025-08-04 PROCEDURE — 2700000000 HC OXYGEN THERAPY PER DAY

## 2025-08-04 PROCEDURE — 97116 GAIT TRAINING THERAPY: CPT

## 2025-08-04 PROCEDURE — 1100000003 HC PRIVATE W/ TELEMETRY

## 2025-08-04 PROCEDURE — 6370000000 HC RX 637 (ALT 250 FOR IP): Performed by: STUDENT IN AN ORGANIZED HEALTH CARE EDUCATION/TRAINING PROGRAM

## 2025-08-04 PROCEDURE — 2500000003 HC RX 250 WO HCPCS

## 2025-08-04 RX ADMIN — GABAPENTIN 600 MG: 300 CAPSULE ORAL at 20:22

## 2025-08-04 RX ADMIN — PREDNISONE 30 MG: 20 TABLET ORAL at 10:24

## 2025-08-04 RX ADMIN — SERTRALINE 150 MG: 50 TABLET, FILM COATED ORAL at 10:24

## 2025-08-04 RX ADMIN — LEVETIRACETAM 500 MG: 500 TABLET, FILM COATED ORAL at 10:25

## 2025-08-04 RX ADMIN — LORAZEPAM 0.5 MG: 0.5 TABLET ORAL at 18:52

## 2025-08-04 RX ADMIN — SENNOSIDES AND DOCUSATE SODIUM 2 TABLET: 8.6; 5 TABLET ORAL at 20:22

## 2025-08-04 RX ADMIN — APIXABAN 5 MG: 5 TABLET, FILM COATED ORAL at 20:22

## 2025-08-04 RX ADMIN — APIXABAN 5 MG: 5 TABLET, FILM COATED ORAL at 10:25

## 2025-08-04 RX ADMIN — QUETIAPINE FUMARATE 600 MG: 100 TABLET ORAL at 20:21

## 2025-08-04 RX ADMIN — LEVETIRACETAM 500 MG: 500 TABLET, FILM COATED ORAL at 20:22

## 2025-08-04 RX ADMIN — OXCARBAZEPINE 150 MG: 300 TABLET, FILM COATED ORAL at 20:22

## 2025-08-04 RX ADMIN — SODIUM CHLORIDE, PRESERVATIVE FREE 10 ML: 5 INJECTION INTRAVENOUS at 10:25

## 2025-08-04 RX ADMIN — CLONIDINE HYDROCHLORIDE 0.1 MG: 0.1 TABLET ORAL at 10:23

## 2025-08-04 RX ADMIN — GABAPENTIN 600 MG: 300 CAPSULE ORAL at 10:25

## 2025-08-04 RX ADMIN — GABAPENTIN 600 MG: 300 CAPSULE ORAL at 14:25

## 2025-08-04 RX ADMIN — CLONIDINE HYDROCHLORIDE 0.1 MG: 0.1 TABLET ORAL at 20:22

## 2025-08-04 RX ADMIN — PRAZOSIN HYDROCHLORIDE 2 MG: 1 CAPSULE ORAL at 20:22

## 2025-08-04 RX ADMIN — QUETIAPINE FUMARATE 250 MG: 100 TABLET ORAL at 10:24

## 2025-08-04 RX ADMIN — OXCARBAZEPINE 150 MG: 300 TABLET, FILM COATED ORAL at 10:25

## 2025-08-04 RX ADMIN — SODIUM CHLORIDE, PRESERVATIVE FREE 10 ML: 5 INJECTION INTRAVENOUS at 20:22

## 2025-08-04 RX ADMIN — METHADONE HYDROCHLORIDE 120 MG: 10 CONCENTRATE ORAL at 10:21

## 2025-08-04 ASSESSMENT — PAIN SCALES - GENERAL
PAINLEVEL_OUTOF10: 0

## 2025-08-05 LAB
ANION GAP SERPL CALC-SCNC: 10 MMOL/L (ref 2–14)
BUN SERPL-MCNC: 19 MG/DL (ref 6–20)
BUN/CREAT SERPL: 22 (ref 12–20)
CALCIUM SERPL-MCNC: 9.5 MG/DL (ref 8.6–10)
CHLORIDE SERPL-SCNC: 96 MMOL/L (ref 98–107)
CO2 SERPL-SCNC: 31 MMOL/L (ref 20–29)
CREAT SERPL-MCNC: 0.86 MG/DL (ref 0.6–1)
ERYTHROCYTE [DISTWIDTH] IN BLOOD BY AUTOMATED COUNT: 12 % (ref 11.5–14.5)
GLUCOSE SERPL-MCNC: 89 MG/DL (ref 65–100)
HCT VFR BLD AUTO: 35.3 % (ref 35–47)
HGB BLD-MCNC: 11.4 G/DL (ref 11.5–16)
MCH RBC QN AUTO: 31.6 PG (ref 26–34)
MCHC RBC AUTO-ENTMCNC: 32.3 G/DL (ref 30–36.5)
MCV RBC AUTO: 97.8 FL (ref 80–99)
NRBC # BLD: 0 K/UL (ref 0–0.01)
NRBC BLD-RTO: 0 PER 100 WBC
PLATELET # BLD AUTO: 250 K/UL (ref 150–400)
PMV BLD AUTO: 9.5 FL (ref 8.9–12.9)
POTASSIUM SERPL-SCNC: 3.9 MMOL/L (ref 3.5–5.1)
RBC # BLD AUTO: 3.61 M/UL (ref 3.8–5.2)
SODIUM SERPL-SCNC: 137 MMOL/L (ref 136–145)
WBC # BLD AUTO: 10.5 K/UL (ref 3.6–11)

## 2025-08-05 PROCEDURE — 6360000002 HC RX W HCPCS: Performed by: INTERNAL MEDICINE

## 2025-08-05 PROCEDURE — 6370000000 HC RX 637 (ALT 250 FOR IP): Performed by: STUDENT IN AN ORGANIZED HEALTH CARE EDUCATION/TRAINING PROGRAM

## 2025-08-05 PROCEDURE — 36415 COLL VENOUS BLD VENIPUNCTURE: CPT

## 2025-08-05 PROCEDURE — 94660 CPAP INITIATION&MGMT: CPT

## 2025-08-05 PROCEDURE — 6370000000 HC RX 637 (ALT 250 FOR IP): Performed by: INTERNAL MEDICINE

## 2025-08-05 PROCEDURE — 6370000000 HC RX 637 (ALT 250 FOR IP)

## 2025-08-05 PROCEDURE — 6360000002 HC RX W HCPCS

## 2025-08-05 PROCEDURE — 1100000003 HC PRIVATE W/ TELEMETRY

## 2025-08-05 PROCEDURE — 97530 THERAPEUTIC ACTIVITIES: CPT

## 2025-08-05 PROCEDURE — 85027 COMPLETE CBC AUTOMATED: CPT

## 2025-08-05 PROCEDURE — 2500000003 HC RX 250 WO HCPCS

## 2025-08-05 PROCEDURE — 80048 BASIC METABOLIC PNL TOTAL CA: CPT

## 2025-08-05 RX ADMIN — GABAPENTIN 600 MG: 300 CAPSULE ORAL at 08:42

## 2025-08-05 RX ADMIN — SERTRALINE 150 MG: 50 TABLET, FILM COATED ORAL at 08:43

## 2025-08-05 RX ADMIN — METHADONE HYDROCHLORIDE 120 MG: 10 CONCENTRATE ORAL at 08:43

## 2025-08-05 RX ADMIN — SODIUM CHLORIDE, PRESERVATIVE FREE 10 ML: 5 INJECTION INTRAVENOUS at 08:48

## 2025-08-05 RX ADMIN — QUETIAPINE FUMARATE 600 MG: 100 TABLET ORAL at 20:15

## 2025-08-05 RX ADMIN — OXCARBAZEPINE 150 MG: 300 TABLET, FILM COATED ORAL at 08:41

## 2025-08-05 RX ADMIN — CLONIDINE HYDROCHLORIDE 0.1 MG: 0.1 TABLET ORAL at 20:15

## 2025-08-05 RX ADMIN — PREDNISONE 30 MG: 20 TABLET ORAL at 08:41

## 2025-08-05 RX ADMIN — LORAZEPAM 0.5 MG: 0.5 TABLET ORAL at 12:22

## 2025-08-05 RX ADMIN — GABAPENTIN 600 MG: 300 CAPSULE ORAL at 20:15

## 2025-08-05 RX ADMIN — QUETIAPINE FUMARATE 250 MG: 100 TABLET ORAL at 08:42

## 2025-08-05 RX ADMIN — CLONIDINE HYDROCHLORIDE 0.1 MG: 0.1 TABLET ORAL at 08:42

## 2025-08-05 RX ADMIN — LEVETIRACETAM 500 MG: 500 TABLET, FILM COATED ORAL at 20:15

## 2025-08-05 RX ADMIN — SODIUM CHLORIDE, PRESERVATIVE FREE 10 ML: 5 INJECTION INTRAVENOUS at 20:16

## 2025-08-05 RX ADMIN — APIXABAN 5 MG: 5 TABLET, FILM COATED ORAL at 08:42

## 2025-08-05 RX ADMIN — LEVETIRACETAM 500 MG: 500 TABLET, FILM COATED ORAL at 08:40

## 2025-08-05 RX ADMIN — OXCARBAZEPINE 150 MG: 300 TABLET, FILM COATED ORAL at 20:14

## 2025-08-05 RX ADMIN — APIXABAN 5 MG: 5 TABLET, FILM COATED ORAL at 20:15

## 2025-08-05 RX ADMIN — SENNOSIDES AND DOCUSATE SODIUM 2 TABLET: 8.6; 5 TABLET ORAL at 20:14

## 2025-08-05 RX ADMIN — PRAZOSIN HYDROCHLORIDE 2 MG: 1 CAPSULE ORAL at 20:15

## 2025-08-05 ASSESSMENT — PAIN SCALES - GENERAL
PAINLEVEL_OUTOF10: 0

## 2025-08-06 LAB
ANION GAP SERPL CALC-SCNC: 10 MMOL/L (ref 2–14)
BUN SERPL-MCNC: 19 MG/DL (ref 6–20)
BUN/CREAT SERPL: 25 (ref 12–20)
CALCIUM SERPL-MCNC: 9 MG/DL (ref 8.6–10)
CHLORIDE SERPL-SCNC: 96 MMOL/L (ref 98–107)
CO2 SERPL-SCNC: 31 MMOL/L (ref 20–29)
CREAT SERPL-MCNC: 0.75 MG/DL (ref 0.6–1)
ERYTHROCYTE [DISTWIDTH] IN BLOOD BY AUTOMATED COUNT: 11.9 % (ref 11.5–14.5)
GLUCOSE SERPL-MCNC: 115 MG/DL (ref 65–100)
HCT VFR BLD AUTO: 32.4 % (ref 35–47)
HGB BLD-MCNC: 10.6 G/DL (ref 11.5–16)
MCH RBC QN AUTO: 31.8 PG (ref 26–34)
MCHC RBC AUTO-ENTMCNC: 32.7 G/DL (ref 30–36.5)
MCV RBC AUTO: 97.3 FL (ref 80–99)
NRBC # BLD: 0 K/UL (ref 0–0.01)
NRBC BLD-RTO: 0 PER 100 WBC
PLATELET # BLD AUTO: 215 K/UL (ref 150–400)
PMV BLD AUTO: 9.5 FL (ref 8.9–12.9)
POTASSIUM SERPL-SCNC: 3.8 MMOL/L (ref 3.5–5.1)
RBC # BLD AUTO: 3.33 M/UL (ref 3.8–5.2)
SODIUM SERPL-SCNC: 137 MMOL/L (ref 136–145)
WBC # BLD AUTO: 9.9 K/UL (ref 3.6–11)

## 2025-08-06 PROCEDURE — 6360000002 HC RX W HCPCS

## 2025-08-06 PROCEDURE — 6370000000 HC RX 637 (ALT 250 FOR IP)

## 2025-08-06 PROCEDURE — 85027 COMPLETE CBC AUTOMATED: CPT

## 2025-08-06 PROCEDURE — 6360000002 HC RX W HCPCS: Performed by: INTERNAL MEDICINE

## 2025-08-06 PROCEDURE — 36415 COLL VENOUS BLD VENIPUNCTURE: CPT

## 2025-08-06 PROCEDURE — 6370000000 HC RX 637 (ALT 250 FOR IP): Performed by: STUDENT IN AN ORGANIZED HEALTH CARE EDUCATION/TRAINING PROGRAM

## 2025-08-06 PROCEDURE — 94640 AIRWAY INHALATION TREATMENT: CPT

## 2025-08-06 PROCEDURE — 2500000003 HC RX 250 WO HCPCS

## 2025-08-06 PROCEDURE — 6370000000 HC RX 637 (ALT 250 FOR IP): Performed by: INTERNAL MEDICINE

## 2025-08-06 PROCEDURE — 80048 BASIC METABOLIC PNL TOTAL CA: CPT

## 2025-08-06 PROCEDURE — 1100000003 HC PRIVATE W/ TELEMETRY

## 2025-08-06 PROCEDURE — 94660 CPAP INITIATION&MGMT: CPT

## 2025-08-06 RX ORDER — BUDESONIDE 0.5 MG/2ML
1 INHALANT ORAL 2 TIMES DAILY PRN
Status: DISCONTINUED | OUTPATIENT
Start: 2025-08-06 | End: 2025-08-08 | Stop reason: HOSPADM

## 2025-08-06 RX ORDER — ARFORMOTEROL TARTRATE 15 UG/2ML
15 SOLUTION RESPIRATORY (INHALATION) 2 TIMES DAILY PRN
Status: DISCONTINUED | OUTPATIENT
Start: 2025-08-06 | End: 2025-08-08 | Stop reason: HOSPADM

## 2025-08-06 RX ORDER — LEVALBUTEROL INHALATION SOLUTION 1.25 MG/3ML
1.25 SOLUTION RESPIRATORY (INHALATION) EVERY 6 HOURS PRN
Status: DISCONTINUED | OUTPATIENT
Start: 2025-08-06 | End: 2025-08-08 | Stop reason: HOSPADM

## 2025-08-06 RX ORDER — FUROSEMIDE 10 MG/ML
20 INJECTION INTRAMUSCULAR; INTRAVENOUS 2 TIMES DAILY
Status: COMPLETED | OUTPATIENT
Start: 2025-08-06 | End: 2025-08-06

## 2025-08-06 RX ADMIN — CLONIDINE HYDROCHLORIDE 0.1 MG: 0.1 TABLET ORAL at 09:53

## 2025-08-06 RX ADMIN — METHADONE HYDROCHLORIDE 120 MG: 10 CONCENTRATE ORAL at 09:57

## 2025-08-06 RX ADMIN — SACUBITRIL AND VALSARTAN 1 TABLET: 24; 26 TABLET, FILM COATED ORAL at 20:59

## 2025-08-06 RX ADMIN — GABAPENTIN 600 MG: 300 CAPSULE ORAL at 20:52

## 2025-08-06 RX ADMIN — LEVALBUTEROL HYDROCHLORIDE 1.25 MG: 1.25 SOLUTION RESPIRATORY (INHALATION) at 08:57

## 2025-08-06 RX ADMIN — PREDNISONE 30 MG: 20 TABLET ORAL at 09:53

## 2025-08-06 RX ADMIN — LORAZEPAM 0.5 MG: 0.5 TABLET ORAL at 15:10

## 2025-08-06 RX ADMIN — SENNOSIDES AND DOCUSATE SODIUM 2 TABLET: 8.6; 5 TABLET ORAL at 20:53

## 2025-08-06 RX ADMIN — SODIUM CHLORIDE, PRESERVATIVE FREE 10 ML: 5 INJECTION INTRAVENOUS at 10:04

## 2025-08-06 RX ADMIN — SERTRALINE 150 MG: 50 TABLET, FILM COATED ORAL at 09:52

## 2025-08-06 RX ADMIN — SODIUM CHLORIDE, PRESERVATIVE FREE 10 ML: 5 INJECTION INTRAVENOUS at 20:55

## 2025-08-06 RX ADMIN — GABAPENTIN 600 MG: 300 CAPSULE ORAL at 09:53

## 2025-08-06 RX ADMIN — ARFORMOTEROL TARTRATE 15 MCG: 15 SOLUTION RESPIRATORY (INHALATION) at 08:52

## 2025-08-06 RX ADMIN — LEVETIRACETAM 500 MG: 500 TABLET, FILM COATED ORAL at 20:53

## 2025-08-06 RX ADMIN — LEVETIRACETAM 500 MG: 500 TABLET, FILM COATED ORAL at 09:52

## 2025-08-06 RX ADMIN — APIXABAN 5 MG: 5 TABLET, FILM COATED ORAL at 20:53

## 2025-08-06 RX ADMIN — OXCARBAZEPINE 150 MG: 300 TABLET, FILM COATED ORAL at 09:53

## 2025-08-06 RX ADMIN — GABAPENTIN 600 MG: 300 CAPSULE ORAL at 14:12

## 2025-08-06 RX ADMIN — CLONIDINE HYDROCHLORIDE 0.1 MG: 0.1 TABLET ORAL at 20:52

## 2025-08-06 RX ADMIN — FUROSEMIDE 20 MG: 10 INJECTION, SOLUTION INTRAMUSCULAR; INTRAVENOUS at 09:57

## 2025-08-06 RX ADMIN — APIXABAN 5 MG: 5 TABLET, FILM COATED ORAL at 09:52

## 2025-08-06 RX ADMIN — FUROSEMIDE 20 MG: 10 INJECTION, SOLUTION INTRAMUSCULAR; INTRAVENOUS at 17:22

## 2025-08-06 RX ADMIN — PRAZOSIN HYDROCHLORIDE 2 MG: 1 CAPSULE ORAL at 20:53

## 2025-08-06 RX ADMIN — OXCARBAZEPINE 150 MG: 300 TABLET, FILM COATED ORAL at 20:51

## 2025-08-06 RX ADMIN — LORAZEPAM 0.5 MG: 0.5 TABLET ORAL at 21:03

## 2025-08-06 RX ADMIN — QUETIAPINE FUMARATE 250 MG: 100 TABLET ORAL at 09:53

## 2025-08-06 RX ADMIN — QUETIAPINE FUMARATE 600 MG: 100 TABLET ORAL at 20:53

## 2025-08-06 RX ADMIN — BUDESONIDE 1000 MCG: 0.5 INHALANT RESPIRATORY (INHALATION) at 08:52

## 2025-08-06 ASSESSMENT — PAIN SCALES - GENERAL: PAINLEVEL_OUTOF10: 0

## 2025-08-07 ENCOUNTER — APPOINTMENT (OUTPATIENT)
Facility: HOSPITAL | Age: 50
End: 2025-08-07
Payer: MEDICAID

## 2025-08-07 LAB
ANION GAP SERPL CALC-SCNC: 13 MMOL/L (ref 2–14)
ARTERIAL PATENCY WRIST A: YES
BASE EXCESS BLDA CALC-SCNC: 8.3 MMOL/L
BDY SITE: ABNORMAL
BUN SERPL-MCNC: 19 MG/DL (ref 6–20)
BUN/CREAT SERPL: 27 (ref 12–20)
CALCIUM SERPL-MCNC: 9 MG/DL (ref 8.6–10)
CHLORIDE SERPL-SCNC: 94 MMOL/L (ref 98–107)
CO2 SERPL-SCNC: 32 MMOL/L (ref 20–29)
CREAT SERPL-MCNC: 0.69 MG/DL (ref 0.6–1)
ERYTHROCYTE [DISTWIDTH] IN BLOOD BY AUTOMATED COUNT: 11.9 % (ref 11.5–14.5)
GAS FLOW.O2 O2 DELIVERY SYS: 4 L/MIN
GLUCOSE BLD STRIP.AUTO-MCNC: 158 MG/DL (ref 65–117)
GLUCOSE SERPL-MCNC: 124 MG/DL (ref 65–100)
HCO3 BLDA-SCNC: 37 MMOL/L (ref 22–26)
HCT VFR BLD AUTO: 32.7 % (ref 35–47)
HGB BLD-MCNC: 10.7 G/DL (ref 11.5–16)
MCH RBC QN AUTO: 32 PG (ref 26–34)
MCHC RBC AUTO-ENTMCNC: 32.7 G/DL (ref 30–36.5)
MCV RBC AUTO: 97.9 FL (ref 80–99)
NRBC # BLD: 0 K/UL (ref 0–0.01)
NRBC BLD-RTO: 0 PER 100 WBC
PCO2 BLDA: 68 MMHG (ref 35–45)
PH BLDA: 7.35 (ref 7.35–7.45)
PLATELET # BLD AUTO: 222 K/UL (ref 150–400)
PMV BLD AUTO: 9.6 FL (ref 8.9–12.9)
PO2 BLDA: 103 MMHG (ref 80–100)
POTASSIUM SERPL-SCNC: 3.1 MMOL/L (ref 3.5–5.1)
RBC # BLD AUTO: 3.34 M/UL (ref 3.8–5.2)
SAO2 % BLD: 97 % (ref 92–97)
SAO2% DEVICE SAO2% SENSOR NAME: ABNORMAL
SERVICE CMNT-IMP: ABNORMAL
SODIUM SERPL-SCNC: 139 MMOL/L (ref 136–145)
SPECIMEN SITE: ABNORMAL
WBC # BLD AUTO: 9.4 K/UL (ref 3.6–11)

## 2025-08-07 PROCEDURE — 71250 CT THORAX DX C-: CPT

## 2025-08-07 PROCEDURE — 97535 SELF CARE MNGMENT TRAINING: CPT

## 2025-08-07 PROCEDURE — 82803 BLOOD GASES ANY COMBINATION: CPT

## 2025-08-07 PROCEDURE — 97530 THERAPEUTIC ACTIVITIES: CPT

## 2025-08-07 PROCEDURE — 2500000003 HC RX 250 WO HCPCS

## 2025-08-07 PROCEDURE — 94660 CPAP INITIATION&MGMT: CPT

## 2025-08-07 PROCEDURE — 36415 COLL VENOUS BLD VENIPUNCTURE: CPT

## 2025-08-07 PROCEDURE — 80048 BASIC METABOLIC PNL TOTAL CA: CPT

## 2025-08-07 PROCEDURE — 36600 WITHDRAWAL OF ARTERIAL BLOOD: CPT

## 2025-08-07 PROCEDURE — 85027 COMPLETE CBC AUTOMATED: CPT

## 2025-08-07 PROCEDURE — 6370000000 HC RX 637 (ALT 250 FOR IP): Performed by: INTERNAL MEDICINE

## 2025-08-07 PROCEDURE — 82962 GLUCOSE BLOOD TEST: CPT

## 2025-08-07 PROCEDURE — 6370000000 HC RX 637 (ALT 250 FOR IP): Performed by: STUDENT IN AN ORGANIZED HEALTH CARE EDUCATION/TRAINING PROGRAM

## 2025-08-07 PROCEDURE — 6370000000 HC RX 637 (ALT 250 FOR IP)

## 2025-08-07 PROCEDURE — 1100000003 HC PRIVATE W/ TELEMETRY

## 2025-08-07 PROCEDURE — 2700000000 HC OXYGEN THERAPY PER DAY

## 2025-08-07 PROCEDURE — 97530 THERAPEUTIC ACTIVITIES: CPT | Performed by: PHYSICAL THERAPIST

## 2025-08-07 RX ADMIN — METHADONE HYDROCHLORIDE 120 MG: 10 CONCENTRATE ORAL at 08:58

## 2025-08-07 RX ADMIN — CLONIDINE HYDROCHLORIDE 0.1 MG: 0.1 TABLET ORAL at 21:01

## 2025-08-07 RX ADMIN — SENNOSIDES AND DOCUSATE SODIUM 2 TABLET: 8.6; 5 TABLET ORAL at 21:01

## 2025-08-07 RX ADMIN — SODIUM CHLORIDE, PRESERVATIVE FREE 10 ML: 5 INJECTION INTRAVENOUS at 09:02

## 2025-08-07 RX ADMIN — GABAPENTIN 600 MG: 300 CAPSULE ORAL at 08:56

## 2025-08-07 RX ADMIN — CLONIDINE HYDROCHLORIDE 0.1 MG: 0.1 TABLET ORAL at 08:56

## 2025-08-07 RX ADMIN — SACUBITRIL AND VALSARTAN 1 TABLET: 24; 26 TABLET, FILM COATED ORAL at 08:57

## 2025-08-07 RX ADMIN — QUETIAPINE FUMARATE 250 MG: 100 TABLET ORAL at 08:57

## 2025-08-07 RX ADMIN — LORAZEPAM 0.5 MG: 0.5 TABLET ORAL at 08:57

## 2025-08-07 RX ADMIN — PREDNISONE 20 MG: 20 TABLET ORAL at 08:56

## 2025-08-07 RX ADMIN — SERTRALINE 150 MG: 50 TABLET, FILM COATED ORAL at 08:57

## 2025-08-07 RX ADMIN — LEVETIRACETAM 500 MG: 500 TABLET, FILM COATED ORAL at 08:56

## 2025-08-07 RX ADMIN — GABAPENTIN 600 MG: 300 CAPSULE ORAL at 21:01

## 2025-08-07 RX ADMIN — OXCARBAZEPINE 150 MG: 300 TABLET, FILM COATED ORAL at 21:01

## 2025-08-07 RX ADMIN — OXCARBAZEPINE 150 MG: 300 TABLET, FILM COATED ORAL at 09:02

## 2025-08-07 RX ADMIN — LEVETIRACETAM 500 MG: 500 TABLET, FILM COATED ORAL at 21:01

## 2025-08-07 RX ADMIN — QUETIAPINE FUMARATE 600 MG: 100 TABLET ORAL at 21:00

## 2025-08-07 RX ADMIN — APIXABAN 5 MG: 5 TABLET, FILM COATED ORAL at 21:01

## 2025-08-07 RX ADMIN — APIXABAN 5 MG: 5 TABLET, FILM COATED ORAL at 08:56

## 2025-08-07 RX ADMIN — SODIUM CHLORIDE, PRESERVATIVE FREE 10 ML: 5 INJECTION INTRAVENOUS at 21:04

## 2025-08-07 RX ADMIN — SACUBITRIL AND VALSARTAN 1 TABLET: 24; 26 TABLET, FILM COATED ORAL at 21:01

## 2025-08-07 RX ADMIN — PRAZOSIN HYDROCHLORIDE 2 MG: 1 CAPSULE ORAL at 21:01

## 2025-08-07 ASSESSMENT — PAIN SCALES - GENERAL
PAINLEVEL_OUTOF10: 0
PAINLEVEL_OUTOF10: 0

## 2025-08-08 VITALS
OXYGEN SATURATION: 97 % | DIASTOLIC BLOOD PRESSURE: 70 MMHG | WEIGHT: 231.26 LBS | TEMPERATURE: 97.8 F | HEART RATE: 99 BPM | HEIGHT: 64 IN | BODY MASS INDEX: 39.48 KG/M2 | SYSTOLIC BLOOD PRESSURE: 123 MMHG | RESPIRATION RATE: 20 BRPM

## 2025-08-08 LAB
ANION GAP SERPL CALC-SCNC: 12 MMOL/L (ref 2–14)
BASOPHILS # BLD: 0 K/UL (ref 0–0.1)
BASOPHILS NFR BLD: 0 % (ref 0–1)
BUN SERPL-MCNC: 17 MG/DL (ref 6–20)
BUN/CREAT SERPL: 26 (ref 12–20)
CALCIUM SERPL-MCNC: 9 MG/DL (ref 8.6–10)
CHLORIDE SERPL-SCNC: 96 MMOL/L (ref 98–107)
CO2 SERPL-SCNC: 31 MMOL/L (ref 20–29)
CREAT SERPL-MCNC: 0.65 MG/DL (ref 0.6–1)
DIFFERENTIAL METHOD BLD: ABNORMAL
EOSINOPHIL # BLD: 0.72 K/UL (ref 0–0.4)
EOSINOPHIL NFR BLD: 9 % (ref 0–7)
ERYTHROCYTE [DISTWIDTH] IN BLOOD BY AUTOMATED COUNT: 12.2 % (ref 11.5–14.5)
GLUCOSE SERPL-MCNC: 70 MG/DL (ref 65–100)
HCT VFR BLD AUTO: 33.2 % (ref 35–47)
HGB BLD-MCNC: 10.6 G/DL (ref 11.5–16)
IMM GRANULOCYTES # BLD AUTO: 0 K/UL (ref 0–0.04)
IMM GRANULOCYTES NFR BLD AUTO: 0 % (ref 0–0.5)
LYMPHOCYTES # BLD: 2.08 K/UL (ref 0.8–3.5)
LYMPHOCYTES NFR BLD: 26 % (ref 12–49)
MCH RBC QN AUTO: 31.4 PG (ref 26–34)
MCHC RBC AUTO-ENTMCNC: 31.9 G/DL (ref 30–36.5)
MCV RBC AUTO: 98.2 FL (ref 80–99)
METAMYELOCYTES NFR BLD MANUAL: 1 %
MONOCYTES # BLD: 0.4 K/UL (ref 0–1)
MONOCYTES NFR BLD: 5 % (ref 5–13)
NEUTS SEG # BLD: 4.72 K/UL (ref 1.8–8)
NEUTS SEG NFR BLD: 59 % (ref 32–75)
NRBC # BLD: 0 K/UL (ref 0–0.01)
NRBC BLD-RTO: 0 PER 100 WBC
PLATELET # BLD AUTO: 201 K/UL (ref 150–400)
PMV BLD AUTO: 9 FL (ref 8.9–12.9)
POTASSIUM SERPL-SCNC: 3.8 MMOL/L (ref 3.5–5.1)
RBC # BLD AUTO: 3.38 M/UL (ref 3.8–5.2)
RBC MORPH BLD: ABNORMAL
SODIUM SERPL-SCNC: 139 MMOL/L (ref 136–145)
WBC # BLD AUTO: 8 K/UL (ref 3.6–11)

## 2025-08-08 PROCEDURE — 6370000000 HC RX 637 (ALT 250 FOR IP): Performed by: STUDENT IN AN ORGANIZED HEALTH CARE EDUCATION/TRAINING PROGRAM

## 2025-08-08 PROCEDURE — 36415 COLL VENOUS BLD VENIPUNCTURE: CPT

## 2025-08-08 PROCEDURE — 80048 BASIC METABOLIC PNL TOTAL CA: CPT

## 2025-08-08 PROCEDURE — 94660 CPAP INITIATION&MGMT: CPT

## 2025-08-08 PROCEDURE — 85025 COMPLETE CBC W/AUTO DIFF WBC: CPT

## 2025-08-08 PROCEDURE — 2500000003 HC RX 250 WO HCPCS

## 2025-08-08 PROCEDURE — 6370000000 HC RX 637 (ALT 250 FOR IP)

## 2025-08-08 PROCEDURE — 6370000000 HC RX 637 (ALT 250 FOR IP): Performed by: INTERNAL MEDICINE

## 2025-08-08 RX ORDER — PREDNISONE 10 MG/1
TABLET ORAL
Qty: 5 TABLET | Refills: 0 | Status: SHIPPED | OUTPATIENT
Start: 2025-08-09 | End: 2025-08-18

## 2025-08-08 RX ORDER — ALPRAZOLAM 0.5 MG
1 TABLET ORAL 2 TIMES DAILY PRN
Status: DISCONTINUED | OUTPATIENT
Start: 2025-08-08 | End: 2025-08-08 | Stop reason: HOSPADM

## 2025-08-08 RX ADMIN — SODIUM CHLORIDE, PRESERVATIVE FREE 10 ML: 5 INJECTION INTRAVENOUS at 09:32

## 2025-08-08 RX ADMIN — QUETIAPINE FUMARATE 250 MG: 100 TABLET ORAL at 09:31

## 2025-08-08 RX ADMIN — ALPRAZOLAM 1 MG: 0.5 TABLET ORAL at 10:17

## 2025-08-08 RX ADMIN — GABAPENTIN 600 MG: 300 CAPSULE ORAL at 09:31

## 2025-08-08 RX ADMIN — APIXABAN 5 MG: 5 TABLET, FILM COATED ORAL at 09:31

## 2025-08-08 RX ADMIN — LEVETIRACETAM 500 MG: 500 TABLET, FILM COATED ORAL at 09:31

## 2025-08-08 RX ADMIN — SACUBITRIL AND VALSARTAN 1 TABLET: 24; 26 TABLET, FILM COATED ORAL at 09:31

## 2025-08-08 RX ADMIN — CLONIDINE HYDROCHLORIDE 0.1 MG: 0.1 TABLET ORAL at 09:31

## 2025-08-08 RX ADMIN — PREDNISONE 20 MG: 20 TABLET ORAL at 09:32

## 2025-08-08 RX ADMIN — METHADONE HYDROCHLORIDE 120 MG: 10 CONCENTRATE ORAL at 09:30

## 2025-08-08 RX ADMIN — SERTRALINE 150 MG: 50 TABLET, FILM COATED ORAL at 09:32

## 2025-08-08 RX ADMIN — OXCARBAZEPINE 150 MG: 300 TABLET, FILM COATED ORAL at 09:31

## 2025-08-08 ASSESSMENT — PAIN SCALES - GENERAL
PAINLEVEL_OUTOF10: 0

## 2025-08-13 ENCOUNTER — TRANSCRIBE ORDERS (OUTPATIENT)
Facility: HOSPITAL | Age: 50
End: 2025-08-13

## 2025-08-13 ENCOUNTER — HOSPITAL ENCOUNTER (OUTPATIENT)
Facility: HOSPITAL | Age: 50
Discharge: HOME | End: 2025-08-16
Payer: MEDICAID
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
## 2025-08-13 DIAGNOSIS — R22.31 LOCALIZED SWELLING OF RIGHT UPPER EXTREMITY: Primary | ICD-10-CM

## 2025-08-13 DIAGNOSIS — R22.31 LOCALIZED SWELLING OF RIGHT UPPER EXTREMITY: ICD-10-CM
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
## 2025-08-13 PROCEDURE — 73200 CT UPPER EXTREMITY W/O DYE: CPT

## 2025-08-13 PROCEDURE — 70450 CT HEAD/BRAIN W/O DYE: CPT

## 2025-08-13 PROCEDURE — 72192 CT PELVIS W/O DYE: CPT

## 2025-08-13 RX ORDER — IOPAMIDOL 755 MG/ML
100 INJECTION, SOLUTION INTRAVASCULAR
Status: DISCONTINUED | OUTPATIENT
Start: 2025-08-13 | End: 2025-08-13